# Patient Record
Sex: FEMALE | Race: OTHER | HISPANIC OR LATINO
[De-identification: names, ages, dates, MRNs, and addresses within clinical notes are randomized per-mention and may not be internally consistent; named-entity substitution may affect disease eponyms.]

---

## 2017-04-04 PROBLEM — Z00.00 ENCOUNTER FOR PREVENTIVE HEALTH EXAMINATION: Status: ACTIVE | Noted: 2017-04-04

## 2017-04-26 ENCOUNTER — LABORATORY RESULT (OUTPATIENT)
Age: 72
End: 2017-04-26

## 2017-04-26 ENCOUNTER — APPOINTMENT (OUTPATIENT)
Dept: TRANSPLANT | Facility: CLINIC | Age: 72
End: 2017-04-26

## 2017-04-26 ENCOUNTER — APPOINTMENT (OUTPATIENT)
Dept: NEPHROLOGY | Facility: CLINIC | Age: 72
End: 2017-04-26

## 2017-04-26 ENCOUNTER — OUTPATIENT (OUTPATIENT)
Dept: OUTPATIENT SERVICES | Facility: HOSPITAL | Age: 72
LOS: 1 days | End: 2017-04-26

## 2017-04-26 ENCOUNTER — OUTPATIENT (OUTPATIENT)
Dept: OUTPATIENT SERVICES | Facility: HOSPITAL | Age: 72
LOS: 1 days | End: 2017-04-26
Payer: COMMERCIAL

## 2017-04-26 VITALS
BODY MASS INDEX: 20.34 KG/M2 | HEIGHT: 66.75 IN | WEIGHT: 129.6 LBS | RESPIRATION RATE: 16 BRPM | TEMPERATURE: 98.7 F | DIASTOLIC BLOOD PRESSURE: 90 MMHG | SYSTOLIC BLOOD PRESSURE: 180 MMHG | OXYGEN SATURATION: 98 % | HEART RATE: 76 BPM

## 2017-04-26 VITALS — BODY MASS INDEX: 21.82 KG/M2 | WEIGHT: 129.4 LBS | HEIGHT: 64.75 IN

## 2017-04-26 VITALS
RESPIRATION RATE: 16 BRPM | SYSTOLIC BLOOD PRESSURE: 180 MMHG | DIASTOLIC BLOOD PRESSURE: 90 MMHG | WEIGHT: 129.59 LBS | BODY MASS INDEX: 20.45 KG/M2

## 2017-04-26 DIAGNOSIS — N18.6 END STAGE RENAL DISEASE: ICD-10-CM

## 2017-04-26 DIAGNOSIS — Z63.5 DISRUPTION OF FAMILY BY SEPARATION AND DIVORCE: ICD-10-CM

## 2017-04-26 DIAGNOSIS — Z82.3 FAMILY HISTORY OF STROKE: ICD-10-CM

## 2017-04-26 DIAGNOSIS — Z82.49 FAMILY HISTORY OF ISCHEMIC HEART DISEASE AND OTHER DISEASES OF THE CIRCULATORY SYSTEM: ICD-10-CM

## 2017-04-26 DIAGNOSIS — H40.9 UNSPECIFIED GLAUCOMA: ICD-10-CM

## 2017-04-26 DIAGNOSIS — Z78.9 OTHER SPECIFIED HEALTH STATUS: ICD-10-CM

## 2017-04-26 DIAGNOSIS — Z86.69 PERSONAL HISTORY OF OTHER DISEASES OF THE NERVOUS SYSTEM AND SENSE ORGANS: ICD-10-CM

## 2017-04-26 DIAGNOSIS — Z87.891 PERSONAL HISTORY OF NICOTINE DEPENDENCE: ICD-10-CM

## 2017-04-26 DIAGNOSIS — Z87.448 PERSONAL HISTORY OF OTHER DISEASES OF URINARY SYSTEM: ICD-10-CM

## 2017-04-26 LAB
ALBUMIN SERPL ELPH-MCNC: 4.9 G/DL — SIGNIFICANT CHANGE UP (ref 3.3–5)
ALP SERPL-CCNC: 95 U/L — SIGNIFICANT CHANGE UP (ref 40–120)
ALT FLD-CCNC: 7 U/L — LOW (ref 10–45)
ANION GAP SERPL CALC-SCNC: 21 MMOL/L — HIGH (ref 5–17)
AST SERPL-CCNC: 15 U/L — SIGNIFICANT CHANGE UP (ref 10–40)
BASOPHILS # BLD AUTO: 0.02 K/UL — SIGNIFICANT CHANGE UP (ref 0–0.2)
BASOPHILS NFR BLD AUTO: 0.5 % — SIGNIFICANT CHANGE UP (ref 0–2)
BILIRUB SERPL-MCNC: 0.3 MG/DL — SIGNIFICANT CHANGE UP (ref 0.2–1.2)
BLD GP AB SCN SERPL QL: NEGATIVE — SIGNIFICANT CHANGE UP
BUN SERPL-MCNC: 43 MG/DL — HIGH (ref 7–23)
CALCIUM SERPL-MCNC: 9.6 MG/DL — SIGNIFICANT CHANGE UP (ref 8.4–10.5)
CHLORIDE SERPL-SCNC: 97 MMOL/L — SIGNIFICANT CHANGE UP (ref 96–108)
CO2 SERPL-SCNC: 25 MMOL/L — SIGNIFICANT CHANGE UP (ref 22–31)
CREAT SERPL-MCNC: 6.98 MG/DL — HIGH (ref 0.5–1.3)
EOSINOPHIL # BLD AUTO: 0.14 K/UL — SIGNIFICANT CHANGE UP (ref 0–0.5)
EOSINOPHIL NFR BLD AUTO: 3.5 % — SIGNIFICANT CHANGE UP (ref 0–6)
GLUCOSE SERPL-MCNC: 122 MG/DL — HIGH (ref 70–99)
HAV IGG+IGM SER QL: REACTIVE
HBV CORE AB SER-ACNC: SIGNIFICANT CHANGE UP
HBV SURFACE AB SER-ACNC: 646.8 MIU/ML — SIGNIFICANT CHANGE UP
HBV SURFACE AB SER-ACNC: REACTIVE
HBV SURFACE AG SER-ACNC: SIGNIFICANT CHANGE UP
HCT VFR BLD CALC: 39 % — SIGNIFICANT CHANGE UP (ref 34.5–45)
HCV AB S/CO SERPL IA: 0.35 S/CO — SIGNIFICANT CHANGE UP
HCV AB SERPL-IMP: SIGNIFICANT CHANGE UP
HGB BLD-MCNC: 13.1 G/DL — SIGNIFICANT CHANGE UP (ref 11.5–15.5)
HIV 1+2 AB+HIV1 P24 AG SERPL QL IA: SIGNIFICANT CHANGE UP
IMM GRANULOCYTES NFR BLD AUTO: 0 % — SIGNIFICANT CHANGE UP (ref 0–1.5)
LDH SERPL L TO P-CCNC: 218 U/L — SIGNIFICANT CHANGE UP (ref 50–242)
LYMPHOCYTES # BLD AUTO: 1.32 K/UL — SIGNIFICANT CHANGE UP (ref 1–3.3)
LYMPHOCYTES # BLD AUTO: 33.2 % — SIGNIFICANT CHANGE UP (ref 13–44)
MAGNESIUM SERPL-MCNC: 2.4 MG/DL — SIGNIFICANT CHANGE UP (ref 1.6–2.6)
MCHC RBC-ENTMCNC: 30 PG — SIGNIFICANT CHANGE UP (ref 27–34)
MCHC RBC-ENTMCNC: 33.6 GM/DL — SIGNIFICANT CHANGE UP (ref 32–36)
MCV RBC AUTO: 89.4 FL — SIGNIFICANT CHANGE UP (ref 80–100)
MONOCYTES # BLD AUTO: 0.3 K/UL — SIGNIFICANT CHANGE UP (ref 0–0.9)
MONOCYTES NFR BLD AUTO: 7.6 % — SIGNIFICANT CHANGE UP (ref 2–14)
NEUTROPHILS # BLD AUTO: 2.19 K/UL — SIGNIFICANT CHANGE UP (ref 1.8–7.4)
NEUTROPHILS NFR BLD AUTO: 55.2 % — SIGNIFICANT CHANGE UP (ref 43–77)
PHOSPHATE SERPL-MCNC: 5.4 MG/DL — HIGH (ref 2.5–4.5)
PLATELET # BLD AUTO: 227 K/UL — SIGNIFICANT CHANGE UP (ref 150–400)
POTASSIUM SERPL-MCNC: 4.3 MMOL/L — SIGNIFICANT CHANGE UP (ref 3.5–5.3)
POTASSIUM SERPL-SCNC: 4.3 MMOL/L — SIGNIFICANT CHANGE UP (ref 3.5–5.3)
PROT SERPL-MCNC: 7.9 G/DL — SIGNIFICANT CHANGE UP (ref 6–8.3)
RBC # BLD: 4.36 M/UL — SIGNIFICANT CHANGE UP (ref 3.8–5.2)
RBC # FLD: 15.3 % — HIGH (ref 10.3–14.5)
RH IG SCN BLD-IMP: POSITIVE — SIGNIFICANT CHANGE UP
SODIUM SERPL-SCNC: 143 MMOL/L — SIGNIFICANT CHANGE UP (ref 135–145)
WBC # BLD: 3.97 K/UL — SIGNIFICANT CHANGE UP (ref 3.8–10.5)
WBC # FLD AUTO: 3.97 K/UL — SIGNIFICANT CHANGE UP (ref 3.8–10.5)

## 2017-04-26 PROCEDURE — 86696 HERPES SIMPLEX TYPE 2 TEST: CPT

## 2017-04-26 PROCEDURE — 86695 HERPES SIMPLEX TYPE 1 TEST: CPT

## 2017-04-26 PROCEDURE — 86663 EPSTEIN-BARR ANTIBODY: CPT

## 2017-04-26 PROCEDURE — 87389 HIV-1 AG W/HIV-1&-2 AB AG IA: CPT

## 2017-04-26 PROCEDURE — 83735 ASSAY OF MAGNESIUM: CPT

## 2017-04-26 PROCEDURE — 86664 EPSTEIN-BARR NUCLEAR ANTIGEN: CPT

## 2017-04-26 PROCEDURE — 86644 CMV ANTIBODY: CPT

## 2017-04-26 PROCEDURE — 84100 ASSAY OF PHOSPHORUS: CPT

## 2017-04-26 PROCEDURE — 85027 COMPLETE CBC AUTOMATED: CPT

## 2017-04-26 PROCEDURE — 86706 HEP B SURFACE ANTIBODY: CPT

## 2017-04-26 PROCEDURE — 86901 BLOOD TYPING SEROLOGIC RH(D): CPT

## 2017-04-26 PROCEDURE — 87340 HEPATITIS B SURFACE AG IA: CPT

## 2017-04-26 PROCEDURE — 80053 COMPREHEN METABOLIC PANEL: CPT

## 2017-04-26 PROCEDURE — 86850 RBC ANTIBODY SCREEN: CPT

## 2017-04-26 PROCEDURE — 86777 TOXOPLASMA ANTIBODY: CPT

## 2017-04-26 PROCEDURE — 86787 VARICELLA-ZOSTER ANTIBODY: CPT

## 2017-04-26 PROCEDURE — 86592 SYPHILIS TEST NON-TREP QUAL: CPT

## 2017-04-26 PROCEDURE — 86704 HEP B CORE ANTIBODY TOTAL: CPT

## 2017-04-26 PROCEDURE — 86665 EPSTEIN-BARR CAPSID VCA: CPT

## 2017-04-26 PROCEDURE — 86900 BLOOD TYPING SEROLOGIC ABO: CPT

## 2017-04-26 PROCEDURE — 83036 HEMOGLOBIN GLYCOSYLATED A1C: CPT

## 2017-04-26 PROCEDURE — 86708 HEPATITIS A ANTIBODY: CPT

## 2017-04-26 PROCEDURE — 86762 RUBELLA ANTIBODY: CPT

## 2017-04-26 PROCEDURE — 86803 HEPATITIS C AB TEST: CPT

## 2017-04-26 PROCEDURE — 83615 LACTATE (LD) (LDH) ENZYME: CPT

## 2017-04-26 SDOH — SOCIAL STABILITY - SOCIAL INSECURITY: DISRUPTION OF FAMILY BY SEPARATION AND DIVORCE: Z63.5

## 2017-04-27 LAB
CMV IGG FLD QL: >10 U/ML — HIGH
CMV IGG SERPL-IMP: POSITIVE
EBV EA AB SER IA-ACNC: 19.9 U/ML — HIGH
EBV EA AB TITR SER IF: POSITIVE
EBV EA IGG SER-ACNC: POSITIVE
EBV NA IGG SER IA-ACNC: 527 U/ML — HIGH
EBV PATRN SPEC IB-IMP: SIGNIFICANT CHANGE UP
EBV VCA IGG AVIDITY SER QL IA: POSITIVE
EBV VCA IGM SER IA-ACNC: 273 U/ML — HIGH
EBV VCA IGM SER IA-ACNC: <10 U/ML — SIGNIFICANT CHANGE UP
EBV VCA IGM TITR FLD: NEGATIVE — SIGNIFICANT CHANGE UP
HBA1C BLD-MCNC: 4.9 % — SIGNIFICANT CHANGE UP (ref 4–5.6)
HSV1 IGG SER-ACNC: 39.6 INDEX — HIGH
HSV1 IGG SERPL QL IA: POSITIVE
HSV2 IGG FLD-ACNC: 21.4 INDEX — HIGH
HSV2 IGG SERPL QL IA: POSITIVE
RPR SERPL-ACNC: SIGNIFICANT CHANGE UP
RUBV IGG SER-ACNC: 17.1 INDEX — SIGNIFICANT CHANGE UP
RUBV IGG SER-IMP: POSITIVE — SIGNIFICANT CHANGE UP
T GONDII IGG SER QL: 137 IU/ML — HIGH
T GONDII IGG SER QL: POSITIVE
VZV IGG FLD QL IA: 3037 INDEX — SIGNIFICANT CHANGE UP
VZV IGG FLD QL IA: POSITIVE — SIGNIFICANT CHANGE UP

## 2017-05-02 ENCOUNTER — FORM ENCOUNTER (OUTPATIENT)
Age: 72
End: 2017-05-02

## 2017-05-03 ENCOUNTER — APPOINTMENT (OUTPATIENT)
Dept: ULTRASOUND IMAGING | Facility: HOSPITAL | Age: 72
End: 2017-05-03

## 2017-05-03 ENCOUNTER — OUTPATIENT (OUTPATIENT)
Dept: OUTPATIENT SERVICES | Facility: HOSPITAL | Age: 72
LOS: 1 days | End: 2017-05-03
Payer: COMMERCIAL

## 2017-05-03 DIAGNOSIS — Z01.818 ENCOUNTER FOR OTHER PREPROCEDURAL EXAMINATION: ICD-10-CM

## 2017-05-03 PROCEDURE — 93978 VASCULAR STUDY: CPT

## 2017-05-03 PROCEDURE — 71020: CPT | Mod: 26

## 2017-05-03 PROCEDURE — 93978 VASCULAR STUDY: CPT | Mod: 26

## 2017-05-03 PROCEDURE — 76700 US EXAM ABDOM COMPLETE: CPT | Mod: 26

## 2017-05-03 PROCEDURE — 76700 US EXAM ABDOM COMPLETE: CPT

## 2017-05-03 PROCEDURE — 71046 X-RAY EXAM CHEST 2 VIEWS: CPT

## 2017-05-08 ENCOUNTER — APPOINTMENT (OUTPATIENT)
Dept: GASTROENTEROLOGY | Facility: CLINIC | Age: 72
End: 2017-05-08

## 2017-05-23 ENCOUNTER — INPATIENT (INPATIENT)
Facility: HOSPITAL | Age: 72
LOS: 4 days | Discharge: HOME | DRG: 391 | End: 2017-05-28
Attending: INTERNAL MEDICINE | Admitting: INTERNAL MEDICINE
Payer: MEDICARE

## 2017-05-23 VITALS
SYSTOLIC BLOOD PRESSURE: 209 MMHG | TEMPERATURE: 98 F | HEART RATE: 94 BPM | RESPIRATION RATE: 18 BRPM | OXYGEN SATURATION: 100 % | DIASTOLIC BLOOD PRESSURE: 100 MMHG

## 2017-05-23 DIAGNOSIS — H26.9 UNSPECIFIED CATARACT: Chronic | ICD-10-CM

## 2017-05-23 DIAGNOSIS — N18.6 END STAGE RENAL DISEASE: ICD-10-CM

## 2017-05-23 DIAGNOSIS — S72.009A FRACTURE OF UNSPECIFIED PART OF NECK OF UNSPECIFIED FEMUR, INITIAL ENCOUNTER FOR CLOSED FRACTURE: Chronic | ICD-10-CM

## 2017-05-23 DIAGNOSIS — R11.2 NAUSEA WITH VOMITING, UNSPECIFIED: ICD-10-CM

## 2017-05-23 DIAGNOSIS — Z90.710 ACQUIRED ABSENCE OF BOTH CERVIX AND UTERUS: Chronic | ICD-10-CM

## 2017-05-23 DIAGNOSIS — Z29.9 ENCOUNTER FOR PROPHYLACTIC MEASURES, UNSPECIFIED: ICD-10-CM

## 2017-05-23 DIAGNOSIS — I10 ESSENTIAL (PRIMARY) HYPERTENSION: ICD-10-CM

## 2017-05-23 LAB
ALBUMIN SERPL ELPH-MCNC: 4.3 G/DL — SIGNIFICANT CHANGE UP (ref 3.3–5)
ALP SERPL-CCNC: 97 U/L — SIGNIFICANT CHANGE UP (ref 40–120)
ALT FLD-CCNC: 7 U/L RC — LOW (ref 10–45)
ANION GAP SERPL CALC-SCNC: 26 MMOL/L — HIGH (ref 5–17)
APTT BLD: 33.5 SEC — SIGNIFICANT CHANGE UP (ref 27.5–37.4)
AST SERPL-CCNC: 16 U/L — SIGNIFICANT CHANGE UP (ref 10–40)
BASOPHILS # BLD AUTO: 0 K/UL — SIGNIFICANT CHANGE UP (ref 0–0.2)
BASOPHILS NFR BLD AUTO: 0.1 % — SIGNIFICANT CHANGE UP (ref 0–2)
BILIRUB SERPL-MCNC: 0.3 MG/DL — SIGNIFICANT CHANGE UP (ref 0.2–1.2)
BUN SERPL-MCNC: 70 MG/DL — HIGH (ref 7–23)
CALCIUM SERPL-MCNC: 8.8 MG/DL — SIGNIFICANT CHANGE UP (ref 8.4–10.5)
CHLORIDE SERPL-SCNC: 95 MMOL/L — LOW (ref 96–108)
CO2 SERPL-SCNC: 21 MMOL/L — LOW (ref 22–31)
CREAT SERPL-MCNC: 10.09 MG/DL — HIGH (ref 0.5–1.3)
EOSINOPHIL # BLD AUTO: 0.1 K/UL — SIGNIFICANT CHANGE UP (ref 0–0.5)
EOSINOPHIL NFR BLD AUTO: 1.6 % — SIGNIFICANT CHANGE UP (ref 0–6)
GAS PNL BLDV: SIGNIFICANT CHANGE UP
GLUCOSE SERPL-MCNC: 138 MG/DL — HIGH (ref 70–99)
HCT VFR BLD CALC: 34 % — LOW (ref 34.5–45)
HGB BLD-MCNC: 11.6 G/DL — SIGNIFICANT CHANGE UP (ref 11.5–15.5)
INR BLD: 0.98 RATIO — SIGNIFICANT CHANGE UP (ref 0.88–1.16)
LIDOCAIN IGE QN: 120 U/L — HIGH (ref 7–60)
LYMPHOCYTES # BLD AUTO: 1 K/UL — SIGNIFICANT CHANGE UP (ref 1–3.3)
LYMPHOCYTES # BLD AUTO: 16.3 % — SIGNIFICANT CHANGE UP (ref 13–44)
MAGNESIUM SERPL-MCNC: 2.8 MG/DL — HIGH (ref 1.6–2.6)
MCHC RBC-ENTMCNC: 31.1 PG — SIGNIFICANT CHANGE UP (ref 27–34)
MCHC RBC-ENTMCNC: 34.1 GM/DL — SIGNIFICANT CHANGE UP (ref 32–36)
MCV RBC AUTO: 91.3 FL — SIGNIFICANT CHANGE UP (ref 80–100)
MONOCYTES # BLD AUTO: 0.2 K/UL — SIGNIFICANT CHANGE UP (ref 0–0.9)
MONOCYTES NFR BLD AUTO: 3.1 % — SIGNIFICANT CHANGE UP (ref 2–14)
NEUTROPHILS # BLD AUTO: 4.9 K/UL — SIGNIFICANT CHANGE UP (ref 1.8–7.4)
NEUTROPHILS NFR BLD AUTO: 79 % — HIGH (ref 43–77)
PLATELET # BLD AUTO: 202 K/UL — SIGNIFICANT CHANGE UP (ref 150–400)
POTASSIUM SERPL-MCNC: 4.3 MMOL/L — SIGNIFICANT CHANGE UP (ref 3.5–5.3)
POTASSIUM SERPL-SCNC: 4.3 MMOL/L — SIGNIFICANT CHANGE UP (ref 3.5–5.3)
PROT SERPL-MCNC: 8.1 G/DL — SIGNIFICANT CHANGE UP (ref 6–8.3)
PROTHROM AB SERPL-ACNC: 10.6 SEC — SIGNIFICANT CHANGE UP (ref 9.8–12.7)
RBC # BLD: 3.72 M/UL — LOW (ref 3.8–5.2)
RBC # FLD: 14.3 % — SIGNIFICANT CHANGE UP (ref 10.3–14.5)
SODIUM SERPL-SCNC: 142 MMOL/L — SIGNIFICANT CHANGE UP (ref 135–145)
WBC # BLD: 6.2 K/UL — SIGNIFICANT CHANGE UP (ref 3.8–10.5)
WBC # FLD AUTO: 6.2 K/UL — SIGNIFICANT CHANGE UP (ref 3.8–10.5)

## 2017-05-23 PROCEDURE — 74177 CT ABD & PELVIS W/CONTRAST: CPT | Mod: 26

## 2017-05-23 PROCEDURE — 99223 1ST HOSP IP/OBS HIGH 75: CPT

## 2017-05-23 PROCEDURE — 99285 EMERGENCY DEPT VISIT HI MDM: CPT | Mod: GC

## 2017-05-23 RX ORDER — HYDRALAZINE HCL 50 MG
75 TABLET ORAL ONCE
Qty: 0 | Refills: 0 | Status: COMPLETED | OUTPATIENT
Start: 2017-05-23 | End: 2017-05-23

## 2017-05-23 RX ORDER — AMLODIPINE BESYLATE 2.5 MG/1
10 TABLET ORAL DAILY
Qty: 0 | Refills: 0 | Status: DISCONTINUED | OUTPATIENT
Start: 2017-05-23 | End: 2017-05-28

## 2017-05-23 RX ORDER — ACETAMINOPHEN 500 MG
650 TABLET ORAL EVERY 6 HOURS
Qty: 0 | Refills: 0 | Status: DISCONTINUED | OUTPATIENT
Start: 2017-05-23 | End: 2017-05-28

## 2017-05-23 RX ORDER — HEPARIN SODIUM 5000 [USP'U]/ML
5000 INJECTION INTRAVENOUS; SUBCUTANEOUS EVERY 12 HOURS
Qty: 0 | Refills: 0 | Status: DISCONTINUED | OUTPATIENT
Start: 2017-05-23 | End: 2017-05-28

## 2017-05-23 RX ORDER — HYDRALAZINE HCL 50 MG
0 TABLET ORAL
Qty: 0 | Refills: 0 | COMMUNITY

## 2017-05-23 RX ORDER — ONDANSETRON 8 MG/1
4 TABLET, FILM COATED ORAL ONCE
Qty: 0 | Refills: 0 | Status: COMPLETED | OUTPATIENT
Start: 2017-05-23 | End: 2017-05-23

## 2017-05-23 RX ORDER — HYDRALAZINE HCL 50 MG
75 TABLET ORAL THREE TIMES A DAY
Qty: 0 | Refills: 0 | Status: DISCONTINUED | OUTPATIENT
Start: 2017-05-23 | End: 2017-05-26

## 2017-05-23 RX ORDER — AMLODIPINE BESYLATE 2.5 MG/1
0 TABLET ORAL
Qty: 0 | Refills: 0 | COMMUNITY

## 2017-05-23 RX ORDER — CALCIUM ACETATE 667 MG
2001 TABLET ORAL
Qty: 0 | Refills: 0 | Status: DISCONTINUED | OUTPATIENT
Start: 2017-05-23 | End: 2017-05-28

## 2017-05-23 RX ADMIN — HEPARIN SODIUM 5000 UNIT(S): 5000 INJECTION INTRAVENOUS; SUBCUTANEOUS at 21:56

## 2017-05-23 RX ADMIN — Medication 30 MILLILITER(S): at 12:03

## 2017-05-23 RX ADMIN — Medication 75 MILLIGRAM(S): at 21:56

## 2017-05-23 RX ADMIN — ONDANSETRON 4 MILLIGRAM(S): 8 TABLET, FILM COATED ORAL at 12:03

## 2017-05-23 RX ADMIN — Medication 75 MILLIGRAM(S): at 12:03

## 2017-05-23 NOTE — ED PROCEDURE NOTE - CPROC ED POST PROC CARE GUIDE1
Verbal/written post procedure instructions were given to patient/caregiver./Keep the cast/splint/dressing clean and dry.

## 2017-05-23 NOTE — H&P ADULT. - HISTORY OF PRESENT ILLNESS
Initial ED vitals--Afebrile HR 94 209/100, 18 100%   Initial labs notable for WBC 6.2 Hb 11.6 Plt 202; INR 0.96 BMP Cr 10.09 K 4.3 Mg 2.8; VBG with negative lactate 1.5   Imaging notable for CT Abd/Pelvis with submucosal fatty deposition in the descending colon and wall thickening transverse colon with evidence of acute on chronic colitis.     In the ED, she received Hydralazine 75mg PO x1, Zofran 4mg x1 and Maalox.     Admitted to medicine for further evaluation. 72 yo woman w/PMHx HTN, glaucoma, and ESRD on HD (Tues, Thurs, Sat) presenting with nausea and vomiting for 1 day. She reports that when she woke up this am at 7am she felt malaise. She then noted nausea when she took a few bites of toast. She had coffee and felt persistently nauseous. Her BP at that time 184/100 and she attributed her symptoms to her elevated blood pressure. She took her BP med and went to lay down. After resting for ~30mins, she reports that she vomited nonbloody nonbilious emesis mainly the coffee she had drank earlier. Associated with moderate "gas like pains." No diarrhea/constipation, sick contacts, or recent travel. She reports that yesterday morning she tried quail eggs for the first time. Denied any fevers/chills, CP, SOB, skin changes or dysuria. She reports that she still makes scant urine.     Initial ED vitals--Afebrile HR 94 209/100, 18 100%   Initial labs notable for WBC 6.2 Hb 11.6 Plt 202; INR 0.96 BMP Cr 10.09 K 4.3 Mg 2.8; VBG with negative lactate 1.5   Imaging notable for CT Abd/Pelvis with submucosal fatty deposition in the descending colon and wall thickening transverse colon with evidence of acute on chronic colitis.     In the ED, she received Hydralazine 75mg PO x1, Zofran 4mg x1 and Maalox.     Admitted to medicine for further evaluation.

## 2017-05-23 NOTE — H&P ADULT. - PROBLEM SELECTOR PLAN 3
- Renal consulted, seen in HD, recs appreciated  - Will start phoslo 3 tabs TID; check phos in AM   - Monitor volume status and lytes daily

## 2017-05-23 NOTE — ED ADULT NURSE REASSESSMENT NOTE - NS ED NURSE REASSESS COMMENT FT1
Report given to Beevrley MARINO - DIALYSIS. Pt is resting comfortably in bed. NAD noted. Son at bedside.

## 2017-05-23 NOTE — ED PROVIDER NOTE - MEDICAL DECISION MAKING DETAILS
71F on HD from chronic HTN presents with gastroenteritis type symptoms on day of dialysis. Denies chest pain, shortness of breath, headache. Will obtain blood, and control symptoms. Will need dialysis set up today. Rambo Cook, Resident. Woods: 71F on HD from chronic HTN presents with gastroenteritis type symptoms on day of dialysis. Denies chest pain, shortness of breath, headache. Will obtain blood, and control symptoms. Will need dialysis set up today. will check labs.

## 2017-05-23 NOTE — ED PROVIDER NOTE - OBJECTIVE STATEMENT
71F history of HTN, HD (T-TH-Sat) presents with vomiting (nbnb) and hypertension associated with gas-like epigastric pains. Denies fevers/chills, vision changes, headache, chest pain, shortness of breath, bowel/bladder changes. Still makes urine.     Nepho: Dipesh ([address])  PMD: Kourtney Tabares

## 2017-05-23 NOTE — H&P ADULT. - ASSESSMENT
71F w/HTN, glaucoma, and ESRD on HD (Tues, Thurs, Sat) presenting with nausea and vomiting for 1 day likely gastroenteritis admitted to medicine for further evaluation.

## 2017-05-23 NOTE — H&P ADULT. - RADIOLOGY RESULTS AND INTERPRETATION
Personally reviewed imaging. Imaging notable for CT Abd/Pelvis with submucosal fatty deposition in the descending colon and wall thickening transverse colon with evidence of acute on chronic colitis.

## 2017-05-23 NOTE — H&P ADULT. - PROBLEM SELECTOR PLAN 1
symptoms either related to gastroenteritis vs. acute on chronic colitis (no diarrhea). Afebrile, no leukocytosis. Symptoms improved, no longer feeling nauseous or having any abdominal pain.   - Dose Zofran if needed, Monitor QTc 499 on admission   - Monitor symptoms

## 2017-05-23 NOTE — ED ADULT NURSE REASSESSMENT NOTE - NS ED NURSE REASSESS COMMENT FT1
Pt states partial relief. Pt has not vomited since arrival. NAD noted. Pt is hypertensive - MD aware. Family at bedside. Plan of care discussed w/ pt and family.

## 2017-05-23 NOTE — ED PROVIDER NOTE - PROGRESS NOTE DETAILS
Spoke with house dialysis fellow who stated that pt's MD are private and need to be contacted to set up HD. She provided the numbers for the MDs but when called, no answer and mailbox full. Office contacted and left message with NP who also did not answer. Service line called and left message for Dr. Treviño. Rambo Cook, Resident. Dr. Treviño called and states that his partner, Dr. Lloyd is on call. He will notify Dr. Lloyd that patient needs dialysis today and took my number to give to Dr. Lloyd. Rambo Cook, Resident. Dr. Lloyd called back and will notify dialysis that patient needs to be dialyzed. Rambo Cook, Resident. Pt's PMD called and message sent for call back. Rambo Cook, Resident.

## 2017-05-23 NOTE — H&P ADULT. - PROBLEM SELECTOR PLAN 2
- c/w Hydralazine 75mg TID and Amlodipine 10mg qd   - Trend BP closely and adjust antihypertensives as needed

## 2017-05-23 NOTE — H&P ADULT. - LAB RESULTS AND INTERPRETATION
Personally reviewed labs. Initial labs notable for WBC 6.2 Hb 11.6 Plt 202; INR 0.96 BMP Cr 10.09 K 4.3 Mg 2.8; VBG with negative lactate 1.5

## 2017-05-23 NOTE — ED ADULT NURSE NOTE - OBJECTIVE STATEMENT
72yo female pt AxOx3 ambulatory to ED c/o N/V prior to going to dialysis today. Pt denies CP/SOB/diarrhea/fever/chills. +epigastric pain/burning. Abd soft/NT/ND/+BSx4. LAV fistula. +thrills/bruit, no signs of redness/swelling/edema noted to site. Pt is hypertensive. B/L lungs CTA. No edema noted to extremities. #20G R thumb, labs drawn and sent. Family at bedside. MD at bedside for eval. Safety maintained.

## 2017-05-24 ENCOUNTER — APPOINTMENT (OUTPATIENT)
Dept: CARDIOLOGY | Facility: CLINIC | Age: 72
End: 2017-05-24

## 2017-05-24 LAB
ANION GAP SERPL CALC-SCNC: 18 MMOL/L — HIGH (ref 5–17)
APPEARANCE UR: ABNORMAL
BACTERIA # UR AUTO: ABNORMAL /HPF
BASOPHILS # BLD AUTO: 0.01 K/UL — SIGNIFICANT CHANGE UP (ref 0–0.2)
BASOPHILS NFR BLD AUTO: 0.3 % — SIGNIFICANT CHANGE UP (ref 0–2)
BILIRUB UR-MCNC: NEGATIVE — SIGNIFICANT CHANGE UP
BUN SERPL-MCNC: 29 MG/DL — HIGH (ref 7–23)
CALCIUM SERPL-MCNC: 8.2 MG/DL — LOW (ref 8.4–10.5)
CHLORIDE SERPL-SCNC: 95 MMOL/L — LOW (ref 96–108)
CO2 SERPL-SCNC: 26 MMOL/L — SIGNIFICANT CHANGE UP (ref 22–31)
COLOR SPEC: SIGNIFICANT CHANGE UP
CREAT SERPL-MCNC: 5.82 MG/DL — HIGH (ref 0.5–1.3)
DIFF PNL FLD: ABNORMAL
EOSINOPHIL # BLD AUTO: 0.14 K/UL — SIGNIFICANT CHANGE UP (ref 0–0.5)
EOSINOPHIL NFR BLD AUTO: 3.8 % — SIGNIFICANT CHANGE UP (ref 0–6)
EPI CELLS # UR: SIGNIFICANT CHANGE UP /HPF
GLUCOSE SERPL-MCNC: 88 MG/DL — SIGNIFICANT CHANGE UP (ref 70–99)
GLUCOSE UR QL: 250
HCT VFR BLD CALC: 31.9 % — LOW (ref 34.5–45)
HGB BLD-MCNC: 10.2 G/DL — LOW (ref 11.5–15.5)
HYALINE CASTS # UR AUTO: ABNORMAL
IMM GRANULOCYTES NFR BLD AUTO: 0.3 % — SIGNIFICANT CHANGE UP (ref 0–1.5)
KETONES UR-MCNC: NEGATIVE — SIGNIFICANT CHANGE UP
LEUKOCYTE ESTERASE UR-ACNC: NEGATIVE — SIGNIFICANT CHANGE UP
LYMPHOCYTES # BLD AUTO: 0.91 K/UL — LOW (ref 1–3.3)
LYMPHOCYTES # BLD AUTO: 24.9 % — SIGNIFICANT CHANGE UP (ref 13–44)
MAGNESIUM SERPL-MCNC: 2.7 MG/DL — HIGH (ref 1.6–2.6)
MCHC RBC-ENTMCNC: 28.9 PG — SIGNIFICANT CHANGE UP (ref 27–34)
MCHC RBC-ENTMCNC: 32 GM/DL — SIGNIFICANT CHANGE UP (ref 32–36)
MCV RBC AUTO: 90.4 FL — SIGNIFICANT CHANGE UP (ref 80–100)
MONOCYTES # BLD AUTO: 0.29 K/UL — SIGNIFICANT CHANGE UP (ref 0–0.9)
MONOCYTES NFR BLD AUTO: 7.9 % — SIGNIFICANT CHANGE UP (ref 2–14)
NEUTROPHILS # BLD AUTO: 2.29 K/UL — SIGNIFICANT CHANGE UP (ref 1.8–7.4)
NEUTROPHILS NFR BLD AUTO: 62.8 % — SIGNIFICANT CHANGE UP (ref 43–77)
NITRITE UR-MCNC: NEGATIVE — SIGNIFICANT CHANGE UP
PH UR: 8.5 — HIGH (ref 5–8)
PHOSPHATE SERPL-MCNC: 5.7 MG/DL — HIGH (ref 2.5–4.5)
PLATELET # BLD AUTO: 221 K/UL — SIGNIFICANT CHANGE UP (ref 150–400)
POTASSIUM SERPL-MCNC: 4.5 MMOL/L — SIGNIFICANT CHANGE UP (ref 3.5–5.3)
POTASSIUM SERPL-SCNC: 4.5 MMOL/L — SIGNIFICANT CHANGE UP (ref 3.5–5.3)
PROT UR-MCNC: >600 MG/DL
RBC # BLD: 3.53 M/UL — LOW (ref 3.8–5.2)
RBC # FLD: 15.2 % — HIGH (ref 10.3–14.5)
RBC CASTS # UR COMP ASSIST: SIGNIFICANT CHANGE UP /HPF (ref 0–2)
SODIUM SERPL-SCNC: 139 MMOL/L — SIGNIFICANT CHANGE UP (ref 135–145)
SP GR SPEC: 1.01 — SIGNIFICANT CHANGE UP (ref 1.01–1.02)
UROBILINOGEN FLD QL: NEGATIVE — SIGNIFICANT CHANGE UP
WBC # BLD: 3.65 K/UL — LOW (ref 3.8–10.5)
WBC # FLD AUTO: 3.65 K/UL — LOW (ref 3.8–10.5)
WBC UR QL: SIGNIFICANT CHANGE UP /HPF (ref 0–5)

## 2017-05-24 RX ORDER — ONDANSETRON 8 MG/1
4 TABLET, FILM COATED ORAL ONCE
Qty: 0 | Refills: 0 | Status: COMPLETED | OUTPATIENT
Start: 2017-05-24 | End: 2017-05-24

## 2017-05-24 RX ORDER — CIPROFLOXACIN LACTATE 400MG/40ML
200 VIAL (ML) INTRAVENOUS EVERY 24 HOURS
Qty: 0 | Refills: 0 | Status: DISCONTINUED | OUTPATIENT
Start: 2017-05-25 | End: 2017-05-26

## 2017-05-24 RX ORDER — PANTOPRAZOLE SODIUM 20 MG/1
40 TABLET, DELAYED RELEASE ORAL
Qty: 0 | Refills: 0 | Status: DISCONTINUED | OUTPATIENT
Start: 2017-05-24 | End: 2017-05-26

## 2017-05-24 RX ORDER — METRONIDAZOLE 500 MG
500 TABLET ORAL EVERY 8 HOURS
Qty: 0 | Refills: 0 | Status: DISCONTINUED | OUTPATIENT
Start: 2017-05-24 | End: 2017-05-26

## 2017-05-24 RX ORDER — CIPROFLOXACIN LACTATE 400MG/40ML
200 VIAL (ML) INTRAVENOUS ONCE
Qty: 0 | Refills: 0 | Status: COMPLETED | OUTPATIENT
Start: 2017-05-24 | End: 2017-05-24

## 2017-05-24 RX ORDER — METRONIDAZOLE 500 MG
TABLET ORAL
Qty: 0 | Refills: 0 | Status: DISCONTINUED | OUTPATIENT
Start: 2017-05-24 | End: 2017-05-26

## 2017-05-24 RX ORDER — CIPROFLOXACIN LACTATE 400MG/40ML
VIAL (ML) INTRAVENOUS
Qty: 0 | Refills: 0 | Status: DISCONTINUED | OUTPATIENT
Start: 2017-05-24 | End: 2017-05-26

## 2017-05-24 RX ORDER — SOD SULF/SODIUM/NAHCO3/KCL/PEG
4000 SOLUTION, RECONSTITUTED, ORAL ORAL ONCE
Qty: 0 | Refills: 0 | Status: COMPLETED | OUTPATIENT
Start: 2017-05-25 | End: 2017-05-25

## 2017-05-24 RX ORDER — METRONIDAZOLE 500 MG
500 TABLET ORAL ONCE
Qty: 0 | Refills: 0 | Status: COMPLETED | OUTPATIENT
Start: 2017-05-24 | End: 2017-05-24

## 2017-05-24 RX ADMIN — AMLODIPINE BESYLATE 10 MILLIGRAM(S): 2.5 TABLET ORAL at 05:31

## 2017-05-24 RX ADMIN — ONDANSETRON 4 MILLIGRAM(S): 8 TABLET, FILM COATED ORAL at 18:13

## 2017-05-24 RX ADMIN — Medication 2001 MILLIGRAM(S): at 11:53

## 2017-05-24 RX ADMIN — Medication 100 MILLIGRAM(S): at 13:36

## 2017-05-24 RX ADMIN — Medication 75 MILLIGRAM(S): at 13:36

## 2017-05-24 RX ADMIN — Medication 75 MILLIGRAM(S): at 21:38

## 2017-05-24 RX ADMIN — Medication 2001 MILLIGRAM(S): at 17:36

## 2017-05-24 RX ADMIN — HEPARIN SODIUM 5000 UNIT(S): 5000 INJECTION INTRAVENOUS; SUBCUTANEOUS at 17:36

## 2017-05-24 RX ADMIN — Medication 100 MILLIGRAM(S): at 21:38

## 2017-05-24 RX ADMIN — Medication 75 MILLIGRAM(S): at 05:31

## 2017-05-24 RX ADMIN — Medication 2001 MILLIGRAM(S): at 07:55

## 2017-05-24 RX ADMIN — HEPARIN SODIUM 5000 UNIT(S): 5000 INJECTION INTRAVENOUS; SUBCUTANEOUS at 05:30

## 2017-05-25 LAB
ANION GAP SERPL CALC-SCNC: 17 MMOL/L — SIGNIFICANT CHANGE UP (ref 5–17)
BUN SERPL-MCNC: 46 MG/DL — HIGH (ref 7–23)
CALCIUM SERPL-MCNC: 8.5 MG/DL — SIGNIFICANT CHANGE UP (ref 8.4–10.5)
CHLORIDE SERPL-SCNC: 97 MMOL/L — SIGNIFICANT CHANGE UP (ref 96–108)
CO2 SERPL-SCNC: 24 MMOL/L — SIGNIFICANT CHANGE UP (ref 22–31)
CREAT SERPL-MCNC: 7.93 MG/DL — HIGH (ref 0.5–1.3)
GLUCOSE SERPL-MCNC: 85 MG/DL — SIGNIFICANT CHANGE UP (ref 70–99)
HCT VFR BLD CALC: 29.9 % — LOW (ref 34.5–45)
HGB BLD-MCNC: 9.9 G/DL — LOW (ref 11.5–15.5)
MCHC RBC-ENTMCNC: 29.6 PG — SIGNIFICANT CHANGE UP (ref 27–34)
MCHC RBC-ENTMCNC: 33.1 GM/DL — SIGNIFICANT CHANGE UP (ref 32–36)
MCV RBC AUTO: 89.5 FL — SIGNIFICANT CHANGE UP (ref 80–100)
PLATELET # BLD AUTO: 202 K/UL — SIGNIFICANT CHANGE UP (ref 150–400)
POTASSIUM SERPL-MCNC: 4.7 MMOL/L — SIGNIFICANT CHANGE UP (ref 3.5–5.3)
POTASSIUM SERPL-SCNC: 4.7 MMOL/L — SIGNIFICANT CHANGE UP (ref 3.5–5.3)
RBC # BLD: 3.34 M/UL — LOW (ref 3.8–5.2)
RBC # FLD: 15.1 % — HIGH (ref 10.3–14.5)
SODIUM SERPL-SCNC: 138 MMOL/L — SIGNIFICANT CHANGE UP (ref 135–145)
WBC # BLD: 3.26 K/UL — LOW (ref 3.8–10.5)
WBC # FLD AUTO: 3.26 K/UL — LOW (ref 3.8–10.5)

## 2017-05-25 RX ORDER — ERYTHROPOIETIN 10000 [IU]/ML
4000 INJECTION, SOLUTION INTRAVENOUS; SUBCUTANEOUS
Qty: 0 | Refills: 0 | Status: DISCONTINUED | OUTPATIENT
Start: 2017-05-25 | End: 2017-05-28

## 2017-05-25 RX ADMIN — AMLODIPINE BESYLATE 10 MILLIGRAM(S): 2.5 TABLET ORAL at 05:18

## 2017-05-25 RX ADMIN — Medication 100 MILLIGRAM(S): at 17:22

## 2017-05-25 RX ADMIN — HEPARIN SODIUM 5000 UNIT(S): 5000 INJECTION INTRAVENOUS; SUBCUTANEOUS at 05:18

## 2017-05-25 RX ADMIN — ERYTHROPOIETIN 4000 UNIT(S): 10000 INJECTION, SOLUTION INTRAVENOUS; SUBCUTANEOUS at 14:15

## 2017-05-25 RX ADMIN — PANTOPRAZOLE SODIUM 40 MILLIGRAM(S): 20 TABLET, DELAYED RELEASE ORAL at 05:18

## 2017-05-25 RX ADMIN — Medication 4000 MILLILITER(S): at 19:23

## 2017-05-25 RX ADMIN — Medication 75 MILLIGRAM(S): at 05:18

## 2017-05-25 RX ADMIN — Medication 75 MILLIGRAM(S): at 22:01

## 2017-05-25 RX ADMIN — PANTOPRAZOLE SODIUM 40 MILLIGRAM(S): 20 TABLET, DELAYED RELEASE ORAL at 17:22

## 2017-05-25 RX ADMIN — Medication 100 MILLIGRAM(S): at 05:18

## 2017-05-25 RX ADMIN — HEPARIN SODIUM 5000 UNIT(S): 5000 INJECTION INTRAVENOUS; SUBCUTANEOUS at 17:22

## 2017-05-26 ENCOUNTER — RESULT REVIEW (OUTPATIENT)
Age: 72
End: 2017-05-26

## 2017-05-26 LAB
ANION GAP SERPL CALC-SCNC: 17 MMOL/L — SIGNIFICANT CHANGE UP (ref 5–17)
ANION GAP SERPL CALC-SCNC: 19 MMOL/L — HIGH (ref 5–17)
BUN SERPL-MCNC: 17 MG/DL — SIGNIFICANT CHANGE UP (ref 7–23)
BUN SERPL-MCNC: 19 MG/DL — SIGNIFICANT CHANGE UP (ref 7–23)
CALCIUM SERPL-MCNC: 8.8 MG/DL — SIGNIFICANT CHANGE UP (ref 8.4–10.5)
CALCIUM SERPL-MCNC: 9.5 MG/DL — SIGNIFICANT CHANGE UP (ref 8.4–10.5)
CHLORIDE SERPL-SCNC: 93 MMOL/L — LOW (ref 96–108)
CHLORIDE SERPL-SCNC: 95 MMOL/L — LOW (ref 96–108)
CK MB BLD-MCNC: 2.9 % — SIGNIFICANT CHANGE UP (ref 0–3.5)
CK MB CFR SERPL CALC: 1.7 NG/ML — SIGNIFICANT CHANGE UP (ref 0–3.8)
CK SERPL-CCNC: 59 U/L — SIGNIFICANT CHANGE UP (ref 25–170)
CO2 SERPL-SCNC: 24 MMOL/L — SIGNIFICANT CHANGE UP (ref 22–31)
CO2 SERPL-SCNC: 26 MMOL/L — SIGNIFICANT CHANGE UP (ref 22–31)
CREAT SERPL-MCNC: 5.14 MG/DL — HIGH (ref 0.5–1.3)
CREAT SERPL-MCNC: 6.26 MG/DL — HIGH (ref 0.5–1.3)
GLUCOSE SERPL-MCNC: 116 MG/DL — HIGH (ref 70–99)
GLUCOSE SERPL-MCNC: 86 MG/DL — SIGNIFICANT CHANGE UP (ref 70–99)
HCT VFR BLD CALC: 29.8 % — LOW (ref 34.5–45)
HCT VFR BLD CALC: 35 % — SIGNIFICANT CHANGE UP (ref 34.5–45)
HGB BLD-MCNC: 11.6 G/DL — SIGNIFICANT CHANGE UP (ref 11.5–15.5)
HGB BLD-MCNC: 9.8 G/DL — LOW (ref 11.5–15.5)
LACTATE SERPL-SCNC: 1 MMOL/L — SIGNIFICANT CHANGE UP (ref 0.7–2)
MAGNESIUM SERPL-MCNC: 2.3 MG/DL — SIGNIFICANT CHANGE UP (ref 1.6–2.6)
MCHC RBC-ENTMCNC: 29.2 PG — SIGNIFICANT CHANGE UP (ref 27–34)
MCHC RBC-ENTMCNC: 30.2 PG — SIGNIFICANT CHANGE UP (ref 27–34)
MCHC RBC-ENTMCNC: 32.9 GM/DL — SIGNIFICANT CHANGE UP (ref 32–36)
MCHC RBC-ENTMCNC: 33.1 GM/DL — SIGNIFICANT CHANGE UP (ref 32–36)
MCV RBC AUTO: 88.7 FL — SIGNIFICANT CHANGE UP (ref 80–100)
MCV RBC AUTO: 91.2 FL — SIGNIFICANT CHANGE UP (ref 80–100)
PHOSPHATE SERPL-MCNC: 3.8 MG/DL — SIGNIFICANT CHANGE UP (ref 2.5–4.5)
PLATELET # BLD AUTO: 194 K/UL — SIGNIFICANT CHANGE UP (ref 150–400)
PLATELET # BLD AUTO: 199 K/UL — SIGNIFICANT CHANGE UP (ref 150–400)
POTASSIUM SERPL-MCNC: 4.2 MMOL/L — SIGNIFICANT CHANGE UP (ref 3.5–5.3)
POTASSIUM SERPL-MCNC: 4.5 MMOL/L — SIGNIFICANT CHANGE UP (ref 3.5–5.3)
POTASSIUM SERPL-SCNC: 4.2 MMOL/L — SIGNIFICANT CHANGE UP (ref 3.5–5.3)
POTASSIUM SERPL-SCNC: 4.5 MMOL/L — SIGNIFICANT CHANGE UP (ref 3.5–5.3)
RBC # BLD: 3.36 M/UL — LOW (ref 3.8–5.2)
RBC # BLD: 3.84 M/UL — SIGNIFICANT CHANGE UP (ref 3.8–5.2)
RBC # FLD: 14.1 % — SIGNIFICANT CHANGE UP (ref 10.3–14.5)
RBC # FLD: 15 % — HIGH (ref 10.3–14.5)
SODIUM SERPL-SCNC: 136 MMOL/L — SIGNIFICANT CHANGE UP (ref 135–145)
SODIUM SERPL-SCNC: 138 MMOL/L — SIGNIFICANT CHANGE UP (ref 135–145)
TROPONIN T SERPL-MCNC: 0.03 NG/ML — SIGNIFICANT CHANGE UP (ref 0–0.06)
WBC # BLD: 3.22 K/UL — LOW (ref 3.8–10.5)
WBC # BLD: 3.8 K/UL — SIGNIFICANT CHANGE UP (ref 3.8–10.5)
WBC # FLD AUTO: 3.22 K/UL — LOW (ref 3.8–10.5)
WBC # FLD AUTO: 3.8 K/UL — SIGNIFICANT CHANGE UP (ref 3.8–10.5)

## 2017-05-26 PROCEDURE — 31505 DIAGNOSTIC LARYNGOSCOPY: CPT

## 2017-05-26 PROCEDURE — 88312 SPECIAL STAINS GROUP 1: CPT | Mod: 26

## 2017-05-26 PROCEDURE — 71010: CPT | Mod: 26

## 2017-05-26 PROCEDURE — 99222 1ST HOSP IP/OBS MODERATE 55: CPT | Mod: 25

## 2017-05-26 PROCEDURE — 88305 TISSUE EXAM BY PATHOLOGIST: CPT | Mod: 26

## 2017-05-26 PROCEDURE — 93010 ELECTROCARDIOGRAM REPORT: CPT

## 2017-05-26 RX ORDER — DEXAMETHASONE 0.5 MG/5ML
10 ELIXIR ORAL EVERY 8 HOURS
Qty: 0 | Refills: 0 | Status: COMPLETED | OUTPATIENT
Start: 2017-05-27 | End: 2017-05-27

## 2017-05-26 RX ORDER — HYDRALAZINE HCL 50 MG
10 TABLET ORAL ONCE
Qty: 0 | Refills: 0 | Status: COMPLETED | OUTPATIENT
Start: 2017-05-26 | End: 2017-05-26

## 2017-05-26 RX ORDER — FAMOTIDINE 10 MG/ML
20 INJECTION INTRAVENOUS ONCE
Qty: 0 | Refills: 0 | Status: COMPLETED | OUTPATIENT
Start: 2017-05-26 | End: 2017-05-26

## 2017-05-26 RX ORDER — DIPHENHYDRAMINE HCL 50 MG
25 CAPSULE ORAL ONCE
Qty: 0 | Refills: 0 | Status: DISCONTINUED | OUTPATIENT
Start: 2017-05-26 | End: 2017-05-26

## 2017-05-26 RX ORDER — ONDANSETRON 8 MG/1
4 TABLET, FILM COATED ORAL ONCE
Qty: 0 | Refills: 0 | Status: COMPLETED | OUTPATIENT
Start: 2017-05-26 | End: 2017-05-26

## 2017-05-26 RX ORDER — PANTOPRAZOLE SODIUM 20 MG/1
40 TABLET, DELAYED RELEASE ORAL
Qty: 0 | Refills: 0 | Status: DISCONTINUED | OUTPATIENT
Start: 2017-05-26 | End: 2017-05-28

## 2017-05-26 RX ORDER — HYDRALAZINE HCL 50 MG
100 TABLET ORAL
Qty: 0 | Refills: 0 | Status: DISCONTINUED | OUTPATIENT
Start: 2017-05-26 | End: 2017-05-28

## 2017-05-26 RX ADMIN — Medication 100 MILLIGRAM(S): at 17:49

## 2017-05-26 RX ADMIN — HEPARIN SODIUM 5000 UNIT(S): 5000 INJECTION INTRAVENOUS; SUBCUTANEOUS at 05:23

## 2017-05-26 RX ADMIN — HEPARIN SODIUM 5000 UNIT(S): 5000 INJECTION INTRAVENOUS; SUBCUTANEOUS at 17:49

## 2017-05-26 RX ADMIN — Medication 100 MILLIGRAM(S): at 17:53

## 2017-05-26 RX ADMIN — Medication 75 MILLIGRAM(S): at 05:23

## 2017-05-26 RX ADMIN — FAMOTIDINE 20 MILLIGRAM(S): 10 INJECTION INTRAVENOUS at 19:52

## 2017-05-26 RX ADMIN — Medication 100 MILLIGRAM(S): at 01:36

## 2017-05-26 RX ADMIN — AMLODIPINE BESYLATE 10 MILLIGRAM(S): 2.5 TABLET ORAL at 05:23

## 2017-05-26 RX ADMIN — PANTOPRAZOLE SODIUM 40 MILLIGRAM(S): 20 TABLET, DELAYED RELEASE ORAL at 05:23

## 2017-05-26 RX ADMIN — ONDANSETRON 4 MILLIGRAM(S): 8 TABLET, FILM COATED ORAL at 22:03

## 2017-05-26 RX ADMIN — Medication 100 MILLIGRAM(S): at 09:07

## 2017-05-26 RX ADMIN — Medication 100 MILLIGRAM(S): at 13:52

## 2017-05-26 RX ADMIN — Medication 2001 MILLIGRAM(S): at 17:49

## 2017-05-26 RX ADMIN — Medication 10 MILLIGRAM(S): at 19:50

## 2017-05-26 NOTE — PROVIDER CONTACT NOTE (OTHER) - SITUATION
Pt was eating her dinner when her family came out and said pt was complaining of difficulty breathing, neck swelling

## 2017-05-26 NOTE — PROVIDER CONTACT NOTE (OTHER) - BACKGROUND
Hx ESRD, HTN, hysterectomy, 5/23- N/V, colitis, 5/26- endoscopy/colonoscopy with biopsy, and polyptectomy

## 2017-05-26 NOTE — PROVIDER CONTACT NOTE (OTHER) - ACTION/TREATMENT ORDERED:
RRT called, stat CBC, cardiac enzymes, BNP, Lactate, EKG, chest x-ray. Pt also put on humidified oxygen and pt sent to Telemetry unit

## 2017-05-26 NOTE — PROVIDER CONTACT NOTE (OTHER) - ASSESSMENT
Pt BP and HR elevated. RRT called. oxygen level normal on room air. pt complaining of difficulty breathing and neck swelling up. Pt BP and HR elevated. AG Anthony was with me when pt started complaining of diffculty breathing and neck swelling. RRT called. oxygen level normal on room air. pt complaining of difficulty breathing and neck swelling up.

## 2017-05-27 LAB
ANION GAP SERPL CALC-SCNC: 21 MMOL/L — HIGH (ref 5–17)
BUN SERPL-MCNC: 26 MG/DL — HIGH (ref 7–23)
CALCIUM SERPL-MCNC: 9.7 MG/DL — SIGNIFICANT CHANGE UP (ref 8.4–10.5)
CHLORIDE SERPL-SCNC: 94 MMOL/L — LOW (ref 96–108)
CO2 SERPL-SCNC: 20 MMOL/L — LOW (ref 22–31)
CREAT SERPL-MCNC: 7.36 MG/DL — HIGH (ref 0.5–1.3)
GLUCOSE SERPL-MCNC: 114 MG/DL — HIGH (ref 70–99)
POTASSIUM SERPL-MCNC: 5.3 MMOL/L — SIGNIFICANT CHANGE UP (ref 3.5–5.3)
POTASSIUM SERPL-SCNC: 5.3 MMOL/L — SIGNIFICANT CHANGE UP (ref 3.5–5.3)
SODIUM SERPL-SCNC: 135 MMOL/L — SIGNIFICANT CHANGE UP (ref 135–145)

## 2017-05-27 RX ADMIN — Medication 102 MILLIGRAM(S): at 02:56

## 2017-05-27 RX ADMIN — Medication 102 MILLIGRAM(S): at 12:01

## 2017-05-27 RX ADMIN — PANTOPRAZOLE SODIUM 40 MILLIGRAM(S): 20 TABLET, DELAYED RELEASE ORAL at 09:03

## 2017-05-27 RX ADMIN — Medication 25 MILLIGRAM(S): at 17:55

## 2017-05-27 RX ADMIN — HEPARIN SODIUM 5000 UNIT(S): 5000 INJECTION INTRAVENOUS; SUBCUTANEOUS at 17:53

## 2017-05-27 RX ADMIN — Medication 100 MILLIGRAM(S): at 05:40

## 2017-05-27 RX ADMIN — AMLODIPINE BESYLATE 10 MILLIGRAM(S): 2.5 TABLET ORAL at 05:40

## 2017-05-27 RX ADMIN — HEPARIN SODIUM 5000 UNIT(S): 5000 INJECTION INTRAVENOUS; SUBCUTANEOUS at 05:40

## 2017-05-27 RX ADMIN — ERYTHROPOIETIN 4000 UNIT(S): 10000 INJECTION, SOLUTION INTRAVENOUS; SUBCUTANEOUS at 13:45

## 2017-05-28 ENCOUNTER — TRANSCRIPTION ENCOUNTER (OUTPATIENT)
Age: 72
End: 2017-05-28

## 2017-05-28 VITALS
SYSTOLIC BLOOD PRESSURE: 148 MMHG | RESPIRATION RATE: 18 BRPM | DIASTOLIC BLOOD PRESSURE: 84 MMHG | OXYGEN SATURATION: 97 % | HEART RATE: 82 BPM | TEMPERATURE: 98 F

## 2017-05-28 PROCEDURE — 83605 ASSAY OF LACTIC ACID: CPT

## 2017-05-28 PROCEDURE — 82803 BLOOD GASES ANY COMBINATION: CPT

## 2017-05-28 PROCEDURE — 74177 CT ABD & PELVIS W/CONTRAST: CPT

## 2017-05-28 PROCEDURE — 71045 X-RAY EXAM CHEST 1 VIEW: CPT

## 2017-05-28 PROCEDURE — 97161 PT EVAL LOW COMPLEX 20 MIN: CPT

## 2017-05-28 PROCEDURE — 99261: CPT

## 2017-05-28 PROCEDURE — 96374 THER/PROPH/DIAG INJ IV PUSH: CPT | Mod: XU

## 2017-05-28 PROCEDURE — 85610 PROTHROMBIN TIME: CPT

## 2017-05-28 PROCEDURE — 85027 COMPLETE CBC AUTOMATED: CPT

## 2017-05-28 PROCEDURE — 93005 ELECTROCARDIOGRAM TRACING: CPT

## 2017-05-28 PROCEDURE — 80053 COMPREHEN METABOLIC PANEL: CPT

## 2017-05-28 PROCEDURE — 82553 CREATINE MB FRACTION: CPT

## 2017-05-28 PROCEDURE — 99285 EMERGENCY DEPT VISIT HI MDM: CPT | Mod: 25

## 2017-05-28 PROCEDURE — 82330 ASSAY OF CALCIUM: CPT

## 2017-05-28 PROCEDURE — 80048 BASIC METABOLIC PNL TOTAL CA: CPT

## 2017-05-28 PROCEDURE — 82947 ASSAY GLUCOSE BLOOD QUANT: CPT

## 2017-05-28 PROCEDURE — 92610 EVALUATE SWALLOWING FUNCTION: CPT

## 2017-05-28 PROCEDURE — 88305 TISSUE EXAM BY PATHOLOGIST: CPT

## 2017-05-28 PROCEDURE — 83690 ASSAY OF LIPASE: CPT

## 2017-05-28 PROCEDURE — 84132 ASSAY OF SERUM POTASSIUM: CPT

## 2017-05-28 PROCEDURE — 84295 ASSAY OF SERUM SODIUM: CPT

## 2017-05-28 PROCEDURE — 84100 ASSAY OF PHOSPHORUS: CPT

## 2017-05-28 PROCEDURE — 88312 SPECIAL STAINS GROUP 1: CPT

## 2017-05-28 PROCEDURE — 82435 ASSAY OF BLOOD CHLORIDE: CPT

## 2017-05-28 PROCEDURE — 85014 HEMATOCRIT: CPT

## 2017-05-28 PROCEDURE — 81001 URINALYSIS AUTO W/SCOPE: CPT

## 2017-05-28 PROCEDURE — 85730 THROMBOPLASTIN TIME PARTIAL: CPT

## 2017-05-28 PROCEDURE — 82550 ASSAY OF CK (CPK): CPT

## 2017-05-28 PROCEDURE — 84484 ASSAY OF TROPONIN QUANT: CPT

## 2017-05-28 PROCEDURE — 83735 ASSAY OF MAGNESIUM: CPT

## 2017-05-28 RX ORDER — EPINEPHRINE 0.3 MG/.3ML
0.3 INJECTION INTRAMUSCULAR; SUBCUTANEOUS
Qty: 1 | Refills: 0 | OUTPATIENT
Start: 2017-05-28 | End: 2017-05-29

## 2017-05-28 RX ORDER — HYDRALAZINE HCL 50 MG
1 TABLET ORAL
Qty: 60 | Refills: 0 | OUTPATIENT
Start: 2017-05-28 | End: 2017-06-27

## 2017-05-28 RX ORDER — EPINEPHRINE 0.3 MG/.3ML
0.3 INJECTION INTRAMUSCULAR; SUBCUTANEOUS
Qty: 0 | Refills: 0 | COMMUNITY

## 2017-05-28 RX ORDER — HYDRALAZINE HCL 50 MG
3 TABLET ORAL
Qty: 0 | Refills: 0 | COMMUNITY

## 2017-05-28 RX ORDER — HYDRALAZINE HCL 50 MG
1 TABLET ORAL
Qty: 60 | Refills: 0 | COMMUNITY
Start: 2017-05-28 | End: 2017-06-27

## 2017-05-28 RX ORDER — PANTOPRAZOLE SODIUM 20 MG/1
1 TABLET, DELAYED RELEASE ORAL
Qty: 0 | Refills: 0 | COMMUNITY
Start: 2017-05-28

## 2017-05-28 RX ORDER — CALCIUM ACETATE 667 MG
2001 TABLET ORAL
Qty: 0 | Refills: 0 | COMMUNITY
Start: 2017-05-28

## 2017-05-28 RX ADMIN — Medication 2001 MILLIGRAM(S): at 12:16

## 2017-05-28 RX ADMIN — AMLODIPINE BESYLATE 10 MILLIGRAM(S): 2.5 TABLET ORAL at 05:07

## 2017-05-28 RX ADMIN — Medication 2001 MILLIGRAM(S): at 09:01

## 2017-05-28 RX ADMIN — Medication 2001 MILLIGRAM(S): at 17:32

## 2017-05-28 RX ADMIN — HEPARIN SODIUM 5000 UNIT(S): 5000 INJECTION INTRAVENOUS; SUBCUTANEOUS at 05:07

## 2017-05-28 RX ADMIN — Medication 100 MILLIGRAM(S): at 05:07

## 2017-05-28 RX ADMIN — Medication 100 MILLIGRAM(S): at 17:32

## 2017-05-28 RX ADMIN — HEPARIN SODIUM 5000 UNIT(S): 5000 INJECTION INTRAVENOUS; SUBCUTANEOUS at 17:32

## 2017-05-28 RX ADMIN — PANTOPRAZOLE SODIUM 40 MILLIGRAM(S): 20 TABLET, DELAYED RELEASE ORAL at 05:07

## 2017-05-28 NOTE — SWALLOW BEDSIDE ASSESSMENT ADULT - SLP PERTINENT HISTORY OF CURRENT PROBLEM
70 yo woman w/PMHx HTN, glaucoma, and ESRD on HD (Tues, Thurs, Sat) presenting with nausea and vomiting for 1 day. She reports that when she woke up this am at 7am she felt malaise. She then noted nausea when she took a few bites of toast. She had coffee and felt persistently nauseous. Her BP at that time 184/100 and she attributed her symptoms to her elevated blood pressure. She took her BP med and went to lay down. After resting for ~30mins, she reports that she vomited nonbloody nonbilious emesis mainly the coffee she had drank earlier. Associated with moderate "gas like pains." No diarrhea/constipation, sick contacts, or recent travel. She reports that yesterday morning she tried quail eggs for the first time. Denied any fevers/chills, CP, SOB, skin changes or dysuria. She reports that she still makes scant urine.

## 2017-05-28 NOTE — DISCHARGE NOTE ADULT - HOSPITAL COURSE
To be completed by MD 70 yo woman w/PMHx HTN, glaucoma, and ESRD on HD (Tues, Thurs, Sat) presenting with nausea and vomiting for 1 day. She reports that when she woke up this am at 7am she felt malaise. She then noted nausea when she took a few bites of toast. She had coffee and felt persistently nauseous. Her BP at that time 184/100 and she attributed her symptoms to her elevated blood pressure. She took her BP med and went to lay down. After resting for ~30mins, she reports that she vomited nonbloody nonbilious emesis mainly the coffee she had drank earlier. Associated with moderate "gas like pains." No diarrhea/constipation, sick contacts, or recent travel. She reports that yesterday morning she tried quail eggs for the first time. Denied any fevers/chills, CP, SOB, skin changes or dysuria. She reports that she still makes scant urine.     Initial ED vitals--Afebrile HR 94 209/100, 18 100%   Initial labs notable for WBC 6.2 Hb 11.6 Plt 202; INR 0.96 BMP Cr 10.09 K 4.3 Mg 2.8; VBG with negative lactate 1.5   Imaging notable for CT Abd/Pelvis with submucosal fatty deposition in the descending colon and wall thickening transverse colon with evidence of acute on chronic colitis.     In the ED, she received Hydralazine 75mg PO x1, Zofran 4mg x1 and Maalox.     Admitted to medicine for further evaluation.     CT abd/pelvis: Submucosal fatty deposition in the ascending colon and wall  thickening of the transverse colon, these findings may represent acute on  chronic colitis.                       .    05/24- started on cipro / flagyl for acute on chronic colitis   05/25- Npo after mn for egd/ colonoscopy in am   EGD -  Normal esophagus. Z-line regular, 40 cm from the incisors.                       - Mild antral gastritis. Biopsied to rule out H. pylori.                       - Mild duodenitis of the duodenal bulb.                       - No source of upper GI bleeding found on this exam.  colon -  The examined portion of the ileum was normal.                       - One 2 mm polyp in the ascending colon. Resected and retrieved.                       - One 5 mm polyp in the transverse colon. Resected and retrieved.                       - Non-bleeding internal and external hemorrhoids  5/26 s/p rrt tx to telemetry for tachycardia. Seen by house ENT recommends decadron x2 doses and speech and swallow eval.   5/27 HD today - NP            Allergy team called will follow up as outpt            Started on clear liquids   5/28: Please Order EPI PEN on d/c ******* Cleared for D/C needs HD set up for Tuesday  Pt was dc home  f/u PCP

## 2017-05-28 NOTE — PHYSICAL THERAPY INITIAL EVALUATION ADULT - ADDITIONAL COMMENTS
+Colonscopy 5/26/17: One 2 mm polyp in the ascending colon. One 5 mm polyp in the transverse colon. Non-bleeding internal and external hemorrhoids.  +Upper endoscopy 5/26/17: Mild antral gastritis. Mild duodenitis of the duodenal bulb.  +CT abd and pelvis 5/23/17: Submucosal fatty deposition in the ascending colon and wall thickening of the transverse colon, these findings may represent acute on chronic colitis.    Pt states she lives in a apt with her son and daughter in law with elevator access. Pt states she is independent with all ADLs and ambulation. Pt states family available to assist when needed.

## 2017-05-28 NOTE — SWALLOW BEDSIDE ASSESSMENT ADULT - SPECIFY REASON(S)
to subjectively assess the swallow mechanism r/o dysphagia to subjectively assess the swallow mechanism r/o dysphagia. Per NP Medina, pt is cleared for liquids and solids to assess swallow mechanism

## 2017-05-28 NOTE — SWALLOW BEDSIDE ASSESSMENT ADULT - SWALLOW EVAL: PATIENT/FAMILY GOALS STATEMENT
Pt reported a feeling of "gas and tightness" pointing to chest s/p EGD. She states that prior to RRT, she began eating "chicken, mushrooms and green beans" and  felt "like I couldn't breathe". Patient reports she told her son to "get help". Patient denies any known  food allergies prior to admission. Pt reported a feeling of "gas and tightness" pointing to chest s/p EGD. She states that prior to RRT, she began eating "chicken, mushrooms and green beans" and then stated she "felt like I couldn't breathe". Patient reports she told her son to "get help". Patient denies any known  food allergies prior to admission.

## 2017-05-28 NOTE — DISCHARGE NOTE ADULT - MEDICATION SUMMARY - MEDICATIONS TO TAKE
I will START or STAY ON the medications listed below when I get home from the hospital:    calcium (as carbonate) 600 mg oral tablet  -- 3 tab(s) by mouth 3 times a day  -- Indication: For Heart burn    amLODIPine 10 mg oral tablet  -- 1 tab(s) by mouth once a day  -- Indication: For High Blood Pressure     EpiPen 2-Rakesh 0.3 mg injectable kit  -- 0.3 milligram(s) injectable once x 1 days, As Needed allergic reaction  -- Obtain medical advice before taking any non-prescription drugs as some may affect the action of this medication.    -- Indication: For Allergic Reaction     calcium acetate 667 mg oral tablet  -- 2001 milligram(s) by mouth 3 times a day  -- Indication: For Phosphorous    pantoprazole 40 mg oral delayed release tablet  -- 1 tab(s) by mouth once a day (before a meal)  -- Indication: For Heart Burn     hydrALAZINE 100 mg oral tablet  -- 1 tab(s) by mouth 2 times a day for hypertension  -- Indication: For Blood Pressure     Vitamin D3  -- 1 cap(s) by mouth once a week  -- Indication: For vitamin

## 2017-05-28 NOTE — SWALLOW BEDSIDE ASSESSMENT ADULT - SWALLOW EVAL: DIAGNOSIS
Pt presents with an overtly functional swallow sequence given thin liquid, thin puree and solid food. No overt, clinical s/s of laryngeal penetration/aspiration evident on exam.

## 2017-05-28 NOTE — DISCHARGE NOTE ADULT - CARE PROVIDERS DIRECT ADDRESSES
[email or web address],DirectAddress_Unknown,DirectAddress_Unknown,DirectAddress_Unknown

## 2017-05-28 NOTE — SWALLOW BEDSIDE ASSESSMENT ADULT - COMMENTS
Initial ED vitals--Afebrile HR 94 209/100, 18 100%   Initial labs notable for WBC 6.2 Hb 11.6 Plt 202; INR 0.96 BMP Cr 10.09 K 4.3 Mg 2.8; VBG with negative lactate 1.5   Imaging notable for CT Abd/Pelvis with submucosal fatty deposition in the descending colon and wall thickening transverse colon with evidence of acute on chronic colitis.     In the ED, she received Hydralazine 75mg PO x1, Zofran 4mg x1 and Maalox.     Patient admitted to medicine with gastroenteritis . Initial ED vitals--Afebrile HR 94 209/100, 18 100%   Initial labs notable for WBC 6.2 Hb 11.6 Plt 202; INR 0.96 BMP Cr 10.09 K 4.3 Mg 2.8; VBG with negative lactate 1.5   Imaging notable for CT Abd/Pelvis with submucosal fatty deposition in the descending colon and wall thickening transverse colon with evidence of acute on chronic colitis.     In the ED, she received Hydralazine 75mg PO x1, Zofran 4mg x1 and Maalox.     Patient admitted to medicine with gastroenteritis . GI consulted re: possible colitis, melena, N/V abd pain. S/P EGD/colonoscopy.   EGD->  - Normal esophagus. Z-line regular, 40 cm from the incisors. Mild antral gastritis. Biopsied to rule out H. pylori. Mild duodenitis of the duodenal bulb. No source of upper GI bleeding found on this exam.  Colonoscopy-> the examined portion of the ileum was normal. One 2 mm polyp in the ascending colon. Resected and retrieved. One 5 mm polyp in the transverse colon. Resected and retrieved. Non-bleeding internal and external hemorrhoids.  Renal following re: ESRD on HD Tuesday, Thursday, Saturday.   5/26-> RRT called 2/2 respiratory distress and choking sensation after eating food. Pt had steroid order in place for possible food allergy. EKG unchanged. Concern for food allergy. Epipen at bedside. Initially NPO then advanced to clears. ENT consulted. FOE-> no foreign body, Right arytenoid with mild swelling. Airway patent, vocal cords intact B/L. Placed on decadron, diet as per speech and swallow. Pt to see allergist as outpatient per NP.

## 2017-05-28 NOTE — DISCHARGE NOTE ADULT - PATIENT PORTAL LINK FT
“You can access the [employer] Patient Portal, offered by [facility], by registering with the following website: [email or web address]”

## 2017-05-28 NOTE — SWALLOW BEDSIDE ASSESSMENT ADULT - ASR SWALLOW ASPIRATION MONITOR
change of breathing pattern/Monitor for s/s aspiration/laryngeal penetration. If noted:  D/C p.o. intake, provide non-oral nutrition/hydration/meds, and contact this service @ [phone]/cough/gurgly voice/throat clearing/pneumonia/upper respiratory infection/fever

## 2017-05-28 NOTE — DISCHARGE NOTE ADULT - PROVIDER TOKENS
TOKEN:'[id number]',TOKEN:'[id number]',FREE:[LAST:[Carrieel],PHONE:[(   )    -],FAX:[(   )    -]],FREE:[LAST:[David],PHONE:[(   )    -],FAX:[(   )    -],ADDRESS:[Please follow up with your Allergist on Tuesday!   [phone]]]

## 2017-05-28 NOTE — PHYSICAL THERAPY INITIAL EVALUATION ADULT - PERTINENT HX OF CURRENT PROBLEM, REHAB EVAL
Pt is a 70 yo woman w/PMHx HTN, glaucoma, and ESRD on HD (Tues, Thurs, Sat) presenting with nausea and vomiting for 1 day. Pt had a RRT on 5/26/17 for respiratory distress and "choking sensation".  Continued below

## 2017-05-28 NOTE — DISCHARGE NOTE ADULT - MEDICATION SUMMARY - MEDICATIONS TO CHANGE
I will SWITCH the dose or number of times a day I take the medications listed below when I get home from the hospital:    hydrALAZINE 25 mg oral tablet  -- 3 tab(s) by mouth 3 times a day

## 2017-05-28 NOTE — DISCHARGE NOTE ADULT - PLAN OF CARE
To prevent exposure to allergen Please carry the epi-pen with you at all times  Please follow up with your Allergist on Tuesday!  [phone]  Please avoid exposure to possible Allergen   If you feel short of breath, unable to breath, lip swelling, throat closure, changes in voice use Epi-pen and Call 911 Avoid spicey salty greasy foods   If you cannot hydrate your self, if you are vomitting water please go to your nearest Emergency Department  If you develop a fever, please go to your nearest Emergency Department  Please follow up with your Primary Care provider within 1 week Please follow up with your nephrologist, please schedule your Dialysis for Tuesday.

## 2017-05-28 NOTE — SWALLOW BEDSIDE ASSESSMENT ADULT - SLP GENERAL OBSERVATIONS
Pt found in bed, alert and awake. A&O x3. Cooperative and communicative. Following commands for evaluation purposes.

## 2017-05-28 NOTE — DISCHARGE NOTE ADULT - CARE PROVIDER_API CALL
Matti Robledo), Internal Medicine  [address] [address] [address]  Phone: [phone]  Fax: [phone]    Kourtney Tabares), Internal Medicine  [id number] [place] [address]  Phone: [phone]  Fax: [phone]    Dipesh,   Phone: (   )    -  Fax: (   )    -    David,   Please follow up with your Allergist on Tuesday!   [phone]  Phone: (   )    -  Fax: (   )    -

## 2017-05-28 NOTE — DISCHARGE NOTE ADULT - SECONDARY DIAGNOSIS.
Nausea and vomiting, intractability of vomiting not specified, unspecified vomiting type ESRD on hemodialysis

## 2017-05-28 NOTE — DISCHARGE NOTE ADULT - CARE PLAN
Principal Discharge DX:	Allergic reaction, initial encounter  Goal:	To prevent exposure to allergen  Instructions for follow-up, activity and diet:	Please carry the epi-pen with you at all times  Please follow up with your Allergist on Tuesday!  [phone]  Please avoid exposure to possible Allergen   If you feel short of breath, unable to breath, lip swelling, throat closure, changes in voice use Epi-pen and Call 911  Secondary Diagnosis:	Nausea and vomiting, intractability of vomiting not specified, unspecified vomiting type  Instructions for follow-up, activity and diet:	Avoid spicey salty greasy foods   If you cannot hydrate your self, if you are vomitting water please go to your nearest Emergency Department  If you develop a fever, please go to your nearest Emergency Department  Please follow up with your Primary Care provider within 1 week  Secondary Diagnosis:	ESRD on hemodialysis  Instructions for follow-up, activity and diet:	Please follow up with your nephrologist, please schedule your Dialysis for Tuesday. Principal Discharge DX:	Allergic reaction, initial encounter  Goal:	To prevent exposure to allergen  Instructions for follow-up, activity and diet:	Please carry the epi-pen with you at all times  Please follow up with your Allergist on Tuesday!  [phone]  Please avoid exposure to possible Allergen   If you feel short of breath, unable to breath, lip swelling, throat closure, changes in voice use Epi-pen and Call 911  Secondary Diagnosis:	Nausea and vomiting, intractability of vomiting not specified, unspecified vomiting type  Instructions for follow-up, activity and diet:	Avoid spicey salty greasy foods   If you cannot hydrate your self, if you are vomitting water please go to your nearest Emergency Department  If you develop a fever, please go to your nearest Emergency Department  Please follow up with your Primary Care provider within 1 week  Secondary Diagnosis:	ESRD on hemodialysis  Instructions for follow-up, activity and diet:	Please follow up with your nephrologist, please schedule your Dialysis for Tuesday. Principal Discharge DX:	Allergic reaction, initial encounter  Goal:	To prevent exposure to allergen  Instructions for follow-up, activity and diet:	Please carry the epi-pen with you at all times  Please follow up with your Allergist on Tuesday!  [phone]  Please avoid exposure to possible Allergen   If you feel short of breath, unable to breath, lip swelling, throat closure, changes in voice use Epi-pen and Call 911  Secondary Diagnosis:	Nausea and vomiting, intractability of vomiting not specified, unspecified vomiting type  Instructions for follow-up, activity and diet:	Avoid spicey salty greasy foods   If you cannot hydrate your self, if you are vomitting water please go to your nearest Emergency Department  If you develop a fever, please go to your nearest Emergency Department  Please follow up with your Primary Care provider within 1 week  Secondary Diagnosis:	ESRD on hemodialysis  Instructions for follow-up, activity and diet:	Please follow up with your nephrologist, please schedule your Dialysis for Tuesday.

## 2017-05-28 NOTE — PHYSICAL THERAPY INITIAL EVALUATION ADULT - DISCHARGE DISPOSITION, PT EVAL
no skilled PT needs/Patient is cleared for D/C home from physical therapy standpoint. Patient is agreeable, JAMILA Marinelli and  aware.

## 2017-05-30 PROBLEM — I10 ESSENTIAL (PRIMARY) HYPERTENSION: Chronic | Status: ACTIVE | Noted: 2017-05-23

## 2017-05-30 PROBLEM — H40.9 UNSPECIFIED GLAUCOMA: Chronic | Status: ACTIVE | Noted: 2017-05-23

## 2017-05-30 LAB — SURGICAL PATHOLOGY STUDY: SIGNIFICANT CHANGE UP

## 2017-05-31 ENCOUNTER — INPATIENT (INPATIENT)
Facility: HOSPITAL | Age: 72
LOS: 5 days | Discharge: HOME | DRG: 314 | End: 2017-06-06
Attending: INTERNAL MEDICINE | Admitting: INTERNAL MEDICINE
Payer: MEDICARE

## 2017-05-31 VITALS
TEMPERATURE: 98 F | HEART RATE: 92 BPM | DIASTOLIC BLOOD PRESSURE: 87 MMHG | OXYGEN SATURATION: 99 % | SYSTOLIC BLOOD PRESSURE: 195 MMHG | RESPIRATION RATE: 16 BRPM

## 2017-05-31 DIAGNOSIS — Z90.710 ACQUIRED ABSENCE OF BOTH CERVIX AND UTERUS: Chronic | ICD-10-CM

## 2017-05-31 DIAGNOSIS — S72.009A FRACTURE OF UNSPECIFIED PART OF NECK OF UNSPECIFIED FEMUR, INITIAL ENCOUNTER FOR CLOSED FRACTURE: Chronic | ICD-10-CM

## 2017-05-31 DIAGNOSIS — H26.9 UNSPECIFIED CATARACT: Chronic | ICD-10-CM

## 2017-05-31 DIAGNOSIS — T82.898A OTHER SPECIFIED COMPLICATION OF VASCULAR PROSTHETIC DEVICES, IMPLANTS AND GRAFTS, INITIAL ENCOUNTER: ICD-10-CM

## 2017-05-31 LAB
ALBUMIN SERPL ELPH-MCNC: 4 G/DL — SIGNIFICANT CHANGE UP (ref 3.3–5)
ALP SERPL-CCNC: 74 U/L — SIGNIFICANT CHANGE UP (ref 40–120)
ALT FLD-CCNC: 29 U/L RC — SIGNIFICANT CHANGE UP (ref 10–45)
ANION GAP SERPL CALC-SCNC: 19 MMOL/L — HIGH (ref 5–17)
ANION GAP SERPL CALC-SCNC: 21 MMOL/L — HIGH (ref 5–17)
AST SERPL-CCNC: 41 U/L — HIGH (ref 10–40)
BASOPHILS # BLD AUTO: 0 K/UL — SIGNIFICANT CHANGE UP (ref 0–0.2)
BASOPHILS NFR BLD AUTO: 0.6 % — SIGNIFICANT CHANGE UP (ref 0–2)
BILIRUB SERPL-MCNC: 0.2 MG/DL — SIGNIFICANT CHANGE UP (ref 0.2–1.2)
BLD GP AB SCN SERPL QL: NEGATIVE — SIGNIFICANT CHANGE UP
BUN SERPL-MCNC: 80 MG/DL — HIGH (ref 7–23)
BUN SERPL-MCNC: 81 MG/DL — HIGH (ref 7–23)
CALCIUM SERPL-MCNC: 7.9 MG/DL — LOW (ref 8.4–10.5)
CALCIUM SERPL-MCNC: 8.1 MG/DL — LOW (ref 8.4–10.5)
CHLORIDE SERPL-SCNC: 100 MMOL/L — SIGNIFICANT CHANGE UP (ref 96–108)
CHLORIDE SERPL-SCNC: 98 MMOL/L — SIGNIFICANT CHANGE UP (ref 96–108)
CO2 SERPL-SCNC: 18 MMOL/L — LOW (ref 22–31)
CO2 SERPL-SCNC: 20 MMOL/L — LOW (ref 22–31)
CREAT SERPL-MCNC: 13.55 MG/DL — HIGH (ref 0.5–1.3)
CREAT SERPL-MCNC: 13.85 MG/DL — HIGH (ref 0.5–1.3)
EOSINOPHIL # BLD AUTO: 0.1 K/UL — SIGNIFICANT CHANGE UP (ref 0–0.5)
EOSINOPHIL NFR BLD AUTO: 3.2 % — SIGNIFICANT CHANGE UP (ref 0–6)
GLUCOSE SERPL-MCNC: 98 MG/DL — SIGNIFICANT CHANGE UP (ref 70–99)
GLUCOSE SERPL-MCNC: 98 MG/DL — SIGNIFICANT CHANGE UP (ref 70–99)
HCT VFR BLD CALC: 31.8 % — LOW (ref 34.5–45)
HGB BLD-MCNC: 10.8 G/DL — LOW (ref 11.5–15.5)
INR BLD: 0.98 RATIO — SIGNIFICANT CHANGE UP (ref 0.88–1.16)
LYMPHOCYTES # BLD AUTO: 0.9 K/UL — LOW (ref 1–3.3)
LYMPHOCYTES # BLD AUTO: 19.4 % — SIGNIFICANT CHANGE UP (ref 13–44)
MCHC RBC-ENTMCNC: 31.2 PG — SIGNIFICANT CHANGE UP (ref 27–34)
MCHC RBC-ENTMCNC: 34.1 GM/DL — SIGNIFICANT CHANGE UP (ref 32–36)
MCV RBC AUTO: 91.6 FL — SIGNIFICANT CHANGE UP (ref 80–100)
MONOCYTES # BLD AUTO: 0.6 K/UL — SIGNIFICANT CHANGE UP (ref 0–0.9)
MONOCYTES NFR BLD AUTO: 12.8 % — SIGNIFICANT CHANGE UP (ref 2–14)
NEUTROPHILS # BLD AUTO: 2.9 K/UL — SIGNIFICANT CHANGE UP (ref 1.8–7.4)
NEUTROPHILS NFR BLD AUTO: 64 % — SIGNIFICANT CHANGE UP (ref 43–77)
PLATELET # BLD AUTO: 243 K/UL — SIGNIFICANT CHANGE UP (ref 150–400)
POTASSIUM SERPL-MCNC: 5.6 MMOL/L — HIGH (ref 3.5–5.3)
POTASSIUM SERPL-MCNC: 7.2 MMOL/L — CRITICAL HIGH (ref 3.5–5.3)
POTASSIUM SERPL-SCNC: 5.6 MMOL/L — HIGH (ref 3.5–5.3)
POTASSIUM SERPL-SCNC: 7.2 MMOL/L — CRITICAL HIGH (ref 3.5–5.3)
PROT SERPL-MCNC: 7.6 G/DL — SIGNIFICANT CHANGE UP (ref 6–8.3)
PROTHROM AB SERPL-ACNC: 10.6 SEC — SIGNIFICANT CHANGE UP (ref 9.8–12.7)
RBC # BLD: 3.47 M/UL — LOW (ref 3.8–5.2)
RBC # FLD: 14.8 % — HIGH (ref 10.3–14.5)
RH IG SCN BLD-IMP: POSITIVE — SIGNIFICANT CHANGE UP
SODIUM SERPL-SCNC: 135 MMOL/L — SIGNIFICANT CHANGE UP (ref 135–145)
SODIUM SERPL-SCNC: 141 MMOL/L — SIGNIFICANT CHANGE UP (ref 135–145)
WBC # BLD: 4.5 K/UL — SIGNIFICANT CHANGE UP (ref 3.8–10.5)
WBC # FLD AUTO: 4.5 K/UL — SIGNIFICANT CHANGE UP (ref 3.8–10.5)

## 2017-05-31 PROCEDURE — 99285 EMERGENCY DEPT VISIT HI MDM: CPT | Mod: 25

## 2017-05-31 PROCEDURE — 71010: CPT | Mod: 26,59

## 2017-05-31 PROCEDURE — 93010 ELECTROCARDIOGRAM REPORT: CPT

## 2017-05-31 PROCEDURE — 71020: CPT | Mod: 26

## 2017-05-31 RX ORDER — ACETAMINOPHEN 500 MG
1000 TABLET ORAL ONCE
Qty: 0 | Refills: 0 | Status: COMPLETED | OUTPATIENT
Start: 2017-05-31 | End: 2017-05-31

## 2017-05-31 RX ADMIN — Medication 400 MILLIGRAM(S): at 20:53

## 2017-05-31 NOTE — ED PROVIDER NOTE - MEDICAL DECISION MAKING DETAILS
Dr. Cope (Attending Physician)  ESRD with occluded fistula no thrill unable to perform HD today. last HD 5 days ago.  No shortness of breath, orthopnea, pnd, will cxr, check labs, coags, type and screen.  Lungs clear.  D/w vascular, renal in ED on arrival and admit.

## 2017-05-31 NOTE — ED PROVIDER NOTE - PROGRESS NOTE DETAILS
Dr. Lowe, neph, saw pt, if labs stable no emergent HD, pt can get HD tomorrow after fistula working or alternative access is established. states pt was admitted to Dr. Xie recently. - CHRISTOPHER Salmon spoke with surgery, aware of pt, will see pt, admit to medicine. -CHRISTOPHER Salmon. surgery placed shiley. nephorology will HD pt tonight after placement of shiley is confirmed. Soham Salmon

## 2017-05-31 NOTE — ED PROVIDER NOTE - OBJECTIVE STATEMENT
72y/o F with PMH HTN, ESRD on HD, 72y/o F with PMH HTN, ESRD on HD, c/o fistula not working. pt states she went to HD today and fistula did not work, pt informed to go to ED. pt denies any fever, SOB, CP, N/V/D, fistula/arm pain.   nephro Dr. Lowe ([phone])  vasc sx Dr. Juárez/Dr. Horne ([phone])  PMD Dr. Xie 72y/o F with PMH HTN, ESRD on HD, c/o fistula not working. pt states she went to HD today and fistula did not work, pt informed to go to ED. last complete HD session was Saturday. pt denies any fever, SOB, CP, N/V/D, fistula/arm pain, swelling.   nephro Dr. Lowe ([phone])  vas sx Dr. Juárez/Dr. Horne ([phone])  PMD Dr. Xie 72y/o F with PMH HTN, ESRD on HD, c/o fistula not working. pt states she went to HD yesterday and the fistula did not work. pt saw vascular today and was informed to go to ED. last complete HD session was Saturday. pt denies any fever, SOB, CP, N/V/D, fistula/arm pain, swelling.   nephro Dr. Lowe ([phone])  vas sx Dr. Mc [phone]/Dr. Horne ([phone])  PMD Dr. Xie 70y/o F with PMH HTN, ESRD on HD, c/o fistula not working. pt states she went to HD yesterday and the fistula did not work. pt saw vascular today and was informed to go to ED. last complete HD session was Saturday. pt denies any fever, SOB, CP, N/V/D, fistula/arm pain, swelling.   nephro Dr. Lowe ([phone])  vasc sx Dr. Mc [phone]/Dr. Horne ([phone])  PMD Dr. Robledo [phone] 72y/o F with PMH HTN, ESRD on HD, c/o fistula not working. pt states she went to HD yesterday and the fistula did not work. pt saw vascular today and was informed to go to ED. last complete HD session was Saturday. pt denies any fever, SOB, CP, N/V/D, fistula/arm pain, swelling.   nephro Dr. Lowe ([phone])  vas sx Dr. Mc [phone]/Dr. Horne ([phone])  PMD Dr. OLIVE Tabares [phone]  admitted last week to Dr. Robledo.

## 2017-05-31 NOTE — ED PROVIDER NOTE - ATTENDING CONTRIBUTION TO CARE
Dr. Cope (Attending Physician)  I performed a history and physical exam of the patient and discussed their management with the resident. I reviewed the resident's note and agree with the documented findings and plan of care. My medical decision making and observations are found above.

## 2017-05-31 NOTE — ED ADULT NURSE NOTE - OBJECTIVE STATEMENT
pt ambulatory to midway from vascular office with non functioning bruitt L arm . pt receives dialysis tues, thurs, sat. pt awake alert color good skin warm dry lungs cl no chest pain abd soft nt. nobriut felt or heard L arm

## 2017-06-01 ENCOUNTER — OUTPATIENT (OUTPATIENT)
Dept: OUTPATIENT SERVICES | Facility: HOSPITAL | Age: 72
LOS: 1 days | End: 2017-06-01
Payer: MEDICAID

## 2017-06-01 ENCOUNTER — APPOINTMENT (OUTPATIENT)
Dept: PEDIATRIC ALLERGY IMMUNOLOGY | Facility: CLINIC | Age: 72
End: 2017-06-01

## 2017-06-01 DIAGNOSIS — T82.898A OTHER SPECIFIED COMPLICATION OF VASCULAR PROSTHETIC DEVICES, IMPLANTS AND GRAFTS, INITIAL ENCOUNTER: ICD-10-CM

## 2017-06-01 DIAGNOSIS — S72.009A FRACTURE OF UNSPECIFIED PART OF NECK OF UNSPECIFIED FEMUR, INITIAL ENCOUNTER FOR CLOSED FRACTURE: Chronic | ICD-10-CM

## 2017-06-01 DIAGNOSIS — Z90.710 ACQUIRED ABSENCE OF BOTH CERVIX AND UTERUS: Chronic | ICD-10-CM

## 2017-06-01 DIAGNOSIS — I10 ESSENTIAL (PRIMARY) HYPERTENSION: ICD-10-CM

## 2017-06-01 DIAGNOSIS — H26.9 UNSPECIFIED CATARACT: Chronic | ICD-10-CM

## 2017-06-01 DIAGNOSIS — N18.6 END STAGE RENAL DISEASE: ICD-10-CM

## 2017-06-01 DIAGNOSIS — Z29.9 ENCOUNTER FOR PROPHYLACTIC MEASURES, UNSPECIFIED: ICD-10-CM

## 2017-06-01 LAB
ALBUMIN SERPL ELPH-MCNC: 3.3 G/DL — SIGNIFICANT CHANGE UP (ref 3.3–5)
ALP SERPL-CCNC: 73 U/L — SIGNIFICANT CHANGE UP (ref 40–120)
ALT FLD-CCNC: 20 U/L RC — SIGNIFICANT CHANGE UP (ref 10–45)
ANION GAP SERPL CALC-SCNC: 15 MMOL/L — SIGNIFICANT CHANGE UP (ref 5–17)
AST SERPL-CCNC: 23 U/L — SIGNIFICANT CHANGE UP (ref 10–40)
BASOPHILS # BLD AUTO: 0 K/UL — SIGNIFICANT CHANGE UP (ref 0–0.2)
BASOPHILS NFR BLD AUTO: 0.3 % — SIGNIFICANT CHANGE UP (ref 0–2)
BILIRUB SERPL-MCNC: 0.4 MG/DL — SIGNIFICANT CHANGE UP (ref 0.2–1.2)
BUN SERPL-MCNC: 29 MG/DL — HIGH (ref 7–23)
CALCIUM SERPL-MCNC: 8.3 MG/DL — LOW (ref 8.4–10.5)
CHLORIDE SERPL-SCNC: 100 MMOL/L — SIGNIFICANT CHANGE UP (ref 96–108)
CO2 SERPL-SCNC: 26 MMOL/L — SIGNIFICANT CHANGE UP (ref 22–31)
CREAT SERPL-MCNC: 6.42 MG/DL — HIGH (ref 0.5–1.3)
EOSINOPHIL # BLD AUTO: 0.1 K/UL — SIGNIFICANT CHANGE UP (ref 0–0.5)
EOSINOPHIL NFR BLD AUTO: 3.6 % — SIGNIFICANT CHANGE UP (ref 0–6)
GLUCOSE SERPL-MCNC: 94 MG/DL — SIGNIFICANT CHANGE UP (ref 70–99)
HCT VFR BLD CALC: 28 % — LOW (ref 34.5–45)
HGB BLD-MCNC: 9.7 G/DL — LOW (ref 11.5–15.5)
LYMPHOCYTES # BLD AUTO: 0.6 K/UL — LOW (ref 1–3.3)
LYMPHOCYTES # BLD AUTO: 14.3 % — SIGNIFICANT CHANGE UP (ref 13–44)
MAGNESIUM SERPL-MCNC: 2 MG/DL — SIGNIFICANT CHANGE UP (ref 1.6–2.6)
MCHC RBC-ENTMCNC: 31.7 PG — SIGNIFICANT CHANGE UP (ref 27–34)
MCHC RBC-ENTMCNC: 34.7 GM/DL — SIGNIFICANT CHANGE UP (ref 32–36)
MCV RBC AUTO: 91.4 FL — SIGNIFICANT CHANGE UP (ref 80–100)
MONOCYTES # BLD AUTO: 0.4 K/UL — SIGNIFICANT CHANGE UP (ref 0–0.9)
MONOCYTES NFR BLD AUTO: 10.3 % — SIGNIFICANT CHANGE UP (ref 2–14)
NEUTROPHILS # BLD AUTO: 2.9 K/UL — SIGNIFICANT CHANGE UP (ref 1.8–7.4)
NEUTROPHILS NFR BLD AUTO: 71.6 % — SIGNIFICANT CHANGE UP (ref 43–77)
PHOSPHATE SERPL-MCNC: 4.6 MG/DL — HIGH (ref 2.5–4.5)
PLATELET # BLD AUTO: 208 K/UL — SIGNIFICANT CHANGE UP (ref 150–400)
POTASSIUM SERPL-MCNC: 4.4 MMOL/L — SIGNIFICANT CHANGE UP (ref 3.5–5.3)
POTASSIUM SERPL-SCNC: 4.4 MMOL/L — SIGNIFICANT CHANGE UP (ref 3.5–5.3)
PROT SERPL-MCNC: 6.3 G/DL — SIGNIFICANT CHANGE UP (ref 6–8.3)
RBC # BLD: 3.06 M/UL — LOW (ref 3.8–5.2)
RBC # FLD: 14.6 % — HIGH (ref 10.3–14.5)
SODIUM SERPL-SCNC: 141 MMOL/L — SIGNIFICANT CHANGE UP (ref 135–145)
WBC # BLD: 4 K/UL — SIGNIFICANT CHANGE UP (ref 3.8–10.5)
WBC # FLD AUTO: 4 K/UL — SIGNIFICANT CHANGE UP (ref 3.8–10.5)

## 2017-06-01 PROCEDURE — 99223 1ST HOSP IP/OBS HIGH 75: CPT

## 2017-06-01 RX ORDER — HEPARIN SODIUM 5000 [USP'U]/ML
5000 INJECTION INTRAVENOUS; SUBCUTANEOUS EVERY 8 HOURS
Qty: 0 | Refills: 0 | Status: DISCONTINUED | OUTPATIENT
Start: 2017-06-01 | End: 2017-06-02

## 2017-06-01 RX ORDER — AMLODIPINE BESYLATE 2.5 MG/1
10 TABLET ORAL DAILY
Qty: 0 | Refills: 0 | Status: DISCONTINUED | OUTPATIENT
Start: 2017-06-01 | End: 2017-06-06

## 2017-06-01 RX ORDER — CALCIUM ACETATE 667 MG
2001 TABLET ORAL
Qty: 0 | Refills: 0 | Status: DISCONTINUED | OUTPATIENT
Start: 2017-06-01 | End: 2017-06-06

## 2017-06-01 RX ORDER — PANTOPRAZOLE SODIUM 20 MG/1
40 TABLET, DELAYED RELEASE ORAL
Qty: 0 | Refills: 0 | Status: DISCONTINUED | OUTPATIENT
Start: 2017-06-01 | End: 2017-06-06

## 2017-06-01 RX ORDER — ERYTHROPOIETIN 10000 [IU]/ML
4000 INJECTION, SOLUTION INTRAVENOUS; SUBCUTANEOUS
Qty: 0 | Refills: 0 | Status: DISCONTINUED | OUTPATIENT
Start: 2017-06-01 | End: 2017-06-06

## 2017-06-01 RX ORDER — HYDRALAZINE HCL 50 MG
100 TABLET ORAL THREE TIMES A DAY
Qty: 0 | Refills: 0 | Status: DISCONTINUED | OUTPATIENT
Start: 2017-06-01 | End: 2017-06-06

## 2017-06-01 RX ORDER — HEPARIN SODIUM 5000 [USP'U]/ML
5000 INJECTION INTRAVENOUS; SUBCUTANEOUS EVERY 8 HOURS
Qty: 0 | Refills: 0 | Status: DISCONTINUED | OUTPATIENT
Start: 2017-06-01 | End: 2017-06-01

## 2017-06-01 RX ADMIN — Medication 2001 MILLIGRAM(S): at 21:31

## 2017-06-01 RX ADMIN — HEPARIN SODIUM 5000 UNIT(S): 5000 INJECTION INTRAVENOUS; SUBCUTANEOUS at 05:34

## 2017-06-01 RX ADMIN — Medication 100 MILLIGRAM(S): at 05:34

## 2017-06-01 RX ADMIN — AMLODIPINE BESYLATE 10 MILLIGRAM(S): 2.5 TABLET ORAL at 05:34

## 2017-06-01 RX ADMIN — ERYTHROPOIETIN 4000 UNIT(S): 10000 INJECTION, SOLUTION INTRAVENOUS; SUBCUTANEOUS at 18:16

## 2017-06-01 RX ADMIN — Medication 100 MILLIGRAM(S): at 14:46

## 2017-06-01 RX ADMIN — HEPARIN SODIUM 5000 UNIT(S): 5000 INJECTION INTRAVENOUS; SUBCUTANEOUS at 14:45

## 2017-06-01 RX ADMIN — HEPARIN SODIUM 5000 UNIT(S): 5000 INJECTION INTRAVENOUS; SUBCUTANEOUS at 21:31

## 2017-06-01 RX ADMIN — Medication 100 MILLIGRAM(S): at 21:31

## 2017-06-01 NOTE — H&P ADULT. - LAB RESULTS AND INTERPRETATION
Labs personally reviewed and interpreted:  no leukocytosis, hb 10.8, platelet 243  sodium 141, potassium 5.6, chloride 100, bicarb 20, BUN 81, creatinine 13.85, glucose 78; AG 21

## 2017-06-01 NOTE — H&P ADULT. - FAMILY HISTORY
Mother  Still living? Unknown  Family history of cerebrovascular accident (CVA), Age at diagnosis: Age Unknown     Sibling  Still living? Unknown  Family history of colon cancer, Age at diagnosis: Age Unknown

## 2017-06-01 NOTE — H&P ADULT. - RS GEN PE MLT RESP DETAILS PC
clear to auscultation bilaterally/normal/respirations non-labored/breath sounds equal/good air movement/airway patent

## 2017-06-01 NOTE — H&P ADULT. - HISTORY OF PRESENT ILLNESS
72 yo woman w/PMHx HTN, glaucoma, and ESRD on HD (Tues, Thurs, Sat), sent here because AVF not working.  Patient states that she went for dialysis on Tuesday as her routine and she was told her AVF not functioning.  She did not get dialysis that day.  She was referred to vascular surgeon, whom she saw on Wednesday and patient was sent here for new access.  Patient last had her dialysis on Saturday.  She denies any nausea, chest pain, SOB, cough, fever, nausea, vomiting.  She has no pain or swelling of left arm or at the site of AVF.  She was seen by nephrology in ED and vascular surgery, who placed a shiley dialysis.  She is now s/p dialysis, currently denies any complaints.

## 2017-06-01 NOTE — H&P ADULT. - ASSESSMENT
70 yo woman w/PMHx HTN, glaucoma, and ESRD on HD (Tues, Thurs, Sat), sent here because AVF not working.

## 2017-06-01 NOTE — ED ADULT NURSE REASSESSMENT NOTE - NS ED NURSE REASSESS COMMENT FT1
Dialysis JAMILA Bueno states patient receive 3 hours of hemodialysis, 0.9kg fluid removed. Last vitals /92, temp 98.4F, 99% on room air. 9Monti JAMILA Simental notified.

## 2017-06-01 NOTE — H&P ADULT. - PROBLEM SELECTOR PLAN 1
s/p permacath placement for dialysis  plan for likely fistulogram and/or new AVF placement  will keep patient NPO for now  f/u with vascular surgery  discussed plan with vascular surgery team

## 2017-06-01 NOTE — ED ADULT NURSE REASSESSMENT NOTE - NS ED NURSE REASSESS COMMENT FT1
Patient still at dialysis. Patient moving to [facility]. Report given to Deisy MARINO.

## 2017-06-01 NOTE — H&P ADULT. - OTHER
Old records reviewed:  EKG May 16, 2017: sinus tachycardia  CXR May 16, 2017: clear  CT abd/pelv May 23, 2017: acute on chronic colitis  HIV negative April 2017

## 2017-06-02 DIAGNOSIS — R69 ILLNESS, UNSPECIFIED: ICD-10-CM

## 2017-06-02 LAB
ANION GAP SERPL CALC-SCNC: 12 MMOL/L — SIGNIFICANT CHANGE UP (ref 5–17)
APTT BLD: 104.2 SEC — HIGH (ref 27.5–37.4)
APTT BLD: 122.8 SEC — CRITICAL HIGH (ref 27.5–37.4)
APTT BLD: 58.6 SEC — HIGH (ref 27.5–37.4)
BUN SERPL-MCNC: 18 MG/DL — SIGNIFICANT CHANGE UP (ref 7–23)
CALCIUM SERPL-MCNC: 8.3 MG/DL — LOW (ref 8.4–10.5)
CHLORIDE SERPL-SCNC: 101 MMOL/L — SIGNIFICANT CHANGE UP (ref 96–108)
CO2 SERPL-SCNC: 29 MMOL/L — SIGNIFICANT CHANGE UP (ref 22–31)
CREAT SERPL-MCNC: 4.55 MG/DL — HIGH (ref 0.5–1.3)
FIBRINOGEN PPP-MCNC: 634 MG/DL — HIGH (ref 310–510)
GLUCOSE SERPL-MCNC: 91 MG/DL — SIGNIFICANT CHANGE UP (ref 70–99)
HBV CORE AB SER-ACNC: SIGNIFICANT CHANGE UP
HBV SURFACE AB SER-ACNC: REACTIVE
HBV SURFACE AG SER-ACNC: SIGNIFICANT CHANGE UP
HCT VFR BLD CALC: 29.1 % — LOW (ref 34.5–45)
HGB BLD-MCNC: 9.8 G/DL — LOW (ref 11.5–15.5)
INR BLD: 1.03 RATIO — SIGNIFICANT CHANGE UP (ref 0.88–1.16)
INR BLD: 1.08 RATIO — SIGNIFICANT CHANGE UP (ref 0.88–1.16)
INR BLD: 1.1 RATIO — SIGNIFICANT CHANGE UP (ref 0.88–1.16)
INR BLD: 1.11 RATIO — SIGNIFICANT CHANGE UP (ref 0.88–1.16)
MCHC RBC-ENTMCNC: 31.5 PG — SIGNIFICANT CHANGE UP (ref 27–34)
MCHC RBC-ENTMCNC: 33.6 GM/DL — SIGNIFICANT CHANGE UP (ref 32–36)
MCV RBC AUTO: 93.8 FL — SIGNIFICANT CHANGE UP (ref 80–100)
PLATELET # BLD AUTO: 201 K/UL — SIGNIFICANT CHANGE UP (ref 150–400)
POTASSIUM SERPL-MCNC: 4.4 MMOL/L — SIGNIFICANT CHANGE UP (ref 3.5–5.3)
POTASSIUM SERPL-SCNC: 4.4 MMOL/L — SIGNIFICANT CHANGE UP (ref 3.5–5.3)
PROTHROM AB SERPL-ACNC: 11.2 SEC — SIGNIFICANT CHANGE UP (ref 9.8–12.7)
PROTHROM AB SERPL-ACNC: 11.8 SEC — SIGNIFICANT CHANGE UP (ref 9.8–12.7)
PROTHROM AB SERPL-ACNC: 11.9 SEC — SIGNIFICANT CHANGE UP (ref 9.8–12.7)
PROTHROM AB SERPL-ACNC: 12.1 SEC — SIGNIFICANT CHANGE UP (ref 9.8–12.7)
RBC # BLD: 3.11 M/UL — LOW (ref 3.8–5.2)
RBC # FLD: 14.8 % — HIGH (ref 10.3–14.5)
SODIUM SERPL-SCNC: 142 MMOL/L — SIGNIFICANT CHANGE UP (ref 135–145)
THROMBIN TIME: 20.5 SECS — SIGNIFICANT CHANGE UP (ref 16–25)
THROMBIN TIME: 20.5 SECS — SIGNIFICANT CHANGE UP (ref 16–25)
WBC # BLD: 3.7 K/UL — LOW (ref 3.8–10.5)
WBC # FLD AUTO: 3.7 K/UL — LOW (ref 3.8–10.5)

## 2017-06-02 PROCEDURE — 93970 EXTREMITY STUDY: CPT | Mod: 26

## 2017-06-02 PROCEDURE — 99222 1ST HOSP IP/OBS MODERATE 55: CPT | Mod: GC

## 2017-06-02 RX ADMIN — AMLODIPINE BESYLATE 10 MILLIGRAM(S): 2.5 TABLET ORAL at 05:48

## 2017-06-02 RX ADMIN — HEPARIN SODIUM 5000 UNIT(S): 5000 INJECTION INTRAVENOUS; SUBCUTANEOUS at 05:48

## 2017-06-02 RX ADMIN — PANTOPRAZOLE SODIUM 40 MILLIGRAM(S): 20 TABLET, DELAYED RELEASE ORAL at 05:49

## 2017-06-02 RX ADMIN — Medication 2001 MILLIGRAM(S): at 13:52

## 2017-06-02 RX ADMIN — Medication 100 MILLIGRAM(S): at 05:48

## 2017-06-02 RX ADMIN — Medication 100 MILLIGRAM(S): at 13:52

## 2017-06-02 RX ADMIN — Medication 100 MILLIGRAM(S): at 21:42

## 2017-06-02 NOTE — PROVIDER CONTACT NOTE (CRITICAL VALUE NOTIFICATION) - ACTION/TREATMENT ORDERED:
Alber NP made aware, was told to contact IR, contacted IR who stated they need repeat labs before coming to procedure, NP contacted by IR department to speak regarding blood work, PTT & INR STAT ordered, pending results, will continue to monitor

## 2017-06-03 LAB
ANION GAP SERPL CALC-SCNC: 15 MMOL/L — SIGNIFICANT CHANGE UP (ref 5–17)
BUN SERPL-MCNC: 32 MG/DL — HIGH (ref 7–23)
CALCIUM SERPL-MCNC: 9.2 MG/DL — SIGNIFICANT CHANGE UP (ref 8.4–10.5)
CHLORIDE SERPL-SCNC: 99 MMOL/L — SIGNIFICANT CHANGE UP (ref 96–108)
CO2 SERPL-SCNC: 27 MMOL/L — SIGNIFICANT CHANGE UP (ref 22–31)
CREAT SERPL-MCNC: 6.6 MG/DL — HIGH (ref 0.5–1.3)
GLUCOSE SERPL-MCNC: 92 MG/DL — SIGNIFICANT CHANGE UP (ref 70–99)
HCT VFR BLD CALC: 30.3 % — LOW (ref 34.5–45)
HGB BLD-MCNC: 10.1 G/DL — LOW (ref 11.5–15.5)
MCHC RBC-ENTMCNC: 31.3 PG — SIGNIFICANT CHANGE UP (ref 27–34)
MCHC RBC-ENTMCNC: 33.3 GM/DL — SIGNIFICANT CHANGE UP (ref 32–36)
MCV RBC AUTO: 94.1 FL — SIGNIFICANT CHANGE UP (ref 80–100)
PLATELET # BLD AUTO: 195 K/UL — SIGNIFICANT CHANGE UP (ref 150–400)
POTASSIUM SERPL-MCNC: 4.5 MMOL/L — SIGNIFICANT CHANGE UP (ref 3.5–5.3)
POTASSIUM SERPL-SCNC: 4.5 MMOL/L — SIGNIFICANT CHANGE UP (ref 3.5–5.3)
RBC # BLD: 3.23 M/UL — LOW (ref 3.8–5.2)
RBC # FLD: 14.6 % — HIGH (ref 10.3–14.5)
SODIUM SERPL-SCNC: 141 MMOL/L — SIGNIFICANT CHANGE UP (ref 135–145)
WBC # BLD: 4.4 K/UL — SIGNIFICANT CHANGE UP (ref 3.8–10.5)
WBC # FLD AUTO: 4.4 K/UL — SIGNIFICANT CHANGE UP (ref 3.8–10.5)

## 2017-06-03 RX ORDER — SODIUM CHLORIDE 0.65 %
1 AEROSOL, SPRAY (ML) NASAL THREE TIMES A DAY
Qty: 0 | Refills: 0 | Status: DISCONTINUED | OUTPATIENT
Start: 2017-06-03 | End: 2017-06-06

## 2017-06-03 RX ADMIN — PANTOPRAZOLE SODIUM 40 MILLIGRAM(S): 20 TABLET, DELAYED RELEASE ORAL at 05:31

## 2017-06-03 RX ADMIN — ERYTHROPOIETIN 4000 UNIT(S): 10000 INJECTION, SOLUTION INTRAVENOUS; SUBCUTANEOUS at 21:56

## 2017-06-03 RX ADMIN — Medication 100 MILLIGRAM(S): at 05:31

## 2017-06-03 RX ADMIN — Medication 1 SPRAY(S): at 22:18

## 2017-06-03 RX ADMIN — Medication 2001 MILLIGRAM(S): at 20:54

## 2017-06-03 RX ADMIN — Medication 100 MILLIGRAM(S): at 22:18

## 2017-06-03 RX ADMIN — AMLODIPINE BESYLATE 10 MILLIGRAM(S): 2.5 TABLET ORAL at 05:31

## 2017-06-03 RX ADMIN — Medication 100 MILLIGRAM(S): at 17:07

## 2017-06-03 RX ADMIN — Medication 2001 MILLIGRAM(S): at 09:41

## 2017-06-04 LAB
ANION GAP SERPL CALC-SCNC: 11 MMOL/L — SIGNIFICANT CHANGE UP (ref 5–17)
BUN SERPL-MCNC: 19 MG/DL — SIGNIFICANT CHANGE UP (ref 7–23)
CALCIUM SERPL-MCNC: 8.9 MG/DL — SIGNIFICANT CHANGE UP (ref 8.4–10.5)
CHLORIDE SERPL-SCNC: 95 MMOL/L — LOW (ref 96–108)
CO2 SERPL-SCNC: 30 MMOL/L — SIGNIFICANT CHANGE UP (ref 22–31)
CREAT SERPL-MCNC: 4.67 MG/DL — HIGH (ref 0.5–1.3)
GLUCOSE SERPL-MCNC: 88 MG/DL — SIGNIFICANT CHANGE UP (ref 70–99)
HCT VFR BLD CALC: 30.7 % — LOW (ref 34.5–45)
HGB BLD-MCNC: 9.9 G/DL — LOW (ref 11.5–15.5)
MCHC RBC-ENTMCNC: 30.1 PG — SIGNIFICANT CHANGE UP (ref 27–34)
MCHC RBC-ENTMCNC: 32.3 GM/DL — SIGNIFICANT CHANGE UP (ref 32–36)
MCV RBC AUTO: 93.3 FL — SIGNIFICANT CHANGE UP (ref 80–100)
PLATELET # BLD AUTO: 182 K/UL — SIGNIFICANT CHANGE UP (ref 150–400)
POTASSIUM SERPL-MCNC: 4.3 MMOL/L — SIGNIFICANT CHANGE UP (ref 3.5–5.3)
POTASSIUM SERPL-SCNC: 4.3 MMOL/L — SIGNIFICANT CHANGE UP (ref 3.5–5.3)
RBC # BLD: 3.29 M/UL — LOW (ref 3.8–5.2)
RBC # FLD: 14.5 % — SIGNIFICANT CHANGE UP (ref 10.3–14.5)
SODIUM SERPL-SCNC: 136 MMOL/L — SIGNIFICANT CHANGE UP (ref 135–145)
WBC # BLD: 4.7 K/UL — SIGNIFICANT CHANGE UP (ref 3.8–10.5)
WBC # FLD AUTO: 4.7 K/UL — SIGNIFICANT CHANGE UP (ref 3.8–10.5)

## 2017-06-04 RX ADMIN — PANTOPRAZOLE SODIUM 40 MILLIGRAM(S): 20 TABLET, DELAYED RELEASE ORAL at 05:19

## 2017-06-04 RX ADMIN — Medication 2001 MILLIGRAM(S): at 17:25

## 2017-06-04 RX ADMIN — Medication 100 MILLIGRAM(S): at 13:51

## 2017-06-04 RX ADMIN — Medication 100 MILLIGRAM(S): at 05:20

## 2017-06-04 RX ADMIN — Medication 100 MILLIGRAM(S): at 21:16

## 2017-06-04 RX ADMIN — AMLODIPINE BESYLATE 10 MILLIGRAM(S): 2.5 TABLET ORAL at 05:20

## 2017-06-04 RX ADMIN — Medication 2001 MILLIGRAM(S): at 11:58

## 2017-06-05 LAB
ANION GAP SERPL CALC-SCNC: 16 MMOL/L — SIGNIFICANT CHANGE UP (ref 5–17)
APTT BLD: 32.2 SEC — SIGNIFICANT CHANGE UP (ref 27.5–37.4)
BUN SERPL-MCNC: 39 MG/DL — HIGH (ref 7–23)
CALCIUM SERPL-MCNC: 9.2 MG/DL — SIGNIFICANT CHANGE UP (ref 8.4–10.5)
CHLORIDE SERPL-SCNC: 95 MMOL/L — LOW (ref 96–108)
CO2 SERPL-SCNC: 26 MMOL/L — SIGNIFICANT CHANGE UP (ref 22–31)
CREAT SERPL-MCNC: 7.4 MG/DL — HIGH (ref 0.5–1.3)
GLUCOSE SERPL-MCNC: 93 MG/DL — SIGNIFICANT CHANGE UP (ref 70–99)
INR BLD: 0.96 RATIO — SIGNIFICANT CHANGE UP (ref 0.88–1.16)
POTASSIUM SERPL-MCNC: 4.5 MMOL/L — SIGNIFICANT CHANGE UP (ref 3.5–5.3)
POTASSIUM SERPL-SCNC: 4.5 MMOL/L — SIGNIFICANT CHANGE UP (ref 3.5–5.3)
PROTHROM AB SERPL-ACNC: 10.4 SEC — SIGNIFICANT CHANGE UP (ref 9.8–12.7)
SODIUM SERPL-SCNC: 137 MMOL/L — SIGNIFICANT CHANGE UP (ref 135–145)

## 2017-06-05 PROCEDURE — 36558 INSERT TUNNELED CV CATH: CPT

## 2017-06-05 PROCEDURE — 77001 FLUOROGUIDE FOR VEIN DEVICE: CPT | Mod: 26

## 2017-06-05 PROCEDURE — 76937 US GUIDE VASCULAR ACCESS: CPT | Mod: 26

## 2017-06-05 RX ORDER — ACETAMINOPHEN 500 MG
650 TABLET ORAL ONCE
Qty: 0 | Refills: 0 | Status: COMPLETED | OUTPATIENT
Start: 2017-06-05 | End: 2017-06-05

## 2017-06-05 RX ADMIN — Medication 100 MILLIGRAM(S): at 21:41

## 2017-06-05 RX ADMIN — Medication 650 MILLIGRAM(S): at 13:06

## 2017-06-05 RX ADMIN — Medication 100 MILLIGRAM(S): at 13:07

## 2017-06-05 RX ADMIN — Medication 650 MILLIGRAM(S): at 13:36

## 2017-06-05 RX ADMIN — Medication 100 MILLIGRAM(S): at 05:12

## 2017-06-05 RX ADMIN — Medication 2001 MILLIGRAM(S): at 17:23

## 2017-06-05 RX ADMIN — PANTOPRAZOLE SODIUM 40 MILLIGRAM(S): 20 TABLET, DELAYED RELEASE ORAL at 05:12

## 2017-06-05 RX ADMIN — AMLODIPINE BESYLATE 10 MILLIGRAM(S): 2.5 TABLET ORAL at 05:12

## 2017-06-06 VITALS
HEART RATE: 85 BPM | TEMPERATURE: 99 F | SYSTOLIC BLOOD PRESSURE: 136 MMHG | RESPIRATION RATE: 18 BRPM | DIASTOLIC BLOOD PRESSURE: 66 MMHG | OXYGEN SATURATION: 97 %

## 2017-06-06 LAB
ANION GAP SERPL CALC-SCNC: 19 MMOL/L — HIGH (ref 5–17)
BUN SERPL-MCNC: 64 MG/DL — HIGH (ref 7–23)
CALCIUM SERPL-MCNC: 8.9 MG/DL — SIGNIFICANT CHANGE UP (ref 8.4–10.5)
CHLORIDE SERPL-SCNC: 94 MMOL/L — LOW (ref 96–108)
CO2 SERPL-SCNC: 23 MMOL/L — SIGNIFICANT CHANGE UP (ref 22–31)
CREAT SERPL-MCNC: 9.63 MG/DL — HIGH (ref 0.5–1.3)
GLUCOSE SERPL-MCNC: 100 MG/DL — HIGH (ref 70–99)
HCT VFR BLD CALC: 26.6 % — LOW (ref 34.5–45)
HGB BLD-MCNC: 9 G/DL — LOW (ref 11.5–15.5)
MCHC RBC-ENTMCNC: 31.3 PG — SIGNIFICANT CHANGE UP (ref 27–34)
MCHC RBC-ENTMCNC: 33.7 GM/DL — SIGNIFICANT CHANGE UP (ref 32–36)
MCV RBC AUTO: 92.9 FL — SIGNIFICANT CHANGE UP (ref 80–100)
PLATELET # BLD AUTO: 188 K/UL — SIGNIFICANT CHANGE UP (ref 150–400)
POTASSIUM SERPL-MCNC: 4.8 MMOL/L — SIGNIFICANT CHANGE UP (ref 3.5–5.3)
POTASSIUM SERPL-SCNC: 4.8 MMOL/L — SIGNIFICANT CHANGE UP (ref 3.5–5.3)
RBC # BLD: 2.86 M/UL — LOW (ref 3.8–5.2)
RBC # FLD: 14.5 % — SIGNIFICANT CHANGE UP (ref 10.3–14.5)
SODIUM SERPL-SCNC: 136 MMOL/L — SIGNIFICANT CHANGE UP (ref 135–145)
WBC # BLD: 4.7 K/UL — SIGNIFICANT CHANGE UP (ref 3.8–10.5)
WBC # FLD AUTO: 4.7 K/UL — SIGNIFICANT CHANGE UP (ref 3.8–10.5)

## 2017-06-06 PROCEDURE — 93970 EXTREMITY STUDY: CPT

## 2017-06-06 PROCEDURE — 36558 INSERT TUNNELED CV CATH: CPT

## 2017-06-06 PROCEDURE — 85027 COMPLETE CBC AUTOMATED: CPT

## 2017-06-06 PROCEDURE — 85610 PROTHROMBIN TIME: CPT

## 2017-06-06 PROCEDURE — 83735 ASSAY OF MAGNESIUM: CPT

## 2017-06-06 PROCEDURE — 85730 THROMBOPLASTIN TIME PARTIAL: CPT

## 2017-06-06 PROCEDURE — 77001 FLUOROGUIDE FOR VEIN DEVICE: CPT

## 2017-06-06 PROCEDURE — 80053 COMPREHEN METABOLIC PANEL: CPT

## 2017-06-06 PROCEDURE — 99285 EMERGENCY DEPT VISIT HI MDM: CPT | Mod: 25

## 2017-06-06 PROCEDURE — 86706 HEP B SURFACE ANTIBODY: CPT

## 2017-06-06 PROCEDURE — C1894: CPT

## 2017-06-06 PROCEDURE — 87340 HEPATITIS B SURFACE AG IA: CPT

## 2017-06-06 PROCEDURE — 71046 X-RAY EXAM CHEST 2 VIEWS: CPT

## 2017-06-06 PROCEDURE — 86901 BLOOD TYPING SEROLOGIC RH(D): CPT

## 2017-06-06 PROCEDURE — 71045 X-RAY EXAM CHEST 1 VIEW: CPT

## 2017-06-06 PROCEDURE — 86900 BLOOD TYPING SEROLOGIC ABO: CPT

## 2017-06-06 PROCEDURE — 86704 HEP B CORE ANTIBODY TOTAL: CPT

## 2017-06-06 PROCEDURE — 86850 RBC ANTIBODY SCREEN: CPT

## 2017-06-06 PROCEDURE — 80048 BASIC METABOLIC PNL TOTAL CA: CPT

## 2017-06-06 PROCEDURE — 76937 US GUIDE VASCULAR ACCESS: CPT

## 2017-06-06 PROCEDURE — C1769: CPT

## 2017-06-06 PROCEDURE — C1750: CPT

## 2017-06-06 PROCEDURE — 93005 ELECTROCARDIOGRAM TRACING: CPT

## 2017-06-06 PROCEDURE — 85670 THROMBIN TIME PLASMA: CPT

## 2017-06-06 PROCEDURE — 85384 FIBRINOGEN ACTIVITY: CPT

## 2017-06-06 PROCEDURE — 96374 THER/PROPH/DIAG INJ IV PUSH: CPT

## 2017-06-06 PROCEDURE — 84100 ASSAY OF PHOSPHORUS: CPT

## 2017-06-06 PROCEDURE — 85732 THROMBOPLASTIN TIME PARTIAL: CPT

## 2017-06-06 PROCEDURE — 99261: CPT

## 2017-06-06 RX ORDER — HYDRALAZINE HCL 50 MG
1 TABLET ORAL
Qty: 0 | Refills: 0 | COMMUNITY
Start: 2017-06-06

## 2017-06-06 RX ORDER — CALCIUM ACETATE 667 MG
2001 TABLET ORAL
Qty: 180090 | Refills: 0
Start: 2017-06-06 | End: 2017-07-06

## 2017-06-06 RX ORDER — AMLODIPINE BESYLATE 2.5 MG/1
1 TABLET ORAL
Qty: 0 | Refills: 0 | DISCHARGE
Start: 2017-06-06

## 2017-06-06 RX ORDER — AMLODIPINE BESYLATE 2.5 MG/1
1 TABLET ORAL
Qty: 0 | Refills: 0 | COMMUNITY

## 2017-06-06 RX ORDER — ERYTHROPOIETIN 10000 [IU]/ML
4000 INJECTION, SOLUTION INTRAVENOUS; SUBCUTANEOUS
Qty: 0 | Refills: 0 | COMMUNITY
Start: 2017-06-06

## 2017-06-06 RX ORDER — CALCIUM ACETATE 667 MG
2000 TABLET ORAL
Qty: 0 | Refills: 0 | COMMUNITY
Start: 2017-06-06

## 2017-06-06 RX ORDER — PANTOPRAZOLE SODIUM 20 MG/1
1 TABLET, DELAYED RELEASE ORAL
Qty: 0 | Refills: 0 | COMMUNITY
Start: 2017-06-06

## 2017-06-06 RX ORDER — CALCIUM CARBONATE 500(1250)
3 TABLET ORAL
Qty: 0 | Refills: 0 | COMMUNITY

## 2017-06-06 RX ADMIN — PANTOPRAZOLE SODIUM 40 MILLIGRAM(S): 20 TABLET, DELAYED RELEASE ORAL at 05:29

## 2017-06-06 RX ADMIN — Medication 100 MILLIGRAM(S): at 14:14

## 2017-06-06 RX ADMIN — ERYTHROPOIETIN 4000 UNIT(S): 10000 INJECTION, SOLUTION INTRAVENOUS; SUBCUTANEOUS at 10:53

## 2017-06-06 RX ADMIN — Medication 2001 MILLIGRAM(S): at 11:54

## 2017-06-06 NOTE — DISCHARGE NOTE ADULT - CARE PROVIDER_API CALL
Luis Fernando Lloyd), Internal Medicine  [address] [place] [address]  Phone: [phone]  Fax: [phone]

## 2017-06-06 NOTE — DISCHARGE NOTE ADULT - HOSPITAL COURSE
To be completed by attending MD 70 yo woman w/PMHx HTN, glaucoma, and ESRD on HD (Tues, Thurs, Sat), sent here because AVF not working.  Patient states that she went for dialysis on Tuesday as her routine and she was told her AVF not functioning.  She did not get dialysis that day.  She was referred to vascular surgeon, whom she saw on Wednesday and patient was sent here for new access.  Patient last had her dialysis on Saturday.  She denies any nausea, chest pain, SOB, cough, fever, nausea, vomiting.  She has no pain or swelling of left arm or at the site of AVF.  She was seen by nephrology in ED and vascular surgery, who placed a shiley dialysis.  She is now s/p dialysis, currently denies any complaints.      6/1- spoke with neph, aurelio decided that Fistula is not salvagable, shiley in place for dialysis, will need permacath by IR, plan to do in am vein mapping  6/2 notify by RN patient .8 repeat 104.2 no sign of bleeding repeat at 1200 result 58.6 hematology hosue called for consult   IR cancel secondary to PTT   - Will reeval on Monday once cleared by hematology for  IR  Perma cath   -  Discussed with Hematology Dr Bailey Ordered   D/ c Heparin Sq as per Hematology   - Consult Called for House Allergy called     6/5 s/p Permacath by IR today        Shiley previously placed by [facility]; IR and vasc declined to remove per Dr Lloyd ( renal)        Shiley removed per renal request; site w/ dsg  c/d/i; continue to monitor      Allergy/Immunology consult ( pt w/ anaphylactic rxn on last adm);  etiology of rxn                 unclear; poss drug rxn vs food ( mushrooms )to f/u in office for skin testing; pt has epi-pen; meds given around  the time of rxn ( during egd/colonoscopy) to be reviewed primary team, Patient to follow up outpt after review of requested records   6/6 : D/c plan for today and F/u with .

## 2017-06-06 NOTE — DISCHARGE NOTE ADULT - MEDICATION SUMMARY - MEDICATIONS TO TAKE
I will START or STAY ON the medications listed below when I get home from the hospital:    amLODIPine 10 mg oral tablet  -- 1 tab(s) by mouth once a day  -- Indication: For HTN (hypertension)    EpiPen 2-Rakesh 0.3 mg injectable kit  -- 0.3 milligram(s) injectable once x 1 days, As Needed allergic reaction  -- Obtain medical advice before taking any non-prescription drugs as some may affect the action of this medication.    -- Indication: For Allergic reaction    epoetin ivelisse  -- 4000 unit(s) subcutaneous Tuesday, Thursday, Saturday  at dialysis  -- Indication: For Epogen    calcium acetate 667 mg oral tablet  -- 2001 milligram(s) by mouth 3 times a day  -- Indication: For End stage renal disease    pantoprazole 40 mg oral delayed release tablet  -- 1 tab(s) by mouth once a day (before a meal)  -- Indication: For GERD    hydrALAZINE 100 mg oral tablet  -- 1 tab(s) by mouth 3 times a day  -- Indication: For HTN (hypertension)    Vitamin D3  -- 1 cap(s) by mouth once a week  -- Indication: For vitamin supplement I will START or STAY ON the medications listed below when I get home from the hospital:    amLODIPine 10 mg oral tablet  -- 1 tab(s) by mouth once a day  -- Indication: For HTN (hypertension)    EpiPen 2-Rakesh 0.3 mg injectable kit  -- 0.3 milligram(s) injectable once x 1 days, As Needed allergic reaction  -- Obtain medical advice before taking any non-prescription drugs as some may affect the action of this medication.    -- Indication: For Allergic reaction    epoetin ivelisse  -- 4000 unit(s) subcutaneous Tuesday, Thursday, Saturday  at dialysis  -- Indication: For Anemia due to renal disease    calcium acetate 667 mg oral tablet  -- 2001 milligram(s) by mouth 3 times a day  -- Indication: For End stage renal disease    pantoprazole 40 mg oral delayed release tablet  -- 1 tab(s) by mouth once a day (before a meal)  -- Indication: For GERD    hydrALAZINE 100 mg oral tablet  -- 1 tab(s) by mouth 3 times a day  -- Indication: For HTN (hypertension)    Vitamin D3  -- 1 cap(s) by mouth once a week  -- Indication: For vitamin supplement

## 2017-06-06 NOTE — DISCHARGE NOTE ADULT - MEDICATION SUMMARY - MEDICATIONS TO STOP TAKING
I will STOP taking the medications listed below when I get home from the hospital:    calcium (as carbonate) 600 mg oral tablet  -- 3 tab(s) by mouth 3 times a day

## 2017-06-06 NOTE — DISCHARGE NOTE ADULT - CARE PLAN
Principal Discharge DX:	AV fistula occlusion, initial encounter  Goal:	permacath in place for dialysis  Instructions for follow-up, activity and diet:	You had permacath placement during the stay in the hospital .  Likely AV fistula placement as out patient.   follow up with Dr. Lloyd (nephrologist) in one week  Secondary Diagnosis:	ESRD (end stage renal disease) on dialysis  Instructions for follow-up, activity and diet:	Dialysis scheduled for Tue/Thursday /Saturday  Continue with Phoslo   Will receive Epogen at dialysis center.  Secondary Diagnosis:	HTN (hypertension)  Instructions for follow-up, activity and diet:	Continue with Current dosing of Hydralazine and Norvasc .  Your BP is well controlled on following medication

## 2017-06-06 NOTE — DISCHARGE NOTE ADULT - PLAN OF CARE
permacath in place for dialysis You had permacath placement during the stay in the hospital .  Likely AV fistula placement as out patient.   follow up with Dr. Lloyd (nephrologist) in one week Dialysis scheduled for Tue/Thursday /Saturday  Continue with Phoslo   Will receive Epogen at dialysis center. Continue with Current dosing of Hydralazine and Norvasc .  Your BP is well controlled on following medication

## 2017-06-06 NOTE — DISCHARGE NOTE ADULT - CONDITIONS AT DISCHARGE
Pt A&Ox4. vss. pt complains of no pain at this time. iv site removed, no signs of redness or swelling noted at site. safety checks completed. tolerating diet with no signs of n/v. ambulating. oliguric. L neck dressing gauze & thin film wnl, L CW permacath wnl clean dry and intact, L AVF fistula present. otherwise skin intact. pt verbalized understanding of discharge instructions, when to notify provider and take medications. pt safety maintained.

## 2017-06-09 ENCOUNTER — APPOINTMENT (OUTPATIENT)
Dept: VASCULAR SURGERY | Facility: CLINIC | Age: 72
End: 2017-06-09

## 2017-06-09 ENCOUNTER — APPOINTMENT (OUTPATIENT)
Dept: GASTROENTEROLOGY | Facility: CLINIC | Age: 72
End: 2017-06-09

## 2017-06-09 VITALS
RESPIRATION RATE: 16 BRPM | HEIGHT: 64 IN | BODY MASS INDEX: 22.02 KG/M2 | DIASTOLIC BLOOD PRESSURE: 70 MMHG | TEMPERATURE: 98.6 F | WEIGHT: 129 LBS | SYSTOLIC BLOOD PRESSURE: 162 MMHG

## 2017-06-09 DIAGNOSIS — T82.868A THROMBOSIS DUE VASCULAR PROSTHETIC DEVICES, IMPLANTS AND GRAFTS, INITIAL ENCOUNTER: ICD-10-CM

## 2017-06-12 ENCOUNTER — INPATIENT (INPATIENT)
Facility: HOSPITAL | Age: 72
LOS: 8 days | Discharge: HOME | DRG: 314 | End: 2017-06-21
Attending: INTERNAL MEDICINE | Admitting: INTERNAL MEDICINE
Payer: MEDICARE

## 2017-06-12 VITALS
RESPIRATION RATE: 20 BRPM | OXYGEN SATURATION: 100 % | SYSTOLIC BLOOD PRESSURE: 150 MMHG | DIASTOLIC BLOOD PRESSURE: 77 MMHG | HEART RATE: 91 BPM | TEMPERATURE: 99 F | WEIGHT: 130.07 LBS

## 2017-06-12 DIAGNOSIS — Z90.710 ACQUIRED ABSENCE OF BOTH CERVIX AND UTERUS: Chronic | ICD-10-CM

## 2017-06-12 DIAGNOSIS — R50.9 FEVER, UNSPECIFIED: ICD-10-CM

## 2017-06-12 DIAGNOSIS — Z29.9 ENCOUNTER FOR PROPHYLACTIC MEASURES, UNSPECIFIED: ICD-10-CM

## 2017-06-12 DIAGNOSIS — S72.009A FRACTURE OF UNSPECIFIED PART OF NECK OF UNSPECIFIED FEMUR, INITIAL ENCOUNTER FOR CLOSED FRACTURE: Chronic | ICD-10-CM

## 2017-06-12 DIAGNOSIS — R09.1 PLEURISY: ICD-10-CM

## 2017-06-12 DIAGNOSIS — N18.6 END STAGE RENAL DISEASE: ICD-10-CM

## 2017-06-12 DIAGNOSIS — I10 ESSENTIAL (PRIMARY) HYPERTENSION: ICD-10-CM

## 2017-06-12 DIAGNOSIS — H26.9 UNSPECIFIED CATARACT: Chronic | ICD-10-CM

## 2017-06-12 DIAGNOSIS — I82.622 ACUTE EMBOLISM AND THROMBOSIS OF DEEP VEINS OF LEFT UPPER EXTREMITY: ICD-10-CM

## 2017-06-12 LAB
ALBUMIN SERPL ELPH-MCNC: 3.5 G/DL — SIGNIFICANT CHANGE UP (ref 3.3–5)
ALP SERPL-CCNC: 66 U/L — SIGNIFICANT CHANGE UP (ref 40–120)
ALT FLD-CCNC: 5 U/L RC — LOW (ref 10–45)
ANION GAP SERPL CALC-SCNC: 17 MMOL/L — SIGNIFICANT CHANGE UP (ref 5–17)
APTT BLD: 32.7 SEC — SIGNIFICANT CHANGE UP (ref 27.5–37.4)
APTT BLD: 80.6 SEC — HIGH (ref 27.5–37.4)
AST SERPL-CCNC: 11 U/L — SIGNIFICANT CHANGE UP (ref 10–40)
BASOPHILS # BLD AUTO: 0 K/UL — SIGNIFICANT CHANGE UP (ref 0–0.2)
BASOPHILS NFR BLD AUTO: 0.1 % — SIGNIFICANT CHANGE UP (ref 0–2)
BILIRUB SERPL-MCNC: 0.4 MG/DL — SIGNIFICANT CHANGE UP (ref 0.2–1.2)
BUN SERPL-MCNC: 34 MG/DL — HIGH (ref 7–23)
CALCIUM SERPL-MCNC: 8.8 MG/DL — SIGNIFICANT CHANGE UP (ref 8.4–10.5)
CHLORIDE SERPL-SCNC: 93 MMOL/L — LOW (ref 96–108)
CK MB CFR SERPL CALC: 1 NG/ML — SIGNIFICANT CHANGE UP (ref 0–3.8)
CK MB CFR SERPL CALC: 1 NG/ML — SIGNIFICANT CHANGE UP (ref 0–3.8)
CK SERPL-CCNC: 26 U/L — SIGNIFICANT CHANGE UP (ref 25–170)
CK SERPL-CCNC: 33 U/L — SIGNIFICANT CHANGE UP (ref 25–170)
CO2 SERPL-SCNC: 28 MMOL/L — SIGNIFICANT CHANGE UP (ref 22–31)
CREAT SERPL-MCNC: 7.76 MG/DL — HIGH (ref 0.5–1.3)
EOSINOPHIL # BLD AUTO: 0.1 K/UL — SIGNIFICANT CHANGE UP (ref 0–0.5)
EOSINOPHIL NFR BLD AUTO: 1.6 % — SIGNIFICANT CHANGE UP (ref 0–6)
GAS PNL BLDV: SIGNIFICANT CHANGE UP
GLUCOSE SERPL-MCNC: 104 MG/DL — HIGH (ref 70–99)
HCT VFR BLD CALC: 23.7 % — LOW (ref 34.5–45)
HCT VFR BLD CALC: 26 % — LOW (ref 34.5–45)
HGB BLD-MCNC: 8.2 G/DL — LOW (ref 11.5–15.5)
HGB BLD-MCNC: 8.7 G/DL — LOW (ref 11.5–15.5)
INR BLD: 1.08 RATIO — SIGNIFICANT CHANGE UP (ref 0.88–1.16)
LYMPHOCYTES # BLD AUTO: 0.7 K/UL — LOW (ref 1–3.3)
LYMPHOCYTES # BLD AUTO: 13.5 % — SIGNIFICANT CHANGE UP (ref 13–44)
MCHC RBC-ENTMCNC: 30.9 PG — SIGNIFICANT CHANGE UP (ref 27–34)
MCHC RBC-ENTMCNC: 31.9 PG — SIGNIFICANT CHANGE UP (ref 27–34)
MCHC RBC-ENTMCNC: 33.5 GM/DL — SIGNIFICANT CHANGE UP (ref 32–36)
MCHC RBC-ENTMCNC: 34.7 GM/DL — SIGNIFICANT CHANGE UP (ref 32–36)
MCV RBC AUTO: 92 FL — SIGNIFICANT CHANGE UP (ref 80–100)
MCV RBC AUTO: 92.2 FL — SIGNIFICANT CHANGE UP (ref 80–100)
MONOCYTES # BLD AUTO: 0.6 K/UL — SIGNIFICANT CHANGE UP (ref 0–0.9)
MONOCYTES NFR BLD AUTO: 11 % — SIGNIFICANT CHANGE UP (ref 2–14)
NEUTROPHILS # BLD AUTO: 4 K/UL — SIGNIFICANT CHANGE UP (ref 1.8–7.4)
NEUTROPHILS NFR BLD AUTO: 73.8 % — SIGNIFICANT CHANGE UP (ref 43–77)
PLATELET # BLD AUTO: 221 K/UL — SIGNIFICANT CHANGE UP (ref 150–400)
PLATELET # BLD AUTO: 234 K/UL — SIGNIFICANT CHANGE UP (ref 150–400)
POTASSIUM SERPL-MCNC: 4 MMOL/L — SIGNIFICANT CHANGE UP (ref 3.5–5.3)
POTASSIUM SERPL-SCNC: 4 MMOL/L — SIGNIFICANT CHANGE UP (ref 3.5–5.3)
PROT SERPL-MCNC: 7.1 G/DL — SIGNIFICANT CHANGE UP (ref 6–8.3)
PROTHROM AB SERPL-ACNC: 11.8 SEC — SIGNIFICANT CHANGE UP (ref 9.8–12.7)
RBC # BLD: 2.58 M/UL — LOW (ref 3.8–5.2)
RBC # BLD: 2.81 M/UL — LOW (ref 3.8–5.2)
RBC # FLD: 13.6 % — SIGNIFICANT CHANGE UP (ref 10.3–14.5)
RBC # FLD: 13.6 % — SIGNIFICANT CHANGE UP (ref 10.3–14.5)
SODIUM SERPL-SCNC: 138 MMOL/L — SIGNIFICANT CHANGE UP (ref 135–145)
TROPONIN T SERPL-MCNC: 0.05 NG/ML — SIGNIFICANT CHANGE UP (ref 0–0.06)
TROPONIN T SERPL-MCNC: 0.06 NG/ML — SIGNIFICANT CHANGE UP (ref 0–0.06)
WBC # BLD: 5.4 K/UL — SIGNIFICANT CHANGE UP (ref 3.8–10.5)
WBC # BLD: 5.5 K/UL — SIGNIFICANT CHANGE UP (ref 3.8–10.5)
WBC # FLD AUTO: 5.4 K/UL — SIGNIFICANT CHANGE UP (ref 3.8–10.5)
WBC # FLD AUTO: 5.5 K/UL — SIGNIFICANT CHANGE UP (ref 3.8–10.5)

## 2017-06-12 PROCEDURE — 93010 ELECTROCARDIOGRAM REPORT: CPT

## 2017-06-12 PROCEDURE — 99223 1ST HOSP IP/OBS HIGH 75: CPT

## 2017-06-12 PROCEDURE — 71020: CPT | Mod: 26

## 2017-06-12 PROCEDURE — 93971 EXTREMITY STUDY: CPT | Mod: 26

## 2017-06-12 PROCEDURE — 99285 EMERGENCY DEPT VISIT HI MDM: CPT | Mod: 25,GC

## 2017-06-12 RX ORDER — HEPARIN SODIUM 5000 [USP'U]/ML
4500 INJECTION INTRAVENOUS; SUBCUTANEOUS EVERY 6 HOURS
Qty: 0 | Refills: 0 | Status: DISCONTINUED | OUTPATIENT
Start: 2017-06-12 | End: 2017-06-21

## 2017-06-12 RX ORDER — SODIUM CHLORIDE 9 MG/ML
3 INJECTION INTRAMUSCULAR; INTRAVENOUS; SUBCUTANEOUS ONCE
Qty: 0 | Refills: 0 | Status: COMPLETED | OUTPATIENT
Start: 2017-06-12 | End: 2017-06-12

## 2017-06-12 RX ORDER — VANCOMYCIN HCL 1 G
1000 VIAL (EA) INTRAVENOUS ONCE
Qty: 0 | Refills: 0 | Status: COMPLETED | OUTPATIENT
Start: 2017-06-12 | End: 2017-06-12

## 2017-06-12 RX ORDER — AMLODIPINE BESYLATE 2.5 MG/1
10 TABLET ORAL DAILY
Qty: 0 | Refills: 0 | Status: DISCONTINUED | OUTPATIENT
Start: 2017-06-12 | End: 2017-06-21

## 2017-06-12 RX ORDER — CEFTRIAXONE 500 MG/1
1 INJECTION, POWDER, FOR SOLUTION INTRAMUSCULAR; INTRAVENOUS ONCE
Qty: 0 | Refills: 0 | Status: COMPLETED | OUTPATIENT
Start: 2017-06-12 | End: 2017-06-12

## 2017-06-12 RX ORDER — CALCIUM ACETATE 667 MG
667 TABLET ORAL
Qty: 0 | Refills: 0 | Status: DISCONTINUED | OUTPATIENT
Start: 2017-06-12 | End: 2017-06-13

## 2017-06-12 RX ORDER — HEPARIN SODIUM 5000 [USP'U]/ML
2000 INJECTION INTRAVENOUS; SUBCUTANEOUS EVERY 6 HOURS
Qty: 0 | Refills: 0 | Status: DISCONTINUED | OUTPATIENT
Start: 2017-06-12 | End: 2017-06-21

## 2017-06-12 RX ORDER — ACETAMINOPHEN 500 MG
650 TABLET ORAL EVERY 6 HOURS
Qty: 0 | Refills: 0 | Status: DISCONTINUED | OUTPATIENT
Start: 2017-06-12 | End: 2017-06-21

## 2017-06-12 RX ORDER — ASPIRIN/CALCIUM CARB/MAGNESIUM 324 MG
325 TABLET ORAL ONCE
Qty: 0 | Refills: 0 | Status: COMPLETED | OUTPATIENT
Start: 2017-06-12 | End: 2017-06-12

## 2017-06-12 RX ORDER — HEPARIN SODIUM 5000 [USP'U]/ML
4500 INJECTION INTRAVENOUS; SUBCUTANEOUS ONCE
Qty: 0 | Refills: 0 | Status: COMPLETED | OUTPATIENT
Start: 2017-06-12 | End: 2017-06-12

## 2017-06-12 RX ORDER — HYDRALAZINE HCL 50 MG
25 TABLET ORAL THREE TIMES A DAY
Qty: 0 | Refills: 0 | Status: DISCONTINUED | OUTPATIENT
Start: 2017-06-12 | End: 2017-06-13

## 2017-06-12 RX ORDER — PANTOPRAZOLE SODIUM 20 MG/1
40 TABLET, DELAYED RELEASE ORAL
Qty: 0 | Refills: 0 | Status: DISCONTINUED | OUTPATIENT
Start: 2017-06-12 | End: 2017-06-21

## 2017-06-12 RX ORDER — HEPARIN SODIUM 5000 [USP'U]/ML
INJECTION INTRAVENOUS; SUBCUTANEOUS
Qty: 25000 | Refills: 0 | Status: DISCONTINUED | OUTPATIENT
Start: 2017-06-12 | End: 2017-06-21

## 2017-06-12 RX ADMIN — HEPARIN SODIUM 1100 UNIT(S)/HR: 5000 INJECTION INTRAVENOUS; SUBCUTANEOUS at 22:22

## 2017-06-12 RX ADMIN — AMLODIPINE BESYLATE 10 MILLIGRAM(S): 2.5 TABLET ORAL at 16:23

## 2017-06-12 RX ADMIN — HEPARIN SODIUM 1100 UNIT(S)/HR: 5000 INJECTION INTRAVENOUS; SUBCUTANEOUS at 15:13

## 2017-06-12 RX ADMIN — Medication 250 MILLIGRAM(S): at 12:02

## 2017-06-12 RX ADMIN — Medication 325 MILLIGRAM(S): at 11:33

## 2017-06-12 RX ADMIN — CEFTRIAXONE 100 GRAM(S): 500 INJECTION, POWDER, FOR SOLUTION INTRAMUSCULAR; INTRAVENOUS at 11:33

## 2017-06-12 RX ADMIN — Medication 25 MILLIGRAM(S): at 21:48

## 2017-06-12 RX ADMIN — Medication 667 MILLIGRAM(S): at 20:11

## 2017-06-12 RX ADMIN — SODIUM CHLORIDE 3 MILLILITER(S): 9 INJECTION INTRAMUSCULAR; INTRAVENOUS; SUBCUTANEOUS at 11:16

## 2017-06-12 RX ADMIN — HEPARIN SODIUM 4500 UNIT(S): 5000 INJECTION INTRAVENOUS; SUBCUTANEOUS at 15:10

## 2017-06-12 NOTE — H&P ADULT - PROBLEM SELECTOR PLAN 1
patient iwht fever in setting of acute DVT, however given history cannot r/o other etiology  -would continue vancomycin with dialysis unti BCX return.  Obtain BCx from catheter site  -CXR clear no need for ceftriaxone.  -await BCx

## 2017-06-12 NOTE — ED PROVIDER NOTE - OBJECTIVE STATEMENT
71 YOF presents to ED with left chest pain with coughing over left side, +fever, chills since sunday. Pt recently had permacath placed in left chest, pt denies any discharge around the site. Pt denies any chest pain at rest, SOB, abd pain, dysuria, headaches, vision changes. Pain is only with coughing. no FH         PMD: Gaurav Tabares  Nephrologist:   Dialysis

## 2017-06-12 NOTE — H&P ADULT - PROBLEM SELECTOR PLAN 6
-possible 2/2 DVT vs PE, however given ESRD at high risk of ACS, cannot r/o atypical chest pain.  -ECG normal, would check second set of CE.

## 2017-06-12 NOTE — ED ADULT NURSE REASSESSMENT NOTE - NS ED NURSE REASSESS COMMENT FT1
Pt left unit for vascular
Pt return from having ultrasound done. Pt states pain to the chest improved, verbalizes pain level of 4/10 on pain scale. Awaiting bed asignment.

## 2017-06-12 NOTE — H&P ADULT - NSHPLABSRESULTS_GEN_ALL_CORE
Personally reviewed labs.  Normocytic anemia,Elevated Creatinine, baseline. CE negative x1.  Normal lactate    CXR personally reviewed:  No acute pulmonary disease  Upper extremity doppler reviewed: acute DVT in left subclavian    ECG personally erviewed:  Normal sinus rhythm without ST segment changes.

## 2017-06-12 NOTE — H&P ADULT - NSHPPHYSICALEXAM_GEN_ALL_CORE
GEN: NAD  HEENT: EOMI. No JVD  CARDS: +S1S2 rrr.  No M/r/g  RESP: CTA b/l.  no r/r/w  ABD: Soft. NT/ND.  +BS  Extr: TTP at shoulder on left. Site around dialysis catheter c/d/i  MSK: Full ROM b/l arms in flexion and abduction.  Neuro: Non-focal

## 2017-06-12 NOTE — H&P ADULT - ASSESSMENT
72 yo woman w/PMHx HTN, glaucoma, and ESRD on HD (Tues, Thurs, Sat) recent admission for malfunctioning dialysis catheter s/p left IJ access presents today with fevers x 2 days, pleuritic chest pain and left arm pain admitted for subclavian DVT

## 2017-06-12 NOTE — H&P ADULT - NSHPREVIEWOFSYSTEMS_GEN_ALL_CORE
+fever, non-producgtive cough, left arm sharp pain radiating down arm and into lateral chest, mid-axillary line.   Denies chest pain, abdominal pain, n/v/d/c, dysuria, numbness or tingling of extreities, weight gain or loss. Had prior history of rash with PCN.  No cold intolerance.  No gum bleeding.  No myalgias.

## 2017-06-12 NOTE — ED ADULT NURSE NOTE - OBJECTIVE STATEMENT
71 yr old female with h/o htn , ESRD  and is on dialysis Tuesday, Thursday, Saturday  present to the ER for fever and chest pain. As per report pt state she had fever Saturday pm however denies taking any medication. Pt reported that this am she started to experience a non productive cough and only experiences chest pain when she cough. Pt reports pain level of 8/10 on pain scale and aching in quality. Pt denies shortness of breath,.

## 2017-06-12 NOTE — ED PROVIDER NOTE - ATTENDING CONTRIBUTION TO CARE
Attending MD Santillan:  I personally have seen and examined this patient.  Resident note reviewed and agree on plan of care and except where noted.  See HPI, PE, and MDM for details.       71F with ESRD on HD MWF, recent failed fistula placement s/p recent left upper chest wall HD line presenting with pain around HD line site and fever. Ddx includes DVT associated with line, PNA, line infection, will obtain CXR, labs, US LUE, cultures, empiric abx and admit

## 2017-06-12 NOTE — CONSULT NOTE ADULT - SUBJECTIVE AND OBJECTIVE BOX
CC: Patient is a 71y old  Female who presents with a chief complaint of Fevers (12 Jun 2017 16:02)        Patient is a 71y year old female with PMHx of   HPI:  70 yo woman w/PMHx HTN, glaucoma, and ESRD on HD (Tues, Thurs, Sat) recent admission for malfunctioning dialysis catheter s/p left IJ access presents today with fevers x 2 days, pleuritic chest pain and left arm pain.  Patient states since discharge has had left arm pain, however on Saturday went to dialysis and was told had a fever.  This fever persisted into Sunday and became assocaietd wtih increased left arm pain and swelling.  Patient came to ED for further evaluation.     In eD: Zs=835.4  Hr=79  OH=270/83 R=20  PO2=97%RA  Patient given vancomycin, ceftraixone and started on heparin gtt for DVT adn admitted to medicine.    Currently patient sates pain improved with pain medication.  Still with nonn-roductive cough. (12 Jun 2017 16:02)    Patient has a nonfunctioning LUE AVF placed at OSH several years ago.  There have been several previous attempts at fistula salvage. She was admitted 5/31-6/6 for HD.  On 5/31 she had a left IJ Shiley placed in the ED. On 6/5 she had a Left IJ tunneled HD catheter placed.  She was discharged home.  Now she returns with left arm pain and chest pain. Duplex today demonstrates left subclavian thrombosis.  Vascular consulted for recommendations.     Patient has followed with Dr. Mc for future fistula placement for long term HD access      [facility]  Glaucoma  Dialysis patient  HTN (hypertension)    PSH  H/O: hysterectomy  Hip fracture  Acquired cataract    Allergies  penicillin (Rash)    Physical Exam  T(C): 36.9, Max: 38 (06-12 @ 10:56)  HR: 84 (79 - 91)  BP: 160/77 (150/77 - 174/83)  RR: 18 (18 - 20)  SpO2: 95% (95% - 100%)  Wt(kg): --  Tmax: T(C): , Max: 38 (06-12 @ 10:56)  Wt(kg): --    Gen: NAD  Left IJ permacath without erythema  LUE AVF without palpable thrill   palpable radial pulse  B/L Feet warm     I & Os for current day (as of 06-12-17)  =============================================  IN:    heparin  Infusion.: 55 ml    Total IN: 55 ml  ---------------------------------------------  OUT:    Total OUT: 0 ml  ---------------------------------------------  Total NET: 55 ml      Labs:                        8.2    5.4   )-----------( 221      ( 12 Jun 2017 21:19 )             23.7     06-12    138  |  93<L>  |  34<H>  ----------------------------<  104<H>  4.0   |  28  |  7.76<H>    Ca    8.8      12 Jun 2017 11:20    TPro  7.1  /  Alb  3.5  /  TBili  0.4  /  DBili  x   /  AST  11  /  ALT  5<L>  /  AlkPhos  66  06-12    PT/INR - ( 12 Jun 2017 11:20 )   PT: 11.8 sec;   INR: 1.08 ratio         PTT - ( 12 Jun 2017 11:20 )  PTT:32.7 sec      Imaging    EXAM:  DUPLEX EXT VEINS UPPER LT                        PROCEDURE DATE:  06/12/2017    IMPRESSION:   There is acute thrombosis of the left subclavian vein.

## 2017-06-12 NOTE — ED ADULT NURSE NOTE - CHPI ED SYMPTOMS NEG
no abdominal pain/no diarrhea/no shortness of breath/no vomiting/no rash/no decreased eating/drinking/no chills/no headache

## 2017-06-12 NOTE — ED PROVIDER NOTE - PHYSICAL EXAMINATION
Attending MD Santillan: A & O x 3, NAD, HEENT WNL and no facial asymmetry; left upper chest wall HD line in place, no surrounding erythema, discharge or fluctuance, lungs CTAB, heart with reg rhythm without murmur; abdomen soft NTND; extremities with no edema; neuro exam non focal with no motor or sensory deficits.

## 2017-06-12 NOTE — H&P ADULT - HISTORY OF PRESENT ILLNESS
70 yo woman w/PMHx HTN, glaucoma, and ESRD on HD (Tues, Thurs, Sat) recent admission for malfunctioning dialysis catheter s/p left IJ access presents today with fevers x 2 days, pleuritic chest pain and left arm pain.  Patient states since discharge has had left arm pain, however on Saturday went to dialysis and was told had a fever.  This fever persisted into Sunday and became assocaietd wtih increased left arm pain and swelling.  Patient came to ED for further evaluation.     In eD: Js=365.4  Hr=79  XT=438/83 R=20  PO2=97%RA  Patient given vancomycin, ceftraixone and started on heparin gtt for DVT adn admitted to medicine.    Currently patient sates pain improved with pain medication.  Still with nonn-roductive cough.

## 2017-06-12 NOTE — CONSULT NOTE ADULT - ASSESSMENT
71 F with L subclavian thrombosis associated with L IJ shiley --> permacath   -Recommend anticoagulation   -no need to remove permacath  -possible OR this admission for AVF; will need medical clearance.  AVF may occur outpt 71 F with L subclavian thrombosis associated with L IJ shiley --> permacath   -Recommend anticoagulation   -no need to remove permacath  -possible OR this admission for AVF; will need medical clearance.  AVF may occur outpt   -Pt already has vein mapping (6/2) 71 F with L subclavian thrombosis associated with L IJ shiley --> permacath   -Recommend anticoagulation 3 months  -no need to remove permacath  -no permanent access at this time  -Pt already has vein mapping (6/2)

## 2017-06-12 NOTE — H&P ADULT - PROBLEM SELECTOR PLAN 2
currently on heparin gtt  -vascular consult as was planning on placing AVF on left side.  -to also consult vascular re: need to remove current catheter vs. treat with cather in place.  -if goal is to treat for this UE DVT, would not obtain V/Q scan fo rPE, however if they do not want to treat for DVT, then would obtain V/Q scan given pleuritic chest pain and cough as would .  -heparin-->coumadin

## 2017-06-12 NOTE — ED PROVIDER NOTE - MEDICAL DECISION MAKING DETAILS
Pleuritic chest pain, recent catheter placement, will get lab work, CXR, r/o ACS, upper extremity duplex to r/o clot, blood cultures secondary to fever and recent line placement.

## 2017-06-13 DIAGNOSIS — E83.39 OTHER DISORDERS OF PHOSPHORUS METABOLISM: ICD-10-CM

## 2017-06-13 DIAGNOSIS — N18.9 CHRONIC KIDNEY DISEASE, UNSPECIFIED: ICD-10-CM

## 2017-06-13 LAB
ALBUMIN SERPL ELPH-MCNC: 3 G/DL — LOW (ref 3.3–5)
ALP SERPL-CCNC: 62 U/L — SIGNIFICANT CHANGE UP (ref 40–120)
ALT FLD-CCNC: <4 U/L — LOW (ref 10–45)
ANION GAP SERPL CALC-SCNC: 22 MMOL/L — HIGH (ref 5–17)
APTT BLD: 70.6 SEC — HIGH (ref 27.5–37.4)
AST SERPL-CCNC: 11 U/L — SIGNIFICANT CHANGE UP (ref 10–40)
BILIRUB DIRECT SERPL-MCNC: 0.1 MG/DL — SIGNIFICANT CHANGE UP (ref 0–0.2)
BILIRUB INDIRECT FLD-MCNC: 0.2 MG/DL — SIGNIFICANT CHANGE UP (ref 0.2–1)
BILIRUB SERPL-MCNC: 0.3 MG/DL — SIGNIFICANT CHANGE UP (ref 0.2–1.2)
BLD GP AB SCN SERPL QL: NEGATIVE — SIGNIFICANT CHANGE UP
BUN SERPL-MCNC: 44 MG/DL — HIGH (ref 7–23)
CALCIUM SERPL-MCNC: 8.2 MG/DL — LOW (ref 8.4–10.5)
CHLORIDE SERPL-SCNC: 92 MMOL/L — LOW (ref 96–108)
CO2 SERPL-SCNC: 20 MMOL/L — LOW (ref 22–31)
CREAT SERPL-MCNC: 9.6 MG/DL — HIGH (ref 0.5–1.3)
GLUCOSE SERPL-MCNC: 87 MG/DL — SIGNIFICANT CHANGE UP (ref 70–99)
HCT VFR BLD CALC: 22 % — LOW (ref 34.5–45)
HGB BLD-MCNC: 7.4 G/DL — LOW (ref 11.5–15.5)
INR BLD: 1.08 RATIO — SIGNIFICANT CHANGE UP (ref 0.88–1.16)
MAGNESIUM SERPL-MCNC: 2.3 MG/DL — SIGNIFICANT CHANGE UP (ref 1.6–2.6)
MCHC RBC-ENTMCNC: 29.6 PG — SIGNIFICANT CHANGE UP (ref 27–34)
MCHC RBC-ENTMCNC: 33.6 GM/DL — SIGNIFICANT CHANGE UP (ref 32–36)
MCV RBC AUTO: 88 FL — SIGNIFICANT CHANGE UP (ref 80–100)
PHOSPHATE SERPL-MCNC: 6 MG/DL — HIGH (ref 2.5–4.5)
PLATELET # BLD AUTO: 230 K/UL — SIGNIFICANT CHANGE UP (ref 150–400)
POTASSIUM SERPL-MCNC: 4.5 MMOL/L — SIGNIFICANT CHANGE UP (ref 3.5–5.3)
POTASSIUM SERPL-SCNC: 4.5 MMOL/L — SIGNIFICANT CHANGE UP (ref 3.5–5.3)
PROT SERPL-MCNC: 6.4 G/DL — SIGNIFICANT CHANGE UP (ref 6–8.3)
PROTHROM AB SERPL-ACNC: 12.2 SEC — SIGNIFICANT CHANGE UP (ref 10–13.1)
RBC # BLD: 2.5 M/UL — LOW (ref 3.8–5.2)
RBC # FLD: 14.9 % — HIGH (ref 10.3–14.5)
RH IG SCN BLD-IMP: POSITIVE — SIGNIFICANT CHANGE UP
SODIUM SERPL-SCNC: 134 MMOL/L — LOW (ref 135–145)
WBC # BLD: 4.39 K/UL — SIGNIFICANT CHANGE UP (ref 3.8–10.5)
WBC # FLD AUTO: 4.39 K/UL — SIGNIFICANT CHANGE UP (ref 3.8–10.5)

## 2017-06-13 PROCEDURE — 71275 CT ANGIOGRAPHY CHEST: CPT | Mod: 26

## 2017-06-13 RX ORDER — ERYTHROPOIETIN 10000 [IU]/ML
4000 INJECTION, SOLUTION INTRAVENOUS; SUBCUTANEOUS
Qty: 0 | Refills: 0 | Status: DISCONTINUED | OUTPATIENT
Start: 2017-06-13 | End: 2017-06-20

## 2017-06-13 RX ORDER — HYDRALAZINE HCL 50 MG
100 TABLET ORAL THREE TIMES A DAY
Qty: 0 | Refills: 0 | Status: DISCONTINUED | OUTPATIENT
Start: 2017-06-13 | End: 2017-06-21

## 2017-06-13 RX ORDER — CALCIUM ACETATE 667 MG
2001 TABLET ORAL
Qty: 0 | Refills: 0 | Status: DISCONTINUED | OUTPATIENT
Start: 2017-06-13 | End: 2017-06-21

## 2017-06-13 RX ADMIN — Medication 25 MILLIGRAM(S): at 12:49

## 2017-06-13 RX ADMIN — PANTOPRAZOLE SODIUM 40 MILLIGRAM(S): 20 TABLET, DELAYED RELEASE ORAL at 05:10

## 2017-06-13 RX ADMIN — Medication 100 MILLIGRAM(S): at 23:59

## 2017-06-13 RX ADMIN — Medication 667 MILLIGRAM(S): at 12:49

## 2017-06-13 RX ADMIN — AMLODIPINE BESYLATE 10 MILLIGRAM(S): 2.5 TABLET ORAL at 05:10

## 2017-06-13 RX ADMIN — Medication 25 MILLIGRAM(S): at 05:10

## 2017-06-13 RX ADMIN — HEPARIN SODIUM 1100 UNIT(S)/HR: 5000 INJECTION INTRAVENOUS; SUBCUTANEOUS at 06:14

## 2017-06-13 NOTE — PROGRESS NOTE ADULT - SUBJECTIVE AND OBJECTIVE BOX
Patient is a 71y old  Female who presents with a chief complaint of Fevers (12 Jun 2017 16:02)      SUBJECTIVE / OVERNIGHT EVENTS:   Feels better.  Denies CP/SOB/Palpitation/HA.    MEDICATIONS  (STANDING):  heparin  Infusion. Unit(s)/Hr IV Continuous <Continuous>  pantoprazole    Tablet 40milliGRAM(s) Oral before breakfast  amLODIPine   Tablet 10milliGRAM(s) Oral daily  hydrALAZINE 100milliGRAM(s) Oral three times a day  epoetin ivelisse Injectable 4000Unit(s) IV Push <User Schedule>  calcium acetate 2001milliGRAM(s) Oral three times a day with meals    MEDICATIONS  (PRN):  heparin  Injectable 4500Unit(s) IV Push every 6 hours PRN For aPTT less than 40  heparin  Injectable 2000Unit(s) IV Push every 6 hours PRN For aPTT between 40 - 57  acetaminophen   Tablet 650milliGRAM(s) Oral every 6 hours PRN For Temp greater than 38 C (100.4 F)  acetaminophen   Tablet. 650milliGRAM(s) Oral every 6 hours PRN Mild Pain (1 - 3)      Vital Signs Last 24 Hrs  T(C): 37.3, Max: 37.6 (06-13 @ 04:23)  HR: 82 (80 - 85)  BP: 161/85 (135/75 - 163/81)  RR: 18 (18 - 18)  SpO2: 97% (95% - 98%)  Wt(kg): --  CAPILLARY BLOOD GLUCOSE    I&O's Summary  I & Os for 24h ending 13 Jun 2017 07:00  =============================================  IN: 535 ml / OUT: 0 ml / NET: 535 ml    I & Os for current day (as of 14 Jun 2017 01:19)  =============================================  IN: 480 ml / OUT: 1500 ml / NET: -1020 ml      PHYSICAL EXAM:  GENERAL: NAD, well-developed  HEAD:  Atraumatic, Normocephalic  EYES: EOMI, PERRLA, conjunctiva and sclera clear  NECK: Supple, No JVD  CHEST/LUNG: Clear to auscultation bilaterally; No wheeze  HEART: Regular rate and rhythm; No murmurs, rubs, or gallops  ABDOMEN: Soft, Nontender, Nondistended; Bowel sounds present  EXTREMITIES:  2+ Peripheral Pulses, No clubbing, cyanosis, or edema  PSYCH: AAOx3  NEUROLOGY: non-focal  SKIN: No rashes or lesions    LABS:                        7.4    4.39  )-----------( 230      ( 13 Jun 2017 07:54 )             22.0     06-13    134<L>  |  92<L>  |  44<H>  ----------------------------<  87  4.5   |  20<L>  |  9.60<H>    Ca    8.2<L>      13 Jun 2017 07:54  Phos  6.0     06-13  Mg     2.3     06-13    TPro  6.4  /  Alb  3.0<L>  /  TBili  0.3  /  DBili  0.1  /  AST  11  /  ALT  <4<L>  /  AlkPhos  62  06-13    PT/INR - ( 13 Jun 2017 07:54 )   PT: 12.2 sec;   INR: 1.08 ratio         PTT - ( 13 Jun 2017 05:18 )  PTT:70.6 sec  CARDIAC MARKERS ( 12 Jun 2017 21:19 )  x     / 0.05 ng/mL / 33 U/L / x     / 1.0 ng/mL  CARDIAC MARKERS ( 12 Jun 2017 11:20 )  x     / 0.06 ng/mL / 26 U/L / x     / 1.0 ng/mL        CAPILLARY BLOOD GLUCOSE    06-12 @ 18:22  Culture-urine --  Culture results   No growth to date.  method type --  Organism --  Organism Identification --  Specimen source .Blood Blood  06-12 @ 14:45  Culture-urine --  Culture results   No growth to date.  method type --  Organism --  Organism Identification --  Specimen source .Blood Blood           06-12 @ 18:22  Culture blood --  Culture results   No growth to date.  Gram stain --  Gram stain blood --  Method type --  Organism --  Organism identification --  Specimen source .Blood Blood   06-12 @ 14:45  Culture blood --  Culture results   No growth to date.  Gram stain --  Gram stain blood --  Method type --  Organism --  Organism identification --  Specimen source .Blood Blood      RADIOLOGY & ADDITIONAL TESTS:    Imaging Personally Reviewed:    Consultant(s) Notes Reviewed:      Care Discussed with Consultants/Other Providers:

## 2017-06-13 NOTE — CONSULT NOTE ADULT - PROBLEM SELECTOR RECOMMENDATION 6
could be related to dvt but r/o infectious causes. Continue with iv abx and follow up blood cultures. so far, blood cultures from dialysis center have been negative.  ID consulted.

## 2017-06-13 NOTE — CONSULT NOTE ADULT - SUBJECTIVE AND OBJECTIVE BOX
HPI:  70 yo woman w/PMHx HTN, glaucoma, and ESRD on HD (Tues, Thurs, Sat) at [facility] ( dr. liriano/dr. heaton)  came for cough, chest pressure,  left swollen arm with low grade fever.. Renal consulted for ESRD management.  Pt had fever on saturday and blood cultures were taken in dialysis which are negative so far. Pt started to have left arm pain with swelling on Sunday. Pt's last had was on saturday 6/10 without any issues. Of note, patient had left forarm avg which thrombosed and as per vascular unable to do thrombectomy so permacath placed and would f/u with vascular as outpatient for new access.      PMH:   Glaucoma  Dialysis patient  HTN (hypertension)      PSH:   H/O: hysterectomy  Hip fracture  Acquired cataract      FAMILY HISTORY:  Family history of colon cancer (Sibling)  Family history of cerebrovascular accident (CVA)      Social History:  non-smoker/ non-alcoholic     Home Meds:  MEDICATIONS  (STANDING):  heparin  Infusion. Unit(s)/Hr IV Continuous <Continuous>  pantoprazole    Tablet 40milliGRAM(s) Oral before breakfast  calcium acetate 667milliGRAM(s) Oral four times a day with meals  hydrALAZINE 25milliGRAM(s) Oral three times a day  amLODIPine   Tablet 10milliGRAM(s) Oral daily    MEDICATIONS  (PRN):  heparin  Injectable 4500Unit(s) IV Push every 6 hours PRN For aPTT less than 40  heparin  Injectable 2000Unit(s) IV Push every 6 hours PRN For aPTT between 40 - 57  acetaminophen   Tablet 650milliGRAM(s) Oral every 6 hours PRN For Temp greater than 38 C (100.4 F)  acetaminophen   Tablet. 650milliGRAM(s) Oral every 6 hours PRN Mild Pain (1 - 3)      Allergies:  Allergies    penicillin (Rash)    Intolerances        REVIEW OF SYSTEMS:  CONSTITUTIONAL: fever present and no weight loss, or fatigue  EYES: No eye pain, visual disturbances, or discharge  ENMT:  No difficulty hearing, tinnitus, vertigo; No sinus or throat pain  NECK: No pain or stiffness  BREASTS: No pain, masses, or nipple discharge  RESPIRATORY: No cough, wheezing, chills or hemoptysis; No shortness of breath  CARDIOVASCULAR: No chest pain, palpitations, dizziness, or leg swelling  GASTROINTESTINAL: No abdominal or epigastric pain. No nausea, vomiting, or hematemesis; No diarrhea or constipation. No melena or hematochezia.  GENITOURINARY: No dysuria, frequency, hematuria, or incontinence  NEUROLOGICAL: No headaches, memory loss, loss of strength, numbness, or tremors  SKIN: No itching, burning, rashes, or lesions   ENDOCRINE: No heat or cold intolerance; No hair loss  MUSCULOSKELETAL: left arm swelling; No muscle, back, or extremity pain  PSYCHIATRIC: No depression, anxiety, mood swings, or difficulty sleeping  HEME/LYMPH: No easy bruising, or bleeding gums  ALLERY AND IMMUNOLOGIC: No hives or eczema    Vital Signs Last 24 Hrs  T(C): 37.4, Max: 37.7 (06-12 @ 14:55)  T(F): 99.3, Max: 99.8 (06-12 @ 14:55)  HR: 85 (79 - 85)  BP: 135/75 (135/75 - 174/83)  BP(mean): --  RR: 18 (18 - 20)  SpO2: 95% (95% - 97%)  I & Os for 24h ending 06-13 @ 07:00  =============================================  IN: 535 ml / OUT: 0 ml / NET: 535 ml    I & Os for current day (as of 06-13 @ 14:52)  =============================================  IN: 480 ml / OUT: 0 ml / NET: 480 ml      PHYSICAL EXAM:  GENERAL: No acute respiratory distress.  HEAD:  Atraumatic, Normocephalic  EYES: conjunctiva and sclera clear  ENMT: Moist mucous membranes,  NECK: Supple, No JVD  NERVOUS SYSTEM:  Awake, Alert & Oriented X3, No focal deficits present.   CHEST/LUNG: Clear to percussion bilaterally; No rales, rhonchi, wheezing, or rubs  HEART: Regular rate and rhythm; No murmurs, rubs, or gallops  ABDOMEN: Soft, Nontender, Nondistended; Bowel sounds present  EXTREMITIES:  2+ Peripheral Pulses lower text, trace edema; Lt forearm AVG thrombosed and Lt Chest permacath.  SKIN: No rashes or lesions    LABS:                        7.4    4.39  )-----------( 230      ( 13 Jun 2017 07:54 )             22.0     06-13    134<L>  |  92<L>  |  44<H>  ----------------------------<  87  4.5   |  20<L>  |  9.60<H>    Ca    8.2<L>      13 Jun 2017 07:54  Phos  6.0     06-13  Mg     2.3     06-13    TPro  6.4  /  Alb  3.0<L>  /  TBili  0.3  /  DBili  0.1  /  AST  11  /  ALT  <4<L>  /  AlkPhos  62  06-13    PT/INR - ( 13 Jun 2017 07:54 )   PT: 12.2 sec;   INR: 1.08 ratio         PTT - ( 13 Jun 2017 05:18 )  PTT:70.6 sec    Magnesium, Serum: 2.3 mg/dL (06-13 @ 07:54)  Phosphorus Level, Serum: 6.0 mg/dL <H> (06-13 @ 07:43)    Urine studies      RADIOLOGY & ADDITIONAL TESTS:

## 2017-06-13 NOTE — CONSULT NOTE ADULT - PROBLEM SELECTOR RECOMMENDATION 4
vascular doppler shows left subcalvian dvt near catheter site. Spoke with vascular (dr. villalobos) he recommends to treat medical with anticoagulation because he will need the rt arm for future access. Consult heme/onco, thrombosis x 2 in the last 2 weeks, r/o thrombophilic disorders and recs on anticoagulation. continue with heparin drip.

## 2017-06-13 NOTE — CONSULT NOTE ADULT - PROBLEM SELECTOR RECOMMENDATION 9
ESRD on HD (T/TH/SAT)  -Dialyzed today and HD orders were written and discussed with dialysis nurse.   -Hemodialysis Renal Diet and Fluid restriction to 1L/day  -Adjust meds to eGFR and avoid IV Gadolinium contrast,NSAIDs, and phosphate enema.  -Monitor Electrolytes daily.

## 2017-06-14 DIAGNOSIS — I82.B12 ACUTE EMBOLISM AND THROMBOSIS OF LEFT SUBCLAVIAN VEIN: ICD-10-CM

## 2017-06-14 LAB
ANION GAP SERPL CALC-SCNC: 17 MMOL/L — SIGNIFICANT CHANGE UP (ref 5–17)
APTT BLD: 72 SEC — HIGH (ref 27.5–37.4)
BUN SERPL-MCNC: 18 MG/DL — SIGNIFICANT CHANGE UP (ref 7–23)
CALCIUM SERPL-MCNC: 8.5 MG/DL — SIGNIFICANT CHANGE UP (ref 8.4–10.5)
CHLORIDE SERPL-SCNC: 92 MMOL/L — LOW (ref 96–108)
CO2 SERPL-SCNC: 25 MMOL/L — SIGNIFICANT CHANGE UP (ref 22–31)
CREAT SERPL-MCNC: 5.24 MG/DL — HIGH (ref 0.5–1.3)
GLUCOSE SERPL-MCNC: 92 MG/DL — SIGNIFICANT CHANGE UP (ref 70–99)
HCT VFR BLD CALC: 29 % — LOW (ref 34.5–45)
HGB BLD-MCNC: 10 G/DL — LOW (ref 11.5–15.5)
INR BLD: 1.13 RATIO — SIGNIFICANT CHANGE UP (ref 0.88–1.16)
MCHC RBC-ENTMCNC: 29.5 PG — SIGNIFICANT CHANGE UP (ref 27–34)
MCHC RBC-ENTMCNC: 34.5 GM/DL — SIGNIFICANT CHANGE UP (ref 32–36)
MCV RBC AUTO: 85.5 FL — SIGNIFICANT CHANGE UP (ref 80–100)
PLATELET # BLD AUTO: 253 K/UL — SIGNIFICANT CHANGE UP (ref 150–400)
POTASSIUM SERPL-MCNC: 3.8 MMOL/L — SIGNIFICANT CHANGE UP (ref 3.5–5.3)
POTASSIUM SERPL-SCNC: 3.8 MMOL/L — SIGNIFICANT CHANGE UP (ref 3.5–5.3)
PROTHROM AB SERPL-ACNC: 12.3 SEC — SIGNIFICANT CHANGE UP (ref 9.8–12.7)
RBC # BLD: 3.39 M/UL — LOW (ref 3.8–5.2)
RBC # FLD: 15.4 % — HIGH (ref 10.3–14.5)
SODIUM SERPL-SCNC: 134 MMOL/L — LOW (ref 135–145)
WBC # BLD: 3.83 K/UL — SIGNIFICANT CHANGE UP (ref 3.8–10.5)
WBC # FLD AUTO: 3.83 K/UL — SIGNIFICANT CHANGE UP (ref 3.8–10.5)

## 2017-06-14 PROCEDURE — G0452: CPT | Mod: 26

## 2017-06-14 PROCEDURE — 99222 1ST HOSP IP/OBS MODERATE 55: CPT | Mod: GC

## 2017-06-14 RX ORDER — WARFARIN SODIUM 2.5 MG/1
5 TABLET ORAL ONCE
Qty: 0 | Refills: 0 | Status: COMPLETED | OUTPATIENT
Start: 2017-06-14 | End: 2017-06-14

## 2017-06-14 RX ADMIN — Medication 2001 MILLIGRAM(S): at 12:08

## 2017-06-14 RX ADMIN — Medication 2001 MILLIGRAM(S): at 08:46

## 2017-06-14 RX ADMIN — Medication 100 MILLIGRAM(S): at 12:08

## 2017-06-14 RX ADMIN — AMLODIPINE BESYLATE 10 MILLIGRAM(S): 2.5 TABLET ORAL at 05:30

## 2017-06-14 RX ADMIN — HEPARIN SODIUM 1100 UNIT(S)/HR: 5000 INJECTION INTRAVENOUS; SUBCUTANEOUS at 08:46

## 2017-06-14 RX ADMIN — PANTOPRAZOLE SODIUM 40 MILLIGRAM(S): 20 TABLET, DELAYED RELEASE ORAL at 05:30

## 2017-06-14 RX ADMIN — Medication 100 MILLIGRAM(S): at 21:54

## 2017-06-14 RX ADMIN — Medication 100 MILLIGRAM(S): at 05:30

## 2017-06-14 RX ADMIN — WARFARIN SODIUM 5 MILLIGRAM(S): 2.5 TABLET ORAL at 21:54

## 2017-06-14 NOTE — CONSULT NOTE ADULT - PROBLEM SELECTOR RECOMMENDATION 9
- Provoked DVT in setting of recent dialysis catheter placement  - Agree with heparin for now.  Would bridge to coumadin given underlying renal dysfunction.    - No further work up indicated at this time. - Provoked DVT in setting of recent dialysis catheter placement  - Agree with heparin for now.  Would bridge to coumadin given underlying renal dysfunction.    - As patient may be transplant candidate would initiate hypercoag work up.  Check factor V leiden, prothrombin gene mutation, and antiphospholipid panel.

## 2017-06-14 NOTE — CONSULT NOTE ADULT - SUBJECTIVE AND OBJECTIVE BOX
HPI:  72 yo woman w/PMHx HTN, glaucoma, and ESRD on HD (Tues, Thurs, Sat) recent admission for malfunctioning dialysis catheter s/p left IJ access presents today with fevers x 2 days, pleuritic chest pain and left arm pain, found to have left subclavian DVT.  Patient known to heme service from prior admission.  At that time consulted for prolonged PTT which was secondary to heparin administration.  Heparin was held and PTT normalized.  At that time she had a line placed for HD as her left fistula was non functioning.  Today she denies any complaints.  States her left arm pain and swelling has improved.  Denies clotting history in past.           Allergies    penicillin (Rash)    Intolerances        MEDICATIONS  (STANDING):  heparin  Infusion. Unit(s)/Hr IV Continuous <Continuous>  pantoprazole    Tablet 40milliGRAM(s) Oral before breakfast  amLODIPine   Tablet 10milliGRAM(s) Oral daily  hydrALAZINE 100milliGRAM(s) Oral three times a day  epoetin ivelisse Injectable 4000Unit(s) IV Push <User Schedule>  calcium acetate 2001milliGRAM(s) Oral three times a day with meals    MEDICATIONS  (PRN):  heparin  Injectable 4500Unit(s) IV Push every 6 hours PRN For aPTT less than 40  heparin  Injectable 2000Unit(s) IV Push every 6 hours PRN For aPTT between 40 - 57  acetaminophen   Tablet 650milliGRAM(s) Oral every 6 hours PRN For Temp greater than 38 C (100.4 F)  acetaminophen   Tablet. 650milliGRAM(s) Oral every 6 hours PRN Mild Pain (1 - 3)      PAST MEDICAL & SURGICAL HISTORY:  Glaucoma  Dialysis patient  HTN (hypertension)  H/O: hysterectomy  Hip fracture  Acquired cataract      FAMILY HISTORY:  Family history of colon cancer (Sibling)  Family history of cerebrovascular accident (CVA)      SOCIAL HISTORY: No EtOH, no tobacco    REVIEW OF SYSTEMS:    CONSTITUTIONAL: No weakness, fevers or chills  EYES/ENT: No visual changes;  No vertigo or throat pain   NECK: No pain or stiffness  RESPIRATORY: No cough, wheezing, hemoptysis; No shortness of breath  CARDIOVASCULAR: No chest pain or palpitations  GASTROINTESTINAL: No abdominal or epigastric pain. No nausea, vomiting, or hematemesis; No diarrhea or constipation. No melena or hematochezia.  GENITOURINARY: No dysuria, frequency or hematuria  NEUROLOGICAL: No numbness or weakness  SKIN: No itching, burning, rashes, or lesions   All other review of systems is negative unless indicated above.        T(F): 99.2, Max: 100.6 (06-14 @ 04:48)  HR: 83  BP: 132/71  RR: 18  SpO2: 94%  Wt(kg): --    GENERAL: NAD, well-developed  HEAD:  Atraumatic, Normocephalic  EYES: EOMI, PERRLA, conjunctiva and sclera clear  NECK: Supple, No JVD  CHEST/LUNG: Clear to auscultation bilaterally; No wheeze  HEART: Regular rate and rhythm; No murmurs, rubs, or gallops  ABDOMEN: Soft, Nontender, Nondistended; Bowel sounds present  EXTREMITIES:  2+ Peripheral Pulses, No clubbing, cyanosis, or edema  NEUROLOGY: non-focal  SKIN: No rashes or lesions                          10.0   3.83  )-----------( 253      ( 14 Jun 2017 07:29 )             29.0       06-14    134<L>  |  92<L>  |  18  ----------------------------<  92  3.8   |  25  |  5.24<H>    Ca    8.5      14 Jun 2017 07:34  Phos  6.0     06-13  Mg     2.3     06-13    TPro  6.4  /  Alb  3.0<L>  /  TBili  0.3  /  DBili  0.1  /  AST  11  /  ALT  <4<L>  /  AlkPhos  62  06-13

## 2017-06-14 NOTE — PROGRESS NOTE ADULT - ASSESSMENT
72 yo woman w/PMHx HTN, glaucoma, and ESRD on HD (Tues, Thurs, Sat) recent admission for malfunctioning dialysis catheter s/p left IJ access presents today with fevers x 2 days, pleuritic chest pain and left arm pain admitted for Lt subclavian DVT

## 2017-06-14 NOTE — CONSULT NOTE ADULT - ASSESSMENT
72 yo woman w/PMHx HTN, glaucoma, and ESRD on HD (Tues, Thurs, Sat) recent admission for malfunctioning dialysis catheter s/p left IJ access now with left subclavian DVT

## 2017-06-14 NOTE — PROGRESS NOTE ADULT - SUBJECTIVE AND OBJECTIVE BOX
Patient is a 71y old  Female who presents with a chief complaint of Fevers (12 Jun 2017 16:02)      SUBJECTIVE / OVERNIGHT EVENTS:   Feels better.  Denies CP/SOB/Palpitation/HA.    MEDICATIONS  (STANDING):  heparin  Infusion. Unit(s)/Hr IV Continuous <Continuous>  pantoprazole    Tablet 40milliGRAM(s) Oral before breakfast  amLODIPine   Tablet 10milliGRAM(s) Oral daily  hydrALAZINE 100milliGRAM(s) Oral three times a day  epoetin ivelisse Injectable 4000Unit(s) IV Push <User Schedule>  calcium acetate 2001milliGRAM(s) Oral three times a day with meals    MEDICATIONS  (PRN):  heparin  Injectable 4500Unit(s) IV Push every 6 hours PRN For aPTT less than 40  heparin  Injectable 2000Unit(s) IV Push every 6 hours PRN For aPTT between 40 - 57  acetaminophen   Tablet 650milliGRAM(s) Oral every 6 hours PRN For Temp greater than 38 C (100.4 F)  acetaminophen   Tablet. 650milliGRAM(s) Oral every 6 hours PRN Mild Pain (1 - 3)      Vital Signs Last 24 Hrs  T(C): 37.4, Max: 38.1 (06-14 @ 04:48)  HR: 85 (80 - 85)  BP: 145/67 (132/71 - 161/85)  RR: 18 (18 - 18)  SpO2: 98% (94% - 98%)  Wt(kg): --  CAPILLARY BLOOD GLUCOSE    I&O's Summary  I & Os for 24h ending 14 Jun 2017 07:00  =============================================  IN: 480 ml / OUT: 1500 ml / NET: -1020 ml    I & Os for current day (as of 14 Jun 2017 22:34)  =============================================  IN: 660 ml / OUT: 0 ml / NET: 660 ml      PHYSICAL EXAM:  GENERAL: NAD, well-developed  HEAD:  Atraumatic, Normocephalic  EYES: EOMI, PERRLA, conjunctiva and sclera clear  NECK: Supple, No JVD  CHEST/LUNG: Clear to auscultation bilaterally; No wheeze  HEART: Regular rate and rhythm; No murmurs, rubs, or gallops  ABDOMEN: Soft, Nontender, Nondistended; Bowel sounds present  EXTREMITIES:  2+ Peripheral Pulses, No clubbing, cyanosis, or edema  PSYCH: AAOx3  NEUROLOGY: non-focal  SKIN: No rashes or lesions    LABS:                        10.0   3.83  )-----------( 253      ( 14 Jun 2017 07:29 )             29.0     06-14    134<L>  |  92<L>  |  18  ----------------------------<  92  3.8   |  25  |  5.24<H>    Ca    8.5      14 Jun 2017 07:34  Phos  6.0     06-13  Mg     2.3     06-13    TPro  6.4  /  Alb  3.0<L>  /  TBili  0.3  /  DBili  0.1  /  AST  11  /  ALT  <4<L>  /  AlkPhos  62  06-13    PT/INR - ( 14 Jun 2017 19:17 )   PT: 12.3 sec;   INR: 1.13 ratio         PTT - ( 14 Jun 2017 07:29 )  PTT:72.0 sec        CAPILLARY BLOOD GLUCOSE    06-12 @ 18:22  Culture-urine --  Culture results   No growth to date.  method type --  Organism --  Organism Identification --  Specimen source .Blood Blood  06-12 @ 14:45  Culture-urine --  Culture results   No growth to date.  method type --  Organism --  Organism Identification --  Specimen source .Blood Blood           06-12 @ 18:22  Culture blood --  Culture results   No growth to date.  Gram stain --  Gram stain blood --  Method type --  Organism --  Organism identification --  Specimen source .Blood Blood   06-12 @ 14:45  Culture blood --  Culture results   No growth to date.  Gram stain --  Gram stain blood --  Method type --  Organism --  Organism identification --  Specimen source .Blood Blood      RADIOLOGY & ADDITIONAL TESTS:    Imaging Personally Reviewed:    Consultant(s) Notes Reviewed:      Care Discussed with Consultants/Other Providers:

## 2017-06-14 NOTE — CONSULT NOTE ADULT - ATTENDING COMMENTS
agree with above. Recommend anticoagulation for 3 months. No permanent access at this admission. Followup as outpatient with Dr. Mc
71F with recently with HD catheter in [facility] now with subclavian vein thrombosis.   -provoked thrombosis, plan for 3 months of AC  -given previous thrombosis of the fistula and undergoing renal transplant workup, plan for hypercoag workup in peritransplant setting  -send Prothrombin gene mutation, FV leiden, DRVVT, Silica Clotting time, ACL and B2GP (Antiphosphlipid panel). (can send rest when thrombosis isnt in the acute setting)

## 2017-06-14 NOTE — PROGRESS NOTE ADULT - SUBJECTIVE AND OBJECTIVE BOX
CC: fever this morning. pt is not sob.     Vital Signs Last 24 Hrs  T(C): 36.7, Max: 38.1 (06-14 @ 04:48)  T(F): 98.1, Max: 100.6 (06-14 @ 04:48)  HR: 84 (80 - 85)  BP: 153/76 (135/75 - 163/81)  BP(mean): --  RR: 18 (18 - 18)  SpO2: 96% (95% - 98%)    I & Os for current day (as of 06-14 @ 08:29)  =============================================  IN: 480 ml / OUT: 1500 ml / NET: -1020 ml      PHYSICAL EXAM:  GENERAL: No acute respiratory distress.  HEAD:  Atraumatic, Normocephalic  EYES: conjunctiva and sclera clear  ENMT: Moist mucous membranes,  NECK: Supple, No JVD  NERVOUS SYSTEM:  Awake, Alert & Oriented X3, No focal deficits present.   CHEST/LUNG: Clear to percussion bilaterally; No rales, rhonchi, wheezing, or rubs  HEART: Regular rate and rhythm; No murmurs, rubs, or gallops  ABDOMEN: Soft, Nontender, Nondistended; Bowel sounds present  EXTREMITIES:  2+ Peripheral Pulses lower text, trace edema; Lt forearm AVG thrombosed and Lt Chest permacath.  SKIN: No rashes or lesions    MEDICATIONS:  heparin  Infusion. Unit(s)/Hr IV Continuous <Continuous>  heparin  Injectable 4500Unit(s) IV Push every 6 hours PRN  heparin  Injectable 2000Unit(s) IV Push every 6 hours PRN  pantoprazole    Tablet 40milliGRAM(s) Oral before breakfast  amLODIPine   Tablet 10milliGRAM(s) Oral daily  acetaminophen   Tablet 650milliGRAM(s) Oral every 6 hours PRN  acetaminophen   Tablet. 650milliGRAM(s) Oral every 6 hours PRN  hydrALAZINE 100milliGRAM(s) Oral three times a day  epoetin ivelisse Injectable 4000Unit(s) IV Push <User Schedule>  calcium acetate 2001milliGRAM(s) Oral three times a day with meals      LABS:                        7.4    4.39  )-----------( 230      ( 13 Jun 2017 07:54 )             22.0     06-14    134<L>  |  92<L>  |  18  ----------------------------<  92  3.8   |  25  |  5.24<H>    Ca    8.5      14 Jun 2017 07:34  Phos  6.0     06-13  Mg     2.3     06-13    TPro  6.4  /  Alb  3.0<L>  /  TBili  0.3  /  DBili  0.1  /  AST  11  /  ALT  <4<L>  /  AlkPhos  62  06-13    PT/INR - ( 13 Jun 2017 07:54 )   PT: 12.2 sec;   INR: 1.08 ratio         PTT - ( 14 Jun 2017 07:29 )  PTT:72.0 sec      Urine studies    PTH and Vit D:

## 2017-06-15 LAB
ANION GAP SERPL CALC-SCNC: 20 MMOL/L — HIGH (ref 5–17)
APTT BLD: 63.6 SEC — HIGH (ref 27.5–37.4)
BUN SERPL-MCNC: 29 MG/DL — HIGH (ref 7–23)
CALCIUM SERPL-MCNC: 8.3 MG/DL — LOW (ref 8.4–10.5)
CHLORIDE SERPL-SCNC: 93 MMOL/L — LOW (ref 96–108)
CO2 SERPL-SCNC: 23 MMOL/L — SIGNIFICANT CHANGE UP (ref 22–31)
CREAT SERPL-MCNC: 7.04 MG/DL — HIGH (ref 0.5–1.3)
GLUCOSE SERPL-MCNC: 114 MG/DL — HIGH (ref 70–99)
HCT VFR BLD CALC: 31.2 % — LOW (ref 34.5–45)
HGB BLD-MCNC: 10.4 G/DL — LOW (ref 11.5–15.5)
INR BLD: 1.09 RATIO — SIGNIFICANT CHANGE UP (ref 0.88–1.16)
MCHC RBC-ENTMCNC: 29 PG — SIGNIFICANT CHANGE UP (ref 27–34)
MCHC RBC-ENTMCNC: 33.3 GM/DL — SIGNIFICANT CHANGE UP (ref 32–36)
MCV RBC AUTO: 86.9 FL — SIGNIFICANT CHANGE UP (ref 80–100)
PLATELET # BLD AUTO: 281 K/UL — SIGNIFICANT CHANGE UP (ref 150–400)
POTASSIUM SERPL-MCNC: 4.2 MMOL/L — SIGNIFICANT CHANGE UP (ref 3.5–5.3)
POTASSIUM SERPL-SCNC: 4.2 MMOL/L — SIGNIFICANT CHANGE UP (ref 3.5–5.3)
PROTHROM AB SERPL-ACNC: 12.3 SEC — SIGNIFICANT CHANGE UP (ref 10–13.1)
RBC # BLD: 3.59 M/UL — LOW (ref 3.8–5.2)
RBC # FLD: 14.9 % — HIGH (ref 10.3–14.5)
SODIUM SERPL-SCNC: 136 MMOL/L — SIGNIFICANT CHANGE UP (ref 135–145)
WBC # BLD: 4.05 K/UL — SIGNIFICANT CHANGE UP (ref 3.8–10.5)
WBC # FLD AUTO: 4.05 K/UL — SIGNIFICANT CHANGE UP (ref 3.8–10.5)

## 2017-06-15 PROCEDURE — G0452: CPT | Mod: 26

## 2017-06-15 RX ORDER — WARFARIN SODIUM 2.5 MG/1
5 TABLET ORAL ONCE
Qty: 0 | Refills: 0 | Status: COMPLETED | OUTPATIENT
Start: 2017-06-15 | End: 2017-06-15

## 2017-06-15 RX ADMIN — Medication 100 MILLIGRAM(S): at 22:02

## 2017-06-15 RX ADMIN — HEPARIN SODIUM 1100 UNIT(S)/HR: 5000 INJECTION INTRAVENOUS; SUBCUTANEOUS at 10:33

## 2017-06-15 RX ADMIN — WARFARIN SODIUM 5 MILLIGRAM(S): 2.5 TABLET ORAL at 22:02

## 2017-06-15 RX ADMIN — Medication 100 MILLIGRAM(S): at 05:37

## 2017-06-15 RX ADMIN — Medication 2001 MILLIGRAM(S): at 11:50

## 2017-06-15 RX ADMIN — ERYTHROPOIETIN 4000 UNIT(S): 10000 INJECTION, SOLUTION INTRAVENOUS; SUBCUTANEOUS at 18:05

## 2017-06-15 RX ADMIN — PANTOPRAZOLE SODIUM 40 MILLIGRAM(S): 20 TABLET, DELAYED RELEASE ORAL at 05:37

## 2017-06-15 RX ADMIN — Medication 100 MILLIGRAM(S): at 14:44

## 2017-06-15 RX ADMIN — AMLODIPINE BESYLATE 10 MILLIGRAM(S): 2.5 TABLET ORAL at 05:37

## 2017-06-15 NOTE — DISCHARGE NOTE ADULT - HOSPITAL COURSE
To be completed by attending 70 yo woman w/PMHx HTN, glaucoma, and ESRD on HD (Tues, Thurs, Sat) recent admission for malfunctioning dialysis catheter s/p left IJ access presents today with fevers x 2 days, pleuritic chest pain and left arm pain.  Patient states since discharge has had left arm pain, however on Saturday went to dialysis and was told had a fever.  This fever persisted into Sunday and became assocaietd wtih increased left arm pain and swelling.  Patient came to ED for further evaluation.     In eD: Df=806.4  Hr=79  RE=963/83 R=20  PO2=97%RA  Patient given vancomycin, ceftraixone and started on heparin gtt for DVT adn admitted to medicine.    Currently patient sates pain improved with pain medication.  Still with non-productive cough.    6/12 : US LUE  -acute thrombosis of the left subclavian vein.- Heparin drip  6/13: For CTA w/Con to R/O PE. Then Hemodialysis immediately after. CTA limited study, no PE  - Pt to receive 2 U PRBC in HD  for H & H 7.4/22.   6/14:  vascular doppler shows left subcalvian dvt near catheter site. As per Nephrologist, Vascular MD  recommends to treat DVT medically  with anticoagulation because he will need the rt arm for future access for AV Fistula. He recommended Consult heme/onco, thrombosis x 2 in the last 2 weeks, to  r/o thrombophilic disorders and for  recs on anticoagulation. Pt to continue with heparin drip. Heme Onc Consult, Dr. Rodrigez to see pt.   6/14: Pt seen byu Heme Onc. Recommended Bridging coumadin 5mg, serum prothrombin, antiphospholipid antibodies, factor V leiden  6/15: As per Vascular Surgery, , no acute inpatient vascular surgery intervention indicated. Pt to have AVF planning as outpatient  -Discharge planning as per primary team. Patient should be discharged on anti-coagulation.   -Patient is to follow-up in office upon discharge with Dr. Dutch Mc.    - Pt to start Heparin to Coumadin Bridge tonight - Will receive Coumadin 5 mg tonigh  - Heme Onc requested coagulopathy lab work up - ( serum prothrombin, antiphospholipid antibodies, factor V leiden). Condent obtained today. Labs to be drawn in Hemodialysis this evening.   6/16: Coumadin dose increased to 8mg today with INR 1.11  6/20 discharge planning if INR is therapeutic           Patient set up for PMD office visits for PT/INR check   INR 2.24  Pt was dc home  f/u PCP

## 2017-06-15 NOTE — DISCHARGE NOTE ADULT - CARE PLAN
Principal Discharge DX:	Acute deep vein thrombosis (DVT) of other vein of left upper extremity  Secondary Diagnosis:	ESRD (end stage renal disease) on dialysis  Secondary Diagnosis:	Essential hypertension Principal Discharge DX:	Acute deep vein thrombosis (DVT) of other vein of left upper extremity  Goal:	stable  Instructions for follow-up, activity and diet:	c/w coumadin and f/u with your PCP for INR checks  Secondary Diagnosis:	ESRD (end stage renal disease) on dialysis  Goal:	stable  Instructions for follow-up, activity and diet:	c/w HD and c/w current regime  Secondary Diagnosis:	Essential hypertension  Goal:	stable  Instructions for follow-up, activity and diet:	Follow up with your medical doctor to establish long term blood pressure treatment goals.

## 2017-06-15 NOTE — DISCHARGE NOTE ADULT - PATIENT PORTAL LINK FT
“You can access the [facility] Patient Portal, offered by [facility], by registering with the following website: [email or web address]”

## 2017-06-15 NOTE — DISCHARGE NOTE ADULT - CARE PROVIDERS DIRECT ADDRESSES
[email or web address]

## 2017-06-15 NOTE — DISCHARGE NOTE ADULT - PLAN OF CARE
stable c/w coumadin and f/u with your PCP for INR checks c/w HD and c/w current regime Follow up with your medical doctor to establish long term blood pressure treatment goals.

## 2017-06-15 NOTE — PROGRESS NOTE ADULT - SUBJECTIVE AND OBJECTIVE BOX
CC: no fever overnight.    Vital Signs Last 24 Hrs  T(C): 37.4, Max: 37.4 (06-14 @ 20:50)  T(F): 99.3, Max: 99.3 (06-14 @ 20:50)  HR: 77 (77 - 85)  BP: 139/69 (132/71 - 145/67)  BP(mean): --  RR: 18 (18 - 18)  SpO2: 95% (94% - 98%)  I & Os for 24h ending 06-15 @ 07:00  =============================================  IN: 912 ml / OUT: 0 ml / NET: 912 ml    I & Os for current day (as of 06-15 @ 11:30)  =============================================  IN: 220 ml / OUT: 0 ml / NET: 220 ml      PHYSICAL EXAM:  GENERAL: No acute respiratory distress.  HEAD:  Atraumatic, Normocephalic  EYES: conjunctiva and sclera clear  ENMT: Moist mucous membranes,  NECK: Supple, No JVD  NERVOUS SYSTEM:  Awake, Alert & Oriented X3, No focal deficits present.   CHEST/LUNG: Clear to percussion bilaterally; No rales, rhonchi, wheezing, or rubs  HEART: Regular rate and rhythm; No murmurs, rubs, or gallops  ABDOMEN: Soft, Nontender, Nondistended; Bowel sounds present  EXTREMITIES:  2+ Peripheral Pulses lower text, trace edema; Lt forearm AVG thrombosed and Lt Chest permacath.  SKIN: No rashes or lesions      MEDICATIONS:  heparin  Infusion. Unit(s)/Hr IV Continuous <Continuous>  heparin  Injectable 4500Unit(s) IV Push every 6 hours PRN  heparin  Injectable 2000Unit(s) IV Push every 6 hours PRN  pantoprazole    Tablet 40milliGRAM(s) Oral before breakfast  amLODIPine   Tablet 10milliGRAM(s) Oral daily  acetaminophen   Tablet 650milliGRAM(s) Oral every 6 hours PRN  acetaminophen   Tablet. 650milliGRAM(s) Oral every 6 hours PRN  hydrALAZINE 100milliGRAM(s) Oral three times a day  epoetin ivelisse Injectable 4000Unit(s) IV Push <User Schedule>  calcium acetate 2001milliGRAM(s) Oral three times a day with meals      LABS:                        10.4   4.05  )-----------( 281      ( 15 Jarod 2017 09:44 )             31.2     06-15    136  |  93<L>  |  29<H>  ----------------------------<  114<H>  4.2   |  23  |  7.04<H>    Ca    8.3<L>      15 Jarod 2017 09:44      PT/INR - ( 15 Jarod 2017 09:44 )   PT: 12.3 sec;   INR: 1.09 ratio         PTT - ( 15 Jarod 2017 09:44 )  PTT:63.6 sec      Urine studies    PTH and Vit D:

## 2017-06-15 NOTE — DISCHARGE NOTE ADULT - ADDITIONAL INSTRUCTIONS
Follow up with Dr. Dutch Mc to discuss placement of AV Fistula. You have a schedule appointment with Dr. Rayshawn bishop Monday, 6/26/17 at 10:45 am at this time you  will also have blood work done to check your PT/INR for Coumadin dosing   Please schedule an appointment with  Dr. Dutch Mc to discuss placement of AV Fistula.

## 2017-06-15 NOTE — DISCHARGE NOTE ADULT - CARE PROVIDER_API CALL
Dutch Mc), Surgery; Vascular Surgery  [address]  [place] [address]  Phone: [phone]  Fax: [phone] Dutch Mc), Surgery; Vascular Surgery  [address]  [place] [address]  Phone: [phone]  Fax: [phone]    Kourtney Tabares), Internal Medicine  [address] [place] [address]  Phone: [phone]  Fax: [phone]

## 2017-06-15 NOTE — DISCHARGE NOTE ADULT - MEDICATION SUMMARY - MEDICATIONS TO STOP TAKING
I will STOP taking the medications listed below when I get home from the hospital:  None I will STOP taking the medications listed below when I get home from the hospital:    hydrALAZINE 25 mg oral tablet  -- 1 tab(s) by mouth 3 times a day

## 2017-06-15 NOTE — PROGRESS NOTE ADULT - ASSESSMENT
70 yo woman w/PMHx HTN, glaucoma, and ESRD on HD (Tues, Thurs, Sat) recent admission for malfunctioning dialysis catheter s/p left IJ access presents today with fevers x 2 days, pleuritic chest pain and left arm pain admitted for Lt subclavian DVT.  -AVF planning as outpatient, no acute inpatient vascular surgery intervention indicated  -Discharge planning as per primary team. Patient should be discharged on anti-coagulation.   -Patient is to follow-up in office upon discharge with Dr. Dutch Mc  -Discussed case with

## 2017-06-15 NOTE — PROGRESS NOTE ADULT - SUBJECTIVE AND OBJECTIVE BOX
Patient is a 71y old  Female who presents with a chief complaint of Fevers. (15 Jarod 2017 19:44)      SUBJECTIVE / OVERNIGHT EVENTS:   Feels better.  Denies CP/SOB/Palpitation/HA.    MEDICATIONS  (STANDING):  heparin  Infusion. Unit(s)/Hr IV Continuous <Continuous>  pantoprazole    Tablet 40milliGRAM(s) Oral before breakfast  amLODIPine   Tablet 10milliGRAM(s) Oral daily  hydrALAZINE 100milliGRAM(s) Oral three times a day  epoetin ivelisse Injectable 4000Unit(s) IV Push <User Schedule>  calcium acetate 2001milliGRAM(s) Oral three times a day with meals    MEDICATIONS  (PRN):  heparin  Injectable 4500Unit(s) IV Push every 6 hours PRN For aPTT less than 40  heparin  Injectable 2000Unit(s) IV Push every 6 hours PRN For aPTT between 40 - 57  acetaminophen   Tablet 650milliGRAM(s) Oral every 6 hours PRN For Temp greater than 38 C (100.4 F)  acetaminophen   Tablet. 650milliGRAM(s) Oral every 6 hours PRN Mild Pain (1 - 3)      Vital Signs Last 24 Hrs  T(C): 36.7, Max: 37.4 (06-15 @ 04:39)  HR: 95 (77 - 95)  BP: 166/82 (130/68 - 166/82)  RR: 18 (17 - 18)  SpO2: 95% (95% - 98%)  Wt(kg): --  CAPILLARY BLOOD GLUCOSE    I&O's Summary  I & Os for 24h ending 15 Jarod 2017 07:00  =============================================  IN: 912 ml / OUT: 0 ml / NET: 912 ml    I & Os for current day (as of 15 Jarod 2017 23:57)  =============================================  IN: 940 ml / OUT: 1500 ml / NET: -560 ml      PHYSICAL EXAM:  GENERAL: NAD, well-developed  HEAD:  Atraumatic, Normocephalic  EYES: EOMI, PERRLA, conjunctiva and sclera clear  NECK: Supple, No JVD  CHEST/LUNG: Clear to auscultation bilaterally; No wheeze  HEART: Regular rate and rhythm; No murmurs, rubs, or gallops  ABDOMEN: Soft, Nontender, Nondistended; Bowel sounds present  EXTREMITIES:  2+ Peripheral Pulses, No clubbing, cyanosis, or edema  PSYCH: AAOx3  NEUROLOGY: non-focal  SKIN: No rashes or lesions    LABS:                        10.4   4.05  )-----------( 281      ( 15 Jarod 2017 09:44 )             31.2     06-15    136  |  93<L>  |  29<H>  ----------------------------<  114<H>  4.2   |  23  |  7.04<H>    Ca    8.3<L>      15 Jarod 2017 09:44      PT/INR - ( 15 Jarod 2017 09:44 )   PT: 12.3 sec;   INR: 1.09 ratio         PTT - ( 15 Jarod 2017 09:44 )  PTT:63.6 sec        CAPILLARY BLOOD GLUCOSE    06-12 @ 18:22  Culture-urine --  Culture results   No growth to date.  method type --  Organism --  Organism Identification --  Specimen source .Blood Blood  06-12 @ 14:45  Culture-urine --  Culture results   No growth to date.  method type --  Organism --  Organism Identification --  Specimen source .Blood Blood           06-12 @ 18:22  Culture blood --  Culture results   No growth to date.  Gram stain --  Gram stain blood --  Method type --  Organism --  Organism identification --  Specimen source .Blood Blood   06-12 @ 14:45  Culture blood --  Culture results   No growth to date.  Gram stain --  Gram stain blood --  Method type --  Organism --  Organism identification --  Specimen source .Blood Blood      RADIOLOGY & ADDITIONAL TESTS:    Imaging Personally Reviewed:    Consultant(s) Notes Reviewed:      Care Discussed with Consultants/Other Providers:

## 2017-06-15 NOTE — PROGRESS NOTE ADULT - SUBJECTIVE AND OBJECTIVE BOX
GENERAL SURGERY DAILY PROGRESS NOTE:    Patient resting comfortably, no complaints      PE:  General: WN/WD NAD  Neurology: A&Ox3, nonfocal, FAGAN x 4  Head:  Normocephalic, atraumatic  ENT:  Mucosa moist, no ulcerations  Neck:  Supple, no sinuses or palpable masses, L IJ double lumen catheter in place.  Lymphatic:  No palpable cervical, supraclavicular, axillary or inguinal adenopathy  Respiratory: CTA B/L  CV: RRR, S1S2, no murmur  Abdominal: Soft, NT, ND no palpable mass  MSK: No edema, + peripheral pulses, FROM all 4 extremity      Vital Signs Last 24 Hrs  T(C): 37.3, Max: 37.4 (06-14 @ 20:50)  T(F): 99.2, Max: 99.3 (06-14 @ 20:50)  HR: 82 (77 - 85)  BP: 143/76 (130/68 - 145/67)  BP(mean): --  RR: 18 (18 - 18)  SpO2: 98% (95% - 98%)    I&O's Detail  I & Os for 24h ending 15 Jarod 2017 07:00  =============================================  IN:    Oral Fluid: 780 ml    heparin  Infusion.: 132 ml    Total IN: 912 ml  ---------------------------------------------  OUT:    Total OUT: 0 ml  ---------------------------------------------  Total NET: 912 ml    I & Os for current day (as of 15 Jarod 2017 15:24)  =============================================  IN:    Oral Fluid: 460 ml    Total IN: 460 ml  ---------------------------------------------  OUT:    Total OUT: 0 ml  ---------------------------------------------  Total NET: 460 ml      Daily     Daily     MEDICATIONS  (STANDING):  heparin  Infusion. Unit(s)/Hr IV Continuous <Continuous>  pantoprazole    Tablet 40milliGRAM(s) Oral before breakfast  amLODIPine   Tablet 10milliGRAM(s) Oral daily  hydrALAZINE 100milliGRAM(s) Oral three times a day  epoetin ivelisse Injectable 4000Unit(s) IV Push <User Schedule>  calcium acetate 2001milliGRAM(s) Oral three times a day with meals    MEDICATIONS  (PRN):  heparin  Injectable 4500Unit(s) IV Push every 6 hours PRN For aPTT less than 40  heparin  Injectable 2000Unit(s) IV Push every 6 hours PRN For aPTT between 40 - 57  acetaminophen   Tablet 650milliGRAM(s) Oral every 6 hours PRN For Temp greater than 38 C (100.4 F)  acetaminophen   Tablet. 650milliGRAM(s) Oral every 6 hours PRN Mild Pain (1 - 3)      LABS:                        10.4   4.05  )-----------( 281      ( 15 Jarod 2017 09:44 )             31.2     06-15    136  |  93<L>  |  29<H>  ----------------------------<  114<H>  4.2   |  23  |  7.04<H>    Ca    8.3<L>      15 Jarod 2017 09:44      PT/INR - ( 15 Jarod 2017 09:44 )   PT: 12.3 sec;   INR: 1.09 ratio         PTT - ( 15 Jarod 2017 09:44 )  PTT:63.6 sec

## 2017-06-16 LAB
ANION GAP SERPL CALC-SCNC: 13 MMOL/L — SIGNIFICANT CHANGE UP (ref 5–17)
APTT BLD: 65.2 SEC — HIGH (ref 27.5–37.4)
BUN SERPL-MCNC: 16 MG/DL — SIGNIFICANT CHANGE UP (ref 7–23)
CALCIUM SERPL-MCNC: 8.9 MG/DL — SIGNIFICANT CHANGE UP (ref 8.4–10.5)
CHLORIDE SERPL-SCNC: 95 MMOL/L — LOW (ref 96–108)
CO2 SERPL-SCNC: 27 MMOL/L — SIGNIFICANT CHANGE UP (ref 22–31)
CREAT SERPL-MCNC: 5.13 MG/DL — HIGH (ref 0.5–1.3)
GLUCOSE SERPL-MCNC: 92 MG/DL — SIGNIFICANT CHANGE UP (ref 70–99)
HCT VFR BLD CALC: 30.7 % — LOW (ref 34.5–45)
HGB BLD-MCNC: 10 G/DL — LOW (ref 11.5–15.5)
INR BLD: 1.11 RATIO — SIGNIFICANT CHANGE UP (ref 0.88–1.16)
MCHC RBC-ENTMCNC: 28.8 PG — SIGNIFICANT CHANGE UP (ref 27–34)
MCHC RBC-ENTMCNC: 32.6 GM/DL — SIGNIFICANT CHANGE UP (ref 32–36)
MCV RBC AUTO: 88.5 FL — SIGNIFICANT CHANGE UP (ref 80–100)
PLATELET # BLD AUTO: 302 K/UL — SIGNIFICANT CHANGE UP (ref 150–400)
POTASSIUM SERPL-MCNC: 3.9 MMOL/L — SIGNIFICANT CHANGE UP (ref 3.5–5.3)
POTASSIUM SERPL-SCNC: 3.9 MMOL/L — SIGNIFICANT CHANGE UP (ref 3.5–5.3)
PROTHROM AB SERPL-ACNC: 12.1 SEC — SIGNIFICANT CHANGE UP (ref 9.8–12.7)
RBC # BLD: 3.47 M/UL — LOW (ref 3.8–5.2)
RBC # FLD: 14.9 % — HIGH (ref 10.3–14.5)
SODIUM SERPL-SCNC: 135 MMOL/L — SIGNIFICANT CHANGE UP (ref 135–145)
WBC # BLD: 3.89 K/UL — SIGNIFICANT CHANGE UP (ref 3.8–10.5)
WBC # FLD AUTO: 3.89 K/UL — SIGNIFICANT CHANGE UP (ref 3.8–10.5)

## 2017-06-16 PROCEDURE — G0452: CPT | Mod: 26

## 2017-06-16 RX ORDER — WARFARIN SODIUM 2.5 MG/1
8 TABLET ORAL ONCE
Qty: 0 | Refills: 0 | Status: COMPLETED | OUTPATIENT
Start: 2017-06-16 | End: 2017-06-16

## 2017-06-16 RX ADMIN — HEPARIN SODIUM 1100 UNIT(S)/HR: 5000 INJECTION INTRAVENOUS; SUBCUTANEOUS at 08:35

## 2017-06-16 RX ADMIN — PANTOPRAZOLE SODIUM 40 MILLIGRAM(S): 20 TABLET, DELAYED RELEASE ORAL at 05:11

## 2017-06-16 RX ADMIN — Medication 100 MILLIGRAM(S): at 21:26

## 2017-06-16 RX ADMIN — Medication 2001 MILLIGRAM(S): at 08:27

## 2017-06-16 RX ADMIN — Medication 2001 MILLIGRAM(S): at 17:04

## 2017-06-16 RX ADMIN — Medication 100 MILLIGRAM(S): at 12:57

## 2017-06-16 RX ADMIN — Medication 2001 MILLIGRAM(S): at 12:57

## 2017-06-16 RX ADMIN — AMLODIPINE BESYLATE 10 MILLIGRAM(S): 2.5 TABLET ORAL at 05:11

## 2017-06-16 RX ADMIN — Medication 100 MILLIGRAM(S): at 05:11

## 2017-06-16 RX ADMIN — WARFARIN SODIUM 8 MILLIGRAM(S): 2.5 TABLET ORAL at 21:26

## 2017-06-16 NOTE — PROGRESS NOTE ADULT - ASSESSMENT
70 yo woman w/PMHx HTN, glaucoma, and ESRD on HD (Tues, Thurs, Sat) recent admission for malfunctioning dialysis catheter s/p left IJ access presents today with fevers x 2 days, pleuritic chest pain and left arm pain admitted for Lt subclavian DVT

## 2017-06-16 NOTE — PROGRESS NOTE ADULT - SUBJECTIVE AND OBJECTIVE BOX
CC: no fever overnight.    Vital Signs Last 24 Hrs  T(C): 37.6, Max: 37.6 (06-16 @ 04:44)  T(F): 99.7, Max: 99.7 (06-16 @ 04:44)  HR: 81 (81 - 95)  BP: 139/67 (130/68 - 166/82)  BP(mean): --  RR: 18 (17 - 18)  SpO2: 96% (95% - 98%)  I & Os for 24h ending 06-16 @ 07:00  =============================================  IN: 1312 ml / OUT: 1500 ml / NET: -188 ml    I & Os for current day (as of 06-16 @ 11:36)  =============================================  IN: 240 ml / OUT: 0 ml / NET: 240 ml      PHYSICAL EXAM:  GENERAL: No acute respiratory distress.  HEAD:  Atraumatic, Normocephalic  EYES: conjunctiva and sclera clear  ENMT: Moist mucous membranes,  NECK: Supple, No JVD  NERVOUS SYSTEM:  Awake, Alert & Oriented X3, No focal deficits present.   CHEST/LUNG: Clear to percussion bilaterally; No rales, rhonchi, wheezing, or rubs  HEART: Regular rate and rhythm; No murmurs, rubs, or gallops  ABDOMEN: Soft, Nontender, Nondistended; Bowel sounds present  EXTREMITIES:  2+ Peripheral Pulses lower text, trace edema; Lt forearm AVG thrombosed and Lt Chest permacath.  SKIN: No rashes or lesions    MEDICATIONS:  heparin  Infusion. Unit(s)/Hr IV Continuous <Continuous>  heparin  Injectable 4500Unit(s) IV Push every 6 hours PRN  heparin  Injectable 2000Unit(s) IV Push every 6 hours PRN  pantoprazole    Tablet 40milliGRAM(s) Oral before breakfast  amLODIPine   Tablet 10milliGRAM(s) Oral daily  acetaminophen   Tablet 650milliGRAM(s) Oral every 6 hours PRN  acetaminophen   Tablet. 650milliGRAM(s) Oral every 6 hours PRN  hydrALAZINE 100milliGRAM(s) Oral three times a day  epoetin ivelisse Injectable 4000Unit(s) IV Push <User Schedule>  calcium acetate 2001milliGRAM(s) Oral three times a day with meals      LABS:                        10.0   3.89  )-----------( 302      ( 16 Jun 2017 07:50 )             30.7     06-16    135  |  95<L>  |  16  ----------------------------<  92  3.9   |  27  |  5.13<H>    Ca    8.9      16 Jun 2017 08:33      PT/INR - ( 15 Jarod 2017 09:44 )   PT: 12.3 sec;   INR: 1.09 ratio         PTT - ( 16 Jun 2017 07:50 )  PTT:65.2 sec      Urine studies    PTH and Vit D:

## 2017-06-16 NOTE — PROGRESS NOTE ADULT - SUBJECTIVE AND OBJECTIVE BOX
Patient is a 71y old  Female who presents with a chief complaint of Fevers. (15 Jarod 2017 19:44)      SUBJECTIVE / OVERNIGHT EVENTS:   Feels better.  Denies CP/SOB/Palpitation/HA.    MEDICATIONS  (STANDING):  heparin  Infusion. Unit(s)/Hr IV Continuous <Continuous>  pantoprazole    Tablet 40milliGRAM(s) Oral before breakfast  amLODIPine   Tablet 10milliGRAM(s) Oral daily  hydrALAZINE 100milliGRAM(s) Oral three times a day  epoetin ivelisse Injectable 4000Unit(s) IV Push <User Schedule>  calcium acetate 2001milliGRAM(s) Oral three times a day with meals    MEDICATIONS  (PRN):  heparin  Injectable 4500Unit(s) IV Push every 6 hours PRN For aPTT less than 40  heparin  Injectable 2000Unit(s) IV Push every 6 hours PRN For aPTT between 40 - 57  acetaminophen   Tablet 650milliGRAM(s) Oral every 6 hours PRN For Temp greater than 38 C (100.4 F)  acetaminophen   Tablet. 650milliGRAM(s) Oral every 6 hours PRN Mild Pain (1 - 3)      Vital Signs Last 24 Hrs  T(C): 37, Max: 37.6 (06-16 @ 04:44)  HR: 80 (80 - 85)  BP: 165/80 (139/67 - 165/80)  RR: 20 (18 - 20)  SpO2: 96% (96% - 96%)  Wt(kg): --  CAPILLARY BLOOD GLUCOSE    I&O's Summary  I & Os for 24h ending 16 Jun 2017 07:00  =============================================  IN: 1312 ml / OUT: 1500 ml / NET: -188 ml    I & Os for current day (as of 17 Jun 2017 01:20)  =============================================  IN: 960 ml / OUT: 100 ml / NET: 860 ml      PHYSICAL EXAM:  GENERAL: NAD, well-developed  HEAD:  Atraumatic, Normocephalic  EYES: EOMI, PERRLA, conjunctiva and sclera clear  NECK: Supple, No JVD  CHEST/LUNG: Clear to auscultation bilaterally; No wheeze  HEART: Regular rate and rhythm; No murmurs, rubs, or gallops  ABDOMEN: Soft, Nontender, Nondistended; Bowel sounds present  EXTREMITIES:  2+ Peripheral Pulses, No clubbing, cyanosis, or edema  PSYCH: AAOx3  NEUROLOGY: non-focal  SKIN: No rashes or lesions    LABS:                        10.0   3.89  )-----------( 302      ( 16 Jun 2017 07:50 )             30.7     06-16    135  |  95<L>  |  16  ----------------------------<  92  3.9   |  27  |  5.13<H>    Ca    8.9      16 Jun 2017 08:33      PT/INR - ( 16 Jun 2017 17:03 )   PT: 12.1 sec;   INR: 1.11 ratio         PTT - ( 16 Jun 2017 07:50 )  PTT:65.2 sec        CAPILLARY BLOOD GLUCOSE    06-12 @ 18:22  Culture-urine --  Culture results   No growth to date.  method type --  Organism --  Organism Identification --  Specimen source .Blood Blood  06-12 @ 14:45  Culture-urine --  Culture results   No growth to date.  method type --  Organism --  Organism Identification --  Specimen source .Blood Blood           06-12 @ 18:22  Culture blood --  Culture results   No growth to date.  Gram stain --  Gram stain blood --  Method type --  Organism --  Organism identification --  Specimen source .Blood Blood   06-12 @ 14:45  Culture blood --  Culture results   No growth to date.  Gram stain --  Gram stain blood --  Method type --  Organism --  Organism identification --  Specimen source .Blood Blood      RADIOLOGY & ADDITIONAL TESTS:    Imaging Personally Reviewed:    Consultant(s) Notes Reviewed:      Care Discussed with Consultants/Other Providers:

## 2017-06-17 LAB
ANION GAP SERPL CALC-SCNC: 18 MMOL/L — HIGH (ref 5–17)
APTT BLD: 67.3 SEC — HIGH (ref 27.5–37.4)
BUN SERPL-MCNC: 26 MG/DL — HIGH (ref 7–23)
CALCIUM SERPL-MCNC: 9.3 MG/DL — SIGNIFICANT CHANGE UP (ref 8.4–10.5)
CHLORIDE SERPL-SCNC: 92 MMOL/L — LOW (ref 96–108)
CO2 SERPL-SCNC: 24 MMOL/L — SIGNIFICANT CHANGE UP (ref 22–31)
CREAT SERPL-MCNC: 7.14 MG/DL — HIGH (ref 0.5–1.3)
CULTURE RESULTS: SIGNIFICANT CHANGE UP
CULTURE RESULTS: SIGNIFICANT CHANGE UP
GLUCOSE SERPL-MCNC: 81 MG/DL — SIGNIFICANT CHANGE UP (ref 70–99)
HCT VFR BLD CALC: 30.5 % — LOW (ref 34.5–45)
HGB BLD-MCNC: 9.9 G/DL — LOW (ref 11.5–15.5)
INR BLD: 1.11 RATIO — SIGNIFICANT CHANGE UP (ref 0.88–1.16)
MCHC RBC-ENTMCNC: 28.5 PG — SIGNIFICANT CHANGE UP (ref 27–34)
MCHC RBC-ENTMCNC: 32.5 GM/DL — SIGNIFICANT CHANGE UP (ref 32–36)
MCV RBC AUTO: 87.9 FL — SIGNIFICANT CHANGE UP (ref 80–100)
PLATELET # BLD AUTO: 303 K/UL — SIGNIFICANT CHANGE UP (ref 150–400)
POTASSIUM SERPL-MCNC: 4.3 MMOL/L — SIGNIFICANT CHANGE UP (ref 3.5–5.3)
POTASSIUM SERPL-SCNC: 4.3 MMOL/L — SIGNIFICANT CHANGE UP (ref 3.5–5.3)
PROTHROM AB SERPL-ACNC: 12.6 SEC — SIGNIFICANT CHANGE UP (ref 10–13.1)
RBC # BLD: 3.47 M/UL — LOW (ref 3.8–5.2)
RBC # FLD: 14.7 % — HIGH (ref 10.3–14.5)
SODIUM SERPL-SCNC: 134 MMOL/L — LOW (ref 135–145)
SPECIMEN SOURCE: SIGNIFICANT CHANGE UP
SPECIMEN SOURCE: SIGNIFICANT CHANGE UP
WBC # BLD: 4.14 K/UL — SIGNIFICANT CHANGE UP (ref 3.8–10.5)
WBC # FLD AUTO: 4.14 K/UL — SIGNIFICANT CHANGE UP (ref 3.8–10.5)

## 2017-06-17 RX ORDER — WARFARIN SODIUM 2.5 MG/1
10 TABLET ORAL ONCE
Qty: 0 | Refills: 0 | Status: COMPLETED | OUTPATIENT
Start: 2017-06-17 | End: 2017-06-17

## 2017-06-17 RX ADMIN — AMLODIPINE BESYLATE 10 MILLIGRAM(S): 2.5 TABLET ORAL at 05:08

## 2017-06-17 RX ADMIN — Medication 2001 MILLIGRAM(S): at 19:59

## 2017-06-17 RX ADMIN — Medication 2001 MILLIGRAM(S): at 12:15

## 2017-06-17 RX ADMIN — ERYTHROPOIETIN 4000 UNIT(S): 10000 INJECTION, SOLUTION INTRAVENOUS; SUBCUTANEOUS at 15:58

## 2017-06-17 RX ADMIN — WARFARIN SODIUM 10 MILLIGRAM(S): 2.5 TABLET ORAL at 21:06

## 2017-06-17 RX ADMIN — Medication 100 MILLIGRAM(S): at 05:09

## 2017-06-17 RX ADMIN — PANTOPRAZOLE SODIUM 40 MILLIGRAM(S): 20 TABLET, DELAYED RELEASE ORAL at 05:08

## 2017-06-17 RX ADMIN — HEPARIN SODIUM 1100 UNIT(S)/HR: 5000 INJECTION INTRAVENOUS; SUBCUTANEOUS at 12:19

## 2017-06-17 RX ADMIN — Medication 100 MILLIGRAM(S): at 21:06

## 2017-06-17 RX ADMIN — Medication 100 MILLIGRAM(S): at 13:14

## 2017-06-17 NOTE — PROGRESS NOTE ADULT - SUBJECTIVE AND OBJECTIVE BOX
CC: no sob present.    Vital Signs Last 24 Hrs  T(C): 36.8, Max: 37.1 (06-17 @ 04:47)  T(F): 98.2, Max: 98.7 (06-17 @ 04:47)  HR: 78 (76 - 80)  BP: 155/79 (155/79 - 165/80)  BP(mean): --  RR: 18 (18 - 20)  SpO2: 97% (96% - 97%)  I & Os for 24h ending 06-17 @ 07:00  =============================================  IN: 1321 ml / OUT: 100 ml / NET: 1221 ml    I & Os for current day (as of 06-17 @ 13:56)  =============================================  IN: 480 ml / OUT: 300 ml / NET: 180 ml      PHYSICAL EXAM:  GENERAL: No acute respiratory distress.  HEAD:  Atraumatic, Normocephalic  EYES: conjunctiva and sclera clear  ENMT: Moist mucous membranes,  NECK: Supple, No JVD  NERVOUS SYSTEM:  Awake, Alert & Oriented X3, No focal deficits present.   CHEST/LUNG: Clear to percussion bilaterally; No rales, rhonchi, wheezing, or rubs  HEART: Regular rate and rhythm; No murmurs, rubs, or gallops  ABDOMEN: Soft, Nontender, Nondistended; Bowel sounds present  EXTREMITIES:  2+ Peripheral Pulses lower text, trace edema; Lt forearm AVG thrombosed and Lt Chest permacath.  SKIN: No rashes or lesions    MEDICATIONS:  heparin  Infusion. Unit(s)/Hr IV Continuous <Continuous>  heparin  Injectable 4500Unit(s) IV Push every 6 hours PRN  heparin  Injectable 2000Unit(s) IV Push every 6 hours PRN  pantoprazole    Tablet 40milliGRAM(s) Oral before breakfast  amLODIPine   Tablet 10milliGRAM(s) Oral daily  acetaminophen   Tablet 650milliGRAM(s) Oral every 6 hours PRN  acetaminophen   Tablet. 650milliGRAM(s) Oral every 6 hours PRN  hydrALAZINE 100milliGRAM(s) Oral three times a day  epoetin ivelisse Injectable 4000Unit(s) IV Push <User Schedule>  calcium acetate 2001milliGRAM(s) Oral three times a day with meals  warfarin 10milliGRAM(s) Oral once      LABS:                        9.9    4.14  )-----------( 303      ( 17 Jun 2017 08:27 )             30.5     06-17    134<L>  |  92<L>  |  26<H>  ----------------------------<  81  4.3   |  24  |  7.14<H>    Ca    9.3      17 Jun 2017 08:23      PT/INR - ( 17 Jun 2017 08:27 )   PT: 12.6 sec;   INR: 1.11 ratio         PTT - ( 17 Jun 2017 08:27 )  PTT:67.3 sec      Urine studies    PTH and Vit D:

## 2017-06-17 NOTE — PROGRESS NOTE ADULT - SUBJECTIVE AND OBJECTIVE BOX
Patient is a 71y old  Female who presents with a chief complaint of Fevers. (15 Jarod 2017 19:44)      SUBJECTIVE / OVERNIGHT EVENTS:   Feels better.  Denies CP/SOB/Palpitation/HA.    MEDICATIONS  (STANDING):  heparin  Infusion. Unit(s)/Hr IV Continuous <Continuous>  pantoprazole    Tablet 40milliGRAM(s) Oral before breakfast  amLODIPine   Tablet 10milliGRAM(s) Oral daily  hydrALAZINE 100milliGRAM(s) Oral three times a day  epoetin ivelisse Injectable 4000Unit(s) IV Push <User Schedule>  calcium acetate 2001milliGRAM(s) Oral three times a day with meals    MEDICATIONS  (PRN):  heparin  Injectable 4500Unit(s) IV Push every 6 hours PRN For aPTT less than 40  heparin  Injectable 2000Unit(s) IV Push every 6 hours PRN For aPTT between 40 - 57  acetaminophen   Tablet 650milliGRAM(s) Oral every 6 hours PRN For Temp greater than 38 C (100.4 F)  acetaminophen   Tablet. 650milliGRAM(s) Oral every 6 hours PRN Mild Pain (1 - 3)      Vital Signs Last 24 Hrs  T(C): 37, Max: 37.1 (06-17 @ 04:47)  HR: 103 (76 - 103)  BP: 175/78 (144/76 - 175/78)  RR: 19 (18 - 19)  SpO2: 96% (96% - 98%)  Wt(kg): --  CAPILLARY BLOOD GLUCOSE    I&O's Summary  I & Os for 24h ending 17 Jun 2017 07:00  =============================================  IN: 1321 ml / OUT: 100 ml / NET: 1221 ml    I & Os for current day (as of 18 Jun 2017 02:01)  =============================================  IN: 887 ml / OUT: 1800 ml / NET: -913 ml      PHYSICAL EXAM:  GENERAL: NAD, well-developed  HEAD:  Atraumatic, Normocephalic  EYES: EOMI, PERRLA, conjunctiva and sclera clear  NECK: Supple, No JVD  CHEST/LUNG: Clear to auscultation bilaterally; No wheeze  HEART: Regular rate and rhythm; No murmurs, rubs, or gallops  ABDOMEN: Soft, Nontender, Nondistended; Bowel sounds present  EXTREMITIES:  2+ Peripheral Pulses, No clubbing, cyanosis, or edema  PSYCH: AAOx3  NEUROLOGY: non-focal  SKIN: No rashes or lesions    LABS:                        9.9    4.14  )-----------( 303      ( 17 Jun 2017 08:27 )             30.5     06-17    134<L>  |  92<L>  |  26<H>  ----------------------------<  81  4.3   |  24  |  7.14<H>    Ca    9.3      17 Jun 2017 08:23      PT/INR - ( 17 Jun 2017 08:27 )   PT: 12.6 sec;   INR: 1.11 ratio         PTT - ( 17 Jun 2017 08:27 )  PTT:67.3 sec        CAPILLARY BLOOD GLUCOSE    06-12 @ 18:22  Culture-urine --  Culture results   No growth at 5 days.  method type --  Organism --  Organism Identification --  Specimen source .Blood Blood  06-12 @ 14:45  Culture-urine --  Culture results   No growth at 5 days.  method type --  Organism --  Organism Identification --  Specimen source .Blood Blood           06-12 @ 18:22  Culture blood --  Culture results   No growth at 5 days.  Gram stain --  Gram stain blood --  Method type --  Organism --  Organism identification --  Specimen source .Blood Blood   06-12 @ 14:45  Culture blood --  Culture results   No growth at 5 days.  Gram stain --  Gram stain blood --  Method type --  Organism --  Organism identification --  Specimen source .Blood Blood      RADIOLOGY & ADDITIONAL TESTS:    Imaging Personally Reviewed:    Consultant(s) Notes Reviewed:      Care Discussed with Consultants/Other Providers:

## 2017-06-18 LAB
ANION GAP SERPL CALC-SCNC: 15 MMOL/L — SIGNIFICANT CHANGE UP (ref 5–17)
APTT BLD: 63.3 SEC — HIGH (ref 27.5–37.4)
BUN SERPL-MCNC: 17 MG/DL — SIGNIFICANT CHANGE UP (ref 7–23)
CALCIUM SERPL-MCNC: 9.1 MG/DL — SIGNIFICANT CHANGE UP (ref 8.4–10.5)
CHLORIDE SERPL-SCNC: 98 MMOL/L — SIGNIFICANT CHANGE UP (ref 96–108)
CO2 SERPL-SCNC: 25 MMOL/L — SIGNIFICANT CHANGE UP (ref 22–31)
CREAT SERPL-MCNC: 5.03 MG/DL — HIGH (ref 0.5–1.3)
GLUCOSE SERPL-MCNC: 85 MG/DL — SIGNIFICANT CHANGE UP (ref 70–99)
HCT VFR BLD CALC: 30.5 % — LOW (ref 34.5–45)
HGB BLD-MCNC: 9.7 G/DL — LOW (ref 11.5–15.5)
INR BLD: 1.35 RATIO — HIGH (ref 0.88–1.16)
MCHC RBC-ENTMCNC: 28.4 PG — SIGNIFICANT CHANGE UP (ref 27–34)
MCHC RBC-ENTMCNC: 31.8 GM/DL — LOW (ref 32–36)
MCV RBC AUTO: 89.4 FL — SIGNIFICANT CHANGE UP (ref 80–100)
PLATELET # BLD AUTO: 290 K/UL — SIGNIFICANT CHANGE UP (ref 150–400)
POTASSIUM SERPL-MCNC: 4 MMOL/L — SIGNIFICANT CHANGE UP (ref 3.5–5.3)
POTASSIUM SERPL-SCNC: 4 MMOL/L — SIGNIFICANT CHANGE UP (ref 3.5–5.3)
PROTHROM AB SERPL-ACNC: 15.4 SEC — HIGH (ref 10–13.1)
RBC # BLD: 3.41 M/UL — LOW (ref 3.8–5.2)
RBC # FLD: 14.6 % — HIGH (ref 10.3–14.5)
SODIUM SERPL-SCNC: 138 MMOL/L — SIGNIFICANT CHANGE UP (ref 135–145)
WBC # BLD: 3.94 K/UL — SIGNIFICANT CHANGE UP (ref 3.8–10.5)
WBC # FLD AUTO: 3.94 K/UL — SIGNIFICANT CHANGE UP (ref 3.8–10.5)

## 2017-06-18 RX ORDER — WARFARIN SODIUM 2.5 MG/1
8 TABLET ORAL ONCE
Qty: 0 | Refills: 0 | Status: COMPLETED | OUTPATIENT
Start: 2017-06-18 | End: 2017-06-18

## 2017-06-18 RX ADMIN — Medication 100 MILLIGRAM(S): at 13:20

## 2017-06-18 RX ADMIN — AMLODIPINE BESYLATE 10 MILLIGRAM(S): 2.5 TABLET ORAL at 05:03

## 2017-06-18 RX ADMIN — Medication 2001 MILLIGRAM(S): at 11:47

## 2017-06-18 RX ADMIN — WARFARIN SODIUM 8 MILLIGRAM(S): 2.5 TABLET ORAL at 21:05

## 2017-06-18 RX ADMIN — HEPARIN SODIUM 1100 UNIT(S)/HR: 5000 INJECTION INTRAVENOUS; SUBCUTANEOUS at 11:47

## 2017-06-18 RX ADMIN — PANTOPRAZOLE SODIUM 40 MILLIGRAM(S): 20 TABLET, DELAYED RELEASE ORAL at 05:03

## 2017-06-18 RX ADMIN — Medication 100 MILLIGRAM(S): at 05:03

## 2017-06-18 RX ADMIN — Medication 2001 MILLIGRAM(S): at 16:56

## 2017-06-18 RX ADMIN — Medication 100 MILLIGRAM(S): at 21:05

## 2017-06-18 NOTE — PROGRESS NOTE ADULT - SUBJECTIVE AND OBJECTIVE BOX
CC: no fevers or sob    Vital Signs Last 24 Hrs  T(C): 37.2, Max: 37.2 (06-18 @ 04:31)  T(F): 98.9, Max: 98.9 (06-18 @ 04:31)  HR: 77 (76 - 103)  BP: 150/70 (144/76 - 175/78)  BP(mean): --  RR: 18 (18 - 19)  SpO2: 95% (95% - 98%)  I & Os for 24h ending 06-18 @ 07:00  =============================================  IN: 1248 ml / OUT: 1800 ml / NET: -552 ml    I & Os for current day (as of 06-18 @ 10:12)  =============================================  IN: 240 ml / OUT: 0 ml / NET: 240 ml      PHYSICAL EXAM:  GENERAL: No acute respiratory distress.  HEAD:  Atraumatic, Normocephalic  EYES: conjunctiva and sclera clear  ENMT: Moist mucous membranes,  NECK: Supple, No JVD  NERVOUS SYSTEM:  Awake, Alert & Oriented X3, No focal deficits present.   CHEST/LUNG: Clear to percussion bilaterally; No rales, rhonchi, wheezing, or rubs  HEART: Regular rate and rhythm; No murmurs, rubs, or gallops  ABDOMEN: Soft, Nontender, Nondistended; Bowel sounds present  EXTREMITIES:  2+ Peripheral Pulses lower text, trace edema; Lt forearm AVG thrombosed and Lt Chest permacath.  SKIN: No rashes or lesions      MEDICATIONS:  heparin  Infusion. Unit(s)/Hr IV Continuous <Continuous>  heparin  Injectable 4500Unit(s) IV Push every 6 hours PRN  heparin  Injectable 2000Unit(s) IV Push every 6 hours PRN  pantoprazole    Tablet 40milliGRAM(s) Oral before breakfast  amLODIPine   Tablet 10milliGRAM(s) Oral daily  acetaminophen   Tablet 650milliGRAM(s) Oral every 6 hours PRN  acetaminophen   Tablet. 650milliGRAM(s) Oral every 6 hours PRN  hydrALAZINE 100milliGRAM(s) Oral three times a day  epoetin ivelisse Injectable 4000Unit(s) IV Push <User Schedule>  calcium acetate 2001milliGRAM(s) Oral three times a day with meals      LABS:                        9.9    4.14  )-----------( 303      ( 17 Jun 2017 08:27 )             30.5     06-17    134<L>  |  92<L>  |  26<H>  ----------------------------<  81  4.3   |  24  |  7.14<H>    Ca    9.3      17 Jun 2017 08:23      PT/INR - ( 17 Jun 2017 08:27 )   PT: 12.6 sec;   INR: 1.11 ratio         PTT - ( 17 Jun 2017 08:27 )  PTT:67.3 sec      Urine studies    PTH and Vit D:

## 2017-06-19 LAB
ANION GAP SERPL CALC-SCNC: 18 MMOL/L — HIGH (ref 5–17)
APTT BLD: 79.2 SEC — HIGH (ref 27.5–37.4)
BUN SERPL-MCNC: 31 MG/DL — HIGH (ref 7–23)
CALCIUM SERPL-MCNC: 9.4 MG/DL — SIGNIFICANT CHANGE UP (ref 8.4–10.5)
CHLORIDE SERPL-SCNC: 96 MMOL/L — SIGNIFICANT CHANGE UP (ref 96–108)
CO2 SERPL-SCNC: 25 MMOL/L — SIGNIFICANT CHANGE UP (ref 22–31)
CREAT SERPL-MCNC: 7.25 MG/DL — HIGH (ref 0.5–1.3)
GLUCOSE SERPL-MCNC: 73 MG/DL — SIGNIFICANT CHANGE UP (ref 70–99)
HCT VFR BLD CALC: 29.5 % — LOW (ref 34.5–45)
HGB BLD-MCNC: 9.5 G/DL — LOW (ref 11.5–15.5)
INR BLD: 1.66 RATIO — HIGH (ref 0.88–1.16)
MCHC RBC-ENTMCNC: 28.8 PG — SIGNIFICANT CHANGE UP (ref 27–34)
MCHC RBC-ENTMCNC: 32.2 GM/DL — SIGNIFICANT CHANGE UP (ref 32–36)
MCV RBC AUTO: 89.4 FL — SIGNIFICANT CHANGE UP (ref 80–100)
PLATELET # BLD AUTO: 307 K/UL — SIGNIFICANT CHANGE UP (ref 150–400)
POTASSIUM SERPL-MCNC: 4.5 MMOL/L — SIGNIFICANT CHANGE UP (ref 3.5–5.3)
POTASSIUM SERPL-SCNC: 4.5 MMOL/L — SIGNIFICANT CHANGE UP (ref 3.5–5.3)
PROTHROM AB SERPL-ACNC: 18.9 SEC — HIGH (ref 10–13.1)
RBC # BLD: 3.3 M/UL — LOW (ref 3.8–5.2)
RBC # FLD: 14.7 % — HIGH (ref 10.3–14.5)
SODIUM SERPL-SCNC: 139 MMOL/L — SIGNIFICANT CHANGE UP (ref 135–145)
WBC # BLD: 3.53 K/UL — LOW (ref 3.8–10.5)
WBC # FLD AUTO: 3.53 K/UL — LOW (ref 3.8–10.5)

## 2017-06-19 RX ORDER — WARFARIN SODIUM 2.5 MG/1
8 TABLET ORAL ONCE
Qty: 0 | Refills: 0 | Status: COMPLETED | OUTPATIENT
Start: 2017-06-19 | End: 2017-06-19

## 2017-06-19 RX ADMIN — AMLODIPINE BESYLATE 10 MILLIGRAM(S): 2.5 TABLET ORAL at 05:19

## 2017-06-19 RX ADMIN — Medication 2001 MILLIGRAM(S): at 08:54

## 2017-06-19 RX ADMIN — Medication 100 MILLIGRAM(S): at 05:19

## 2017-06-19 RX ADMIN — HEPARIN SODIUM 1100 UNIT(S)/HR: 5000 INJECTION INTRAVENOUS; SUBCUTANEOUS at 10:08

## 2017-06-19 RX ADMIN — WARFARIN SODIUM 8 MILLIGRAM(S): 2.5 TABLET ORAL at 21:43

## 2017-06-19 RX ADMIN — Medication 2001 MILLIGRAM(S): at 17:08

## 2017-06-19 RX ADMIN — Medication 2001 MILLIGRAM(S): at 13:06

## 2017-06-19 RX ADMIN — PANTOPRAZOLE SODIUM 40 MILLIGRAM(S): 20 TABLET, DELAYED RELEASE ORAL at 05:19

## 2017-06-19 RX ADMIN — Medication 100 MILLIGRAM(S): at 13:06

## 2017-06-19 RX ADMIN — Medication 100 MILLIGRAM(S): at 21:43

## 2017-06-19 NOTE — PROGRESS NOTE ADULT - SUBJECTIVE AND OBJECTIVE BOX
CC: no fever and no sob.    Vital Signs Last 24 Hrs  T(C): 37, Max: 37.6 (06-18 @ 20:23)  T(F): 98.6, Max: 99.7 (06-18 @ 20:23)  HR: 72 (72 - 82)  BP: 145/70 (145/70 - 159/77)  BP(mean): --  RR: 18 (18 - 19)  SpO2: 97% (93% - 98%)  I & Os for 24h ending 06-19 @ 07:00  =============================================  IN: 1136 ml / OUT: 500 ml / NET: 636 ml    I & Os for current day (as of 06-19 @ 11:12)  =============================================  IN: 240 ml / OUT: 0 ml / NET: 240 ml      PHYSICAL EXAM:  GENERAL: No acute respiratory distress.  HEAD:  Atraumatic, Normocephalic  EYES: conjunctiva and sclera clear  ENMT: Moist mucous membranes,  NECK: Supple, No JVD  NERVOUS SYSTEM:  Awake, Alert & Oriented X3, No focal deficits present.   CHEST/LUNG: Clear to percussion bilaterally; No rales, rhonchi, wheezing, or rubs  HEART: Regular rate and rhythm; No murmurs, rubs, or gallops  ABDOMEN: Soft, Nontender, Nondistended; Bowel sounds present  EXTREMITIES:  2+ Peripheral Pulses lower text, trace edema; Lt forearm AVG thrombosed and Lt Chest permacath.  SKIN: No rashes or lesions    MEDICATIONS:  heparin  Infusion. Unit(s)/Hr IV Continuous <Continuous>  heparin  Injectable 4500Unit(s) IV Push every 6 hours PRN  heparin  Injectable 2000Unit(s) IV Push every 6 hours PRN  pantoprazole    Tablet 40milliGRAM(s) Oral before breakfast  amLODIPine   Tablet 10milliGRAM(s) Oral daily  acetaminophen   Tablet 650milliGRAM(s) Oral every 6 hours PRN  acetaminophen   Tablet. 650milliGRAM(s) Oral every 6 hours PRN  hydrALAZINE 100milliGRAM(s) Oral three times a day  epoetin ivelisse Injectable 4000Unit(s) IV Push <User Schedule>  calcium acetate 2001milliGRAM(s) Oral three times a day with meals      LABS:                        9.5    3.53  )-----------( 307      ( 19 Jun 2017 07:44 )             29.5     06-19    139  |  96  |  31<H>  ----------------------------<  73  4.5   |  25  |  7.25<H>    Ca    9.4      19 Jun 2017 09:18      PT/INR - ( 19 Jun 2017 07:50 )   PT: 18.9 sec;   INR: 1.66 ratio         PTT - ( 19 Jun 2017 07:50 )  PTT:79.2 sec      Urine studies    PTH and Vit D:

## 2017-06-19 NOTE — PROGRESS NOTE ADULT - SUBJECTIVE AND OBJECTIVE BOX
Patient is a 71y old  Female who presents with a chief complaint of Fevers. (15 Jarod 2017 19:44)      SUBJECTIVE / OVERNIGHT EVENTS:   Feels better.  Denies CP/SOB/Palpitation/HA.    MEDICATIONS  (STANDING):  heparin  Infusion. Unit(s)/Hr IV Continuous <Continuous>  pantoprazole    Tablet 40milliGRAM(s) Oral before breakfast  amLODIPine   Tablet 10milliGRAM(s) Oral daily  hydrALAZINE 100milliGRAM(s) Oral three times a day  epoetin ivelisse Injectable 4000Unit(s) IV Push <User Schedule>  calcium acetate 2001milliGRAM(s) Oral three times a day with meals    MEDICATIONS  (PRN):  heparin  Injectable 4500Unit(s) IV Push every 6 hours PRN For aPTT less than 40  heparin  Injectable 2000Unit(s) IV Push every 6 hours PRN For aPTT between 40 - 57  acetaminophen   Tablet 650milliGRAM(s) Oral every 6 hours PRN For Temp greater than 38 C (100.4 F)  acetaminophen   Tablet. 650milliGRAM(s) Oral every 6 hours PRN Mild Pain (1 - 3)      Vital Signs Last 24 Hrs  T(C): 36.8, Max: 37.1 (06-19 @ 11:45)  HR: 76 (72 - 76)  BP: 174/80 (145/70 - 174/80)  RR: 19 (18 - 19)  SpO2: 96% (96% - 97%)  Wt(kg): --  CAPILLARY BLOOD GLUCOSE    I&O's Summary  I & Os for 24h ending 19 Jun 2017 07:00  =============================================  IN: 1136 ml / OUT: 500 ml / NET: 636 ml    I & Os for current day (as of 19 Jun 2017 23:28)  =============================================  IN: 850 ml / OUT: 400 ml / NET: 450 ml      PHYSICAL EXAM:  GENERAL: NAD, well-developed  HEAD:  Atraumatic, Normocephalic  EYES: EOMI, PERRLA, conjunctiva and sclera clear  NECK: Supple, No JVD  CHEST/LUNG: Clear to auscultation bilaterally; No wheeze  HEART: Regular rate and rhythm; No murmurs, rubs, or gallops  ABDOMEN: Soft, Nontender, Nondistended; Bowel sounds present  EXTREMITIES:  2+ Peripheral Pulses, No clubbing, cyanosis, or edema  PSYCH: AAOx3  NEUROLOGY: non-focal  SKIN: No rashes or lesions    LABS:                        9.5    3.53  )-----------( 307      ( 19 Jun 2017 07:44 )             29.5     06-19    139  |  96  |  31<H>  ----------------------------<  73  4.5   |  25  |  7.25<H>    Ca    9.4      19 Jun 2017 09:18      PT/INR - ( 19 Jun 2017 07:50 )   PT: 18.9 sec;   INR: 1.66 ratio         PTT - ( 19 Jun 2017 07:50 )  PTT:79.2 sec        CAPILLARY BLOOD GLUCOSE                RADIOLOGY & ADDITIONAL TESTS:    Imaging Personally Reviewed:    Consultant(s) Notes Reviewed:      Care Discussed with Consultants/Other Providers:

## 2017-06-20 LAB
ANION GAP SERPL CALC-SCNC: 19 MMOL/L — HIGH (ref 5–17)
APTT BLD: 119.9 SEC — HIGH (ref 27.5–37.4)
APTT BLD: 174.3 SEC — CRITICAL HIGH (ref 27.5–37.4)
BUN SERPL-MCNC: 42 MG/DL — HIGH (ref 7–23)
CALCIUM SERPL-MCNC: 9.4 MG/DL — SIGNIFICANT CHANGE UP (ref 8.4–10.5)
CHLORIDE SERPL-SCNC: 96 MMOL/L — SIGNIFICANT CHANGE UP (ref 96–108)
CO2 SERPL-SCNC: 24 MMOL/L — SIGNIFICANT CHANGE UP (ref 22–31)
CREAT SERPL-MCNC: 9.31 MG/DL — HIGH (ref 0.5–1.3)
GLUCOSE SERPL-MCNC: 120 MG/DL — HIGH (ref 70–99)
HCT VFR BLD CALC: 29.4 % — LOW (ref 34.5–45)
HGB BLD-MCNC: 9.6 G/DL — LOW (ref 11.5–15.5)
INR BLD: 1.97 RATIO — HIGH (ref 0.88–1.16)
MCHC RBC-ENTMCNC: 29.4 PG — SIGNIFICANT CHANGE UP (ref 27–34)
MCHC RBC-ENTMCNC: 32.7 GM/DL — SIGNIFICANT CHANGE UP (ref 32–36)
MCV RBC AUTO: 90.2 FL — SIGNIFICANT CHANGE UP (ref 80–100)
PLATELET # BLD AUTO: 328 K/UL — SIGNIFICANT CHANGE UP (ref 150–400)
POTASSIUM SERPL-MCNC: 4.2 MMOL/L — SIGNIFICANT CHANGE UP (ref 3.5–5.3)
POTASSIUM SERPL-SCNC: 4.2 MMOL/L — SIGNIFICANT CHANGE UP (ref 3.5–5.3)
PROTHROM AB SERPL-ACNC: 22.6 SEC — HIGH (ref 10–13.1)
RBC # BLD: 3.26 M/UL — LOW (ref 3.8–5.2)
RBC # FLD: 15 % — HIGH (ref 10.3–14.5)
SODIUM SERPL-SCNC: 139 MMOL/L — SIGNIFICANT CHANGE UP (ref 135–145)
WBC # BLD: 3.74 K/UL — LOW (ref 3.8–10.5)
WBC # FLD AUTO: 3.74 K/UL — LOW (ref 3.8–10.5)

## 2017-06-20 RX ORDER — WARFARIN SODIUM 2.5 MG/1
8 TABLET ORAL ONCE
Qty: 0 | Refills: 0 | Status: COMPLETED | OUTPATIENT
Start: 2017-06-20 | End: 2017-06-20

## 2017-06-20 RX ORDER — ERYTHROPOIETIN 10000 [IU]/ML
10000 INJECTION, SOLUTION INTRAVENOUS; SUBCUTANEOUS
Qty: 0 | Refills: 0 | Status: DISCONTINUED | OUTPATIENT
Start: 2017-06-20 | End: 2017-06-21

## 2017-06-20 RX ADMIN — HEPARIN SODIUM 0 UNIT(S)/HR: 5000 INJECTION INTRAVENOUS; SUBCUTANEOUS at 20:40

## 2017-06-20 RX ADMIN — WARFARIN SODIUM 8 MILLIGRAM(S): 2.5 TABLET ORAL at 21:37

## 2017-06-20 RX ADMIN — Medication 2001 MILLIGRAM(S): at 18:04

## 2017-06-20 RX ADMIN — Medication 100 MILLIGRAM(S): at 12:15

## 2017-06-20 RX ADMIN — HEPARIN SODIUM 800 UNIT(S)/HR: 5000 INJECTION INTRAVENOUS; SUBCUTANEOUS at 21:41

## 2017-06-20 RX ADMIN — Medication 2001 MILLIGRAM(S): at 12:15

## 2017-06-20 RX ADMIN — ERYTHROPOIETIN 4000 UNIT(S): 10000 INJECTION, SOLUTION INTRAVENOUS; SUBCUTANEOUS at 10:54

## 2017-06-20 RX ADMIN — PANTOPRAZOLE SODIUM 40 MILLIGRAM(S): 20 TABLET, DELAYED RELEASE ORAL at 05:22

## 2017-06-20 RX ADMIN — Medication 100 MILLIGRAM(S): at 21:37

## 2017-06-20 RX ADMIN — HEPARIN SODIUM 1000 UNIT(S)/HR: 5000 INJECTION INTRAVENOUS; SUBCUTANEOUS at 14:02

## 2017-06-20 RX ADMIN — AMLODIPINE BESYLATE 10 MILLIGRAM(S): 2.5 TABLET ORAL at 12:15

## 2017-06-20 RX ADMIN — Medication 2001 MILLIGRAM(S): at 08:00

## 2017-06-20 NOTE — PROGRESS NOTE ADULT - SUBJECTIVE AND OBJECTIVE BOX
Patient is a 71y old  Female who presents with a chief complaint of Fevers. (15 Jarod 2017 19:44)      SUBJECTIVE / OVERNIGHT EVENTS:   Feels better.  Denies CP/SOB/Palpitation/HA.    MEDICATIONS  (STANDING):  heparin  Infusion. Unit(s)/Hr IV Continuous <Continuous>  pantoprazole    Tablet 40milliGRAM(s) Oral before breakfast  amLODIPine   Tablet 10milliGRAM(s) Oral daily  hydrALAZINE 100milliGRAM(s) Oral three times a day  calcium acetate 2001milliGRAM(s) Oral three times a day with meals  epoetin ivelisse Injectable 25025Ezps(s) IV Push <User Schedule>    MEDICATIONS  (PRN):  heparin  Injectable 4500Unit(s) IV Push every 6 hours PRN For aPTT less than 40  heparin  Injectable 2000Unit(s) IV Push every 6 hours PRN For aPTT between 40 - 57  acetaminophen   Tablet 650milliGRAM(s) Oral every 6 hours PRN For Temp greater than 38 C (100.4 F)  acetaminophen   Tablet. 650milliGRAM(s) Oral every 6 hours PRN Mild Pain (1 - 3)      Vital Signs Last 24 Hrs  T(C): 37.6, Max: 37.6 (06-20 @ 20:30)  HR: 77 (68 - 99)  BP: 147/68 (139/82 - 169/88)  RR: 19 (18 - 19)  SpO2: 96% (96% - 98%)  Wt(kg): --  CAPILLARY BLOOD GLUCOSE    I&O's Summary  I & Os for 24h ending 20 Jun 2017 07:00  =============================================  IN: 970 ml / OUT: 400 ml / NET: 570 ml    I & Os for current day (as of 21 Jun 2017 00:20)  =============================================  IN: 1730 ml / OUT: 3550 ml / NET: -1820 ml      PHYSICAL EXAM:  GENERAL: NAD, well-developed  HEAD:  Atraumatic, Normocephalic  EYES: EOMI, PERRLA, conjunctiva and sclera clear  NECK: Supple, No JVD  CHEST/LUNG: Clear to auscultation bilaterally; No wheeze  HEART: Regular rate and rhythm; No murmurs, rubs, or gallops  ABDOMEN: Soft, Nontender, Nondistended; Bowel sounds present  EXTREMITIES:  2+ Peripheral Pulses, No clubbing, cyanosis, or edema  PSYCH: AAOx3  NEUROLOGY: non-focal  SKIN: No rashes or lesions    LABS:                        9.6    3.74  )-----------( 328      ( 20 Jun 2017 12:28 )             29.4     06-20    139  |  96  |  42<H>  ----------------------------<  120<H>  4.2   |  24  |  9.31<H>    Ca    9.4      20 Jun 2017 12:28      PT/INR - ( 20 Jun 2017 12:28 )   PT: 22.6 sec;   INR: 1.97 ratio         PTT - ( 20 Jun 2017 20:05 )  PTT:174.3 sec        CAPILLARY BLOOD GLUCOSE                RADIOLOGY & ADDITIONAL TESTS:    Imaging Personally Reviewed:    Consultant(s) Notes Reviewed:      Care Discussed with Consultants/Other Providers:

## 2017-06-20 NOTE — PROGRESS NOTE ADULT - SUBJECTIVE AND OBJECTIVE BOX
CC: no issues overnight.    Vital Signs Last 24 Hrs  T(C): 36.8, Max: 37.1 (06-19 @ 11:45)  T(F): 98.3, Max: 98.7 (06-19 @ 11:45)  HR: 73 (70 - 76)  BP: 160/91 (153/72 - 174/80)  BP(mean): --  RR: 18 (18 - 19)  SpO2: 97% (96% - 97%)  I & Os for 24h ending 06-20 @ 07:00  =============================================  IN: 970 ml / OUT: 400 ml / NET: 570 ml    I & Os for current day (as of 06-20 @ 10:54)  =============================================  IN: 360 ml / OUT: 0 ml / NET: 360 ml      PHYSICAL EXAM:  GENERAL: No acute respiratory distress.  HEAD:  Atraumatic, Normocephalic  EYES: conjunctiva and sclera clear  ENMT: Moist mucous membranes,  NECK: Supple, No JVD  NERVOUS SYSTEM:  Awake, Alert & Oriented X3, No focal deficits present.   CHEST/LUNG: Clear to percussion bilaterally; No rales, rhonchi, wheezing, or rubs  HEART: Regular rate and rhythm; No murmurs, rubs, or gallops  ABDOMEN: Soft, Nontender, Nondistended; Bowel sounds present  EXTREMITIES:  2+ Peripheral Pulses lower text, trace edema; Lt forearm AVG thrombosed and Lt Chest permacath.  SKIN: No rashes or lesions      MEDICATIONS:  heparin  Infusion. Unit(s)/Hr IV Continuous <Continuous>  heparin  Injectable 4500Unit(s) IV Push every 6 hours PRN  heparin  Injectable 2000Unit(s) IV Push every 6 hours PRN  pantoprazole    Tablet 40milliGRAM(s) Oral before breakfast  amLODIPine   Tablet 10milliGRAM(s) Oral daily  acetaminophen   Tablet 650milliGRAM(s) Oral every 6 hours PRN  acetaminophen   Tablet. 650milliGRAM(s) Oral every 6 hours PRN  hydrALAZINE 100milliGRAM(s) Oral three times a day  epoetin ivelisse Injectable 4000Unit(s) IV Push <User Schedule>  calcium acetate 2001milliGRAM(s) Oral three times a day with meals      LABS:                        9.5    3.53  )-----------( 307      ( 19 Jun 2017 07:44 )             29.5     06-19    139  |  96  |  31<H>  ----------------------------<  73  4.5   |  25  |  7.25<H>    Ca    9.4      19 Jun 2017 09:18      PT/INR - ( 19 Jun 2017 07:50 )   PT: 18.9 sec;   INR: 1.66 ratio         PTT - ( 19 Jun 2017 07:50 )  PTT:79.2 sec      Urine studies    PTH and Vit D:

## 2017-06-21 VITALS — HEART RATE: 80 BPM | SYSTOLIC BLOOD PRESSURE: 179 MMHG | DIASTOLIC BLOOD PRESSURE: 88 MMHG

## 2017-06-21 LAB
ANION GAP SERPL CALC-SCNC: 14 MMOL/L — SIGNIFICANT CHANGE UP (ref 5–17)
APTT BLD: 89.7 SEC — HIGH (ref 27.5–37.4)
BASOPHILS # BLD AUTO: 0 K/UL — SIGNIFICANT CHANGE UP (ref 0–0.2)
BASOPHILS NFR BLD AUTO: 0.3 % — SIGNIFICANT CHANGE UP (ref 0–2)
BUN SERPL-MCNC: 28 MG/DL — HIGH (ref 7–23)
CALCIUM SERPL-MCNC: 9.3 MG/DL — SIGNIFICANT CHANGE UP (ref 8.4–10.5)
CHLORIDE SERPL-SCNC: 100 MMOL/L — SIGNIFICANT CHANGE UP (ref 96–108)
CO2 SERPL-SCNC: 26 MMOL/L — SIGNIFICANT CHANGE UP (ref 22–31)
CREAT SERPL-MCNC: 6.13 MG/DL — HIGH (ref 0.5–1.3)
DNA PLOIDY SPEC FC-IMP: SIGNIFICANT CHANGE UP
EOSINOPHIL # BLD AUTO: 0.1 K/UL — SIGNIFICANT CHANGE UP (ref 0–0.5)
EOSINOPHIL NFR BLD AUTO: 3.5 % — SIGNIFICANT CHANGE UP (ref 0–6)
GLUCOSE SERPL-MCNC: 92 MG/DL — SIGNIFICANT CHANGE UP (ref 70–99)
HCT VFR BLD CALC: 30.7 % — LOW (ref 34.5–45)
HGB BLD-MCNC: 10.2 G/DL — LOW (ref 11.5–15.5)
INR BLD: 2.24 RATIO — HIGH (ref 0.88–1.16)
LYMPHOCYTES # BLD AUTO: 1.2 K/UL — SIGNIFICANT CHANGE UP (ref 1–3.3)
LYMPHOCYTES # BLD AUTO: 27.3 % — SIGNIFICANT CHANGE UP (ref 13–44)
MCHC RBC-ENTMCNC: 30.8 PG — SIGNIFICANT CHANGE UP (ref 27–34)
MCHC RBC-ENTMCNC: 33.1 GM/DL — SIGNIFICANT CHANGE UP (ref 32–36)
MCV RBC AUTO: 92.9 FL — SIGNIFICANT CHANGE UP (ref 80–100)
MONOCYTES # BLD AUTO: 0.5 K/UL — SIGNIFICANT CHANGE UP (ref 0–0.9)
MONOCYTES NFR BLD AUTO: 11.1 % — SIGNIFICANT CHANGE UP (ref 2–14)
MTHFR GENE INTERPRETATION: SIGNIFICANT CHANGE UP
NEUTROPHILS # BLD AUTO: 2.5 K/UL — SIGNIFICANT CHANGE UP (ref 1.8–7.4)
NEUTROPHILS NFR BLD AUTO: 57.9 % — SIGNIFICANT CHANGE UP (ref 43–77)
PLATELET # BLD AUTO: 303 K/UL — SIGNIFICANT CHANGE UP (ref 150–400)
POTASSIUM SERPL-MCNC: 4.4 MMOL/L — SIGNIFICANT CHANGE UP (ref 3.5–5.3)
POTASSIUM SERPL-SCNC: 4.4 MMOL/L — SIGNIFICANT CHANGE UP (ref 3.5–5.3)
PROTHROM AB SERPL-ACNC: 24.8 SEC — HIGH (ref 9.8–12.7)
PTR INTERPRETATION: SIGNIFICANT CHANGE UP
RBC # BLD: 3.3 M/UL — LOW (ref 3.8–5.2)
RBC # FLD: 13.9 % — SIGNIFICANT CHANGE UP (ref 10.3–14.5)
SODIUM SERPL-SCNC: 140 MMOL/L — SIGNIFICANT CHANGE UP (ref 135–145)
WBC # BLD: 4.3 K/UL — SIGNIFICANT CHANGE UP (ref 3.8–10.5)
WBC # FLD AUTO: 4.3 K/UL — SIGNIFICANT CHANGE UP (ref 3.8–10.5)

## 2017-06-21 PROCEDURE — 81241 F5 GENE: CPT

## 2017-06-21 PROCEDURE — 86850 RBC ANTIBODY SCREEN: CPT

## 2017-06-21 PROCEDURE — 71275 CT ANGIOGRAPHY CHEST: CPT

## 2017-06-21 PROCEDURE — 82553 CREATINE MB FRACTION: CPT

## 2017-06-21 PROCEDURE — 84484 ASSAY OF TROPONIN QUANT: CPT

## 2017-06-21 PROCEDURE — 85610 PROTHROMBIN TIME: CPT

## 2017-06-21 PROCEDURE — 96374 THER/PROPH/DIAG INJ IV PUSH: CPT

## 2017-06-21 PROCEDURE — 85730 THROMBOPLASTIN TIME PARTIAL: CPT

## 2017-06-21 PROCEDURE — 84132 ASSAY OF SERUM POTASSIUM: CPT

## 2017-06-21 PROCEDURE — 71046 X-RAY EXAM CHEST 2 VIEWS: CPT

## 2017-06-21 PROCEDURE — 85014 HEMATOCRIT: CPT

## 2017-06-21 PROCEDURE — 82550 ASSAY OF CK (CPK): CPT

## 2017-06-21 PROCEDURE — P9016: CPT

## 2017-06-21 PROCEDURE — 82435 ASSAY OF BLOOD CHLORIDE: CPT

## 2017-06-21 PROCEDURE — 82330 ASSAY OF CALCIUM: CPT

## 2017-06-21 PROCEDURE — 86901 BLOOD TYPING SEROLOGIC RH(D): CPT

## 2017-06-21 PROCEDURE — 99261: CPT

## 2017-06-21 PROCEDURE — 82947 ASSAY GLUCOSE BLOOD QUANT: CPT

## 2017-06-21 PROCEDURE — 81240 F2 GENE: CPT

## 2017-06-21 PROCEDURE — 84295 ASSAY OF SERUM SODIUM: CPT

## 2017-06-21 PROCEDURE — 84100 ASSAY OF PHOSPHORUS: CPT

## 2017-06-21 PROCEDURE — 81291 MTHFR GENE: CPT

## 2017-06-21 PROCEDURE — 85027 COMPLETE CBC AUTOMATED: CPT

## 2017-06-21 PROCEDURE — 96375 TX/PRO/DX INJ NEW DRUG ADDON: CPT

## 2017-06-21 PROCEDURE — 87040 BLOOD CULTURE FOR BACTERIA: CPT

## 2017-06-21 PROCEDURE — 80076 HEPATIC FUNCTION PANEL: CPT

## 2017-06-21 PROCEDURE — 82803 BLOOD GASES ANY COMBINATION: CPT

## 2017-06-21 PROCEDURE — 80048 BASIC METABOLIC PNL TOTAL CA: CPT

## 2017-06-21 PROCEDURE — 93005 ELECTROCARDIOGRAM TRACING: CPT

## 2017-06-21 PROCEDURE — 93971 EXTREMITY STUDY: CPT

## 2017-06-21 PROCEDURE — 83735 ASSAY OF MAGNESIUM: CPT

## 2017-06-21 PROCEDURE — 86900 BLOOD TYPING SEROLOGIC ABO: CPT

## 2017-06-21 PROCEDURE — 86923 COMPATIBILITY TEST ELECTRIC: CPT

## 2017-06-21 PROCEDURE — 36430 TRANSFUSION BLD/BLD COMPNT: CPT

## 2017-06-21 PROCEDURE — 99285 EMERGENCY DEPT VISIT HI MDM: CPT | Mod: 25

## 2017-06-21 PROCEDURE — 83605 ASSAY OF LACTIC ACID: CPT

## 2017-06-21 PROCEDURE — 80053 COMPREHEN METABOLIC PANEL: CPT

## 2017-06-21 RX ORDER — WARFARIN SODIUM 2.5 MG/1
1 TABLET ORAL
Qty: 30 | Refills: 0 | OUTPATIENT
Start: 2017-06-21 | End: 2017-07-21

## 2017-06-21 RX ORDER — HYDRALAZINE HCL 50 MG
1 TABLET ORAL
Qty: 90 | Refills: 0 | OUTPATIENT
Start: 2017-06-21 | End: 2017-07-21

## 2017-06-21 RX ADMIN — AMLODIPINE BESYLATE 10 MILLIGRAM(S): 2.5 TABLET ORAL at 05:12

## 2017-06-21 RX ADMIN — HEPARIN SODIUM 800 UNIT(S)/HR: 5000 INJECTION INTRAVENOUS; SUBCUTANEOUS at 04:32

## 2017-06-21 RX ADMIN — Medication 2001 MILLIGRAM(S): at 09:21

## 2017-06-21 RX ADMIN — PANTOPRAZOLE SODIUM 40 MILLIGRAM(S): 20 TABLET, DELAYED RELEASE ORAL at 05:12

## 2017-06-21 RX ADMIN — Medication 2001 MILLIGRAM(S): at 12:12

## 2017-06-21 RX ADMIN — Medication 100 MILLIGRAM(S): at 12:12

## 2017-06-21 RX ADMIN — Medication 100 MILLIGRAM(S): at 05:12

## 2017-06-21 NOTE — PROGRESS NOTE ADULT - PROBLEM SELECTOR PROBLEM 1
ESRD (end stage renal disease) on dialysis
Fever, unspecified fever cause

## 2017-06-21 NOTE — PROGRESS NOTE ADULT - PROBLEM SELECTOR PROBLEM 5
Pleuritis
Need for prophylactic measure

## 2017-06-21 NOTE — PROGRESS NOTE ADULT - PROBLEM SELECTOR PLAN 2
h/h is slowly dropping and s/p  2 prbc  increase to epogen 10,000 tiw.
h/h is stable and s/p  2 prbc  continue epogen 10,000 tiw.
h/h is stable with s/p  2 prbc  continue epogen 4,000 tiw.
Iv Heparin/Cpumadin  Dc planning once INR >2
Iv Heparin/Cpumadin  Dc planning once INR >2
currently on heparin gtt  -vascular consult as was planning on placing AVF on left side as OP.  -to also consult vascular re:  treat with cather in place.  -CTPA.  -heparin-->coumadin
currently on heparin gtt/ start Coumadin/Hem eval noted.  -vascular consult as was planning on placing AVF on left side as OP.  -to also consult vascular re:  treat with catheter in place.  -CTPA:   -heparin-->coumadin
currently on heparin gtt/ start Coumadin/Hem eval noted.  -vascular consult as was planning on placing AVF on left side as OP.  -to also consult vascular re:  treat with catheter in place.  -CTPA:   -heparin-->coumadin
currently on heparin gtt/ start Coumadin/Hem f/up   -vascular consult as was planning on placing AVF on left side as OP.  -to also consult vascular re:  treat with catheter in place.  -CTPA:   -heparin-->coumadin
awaiting labs and s/p  2 prbc  continue epogen 4,000 tiw.

## 2017-06-21 NOTE — PROGRESS NOTE ADULT - PROBLEM SELECTOR PROBLEM 2
Anemia due to chronic kidney disease
Acute deep vein thrombosis (DVT) of other vein of left upper extremity

## 2017-06-21 NOTE — PROGRESS NOTE ADULT - SUBJECTIVE AND OBJECTIVE BOX
CC: no issues overnight.    Vital Signs Last 24 Hrs  T(C): 37.2, Max: 37.6 (06-20 @ 20:30)  T(F): 98.9, Max: 99.6 (06-20 @ 20:30)  HR: 74 (68 - 99)  BP: 160/72 (139/82 - 169/88)  BP(mean): --  RR: 19 (18 - 19)  SpO2: 97% (96% - 98%)    I & Os for current day (as of 06-21 @ 09:41)  =============================================  IN: 1966 ml / OUT: 3750 ml / NET: -1784 ml      PHYSICAL EXAM:  GENERAL: No acute respiratory distress.  HEAD:  Atraumatic, Normocephalic  EYES: conjunctiva and sclera clear  ENMT: Moist mucous membranes,  NECK: Supple, No JVD  NERVOUS SYSTEM:  Awake, Alert & Oriented X3, No focal deficits present.   CHEST/LUNG: Clear to percussion bilaterally; No rales, rhonchi, wheezing, or rubs  HEART: Regular rate and rhythm; No murmurs, rubs, or gallops  ABDOMEN: Soft, Nontender, Nondistended; Bowel sounds present  EXTREMITIES:  2+ Peripheral Pulses lower text, trace edema; Lt forearm AVG thrombosed and Lt Chest permacath.  SKIN: No rashes or lesions    MEDICATIONS:  heparin  Infusion. Unit(s)/Hr IV Continuous <Continuous>  heparin  Injectable 4500Unit(s) IV Push every 6 hours PRN  heparin  Injectable 2000Unit(s) IV Push every 6 hours PRN  pantoprazole    Tablet 40milliGRAM(s) Oral before breakfast  amLODIPine   Tablet 10milliGRAM(s) Oral daily  acetaminophen   Tablet 650milliGRAM(s) Oral every 6 hours PRN  acetaminophen   Tablet. 650milliGRAM(s) Oral every 6 hours PRN  hydrALAZINE 100milliGRAM(s) Oral three times a day  calcium acetate 2001milliGRAM(s) Oral three times a day with meals  epoetin ivelisse Injectable 27164Pbjm(s) IV Push <User Schedule>      LABS:                        10.2   4.3   )-----------( 303      ( 21 Jun 2017 03:40 )             30.7     06-21    140  |  100  |  28<H>  ----------------------------<  92  4.4   |  26  |  6.13<H>    Ca    9.3      21 Jun 2017 03:40      PT/INR - ( 21 Jun 2017 03:40 )   PT: 24.8 sec;   INR: 2.24 ratio         PTT - ( 21 Jun 2017 03:40 )  PTT:89.7 sec      Urine studies    PTH and Vit D:

## 2017-06-21 NOTE — PROGRESS NOTE ADULT - PROBLEM SELECTOR PLAN 3
bp is acceptable and keep <140/80; continue bp meds.
continue with current medications
bp is acceptable and keep <140/80; continue home meds.

## 2017-06-21 NOTE — PROGRESS NOTE ADULT - PROBLEM SELECTOR PROBLEM 6
Fever, unspecified fever cause
Pleuritis

## 2017-06-21 NOTE — PROGRESS NOTE ADULT - PROBLEM SELECTOR PLAN 4
vascular doppler shows left subcalvian dvt near catheter site. Spoke with vascular (dr. villalobos) he recommends to treat medical with anticoagulation because he will need the rt arm for future access. Heme/onco consult noted. recs coumadin and f/u thrombotic workup.  continue with heparin drip and coumadin.  f/u daily pt/inr.
vascular doppler shows left subcalvian dvt near catheter site. Spoke with vascular (dr. villalobos) he recommends to treat medical with anticoagulation because he will need the rt arm for future access. Heme/onco consult noted. recs coumadin and f/u thrombotic workup.  continue with heparin drip and coumadin.  f/u daily pt/inr. INR goal is 2 to 3.
vascular doppler shows left subcalvian dvt near catheter site. Spoke with vascular (dr. villalobos) he recommends to treat medical with anticoagulation because he will need the rt arm for future access. Heme/onco consult noted. recs coumadin and f/u thrombotic workup.  continue with heparin drip and coumadin.  f/u daily pt/inr. INR goal is 2 to 3. spoke with NP to arrange for monitoring coumadin with INR upon discharge with pcp.
vascular doppler shows left subcalvian dvt near catheter site. Spoke with vascular (dr. villalobos) he recommends to treat medical with anticoagulation because he will need the rt arm for future access. Heme/onco consult noted. recs coumadin and f/u thrombotic workup.  continue with heparin drip and coumadin.  f/u daily pt/inr. INR goal is 2 to 3. spoke with NP to arrange for monitoring coumadin with INR upon discharge with pcp.
vascular doppler shows left subcalvian dvt near catheter site. Spoke with vascular (dr. villalobos) he recommends to treat medical with anticoagulation because he will need the rt arm for future access. Heme/onco consult noted. recs coumadin and sending thrombotic workup.  continue with heparin drip and coumadin started last night. f/u daily pt/inr.
dialysis T,Th,S.  Renal eval appreciated.
dialysis T,Th,S.  Renal f/up appreciated.
dialysis T,Th,S.  Renal f/up appreciated.
vascular doppler shows left subcalvian dvt near catheter site. Spoke with vascular (dr. villalobos) he recommends to treat medical with anticoagulation because he will need the rt arm for future access. Consult heme/onco, thrombosis x 2 in the last 2 weeks, r/o thrombophilic disorders and recs on anticoagulation. continue with heparin drip.

## 2017-06-21 NOTE — PROGRESS NOTE ADULT - PROBLEM SELECTOR PLAN 6
could be related to dvt but r/o infectious causes. no fevers overnight. Continue with iv abx and follow up blood cultures shows ngd. so far, blood cultures from dialysis center have been negative.
could be related to dvt but r/o infectious causes. no fevers overnight. Continue with iv abx and follow up blood cultures shos ngd. so far, blood cultures from dialysis center have been negative.
could be related to dvt but r/o infectious causes. no fevers overnight. Continue with iv abx and follow up blood cultures shos ngd. so far, blood cultures from dialysis center have been negative.
could be related to dvt but r/o infectious causes. no fevers overnight. Continue with iv abx and follow up blood cultures shows ngd. so far, blood cultures from dialysis center have been negative.
could be related to dvt but r/o infectious causes. no fevers overnight. follow up blood cultures shows ngd. so far, blood cultures from dialysis center have been negative.  off iv abx.
-possible 2/2 DVT . CTPA  no PE.  -ECG normal.
-possible 2/2 DVT . CTPA to r/o PE.  -ECG normal.
could be related to dvt but r/o infectious causes. Pt still has low grade fever this morning. Continue with iv abx and follow up blood cultures. so far, blood cultures from dialysis center have been negative.  ID consulted.

## 2017-06-21 NOTE — PROGRESS NOTE ADULT - PROBLEM SELECTOR PROBLEM 4
Acute deep vein thrombosis (DVT) of other vein of left upper extremity
ESRD (end stage renal disease) on dialysis

## 2017-06-21 NOTE — PROGRESS NOTE ADULT - PROBLEM SELECTOR PROBLEM 3
Essential hypertension

## 2017-06-21 NOTE — PROGRESS NOTE ADULT - PROBLEM SELECTOR PLAN 5
CT angio shows no obvious PE. no active pleuritic pain.
on hep gtt
r/o PE and  CT angio performed and awaiting results; continue heparin drip.

## 2017-06-21 NOTE — PROGRESS NOTE ADULT - PROBLEM SELECTOR PROBLEM 7
Hyperphosphatemia

## 2017-06-21 NOTE — PROGRESS NOTE ADULT - PROBLEM SELECTOR PLAN 7
continue the phoslo 3 tabs tid with meals.

## 2017-06-21 NOTE — PROGRESS NOTE ADULT - PROBLEM SELECTOR PLAN 1
Recommendation: ESRD on HD (T/TH/SAT)  -HD today  -Hemodialysis Renal Diet and Fluid restriction to 1L/day  -Adjust meds to eGFR and avoid IV Gadolinium contrast,NSAIDs, and phosphate enema.
Recommendation: ESRD on HD (T/TH/SAT)  -HD today.  -Hemodialysis Renal Diet and Fluid restriction to 1L/day  -Adjust meds to eGFR and avoid IV Gadolinium contrast,NSAIDs, and phosphate enema.
Recommendation: ESRD on HD (T/TH/SAT)  -HD tolerated well and tomorrow   -Hemodialysis Renal Diet and Fluid restriction to 1L/day  -Adjust meds to eGFR and avoid IV Gadolinium contrast,NSAIDs, and phosphate enema.
Recommendation: ESRD on HD (T/TH/SAT)  -HD tomorrow and orders written..  -Hemodialysis Renal Diet and Fluid restriction to 1L/day  -Adjust meds to eGFR and avoid IV Gadolinium contrast,NSAIDs, and phosphate enema.
Recommendation: ESRD on HD (T/TH/SAT)  -HD yesterday and removed 2.0kg of UF.  -Hemodialysis Renal Diet and Fluid restriction to 1L/day  -Adjust meds to eGFR and avoid IV Gadolinium contrast,NSAIDs, and phosphate enema.
Recommendation: ESRD on HD (T/TH/SAT)  -Hd today  -Hemodialysis Renal Diet and Fluid restriction to 1L/day  -Adjust meds to eGFR and avoid IV Gadolinium contrast,NSAIDs, and phosphate enema.
Recommendation: ESRD on HD (T/TH/SAT)  -Yesterday UF was 1.5kg and HD tomorrow.  -Hemodialysis Renal Diet and Fluid restriction to 1L/day  -Adjust meds to eGFR and avoid IV Gadolinium contrast,NSAIDs, and phosphate enema.
off abx
off abx
patient with fever in setting of acute DVT, however given history cannot r/o other etiology  -would continue vancomycin with dialysis unti BCX return.  BCx from catheter site  -CXR clear no need for ceftriaxone.  -await BCx
patient with fever in setting of acute DVT, however given history cannot r/o other etiology. Cx neg.
Recommendation: ESRD on HD (T/TH/SAT)  -Hd yesterday and removed 1.5kg.  -Dialysis tomorrow  -Hemodialysis Renal Diet and Fluid restriction to 1L/day  -Adjust meds to eGFR and avoid IV Gadolinium contrast,NSAIDs, and phosphate enema.

## 2017-07-18 ENCOUNTER — APPOINTMENT (OUTPATIENT)
Dept: VASCULAR SURGERY | Facility: CLINIC | Age: 72
End: 2017-07-18

## 2017-07-28 ENCOUNTER — APPOINTMENT (OUTPATIENT)
Dept: VASCULAR SURGERY | Facility: CLINIC | Age: 72
End: 2017-07-28
Payer: MEDICARE

## 2017-07-28 PROCEDURE — 99214 OFFICE O/P EST MOD 30 MIN: CPT

## 2017-07-28 PROCEDURE — G0365: CPT

## 2017-08-01 PROCEDURE — G9001: CPT

## 2017-08-14 ENCOUNTER — APPOINTMENT (OUTPATIENT)
Dept: NEPHROLOGY | Facility: CLINIC | Age: 72
End: 2017-08-14
Payer: COMMERCIAL

## 2017-08-14 VITALS
SYSTOLIC BLOOD PRESSURE: 207 MMHG | RESPIRATION RATE: 14 BRPM | WEIGHT: 131 LBS | OXYGEN SATURATION: 99 % | TEMPERATURE: 97.2 F | HEART RATE: 73 BPM | BODY MASS INDEX: 21.83 KG/M2 | DIASTOLIC BLOOD PRESSURE: 100 MMHG | HEIGHT: 65 IN

## 2017-08-14 PROCEDURE — 99214 OFFICE O/P EST MOD 30 MIN: CPT

## 2017-09-01 ENCOUNTER — OUTPATIENT (OUTPATIENT)
Dept: OUTPATIENT SERVICES | Facility: HOSPITAL | Age: 72
LOS: 1 days | End: 2017-09-01
Payer: MEDICARE

## 2017-09-01 VITALS
TEMPERATURE: 98 F | OXYGEN SATURATION: 99 % | RESPIRATION RATE: 16 BRPM | SYSTOLIC BLOOD PRESSURE: 185 MMHG | WEIGHT: 129.41 LBS | HEIGHT: 68 IN | HEART RATE: 79 BPM | DIASTOLIC BLOOD PRESSURE: 86 MMHG

## 2017-09-01 DIAGNOSIS — Z90.710 ACQUIRED ABSENCE OF BOTH CERVIX AND UTERUS: Chronic | ICD-10-CM

## 2017-09-01 DIAGNOSIS — S72.009A FRACTURE OF UNSPECIFIED PART OF NECK OF UNSPECIFIED FEMUR, INITIAL ENCOUNTER FOR CLOSED FRACTURE: Chronic | ICD-10-CM

## 2017-09-01 DIAGNOSIS — I82.409 ACUTE EMBOLISM AND THROMBOSIS OF UNSPECIFIED DEEP VEINS OF UNSPECIFIED LOWER EXTREMITY: ICD-10-CM

## 2017-09-01 DIAGNOSIS — I77.0 ARTERIOVENOUS FISTULA, ACQUIRED: Chronic | ICD-10-CM

## 2017-09-01 DIAGNOSIS — N18.6 END STAGE RENAL DISEASE: ICD-10-CM

## 2017-09-01 DIAGNOSIS — H26.9 UNSPECIFIED CATARACT: Chronic | ICD-10-CM

## 2017-09-01 DIAGNOSIS — Z01.818 ENCOUNTER FOR OTHER PREPROCEDURAL EXAMINATION: ICD-10-CM

## 2017-09-01 DIAGNOSIS — Z86.69 PERSONAL HISTORY OF OTHER DISEASES OF THE NERVOUS SYSTEM AND SENSE ORGANS: Chronic | ICD-10-CM

## 2017-09-01 LAB
ANION GAP SERPL CALC-SCNC: 20 MMOL/L — HIGH (ref 5–17)
BUN SERPL-MCNC: 30 MG/DL — HIGH (ref 7–23)
CALCIUM SERPL-MCNC: 9.4 MG/DL — SIGNIFICANT CHANGE UP (ref 8.4–10.5)
CHLORIDE SERPL-SCNC: 98 MMOL/L — SIGNIFICANT CHANGE UP (ref 96–108)
CO2 SERPL-SCNC: 24 MMOL/L — SIGNIFICANT CHANGE UP (ref 22–31)
CREAT SERPL-MCNC: 5.07 MG/DL — HIGH (ref 0.5–1.3)
GLUCOSE SERPL-MCNC: 75 MG/DL — SIGNIFICANT CHANGE UP (ref 70–99)
HCT VFR BLD CALC: 35.7 % — SIGNIFICANT CHANGE UP (ref 34.5–45)
HGB BLD-MCNC: 11.3 G/DL — LOW (ref 11.5–15.5)
MCHC RBC-ENTMCNC: 27.8 PG — SIGNIFICANT CHANGE UP (ref 27–34)
MCHC RBC-ENTMCNC: 31.7 GM/DL — LOW (ref 32–36)
MCV RBC AUTO: 87.9 FL — SIGNIFICANT CHANGE UP (ref 80–100)
PLATELET # BLD AUTO: 207 K/UL — SIGNIFICANT CHANGE UP (ref 150–400)
POTASSIUM SERPL-MCNC: 4.5 MMOL/L — SIGNIFICANT CHANGE UP (ref 3.5–5.3)
POTASSIUM SERPL-SCNC: 4.5 MMOL/L — SIGNIFICANT CHANGE UP (ref 3.5–5.3)
RBC # BLD: 4.06 M/UL — SIGNIFICANT CHANGE UP (ref 3.8–5.2)
RBC # FLD: 15.9 % — HIGH (ref 10.3–14.5)
SODIUM SERPL-SCNC: 142 MMOL/L — SIGNIFICANT CHANGE UP (ref 135–145)
WBC # BLD: 3.61 K/UL — LOW (ref 3.8–10.5)
WBC # FLD AUTO: 3.61 K/UL — LOW (ref 3.8–10.5)

## 2017-09-01 PROCEDURE — G0463: CPT

## 2017-09-01 PROCEDURE — 80048 BASIC METABOLIC PNL TOTAL CA: CPT

## 2017-09-01 PROCEDURE — 85027 COMPLETE CBC AUTOMATED: CPT

## 2017-09-01 RX ORDER — CHOLECALCIFEROL (VITAMIN D3) 125 MCG
1 CAPSULE ORAL
Qty: 0 | Refills: 0 | COMMUNITY

## 2017-09-01 RX ORDER — VANCOMYCIN HCL 1 G
1000 VIAL (EA) INTRAVENOUS ONCE
Qty: 0 | Refills: 0 | Status: DISCONTINUED | OUTPATIENT
Start: 2017-09-13 | End: 2017-09-28

## 2017-09-01 RX ORDER — WARFARIN SODIUM 2.5 MG/1
1 TABLET ORAL
Qty: 0 | Refills: 0 | COMMUNITY

## 2017-09-01 RX ORDER — SODIUM CHLORIDE 9 MG/ML
3 INJECTION INTRAMUSCULAR; INTRAVENOUS; SUBCUTANEOUS EVERY 8 HOURS
Qty: 0 | Refills: 0 | Status: DISCONTINUED | OUTPATIENT
Start: 2017-09-13 | End: 2017-09-13

## 2017-09-01 RX ORDER — LIDOCAINE HCL 20 MG/ML
0.2 VIAL (ML) INJECTION ONCE
Qty: 0 | Refills: 0 | Status: DISCONTINUED | OUTPATIENT
Start: 2017-09-13 | End: 2017-09-13

## 2017-09-01 NOTE — H&P PST ADULT - HISTORY OF PRESENT ILLNESS
70 yo woman with PMH of  HTN, glaucoma, ESRD on HD for 2 years (Tues, Thurs, Sat), s/p recent admission for thrombosed Left forearm AV fistula s/p Left IJ port (06/01/17), s/p admission on 06/12/17 with acute Left subclavian DVT (now on Coumadin) presents for creation of a new hemodialysis access. Of note, patient is being considered for renal transplant list pending cardiac evaluation. She is scheduled for Right Arm AV Graft.

## 2017-09-01 NOTE — H&P PST ADULT - PSH
Acquired cataract  extraction with b/l lense placement, 2016  AV fistula  2015  H/O: glaucoma  surgery, 2002  S/P [facility]  for fibroids, 2012

## 2017-09-01 NOTE — H&P PST ADULT - PROBLEM SELECTOR PLAN 2
Will stop Coumadin on 09/09/17 as per Dr. AMY Watts. Will bridge with Lovenox as per Dr. OLIVE Tabares (PCP)

## 2017-09-01 NOTE — H&P PST ADULT - PMH
Cataract    DVT (deep venous thrombosis)  of Left subclavian vein, 06/12/17  ESRD (end stage renal disease) on dialysis    Glaucoma    HTN (hypertension)

## 2017-09-01 NOTE — H&P PST ADULT - NSANTHOSAYNRD_GEN_A_CORE
No. DESTIN screening performed.  STOP BANG Legend: 0-2 = LOW Risk; 3-4 = INTERMEDIATE Risk; 5-8 = HIGH Risk

## 2017-09-13 ENCOUNTER — OUTPATIENT (OUTPATIENT)
Dept: OUTPATIENT SERVICES | Facility: HOSPITAL | Age: 72
LOS: 1 days | End: 2017-09-13
Payer: MEDICARE

## 2017-09-13 ENCOUNTER — APPOINTMENT (OUTPATIENT)
Dept: VASCULAR SURGERY | Facility: HOSPITAL | Age: 72
End: 2017-09-13

## 2017-09-13 VITALS
OXYGEN SATURATION: 98 % | DIASTOLIC BLOOD PRESSURE: 78 MMHG | RESPIRATION RATE: 17 BRPM | SYSTOLIC BLOOD PRESSURE: 157 MMHG | HEART RATE: 72 BPM

## 2017-09-13 VITALS
HEART RATE: 73 BPM | RESPIRATION RATE: 18 BRPM | DIASTOLIC BLOOD PRESSURE: 96 MMHG | OXYGEN SATURATION: 99 % | TEMPERATURE: 98 F | WEIGHT: 129.41 LBS | SYSTOLIC BLOOD PRESSURE: 178 MMHG | HEIGHT: 68 IN

## 2017-09-13 DIAGNOSIS — Z90.710 ACQUIRED ABSENCE OF BOTH CERVIX AND UTERUS: Chronic | ICD-10-CM

## 2017-09-13 DIAGNOSIS — N18.6 END STAGE RENAL DISEASE: ICD-10-CM

## 2017-09-13 DIAGNOSIS — Z86.69 PERSONAL HISTORY OF OTHER DISEASES OF THE NERVOUS SYSTEM AND SENSE ORGANS: Chronic | ICD-10-CM

## 2017-09-13 DIAGNOSIS — H26.9 UNSPECIFIED CATARACT: Chronic | ICD-10-CM

## 2017-09-13 DIAGNOSIS — I77.0 ARTERIOVENOUS FISTULA, ACQUIRED: Chronic | ICD-10-CM

## 2017-09-13 LAB
INR BLD: 1.03 RATIO — SIGNIFICANT CHANGE UP (ref 0.88–1.16)
POTASSIUM SERPL-MCNC: 4.3 MMOL/L — SIGNIFICANT CHANGE UP (ref 3.5–5.3)
POTASSIUM SERPL-SCNC: 4.3 MMOL/L — SIGNIFICANT CHANGE UP (ref 3.5–5.3)
PROTHROM AB SERPL-ACNC: 11.2 SEC — SIGNIFICANT CHANGE UP (ref 9.8–12.7)

## 2017-09-13 PROCEDURE — 36830 ARTERY-VEIN NONAUTOGRAFT: CPT

## 2017-09-13 PROCEDURE — C1768: CPT

## 2017-09-13 PROCEDURE — C1889: CPT

## 2017-09-13 PROCEDURE — 84132 ASSAY OF SERUM POTASSIUM: CPT

## 2017-09-13 PROCEDURE — 36830 ARTERY-VEIN NONAUTOGRAFT: CPT | Mod: RT

## 2017-09-13 PROCEDURE — 85610 PROTHROMBIN TIME: CPT

## 2017-09-13 RX ORDER — ENOXAPARIN SODIUM 100 MG/ML
0 INJECTION SUBCUTANEOUS
Qty: 0 | Refills: 0 | COMMUNITY

## 2017-09-13 RX ORDER — ONDANSETRON 8 MG/1
4 TABLET, FILM COATED ORAL ONCE
Qty: 0 | Refills: 0 | Status: DISCONTINUED | OUTPATIENT
Start: 2017-09-13 | End: 2017-09-13

## 2017-09-13 RX ORDER — HYDROMORPHONE HYDROCHLORIDE 2 MG/ML
0.25 INJECTION INTRAMUSCULAR; INTRAVENOUS; SUBCUTANEOUS
Qty: 0 | Refills: 0 | Status: DISCONTINUED | OUTPATIENT
Start: 2017-09-13 | End: 2017-09-13

## 2017-09-13 NOTE — ASU DISCHARGE PLAN (ADULT/PEDIATRIC). - MEDICATION SUMMARY - MEDICATIONS TO TAKE
I will START or STAY ON the medications listed below when I get home from the hospital:    Coumadin 6 mg oral tablet  -- 1 tab(s) by mouth once a day, last dose 09/09/17 as per Dr. Watts  -- Indication: For Anticoagulation    metoprolol succinate 25 mg oral tablet, extended release  -- 1 tab(s) by mouth once a day  -- Indication: For Hypertension    amLODIPine 10 mg oral tablet  -- 1 tab(s) by mouth once a day  -- Indication: For Hypertension    hydrALAZINE 100 mg oral tablet  -- 1 tab(s) by mouth 2 times a day  -- Indication: For Hypertension

## 2017-09-13 NOTE — ASU DISCHARGE PLAN (ADULT/PEDIATRIC). - NOTIFY
Swelling that continues/Bleeding that does not stop/Numbness, color, or temperature change to extremity

## 2017-09-15 ENCOUNTER — APPOINTMENT (OUTPATIENT)
Age: 72
End: 2017-09-15

## 2017-09-19 ENCOUNTER — TRANSCRIPTION ENCOUNTER (OUTPATIENT)
Age: 72
End: 2017-09-19

## 2017-09-19 ENCOUNTER — APPOINTMENT (OUTPATIENT)
Dept: VASCULAR SURGERY | Facility: CLINIC | Age: 72
End: 2017-09-19
Payer: MEDICARE

## 2017-09-19 VITALS
TEMPERATURE: 98 F | DIASTOLIC BLOOD PRESSURE: 86 MMHG | SYSTOLIC BLOOD PRESSURE: 171 MMHG | HEIGHT: 65 IN | HEART RATE: 70 BPM | WEIGHT: 131 LBS | BODY MASS INDEX: 21.83 KG/M2

## 2017-09-19 PROCEDURE — 99024 POSTOP FOLLOW-UP VISIT: CPT

## 2017-09-20 ENCOUNTER — APPOINTMENT (OUTPATIENT)
Dept: CARDIOLOGY | Facility: CLINIC | Age: 72
End: 2017-09-20

## 2017-10-03 ENCOUNTER — APPOINTMENT (OUTPATIENT)
Dept: VASCULAR SURGERY | Facility: CLINIC | Age: 72
End: 2017-10-03
Payer: MEDICARE

## 2017-10-03 VITALS — DIASTOLIC BLOOD PRESSURE: 82 MMHG | SYSTOLIC BLOOD PRESSURE: 183 MMHG | HEART RATE: 75 BPM

## 2017-10-03 PROCEDURE — 99024 POSTOP FOLLOW-UP VISIT: CPT

## 2017-10-27 ENCOUNTER — APPOINTMENT (OUTPATIENT)
Age: 72
End: 2017-10-27
Payer: MEDICARE

## 2017-10-27 PROCEDURE — 36589 REMOVAL TUNNELED CV CATH: CPT | Mod: 78

## 2017-11-09 ENCOUNTER — APPOINTMENT (OUTPATIENT)
Dept: CARDIOLOGY | Facility: CLINIC | Age: 72
End: 2017-11-09
Payer: MEDICARE

## 2017-11-09 ENCOUNTER — NON-APPOINTMENT (OUTPATIENT)
Age: 72
End: 2017-11-09

## 2017-11-09 VITALS
HEIGHT: 65 IN | SYSTOLIC BLOOD PRESSURE: 186 MMHG | BODY MASS INDEX: 21.16 KG/M2 | OXYGEN SATURATION: 100 % | HEART RATE: 74 BPM | WEIGHT: 127 LBS | DIASTOLIC BLOOD PRESSURE: 84 MMHG

## 2017-11-09 PROCEDURE — 99203 OFFICE O/P NEW LOW 30 MIN: CPT

## 2017-11-09 PROCEDURE — 93000 ELECTROCARDIOGRAM COMPLETE: CPT

## 2017-11-09 RX ORDER — ISOSORBIDE MONONITRATE 10 MG/1
10 TABLET ORAL
Qty: 30 | Refills: 0 | Status: DISCONTINUED | COMMUNITY
Start: 2017-04-30 | End: 2017-11-09

## 2017-11-09 RX ORDER — HYDRALAZINE HYDROCHLORIDE 50 MG/1
50 TABLET ORAL
Qty: 90 | Refills: 0 | Status: DISCONTINUED | COMMUNITY
Start: 2017-04-30 | End: 2017-11-09

## 2017-11-09 RX ORDER — WARFARIN 6 MG/1
6 TABLET ORAL
Qty: 30 | Refills: 0 | Status: DISCONTINUED | COMMUNITY
Start: 2017-06-21 | End: 2017-11-09

## 2017-11-09 RX ORDER — HYDRALAZINE HYDROCHLORIDE 25 MG/1
25 TABLET ORAL 3 TIMES DAILY
Refills: 0 | Status: DISCONTINUED | COMMUNITY
End: 2017-11-09

## 2017-12-06 ENCOUNTER — OUTPATIENT (OUTPATIENT)
Dept: OUTPATIENT SERVICES | Facility: HOSPITAL | Age: 72
LOS: 1 days | End: 2017-12-06

## 2017-12-06 ENCOUNTER — APPOINTMENT (OUTPATIENT)
Dept: CV DIAGNOSTICS | Facility: HOSPITAL | Age: 72
End: 2017-12-06

## 2017-12-06 ENCOUNTER — APPOINTMENT (OUTPATIENT)
Dept: CV DIAGNOSITCS | Facility: HOSPITAL | Age: 72
End: 2017-12-06
Payer: COMMERCIAL

## 2017-12-06 DIAGNOSIS — Z86.69 PERSONAL HISTORY OF OTHER DISEASES OF THE NERVOUS SYSTEM AND SENSE ORGANS: Chronic | ICD-10-CM

## 2017-12-06 DIAGNOSIS — H26.9 UNSPECIFIED CATARACT: Chronic | ICD-10-CM

## 2017-12-06 DIAGNOSIS — I10 ESSENTIAL (PRIMARY) HYPERTENSION: ICD-10-CM

## 2017-12-06 DIAGNOSIS — Z90.710 ACQUIRED ABSENCE OF BOTH CERVIX AND UTERUS: Chronic | ICD-10-CM

## 2017-12-06 DIAGNOSIS — I77.0 ARTERIOVENOUS FISTULA, ACQUIRED: Chronic | ICD-10-CM

## 2017-12-06 PROCEDURE — 93306 TTE W/DOPPLER COMPLETE: CPT | Mod: 26

## 2017-12-06 PROCEDURE — 93016 CV STRESS TEST SUPVJ ONLY: CPT | Mod: GC

## 2017-12-06 PROCEDURE — 93018 CV STRESS TEST I&R ONLY: CPT | Mod: GC

## 2017-12-06 PROCEDURE — 78452 HT MUSCLE IMAGE SPECT MULT: CPT | Mod: 26

## 2017-12-14 ENCOUNTER — CHART COPY (OUTPATIENT)
Age: 72
End: 2017-12-14

## 2017-12-19 ENCOUNTER — APPOINTMENT (OUTPATIENT)
Dept: VASCULAR SURGERY | Facility: CLINIC | Age: 72
End: 2017-12-19
Payer: MEDICARE

## 2017-12-19 VITALS
HEART RATE: 75 BPM | TEMPERATURE: 98 F | HEIGHT: 65 IN | SYSTOLIC BLOOD PRESSURE: 181 MMHG | DIASTOLIC BLOOD PRESSURE: 94 MMHG | BODY MASS INDEX: 21.33 KG/M2 | WEIGHT: 128 LBS

## 2017-12-19 PROCEDURE — 99213 OFFICE O/P EST LOW 20 MIN: CPT

## 2017-12-19 PROCEDURE — 93990 DOPPLER FLOW TESTING: CPT

## 2018-01-26 ENCOUNTER — EMERGENCY (EMERGENCY)
Facility: HOSPITAL | Age: 73
LOS: 1 days | Discharge: HOME | End: 2018-01-26
Attending: EMERGENCY MEDICINE | Admitting: EMERGENCY MEDICINE
Payer: MEDICARE

## 2018-01-26 VITALS
TEMPERATURE: 98 F | OXYGEN SATURATION: 98 % | WEIGHT: 130.95 LBS | SYSTOLIC BLOOD PRESSURE: 186 MMHG | HEIGHT: 68 IN | HEART RATE: 85 BPM | DIASTOLIC BLOOD PRESSURE: 86 MMHG | RESPIRATION RATE: 18 BRPM

## 2018-01-26 VITALS
RESPIRATION RATE: 18 BRPM | SYSTOLIC BLOOD PRESSURE: 154 MMHG | OXYGEN SATURATION: 100 % | HEART RATE: 70 BPM | DIASTOLIC BLOOD PRESSURE: 66 MMHG

## 2018-01-26 DIAGNOSIS — Z90.710 ACQUIRED ABSENCE OF BOTH CERVIX AND UTERUS: Chronic | ICD-10-CM

## 2018-01-26 DIAGNOSIS — Z86.69 PERSONAL HISTORY OF OTHER DISEASES OF THE NERVOUS SYSTEM AND SENSE ORGANS: Chronic | ICD-10-CM

## 2018-01-26 DIAGNOSIS — I77.0 ARTERIOVENOUS FISTULA, ACQUIRED: Chronic | ICD-10-CM

## 2018-01-26 DIAGNOSIS — H26.9 UNSPECIFIED CATARACT: Chronic | ICD-10-CM

## 2018-01-26 LAB
APPEARANCE UR: CLEAR — SIGNIFICANT CHANGE UP
BILIRUB UR-MCNC: NEGATIVE — SIGNIFICANT CHANGE UP
COLOR SPEC: SIGNIFICANT CHANGE UP
DIFF PNL FLD: NEGATIVE — SIGNIFICANT CHANGE UP
GLUCOSE UR QL: 150 MG/DL
KETONES UR-MCNC: NEGATIVE — SIGNIFICANT CHANGE UP
LEUKOCYTE ESTERASE UR-ACNC: ABNORMAL
NITRITE UR-MCNC: NEGATIVE — SIGNIFICANT CHANGE UP
PH UR: 8.5 — HIGH (ref 5–8)
PROT UR-MCNC: >600 MG/DL
SP GR SPEC: 1.01 — LOW (ref 1.01–1.02)
UROBILINOGEN FLD QL: NEGATIVE — SIGNIFICANT CHANGE UP

## 2018-01-26 PROCEDURE — 87086 URINE CULTURE/COLONY COUNT: CPT

## 2018-01-26 PROCEDURE — 99284 EMERGENCY DEPT VISIT MOD MDM: CPT | Mod: 25,GC

## 2018-01-26 PROCEDURE — 81001 URINALYSIS AUTO W/SCOPE: CPT

## 2018-01-26 PROCEDURE — 93010 ELECTROCARDIOGRAM REPORT: CPT

## 2018-01-26 PROCEDURE — 99283 EMERGENCY DEPT VISIT LOW MDM: CPT | Mod: 25

## 2018-01-26 PROCEDURE — 93005 ELECTROCARDIOGRAM TRACING: CPT

## 2018-01-26 RX ORDER — ACETAMINOPHEN 500 MG
975 TABLET ORAL ONCE
Qty: 0 | Refills: 0 | Status: COMPLETED | OUTPATIENT
Start: 2018-01-26 | End: 2018-01-26

## 2018-01-26 RX ADMIN — Medication 975 MILLIGRAM(S): at 12:45

## 2018-01-26 NOTE — ED PROVIDER NOTE - MEDICAL DECISION MAKING DETAILS
73yo F PMH ESRD on HD M/W/F, HTN who presents with dizziness, weakness and hypertension. On exam symptoms improved. R CVAT, will get ua and urine culture. Will also get EKG, basic labs. Likely discharge to get regularly scheduled dialysis today. 71yo F PMH ESRD on HD M/W/F, HTN who presents with dizziness, weakness and hypertension. On exam symptoms improved. R CVAT, will get ua and urine culture. No indication at this time for lab work. Likely discharge to get regularly scheduled dialysis today.

## 2018-01-26 NOTE — ED ADULT NURSE NOTE - PMH
Cataract    DVT (deep venous thrombosis)  of Left subclavian vein, 06/12/17  ESRD (end stage renal disease) on dialysis    Glaucoma    Hemodialysis access, fistula mature  JASON  Hemodialysis patient    HTN (hypertension)

## 2018-01-26 NOTE — ED PROVIDER NOTE - NS ED ROS FT
REVIEW OF SYSTEMS:  CONSTITUTIONAL: Dizzy, lightheaded, generalized weakness; No fever, weight loss, or fatigue  EYES: No eye pain, visual disturbances, or discharge  ENMT:  No difficulty hearing, tinnitus, vertigo; No sinus or throat pain  NECK: No pain or stiffness  BREASTS: No pain, masses, or nipple discharge  RESPIRATORY: No cough, wheezing, chills or hemoptysis; No shortness of breath  CARDIOVASCULAR: No chest pain, palpitations, dizziness, or leg swelling  GASTROINTESTINAL: No abdominal or epigastric pain. No nausea, vomiting, or hematemesis; No diarrhea or constipation. No melena or hematochezia.  GENITOURINARY: No dysuria, frequency, hematuria, or incontinence  NEUROLOGICAL: No headaches, memory loss, loss of strength, numbness, or tremors  SKIN: No itching, burning, rashes, or lesions   LYMPH NODES: No enlarged glands  ENDOCRINE: No heat or cold intolerance; No hair loss  MUSCULOSKELETAL: No joint pain or swelling; No muscle, back, or extremity pain  PSYCHIATRIC: No depression, anxiety, mood swings, or difficulty sleeping  HEME/LYMPH: No easy bruising, or bleeding gums  ALLERY AND IMMUNOLOGIC: No hives or eczema

## 2018-01-26 NOTE — ED PROVIDER NOTE - ATTENDING CONTRIBUTION TO CARE
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 Took extra dose and now feeling better, pressure improved. Makes urine. Denies fever, headache, cough, sore throat, chest pain, SOB, abdominal pain, dysuria, diarrhea. On exam, BPs 150s/60s, neuro intact, pink conjunctiva, RUE fistula with palpable thrill, lungs clear, MMM, mild R CVAT. Will obtain ekg, urinalysis/urine culture and reassess. Likely dc to scheduled dialysis this afternoon.

## 2018-01-26 NOTE — ED PROVIDER NOTE - FAMILY HISTORY
Family history of cerebrovascular accident (CVA)     Sibling  Still living? Unknown  Family history of colon cancer, Age at diagnosis: Age Unknown

## 2018-01-26 NOTE — ED PROVIDER NOTE - PHYSICAL EXAMINATION
PHYSICAL EXAM:  GENERAL: NAD, well-groomed, well-developed  HEAD:  Atraumatic, Normocephalic  EYES: EOMI, PERRLA, conjunctiva and sclera clear  EARS: TMs w/o erythema, effusion or bulging; +R cerumen  NECK: Supple, No JVD, No LAD  CHEST/LUNG: Clear to auscultation bilaterally; No rales, rhonchi, wheezing, or rubs  HEART: Regular rate and rhythm; No murmurs, rubs, or gallops  ABDOMEN: Soft, Nondistended, Nontender, No guarding; Bowel sounds present  VASCULAR:  2+ Peripheral Pulses, No clubbing, cyanosis, or edema  LYMPH: No lymphadenopathy noted  SKIN: No rashes or lesions  NERVOUS SYSTEM:  Alert & Oriented X3, Good concentration; Motor Strength 5/5 B/L upper and lower extremities; steady gait PHYSICAL EXAM:  GENERAL: NAD, well-groomed, well-developed  HEAD:  Atraumatic, Normocephalic  EYES: EOMI, PERRLA, conjunctiva and sclera clear  EARS: TMs w/o erythema, effusion or bulging; +R cerumen  NECK: Supple, No JVD, No LAD  CHEST/LUNG: Clear to auscultation bilaterally; No rales, rhonchi, wheezing, or rubs  HEART: Regular rate and rhythm; No murmurs, rubs, or gallops  ABDOMEN: Soft, Nondistended, Nontender, No guarding; Bowel sounds present; +R CVAT  VASCULAR:  2+ Peripheral Pulses, No clubbing, cyanosis, or edema  LYMPH: No lymphadenopathy noted  SKIN: No rashes or lesions  NERVOUS SYSTEM:  Alert & Oriented X3, Good concentration; Motor Strength 5/5 B/L upper and lower extremities; steady gait

## 2018-01-26 NOTE — ED PROVIDER NOTE - PLAN OF CARE
Please take antibiotics as prescribed Please take bactrim DS 1 tab after dialysis today, Friday 1/26.   Please take bactrim single strength 1 tab once a day on saturday 1/27 and sunday 1/28.

## 2018-01-26 NOTE — ED PROVIDER NOTE - CARE PLAN
Principal Discharge DX:	Dizziness Principal Discharge DX:	UTI (urinary tract infection)  Goal:	Please take antibiotics as prescribed  Assessment and plan of treatment:	Please take bactrim DS 1 tab after dialysis today, Friday 1/26.   Please take bactrim single strength 1 tab once a day on saturday 1/27 and sunday 1/28.

## 2018-01-26 NOTE — ED PROVIDER NOTE - PROGRESS NOTE DETAILS
MD Nuzhat: UA positive. Will send bactrim (penicillin allergic) to [facility] on [address] in [place]. Dizziness improved, able to ambulate. Stable for discharge.

## 2018-01-26 NOTE — ED ADULT NURSE NOTE - OBJECTIVE STATEMENT
72 year old female a/ox3 ambulatory presenting to ed with one episode of htn at home this morning 195/90, as well as verbalizing intermtitent dizziness. patient a dialysis patient, goes M/W/F, scheduled to go today 72 year old female a/ox3 ambulatoryhx of ESRD, on Dialysis M/W/F  presenting to ed with one episode of htn at home this morning 199/90, as well as verbalizing intermtitent dizziness this morning. patient states she took an extra dose of hydralazine this morning and called her son who came over and found patient on the floor patient denies falling, stated she sat down herself because she felt weak. patient denies fever, headache, cough, sore throat, chest pain, SOB, abdominal pain, dysuria, diarrhea. patient verbalizes feeling better, no longer has light headedness or dizziness.

## 2018-01-26 NOTE — ED PROVIDER NOTE - OBJECTIVE STATEMENT
71yo F PMH ESRD on HD M/W/F, HTN, L subclav thromosis 6/2017 on coumadin who presents with hypertension and associated dizziness/lightheadedness. She reports feeling weak and dizzy this AM, took her BP which was 199/100 at took an extra hydralazine (100mg tab). She called her son who found her on the floor at home. She denies falling, reports sitting on floor due to weakness. She reports commonly having these symptoms when she is hypertensive. She denies fever, headache, cough, sore throat, chest pain, SOB, abdominal pain, dysuria, diarrhea. 73yo F PMH ESRD on HD M/W/F, HTN, L subclav thromosis 6/2017 on coumadin who presents with hypertension and associated dizziness/lightheadedness. She reports feeling weak and dizzy this AM, took her BP which was 199/100 at took an extra hydralazine (100mg tab). She called her son who found her on the floor at home. She denies falling, reports sitting on floor due to weakness. She reports commonly having these symptoms when she is hypertensive. She denies fever, headache, cough, sore throat, chest pain, SOB, abdominal pain, dysuria, diarrhea. She is reporting improvement in symptoms on exam.

## 2018-01-27 LAB
CULTURE RESULTS: SIGNIFICANT CHANGE UP
SPECIMEN SOURCE: SIGNIFICANT CHANGE UP

## 2018-01-28 NOTE — ED POST DISCHARGE NOTE - OTHER COMMUNICATION
Patient contacted and informed of contaminated UCx.  Instructed to have repeat culture with her PMD.  Currently asymptomatic. -Kanu England PA-C

## 2018-05-17 ENCOUNTER — APPOINTMENT (OUTPATIENT)
Dept: VASCULAR SURGERY | Facility: CLINIC | Age: 73
End: 2018-05-17
Payer: MEDICARE

## 2018-05-17 PROCEDURE — 93990 DOPPLER FLOW TESTING: CPT

## 2018-05-17 PROCEDURE — 99213 OFFICE O/P EST LOW 20 MIN: CPT

## 2018-07-27 PROBLEM — Z78.9 ALCOHOL USE: Status: INACTIVE | Noted: 2017-04-26

## 2018-07-31 PROBLEM — I82.409 ACUTE EMBOLISM AND THROMBOSIS OF UNSPECIFIED DEEP VEINS OF UNSPECIFIED LOWER EXTREMITY: Chronic | Status: ACTIVE | Noted: 2017-09-01

## 2018-07-31 PROBLEM — H26.9 UNSPECIFIED CATARACT: Chronic | Status: ACTIVE | Noted: 2017-09-01

## 2018-07-31 PROBLEM — N18.6 END STAGE RENAL DISEASE: Chronic | Status: ACTIVE | Noted: 2017-09-01

## 2018-08-14 ENCOUNTER — APPOINTMENT (OUTPATIENT)
Dept: VASCULAR SURGERY | Facility: CLINIC | Age: 73
End: 2018-08-14
Payer: MEDICARE

## 2018-08-14 PROCEDURE — 93990 DOPPLER FLOW TESTING: CPT

## 2018-08-14 PROCEDURE — 99213 OFFICE O/P EST LOW 20 MIN: CPT

## 2018-10-09 ENCOUNTER — APPOINTMENT (OUTPATIENT)
Dept: NEPHROLOGY | Facility: CLINIC | Age: 73
End: 2018-10-09

## 2019-01-15 ENCOUNTER — APPOINTMENT (OUTPATIENT)
Dept: NEPHROLOGY | Facility: CLINIC | Age: 74
End: 2019-01-15

## 2019-01-29 ENCOUNTER — APPOINTMENT (OUTPATIENT)
Dept: VASCULAR SURGERY | Facility: CLINIC | Age: 74
End: 2019-01-29
Payer: MEDICARE

## 2019-01-29 PROCEDURE — 99213 OFFICE O/P EST LOW 20 MIN: CPT

## 2019-01-29 PROCEDURE — 93990 DOPPLER FLOW TESTING: CPT

## 2019-01-31 NOTE — PHYSICAL EXAM
[Thrill] : thrill [Hand well perfused] : hand well perfused [Warm Extremities] : warm extremities [Normal] : coordination grossly intact, no focal deficits [Pulsatile Thrill] : no pulsatile thrill [Aneurysm] : no aneurysm [Bleeding] : no bleeding [Ulcer] : no ulcer [Gangrene] : no gangrene [de-identified] : motor intact b/l upper extremities [de-identified] : intact, no erythema, no ulceration

## 2019-01-31 NOTE — DISCUSSION/SUMMARY
[FreeTextEntry1] : 72 yo female with a history of esrd on hd presents for follow up of right upper extremity avg.  pt reports left upper extremity avg is painful and increased in size\par duplex shows patent right upper extremity avg\par left upper extremity graft in place no edema, no erythema noted would monitor pt to follow up if notes any further growth otherwise will repeat duplex of the right upper extremity in 3-4 months

## 2019-03-13 ENCOUNTER — APPOINTMENT (OUTPATIENT)
Dept: TRANSPLANT | Facility: CLINIC | Age: 74
End: 2019-03-13

## 2019-03-13 ENCOUNTER — APPOINTMENT (OUTPATIENT)
Dept: NEPHROLOGY | Facility: CLINIC | Age: 74
End: 2019-03-13

## 2019-03-20 ENCOUNTER — APPOINTMENT (OUTPATIENT)
Dept: TRANSPLANT | Facility: CLINIC | Age: 74
End: 2019-03-20

## 2019-03-20 ENCOUNTER — APPOINTMENT (OUTPATIENT)
Dept: NEPHROLOGY | Facility: CLINIC | Age: 74
End: 2019-03-20
Payer: COMMERCIAL

## 2019-03-20 VITALS
WEIGHT: 132 LBS | HEART RATE: 72 BPM | SYSTOLIC BLOOD PRESSURE: 150 MMHG | RESPIRATION RATE: 14 BRPM | DIASTOLIC BLOOD PRESSURE: 74 MMHG | BODY MASS INDEX: 21.97 KG/M2

## 2019-03-20 LAB
CREAT SPEC-SCNC: 29 MG/DL
CREAT/PROT UR: 13.4 RATIO
PROT UR-MCNC: 395 MG/DL

## 2019-03-20 PROCEDURE — 99214 OFFICE O/P EST MOD 30 MIN: CPT

## 2019-03-20 NOTE — ASSESSMENT
[FreeTextEntry1] : ESRD: On hemodialysis, on f/u with Dr. Treviño, clinically appears to be an acceptable candidate in good functional status.- Reviewed work up so far, acceptable- needs updated  cardiac eval, and others as per protocol.\par Hypertension: discussed target level, on f/u with primary nephrologist.\par Transplant evaluation: will need CAD evaluation,mammogram and latent TB test.\par Explained again benefits of live donor transplantation, evaluation process, wait times, variability in wait times, PHS high risk , Hep C pos and KDPI >85% kidneys.\par

## 2019-03-20 NOTE — HISTORY OF PRESENT ILLNESS
[FreeTextEntry1] : Patient is here for follow up ; accompanied by Salome, daughter in law.\par 73 years old [place] lady, (looks much younger) retired beautician and . ESRD (1 year 3 months), f/u by Dr. Treviño, [facility] dialysis unit. Switching to home dialysis\par Normal urine out put, no hematuria.\par Hypertension, 25 years, no kidney biopsy.\par Non smoker. \par Able to walk 20 blocks, able to climb stairs.\par Retired in [place], 15 years,  moved back since on dialysis.\par h/o transfusion. No CAD/CVA/PVD.\par No neoplasm/DVT/ bleeding/infection.\par Past surgery: eye, hysterectomy\par non smoker\par independent for ADL\par \par Update:\par Listed, consents for Hep C. No live donors. No acute issues since last visit\par \par Accompanied by daughter in law Saloem who is a  at a [place] nursing home.\par \par \par

## 2019-03-20 NOTE — END OF VISIT
[Time Spent: ___ minutes] : I have spent [unfilled] minutes of face to face time with the patient [FreeTextEntry1] : BONITA Winkler

## 2019-03-20 NOTE — REASON FOR VISIT
[Follow-Up] : a follow-up visit [Family Member] : family member [FreeTextEntry1] : follow up pre transplant evaluation

## 2019-03-21 LAB
ABO + RH PNL BLD: NORMAL
ALBUMIN SERPL ELPH-MCNC: 4.3 G/DL
ALP BLD-CCNC: 104 U/L
ALT SERPL-CCNC: 14 U/L
ANION GAP SERPL CALC-SCNC: 17 MMOL/L
APPEARANCE: CLEAR
AST SERPL-CCNC: 18 U/L
BACTERIA: NEGATIVE
BASOPHILS # BLD AUTO: 0.03 K/UL
BASOPHILS NFR BLD AUTO: 0.7 %
BILIRUB SERPL-MCNC: 0.2 MG/DL
BILIRUBIN URINE: NEGATIVE
BLOOD URINE: NORMAL
BUN SERPL-MCNC: 85 MG/DL
C PEPTIDE SERPL-MCNC: 7.5 NG/ML
CALCIUM SERPL-MCNC: 9.1 MG/DL
CHLORIDE SERPL-SCNC: 97 MMOL/L
CMV IGG SERPL QL: 7.7 U/ML
CMV IGG SERPL-IMP: POSITIVE
CO2 SERPL-SCNC: 26 MMOL/L
COLOR: NORMAL
CREAT SERPL-MCNC: 8.99 MG/DL
EBV EA AB SER IA-ACNC: 41 U/ML
EBV EA AB TITR SER IF: POSITIVE
EBV EA IGG SER QL IA: >600 U/ML
EBV EA IGG SER-ACNC: POSITIVE
EBV EA IGM SER IA-ACNC: NEGATIVE
EBV PATRN SPEC IB-IMP: NORMAL
EBV VCA IGG SER IA-ACNC: 408 U/ML
EBV VCA IGM SER QL IA: <10 U/ML
EOSINOPHIL # BLD AUTO: 0.18 K/UL
EOSINOPHIL NFR BLD AUTO: 4.2 %
EPSTEIN-BARR VIRUS CAPSID ANTIGEN IGG: POSITIVE
GLUCOSE QUALITATIVE U: ABNORMAL
GLUCOSE SERPL-MCNC: 88 MG/DL
HAV IGM SER QL: NONREACTIVE
HBA1C MFR BLD HPLC: 4.6 %
HBV CORE IGG+IGM SER QL: NONREACTIVE
HBV SURFACE AB SER QL: REACTIVE
HBV SURFACE AG SER QL: NONREACTIVE
HCT VFR BLD CALC: 28.5 %
HCV AB SER QL: NONREACTIVE
HCV S/CO RATIO: 0.43 S/CO
HGB BLD-MCNC: 9 G/DL
HIV1+2 AB SPEC QL IA.RAPID: NONREACTIVE
HSV 1+2 IGG SER IA-IMP: POSITIVE
HSV 1+2 IGG SER IA-IMP: POSITIVE
HSV1 IGG SER QL: 31.1 INDEX
HSV2 IGG SER QL: 18.9 INDEX
HYALINE CASTS: 0 /LPF
IMM GRANULOCYTES NFR BLD AUTO: 0.2 %
KETONES URINE: NEGATIVE
LEUKOCYTE ESTERASE URINE: NEGATIVE
LYMPHOCYTES # BLD AUTO: 1.25 K/UL
LYMPHOCYTES NFR BLD AUTO: 29.2 %
MAGNESIUM SERPL-MCNC: 2.5 MG/DL
MAN DIFF?: NORMAL
MCHC RBC-ENTMCNC: 28.5 PG
MCHC RBC-ENTMCNC: 31.6 GM/DL
MCV RBC AUTO: 90.2 FL
MICROSCOPIC-UA: NORMAL
MONOCYTES # BLD AUTO: 0.41 K/UL
MONOCYTES NFR BLD AUTO: 9.6 %
NEUTROPHILS # BLD AUTO: 2.4 K/UL
NEUTROPHILS NFR BLD AUTO: 56.1 %
NITRITE URINE: NEGATIVE
PH URINE: 8.5
PHOSPHATE SERPL-MCNC: 6 MG/DL
PLATELET # BLD AUTO: 114 K/UL
POTASSIUM SERPL-SCNC: 5.9 MMOL/L
PROT SERPL-MCNC: 7.1 G/DL
PROTEIN URINE: ABNORMAL
RBC # BLD: 3.16 M/UL
RBC # FLD: 15.7 %
RED BLOOD CELLS URINE: 3 /HPF
RUBV IGG FLD-ACNC: 18 INDEX
RUBV IGG SER-IMP: POSITIVE
SODIUM SERPL-SCNC: 140 MMOL/L
SPECIFIC GRAVITY URINE: 1.01
SQUAMOUS EPITHELIAL CELLS: 2 /HPF
T GONDII AB SER-IMP: POSITIVE
T GONDII IGG SER QL: 82.7 IU/ML
T PALLIDUM AB SER QL IA: NEGATIVE
URATE SERPL-MCNC: 4.7 MG/DL
UROBILINOGEN URINE: NORMAL
VZV AB TITR SER: POSITIVE
VZV IGG SER IF-ACNC: 1955 INDEX
WBC # FLD AUTO: 4.28 K/UL
WHITE BLOOD CELLS URINE: 3 /HPF

## 2019-03-22 LAB
EBV DNA SERPL NAA+PROBE-ACNC: NOT DETECTED IU/ML
EBVPCR LOG: NOT DETECTED LOGIU/ML

## 2019-03-27 LAB
M TB IFN-G BLD-IMP: NEGATIVE
QUANTIFERON TB PLUS MITOGEN MINUS NIL: >10 IU/ML
QUANTIFERON TB PLUS NIL: 0.01 IU/ML
QUANTIFERON TB PLUS TB1 MINUS NIL: 0 IU/ML
QUANTIFERON TB PLUS TB2 MINUS NIL: 0 IU/ML

## 2019-04-17 ENCOUNTER — APPOINTMENT (OUTPATIENT)
Dept: ULTRASOUND IMAGING | Facility: IMAGING CENTER | Age: 74
End: 2019-04-17
Payer: COMMERCIAL

## 2019-04-17 ENCOUNTER — NON-APPOINTMENT (OUTPATIENT)
Age: 74
End: 2019-04-17

## 2019-04-17 ENCOUNTER — APPOINTMENT (OUTPATIENT)
Dept: RADIOLOGY | Facility: IMAGING CENTER | Age: 74
End: 2019-04-17
Payer: COMMERCIAL

## 2019-04-17 ENCOUNTER — OUTPATIENT (OUTPATIENT)
Dept: OUTPATIENT SERVICES | Facility: HOSPITAL | Age: 74
LOS: 1 days | End: 2019-04-17
Payer: COMMERCIAL

## 2019-04-17 ENCOUNTER — APPOINTMENT (OUTPATIENT)
Dept: CARDIOLOGY | Facility: CLINIC | Age: 74
End: 2019-04-17
Payer: COMMERCIAL

## 2019-04-17 VITALS
DIASTOLIC BLOOD PRESSURE: 76 MMHG | HEART RATE: 59 BPM | BODY MASS INDEX: 22.33 KG/M2 | HEIGHT: 65 IN | WEIGHT: 134 LBS | SYSTOLIC BLOOD PRESSURE: 178 MMHG | OXYGEN SATURATION: 99 %

## 2019-04-17 DIAGNOSIS — I77.0 ARTERIOVENOUS FISTULA, ACQUIRED: Chronic | ICD-10-CM

## 2019-04-17 DIAGNOSIS — Z90.710 ACQUIRED ABSENCE OF BOTH CERVIX AND UTERUS: Chronic | ICD-10-CM

## 2019-04-17 DIAGNOSIS — Z01.818 ENCOUNTER FOR OTHER PREPROCEDURAL EXAMINATION: ICD-10-CM

## 2019-04-17 DIAGNOSIS — H26.9 UNSPECIFIED CATARACT: Chronic | ICD-10-CM

## 2019-04-17 DIAGNOSIS — Z86.69 PERSONAL HISTORY OF OTHER DISEASES OF THE NERVOUS SYSTEM AND SENSE ORGANS: Chronic | ICD-10-CM

## 2019-04-17 PROCEDURE — 93979 VASCULAR STUDY: CPT

## 2019-04-17 PROCEDURE — 71046 X-RAY EXAM CHEST 2 VIEWS: CPT | Mod: 26

## 2019-04-17 PROCEDURE — 93000 ELECTROCARDIOGRAM COMPLETE: CPT

## 2019-04-17 PROCEDURE — 76700 US EXAM ABDOM COMPLETE: CPT | Mod: 26

## 2019-04-17 PROCEDURE — 93979 VASCULAR STUDY: CPT | Mod: 26

## 2019-04-17 PROCEDURE — 76700 US EXAM ABDOM COMPLETE: CPT

## 2019-04-17 PROCEDURE — 71046 X-RAY EXAM CHEST 2 VIEWS: CPT

## 2019-04-17 PROCEDURE — 99213 OFFICE O/P EST LOW 20 MIN: CPT

## 2019-04-17 RX ORDER — WARFARIN 7.5 MG/1
7.5 TABLET ORAL
Qty: 30 | Refills: 0 | Status: DISCONTINUED | COMMUNITY
Start: 2017-11-05 | End: 2019-04-17

## 2019-04-22 NOTE — PHYSICAL EXAM
[General Appearance - Well Developed] : well developed [Normal Appearance] : normal appearance [Well Groomed] : well groomed [General Appearance - Well Nourished] : well nourished [No Deformities] : no deformities [General Appearance - In No Acute Distress] : no acute distress [Normal Conjunctiva] : the conjunctiva exhibited no abnormalities [Eyelids - No Xanthelasma] : the eyelids demonstrated no xanthelasmas [Normal Oral Mucosa] : normal oral mucosa [No Oral Pallor] : no oral pallor [No Oral Cyanosis] : no oral cyanosis [Respiration, Rhythm And Depth] : normal respiratory rhythm and effort [Exaggerated Use Of Accessory Muscles For Inspiration] : no accessory muscle use [Auscultation Breath Sounds / Voice Sounds] : lungs were clear to auscultation bilaterally [Heart Rate And Rhythm] : heart rate and rhythm were normal [Heart Sounds] : normal S1 and S2 [Murmurs] : no murmurs present [Abdomen Soft] : soft [Abdomen Tenderness] : non-tender [Abnormal Walk] : normal gait [Gait - Sufficient For Exercise Testing] : the gait was sufficient for exercise testing [Cyanosis, Localized] : no localized cyanosis [Skin Color & Pigmentation] : normal skin color and pigmentation [] : no rash [Oriented To Time, Place, And Person] : oriented to person, place, and time [Affect] : the affect was normal [Mood] : the mood was normal [No Anxiety] : not feeling anxious [FreeTextEntry1] : RUE fistula

## 2019-04-22 NOTE — DISCUSSION/SUMMARY
[Hypertension] : hypertension [FreeTextEntry1] : \par Currently stable from a cardiovascular standpoint. Hypertensive today. Mild volume overload. Asymptomatic. Continue current medications. ECG completed today and reviewed. Will schedule a pharmacologic nuclear stress test to rule out any significant CAD. In addition, will schedule an echocardiogram to assess her cardiac structures and function. At this time, patient is considered an acceptable risk from a cardiac standpoint for renal transplant. Follow up annually pre-transplant.

## 2019-05-10 ENCOUNTER — OTHER (OUTPATIENT)
Age: 74
End: 2019-05-10

## 2019-05-27 ENCOUNTER — INPATIENT (INPATIENT)
Facility: HOSPITAL | Age: 74
LOS: 0 days | Discharge: HOME | DRG: 640 | End: 2019-05-28
Attending: INTERNAL MEDICINE | Admitting: INTERNAL MEDICINE
Payer: MEDICAID

## 2019-05-27 VITALS
HEART RATE: 58 BPM | HEIGHT: 67 IN | DIASTOLIC BLOOD PRESSURE: 85 MMHG | SYSTOLIC BLOOD PRESSURE: 215 MMHG | TEMPERATURE: 98 F | WEIGHT: 132.06 LBS | OXYGEN SATURATION: 97 % | RESPIRATION RATE: 18 BRPM

## 2019-05-27 DIAGNOSIS — Z29.9 ENCOUNTER FOR PROPHYLACTIC MEASURES, UNSPECIFIED: ICD-10-CM

## 2019-05-27 DIAGNOSIS — H26.9 UNSPECIFIED CATARACT: Chronic | ICD-10-CM

## 2019-05-27 DIAGNOSIS — R06.02 SHORTNESS OF BREATH: ICD-10-CM

## 2019-05-27 DIAGNOSIS — I77.0 ARTERIOVENOUS FISTULA, ACQUIRED: Chronic | ICD-10-CM

## 2019-05-27 DIAGNOSIS — Z86.69 PERSONAL HISTORY OF OTHER DISEASES OF THE NERVOUS SYSTEM AND SENSE ORGANS: Chronic | ICD-10-CM

## 2019-05-27 DIAGNOSIS — N18.6 END STAGE RENAL DISEASE: ICD-10-CM

## 2019-05-27 DIAGNOSIS — E87.5 HYPERKALEMIA: ICD-10-CM

## 2019-05-27 DIAGNOSIS — D64.9 ANEMIA, UNSPECIFIED: ICD-10-CM

## 2019-05-27 DIAGNOSIS — I16.0 HYPERTENSIVE URGENCY: ICD-10-CM

## 2019-05-27 DIAGNOSIS — Z90.710 ACQUIRED ABSENCE OF BOTH CERVIX AND UTERUS: Chronic | ICD-10-CM

## 2019-05-27 LAB
ALBUMIN SERPL ELPH-MCNC: 3.5 G/DL — SIGNIFICANT CHANGE UP (ref 3.5–5)
ALP SERPL-CCNC: 109 U/L — SIGNIFICANT CHANGE UP (ref 40–120)
ALT FLD-CCNC: 38 U/L DA — SIGNIFICANT CHANGE UP (ref 10–60)
ANION GAP SERPL CALC-SCNC: 9 MMOL/L — SIGNIFICANT CHANGE UP (ref 5–17)
APTT BLD: 34.1 SEC — SIGNIFICANT CHANGE UP (ref 27.5–36.3)
AST SERPL-CCNC: 37 U/L — SIGNIFICANT CHANGE UP (ref 10–40)
BASOPHILS # BLD AUTO: 0.02 K/UL — SIGNIFICANT CHANGE UP (ref 0–0.2)
BASOPHILS NFR BLD AUTO: 0.4 % — SIGNIFICANT CHANGE UP (ref 0–2)
BILIRUB SERPL-MCNC: 0.4 MG/DL — SIGNIFICANT CHANGE UP (ref 0.2–1.2)
BUN SERPL-MCNC: 76 MG/DL — HIGH (ref 7–18)
CALCIUM SERPL-MCNC: 8.5 MG/DL — SIGNIFICANT CHANGE UP (ref 8.4–10.5)
CHLORIDE SERPL-SCNC: 104 MMOL/L — SIGNIFICANT CHANGE UP (ref 96–108)
CK MB BLD-MCNC: 2.1 % — SIGNIFICANT CHANGE UP (ref 0–3.5)
CK MB CFR SERPL CALC: 2.2 NG/ML — SIGNIFICANT CHANGE UP (ref 0–3.6)
CK SERPL-CCNC: 106 U/L — SIGNIFICANT CHANGE UP (ref 21–215)
CO2 SERPL-SCNC: 25 MMOL/L — SIGNIFICANT CHANGE UP (ref 22–31)
CREAT SERPL-MCNC: 11.8 MG/DL — HIGH (ref 0.5–1.3)
EOSINOPHIL # BLD AUTO: 0.17 K/UL — SIGNIFICANT CHANGE UP (ref 0–0.5)
EOSINOPHIL NFR BLD AUTO: 3.6 % — SIGNIFICANT CHANGE UP (ref 0–6)
GLUCOSE SERPL-MCNC: 102 MG/DL — HIGH (ref 70–99)
HCT VFR BLD CALC: 27.1 % — LOW (ref 34.5–45)
HGB BLD-MCNC: 8.8 G/DL — LOW (ref 11.5–15.5)
IMM GRANULOCYTES NFR BLD AUTO: 0.4 % — SIGNIFICANT CHANGE UP (ref 0–1.5)
INR BLD: 1.1 RATIO — SIGNIFICANT CHANGE UP (ref 0.88–1.16)
IRON SATN MFR SERPL: 19 % — SIGNIFICANT CHANGE UP (ref 15–50)
IRON SATN MFR SERPL: 40 UG/DL — SIGNIFICANT CHANGE UP (ref 40–150)
LYMPHOCYTES # BLD AUTO: 0.86 K/UL — LOW (ref 1–3.3)
LYMPHOCYTES # BLD AUTO: 18.1 % — SIGNIFICANT CHANGE UP (ref 13–44)
MCHC RBC-ENTMCNC: 28.4 PG — SIGNIFICANT CHANGE UP (ref 27–34)
MCHC RBC-ENTMCNC: 32.5 GM/DL — SIGNIFICANT CHANGE UP (ref 32–36)
MCV RBC AUTO: 87.4 FL — SIGNIFICANT CHANGE UP (ref 80–100)
MONOCYTES # BLD AUTO: 0.37 K/UL — SIGNIFICANT CHANGE UP (ref 0–0.9)
MONOCYTES NFR BLD AUTO: 7.8 % — SIGNIFICANT CHANGE UP (ref 2–14)
NEUTROPHILS # BLD AUTO: 3.31 K/UL — SIGNIFICANT CHANGE UP (ref 1.8–7.4)
NEUTROPHILS NFR BLD AUTO: 69.7 % — SIGNIFICANT CHANGE UP (ref 43–77)
NRBC # BLD: 0 /100 WBCS — SIGNIFICANT CHANGE UP (ref 0–0)
NT-PROBNP SERPL-SCNC: HIGH PG/ML (ref 0–450)
PHOSPHATE SERPL-MCNC: 5.6 MG/DL — HIGH (ref 2.5–4.5)
PLATELET # BLD AUTO: 131 K/UL — LOW (ref 150–400)
POTASSIUM SERPL-MCNC: 6.5 MMOL/L — CRITICAL HIGH (ref 3.5–5.3)
POTASSIUM SERPL-SCNC: 6.5 MMOL/L — CRITICAL HIGH (ref 3.5–5.3)
PROT SERPL-MCNC: 7 G/DL — SIGNIFICANT CHANGE UP (ref 6–8.3)
PROTHROM AB SERPL-ACNC: 12.3 SEC — SIGNIFICANT CHANGE UP (ref 10–12.9)
RBC # BLD: 3.1 M/UL — LOW (ref 3.8–5.2)
RBC # FLD: 15.3 % — HIGH (ref 10.3–14.5)
SODIUM SERPL-SCNC: 138 MMOL/L — SIGNIFICANT CHANGE UP (ref 135–145)
TIBC SERPL-MCNC: 209 UG/DL — LOW (ref 250–450)
TROPONIN I SERPL-MCNC: <0.015 NG/ML — SIGNIFICANT CHANGE UP (ref 0–0.04)
UIBC SERPL-MCNC: 169 UG/DL — SIGNIFICANT CHANGE UP (ref 110–370)
WBC # BLD: 4.75 K/UL — SIGNIFICANT CHANGE UP (ref 3.8–10.5)
WBC # FLD AUTO: 4.75 K/UL — SIGNIFICANT CHANGE UP (ref 3.8–10.5)

## 2019-05-27 PROCEDURE — 71045 X-RAY EXAM CHEST 1 VIEW: CPT | Mod: 26

## 2019-05-27 PROCEDURE — 93010 ELECTROCARDIOGRAM REPORT: CPT

## 2019-05-27 PROCEDURE — 99285 EMERGENCY DEPT VISIT HI MDM: CPT

## 2019-05-27 RX ORDER — WARFARIN SODIUM 2.5 MG/1
1 TABLET ORAL
Qty: 0 | Refills: 0 | DISCHARGE

## 2019-05-27 RX ORDER — LOSARTAN POTASSIUM 100 MG/1
25 TABLET, FILM COATED ORAL DAILY
Refills: 0 | Status: DISCONTINUED | OUTPATIENT
Start: 2019-05-27 | End: 2019-05-28

## 2019-05-27 RX ORDER — HYDRALAZINE HCL 50 MG
50 TABLET ORAL EVERY 8 HOURS
Refills: 0 | Status: DISCONTINUED | OUTPATIENT
Start: 2019-05-27 | End: 2019-05-28

## 2019-05-27 RX ORDER — HEPARIN SODIUM 5000 [USP'U]/ML
5000 INJECTION INTRAVENOUS; SUBCUTANEOUS EVERY 12 HOURS
Refills: 0 | Status: DISCONTINUED | OUTPATIENT
Start: 2019-05-27 | End: 2019-05-28

## 2019-05-27 RX ORDER — HYDRALAZINE HCL 50 MG
100 TABLET ORAL EVERY 12 HOURS
Refills: 0 | Status: DISCONTINUED | OUTPATIENT
Start: 2019-05-27 | End: 2019-05-27

## 2019-05-27 RX ORDER — AMLODIPINE BESYLATE 2.5 MG/1
10 TABLET ORAL DAILY
Refills: 0 | Status: DISCONTINUED | OUTPATIENT
Start: 2019-05-27 | End: 2019-05-28

## 2019-05-27 RX ORDER — METOPROLOL TARTRATE 50 MG
1 TABLET ORAL
Qty: 0 | Refills: 0 | DISCHARGE

## 2019-05-27 RX ORDER — HYDRALAZINE HCL 50 MG
1 TABLET ORAL
Qty: 0 | Refills: 0 | DISCHARGE

## 2019-05-27 RX ORDER — METOPROLOL TARTRATE 50 MG
25 TABLET ORAL
Refills: 0 | Status: DISCONTINUED | OUTPATIENT
Start: 2019-05-27 | End: 2019-05-28

## 2019-05-27 RX ORDER — HYDRALAZINE HCL 50 MG
10 TABLET ORAL ONCE
Refills: 0 | Status: COMPLETED | OUTPATIENT
Start: 2019-05-27 | End: 2019-05-27

## 2019-05-27 RX ADMIN — Medication 10 MILLIGRAM(S): at 21:51

## 2019-05-27 NOTE — ED PROVIDER NOTE - OBJECTIVE STATEMENT
72 y/o F pt with a PMHx of DVT, HTN, ESRD, and a significant PSHx of AV fistula, presents to the ED with complaints of shortness of breath. Patient reports she was supposed to have dialysis today at 5pm but felt shortness of breath and felt chest tightness so she came into the ED to see if she can get dialyzed today. Patient denies nausea, vomiting, diarrhea, fever or any other complaints. Allergies: Penicillin

## 2019-05-27 NOTE — H&P ADULT - PROBLEM SELECTOR PLAN 5
/85mmHg in ED   pt on norvasc, metoprolol, losartan and hydralazine at home, compliance with home meds   resume home bp meds  will get HD today   monitor BP closely /85mmHg in ED   pt on norvasc, metoprolol, losartan and hydralazine at home, compliance with home meds   resume home bp meds  will get HD today and  f/u BP after HD   monitor BP closely

## 2019-05-27 NOTE — H&P ADULT - NSICDXFAMILYHX_GEN_ALL_CORE_FT
FAMILY HISTORY:  Family history of cerebrovascular accident (CVA)    Sibling  Still living? Unknown  Family history of colon cancer, Age at diagnosis: Age Unknown

## 2019-05-27 NOTE — H&P ADULT - NSHPPHYSICALEXAM_GEN_ALL_CORE
Vital Signs Last 24 Hrs  T(C): 36.4 (27 May 2019 15:30), Max: 36.6 (27 May 2019 13:17)  T(F): 97.6 (27 May 2019 15:30), Max: 97.9 (27 May 2019 13:17)  HR: 57 (27 May 2019 15:30) (57 - 58)  BP: 190/64 (27 May 2019 15:30) (190/64 - 215/85)  BP(mean): --  RR: 18 (27 May 2019 15:30) (18 - 18)  SpO2: 97% (27 May 2019 15:30) (97% - 97%)

## 2019-05-27 NOTE — H&P ADULT - NSICDXPASTSURGICALHX_GEN_ALL_CORE_FT
PAST SURGICAL HISTORY:  Acquired cataract extraction with b/l lense placement, 2016    AV fistula 2015    H/O: glaucoma surgery, 2002    S/P KEVEN-BSO for fibroids, 2012

## 2019-05-27 NOTE — ED ADULT NURSE REASSESSMENT NOTE - NS ED NURSE REASSESS COMMENT FT1
Received pt from JAMILA Moise, pt at dialysis at ti8me of shift start. Pt came back from dialysis @ 20:20. Observed laying in bed, breathing room air, in no respiratory distress at time of assessment. Pt is A&O x3, able to make needs known, denies any distress/discomfort at time of assessment, states dialysis went fine. Pt has AV Fistula to right upper arm, dialysis days M, W & F, left hand #2oGa in place. Admitted to [facility], awaiting bed, endorsed to JAMILA Chao for holding.

## 2019-05-27 NOTE — H&P ADULT - PROBLEM SELECTOR PLAN 6
IMPROVE VTE Individual Risk Assessment    RISK                                                          Points  [] Previous VTE                                           3  [] Thrombophilia                                        2  [] Lower limb paralysis                              2   [] Current Cancer                                       2   [x] Immobilization > 24 hrs                        1  [] ICU/CCU stay > 24 hours                       1  [x] Age > 60                                                   1    IMPROVE VTE Score: 2  on heparin for dvt ppx  no need for gi ppx

## 2019-05-27 NOTE — H&P ADULT - HISTORY OF PRESENT ILLNESS
72 yo F from home with PMH of DVT, HTN, ESRD, and a significant PSHx of AV fistula, presents to the ED with complaints of shortness of breath. Patient reports she was supposed to have dialysis today at 5pm but felt shortness of breath and felt chest tightness so she came into the ED to see if she can get dialyzed today. Patient denies nausea, vomiting, diarrhea, fever or any other complaints. Allergies: Penicillin 74 yo F from home with PMH of LUE DVT (off coumadin ), HTN, ESRD and a significant PSHx of AV fistula, presents to the ED with complaints of shortness of breath. Patient reports she was supposed to have dialysis today at 5pm but felt shortness of breath and felt chest tightness so she came into the ED to see if she can get dialyzed today. Patient denies fever, chills, chest pain, abd pain, nausea, vomiting, diarrhea, LE swelling or any other complaints.     ED course, initial vitals noted /85mmHg, labs noted BUN/Cr 76/11.8, K 6.5, pt will get urgent HD today.     GOC: pt is full code. 72 yo F from home with PMH of LUE DVT (off coumadin ), HTN, ESRD (MWF via RUE AVF) and a significant PSHx of AV fistula, presents to the ED with complaints of shortness of breath. Patient reports she was supposed to have dialysis today at 5pm but felt shortness of breath and felt chest tightness so she came into the ED to see if she can get dialyzed today. Patient denies fever, chills, chest pain, abd pain, nausea, vomiting, diarrhea, LE swelling or any other complaints.     ED course, initial vitals noted /85mmHg, labs noted BUN/Cr 76/11.8, K 6.5, CXR preliminary reading noted pulmonary congested, pt will get urgent HD today.     GOC: pt is full code. 74 yo F from home with PMH of LUE DVT (off coumadin ), HTN, ESRD (MWF via RUE AVF) and a significant PSHx of AV fistula, presents to the ED with complaints of shortness of breath. Patient reports she was supposed to have dialysis today at 5pm but felt shortness of breath and felt chest tightness so she came into the ED to see if she can get dialyzed today. Patient denies fever, chills, chest pain, abd pain, nausea, vomiting, diarrhea, LE swelling or any other complaints.  Patient on home dialysis and last dialysis was Friday.    ED course, initial vitals noted /85mmHg, labs noted BUN/Cr 76/11.8, K 6.5, CXR preliminary reading noted pulmonary congested, pt will get urgent HD today.     GOC: pt is full code. 74 yo F from home with PMH of LUE DVT (off coumadin ), HTN, ESRD (on home dialysis, last HD on Friday) and a significant PSHx of AV fistula, presents to the ED with complaints of shortness of breath. Patient reports she was supposed to have dialysis today at 5pm but felt shortness of breath and felt chest tightness so she came into the ED to see if she can get dialyzed today. Patient denies fever, chills, chest pain, abd pain, nausea, vomiting, diarrhea, LE swelling or any other complaints.  Patient on home dialysis and last dialysis was Friday.    ED course, initial vitals noted /85mmHg, labs noted BUN/Cr 76/11.8, K 6.5, CXR preliminary reading noted pulmonary congested, pt will get urgent HD today.     GOC: pt is full code.

## 2019-05-27 NOTE — H&P ADULT - NSHPLABSRESULTS_GEN_ALL_CORE
Comprehensive Metabolic Panel (05.27.19 @ 15:26)    Sodium, Serum: 138 mmol/L    Potassium, Serum: 6.5: TYPE:(C=Critical, N=Notification, A=Abnormal) C  TESTS: K  DATE/TIME CALLED: 05/27/19 16:16  CALLED TO: JENNIFER CASTLE  READ BACK (2 Patient Identifiers)(Y/N): Y  READ BACK VALUES (Y/N): Y  CALLED BY: SC mmol/L    Chloride, Serum: 104 mmol/L    Carbon Dioxide, Serum: 25 mmol/L    Anion Gap, Serum: 9 mmol/L    Blood Urea Nitrogen, Serum: 76 mg/dL    Creatinine, Serum: 11.80 mg/dL    Glucose, Serum: 102 mg/dL    Calcium, Total Serum: 8.5 mg/dL    Protein Total, Serum: 7.0 g/dL    Albumin, Serum: 3.5 g/dL    Bilirubin Total, Serum: 0.4 mg/dL    Alkaline Phosphatase, Serum: 109 U/L    Aspartate Aminotransferase (AST/SGOT): 37 U/L    Alanine Aminotransferase (ALT/SGPT): 38 U/L DA    eGFR if Non : 3:   Complete Blood Count + Automated Diff (05.27.19 @ 15:26)    WBC Count: 4.75 K/uL    RBC Count: 3.10 M/uL    Hemoglobin: 8.8 g/dL    Hematocrit: 27.1 %    Mean Cell Volume: 87.4 fl    Mean Cell Hemoglobin: 28.4 pg    Mean Cell Hemoglobin Conc: 32.5 gm/dL    Red Cell Distrib Width: 15.3 %    Platelet Count - Automated: 131 K/uL    Auto Neutrophil #: 3.31 K/uL    Auto Lymphocyte #: 0.86 K/uL    Auto Monocyte #: 0.37 K/uL    Auto Eosinophil #: 0.17 K/uL    Auto Basophil #: 0.02 K/uL    Auto Neutrophil %: 69.7: Differential percentages must be correlated with absolute numbers for  clinical significance. %    Auto Lymphocyte %: 18.1 %    Auto Monocyte %: 7.8 %    Auto Eosinophil %: 3.6 %    Auto Basophil %: 0.4 %    Auto Immature Granulocyte %: 0.4 %    Nucleated RBC: 0 /100 WBCs

## 2019-05-27 NOTE — CHART NOTE - NSCHARTNOTEFT_GEN_A_CORE
EVENT: hypertension    OBJECTIVE:  Vital Signs Last 24 Hrs  T(C): 36.6 (27 May 2019 20:00), Max: 36.7 (27 May 2019 17:00)  T(F): 97.8 (27 May 2019 20:00), Max: 98 (27 May 2019 17:00)  HR: 58 (27 May 2019 20:00) (57 - 58)  BP: 180/89 (27 May 2019 20:00) (180/89 - 215/85)  BP(mean): --  RR: 17 (27 May 2019 20:00) (17 - 18)  SpO2: 99% (27 May 2019 20:00) (97% - 100%)    FOCUSED PHYSICAL EXAM:  Neuro: awake, alert, oriented x 3. No neuro deficit  Cardiovascular: Pulses +2 B/L in lower and upper extremities, HR regular, BP stable, No edema.  Respiratory: Respirations regular, unlabored, breath sounds clear B/L.   GI: Abdomen soft, non-tender, positive bowel sounds.  : no bladder distention noted. No complaints at this time.  Skin: Dry, intact, no bruising, no diaphoresis.    LABS:                        8.8    4.75  )-----------( 131      ( 27 May 2019 15:26 )             27.1   CARDIAC MARKERS ( 27 May 2019 15:26 )  <0.015 ng/mL / x     / 106 U/L / x     / 2.2 ng/mL    05-27    138  |  104  |  76<H>  ----------------------------<  102<H>  6.5<HH>   |  25  |  11.80<H>    Ca    8.5      27 May 2019 15:26  Phos  5.6     05-27    TPro  7.0  /  Alb  3.5  /  TBili  0.4  /  DBili  x   /  AST  37  /  ALT  38  /  AlkPhos  109  05-27      EKG:   IMGAGING:    ASSESSMENT:  HPI:  74 yo F from home with PMH of LUE DVT (off coumadin ), HTN, ESRD (on home dialysis, last HD on Friday) and a significant PSHx of AV fistula, presents to the ED with complaints of shortness of breath. Patient reports she was supposed to have dialysis today at 5pm but felt shortness of breath and felt chest tightness so she came into the ED to see if she can get dialyzed today. Patient denies fever, chills, chest pain, abd pain, nausea, vomiting, diarrhea, LE swelling or any other complaints.  Patient on home dialysis and last dialysis was Friday.    ED course, initial vitals noted /85mmHg, labs noted BUN/Cr 76/11.8, K 6.5, CXR preliminary reading noted pulmonary congested, pt will get urgent HD today.     GOC: pt is full code. (27 May 2019 15:41)      PLAN:   Hydralazine PO 9 PM dose held. IV hydralazine ordered.    FOLLOW UP / RESULT: EVENT: hypertension    OBJECTIVE:  Vital Signs Last 24 Hrs  T(C): 36.6 (27 May 2019 20:00), Max: 36.7 (27 May 2019 17:00)  T(F): 97.8 (27 May 2019 20:00), Max: 98 (27 May 2019 17:00)  HR: 58 (27 May 2019 20:00) (57 - 58)  BP: 180/89 (27 May 2019 20:00) (180/89 - 215/85)  BP(mean): --  RR: 17 (27 May 2019 20:00) (17 - 18)  SpO2: 99% (27 May 2019 20:00) (97% - 100%)    FOCUSED PHYSICAL EXAM:  Neuro: awake, alert, oriented x 3. No neuro deficit  Cardiovascular: Pulses +2 B/L in lower and upper extremities, HR regular, /65 reported by PCT, No edema.  Respiratory: Respirations regular, unlabored, breath sounds clear B/L.   GI: Abdomen soft, non-tender, positive bowel sounds.  : no bladder distention noted. No complaints at this time.  Skin: Dry, intact, no bruising, no diaphoresis.    LABS:                        8.8    4.75  )-----------( 131      ( 27 May 2019 15:26 )             27.1   CARDIAC MARKERS ( 27 May 2019 15:26 )  <0.015 ng/mL / x     / 106 U/L / x     / 2.2 ng/mL    05-27    138  |  104  |  76<H>  ----------------------------<  102<H>  6.5<HH>   |  25  |  11.80<H>    Ca    8.5      27 May 2019 15:26  Phos  5.6     05-27    TPro  7.0  /  Alb  3.5  /  TBili  0.4  /  DBili  x   /  AST  37  /  ALT  38  /  AlkPhos  109  05-27      EKG:       < from: 12 Lead ECG (01.26.18 @ 12:20) >      Ventricular Rate 70 BPM    Atrial Rate 70 BPM    P-R Interval 186 ms    QRS Duration 90 ms     ms    QTc 473 ms    P Axis 1 degrees    R Axis 52 degrees    T Axis 80 degrees    Diagnosis Line NORMAL SINUS RHYTHM  NONSPECIFIC ST AND T WAVE ABNORMALITY  PROLONGED QT    < end of copied text >      IMGAGING:    ASSESSMENT:  HPI:  74 yo F from home with PMH of LUE DVT (off coumadin ), HTN, ESRD (on home dialysis, last HD on Friday) and a significant PSHx of AV fistula, presents to the ED with complaints of shortness of breath. Patient reports she was supposed to have dialysis today at 5pm but felt shortness of breath and felt chest tightness so she came into the ED to see if she can get dialyzed today. Patient denies fever, chills, chest pain, abd pain, nausea, vomiting, diarrhea, LE swelling or any other complaints.  Patient on home dialysis and last dialysis was Friday.    ED course, initial vitals noted /85mmHg, labs noted BUN/Cr 76/11.8, K 6.5, CXR preliminary reading noted pulmonary congested, pt will get urgent HD today.   5/27/19 /65 HR 54 reported. PO hydralazine dose held, IV hydralazine ordered one time dose. Will follow up.     GOC: pt is full code. (27 May 2019 15:41)      PLAN:   Hydralazine PO 9 PM dose held. IV hydralazine ordered.    FOLLOW UP / RESULT: EVENT: hypertension    OBJECTIVE:  Vital Signs Last 24 Hrs  T(C): 36.6 (27 May 2019 20:00), Max: 36.7 (27 May 2019 17:00)  T(F): 97.8 (27 May 2019 20:00), Max: 98 (27 May 2019 17:00)  HR: 58 (27 May 2019 20:00) (57 - 58)  BP: 180/89 (27 May 2019 20:00) (180/89 - 215/85)  BP(mean): --  RR: 17 (27 May 2019 20:00) (17 - 18)  SpO2: 99% (27 May 2019 20:00) (97% - 100%)    FOCUSED PHYSICAL EXAM:  Neuro: awake, alert, oriented x 3. No neuro deficit  Cardiovascular: Pulses +2 B/L in lower and upper extremities, HR regular, /65 reported by PCT, No edema.  Respiratory: Respirations regular, unlabored, breath sounds clear B/L.   GI: Abdomen soft, non-tender, positive bowel sounds.  : no bladder distention noted. No complaints at this time.  Skin: Dry, intact, no bruising, no diaphoresis.    LABS:                        8.8    4.75  )-----------( 131      ( 27 May 2019 15:26 )             27.1   CARDIAC MARKERS ( 27 May 2019 15:26 )  <0.015 ng/mL / x     / 106 U/L / x     / 2.2 ng/mL    05-27    138  |  104  |  76<H>  ----------------------------<  102<H>  6.5<HH>   |  25  |  11.80<H>    Ca    8.5      27 May 2019 15:26  Phos  5.6     05-27    TPro  7.0  /  Alb  3.5  /  TBili  0.4  /  DBili  x   /  AST  37  /  ALT  38  /  AlkPhos  109  05-27      EKG:       < from: 12 Lead ECG (01.26.18 @ 12:20) >      Ventricular Rate 70 BPM    Atrial Rate 70 BPM    P-R Interval 186 ms    QRS Duration 90 ms     ms    QTc 473 ms    P Axis 1 degrees    R Axis 52 degrees    T Axis 80 degrees    Diagnosis Line NORMAL SINUS RHYTHM  NONSPECIFIC ST AND T WAVE ABNORMALITY  PROLONGED QT    < end of copied text >      IMGAGING:    ASSESSMENT:  HPI:  74 yo F from home with PMH of LUE DVT (off coumadin ), HTN, ESRD (on home dialysis, last HD on Friday) and a significant PSHx of AV fistula, presents to the ED with complaints of shortness of breath. Patient reports she was supposed to have dialysis today at 5pm but felt shortness of breath and felt chest tightness so she came into the ED to see if she can get dialyzed today. Patient denies fever, chills, chest pain, abd pain, nausea, vomiting, diarrhea, LE swelling or any other complaints.  Patient on home dialysis and last dialysis was Friday.    ED course, initial vitals noted /85mmHg, labs noted BUN/Cr 76/11.8, K 6.5, CXR preliminary reading noted pulmonary congested, pt will get urgent HD today.   5/27/19 /65 HR 54 reported. PO hydralazine dose held, IV hydralazine ordered one time dose. Will follow up.     GOC: pt is full code. (27 May 2019 15:41)      PLAN:   Hydralazine PO 9 PM dose held. IV hydralazine ordered.    FOLLOW UP / RESULT:    5/27/19 23:00 /58. HR 56

## 2019-05-27 NOTE — ED ADULT NURSE NOTE - NSIMPLEMENTINTERV_GEN_ALL_ED
Implemented All Universal Safety Interventions:  [place] to call system. Call bell, personal items and telephone within reach. Instruct patient to call for assistance. Room bathroom lighting operational. Non-slip footwear when patient is off stretcher. Physically safe environment: no spills, clutter or unnecessary equipment. Stretcher in lowest position, wheels locked, appropriate side rails in place.

## 2019-05-27 NOTE — H&P ADULT - PROBLEM SELECTOR PLAN 2
K 6.5  will get urgent HD today  f/u repeat bmp K 6.5  will get urgent HD today  f/u repeat bmp  nephro DR. Chowdhury

## 2019-05-27 NOTE — H&P ADULT - PROBLEM SELECTOR PLAN 3
ESRD on HD  BUN/Cr 76/11.8  will get urgent HD today  nephro DR. Chowdhury ESRD on HD, MWF via RUE AVF  BUN/Cr 76/11.8  will get urgent HD today  nephro DR. Chowdhury ESRD on HD, MWF via RUE AVF  Patient on home dialysis and last dialysis was Friday.  BUN/Cr 76/11.8  will get urgent HD today  nephro DR. Chowdhury ESRD on home dialysis and last dialysis was Friday.  BUN/Cr 76/11.8  will get urgent HD today  nephro DR. Chowdhury

## 2019-05-27 NOTE — H&P ADULT - PROBLEM SELECTOR PLAN 1
p/w sob and chest tightness, denies CP  likely sob 2/2 fluid overload 2/2 ESRD   BUN/Cr 76/11.8, pt will get urgent HD today. p/w sob and chest tightness, denies CP  likely sob 2/2 fluid overload 2/2 ESRD   CXR preliminary reading noted pulmonary congested  BUN/Cr 76/11.8, pt will get urgent HD today. p/w sob and chest tightness, denies CP  likely sob 2/2 fluid overload 2/2 ESRD   CXR preliminary reading noted pulmonary congested  BUN/Cr 76/11.8, pt will get urgent HD today.  EKG noted nsr, no ischemia changes and Troponin negative  Pro BNP elevated >23k (likely from ESRD, no need for tele/TTE as per attending )

## 2019-05-27 NOTE — ED ADULT NURSE NOTE - OBJECTIVE STATEMENT
Pt  states has SOB x 1 week, last dialysis tx on Friday, due today  Denies pain, c/o being hungry, no difficulty breathing noted

## 2019-05-27 NOTE — H&P ADULT - PROBLEM SELECTOR PLAN 4
Hb 8.8, MCV 86, baseline Hb 9-11  likely chronic anemia 2/2 ESRD   f/u anemia panel and FOBT  CBC daily

## 2019-05-27 NOTE — CONSULT NOTE ADULT - ASSESSMENT
ESRD due to hypertension.  Chest pain and dyspnea , await xr chest for further evaluation, possible CHF  R/O ACS, follow Troponin level  Follow BNP  Hyperkalemia to have dialysis ASAP.    Anemia follow iron studies and stool for occult blood, check with Dr. Terviño base line hemoglobin  Hold Epogen for now due to Hypertension

## 2019-05-27 NOTE — ED ADULT NURSE NOTE - CHPI ED NUR SYMPTOMS NEG
no wheezing/no shortness of breath/no diaphoresis/no fever/no chest pain/no chills/no hemoptysis/no cough/no edema/no headache/no body aches

## 2019-05-27 NOTE — H&P ADULT - ASSESSMENT
72 yo F from home with PMH of LUE DVT (off coumadin ), HTN, ESRD and a significant PSHx of AV fistula, presents to the ED with complaints of shortness of breath. Patient reports she was supposed to have dialysis today at 5pm but felt shortness of breath and felt chest tightness so she came into the ED to see if she can get dialyzed today. ED course, initial vitals noted /85mmHg, labs noted BUN/Cr 76/11.8, K 6.5, Will admit for urgent HD today.

## 2019-05-27 NOTE — CONSULT NOTE ADULT - SUBJECTIVE AND OBJECTIVE BOX
Chief complain/HPI  ESRD due to hypertension  She acme to the ER foe increased BP in the last few days associated with SOB and chest tightness  She is on home dialysis, and last dialysis was Friday.  She infiltrated her AVG when she cannulated the AVG.  She says that she wants to go back to her dialysis center [facility] dialysis  She is 2.5 kg above EDW today.    PAST MEDICAL & SURGICAL HISTORY:  Hemodialysis patient  Hemodialysis access, graft mature: JASON  DVT (deep venous thrombosis): of Left subclavian vein, 06/12/17  ESRD (end stage renal disease) on dialysis  Cataract  Glaucoma  HTN (hypertension)  S/P KEVEN-BSO: for fibroids, 2012  AV fistula: 2015  H/O: glaucoma: surgery, 2002  Acquired cataract: extraction with b/l lense placement, 2016      Home Medications Reviewed    Hospital Medications:   MEDICATIONS  (STANDING):  amLODIPine   Tablet 10 milliGRAM(s) Oral daily  heparin  Injectable 5000 Unit(s) SubCutaneous every 12 hours  hydrALAZINE 50 milliGRAM(s) Oral every 8 hours  losartan 25 milliGRAM(s) Oral daily  metoprolol tartrate 25 milliGRAM(s) Oral two times a day    MEDICATIONS  (PRN):      Allergies    Mushrooms (Anaphylaxis)  penicillin (Rash)    Intolerances                              8.8    4.75  )-----------( 131      ( 27 May 2019 15:26 )             27.1     05-27    138  |  104  |  76<H>  ----------------------------<  102<H>  6.5<HH>   |  25  |  11.80<H>    Ca    8.5      27 May 2019 15:26    TPro  7.0  /  Alb  3.5  /  TBili  0.4  /  DBili  x   /  AST  37  /  ALT  38  /  AlkPhos  109  05-27              RADIOLOGY & ADDITIONAL STUDIES:    SOCIAL HISTORY: Denies ETOh,Smoking,     FAMILY HISTORY:  Family history of colon cancer (Sibling)  Family history of cerebrovascular accident (CVA)      REVIEW OF SYSTEMS:  VITALS:  Vital Signs Last 24 Hrs  T(C): 36.4 (27 May 2019 15:30), Max: 36.6 (27 May 2019 13:17)  T(F): 97.6 (27 May 2019 15:30), Max: 97.9 (27 May 2019 13:17)  HR: 57 (27 May 2019 15:30) (57 - 58)  BP: 190/64 (27 May 2019 15:30) (190/64 - 215/85)  BP(mean): --  RR: 18 (27 May 2019 15:30) (18 - 18)  SpO2: 97% (27 May 2019 15:30) (97% - 97%)    Height (cm): 170.18 (05-27 @ 13:17)  Weight (kg): 59.9 (05-27 @ 13:17)  BMI (kg/m2): 20.7 (05-27 @ 13:17)  BSA (m2): 1.7 (05-27 @ 13:17)    PHYSICAL EXAM:  Constitutional: NAD, suoine position  HEENT: anicteric sclera, oropharynx clear, MMM  Neck: No JVD  Respiratory:  reduced air entrance to right lung lower field, no wheezes, rales or rhonchi  Cardiovascular: S1, S2, RRR, no pericardial rub, no murmur  Gastrointestinal: BS+, soft, no tenderness, no distension, no bruit  Pelvis: bladder non-distended.  Extremities: EDEMA TRACE BIALTERAL in lower extermities  Neurological: A/O x 3, no focal deficits    Right AVG WITH MILD SWELLING IN THE UPPER PART WITH BRUIT AND THRILL

## 2019-05-28 ENCOUNTER — TRANSCRIPTION ENCOUNTER (OUTPATIENT)
Age: 74
End: 2019-05-28

## 2019-05-28 VITALS — SYSTOLIC BLOOD PRESSURE: 148 MMHG | DIASTOLIC BLOOD PRESSURE: 52 MMHG | HEART RATE: 61 BPM

## 2019-05-28 LAB
ANION GAP SERPL CALC-SCNC: 7 MMOL/L — SIGNIFICANT CHANGE UP (ref 5–17)
BASOPHILS # BLD AUTO: 0.02 K/UL — SIGNIFICANT CHANGE UP (ref 0–0.2)
BASOPHILS NFR BLD AUTO: 0.6 % — SIGNIFICANT CHANGE UP (ref 0–2)
BUN SERPL-MCNC: 35 MG/DL — HIGH (ref 7–18)
CALCIUM SERPL-MCNC: 8 MG/DL — LOW (ref 8.4–10.5)
CHLORIDE SERPL-SCNC: 101 MMOL/L — SIGNIFICANT CHANGE UP (ref 96–108)
CHOLEST SERPL-MCNC: 149 MG/DL — SIGNIFICANT CHANGE UP (ref 10–199)
CO2 SERPL-SCNC: 32 MMOL/L — HIGH (ref 22–31)
CREAT SERPL-MCNC: 7.01 MG/DL — HIGH (ref 0.5–1.3)
EOSINOPHIL # BLD AUTO: 0.21 K/UL — SIGNIFICANT CHANGE UP (ref 0–0.5)
EOSINOPHIL NFR BLD AUTO: 5.8 % — SIGNIFICANT CHANGE UP (ref 0–6)
FOLATE SERPL-MCNC: 7.3 NG/ML — SIGNIFICANT CHANGE UP
GLUCOSE SERPL-MCNC: 88 MG/DL — SIGNIFICANT CHANGE UP (ref 70–99)
HBA1C BLD-MCNC: 4.6 % — SIGNIFICANT CHANGE UP (ref 4–5.6)
HBV SURFACE AB SER-ACNC: REACTIVE
HBV SURFACE AG SER-ACNC: SIGNIFICANT CHANGE UP
HCT VFR BLD CALC: 24.9 % — LOW (ref 34.5–45)
HCV AB S/CO SERPL IA: 0.34 S/CO — SIGNIFICANT CHANGE UP (ref 0–0.99)
HCV AB SERPL-IMP: SIGNIFICANT CHANGE UP
HDLC SERPL-MCNC: 69 MG/DL — SIGNIFICANT CHANGE UP
HGB BLD-MCNC: 8.1 G/DL — LOW (ref 11.5–15.5)
IMM GRANULOCYTES NFR BLD AUTO: 0.3 % — SIGNIFICANT CHANGE UP (ref 0–1.5)
LIPID PNL WITH DIRECT LDL SERPL: 67 MG/DL — SIGNIFICANT CHANGE UP
LYMPHOCYTES # BLD AUTO: 0.89 K/UL — LOW (ref 1–3.3)
LYMPHOCYTES # BLD AUTO: 24.6 % — SIGNIFICANT CHANGE UP (ref 13–44)
MAGNESIUM SERPL-MCNC: 2.3 MG/DL — SIGNIFICANT CHANGE UP (ref 1.6–2.6)
MCHC RBC-ENTMCNC: 28.3 PG — SIGNIFICANT CHANGE UP (ref 27–34)
MCHC RBC-ENTMCNC: 32.5 GM/DL — SIGNIFICANT CHANGE UP (ref 32–36)
MCV RBC AUTO: 87.1 FL — SIGNIFICANT CHANGE UP (ref 80–100)
MONOCYTES # BLD AUTO: 0.39 K/UL — SIGNIFICANT CHANGE UP (ref 0–0.9)
MONOCYTES NFR BLD AUTO: 10.8 % — SIGNIFICANT CHANGE UP (ref 2–14)
NEUTROPHILS # BLD AUTO: 2.1 K/UL — SIGNIFICANT CHANGE UP (ref 1.8–7.4)
NEUTROPHILS NFR BLD AUTO: 57.9 % — SIGNIFICANT CHANGE UP (ref 43–77)
NRBC # BLD: 0 /100 WBCS — SIGNIFICANT CHANGE UP (ref 0–0)
PHOSPHATE SERPL-MCNC: 4.9 MG/DL — HIGH (ref 2.5–4.5)
PLATELET # BLD AUTO: 128 K/UL — LOW (ref 150–400)
POTASSIUM SERPL-MCNC: 4.7 MMOL/L — SIGNIFICANT CHANGE UP (ref 3.5–5.3)
POTASSIUM SERPL-SCNC: 4.7 MMOL/L — SIGNIFICANT CHANGE UP (ref 3.5–5.3)
RBC # BLD: 2.86 M/UL — LOW (ref 3.8–5.2)
RBC # FLD: 15.1 % — HIGH (ref 10.3–14.5)
SODIUM SERPL-SCNC: 140 MMOL/L — SIGNIFICANT CHANGE UP (ref 135–145)
TOTAL CHOLESTEROL/HDL RATIO MEASUREMENT: 2.2 RATIO — LOW (ref 3.3–7.1)
TRIGL SERPL-MCNC: 67 MG/DL — SIGNIFICANT CHANGE UP (ref 10–149)
TSH SERPL-MCNC: 3.79 UU/ML — SIGNIFICANT CHANGE UP (ref 0.34–4.82)
VIT B12 SERPL-MCNC: 566 PG/ML — SIGNIFICANT CHANGE UP (ref 232–1245)
WBC # BLD: 3.62 K/UL — LOW (ref 3.8–10.5)
WBC # FLD AUTO: 3.62 K/UL — LOW (ref 3.8–10.5)

## 2019-05-28 RX ORDER — CHLORHEXIDINE GLUCONATE 213 G/1000ML
1 SOLUTION TOPICAL DAILY
Refills: 0 | Status: DISCONTINUED | OUTPATIENT
Start: 2019-05-28 | End: 2019-05-28

## 2019-05-28 RX ADMIN — Medication 25 MILLIGRAM(S): at 05:03

## 2019-05-28 RX ADMIN — AMLODIPINE BESYLATE 10 MILLIGRAM(S): 2.5 TABLET ORAL at 05:03

## 2019-05-28 RX ADMIN — CHLORHEXIDINE GLUCONATE 1 APPLICATION(S): 213 SOLUTION TOPICAL at 11:03

## 2019-05-28 RX ADMIN — HEPARIN SODIUM 5000 UNIT(S): 5000 INJECTION INTRAVENOUS; SUBCUTANEOUS at 05:03

## 2019-05-28 RX ADMIN — Medication 50 MILLIGRAM(S): at 05:02

## 2019-05-28 RX ADMIN — Medication 50 MILLIGRAM(S): at 11:38

## 2019-05-28 RX ADMIN — LOSARTAN POTASSIUM 25 MILLIGRAM(S): 100 TABLET, FILM COATED ORAL at 05:03

## 2019-05-28 NOTE — PATIENT PROFILE ADULT - NSPROGENPREVTRANSF_GEN_A_NUR
32-year-old male presents to the ER with complaints of a lymph node infection. States he gets sores under his armpits and he has gone to a dermatologist that told him he can't do much for him. Usually has them in his right armpit but now has them in his left. Mother is a nurse and told him he probably has a lymph node infection. Was told he needed to come right in since they were under the left armpit they are too close to his heart. Denies fevers.    no

## 2019-05-28 NOTE — DISCHARGE NOTE PROVIDER - HOSPITAL COURSE
74 yo F from home with PMH of LUE DVT (off coumadin ), HTN, ESRD and a significant PSHx of AV fistula, presents to the ED with complaints of shortness of breath.  Home HD pt ,tried HD yesterday noted to have swelling AVF site so she called her HD center and they gave her 5pm schedule but due to her chest tightness/ SOB, she chose to visit ED to get urgent HD. Per PT her baseline SBP is 155 to 170mmHg at home, BP is 170/70mmHg at 10:30am, denies SOB/ chest pain or any other discomfort. SW arranged her HD for tomorrow, 5.29.2019. Pt is medically stable to d/c home.

## 2019-05-28 NOTE — PROGRESS NOTE ADULT - ASSESSMENT
A/P:  1.ESRD: secondary to htn on home hemodialysis recently  -s/p hd last pm for high k and fluid overload/htn  -pt feels fine and wants to go home and start in center hd as pt feels her bp is always high on home hemodialysis   -Keep patient euvolemic and renal diet  -Avoid Nephrotoxic Meds/ Agents such as (NSAIDs, IV contrast, Aminoglycosides such as gentamicin, -Gadolinium contrast, Phosphate containing enemas, etc..)  -Adjust Medications according to eGFR  -f/u bmp daily  2.HYPERKALEMIA:may be secondary to underdialysis at home vs non compliant with low k diet   -now k is normal today  -add low k diet  3.HTN: bp is high  -suggest to give stat dose of Hydralazine 50 mg now and recheck bp in 1 to 2 hrs  -pt/famiuly advised to stay in the hospital x 24 hrs if bp still high this pm  -keep bp<140/90  -titrate bp meds as needed  -low salt diet  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

## 2019-05-28 NOTE — DISCHARGE NOTE NURSING/CASE MANAGEMENT/SOCIAL WORK - NSDCDPATPORTLINK_GEN_ALL_CORE
You can access the [email or web address] Portal, offered by [facility], by registering with the following website: [email or web address]

## 2019-05-28 NOTE — DISCHARGE NOTE PROVIDER - CARE PROVIDER_API CALL
Nakul Gomez  [facility] [place] [address]  Phone: [phone]  Fax: (   )    -  Follow Up Time:

## 2019-05-28 NOTE — DISCHARGE NOTE PROVIDER - PROVIDER TOKENS
FREE:[LAST:[Jason],FIRST:[Nakul],PHONE:[phone]],FAX:[phone]   [phone]    -],ADDRESS:[[place] [id number]]]

## 2019-05-28 NOTE — DISCHARGE NOTE NURSING/CASE MANAGEMENT/SOCIAL WORK - NSDCFUADDAPPT_GEN_ALL_CORE_FT
[facility]    [address] [place], [place], [address]    Wednesday, 05/29/2019 at 5AM  Friday, 05/31/2019 at 5AM  Saturday, 06/01/2019 at 1PM    ONGOING SCHEDULE IS Tuesday, Thursday and Saturday at 1:30PM starting 06/04/2019

## 2019-05-28 NOTE — DISCHARGE NOTE PROVIDER - NSDCCPCAREPLAN_GEN_ALL_CORE_FT
PRINCIPAL DISCHARGE DIAGNOSIS  Diagnosis: Shortness of breath  Assessment and Plan of Treatment: resolved  continue HD as scheduled      SECONDARY DISCHARGE DIAGNOSES  Diagnosis: ESRD (end stage renal disease) on dialysis  Assessment and Plan of Treatment: go to your dialysis center tomorrow, 5.29.2019 at 5am as we made appointment for you    Diagnosis: Hypertensive urgency  Assessment and Plan of Treatment: improved   continue HD as scheduled and take meds as prescribed.

## 2019-05-28 NOTE — PROGRESS NOTE ADULT - SUBJECTIVE AND OBJECTIVE BOX
pt seen and examined.pts current chart reviewed and case discussed with resident covering.    SUBJECTIVE:  pt feels fine and denies cp,sob,gi or gu/uremic  symptons    REVIEW OF SYSTEMS:  CONSTITUTIONAL: No weakness, fevers or chills  EYES/ENT: No visual changes;  No vertigo or throat pain   NECK: No pain or stiffness  RESPIRATORY: No cough, wheezing, hemoptysis; No shortness of breath  CARDIOVASCULAR: No chest pain or palpitations  GASTROINTESTINAL: No abdominal or epigastric pain. No nausea, vomiting, or hematemesis; No diarrhea or constipation. No melena or hematochezia.  GENITOURINARY: No dysuria, frequency , hematuria, flank pain or nocturia  NEUROLOGICAL: No numbness or weakness  SKIN: No itching, burning, rashes, or lesions   All other review of systems is negative unless indicated above    Current meds:    amLODIPine   Tablet 10 milliGRAM(s) Oral daily  chlorhexidine 2% Cloths 1 Application(s) Topical daily  heparin  Injectable 5000 Unit(s) SubCutaneous every 12 hours  hydrALAZINE 50 milliGRAM(s) Oral every 8 hours  losartan 25 milliGRAM(s) Oral daily  metoprolol tartrate 25 milliGRAM(s) Oral two times a day      Vital Signs    T(F): 98 (05-28-19 @ 08:53), Max: 99.4 (05-28-19 @ 06:03)  HR: 64 (05-28-19 @ 08:53) (57 - 64)  BP: 170/70 (05-28-19 @ 11:10) (158/64 - 216/65)  ABP: --  RR: 18 (05-28-19 @ 08:53) (17 - 18)  SpO2: 96% (05-28-19 @ 08:53) (96% - 100%)  Wt(kg): --  CVP(cm H2O): --  CO: --  PCWP: --    I and O's:    Daily Height in cm: 170.18 (27 May 2019 13:17)    Daily     PHYSICAL EXAM:  Constitutional: well developed, well nourished  and in nad  HEENT: PERRLA,  no icteric sclera and mild pallor of conjunctiva noted  Neck: No JVD, thyromegaly or adenopathy  Respiratory: reduced air entry lower lungs with no rales, wheezing or rhonchi  Cardiovascular: S1 and S2 normally heard  Gastrointestinal: soft, nondistended, nontender and normal bowel sounds heard  Extremities: No peripheral edema or cyanosis  Neurological: A/O x 3, no focal deficits  : No flank or cva tenderness palpated.  Skin: No rashes      LABS:    CBC:                          8.1    3.62  )-----------( 128      ( 28 May 2019 06:56 )             24.9           BMP:    05-28    140  |  101  |  35<H>  ----------------------------<  88  4.7   |  32<H>  |  7.01<H>  05-27    138  |  104  |  76<H>  ----------------------------<  102<H>  6.5<HH>   |  25  |  11.80<H>    Ca    8.0<L>      28 May 2019 06:56  Ca    8.5      27 May 2019 15:26  Phos  4.9     05-28  Phos  5.6     05-27  Mg     2.3     05-28    TPro  7.0  /  Alb  3.5  /  TBili  0.4  /  DBili  x   /  AST  37  /  ALT  38  /  AlkPhos  109  05-27      Thyroid Stimulating Hormone, Serum: 3.79 uU/mL (05-28 @ 06:56)      URINE STUDIES:                        RADIOLOGY & ADDITIONAL STUDIES: pt seen and examined.pts current chart reviewed and case discussed with NP  covering.    SUBJECTIVE:  pt feels fine and denies cp,sob,gi or uremic  symptons  pt is being d/jair home but bp is high(bp 180s systolic)  d/w pt to stay and get bp meds now and we will f/u repeat bp in 1 to 2 hrs  pts family at bedside  pt was dialyzed yesterday for hyperkalemia and fluid overload    REVIEW OF SYSTEMS:  CONSTITUTIONAL: No weakness, fevers or chills  EYES/ENT: No visual changes;  No vertigo or throat pain   NECK: No pain or stiffness  RESPIRATORY: No cough, wheezing, hemoptysis; No shortness of breath  CARDIOVASCULAR: No chest pain or palpitations  GASTROINTESTINAL: No abdominal or epigastric pain. No nausea, vomiting, or hematemesis; No diarrhea or constipation. No melena or hematochezia.  GENITOURINARY: No dysuria, frequency , hematuria, flank pain or nocturia  NEUROLOGICAL: No numbness or weakness  SKIN: No itching, burning, rashes, or lesions   All other review of systems is negative unless indicated above    Current meds:    amLODIPine   Tablet 10 milliGRAM(s) Oral daily  chlorhexidine 2% Cloths 1 Application(s) Topical daily  heparin  Injectable 5000 Unit(s) SubCutaneous every 12 hours  hydrALAZINE 50 milliGRAM(s) Oral every 8 hours  losartan 25 milliGRAM(s) Oral daily  metoprolol tartrate 25 milliGRAM(s) Oral two times a day      Vital Signs    T(F): 98 (05-28-19 @ 08:53), Max: 99.4 (05-28-19 @ 06:03)  HR: 64 (05-28-19 @ 08:53) (57 - 64)  BP: 170/70 (05-28-19 @ 11:10) (158/64 - 216/65)  ABP: --  RR: 18 (05-28-19 @ 08:53) (17 - 18)  SpO2: 96% (05-28-19 @ 08:53) (96% - 100%)  Wt(kg): --  CVP(cm H2O): --  CO: --  PCWP: --    I and O's:    Daily Height in cm: 170.18 (27 May 2019 13:17)    Daily     PHYSICAL EXAM:  Constitutional: well developed, well nourished  and in nad  HEENT: PERRLA,  no icteric sclera and mild pallor of conjunctiva noted  Neck: No JVD, thyromegaly or adenopathy  Respiratory: reduced air entry lower lungs with no rales, wheezing or rhonchi  Cardiovascular: S1 and S2 normally heard  Gastrointestinal: soft, nondistended, nontender and normal bowel sounds heard  Extremities: No peripheral edema or cyanosis  pt has palpable thrill over rt upper arm with no swelling or tenderness   Neurological: A/O x 3, no focal deficits  Skin: No rashes      LABS:    CBC:                          8.1    3.62  )-----------( 128      ( 28 May 2019 06:56 )             24.9       BMP:    05-28    140  |  101  |  35<H>  ----------------------------<  88  4.7   |  32<H>  |  7.01<H>  05-27    138  |  104  |  76<H>  ----------------------------<  102<H>  6.5<HH>   |  25  |  11.80<H>    Ca    8.0<L>      28 May 2019 06:56  Ca    8.5      27 May 2019 15:26  Phos  4.9     05-28  Phos  5.6     05-27  Mg     2.3     05-28    TPro  7.0  /  Alb  3.5  /  TBili  0.4  /  DBili  x   /  AST  37  /  ALT  38  /  AlkPhos  109  05-27      Thyroid Stimulating Hormone, Serum: 3.79 uU/mL (05-28 @ 06:56)

## 2019-06-06 ENCOUNTER — APPOINTMENT (OUTPATIENT)
Dept: VASCULAR SURGERY | Facility: CLINIC | Age: 74
End: 2019-06-06
Payer: MEDICARE

## 2019-06-06 VITALS — SYSTOLIC BLOOD PRESSURE: 196 MMHG | DIASTOLIC BLOOD PRESSURE: 69 MMHG | TEMPERATURE: 97.8 F

## 2019-06-06 PROCEDURE — 93990 DOPPLER FLOW TESTING: CPT

## 2019-06-06 PROCEDURE — 99213 OFFICE O/P EST LOW 20 MIN: CPT

## 2019-07-10 PROCEDURE — 99261: CPT

## 2019-07-10 PROCEDURE — 80053 COMPREHEN METABOLIC PANEL: CPT

## 2019-07-10 PROCEDURE — 99285 EMERGENCY DEPT VISIT HI MDM: CPT | Mod: 25

## 2019-07-10 PROCEDURE — 83550 IRON BINDING TEST: CPT

## 2019-07-10 PROCEDURE — 87340 HEPATITIS B SURFACE AG IA: CPT

## 2019-07-10 PROCEDURE — 71045 X-RAY EXAM CHEST 1 VIEW: CPT

## 2019-07-10 PROCEDURE — 82607 VITAMIN B-12: CPT

## 2019-07-10 PROCEDURE — 83540 ASSAY OF IRON: CPT

## 2019-07-10 PROCEDURE — 86803 HEPATITIS C AB TEST: CPT

## 2019-07-10 PROCEDURE — 84484 ASSAY OF TROPONIN QUANT: CPT

## 2019-07-10 PROCEDURE — 82553 CREATINE MB FRACTION: CPT

## 2019-07-10 PROCEDURE — 80048 BASIC METABOLIC PNL TOTAL CA: CPT

## 2019-07-10 PROCEDURE — 80061 LIPID PANEL: CPT

## 2019-07-10 PROCEDURE — 83036 HEMOGLOBIN GLYCOSYLATED A1C: CPT

## 2019-07-10 PROCEDURE — 83880 ASSAY OF NATRIURETIC PEPTIDE: CPT

## 2019-07-10 PROCEDURE — 85027 COMPLETE CBC AUTOMATED: CPT

## 2019-07-10 PROCEDURE — 85730 THROMBOPLASTIN TIME PARTIAL: CPT

## 2019-07-10 PROCEDURE — 82550 ASSAY OF CK (CPK): CPT

## 2019-07-10 PROCEDURE — G0257: CPT

## 2019-07-10 PROCEDURE — 36415 COLL VENOUS BLD VENIPUNCTURE: CPT

## 2019-07-10 PROCEDURE — 86706 HEP B SURFACE ANTIBODY: CPT

## 2019-07-10 PROCEDURE — 85610 PROTHROMBIN TIME: CPT

## 2019-07-10 PROCEDURE — 84443 ASSAY THYROID STIM HORMONE: CPT

## 2019-07-10 PROCEDURE — 84100 ASSAY OF PHOSPHORUS: CPT

## 2019-07-10 PROCEDURE — 82746 ASSAY OF FOLIC ACID SERUM: CPT

## 2019-07-10 PROCEDURE — 83735 ASSAY OF MAGNESIUM: CPT

## 2019-07-10 PROCEDURE — 93005 ELECTROCARDIOGRAM TRACING: CPT

## 2019-09-05 ENCOUNTER — APPOINTMENT (OUTPATIENT)
Dept: VASCULAR SURGERY | Facility: CLINIC | Age: 74
End: 2019-09-05
Payer: MEDICARE

## 2019-09-05 VITALS
TEMPERATURE: 97.9 F | SYSTOLIC BLOOD PRESSURE: 181 MMHG | HEART RATE: 65 BPM | BODY MASS INDEX: 22.49 KG/M2 | HEIGHT: 65 IN | DIASTOLIC BLOOD PRESSURE: 82 MMHG | WEIGHT: 135 LBS

## 2019-09-05 PROCEDURE — 93990 DOPPLER FLOW TESTING: CPT

## 2019-09-05 PROCEDURE — 99213 OFFICE O/P EST LOW 20 MIN: CPT

## 2019-09-05 NOTE — DISCUSSION/SUMMARY
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 At this time would recommend

## 2019-09-05 NOTE — HISTORY OF PRESENT ILLNESS
[] : right brachiocephalic fistula [FreeTextEntry1] : 72 yo female with a history of esrd on hd presents for evaluation of pain over the distal aspect of the avf.  pt states that the pain started about 2 weeks ago over the distal bump  \par pt denies any difficulty with accessing the avf and states that she experiences pain when they access that area but they have been alternating sites

## 2019-09-05 NOTE — PHYSICAL EXAM
[Normal] : no acute distress, well-nourished, well developed, well appearing [Thrill] : thrill [Aneurysm] : aneurysm [Hand well perfused] : hand well perfused [2+] : left 2+ [Pulsatile Thrill] : no pulsatile thrill [Bleeding] : no bleeding [Ulcer] : no ulcer [de-identified] : intact

## 2019-09-12 ENCOUNTER — FORM ENCOUNTER (OUTPATIENT)
Age: 74
End: 2019-09-12

## 2019-09-13 ENCOUNTER — APPOINTMENT (OUTPATIENT)
Dept: ENDOVASCULAR SURGERY | Facility: CLINIC | Age: 74
End: 2019-09-13
Payer: MEDICARE

## 2019-09-13 VITALS
RESPIRATION RATE: 15 BRPM | HEIGHT: 65 IN | SYSTOLIC BLOOD PRESSURE: 222 MMHG | BODY MASS INDEX: 22.49 KG/M2 | WEIGHT: 135 LBS | TEMPERATURE: 98 F | DIASTOLIC BLOOD PRESSURE: 89 MMHG | OXYGEN SATURATION: 100 % | HEART RATE: 66 BPM

## 2019-09-13 PROCEDURE — 36907Z: CUSTOM | Mod: 59

## 2019-09-13 PROCEDURE — 36902Z: CUSTOM

## 2019-09-13 NOTE — HISTORY OF PRESENT ILLNESS
[] : in right upper arm [FreeTextEntry1] : alert and oriented x 3 \par accompanied by son eldae \par no reported falls\par took Bp meds in the morning \par  [FreeTextEntry3] : 9/13/2017 Dr Watts [FreeTextEntry4] : today [FreeTextEntry6] : Dr Griffith [FreeTextEntry5] : yesterday

## 2019-09-13 NOTE — PAST MEDICAL HISTORY
[Increasing age ( >40 years old)] : Increasing age ( >40 years old) [No therapy indicated for cases scheduled for less than one hour] : No therapy indicated for cases scheduled for less than one hour. [FreeTextEntry1] : Malignant Hyperthermia Screening Tool and Risk of Bleeding Assessment \par \par Ms. ARIANNE MCNAMARA denies family of unexpected death following Anesthesia or Exercise.\par Denies Family history of Malignant Hyperthermia, Muscle or Neuromuscular disorder and High Temperature  following exercise.\par \par Ms. ARIANNE MCNAMARA denies history of Muscle Spasm, Dark or Chocolate- Colored and Unanticipated fever immediately following anaesthesia or serious exercise.\par Ms. ARIANNE MCNAMARA also denies bleeding tendencies/Risks of Bleeding.\par

## 2019-09-13 NOTE — PROCEDURE
[Resume diet] : resume diet [Vital signs on admission the q 15 mins x2] : Vital signs on admission the q 15 mins x2 [Site check for bleeding/hematoma/thrill/bruit] : Site check for bleeding/hematoma/thrill/bruit [FreeTextEntry1] : right arm fistula/fistulogram/angioplasty

## 2019-09-25 ENCOUNTER — APPOINTMENT (OUTPATIENT)
Dept: CARDIOLOGY | Facility: CLINIC | Age: 74
End: 2019-09-25
Payer: MEDICARE

## 2019-10-16 ENCOUNTER — APPOINTMENT (OUTPATIENT)
Dept: VASCULAR SURGERY | Facility: CLINIC | Age: 74
End: 2019-10-16

## 2019-10-17 ENCOUNTER — INPATIENT (INPATIENT)
Facility: HOSPITAL | Age: 74
LOS: 3 days | Discharge: HOME | DRG: 69 | End: 2019-10-21
Attending: INTERNAL MEDICINE | Admitting: INTERNAL MEDICINE
Payer: MEDICARE

## 2019-10-17 VITALS
SYSTOLIC BLOOD PRESSURE: 201 MMHG | TEMPERATURE: 97 F | DIASTOLIC BLOOD PRESSURE: 91 MMHG | HEIGHT: 68 IN | WEIGHT: 132.06 LBS | RESPIRATION RATE: 20 BRPM | OXYGEN SATURATION: 99 % | HEART RATE: 72 BPM

## 2019-10-17 DIAGNOSIS — Z90.710 ACQUIRED ABSENCE OF BOTH CERVIX AND UTERUS: Chronic | ICD-10-CM

## 2019-10-17 DIAGNOSIS — H26.9 UNSPECIFIED CATARACT: Chronic | ICD-10-CM

## 2019-10-17 DIAGNOSIS — I63.9 CEREBRAL INFARCTION, UNSPECIFIED: ICD-10-CM

## 2019-10-17 DIAGNOSIS — I77.0 ARTERIOVENOUS FISTULA, ACQUIRED: Chronic | ICD-10-CM

## 2019-10-17 DIAGNOSIS — I16.1 HYPERTENSIVE EMERGENCY: ICD-10-CM

## 2019-10-17 DIAGNOSIS — Z86.69 PERSONAL HISTORY OF OTHER DISEASES OF THE NERVOUS SYSTEM AND SENSE ORGANS: Chronic | ICD-10-CM

## 2019-10-17 DIAGNOSIS — I82.409 ACUTE EMBOLISM AND THROMBOSIS OF UNSPECIFIED DEEP VEINS OF UNSPECIFIED LOWER EXTREMITY: ICD-10-CM

## 2019-10-17 DIAGNOSIS — Z29.9 ENCOUNTER FOR PROPHYLACTIC MEASURES, UNSPECIFIED: ICD-10-CM

## 2019-10-17 DIAGNOSIS — N18.6 END STAGE RENAL DISEASE: ICD-10-CM

## 2019-10-17 LAB
ALBUMIN SERPL ELPH-MCNC: 4 G/DL — SIGNIFICANT CHANGE UP (ref 3.5–5)
ALP SERPL-CCNC: 75 U/L — SIGNIFICANT CHANGE UP (ref 40–120)
ALT FLD-CCNC: 19 U/L DA — SIGNIFICANT CHANGE UP (ref 10–60)
ANION GAP SERPL CALC-SCNC: 10 MMOL/L — SIGNIFICANT CHANGE UP (ref 5–17)
APTT BLD: 40.8 SEC — HIGH (ref 27.5–36.3)
AST SERPL-CCNC: 28 U/L — SIGNIFICANT CHANGE UP (ref 10–40)
BASOPHILS # BLD AUTO: 0.02 K/UL — SIGNIFICANT CHANGE UP (ref 0–0.2)
BASOPHILS NFR BLD AUTO: 0.6 % — SIGNIFICANT CHANGE UP (ref 0–2)
BILIRUB SERPL-MCNC: 0.7 MG/DL — SIGNIFICANT CHANGE UP (ref 0.2–1.2)
BUN SERPL-MCNC: 30 MG/DL — HIGH (ref 7–18)
CALCIUM SERPL-MCNC: 10.1 MG/DL — SIGNIFICANT CHANGE UP (ref 8.4–10.5)
CHLORIDE SERPL-SCNC: 97 MMOL/L — SIGNIFICANT CHANGE UP (ref 96–108)
CO2 SERPL-SCNC: 27 MMOL/L — SIGNIFICANT CHANGE UP (ref 22–31)
CREAT SERPL-MCNC: 6.47 MG/DL — HIGH (ref 0.5–1.3)
EOSINOPHIL # BLD AUTO: 0.14 K/UL — SIGNIFICANT CHANGE UP (ref 0–0.5)
EOSINOPHIL NFR BLD AUTO: 4 % — SIGNIFICANT CHANGE UP (ref 0–6)
GLUCOSE SERPL-MCNC: 101 MG/DL — HIGH (ref 70–99)
HCT VFR BLD CALC: 36.8 % — SIGNIFICANT CHANGE UP (ref 34.5–45)
HGB BLD-MCNC: 11.6 G/DL — SIGNIFICANT CHANGE UP (ref 11.5–15.5)
IMM GRANULOCYTES NFR BLD AUTO: 0.6 % — SIGNIFICANT CHANGE UP (ref 0–1.5)
INR BLD: 0.98 RATIO — SIGNIFICANT CHANGE UP (ref 0.88–1.16)
LYMPHOCYTES # BLD AUTO: 0.79 K/UL — LOW (ref 1–3.3)
LYMPHOCYTES # BLD AUTO: 22.4 % — SIGNIFICANT CHANGE UP (ref 13–44)
MCHC RBC-ENTMCNC: 27.8 PG — SIGNIFICANT CHANGE UP (ref 27–34)
MCHC RBC-ENTMCNC: 31.5 GM/DL — LOW (ref 32–36)
MCV RBC AUTO: 88.2 FL — SIGNIFICANT CHANGE UP (ref 80–100)
MONOCYTES # BLD AUTO: 0.47 K/UL — SIGNIFICANT CHANGE UP (ref 0–0.9)
MONOCYTES NFR BLD AUTO: 13.3 % — SIGNIFICANT CHANGE UP (ref 2–14)
NEUTROPHILS # BLD AUTO: 2.09 K/UL — SIGNIFICANT CHANGE UP (ref 1.8–7.4)
NEUTROPHILS NFR BLD AUTO: 59.1 % — SIGNIFICANT CHANGE UP (ref 43–77)
NRBC # BLD: 0 /100 WBCS — SIGNIFICANT CHANGE UP (ref 0–0)
PLATELET # BLD AUTO: 108 K/UL — LOW (ref 150–400)
POTASSIUM SERPL-MCNC: 4.5 MMOL/L — SIGNIFICANT CHANGE UP (ref 3.5–5.3)
POTASSIUM SERPL-SCNC: 4.5 MMOL/L — SIGNIFICANT CHANGE UP (ref 3.5–5.3)
PROT SERPL-MCNC: 7.7 G/DL — SIGNIFICANT CHANGE UP (ref 6–8.3)
PROTHROM AB SERPL-ACNC: 10.9 SEC — SIGNIFICANT CHANGE UP (ref 10–12.9)
RBC # BLD: 4.17 M/UL — SIGNIFICANT CHANGE UP (ref 3.8–5.2)
RBC # FLD: 16.2 % — HIGH (ref 10.3–14.5)
SODIUM SERPL-SCNC: 134 MMOL/L — LOW (ref 135–145)
TROPONIN I SERPL-MCNC: <0.015 NG/ML — SIGNIFICANT CHANGE UP (ref 0–0.04)
WBC # BLD: 3.53 K/UL — LOW (ref 3.8–10.5)
WBC # FLD AUTO: 3.53 K/UL — LOW (ref 3.8–10.5)

## 2019-10-17 PROCEDURE — 99285 EMERGENCY DEPT VISIT HI MDM: CPT

## 2019-10-17 PROCEDURE — 71045 X-RAY EXAM CHEST 1 VIEW: CPT | Mod: 26

## 2019-10-17 PROCEDURE — 70450 CT HEAD/BRAIN W/O DYE: CPT | Mod: 26

## 2019-10-17 RX ORDER — ISOSORBIDE MONONITRATE 60 MG/1
10 TABLET, EXTENDED RELEASE ORAL ONCE
Refills: 0 | Status: COMPLETED | OUTPATIENT
Start: 2019-10-17 | End: 2019-10-17

## 2019-10-17 RX ORDER — ISOSORBIDE MONONITRATE 60 MG/1
10 TABLET, EXTENDED RELEASE ORAL
Refills: 0 | Status: DISCONTINUED | OUTPATIENT
Start: 2019-10-17 | End: 2019-10-17

## 2019-10-17 RX ORDER — LABETALOL HCL 100 MG
100 TABLET ORAL EVERY 8 HOURS
Refills: 0 | Status: DISCONTINUED | OUTPATIENT
Start: 2019-10-17 | End: 2019-10-17

## 2019-10-17 RX ORDER — ISOSORBIDE MONONITRATE 60 MG/1
20 TABLET, EXTENDED RELEASE ORAL ONCE
Refills: 0 | Status: DISCONTINUED | OUTPATIENT
Start: 2019-10-17 | End: 2019-10-17

## 2019-10-17 RX ORDER — LOSARTAN POTASSIUM 100 MG/1
1 TABLET, FILM COATED ORAL
Qty: 0 | Refills: 0 | DISCHARGE

## 2019-10-17 RX ORDER — AMLODIPINE BESYLATE 2.5 MG/1
10 TABLET ORAL DAILY
Refills: 0 | Status: DISCONTINUED | OUTPATIENT
Start: 2019-10-18 | End: 2019-10-21

## 2019-10-17 RX ORDER — NICARDIPINE HYDROCHLORIDE 30 MG/1
5 CAPSULE, EXTENDED RELEASE ORAL
Qty: 40 | Refills: 0 | Status: DISCONTINUED | OUTPATIENT
Start: 2019-10-17 | End: 2019-10-17

## 2019-10-17 RX ORDER — LABETALOL HCL 100 MG
100 TABLET ORAL ONCE
Refills: 0 | Status: COMPLETED | OUTPATIENT
Start: 2019-10-17 | End: 2019-10-17

## 2019-10-17 RX ORDER — LABETALOL HCL 100 MG
10 TABLET ORAL ONCE
Refills: 0 | Status: COMPLETED | OUTPATIENT
Start: 2019-10-17 | End: 2019-10-17

## 2019-10-17 RX ORDER — HEPARIN SODIUM 5000 [USP'U]/ML
5000 INJECTION INTRAVENOUS; SUBCUTANEOUS EVERY 8 HOURS
Refills: 0 | Status: DISCONTINUED | OUTPATIENT
Start: 2019-10-17 | End: 2019-10-21

## 2019-10-17 RX ORDER — HYDRALAZINE HCL 50 MG
100 TABLET ORAL THREE TIMES A DAY
Refills: 0 | Status: DISCONTINUED | OUTPATIENT
Start: 2019-10-18 | End: 2019-10-21

## 2019-10-17 RX ORDER — ISOSORBIDE MONONITRATE 60 MG/1
10 TABLET, EXTENDED RELEASE ORAL
Refills: 0 | Status: DISCONTINUED | OUTPATIENT
Start: 2019-10-18 | End: 2019-10-21

## 2019-10-17 RX ORDER — HYDRALAZINE HCL 50 MG
100 TABLET ORAL THREE TIMES A DAY
Refills: 0 | Status: DISCONTINUED | OUTPATIENT
Start: 2019-10-17 | End: 2019-10-17

## 2019-10-17 RX ORDER — ISOSORBIDE MONONITRATE 60 MG/1
10 TABLET, EXTENDED RELEASE ORAL ONCE
Refills: 0 | Status: DISCONTINUED | OUTPATIENT
Start: 2019-10-17 | End: 2019-10-17

## 2019-10-17 RX ORDER — HYDRALAZINE HCL 50 MG
10 TABLET ORAL ONCE
Refills: 0 | Status: COMPLETED | OUTPATIENT
Start: 2019-10-17 | End: 2019-10-17

## 2019-10-17 RX ORDER — AMLODIPINE BESYLATE 2.5 MG/1
10 TABLET ORAL DAILY
Refills: 0 | Status: DISCONTINUED | OUTPATIENT
Start: 2019-10-17 | End: 2019-10-17

## 2019-10-17 RX ORDER — LABETALOL HCL 100 MG
100 TABLET ORAL EVERY 8 HOURS
Refills: 0 | Status: DISCONTINUED | OUTPATIENT
Start: 2019-10-18 | End: 2019-10-19

## 2019-10-17 RX ORDER — HYDRALAZINE HCL 50 MG
100 TABLET ORAL ONCE
Refills: 0 | Status: COMPLETED | OUTPATIENT
Start: 2019-10-17 | End: 2019-10-17

## 2019-10-17 RX ADMIN — ISOSORBIDE MONONITRATE 10 MILLIGRAM(S): 60 TABLET, EXTENDED RELEASE ORAL at 18:52

## 2019-10-17 RX ADMIN — Medication 100 MILLIGRAM(S): at 18:51

## 2019-10-17 RX ADMIN — Medication 10 MILLIGRAM(S): at 15:23

## 2019-10-17 RX ADMIN — NICARDIPINE HYDROCHLORIDE 25 MG/HR: 30 CAPSULE, EXTENDED RELEASE ORAL at 16:45

## 2019-10-17 RX ADMIN — NICARDIPINE HYDROCHLORIDE 5 MG/HR: 30 CAPSULE, EXTENDED RELEASE ORAL at 17:32

## 2019-10-17 RX ADMIN — Medication 10 MILLIGRAM(S): at 18:52

## 2019-10-17 RX ADMIN — Medication 10 MILLIGRAM(S): at 14:03

## 2019-10-17 RX ADMIN — Medication 10 MILLIGRAM(S): at 17:11

## 2019-10-17 RX ADMIN — Medication 100 MILLIGRAM(S): at 17:12

## 2019-10-17 NOTE — ED ADULT NURSE NOTE - NSIMPLEMENTINTERV_GEN_ALL_ED
Implemented All Universal Safety Interventions:  [place] to call system. Call bell, personal items and telephone within reach. Instruct patient to call for assistance. Room bathroom lighting operational. Non-slip footwear when patient is off stretcher. Physically safe environment: no spills, clutter or unnecessary equipment. Stretcher in lowest position, wheels locked, appropriate side rails in place.

## 2019-10-17 NOTE — ED ADULT NURSE NOTE - OBJECTIVE STATEMENT
FLOAT NOTE _axox3 ,nad , brought from home with facial numbness , and tongue numbness , bilateral ext lower and upper numbness , headache since 11 am today

## 2019-10-17 NOTE — PROGRESS NOTE ADULT - ASSESSMENT
ASSESSMENT: Acute stroke versus HTNive crisis    Despite low NIHSS, patient distressed by symptoms and feels language difficulty is highly disabling. She understands risks and benefits and would like to pursue IV tPA.    Given her elevated BP, need to get and maintain BP in IV tPA goal range prior to IV tPA administration. [facility] ED physician Dr. Davison would prefer to see if patient symptoms resolve at lower pressure prior to IV tPA administration.     RECOMMENDATIONS:  [x] Administer IV rTPA after BP controlled  [x] Vessel imaging, though symptoms not suggestive of LVO; if LVO present, please re-consult Telestroke immediately to assess for need for further therapies    Discussed above with patient, patient's son, patient's , patient's nurse and patient's ED physician Dr. Davison.    TeleStroke service available for any further questions or changes in patient condition.     50 minutes

## 2019-10-17 NOTE — H&P ADULT - PROBLEM SELECTOR PROBLEM 3
Pt here w/ vomiting for several days, today episode of near syncope.  Pt vomiting upon arrival
ESRD (end stage renal disease) on dialysis

## 2019-10-17 NOTE — H&P ADULT - ASSESSMENT
Patient is a 72 y/o F from home with PMH of ESRD on HD (MWF via right upper arm A-V graft) x 4 years, HTN PSHx right hip and femure fracture s/p replacement, comes in with complaint of facial numbness on right side and tongue haviness with inability to speak started 11:00 am this morning. Patient checked her BP at home which was 210s.Pt states that she took her home medications but there was no improvement in her symptoms and she came to ED. Pt was recommended for Tpa as per Tele stroke after Blood pressure stabilization but her symptoms improved and no tpa was given. pt was started on nicardipine for small duration and was later changed to her home medication with some improvement in blood pressure

## 2019-10-17 NOTE — H&P ADULT - NSHPPHYSICALEXAM_GEN_ALL_CORE
PHYSICAL EXAM:  GENERAL: NAD, speaks in full sentences, no signs of respiratory distress  HEAD:  Atraumatic, Normocephalic  EYES: EOMI, PERRLA, conjunctiva and sclera clear  NECK: Supple, No JVD  CHEST/LUNG: Clear to auscultation bilaterally; No wheeze; No crackles; No accessory muscles used  HEART: Regular rate and rhythm; No murmurs;   ABDOMEN: Soft, Nontender, Nondistended; Bowel sounds present; No guarding  EXTREMITIES:  2+ Peripheral Pulses, No cyanosis or edema  PSYCH: AAOx3  NEUROLOGY: non-focal  SKIN: No rashes or lesions PHYSICAL EXAM:  GENERAL: NAD, speaks in full sentences, no signs of respiratory distress  HEAD:  Atraumatic, Normocephalic  EYES: EOMI, PERRLA, conjunctiva and sclera clear  NECK: Supple, No JVD  CHEST/LUNG: Clear to auscultation bilaterally; No wheeze; No crackles; No accessory muscles used  HEART: Regular rate and rhythm; No murmurs;   ABDOMEN: Soft, Nontender, Nondistended; Bowel sounds present; No guarding  EXTREMITIES:  2+ Peripheral Pulses, No cyanosis or edema  PSYCH: AAOx3  NEUROLOGY: non-focal, Power 5/5 in b/l upper and lower ext, DTR intact, CN intact  SKIN: No rashes or lesions

## 2019-10-17 NOTE — CONSULT NOTE ADULT - PROBLEM SELECTOR RECOMMENDATION 9
-pt comes in with symptoms of facial numbness and tongue heaviness.   -Her /91 HR 72   -EKG shows NSR @ 82 b/min with prolong  msec  -Code stroke was called and tele stroke was consulted, they recommended to administer tpa with improved bp control. However, patient's symptoms started to improve and are now almost resolved.   -She received a dose of Iv labetolol 10mg and IV hydralazine 10mg x 1.  -Patient reiecved another dose of Labetolol 10mg Iv and 100mg po which improved the Bp to 203/83  -Her symptoms are likely secondary to hypertensive emergency  -At home patient's bp sometimes becomes high upto 190-200s as well at least twice a month.   -Patient did not took her imdur, enalapril as well today  -Will recommend to resume all home medications except metoprolol  -Will recommend to start labetolol 100mg TID and titrate as tolerate   -Goal BP in next 24 hours should be 175-180mmhg -pt comes in with symptoms of facial numbness and tongue heaviness.   -Her /91 HR 72   -EKG shows NSR @ 82 b/min with prolong  msec  -Code stroke was called and tele stroke was consulted, they recommended to administer tpa with improved bp control. However, patient's symptoms started to improve and are now almost resolved.   -She received a dose of Iv labetolol 10mg and IV hydralazine 10mg x 1.  -Patient reiecved another dose of Labetolol 10mg Iv and 100mg po which improved the Bp to 203/83  -Her symptoms are likely secondary to hypertensive emergency  -At home patient's bp sometimes becomes high upto 190-200s as well at least twice a month.   -Patient did not took her imdur, enalapril as well today  -Will recommend to resume all home medications except metoprolol  -Will recommend to start labetolol 100mg TID and titrate as tolerate   -Goal BP in next 24 hours should be 175-180mmhg  -Will recommend to hold off to nicardipine drip  -If BP doesn't control appropriately after extensive Po medication regime, can reconsult as needed.

## 2019-10-17 NOTE — CHART NOTE - NSCHARTNOTEFT_GEN_A_CORE
Patient's latest repeat Blood pressure is 188/74 mmhg. Discussed with ICU attending Dr Tillman. Patient doesn't require ICU level care at this point. Discussed with ER Attending.

## 2019-10-17 NOTE — H&P ADULT - PROBLEM SELECTOR PLAN 2
Pt came in with some weakness and was also found to have /91 HR 72   -EKG shows NSR @ 82 b/min   Pt was initially started on nicardipine drip and ICU was consulted who recommended to start on PO medication  Pt started back on home medications  Monitor BP Goal -180 for 24 hours from symptom onset  Please confirm medication list from pharmacy

## 2019-10-17 NOTE — CONSULT NOTE ADULT - SUBJECTIVE AND OBJECTIVE BOX
PULMONARY/CRITICAL CARE CONSULTATION    HPI: Patient is a 72 y/o F from home with PMH of ESRD on HD (MWF via right upper arm A-V graft) x 4 years, HTN PSHx right hip and femure fracture s/p replacement, comes in with complaint of facial numbness on right side and tongue haviness with speech changes started 11:00 am this morning. Patient checked her BP at home which was 210s. She took her Bp medications like amlodipine 10mg, metoprolol 25 mg and doubled the dose of her morning hydralazine to 200mg. Her symptoms did not improve after medication so she came in to ED. In Ed Code stroke was called. She had a CT head stroke protocol which did not show any bleed or stroke. Patient's Initially vitals were significant for /91 HR 72 saO2 99% on room air. She received a dose of labetalol 10mg Iv push and hydralazine 10mg IV push w/o significant improvement in her Bp. ICU was consulted for concern of Hypertensive emergency.     PAST MEDICAL & SURGICAL HISTORY:  Hemodialysis patient  Hemodialysis access, fistula mature: JASON  DVT (deep venous thrombosis): of Left subclavian vein, 06/12/17  ESRD (end stage renal disease) on dialysis  Cataract  Glaucoma  HTN (hypertension)  S/P KEVEN-BSO: for fibroids, 2012  AV fistula: 2015  H/O: glaucoma: surgery, 2002  Acquired cataract: extraction with b/l lense placement, 2016    Allergies    Mushrooms (Anaphylaxis)  penicillin (Rash)    Intolerances      SOCIAL HISTORY/FAMILY HISTORY reviewed:   Medications:  niCARdipine Infusion 5 mG/Hr IV Continuous <Continuous>      Review of Systems:  12 point ROS performed, pertinent positives and negatives below  CONSTITUTIONAL: No fever, chills, or fatigue;  EYES: No eye pain, visual disturbances, or discharge;  ENMT:  right side difficulty hearing, currently no vertigo; right sided sinus pain  NECK: No pain or stiffness;  RESPIRATORY: No cough, wheezing, chills or hemoptysis; No shortness of breath;  CARDIOVASCULAR: No chest pain, palpitations, currently denies dizziness, or leg swelling;  GASTROINTESTINAL: No abdominal or epigastric pain. No nausea, vomiting, or hematemesis; No diarrhea or constipation. No melena or hematochezia;  GENITOURINARY: No dysuria, frequency, hematuria, or incontinence;  NEUROLOGICAL: mild numbness of right side of face. no extremity weakness.   SKIN: No itching, burning, rashes, or lesions;  MUSCULOSKELETAL: No joint pain or swelling; No muscle, back, or extremity pain;  PSYCHIATRIC: No depression, anxiety, mood swings, or difficulty sleeping;        T(F): 97.5 (10-17-19 @ 15:18), Max: 97.5 (10-17-19 @ 15:18)  HR: 71 (10-17-19 @ 15:18)  BP: 206/86 (10-17-19 @ 15:18)  BP(mean): --  ABP: --  RR: 18 (10-17-19 @ 15:18)  SpO2: 100% (10-17-19 @ 15:18)                PHYSICAL EXAM:  GENERAL: NAD, well-groomed, well-developed;  HEAD:  Atraumatic, Normocephalic;  EYES: EOMI, PERRLA, conjunctiva and sclera clear;  ENMT: No tonsillar erythema, exudates, or enlargement; Moist mucous membranes, Good dentition, No lesions;  NECK: Supple, normal appearance, No JVD; Normal thyroid; Trachea midline;  NERVOUS SYSTEM:  Alert & Oriented X3, Good concentration; Motor Strength 5/5 B/L upper and lower extremities; DTRs 2+ intact and symmetric; no focal deficits   CHEST/LUNG: No chest deformity; Normal percussion bilaterally; No rales, rhonchi, wheezing; Crackles at bases;  HEART: Regular rate and rhythm; No murmurs, rubs, or gallops;  ABDOMEN: Soft, Nontender, Nondistended; Bowel sounds present;  EXTREMITIES:  2+ Peripheral Pulses, No clubbing, cyanosis, or edema, Bilat lower extremity edema;  LYMPH:No lymphadenopathy noted;  SKIN: No rashes or lesions; Good capillary refill;    LABS:                        11.6   3.53  )-----------( 108      ( 17 Oct 2019 13:56 )             36.8     10-17    134<L>  |  97  |  30<H>  ----------------------------<  101<H>  4.5   |  27  |  6.47<H>    Ca    10.1      17 Oct 2019 14:27    TPro  7.7  /  Alb  4.0  /  TBili  0.7  /  DBili  x   /  AST  28  /  ALT  19  /  AlkPhos  75  10-17      CARDIAC MARKERS ( 17 Oct 2019 14:27 )  <0.015 ng/mL / x     / x     / x     / x          CAPILLARY BLOOD GLUCOSE        PT/INR - ( 17 Oct 2019 13:56 )   PT: 10.9 sec;   INR: 0.98 ratio         PTT - ( 17 Oct 2019 13:56 )  PTT:40.8 sec    CULTURES:            RADIOLOGY REVIEWED:  < from: Xray Chest 1 View- PORTABLE-Urgent (10.17.19 @ 14:57) >    Presently lungs are clear. Clips in the right upper arm again noted.    < end of copied text > PULMONARY/CRITICAL CARE CONSULTATION    HPI: Patient is a 72 y/o F from home with PMH of ESRD on HD (MWF via right upper arm A-V graft) x 4 years, HTN PSHx right hip and femure fracture s/p replacement, comes in with complaint of facial numbness on right side and tongue haviness with speech changes started 11:00 am this morning. Patient checked her BP at home which was 210s. She took her Bp medications like amlodipine 10mg, metoprolol 25 mg and doubled the dose of her morning hydralazine to 200mg. Her symptoms did not improve after medication so she came in to ED. She currently denies any chest pain, dizziness, nausea, vomiting, headache, weakness, numbness.  In Ed Code stroke was called. She had a CT head stroke protocol which did not show any bleed or stroke. Patient's Initially vitals were significant for /91 HR 72 saO2 99% on room air. She received a dose of labetalol 10mg Iv push and hydralazine 10mg IV push w/o significant improvement in her Bp. ICU was consulted for concern of Hypertensive emergency.     PAST MEDICAL & SURGICAL HISTORY:  Hemodialysis patient  Hemodialysis access, fistula mature: JASON  DVT (deep venous thrombosis): of Left subclavian vein, 06/12/17  ESRD (end stage renal disease) on dialysis  Cataract  Glaucoma  HTN (hypertension)  S/P KEVEN-BSO: for fibroids, 2012  AV fistula: 2015  H/O: glaucoma: surgery, 2002  Acquired cataract: extraction with b/l lense placement, 2016    Allergies    Mushrooms (Anaphylaxis)  penicillin (Rash)    Intolerances      SOCIAL HISTORY/FAMILY HISTORY reviewed:   Medications:  niCARdipine Infusion 5 mG/Hr IV Continuous <Continuous>      Review of Systems:  12 point ROS performed, pertinent positives and negatives below  CONSTITUTIONAL: No fever, chills, or fatigue;  EYES: No eye pain, visual disturbances, or discharge;  ENMT:  right side difficulty hearing, currently no vertigo; right sided sinus pain  NECK: No pain or stiffness;  RESPIRATORY: No cough, wheezing, chills or hemoptysis; No shortness of breath;  CARDIOVASCULAR: No chest pain, palpitations, currently denies dizziness, or leg swelling;  GASTROINTESTINAL: No abdominal or epigastric pain. No nausea, vomiting, or hematemesis; No diarrhea or constipation. No melena or hematochezia;  GENITOURINARY: No dysuria, frequency, hematuria, or incontinence;  NEUROLOGICAL: mild numbness of right side of face. no extremity weakness.   SKIN: No itching, burning, rashes, or lesions;  MUSCULOSKELETAL: No joint pain or swelling; No muscle, back, or extremity pain;  PSYCHIATRIC: No depression, anxiety, mood swings, or difficulty sleeping;        T(F): 97.5 (10-17-19 @ 15:18), Max: 97.5 (10-17-19 @ 15:18)  HR: 71 (10-17-19 @ 15:18)  BP: 206/86 (10-17-19 @ 15:18)  BP(mean): --  ABP: --  RR: 18 (10-17-19 @ 15:18)  SpO2: 100% (10-17-19 @ 15:18)      PHYSICAL EXAM:  GENERAL: NAD, well-groomed, well-developed;  HEAD:  Atraumatic, Normocephalic;  EYES: EOMI, PERRLA, conjunctiva and sclera clear;  ENMT: No tonsillar erythema, exudates, or enlargement; Moist mucous membranes, Good dentition, No lesions;  NECK: Supple, normal appearance, No JVD; Normal thyroid; Trachea midline;  NERVOUS SYSTEM:  Alert & Oriented X3, Good concentration; Motor Strength 5/5 B/L upper and lower extremities; DTRs 2+ intact and symmetric; no focal deficits   CHEST/LUNG: No chest deformity; Normal percussion bilaterally; No rales, rhonchi, wheezing; Crackles at bases;  HEART: Regular rate and rhythm; No murmurs, rubs, or gallops;  ABDOMEN: Soft, Nontender, Nondistended; Bowel sounds present;  EXTREMITIES:  2+ Peripheral Pulses, No clubbing, cyanosis, or edema, Bilat lower extremity edema;  LYMPH:No lymphadenopathy noted;  SKIN: No rashes or lesions; Good capillary refill;    LABS:                        11.6   3.53  )-----------( 108      ( 17 Oct 2019 13:56 )             36.8     10-17    134<L>  |  97  |  30<H>  ----------------------------<  101<H>  4.5   |  27  |  6.47<H>    Ca    10.1      17 Oct 2019 14:27    TPro  7.7  /  Alb  4.0  /  TBili  0.7  /  DBili  x   /  AST  28  /  ALT  19  /  AlkPhos  75  10-17      CARDIAC MARKERS ( 17 Oct 2019 14:27 )  <0.015 ng/mL / x     / x     / x     / x          CAPILLARY BLOOD GLUCOSE        PT/INR - ( 17 Oct 2019 13:56 )   PT: 10.9 sec;   INR: 0.98 ratio         PTT - ( 17 Oct 2019 13:56 )  PTT:40.8 sec    CULTURES:            RADIOLOGY REVIEWED:  < from: Xray Chest 1 View- PORTABLE-Urgent (10.17.19 @ 14:57) >    Presently lungs are clear. Clips in the right upper arm again noted.    < end of copied text >

## 2019-10-17 NOTE — ED ADULT TRIAGE NOTE - O2 FLOW (L/MIN)
Spouse Nataliya called back stating that her  has been in rehab at [facility]. He has been seeing physicans there while at rehab. Patient would still like to be a patient of dr yip and will make an appointment once this is all taken care of. If you have any questions to please return the call back to nataliya.   2

## 2019-10-17 NOTE — ED PROVIDER NOTE - CLINICAL SUMMARY MEDICAL DECISION MAKING FREE TEXT BOX
72 y/o pt presenting to ED with heaviness to tongue with slight dysarthria. [facility] Code called. Based on sxs, her risk outweigh benefits for TPA. Given improving sxs and low NIH scale.

## 2019-10-17 NOTE — ED PROVIDER NOTE - PROGRESS NOTE DETAILS
Pt dysphagia pass, NIHSS 1. No tpa given due to improving symptoms, low stroke scale with nondisabling symptoms. ICU consulted given possible need for nicardipine infusion to help control BP. Pt states BP chronically elevated at same level at least twice weekly. Infusion held by ICU team in attempt manage BP. Dr. Salgado: Patient signed out by Dr. Noyola. ICU rec medicine admission.

## 2019-10-17 NOTE — ED PROVIDER NOTE - CRANIAL NERVE AND PUPILLARY EXAM
cough reflex intact/corneal reflex intact/extra-ocular movements intact/gag reflex intact/tongue is midline/cranial nerves 2-12 intact/central and peripheral vision intact

## 2019-10-17 NOTE — H&P ADULT - PROBLEM SELECTOR PLAN 3
Pt is ESRD on Dialysis M/W/F through AV fistula  Pt goes to [facility], doesn't remember nephrologist name  Doesn't appear in fluid overload with acceptable potassium levels  Nephrology consult for dialysis set up while admitted

## 2019-10-17 NOTE — H&P ADULT - PROBLEM SELECTOR PLAN 4
Pt on multiple medications at home  Unsure of full details  Please call pharmacy in am to get list of all active medications

## 2019-10-17 NOTE — H&P ADULT - PROBLEM SELECTOR PLAN 5
IMPROVE VTE Individual Risk Assessment    RISK                                                                Points  [ X ] Previous VTE                                                  3  [  ] Thrombophilia                                               2  [  ] Lower limb paralysis                                      2        (unable to hold up >15 seconds)    [  ] Current Cancer                                              2         (within 6 months)  [ X ] Immobilization > 24 hrs                                1  [  ] ICU/CCU stay > 24 hours                              1  [ X ] Age > 60                                                      1  IMPROVE VTE Score ____4_____  C/W Heparin for DVT ppx

## 2019-10-17 NOTE — H&P ADULT - HISTORY OF PRESENT ILLNESS
HPI: Patient is a 74 y/o F from home with PMH of ESRD on HD (MWF via right upper arm A-V graft) x 4 years, HTN PSHx right hip and femure fracture s/p replacement, comes in with complaint of facial numbness on right side and tongue haviness with speech changes started 11:00 am this morning. Patient checked her BP at home which was 210s. She took her Bp medications like amlodipine 10mg, metoprolol 25 mg and doubled the dose of her morning hydralazine to 200mg. Her symptoms did not improve after medication so she came in to ED. She currently denies any chest pain, dizziness, nausea, vomiting, headache, weakness, numbness.  In Ed Code stroke was called. She had a CT head stroke protocol which did not show any bleed or stroke. Patient's Initially vitals were significant for /91 HR 72 saO2 99% on room air. She received a dose of labetalol 10mg Iv push and hydralazine 10mg IV push w/o significant improvement in her Bp. ICU was consulted for concern of Hypertensive emergency. Patient is a 74 y/o F from home with PMH of ESRD on HD (MWF via right upper arm A-V graft) x 4 years, HTN PSHx right hip and femure fracture s/p replacement, comes in with complaint of facial numbness on right side and tongue haviness with inability to speak started 11:00 am this morning. Patient checked her BP at home which was 210s.Pt states that she took her home medications but there was no improvement in her symptoms and she came to ED. Pt was recommended for Tpa as per Tele stroke after Blood pressure stabilization but her symptoms improved and no tpa was given. pt was started on nicardipine for small duration and was later changed to her home medication with some improvement in blood pressure

## 2019-10-17 NOTE — H&P ADULT - PROBLEM SELECTOR PLAN 1
Pt came in with tongue weakness, difficulty speaking and tingling  Symptoms now resolved  Code stroke was called who recommended Tpa post Bp stabilizations  Monitor on tele  Neuroo checks  Stat ct head for change in mental status  Neuro evaluation Pt came in with tongue weakness, difficulty speaking and tingling  Symptoms now resolved  Code stroke was called who recommended Tpa post Bp stabilizations  Monitor on tele  Neuro checks q4  Stat ct head for change in mental status  Neuro evaluation  Aspirin and high intensity statin

## 2019-10-17 NOTE — ED ADULT TRIAGE NOTE - CHIEF COMPLAINT QUOTE
headache , numbness to face and b/l feet , pt verbalizes tounge heaviness started at 11:00 am , pt is a dialysis pt

## 2019-10-17 NOTE — H&P ADULT - NSHPREVIEWOFSYSTEMS_GEN_ALL_CORE
REVIEW OF SYSTEMS:    CONSTITUTIONAL: No weakness, fevers or chills  EYES/ENT: No visual changes;  No vertigo or throat pain   NECK: No pain or stiffness  RESPIRATORY: No cough, wheezing, hemoptysis; No shortness of breath  CARDIOVASCULAR: No chest pain or palpitations  GASTROINTESTINAL: No abdominal or epigastric pain. No nausea, vomiting, or hematemesis; No diarrhea or constipation. No melena or hematochezia.  GENITOURINARY: No dysuria, frequency or hematuria  NEUROLOGICAL: No numbness or weakness  SKIN: No itching, rashes REVIEW OF SYSTEMS:    CONSTITUTIONAL: Weakness, and headache  EYES/ENT: No visual changes;  No vertigo or throat pain   NECK: No pain or stiffness  RESPIRATORY: No cough, wheezing, hemoptysis; No shortness of breath  CARDIOVASCULAR: +chest pain  GASTROINTESTINAL: No abdominal or epigastric pain. No nausea, vomiting, or hematemesis; No diarrhea or constipation. No melena or hematochezia.  GENITOURINARY: No dysuria, frequency or hematuria  NEUROLOGICAL: no current weakness or numbness  SKIN: No itching, rashes

## 2019-10-17 NOTE — PROGRESS NOTE ADULT - SUBJECTIVE AND OBJECTIVE BOX
HISTORY OF PRESENT ILLNESS:  Mrs. Head (MRN [id number]) is a 73 year-old woman with stroke risk factors of hypertension who developed acute onset of "heavy tongue" and difficulty speaking. She clearly identified 11AM as the onset time as she described cooking and then suddenly developing these symptoms. Her  and son corroborate this story.     OUTSIDE HOSPITAL COURSE:  Hypertensive to SBP 200s.     PAST MEDICAL HISTORY:  Family history of colon cancer (Sibling)  Family history of cerebrovascular accident (CVA)  MEWS Score  Hemodialysis patient  Hemodialysis access, fistula mature  DVT (deep venous thrombosis)  ESRD (end stage renal disease) on dialysis  Cataract  Glaucoma  Dialysis patient  HTN (hypertension)  S/P KEVEN-BSO  AV fistula  H/O: glaucoma  H/O: hysterectomy  Hip fracture  Acquired cataract  HYPERTENSIVE CRISIS/NUMBNESS    HOME MEDICATIONS:  Home Medications:  amLODIPine 10 mg oral tablet: 1 tab(s) orally once a day (27 May 2019 16:27)  hydrALAZINE 100 mg oral tablet: 1 tab(s) orally 3 times a day (27 May 2019 16:27)  losartan 25 mg oral tablet: 1 tab(s) orally once a day (27 May 2019 16:27)  metoprolol succinate 25 mg oral tablet, extended release: 1 tab(s) orally 2 times a day (27 May 2019 16:27)  Tylenol 325 mg oral capsule: 1 cap(s) orally 3 times a day, As Needed (27 May 2019 16:27)    ALLERGIES:  Mushrooms (Anaphylaxis)  penicillin (Rash)      VITALS/DATA/ORDERS: [x] Reviewed  Vital Signs Last 24 Hrs  T(C): 35.9 (17 Oct 2019 13:10), Max: 35.9 (17 Oct 2019 13:10)  T(F): 96.7 (17 Oct 2019 13:10), Max: 96.7 (17 Oct 2019 13:10)  HR: 72 (17 Oct 2019 13:10) (72 - 72)  BP: 201/91 (17 Oct 2019 13:10) (201/91 - 201/91)  BP(mean): --  RR: 20 (17 Oct 2019 13:10) (20 - 20)  SpO2: 99% (17 Oct 2019 13:10) (99% - 99%)    CAPILLARY BLOOD GLUCOSE    CT Head:   No hemorrhage    EXAMINATION: Assisted by (OSH staff)  NIHSS: 1    1A: Level of consciousness       0= Alert; keenly responsive  1B: Ask month and age       0= Both questions right  1C: "Blink eyes" and "Squeeze Hands"       0= Performs both    2: Horizontal EOMs       0= Normal    3: Visual fields       0= No visual loss    4: Facial palsy (use grimace if obtunded)       0= Normal symmetry    5A: Left arm motor drift (count out loud and use fingers to show count)       0= No drift x 10 seconds    5B: Right arm motor drift       0= No drift x 10 seconds    6A: Left leg motor drift       0= No drift x 10 seconds    6B: Right leg motor drift       0= No drift x 10 seconds    7: Limb ataxia (FNF/heel-shin)       0= No ataxia    8: Sensation       0= Normal, no sensory loss    9: Language/aphasia- describe the scene (on megan); name the items; read the sentences (on megan)       0= Normal, no aphasia    10: Dysarthria- read the words       +1= mild-moderate dysarthria: slurring but can be understood    11: Extinction/inattention       0= No abnormality    IV rTPA INCLUSION CRITERIA  [x] Symptoms suggestive of ischemic stroke that are deemed to be disabling, regardless of improvement   [] Able to initiate treatment within [x] 3 hours [] 4.5 hours of time last known well    IV rTPA CONTRAINDICATIONS  [] None    EXCLUSION CRITERIA:  Absolute Contraindications  [x] Uncontrolled hypertension at the start of treatment with tPA (SBP>185 mm Hg and/or DBP>110 mm Hg)  [] Evidence of intracranial hemorrhage on pretreatment evaluation  [] Suspicion of subarachnoid hemorrhage  [] Recent intracranial surgery or serious head trauma within 3 months  [] Major surgeries and/or major trauma within 15 days  [] Active internal bleeding within 21 days  [] Intracranial neoplasm, AVM or aneurysm  [] Known bleeding diathesis (including INR>1.7, heparin within last 12 hours and a PTT > 1.5 x normal, platelet count < 100,000)  [] Acute pericarditis or infective endocarditis  [] Patient or family declines and understands risks and benefits of treatment.  Additional exclusion criteria between 3 and 4.5 hours:  [] Age >80 years  [] Severe stroke (NIHSS > 25)  [] History of diabetes and prior stroke  [] Taking an oral anticoagulant regardless of INR  Relative Contraindications/ Warnings:  [] Glucose < 50 or > 400 mg/dL at time of bolus  [] Arterial puncture in non-compressible site  [] Minor or rapidly improving stroke within previous 7 days  [] Recent MI within 3 months  [] Previous stroke within the past 3 months  [] History of intracranial hemorrhage  [] Seizure at onset of stroke  [] Life expectancy < 1 year or severe co-morbid illness  [] Pregnancy    RISKS/BENEFITS DISCUSSION:  The risks and benefits of IV rTPA administration was discussed with patient/family in presence of OSH staff.  Current treatment recommendations for eligible patients with acute ischemic stroke have proven highly beneficial with acceptable risk. Complications related to intravenous r-tPA include symptomatic intracranial hemorrhage, major systemic hemorrhage and angioedema in approximately 6%, 2%, and 5% of patients, respectively. There is a 2.8% risk of intracerebral bleeding in patients treated in the 3-4.5 hour window (compared to 0.2% not treated with r-tPA) according to the ECASS III Study with no increase in mortality compared to placebo groups. Early Alteplase IV r-tPA is the standard of care for acute stroke patients. Rapid intervention is critical to successful treatment.

## 2019-10-18 ENCOUNTER — TRANSCRIPTION ENCOUNTER (OUTPATIENT)
Age: 74
End: 2019-10-18

## 2019-10-18 DIAGNOSIS — I10 ESSENTIAL (PRIMARY) HYPERTENSION: ICD-10-CM

## 2019-10-18 DIAGNOSIS — G45.9 TRANSIENT CEREBRAL ISCHEMIC ATTACK, UNSPECIFIED: ICD-10-CM

## 2019-10-18 LAB
ANION GAP SERPL CALC-SCNC: 8 MMOL/L — SIGNIFICANT CHANGE UP (ref 5–17)
BUN SERPL-MCNC: 34 MG/DL — HIGH (ref 7–18)
CALCIUM SERPL-MCNC: 9.2 MG/DL — SIGNIFICANT CHANGE UP (ref 8.4–10.5)
CHLORIDE SERPL-SCNC: 98 MMOL/L — SIGNIFICANT CHANGE UP (ref 96–108)
CK MB BLD-MCNC: 2.6 % — SIGNIFICANT CHANGE UP (ref 0–3.5)
CK MB CFR SERPL CALC: 2.5 NG/ML — SIGNIFICANT CHANGE UP (ref 0–3.6)
CK SERPL-CCNC: 95 U/L — SIGNIFICANT CHANGE UP (ref 21–215)
CO2 SERPL-SCNC: 28 MMOL/L — SIGNIFICANT CHANGE UP (ref 22–31)
CREAT SERPL-MCNC: 7.32 MG/DL — HIGH (ref 0.5–1.3)
GLUCOSE BLDC GLUCOMTR-MCNC: 127 MG/DL — HIGH (ref 70–99)
GLUCOSE SERPL-MCNC: 114 MG/DL — HIGH (ref 70–99)
PHOSPHATE SERPL-MCNC: 5.4 MG/DL — HIGH (ref 2.5–4.5)
POTASSIUM SERPL-MCNC: 4.7 MMOL/L — SIGNIFICANT CHANGE UP (ref 3.5–5.3)
POTASSIUM SERPL-SCNC: 4.7 MMOL/L — SIGNIFICANT CHANGE UP (ref 3.5–5.3)
SODIUM SERPL-SCNC: 134 MMOL/L — LOW (ref 135–145)
TROPONIN I SERPL-MCNC: <0.015 NG/ML — SIGNIFICANT CHANGE UP (ref 0–0.04)

## 2019-10-18 RX ORDER — METOPROLOL TARTRATE 50 MG
1 TABLET ORAL
Qty: 0 | Refills: 0 | DISCHARGE

## 2019-10-18 RX ORDER — ACETAMINOPHEN 500 MG
1 TABLET ORAL
Qty: 0 | Refills: 0 | DISCHARGE

## 2019-10-18 RX ORDER — LABETALOL HCL 100 MG
1 TABLET ORAL
Qty: 90 | Refills: 0
Start: 2019-10-18 | End: 2019-11-16

## 2019-10-18 RX ADMIN — Medication 100 MILLIGRAM(S): at 13:17

## 2019-10-18 RX ADMIN — Medication 100 MILLIGRAM(S): at 22:39

## 2019-10-18 RX ADMIN — ISOSORBIDE MONONITRATE 10 MILLIGRAM(S): 60 TABLET, EXTENDED RELEASE ORAL at 06:02

## 2019-10-18 RX ADMIN — Medication 10 MILLIGRAM(S): at 06:01

## 2019-10-18 RX ADMIN — HEPARIN SODIUM 5000 UNIT(S): 5000 INJECTION INTRAVENOUS; SUBCUTANEOUS at 13:17

## 2019-10-18 RX ADMIN — HEPARIN SODIUM 5000 UNIT(S): 5000 INJECTION INTRAVENOUS; SUBCUTANEOUS at 22:39

## 2019-10-18 RX ADMIN — Medication 100 MILLIGRAM(S): at 06:04

## 2019-10-18 RX ADMIN — AMLODIPINE BESYLATE 10 MILLIGRAM(S): 2.5 TABLET ORAL at 06:01

## 2019-10-18 RX ADMIN — Medication 100 MILLIGRAM(S): at 06:02

## 2019-10-18 RX ADMIN — HEPARIN SODIUM 5000 UNIT(S): 5000 INJECTION INTRAVENOUS; SUBCUTANEOUS at 06:02

## 2019-10-18 NOTE — SWALLOW BEDSIDE ASSESSMENT ADULT - SLP PERTINENT HISTORY OF CURRENT PROBLEM
72 y/o F from home with PMH of ESRD on HD (MWF via right upper arm A-V graft) x 4 years, HTN PSHx right hip and femure fracture s/p replacement, came to ED with complaint of facial numbness on right side and tongue heaviness with speech changes starting in the morning. Patient checked her BP at home which was 210s. She took her Bp medications like amlodipine 10mg, metoprolol 25 mg and doubled the dose of her morning hydralazine to 200mg. Her symptoms did not improve after medication so she came in to ED. In Ed Code stroke was called. She had a CT head stroke protocol which did not show any bleed or stroke. Patient's Initially vitals were significant for /91 HR 72 saO2 99% on room air. She received a dose of labetalol 10mg Iv push and hydralazine 10mg IV push w/o significant improvement in her Bp. ICU was consulted for concern of Hypertensive emergency.

## 2019-10-18 NOTE — PROGRESS NOTE ADULT - ATTENDING COMMENTS
Agreed and concurred on the above.    Pt reports complete resolution of presenting neuro- deficits.  SBP improved, at present 167. Pending cardiac consult and stabilized bp will proceed with d/c planning on current BP medication (if deemed no alternative to Hydralazine can be trailed, given concern for potential rebound HTN on missed dosage).

## 2019-10-18 NOTE — DISCHARGE NOTE PROVIDER - NSDCCPCAREPLAN_GEN_ALL_CORE_FT
PRINCIPAL DISCHARGE DIAGNOSIS  Diagnosis: TIA (transient ischemic attack)  Assessment and Plan of Treatment: You came in with complaint of facial numbness on right side and tongue heaviness with inability to speak started in the morning before coming to ED. You had elevated BP. CT Head was negative for any intracranial pathology. Continue with medications a sprescribed. Follow with your nephrologist as an outpatient.      SECONDARY DISCHARGE DIAGNOSES  Diagnosis: Hypertensive emergency  Assessment and Plan of Treatment: You were started on labetalol 100 mg tid, hydralazine 100 tid, enalapril 10 od, amlodipine 10 od, isosorbide mononitrate 10 bid. Continue with the medications a sprescribed. Follow with your PCP as an outpatient.       Diagnosis: ESRD (end stage renal disease) on dialysis  Assessment and Plan of Treatment: Continue with your dialysis sessions. You got dialsysis sessions while you were in the hospital. See your nephrologist regularly. PRINCIPAL DISCHARGE DIAGNOSIS  Diagnosis: TIA (transient ischemic attack)  Assessment and Plan of Treatment: You came in with complaint of facial numbness on right side and tongue heaviness with inability to speak started in the morning before coming to ED. You had elevated BP. CT Head was negative for any intracranial pathology. Continue with medications as prescribed. Follow with your nephrologist as an outpatient.      SECONDARY DISCHARGE DIAGNOSES  Diagnosis: Hypertensive emergency  Assessment and Plan of Treatment: You were started on labetalol, hydralazine, enalapril, amlodipine, isosorbide mononitrate . Continue with the medications as prescribed. Follow with your PCP as an outpatient for adjustment of BP medications if needed. Eat healthy diet rich in fruits and vegetables. Avoid salty and fatty diet. Excercise regularly.       Diagnosis: ESRD (end stage renal disease) on dialysis  Assessment and Plan of Treatment: Continue with your dialysis sessions. You got dialsysis sessions while you were in the hospital. See your nephrologist regularly.

## 2019-10-18 NOTE — PROGRESS NOTE ADULT - ASSESSMENT
Patient is a 72 y/o F from home with PMH of ESRD on HD (MWF via right upper arm A-V graft) x 4 years, HTN PSHx right hip and femure fracture s/p replacement, comes in with complaint of facial numbness on right side and tongue haviness with inability to speak.

## 2019-10-18 NOTE — DISCHARGE NOTE PROVIDER - HOSPITAL COURSE
Patient is a 72 y/o F from home with PMH of ESRD on HD (MWF via right upper arm A-V graft) x 4 years, HTN PSHx right hip and femure fracture s/p replacement, came in with complaint of facial numbness on right side and tongue haviness with inability to speak started in the morning before coming to ED. Patient checked her BP at home which was in 210s.    Pt was amanged for TIA and hypertensive emergency.     CT Head was negative for any intracranial changes.     In the ED, she received a dose of Iv labetolol 10mg and IV hydralazine 10mg x 1 and another dose of Labetolol 10mg Iv and 100mg po was given which improved the Bp to 203/83.    Pt was started on labetalol 100 mg tid, hydralazine 100 tid, enalapril 10 od, amlodipine 10 od, isosorbide mononitrate 10 bid. Patient is a 74 y/o F from home with PMH of ESRD on HD (MWF via right upper arm A-V graft) x 4 years, HTN PSHx right hip and femure fracture s/p replacement, came in with complaint of facial numbness on right side and tongue haviness with inability to speak started in the morning before coming to ED. Patient checked her BP at home which was in 210s.    Pt was amanged for TIA and hypertensive emergency.     CT Head was negative for any intracranial changes.     In the ED, she received a dose of Iv labetolol 10mg and IV hydralazine 10mg x 1 and another dose of Labetolol 10mg Iv and 100mg po was given which improved the Bp to 203/83.    Pt was started on labetalol 200 mg tid, hydralazine 100 tid, enalapril 10 od, amlodipine 10 od, isosorbide mononitrate 30 bid.

## 2019-10-18 NOTE — DISCHARGE NOTE PROVIDER - NSDCFUSCHEDAPPT_GEN_ALL_CORE_FT
ARIANNE MCNAMARA ; 12/17/2019 ; [facility] [address] ; 12/17/2019 ; [facility] [address] ; 12/26/2019 ; [facility] [address] ARIANNE MCNAMARA ; 12/17/2019 ; [facility] [address] ; 12/17/2019 ; [facility] [address] ; 12/26/2019 ; [facility] [address] ARIANNE MCNAMARA ; 12/17/2019 ; [facility] [address] ; 12/17/2019 ; [facility] [address] ; 12/26/2019 ; [facility] [address] ARIANNE MCNAMARA ; 12/17/2019 ; [facility] [address] ; 12/17/2019 ; [facility] [address] ; 12/26/2019 ; [facility] [address] ARIANNE MCNAMARA ; 12/17/2019 ; [facility] [address] ; 12/17/2019 ; [facility] [address] ; 12/26/2019 ; [facility] [address]

## 2019-10-18 NOTE — CONSULT NOTE ADULT - SUBJECTIVE AND OBJECTIVE BOX
This consult is incomplete  Patient is a 73y Female whom presented to the hospital with     HPI:  Patient is a 72 y/o F from home with PMH of ESRD on HD (MWF via right upper arm A-V graft) x 4 years, HTN PSHx right hip and femure fracture s/p replacement, comes in with complaint of facial numbness on right side and tongue haviness with inability to speak started 11:00 am this morning. Patient checked her BP at home which was 210s.Pt states that she took her home medications but there was no improvement in her symptoms and she came to ED. Pt was recommended for Tpa as per Tele stroke after Blood pressure stabilization but her symptoms improved and no tpa was given. pt was started on nicardipine for small duration and was later changed to her home medication with some improvement in blood pressure (17 Oct 2019 21:21)    pts current chart reviewed and case discussed with resident  pt denies pmh of renal ds, proteinuria, renal stones, recurrent uti or nephrotic edema or nephrotic syndrome  pt give pmh of retinopathy and s/p laser treatment  pt denies Nsaids use or otc other nephrotoxic supplements  PAST MEDICAL & SURGICAL HISTORY:  Hemodialysis patient  Hemodialysis access, fistula mature: JASON  DVT (deep venous thrombosis): of Left subclavian vein, 17  ESRD (end stage renal disease) on dialysis  Cataract  Glaucoma  HTN (hypertension)  S/P KEVEN-BSO: for fibroids,   AV fistula:   H/O: glaucoma: surgery,   Acquired cataract: extraction with b/l lense placement, 2016      Home Medications: Reviewed    MEDICATIONS  (STANDING):  amLODIPine   Tablet 10 milliGRAM(s) Oral daily  enalapril 10 milliGRAM(s) Oral daily  heparin  Injectable 5000 Unit(s) SubCutaneous every 8 hours  hydrALAZINE 100 milliGRAM(s) Oral three times a day  isosorbide    mononitrate Tablet (ISMO) 10 milliGRAM(s) Oral two times a day  labetalol 100 milliGRAM(s) Oral every 8 hours    MEDICATIONS  (PRN):      Allergies    Mushrooms (Anaphylaxis)  penicillin (Rash)    Intolerances        SOCIAL HISTORY:  Alcohol use [X ] No  [ ] Yes  Smoking  [ X] No  [ ] Yes  Drug Abuse [X ] No  [ ] Yes  Tattoo [X ] No  [ ] Yes  History of Blood transfusion [ X] No  [ ] Yes    FAMILY HISTORY:  Family history of colon cancer (Sibling)  Family history of cerebrovascular accident (CVA)      Diabetes [ ]  Hypertension [ ]  Kidney Stones [ ]  Heart Disease [ ]  Hyperlipidemia [ ]  Cancer [ ]    REVIEW OF SYSTEMS:  CONSTITUTIONAL: No weakness, fevers or chills  EYES/ENT: No visual changes;  No vertigo or throat pain   NECK: No pain or stiffness  RESPIRATORY: No cough, wheezing, hemoptysis; No shortness of breath  CARDIOVASCULAR: No chest pain or palpitations  GASTROINTESTINAL: No abdominal or epigastric pain. No nausea, vomiting, or hematemesis; No diarrhea or constipation. No melena or hematochezia.  GENITOURINARY: No dysuria, frequency or hematuria  NEUROLOGICAL: No numbness or weakness  SKIN: No itching, burning, rashes, or lesions   All other review of systems is negative unless indicated above    Vital Signs  T(F): 98 (10-18-19 @ 11:10), Max: 98.8 (10-18-19 @ 02:13)  HR: 66 (10-18-19 @ 11:10) (66 - 89)  BP: 170/73 (10-18-19 @ 11:10) (166/76 - 207/89)  ABP: --  RR: 18 (10-18-19 @ 11:10) (16 - 20)  SpO2: 100% (10-18-19 @ 11:10) (97% - 100%)  Wt(kg): --  CVP(cm H2O): --  CO: --  PCWP: --    I and O's:    Daily Height in cm: 172.72 (17 Oct 2019 13:10)    Daily Weight in k.6 (18 Oct 2019 05:14)    PHYSICAL EXAM:  Constitutional: well developed, well nourished  and in nad  HEENT: PERRLA,  no icteric sclera and mild pallor of conjunctiva noted  Neck: No JVD, thyromegaly or adenopathy  Respiratory: reduced air entry lower lungs with no rales, wheezing or rhonchi  Cardiovascular: S1 and S2 normally heard  Gastrointestinal: soft, nondistended, nontender and normal bowel sounds heard  Extremities: No peripheral edema or cyanosis  Neurological: A/O x 3, no focal deficits  Psychiatric: Normal mood, normal affect  : No flank tenderness  Skin: No rashes        LABS:                        11.6   3.53  )-----------( 108      ( 17 Oct 2019 13:56 )             36.8           10-18    134<L>  |  98  |  34<H>  ----------------------------<  114<H>  4.7   |  28  |  7.32<H>  10-17    134<L>  |  97  |  30<H>  ----------------------------<  101<H>  4.5   |  27  |  6.47<H>    Ca    9.2      18 Oct 2019 00:33  Ca    10.1      17 Oct 2019 14:27    TPro  7.7  /  Alb  4.0  /  TBili  0.7  /  DBili  x   /  AST  28  /  ALT  19  /  AlkPhos  75  10-17          URINE STUDIES:                    RADIOLOGY & ADDITIONAL STUDIES: Patient is a 73y Female whom presented to the hospital with Rt facial numbness and inability to speak and uncontrolled htn  ,was suspected of TIA vs CVA ,needs hd for esrd    Medical HPI:  Patient is a 72 y/o F from home with PMH of ESRD on HD (MWF via right upper arm A-V graft) x 4 years, HTN PSHx right hip and femure fracture s/p replacement, comes in with complaint of facial numbness on right side and tongue haviness with inability to speak started 11:00 am this morning. Patient checked her BP at home which was 210s.Pt states that she took her home medications but there was no improvement in her symptoms and she came to ED. Pt was recommended for Tpa as per Tele stroke after Blood pressure stabilization but her symptoms improved and no tpa was given. pt was started on nicardipine for small duration and was later changed to her home medication with some improvement in blood pressure (17 Oct 2019 21:21)  Renal HPI:  pts current chart reviewed and case discussed with resident  pt gets hd at [facility] on / schedule, last dialyzed on Wed via Rt arm AVG.  pt denies allergy to Heparin or Dialyzer as per pt and as per dialysi ctr staff.pt also denies hx of Hep B infection  pt currently denies facial numbness,weakness ,dizziness ,cp,sob ,gi symptoms or uremic symptoms like itching  pt does have mild headache.pt is able to speak normally now.    PAST MEDICAL & SURGICAL HISTORY:  Hemodialysis patient  Hemodialysis access, fistula mature: JASON  DVT (deep venous thrombosis): of Left subclavian vein, 15  ESRD (end stage renal disease) on dialysis  Cataract  Glaucoma  HTN (hypertension)  S/P KEVEN-BSO: for fibroids,   AV fistula:   H/O: glaucoma: surgery,   Acquired cataract: extraction with b/l lense placement, 2016      Home Medications: Reviewed    MEDICATIONS  (STANDING):  amLODIPine   Tablet 10 milliGRAM(s) Oral daily  enalapril 10 milliGRAM(s) Oral daily  heparin  Injectable 5000 Unit(s) SubCutaneous every 8 hours  hydrALAZINE 100 milliGRAM(s) Oral three times a day  isosorbide    mononitrate Tablet (ISMO) 10 milliGRAM(s) Oral two times a day  labetalol 100 milliGRAM(s) Oral every 8 hours    MEDICATIONS  (PRN):      Allergies    Mushrooms (Anaphylaxis)  penicillin (Rash)    Intolerances        SOCIAL HISTORY:  Alcohol use [X ] No  [ ] Yes  Smoking  [ X] No  [ ] Yes  Drug Abuse [X ] No  [ ] Yes  Tattoo [X ] No  [ ] Yes  History of Blood transfusion [ X] No  [ ] Yes    FAMILY HISTORY:  Family history of colon cancer (Sibling)  Family history of cerebrovascular accident (CVA)        REVIEW OF SYSTEMS:  CONSTITUTIONAL: No weakness, fevers or chills  EYES/ENT: No visual changes;  No vertigo or throat pain   NECK: No pain or stiffness  RESPIRATORY: No cough, wheezing, hemoptysis; No shortness of breath  CARDIOVASCULAR: No chest pain or palpitations  GASTROINTESTINAL: No abdominal or epigastric pain. No nausea, vomiting, or hematemesis; No diarrhea or constipation. No melena or hematochezia.  GENITOURINARY: No dysuria, frequency or hematuria  NEUROLOGICAL: No numbness or weakness  +headache  SKIN: No itching, burning, rashes, or lesions   All other review of systems is negative unless indicated above    Vital Signs  T(F): 98 (10-18-19 @ 11:10), Max: 98.8 (10-18-19 @ 02:13)  HR: 66 (10-18-19 @ 11:10) (66 - 89)  BP: 170/73 (10-18-19 @ 11:10) (166/76 - 207/89)  ABP: --  RR: 18 (10-18-19 @ 11:10) (16 - 20)  SpO2: 100% (10-18-19 @ 11:10) (97% - 100%)  Wt(kg): --  CVP(cm H2O): --  CO: --  PCWP: --    I and O's:    Daily Height in cm: 172.72 (17 Oct 2019 13:10)    Daily Weight in k.6 (18 Oct 2019 05:14)    PHYSICAL EXAM:  Constitutional: well developed, well nourished  and in nad  HEENT: PERRLA,  no icteric sclera and mild pallor of conjunctiva noted  Neck: No JVD or  thyromegaly   Respiratory: reduced air entry lower lungs with no rales, wheezing or rhonchi  Cardiovascular: S1 and S2 normally heard  Gastrointestinal: soft, nondistended, nontender and normal bowel sounds heard  Extremities: No peripheral edema or cyanosis  pt has palpable thrill over Rt upper arm AVG   Neurological: A/O x 3, no focal deficits  Psychiatric: Normal mood, normal affect  Skin: No rashes        LABS:                        11.6   3.53  )-----------( 108      ( 17 Oct 2019 13:56 )             36.8           10-18    134<L>  |  98  |  34<H>  ----------------------------<  114<H>  4.7   |  28  |  7.32<H>  10-17    134<L>  |  97  |  30<H>  ----------------------------<  101<H>  4.5   |  27  |  6.47<H>    Ca    9.2      18 Oct 2019 00:33  Ca    10.1      17 Oct 2019 14:27    TPro  7.7  /  Alb  4.0  /  TBili  0.7  /  DBili  x   /  AST  28  /  ALT  19  /  AlkPhos  75  10-17              RADIOLOGY & ADDITIONAL STUDIES:  < from: Xray Chest 1 View- PORTABLE-Urgent (10.17.19 @ 14:57) >  EXAM:  XR CHEST PORTABLE URGENT 1V                            PROCEDURE DATE:  10/17/2019          INTERPRETATION:  AP semierect chest on 2019 at 2:25 PM. This   is a code stroke.    Gross heart enlargement again noted.    On May 27 of this year there were also small basilar effusions with   adjacent atelectasis.    Presently lungs are clear. Clips in the right upper arm again noted.    IMPRESSION: Heart enlargement again noted. No acute lung finding at this   time.    < end of copied text >  < from: CT Brain Stroke Protocol (10.17.19 @ 13:26) >  EXAM:  CT BRAIN STROKE PROTOCOL                            PROCEDURE DATE:  10/17/2019          INTERPRETATION:  Head CT without IV contrast     Clinical Indication: Dysarthria    Technique: Axial CT images of the head are obtained from the skull base   to the vertex without IV contrast.    Comparison: None.    Findings: There is no acute intracranial hemorrhage. No CT evidence for   an acute territorial infarct. Patchy hypodensities in the periventricular   and subcortical white matter bilaterally, compatible with chronic small   vessel ischemic disease. There is no space-occupying lesion, midline   shift, or herniation. The ventricles maintain normal size, shape, and   location. The sulci are symmetric and normal for age bilaterally. No   extra-axial fluid collection. The gray-white differentiation is   preserved. The visualized orbits appear grossly unremarkable. 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    Impression: No acute intracranial hemorrhage. No CT evidence for an acute   territorial infarct.    Chronic small vessel ischemic disease.    If clinically indicated, brain MR may be pursued for further evaluation.    < end of copied text >

## 2019-10-18 NOTE — PROGRESS NOTE ADULT - SUBJECTIVE AND OBJECTIVE BOX
PGY1 Note discussed with supervising resident and primary attending.    Patient is a 73y old  Female who presents with a chief complaint of Hypertensive urgency (17 Oct 2019 17:15)      INTERVAL HPI/OVERNIGHT EVENTS: Patient seen and examined at bedside. No acute events.     MEDICATIONS  (STANDING):  amLODIPine   Tablet 10 milliGRAM(s) Oral daily  enalapril 10 milliGRAM(s) Oral daily  heparin  Injectable 5000 Unit(s) SubCutaneous every 8 hours  hydrALAZINE 100 milliGRAM(s) Oral three times a day  isosorbide    mononitrate Tablet (ISMO) 10 milliGRAM(s) Oral two times a day  labetalol 100 milliGRAM(s) Oral every 8 hours    MEDICATIONS  (PRN):      Allergies    Mushrooms (Anaphylaxis)  penicillin (Rash)    Intolerances        REVIEW OF SYSTEMS:  CONSTITUTIONAL: No fever, weight loss, or fatigue  RESPIRATORY: No cough, wheezing, chills or hemoptysis; No shortness of breath  CARDIOVASCULAR: No chest pain, palpitations, dizziness, or leg swelling  GASTROINTESTINAL: No abdominal or epigastric pain. No nausea, vomiting, or hematemesis; No diarrhea or constipation. No melena or hematochezia.  NEUROLOGICAL: No headaches, memory loss, loss of strength, numbness, or tremors  SKIN: No itching, burning, rashes, or lesions     Vital Signs Last 24 Hrs  T(C): 37 (18 Oct 2019 05:14), Max: 37.1 (17 Oct 2019 23:43)  T(F): 98.6 (18 Oct 2019 05:14), Max: 98.8 (18 Oct 2019 02:13)  HR: 81 (18 Oct 2019 05:14) (71 - 89)  BP: 189/80 (18 Oct 2019 05:14) (166/76 - 207/89)  BP(mean): --  RR: 18 (18 Oct 2019 05:14) (16 - 20)  SpO2: 100% (18 Oct 2019 05:14) (97% - 100%)    PHYSICAL EXAM:  GENERAL: NAD, well-groomed, well-developed  HEAD:  Atraumatic, Normocephalic  EYES: EOMI, PERRLA, conjunctiva and sclera clear  NECK: Supple, No JVD, Normal thyroid  CHEST/LUNG: Clear to percussion bilaterally; No rales, rhonchi, wheezing, or rubs  HEART: Regular rate and rhythm; No murmurs, rubs, or gallops  ABDOMEN: Soft, Nontender, Nondistended; Bowel sounds present  NERVOUS SYSTEM:  Alert & Oriented X3, Good concentration; Motor Strength 5/5 B/L   EXTREMITIES:  2+ Peripheral Pulses, No clubbing, cyanosis, or edema    LABS:                        11.6   3.53  )-----------( 108      ( 17 Oct 2019 13:56 )             36.8     10-18    134<L>  |  98  |  34<H>  ----------------------------<  114<H>  4.7   |  28  |  7.32<H>    Ca    9.2      18 Oct 2019 00:33    TPro  7.7  /  Alb  4.0  /  TBili  0.7  /  DBili  x   /  AST  28  /  ALT  19  /  AlkPhos  75  10-17    PT/INR - ( 17 Oct 2019 13:56 )   PT: 10.9 sec;   INR: 0.98 ratio         PTT - ( 17 Oct 2019 13:56 )  PTT:40.8 sec    CAPILLARY BLOOD GLUCOSE          RADIOLOGY & ADDITIONAL TESTS:    Imaging Personally Reviewed:  [x ] YES  [ ] NO    Consultant(s) Notes Reviewed:  [x ] YES  [ ] NO

## 2019-10-18 NOTE — SWALLOW BEDSIDE ASSESSMENT ADULT - SWALLOW EVAL: DIAGNOSIS
Pt p/w intact oral management and timely pharyngeal swallow for Regular solids and Thin liquids. No overt clinical s&s of penetration/aspiration noted at time of this exam.

## 2019-10-18 NOTE — SWALLOW BEDSIDE ASSESSMENT ADULT - COMMENTS
Pt received upright at side of bed.  bedside. Pt is AOx3, speech is clear, following directives. Upper and lower dentures visualized. Labial, lingual, velar structures appear symmetrical at rest, and with WFL ROM and strength.    PATIENT RECEIVED WRITTEN TEACHING ON:   1. Signs and symptoms of stroke.  2. What to do if they are having a stroke.  3. Activation of 911.  4. Modifying risk factors.  5. Discharge medications.  6. The importance of compliance and the need for follow up.

## 2019-10-19 LAB
ANION GAP SERPL CALC-SCNC: 7 MMOL/L — SIGNIFICANT CHANGE UP (ref 5–17)
BASOPHILS # BLD AUTO: 0.01 K/UL — SIGNIFICANT CHANGE UP (ref 0–0.2)
BASOPHILS NFR BLD AUTO: 0.3 % — SIGNIFICANT CHANGE UP (ref 0–2)
BUN SERPL-MCNC: 22 MG/DL — HIGH (ref 7–18)
CALCIUM SERPL-MCNC: 9 MG/DL — SIGNIFICANT CHANGE UP (ref 8.4–10.5)
CALCIUM SERPL-MCNC: 9.2 MG/DL — SIGNIFICANT CHANGE UP (ref 8.4–10.5)
CHLORIDE SERPL-SCNC: 98 MMOL/L — SIGNIFICANT CHANGE UP (ref 96–108)
CO2 SERPL-SCNC: 31 MMOL/L — SIGNIFICANT CHANGE UP (ref 22–31)
CREAT SERPL-MCNC: 5.11 MG/DL — HIGH (ref 0.5–1.3)
EOSINOPHIL # BLD AUTO: 0.13 K/UL — SIGNIFICANT CHANGE UP (ref 0–0.5)
EOSINOPHIL NFR BLD AUTO: 4.2 % — SIGNIFICANT CHANGE UP (ref 0–6)
GLUCOSE SERPL-MCNC: 90 MG/DL — SIGNIFICANT CHANGE UP (ref 70–99)
HBV CORE AB SER-ACNC: SIGNIFICANT CHANGE UP
HBV SURFACE AB SER-ACNC: 32.2 MIU/ML — SIGNIFICANT CHANGE UP
HBV SURFACE AB SER-ACNC: REACTIVE
HBV SURFACE AG SER-ACNC: SIGNIFICANT CHANGE UP
HCT VFR BLD CALC: 31.5 % — LOW (ref 34.5–45)
HGB BLD-MCNC: 10.1 G/DL — LOW (ref 11.5–15.5)
IMM GRANULOCYTES NFR BLD AUTO: 0.3 % — SIGNIFICANT CHANGE UP (ref 0–1.5)
LYMPHOCYTES # BLD AUTO: 0.8 K/UL — LOW (ref 1–3.3)
LYMPHOCYTES # BLD AUTO: 25.9 % — SIGNIFICANT CHANGE UP (ref 13–44)
MCHC RBC-ENTMCNC: 27.7 PG — SIGNIFICANT CHANGE UP (ref 27–34)
MCHC RBC-ENTMCNC: 32.1 GM/DL — SIGNIFICANT CHANGE UP (ref 32–36)
MCV RBC AUTO: 86.5 FL — SIGNIFICANT CHANGE UP (ref 80–100)
MONOCYTES # BLD AUTO: 0.36 K/UL — SIGNIFICANT CHANGE UP (ref 0–0.9)
MONOCYTES NFR BLD AUTO: 11.7 % — SIGNIFICANT CHANGE UP (ref 2–14)
NEUTROPHILS # BLD AUTO: 1.78 K/UL — LOW (ref 1.8–7.4)
NEUTROPHILS NFR BLD AUTO: 57.6 % — SIGNIFICANT CHANGE UP (ref 43–77)
NRBC # BLD: 0 /100 WBCS — SIGNIFICANT CHANGE UP (ref 0–0)
PLATELET # BLD AUTO: 143 K/UL — LOW (ref 150–400)
POTASSIUM SERPL-MCNC: 4.3 MMOL/L — SIGNIFICANT CHANGE UP (ref 3.5–5.3)
POTASSIUM SERPL-SCNC: 4.3 MMOL/L — SIGNIFICANT CHANGE UP (ref 3.5–5.3)
PTH-INTACT FLD-MCNC: 246 PG/ML — HIGH (ref 15–65)
RBC # BLD: 3.64 M/UL — LOW (ref 3.8–5.2)
RBC # FLD: 16.1 % — HIGH (ref 10.3–14.5)
SODIUM SERPL-SCNC: 136 MMOL/L — SIGNIFICANT CHANGE UP (ref 135–145)
WBC # BLD: 3.09 K/UL — LOW (ref 3.8–10.5)
WBC # FLD AUTO: 3.09 K/UL — LOW (ref 3.8–10.5)

## 2019-10-19 RX ORDER — SEVELAMER CARBONATE 2400 MG/1
1600 POWDER, FOR SUSPENSION ORAL
Refills: 0 | Status: DISCONTINUED | OUTPATIENT
Start: 2019-10-19 | End: 2019-10-21

## 2019-10-19 RX ORDER — LABETALOL HCL 100 MG
200 TABLET ORAL THREE TIMES A DAY
Refills: 0 | Status: DISCONTINUED | OUTPATIENT
Start: 2019-10-19 | End: 2019-10-21

## 2019-10-19 RX ADMIN — ISOSORBIDE MONONITRATE 10 MILLIGRAM(S): 60 TABLET, EXTENDED RELEASE ORAL at 06:19

## 2019-10-19 RX ADMIN — HEPARIN SODIUM 5000 UNIT(S): 5000 INJECTION INTRAVENOUS; SUBCUTANEOUS at 14:20

## 2019-10-19 RX ADMIN — Medication 1 TABLET(S): at 22:23

## 2019-10-19 RX ADMIN — Medication 100 MILLIGRAM(S): at 14:20

## 2019-10-19 RX ADMIN — Medication 100 MILLIGRAM(S): at 22:24

## 2019-10-19 RX ADMIN — Medication 200 MILLIGRAM(S): at 22:24

## 2019-10-19 RX ADMIN — HEPARIN SODIUM 5000 UNIT(S): 5000 INJECTION INTRAVENOUS; SUBCUTANEOUS at 06:19

## 2019-10-19 RX ADMIN — Medication 10 MILLIGRAM(S): at 06:19

## 2019-10-19 RX ADMIN — Medication 200 MILLIGRAM(S): at 15:56

## 2019-10-19 RX ADMIN — HEPARIN SODIUM 5000 UNIT(S): 5000 INJECTION INTRAVENOUS; SUBCUTANEOUS at 22:23

## 2019-10-19 RX ADMIN — Medication 100 MILLIGRAM(S): at 06:19

## 2019-10-19 RX ADMIN — ISOSORBIDE MONONITRATE 10 MILLIGRAM(S): 60 TABLET, EXTENDED RELEASE ORAL at 18:17

## 2019-10-19 RX ADMIN — AMLODIPINE BESYLATE 10 MILLIGRAM(S): 2.5 TABLET ORAL at 06:19

## 2019-10-19 RX ADMIN — SEVELAMER CARBONATE 1600 MILLIGRAM(S): 2400 POWDER, FOR SUSPENSION ORAL at 22:23

## 2019-10-19 NOTE — PROGRESS NOTE ADULT - ASSESSMENT
Patient is a 74 y/o F from home with PMH of ESRD on HD (MWF via right upper arm A-V graft) x 4 years, HTN PSHx right hip and femure fracture s/p replacement, comes in with complaint of facial numbness on right side and tongue haviness with inability to speak.    Day 2- Pt reports complete resolution of presenting neuro- deficits.  SBP improved, at present 167. Pending cardiac consult and stabilized bp will proceed with d/c planning on current BP medication (if deemed no alternative to Hydralazine can be trailed, given concern for potential rebound HTN on missed dosage).

## 2019-10-19 NOTE — PROGRESS NOTE ADULT - SUBJECTIVE AND OBJECTIVE BOX
ARIANNE MCNAMARA  MR# [id number]  73yFemale        Patient is a 73y old  Female who presents with a chief complaint of Hypertensive urgency (17 Oct 2019 17:15)      INTERVAL HPI/OVERNIGHT EVENTS:  Patient seen and examined at bedside. No notations of chest pain, palpitation, SOB, orthopnea, nausea, vomiting or abdominal pain.    ALLERGIES  Mushrooms (Anaphylaxis)  penicillin (Rash)      MEDICATIONS  amLODIPine   Tablet 10 milliGRAM(s) Oral daily  enalapril 10 milliGRAM(s) Oral daily  heparin  Injectable 5000 Unit(s) SubCutaneous every 8 hours  hydrALAZINE 100 milliGRAM(s) Oral three times a day  isosorbide    mononitrate Tablet (ISMO) 10 milliGRAM(s) Oral two times a day  labetalol 200 milliGRAM(s) Oral three times a day  Nephro-radha 1 Tablet(s) Oral daily  sevelamer carbonate 1600 milliGRAM(s) Oral three times a day with meals              REVIEW OF SYSTEMS:  CONSTITUTIONAL: No fever, weight loss, or fatigue  EYES: No eye pain, visual disturbances, or discharge  ENT:  No difficulty hearing, tinnitus, vertigo; No sinus or throat pain  NECK: No pain or stiffness  RESPIRATORY: No cough, wheezing, chills or hemoptysis; No Shortness of Breath  CARDIOVASCULAR: No chest pain, palpitations, passing out, dizziness, or leg swelling  GASTROINTESTINAL: No abdominal or epigastric pain. No nausea, vomiting, or hematemesis; No diarrhea or constipation. No melena or hematochezia.  GENITOURINARY: No dysuria, frequency, hematuria, or incontinence  NEUROLOGICAL: No headaches, memory loss, loss of strength, numbness, or tremors  SKIN: No itching, burning, rashes, or lesions   LYMPH Nodes: No enlarged glands  ENDOCRINE: No heat or cold intolerance; No hair loss  MUSCULOSKELETAL: No joint pain or swelling; No muscle, back, or extremity pain  PSYCHIATRIC: No depression, anxiety, mood swings, or difficulty sleeping  HEME/LYMPH: No easy bruising, or bleeding gums  ALLERGY AND IMMUNOLOGIC: No hives or eczema	    [ ] All others negative	  [ ] Unable to obtain      T(C): 36.7 (10-19-19 @ 18:47), Max: 37.5 (10-19-19 @ 14:26)  T(F): 98.1 (10-19-19 @ 18:47), Max: 99.5 (10-19-19 @ 14:26)  HR: 70 (10-19-19 @ 18:47) (67 - 173)  BP: 177/81 (10-19-19 @ 18:47) (153/67 - 198/78)  RR: 18 (10-19-19 @ 18:47) (18 - 18)  SpO2: 97% (10-19-19 @ 18:47) (97% - 100%)  Wt(kg): --    I&O's Summary        PHYSICAL EXAM:  A X O x  HEAD:  Atraumatic, Normocephalic  EYES: EOMI, PERRLA, conjunctiva and sclera clear  NECK: Supple, No JVD, Normal thyroid  Resp: CTAB, No crackles, wheezing,   CVS: Regular rate and rhythm; No discernable murmurs, rubs, or gallops  ABD: Soft, Nontender, Nondistended; Bowel sounds present  EXTREMITIES:  2+ Peripheral Pulses, No edema  LYMPH: No dicernable lymphadenopathy noted  GENERAL: NAD, well-groomed, well-developed      LABS:                        10.1   3.09  )-----------( 143      ( 19 Oct 2019 07:39 )             31.5     10-19    136  |  98  |  22<H>  ----------------------------<  90  4.3   |  31  |  5.11<H>    Ca    9.0      19 Oct 2019 07:39  Phos  5.4     10-18          CAPILLARY BLOOD GLUCOSE      POCT Blood Glucose.: 127 mg/dL (18 Oct 2019 21:40)      Troponins:  ProBNP:  Lipid Profile:   HgA1c:  TSH:           RADIOLOGY & ADDITIONAL TESTS:    Imaging Personally Reviewed:  [ ] YES  [ ] NO      Consultant(s) Notes Reviewed:  [x ] YES  [ ] NO    Care Discussed with Consultants/Other Providers [ x] YES  [ ] NO          PAST MEDICAL & SURGICAL HISTORY:  Hemodialysis patient  Hemodialysis access, fistula mature: [facility]  DVT (deep venous thrombosis): of Left subclavian vein, 06/12/17  ESRD (end stage renal disease) on dialysis  Cataract  Glaucoma  HTN (hypertension)  S/P KEVEN-BSO: for fibroids, 2012  AV fistula: 2015  H/O: glaucoma: surgery, 2002  Acquired cataract: extraction with b/l lense placement, 2016        Hypertensive emergency  Family history of colon cancer (Sibling)  Family history of cerebrovascular accident (CVA)  Handoff  MEWS Score  Hemodialysis patient  Hemodialysis access, fistula mature  DVT (deep venous thrombosis)  ESRD (end stage renal disease) on dialysis  Cataract  Glaucoma  Dialysis patient  HTN (hypertension)  TIA (transient ischemic attack)  Hypertensive emergency  Hypertension  TIA (transient ischemic attack)  Prophylactic measure  DVT (deep venous thrombosis)  Stroke  ESRD (end stage renal disease) on dialysis  Hypertensive emergency  S/P KEVEN-BSO  AV fistula  H/O: glaucoma  H/O: hysterectomy  Hip fracture  Acquired cataract  HYPERTENSIVE CRISIS/NUMBNESS  90+  ESRD (end stage renal disease) on dialysis  Hypertensive emergency

## 2019-10-19 NOTE — PROGRESS NOTE ADULT - SUBJECTIVE AND OBJECTIVE BOX
This note is incomplete  pt seen and examined.pts current chart reviewed and case discussed with resident covering.    SUBJECTIVE:  pt feels fine and denies cp,sob,gi or gu/uremic  symptons    REVIEW OF SYSTEMS:  CONSTITUTIONAL: No weakness, fevers or chills  EYES/ENT: No visual changes;  No vertigo or throat pain   NECK: No pain or stiffness  RESPIRATORY: No cough, wheezing, hemoptysis; No shortness of breath  CARDIOVASCULAR: No chest pain or palpitations  GASTROINTESTINAL: No abdominal or epigastric pain. No nausea, vomiting, or hematemesis; No diarrhea or constipation. No melena or hematochezia.  GENITOURINARY: No dysuria, frequency , hematuria, flank pain or nocturia  NEUROLOGICAL: No numbness or weakness  SKIN: No itching, burning, rashes, or lesions   All other review of systems is negative unless indicated above    Current meds:    amLODIPine   Tablet 10 milliGRAM(s) Oral daily  enalapril 10 milliGRAM(s) Oral daily  heparin  Injectable 5000 Unit(s) SubCutaneous every 8 hours  hydrALAZINE 100 milliGRAM(s) Oral three times a day  isosorbide    mononitrate Tablet (ISMO) 10 milliGRAM(s) Oral two times a day  labetalol 200 milliGRAM(s) Oral three times a day      Vital Signs    T(F): 99.5 (10-19-19 @ 14:26), Max: 99.5 (10-19-19 @ 14:26)  HR: 173 (10-19-19 @ 14:26) (67 - 173)  BP: 173/67 (10-19-19 @ 14:26) (153/67 - 198/78)  ABP: --  RR: 18 (10-19-19 @ 14:26) (17 - 19)  SpO2: 100% (10-19-19 @ 14:26) (99% - 100%)  Wt(kg): --  CVP(cm H2O): --  CO: --  PCWP: --    I and O's:    Daily     Daily Weight in k.7 (19 Oct 2019 02:29)    PHYSICAL EXAM:  Constitutional: well developed, well nourished  and in nad  HEENT: PERRLA,  no icteric sclera and mild pallor of conjunctiva noted  Neck: No JVD, thyromegaly or adenopathy  Respiratory: reduced air entry lower lungs with no rales, wheezing or rhonchi  Cardiovascular: S1 and S2 normally heard  Gastrointestinal: soft, nondistended, nontender and normal bowel sounds heard  Extremities: No peripheral edema or cyanosis  Neurological: A/O x 3, no focal deficits  : No flank or cva tenderness palpated.  Skin: No rashes      LABS:    CBC:                          10.1   3.09  )-----------( 143      ( 19 Oct 2019 07:39 )             31.5           BMP:    10-19    136  |  98  |  22<H>  ----------------------------<  90  4.3   |  31  |  5.11<H>  10-18    134<L>  |  98  |  34<H>  ----------------------------<  114<H>  4.7   |  28  |  7.32<H>  10-17    134<L>  |  97  |  30<H>  ----------------------------<  101<H>  4.5   |  27  |  6.47<H>    Ca    9.0      19 Oct 2019 07:39  Ca    9.2      18 Oct 2019 00:33  Ca    10.1      17 Oct 2019 14:27  Phos  5.4     10-18    TPro  7.7  /  Alb  4.0  /  TBili  0.7  /  DBili  x   /  AST  28  /  ALT  19  /  AlkPhos  75  10-17      Intact PTH: 246 pg/mL (10-19 @ 01:16)      URINE STUDIES:                        RADIOLOGY & ADDITIONAL STUDIES: pt seen and examined.    SUBJECTIVE:  pt feels fine and denies cp,sob,gi or uremic  symptons  pts bp still high  REVIEW OF SYSTEMS:  CONSTITUTIONAL: No weakness, fevers or chills  EYES/ENT: No visual changes;  No vertigo or throat pain   NECK: No pain or stiffness  RESPIRATORY: No cough, wheezing, hemoptysis; No shortness of breath  CARDIOVASCULAR: No chest pain or palpitations  GASTROINTESTINAL: No abdominal or epigastric pain. No nausea, vomiting, or hematemesis; No diarrhea or constipation. No melena or hematochezia.  GENITOURINARY: No dysuria, frequency , hematuria, flank pain or nocturia  NEUROLOGICAL: No numbness or weakness  SKIN: No itching, burning, rashes, or lesions   All other review of systems is negative unless indicated above    Current meds:    amLODIPine   Tablet 10 milliGRAM(s) Oral daily  enalapril 10 milliGRAM(s) Oral daily  heparin  Injectable 5000 Unit(s) SubCutaneous every 8 hours  hydrALAZINE 100 milliGRAM(s) Oral three times a day  isosorbide    mononitrate Tablet (ISMO) 10 milliGRAM(s) Oral two times a day  labetalol 200 milliGRAM(s) Oral three times a day      Vital Signs    T(F): 99.5 (10-19-19 @ 14:26), Max: 99.5 (10-19-19 @ 14:26)  HR: 173 (10-19-19 @ 14:26) (67 - 173)  BP: 173/67 (10-19-19 @ 14:26) (153/67 - 198/78)  ABP: --  RR: 18 (10-19-19 @ 14:26) (17 - 19)  SpO2: 100% (10-19-19 @ 14:26) (99% - 100%)  Wt(kg): --  CVP(cm H2O): --  CO: --  PCWP: --    I and O's:    Daily     Daily Weight in k.7 (19 Oct 2019 02:29)    PHYSICAL EXAM:  Constitutional: well developed, well nourished  and in nad  HEENT: PERRLA,  no icteric sclera and mild pallor of conjunctiva noted  Neck: No JVD or  thyromegaly   Respiratory: reduced air entry lower lungs with no rales, wheezing or rhonchi  Cardiovascular: S1 and S2 normally heard  Gastrointestinal: soft, nondistended, nontender and normal bowel sounds heard  Extremities: No peripheral edema or cyanosis  pt has palpable thrill over Rt upper arm AVG   Neurological: A/O x 3, no focal deficits  Psychiatric: Normal mood, normal affect  Skin: No rashes      LABS:    CBC:                          10.1   3.09  )-----------( 143      ( 19 Oct 2019 07:39 )             31.5     BMP:    10-19    136  |  98  |  22<H>  ----------------------------<  90  4.3   |  31  |  5.11<H>  10-18    134<L>  |  98  |  34<H>  ----------------------------<  114<H>  4.7   |  28  |  7.32<H>  10-17    134<L>  |  97  |  30<H>  ----------------------------<  101<H>  4.5   |  27  |  6.47<H>    Ca    9.0      19 Oct 2019 07:39  Ca    9.2      18 Oct 2019 00:33  Ca    10.1      17 Oct 2019 14:27  Phos  5.4     10-18    TPro  7.7  /  Alb  4.0  /  TBili  0.7  /  DBili  x   /  AST  28  /  ALT  19  /  AlkPhos  75  10-17      Intact PTH: 246 pg/mL (10-19 @ 01:16)

## 2019-10-20 LAB
ANION GAP SERPL CALC-SCNC: 9 MMOL/L — SIGNIFICANT CHANGE UP (ref 5–17)
BASOPHILS # BLD AUTO: 0.01 K/UL — SIGNIFICANT CHANGE UP (ref 0–0.2)
BASOPHILS NFR BLD AUTO: 0.3 % — SIGNIFICANT CHANGE UP (ref 0–2)
BUN SERPL-MCNC: 40 MG/DL — HIGH (ref 7–18)
CALCIUM SERPL-MCNC: 9 MG/DL — SIGNIFICANT CHANGE UP (ref 8.4–10.5)
CHLORIDE SERPL-SCNC: 99 MMOL/L — SIGNIFICANT CHANGE UP (ref 96–108)
CO2 SERPL-SCNC: 27 MMOL/L — SIGNIFICANT CHANGE UP (ref 22–31)
CREAT SERPL-MCNC: 7.23 MG/DL — HIGH (ref 0.5–1.3)
EOSINOPHIL # BLD AUTO: 0.15 K/UL — SIGNIFICANT CHANGE UP (ref 0–0.5)
EOSINOPHIL NFR BLD AUTO: 4.5 % — SIGNIFICANT CHANGE UP (ref 0–6)
GLUCOSE SERPL-MCNC: 94 MG/DL — SIGNIFICANT CHANGE UP (ref 70–99)
HCT VFR BLD CALC: 31.9 % — LOW (ref 34.5–45)
HGB BLD-MCNC: 10.3 G/DL — LOW (ref 11.5–15.5)
IMM GRANULOCYTES NFR BLD AUTO: 0.3 % — SIGNIFICANT CHANGE UP (ref 0–1.5)
LYMPHOCYTES # BLD AUTO: 1.05 K/UL — SIGNIFICANT CHANGE UP (ref 1–3.3)
LYMPHOCYTES # BLD AUTO: 31.3 % — SIGNIFICANT CHANGE UP (ref 13–44)
MCHC RBC-ENTMCNC: 27.8 PG — SIGNIFICANT CHANGE UP (ref 27–34)
MCHC RBC-ENTMCNC: 32.3 GM/DL — SIGNIFICANT CHANGE UP (ref 32–36)
MCV RBC AUTO: 86 FL — SIGNIFICANT CHANGE UP (ref 80–100)
MONOCYTES # BLD AUTO: 0.38 K/UL — SIGNIFICANT CHANGE UP (ref 0–0.9)
MONOCYTES NFR BLD AUTO: 11.3 % — SIGNIFICANT CHANGE UP (ref 2–14)
NEUTROPHILS # BLD AUTO: 1.76 K/UL — LOW (ref 1.8–7.4)
NEUTROPHILS NFR BLD AUTO: 52.3 % — SIGNIFICANT CHANGE UP (ref 43–77)
NRBC # BLD: 0 /100 WBCS — SIGNIFICANT CHANGE UP (ref 0–0)
PLATELET # BLD AUTO: 147 K/UL — LOW (ref 150–400)
POTASSIUM SERPL-MCNC: 4.9 MMOL/L — SIGNIFICANT CHANGE UP (ref 3.5–5.3)
POTASSIUM SERPL-SCNC: 4.9 MMOL/L — SIGNIFICANT CHANGE UP (ref 3.5–5.3)
RBC # BLD: 3.71 M/UL — LOW (ref 3.8–5.2)
RBC # FLD: 16.3 % — HIGH (ref 10.3–14.5)
SODIUM SERPL-SCNC: 135 MMOL/L — SIGNIFICANT CHANGE UP (ref 135–145)
WBC # BLD: 3.36 K/UL — LOW (ref 3.8–10.5)
WBC # FLD AUTO: 3.36 K/UL — LOW (ref 3.8–10.5)

## 2019-10-20 RX ORDER — LABETALOL HCL 100 MG
5 TABLET ORAL ONCE
Refills: 0 | Status: COMPLETED | OUTPATIENT
Start: 2019-10-20 | End: 2019-10-20

## 2019-10-20 RX ORDER — ERYTHROPOIETIN 10000 [IU]/ML
6000 INJECTION, SOLUTION INTRAVENOUS; SUBCUTANEOUS
Refills: 0 | Status: DISCONTINUED | OUTPATIENT
Start: 2019-10-21 | End: 2019-10-21

## 2019-10-20 RX ADMIN — HEPARIN SODIUM 5000 UNIT(S): 5000 INJECTION INTRAVENOUS; SUBCUTANEOUS at 06:47

## 2019-10-20 RX ADMIN — Medication 100 MILLIGRAM(S): at 21:41

## 2019-10-20 RX ADMIN — Medication 1 TABLET(S): at 12:49

## 2019-10-20 RX ADMIN — ISOSORBIDE MONONITRATE 10 MILLIGRAM(S): 60 TABLET, EXTENDED RELEASE ORAL at 17:55

## 2019-10-20 RX ADMIN — AMLODIPINE BESYLATE 10 MILLIGRAM(S): 2.5 TABLET ORAL at 06:48

## 2019-10-20 RX ADMIN — Medication 200 MILLIGRAM(S): at 13:43

## 2019-10-20 RX ADMIN — Medication 10 MILLIGRAM(S): at 06:47

## 2019-10-20 RX ADMIN — Medication 100 MILLIGRAM(S): at 13:43

## 2019-10-20 RX ADMIN — SEVELAMER CARBONATE 1600 MILLIGRAM(S): 2400 POWDER, FOR SUSPENSION ORAL at 12:49

## 2019-10-20 RX ADMIN — Medication 200 MILLIGRAM(S): at 06:48

## 2019-10-20 RX ADMIN — Medication 5 MILLIGRAM(S): at 10:21

## 2019-10-20 RX ADMIN — Medication 200 MILLIGRAM(S): at 21:41

## 2019-10-20 RX ADMIN — ISOSORBIDE MONONITRATE 10 MILLIGRAM(S): 60 TABLET, EXTENDED RELEASE ORAL at 06:48

## 2019-10-20 RX ADMIN — HEPARIN SODIUM 5000 UNIT(S): 5000 INJECTION INTRAVENOUS; SUBCUTANEOUS at 21:42

## 2019-10-20 RX ADMIN — Medication 100 MILLIGRAM(S): at 06:47

## 2019-10-20 RX ADMIN — SEVELAMER CARBONATE 1600 MILLIGRAM(S): 2400 POWDER, FOR SUSPENSION ORAL at 10:21

## 2019-10-20 RX ADMIN — SEVELAMER CARBONATE 1600 MILLIGRAM(S): 2400 POWDER, FOR SUSPENSION ORAL at 17:54

## 2019-10-20 RX ADMIN — HEPARIN SODIUM 5000 UNIT(S): 5000 INJECTION INTRAVENOUS; SUBCUTANEOUS at 13:43

## 2019-10-20 NOTE — PROGRESS NOTE ADULT - SUBJECTIVE AND OBJECTIVE BOX
PGY1 Note discussed with supervising resident and primary attending.    Patient is a 73y old  Female who presents with a chief complaint of Hypertensive Emergency (19 Oct 2019 18:01)      INTERVAL HPI/OVERNIGHT EVENTS: Patient seen and examined at bedside. No acute events.     MEDICATIONS  (STANDING):  amLODIPine   Tablet 10 milliGRAM(s) Oral daily  enalapril 10 milliGRAM(s) Oral daily  heparin  Injectable 5000 Unit(s) SubCutaneous every 8 hours  hydrALAZINE 100 milliGRAM(s) Oral three times a day  isosorbide    mononitrate Tablet (ISMO) 10 milliGRAM(s) Oral two times a day  labetalol 200 milliGRAM(s) Oral three times a day  labetalol Injectable 5 milliGRAM(s) IV Push once  Nephro-radha 1 Tablet(s) Oral daily  sevelamer carbonate 1600 milliGRAM(s) Oral three times a day with meals    MEDICATIONS  (PRN):      Allergies    Mushrooms (Anaphylaxis)  penicillin (Rash)    Intolerances        REVIEW OF SYSTEMS:  CONSTITUTIONAL: No fever, weight loss, or fatigue  RESPIRATORY: No cough, wheezing, chills or hemoptysis; No shortness of breath  CARDIOVASCULAR: No chest pain, palpitations, dizziness, or leg swelling  GASTROINTESTINAL: No abdominal or epigastric pain. No nausea, vomiting, or hematemesis; No diarrhea or constipation. No melena or hematochezia.  NEUROLOGICAL: No headaches, memory loss, loss of strength, numbness, or tremors  SKIN: No itching, burning, rashes, or lesions     Vital Signs Last 24 Hrs  T(C): 37 (20 Oct 2019 05:26), Max: 37.5 (19 Oct 2019 14:26)  T(F): 98.6 (20 Oct 2019 05:26), Max: 99.5 (19 Oct 2019 14:26)  HR: 70 (20 Oct 2019 05:26) (64 - 173)  BP: 185/81 (20 Oct 2019 05:26) (153/67 - 185/82)  BP(mean): --  RR: 18 (20 Oct 2019 05:26) (18 - 18)  SpO2: 97% (20 Oct 2019 05:26) (97% - 100%)    PHYSICAL EXAM:  GENERAL: NAD, well-groomed, well-developed  HEAD:  Atraumatic, Normocephalic  EYES: EOMI, PERRLA, conjunctiva and sclera clear  NECK: Supple, No JVD, Normal thyroid  CHEST/LUNG: Clear to percussion bilaterally; No rales, rhonchi, wheezing, or rubs  HEART: Regular rate and rhythm; No murmurs, rubs, or gallops  ABDOMEN: Soft, Nontender, Nondistended; Bowel sounds present  NERVOUS SYSTEM:  Alert & Oriented X3, Good concentration; Motor Strength 5/5 B/L   EXTREMITIES:  2+ Peripheral Pulses, No clubbing, cyanosis, or edema  SKIN;    LABS:                        10.3   3.36  )-----------( 147      ( 20 Oct 2019 07:54 )             31.9     10-19    136  |  98  |  22<H>  ----------------------------<  90  4.3   |  31  |  5.11<H>    Ca    9.0      19 Oct 2019 07:39  Phos  5.4     10-18          CAPILLARY BLOOD GLUCOSE          RADIOLOGY & ADDITIONAL TESTS:    Imaging Personally Reviewed:  [x ] YES  [ ] NO    Consultant(s) Notes Reviewed:  [x ] YES  [ ] NO

## 2019-10-20 NOTE — PROGRESS NOTE ADULT - ASSESSMENT
Patient is a 74 y/o F from home with PMH of ESRD on HD (MWF via right upper arm A-V graft) x 4 years, HTN PSHx right hip and femure fracture s/p replacement, comes in with complaint of facial numbness on right side and tongue haviness with inability to speak.

## 2019-10-20 NOTE — PROGRESS NOTE ADULT - SUBJECTIVE AND OBJECTIVE BOX
pt seen and examined.    SUBJECTIVE:  pt feels fine and denies cp,sob,gi or uremic  symptons    REVIEW OF SYSTEMS:  CONSTITUTIONAL: No weakness, fevers or chills  EYES/ENT: No visual changes;  No vertigo or throat pain   NECK: No pain or stiffness  RESPIRATORY: No cough, wheezing, hemoptysis; No shortness of breath  CARDIOVASCULAR: No chest pain or palpitations  GASTROINTESTINAL: No abdominal or epigastric pain. No nausea, vomiting, or hematemesis; No diarrhea or constipation. No melena or hematochezia.  GENITOURINARY: No dysuria, frequency , hematuria, flank pain or nocturia  NEUROLOGICAL: No numbness or weakness  SKIN: No itching, burning, rashes, or lesions   All other review of systems is negative unless indicated above    Current meds:    amLODIPine   Tablet 10 milliGRAM(s) Oral daily  enalapril 10 milliGRAM(s) Oral daily  heparin  Injectable 5000 Unit(s) SubCutaneous every 8 hours  hydrALAZINE 100 milliGRAM(s) Oral three times a day  isosorbide    mononitrate Tablet (ISMO) 10 milliGRAM(s) Oral two times a day  labetalol 200 milliGRAM(s) Oral three times a day  Nephro-radha 1 Tablet(s) Oral daily  sevelamer carbonate 1600 milliGRAM(s) Oral three times a day with meals      Vital Signs    T(F): 98 (10-20-19 @ 14:31), Max: 98.6 (10-20-19 @ 05:26)  HR: 66 (10-20-19 @ 14:31) (64 - 75)  BP: 168/71 (10-20-19 @ 14:31) (153/69 - 185/81)  ABP: --  RR: 18 (10-20-19 @ 14:31) (18 - 18)  SpO2: 96% (10-20-19 @ 14:31) (96% - 100%)  Wt(kg): --  CVP(cm H2O): --  CO: --  PCWP: --    I and O's:    Daily     Daily Weight in k.2 (20 Oct 2019 02:30)    PHYSICAL EXAM:  Constitutional: well developed, well nourished  and in nad  HEENT: PERRLA,  no icteric sclera and mild pallor of conjunctiva noted  Neck: No JVD or  thyromegaly   Respiratory: reduced air entry lower lungs with no rales, wheezing or rhonchi  Cardiovascular: S1 and S2 normally heard  Gastrointestinal: soft, nondistended, nontender and normal bowel sounds heard  Extremities: No peripheral edema or cyanosis  pt has palpable thrill over Rt upper arm AVG   Neurological: A/O x 3, no focal deficits  Psychiatric: Normal mood, normal affect  Skin: No rashes      LABS:    CBC:                          10.3   3.36  )-----------( 147      ( 20 Oct 2019 07:54 )             31.9       BMP:    10-20    135  |  99  |  40<H>  ----------------------------<  94  4.9   |  27  |  7.23<H>  10-19    136  |  98  |  22<H>  ----------------------------<  90  4.3   |  31  |  5.11<H>  10-18    134<L>  |  98  |  34<H>  ----------------------------<  114<H>  4.7   |  28  |  7.32<H>    Ca    9.0      20 Oct 2019 07:54  Ca    9.0      19 Oct 2019 07:39  Ca    9.2      18 Oct 2019 00:33  Phos  5.4     10-18        Intact PTH: 246 pg/mL (10-19 @ 01:16)

## 2019-10-20 NOTE — PROGRESS NOTE ADULT - ASSESSMENT
A/P:  1.ESRD: on hd M/W/F schedule  -pts renal status is stable.pt will be dialyzed tomorrow  -Keep patient euvolemic and HD renal diet  -Avoid Nephrotoxic Meds/ Agents such as (NSAIDs, IV contrast, Aminoglycosides such as gentamicin, -Gadolinium contrast, Phosphate containing enemas, etc..)  -Adjust Medications according to eGFR  -f/u bmp daily  2.HTN: bp is high but much better today  -please continue to titrate bp meds prn to keep bp<130/80 next 24 hrs  -continue  low salt diet  3.ANEMIA:mild, secondary to esrd  -we will add epogen in hd tomorrow  -f/u Hb daily  4.R/O TIA vs CVA: plan as per medical team  -suggest neurology consult  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

## 2019-10-21 ENCOUNTER — TRANSCRIPTION ENCOUNTER (OUTPATIENT)
Age: 74
End: 2019-10-21

## 2019-10-21 VITALS
HEART RATE: 71 BPM | OXYGEN SATURATION: 99 % | SYSTOLIC BLOOD PRESSURE: 168 MMHG | DIASTOLIC BLOOD PRESSURE: 58 MMHG | RESPIRATION RATE: 17 BRPM

## 2019-10-21 LAB
ALDOST SERPL-MCNC: 6.5 NG/DL — SIGNIFICANT CHANGE UP
ANION GAP SERPL CALC-SCNC: 11 MMOL/L — SIGNIFICANT CHANGE UP (ref 5–17)
BASOPHILS # BLD AUTO: 0.02 K/UL — SIGNIFICANT CHANGE UP (ref 0–0.2)
BASOPHILS NFR BLD AUTO: 0.5 % — SIGNIFICANT CHANGE UP (ref 0–2)
BUN SERPL-MCNC: 48 MG/DL — HIGH (ref 7–18)
CALCIUM SERPL-MCNC: 9.4 MG/DL — SIGNIFICANT CHANGE UP (ref 8.4–10.5)
CHLORIDE SERPL-SCNC: 97 MMOL/L — SIGNIFICANT CHANGE UP (ref 96–108)
CO2 SERPL-SCNC: 25 MMOL/L — SIGNIFICANT CHANGE UP (ref 22–31)
CREAT SERPL-MCNC: 9.02 MG/DL — HIGH (ref 0.5–1.3)
EOSINOPHIL # BLD AUTO: 0.19 K/UL — SIGNIFICANT CHANGE UP (ref 0–0.5)
EOSINOPHIL NFR BLD AUTO: 5.1 % — SIGNIFICANT CHANGE UP (ref 0–6)
GLUCOSE SERPL-MCNC: 81 MG/DL — SIGNIFICANT CHANGE UP (ref 70–99)
HCT VFR BLD CALC: 31.7 % — LOW (ref 34.5–45)
HGB BLD-MCNC: 10.4 G/DL — LOW (ref 11.5–15.5)
IMM GRANULOCYTES NFR BLD AUTO: 0.3 % — SIGNIFICANT CHANGE UP (ref 0–1.5)
LYMPHOCYTES # BLD AUTO: 0.99 K/UL — LOW (ref 1–3.3)
LYMPHOCYTES # BLD AUTO: 26.5 % — SIGNIFICANT CHANGE UP (ref 13–44)
MCHC RBC-ENTMCNC: 28 PG — SIGNIFICANT CHANGE UP (ref 27–34)
MCHC RBC-ENTMCNC: 32.8 GM/DL — SIGNIFICANT CHANGE UP (ref 32–36)
MCV RBC AUTO: 85.4 FL — SIGNIFICANT CHANGE UP (ref 80–100)
MONOCYTES # BLD AUTO: 0.39 K/UL — SIGNIFICANT CHANGE UP (ref 0–0.9)
MONOCYTES NFR BLD AUTO: 10.5 % — SIGNIFICANT CHANGE UP (ref 2–14)
NEUTROPHILS # BLD AUTO: 2.13 K/UL — SIGNIFICANT CHANGE UP (ref 1.8–7.4)
NEUTROPHILS NFR BLD AUTO: 57.1 % — SIGNIFICANT CHANGE UP (ref 43–77)
NRBC # BLD: 0 /100 WBCS — SIGNIFICANT CHANGE UP (ref 0–0)
PLATELET # BLD AUTO: 173 K/UL — SIGNIFICANT CHANGE UP (ref 150–400)
POTASSIUM SERPL-MCNC: 5.2 MMOL/L — SIGNIFICANT CHANGE UP (ref 3.5–5.3)
POTASSIUM SERPL-SCNC: 5.2 MMOL/L — SIGNIFICANT CHANGE UP (ref 3.5–5.3)
RBC # BLD: 3.71 M/UL — LOW (ref 3.8–5.2)
RBC # FLD: 16.6 % — HIGH (ref 10.3–14.5)
SODIUM SERPL-SCNC: 133 MMOL/L — LOW (ref 135–145)
WBC # BLD: 3.73 K/UL — LOW (ref 3.8–10.5)
WBC # FLD AUTO: 3.73 K/UL — LOW (ref 3.8–10.5)

## 2019-10-21 RX ORDER — HYDRALAZINE HCL 50 MG
1 TABLET ORAL
Qty: 90 | Refills: 0
Start: 2019-10-21 | End: 2019-11-19

## 2019-10-21 RX ORDER — ASPIRIN/CALCIUM CARB/MAGNESIUM 324 MG
1 TABLET ORAL
Qty: 30 | Refills: 0
Start: 2019-10-21 | End: 2019-11-19

## 2019-10-21 RX ORDER — AMLODIPINE BESYLATE 2.5 MG/1
1 TABLET ORAL
Qty: 0 | Refills: 0 | DISCHARGE
Start: 2019-10-21 | End: 2019-11-19

## 2019-10-21 RX ORDER — LABETALOL HCL 100 MG
1 TABLET ORAL
Qty: 90 | Refills: 0
Start: 2019-10-21 | End: 2019-11-19

## 2019-10-21 RX ORDER — ISOSORBIDE MONONITRATE 60 MG/1
30 TABLET, EXTENDED RELEASE ORAL DAILY
Refills: 0 | Status: DISCONTINUED | OUTPATIENT
Start: 2019-10-21 | End: 2019-10-21

## 2019-10-21 RX ORDER — SEVELAMER CARBONATE 2400 MG/1
2 POWDER, FOR SUSPENSION ORAL
Qty: 180 | Refills: 0
Start: 2019-10-21 | End: 2019-11-19

## 2019-10-21 RX ORDER — ATORVASTATIN CALCIUM 80 MG/1
1 TABLET, FILM COATED ORAL
Qty: 30 | Refills: 0
Start: 2019-10-21 | End: 2019-11-19

## 2019-10-21 RX ORDER — AMLODIPINE BESYLATE 2.5 MG/1
1 TABLET ORAL
Qty: 30 | Refills: 0
Start: 2019-10-21 | End: 2019-11-19

## 2019-10-21 RX ORDER — ISOSORBIDE MONONITRATE 60 MG/1
1 TABLET, EXTENDED RELEASE ORAL
Qty: 30 | Refills: 0
Start: 2019-10-21 | End: 2019-11-19

## 2019-10-21 RX ORDER — ISOSORBIDE MONONITRATE 60 MG/1
1 TABLET, EXTENDED RELEASE ORAL
Qty: 0 | Refills: 0 | DISCHARGE

## 2019-10-21 RX ORDER — ATORVASTATIN CALCIUM 80 MG/1
40 TABLET, FILM COATED ORAL AT BEDTIME
Refills: 0 | Status: DISCONTINUED | OUTPATIENT
Start: 2019-10-21 | End: 2019-10-21

## 2019-10-21 RX ORDER — HYDRALAZINE HCL 50 MG
1 TABLET ORAL
Qty: 0 | Refills: 0 | DISCHARGE

## 2019-10-21 RX ORDER — ASPIRIN/CALCIUM CARB/MAGNESIUM 324 MG
81 TABLET ORAL DAILY
Refills: 0 | Status: DISCONTINUED | OUTPATIENT
Start: 2019-10-21 | End: 2019-10-21

## 2019-10-21 RX ADMIN — SEVELAMER CARBONATE 1600 MILLIGRAM(S): 2400 POWDER, FOR SUSPENSION ORAL at 12:04

## 2019-10-21 RX ADMIN — Medication 200 MILLIGRAM(S): at 12:07

## 2019-10-21 RX ADMIN — Medication 100 MILLIGRAM(S): at 12:07

## 2019-10-21 RX ADMIN — HEPARIN SODIUM 5000 UNIT(S): 5000 INJECTION INTRAVENOUS; SUBCUTANEOUS at 12:07

## 2019-10-21 RX ADMIN — Medication 10 MILLIGRAM(S): at 06:08

## 2019-10-21 RX ADMIN — AMLODIPINE BESYLATE 10 MILLIGRAM(S): 2.5 TABLET ORAL at 06:08

## 2019-10-21 RX ADMIN — ISOSORBIDE MONONITRATE 30 MILLIGRAM(S): 60 TABLET, EXTENDED RELEASE ORAL at 17:48

## 2019-10-21 RX ADMIN — Medication 1 TABLET(S): at 12:04

## 2019-10-21 RX ADMIN — SEVELAMER CARBONATE 1600 MILLIGRAM(S): 2400 POWDER, FOR SUSPENSION ORAL at 17:48

## 2019-10-21 RX ADMIN — HEPARIN SODIUM 5000 UNIT(S): 5000 INJECTION INTRAVENOUS; SUBCUTANEOUS at 06:08

## 2019-10-21 RX ADMIN — Medication 200 MILLIGRAM(S): at 06:07

## 2019-10-21 RX ADMIN — Medication 81 MILLIGRAM(S): at 12:08

## 2019-10-21 RX ADMIN — ISOSORBIDE MONONITRATE 10 MILLIGRAM(S): 60 TABLET, EXTENDED RELEASE ORAL at 06:08

## 2019-10-21 RX ADMIN — Medication 100 MILLIGRAM(S): at 06:08

## 2019-10-21 RX ADMIN — SEVELAMER CARBONATE 1600 MILLIGRAM(S): 2400 POWDER, FOR SUSPENSION ORAL at 12:06

## 2019-10-21 RX ADMIN — ERYTHROPOIETIN 6000 UNIT(S): 10000 INJECTION, SOLUTION INTRAVENOUS; SUBCUTANEOUS at 15:32

## 2019-10-21 NOTE — PROGRESS NOTE ADULT - PROBLEM SELECTOR PLAN 3
-Pt is ESRD on Dialysis M/W/F through AV fistula  -Nephrology consult for dialysis set up while admitted
-Pt is ESRD on Dialysis M/W/F through AV fistula  -Got dialysis on Friday
-Pt is ESRD on Dialysis M/W/F through AV fistula  -Got dialysis on Friday; next session today
-Pt is ESRD on Dialysis M/W/F through AV fistula  -Nephrology consult for dialysis set up while admitted

## 2019-10-21 NOTE — PROGRESS NOTE ADULT - SUBJECTIVE AND OBJECTIVE BOX
This note is incomplete  pt seen and examined.pts current chart reviewed and case discussed with resident covering.    SUBJECTIVE:  pt feels fine and denies cp,sob,gi or gu/uremic  symptons    REVIEW OF SYSTEMS:  CONSTITUTIONAL: No weakness, fevers or chills  EYES/ENT: No visual changes;  No vertigo or throat pain   NECK: No pain or stiffness  RESPIRATORY: No cough, wheezing, hemoptysis; No shortness of breath  CARDIOVASCULAR: No chest pain or palpitations  GASTROINTESTINAL: No abdominal or epigastric pain. No nausea, vomiting, or hematemesis; No diarrhea or constipation. No melena or hematochezia.  GENITOURINARY: No dysuria, frequency , hematuria, flank pain or nocturia  NEUROLOGICAL: No numbness or weakness  SKIN: No itching, burning, rashes, or lesions   All other review of systems is negative unless indicated above    Current meds:    amLODIPine   Tablet 10 milliGRAM(s) Oral daily  aspirin  chewable 81 milliGRAM(s) Oral daily  atorvastatin 40 milliGRAM(s) Oral at bedtime  enalapril 10 milliGRAM(s) Oral daily  epoetin ivelisse Injectable 6000 Unit(s) IV Push <User Schedule>  heparin  Injectable 5000 Unit(s) SubCutaneous every 8 hours  hydrALAZINE 100 milliGRAM(s) Oral three times a day  isosorbide   mononitrate ER Tablet (IMDUR) 30 milliGRAM(s) Oral daily  labetalol 200 milliGRAM(s) Oral three times a day  Nephro-radha 1 Tablet(s) Oral daily  sevelamer carbonate 1600 milliGRAM(s) Oral three times a day with meals      Vital Signs    T(F): 98.2 (10-21-19 @ 13:40), Max: 98.2 (10-21-19 @ 13:40)  HR: 62 (10-21-19 @ 13:40) (62 - 69)  BP: 132/68 (10-21-19 @ 13:40) (132/68 - 175/69)  ABP: --  RR: 18 (10-21-19 @ 13:40) (18 - 18)  SpO2: 100% (10-21-19 @ 13:40) (96% - 100%)  Wt(kg): --  CVP(cm H2O): --  CO: --  PCWP: --    I and O's:    Daily     Daily Weight in k.5 (21 Oct 2019 13:40)    PHYSICAL EXAM:  Constitutional: well developed, well nourished  and in nad  HEENT: PERRLA,  no icteric sclera and mild pallor of conjunctiva noted  Neck: No JVD, thyromegaly or adenopathy  Respiratory: reduced air entry lower lungs with no rales, wheezing or rhonchi  Cardiovascular: S1 and S2 normally heard  Gastrointestinal: soft, nondistended, nontender and normal bowel sounds heard  Extremities: No peripheral edema or cyanosis  Neurological: A/O x 3, no focal deficits  : No flank or cva tenderness palpated.  Skin: No rashes      LABS:    CBC:                          10.4   3.73  )-----------( 173      ( 21 Oct 2019 07:16 )             31.7           BMP:    10-21    133<L>  |  97  |  48<H>  ----------------------------<  81  5.2   |  25  |  9.02<H>  10    135  |  99  |  40<H>  ----------------------------<  94  4.9   |  27  |  7.23<H>  1019    136  |  98  |  22<H>  ----------------------------<  90  4.3   |  31  |  5.11<H>    Ca    9.4      21 Oct 2019 07:16  Ca    9.0      20 Oct 2019 07:54  Ca    9.0      19 Oct 2019 07:39  Phos  5.4     10-18        Intact PTH: 246 pg/mL (10-19 @ 01:16)      URINE STUDIES:                        RADIOLOGY & ADDITIONAL STUDIES: pt seen and examined.    SUBJECTIVE:  pt seen during dialysis  pt feels fine and denies cp,sob,gi or uremic  symptons  pts bp is acceptble during dialysis  pt will be d/jair home post hd if pt is hemodynamically stable  Current meds:    amLODIPine   Tablet 10 milliGRAM(s) Oral daily  aspirin  chewable 81 milliGRAM(s) Oral daily  atorvastatin 40 milliGRAM(s) Oral at bedtime  enalapril 10 milliGRAM(s) Oral daily  epoetin ivelisse Injectable 6000 Unit(s) IV Push <User Schedule>  heparin  Injectable 5000 Unit(s) SubCutaneous every 8 hours  hydrALAZINE 100 milliGRAM(s) Oral three times a day  isosorbide   mononitrate ER Tablet (IMDUR) 30 milliGRAM(s) Oral daily  labetalol 200 milliGRAM(s) Oral three times a day  Nephro-radha 1 Tablet(s) Oral daily  sevelamer carbonate 1600 milliGRAM(s) Oral three times a day with meals      Vital Signs    T(F): 98.2 (10-21-19 @ 13:40), Max: 98.2 (10-21-19 @ 13:40)  HR: 62 (10-21-19 @ 13:40) (62 - 69)  BP: 132/68 (10-21-19 @ 13:40) (132/68 - 175/69)  ABP: --  RR: 18 (10-21-19 @ 13:40) (18 - 18)  SpO2: 100% (10-21-19 @ 13:40) (96% - 100%)  Wt(kg): --  CVP(cm H2O): --  CO: --  PCWP: --    I and O's:    Daily     Daily Weight in k.5 (21 Oct 2019 13:40)    PHYSICAL EXAM:  Constitutional: well developed, well nourished  and in nad  HEENT: PERRLA,  no icteric sclera and mild pallor of conjunctiva noted  Neck: No JVD or  thyromegaly   Respiratory: reduced air entry lower lungs with no rales, wheezing or rhonchi  Cardiovascular: S1 and S2 normally heard  Gastrointestinal: soft, nondistended, nontender and normal bowel sounds heard  Extremities: No peripheral edema or cyanosis  pt has palpable thrill over Rt upper arm AVG   Neurological: A/O x 3, no focal deficits  Psychiatric: Normal mood, normal affect  Skin: No rashes    LABS:    CBC:                          10.4   3.73  )-----------( 173      ( 21 Oct 2019 07:16 )             31.7           BMP:    10-21    133<L>  |  97  |  48<H>  ----------------------------<  81  5.2   |  25  |  9.02<H>  10    135  |  99  |  40<H>  ----------------------------<  94  4.9   |  27  |  7.23<H>  10    136  |  98  |  22<H>  ----------------------------<  90  4.3   |  31  |  5.11<H>    Ca    9.4      21 Oct 2019 07:16  Ca    9.0      20 Oct 2019 07:54  Ca    9.0      19 Oct 2019 07:39  Phos  5.4     10-18        Intact PTH: 246 pg/mL (10-19 @ 01:16)

## 2019-10-21 NOTE — PROGRESS NOTE ADULT - PROBLEM SELECTOR PLAN 2
-Pt came in with some weakness and was also found to have /91 HR 72   -EKG NSR  -Pt was initially started on nicardipine drip and ICU was consulted who recommended to start on PO medication  -Pt started back on home medications  -started on labetalol 100 mg tid, hydralazine 100 tid, enalapril 10 od, amlodipine 10 od, isosorbide mononitrate 10 bid  -/77 this AM
-Pt came in with some weakness and was also found to have /91 HR 72   -EKG NSR  -Pt was initially started on nicardipine drip and ICU was consulted who recommended to start on PO medication  -Pt started back on home medications  -increased labetalol to 200 mg tid,   -continue with hydralazine 100 tid, enalapril 10 od, amlodipine 10 od, isosorbide mononitrate 10 bid  -/68 this AM
-Pt came in with some weakness and was also found to have /91 HR 72   -EKG NSR  -Pt was initially started on nicardipine drip and ICU was consulted who recommended to start on PO medication  -Pt started back on home medications  -increased labetalol to 200 mg tid,   -continue with hydralazine 100 tid, enalapril 10 od, amlodipine 10 od, isosorbide mononitrate 10 bid  -/81 this AM
-Pt came in with some weakness and was also found to have /91 HR 72   -EKG NSR  -Pt was initially started on nicardipine drip and ICU was consulted who recommended to start on PO medication  -Pt started back on home medications  -started on labetalol 100 mg tid, hydralazine 100 tid, enalapril 10 od, amlodipine 10 od, isosorbide mononitrate 10 bid  -/77 this AM

## 2019-10-21 NOTE — CONSULT NOTE ADULT - ASSESSMENT
HPI: Patient is a 74 y/o F from home with PMH of ESRD on HD (MWF via right upper arm A-V graft) x 4 years, HTN PSHx right hip and femure fracture s/p replacement, comes in with complaint of facial numbness on right side and tongue haviness with speech changes started 11:00 am this morning. Patient checked her BP at home which was 210s. She took her Bp medications like amlodipine 10mg, metoprolol 25 mg and doubled the dose of her morning hydralazine to 200mg. Her symptoms did not improve after medication so she came in to ED. In Ed Code stroke was called. She had a CT head stroke protocol which did not show any bleed or stroke. Patient's Initially vitals were significant for /91 HR 72 saO2 99% on room air. She received a dose of labetalol 10mg Iv push and hydralazine 10mg IV push w/o significant improvement in her Bp. ICU was consulted for concern of Hypertensive emergency.
72 y/o F from home with PMHX of ESRD on HD (MWF via right upper arm A-V graft) x 4 years, HTN PSHx right hip and femure fracture s/p replacement, comes in with complaint of facial numbness on right side and tongue haviness with inability to speak started 11:00 am this morning.  1.ESRD-HD as per renal.  2.HTN-Change Imdur 30mg qd, cont rest of  bp medication.  3.Anemia-epogen.  4.Cont asa,statin.  5.GI and DVT prophylaxis.
A/P:  1.ESRD: on hd M/W/F schedule  -pt will get hd today.Hd consent obtained and hd orders written  -Keep patient euvolemic and HD renal diet  -Avoid Nephrotoxic Meds/ Agents such as (NSAIDs, IV contrast, Aminoglycosides such as gentamicin, -Gadolinium contrast, Phosphate containing enemas, etc..)  -Adjust Medications according to eGFR  -f/u bmp daily  2.HTN: bp is high  -please titrate bp meds to keep bp<130/80 slowly over next 48 hrs  -add low salt diet  3.ANEMIA:mild, secondary to esrd  -we will avoid epogen for now as pts bp is high plus Hb >11 gm  -f/u Hb daily  4.R/O TIA vs CVA: plan as per medical team  -suggest neurology consult  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

## 2019-10-21 NOTE — PROGRESS NOTE ADULT - PROBLEM SELECTOR PROBLEM 1
TIA (transient ischemic attack)

## 2019-10-21 NOTE — CONSULT NOTE ADULT - SUBJECTIVE AND OBJECTIVE BOX
CHIEF COMPLAINT:Patient is a 73y old  Female who presents with a chief complaint of Hypertensive Emergency.      HPI:  Patient is a 74 y/o F from home with PMHX of ESRD on HD (MWF via right upper arm A-V graft) x 4 years, HTN PSHx right hip and femure fracture s/p replacement, comes in with complaint of facial numbness on right side and tongue haviness with inability to speak started 11:00 am this morning. Patient checked her BP at home which was 210s.Pt states that she took her home medications but there was no improvement in her symptoms and she came to ED. Pt was recommended for Tpa as per Tele stroke after Blood pressure stabilization but her symptoms improved and no tpa was given. pt was started on nicardipine for small duration and was later changed to her home medication with some improvement in blood pressure (17 Oct 2019 21:21)      PAST MEDICAL & SURGICAL HISTORY:  Hemodialysis patient  Hemodialysis access, fistula mature: JASON  DVT (deep venous thrombosis): of Left subclavian vein, 06/12/17  ESRD (end stage renal disease) on dialysis  Cataract  Glaucoma  HTN (hypertension)  S/P KEVEN-BSO: for fibroids, 2012  AV fistula: 2015  H/O: glaucoma: surgery, 2002  Acquired cataract: extraction with b/l lense placement, 2016      MEDICATIONS  (STANDING):  amLODIPine   Tablet 10 milliGRAM(s) Oral daily  aspirin  chewable 81 milliGRAM(s) Oral daily  atorvastatin 40 milliGRAM(s) Oral at bedtime  enalapril 10 milliGRAM(s) Oral daily  epoetin ivelisse Injectable 6000 Unit(s) IV Push <User Schedule>  heparin  Injectable 5000 Unit(s) SubCutaneous every 8 hours  hydrALAZINE 100 milliGRAM(s) Oral three times a day  isosorbide    mononitrate Tablet (ISMO) 10 milliGRAM(s) Oral two times a day  labetalol 200 milliGRAM(s) Oral three times a day  Nephro-radha 1 Tablet(s) Oral daily  sevelamer carbonate 1600 milliGRAM(s) Oral three times a day with meals    MEDICATIONS  (PRN):      FAMILY HISTORY:  Family history of colon cancer (Sibling)  Family history of cerebrovascular accident (CVA)      SOCIAL HISTORY:    [ x] Non-smoker    [x ] Alcohol-denies    Allergies    Mushrooms (Anaphylaxis)  penicillin (Rash)    Intolerances    	    REVIEW OF SYSTEMS:  CONSTITUTIONAL: No fever, weight loss, or fatigue  EYES: No eye pain, visual disturbances, or discharge  ENT:  No difficulty hearing, tinnitus, vertigo; No sinus or throat pain  NECK: No pain or stiffness  RESPIRATORY: No cough, wheezing, chills or hemoptysis; No Shortness of Breath  CARDIOVASCULAR: No chest pain, palpitations, passing out, dizziness, or leg swelling  GASTROINTESTINAL: No abdominal or epigastric pain. No nausea, vomiting, or hematemesis; No diarrhea or constipation. No melena or hematochezia.  GENITOURINARY: No dysuria, frequency, hematuria, or incontinence  NEUROLOGICAL: No headaches, memory loss, loss of strength, numbness, or tremors  SKIN: No itching, burning, rashes, or lesions   LYMPH Nodes: No enlarged glands  ENDOCRINE: No heat or cold intolerance; No hair loss  MUSCULOSKELETAL: No joint pain or swelling; No muscle, back, or extremity pain  PSYCHIATRIC: No depression, anxiety, mood swings, or difficulty sleeping  HEME/LYMPH: No easy bruising, or bleeding gums  ALLERGY AND IMMUNOLOGIC: No hives or eczema	      PHYSICAL EXAM:  T(C): 36.7 (10-21-19 @ 11:42), Max: 36.7 (10-20-19 @ 14:31)  HR: 63 (10-21-19 @ 11:42) (63 - 69)  BP: 149/61 (10-21-19 @ 11:42) (149/61 - 175/69)  RR: 18 (10-21-19 @ 11:42) (18 - 18)  SpO2: 100% (10-21-19 @ 11:42) (96% - 100%)  Wt(kg): --  I&O's Summary      Appearance: Normal	  HEENT:   Normal oral mucosa, PERRL, EOMI	  Lymphatic: No lymphadenopathy  Cardiovascular: Normal S1 S2, No JVD, No murmurs, No edema  Respiratory: Lungs clear to auscultation	  Psychiatry: A & O x 3, Mood & affect appropriate  Gastrointestinal:  Soft, Non-tender, + BS	  Skin: No rashes, No ecchymoses, No cyanosis	  Neurologic: Non-focal  Extremities: Normal range of motion, No clubbing, cyanosis or edema  Vascular: Peripheral pulses palpable 2+ bilaterally        ECG:  	Normal sinus rhythm  Prolonged QT      LABS:	 	                        10.4   3.73  )-----------( 173      ( 21 Oct 2019 07:16 )             31.7     10-21    133<L>  |  97  |  48<H>  ----------------------------<  81  5.2   |  25  |  9.02<H>    Ca    9.4      21 Oct 2019 07:16        EXAM:  XR CHEST PORTABLE URGENT 1V                            PROCEDURE DATE:  10/17/2019          INTERPRETATION:  AP semierect chest on October 17, 2019 at 2:25 PM. This   is a code stroke.    Gross heart enlargement again noted.    On May 27 of this year there were also small basilar effusions with   adjacent atelectasis.    Presently lungs are clear. Clips in the right upper arm again noted.    IMPRESSION: Heart enlargement again noted. No acute lung finding at this   time.    Echocardiogram:    OBSERVATIONS:  Mitral Valve: Normal mitral valve. Trace mitral  regurgitation.  Aortic Root: Normal aortic root.  Aortic Valve: Normal trileaflet aortic valve. Trace aortic  regurgitation.  Left Atrium: Severely dilated left atrium.  LA volume index  = 54 cc/m2.  Left Ventricle: Endocardium not well visualized; grossly  normal left ventricular systolic function.   Mild diastolic  dysfunction (stage I). Moderate concentric left ventricular  hypertrophy.  Right Heart: Normal right atrium. Normal right ventricular  size and function. There is trace tricuspid regurgitation.  There is mild pulmonic regurgitation.  Pericardium/PleuraSmall pericardial effusion.  No  echocardiographic evidence of tamponade physiology.  Hemodynamic: RA Pressure is 8 mm Hg. RV systolic pressure  is 46 mm Hg. Mild pulmonary hypertension. Agitated saline  injection was negative for intracardiac shunt.

## 2019-10-21 NOTE — PROGRESS NOTE ADULT - SUBJECTIVE AND OBJECTIVE BOX
PGY1 Note discussed with supervising resident and primary attending.    Patient is a 73y old  Female who presents with a chief complaint of Hypertensive Emergency (20 Oct 2019 08:01)      INTERVAL HPI/OVERNIGHT EVENTS: Patient seen and examined at bedside. No acute events.     MEDICATIONS  (STANDING):  amLODIPine   Tablet 10 milliGRAM(s) Oral daily  aspirin  chewable 81 milliGRAM(s) Oral daily  atorvastatin 40 milliGRAM(s) Oral at bedtime  enalapril 10 milliGRAM(s) Oral daily  epoetin ivelisse Injectable 6000 Unit(s) IV Push <User Schedule>  heparin  Injectable 5000 Unit(s) SubCutaneous every 8 hours  hydrALAZINE 100 milliGRAM(s) Oral three times a day  isosorbide    mononitrate Tablet (ISMO) 10 milliGRAM(s) Oral two times a day  labetalol 200 milliGRAM(s) Oral three times a day  Nephro-radha 1 Tablet(s) Oral daily  sevelamer carbonate 1600 milliGRAM(s) Oral three times a day with meals    MEDICATIONS  (PRN):      Allergies    Mushrooms (Anaphylaxis)  penicillin (Rash)    Intolerances        REVIEW OF SYSTEMS:  CONSTITUTIONAL: No fever, weight loss, or fatigue  RESPIRATORY: No cough, wheezing, chills or hemoptysis; No shortness of breath  CARDIOVASCULAR: No chest pain, palpitations, dizziness, or leg swelling  GASTROINTESTINAL: No abdominal or epigastric pain. No nausea, vomiting, or hematemesis; No diarrhea or constipation. No melena or hematochezia.  NEUROLOGICAL: No headaches, memory loss, loss of strength, numbness, or tremors  SKIN: No itching, burning, rashes, or lesions     Vital Signs Last 24 Hrs  T(C): 36.7 (21 Oct 2019 04:52), Max: 36.7 (20 Oct 2019 14:31)  T(F): 98 (21 Oct 2019 04:52), Max: 98.1 (21 Oct 2019 00:53)  HR: 64 (21 Oct 2019 04:52) (64 - 69)  BP: 170/68 (21 Oct 2019 04:52) (167/75 - 175/69)  BP(mean): --  RR: 18 (21 Oct 2019 04:52) (18 - 18)  SpO2: 98% (21 Oct 2019 04:52) (96% - 98%)    PHYSICAL EXAM:  GENERAL: NAD, well-groomed, well-developed  HEAD:  Atraumatic, Normocephalic  EYES: EOMI, PERRLA, conjunctiva and sclera clear  NECK: Supple, No JVD, Normal thyroid  CHEST/LUNG: Clear to percussion bilaterally; No rales, rhonchi, wheezing, or rubs  HEART: Regular rate and rhythm; No murmurs, rubs, or gallops  ABDOMEN: Soft, Nontender, Nondistended; Bowel sounds present  NERVOUS SYSTEM:  Alert & Oriented X3, Good concentration; Motor Strength 5/5 B/L   EXTREMITIES:  2+ Peripheral Pulses, No clubbing, cyanosis, or edema  SKIN;    LABS:                        10.4   3.73  )-----------( 173      ( 21 Oct 2019 07:16 )             31.7     10-21    133<L>  |  97  |  48<H>  ----------------------------<  81  5.2   |  25  |  9.02<H>    Ca    9.4      21 Oct 2019 07:16          CAPILLARY BLOOD GLUCOSE          RADIOLOGY & ADDITIONAL TESTS:    Imaging Personally Reviewed:  [x ] YES  [ ] NO    Consultant(s) Notes Reviewed:  [x ] YES  [ ] NO

## 2019-10-21 NOTE — DISCHARGE NOTE NURSING/CASE MANAGEMENT/SOCIAL WORK - PATIENT PORTAL LINK FT
You can access the FollowMyHealth Patient Portal offered by [facility] by registering at the following website: [email or web address]. By joining [employer]’s FollowMyHealth portal, you will also be able to view your health information using other applications (apps) compatible with our system.

## 2019-10-21 NOTE — PROGRESS NOTE ADULT - PROBLEM SELECTOR PLAN 1
-Pt came in with tongue weakness, difficulty speaking and R facial numbness  -Symptoms now resolved  -Neuro checks q4  -CT Head negative for any acute intracraial hemorrhage  -Neuro evaluation  -Aspirin and statin  -f/u Echo
-Pt came in with tongue weakness, difficulty speaking and R facial numbness  -Symptoms now resolved  -CT Head negative for any acute intracranial hemorrhage  -Neuro evaluation  -Aspirin and statin   -Echo shows severely dilated LA and EF >55%
-Pt came in with tongue weakness, difficulty speaking and R facial numbness  -Symptoms now resolved  -CT Head negative for any acute intracranial hemorrhage  -Neuro evaluation  -Aspirin and statin   -Echo shows severely dilated LA and EF >55%
-Pt came in with tongue weakness, difficulty speaking and R facial numbness  -Symptoms now resolved  -Neuro checks q4  -CT Head negative for any acute intracraial hemorrhage  -Neuro evaluation  -Aspirin and statin  -f/u Echo

## 2019-10-21 NOTE — PROGRESS NOTE ADULT - PROVIDER SPECIALTY LIST ADULT
Internal Medicine
Nephrology
Nephrology
TeleStroke
Nephrology
Internal Medicine

## 2019-10-21 NOTE — PROGRESS NOTE ADULT - ASSESSMENT
A/P:  1.ESRD: on hd M/W/F schedule  -pts renal status is stable.pt will be dialyzed tomorrow  -Keep patient euvolemic and HD renal diet  -Avoid Nephrotoxic Meds/ Agents such as (NSAIDs, IV contrast, Aminoglycosides such as gentamicin, -Gadolinium contrast, Phosphate containing enemas, etc..)  -Adjust Medications according to eGFR  -f/u bmp daily  2.HTN: bp is high but much better today  -please continue to titrate bp meds prn to keep bp<130/80 next 24 hrs  -continue  low salt diet  3.ANEMIA:mild, secondary to esrd  -we will add epogen in hd tomorrow  -f/u Hb daily  4.R/O TIA vs CVA: plan as per medical team  -suggest neurology consult  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 A/P:  1.ESRD: on hd M/W/F schedule  -pts renal status is stable.pt is being dialyzed now.Hd orders written and discussed  with dialysis RN  -Keep patient euvolemic and HD renal diet  -Avoid Nephrotoxic Meds/ Agents such as (NSAIDs, IV contrast, Aminoglycosides such as gentamicin, -Gadolinium contrast, Phosphate containing enemas, etc..)  -Adjust Medications according to eGFR  -f/u bmp daily  -pt is ok for d/c home if post dialysis bp is acceptble  2.HTN: bp is acceptble today  -Goal bp<130/80 next 24 hrs  -continue  low salt diet  3.ANEMIA:mild, secondary to esrd  -on  epogen in hd today  -f/u Hb daily  4.R/O TIA vs CVA: plan as per medical team  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

## 2019-10-21 NOTE — DISCHARGE NOTE NURSING/CASE MANAGEMENT/SOCIAL WORK - NSDCPEPTSTRK_GEN_ALL_CORE
Call 911 for stroke/Stroke education booklet/Stroke support groups for patients, families, and friends/Signs and symptoms of stroke/Need for follow up after discharge/Risk factors for stroke/Stroke warning signs and symptoms/Prescribed medications

## 2019-10-28 LAB — RENIN PLAS-CCNC: <0.167 NG/ML/HR — LOW (ref 0.17–5.38)

## 2019-10-28 NOTE — ED PROVIDER NOTE - CROS ED CARDIOVAS ALL NEG
Concha--thanks --I stopped her glip today , added jardiance and low dose pioglitazone to her metformin , will have continue IF diet but she needs to recognize cannot eat banana bread as it made her bs 500+.4 weeks recheck with me --lets see how it goes.Whitney
- - -

## 2019-11-12 PROCEDURE — 82310 ASSAY OF CALCIUM: CPT

## 2019-11-12 PROCEDURE — 82088 ASSAY OF ALDOSTERONE: CPT

## 2019-11-12 PROCEDURE — 87340 HEPATITIS B SURFACE AG IA: CPT

## 2019-11-12 PROCEDURE — 96374 THER/PROPH/DIAG INJ IV PUSH: CPT

## 2019-11-12 PROCEDURE — 96376 TX/PRO/DX INJ SAME DRUG ADON: CPT

## 2019-11-12 PROCEDURE — 83970 ASSAY OF PARATHORMONE: CPT

## 2019-11-12 PROCEDURE — 99285 EMERGENCY DEPT VISIT HI MDM: CPT | Mod: 25

## 2019-11-12 PROCEDURE — 93306 TTE W/DOPPLER COMPLETE: CPT

## 2019-11-12 PROCEDURE — 84244 ASSAY OF RENIN: CPT

## 2019-11-12 PROCEDURE — 84484 ASSAY OF TROPONIN QUANT: CPT

## 2019-11-12 PROCEDURE — 70450 CT HEAD/BRAIN W/O DYE: CPT

## 2019-11-12 PROCEDURE — 85610 PROTHROMBIN TIME: CPT

## 2019-11-12 PROCEDURE — 84100 ASSAY OF PHOSPHORUS: CPT

## 2019-11-12 PROCEDURE — 80053 COMPREHEN METABOLIC PANEL: CPT

## 2019-11-12 PROCEDURE — 82550 ASSAY OF CK (CPK): CPT

## 2019-11-12 PROCEDURE — 86704 HEP B CORE ANTIBODY TOTAL: CPT

## 2019-11-12 PROCEDURE — 96375 TX/PRO/DX INJ NEW DRUG ADDON: CPT

## 2019-11-12 PROCEDURE — 82962 GLUCOSE BLOOD TEST: CPT

## 2019-11-12 PROCEDURE — 85730 THROMBOPLASTIN TIME PARTIAL: CPT

## 2019-11-12 PROCEDURE — 80048 BASIC METABOLIC PNL TOTAL CA: CPT

## 2019-11-12 PROCEDURE — 93005 ELECTROCARDIOGRAM TRACING: CPT

## 2019-11-12 PROCEDURE — 82553 CREATINE MB FRACTION: CPT

## 2019-11-12 PROCEDURE — 71045 X-RAY EXAM CHEST 1 VIEW: CPT

## 2019-11-12 PROCEDURE — 86706 HEP B SURFACE ANTIBODY: CPT

## 2019-11-12 PROCEDURE — 85027 COMPLETE CBC AUTOMATED: CPT

## 2019-11-12 PROCEDURE — 99261: CPT

## 2019-11-12 PROCEDURE — 36415 COLL VENOUS BLD VENIPUNCTURE: CPT

## 2019-11-12 PROCEDURE — 92610 EVALUATE SWALLOWING FUNCTION: CPT

## 2019-12-10 ENCOUNTER — OTHER (OUTPATIENT)
Age: 74
End: 2019-12-10

## 2019-12-17 ENCOUNTER — APPOINTMENT (OUTPATIENT)
Dept: VASCULAR SURGERY | Facility: CLINIC | Age: 74
End: 2019-12-17
Payer: MEDICARE

## 2019-12-19 ENCOUNTER — APPOINTMENT (OUTPATIENT)
Dept: CT IMAGING | Facility: IMAGING CENTER | Age: 74
End: 2019-12-19
Payer: MEDICARE

## 2019-12-19 ENCOUNTER — OUTPATIENT (OUTPATIENT)
Dept: OUTPATIENT SERVICES | Facility: HOSPITAL | Age: 74
LOS: 1 days | End: 2019-12-19
Payer: MEDICARE

## 2019-12-19 DIAGNOSIS — Z90.710 ACQUIRED ABSENCE OF BOTH CERVIX AND UTERUS: Chronic | ICD-10-CM

## 2019-12-19 DIAGNOSIS — R91.1 SOLITARY PULMONARY NODULE: ICD-10-CM

## 2019-12-19 DIAGNOSIS — H26.9 UNSPECIFIED CATARACT: Chronic | ICD-10-CM

## 2019-12-19 DIAGNOSIS — I77.0 ARTERIOVENOUS FISTULA, ACQUIRED: Chronic | ICD-10-CM

## 2019-12-19 DIAGNOSIS — Z86.69 PERSONAL HISTORY OF OTHER DISEASES OF THE NERVOUS SYSTEM AND SENSE ORGANS: Chronic | ICD-10-CM

## 2019-12-19 PROCEDURE — 71250 CT THORAX DX C-: CPT

## 2019-12-19 PROCEDURE — 71250 CT THORAX DX C-: CPT | Mod: 26

## 2019-12-26 ENCOUNTER — NON-APPOINTMENT (OUTPATIENT)
Age: 74
End: 2019-12-26

## 2019-12-26 ENCOUNTER — APPOINTMENT (OUTPATIENT)
Dept: CARDIOLOGY | Facility: CLINIC | Age: 74
End: 2019-12-26
Payer: MEDICARE

## 2019-12-26 VITALS
SYSTOLIC BLOOD PRESSURE: 169 MMHG | HEIGHT: 65 IN | BODY MASS INDEX: 22.49 KG/M2 | OXYGEN SATURATION: 100 % | HEART RATE: 65 BPM | RESPIRATION RATE: 14 BRPM | DIASTOLIC BLOOD PRESSURE: 81 MMHG | WEIGHT: 135 LBS

## 2019-12-26 PROCEDURE — 93000 ELECTROCARDIOGRAM COMPLETE: CPT

## 2019-12-26 PROCEDURE — 99214 OFFICE O/P EST MOD 30 MIN: CPT

## 2019-12-26 RX ORDER — METOPROLOL SUCCINATE 25 MG/1
25 TABLET, EXTENDED RELEASE ORAL DAILY
Refills: 0 | Status: DISCONTINUED | COMMUNITY
Start: 2017-07-13 | End: 2019-12-26

## 2020-01-14 ENCOUNTER — APPOINTMENT (OUTPATIENT)
Dept: CV DIAGNOSITCS | Facility: HOSPITAL | Age: 75
End: 2020-01-14

## 2020-01-14 ENCOUNTER — OUTPATIENT (OUTPATIENT)
Dept: OUTPATIENT SERVICES | Facility: HOSPITAL | Age: 75
LOS: 1 days | End: 2020-01-14

## 2020-01-14 ENCOUNTER — APPOINTMENT (OUTPATIENT)
Dept: VASCULAR SURGERY | Facility: CLINIC | Age: 75
End: 2020-01-14
Payer: MEDICARE

## 2020-01-14 DIAGNOSIS — Z86.69 PERSONAL HISTORY OF OTHER DISEASES OF THE NERVOUS SYSTEM AND SENSE ORGANS: Chronic | ICD-10-CM

## 2020-01-14 DIAGNOSIS — I31.3 PERICARDIAL EFFUSION (NONINFLAMMATORY): ICD-10-CM

## 2020-01-14 DIAGNOSIS — I77.0 ARTERIOVENOUS FISTULA, ACQUIRED: Chronic | ICD-10-CM

## 2020-01-14 DIAGNOSIS — Z90.710 ACQUIRED ABSENCE OF BOTH CERVIX AND UTERUS: Chronic | ICD-10-CM

## 2020-01-14 DIAGNOSIS — H26.9 UNSPECIFIED CATARACT: Chronic | ICD-10-CM

## 2020-01-14 PROCEDURE — 99213 OFFICE O/P EST LOW 20 MIN: CPT

## 2020-01-14 PROCEDURE — 93990 DOPPLER FLOW TESTING: CPT

## 2020-01-14 NOTE — DISCUSSION/SUMMARY
[FreeTextEntry1] : 74 yo female with a history of esrd on hd presents for evaluation o avg\par No significant stenosis

## 2020-01-14 NOTE — PHYSICAL EXAM
[Normal] : no acute distress, well-nourished, well developed, well appearing [Aneurysm] : aneurysm [Thrill] : thrill [Pulsatile Thrill] : no pulsatile thrill [Ulcer] : no ulcer [Hand well perfused] : hand well perfused [Bleeding] : no bleeding [2+] : right 2+ [de-identified] : intact

## 2020-01-14 NOTE — HISTORY OF PRESENT ILLNESS
[FreeTextEntry1] : 73 yo female with a history of esrd on hd presents for evaluation\par pt denies any difficulty with accessing the avf and states that she experiences pain when they access that area but they have been alternating sites  [] : right brachiocephalic fistula

## 2020-01-16 ENCOUNTER — APPOINTMENT (OUTPATIENT)
Dept: CV DIAGNOSTICS | Facility: HOSPITAL | Age: 75
End: 2020-01-16

## 2020-01-16 ENCOUNTER — OUTPATIENT (OUTPATIENT)
Dept: OUTPATIENT SERVICES | Facility: HOSPITAL | Age: 75
LOS: 1 days | End: 2020-01-16

## 2020-01-16 DIAGNOSIS — N18.6 END STAGE RENAL DISEASE: ICD-10-CM

## 2020-01-16 DIAGNOSIS — Z90.710 ACQUIRED ABSENCE OF BOTH CERVIX AND UTERUS: Chronic | ICD-10-CM

## 2020-01-16 DIAGNOSIS — H26.9 UNSPECIFIED CATARACT: Chronic | ICD-10-CM

## 2020-01-16 DIAGNOSIS — I10 ESSENTIAL (PRIMARY) HYPERTENSION: ICD-10-CM

## 2020-01-16 DIAGNOSIS — Z86.69 PERSONAL HISTORY OF OTHER DISEASES OF THE NERVOUS SYSTEM AND SENSE ORGANS: Chronic | ICD-10-CM

## 2020-01-16 DIAGNOSIS — I77.0 ARTERIOVENOUS FISTULA, ACQUIRED: Chronic | ICD-10-CM

## 2020-01-17 NOTE — ADDENDUM
[FreeTextEntry1] : CARDIAC TEST RESULTS:\par 01/14/20 ECHO: MAC, mild AR, mod LAE, moderate concentric LVH, mild diastolic dysfunction, normal RV size and function, small pericardial effusion superior to RA and adjacent to RV free wall, LVEF 62%\par 01/16/20 Regadenoson Tetrofosmin: no evidence of infarction or inducible ischemia, LVEF 61%\par \par CARDIAC CLEARANCE:\par At this time, patient is considered an acceptable risk from a cardiac standpoint for renal transplant. Optimizing BP management is advised.

## 2020-01-17 NOTE — PHYSICAL EXAM
[Normal Appearance] : normal appearance [General Appearance - Well Developed] : well developed [Well Groomed] : well groomed [No Deformities] : no deformities [General Appearance - Well Nourished] : well nourished [Normal Conjunctiva] : the conjunctiva exhibited no abnormalities [General Appearance - In No Acute Distress] : no acute distress [Normal Oral Mucosa] : normal oral mucosa [Eyelids - No Xanthelasma] : the eyelids demonstrated no xanthelasmas [No Oral Cyanosis] : no oral cyanosis [No Oral Pallor] : no oral pallor [Respiration, Rhythm And Depth] : normal respiratory rhythm and effort [Exaggerated Use Of Accessory Muscles For Inspiration] : no accessory muscle use [Auscultation Breath Sounds / Voice Sounds] : lungs were clear to auscultation bilaterally [Heart Rate And Rhythm] : heart rate and rhythm were normal [Heart Sounds] : normal S1 and S2 [Murmurs] : no murmurs present [Edema] : no peripheral edema present [Abdomen Tenderness] : non-tender [Abdomen Soft] : soft [Abnormal Walk] : normal gait [Gait - Sufficient For Exercise Testing] : the gait was sufficient for exercise testing [Cyanosis, Localized] : no localized cyanosis [Skin Color & Pigmentation] : normal skin color and pigmentation [] : no rash [Oriented To Time, Place, And Person] : oriented to person, place, and time [Affect] : the affect was normal [Mood] : the mood was normal [No Anxiety] : not feeling anxious [FreeTextEntry1] : RUE fistula

## 2020-01-17 NOTE — DISCUSSION/SUMMARY
[Hypertension] : hypertension [FreeTextEntry1] : \par Currently stable from a cardiovascular standpoint. Hypertensive today.  Appears euvolemic. Small to moderate pericardial effusion noted on recent CT. No clinical evidence of tamponade. Continue current medications. ECG completed today and reviewed. For renal transplant clearance, will schedule a pharmacologic nuclear stress test to rule out any significant CAD/ischemia. In addition, will schedule an echocardiogram to assess her cardiac structures and function. Pending the test results, I will make further recommendations.

## 2020-01-17 NOTE — HISTORY OF PRESENT ILLNESS
[FreeTextEntry1] : Doing okay. Denies chest pain, shortness of breath or palpitations. Was noted to have pericardial effusion on recent CT chest.

## 2020-01-30 NOTE — HISTORY OF PRESENT ILLNESS
[] : in right upper arm [FreeTextEntry1] : 74 yo female with a history of esrd on hd presents for follow up of right upper extremity avg.  pt without any complaints at this time\par pt denies any problems with hd at this time\par pt denies any history of bleeding after hd

## 2020-01-30 NOTE — PHYSICAL EXAM
[Thrill] : thrill [Hand well perfused] : hand well perfused [2+] : right 2+ [Normal] : normal sclera/conjunctiva, PERRL, EOMI [Pulsatile Thrill] : no pulsatile thrill [Aneurysm] : no aneurysm [Bleeding] : no bleeding [Ulcer] : no ulcer [de-identified] : intact, mild ecchymosis over the proximal upper arm (pt states about 2 weeks ago after attempted at home hd acces now back at a facility for hd) [de-identified] :  strength 5/5 b/l upper extremities [Gangrene] : no gangrene

## 2020-01-30 NOTE — DISCUSSION/SUMMARY
[FreeTextEntry1] : 72 yo female with a history of esrd on hd presents for follow up of right upper extremity avg\par duplex shows stable 50-75% stenosis at the venous anastomosis with flow rate of 2991\par given no difficulty with hd at this time would continue to monitor\par pt to follow up in 3 months with repeat duplex

## 2020-02-18 ENCOUNTER — EMERGENCY (EMERGENCY)
Facility: HOSPITAL | Age: 75
LOS: 1 days | Discharge: HOME | End: 2020-02-18
Attending: STUDENT IN AN ORGANIZED HEALTH CARE EDUCATION/TRAINING PROGRAM
Payer: MEDICARE

## 2020-02-18 VITALS
OXYGEN SATURATION: 100 % | RESPIRATION RATE: 17 BRPM | TEMPERATURE: 98 F | HEART RATE: 74 BPM | DIASTOLIC BLOOD PRESSURE: 68 MMHG | SYSTOLIC BLOOD PRESSURE: 185 MMHG

## 2020-02-18 VITALS
HEART RATE: 75 BPM | SYSTOLIC BLOOD PRESSURE: 211 MMHG | RESPIRATION RATE: 17 BRPM | DIASTOLIC BLOOD PRESSURE: 86 MMHG | WEIGHT: 125 LBS | TEMPERATURE: 98 F | OXYGEN SATURATION: 99 %

## 2020-02-18 DIAGNOSIS — Z90.710 ACQUIRED ABSENCE OF BOTH CERVIX AND UTERUS: Chronic | ICD-10-CM

## 2020-02-18 DIAGNOSIS — I77.0 ARTERIOVENOUS FISTULA, ACQUIRED: Chronic | ICD-10-CM

## 2020-02-18 DIAGNOSIS — H26.9 UNSPECIFIED CATARACT: Chronic | ICD-10-CM

## 2020-02-18 DIAGNOSIS — Z86.69 PERSONAL HISTORY OF OTHER DISEASES OF THE NERVOUS SYSTEM AND SENSE ORGANS: Chronic | ICD-10-CM

## 2020-02-18 LAB
ALBUMIN SERPL ELPH-MCNC: 3.2 G/DL — LOW (ref 3.5–5)
ALP SERPL-CCNC: 73 U/L — SIGNIFICANT CHANGE UP (ref 40–120)
ALT FLD-CCNC: 18 U/L DA — SIGNIFICANT CHANGE UP (ref 10–60)
ANION GAP SERPL CALC-SCNC: 6 MMOL/L — SIGNIFICANT CHANGE UP (ref 5–17)
APTT BLD: 34.6 SEC — SIGNIFICANT CHANGE UP (ref 27.5–36.3)
AST SERPL-CCNC: 22 U/L — SIGNIFICANT CHANGE UP (ref 10–40)
BASOPHILS # BLD AUTO: 0.02 K/UL — SIGNIFICANT CHANGE UP (ref 0–0.2)
BASOPHILS NFR BLD AUTO: 0.5 % — SIGNIFICANT CHANGE UP (ref 0–2)
BILIRUB SERPL-MCNC: 0.4 MG/DL — SIGNIFICANT CHANGE UP (ref 0.2–1.2)
BUN SERPL-MCNC: 52 MG/DL — HIGH (ref 7–18)
CALCIUM SERPL-MCNC: 9.2 MG/DL — SIGNIFICANT CHANGE UP (ref 8.4–10.5)
CHLORIDE SERPL-SCNC: 101 MMOL/L — SIGNIFICANT CHANGE UP (ref 96–108)
CO2 SERPL-SCNC: 29 MMOL/L — SIGNIFICANT CHANGE UP (ref 22–31)
CREAT SERPL-MCNC: 6.83 MG/DL — HIGH (ref 0.5–1.3)
EOSINOPHIL # BLD AUTO: 0.16 K/UL — SIGNIFICANT CHANGE UP (ref 0–0.5)
EOSINOPHIL NFR BLD AUTO: 3.8 % — SIGNIFICANT CHANGE UP (ref 0–6)
GLUCOSE SERPL-MCNC: 94 MG/DL — SIGNIFICANT CHANGE UP (ref 70–99)
HCT VFR BLD CALC: 30.9 % — LOW (ref 34.5–45)
HGB BLD-MCNC: 9.9 G/DL — LOW (ref 11.5–15.5)
IMM GRANULOCYTES NFR BLD AUTO: 0.2 % — SIGNIFICANT CHANGE UP (ref 0–1.5)
INR BLD: 1.04 RATIO — SIGNIFICANT CHANGE UP (ref 0.88–1.16)
LYMPHOCYTES # BLD AUTO: 0.81 K/UL — LOW (ref 1–3.3)
LYMPHOCYTES # BLD AUTO: 19.4 % — SIGNIFICANT CHANGE UP (ref 13–44)
MCHC RBC-ENTMCNC: 28.9 PG — SIGNIFICANT CHANGE UP (ref 27–34)
MCHC RBC-ENTMCNC: 32 GM/DL — SIGNIFICANT CHANGE UP (ref 32–36)
MCV RBC AUTO: 90.4 FL — SIGNIFICANT CHANGE UP (ref 80–100)
MONOCYTES # BLD AUTO: 0.55 K/UL — SIGNIFICANT CHANGE UP (ref 0–0.9)
MONOCYTES NFR BLD AUTO: 13.2 % — SIGNIFICANT CHANGE UP (ref 2–14)
NEUTROPHILS # BLD AUTO: 2.62 K/UL — SIGNIFICANT CHANGE UP (ref 1.8–7.4)
NEUTROPHILS NFR BLD AUTO: 62.9 % — SIGNIFICANT CHANGE UP (ref 43–77)
NRBC # BLD: 0 /100 WBCS — SIGNIFICANT CHANGE UP (ref 0–0)
NT-PROBNP SERPL-SCNC: HIGH PG/ML (ref 0–450)
PLATELET # BLD AUTO: 99 K/UL — LOW (ref 150–400)
POTASSIUM SERPL-MCNC: 5 MMOL/L — SIGNIFICANT CHANGE UP (ref 3.5–5.3)
POTASSIUM SERPL-SCNC: 5 MMOL/L — SIGNIFICANT CHANGE UP (ref 3.5–5.3)
PROT SERPL-MCNC: 6.3 G/DL — SIGNIFICANT CHANGE UP (ref 6–8.3)
PROTHROM AB SERPL-ACNC: 11.6 SEC — SIGNIFICANT CHANGE UP (ref 10–12.9)
RBC # BLD: 3.42 M/UL — LOW (ref 3.8–5.2)
RBC # FLD: 14.4 % — SIGNIFICANT CHANGE UP (ref 10.3–14.5)
SODIUM SERPL-SCNC: 136 MMOL/L — SIGNIFICANT CHANGE UP (ref 135–145)
TROPONIN I SERPL-MCNC: <0.015 NG/ML — SIGNIFICANT CHANGE UP (ref 0–0.04)
WBC # BLD: 4.17 K/UL — SIGNIFICANT CHANGE UP (ref 3.8–10.5)
WBC # FLD AUTO: 4.17 K/UL — SIGNIFICANT CHANGE UP (ref 3.8–10.5)

## 2020-02-18 PROCEDURE — 83880 ASSAY OF NATRIURETIC PEPTIDE: CPT

## 2020-02-18 PROCEDURE — 99283 EMERGENCY DEPT VISIT LOW MDM: CPT | Mod: 25

## 2020-02-18 PROCEDURE — 71046 X-RAY EXAM CHEST 2 VIEWS: CPT | Mod: 26

## 2020-02-18 PROCEDURE — 99284 EMERGENCY DEPT VISIT MOD MDM: CPT

## 2020-02-18 PROCEDURE — 36415 COLL VENOUS BLD VENIPUNCTURE: CPT

## 2020-02-18 PROCEDURE — 85730 THROMBOPLASTIN TIME PARTIAL: CPT

## 2020-02-18 PROCEDURE — 84484 ASSAY OF TROPONIN QUANT: CPT

## 2020-02-18 PROCEDURE — 93005 ELECTROCARDIOGRAM TRACING: CPT

## 2020-02-18 PROCEDURE — 80053 COMPREHEN METABOLIC PANEL: CPT

## 2020-02-18 PROCEDURE — 71046 X-RAY EXAM CHEST 2 VIEWS: CPT

## 2020-02-18 PROCEDURE — 85027 COMPLETE CBC AUTOMATED: CPT

## 2020-02-18 PROCEDURE — 85610 PROTHROMBIN TIME: CPT

## 2020-02-18 NOTE — ED PROVIDER NOTE - PROGRESS NOTE DETAILS
patient well appearing. vital stable. xray does not appear fluid over loaded or have infiltrate. lab wnl. trop negative. symptoms x weeks, appears stable. instructed to f.u pmd, return precaution provided

## 2020-02-18 NOTE — ED PROVIDER NOTE - PATIENT PORTAL LINK FT
You can access the FollowMyHealth Patient Portal offered by [facility] by registering at the following website: [email or web address]. By joining [facility]’s FollowMyHealth portal, you will also be able to view your health information using other applications (apps) compatible with our system.

## 2020-02-18 NOTE — ED PROVIDER NOTE - CLINICAL SUMMARY MEDICAL DECISION MAKING FREE TEXT BOX
Patient presenting with sob with cough. vital stable. ekg sinus. will obtain lab, r/o acs, cxr, assess for fluid overload, lyte abnormality, pna. ed obs and reassess

## 2020-02-18 NOTE — ED PROVIDER NOTE - OBJECTIVE STATEMENT
73 y/o F pt with a PMHx of ESRD (dialyzed yesterday), HTN, and DVT and a PSHx of KEVEN-BSO presents to ED c/o 3 weeks of cough and chest tightness. Pt denies chest pain, nausea, vomiting, fever, abd pain, recent travel, or any other acute complaints. Allergies: Penicillin (Anaphylaxis)

## 2020-02-19 NOTE — ED ADULT NURSE NOTE - NSIMPLEMENTINTERV_GEN_ALL_ED
Implemented All Universal Safety Interventions:  [place] to call system. Call bell, personal items and telephone within reach. Instruct patient to call for assistance. Room bathroom lighting operational. Non-slip footwear when patient is off stretcher. Physically safe environment: no spills, clutter or unnecessary equipment. Stretcher in lowest position, wheels locked, appropriate side rails in place.

## 2020-04-07 ENCOUNTER — APPOINTMENT (OUTPATIENT)
Dept: NEPHROLOGY | Facility: CLINIC | Age: 75
End: 2020-04-07

## 2020-04-07 ENCOUNTER — APPOINTMENT (OUTPATIENT)
Dept: TRANSPLANT | Facility: CLINIC | Age: 75
End: 2020-04-07

## 2020-05-28 ENCOUNTER — APPOINTMENT (OUTPATIENT)
Dept: RADIOLOGY | Facility: CLINIC | Age: 75
End: 2020-05-28
Payer: COMMERCIAL

## 2020-05-28 ENCOUNTER — APPOINTMENT (OUTPATIENT)
Dept: ULTRASOUND IMAGING | Facility: CLINIC | Age: 75
End: 2020-05-28
Payer: COMMERCIAL

## 2020-05-28 ENCOUNTER — OUTPATIENT (OUTPATIENT)
Dept: OUTPATIENT SERVICES | Facility: HOSPITAL | Age: 75
LOS: 1 days | End: 2020-05-28
Payer: COMMERCIAL

## 2020-05-28 DIAGNOSIS — Z90.710 ACQUIRED ABSENCE OF BOTH CERVIX AND UTERUS: Chronic | ICD-10-CM

## 2020-05-28 DIAGNOSIS — Z86.69 PERSONAL HISTORY OF OTHER DISEASES OF THE NERVOUS SYSTEM AND SENSE ORGANS: Chronic | ICD-10-CM

## 2020-05-28 DIAGNOSIS — H26.9 UNSPECIFIED CATARACT: Chronic | ICD-10-CM

## 2020-05-28 DIAGNOSIS — Z01.818 ENCOUNTER FOR OTHER PREPROCEDURAL EXAMINATION: ICD-10-CM

## 2020-05-28 DIAGNOSIS — I77.0 ARTERIOVENOUS FISTULA, ACQUIRED: Chronic | ICD-10-CM

## 2020-05-28 PROCEDURE — 76700 US EXAM ABDOM COMPLETE: CPT

## 2020-05-28 PROCEDURE — 76700 US EXAM ABDOM COMPLETE: CPT | Mod: 26,59

## 2020-05-28 PROCEDURE — 93975 VASCULAR STUDY: CPT

## 2020-05-28 PROCEDURE — 93975 VASCULAR STUDY: CPT | Mod: 26

## 2020-05-28 PROCEDURE — 71046 X-RAY EXAM CHEST 2 VIEWS: CPT | Mod: 26

## 2020-05-28 PROCEDURE — 71046 X-RAY EXAM CHEST 2 VIEWS: CPT

## 2020-07-01 NOTE — PHYSICAL EXAM
[General Appearance - Alert] : alert [General Appearance - In No Acute Distress] : in no acute distress [Heart Sounds Pericardial Friction Rub] : no pericardial rub [] : no rash [Thrill] : a thrill was present [No Focal Deficits] : no focal deficits [Oriented To Time, Place, And Person] : oriented to person, place, and time [Impaired Insight] : insight and judgment were intact [Affect] : the affect was normal

## 2020-07-02 ENCOUNTER — APPOINTMENT (OUTPATIENT)
Dept: TRANSPLANT | Facility: CLINIC | Age: 75
End: 2020-07-02

## 2020-07-02 ENCOUNTER — APPOINTMENT (OUTPATIENT)
Dept: NEPHROLOGY | Facility: CLINIC | Age: 75
End: 2020-07-02
Payer: COMMERCIAL

## 2020-07-02 PROCEDURE — 99214 OFFICE O/P EST MOD 30 MIN: CPT | Mod: 95

## 2020-07-02 NOTE — REASON FOR VISIT
[Kidney Transplant Evaluation] : kidney transplant evaluation [Follow-Up] : a follow-up visit [Family Member] : family member [FreeTextEntry1] : follow up pre transplant evaluation

## 2020-07-02 NOTE — ASSESSMENT
[FreeTextEntry1] : 1)ESRD: On hemodialysis, is she appears to be an acceptable candidate and reports good functional status despite her advanced age. will need an in clinic appt \par 2)Hypertension: discussed target level, on f/u with primary nephrologist.\par 3)Cardiovascular- has updated echo and stress from this year. results reviewed. \par 4)cancer Screening- Aicha and colonoscopy results reviewed. \par 5) ID- update serologies \par \par Reported off to:  david LUOGN and Erin LAMB\par 	\par Patient must complete: cardiac clearance, Abd US/DOppler, chest Xray,  mammo and PAP, update serologies including COVID Ab here at the office, Complete COVID PCR testing, Send consents and COVID order with locations to the patient to sign and return. Pt must be scheduled for annual appointment. \par Donor coordinator contact information given to patient	\par KDPI >85%: Yes\par CDC high risk: Yes\par Hep C Kidney: Yes\par A2B\par \par \par I have personally discussed the risks and benefits of transplantation where the following was disclosed:\par  \par Reviewed factors affecting survival and morbidity while on dialysis, the transplant wait list and reviewed bella-operative and long-term risk factors affecting outcome in kidney transplantation. \par  \par One year SRTR outcomes for national and [facility] were discussed in regards to patient survival and graft survival after transplantation. \par  \par Details of transplant surgery, including complications were discussed.\par Immunosuppression and complications including infection including life threatening sepsis and opportunistic infections, malignancy and new onset diabetes were discussed. \par  \par Benefits of live donor transplantation as well as variability in wait times across regions and multiple listing were discussed. \par KDPI >85% and PHS high risk criteria donors were discussed. \par HCV kidney transplantation was discussed.\par  \par Will proceed with completing/ updating work up and listing for transplant/ live donor transplant once work up is reviewed and found to be acceptable by multidisciplinary listing committee

## 2020-07-02 NOTE — ADDENDUM
[FreeTextEntry1] : Pt is a 74 yo female appears younger then stated age I s being seen today for transplant annual follow up visit. \par Followed by nephrologist:  Dr Treviño\par Listing Status:1\par KDPI >85% :  yes \par High Risk PHS: yes\par Hep C Offers:  yes\par Recent Hospitalizations or ER visits: none\par Procedures done since last visit: none\par Blood transfusions since last visit: none\par Live donors: none\par Prior Transplants: none\par Note:   Patient came in for annual follow up. Pt saw Dr. Winkler, , finance and annual labs were done. Medications reviewed and updated. Patient reviewed and signed:  HIPPA form, [employer], Informed consent for kidney transplant recipient/KDPI/PHS/HCV form, email form, HIV form. Multi listing discussed.\par Patient was educated on importance of Annual clinical update work as instructed by transplant team. Patient verbalized understanding. Patient was advised to inform the transplant coordinator if any changes in health status.\par  Pt sent for cardiac clearance, CXR, Abdominal sonogram, and mammogram. Lindsay Marshall is patient coordinator and will follow up labs and imaging \par

## 2020-07-02 NOTE — HISTORY OF PRESENT ILLNESS
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 ESRD (1 year 3 months), f/u by Dr. Treviño, [facility] dialysis unit. Switching to home dialysis\par Normal urine out put, no hematuria.\par Hypertension, 25 years, no kidney biopsy.\par Non smoker. \par Able to walk 20 blocks, able to climb stairs.\par Retired in , 15 years,  moved back since on dialysis.\par h/o transfusion. No CAD/CVA/PVD.\par No neoplasm/DVT/ bleeding/infection.\par Past surgery: eye, hysterectomy\par non smoker\par independent for ADL\par \par Last Seen 3/21/19\par Next Apt 2020\par Listed 17\par ABO A\par PRA 81%\par Cause of Kidney failure Unknown (no Bx)\par Other Med Hx ESRD 2015, HTN age 24, hysterectomy age 48, L THR \par \par \par \par Most Recent testing\par \par Cardiac\par Saw Dr Bowers 19\par EK19, normal sinus rhythm, nonspecific ST abnormality \par 20 ECHO: MAC, mild AR, mod LAE, moderate concentric LVH, mild diastolic dysfunction, normal RV size and function, small pericardial effusion superior to RA and adjacent to RV free wall, LVEF 62%\par 20 Regadenoson Tetrofosmin: no evidence of infarction or inducible ischemia, LVEF 61%\par CARDIAC CLEARANCE:\par At this time, patient is considered an acceptable risk from a cardiac standpoint for renal transplant. Optimizing BP management is advised. \par \par Radiology\par Chest CT 19 Clear Lungs Small to moderate pericardial effusion new from prior study. \par Abd US/doppler 19 Liver, BD panc, spleen, WNL. GB 2 mm polyp, kidneys thin echogenic cortex no hydro bilaterally. Vasculature patent. \par \par Cancer Screening\par Mammo- 17-No dominant masses or suspicious clusters of microcalcifications are seen. Bi-Rads-2\par PAP-n/a -Hysterectomy \par Endoscopy- 2017- Normal esophagus, mild antral gastritis (biopsied to r/o \par H. pylori) Mild duodenitis of the duodenal bulb, no source of bleeding found on this exam. Path: Mild chronic gastritis, no H. Pylori\par Colonoscopy- 2017- non-bleeding internal and external hemorrhoids. 2mm polyp in the ascending colon-Tubular adenoma 5mm polyp in the transverse colon- Hyperplastic polyp focally with features \par suggestive of sessile  polyp/adenoma. Repeat in 5 yrs.

## 2020-07-02 NOTE — ADDENDUM
[FreeTextEntry1] : Pt is a 72 yo female appears younger then stated age I s being seen today for transplant annual follow up visit. \par Followed by nephrologist:  Dr Treviño\par Listing Status:1\par KDPI >85% :  yes \par High Risk PHS: yes\par Hep C Offers:  yes\par Recent Hospitalizations or ER visits: none\par Procedures done since last visit: none\par Blood transfusions since last visit: none\par Live donors: none\par Prior Transplants: none\par Note:   Patient came in for annual follow up. Pt saw Dr. Winkler, , finance and annual labs were done. Medications reviewed and updated. Patient reviewed and signed:  HIPPA form, [employer], Informed consent for kidney transplant recipient/KDPI/PHS/HCV form, email form, HIV form. Multi listing discussed.\par Patient was educated on importance of Annual clinical update work as instructed by transplant team. Patient verbalized understanding. Patient was advised to inform the transplant coordinator if any changes in health status.\par  Pt sent for cardiac clearance, CXR, Abdominal sonogram, and mammogram. Lindsay Marshall is patient coordinator and will follow up labs and imaging \par

## 2020-07-02 NOTE — ASSESSMENT
[FreeTextEntry1] : 1)ESRD: On hemodialysis, on f/u with Dr. Treviño, clinically appears to be an acceptable candidate in good functional status\par 2)Hypertension: discussed target level, on f/u with primary nephrologist.\par 3)Cardiovascular\par 4)cancer SCreening\par 5) ID\par \par Reported off to:  david LUONG and Erin LAMB\par 	\par Patient must complete: cardiac clearance, Abd US/DOppler, chest Xray,  mammo and PAP, update serologies including COVID Ab here at the office, Complete COVID PCR testing, Send consents and COVID order with locations to the patient to sign and return. Pt must be scheduled for annual appointment. \par Donor coordinator contact information given to patient	\par KDPI >85%: Yes\par CDC high risk: Yes\par Hep C Kidney: Yes\par A2B\par \par \par I have personally discussed the risks and benefits of transplantation where the following was disclosed:\par  \par Reviewed factors affecting survival and morbidity while on dialysis, the transplant wait list and reviewed bella-operative and long-term risk factors affecting outcome in kidney transplantation. \par  \par One year SRTR outcomes for national and [facility] were discussed in regards to patient survival and graft survival after transplantation. \par  \par Details of transplant surgery, including complications were discussed.\par Immunosuppression and complications including infection including life threatening sepsis and opportunistic infections, malignancy and new onset diabetes were discussed. \par  \par Benefits of live donor transplantation as well as variability in wait times across regions and multiple listing were discussed. \par KDPI >85% and PHS high risk criteria donors were discussed. \par HCV kidney transplantation was discussed.\par  \par Will proceed with completing/ updating work up and listing for transplant/ live donor transplant once work up is reviewed and found to be acceptable by multidisciplinary listing committee

## 2020-07-02 NOTE — HISTORY OF PRESENT ILLNESS
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 Treviño\par Normal urine out put, no hematuria.\par Hypertension, 25 years, no kidney biopsy.\par Non smoker. \par Able to walk 20 blocks, able to climb stairs.\par Retired in [place], 15 years,  moved back since on dialysis.\par h/o transfusion. No CAD/CVA/PVD.\par No neoplasm/DVT/ bleeding/infection.\par Past surgery: eye, hysterectomy\par non smoker\par independent for ADL\par \par Recent ho\par Last Seen 3/21/19\par Next Apt 2020\par Listed 17\par ABO A\par PRA 81%\par Cause of Kidney failure Unknown (no Bx)\par Other Med Hx ESRD 2015, HTN age 24, hysterectomy age 48, L THR \par \par Most recent hospitalization was in March for Covid 19. was admitted just for one day and discharged. \par Most Recent testing\par \par Cardiac\par Saw Dr Bowers 19\par EK19, normal sinus rhythm, nonspecific ST abnormality \par 20 ECHO: MAC, mild AR, mod LAE, moderate concentric LVH, mild diastolic dysfunction, normal RV size and function, small pericardial effusion superior to RA and adjacent to RV free wall, LVEF 62%\par 20 Regadenoson Tetrofosmin: no evidence of infarction or inducible ischemia, LVEF 61%\par CARDIAC CLEARANCE:\par At this time, patient is considered an acceptable risk from a cardiac standpoint for renal transplant. Optimizing BP management is advised. \par \par Radiology\par Chest CT 19 Clear Lungs Small to moderate pericardial effusion new from prior study. \par Abd US/doppler 19 Liver, BD panc, spleen, WNL. GB 2 mm polyp, kidneys thin echogenic cortex no hydro bilaterally. Vasculature patent. \par \par Cancer Screening\par Mammo- 17-No dominant masses or suspicious clusters of microcalcifications are seen. Bi-Rads-2\par PAP-n/a -Hysterectomy \par Endoscopy- 2017- Normal esophagus, mild antral gastritis (biopsied to r/o \par H. pylori) Mild duodenitis of the duodenal bulb, no source of bleeding found on this exam. Path: Mild chronic gastritis, no H. Pylori\par Colonoscopy- 2017- non-bleeding internal and external hemorrhoids. 2mm polyp in the ascending colon-Tubular adenoma 5mm polyp in the transverse colon- Hyperplastic polyp focally with features \par suggestive of sessile  polyp/adenoma. Repeat in 5 yrs.

## 2020-07-21 ENCOUNTER — APPOINTMENT (OUTPATIENT)
Dept: VASCULAR SURGERY | Facility: CLINIC | Age: 75
End: 2020-07-21
Payer: MEDICARE

## 2020-07-30 ENCOUNTER — APPOINTMENT (OUTPATIENT)
Dept: VASCULAR SURGERY | Facility: CLINIC | Age: 75
End: 2020-07-30
Payer: MEDICARE

## 2020-07-30 VITALS — TEMPERATURE: 96.4 F

## 2020-07-30 PROCEDURE — 93990 DOPPLER FLOW TESTING: CPT

## 2020-07-30 PROCEDURE — 99213 OFFICE O/P EST LOW 20 MIN: CPT

## 2020-07-31 NOTE — DISCUSSION/SUMMARY
[FreeTextEntry1] : 73 yo female with a history of esrd on hd presents for follow up of right upper extremity avg.\par \par duplex shows patent avg with 50-75%stenosis of the distal and mid upper arm and distal graft with flow rate of 2492 \par \par will continue to monitor given no issues with hd \par pt to follow up in 3-4 months with repeat duplex\par

## 2020-07-31 NOTE — PHYSICAL EXAM
[Thrill] : thrill [Hand well perfused] : hand well perfused [2+] : right 2+ [Normal] : coordination grossly intact, no focal deficits [Pulsatile Thrill] : no pulsatile thrill [Aneurysm] : no aneurysm [Bleeding] : no bleeding [Ulcer] : no ulcer [de-identified] : intact

## 2020-07-31 NOTE — HISTORY OF PRESENT ILLNESS
[] : in right forearm  [FreeTextEntry1] : 73 yo female with a history of esrd on hd presents for follow up of right upper extremity avg.  pt without any complaints at this time denies any problems with hd at this time

## 2020-08-09 ENCOUNTER — INPATIENT (INPATIENT)
Facility: HOSPITAL | Age: 75
LOS: 7 days | Discharge: HOME | DRG: 193 | End: 2020-08-17
Attending: INTERNAL MEDICINE | Admitting: INTERNAL MEDICINE
Payer: MEDICARE

## 2020-08-09 VITALS
RESPIRATION RATE: 16 BRPM | WEIGHT: 141.1 LBS | HEIGHT: 67 IN | HEART RATE: 66 BPM | SYSTOLIC BLOOD PRESSURE: 220 MMHG | DIASTOLIC BLOOD PRESSURE: 92 MMHG | TEMPERATURE: 98 F | OXYGEN SATURATION: 100 %

## 2020-08-09 DIAGNOSIS — J18.9 PNEUMONIA, UNSPECIFIED ORGANISM: ICD-10-CM

## 2020-08-09 DIAGNOSIS — N18.6 END STAGE RENAL DISEASE: ICD-10-CM

## 2020-08-09 DIAGNOSIS — E87.5 HYPERKALEMIA: ICD-10-CM

## 2020-08-09 DIAGNOSIS — H26.9 UNSPECIFIED CATARACT: Chronic | ICD-10-CM

## 2020-08-09 DIAGNOSIS — Z90.710 ACQUIRED ABSENCE OF BOTH CERVIX AND UTERUS: Chronic | ICD-10-CM

## 2020-08-09 DIAGNOSIS — Z29.9 ENCOUNTER FOR PROPHYLACTIC MEASURES, UNSPECIFIED: ICD-10-CM

## 2020-08-09 DIAGNOSIS — I77.0 ARTERIOVENOUS FISTULA, ACQUIRED: Chronic | ICD-10-CM

## 2020-08-09 DIAGNOSIS — I10 ESSENTIAL (PRIMARY) HYPERTENSION: ICD-10-CM

## 2020-08-09 DIAGNOSIS — Z86.69 PERSONAL HISTORY OF OTHER DISEASES OF THE NERVOUS SYSTEM AND SENSE ORGANS: Chronic | ICD-10-CM

## 2020-08-09 LAB
ALBUMIN SERPL ELPH-MCNC: 3.5 G/DL — SIGNIFICANT CHANGE UP (ref 3.5–5)
ALP SERPL-CCNC: 87 U/L — SIGNIFICANT CHANGE UP (ref 40–120)
ALT FLD-CCNC: 44 U/L DA — SIGNIFICANT CHANGE UP (ref 10–60)
ANION GAP SERPL CALC-SCNC: 10 MMOL/L — SIGNIFICANT CHANGE UP (ref 5–17)
ANION GAP SERPL CALC-SCNC: 11 MMOL/L — SIGNIFICANT CHANGE UP (ref 5–17)
ANION GAP SERPL CALC-SCNC: 12 MMOL/L — SIGNIFICANT CHANGE UP (ref 5–17)
APTT BLD: 38.4 SEC — HIGH (ref 27.5–35.5)
AST SERPL-CCNC: 46 U/L — HIGH (ref 10–40)
BASOPHILS # BLD AUTO: 0.02 K/UL — SIGNIFICANT CHANGE UP (ref 0–0.2)
BASOPHILS NFR BLD AUTO: 0.3 % — SIGNIFICANT CHANGE UP (ref 0–2)
BILIRUB SERPL-MCNC: 0.6 MG/DL — SIGNIFICANT CHANGE UP (ref 0.2–1.2)
BUN SERPL-MCNC: 59 MG/DL — HIGH (ref 7–18)
BUN SERPL-MCNC: 64 MG/DL — HIGH (ref 7–18)
BUN SERPL-MCNC: 69 MG/DL — HIGH (ref 7–18)
CALCIUM SERPL-MCNC: 9.2 MG/DL — SIGNIFICANT CHANGE UP (ref 8.4–10.5)
CALCIUM SERPL-MCNC: 9.3 MG/DL — SIGNIFICANT CHANGE UP (ref 8.4–10.5)
CALCIUM SERPL-MCNC: 9.5 MG/DL — SIGNIFICANT CHANGE UP (ref 8.4–10.5)
CHLORIDE SERPL-SCNC: 97 MMOL/L — SIGNIFICANT CHANGE UP (ref 96–108)
CHLORIDE SERPL-SCNC: 98 MMOL/L — SIGNIFICANT CHANGE UP (ref 96–108)
CHLORIDE SERPL-SCNC: 99 MMOL/L — SIGNIFICANT CHANGE UP (ref 96–108)
CO2 SERPL-SCNC: 26 MMOL/L — SIGNIFICANT CHANGE UP (ref 22–31)
CO2 SERPL-SCNC: 27 MMOL/L — SIGNIFICANT CHANGE UP (ref 22–31)
CO2 SERPL-SCNC: 30 MMOL/L — SIGNIFICANT CHANGE UP (ref 22–31)
CREAT SERPL-MCNC: 8.65 MG/DL — HIGH (ref 0.5–1.3)
CREAT SERPL-MCNC: 9.18 MG/DL — HIGH (ref 0.5–1.3)
CREAT SERPL-MCNC: 9.72 MG/DL — HIGH (ref 0.5–1.3)
EOSINOPHIL # BLD AUTO: 0.21 K/UL — SIGNIFICANT CHANGE UP (ref 0–0.5)
EOSINOPHIL NFR BLD AUTO: 3.6 % — SIGNIFICANT CHANGE UP (ref 0–6)
GLUCOSE BLDC GLUCOMTR-MCNC: 142 MG/DL — HIGH (ref 70–99)
GLUCOSE BLDC GLUCOMTR-MCNC: 63 MG/DL — LOW (ref 70–99)
GLUCOSE BLDC GLUCOMTR-MCNC: 70 MG/DL — SIGNIFICANT CHANGE UP (ref 70–99)
GLUCOSE BLDC GLUCOMTR-MCNC: 87 MG/DL — SIGNIFICANT CHANGE UP (ref 70–99)
GLUCOSE BLDC GLUCOMTR-MCNC: 92 MG/DL — SIGNIFICANT CHANGE UP (ref 70–99)
GLUCOSE SERPL-MCNC: 119 MG/DL — HIGH (ref 70–99)
GLUCOSE SERPL-MCNC: 126 MG/DL — HIGH (ref 70–99)
GLUCOSE SERPL-MCNC: 92 MG/DL — SIGNIFICANT CHANGE UP (ref 70–99)
HCT VFR BLD CALC: 37.6 % — SIGNIFICANT CHANGE UP (ref 34.5–45)
HGB BLD-MCNC: 12.2 G/DL — SIGNIFICANT CHANGE UP (ref 11.5–15.5)
IMM GRANULOCYTES NFR BLD AUTO: 0.2 % — SIGNIFICANT CHANGE UP (ref 0–1.5)
INR BLD: 1.06 RATIO — SIGNIFICANT CHANGE UP (ref 0.88–1.16)
LIDOCAIN IGE QN: 338 U/L — SIGNIFICANT CHANGE UP (ref 73–393)
LYMPHOCYTES # BLD AUTO: 0.79 K/UL — LOW (ref 1–3.3)
LYMPHOCYTES # BLD AUTO: 13.4 % — SIGNIFICANT CHANGE UP (ref 13–44)
MAGNESIUM SERPL-MCNC: 2.7 MG/DL — HIGH (ref 1.6–2.6)
MCHC RBC-ENTMCNC: 28.4 PG — SIGNIFICANT CHANGE UP (ref 27–34)
MCHC RBC-ENTMCNC: 32.4 GM/DL — SIGNIFICANT CHANGE UP (ref 32–36)
MCV RBC AUTO: 87.4 FL — SIGNIFICANT CHANGE UP (ref 80–100)
MONOCYTES # BLD AUTO: 0.52 K/UL — SIGNIFICANT CHANGE UP (ref 0–0.9)
MONOCYTES NFR BLD AUTO: 8.8 % — SIGNIFICANT CHANGE UP (ref 2–14)
NEUTROPHILS # BLD AUTO: 4.36 K/UL — SIGNIFICANT CHANGE UP (ref 1.8–7.4)
NEUTROPHILS NFR BLD AUTO: 73.7 % — SIGNIFICANT CHANGE UP (ref 43–77)
NRBC # BLD: 0 /100 WBCS — SIGNIFICANT CHANGE UP (ref 0–0)
NT-PROBNP SERPL-SCNC: HIGH PG/ML (ref 0–450)
PLATELET # BLD AUTO: 163 K/UL — SIGNIFICANT CHANGE UP (ref 150–400)
POTASSIUM SERPL-MCNC: 5.5 MMOL/L — HIGH (ref 3.5–5.3)
POTASSIUM SERPL-MCNC: 5.6 MMOL/L — HIGH (ref 3.5–5.3)
POTASSIUM SERPL-MCNC: 5.9 MMOL/L — HIGH (ref 3.5–5.3)
POTASSIUM SERPL-SCNC: 5.5 MMOL/L — HIGH (ref 3.5–5.3)
POTASSIUM SERPL-SCNC: 5.6 MMOL/L — HIGH (ref 3.5–5.3)
POTASSIUM SERPL-SCNC: 5.9 MMOL/L — HIGH (ref 3.5–5.3)
PROT SERPL-MCNC: 7.2 G/DL — SIGNIFICANT CHANGE UP (ref 6–8.3)
PROTHROM AB SERPL-ACNC: 12.4 SEC — SIGNIFICANT CHANGE UP (ref 10.6–13.6)
RBC # BLD: 4.3 M/UL — SIGNIFICANT CHANGE UP (ref 3.8–5.2)
RBC # FLD: 17 % — HIGH (ref 10.3–14.5)
SARS-COV-2 RNA SPEC QL NAA+PROBE: SIGNIFICANT CHANGE UP
SODIUM SERPL-SCNC: 136 MMOL/L — SIGNIFICANT CHANGE UP (ref 135–145)
SODIUM SERPL-SCNC: 137 MMOL/L — SIGNIFICANT CHANGE UP (ref 135–145)
SODIUM SERPL-SCNC: 137 MMOL/L — SIGNIFICANT CHANGE UP (ref 135–145)
TROPONIN I SERPL-MCNC: 0.02 NG/ML — SIGNIFICANT CHANGE UP (ref 0–0.04)
WBC # BLD: 5.91 K/UL — SIGNIFICANT CHANGE UP (ref 3.8–10.5)
WBC # FLD AUTO: 5.91 K/UL — SIGNIFICANT CHANGE UP (ref 3.8–10.5)

## 2020-08-09 PROCEDURE — 99285 EMERGENCY DEPT VISIT HI MDM: CPT | Mod: 25

## 2020-08-09 PROCEDURE — 71046 X-RAY EXAM CHEST 2 VIEWS: CPT | Mod: 26

## 2020-08-09 RX ORDER — INSULIN HUMAN 100 [IU]/ML
5 INJECTION, SOLUTION SUBCUTANEOUS ONCE
Refills: 0 | Status: COMPLETED | OUTPATIENT
Start: 2020-08-09 | End: 2020-08-09

## 2020-08-09 RX ORDER — ERGOCALCIFEROL 1.25 MG/1
1 CAPSULE ORAL
Qty: 0 | Refills: 0 | DISCHARGE

## 2020-08-09 RX ORDER — AMLODIPINE BESYLATE 2.5 MG/1
10 TABLET ORAL ONCE
Refills: 0 | Status: COMPLETED | OUTPATIENT
Start: 2020-08-09 | End: 2020-08-09

## 2020-08-09 RX ORDER — HEPARIN SODIUM 5000 [USP'U]/ML
5000 INJECTION INTRAVENOUS; SUBCUTANEOUS EVERY 8 HOURS
Refills: 0 | Status: DISCONTINUED | OUTPATIENT
Start: 2020-08-09 | End: 2020-08-17

## 2020-08-09 RX ORDER — AMLODIPINE BESYLATE 2.5 MG/1
10 TABLET ORAL DAILY
Refills: 0 | Status: DISCONTINUED | OUTPATIENT
Start: 2020-08-09 | End: 2020-08-10

## 2020-08-09 RX ORDER — DEXTROSE 50 % IN WATER 50 %
50 SYRINGE (ML) INTRAVENOUS ONCE
Refills: 0 | Status: COMPLETED | OUTPATIENT
Start: 2020-08-09 | End: 2020-08-09

## 2020-08-09 RX ORDER — SODIUM POLYSTYRENE SULFONATE 4.1 MEQ/G
30 POWDER, FOR SUSPENSION ORAL ONCE
Refills: 0 | Status: COMPLETED | OUTPATIENT
Start: 2020-08-09 | End: 2020-08-09

## 2020-08-09 RX ORDER — HYDRALAZINE HCL 50 MG
100 TABLET ORAL THREE TIMES A DAY
Refills: 0 | Status: DISCONTINUED | OUTPATIENT
Start: 2020-08-09 | End: 2020-08-17

## 2020-08-09 RX ORDER — CALCITRIOL 0.5 UG/1
0.75 CAPSULE ORAL
Refills: 0 | Status: DISCONTINUED | OUTPATIENT
Start: 2020-08-09 | End: 2020-08-17

## 2020-08-09 RX ORDER — AZTREONAM 2 G
1000 VIAL (EA) INJECTION ONCE
Refills: 0 | Status: COMPLETED | OUTPATIENT
Start: 2020-08-09 | End: 2020-08-09

## 2020-08-09 RX ORDER — CEFTRIAXONE 500 MG/1
1000 INJECTION, POWDER, FOR SOLUTION INTRAMUSCULAR; INTRAVENOUS ONCE
Refills: 0 | Status: COMPLETED | OUTPATIENT
Start: 2020-08-09 | End: 2020-08-09

## 2020-08-09 RX ORDER — LABETALOL HCL 100 MG
100 TABLET ORAL
Refills: 0 | Status: DISCONTINUED | OUTPATIENT
Start: 2020-08-09 | End: 2020-08-09

## 2020-08-09 RX ORDER — ALBUTEROL 90 UG/1
2.5 AEROSOL, METERED ORAL ONCE
Refills: 0 | Status: DISCONTINUED | OUTPATIENT
Start: 2020-08-09 | End: 2020-08-09

## 2020-08-09 RX ORDER — AMLODIPINE BESYLATE 2.5 MG/1
10 TABLET ORAL DAILY
Refills: 0 | Status: DISCONTINUED | OUTPATIENT
Start: 2020-08-09 | End: 2020-08-09

## 2020-08-09 RX ORDER — CALCIUM ACETATE 667 MG
2001 TABLET ORAL
Refills: 0 | Status: DISCONTINUED | OUTPATIENT
Start: 2020-08-09 | End: 2020-08-17

## 2020-08-09 RX ORDER — SODIUM ZIRCONIUM CYCLOSILICATE 10 G/10G
10 POWDER, FOR SUSPENSION ORAL ONCE
Refills: 0 | Status: COMPLETED | OUTPATIENT
Start: 2020-08-09 | End: 2020-08-09

## 2020-08-09 RX ORDER — INSULIN HUMAN 100 [IU]/ML
5 INJECTION, SOLUTION SUBCUTANEOUS ONCE
Refills: 0 | Status: DISCONTINUED | OUTPATIENT
Start: 2020-08-09 | End: 2020-08-09

## 2020-08-09 RX ORDER — CALCIUM GLUCONATE 100 MG/ML
1 VIAL (ML) INTRAVENOUS ONCE
Refills: 0 | Status: COMPLETED | OUTPATIENT
Start: 2020-08-09 | End: 2020-08-09

## 2020-08-09 RX ORDER — ALBUTEROL 90 UG/1
2 AEROSOL, METERED ORAL EVERY 6 HOURS
Refills: 0 | Status: DISCONTINUED | OUTPATIENT
Start: 2020-08-09 | End: 2020-08-17

## 2020-08-09 RX ORDER — METOPROLOL TARTRATE 50 MG
50 TABLET ORAL
Refills: 0 | Status: DISCONTINUED | OUTPATIENT
Start: 2020-08-09 | End: 2020-08-11

## 2020-08-09 RX ORDER — HYDRALAZINE HCL 50 MG
100 TABLET ORAL ONCE
Refills: 0 | Status: COMPLETED | OUTPATIENT
Start: 2020-08-09 | End: 2020-08-09

## 2020-08-09 RX ADMIN — CEFTRIAXONE 100 MILLIGRAM(S): 500 INJECTION, POWDER, FOR SOLUTION INTRAMUSCULAR; INTRAVENOUS at 08:06

## 2020-08-09 RX ADMIN — CEFTRIAXONE 1000 MILLIGRAM(S): 500 INJECTION, POWDER, FOR SOLUTION INTRAMUSCULAR; INTRAVENOUS at 08:36

## 2020-08-09 RX ADMIN — Medication 50 MILLILITER(S): at 21:02

## 2020-08-09 RX ADMIN — INSULIN HUMAN 5 UNIT(S): 100 INJECTION, SOLUTION SUBCUTANEOUS at 21:02

## 2020-08-09 RX ADMIN — Medication 100 MILLIGRAM(S): at 14:20

## 2020-08-09 RX ADMIN — SODIUM ZIRCONIUM CYCLOSILICATE 10 GRAM(S): 10 POWDER, FOR SUSPENSION ORAL at 21:14

## 2020-08-09 RX ADMIN — HEPARIN SODIUM 5000 UNIT(S): 5000 INJECTION INTRAVENOUS; SUBCUTANEOUS at 14:40

## 2020-08-09 RX ADMIN — AMLODIPINE BESYLATE 10 MILLIGRAM(S): 2.5 TABLET ORAL at 18:40

## 2020-08-09 RX ADMIN — Medication 50 MILLILITER(S): at 09:36

## 2020-08-09 RX ADMIN — SODIUM POLYSTYRENE SULFONATE 30 GRAM(S): 4.1 POWDER, FOR SUSPENSION ORAL at 14:16

## 2020-08-09 RX ADMIN — Medication 100 MILLIGRAM(S): at 21:15

## 2020-08-09 RX ADMIN — Medication 50 MILLILITER(S): at 14:40

## 2020-08-09 RX ADMIN — Medication 100 GRAM(S): at 21:02

## 2020-08-09 RX ADMIN — INSULIN HUMAN 5 UNIT(S): 100 INJECTION, SOLUTION SUBCUTANEOUS at 14:40

## 2020-08-09 RX ADMIN — Medication 1 TABLET(S): at 14:22

## 2020-08-09 RX ADMIN — Medication 50 MILLIGRAM(S): at 09:36

## 2020-08-09 RX ADMIN — Medication 100 MILLIGRAM(S): at 08:06

## 2020-08-09 RX ADMIN — Medication 50 MILLIGRAM(S): at 18:40

## 2020-08-09 RX ADMIN — INSULIN HUMAN 5 UNIT(S): 100 INJECTION, SOLUTION SUBCUTANEOUS at 09:36

## 2020-08-09 RX ADMIN — HEPARIN SODIUM 5000 UNIT(S): 5000 INJECTION INTRAVENOUS; SUBCUTANEOUS at 21:15

## 2020-08-09 NOTE — H&P ADULT - PROBLEM SELECTOR PLAN 1
Pt pw sob x 1 day with cough   cxr: Moderate cardiomegaly. Patchy opacities at the bilateral lung bases likely reflect a combination of pneumonia and atelectasis. Trace right, small left pleural effusion.   Pt is afebrile with no wbc elevation   will start on abx levaquin fro CAP   fu COVID results   fu blood cultures   Pt was on NRB will de-escalate to NC Pt pw sob x 1 day with cough   cxr: Moderate cardiomegaly. Patchy opacities at the bilateral lung bases likely reflect a combination of pneumonia and atelectasis. Trace right, small left pleural effusion.   Pt is afebrile with no wbc elevation   will start on abx levaquin fro CAP   fu COVID results   fu blood cultures   Pt was on NRB will de-escalate to NC  pulm Dr. Harris

## 2020-08-09 NOTE — H&P ADULT - PROBLEM SELECTOR PLAN 4
IMPROVE VTE Individual Risk Assessment  RISK                                                         Points  [  ] Previous VTE                                      3  [  ] Thrombophilia                                   2  [  ] Lower limb paralysis                         2 (unable to hold up >15 seconds)    [  ] Current Cancer                                  2       (within 6 months)  [ x ] Immobilization > 24 hrs                    1  [  ] ICU/CCU stay > 24 hrs                         1  [ x ] Age > 60                                              1  will cw heparin sub q for dvt ppx Pr with ESRD with HD MWF  hyperkalemia: K elevated will repeat after cocktail    cw renal diet  last HD 8/7  Pt follows with Dr. Hudson Lloyd consulted

## 2020-08-09 NOTE — H&P ADULT - ASSESSMENT
74F, pmh of ESRD (MWF, last dialysis 08/07), HTN, and DVT, presents with shortness of breath for 1 day. Pt noted to have RLL opacity concerning for pna. Pt is admitted for management

## 2020-08-09 NOTE — ED ADULT TRIAGE NOTE - CHIEF COMPLAINT QUOTE
" I don't feel good, I feel short of breath"  Denies pain, Denies exposure to person with corona virus, denies flu-like symptoms

## 2020-08-09 NOTE — ED PROVIDER NOTE - CLINICAL SUMMARY MEDICAL DECISION MAKING FREE TEXT BOX
74F presenting with sob and orthopnea. no fever or chest pain. competed dialysis as scheduled on Friday. concern for CHF, ACS. plan for labs, cxr. will reassess.

## 2020-08-09 NOTE — H&P ADULT - PROBLEM SELECTOR PLAN 3
Pr with ESRD with HD MWF  hyperkalemia: K elevated will repeat after cocktail    cw renal diet  last HD 8/7  Pt follows with Dr. Treviño Pr with ESRD with HD MWF  hyperkalemia: K elevated will repeat after cocktail    cw renal diet  last HD 8/7  Pt follows with Dr. Hudson Lloyd consulted Pt  noted to have hyperkalemia 5.5  s/p kayexalate x1 with insulin regular 5units x2 K trended up to 5.9   will give lokelma x1 and insulin x1 with calcium gluconate and repeat bmp at 12pm  fu daily bmp

## 2020-08-09 NOTE — ED PROVIDER NOTE - CARE PLAN
Principal Discharge DX:	SOB (shortness of breath) Principal Discharge DX:	HCAP (healthcare-associated pneumonia)

## 2020-08-09 NOTE — H&P ADULT - NSICDXPASTMEDICALHX_GEN_ALL_CORE_FT
PAST MEDICAL HISTORY:  Cataract     DVT (deep venous thrombosis) of Left subclavian vein, 06/12/17    ESRD (end stage renal disease) on dialysis     Glaucoma     Hemodialysis access, fistula mature JASON    Hemodialysis patient MWF    HTN (hypertension)

## 2020-08-09 NOTE — H&P ADULT - PROBLEM SELECTOR PLAN 5
IMPROVE VTE Individual Risk Assessment  RISK                                                         Points  [  ] Previous VTE                                      3  [  ] Thrombophilia                                   2  [  ] Lower limb paralysis                         2 (unable to hold up >15 seconds)    [  ] Current Cancer                                  2       (within 6 months)  [ x ] Immobilization > 24 hrs                    1  [  ] ICU/CCU stay > 24 hrs                         1  [ x ] Age > 60                                              1  will cw heparin sub q for dvt ppx

## 2020-08-09 NOTE — CONSULT NOTE ADULT - ATTENDING COMMENTS
please call with any questions, Dr. Lloyd (cell: [phone])  Answering Service: [phone]    NEPHROLOGY MEDICAL CARE, [facility]  MD Jorge Ojeda MD Alexander Bangiev, MD Ljubisa Micic, MD      [place] Office: (Luis Fernando Lloyd MD)  [address], [address]  [place] [address]  Ph: [phone]  Fax: [phone]  Answering Service ([phone])    [place] Office: (Robson Soriano MD)  [address] [place] [address]  Ph: [phone]  Fax: [phone]

## 2020-08-09 NOTE — CONSULT NOTE ADULT - PROBLEM SELECTOR RECOMMENDATION 9
Panculture  antibiotics  oxygen supp  monitor temp and WBC Panculture  antibiotics  oxygen supp  monitor temp and WBC  Pleural effusions too small for safe tap

## 2020-08-09 NOTE — PATIENT PROFILE ADULT - FUNCTIONAL SCREEN CURRENT LEVEL: COMMUNICATION, MLM
0 = understands/communicates without difficulty
63M w/ ESRD on ED, CAD s/p PCI (6/2019), HTN, DM, hypothyroidism, L carotid stenosis. Presented w/ aphasia and AMS. Given tPA for presumed CVA. Admitted to ICU for post-tPA neuro monitoring. Repeat CTH overnight for change in exam showed hemorrhagic conversion with SAH. Pt seen and examined on rounds.     - pt is lethargic and does not follow commands, appears to be protecting his airway  - per report from O/N, telestroke spoke with Neurosurgery and no plans for intervention  - repeat CTH now to evaluate for stability of bleed  - will start NaCl 3% @ 30 cc/hr to increase Na  - get TTE, swallow eval, PT/OT, lipids, HBA1c   - will likely need NGT after 24 hrs of tPA   - aim for SBP ~140 in setting of bleed- continue w/ nicardipine; hold all PO BP meds for now  - hold all DOACs and blood thinner  in setting of bleed  - SCDs for DVT ppx  - d/c D5  - f/u neuro recs  - check TSH and FT4- hx of hypothyroidism  - repeat BMP today- K noted to be 6.5, if remains elevated will plan for slow HD ( want to avoid large swings in BP in setting of SAH)

## 2020-08-09 NOTE — H&P ADULT - HISTORY OF PRESENT ILLNESS
74F, pmh of ESRD (MWF, last dialysis 08/07), HTN, and DVT, presents with shortness of breath for 1 day. Patient has associated cough but denies fevers, chills, phlegm. Pt reports worsening shortness of breath since around 6pm yesterday afternoon and this is the first time she has experienced sob. She endorses breathing is worse with laying down. Pt denies fever, chest pain, nausea, vomiting or swelling in legs.    GOC full code 74F, pmh of ESRD (MWF, last dialysis 08/07), HTN, and DVT, presents with shortness of breath for 1 day. Patient has associated cough but denies fevers, chills, phlegm. Pt reports worsening shortness of breath since around 6pm yesterday afternoon and this is the first time she has experienced sob. She endorses breathing is worse with laying down. Pt denies fever, headache, chest pain, nausea, vomiting or swelling in legs, pain in ext, urinary s/s, .    GOC full code

## 2020-08-09 NOTE — ED PROVIDER NOTE - PHYSICAL EXAMINATION
General: uncomfortable appearing female, mild distress   HEENT: normocephalic, atraumatic   Respiratory: normal work of breathing, lungs clear to auscultation bilaterally   Cardiac: regular rate and rhythm   Abdomen: soft, non-tender, no guarding or rebound   MSK: no swelling or tenderness of lower extremities, moving all extremities spontaneously   Skin: warm, dry   Neuro: A&Ox3  Psych: appropriate affect

## 2020-08-09 NOTE — H&P ADULT - PROBLEM SELECTOR PLAN 2
Pt pw HTN urgency with bp 220/92 which improved to 179/76 after hydralazine and amlodipine was give .   pt had a recent echo on 1/2020 with ef > 605  continue with home medications  continue to monitor bp Pt pw HTN urgency with bp 220/92 which improved to 179/76 after hydralazine and amlodipine was give .   BNP is elevated; pt had a recent echo on 1/2020 with ef > 60% and lv hypertrophy  continue with home medications  continue to monitor bp

## 2020-08-09 NOTE — H&P ADULT - NSHPPHYSICALEXAM_GEN_ALL_CORE
Vital Signs Last 24 Hrs  T(C): 36.4 (09 Aug 2020 08:16), Max: 36.6 (09 Aug 2020 04:52)  T(F): 97.5 (09 Aug 2020 08:16), Max: 97.8 (09 Aug 2020 04:52)  HR: 60 (09 Aug 2020 08:16) (60 - 66)  BP: 179/76 (09 Aug 2020 08:16) (179/76 - 220/92)  BP(mean): --  RR: 21 (09 Aug 2020 08:16) (16 - 21)  SpO2: 100% (09 Aug 2020 08:16) (100% - 100%)    GENERAL: NAD, lying in bed comfortably  HEAD:  Atraumatic, Normocephalic  EYES: EOMI, PERRLA, conjunctiva and sclera clear  ENT: Moist mucous membranes  NECK: Supple, No JVD  CHEST/LUNG: Clear to auscultation bilaterally; No rales, rhonchi, wheezing, or rubs.  HEART: Regular rate and rhythm; S1+ S2+  ABDOMEN: Bowel sounds present; Soft, Nontender, Nondistended. No hepatomegaly  EXTREMITIES:  2+ Peripheral Pulses, brisk capillary refill. No clubbing, cyanosis, or edema, RUE avf  NERVOUS SYSTEM:  Alert & Oriented X3, speech clear. No deficits   MSK: FROM all 4 extremities, full and equal strength  SKIN: No rashes or lesions

## 2020-08-09 NOTE — ED ADULT NURSE NOTE - TEMPLATE
Respiratory Fusiform Excision Additional Text (Leave Blank If You Do Not Want): The margin was drawn around the clinically apparent lesion.  A fusiform shape was then drawn on the skin incorporating the lesion and margins.  Incisions were then made along these lines to the appropriate tissue plane and the lesion was extirpated.

## 2020-08-09 NOTE — ED PROVIDER NOTE - PROGRESS NOTE DETAILS
updated patient on results. agrees to plan for admission. Luis Hawley Spoke with Dr. Braun, being covered by Dr. Joseph today. D/w Dr. Joseph covering Dr. Braun will admit to [facility]. Pt feels better will attempt to titrate down O2 to NC.

## 2020-08-09 NOTE — CONSULT NOTE ADULT - SUBJECTIVE AND OBJECTIVE BOX
NEPHROLOGY MEDICAL CARE, [facility] - Dr. Luis Fernando Lloyd/ Dr. Jorge Treviño/ Dr. Robson Soriano/ Dr. Lauren Paul    Patient was seen and examined at bedside.    HPI:  74F, pmh of  HTN, glaucoma, and ESRD on HD (Tues, Thurs, Sat) at [facility] (Dr. Soriano)  came for sob x 1 day.  Renal consulted for ESRD management. Last HD was on 8/ 7. Pt had sob for 1 day and when lying down it is worse. denies chest pain, nausea, vomiting, diarrhea, cough and fever. pt had elevated K and given rectal kayexalate x 1. Pt also had elevated bp and given her oral bp meds.     PMH:   Hemodialysis patient  DVT (deep venous thrombosis)  ESRD (end stage renal disease) on dialysis  Cataract  Glaucoma  Dialysis patient  HTN (hypertension)      PSH:   S/P KEVEN-BSO  AV fistula  H/O: glaucoma  H/O: hysterectomy  Hip fracture  Acquired cataract      FAMILY HISTORY:  Family history of colon cancer (Sibling)  Family history of cerebrovascular accident (CVA)      Social History:  non-smoker/ non-alcoholic     Home Meds:  MEDICATIONS  (STANDING):  amLODIPine   Tablet 10 milliGRAM(s) Oral daily  heparin   Injectable 5000 Unit(s) SubCutaneous every 8 hours  hydrALAZINE 100 milliGRAM(s) Oral three times a day  labetalol 100 milliGRAM(s) Oral two times a day  levoFLOXacin IVPB 500 milliGRAM(s) IV Intermittent every 24 hours  Nephro-radha 1 Tablet(s) Oral daily    MEDICATIONS  (PRN):  ALBUTerol    90 MICROgram(s) HFA Inhaler 2 Puff(s) Inhalation every 6 hours PRN Bronchospasm      Allergies:  Allergies    Mushrooms (Anaphylaxis)  penicillin (Rash)    Intolerances        REVIEW OF SYSTEMS:  CONSTITUTIONAL: No fever; No weight loss; No fatigue  EYES: No eye pain; No visual disturbances; No discharge  ENMT:  No difficulty hearing; No tinnitus;  No vertigo; No sinus; No throat pain  NECK: No pain; No stiffness  BREASTS: No pain; No masses; No nipple discharge  RESPIRATORY: No cough; No wheezing; No chills; No hemoptysis; shortness of breath  CARDIOVASCULAR: No chest pain; No palpitations; No dizziness; No leg swelling  GASTROINTESTINAL: No abdominal pain; No epigastric pain; No nausea; No vomiting; No hematemesis; No diarrhea; No constipation. No melena   GENITOURINARY: No dysuria No frequency; No hematuria; No incontinence  NEUROLOGICAL: No headaches; No memory loss; No loss of strength; No numbness; No tremors  SKIN: No itching; No burning; No rashes  ENDOCRINE: No heat or cold intolerance; No hair loss  MUSCULOSKELETAL: No joint pain or swelling; No muscle, back, or extremity pain  PSYCHIATRIC: No depression; No anxiety; No mood swings; No difficulty sleeping  HEME/LYMPH: No easy bruising; No bleeding gums  ALLERY AND IMMUNOLOGIC: No hives or eczema    Vital Signs Last 24 Hrs  T(C): 36.6 (09 Aug 2020 15:36), Max: 36.6 (09 Aug 2020 04:52)  T(F): 97.8 (09 Aug 2020 15:36), Max: 97.8 (09 Aug 2020 04:52)  HR: 62 (09 Aug 2020 17:18) (60 - 72)  BP: 197/92 (09 Aug 2020 17:18) (179/76 - 220/92)  BP(mean): --  RR: 20 (09 Aug 2020 15:36) (16 - 21)  SpO2: 100% (09 Aug 2020 15:36) (100% - 100%)      PHYSICAL EXAM:  GENERAL: No acute respiratory distress.  EYES: conjunctiva and sclera clear  ENMT: Atraumatic, Normocephalic, supple, No JVD present. Moist mucous membranes  RESPIRATORY: b/l poor air entry; No rales, rhonchi, wheezing  CARDIOVASCULAR: S1S2+; no m/r/g  GASTROINTESTINAL: Soft, Non-tender, Nondistended; Bowel sounds present, no hepatosplenomegaly.   CENTRAL NERVOUS SYSTEM:  Awake, Alert & Oriented X3, No focal deficits present.   EXTREMITIES:  2+ Peripheral Pulses and pedal edema, No Cyanosis  SKIN: No rashes  DIALYSIS ACCESS:  Rt Arm AVG thrill/bruit+   LABS:                        12.2   5.91  )-----------( 163      ( 09 Aug 2020 06:23 )             37.6     08-09    137  |  97  |  64<H>  ----------------------------<  119<H>  5.6<H>   |  30  |  9.18<H>    Ca    9.2      09 Aug 2020 13:06  Mg     2.7     08-09    TPro  7.2  /  Alb  3.5  /  TBili  0.6  /  DBili  x   /  AST  46<H>  /  ALT  44  /  AlkPhos  87  08-09    PT/INR - ( 09 Aug 2020 06:23 )   PT: 12.4 sec;   INR: 1.06 ratio         PTT - ( 09 Aug 2020 06:23 )  PTT:38.4 sec    Magnesium, Serum: 2.7 mg/dL <H> (08-09 @ 06:23)    Urine studies      Medications:  ALBUTerol    90 MICROgram(s) HFA Inhaler 2 Puff(s) Inhalation every 6 hours PRN  amLODIPine   Tablet 10 milliGRAM(s) Oral daily  heparin   Injectable 5000 Unit(s) SubCutaneous every 8 hours  hydrALAZINE 100 milliGRAM(s) Oral three times a day  labetalol 100 milliGRAM(s) Oral two times a day  levoFLOXacin IVPB 500 milliGRAM(s) IV Intermittent every 24 hours  Nephro-radha 1 Tablet(s) Oral daily      RADIOLOGY & ADDITIONAL TESTS:

## 2020-08-09 NOTE — H&P ADULT - ATTENDING COMMENTS
74F, pmh of ESRD (MWF, last dialysis 08/07), HTN, and DVT, presents with shortness of breath for 1 day. Patient has associated cough but denies fevers, chills, phlegm. Pt reports worsening shortness of breath since around 6pm yesterday afternoon and this is the first time she has experienced sob. She endorses breathing is worse with laying down. Pt denies fever, headache, chest pain, nausea, vomiting or swelling in legs, pain in ext, urinary s/s, pt had bp 220/92 which improved to 179/76 after hydralazine and amlodipine was give .    [facility] full code      assessment  --  pneumonia, pleural eff, atelectasism uncontrolled HTN with urgency h/o ESRD (MWF, last dialysis 08/07), HTN, and DVT,    plan  --  admit to med, levaquin, supplemental oxygen, cont preadmit home meds, gi and dvt profilaxis, incentive spirometer  cbc, bmp, mg, phos, lipids, tsh, bld cx, ua, ucx,     hd as per renal    pulm cons   id cons  cardio cons  renal cons

## 2020-08-09 NOTE — CONSULT NOTE ADULT - SUBJECTIVE AND OBJECTIVE BOX
PULMONARY CONSULT NOTE      ARIANNE MCNAMARA  [id number]    Patient is a 74y old  Female who presents with a chief complaint of shortness of breath (09 Aug 2020 09:24)      History of Present Illness:  Reason for Admission: shortness of breath	  History of Present Illness: 	  74F, pmh of ESRD (MWF, last dialysis 08/07), HTN, and DVT, presents with shortness of breath for 1 day. Patient has associated cough but denies fevers, chills, phlegm. Pt reports worsening shortness of breath since around 6pm yesterday afternoon and this is the first time she has experienced sob. She endorses breathing is worse with laying down. Pt denies fever, headache, chest pain, nausea, vomiting or swelling in legs, pain in ext, urinary s/s, .      HISTORY OF PRESENT ILLNESS: As above. Awake, alert, comfortable in bed in NAD    MEDICATIONS  (STANDING):  amLODIPine   Tablet 10 milliGRAM(s) Oral daily  heparin   Injectable 5000 Unit(s) SubCutaneous every 8 hours  hydrALAZINE 100 milliGRAM(s) Oral three times a day  labetalol 100 milliGRAM(s) Oral two times a day  levoFLOXacin IVPB 500 milliGRAM(s) IV Intermittent every 24 hours  Nephro-radha 1 Tablet(s) Oral daily  sodium polystyrene sulfonate Suspension 30 Gram(s) Oral once      MEDICATIONS  (PRN):  ALBUTerol    90 MICROgram(s) HFA Inhaler 2 Puff(s) Inhalation every 6 hours PRN Bronchospasm      Allergies    Mushrooms (Anaphylaxis)  penicillin (Rash)    Intolerances        PAST MEDICAL & SURGICAL HISTORY:  Hemodialysis patient: F  Hemodialysis access, fistula mature: JASON  DVT (deep venous thrombosis): of Left subclavian vein, 06/12/17  ESRD (end stage renal disease) on dialysis  Cataract  Glaucoma  HTN (hypertension)  S/P KEVEN-BSO: for fibroids, 2012  AV fistula: 2015  H/O: glaucoma: surgery, 2002  Acquired cataract: extraction with b/l lense placement, 2016      FAMILY HISTORY:  Family history of colon cancer (Sibling)  Family history of cerebrovascular accident (CVA)      SOCIAL HISTORY  Smoking History:     REVIEW OF SYSTEMS:    CONSTITUTIONAL:  No fevers, chills, sweats    HEENT:  Eyes:  No diplopia or blurred vision. ENT:  No earache, sore throat or runny nose.    CARDIOVASCULAR:  No pressure, squeezing, tightness, or heaviness about the chest; no palpitations.    RESPIRATORY:  Per HPI    GASTROINTESTINAL:  No abdominal pain, nausea, vomiting or diarrhea.    GENITOURINARY:  No dysuria, frequency or urgency.    NEUROLOGIC:  No paresthesias, fasciculations, seizures or weakness.    PSYCHIATRIC:  No disorder of thought or mood.    Vital Signs Last 24 Hrs  T(C): 36.4 (09 Aug 2020 08:16), Max: 36.6 (09 Aug 2020 04:52)  T(F): 97.5 (09 Aug 2020 08:16), Max: 97.8 (09 Aug 2020 04:52)  HR: 60 (09 Aug 2020 08:16) (60 - 66)  BP: 179/76 (09 Aug 2020 08:16) (179/76 - 220/92)  BP(mean): --  RR: 21 (09 Aug 2020 08:16) (16 - 21)  SpO2: 100% (09 Aug 2020 08:16) (100% - 100%)  I&O's Detail      PHYSICAL EXAMINATION:    GENERAL: The patient is a well-developed, well-nourished _____in no apparent distress.     HEENT: Head is normocephalic and atraumatic. Extraocular muscles are intact. Mucous membranes are moist.     NECK: Supple.     LUNGS: Clear to auscultation without wheezing, rales, or rhonchi. Respirations unlabored    HEART: Regular rate and rhythm without murmur.    ABDOMEN: Soft, nontender, and nondistended.  No hepatosplenomegaly is noted.    EXTREMITIES: Without any cyanosis, clubbing, rash, lesions or edema.    NEUROLOGIC: Grossly intact.      LABS:                        12.2   5.91  )-----------( 163      ( 09 Aug 2020 06:23 )             37.6     08-09    137  |  99  |  59<H>  ----------------------------<  92  5.5<H>   |  27  |  8.65<H>    Ca    9.5      09 Aug 2020 06:23  Mg     2.7     08-09    TPro  7.2  /  Alb  3.5  /  TBili  0.6  /  DBili  x   /  AST  46<H>  /  ALT  44  /  AlkPhos  87  08-09    PT/INR - ( 09 Aug 2020 06:23 )   PT: 12.4 sec;   INR: 1.06 ratio         PTT - ( 09 Aug 2020 06:23 )  PTT:38.4 sec      CARDIAC MARKERS ( 09 Aug 2020 06:23 )  0.016 ng/mL / x     / x     / x     / x            Serum Pro-Brain Natriuretic Peptide: [id number] pg/mL (08-09-20 @ 06:23)          MICROBIOLOGY:    RADIOLOGY & ADDITIONAL STUDIES:    CXR:  < from: Xray Chest 2 Views PA/Lat (08.09.20 @ 05:50) >  Impression: Bibasilar infiltrates/atelectasis and effusions as described. Cardiomegaly    < end of copied text >    Ct scan chest:    ekg;    echo: PULMONARY CONSULT NOTE      ARIANNE MCNAMARA  [id number]    Patient is a 74y old  Female who presents with a chief complaint of shortness of breath (09 Aug 2020 09:24)      History of Present Illness:  Reason for Admission: shortness of breath	  History of Present Illness: 	  74F, pmh of ESRD (MWF, last dialysis 08/07), HTN, and DVT, presents with shortness of breath for 1 day. Patient has associated cough but denies fevers, chills, phlegm. Pt reports worsening shortness of breath since around 6pm yesterday afternoon and this is the first time she has experienced sob. She endorses breathing is worse with laying down. Pt denies fever, headache, chest pain, nausea, vomiting or swelling in legs, pain in ext, urinary s/s, .      HISTORY OF PRESENT ILLNESS: As above. Awake, alert, comfortable in bed in NAD    MEDICATIONS  (STANDING):  amLODIPine   Tablet 10 milliGRAM(s) Oral daily  heparin   Injectable 5000 Unit(s) SubCutaneous every 8 hours  hydrALAZINE 100 milliGRAM(s) Oral three times a day  labetalol 100 milliGRAM(s) Oral two times a day  levoFLOXacin IVPB 500 milliGRAM(s) IV Intermittent every 24 hours  Nephro-radha 1 Tablet(s) Oral daily  sodium polystyrene sulfonate Suspension 30 Gram(s) Oral once      MEDICATIONS  (PRN):  ALBUTerol    90 MICROgram(s) HFA Inhaler 2 Puff(s) Inhalation every 6 hours PRN Bronchospasm      Allergies    Mushrooms (Anaphylaxis)  penicillin (Rash)    Intolerances        PAST MEDICAL & SURGICAL HISTORY:  Hemodialysis patient: F  Hemodialysis access, fistula mature: JASON  DVT (deep venous thrombosis): of Left subclavian vein, 06/12/17  ESRD (end stage renal disease) on dialysis  Cataract  Glaucoma  HTN (hypertension)  S/P KEVEN-BSO: for fibroids, 2012  AV fistula: 2015  H/O: glaucoma: surgery, 2002  Acquired cataract: extraction with b/l lense placement, 2016      FAMILY HISTORY:  Family history of colon cancer (Sibling)  Family history of cerebrovascular accident (CVA)      SOCIAL HISTORY  Smoking History:     REVIEW OF SYSTEMS:    CONSTITUTIONAL:  No fevers, chills, sweats    HEENT:  Eyes:  No diplopia or blurred vision. ENT:  No earache, sore throat or runny nose.    CARDIOVASCULAR:  No pressure, squeezing, tightness, or heaviness about the chest; no palpitations.    RESPIRATORY:  Per HPI    GASTROINTESTINAL:  No abdominal pain, nausea, vomiting or diarrhea.    GENITOURINARY:  No dysuria, frequency or urgency.    NEUROLOGIC:  No paresthesias, fasciculations, seizures or weakness.    PSYCHIATRIC:  No disorder of thought or mood.    Vital Signs Last 24 Hrs  T(C): 36.4 (09 Aug 2020 08:16), Max: 36.6 (09 Aug 2020 04:52)  T(F): 97.5 (09 Aug 2020 08:16), Max: 97.8 (09 Aug 2020 04:52)  HR: 60 (09 Aug 2020 08:16) (60 - 66)  BP: 179/76 (09 Aug 2020 08:16) (179/76 - 220/92)  BP(mean): --  RR: 21 (09 Aug 2020 08:16) (16 - 21)  SpO2: 100% (09 Aug 2020 08:16) (100% - 100%)  I&O's Detail      PHYSICAL EXAMINATION:    GENERAL: The patient is a well-developed, well-nourished _____in no apparent distress.     HEENT: Head is normocephalic and atraumatic. Extraocular muscles are intact. Mucous membranes are moist.     NECK: Supple.     LUNGS: Decreased BS on left base    HEART: Regular rate and rhythm without murmur.    ABDOMEN: Soft, nontender, and nondistended.  No hepatosplenomegaly is noted.    EXTREMITIES: Without any cyanosis, clubbing, rash, lesions or edema.    NEUROLOGIC: Grossly intact.      LABS:                        12.2   5.91  )-----------( 163      ( 09 Aug 2020 06:23 )             37.6     08-09    137  |  99  |  59<H>  ----------------------------<  92  5.5<H>   |  27  |  8.65<H>    Ca    9.5      09 Aug 2020 06:23  Mg     2.7     08-09    TPro  7.2  /  Alb  3.5  /  TBili  0.6  /  DBili  x   /  AST  46<H>  /  ALT  44  /  AlkPhos  87  08-09    PT/INR - ( 09 Aug 2020 06:23 )   PT: 12.4 sec;   INR: 1.06 ratio         PTT - ( 09 Aug 2020 06:23 )  PTT:38.4 sec      CARDIAC MARKERS ( 09 Aug 2020 06:23 )  0.016 ng/mL / x     / x     / x     / x            Serum Pro-Brain Natriuretic Peptide: [id number] pg/mL (08-09-20 @ 06:23)          MICROBIOLOGY:    RADIOLOGY & ADDITIONAL STUDIES:    CXR:  < from: Xray Chest 2 Views PA/Lat (08.09.20 @ 05:50) >  Impression: Bibasilar infiltrates/atelectasis and effusions as described. Cardiomegaly    < end of copied text >    Ct scan chest:    ekg;    echo:

## 2020-08-09 NOTE — ED ADULT NURSE NOTE - NSIMPLEMENTINTERV_GEN_ALL_ED
Implemented All Universal Safety Interventions:  [place] to call system. Call bell, personal items and telephone within reach. Instruct patient to call for assistance. Room bathroom lighting operational. Non-slip footwear when patient is off stretcher. Physically safe environment: no spills, clutter or unnecessary equipment. Stretcher in lowest position, wheels locked, appropriate side rails in place.

## 2020-08-09 NOTE — ED PROVIDER NOTE - OBJECTIVE STATEMENT
74F, pmh of ESRD (MWF, last dialysis Friday), HTN, and DVT, presents with shortness of breath. patient reports worsening shortness of breath since around 6pm yesterday afternoon. breathing worse with laying down. denies fever, chest pain, nausea, vomiting or swelling in legs.

## 2020-08-09 NOTE — H&P ADULT - NSHPLABSRESULTS_GEN_ALL_CORE
< from: Xray Chest 2 Views PA/Lat (08.09.20 @ 05:50) >    Moderate cardiomegaly. Patchy opacities at the bilateral lung bases likely reflect a combination of pneumonia and atelectasis. Trace right, small left pleural effusion. Mild levoscoliosis lower thoracic spine.    < end of copied text >

## 2020-08-09 NOTE — CONSULT NOTE ADULT - ASSESSMENT
1. ESRD on HD (M/W/F)  -plan for HD tomorrow and HD orders were written and discussed with dialysis nurse.   -Hemodialysis Renal Diet and Fluid restriction to 1L/day  -Adjust meds to eGFR and avoid IV Gadolinium contrast,NSAIDs, and phosphate enema.  -Monitor Electrolytes daily.  2. Anemia:  -hb >10.5 and no need for epogen  -F/u CBC daily  -transfuse if HB < 7.0.  3. HTN: -bp is acceptable. pt takes metoprolol 50mg bid at home.   -titrate bp meds to keep sbp >110 and < 130  4. MBD: phoslo 3 tab tid; check phos and outpatient PtH (895), continue calcitriol 0.75mcg tiw.   5. Pneumonia: on iv levaquin. 1. ESRD on HD (M/W/F)  -plan for HD tomorrow and HD orders were written and discussed with dialysis nurse.   -Hemodialysis Renal Diet and Fluid restriction to 1L/day  -Adjust meds to eGFR and avoid IV Gadolinium contrast,NSAIDs, and phosphate enema.  -Monitor Electrolytes daily.  2. Anemia:  -hb >10.5 and no need for epogen  -F/u CBC daily  -transfuse if HB < 7.0.  3. HTN: -bp is acceptable. pt takes metoprolol 50mg bid at home.   -titrate bp meds to keep sbp >110 and < 130  4. MBD: phoslo 3 tab tid; check phos and outpatient PtH (895), continue calcitriol 0.75mcg tiw.   5. Pneumonia: on iv levaquin.   6. Hyperkalemia due to ESRD  -Kayexalate given already.     -start low K diet.   -Keep pt euvolemic. Avoid ACE inh/ ARBs, NSAIDs, and Aldactone or potassium sparing diuretics. Monitor K+ daily.

## 2020-08-10 LAB
A1C WITH ESTIMATED AVERAGE GLUCOSE RESULT: 4.5 % — SIGNIFICANT CHANGE UP (ref 4–5.6)
ANION GAP SERPL CALC-SCNC: 13 MMOL/L — SIGNIFICANT CHANGE UP (ref 5–17)
BUN SERPL-MCNC: 71 MG/DL — HIGH (ref 7–18)
CALCIUM SERPL-MCNC: 9.2 MG/DL — SIGNIFICANT CHANGE UP (ref 8.4–10.5)
CHLORIDE SERPL-SCNC: 95 MMOL/L — LOW (ref 96–108)
CHOLEST SERPL-MCNC: 155 MG/DL — SIGNIFICANT CHANGE UP (ref 10–199)
CO2 SERPL-SCNC: 28 MMOL/L — SIGNIFICANT CHANGE UP (ref 22–31)
CREAT SERPL-MCNC: 11 MG/DL — HIGH (ref 0.5–1.3)
ESTIMATED AVERAGE GLUCOSE: 82 MG/DL — SIGNIFICANT CHANGE UP (ref 68–114)
FOLATE SERPL-MCNC: >20 NG/ML — SIGNIFICANT CHANGE UP
GLUCOSE BLDC GLUCOMTR-MCNC: 126 MG/DL — HIGH (ref 70–99)
GLUCOSE SERPL-MCNC: 147 MG/DL — HIGH (ref 70–99)
HCT VFR BLD CALC: 36.4 % — SIGNIFICANT CHANGE UP (ref 34.5–45)
HDLC SERPL-MCNC: 77 MG/DL — SIGNIFICANT CHANGE UP
HGB BLD-MCNC: 11.7 G/DL — SIGNIFICANT CHANGE UP (ref 11.5–15.5)
LIPID PNL WITH DIRECT LDL SERPL: 63 MG/DL — SIGNIFICANT CHANGE UP
MAGNESIUM SERPL-MCNC: 2.8 MG/DL — HIGH (ref 1.6–2.6)
MCHC RBC-ENTMCNC: 28.3 PG — SIGNIFICANT CHANGE UP (ref 27–34)
MCHC RBC-ENTMCNC: 32.1 GM/DL — SIGNIFICANT CHANGE UP (ref 32–36)
MCV RBC AUTO: 87.9 FL — SIGNIFICANT CHANGE UP (ref 80–100)
NRBC # BLD: 0 /100 WBCS — SIGNIFICANT CHANGE UP (ref 0–0)
PHOSPHATE SERPL-MCNC: 6.6 MG/DL — HIGH (ref 2.5–4.5)
PLATELET # BLD AUTO: 184 K/UL — SIGNIFICANT CHANGE UP (ref 150–400)
POTASSIUM SERPL-MCNC: 5.4 MMOL/L — HIGH (ref 3.5–5.3)
POTASSIUM SERPL-SCNC: 5.4 MMOL/L — HIGH (ref 3.5–5.3)
RBC # BLD: 4.14 M/UL — SIGNIFICANT CHANGE UP (ref 3.8–5.2)
RBC # FLD: 17.2 % — HIGH (ref 10.3–14.5)
SARS-COV-2 IGG SERPL QL IA: POSITIVE
SARS-COV-2 IGM SERPL IA-ACNC: 87.5 INDEX — HIGH
SODIUM SERPL-SCNC: 136 MMOL/L — SIGNIFICANT CHANGE UP (ref 135–145)
TOTAL CHOLESTEROL/HDL RATIO MEASUREMENT: 2 RATIO — LOW (ref 3.3–7.1)
TRIGL SERPL-MCNC: 76 MG/DL — SIGNIFICANT CHANGE UP (ref 10–149)
TSH SERPL-MCNC: 2.7 UU/ML — SIGNIFICANT CHANGE UP (ref 0.34–4.82)
VIT B12 SERPL-MCNC: 605 PG/ML — SIGNIFICANT CHANGE UP (ref 232–1245)
WBC # BLD: 5.39 K/UL — SIGNIFICANT CHANGE UP (ref 3.8–10.5)
WBC # FLD AUTO: 5.39 K/UL — SIGNIFICANT CHANGE UP (ref 3.8–10.5)

## 2020-08-10 RX ORDER — NIFEDIPINE 30 MG
30 TABLET, EXTENDED RELEASE 24 HR ORAL DAILY
Refills: 0 | Status: DISCONTINUED | OUTPATIENT
Start: 2020-08-10 | End: 2020-08-10

## 2020-08-10 RX ORDER — OXYMETAZOLINE HYDROCHLORIDE 0.5 MG/ML
1 SPRAY NASAL ONCE
Refills: 0 | Status: DISCONTINUED | OUTPATIENT
Start: 2020-08-10 | End: 2020-08-10

## 2020-08-10 RX ORDER — CHLORHEXIDINE GLUCONATE 213 G/1000ML
1 SOLUTION TOPICAL DAILY
Refills: 0 | Status: DISCONTINUED | OUTPATIENT
Start: 2020-08-10 | End: 2020-08-17

## 2020-08-10 RX ORDER — ISOSORBIDE MONONITRATE 60 MG/1
30 TABLET, EXTENDED RELEASE ORAL DAILY
Refills: 0 | Status: DISCONTINUED | OUTPATIENT
Start: 2020-08-10 | End: 2020-08-12

## 2020-08-10 RX ADMIN — Medication 2001 MILLIGRAM(S): at 10:10

## 2020-08-10 RX ADMIN — HEPARIN SODIUM 5000 UNIT(S): 5000 INJECTION INTRAVENOUS; SUBCUTANEOUS at 21:30

## 2020-08-10 RX ADMIN — CALCITRIOL 0.75 MICROGRAM(S): 0.5 CAPSULE ORAL at 10:11

## 2020-08-10 RX ADMIN — Medication 2001 MILLIGRAM(S): at 14:41

## 2020-08-10 RX ADMIN — Medication 100 MILLIGRAM(S): at 05:05

## 2020-08-10 RX ADMIN — Medication 100 MILLIGRAM(S): at 21:30

## 2020-08-10 RX ADMIN — Medication 50 MILLIGRAM(S): at 05:05

## 2020-08-10 RX ADMIN — Medication 2001 MILLIGRAM(S): at 17:52

## 2020-08-10 RX ADMIN — ISOSORBIDE MONONITRATE 30 MILLIGRAM(S): 60 TABLET, EXTENDED RELEASE ORAL at 16:36

## 2020-08-10 RX ADMIN — AMLODIPINE BESYLATE 10 MILLIGRAM(S): 2.5 TABLET ORAL at 05:05

## 2020-08-10 RX ADMIN — Medication 100 MILLIGRAM(S): at 14:41

## 2020-08-10 RX ADMIN — HEPARIN SODIUM 5000 UNIT(S): 5000 INJECTION INTRAVENOUS; SUBCUTANEOUS at 14:41

## 2020-08-10 RX ADMIN — Medication 50 MILLIGRAM(S): at 17:52

## 2020-08-10 RX ADMIN — Medication 1 TABLET(S): at 14:41

## 2020-08-10 RX ADMIN — CHLORHEXIDINE GLUCONATE 1 APPLICATION(S): 213 SOLUTION TOPICAL at 14:42

## 2020-08-10 RX ADMIN — HEPARIN SODIUM 5000 UNIT(S): 5000 INJECTION INTRAVENOUS; SUBCUTANEOUS at 05:04

## 2020-08-10 NOTE — MEDICAL STUDENT ADULT H&P (EDUCATION) - NS MD HP STUD PE EXTREMITIES FT
Pt has normal strength in upper and lower extremities. Fingers and toes are warm to touch. No edema noted in extremities. AV fistula is noted ante-brachially on R arm.

## 2020-08-10 NOTE — MEDICAL STUDENT ADULT H&P (EDUCATION) - NS MD HP STUD PE GI FT
Normal bowel sounds in all 4 quadrants. No abdominal bruits auscultated. Abdominal is non-distended and soft. No visible signs of infection or trauma noted on visual inspection. Non-tender to light and deep palpation in all 4 quadrants. Negative lopez's, mcburney's, and rosvigs.

## 2020-08-10 NOTE — MEDICAL STUDENT ADULT H&P (EDUCATION) - NS MD HP STUD ROS ENMT FT
Denies loss of smell, taste. Endorses hearing loss in L ear which she uses a hearing aid for. Endorses use of dentures. Denies loss of smell, taste. Endorses hearing loss in L ear which she uses a hearing aid for. Endorses use of dentures. Endorses hx of sinusitis

## 2020-08-10 NOTE — MEDICAL STUDENT ADULT H&P (EDUCATION) - NS MD HP STUD PE SKIN FT
Pt's face and lower leg (from toes to ante-talus bilaterally) are slightly more pigmented than the rest of her body.

## 2020-08-10 NOTE — CONSULT NOTE ADULT - ASSESSMENT
Patient is a 74y old  Female with ESRD (MWF, last dialysis 08/07), HTN, and DVT, presents with worsening shortness of breath associated with cough but denies fever. On admission, she found to have no fever or leukocytosis but Patchy opacities at the bilateral lung bases on CXR. She has started on Levaquin since allergic to  PCN, and the ID consult requested to assist with further evaluation and antibiotic management,     # Pneumonia  # COVID 19 Negative-  H/O COVID    would recommend:    1. Follow up Blood cultures  2. Obtain Nasal swab for MRSA PCR  3. Continue Levaquin for now  4. Supplemental oxygenation and bronchodilator as needed  5. Aspiration precaution    will follow the patient with you and make further recommendation based on the clinical course and Lab results  Thank you for the opportunity to participate in Ms. MCNAMARA's care    Attending Attestation:    Spent more than 65 minutes on total encounter, more than 50 % of the visit was spent counseling and/or coordinating care by the Attending physician. Patient is a 74y old  Female with ESRD (MWF, last dialysis 08/07), HTN, and DVT, presents to the ER for evaluation of  worsening shortness of breath associated with cough but No fever. On admission, she found to have no fever or leukocytosis but Patchy opacities at the bilateral lung bases on CXR. She has started on Levaquin since allergic to  PCN, and the ID consult requested to assist with further evaluation and antibiotic management.    # Pneumonia  # COVID 19 Negative-  H/O COVID  # ESRD- on HD  # Allergy- PCN ( Rash)      would recommend:    1. Follow up Blood cultures  2. Obtain Nasal swab for MRSA PCR  3. Continue Levaquin for now and please adjust doses based on Crcl   4. Supplemental oxygenation and bronchodilator as needed  5. Aspiration precaution    d/w patient     will follow the patient with you and make further recommendation based on the clinical course and Lab results  Thank you for the opportunity to participate in Ms. MCNAMARA's care    Attending Attestation:    Spent more than 65 minutes on total encounter, more than 50 % of the visit was spent counseling and/or coordinating care by the Attending physician.

## 2020-08-10 NOTE — MEDICAL STUDENT ADULT H&P (EDUCATION) - NS MD HP STUD ASPLAN PLAN FT
1. CAP   - continue to monitor temperature, WBC and clinical picture.   - repeat chest XRay to look for improvement  - attempt to decrease use of supplemental oxygen.  2. Hyperkalemia  - reassess K+ levels after dialysis has taken place  - cont. telemetry to assess rhythm  3. HTN  - Pt presented w/ hypertensive emergency. Today's BP was 179/86 which has improved from the initial presentation 220/92.   - Reassess BP medications to optimize control.   4. ESRD  - Dialysis today 1. CAP   - continue to monitor temperature, WBC and clinical picture.   - repeat chest XRay to look for improvement post abx.  - attempt to decrease use of supplemental oxygen.  2. Hyperkalemia  - reassess K+ levels after dialysis has taken place  - cont. telemetry to assess rhythm  3. HTN  - Pt presented w/ hypertensive emergency. Today's BP was 179/86 which has improved from the initial presentation 220/92.   - Reassess BP medications to optimize control.   4. ESRD  - Dialysis today

## 2020-08-10 NOTE — MEDICAL STUDENT ADULT H&P (EDUCATION) - NS MD HP STUD PE ENMT FT
Face is non-tender to palpation. Nose is midline, no signs of trauma, bleeding. Lips are pink, patient is without teeth. Pinna are intact bilaterally w.o. signs of trauma or infection. No hearing aids are noted in either ear.

## 2020-08-10 NOTE — MEDICAL STUDENT ADULT H&P (EDUCATION) - NS MD HP STUD RESULTS EKG FT
12 Lead ECG 8/9/20   Diagnosis Line Normal sinus rhythm  Normal ECG  Confirmed by MIKAEL MACHADO, [facility] ([id number]) on 8/9/2020 1:38:50 PM

## 2020-08-10 NOTE — PROGRESS NOTE ADULT - SUBJECTIVE AND OBJECTIVE BOX
Pt is awake, alert, lying in bed in NAD.     INTERVAL HPI/OVERNIGHT EVENTS:      VITAL SIGNS:  T(F): 98.3 (08-10-20 @ 10:40)  HR: 62 (08-10-20 @ 10:40)  BP: 162/82 (08-10-20 @ 10:40)  RR: 18 (08-10-20 @ 10:40)  SpO2: 100% (08-10-20 @ 10:40)  Wt(kg): --  I&O's Detail    09 Aug 2020 07:01  -  10 Aug 2020 07:00  --------------------------------------------------------  IN:    Oral Fluid: 75 mL  Total IN: 75 mL    OUT:  Total OUT: 0 mL    Total NET: 75 mL              REVIEW OF SYSTEMS:    CONSTITUTIONAL:  No fevers, chills, sweats    HEENT:  Eyes:  No diplopia or blurred vision. ENT:  No earache, sore throat or runny nose.    CARDIOVASCULAR:  No pressure, squeezing, tightness, or heaviness about the chest; no palpitations.    RESPIRATORY:  Per HPI    GASTROINTESTINAL:  No abdominal pain, nausea, vomiting or diarrhea.    GENITOURINARY:  No dysuria, frequency or urgency.    NEUROLOGIC:  No paresthesias, fasciculations, seizures or weakness.    PSYCHIATRIC:  No disorder of thought or mood.      PHYSICAL EXAM:    Constitutional: Well developed and nourished  Eyes:Perrla  ENMT: normal  Neck:supple  Respiratory: good air entry  Cardiovascular: S1 S2 regular  Gastrointestinal: Soft, Non tender  Extremities: No edema  Vascular:normal  Neurological:Awake, alert,Ox3  Musculoskeletal:Normal      MEDICATIONS  (STANDING):  amLODIPine   Tablet 10 milliGRAM(s) Oral daily  calcitriol   Capsule 0.75 MICROGram(s) Oral <User Schedule>  calcium acetate 2001 milliGRAM(s) Oral three times a day with meals  chlorhexidine 2% Cloths 1 Application(s) Topical daily  heparin   Injectable 5000 Unit(s) SubCutaneous every 8 hours  hydrALAZINE 100 milliGRAM(s) Oral three times a day  levoFLOXacin IVPB 500 milliGRAM(s) IV Intermittent every 24 hours  metoprolol tartrate 50 milliGRAM(s) Oral two times a day  Nephro-radha 1 Tablet(s) Oral daily    MEDICATIONS  (PRN):  ALBUTerol    90 MICROgram(s) HFA Inhaler 2 Puff(s) Inhalation every 6 hours PRN Bronchospasm      Allergies    Mushrooms (Anaphylaxis)  penicillin (Rash)    Intolerances        LABS:                        11.7   5.39  )-----------( 184      ( 10 Aug 2020 10:14 )             36.4     08-10    136  |  95<L>  |  71<H>  ----------------------------<  147<H>  5.4<H>   |  28  |  11.00<H>    Ca    9.2      10 Aug 2020 10:14  Phos  6.6     08-10  Mg     2.8     08-10    TPro  7.2  /  Alb  3.5  /  TBili  0.6  /  DBili  x   /  AST  46<H>  /  ALT  44  /  AlkPhos  87  08-09    PT/INR - ( 09 Aug 2020 06:23 )   PT: 12.4 sec;   INR: 1.06 ratio         PTT - ( 09 Aug 2020 06:23 )  PTT:38.4 sec      CARDIAC MARKERS ( 09 Aug 2020 06:23 )  0.016 ng/mL / x     / x     / x     / x          CAPILLARY BLOOD GLUCOSE      POCT Blood Glucose.: 126 mg/dL (10 Aug 2020 06:14)  POCT Blood Glucose.: 87 mg/dL (09 Aug 2020 23:53)  POCT Blood Glucose.: 70 mg/dL (09 Aug 2020 22:38)  POCT Blood Glucose.: 92 mg/dL (09 Aug 2020 20:58)  POCT Blood Glucose.: 142 mg/dL (09 Aug 2020 17:14)  POCT Blood Glucose.: 63 mg/dL (09 Aug 2020 16:18)    pro-bnp [id number] 08-09 @ 06:23     d-dimer --  08-09 @ 06:23      RADIOLOGY & ADDITIONAL TESTS:    CXR:  < from: Xray Chest 2 Views PA/Lat (08.09.20 @ 05:50) >  Impression: Bibasilar infiltrates/atelectasis and effusions as described. Cardiomegaly    < end of copied text >    Ct scan chest:    ekg;    echo:

## 2020-08-10 NOTE — MEDICAL STUDENT ADULT H&P (EDUCATION) - NS MD HP STUD PE VASCULAR FT
Capillary refill in 3 seconds. Radial pulse is strong, +3, bilaterally. Brachial pulse difficult to palpate on R arm due to fistula, but +3 on L arm. Posterior tibialis pulse is +2 bilaterally. No carotid or abdominal bruits were auscultated.

## 2020-08-10 NOTE — CONSULT NOTE ADULT - SUBJECTIVE AND OBJECTIVE BOX
Patient is a 74y old  Female with ESRD (MWF, last dialysis 08/07), HTN, and DVT, presents with worsening shortness of breath associated with cough but denies fever. On admission, she found to have no fever or leukocytosis but Patchy opacities at the bilateral lung bases on CXR. She has started on Levaquin since allergic to  PCN, and the ID consult requested to assist with further evaluation and antibiotic management,         REVIEW OF SYSTEMS: Total of twelve systems have been reviewed with patient and found to be negative unless mentioned in HPI        PAST MEDICAL & SURGICAL HISTORY:  Hemodialysis patient: MWF  Hemodialysis access, fistula mature: JASON  DVT (deep venous thrombosis): of Left subclavian vein, 06/12/17  ESRD (end stage renal disease) on dialysis  Cataract  Glaucoma  HTN (hypertension)  S/P KEVEN-BSO: for fibroids, 2012  AV fistula: 2015  H/O: glaucoma: surgery, 2002  Acquired cataract: extraction with b/l lense placement, 2016        SOCIAL HISTORY  Alcohol: Does not drink  Tobacco: Does not smoke  Illicit substance use: None      FAMILY HISTORY: Non contributory to the present illness        ALLERGIES: Mushrooms (Anaphylaxis)  penicillin (Rash)        Vital Signs Last 24 Hrs  T(C): 37.1 (10 Aug 2020 14:33), Max: 37.1 (10 Aug 2020 14:33)  T(F): 98.7 (10 Aug 2020 14:33), Max: 98.7 (10 Aug 2020 14:33)  HR: 69 (10 Aug 2020 14:33) (59 - 72)  BP: 174/81 (10 Aug 2020 14:33) (162/77 - 211/92)  BP(mean): 113 (10 Aug 2020 06:47) (113 - 113)  RR: 18 (10 Aug 2020 14:33) (18 - 20)  SpO2: 97% (10 Aug 2020 14:33) (94% - 100%)        PHYSICAL EXAM:  GENERAL: Not in distress   CHEST/LUNG:  Not using accessory muscles   HEART: s1 and s2 present  ABDOMEN:  Nontender and  Nondistended  EXTREMITIES: No pedal  edema  CNS: Awake and Alert        LABS:                        11.7   5.39  )-----------( 184      ( 10 Aug 2020 10:14 )             36.4       08-10    136  |  95<L>  |  71<H>  ----------------------------<  147<H>  5.4<H>   |  28  |  11.00<H>    Ca    9.2      10 Aug 2020 10:14  Phos  6.6     08-10  Mg     2.8     08-10    TPro  7.2  /  Alb  3.5  /  TBili  0.6  /  DBili  x   /  AST  46<H>  /  ALT  44  /  AlkPhos  87  08-09    PT/INR - ( 09 Aug 2020 06:23 )   PT: 12.4 sec;   INR: 1.06 ratio       PTT - ( 09 Aug 2020 06:23 )  PTT:38.4 sec        CAPILLARY BLOOD GLUCOSE  POCT Blood Glucose.: 126 mg/dL (10 Aug 2020 06:14)  POCT Blood Glucose.: 87 mg/dL (09 Aug 2020 23:53)  POCT Blood Glucose.: 70 mg/dL (09 Aug 2020 22:38)  POCT Blood Glucose.: 92 mg/dL (09 Aug 2020 20:58)  POCT Blood Glucose.: 142 mg/dL (09 Aug 2020 17:14)  POCT Blood Glucose.: 63 mg/dL (09 Aug 2020 16:18)        MEDICATIONS  (STANDING):    amLODIPine   Tablet 10 milliGRAM(s) Oral daily  calcitriol   Capsule 0.75 MICROGram(s) Oral <User Schedule>  calcium acetate 2001 milliGRAM(s) Oral three times a day with meals  chlorhexidine 2% Cloths 1 Application(s) Topical daily  heparin   Injectable 5000 Unit(s) SubCutaneous every 8 hours  hydrALAZINE 100 milliGRAM(s) Oral three times a day  levoFLOXacin IVPB 500 milliGRAM(s) IV Intermittent every 24 hours  metoprolol tartrate 50 milliGRAM(s) Oral two times a day  Nephro-radha 1 Tablet(s) Oral daily    MEDICATIONS  (PRN):  ALBUTerol    90 MICROgram(s) HFA Inhaler 2 Puff(s) Inhalation every 6 hours PRN Bronchospasm        RADIOLOGY & ADDITIONAL TESTS:    8/9/20 : Xray Chest 2 Views PA/Lat (08.09.20 @ 05:50) PA lateral. Prior 5/20/2020.    Moderate cardiomegaly. Patchy opacities at the bilateral lung bases likely reflect a combination of pneumonia and atelectasis. Trace right, small left pleural effusion. Mild levoscoliosis lower thoracic spine.        MICROBIOLOGY DATA:    COVID-19 PCR . (08.09.20 @ 07:53)    COVID-19 PCR: NotDetec: This test has been validated by [employer] to be accurate;  though it has not been FDA cleared/approved by the usual pathway  As with all laboratory test, results should be correlated with clinical  findings.  https://www.fda.gov/media/700220/download  https://www.fda.gov/media/927116/download Patient is a 74y old  Female with ESRD (MWF, last dialysis 08/07), HTN, and DVT, presents to the ER for evaluation of  worsening shortness of breath associated with cough but No fever. On admission, she found to have no fever or leukocytosis but Patchy opacities at the bilateral lung bases on CXR. She has started on Levaquin since allergic to  PCN, and the ID consult requested to assist with further evaluation and antibiotic management.        REVIEW OF SYSTEMS: Total of twelve systems have been reviewed with patient and found to be negative unless mentioned in HPI        PAST MEDICAL & SURGICAL HISTORY:  Hemodialysis patient: MWF  Hemodialysis access, fistula mature: JASON  DVT (deep venous thrombosis): of Left subclavian vein, 06/12/17  ESRD (end stage renal disease) on dialysis  Cataract  Glaucoma  HTN (hypertension)  S/P KEVEN-BSO: for fibroids, 2012  AV fistula: 2015  H/O: glaucoma: surgery, 2002  Acquired cataract: extraction with b/l lense placement, 2016        SOCIAL HISTORY  Alcohol: Does not drink  Tobacco: Does not smoke  Illicit substance use: None        FAMILY HISTORY: Non contributory to the present illness        ALLERGIES: Mushrooms (Anaphylaxis)  penicillin (Rash)        Vital Signs Last 24 Hrs  T(C): 37.1 (10 Aug 2020 14:33), Max: 37.1 (10 Aug 2020 14:33)  T(F): 98.7 (10 Aug 2020 14:33), Max: 98.7 (10 Aug 2020 14:33)  HR: 69 (10 Aug 2020 14:33) (59 - 72)  BP: 174/81 (10 Aug 2020 14:33) (162/77 - 211/92)  BP(mean): 113 (10 Aug 2020 06:47) (113 - 113)  RR: 18 (10 Aug 2020 14:33) (18 - 20)  SpO2: 97% (10 Aug 2020 14:33) (94% - 100%)        PHYSICAL EXAM:  GENERAL: Not in distress, on oxygen via NC   CHEST/LUNG:  Not using accessory muscles   HEART: s1 and s2 present  ABDOMEN:  Nontender and  Nondistended  EXTREMITIES: No pedal  edema  CNS: Awake and Alert        LABS:                        11.7   5.39  )-----------( 184      ( 10 Aug 2020 10:14 )             36.4       08-10    136  |  95<L>  |  71<H>  ----------------------------<  147<H>  5.4<H>   |  28  |  11.00<H>    Ca    9.2      10 Aug 2020 10:14  Phos  6.6     08-10  Mg     2.8     08-10    TPro  7.2  /  Alb  3.5  /  TBili  0.6  /  DBili  x   /  AST  46<H>  /  ALT  44  /  AlkPhos  87  08-09    PT/INR - ( 09 Aug 2020 06:23 )   PT: 12.4 sec;   INR: 1.06 ratio       PTT - ( 09 Aug 2020 06:23 )  PTT:38.4 sec        CAPILLARY BLOOD GLUCOSE  POCT Blood Glucose.: 126 mg/dL (10 Aug 2020 06:14)  POCT Blood Glucose.: 87 mg/dL (09 Aug 2020 23:53)  POCT Blood Glucose.: 70 mg/dL (09 Aug 2020 22:38)  POCT Blood Glucose.: 92 mg/dL (09 Aug 2020 20:58)  POCT Blood Glucose.: 142 mg/dL (09 Aug 2020 17:14)  POCT Blood Glucose.: 63 mg/dL (09 Aug 2020 16:18)        MEDICATIONS  (STANDING):    amLODIPine   Tablet 10 milliGRAM(s) Oral daily  calcitriol   Capsule 0.75 MICROGram(s) Oral <User Schedule>  calcium acetate 2001 milliGRAM(s) Oral three times a day with meals  chlorhexidine 2% Cloths 1 Application(s) Topical daily  heparin   Injectable 5000 Unit(s) SubCutaneous every 8 hours  hydrALAZINE 100 milliGRAM(s) Oral three times a day  levoFLOXacin IVPB 500 milliGRAM(s) IV Intermittent every 24 hours  metoprolol tartrate 50 milliGRAM(s) Oral two times a day  Nephro-radha 1 Tablet(s) Oral daily    MEDICATIONS  (PRN):  ALBUTerol    90 MICROgram(s) HFA Inhaler 2 Puff(s) Inhalation every 6 hours PRN Bronchospasm        RADIOLOGY & ADDITIONAL TESTS:    8/9/20 : Xray Chest 2 Views PA/Lat (08.09.20 @ 05:50) PA lateral. Prior 5/20/2020.    Moderate cardiomegaly. Patchy opacities at the bilateral lung bases likely reflect a combination of pneumonia and atelectasis. Trace right, small left pleural effusion. Mild levoscoliosis lower thoracic spine.        MICROBIOLOGY DATA:    COVID-19 PCR . (08.09.20 @ 07:53)    COVID-19 PCR: NotDetec: This test has been validated by [employer] to be accurate;  though it has not been FDA cleared/approved by the usual pathway  As with all laboratory test, results should be correlated with clinical  findings.  https://www.fda.gov/media/674933/download  https://www.fda.gov/media/024266/download

## 2020-08-10 NOTE — MEDICAL STUDENT ADULT H&P (EDUCATION) - NS MD HP STUD PE NEURO FT
CN II- XII grossly intact, but CN X was not assessed. Pt had good proprioception and soft/sharp discrimination.

## 2020-08-10 NOTE — MEDICAL STUDENT ADULT H&P (EDUCATION) - NS MD HP STUD HX OF PRESENT ILLNESS FT
74 y.o. female w/ PMH ESRD and HTN p/w cc: "I can't breathe". Pt says that she noticed the difficulty breathing on Saturday afternoon but it was tolerable. At 4 am Sunday morning, pt woke up unable to breathe and called her own ambulance. The difficulty breathing worsened as time went on, but improved on oxygen. She says she has never experienced this before. Pt denies CP, palpitations, fever, N/V/D/C. Endorses cough but no phlegm production. 74 y.o. female w/ PMH ESRD and HTN p/w cc: "I can't breathe". Pt says that she noticed the difficulty breathing on Saturday afternoon but it was tolerable. At 4 am Sunday morning, pt woke up unable to breathe and called her own ambulance. The difficulty breathing worsened as time went on, but improved on oxygen NC. She says she has never experienced this before. Pt denies CP, palpitations, fever, N/V/D/C. Endorses cough but no phlegm production.

## 2020-08-10 NOTE — MEDICAL STUDENT PROGRESS NOTE(EDUCATION) - SUBJECTIVE AND OBJECTIVE BOX
cc: "I can't breathe".   HPI  74 y.o. female w/ PMH ESRD and HTN p/w cc: "I can't breathe". Pt says that she noticed the difficulty breathing on Saturday afternoon but it was tolerable. At 4 am Sunday morning, pt woke up unable to breathe and called her own ambulance. The difficulty breathing worsened as time went on, but improved on oxygen NC. She says she has never experienced this before. Pt denies CP, palpitations, fever, N/V/D/C. Endorses cough but no phlegm production.     ALLERGIES: Allergies  Mushrooms (Anaphylaxis)  penicillin (Rash)    MEDICATIONS  (STANDING):  calcitriol   Capsule 0.75 MICROGram(s) Oral <User Schedule>  calcium acetate 2001 milliGRAM(s) Oral three times a day with meals  chlorhexidine 2% Cloths 1 Application(s) Topical daily  heparin   Injectable 5000 Unit(s) SubCutaneous every 8 hours  hydrALAZINE 100 milliGRAM(s) Oral three times a day  isosorbide   mononitrate ER Tablet (IMDUR) 30 milliGRAM(s) Oral daily  levoFLOXacin IVPB 500 milliGRAM(s) IV Intermittent every 24 hours  metoprolol tartrate 50 milliGRAM(s) Oral two times a day  Nephro-radha 1 Tablet(s) Oral daily    MEDICATIONS  (PRN):  ALBUTerol    90 MICROgram(s) HFA Inhaler 2 Puff(s) Inhalation every 6 hours PRN Bronchospasm    PMH:   Hemodialysis patient: MWF  Hemodialysis access, fistula mature: JASON  DVT (deep venous thrombosis): of Left subclavian vein, 06/12/17  ESRD (end stage renal disease) on dialysis  Cataract  Glaucoma  HTN (hypertension)    SURG.Hx:  S/P KEVEN-BSO: for fibroids, 2012  AV fistula: 2015  H/O: glaucoma: surgery, 2002  Acquired cataract: extraction with b/l lense placement, 2016    FHX:   Cardiovascular disease and hypertension    SHx:  Pt is a non-smoker, non-ETOH drinker. She is currently retired and lives with her . She has three children who live nearby.     ROS:   · General: Denies weight loss/ weight gain  · Skin/Breast: denies skin changes  · Ophthalmologic: endorses normal vision s/p eye surgeries (see SrgHx)  · ENMT: Denies loss of smell, taste. Endorses hearing loss in L ear which she uses a hearing aid for. Endorses use of dentures. Endorses hx of sinusitis  · Respiratory and Thorax: Denies SOB, CP  · Cardiovascular: Denies Palpitations  · GI: denies abdominal pain, V/C/D/N  · : denies urinary freq., hest., and pain  · Musculoskeletal: denies weakness in L and U extremities, denies extremity swelling  · Neuro: denies headaches and dizziness  · Hematology/Lymphatics: denies swelling of neck, pain in fingertips and toes    HOSPITAL COURSE:   Pt seen at bedside today s/p dialysis. Pt reports she is feeling "normal" after dialysis. She was eating her meal, and expressed a desire to change her medications for HTN as her BP is still elevated.    PHYSICAL EXAM:     VITAL SIGNS (Last 24 Hrs)  T(C): 37 (10 Aug 2020 15:40), Max: 37.1 (10 Aug 2020 14:33)  T(F): 98.6 (10 Aug 2020 15:40), Max: 98.7 (10 Aug 2020 14:33)  HR: 66 (10 Aug 2020 16:36) (59 - 79)  BP: 176/65 (10 Aug 2020 16:36) (162/77 - 206/82)  BP(mean): 113 (10 Aug 2020 06:47) (113 - 113)  RR: 18 (10 Aug 2020 15:40) (18 - 20)  SpO2: 98% (10 Aug 2020 15:40) (94% - 100%)    · Constitutional Pt is well developed, well nourished in no acute distress.  · Eyes Pt has significant bilateral periorbital edema. EOMI and convergence intact.  · ENMT	Face is non-tender to palpation. Nose is midline, no signs of trauma, bleeding. Lips are pink, patient is without teeth. Pinna are intact bilaterally w.o. signs of trauma or infection. No hearing aids are noted in either ear.  · Neck Trachea is midline and neck is supple. There is no lymphadenopathy noted.  · Back Slight kyphosis is noted in upper thoracic cage. Non-tender to palpation.  · Respiratory Chest is non-tender to palpation P. Lungs clear to auscultation bilaterally A/P. Negative Fremitus P.  · Cardiovascular normal rhythm, s1 s2 heard. No murmurs or rubs auscultated, and no thrills palpated.  · GI Normal bowel sounds in all 4 quadrants. No abdominal bruits auscultated. Abdominal is non-distended and soft. No visible signs of infection or trauma noted on visual inspection. Non-tender to light and deep palpation in all 4 quadrants. Negative Fitzgerald's McBurney's and Rosvig's.  · Extremities Pt has normal strength in upper and lower extremities. Fingers and toes are warm to touch. No edema noted in extremities. AV fistula is noted ante-brachially on R arm.  · Vascular Capillary refill in 3 seconds. Radial pulse is strong, +3, bilaterally. Brachial pulse difficult to palpate on R arm due to fistula, but +3 on L arm. Posterior tibialis pulse is +2 bilaterally. No carotid or abdominal bruits were auscultated.  · Neurological CN II- XII grossly intact, but CN X was not assessed. Pt had good proprioception and soft/sharp discrimination.  · Skin Pt's face and lower leg (from toes to ante-talus bilaterally) are slightly more pigmented than the rest of her body.    LABS:                      11.7   5.39  )-----------( 184      ( 10 Aug 2020 10:14 )             36.4   08-10    136  |  95<L>  |  71<H>  ----------------------------<  147<H>  5.4<H>   |  28  |  11.00<H>    Ca    9.2      10 Aug 2020 10:14  Phos  6.6     08-10  Mg     2.8     08-10    Thyroid Stimulating Hormone, Serum (08.10.20 @ 10:14)    Thyroid Stimulating Hormone, Serum: 2.70 uU/mL  Troponin I, Serum (08.09.20 @ 06:23)    Troponin I, Serum: 0.016  TPro  7.2  /  Alb  3.5  /  TBili  0.6  /  DBili  x   /  AST  46<H>  /  ALT  44  /  AlkPhos  87  08-09  Lipase, Serum (08.09.20 @ 06:23)    Lipase, Serum: 338 U/L  Lipid Profile (08.10.20 @ 10:14)    Total Cholesterol/HDL Ratio Measurement: 2.0 RATIO    Cholesterol, Serum: 155 mg/dL    Triglycerides, Serum: 76 mg/dL    HDL Cholesterol, Serum: 77: HDL Levels >/= 60 mg/dL are considered beneficial and a "negative" risk  factor.  PT/INR - ( 09 Aug 2020 06:23 )   PT: 12.4 sec;   INR: 1.06 ratio    PTT - ( 09 Aug 2020 06:23 )  PTT:38.4 sec    RADIOLOGY:    EXAM:  XR CHEST PA LAT 2V                        PROCEDURE DATE:  08/09/2020    INTERPRETATION:  Chest pain short of breath.  PA lateral. Prior 5/20/2020.  Moderate cardiomegaly. Patchy opacities at the bilateral lung bases likely reflect a combination of pneumonia and atelectasis. Trace right, small left pleural effusion. Mild levoscoliosis lower thoracic spine.  Impression: Bibasilar infiltrates/atelectasis and effusions as described. Cardiomegaly  MJ MACKAY M.D., ATTENDING RADIOLOGIST  This document has been electronically signed. Aug  9 2020  8:18AM    12 LEAD EKG:  Ventricular Rate 65 BPM  Atrial Rate 65 BPM  P-R Interval 168 ms  QRS Duration 82 ms  Q-T Interval 434 ms  QTC Calculation(Bezet) 451 ms  P Axis -5 degrees  R Axis 11 degrees  T Axis 65 degrees  Diagnosis Line Normal sinus rhythm  Normal ECG  Confirmed by MIKAEL MACHADO [facility] ([id number]) on 8/9/2020 1:38:50 PM cc: "I can't breathe".     HPI/ Hospital Course: 74 y.o. female w/ PMH ESRD and HTN p/w cc: "I can't breathe". Pt says that she noticed the difficulty breathing on Saturday afternoon but it was tolerable. At 4 am Sunday morning, pt woke up unable to breathe and called her own ambulance. The difficulty breathing worsened as time went on, but improved on oxygen NC. She says she has never experienced this before. Pt denies CP, palpitations, fever, N/V/D/C. Endorses cough but no phlegm production.      ALLERGIES: Allergies  Mushrooms (Anaphylaxis)  penicillin (Rash)    MEDICATIONS  (STANDING):  calcitriol   Capsule 0.75 MICROGram(s) Oral <User Schedule>  calcium acetate 2001 milliGRAM(s) Oral three times a day with meals  chlorhexidine 2% Cloths 1 Application(s) Topical daily  heparin   Injectable 5000 Unit(s) SubCutaneous every 8 hours  hydrALAZINE 100 milliGRAM(s) Oral three times a day  isosorbide   mononitrate ER Tablet (IMDUR) 30 milliGRAM(s) Oral daily  levoFLOXacin IVPB 500 milliGRAM(s) IV Intermittent every 24 hours  metoprolol tartrate 50 milliGRAM(s) Oral two times a day  Nephro-radha 1 Tablet(s) Oral daily    MEDICATIONS  (PRN):  ALBUTerol    90 MICROgram(s) HFA Inhaler 2 Puff(s) Inhalation every 6 hours PRN Bronchospasm    PMH:   Hemodialysis patient: MWF  Hemodialysis access, fistula mature: JASON  DVT (deep venous thrombosis): of Left subclavian vein, 06/12/17  ESRD (end stage renal disease) on dialysis  Cataract  Glaucoma  HTN (hypertension)    SURG.Hx:  S/P KEVEN-BSO: for fibroids, 2012  AV fistula: 2015  H/O: glaucoma: surgery, 2002  Acquired cataract: extraction with b/l lense placement, 2016    FHX:   Cardiovascular disease and hypertension    SHx:  Pt is a non-smoker, non-ETOH drinker. She is currently retired and lives with her . She has three children who live nearby.     ROS:   · General: Denies weight loss/ weight gain  · Skin/Breast: denies skin changes  · Ophthalmologic: endorses normal vision s/p eye surgeries (see SrgHx)  · ENMT: Denies loss of smell, taste. Endorses hearing loss in L ear which she uses a hearing aid for. Endorses use of dentures. Endorses hx of sinusitis  · Respiratory and Thorax: Denies SOB, CP  · Cardiovascular: Denies Palpitations  · GI: denies abdominal pain, V/C/D/N  · : denies urinary freq., hest., and pain  · Musculoskeletal: denies weakness in L and U extremities, denies extremity swelling  · Neuro: denies headaches and dizziness  · Hematology/Lymphatics: denies swelling of neck, pain in fingertips and toes    HOSPITAL COURSE:   Pt seen at bedside today s/p dialysis. Pt reports she is feeling "normal" after dialysis. She was eating her meal, and expressed a desire to change her medications for HTN as her BP is still elevated.    PHYSICAL EXAM:     VITAL SIGNS (Last 24 Hrs)  T(C): 37 (10 Aug 2020 15:40), Max: 37.1 (10 Aug 2020 14:33)  T(F): 98.6 (10 Aug 2020 15:40), Max: 98.7 (10 Aug 2020 14:33)  HR: 66 (10 Aug 2020 16:36) (59 - 79)  BP: 176/65 (10 Aug 2020 16:36) (162/77 - 206/82)  BP(mean): 113 (10 Aug 2020 06:47) (113 - 113)  RR: 18 (10 Aug 2020 15:40) (18 - 20)  SpO2: 98% (10 Aug 2020 15:40) (94% - 100%)    · Constitutional Pt is well developed, well nourished in no acute distress.  · Eyes Pt has significant bilateral periorbital edema. EOMI and convergence intact.  · ENMT	Face is non-tender to palpation. Nose is midline, no signs of trauma, bleeding. Lips are pink, patient is without teeth. Pinna are intact bilaterally w.o. signs of trauma or infection. No hearing aids are noted in either ear.  · Neck Trachea is midline and neck is supple. There is no lymphadenopathy noted.  · Back Slight kyphosis is noted in upper thoracic cage. Non-tender to palpation.  · Respiratory Chest is non-tender to palpation P. Lungs clear to auscultation bilaterally A/P. Negative Fremitus P.  · Cardiovascular normal rhythm, s1 s2 heard. No murmurs or rubs auscultated, and no thrills palpated.  · GI Normal bowel sounds in all 4 quadrants. No abdominal bruits auscultated. Abdominal is non-distended and soft. No visible signs of infection or trauma noted on visual inspection. Non-tender to light and deep palpation in all 4 quadrants. Negative Fitzgerald's McBurney's and Rosvig's.  · Extremities Pt has normal strength in upper and lower extremities. Fingers and toes are warm to touch. No edema noted in extremities. AV fistula is noted ante-brachially on R arm.  · Vascular Capillary refill in 3 seconds. Radial pulse is strong, +3, bilaterally. Brachial pulse difficult to palpate on R arm due to fistula, but +3 on L arm. Posterior tibialis pulse is +2 bilaterally. No carotid or abdominal bruits were auscultated.  · Neurological CN II- XII grossly intact, but CN X was not assessed. Pt had good proprioception and soft/sharp discrimination.  · Skin Pt's face and lower leg (from toes to ante-talus bilaterally) are slightly more pigmented than the rest of her body.    LABS:                      11.7   5.39  )-----------( 184      ( 10 Aug 2020 10:14 )             36.4   08-10    136  |  95<L>  |  71<H>  ----------------------------<  147<H>  5.4<H>   |  28  |  11.00<H>    Ca    9.2      10 Aug 2020 10:14  Phos  6.6     08-10  Mg     2.8     08-10    Thyroid Stimulating Hormone, Serum (08.10.20 @ 10:14)    Thyroid Stimulating Hormone, Serum: 2.70 uU/mL  Troponin I, Serum (08.09.20 @ 06:23)    Troponin I, Serum: 0.016  TPro  7.2  /  Alb  3.5  /  TBili  0.6  /  DBili  x   /  AST  46<H>  /  ALT  44  /  AlkPhos  87  08-09  Lipase, Serum (08.09.20 @ 06:23)    Lipase, Serum: 338 U/L  Lipid Profile (08.10.20 @ 10:14)    Total Cholesterol/HDL Ratio Measurement: 2.0 RATIO    Cholesterol, Serum: 155 mg/dL    Triglycerides, Serum: 76 mg/dL    HDL Cholesterol, Serum: 77: HDL Levels >/= 60 mg/dL are considered beneficial and a "negative" risk  factor.  PT/INR - ( 09 Aug 2020 06:23 )   PT: 12.4 sec;   INR: 1.06 ratio    PTT - ( 09 Aug 2020 06:23 )  PTT:38.4 sec    RADIOLOGY:    EXAM:  XR CHEST PA LAT 2V                        PROCEDURE DATE:  08/09/2020    INTERPRETATION:  Chest pain short of breath.  PA lateral. Prior 5/20/2020.  Moderate cardiomegaly. Patchy opacities at the bilateral lung bases likely reflect a combination of pneumonia and atelectasis. Trace right, small left pleural effusion. Mild levoscoliosis lower thoracic spine.  Impression: Bibasilar infiltrates/atelectasis and effusions as described. Cardiomegaly  MJ MACKAY M.D., ATTENDING RADIOLOGIST  This document has been electronically signed. Aug  9 2020  8:18AM    12 LEAD EKG:  Ventricular Rate 65 BPM  Atrial Rate 65 BPM  P-R Interval 168 ms  QRS Duration 82 ms  Q-T Interval 434 ms  QTC Calculation(Bezet) 451 ms  P Axis -5 degrees  R Axis 11 degrees  T Axis 65 degrees  Diagnosis Line Normal sinus rhythm  Normal ECG  Confirmed by MIKAEL MACHADO [facility] ([id number]) on 8/9/2020 1:38:50 PM

## 2020-08-10 NOTE — MEDICAL STUDENT ADULT H&P (EDUCATION) - NS MD HP STUD RESULTS LAB FT
LABS AS OF 8/4/20                 11.7   5.39  )-----------( 184                  36.4   136  |  95<L>  |  71<H>  ----------------------------<  147<H>  5.4<H>   |  28  |  11.00<H>  Ca    9.2      10 Aug 2020 10:14  Phos  6.6     08-10  Mg     2.8     08-10  TPro  7.2  /  Alb  3.5  /  TBili  0.6  /  DBili  x   /  AST  46<H>  /  ALT  44  /  AlkPhos  87  08-09

## 2020-08-10 NOTE — MEDICAL STUDENT ADULT H&P (EDUCATION) - NS MD HP STUD PE VITALS FT
Vital Signs Last 24 Hrs  T(C): 36.7 (10 Aug 2020 07:39), Max: 36.8 (09 Aug 2020 20:15)  T(F): 98.1 (10 Aug 2020 07:39), Max: 98.3 (09 Aug 2020 20:15)  HR: 59 (10 Aug 2020 07:39) (59 - 72)  BP: 179/86 (10 Aug 2020 07:39) (162/77 - 211/92)  BP(mean): 113 (10 Aug 2020 06:47) (113 - 113)  RR: 20 (10 Aug 2020 07:39) (18 - 20)  SpO2: 95% (10 Aug 2020 07:39) (94% - 100%) on nasal canula

## 2020-08-10 NOTE — MEDICAL STUDENT PROGRESS NOTE(EDUCATION) - NS MD HP STUD ASPLAN PLAN FT
1. Health Care Associated Pneumonia  - continue to monitor temperature, WBC and clinical picture.   - repeat chest XRay to look for improvement post abx.  - reassess O2 sat tomorrow to attempt to decrease use of supplemental oxygen.  2. Hyperkalemia  - reassess K+ levels after dialysis has taken place  - cont. telemetry to assess rhythm  3. HTN  - Pt presented w/ hypertensive emergency. Today's BP was 179/86 which has improved from the initial presentation 220/92.   - added IMDUR to optimize control.   4. ESRD  - Dialysis today 1. Health Care Associated Pneumonia  - continue to monitor temperature, WBC and clinical picture.   - CXR showed patchy opacities on b/l lung bases. pnemonia/ atelectasis.  - reassess O2 sat tomorrow to attempt to decrease use of supplemental oxygen.  - CT chest to be done after ECHO    2. Hyperkalemia  - reassess K+ levels after dialysis has taken place  - cont. telemetry to assess rhythm    3. HTN  - Pt presented w/ hypertensive emergency. Today's BP was 179/86 which has improved from the initial presentation 220/92.   - added IMDUR to optimize control.     4. ESRD  - Hemodialysis today (M/W/F)

## 2020-08-10 NOTE — PROGRESS NOTE ADULT - SUBJECTIVE AND OBJECTIVE BOX
CC: Feels better. Denies SOB, fever or chills.    Vital Signs Last 24 Hrs  T(C): 36.9 (10 Aug 2020 20:33), Max: 37.2 (10 Aug 2020 13:40)  T(F): 98.5 (10 Aug 2020 20:33), Max: 98.9 (10 Aug 2020 13:40)  HR: 66 (10 Aug 2020 20:33) (59 - 79)  BP: 184/74 (10 Aug 2020 20:33) (162/82 - 200/82)  BP(mean): 113 (10 Aug 2020 06:47) (113 - 113)  RR: 18 (10 Aug 2020 20:33) (18 - 20)  SpO2: 99% (10 Aug 2020 20:33) (95% - 100%)    08-09 @ 07:01  -  08-10 @ 07:00  --------------------------------------------------------  IN: 75 mL / OUT: 0 mL / NET: 75 mL    08-10 @ 07:01  -  08-10 @ 22:55  --------------------------------------------------------  IN: 725 mL / OUT: 3000 mL / NET: -2275 mL    PHYSICAL EXAM:    GENERAL: No acute respiratory distress.  EYES: Sclera anicteric  ENMT: Atraumatic, Normocephalic, supple, No JVD present. Moist mucous membranes  RESPIRATORY: b/l poor air entry; No rales, rhonchi, wheezing  CARDIOVASCULAR: S1S2+; no murmurs  GASTROINTESTINAL: Soft, Non-tender, Nondistended; Bowel sounds present   CENTRAL NERVOUS SYSTEM:  Awake, Alert & Oriented X3  EXTREMITIES:  1+ edema  SKIN: Normal turgor  DIALYSIS ACCESS:  Rt Arm AVG thrill/bruit+     MEDICATIONS:  ALBUTerol    90 MICROgram(s) HFA Inhaler 2 Puff(s) Inhalation every 6 hours PRN  calcitriol   Capsule 0.75 MICROGram(s) Oral <User Schedule>  calcium acetate 2001 milliGRAM(s) Oral three times a day with meals  chlorhexidine 2% Cloths 1 Application(s) Topical daily  heparin   Injectable 5000 Unit(s) SubCutaneous every 8 hours  hydrALAZINE 100 milliGRAM(s) Oral three times a day  isosorbide   mononitrate ER Tablet (IMDUR) 30 milliGRAM(s) Oral daily  levoFLOXacin IVPB 500 milliGRAM(s) IV Intermittent every 24 hours  metoprolol tartrate 50 milliGRAM(s) Oral two times a day  Nephro-radha 1 Tablet(s) Oral daily      LABS:                        11.7   5.39  )-----------( 184      ( 10 Aug 2020 10:14 )             36.4     08-10    136  |  95<L>  |  71<H>  ----------------------------<  147<H>  5.4<H>   |  28  |  11.00<H>    Ca    9.2      10 Aug 2020 10:14  Phos  6.6     08-10  Mg     2.8     08-10    TPro  7.2  /  Alb  3.5  /  TBili  0.6  /  DBili  x   /  AST  46<H>  /  ALT  44  /  AlkPhos  87  08-09    PT/INR - ( 09 Aug 2020 06:23 )   PT: 12.4 sec;   INR: 1.06 ratio    PTT - ( 09 Aug 2020 06:23 )  PTT:38.4 sec    ASSESSMENT AND PLAN:     1. ESRD. Tolerating HD  -Cont HD with UF as tolerated  -Hemodialysis Renal Diet and Fluid restriction to 1L/day  -Adjust meds to eGFR and avoid IV Gadolinium contrast,NSAIDs, and phosphate enema.  -Monitor Electrolytes daily.  2. Anemia:  -hb >10.5 and no need for epogen  -F/u CBC daily  -transfuse if HB < 7.0.  3. HTN: -bp is acceptable. pt takes metoprolol 50mg bid at home.   -titrate bp meds to keep sbp >110 and < 130  4. MBD: phoslo 3 tab tid; check phos and outpatient PtH (895), continue calcitriol 0.75mcg tiw.   5. Pneumonia: on iv levaquin.   6. Hyperkalemia due to ESRD  -Kayexalate PRN for K>5.3  -start low K diet.   -Keep pt euvolemic. Avoid ACE inh/ ARBs, NSAIDs, and Aldactone or potassium sparing diuretics. Monitor K+ daily.

## 2020-08-10 NOTE — MEDICAL STUDENT ADULT H&P (EDUCATION) - NS MD HP STUD SOCIAL HX FT
Pt is a non-smoker, non-ETOH drinker.     She is currently retired and lives with her . She has three children who live nearby.

## 2020-08-10 NOTE — PROGRESS NOTE ADULT - ASSESSMENT
Assessment and Recommendation:   Problem/Recommendation - 1:  Problem: HCAP (healthcare-associated pneumonia). Recommendation: Panculture  antibiotics  oxygen supp  monitor temp and WBC  Pleural effusions too small for safe tap.    Problem/Recommendation - 2:  ·  Problem: ESRD (end stage renal disease) on dialysis.  Recommendation: cont HD  monitor labs  Renal F/U.  K is 5.4 today.      Problem/Recommendation - 3:  ·  Problem: HTN (hypertension).  Recommendation: monitor BP  cont meds.

## 2020-08-10 NOTE — MEDICAL STUDENT ADULT H&P (EDUCATION) - NS MD HP STUD PE RESPIRATORY FT
Chest is non-tender to palpation P. Lungs clear to auscultation bilaterally A/P. Negative Fremitus P.

## 2020-08-10 NOTE — MEDICAL STUDENT ADULT H&P (EDUCATION) - NS MD HP STUD RESULTS RAD FT
EXAM:  XR CHEST PA LAT 2V  08/09/2020    INTERPRETATION:  Chest pain short of breath. PA lateral. Prior 5/20/2020.  Moderate cardiomegaly. Patchy opacities at the bilateral lung bases likely reflect a combination of pneumonia and atelectasis. Trace right, small left pleural effusion. Mild levoscoliosis lower thoracic spine.  Impression: Bibasilar infiltrates/atelectasis and effusions as described. Cardiomegaly

## 2020-08-10 NOTE — MEDICAL STUDENT ADULT H&P (EDUCATION) - NS MD HP STUD MEDICATIONS FT
Hydralazine, Amlodopine, Metoprolol MEDICATIONS  (STANDING):  amLODIPine   Tablet 10 milliGRAM(s) Oral daily  calcitriol   Capsule 0.75 MICROGram(s) Oral <User Schedule>  calcium acetate 2001 milliGRAM(s) Oral three times a day with meals  chlorhexidine 2% Cloths 1 Application(s) Topical daily  heparin   Injectable 5000 Unit(s) SubCutaneous every 8 hours  hydrALAZINE 100 milliGRAM(s) Oral three times a day  levoFLOXacin IVPB 500 milliGRAM(s) IV Intermittent every 24 hours  metoprolol tartrate 50 milliGRAM(s) Oral two times a day  Nephro-radha 1 Tablet(s) Oral daily  MEDICATIONS  (PRN):  ALBUTerol    90 MICROgram(s) HFA Inhaler 2 Puff(s) Inhalation every 6 hours PRN Bronchospasm

## 2020-08-11 ENCOUNTER — TRANSCRIPTION ENCOUNTER (OUTPATIENT)
Age: 75
End: 2020-08-11

## 2020-08-11 LAB
ALBUMIN SERPL ELPH-MCNC: 2.9 G/DL — LOW (ref 3.5–5)
ALP SERPL-CCNC: 72 U/L — SIGNIFICANT CHANGE UP (ref 40–120)
ALT FLD-CCNC: 24 U/L DA — SIGNIFICANT CHANGE UP (ref 10–60)
ANION GAP SERPL CALC-SCNC: 7 MMOL/L — SIGNIFICANT CHANGE UP (ref 5–17)
AST SERPL-CCNC: 21 U/L — SIGNIFICANT CHANGE UP (ref 10–40)
BILIRUB SERPL-MCNC: 0.5 MG/DL — SIGNIFICANT CHANGE UP (ref 0.2–1.2)
BUN SERPL-MCNC: 45 MG/DL — HIGH (ref 7–18)
CALCIUM SERPL-MCNC: 9.1 MG/DL — SIGNIFICANT CHANGE UP (ref 8.4–10.5)
CHLORIDE SERPL-SCNC: 99 MMOL/L — SIGNIFICANT CHANGE UP (ref 96–108)
CO2 SERPL-SCNC: 30 MMOL/L — SIGNIFICANT CHANGE UP (ref 22–31)
CREAT SERPL-MCNC: 7.49 MG/DL — HIGH (ref 0.5–1.3)
GLUCOSE BLDC GLUCOMTR-MCNC: 138 MG/DL — HIGH (ref 70–99)
GLUCOSE BLDC GLUCOMTR-MCNC: 98 MG/DL — SIGNIFICANT CHANGE UP (ref 70–99)
GLUCOSE SERPL-MCNC: 86 MG/DL — SIGNIFICANT CHANGE UP (ref 70–99)
HBV SURFACE AG SER-ACNC: SIGNIFICANT CHANGE UP
HCT VFR BLD CALC: 35.4 % — SIGNIFICANT CHANGE UP (ref 34.5–45)
HGB BLD-MCNC: 11.4 G/DL — LOW (ref 11.5–15.5)
MCHC RBC-ENTMCNC: 28.3 PG — SIGNIFICANT CHANGE UP (ref 27–34)
MCHC RBC-ENTMCNC: 32.2 GM/DL — SIGNIFICANT CHANGE UP (ref 32–36)
MCV RBC AUTO: 87.8 FL — SIGNIFICANT CHANGE UP (ref 80–100)
MRSA PCR RESULT.: SIGNIFICANT CHANGE UP
NRBC # BLD: 0 /100 WBCS — SIGNIFICANT CHANGE UP (ref 0–0)
PLATELET # BLD AUTO: 175 K/UL — SIGNIFICANT CHANGE UP (ref 150–400)
POTASSIUM SERPL-MCNC: 5.1 MMOL/L — SIGNIFICANT CHANGE UP (ref 3.5–5.3)
POTASSIUM SERPL-SCNC: 5.1 MMOL/L — SIGNIFICANT CHANGE UP (ref 3.5–5.3)
PROCALCITONIN SERPL-MCNC: 0.68 NG/ML — HIGH (ref 0.02–0.1)
PROT SERPL-MCNC: 6.4 G/DL — SIGNIFICANT CHANGE UP (ref 6–8.3)
RBC # BLD: 4.03 M/UL — SIGNIFICANT CHANGE UP (ref 3.8–5.2)
RBC # FLD: 17.1 % — HIGH (ref 10.3–14.5)
S AUREUS DNA NOSE QL NAA+PROBE: SIGNIFICANT CHANGE UP
SODIUM SERPL-SCNC: 136 MMOL/L — SIGNIFICANT CHANGE UP (ref 135–145)
WBC # BLD: 4.79 K/UL — SIGNIFICANT CHANGE UP (ref 3.8–10.5)
WBC # FLD AUTO: 4.79 K/UL — SIGNIFICANT CHANGE UP (ref 3.8–10.5)

## 2020-08-11 PROCEDURE — 71250 CT THORAX DX C-: CPT | Mod: 26

## 2020-08-11 RX ORDER — HYDRALAZINE HCL 50 MG
5 TABLET ORAL ONCE
Refills: 0 | Status: COMPLETED | OUTPATIENT
Start: 2020-08-11 | End: 2020-08-11

## 2020-08-11 RX ORDER — LABETALOL HCL 100 MG
200 TABLET ORAL THREE TIMES A DAY
Refills: 0 | Status: DISCONTINUED | OUTPATIENT
Start: 2020-08-11 | End: 2020-08-17

## 2020-08-11 RX ORDER — LABETALOL HCL 100 MG
200 TABLET ORAL THREE TIMES A DAY
Refills: 0 | Status: DISCONTINUED | OUTPATIENT
Start: 2020-08-11 | End: 2020-08-11

## 2020-08-11 RX ADMIN — Medication 50 MILLIGRAM(S): at 06:02

## 2020-08-11 RX ADMIN — Medication 5 MILLIGRAM(S): at 00:56

## 2020-08-11 RX ADMIN — Medication 2001 MILLIGRAM(S): at 12:00

## 2020-08-11 RX ADMIN — Medication 2001 MILLIGRAM(S): at 17:01

## 2020-08-11 RX ADMIN — Medication 2001 MILLIGRAM(S): at 08:08

## 2020-08-11 RX ADMIN — HEPARIN SODIUM 5000 UNIT(S): 5000 INJECTION INTRAVENOUS; SUBCUTANEOUS at 21:22

## 2020-08-11 RX ADMIN — Medication 100 MILLIGRAM(S): at 06:02

## 2020-08-11 RX ADMIN — Medication 200 MILLIGRAM(S): at 08:55

## 2020-08-11 RX ADMIN — Medication 5 MILLIGRAM(S): at 02:49

## 2020-08-11 RX ADMIN — HEPARIN SODIUM 5000 UNIT(S): 5000 INJECTION INTRAVENOUS; SUBCUTANEOUS at 14:17

## 2020-08-11 RX ADMIN — Medication 200 MILLIGRAM(S): at 21:22

## 2020-08-11 RX ADMIN — CHLORHEXIDINE GLUCONATE 1 APPLICATION(S): 213 SOLUTION TOPICAL at 12:09

## 2020-08-11 RX ADMIN — Medication 200 MILLIGRAM(S): at 14:16

## 2020-08-11 RX ADMIN — ISOSORBIDE MONONITRATE 30 MILLIGRAM(S): 60 TABLET, EXTENDED RELEASE ORAL at 12:00

## 2020-08-11 RX ADMIN — Medication 100 MILLIGRAM(S): at 21:25

## 2020-08-11 RX ADMIN — HEPARIN SODIUM 5000 UNIT(S): 5000 INJECTION INTRAVENOUS; SUBCUTANEOUS at 06:02

## 2020-08-11 RX ADMIN — Medication 1 TABLET(S): at 12:00

## 2020-08-11 RX ADMIN — Medication 100 MILLIGRAM(S): at 14:17

## 2020-08-11 NOTE — PROGRESS NOTE ADULT - ASSESSMENT
Assessment and Recommendation:   Problem/Recommendation - 1:  Problem: HCAP (healthcare-associated pneumonia). Recommendation: Blood cultures neg  antibiotics  oxygen supp  monitor temp and WBC  Pleural effusions too small for safe tap.    Problem/Recommendation - 2:  ·  Problem: ESRD (end stage renal disease) on dialysis.  Recommendation: cont HD  monitor labs  Renal F/U.        Problem/Recommendation - 3:  ·  Problem: HTN (hypertension).  Recommendation: monitor BP  cont meds.

## 2020-08-11 NOTE — DISCHARGE NOTE PROVIDER - NSDCMRMEDTOKEN_GEN_ALL_CORE_FT
amLODIPine 10 mg oral tablet: 1 tab(s) orally 2 times a day  hydrALAZINE 100 mg oral tablet: 1 tab(s) orally 3 times a day  labetalol 100 mg oral tablet: 1 tab(s) orally 2 times a day  multivitamin:   Ventolin HFA 90 mcg/inh inhalation aerosol: 2 puff(s) inhaled every 6 hours, As Needed calcitriol 0.25 mcg oral capsule: 3 cap(s) orally Monday, Wednesday, and Friday   calcium acetate 667 mg oral tablet: 3 tab(s) orally 3 times a day   hydrALAZINE 100 mg oral tablet: 1 tab(s) orally 3 times a day  isosorbide mononitrate 120 mg oral tablet, extended release: 1 tab(s) orally once a day  labetalol 200 mg oral tablet: 1 tab(s) orally 3 times a day  levoFLOXacin 500 mg oral tablet: 1 tab(s) orally every other day   Lokelma 5 g oral powder for reconstitution: 1 packet(s) orally Tuesday, Thursday, Saturday, Sunday   multivitamin:   NIFEdipine 30 mg oral tablet, extended release: 1 tab(s) orally once a day  Ventolin HFA 90 mcg/inh inhalation aerosol: 2 puff(s) inhaled every 6 hours, As Needed

## 2020-08-11 NOTE — MEDICAL STUDENT PROGRESS NOTE(EDUCATION) - NS MD HP STUD ASPLAN PLAN FT
1. Health Care Associated Pneumonia  - continue to monitor temperature, WBC and clinical picture.   - CXR showed patchy opacities on b/l lung bases. pnemonia/ atelectasis.  - reassess O2 sat tomorrow to attempt to decrease use of supplemental oxygen.  - CT chest to be done after ECHO    2. Hyperkalemia  - reassess K+ levels after dialysis has taken place  - cont. telemetry to assess rhythm    3. HTN  - Pt presented w/ hypertensive emergency. Today's BP was 179/86 which has improved from the initial presentation 220/92.   - added IMDUR to optimize control.     4. ESRD  - Hemodialysis today (M/W/F) 1. Health Care Associated Pneumonia  - continue to monitor temperature, WBC and clinical picture.   - CXR showed patchy opacities on b/l lung bases. pnemonia/ atelectasis.  - reassess O2 sat tomorrow to attempt to decrease use of supplemental oxygen.  - CT chest to be done after ECHO    2. Hyperkalemia  - reassess K+ levels after dialysis has taken place   - cont. telemetry to assess rhythm    3. HTN  - Pt presented w/ hypertensive emergency. Today's BP was 187/88 which has improved from the initial presentation 220/92.   - added IMDUR to optimize control.     4. ESRD  - Hemodialysis today (M/W/F) 1. Health Care Associated Pneumonia  - continue to monitor temperature, WBC and clinical picture.   - CXR showed patchy opacities on b/l lung bases. pnemonia/ atelectasis.  - reassess O2 sat tomorrow to attempt to decrease use of supplemental oxygen.  - CT chest to be done after ECHO    2. Hyperkalemia  - reassess K+ levels after dialysis has taken place    3. HTN  - Pt presented w/ hypertensive emergency. Today's BP was 187/88 which has improved from the initial presentation 220/92.   - added IMDUR to optimize control.     4. ESRD  - Hemodialysis today (M/W/F) 1. Health Care Associated Pneumonia  - continue to monitor temperature, WBC and clinical picture.   - CXR showed patchy opacities on b/l lung bases. pnemonia/ atelectasis.  - reassess O2 sat tomorrow to attempt to decrease use of supplemental oxygen.  - CT chest - Moderate bilateral pleural effusion, new from prior, with underlying compressive atelectasis of lower lobes. pericardial effusion and cardiomegaly unchanged. End-stage renal disease and renal osteodystrophy. Multinodular thyroid.    2. Hyperkalemia  - reassess K+ levels after dialysis has taken place    3. HTN  - Pt presented w/ hypertensive emergency. Today's BP was 187/88 which has improved from the initial presentation 220/92.   - added IMDUR to optimize control.   - changed metoprolol to labetalol 200 today    4. ESRD  - Hemodialysis tomorrow (M/W/F) after that D/c

## 2020-08-11 NOTE — PROGRESS NOTE ADULT - SUBJECTIVE AND OBJECTIVE BOX
Patient is a 74y old  Female who presents with a chief complaint of shortness of breath (10 Aug 2020 22:54)  Awake, alert, comfortable in bed in NAD    INTERVAL HPI/OVERNIGHT EVENTS:      VITAL SIGNS:  T(F): 99 (08-11-20 @ 11:28)  HR: 66 (08-11-20 @ 11:28)  BP: 154/64 (08-11-20 @ 11:28)  RR: 18 (08-11-20 @ 11:28)  SpO2: 99% (08-11-20 @ 11:28)  Wt(kg): --  I&O's Detail    10 Aug 2020 07:01  -  11 Aug 2020 07:00  --------------------------------------------------------  IN:    Oral Fluid: 275 mL    Other: 450 mL  Total IN: 725 mL    OUT:    Other: 3000 mL  Total OUT: 3000 mL    Total NET: -2275 mL              REVIEW OF SYSTEMS:    CONSTITUTIONAL:  No fevers, chills, sweats    HEENT:  Eyes:  No diplopia or blurred vision. ENT:  No earache, sore throat or runny nose.    CARDIOVASCULAR:  No pressure, squeezing, tightness, or heaviness about the chest; no palpitations.    RESPIRATORY:  Per HPI    GASTROINTESTINAL:  No abdominal pain, nausea, vomiting or diarrhea.    GENITOURINARY:  No dysuria, frequency or urgency.    NEUROLOGIC:  No paresthesias, fasciculations, seizures or weakness.    PSYCHIATRIC:  No disorder of thought or mood.      PHYSICAL EXAM:    Constitutional: Well developed and nourished  Eyes:Perrla  ENMT: normal  Neck:supple  Respiratory: good air entry  Cardiovascular: S1 S2 regular  Gastrointestinal: Soft, Non tender  Extremities: No edema  Vascular:normal  Neurological:Awake, alert,Ox3  Musculoskeletal:Normal      MEDICATIONS  (STANDING):  calcitriol   Capsule 0.75 MICROGram(s) Oral <User Schedule>  calcium acetate 2001 milliGRAM(s) Oral three times a day with meals  chlorhexidine 2% Cloths 1 Application(s) Topical daily  heparin   Injectable 5000 Unit(s) SubCutaneous every 8 hours  hydrALAZINE 100 milliGRAM(s) Oral three times a day  isosorbide   mononitrate ER Tablet (IMDUR) 30 milliGRAM(s) Oral daily  labetalol 200 milliGRAM(s) Oral three times a day  levoFLOXacin IVPB 500 milliGRAM(s) IV Intermittent every 24 hours  Nephro-radha 1 Tablet(s) Oral daily    MEDICATIONS  (PRN):  ALBUTerol    90 MICROgram(s) HFA Inhaler 2 Puff(s) Inhalation every 6 hours PRN Bronchospasm      Allergies    Mushrooms (Anaphylaxis)  penicillin (Rash)    Intolerances        LABS:                        11.4   4.79  )-----------( 175      ( 11 Aug 2020 07:16 )             35.4     08-11    136  |  99  |  45<H>  ----------------------------<  86  5.1   |  30  |  7.49<H>    Ca    9.1      11 Aug 2020 07:16  Phos  6.6     08-10  Mg     2.8     08-10    TPro  6.4  /  Alb  2.9<L>  /  TBili  0.5  /  DBili  x   /  AST  21  /  ALT  24  /  AlkPhos  72  08-11              CAPILLARY BLOOD GLUCOSE      POCT Blood Glucose.: 138 mg/dL (11 Aug 2020 11:37)  POCT Blood Glucose.: 98 mg/dL (11 Aug 2020 08:00)Culture - Blood (08.09.20 @ 18:40)    Specimen Source: .Blood Blood    Culture Results:   No growth to date.        pro-bnp [id number] 08-09 @ 06:23     d-dimer --  08-09 @ 06:23      RADIOLOGY & ADDITIONAL TESTS:    CXR:  < from: Xray Chest 2 Views PA/Lat (08.09.20 @ 05:50) >  Impression: Bibasilar infiltrates/atelectasis and effusions as described. Cardiomegaly    < end of copied text >    Ct scan chest:    ekg;    echo:

## 2020-08-11 NOTE — DISCHARGE NOTE PROVIDER - NSDCFUSCHEDAPPT_GEN_ALL_CORE_FT
ARIANNE MCNAMARA ; 10/27/2020 ; [facility] Surg [address] ; 10/27/2020 ; [facility] Surg Vasc [address] [facility] ; [facility] Rad  [facility] ; 09/17/2020 ; NPP Rad  [facility] ; 09/17/2020 ; [facility] Diag Rad [address] [facility] [facility] ; 10/14/2020 ; [facility] Cardio [id number] [facility]  [facility] ; 10/27/2020 ; NPP Surg [address] MCNAMARA ARIANNE ; 10/27/2020 ; NPP Surg Vasc [address]

## 2020-08-11 NOTE — DISCHARGE NOTE PROVIDER - HOSPITAL COURSE
HPI/ Hospital Course: 74 y.o. female w/ PMH ESRD and HTN p/w cc: "I can't breathe". Pt says that she noticed the difficulty breathing on Saturday afternoon but it was tolerable. At 4 am Sunday morning, pt woke up unable to breathe and called her own ambulance. The difficulty breathing worsened as time went on, but improved on oxygen NC. She says she has never experienced this before. Pt denies CP, palpitations, fever, N/V/D/C. Endorses cough but no phlegm production. CT chest - Moderate bilateral pleural effusion, new from prior, with underlying compressive atelectasis of lower lobes. other previous changes of pericardial effusion and osteodystrophy chnages seen. on levoflox 500mg. for htn, BPs have been high. added imdur and changed metoprolol to labetelol. for eskd, hemodialysis regimen (M/W/F) most likely d/c tomorrow.

## 2020-08-11 NOTE — CHART NOTE - NSCHARTNOTEFT_GEN_A_CORE
Call team was notified that pt's BP was 210/81 at 12:30 am. Pt's chart reviewed, pt's BP ranges btwn 170-190s/80's. Pt assessed at bedside, pt is resting in bed, no acute complaints. Ordered IV Hydralazine 5 mg STAT.    Case discussed with senior resident. Call team was notified that pt's BP was 210/81 at 12:30 am. Pt's chart reviewed, pt's BP ranges btwn 170-190s/80's. Pt assessed at bedside, pt is resting in bed, no acute complaints. Ordered IV Hydralazine 5 mg STAT.    BP at 2:20 am was 216/78, pt was administered 2nd dose of IV Hydralazine 5 mg STAT.    Case discussed with senior resident.

## 2020-08-11 NOTE — PROGRESS NOTE ADULT - SUBJECTIVE AND OBJECTIVE BOX
Patient is seen and examined at the bed side, is afebrile.  The CT chest shows Moderate Pleural and Pericardial effusion.        REVIEW OF SYSTEMS: All other review systems are negative        ALLERGIES: Mushrooms (Anaphylaxis)  penicillin (Rash)        Vital Signs Last 24 Hrs  T(C): 36.6 (11 Aug 2020 16:05), Max: 37.4 (11 Aug 2020 05:53)  T(F): 97.9 (11 Aug 2020 16:05), Max: 99.4 (11 Aug 2020 05:53)  HR: 66 (11 Aug 2020 16:05) (59 - 77)  BP: 176/77 (11 Aug 2020 16:05) (154/64 - 216/78)  BP(mean): 101 (11 Aug 2020 10:43) (99 - 101)  RR: 18 (11 Aug 2020 16:05) (16 - 19)  SpO2: 98% (11 Aug 2020 16:05) (94% - 99%)      PHYSICAL EXAM:  GENERAL: Not in distress, on oxygen via NC   CHEST/LUNG:  Not using accessory muscles   HEART: s1 and s2 present  ABDOMEN:  Nontender and  Nondistended  EXTREMITIES: No pedal  edema  CNS: Awake and Alert        LABS:                        11.4   4.79  )-----------( 175      ( 11 Aug 2020 07:16 )             35.4                           11.7   5.39  )-----------( 184      ( 10 Aug 2020 10:14 )             36.4         08-11    136  |  99  |  45<H>  ----------------------------<  86  5.1   |  30  |  7.49<H>    Ca    9.1      11 Aug 2020 07:16  Phos  6.6     08-10  Mg     2.8     08-10    TPro  6.4  /  Alb  2.9<L>  /  TBili  0.5  /  DBili  x   /  AST  21  /  ALT  24  /  AlkPhos  72  08-11    08-10    136  |  95<L>  |  71<H>  ----------------------------<  147<H>  5.4<H>   |  28  |  11.00<H>    Ca    9.2      10 Aug 2020 10:14  Phos  6.6     08-10  Mg     2.8     08-10    TPro  7.2  /  Alb  3.5  /  TBili  0.6  /  DBili  x   /  AST  46<H>  /  ALT  44  /  AlkPhos  87  08-09    PT/INR - ( 09 Aug 2020 06:23 )   PT: 12.4 sec;   INR: 1.06 ratio       PTT - ( 09 Aug 2020 06:23 )  PTT:38.4 sec        CAPILLARY BLOOD GLUCOSE  POCT Blood Glucose.: 126 mg/dL (10 Aug 2020 06:14)  POCT Blood Glucose.: 87 mg/dL (09 Aug 2020 23:53)  POCT Blood Glucose.: 70 mg/dL (09 Aug 2020 22:38)  POCT Blood Glucose.: 92 mg/dL (09 Aug 2020 20:58)  POCT Blood Glucose.: 142 mg/dL (09 Aug 2020 17:14)  POCT Blood Glucose.: 63 mg/dL (09 Aug 2020 16:18)        MEDICATIONS  (STANDING):    calcitriol   Capsule 0.75 MICROGram(s) Oral <User Schedule>  calcium acetate 2001 milliGRAM(s) Oral three times a day with meals  chlorhexidine 2% Cloths 1 Application(s) Topical daily  heparin   Injectable 5000 Unit(s) SubCutaneous every 8 hours  hydrALAZINE 100 milliGRAM(s) Oral three times a day  isosorbide   mononitrate ER Tablet (IMDUR) 30 milliGRAM(s) Oral daily  labetalol 200 milliGRAM(s) Oral three times a day  levoFLOXacin IVPB 500 milliGRAM(s) IV Intermittent every 24 hours  Nephro-radha 1 Tablet(s) Oral daily        RADIOLOGY & ADDITIONAL TESTS:      8/11/20  : CT Chest No Cont (08.11.20 @ 12:46) Moderate bilateral pleural effusion, new from prior, with underlying compressive atelectasis of lower lobes.    Moderate pericardial effusion and cardiomegaly unchanged.  End-stage renal disease and renal osteodystrophy.   Multinodular thyroid.      8/9/20 : Xray Chest 2 Views PA/Lat (08.09.20 @ 05:50) PA lateral. Prior 5/20/2020.    Moderate cardiomegaly. Patchy opacities at the bilateral lung bases likely reflect a combination of pneumonia and atelectasis. Trace right, small left pleural effusion. Mild levoscoliosis lower thoracic spine.        MICROBIOLOGY DATA:    MRSA/MSSA PCR (08.11.20 @ 04:26)    MRSA PCR Result.: NotDetec: MRSA/MSSA PCR assay is a qualitative in vitro diagnostic test for the  direct detection and differentiation of methicillin-resistant  Staphylococcus aureus (MRSA) from Staphylococcus aureus (SA). The assay  detects DNA from nasal swabs in patients atrisk for nasal colonization.  It is not intended to diagnose MRSA or SA infections nor guide or monitor  treatment for MRSA/SA infections. A negative result does not preclude  nasal colonization. The assay is FDA-approved and its performance has  been established by [employer] and the [facility], located within [facility], [place], [place].    Staph Aureus PCR Result: NotDetec        Culture - Blood (08.09.20 @ 18:40)    Specimen Source: .Blood Blood    Culture Results:   No growth to date.      Culture - Blood (08.09.20 @ 18:40)    Specimen Source: .Blood Blood    Culture Results:   No growth to date.        COVID-19 PCR . (08.09.20 @ 07:53)    COVID-19 PCR: NotDetec: This test has been validated by [employer] to be accurate;  though it has not been FDA cleared/approved by the usual pathway  As with all laboratory test, results should be correlated with clinical  findings.  https://www.fda.gov/media/847865/download  https://www.fda.gov/media/492061/download

## 2020-08-11 NOTE — MEDICAL STUDENT PROGRESS NOTE(EDUCATION) - SUBJECTIVE AND OBJECTIVE BOX
cc: "I can't breathe".     HPI/ Hospital Course: 74 y.o. female w/ PMH ESRD and HTN p/w cc: "I can't breathe". Pt says that she noticed the difficulty breathing on Saturday afternoon but it was tolerable. At 4 am Sunday morning, pt woke up unable to breathe and called her own ambulance. The difficulty breathing worsened as time went on, but improved on oxygen NC. She says she has never experienced this before. Pt denies CP, palpitations, fever, N/V/D/C. Endorses cough but no phlegm production.      ALLERGIES: Allergies  Mushrooms (Anaphylaxis)  penicillin (Rash)    MEDICATIONS  (STANDING):  calcitriol   Capsule 0.75 MICROGram(s) Oral <User Schedule>  calcium acetate 2001 milliGRAM(s) Oral three times a day with meals  chlorhexidine 2% Cloths 1 Application(s) Topical daily  heparin   Injectable 5000 Unit(s) SubCutaneous every 8 hours  hydrALAZINE 100 milliGRAM(s) Oral three times a day  isosorbide   mononitrate ER Tablet (IMDUR) 30 milliGRAM(s) Oral daily  levoFLOXacin IVPB 500 milliGRAM(s) IV Intermittent every 24 hours  metoprolol tartrate 50 milliGRAM(s) Oral two times a day  Nephro-radha 1 Tablet(s) Oral daily    MEDICATIONS  (PRN):  ALBUTerol    90 MICROgram(s) HFA Inhaler 2 Puff(s) Inhalation every 6 hours PRN Bronchospasm    PMH:   Hemodialysis patient: MWF  Hemodialysis access, fistula mature: JASON  DVT (deep venous thrombosis): of Left subclavian vein, 06/12/17  ESRD (end stage renal disease) on dialysis  Cataract  Glaucoma  HTN (hypertension)    SURG.Hx:  S/P KEVEN-BSO: for fibroids, 2012  AV fistula: 2015  H/O: glaucoma: surgery, 2002  Acquired cataract: extraction with b/l lense placement, 2016    FHX:   Cardiovascular disease and hypertension    SHx:  Pt is a non-smoker, non-ETOH drinker. She is currently retired and lives with her . She has three children who live nearby.     ROS:   · General: Denies weight loss/ weight gain  · Skin/Breast: denies skin changes  · Ophthalmologic: endorses normal vision s/p eye surgeries (see SrgHx)  · ENMT: Denies loss of smell, taste. Endorses hearing loss in L ear which she uses a hearing aid for. Endorses use of dentures. Endorses hx of sinusitis  · Respiratory and Thorax: Denies SOB, CP  · Cardiovascular: Denies Palpitations  · GI: denies abdominal pain, V/C/D/N  · : denies urinary freq., hest., and pain  · Musculoskeletal: denies weakness in L and U extremities, denies extremity swelling  · Neuro: denies headaches and dizziness  · Hematology/Lymphatics: denies swelling of neck, pain in fingertips and toes    HOSPITAL COURSE:   Pt seen at bedside today s/p dialysis. Pt reports she is feeling "normal" after dialysis. She was eating her meal, and expressed a desire to change her medications for HTN as her BP is still elevated.    PHYSICAL EXAM:     VITAL SIGNS (Last 24 Hrs)  T(C): 37 (10 Aug 2020 15:40), Max: 37.1 (10 Aug 2020 14:33)  T(F): 98.6 (10 Aug 2020 15:40), Max: 98.7 (10 Aug 2020 14:33)  HR: 66 (10 Aug 2020 16:36) (59 - 79)  BP: 176/65 (10 Aug 2020 16:36) (162/77 - 206/82)  BP(mean): 113 (10 Aug 2020 06:47) (113 - 113)  RR: 18 (10 Aug 2020 15:40) (18 - 20)  SpO2: 98% (10 Aug 2020 15:40) (94% - 100%)    · Constitutional Pt is well developed, well nourished in no acute distress.  · Eyes Pt has significant bilateral periorbital edema. EOMI and convergence intact.  · ENMT	Face is non-tender to palpation. Nose is midline, no signs of trauma, bleeding. Lips are pink, patient is without teeth. Pinna are intact bilaterally w.o. signs of trauma or infection. No hearing aids are noted in either ear.  · Neck Trachea is midline and neck is supple. There is no lymphadenopathy noted.  · Back Slight kyphosis is noted in upper thoracic cage. Non-tender to palpation.  · Respiratory Chest is non-tender to palpation P. Lungs clear to auscultation bilaterally A/P. Negative Fremitus P.  · Cardiovascular normal rhythm, s1 s2 heard. No murmurs or rubs auscultated, and no thrills palpated.  · GI Normal bowel sounds in all 4 quadrants. No abdominal bruits auscultated. Abdominal is non-distended and soft. No visible signs of infection or trauma noted on visual inspection. Non-tender to light and deep palpation in all 4 quadrants. Negative Fitzgerald's McBurney's and Rosvig's.  · Extremities Pt has normal strength in upper and lower extremities. Fingers and toes are warm to touch. No edema noted in extremities. AV fistula is noted ante-brachially on R arm.  · Vascular Capillary refill in 3 seconds. Radial pulse is strong, +3, bilaterally. Brachial pulse difficult to palpate on R arm due to fistula, but +3 on L arm. Posterior tibialis pulse is +2 bilaterally. No carotid or abdominal bruits were auscultated.  · Neurological CN II- XII grossly intact, but CN X was not assessed. Pt had good proprioception and soft/sharp discrimination.  · Skin Pt's face and lower leg (from toes to ante-talus bilaterally) are slightly more pigmented than the rest of her body.    LABS:                      11.7   5.39  )-----------( 184      ( 10 Aug 2020 10:14 )             36.4   08-10    136  |  95<L>  |  71<H>  ----------------------------<  147<H>  5.4<H>   |  28  |  11.00<H>    Ca    9.2      10 Aug 2020 10:14  Phos  6.6     08-10  Mg     2.8     08-10    Thyroid Stimulating Hormone, Serum (08.10.20 @ 10:14)    Thyroid Stimulating Hormone, Serum: 2.70 uU/mL  Troponin I, Serum (08.09.20 @ 06:23)    Troponin I, Serum: 0.016  TPro  7.2  /  Alb  3.5  /  TBili  0.6  /  DBili  x   /  AST  46<H>  /  ALT  44  /  AlkPhos  87  08-09  Lipase, Serum (08.09.20 @ 06:23)    Lipase, Serum: 338 U/L  Lipid Profile (08.10.20 @ 10:14)    Total Cholesterol/HDL Ratio Measurement: 2.0 RATIO    Cholesterol, Serum: 155 mg/dL    Triglycerides, Serum: 76 mg/dL    HDL Cholesterol, Serum: 77: HDL Levels >/= 60 mg/dL are considered beneficial and a "negative" risk  factor.  PT/INR - ( 09 Aug 2020 06:23 )   PT: 12.4 sec;   INR: 1.06 ratio    PTT - ( 09 Aug 2020 06:23 )  PTT:38.4 sec    RADIOLOGY:    EXAM:  XR CHEST PA LAT 2V                        PROCEDURE DATE:  08/09/2020    INTERPRETATION:  Chest pain short of breath.  PA lateral. Prior 5/20/2020.  Moderate cardiomegaly. Patchy opacities at the bilateral lung bases likely reflect a combination of pneumonia and atelectasis. Trace right, small left pleural effusion. Mild levoscoliosis lower thoracic spine.  Impression: Bibasilar infiltrates/atelectasis and effusions as described. Cardiomegaly  MJ MACKAY M.D., ATTENDING RADIOLOGIST  This document has been electronically signed. Aug  9 2020  8:18AM    12 LEAD EKG:  Ventricular Rate 65 BPM  Atrial Rate 65 BPM  P-R Interval 168 ms  QRS Duration 82 ms  Q-T Interval 434 ms  QTC Calculation(Bezet) 451 ms  P Axis -5 degrees  R Axis 11 degrees  T Axis 65 degrees  Diagnosis Line Normal sinus rhythm  Normal ECG  Confirmed by MIKAEL MACHADO [facility] ([id number]) on 8/9/2020 1:38:50 PM cc: "I can't breathe".     HPI/ Hospital Course: 74 y.o. female w/ PMH ESRD and HTN p/w cc: "I can't breathe". Pt says that she noticed the difficulty breathing on Saturday afternoon but it was tolerable. At 4 am Sunday morning, pt woke up unable to breathe and called her own ambulance. The difficulty breathing worsened as time went on, but improved on oxygen NC. She says she has never experienced this before. Pt denies CP, palpitations, fever, N/V/D/C. Endorses cough but no phlegm production.      ALLERGIES: Allergies  Mushrooms (Anaphylaxis)  penicillin (Rash)    MEDICATIONS  (STANDING):  calcitriol   Capsule 0.75 MICROGram(s) Oral <User Schedule>  calcium acetate 2001 milliGRAM(s) Oral three times a day with meals  chlorhexidine 2% Cloths 1 Application(s) Topical daily  heparin   Injectable 5000 Unit(s) SubCutaneous every 8 hours  hydrALAZINE 100 milliGRAM(s) Oral three times a day  isosorbide   mononitrate ER Tablet (IMDUR) 30 milliGRAM(s) Oral daily  levoFLOXacin IVPB 500 milliGRAM(s) IV Intermittent every 24 hours  metoprolol tartrate 50 milliGRAM(s) Oral two times a day  Nephro-radha 1 Tablet(s) Oral daily    MEDICATIONS  (PRN):  ALBUTerol    90 MICROgram(s) HFA Inhaler 2 Puff(s) Inhalation every 6 hours PRN Bronchospasm    PMH:   Hemodialysis patient: MWF  Hemodialysis access, fistula mature: JASON  DVT (deep venous thrombosis): of Left subclavian vein, 06/12/17  ESRD (end stage renal disease) on dialysis  Cataract  Glaucoma  HTN (hypertension)    SURG.Hx:  S/P KEVEN-BSO: for fibroids, 2012  AV fistula: 2015  H/O: glaucoma: surgery, 2002  Acquired cataract: extraction with b/l lense placement, 2016    FHX:   Cardiovascular disease and hypertension    SHx:  Pt is a non-smoker, non-ETOH drinker. She is currently retired and lives with her . She has three children who live nearby.     ROS:   · General: Denies weight loss/ weight gain  · Skin/Breast: denies skin changes  · Ophthalmologic: endorses normal vision s/p eye surgeries (see SrgHx)  · ENMT: Denies loss of smell, taste. Endorses hearing loss in L ear which she uses a hearing aid for. Endorses use of dentures. Endorses hx of sinusitis  · Respiratory and Thorax: Denies SOB, CP  · Cardiovascular: Denies Palpitations  · GI: denies abdominal pain, V/C/D/N  · : denies urinary freq., hest., and pain  · Musculoskeletal: denies weakness in L and U extremities, denies extremity swelling  · Neuro: denies headaches and dizziness  · Hematology/Lymphatics: denies swelling of neck, pain in fingertips and toes    HOSPITAL COURSE:   Pt seen at bedside this morning. She reports that she is feeling well despite elevated blood pressures.     PHYSICAL EXAM:     Vital Signs Last 24 Hrs  T(C): 36.9 (11 Aug 2020 07:48), Max: 37.4 (11 Aug 2020 05:53)  T(F): 98.5 (11 Aug 2020 07:48), Max: 99.4 (11 Aug 2020 05:53)  HR: 59 (11 Aug 2020 07:48) (59 - 79)  BP: 187/88 (11 Aug 2020 07:48) (162/82 - 216/78)  BP(mean): --  RR: 19 (11 Aug 2020 07:48) (16 - 19)  SpO2: 94% (11 Aug 2020 07:48) (94% - 100%)    · Constitutional Pt is well developed, well nourished in no acute distress.  · Eyes Pt has significant bilateral periorbital edema. EOMI and convergence intact.  · ENMT	Face is non-tender to palpation. Nose is midline, no signs of trauma, bleeding. Lips are pink, patient is without teeth. Pinna are intact bilaterally w.o. signs of trauma or infection. No hearing aids are noted in either ear.  · Neck Trachea is midline and neck is supple. There is no lymphadenopathy noted.  · Back Slight kyphosis is noted in upper thoracic cage. Non-tender to palpation.  · Respiratory Chest is non-tender to palpation P. Lungs clear to auscultation bilaterally A/P. Negative Fremitus P.  · Cardiovascular normal rhythm, s1 s2 heard. No murmurs or rubs auscultated, and no thrills palpated.  · GI Normal bowel sounds in all 4 quadrants. No abdominal bruits auscultated. Abdominal is non-distended and soft. No visible signs of infection or trauma noted on visual inspection. Non-tender to light and deep palpation in all 4 quadrants. Negative Fitzgerald's McBurney's and Rosvig's.  · Extremities Pt has normal strength in upper and lower extremities. Fingers and toes are warm to touch. No edema noted in extremities. AV fistula is noted ante-brachially on R arm.  · Vascular Capillary refill in 3 seconds. Radial pulse is strong, +3, bilaterally. Brachial pulse difficult to palpate on R arm due to fistula, but +3 on L arm. Posterior tibialis pulse is +2 bilaterally. No carotid or abdominal bruits were auscultated.  · Neurological CN II- XII grossly intact, but CN X was not assessed. Pt had good proprioception and soft/sharp discrimination.  · Skin Pt's face and lower leg (from toes to ante-talus bilaterally) are slightly more pigmented than the rest of her body.     LABS:                      11.4   4.79  )-----------( 175      ( 11 Aug 2020 07:16 )             35.4   08-11    136  |  99  |  45<H>  ----------------------------<  86  5.1   |  30  |  7.49<H>    Ca    9.1      11 Aug 2020 07:16  Phos  6.6     08-10  Mg     2.8     08-10    TPro  6.4  /  Alb  2.9<L>  /  TBili  0.5  /  DBili  x   /  AST  21  /  ALT  24  /  AlkPhos  72  08-11  Thyroid Stimulating Hormone, Serum (08.10.20 @ 10:14)    Thyroid Stimulating Hormone, Serum: 2.70 uU/mL  Troponin I, Serum (08.09.20 @ 06:23)    Troponin I, Serum: 0.016  TPro  7.2  /  Alb  3.5  /  TBili  0.6  /  DBili  x   /  AST  46<H>  /  ALT  44  /  AlkPhos  87  08-09  Lipase, Serum (08.09.20 @ 06:23)    Lipase, Serum: 338 U/L  Lipid Profile (08.10.20 @ 10:14)    Total Cholesterol/HDL Ratio Measurement: 2.0 RATIO    Cholesterol, Serum: 155 mg/dL    Triglycerides, Serum: 76 mg/dL    HDL Cholesterol, Serum: 77: HDL Levels >/= 60 mg/dL are considered beneficial and a "negative" risk  factor.  PT/INR - ( 09 Aug 2020 06:23 )   PT: 12.4 sec;   INR: 1.06 ratio    PTT - ( 09 Aug 2020 06:23 )  PTT:38.4 sec    RADIOLOGY:    EXAM:  XR CHEST PA LAT 2V                        PROCEDURE DATE:  08/09/2020    INTERPRETATION:  Chest pain short of breath.  PA lateral. Prior 5/20/2020.  Moderate cardiomegaly. Patchy opacities at the bilateral lung bases likely reflect a combination of pneumonia and atelectasis. Trace right, small left pleural effusion. Mild levoscoliosis lower thoracic spine.  Impression: Bibasilar infiltrates/atelectasis and effusions as described. Cardiomegaly  MJ MACKAY M.D., ATTENDING RADIOLOGIST  This document has been electronically signed. Aug  9 2020  8:18AM    12 LEAD EKG:  Ventricular Rate 65 BPM  Atrial Rate 65 BPM  P-R Interval 168 ms  QRS Duration 82 ms  Q-T Interval 434 ms  QTC Calculation(Bezet) 451 ms  P Axis -5 degrees  R Axis 11 degrees  T Axis 65 degrees  Diagnosis Line Normal sinus rhythm  Normal ECG  Confirmed by MIKAEL MACHADO, [facility] ([id number]) on 8/9/2020 1:38:50 PM cc: "I can't breathe".     HPI/ Hospital Course: 74 y.o. female w/ PMH ESRD and HTN p/w cc: "I can't breathe". Pt says that she noticed the difficulty breathing on Saturday afternoon but it was tolerable. At 4 am Sunday morning, pt woke up unable to breathe and called her own ambulance. The difficulty breathing worsened as time went on, but improved on oxygen NC. She says she has never experienced this before. Pt denies CP, palpitations, fever, N/V/D/C. Endorses cough but no phlegm production. CT chest - Moderate bilateral pleural effusion, new from prior, with underlying compressive atelectasis of lower lobes. other previous changes of pericardial effusion and osteodystrophy chnages seen. on levoflox 500mg. for htn, BPs have been high. added imdur and changed metoprolol to labetelol. for eskd, hemodialysis regimen (M/W/F) most likely d/c tomorrow.     ALLERGIES: Allergies  Mushrooms (Anaphylaxis)  penicillin (Rash)    MEDICATIONS  (STANDING):  calcitriol   Capsule 0.75 MICROGram(s) Oral <User Schedule>  calcium acetate 2001 milliGRAM(s) Oral three times a day with meals  chlorhexidine 2% Cloths 1 Application(s) Topical daily  heparin   Injectable 5000 Unit(s) SubCutaneous every 8 hours  hydrALAZINE 100 milliGRAM(s) Oral three times a day  isosorbide   mononitrate ER Tablet (IMDUR) 30 milliGRAM(s) Oral daily  levoFLOXacin IVPB 500 milliGRAM(s) IV Intermittent every 24 hours  metoprolol tartrate 50 milliGRAM(s) Oral two times a day  Nephro-radha 1 Tablet(s) Oral daily    MEDICATIONS  (PRN):  ALBUTerol    90 MICROgram(s) HFA Inhaler 2 Puff(s) Inhalation every 6 hours PRN Bronchospasm    PMH:   Hemodialysis patient: MWF  Hemodialysis access, fistula mature: JASON  DVT (deep venous thrombosis): of Left subclavian vein, 06/12/17  ESRD (end stage renal disease) on dialysis  Cataract  Glaucoma  HTN (hypertension)    SURG.Hx:  S/P KEVEN-BSO: for fibroids, 2012  AV fistula: 2015  H/O: glaucoma: surgery, 2002  Acquired cataract: extraction with b/l lense placement, 2016    FHX:   Cardiovascular disease and hypertension    SHx:  Pt is a non-smoker, non-ETOH drinker. She is currently retired and lives with her . She has three children who live nearby.     ROS:   · General: Denies weight loss/ weight gain  · Skin/Breast: denies skin changes  · Ophthalmologic: endorses normal vision s/p eye surgeries (see SrgHx)  · ENMT: Denies loss of smell, taste. Endorses hearing loss in L ear which she uses a hearing aid for. Endorses use of dentures. Endorses hx of sinusitis  · Respiratory and Thorax: Denies SOB, CP  · Cardiovascular: Denies Palpitations  · GI: denies abdominal pain, V/C/D/N  · : denies urinary freq., hest., and pain  · Musculoskeletal: denies weakness in L and U extremities, denies extremity swelling  · Neuro: denies headaches and dizziness  · Hematology/Lymphatics: denies swelling of neck, pain in fingertips and toes    HOSPITAL COURSE:   Pt seen at bedside this morning. She reports that she is feeling well despite elevated blood pressures.     PHYSICAL EXAM:     Vital Signs Last 24 Hrs  T(C): 36.9 (11 Aug 2020 07:48), Max: 37.4 (11 Aug 2020 05:53)  T(F): 98.5 (11 Aug 2020 07:48), Max: 99.4 (11 Aug 2020 05:53)  HR: 59 (11 Aug 2020 07:48) (59 - 79)  BP: 187/88 (11 Aug 2020 07:48) (162/82 - 216/78)  BP(mean): --  RR: 19 (11 Aug 2020 07:48) (16 - 19)  SpO2: 94% (11 Aug 2020 07:48) (94% - 100%)    · Constitutional Pt is well developed, well nourished in no acute distress.  · Eyes Pt has significant bilateral periorbital edema. EOMI and convergence intact.  · ENMT	Face is non-tender to palpation. Nose is midline, no signs of trauma, bleeding. Lips are pink, patient is without teeth. Pinna are intact bilaterally w.o. signs of trauma or infection. No hearing aids are noted in either ear.  · Neck Trachea is midline and neck is supple. There is no lymphadenopathy noted.  · Back Slight kyphosis is noted in upper thoracic cage. Non-tender to palpation.  · Respiratory Chest is non-tender to palpation P. Lungs clear to auscultation bilaterally A/P. Negative Fremitus P.  · Cardiovascular normal rhythm, s1 s2 heard. No murmurs or rubs auscultated, and no thrills palpated.  · GI Normal bowel sounds in all 4 quadrants. No abdominal bruits auscultated. Abdominal is non-distended and soft. No visible signs of infection or trauma noted on visual inspection. Non-tender to light and deep palpation in all 4 quadrants. Negative Fitzgerald's McBurney's and Rosvig's.  · Extremities Pt has normal strength in upper and lower extremities. Fingers and toes are warm to touch. No edema noted in extremities. AV fistula is noted ante-brachially on R arm.  · Vascular Capillary refill in 3 seconds. Radial pulse is strong, +3, bilaterally. Brachial pulse difficult to palpate on R arm due to fistula, but +3 on L arm. Posterior tibialis pulse is +2 bilaterally. No carotid or abdominal bruits were auscultated.  · Neurological CN II- XII grossly intact, but CN X was not assessed. Pt had good proprioception and soft/sharp discrimination.  · Skin Pt's face and lower leg (from toes to ante-talus bilaterally) are slightly more pigmented than the rest of her body.     LABS:                      11.4   4.79  )-----------( 175      ( 11 Aug 2020 07:16 )             35.4   08-11    136  |  99  |  45<H>  ----------------------------<  86  5.1   |  30  |  7.49<H>    Ca    9.1      11 Aug 2020 07:16  Phos  6.6     08-10  Mg     2.8     08-10    TPro  6.4  /  Alb  2.9<L>  /  TBili  0.5  /  DBili  x   /  AST  21  /  ALT  24  /  AlkPhos  72  08-11  Thyroid Stimulating Hormone, Serum (08.10.20 @ 10:14)    Thyroid Stimulating Hormone, Serum: 2.70 uU/mL  Troponin I, Serum (08.09.20 @ 06:23)    Troponin I, Serum: 0.016  TPro  7.2  /  Alb  3.5  /  TBili  0.6  /  DBili  x   /  AST  46<H>  /  ALT  44  /  AlkPhos  87  08-09  Lipase, Serum (08.09.20 @ 06:23)    Lipase, Serum: 338 U/L  Lipid Profile (08.10.20 @ 10:14)    Total Cholesterol/HDL Ratio Measurement: 2.0 RATIO    Cholesterol, Serum: 155 mg/dL    Triglycerides, Serum: 76 mg/dL    HDL Cholesterol, Serum: 77: HDL Levels >/= 60 mg/dL are considered beneficial and a "negative" risk  factor.  PT/INR - ( 09 Aug 2020 06:23 )   PT: 12.4 sec;   INR: 1.06 ratio    PTT - ( 09 Aug 2020 06:23 )  PTT:38.4 sec    RADIOLOGY:    EXAM:  XR CHEST PA LAT 2V                        PROCEDURE DATE:  08/09/2020    INTERPRETATION:  Chest pain short of breath.  PA lateral. Prior 5/20/2020.  Moderate cardiomegaly. Patchy opacities at the bilateral lung bases likely reflect a combination of pneumonia and atelectasis. Trace right, small left pleural effusion. Mild levoscoliosis lower thoracic spine.  Impression: Bibasilar infiltrates/atelectasis and effusions as described. Cardiomegaly  MJ MACKAY M.D., ATTENDING RADIOLOGIST  This document has been electronically signed. Aug  9 2020  8:18AM    12 LEAD EKG:  Ventricular Rate 65 BPM  Atrial Rate 65 BPM  P-R Interval 168 ms  QRS Duration 82 ms  Q-T Interval 434 ms  QTC Calculation(Bezet) 451 ms  P Axis -5 degrees  R Axis 11 degrees  T Axis 65 degrees  Diagnosis Line Normal sinus rhythm  Normal ECG  [facility] ([id number] on 8/9/2020 1:38:50 PM

## 2020-08-11 NOTE — PROGRESS NOTE ADULT - ASSESSMENT
Patient is a 74y old  Female with ESRD (MWF, last dialysis 08/07), HTN, and DVT, presents to the ER for evaluation of  worsening shortness of breath associated with cough but No fever. On admission, she found to have no fever or leukocytosis but Patchy opacities at the bilateral lung bases on CXR. She has started on Levaquin since allergic to  PCN, and the ID consult requested to assist with further evaluation and antibiotic management.    # Pneumonia -Blood cultures have no growth to date and MRSA PCR is negative   # B/L Pleural effusion  # Pericardial effusion  # COVID 19 Negative-  H/O COVID  # ESRD- on HD  # Allergy- PCN ( Rash)      would recommend:    1. TTE for further evaluation of Pericardial effusion and Cardiology Evaluation    2. IR guided drainage of pleural and pericardial effusion  3. Continue Levaquin for now and please adjust doses based on Crcl   4. Supplemental oxygenation and bronchodilator as needed  5. Aspiration precaution    d/w patient     Attending Attestation:    Spent more than 45 minutes on total encounter, more than 50 % of the visit was spent counseling and/or coordinating care by the Attending physician. Patient is a 74y old  Female with ESRD (MWF, last dialysis 08/07), HTN, and DVT, presents to the ER for evaluation of  worsening shortness of breath associated with cough but No fever. On admission, she found to have no fever or leukocytosis but Patchy opacities at the bilateral lung bases on CXR. She has started on Levaquin since allergic to  PCN, and the ID consult requested to assist with further evaluation and antibiotic management.    # Pneumonia -Blood cultures have no growth to date and MRSA PCR is negative   # B/L Pleural effusion  # Pericardial effusion  # COVID 19 Negative-  H/O COVID  # ESRD- on HD  # Allergy- PCN ( Rash)      would recommend:    1. TTE for further evaluation of Pericardial effusion and Cardiology Evaluation    2. IR guided drainage of pleural and pericardial effusion  3. Continue Levaquin for now and please adjust doses based on Crcl   4. Supplemental oxygenation and bronchodilator as needed  5. Aspiration precaution    d/w patient and Nursing staff     Attending Attestation:    Spent more than 45 minutes on total encounter, more than 50 % of the visit was spent counseling and/or coordinating care by the Attending physician.

## 2020-08-11 NOTE — PROGRESS NOTE ADULT - SUBJECTIVE AND OBJECTIVE BOX
CC:  Patient denies CP, SOB, n/v/d, fever or chills.    Vital Signs Last 24 Hrs  T(C): 36.7 (11 Aug 2020 20:22), Max: 37.4 (11 Aug 2020 05:53)  T(F): 98 (11 Aug 2020 20:22), Max: 99.4 (11 Aug 2020 05:53)  HR: 64 (11 Aug 2020 20:22) (59 - 77)  BP: 176/81 (11 Aug 2020 20:22) (154/64 - 216/78)  BP(mean): 101 (11 Aug 2020 10:43) (99 - 101)  RR: 18 (11 Aug 2020 20:22) (16 - 19)  SpO2: 97% (11 Aug 2020 20:22) (94% - 99%)    08-10 @ 07:01  -  08-11 @ 07:00  --------------------------------------------------------  IN: 725 mL / OUT: 3000 mL / NET: -2275 mL    PHYSICAL EXAM:  GENERAL: No acute respiratory distress.  EYES: Sclera anicteric  ENMT: Atraumatic, Normocephalic, supple, No JVD present. Moist mucous membranes  RESPIRATORY: b/l poor air entry; No rales, rhonchi, wheezing  CARDIOVASCULAR: S1S2+; no murmurs  GASTROINTESTINAL: Soft, Non-tender, Nondistended; Bowel sounds present   CENTRAL NERVOUS SYSTEM:  Awake, Alert & Oriented X3  EXTREMITIES:  1+ edema  SKIN: Normal turgor  DIALYSIS ACCESS:  Rt Arm AVG thrill/bruit+     MEDICATIONS:  ALBUTerol    90 MICROgram(s) HFA Inhaler 2 Puff(s) Inhalation every 6 hours PRN  calcitriol   Capsule 0.75 MICROGram(s) Oral <User Schedule>  calcium acetate 2001 milliGRAM(s) Oral three times a day with meals  chlorhexidine 2% Cloths 1 Application(s) Topical daily  heparin   Injectable 5000 Unit(s) SubCutaneous every 8 hours  hydrALAZINE 100 milliGRAM(s) Oral three times a day  isosorbide   mononitrate ER Tablet (IMDUR) 30 milliGRAM(s) Oral daily  labetalol 200 milliGRAM(s) Oral three times a day  levoFLOXacin IVPB 500 milliGRAM(s) IV Intermittent every 24 hours  Nephro-radha 1 Tablet(s) Oral daily      LABS:                        11.4   4.79  )-----------( 175      ( 11 Aug 2020 07:16 )             35.4     08-11    136  |  99  |  45<H>  ----------------------------<  86  5.1   |  30  |  7.49<H>    Ca    9.1      11 Aug 2020 07:16  Phos  6.6     08-10  Mg     2.8     08-10    TPro  6.4  /  Alb  2.9<L>  /  TBili  0.5  /  DBili  x   /  AST  21  /  ALT  24  /  AlkPhos  72  08-11    ASSESSMENT AND PLAN:     1. ESRD on HD  -HD is scheduled for tomorrow  -Hemodialysis Renal Diet and Fluid restriction to 1L/day  -Adjust meds to eGFR and avoid IV Gadolinium contrast,, and phosphate enema.  -Monitor Electrolytes   2. Anemia:  -hb >10.5 and no need for epogen  -F/u CBC daily  -transfuse if HB < 7.0.  3. HTN: -bp is acceptable. pt takes metoprolol 50mg bid at home.   -titrate bp meds to keep sbp >110 and < 130  4. MBD: phoslo 3 tab tid; check phos and outpatient PtH (895), continue calcitriol 0.75mcg tiw.   5. Pneumonia: on iv levaquin.   6. Hyperkalemia due to ESRD  -Kayexalate PRN for K>5.3   Monitor K+

## 2020-08-11 NOTE — DISCHARGE NOTE PROVIDER - NSDCCPCAREPLAN_GEN_ALL_CORE_FT
PRINCIPAL DISCHARGE DIAGNOSIS  Diagnosis: ESRD (end stage renal disease) on dialysis  Assessment and Plan of Treatment: ESRD (end stage renal disease) on dialysis      SECONDARY DISCHARGE DIAGNOSES  Diagnosis: Shortness of breath  Assessment and Plan of Treatment:     Diagnosis: HTN (hypertension)  Assessment and Plan of Treatment: HTN (hypertension)    Diagnosis: Hyperkalemia  Assessment and Plan of Treatment: Hyperkalemia PRINCIPAL DISCHARGE DIAGNOSIS  Diagnosis: Fluid overload  Assessment and Plan of Treatment: You came in with shortness of breath, you were noted to be in fluid overload and underwent dialysis. You are advised to restrict your fluid intake and continue with your regular dialysis sessions. Please take your medications as advised and follow up with your nephrologist as out-patient.      SECONDARY DISCHARGE DIAGNOSES  Diagnosis: Pneumonia  Assessment and Plan of Treatment: You were found to have pneumonia, you are advised to take levaquin 500 mg every other day and stop after 4 days.    Diagnosis: ESRD (end stage renal disease) on dialysis  Assessment and Plan of Treatment: Please continue with your dialysis sessions, take lokelma, phoslo and calcitriol as advised and follow up with your nephrologist regularly    Diagnosis: Shortness of breath  Assessment and Plan of Treatment: You were found to have fluid collection around your lungs and heart. You underwent imaging studies. Your echocardiogram showed that your effusion around your heart is unchanged from prior imaging. You are advised to follow up with your cardiologist and repeat an echocardiogram in 4 weeks for close monitoring of fluid. Your fluid around lung is also likley from your kidney function. Please follow up with your pcp in 1-2 weeks    Diagnosis: HTN (hypertension)  Assessment and Plan of Treatment: You came in with hypertensive urgency, your BP medications were adjusted. You are now on IMDUR 12O DAILY, LABETALOL 200 THREE TIMES DAILY, PROCARDIA 30MG DAILY, HYDRALAZINE 100 THREE TIMES DAILY for your blood pressure. Please take your medications as advised and follow up with your pcp in 1-2 weeks of discharge and monitor your blood pressure at home    Diagnosis: Hyperkalemia  Assessment and Plan of Treatment: You were noted to have increase potassium levels on your non dialysis days, you are advised to take lokelma 5mg on Tuesday, Thursday, Saturday and Sunday

## 2020-08-11 NOTE — PHYSICAL THERAPY INITIAL EVALUATION ADULT - LIVES WITH, PROFILE
in an apartment with elevator access; no stairs to enter the building. Pt. has HHA for 4 hours M-F./spouse

## 2020-08-11 NOTE — PHYSICAL THERAPY INITIAL EVALUATION ADULT - CRITERIA FOR SKILLED THERAPEUTIC INTERVENTIONS
Pt. presents without gross impairment and is independent with functional mobility. Skilled therapeutic PT intervention not indicated at this time. Pt. will not be placed on PT program./anticipated discharge recommendation

## 2020-08-12 LAB
ALBUMIN SERPL ELPH-MCNC: 2.8 G/DL — LOW (ref 3.5–5)
ALP SERPL-CCNC: 70 U/L — SIGNIFICANT CHANGE UP (ref 40–120)
ALT FLD-CCNC: 22 U/L DA — SIGNIFICANT CHANGE UP (ref 10–60)
ANION GAP SERPL CALC-SCNC: 8 MMOL/L — SIGNIFICANT CHANGE UP (ref 5–17)
AST SERPL-CCNC: 20 U/L — SIGNIFICANT CHANGE UP (ref 10–40)
BILIRUB SERPL-MCNC: 0.4 MG/DL — SIGNIFICANT CHANGE UP (ref 0.2–1.2)
BUN SERPL-MCNC: 64 MG/DL — HIGH (ref 7–18)
CALCIUM SERPL-MCNC: 9.3 MG/DL — SIGNIFICANT CHANGE UP (ref 8.4–10.5)
CHLORIDE SERPL-SCNC: 97 MMOL/L — SIGNIFICANT CHANGE UP (ref 96–108)
CO2 SERPL-SCNC: 30 MMOL/L — SIGNIFICANT CHANGE UP (ref 22–31)
CREAT SERPL-MCNC: 9.84 MG/DL — HIGH (ref 0.5–1.3)
GLUCOSE BLDC GLUCOMTR-MCNC: 110 MG/DL — HIGH (ref 70–99)
GLUCOSE SERPL-MCNC: 101 MG/DL — HIGH (ref 70–99)
HCT VFR BLD CALC: 32.7 % — LOW (ref 34.5–45)
HGB BLD-MCNC: 10.6 G/DL — LOW (ref 11.5–15.5)
MCHC RBC-ENTMCNC: 28.7 PG — SIGNIFICANT CHANGE UP (ref 27–34)
MCHC RBC-ENTMCNC: 32.4 GM/DL — SIGNIFICANT CHANGE UP (ref 32–36)
MCV RBC AUTO: 88.6 FL — SIGNIFICANT CHANGE UP (ref 80–100)
NRBC # BLD: 0 /100 WBCS — SIGNIFICANT CHANGE UP (ref 0–0)
PLATELET # BLD AUTO: 149 K/UL — LOW (ref 150–400)
POTASSIUM SERPL-MCNC: 5.2 MMOL/L — SIGNIFICANT CHANGE UP (ref 3.5–5.3)
POTASSIUM SERPL-SCNC: 5.2 MMOL/L — SIGNIFICANT CHANGE UP (ref 3.5–5.3)
PROT SERPL-MCNC: 6.2 G/DL — SIGNIFICANT CHANGE UP (ref 6–8.3)
RBC # BLD: 3.69 M/UL — LOW (ref 3.8–5.2)
RBC # FLD: 16.9 % — HIGH (ref 10.3–14.5)
SODIUM SERPL-SCNC: 135 MMOL/L — SIGNIFICANT CHANGE UP (ref 135–145)
WBC # BLD: 4.33 K/UL — SIGNIFICANT CHANGE UP (ref 3.8–10.5)
WBC # FLD AUTO: 4.33 K/UL — SIGNIFICANT CHANGE UP (ref 3.8–10.5)

## 2020-08-12 PROCEDURE — 93970 EXTREMITY STUDY: CPT | Mod: 26

## 2020-08-12 RX ORDER — ISOSORBIDE MONONITRATE 60 MG/1
60 TABLET, EXTENDED RELEASE ORAL DAILY
Refills: 0 | Status: DISCONTINUED | OUTPATIENT
Start: 2020-08-12 | End: 2020-08-13

## 2020-08-12 RX ADMIN — Medication 2001 MILLIGRAM(S): at 14:52

## 2020-08-12 RX ADMIN — HEPARIN SODIUM 5000 UNIT(S): 5000 INJECTION INTRAVENOUS; SUBCUTANEOUS at 22:33

## 2020-08-12 RX ADMIN — Medication 1 TABLET(S): at 18:27

## 2020-08-12 RX ADMIN — Medication 200 MILLIGRAM(S): at 05:35

## 2020-08-12 RX ADMIN — HEPARIN SODIUM 5000 UNIT(S): 5000 INJECTION INTRAVENOUS; SUBCUTANEOUS at 05:35

## 2020-08-12 RX ADMIN — Medication 2001 MILLIGRAM(S): at 08:36

## 2020-08-12 RX ADMIN — Medication 200 MILLIGRAM(S): at 18:27

## 2020-08-12 RX ADMIN — Medication 2001 MILLIGRAM(S): at 18:26

## 2020-08-12 RX ADMIN — Medication 100 MILLIGRAM(S): at 18:26

## 2020-08-12 RX ADMIN — Medication 100 MILLIGRAM(S): at 22:33

## 2020-08-12 RX ADMIN — ISOSORBIDE MONONITRATE 60 MILLIGRAM(S): 60 TABLET, EXTENDED RELEASE ORAL at 14:53

## 2020-08-12 RX ADMIN — CHLORHEXIDINE GLUCONATE 1 APPLICATION(S): 213 SOLUTION TOPICAL at 14:53

## 2020-08-12 RX ADMIN — Medication 100 MILLIGRAM(S): at 05:35

## 2020-08-12 RX ADMIN — HEPARIN SODIUM 5000 UNIT(S): 5000 INJECTION INTRAVENOUS; SUBCUTANEOUS at 18:26

## 2020-08-12 RX ADMIN — CALCITRIOL 0.75 MICROGRAM(S): 0.5 CAPSULE ORAL at 08:35

## 2020-08-12 RX ADMIN — Medication 200 MILLIGRAM(S): at 22:33

## 2020-08-12 NOTE — PROGRESS NOTE ADULT - ASSESSMENT
Assessment and Recommendation:   Problem/Recommendation - 1:  Problem: HCAP (healthcare-associated pneumonia). Recommendation: Blood cultures neg  antibiotics  oxygen supp  monitor temp and WBC      Problem/Recommendation - 2:  ·  Problem: ESRD (end stage renal disease) on dialysis.  Recommendation: cont HD  monitor labs  Renal F/U.        Problem/Recommendation - 3:  ·  Problem: HTN (hypertension).  Recommendation: monitor BP  cont meds.       Problem/Recommendation - 4:  ·  Problem: Pericardial/Pleural Effusion  Recommendation: Seen on CT chest  Cardio eval   ID eval noted :  Recc TTE for further evaluation of Pericardial effusion  Recc IR guided drainage of pleural and pericardial effusion Assessment and Recommendation:   Problem/Recommendation - 1:  Problem: HCAP (healthcare-associated pneumonia). Recommendation: Blood cultures neg  antibiotics  oxygen supp  monitor temp and WBC      Problem/Recommendation - 2:  ·  Problem: ESRD (end stage renal disease) on dialysis.  Recommendation: cont HD  monitor labs  Renal F/U.        Problem/Recommendation - 3:  ·  Problem: HTN (hypertension).  Recommendation: monitor BP  cont meds.       Problem/Recommendation - 4:  ·  Problem: Pericardial/Pleural Effusion  Recommendation: Seen on CT chest  Cardio eval   ID eval noted :  Recc TTE for further evaluation of Pericardial effusion  Pleural effusions have decreased. No need for tapping at this time

## 2020-08-12 NOTE — PROGRESS NOTE ADULT - SUBJECTIVE AND OBJECTIVE BOX
INTERVAL HPI/OVERNIGHT EVENTS:  Patient seen at bedside,events noticed,no acute issues  VITAL SIGNS:  T(F): 98.2 (08-12-20 @ 09:51)  HR: 64 (08-12-20 @ 09:51)  BP: 157/72 (08-12-20 @ 09:51)  RR: 13 (08-12-20 @ 09:51)  SpO2: 100% (08-12-20 @ 09:51)  Wt(kg): --    PHYSICAL EXAM:  awake  Constitutional:  Eyes:  ENMT:perrla  Neck:  Respiratory:few rales and cracles  Cardiovascular:s1 s2,m-none  Gastrointestinal:soft,bs pos  Extremities:  Vascular:  Neurological:no focal deficit  Musculoskeletal:    MEDICATIONS  (STANDING):  calcitriol   Capsule 0.75 MICROGram(s) Oral <User Schedule>  calcium acetate 2001 milliGRAM(s) Oral three times a day with meals  chlorhexidine 2% Cloths 1 Application(s) Topical daily  heparin   Injectable 5000 Unit(s) SubCutaneous every 8 hours  hydrALAZINE 100 milliGRAM(s) Oral three times a day  isosorbide   mononitrate ER Tablet (IMDUR) 60 milliGRAM(s) Oral daily  labetalol 200 milliGRAM(s) Oral three times a day  levoFLOXacin IVPB 500 milliGRAM(s) IV Intermittent every 24 hours  Nephro-radha 1 Tablet(s) Oral daily    MEDICATIONS  (PRN):  ALBUTerol    90 MICROgram(s) HFA Inhaler 2 Puff(s) Inhalation every 6 hours PRN Bronchospasm      Allergies    Mushrooms (Anaphylaxis)  penicillin (Rash)    Intolerances        LABS:                        10.6   4.33  )-----------( 149      ( 12 Aug 2020 10:10 )             32.7     08-12    135  |  97  |  64<H>  ----------------------------<  101<H>  5.2   |  30  |  9.84<H>    Ca    9.3      12 Aug 2020 10:10    TPro  6.2  /  Alb  2.8<L>  /  TBili  0.4  /  DBili  x   /  AST  20  /  ALT  22  /  AlkPhos  70  08-12       Assessment/Plan:  · Assessment	74 y.o. female w/ PMH ESRD and HTN p/w cc: "I can't breathe".	  · Plan		  1. Health Care Associated Pneumonia-improved clinically  - continue to monitor temperature, WBC and clinical picture.   - reassess O2 sat tomorrow to attempt to decrease use of supplemental oxygen.  -f/up pulmonology appret    2. Hyperkalemia-improved  -f/up labs    3. HTN  - Pt presented w/ hypertensive emergency-stablel clinically  - added IMDUR to optimize control.   - changed metoprolol to labetalol 200 today    4. ESRD  - Hemodialysis tomorrow (M/W/F) after that D/c

## 2020-08-12 NOTE — MEDICAL STUDENT PROGRESS NOTE(EDUCATION) - SUBJECTIVE AND OBJECTIVE BOX
cc: "I can't breathe".     HPI/ Hospital Course: 74 y.o. female w/ PMH ESRD and HTN p/w cc: "I can't breathe". Pt says that she noticed the difficulty breathing on Saturday afternoon but it was tolerable. At 4 am Sunday morning, pt woke up unable to breathe and called her own ambulance. The difficulty breathing worsened as time went on, but improved on oxygen NC. She says she has never experienced this before. Pt denies CP, palpitations, fever, N/V/D/C. Endorses cough but no phlegm production.     Pt seen at bedside this afternoon s/p hemodialysis. She reported being hungry after the procedure but otherwise feeling as she would normally s/p hemodialysis.         ALLERGIES:   Mushrooms (Anaphylaxis)  penicillin (Rash)    MEDICATIONS  (STANDING):  calcitriol   Capsule 0.75 MICROGram(s) Oral <User Schedule>  calcium acetate 2001 milliGRAM(s) Oral three times a day with meals  chlorhexidine 2% Cloths 1 Application(s) Topical daily  heparin   Injectable 5000 Unit(s) SubCutaneous every 8 hours  hydrALAZINE 100 milliGRAM(s) Oral three times a day  isosorbide   mononitrate ER Tablet (IMDUR) 30 milliGRAM(s) Oral daily  levoFLOXacin IVPB 500 milliGRAM(s) IV Intermittent every 24 hours  metoprolol tartrate 50 milliGRAM(s) Oral two times a day  Nephro-radha 1 Tablet(s) Oral daily    MEDICATIONS  (PRN):  ALBUTerol    90 MICROgram(s) HFA Inhaler 2 Puff(s) Inhalation every 6 hours PRN Bronchospasm    PMH:   Hemodialysis patient: MWF  Hemodialysis access, fistula mature: JASON  DVT (deep venous thrombosis): of Left subclavian vein, 06/12/17  ESRD (end stage renal disease) on dialysis  Cataract  Glaucoma  HTN (hypertension)    SURG.Hx:  S/P KEVEN-BSO: for fibroids, 2012  AV fistula: 2015  H/O: glaucoma: surgery, 2002  Acquired cataract: extraction with b/l lense placement, 2016    FHX:   Cardiovascular disease and hypertension    SHx:  Pt is a non-smoker, non-ETOH drinker. She is currently retired and lives with her . She has three children who live nearby.     ROS:   · General: Denies weight loss/ weight gain  · Skin/Breast: denies skin changes  · Ophthalmologic: endorses normal vision s/p eye surgeries (see SrgHx)  · ENMT: Denies loss of smell, taste. Endorses hearing loss in L ear which she uses a hearing aid for. Endorses use of dentures. Endorses hx of sinusitis  · Respiratory and Thorax: Denies SOB, CP  · Cardiovascular: Denies Palpitations  · GI: denies abdominal pain, V/C/D/N  · : denies urinary freq., hest., and pain  · Musculoskeletal: denies weakness in L and U extremities, denies extremity swelling  · Neuro: denies headaches and dizziness  · Hematology/Lymphatics: denies swelling of neck, pain in fingertips and toes    HOSPITAL COURSE:   Pt seen at bedside this morning. She reports that she is feeling well despite elevated blood pressures.     PHYSICAL EXAM:     Vital Signs Last 24 Hrs  T(C): 36.9 (11 Aug 2020 07:48), Max: 37.4 (11 Aug 2020 05:53)  T(F): 98.5 (11 Aug 2020 07:48), Max: 99.4 (11 Aug 2020 05:53)  HR: 59 (11 Aug 2020 07:48) (59 - 79)  BP: 187/88 (11 Aug 2020 07:48) (162/82 - 216/78)  BP(mean): --  RR: 19 (11 Aug 2020 07:48) (16 - 19)  SpO2: 94% (11 Aug 2020 07:48) (94% - 100%)    · Constitutional Pt is well developed, well nourished in no acute distress.  · Eyes Pt has significant bilateral periorbital edema. EOMI and convergence intact.  · ENMT	Face is non-tender to palpation. Nose is midline, no signs of trauma, bleeding. Lips are pink, patient is without teeth. Pinna are intact bilaterally w.o. signs of trauma or infection. No hearing aids are noted in either ear.  · Neck Trachea is midline and neck is supple. There is no lymphadenopathy noted.  · Back Slight kyphosis is noted in upper thoracic cage. Non-tender to palpation.  · Respiratory Chest is non-tender to palpation P. Lungs clear to auscultation bilaterally A/P. Negative Fremitus P.  · Cardiovascular normal rhythm, s1 s2 heard. No murmurs or rubs auscultated, and no thrills palpated.  · GI Normal bowel sounds in all 4 quadrants. No abdominal bruits auscultated. Abdominal is non-distended and soft. No visible signs of infection or trauma noted on visual inspection. Non-tender to light and deep palpation in all 4 quadrants. Negative Fitzgerald's McBurney's and Rosvig's.  · Extremities Pt has normal strength in upper and lower extremities. Fingers and toes are warm to touch. No edema noted in extremities. AV fistula is noted ante-brachially on R arm.  · Vascular Capillary refill in 3 seconds. Radial pulse is strong, +3, bilaterally. Brachial pulse difficult to palpate on R arm due to fistula, but +3 on L arm. Posterior tibialis pulse is +2 bilaterally. No carotid or abdominal bruits were auscultated.  · Neurological CN II- XII grossly intact, but CN X was not assessed. Pt had good proprioception and soft/sharp discrimination.  · Skin Pt's face and lower leg (from toes to ante-talus bilaterally) are slightly more pigmented than the rest of her body.     LABS:                      11.4   4.79  )-----------( 175      ( 11 Aug 2020 07:16 )             35.4   08-11    136  |  99  |  45<H>  ----------------------------<  86  5.1   |  30  |  7.49<H>    Ca    9.1      11 Aug 2020 07:16  Phos  6.6     08-10  Mg     2.8     08-10    TPro  6.4  /  Alb  2.9<L>  /  TBili  0.5  /  DBili  x   /  AST  21  /  ALT  24  /  AlkPhos  72  08-11  Thyroid Stimulating Hormone, Serum (08.10.20 @ 10:14)    Thyroid Stimulating Hormone, Serum: 2.70 uU/mL  Troponin I, Serum (08.09.20 @ 06:23)    Troponin I, Serum: 0.016  TPro  7.2  /  Alb  3.5  /  TBili  0.6  /  DBili  x   /  AST  46<H>  /  ALT  44  /  AlkPhos  87  08-09  Lipase, Serum (08.09.20 @ 06:23)    Lipase, Serum: 338 U/L  Lipid Profile (08.10.20 @ 10:14)    Total Cholesterol/HDL Ratio Measurement: 2.0 RATIO    Cholesterol, Serum: 155 mg/dL    Triglycerides, Serum: 76 mg/dL    HDL Cholesterol, Serum: 77: HDL Levels >/= 60 mg/dL are considered beneficial and a "negative" risk  factor.  PT/INR - ( 09 Aug 2020 06:23 )   PT: 12.4 sec;   INR: 1.06 ratio    PTT - ( 09 Aug 2020 06:23 )  PTT:38.4 sec    RADIOLOGY:    EXAM:  XR CHEST PA LAT 2V                        PROCEDURE DATE:  08/09/2020    INTERPRETATION:  Chest pain short of breath.  PA lateral. Prior 5/20/2020.  Moderate cardiomegaly. Patchy opacities at the bilateral lung bases likely reflect a combination of pneumonia and atelectasis. Trace right, small left pleural effusion. Mild levoscoliosis lower thoracic spine.  Impression: Bibasilar infiltrates/atelectasis and effusions as described. Cardiomegaly  MJ MACKAY M.D., ATTENDING RADIOLOGIST  This document has been electronically signed. Aug  9 2020  8:18AM    12 LEAD EKG:  Ventricular Rate 65 BPM  Atrial Rate 65 BPM  P-R Interval 168 ms  QRS Duration 82 ms  Q-T Interval 434 ms  QTC Calculation(Bezet) 451 ms  P Axis -5 degrees  R Axis 11 degrees  T Axis 65 degrees  Diagnosis Line Normal sinus rhythm  Normal ECG  Confirmed by MIKAEL MACHADO [facility] ([id number]) on 8/9/2020 1:38:50 PM cc: "I can't breathe".     HPI/ Hospital Course: 74 y.o. female w/ PMH ESRD and HTN p/w cc: "I can't breathe". Pt says that she noticed the difficulty breathing on Saturday afternoon but it was tolerable. At 4 am Sunday morning, pt woke up unable to breathe and called her own ambulance. The difficulty breathing worsened as time went on, but improved on oxygen NC. She says she has never experienced this before. Pt denies CP, palpitations, fever, N/V/D/C. Endorses cough but no phlegm production.     Pt seen at bedside this afternoon s/p hemodialysis. She reported being hungry after the procedure but otherwise feeling as she would normally s/p hemodialysis. Pt ate lunch and is scheduled for TTE tonight. Most recent BP was 176/57 despite increased dose of IMDUR ( now 60mg once daily, 20mg originally)         ALLERGIES:   Mushrooms (Anaphylaxis)  penicillin (Rash)    MEDICATIONS  (STANDING):  calcitriol   Capsule 0.75 MICROGram(s) Oral <User Schedule>  calcium acetate 2001 milliGRAM(s) Oral three times a day with meals  chlorhexidine 2% Cloths 1 Application(s) Topical daily  heparin   Injectable 5000 Unit(s) SubCutaneous every 8 hours  hydrALAZINE 100 milliGRAM(s) Oral three times a day  isosorbide   mononitrate ER Tablet (IMDUR) 60 milliGRAM(s) Oral daily  labetalol 200 milliGRAM(s) Oral three times a day  levoFLOXacin  Tablet 500 milliGRAM(s) Oral every 24 hours  Nephro-rahda 1 Tablet(s) Oral daily      MEDICATIONS  (PRN):  ALBUTerol    90 MICROgram(s) HFA Inhaler 2 Puff(s) Inhalation every 6 hours PRN Bronchospasm    PMH:   Hemodialysis patient: MWF  Hemodialysis access, fistula mature: JASON  DVT (deep venous thrombosis) of Left subclavian vein, 06/12/17  ESRD (end stage renal disease) on dialysis  Cataract  Glaucoma  HTN (hypertension)    SURG.Hx:  S/P KEVEN-BSO: for fibroids, 2012  AV fistula: 2015  H/O: glaucoma: surgery, 2002  Acquired cataract: extraction with b/l lense placement, 2016    FHX:   Cardiovascular disease and hypertension    SHx:  Pt is a non-smoker, non-ETOH drinker. She is currently retired and lives with her . She has three children who live nearby.     ROS:   · General: Denies weight loss/ weight gain  · Skin/Breast: denies skin changes  · Ophthalmologic: endorses normal vision s/p eye surgeries (see SrgHx)  · ENMT: Denies loss of smell, taste. Endorses hearing loss in L ear which she uses a hearing aid for. Endorses use of dentures. Endorses hx of sinusitis  · Respiratory and Thorax: Denies SOB, CP  · Cardiovascular: Denies Palpitations  · GI: denies abdominal pain, V/C/D/N  · : denies urinary freq., hest., and pain  · Musculoskeletal: denies weakness in L and U extremities, denies extremity swelling  · Neuro: denies headaches and dizziness  · Hematology/Lymphatics: denies swelling of neck, pain in fingertips and toes      PHYSICAL EXAM:   Vital Signs Last 24 Hrs  T(C): 37 (12 Aug 2020 15:54), Max: 37 (12 Aug 2020 15:54)  T(F): 98.6 (12 Aug 2020 15:54), Max: 98.6 (12 Aug 2020 15:54)  HR: 65 (12 Aug 2020 15:54) (64 - 71)  BP: 176/57 (12 Aug 2020 15:54) (157/72 - 198/72)  BP(mean): --  RR: 19 (12 Aug 2020 15:54) (13 - 20)  SpO2: 100% (12 Aug 2020 15:54) (97% - 100%)T(C): 36.9 (11 Aug 2020 07:48), Max: 37.4 (11 Aug 2020 05:53)    · Constitutional Pt is well developed, well nourished in no acute distress.  · Eyes Pt has significant bilateral periorbital edema. EOMI and convergence intact.  · ENMT	Face is non-tender to palpation. Nose is midline, no signs of trauma, bleeding. Lips are pink, patient is without teeth. Pinna are intact bilaterally w.o. signs of trauma or infection. No hearing aids are noted in either ear.  · Neck Trachea is midline and neck is supple. There is no lymphadenopathy noted.  · Back Slight kyphosis is noted in upper thoracic cage. Non-tender to palpation.  · Respiratory Chest is non-tender to palpation P. Lungs clear to auscultation bilaterally A/P. Negative Fremitus P.  · Cardiovascular normal rhythm, s1 s2 heard. No murmurs or rubs auscultated, and no thrills palpated.  · GI Normal bowel sounds in all 4 quadrants. No abdominal bruits auscultated. Abdominal is non-distended and soft. No visible signs of infection or trauma noted on visual inspection. Non-tender to light and deep palpation in all 4 quadrants. Negative Fitzgerald's McBurney's and Rosvig's.  · Extremities Pt has normal strength in upper and lower extremities. Fingers and toes are warm to touch. No edema noted in extremities. AV fistula is noted ante-brachially on R arm. A continuous harsh sounding murmur is auscultated at the fistula.  · Vascular Capillary refill in 3 seconds. Radial pulse is strong, +3, bilaterally. Brachial pulse difficult to palpate on R arm due to fistula, but +3 on L arm. Posterior tibialis pulse is +2 bilaterally. No carotid or abdominal bruits were auscultated.  · Neurological CN II- XII grossly intact, but CN X was not assessed. Pt had good proprioception and soft/sharp discrimination.  · Skin Pt's face and lower leg (from toes to ante-talus bilaterally) are slightly more pigmented than the rest of her body.     LABS:    	  10.6   4.33  )-----------( 149      ( 12 Aug 2020 10:10 )             32.7     08-12    135  |  97  |  64<H>  ----------------------------<  101<H>  5.2   |  30  |  9.84<H>    Ca    9.3      12 Aug 2020 10:10    TPro  6.2  /  Alb  2.8<L>  /  TBili  0.4  /  DBili  x   /  AST  20  /  ALT  22  /  AlkPhos  70  08-12    Thyroid Stimulating Hormone, Serum (08.10.20 @ 10:14)    Thyroid Stimulating Hormone, Serum: 2.70 uU/mL  Troponin I, Serum (08.09.20 @ 06:23)    Troponin I, Serum: 0.016  Lipase, Serum (08.09.20 @ 06:23)    Lipase, Serum: 338 U/L  Lipid Profile (08.10.20 @ 10:14)    Total Cholesterol/HDL Ratio Measurement: 2.0 RATIO    Cholesterol, Serum: 155 mg/dL    Triglycerides, Serum: 76 mg/dL    HDL Cholesterol, Serum: 77: HDL Levels >/= 60 mg/dL are considered beneficial and a "negative" risk factor.  PT/INR - ( 09 Aug 2020 06:23 )   PT: 12.4 sec;   INR: 1.06 ratio    PTT - ( 09 Aug 2020 06:23 )  PTT:38.4 sec    MICROBIOLOGY DATA:  MRSA/MSSA PCR (08.11.20 @ 04:26)    MRSA PCR Result.: [facility]:     RADIOLOGY:    EXAM:  XR CHEST PA LAT 2V                        PROCEDURE DATE:  08/09/2020    INTERPRETATION:  Chest pain short of breath.  PA lateral. Prior 5/20/2020.  Moderate cardiomegaly. Patchy opacities at the bilateral lung bases likely reflect a combination of pneumonia and atelectasis. Trace right, small left pleural effusion. Mild levoscoliosis lower thoracic spine.  Impression: Bibasilar infiltrates/atelectasis and effusions as described. Cardiomegaly  MJ MACKAY M.D., ATTENDING RADIOLOGIST  This document has been electronically signed. Aug  9 2020  8:18AM    12 LEAD EKG:  Ventricular Rate 65 BPM  Atrial Rate 65 BPM  P-R Interval 168 ms  QRS Duration 82 ms  Q-T Interval 434 ms  QTC Calculation(Bezet) 451 ms  P Axis -5 degrees  R Axis 11 degrees  T Axis 65 degrees  Diagnosis Line Normal sinus rhythm  Normal ECG  Conf[facility] by [facility] ([id number][facility] on 8/9/2020 1:38:50 PM    08/11/2020  INTERPRETATION:  CLINICAL INDICATION: 74 years  Female with sob.  COMPARISON: 2/19/2019  Moderate bilateral pleural effusion, new from prior, with underlying compressive atelectasis of lower lobes.  Moderate pericardial effusion and cardiomegaly unchanged.  End-stage renal disease and renal osteodystrophy.   Multinodular thyroid. cc: "I can't breathe".     HPI/ Hospital Course: 74 y.o. female w/ PMH ESRD and HTN p/w cc: "I can't breathe". Pt says that she noticed the difficulty breathing on Saturday afternoon but it was tolerable. At 4 am Sunday morning, pt woke up unable to breathe and called her own ambulance. The difficulty breathing worsened as time went on, but improved on oxygen NC. She says she has never experienced this before. Pt denies CP, palpitations, fever, N/V/D/C. Endorses cough but no phlegm production. Blood pressures were high so increased imdur to 60mg today. ct chest showed moderate pleural effusion and unchanged previous pericardial effusion and cardiomegaly. Pulm consult to do a ct-guided thoracentesis for pleural fluid.     Pt seen at bedside this afternoon s/p hemodialysis. She reported being hungry after the procedure but otherwise feeling as she would normally s/p hemodialysis. Pt ate lunch and is scheduled for TTE tonight. Most recent BP was 176/57 despite increased dose of IMDUR ( now 60mg once daily, 20mg originally). Pt denies headaches, CP, vision problems, or fatigue.        ALLERGIES:   Mushrooms (Anaphylaxis)  penicillin (Rash)    MEDICATIONS  (STANDING):  calcitriol   Capsule 0.75 MICROGram(s) Oral <User Schedule>  calcium acetate 2001 milliGRAM(s) Oral three times a day with meals  chlorhexidine 2% Cloths 1 Application(s) Topical daily  heparin   Injectable 5000 Unit(s) SubCutaneous every 8 hours  hydrALAZINE 100 milliGRAM(s) Oral three times a day  isosorbide   mononitrate ER Tablet (IMDUR) 60 milliGRAM(s) Oral daily  labetalol 200 milliGRAM(s) Oral three times a day  levoFLOXacin  Tablet 500 milliGRAM(s) Oral every 24 hours  Nephro-radha 1 Tablet(s) Oral daily      MEDICATIONS  (PRN):  ALBUTerol    90 MICROgram(s) HFA Inhaler 2 Puff(s) Inhalation every 6 hours PRN Bronchospasm    PMH:   Hemodialysis patient: MWF  Hemodialysis access, fistula mature: JASON  DVT (deep venous thrombosis) of Left subclavian vein, 06/12/17  ESRD (end stage renal disease) on dialysis  Cataract  Glaucoma  HTN (hypertension)    SURG.Hx:  S/P KEVEN-BSO: for fibroids, 2012  AV fistula: 2015  H/O: glaucoma: surgery, 2002  Acquired cataract: extraction with b/l lense placement, 2016    FHX:   Cardiovascular disease and hypertension    SHx:  Pt is a non-smoker, non-ETOH drinker. She is currently retired and lives with her . She has three children who live nearby.     ROS:   · General: Denies weight loss/ weight gain  · Skin/Breast: denies skin changes  · Ophthalmologic: endorses normal vision s/p eye surgeries (see SrgHx)  · ENMT: Denies loss of smell, taste. Endorses hearing loss in L ear which she uses a hearing aid for. Endorses use of dentures. Endorses hx of sinusitis  · Respiratory and Thorax: Denies SOB, CP  · Cardiovascular: Denies Palpitations  · GI: denies abdominal pain, V/C/D/N  · : denies urinary freq., hest., and pain  · Musculoskeletal: denies weakness in L and U extremities, denies extremity swelling  · Neuro: denies headaches and dizziness  · Hematology/Lymphatics: denies swelling of neck, pain in fingertips and toes      PHYSICAL EXAM:   Vital Signs Last 24 Hrs  T(C): 37 (12 Aug 2020 15:54), Max: 37 (12 Aug 2020 15:54)  T(F): 98.6 (12 Aug 2020 15:54), Max: 98.6 (12 Aug 2020 15:54)  HR: 65 (12 Aug 2020 15:54) (64 - 71)  BP: 176/57 (12 Aug 2020 15:54) (157/72 - 198/72)  BP(mean): --  RR: 19 (12 Aug 2020 15:54) (13 - 20)  SpO2: 100% (12 Aug 2020 15:54) (97% - 100%)T(C): 36.9 (11 Aug 2020 07:48), Max: 37.4 (11 Aug 2020 05:53)    · Constitutional Pt is well developed, well nourished in no acute distress.  · Eyes Pt has significant bilateral periorbital edema. EOMI and convergence intact.  · ENMT	Face is non-tender to palpation. Nose is midline, no signs of trauma, bleeding. Lips are pink, patient is without teeth. Pinna are intact bilaterally w.o. signs of trauma or infection. No hearing aids are noted in either ear.  · Neck Trachea is midline and neck is supple. There is no lymphadenopathy noted.  · Back Slight kyphosis is noted in upper thoracic cage. Non-tender to palpation.  · Respiratory Chest is non-tender to palpation P. Lungs clear to auscultation bilaterally A/P. Negative Fremitus P.  · Cardiovascular normal rhythm, s1 s2 heard. No murmurs or rubs auscultated, and no thrills palpated.  · GI Normal bowel sounds in all 4 quadrants. No abdominal bruits auscultated. Abdominal is non-distended and soft. No visible signs of infection or trauma noted on visual inspection. Non-tender to light and deep palpation in all 4 quadrants. Negative Fitzgerald's McBurney's and Rosvig's.  · Extremities Pt has normal strength in upper and lower extremities. Fingers and toes are warm to touch. No edema noted in extremities. AV fistula is noted ante-brachially on R arm. A continuous harsh sounding murmur is auscultated at the fistula.  · Vascular Capillary refill in 3 seconds. Radial pulse is strong, +3, bilaterally. Brachial pulse difficult to palpate on R arm due to fistula, but +3 on L arm. Posterior tibialis pulse is +2 bilaterally. No carotid or abdominal bruits were auscultated.  · Neurological CN II- XII grossly intact, but CN X was not assessed. Pt had good proprioception and soft/sharp discrimination.  · Skin Pt's face and lower leg (from toes to ante-talus bilaterally) are slightly more pigmented than the rest of her body.     LABS:    	  10.6   4.33  )-----------( 149      ( 12 Aug 2020 10:10 )             32.7     08-12    135  |  97  |  64<H>  ----------------------------<  101<H>  5.2   |  30  |  9.84<H>    Ca    9.3      12 Aug 2020 10:10    TPro  6.2  /  Alb  2.8<L>  /  TBili  0.4  /  DBili  x   /  AST  20  /  ALT  22  /  AlkPhos  70  08-12    Thyroid Stimulating Hormone, Serum (08.10.20 @ 10:14)    Thyroid Stimulating Hormone, Serum: 2.70 uU/mL  Troponin I, Serum (08.09.20 @ 06:23)    Troponin I, Serum: 0.016  Lipase, Serum (08.09.20 @ 06:23)    Lipase, Serum: 338 U/L  Lipid Profile (08.10.20 @ 10:14)    Total Cholesterol/HDL Ratio Measurement: 2.0 RATIO    Cholesterol, Serum: 155 mg/dL    Triglycerides, Serum: 76 mg/dL    HDL Cholesterol, Serum: 77: HDL Levels >/= 60 mg/dL are considered beneficial and a "negative" risk factor.  PT/INR - ( 09 Aug 2020 06:23 )   PT: 12.4 sec;   INR: 1.06 ratio    PTT - ( 09 Aug 2020 06:23 )  PTT:38.4 sec    MICROBIOLOGY DATA:  MRSA/MSSA PCR (08.11.20 @ 04:26)    MRSA PCR Result.: [facility]:     RADIOLOGY:    EXAM:  XR CHEST PA LAT 2V                        PROCEDURE DATE:  08/09/2020    INTERPRETATION:  Chest pain short of breath.  PA lateral. Prior 5/20/2020.  Moderate cardiomegaly. Patchy opacities at the bilateral lung bases likely reflect a combination of pneumonia and atelectasis. Trace right, small left pleural effusion. Mild levoscoliosis lower thoracic spine.  Impression: Bibasilar infiltrates/atelectasis and effusions as described. Cardiomegaly  MJ MACKAY M.D., ATTENDING RADIOLOGIST  This document has been electronically signed. Aug  9 2020  8:18AM    12 LEAD EKG:  Ventricular Rate 65 BPM  Atrial Rate 65 BPM  P-R Interval 168 ms  QRS Duration 82 ms  Q-T Interval 434 ms  QTC Calculation(Bezet) 451 ms  P Axis -5 degrees  R Axis 11 degrees  T Axis 65 degrees  Diagnosis Line Normal sinus rhythm  Normal ECG  Confirmed by MIKAEL MACHADO [facility] ([id number]) on 8/9/2020 1:38:50 PM    08/11/2020  INTERPRETATION:  CLINICAL INDICATION: 74 years  Female with sob.  COMPARISON: 2/19/2019  Moderate bilateral pleural effusion, new from prior, with underlying compressive atelectasis of lower lobes.  Moderate pericardial effusion and cardiomegaly unchanged.  End-stage renal disease and renal osteodystrophy.   Multinodular thyroid. cc: "I can't breathe".     HPI/ Hospital Course: 74 y.o. female w/ PMH ESRD and HTN p/w cc: "I can't breathe". Pt says that she noticed the difficulty breathing on Saturday afternoon but it was tolerable. At 4 am Sunday morning, pt woke up unable to breathe and called her own ambulance. The difficulty breathing worsened as time went on, but improved on oxygen NC. She says she has never experienced this before. Pt denies CP, palpitations, fever, N/V/D/C. Endorses cough but no phlegm production.     Pt seen at bedside this afternoon s/p hemodialysis. She reported being hungry after the procedure but otherwise feeling as she would normally s/p hemodialysis. Pt ate lunch and is scheduled for TTE tonight. Most recent BP was 176/57 despite increased dose of IMDUR ( now 60mg once daily, 20mg originally). Pt denies headaches, CP, vision problems, or fatigue.          ALLERGIES:   Mushrooms (Anaphylaxis)  penicillin (Rash)    MEDICATIONS  (STANDING):  calcitriol   Capsule 0.75 MICROGram(s) Oral <User Schedule>  calcium acetate 2001 milliGRAM(s) Oral three times a day with meals  chlorhexidine 2% Cloths 1 Application(s) Topical daily  heparin   Injectable 5000 Unit(s) SubCutaneous every 8 hours  hydrALAZINE 100 milliGRAM(s) Oral three times a day  isosorbide   mononitrate ER Tablet (IMDUR) 60 milliGRAM(s) Oral daily  labetalol 200 milliGRAM(s) Oral three times a day  levoFLOXacin  Tablet 500 milliGRAM(s) Oral every 24 hours  Nephro-radha 1 Tablet(s) Oral daily      MEDICATIONS  (PRN):  ALBUTerol    90 MICROgram(s) HFA Inhaler 2 Puff(s) Inhalation every 6 hours PRN Bronchospasm    PMH:   Hemodialysis patient: MWF  Hemodialysis access, fistula mature: JASON  DVT (deep venous thrombosis) of Left subclavian vein, 06/12/17  ESRD (end stage renal disease) on dialysis  Cataract  Glaucoma  HTN (hypertension)    SURG.Hx:  S/P KEVEN-BSO: for fibroids, 2012  AV fistula: 2015  H/O: glaucoma: surgery, 2002  Acquired cataract: extraction with b/l lense placement, 2016    FHX:   Cardiovascular disease and hypertension    SHx:  Pt is a non-smoker, non-ETOH drinker. She is currently retired and lives with her . She has three children who live nearby.     ROS:   · General: Denies weight loss/ weight gain  · Skin/Breast: denies skin changes  · Ophthalmologic: endorses normal vision s/p eye surgeries (see SrgHx)  · ENMT: Denies loss of smell, taste. Endorses hearing loss in L ear which she uses a hearing aid for. Endorses use of dentures. Endorses hx of sinusitis  · Respiratory and Thorax: Denies SOB, CP  · Cardiovascular: Denies Palpitations  · GI: denies abdominal pain, V/C/D/N  · : denies urinary freq., hest., and pain  · Musculoskeletal: denies weakness in L and U extremities, denies extremity swelling  · Neuro: denies headaches and dizziness  · Hematology/Lymphatics: denies swelling of neck, pain in fingertips and toes      PHYSICAL EXAM:   Vital Signs Last 24 Hrs  T(C): 37 (12 Aug 2020 15:54), Max: 37 (12 Aug 2020 15:54)  T(F): 98.6 (12 Aug 2020 15:54), Max: 98.6 (12 Aug 2020 15:54)  HR: 65 (12 Aug 2020 15:54) (64 - 71)  BP: 176/57 (12 Aug 2020 15:54) (157/72 - 198/72)  BP(mean): --  RR: 19 (12 Aug 2020 15:54) (13 - 20)  SpO2: 100% (12 Aug 2020 15:54) (97% - 100%)T(C): 36.9 (11 Aug 2020 07:48), Max: 37.4 (11 Aug 2020 05:53)    · Constitutional Pt is well developed, well nourished in no acute distress.  · Eyes Pt has significant bilateral periorbital edema. EOMI and convergence intact.  · ENMT	Face is non-tender to palpation. Nose is midline, no signs of trauma, bleeding. Lips are pink, patient is without teeth. Pinna are intact bilaterally w.o. signs of trauma or infection. No hearing aids are noted in either ear.  · Neck Trachea is midline and neck is supple. There is no lymphadenopathy noted.  · Back Slight kyphosis is noted in upper thoracic cage. Non-tender to palpation.  · Respiratory Chest is non-tender to palpation P. Lungs clear to auscultation bilaterally A/P. Negative Fremitus P.  · Cardiovascular normal rhythm, s1 s2 heard. No murmurs or rubs auscultated, and no thrills palpated.  · GI Normal bowel sounds in all 4 quadrants. No abdominal bruits auscultated. Abdominal is non-distended and soft. No visible signs of infection or trauma noted on visual inspection. Non-tender to light and deep palpation in all 4 quadrants. Negative Fitzgerald's McBurney's and Rosvig's.  · Extremities Pt has normal strength in upper and lower extremities. Fingers and toes are warm to touch. No edema noted in extremities. AV fistula is noted ante-brachially on R arm. A continuous harsh sounding murmur is auscultated at the fistula.  · Vascular Capillary refill in 3 seconds. Radial pulse is strong, +3, bilaterally. Brachial pulse difficult to palpate on R arm due to fistula, but +3 on L arm. Posterior tibialis pulse is +2 bilaterally. No carotid or abdominal bruits were auscultated.  · Neurological CN II- XII grossly intact, but CN X was not assessed. Pt had good proprioception and soft/sharp discrimination.  · Skin Pt's face and lower leg (from toes to ante-talus bilaterally) are slightly more pigmented than the rest of her body.     LABS:    	  10.6   4.33  )-----------( 149      ( 12 Aug 2020 10:10 )             32.7     08-12    135  |  97  |  64<H>  ----------------------------<  101<H>  5.2   |  30  |  9.84<H>    Ca    9.3      12 Aug 2020 10:10    TPro  6.2  /  Alb  2.8<L>  /  TBili  0.4  /  DBili  x   /  AST  20  /  ALT  22  /  AlkPhos  70  08-12    Thyroid Stimulating Hormone, Serum (08.10.20 @ 10:14)    Thyroid Stimulating Hormone, Serum: 2.70 uU/mL  Troponin I, Serum (08.09.20 @ 06:23)    Troponin I, Serum: 0.016  Lipase, Serum (08.09.20 @ 06:23)    Lipase, Serum: 338 U/L  Lipid Profile (08.10.20 @ 10:14)    Total Cholesterol/HDL Ratio Measurement: 2.0 RATIO    Cholesterol, Serum: 155 mg/dL    Triglycerides, Serum: 76 mg/dL    HDL Cholesterol, Serum: 77: HDL Levels >/= 60 mg/dL are considered beneficial and a "negative" risk factor.  PT/INR - ( 09 Aug 2020 06:23 )   PT: 12.4 sec;   INR: 1.06 ratio    PTT - ( 09 Aug 2020 06:23 )  PTT:38.4 sec    MICROBIOLOGY DATA:  MRSA/MSSA PCR (08.11.20 @ 04:26)    MRSA PCR Result.: NotDetec:     RADIOLOGY:    EXAM:  XR CHEST PA LAT 2V                        PROCEDURE DATE:  08/09/2020    INTERPRETATION:  Chest pain short of breath.  PA lateral. Prior 5/20/2020.  Moderate cardiomegaly. Patchy opacities at the bilateral lung bases likely reflect a combination of pneumonia and atelectasis. Trace right, small left pleural effusion. Mild levoscoliosis lower thoracic spine.  Impression: Bibasilar infiltrates/atelectasis and effusions as described. Cardiomegaly  MJ MACKAY M.D., ATTENDING RADIOLOGIST  This document has been electronically signed. Aug  9 2020  8:18AM    12 LEAD EKG:  Ventricular Rate 65 BPM  Atrial Rate 65 BPM  P-R Interval 168 ms  QRS Duration 82 ms  Q-T Interval 434 ms  QTC Calculation(Bezet) 451 ms  P Axis -5 degrees  R Axis 11 degrees  T Axis 65 degrees  Diagnosis Line Normal sinus rhythm  Normal ECG  Confirmed by [facility] ([id number] on 8/9/2020 1:38:50 PM    08/11/2020  INTERPRETATION:  CLINICAL INDICATION: 74 years  Female with sob.  COMPARISON: 2/19/2019  Moderate bilateral pleural effusion, new from prior, with underlying compressive atelectasis of lower lobes.  Moderate pericardial effusion and cardiomegaly unchanged.  End-stage renal disease and renal osteodystrophy.   Multinodular thyroid.

## 2020-08-12 NOTE — CONSULT NOTE ADULT - SUBJECTIVE AND OBJECTIVE BOX
CHIEF COMPLAINT:Patient is a 74y old  Female who presents with a chief complaint of shortness of breath.      HPI:  74 yr old F, pmhx of ESRD (MWF, last dialysis 08/07), HTN, and DVT, presents with shortness of breath for 1 day. Patient has associated cough but denies fevers, chills, phlegm. Pt reports worsening shortness of breath since around 6pm yesterday afternoon and this is the first time she has experienced sob. She endorses breathing is worse with laying down. Pt denies fever, headache, chest pain, nausea, vomiting or swelling in legs, pain in ext, urinary s/s, .      PAST MEDICAL & SURGICAL HISTORY:  Hemodialysis patient: MWF  Hemodialysis access, fistula mature: JASON  DVT (deep venous thrombosis): of Left subclavian vein, 06/12/17  ESRD (end stage renal disease) on dialysis  Cataract  Glaucoma  HTN (hypertension)  S/P KEVEN-BSO: for fibroids, 2012  AV fistula: 2015  H/O: glaucoma: surgery, 2002  Acquired cataract: extraction with b/l lense placement, 2016      MEDICATIONS  (STANDING):  calcitriol   Capsule 0.75 MICROGram(s) Oral <User Schedule>  calcium acetate 2001 milliGRAM(s) Oral three times a day with meals  chlorhexidine 2% Cloths 1 Application(s) Topical daily  heparin   Injectable 5000 Unit(s) SubCutaneous every 8 hours  hydrALAZINE 100 milliGRAM(s) Oral three times a day  isosorbide   mononitrate ER Tablet (IMDUR) 60 milliGRAM(s) Oral daily  labetalol 200 milliGRAM(s) Oral three times a day  levoFLOXacin IVPB 500 milliGRAM(s) IV Intermittent every 24 hours  Nephro-radha 1 Tablet(s) Oral daily    MEDICATIONS  (PRN):  ALBUTerol    90 MICROgram(s) HFA Inhaler 2 Puff(s) Inhalation every 6 hours PRN Bronchospasm      FAMILY HISTORY:  Family history of colon cancer (Sibling)  Family history of cerebrovascular accident (CVA)      SOCIAL HISTORY:    [x ] Non-smoker    [x ] Alcohol    Allergies    Mushrooms (Anaphylaxis)  penicillin (Rash)    Intolerances    	    REVIEW OF SYSTEMS:  CONSTITUTIONAL: No fever, weight loss, or fatigue  EYES: No eye pain, visual disturbances, or discharge  ENT:  No difficulty hearing, tinnitus, vertigo; No sinus or throat pain  NECK: No pain or stiffness  RESPIRATORY: No cough, wheezing, chills or hemoptysis; + Shortness of Breath  CARDIOVASCULAR: No chest pain, palpitations, passing out, dizziness, or leg swelling  GASTROINTESTINAL: No abdominal or epigastric pain. No nausea, vomiting, or hematemesis; No diarrhea or constipation. No melena or hematochezia.  GENITOURINARY: No dysuria, frequency, hematuria, or incontinence  NEUROLOGICAL: No headaches, memory loss, loss of strength, numbness, or tremors  SKIN: No itching, burning, rashes, or lesions   LYMPH Nodes: No enlarged glands  ENDOCRINE: No heat or cold intolerance; No hair loss  MUSCULOSKELETAL: No joint pain or swelling; No muscle, back, or extremity pain  PSYCHIATRIC: No depression, anxiety, mood swings, or difficulty sleeping  HEME/LYMPH: No easy bruising, or bleeding gums  ALLERGY AND IMMUNOLOGIC: No hives or eczema	        PHYSICAL EXAM:  T(C): 36.8 (08-12-20 @ 09:51), Max: 36.9 (08-12-20 @ 04:43)  HR: 64 (08-12-20 @ 09:51) (64 - 71)  BP: 157/72 (08-12-20 @ 09:51) (157/72 - 198/72)  RR: 13 (08-12-20 @ 09:51) (13 - 20)  SpO2: 100% (08-12-20 @ 09:51) (97% - 100%)        Appearance: Normal	  HEENT:   Normal oral mucosa, PERRL, EOMI	  Lymphatic: No lymphadenopathy  Cardiovascular: Normal S1 S2, No JVD, No murmurs, No edema  Respiratory: Dec BS at bases  Psychiatry: A & O x 3, Mood & affect appropriate  Gastrointestinal:  Soft, Non-tender, + BS	  Skin: No rashes, No ecchymoses, No cyanosis	  Neurologic: Non-focal  Extremities: Normal range of motion, No clubbing, cyanosis or edema  Vascular: Peripheral pulses palpable 2+ bilaterally        ECG:  	NSR with NL axis  	  	  LABS:	 	                       10.6   4.33  )-----------( 149      ( 12 Aug 2020 10:10 )             32.7     08-12    135  |  97  |  64<H>  ----------------------------<  101<H>  5.2   |  30  |  9.84<H>    Ca    9.3      12 Aug 2020 10:10    TPro  6.2  /  Alb  2.8<L>  /  TBili  0.4  /  DBili  x   /  AST  20  /  ALT  22  /  AlkPhos  70  08-12    EXAM:  CT CHEST                            PROCEDURE DATE:  08/11/2020          INTERPRETATION:  CLINICAL INDICATION: 74 years  Female with sob.    COMPARISON: 2/19/2019    PROCEDURE:  CT of the Chest was performed without intravenous contrast.  Sagittal and coronal reformats were performed.    FINDINGS:    LUNGS AND AIRWAYS: Patent central airways.  Compressive atelectasis underlying the pleural effusions. Patchy bilateral upper lobe groundglass opacities. Right middle lobe discoid atelectasis.  PLEURA: Moderate bilateral pleural effusions. No pneumothorax.  MEDIASTINUM AND YUSUF: No lymphadenopathy.  VESSELS: Bovine arch. Atherosclerotic aorta without aneurysm.  HEART: Moderate cardiomegaly. Moderate pericardial effusion unchanged.  CHEST WALL AND LOWER NECK: Heterogeneous multinodular thyroid.  VISUALIZED UPPER ABDOMEN: Complete renal cortical atrophy.  BONES: Diffuse osseous sclerosis secondary to renal osteodystrophy. Mild thoracic degenerative changes. 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    IMPRESSION:  Moderate bilateral pleural effusion, new from prior, with underlying compressive atelectasis of lower lobes.    Moderate pericardial effusion and cardiomegaly unchanged.    End-stage renal disease and renal osteodystrophy.    Multinodular thyroid.        CONCLUSIONS:  1. Mitral annular calcification, otherwise normal mitral  valve. Minimal mitral regurgitation.  2. Calcified trileaflet aortic valve with normal opening.  Mild aortic regurgitation.  3. Moderate concentric left ventricular hypertrophy.  4. Normal left ventricular systolic function. No segmental  wall motion abnormalities.  5. Mild diastolic dysfunction (Stage I).  6. Normal right ventricular size and function.  7. Small pericardial effusion superior to the right atrium  and adjacent to the RV free wall.  ------------------------------------------------------------------------  Confirmed on  1/14/2020 - 09:43:14 by Kyle Tesfaye M.D.

## 2020-08-12 NOTE — PROGRESS NOTE ADULT - SUBJECTIVE AND OBJECTIVE BOX
Patient is seen and examined at the bed side, is afebrile.  The Cardiology recommendation is appreciated.         REVIEW OF SYSTEMS: All other review systems are negative        ALLERGIES: Mushrooms (Anaphylaxis)  penicillin (Rash)        Vital Signs Last 24 Hrs  T(C): 37 (12 Aug 2020 15:54), Max: 37 (12 Aug 2020 15:54)  T(F): 98.6 (12 Aug 2020 15:54), Max: 98.6 (12 Aug 2020 15:54)  HR: 65 (12 Aug 2020 15:54) (64 - 71)  BP: 176/57 (12 Aug 2020 15:54) (157/72 - 198/72)  BP(mean): --  RR: 19 (12 Aug 2020 15:54) (13 - 20)  SpO2: 100% (12 Aug 2020 15:54) (97% - 100%)        PHYSICAL EXAM:  GENERAL: Not in distress, on oxygen via NC   CHEST/LUNG:  Not using accessory muscles   HEART: s1 and s2 present  ABDOMEN:  Nontender and  Nondistended  EXTREMITIES: No pedal  edema  CNS: Awake and Alert        LABS:                        10.6   4.33  )-----------( 149      ( 12 Aug 2020 10:10 )             32.7                           11.4   4.79  )-----------( 175      ( 11 Aug 2020 07:16 )             35.4         08-12    135  |  97  |  64<H>  ----------------------------<  101<H>  5.2   |  30  |  9.84<H>    Ca    9.3      12 Aug 2020 10:10    TPro  6.2  /  Alb  2.8<L>  /  TBili  0.4  /  DBili  x   /  AST  20  /  ALT  22  /  AlkPhos  70  08-12    08-11    136  |  99  |  45<H>  ----------------------------<  86  5.1   |  30  |  7.49<H>    Ca    9.1      11 Aug 2020 07:16  Phos  6.6     08-10  Mg     2.8     08-10    TPro  6.4  /  Alb  2.9<L>  /  TBili  0.5  /  DBili  x   /  AST  21  /  ALT  24  /  AlkPhos  72  08-11      PT/INR - ( 09 Aug 2020 06:23 )   PT: 12.4 sec;   INR: 1.06 ratio       PTT - ( 09 Aug 2020 06:23 )  PTT:38.4 sec        CAPILLARY BLOOD GLUCOSE  POCT Blood Glucose.: 126 mg/dL (10 Aug 2020 06:14)  POCT Blood Glucose.: 87 mg/dL (09 Aug 2020 23:53)  POCT Blood Glucose.: 70 mg/dL (09 Aug 2020 22:38)  POCT Blood Glucose.: 92 mg/dL (09 Aug 2020 20:58)  POCT Blood Glucose.: 142 mg/dL (09 Aug 2020 17:14)  POCT Blood Glucose.: 63 mg/dL (09 Aug 2020 16:18)        MEDICATIONS  (STANDING):    calcitriol   Capsule 0.75 MICROGram(s) Oral <User Schedule>  calcium acetate 2001 milliGRAM(s) Oral three times a day with meals  chlorhexidine 2% Cloths 1 Application(s) Topical daily  heparin   Injectable 5000 Unit(s) SubCutaneous every 8 hours  hydrALAZINE 100 milliGRAM(s) Oral three times a day  isosorbide   mononitrate ER Tablet (IMDUR) 60 milliGRAM(s) Oral daily  labetalol 200 milliGRAM(s) Oral three times a day  levoFLOXacin  Tablet 500 milliGRAM(s) Oral every 24 hours  Nephro-radha 1 Tablet(s) Oral daily        RADIOLOGY & ADDITIONAL TESTS:    8/11/20  : CT Chest No Cont (08.11.20 @ 12:46) Moderate bilateral pleural effusion, new from prior, with underlying compressive atelectasis of lower lobes.    Moderate pericardial effusion and cardiomegaly unchanged.  End-stage renal disease and renal osteodystrophy.   Multinodular thyroid.      8/9/20 : Xray Chest 2 Views PA/Lat (08.09.20 @ 05:50) PA lateral. Prior 5/20/2020.    Moderate cardiomegaly. Patchy opacities at the bilateral lung bases likely reflect a combination of pneumonia and atelectasis. Trace right, small left pleural effusion. Mild levoscoliosis lower thoracic spine.        MICROBIOLOGY DATA:      MRSA/MSSA PCR (08.11.20 @ 04:26)    MRSA PCR Result.: NotDetec: MRSA/MSSA PCR assay is a qualitative in vitro diagnostic test for the  direct detection and differentiation of methicillin-resistant  Staphylococcus aureus (MRSA) from Staphylococcus aureus (SA). The assay  detects DNA from nasal swabs in patients atrisk for nasal colonization.  It is not intended to diagnose MRSA or SA infections nor guide or monitor  treatment for MRSA/SA infections. A negative result does not preclude  nasal colonization. The assay is FDA-approved and its performance has  been established by [employer] [place] and the [employer]  [facility], [place], [place].    Staph Aureus PCR Result: NotDetec        Culture - Blood (08.09.20 @ 18:40)    Specimen Source: .Blood Blood    Culture Results:   No growth to date.      Culture - Blood (08.09.20 @ 18:40)    Specimen Source: .Blood Blood    Culture Results:   No growth to date.        COVID-19 PCR . (08.09.20 @ 07:53)    COVID-19 PCR: NotDetec: This test has been validated by [employer] to be accurate;  though it has not been FDA cleared/approved by the usual pathway  As with all laboratory test, results should be correlated with clinical  findings.  https://www.fda.gov/media/174355/download  https://www.fda.gov/media/086293/download Patient is seen and examined at the bed side, is afebrile.  She is feeling better.  The Cardiology recommendation is appreciated.         REVIEW OF SYSTEMS: All other review systems are negative        ALLERGIES: Mushrooms (Anaphylaxis)  penicillin (Rash)        Vital Signs Last 24 Hrs  T(C): 37 (12 Aug 2020 15:54), Max: 37 (12 Aug 2020 15:54)  T(F): 98.6 (12 Aug 2020 15:54), Max: 98.6 (12 Aug 2020 15:54)  HR: 65 (12 Aug 2020 15:54) (64 - 71)  BP: 176/57 (12 Aug 2020 15:54) (157/72 - 198/72)  BP(mean): --  RR: 19 (12 Aug 2020 15:54) (13 - 20)  SpO2: 100% (12 Aug 2020 15:54) (97% - 100%)        PHYSICAL EXAM:  GENERAL: Not in distress, on oxygen via NC   CHEST/LUNG:  Not using accessory muscles   HEART: s1 and s2 present  ABDOMEN:  Nontender and  Nondistended  EXTREMITIES: No pedal  edema  CNS: Awake and Alert        LABS:                        10.6   4.33  )-----------( 149      ( 12 Aug 2020 10:10 )             32.7                           11.4   4.79  )-----------( 175      ( 11 Aug 2020 07:16 )             35.4         08-12    135  |  97  |  64<H>  ----------------------------<  101<H>  5.2   |  30  |  9.84<H>    Ca    9.3      12 Aug 2020 10:10    TPro  6.2  /  Alb  2.8<L>  /  TBili  0.4  /  DBili  x   /  AST  20  /  ALT  22  /  AlkPhos  70  08-12    08-11    136  |  99  |  45<H>  ----------------------------<  86  5.1   |  30  |  7.49<H>    Ca    9.1      11 Aug 2020 07:16  Phos  6.6     08-10  Mg     2.8     08-10    TPro  6.4  /  Alb  2.9<L>  /  TBili  0.5  /  DBili  x   /  AST  21  /  ALT  24  /  AlkPhos  72  08-11      PT/INR - ( 09 Aug 2020 06:23 )   PT: 12.4 sec;   INR: 1.06 ratio       PTT - ( 09 Aug 2020 06:23 )  PTT:38.4 sec        CAPILLARY BLOOD GLUCOSE  POCT Blood Glucose.: 126 mg/dL (10 Aug 2020 06:14)  POCT Blood Glucose.: 87 mg/dL (09 Aug 2020 23:53)  POCT Blood Glucose.: 70 mg/dL (09 Aug 2020 22:38)  POCT Blood Glucose.: 92 mg/dL (09 Aug 2020 20:58)  POCT Blood Glucose.: 142 mg/dL (09 Aug 2020 17:14)  POCT Blood Glucose.: 63 mg/dL (09 Aug 2020 16:18)        MEDICATIONS  (STANDING):    calcitriol   Capsule 0.75 MICROGram(s) Oral <User Schedule>  calcium acetate 2001 milliGRAM(s) Oral three times a day with meals  chlorhexidine 2% Cloths 1 Application(s) Topical daily  heparin   Injectable 5000 Unit(s) SubCutaneous every 8 hours  hydrALAZINE 100 milliGRAM(s) Oral three times a day  isosorbide   mononitrate ER Tablet (IMDUR) 60 milliGRAM(s) Oral daily  labetalol 200 milliGRAM(s) Oral three times a day  levoFLOXacin  Tablet 500 milliGRAM(s) Oral every 24 hours  Nephro-radha 1 Tablet(s) Oral daily        RADIOLOGY & ADDITIONAL TESTS:    8/11/20  : CT Chest No Cont (08.11.20 @ 12:46) Moderate bilateral pleural effusion, new from prior, with underlying compressive atelectasis of lower lobes.    Moderate pericardial effusion and cardiomegaly unchanged.  End-stage renal disease and renal osteodystrophy.   Multinodular thyroid.      8/9/20 : Xray Chest 2 Views PA/Lat (08.09.20 @ 05:50) PA lateral. Prior 5/20/2020.    Moderate cardiomegaly. Patchy opacities at the bilateral lung bases likely reflect a combination of pneumonia and atelectasis. Trace right, small left pleural effusion. Mild levoscoliosis lower thoracic spine.        MICROBIOLOGY DATA:      MRSA/MSSA PCR (08.11.20 @ 04:26)    MRSA PCR Result.: NotDetec: MRSA/MSSA PCR assay is a qualitative in vitro diagnostic test for the  direct detection and differentiation of methicillin-resistant  Staphylococcus aureus (MRSA) from Staphylococcus aureus (SA). The assay  detects DNA from nasal swabs in patients atrisk for nasal colonization.  It is not intended to diagnose MRSA or SA infections nor guide or monitor  treatment for MRSA/SA infections. A negative result does not preclude  nasal colonization. The assay is FDA-approved and its performance has  been established by [employer], [place] and the [employer], [place], [place].    Staph Aureus PCR Result: NotDetec        Culture - Blood (08.09.20 @ 18:40)    Specimen Source: .Blood Blood    Culture Results:   No growth to date.      Culture - Blood (08.09.20 @ 18:40)    Specimen Source: .Blood Blood    Culture Results:   No growth to date.        COVID-19 PCR . (08.09.20 @ 07:53)    COVID-19 PCR: NotDetec: This test has been validated by [employer] to be accurate;  though it has not been FDA cleared/approved by the usual pathway  As with all laboratory test, results should be correlated with clinical  findings.  https://www.fda.gov/media/242334/download  https://www.fda.gov/media/916522/download

## 2020-08-12 NOTE — PROGRESS NOTE ADULT - ASSESSMENT
Patient is a 74y old  Female with ESRD (MWF, last dialysis 08/07), HTN, and DVT, presents to the ER for evaluation of  worsening shortness of breath associated with cough but No fever. On admission, she found to have no fever or leukocytosis but Patchy opacities at the bilateral lung bases on CXR. She has started on Levaquin since allergic to  PCN, and the ID consult requested to assist with further evaluation and antibiotic management.    # Pneumonia -Blood cultures have no growth to date and MRSA PCR is negative   # B/L Pleural effusion  # Pericardial effusion  # COVID 19 Negative-  H/O COVID  # ESRD- on HD  # Allergy- PCN ( Rash)      would recommend:    1. TTE for further evaluation of Pericardial effusion and Cardiology Evaluation    2. IR guided drainage of pleural and pericardial effusion  3. Continue Levaquin for now and please adjust doses based on Crcl   4. Supplemental oxygenation and bronchodilator as needed  5. Aspiration precaution    d/w patient and Nursing staff     Attending Attestation:    Spent more than 45 minutes on total encounter, more than 50 % of the visit was spent counseling and/or coordinating care by the Attending physician. Patient is a 74y old  Female with ESRD (MWF, last dialysis 08/07), HTN, and DVT, presents to the ER for evaluation of  worsening shortness of breath associated with cough but No fever. On admission, she found to have no fever or leukocytosis but Patchy opacities at the bilateral lung bases on CXR. She has started on Levaquin since allergic to  PCN, and the ID consult requested to assist with further evaluation and antibiotic management.    # Pneumonia -Blood cultures have no growth to date and MRSA PCR is negative   # B/L Pleural effusion  # Pericardial effusion  # COVID 19 Negative-  H/O COVID  # ESRD- on HD  # Allergy- PCN ( Rash)    would recommend:    1. TTE for further evaluation of Pericardial effusion   2. IR guided drainage of pleural and pericardial effusion if drainable   3. Continue Levaquin for now and please adjust doses based on Crcl   4. Supplemental oxygenation and bronchodilator as needed  5. Aspiration precaution    d/w patient and Nursing staff     Attending Attestation:    Spent more than 45 minutes on total encounter, more than 50 % of the visit was spent counseling and/or coordinating care by the Attending physician.

## 2020-08-12 NOTE — MEDICAL STUDENT PROGRESS NOTE(EDUCATION) - NS MD HP STUD ASPLAN ASSES FT
74 y.o. female w/ PMH ESRD and HTN p/w cc: "I can't breathe". 74 y.o. female w/ PMH ESRD (last HD 8/12/20) and HTN p/w cc: "I can't breathe". She was found to have new moderate pleural effusion and unchanged moderate pleural effusion and cardiomegaly on Chest CT (8/11/20). TTE is scheduled for tonight (8/12/20). The pt reports feeling better and is saturating well on NC (2L). 74 y.o. female w/ PMH ESRD (last HD 8/12/20) and HTN p/w cc: "I can't breathe". She was found to have new moderate pleural effusion and unchanged moderate pleural effusion and cardiomegaly on Chest CT (8/11/20). TTE is scheduled for tonight (8/12/20). The pt reports feeling better and is saturating well on NC (2L). Although BP is still elevated (176/87) the pt reports no headaches, CP, vision problems, or fatigue.    Problems:  1. HTN   2. Hyperkalemia  3. HCAP  4. ESRD

## 2020-08-12 NOTE — PROGRESS NOTE ADULT - SUBJECTIVE AND OBJECTIVE BOX
Pt is awake, alert, lying in bed in NAD. HD today.     INTERVAL HPI/OVERNIGHT EVENTS:      VITAL SIGNS:  T(F): 98.2 (08-12-20 @ 09:51)  HR: 64 (08-12-20 @ 09:51)  BP: 157/72 (08-12-20 @ 09:51)  RR: 13 (08-12-20 @ 09:51)  SpO2: 100% (08-12-20 @ 09:51)  Wt(kg): --  I&O's Detail          REVIEW OF SYSTEMS:    CONSTITUTIONAL:  No fevers, chills, sweats    HEENT:  Eyes:  No diplopia or blurred vision. ENT:  No earache, sore throat or runny nose.    CARDIOVASCULAR:  No pressure, squeezing, tightness, or heaviness about the chest; no palpitations.    RESPIRATORY:  Per HPI    GASTROINTESTINAL:  No abdominal pain, nausea, vomiting or diarrhea.    GENITOURINARY:  No dysuria, frequency or urgency.    NEUROLOGIC:  No paresthesias, fasciculations, seizures or weakness.    PSYCHIATRIC:  No disorder of thought or mood.      PHYSICAL EXAM:    Constitutional: Well developed and nourished  Eyes:Perrla  ENMT: normal  Neck:supple  Respiratory: good air entry  Cardiovascular: S1 S2 regular  Gastrointestinal: Soft, Non tender  Extremities: No edema  Vascular:normal  Neurological:Awake, alert,Ox3  Musculoskeletal:Normal      MEDICATIONS  (STANDING):  calcitriol   Capsule 0.75 MICROGram(s) Oral <User Schedule>  calcium acetate 2001 milliGRAM(s) Oral three times a day with meals  chlorhexidine 2% Cloths 1 Application(s) Topical daily  heparin   Injectable 5000 Unit(s) SubCutaneous every 8 hours  hydrALAZINE 100 milliGRAM(s) Oral three times a day  isosorbide   mononitrate ER Tablet (IMDUR) 60 milliGRAM(s) Oral daily  labetalol 200 milliGRAM(s) Oral three times a day  levoFLOXacin IVPB 500 milliGRAM(s) IV Intermittent every 24 hours  Nephro-radha 1 Tablet(s) Oral daily    MEDICATIONS  (PRN):  ALBUTerol    90 MICROgram(s) HFA Inhaler 2 Puff(s) Inhalation every 6 hours PRN Bronchospasm      Allergies    Mushrooms (Anaphylaxis)  penicillin (Rash)    Intolerances        LABS:                        10.6   4.33  )-----------( 149      ( 12 Aug 2020 10:10 )             32.7     08-12    135  |  97  |  64<H>  ----------------------------<  101<H>  5.2   |  30  |  9.84<H>    Ca    9.3      12 Aug 2020 10:10    TPro  6.2  /  Alb  2.8<L>  /  TBili  0.4  /  DBili  x   /  AST  20  /  ALT  22  /  AlkPhos  70  08-12              CAPILLARY BLOOD GLUCOSE      POCT Blood Glucose.: 138 mg/dL (11 Aug 2020 11:37)    pro-bnp [id number] 08-09 @ 06:23     d-dimer --  08-09 @ 06:23      RADIOLOGY & ADDITIONAL TESTS:    CXR:    Ct scan chest:  < from: CT Chest No Cont (08.11.20 @ 12:46) >  IMPRESSION:  Moderate bilateral pleural effusion, new from prior, with underlying compressive atelectasis of lower lobes.    Moderate pericardial effusion and cardiomegaly unchanged.    End-stage renal disease and renal osteodystrophy.    Multinodular thyroid.    < end of copied text >    ekg;    echo:

## 2020-08-12 NOTE — PROGRESS NOTE ADULT - SUBJECTIVE AND OBJECTIVE BOX
CC: Patient denies CP, SOB, n/v/d, fever or chills.    Vital Signs Last 24 Hrs  T(C): 36.9 (12 Aug 2020 19:30), Max: 37 (12 Aug 2020 15:54)  T(F): 98.5 (12 Aug 2020 19:30), Max: 98.6 (12 Aug 2020 15:54)  HR: 72 (12 Aug 2020 19:30) (64 - 72)  BP: 154/65 (12 Aug 2020 19:30) (154/65 - 198/72)  BP(mean): --  RR: 18 (12 Aug 2020 19:30) (13 - 20)  SpO2: 98% (12 Aug 2020 19:30) (98% - 100%)    08-12 @ 07:01  -  08-12 @ 23:08  --------------------------------------------------------  IN: 400 mL / OUT: 3000 mL / NET: -2600 mL    PHYSICAL EXAM:  GENERAL: No acute respiratory distress.  EYES: Sclera anicteric  ENMT: Atraumatic, Normocephalic, supple, No JVD present. Moist mucous membranes  RESPIRATORY: few crackles  CARDIOVASCULAR: S1S2+; no murmurs  GASTROINTESTINAL: Soft, Non-tender, Nondistended; Bowel sounds present   CENTRAL NERVOUS SYSTEM:  Awake, Alert & Oriented X3  EXTREMITIES:  1+ edema  SKIN: Normal turgor  DIALYSIS ACCESS:  Rt Arm AVG thrill/bruit+     MEDICATIONS:  ALBUTerol    90 MICROgram(s) HFA Inhaler 2 Puff(s) Inhalation every 6 hours PRN  calcitriol   Capsule 0.75 MICROGram(s) Oral <User Schedule>  calcium acetate 2001 milliGRAM(s) Oral three times a day with meals  chlorhexidine 2% Cloths 1 Application(s) Topical daily  heparin   Injectable 5000 Unit(s) SubCutaneous every 8 hours  hydrALAZINE 100 milliGRAM(s) Oral three times a day  isosorbide   mononitrate ER Tablet (IMDUR) 60 milliGRAM(s) Oral daily  labetalol 200 milliGRAM(s) Oral three times a day  levoFLOXacin  Tablet 500 milliGRAM(s) Oral every 24 hours  Nephro-radha 1 Tablet(s) Oral daily      LABS:                        10.6   4.33  )-----------( 149      ( 12 Aug 2020 10:10 )             32.7     08-12    135  |  97  |  64<H>  ----------------------------<  101<H>  5.2   |  30  |  9.84<H>    Ca    9.3      12 Aug 2020 10:10    TPro  6.2  /  Alb  2.8<L>  /  TBili  0.4  /  DBili  x   /  AST  20  /  ALT  22  /  AlkPhos  70  08-12    ASSESSMENT AND PLAN:     1. ESRD. Tolerating HD  -Cont HD with UF as tolerated  -Hemodialysis Renal Diet and Fluid restriction to 1L/day  -Adjust meds to eGFR and avoid IV Gadolinium contrast,NSAIDs, and phosphate enema.  -Monitor Electrolytes daily.  2. Anemia:  -hb >10.5 and no need for epogen  -F/u CBC daily  -transfuse if HB < 7.0.  3. HTN: -bp is acceptable. pt takes metoprolol 50mg bid at home.   -titrate bp meds to keep sbp >110 and < 130  4. MBD: phoslo 3 tab tid; check phos and outpatient PtH (895), continue calcitriol 0.75mcg tiw.   5. Pneumonia: on iv levaquin.   6. Hyperkalemia due to ESRD  -Kayexalate PRN for K>5.3

## 2020-08-12 NOTE — MEDICAL STUDENT PROGRESS NOTE(EDUCATION) - NS MD HP STUD ASPLAN PLAN FT
1. CAP   - continue to monitor temperature, WBC and clinical picture.   - repeat chest XRay to look for improvement post abx.  - attempt to decrease use of supplemental oxygen.  2. Hyperkalemia  - reassess K+ levels after dialysis has taken place  - cont. telemetry to assess rhythm  3. HTN  - Pt presented w/ hypertensive emergency. Today's BP was 179/86 which has improved from the initial presentation 220/92.   - Reassess BP medications to optimize control.   4. ESRD  - Dialysis today 1. Health Care Associated Pneumonia  - Pt continues to be afebrile, and has improved clinically with ABX and NC.   - continue to monitor temperature, WBC and clinical picture while in pt  - CT of Chest demonstrated pleural and pericardial effusions.   - attempt to decrease use of supplemental oxygen.  2. Hyperkalemia  - reassess K+ levels after dialysis has taken place  - cont. telemetry to assess rhythm  3. HTN  - Pt presented w/ hypertensive emergency. Today's BP was 179/86 which has improved from the initial presentation 220/92.   - Reassess BP medications to optimize control.   4. ESRD  - Dialysis today 1. Health Care Associated Pneumonia  - Pt continues to be afebrile, and has improved clinically with ABX and NC.   - continue to monitor temperature, WBC and clinical picture while in pt  - CT of Chest demonstrated pleural and pericardial effusions.   - attempt to decrease use of supplemental oxygen.  2. Hyperkalemia  - reassess K+ levels after dialysis has taken place  - cont. telemetry to assess rhythm  3. HTN  - Pt presented w/ hypertensive emergency. Today's BP was 179/86 which has improved from the initial presentation 220/92.   - increased imdur to 60mg today  - Reassess BP medications to optimize control.   4. ESRD  - Dialysis today  5. Pleural effusion  - ct chest showed moderate pleural effusion and unchanged previous pericardial effusion and cardiomegaly.   - Pulm consult to do a ct-guided thoracentesis for pleural fluid. 1. HTN  - Pt presented w/ hypertensive emergency. Today's BP was 176/57 which has improved from the initial presentation 220/92, however the BP has not normalized despite increasing the dose of Labetalol and IMDUR.   - Monitor BP s/p dialysis for improvement  2. Pericardial and Pleural Effusions  - Pt was noted to have a pleural effusion on CT + moderate pericardial infusion. per ID, IR guided tap was recommended.   - STEPHANIE and ESR levels to r/o autoimmune or inflammatory causes  2. Hyperkalemia  - K+ levels normalized s/p dialysis and Kayexalate (5.2 today)  - Repeat CMP to monitor levels  3. Health Care Associated Pneumonia  - Pt continues to be afebrile, and has improved clinically with ABX and NC (2L).   - continue to monitor temperature, WBC and clinical picture while in pt  - CT of Chest demonstrated pleural and pericardial effusions.   - attempt to decrease use of supplemental oxygen- reassess O2 sat tomorrow.  4. ESRD  - Dialysis today (8/12/20) went well- pt reported feeling well

## 2020-08-13 LAB
ALBUMIN SERPL ELPH-MCNC: 2.9 G/DL — LOW (ref 3.5–5)
ALP SERPL-CCNC: 68 U/L — SIGNIFICANT CHANGE UP (ref 40–120)
ALT FLD-CCNC: 25 U/L DA — SIGNIFICANT CHANGE UP (ref 10–60)
ANION GAP SERPL CALC-SCNC: 10 MMOL/L — SIGNIFICANT CHANGE UP (ref 5–17)
AST SERPL-CCNC: 26 U/L — SIGNIFICANT CHANGE UP (ref 10–40)
BILIRUB SERPL-MCNC: 0.4 MG/DL — SIGNIFICANT CHANGE UP (ref 0.2–1.2)
BUN SERPL-MCNC: 39 MG/DL — HIGH (ref 7–18)
CALCIUM SERPL-MCNC: 9.3 MG/DL — SIGNIFICANT CHANGE UP (ref 8.4–10.5)
CHLORIDE SERPL-SCNC: 97 MMOL/L — SIGNIFICANT CHANGE UP (ref 96–108)
CO2 SERPL-SCNC: 30 MMOL/L — SIGNIFICANT CHANGE UP (ref 22–31)
CREAT SERPL-MCNC: 6.31 MG/DL — HIGH (ref 0.5–1.3)
ERYTHROCYTE [SEDIMENTATION RATE] IN BLOOD: 10 MM/HR — SIGNIFICANT CHANGE UP (ref 0–20)
GLUCOSE BLDC GLUCOMTR-MCNC: 102 MG/DL — HIGH (ref 70–99)
GLUCOSE BLDC GLUCOMTR-MCNC: 95 MG/DL — SIGNIFICANT CHANGE UP (ref 70–99)
GLUCOSE SERPL-MCNC: 76 MG/DL — SIGNIFICANT CHANGE UP (ref 70–99)
HCT VFR BLD CALC: 35.4 % — SIGNIFICANT CHANGE UP (ref 34.5–45)
HGB BLD-MCNC: 11.1 G/DL — LOW (ref 11.5–15.5)
MCHC RBC-ENTMCNC: 28.5 PG — SIGNIFICANT CHANGE UP (ref 27–34)
MCHC RBC-ENTMCNC: 31.4 GM/DL — LOW (ref 32–36)
MCV RBC AUTO: 90.8 FL — SIGNIFICANT CHANGE UP (ref 80–100)
NRBC # BLD: 0 /100 WBCS — SIGNIFICANT CHANGE UP (ref 0–0)
PLATELET # BLD AUTO: 162 K/UL — SIGNIFICANT CHANGE UP (ref 150–400)
POTASSIUM SERPL-MCNC: 5.5 MMOL/L — HIGH (ref 3.5–5.3)
POTASSIUM SERPL-SCNC: 5.5 MMOL/L — HIGH (ref 3.5–5.3)
PROT SERPL-MCNC: 6.4 G/DL — SIGNIFICANT CHANGE UP (ref 6–8.3)
RBC # BLD: 3.9 M/UL — SIGNIFICANT CHANGE UP (ref 3.8–5.2)
RBC # FLD: 17 % — HIGH (ref 10.3–14.5)
SODIUM SERPL-SCNC: 137 MMOL/L — SIGNIFICANT CHANGE UP (ref 135–145)
WBC # BLD: 3.97 K/UL — SIGNIFICANT CHANGE UP (ref 3.8–10.5)
WBC # FLD AUTO: 3.97 K/UL — SIGNIFICANT CHANGE UP (ref 3.8–10.5)

## 2020-08-13 PROCEDURE — 93306 TTE W/DOPPLER COMPLETE: CPT | Mod: 26

## 2020-08-13 RX ORDER — ISOSORBIDE MONONITRATE 60 MG/1
120 TABLET, EXTENDED RELEASE ORAL DAILY
Refills: 0 | Status: DISCONTINUED | OUTPATIENT
Start: 2020-08-14 | End: 2020-08-17

## 2020-08-13 RX ORDER — SODIUM ZIRCONIUM CYCLOSILICATE 10 G/10G
5 POWDER, FOR SUSPENSION ORAL ONCE
Refills: 0 | Status: COMPLETED | OUTPATIENT
Start: 2020-08-13 | End: 2020-08-13

## 2020-08-13 RX ORDER — ISOSORBIDE MONONITRATE 60 MG/1
60 TABLET, EXTENDED RELEASE ORAL ONCE
Refills: 0 | Status: DISCONTINUED | OUTPATIENT
Start: 2020-08-13 | End: 2020-08-17

## 2020-08-13 RX ORDER — SODIUM ZIRCONIUM CYCLOSILICATE 10 G/10G
5 POWDER, FOR SUSPENSION ORAL
Refills: 0 | Status: DISCONTINUED | OUTPATIENT
Start: 2020-08-14 | End: 2020-08-17

## 2020-08-13 RX ADMIN — Medication 200 MILLIGRAM(S): at 16:33

## 2020-08-13 RX ADMIN — Medication 200 MILLIGRAM(S): at 05:59

## 2020-08-13 RX ADMIN — Medication 100 MILLIGRAM(S): at 16:33

## 2020-08-13 RX ADMIN — Medication 2001 MILLIGRAM(S): at 13:19

## 2020-08-13 RX ADMIN — Medication 100 MILLIGRAM(S): at 05:59

## 2020-08-13 RX ADMIN — ISOSORBIDE MONONITRATE 60 MILLIGRAM(S): 60 TABLET, EXTENDED RELEASE ORAL at 13:25

## 2020-08-13 RX ADMIN — Medication 2001 MILLIGRAM(S): at 08:42

## 2020-08-13 RX ADMIN — Medication 1 TABLET(S): at 13:26

## 2020-08-13 RX ADMIN — SODIUM ZIRCONIUM CYCLOSILICATE 5 GRAM(S): 10 POWDER, FOR SUSPENSION ORAL at 13:25

## 2020-08-13 RX ADMIN — HEPARIN SODIUM 5000 UNIT(S): 5000 INJECTION INTRAVENOUS; SUBCUTANEOUS at 05:59

## 2020-08-13 RX ADMIN — Medication 100 MILLIGRAM(S): at 22:28

## 2020-08-13 RX ADMIN — Medication 2001 MILLIGRAM(S): at 18:01

## 2020-08-13 RX ADMIN — Medication 200 MILLIGRAM(S): at 22:27

## 2020-08-13 RX ADMIN — CHLORHEXIDINE GLUCONATE 1 APPLICATION(S): 213 SOLUTION TOPICAL at 13:19

## 2020-08-13 RX ADMIN — HEPARIN SODIUM 5000 UNIT(S): 5000 INJECTION INTRAVENOUS; SUBCUTANEOUS at 22:28

## 2020-08-13 RX ADMIN — HEPARIN SODIUM 5000 UNIT(S): 5000 INJECTION INTRAVENOUS; SUBCUTANEOUS at 16:33

## 2020-08-13 NOTE — PROGRESS NOTE ADULT - ASSESSMENT
Assessment and Recommendation:   Problem/Recommendation - 1:  Problem: HCAP (healthcare-associated pneumonia). Recommendation: Blood cultures neg  antibiotics  oxygen supp  monitor temp and WBC      Problem/Recommendation - 2:  ·  Problem: ESRD (end stage renal disease) on dialysis.  Recommendation: cont HD  monitor labs  Renal F/U.        Problem/Recommendation - 3:  ·  Problem: HTN (hypertension).  Recommendation: monitor BP  cont meds.       Problem/Recommendation - 4:  ·  Problem: Pericardial/Pleural Effusion  Recommendation: Seen on CT chest  Cardio eval   ID eval noted :  Recc TTE for further evaluation of Pericardial effusion  Recc IR guided drainage of pleural and pericardial effusion

## 2020-08-13 NOTE — MEDICAL STUDENT PROGRESS NOTE(EDUCATION) - NS MD HP STUD ASPLAN PLAN FT
1. HTN  - Pt presented w/ hypertensive emergency. BP this morning was 161/64 which has improved from the initial presentation 220/92, however the BP has not normalized despite increasing the dose of Labetalol and IMDUR.   - Monitor BP regularly to assess whether a return to baseline occurs.   2. Pericardial and Pleural Effusions  - Pt was noted to have a pleural effusion on CT + moderate pericardial infusion. per ID, IR guided tap is recommended and is rescheduled from today.  - ESR was normal (10).   - STEPHANIE to r/o autoimmune effusions.   2. Hyperkalemia  - K+ levels increased again today (5.3 at 7:17 am). sodium zirconium cyclosilicate 5g was started this morning.   - Repeat CMP to continue to monitor levels  3. Health Care Associated Pneumonia  - Pt continues to be afebrile, and has improved clinically with ABX and NC (2L).   - continue to monitor temperature, WBC and clinical picture while inpatient  - CT of Chest demonstrated pleural and pericardial effusions.   - attempt to decrease use of supplemental oxygen- reassess O2 sat tomorrow.  4. ESRD  - Dialysis yesterday (8/12/20) went well- pt reported feeling well this morning.  - scheduled for next dialysis tomorrow 8/14/20. 1. HTN  - Pt presented w/ hypertensive emergency. BP this morning was 161/64 which has improved from the initial presentation 220/92, however the BP has not normalized despite increasing the dose of Labetalol and IMDUR.   - Monitor BP regularly to assess whether a return to baseline occurs.     2. Pericardial and Pleural Effusions  - Pt was noted to have a pleural effusion on CT + moderate pericardial infusion.  - ESR was normal (10).   - STEPHANIE to r/o autoimmune effusions.   - ECHO done today will f/u    2. Hyperkalemia  - K+ levels increased again today (5.3 at 7:17 am). sodium zirconium cyclosilicate 5g was started this morning.   - Repeat CMP to continue to monitor levels    3. Health Care Associated Pneumonia  - Pt continues to be afebrile, and has improved clinically with ABX and NC (2L).   - continue to monitor temperature, WBC and clinical picture while inpatient  - CT of Chest demonstrated pleural and pericardial effusions.   - attempt to decrease use of supplemental oxygen- reassess O2 sat tomorrow.  - IV levaquin to be adjusted based on creat clearance    4. ESRD  - Dialysis yesterday (8/12/20) went well- pt reported feeling well this morning.  - scheduled for next dialysis tomorrow 8/14/20.

## 2020-08-13 NOTE — PROGRESS NOTE ADULT - ASSESSMENT
Patient is a 74y old  Female with ESRD (MWF, last dialysis 08/07), HTN, and DVT, presents to the ER for evaluation of  worsening shortness of breath associated with cough but No fever. On admission, she found to have no fever or leukocytosis but Patchy opacities at the bilateral lung bases on CXR. She has started on Levaquin since allergic to  PCN, and the ID consult requested to assist with further evaluation and antibiotic management.    # Pneumonia -Blood cultures have no growth to date and MRSA PCR is negative   # B/L Pleural effusion  # Pericardial effusion  # COVID 19 Negative-  H/O COVID  # ESRD- on HD  # Allergy- PCN ( Rash)    would recommend:    1. Follow up TTE for further evaluation of Pericardial effusion   2. IR guided drainage of pleural and pericardial effusion if drainable   3. Continue Levaquin for now and please adjust doses based on Crcl   4. Supplemental oxygenation and bronchodilator as needed  5. Aspiration precaution    d/w patient and Nursing staff     Attending Attestation:    Spent more than 45 minutes on total encounter, more than 50 % of the visit was spent counseling and/or coordinating care by the Attending physician. Patient is a 74y old  Female with ESRD (MWF, last dialysis 08/07), HTN, and DVT, presents to the ER for evaluation of  worsening shortness of breath associated with cough but No fever. On admission, she found to have no fever or leukocytosis but Patchy opacities at the bilateral lung bases on CXR. She has started on Levaquin since allergic to  PCN, and the ID consult requested to assist with further evaluation and antibiotic management.    # Pneumonia -Blood cultures have no growth to date and MRSA PCR is negative   # B/L Pleural effusion  # Pericardial effusion  # COVID 19 Negative-  H/O COVID  # ESRD- on HD  # Allergy- PCN ( Rash)    would recommend:    1. Follow up TTE for further evaluation of Pericardial effusion   2. IR guided drainage of pleural and pericardial effusion if drainable   3. Continue Levaquin   4. Supplemental oxygenation and bronchodilator as needed  5. Aspiration precaution    d/w patient and House staff     Attending Attestation:    Spent more than 45 minutes on total encounter, more than 50 % of the visit was spent counseling and/or coordinating care by the Attending physician.

## 2020-08-13 NOTE — MEDICAL STUDENT PROGRESS NOTE(EDUCATION) - SUBJECTIVE AND OBJECTIVE BOX
cc: "I can't breathe".     HPI/ Hospital Course: 74 y.o. female w/ PMH ESRD and HTN p/w cc: "I can't breathe". Pt says that she noticed the difficulty breathing on Saturday afternoon but it was tolerable. At 4 am Sunday morning, pt woke up unable to breathe and called her own ambulance. The difficulty breathing worsened as time went on, but improved on oxygen NC. She says she has never experienced this before. Pt denies CP, palpitations, fever, N/V/D/C. Endorses cough but no phlegm production. Pt reports baseline systolic BP is in 140s.     Pt seen at bedside this morning 1 day s/p dialysis. Pt had just gone to the bathroom and removed her nasal canula to get out of bed, she reported no SOB, difficulty breathing, or CP. Pt denies pain, trouble sleeping, nausea, vomiting, diarrhea or constipation.     ALLERGIES:   Mushrooms (Anaphylaxis)  penicillin (Rash)    MEDICATIONS  (STANDING):  calcitriol   Capsule 0.75 MICROGram(s) Oral <User Schedule>  calcium acetate 2001 milliGRAM(s) Oral three times a day with meals  chlorhexidine 2% Cloths 1 Application(s) Topical daily  heparin   Injectable 5000 Unit(s) SubCutaneous every 8 hours  hydrALAZINE 100 milliGRAM(s) Oral three times a day  isosorbide   mononitrate ER Tablet (IMDUR) 60 milliGRAM(s) Oral daily  labetalol 200 milliGRAM(s) Oral three times a day  levoFLOXacin IVPB 500 milliGRAM(s) IV Intermittent every 48 hours  Nephro-radha 1 Tablet(s) Oral daily  sodium zirconium cyclosilicate 5 Gram(s) Oral once    MEDICATIONS  (PRN):  ALBUTerol    90 MICROgram(s) HFA Inhaler 2 Puff(s) Inhalation every 6 hours PRN Bronchospasm    PMH:   Hemodialysis patient: MWF  Hemodialysis access, fistula mature: JASON  DVT (deep venous thrombosis) of Left subclavian vein, 06/12/17  ESRD (end stage renal disease) on dialysis  Cataract  Glaucoma  HTN (hypertension)    SURG.Hx:  S/P KEVEN-BSO: for fibroids, 2012  AV fistula: 2015  H/O: glaucoma: surgery, 2002  Acquired cataract: extraction with b/l lens placement, 2016    FHX:   Cardiovascular disease and hypertension    SHx:  Pt is a non-smoker, non-ETOH drinker. She is currently retired and lives with her . She has three children who live nearby.     ROS:   · General: Denies weight loss/ weight gain  · Skin/Breast: denies skin changes  · Ophthalmologic: endorses normal vision s/p eye surgeries (see SrgHx)  · ENMT: Denies loss of smell, taste. Endorses hearing loss in L ear which she uses a hearing aid for. Endorses use of dentures. Endorses hx of sinusitis  · Respiratory and Thorax: Denies SOB, CP  · Cardiovascular: Denies Palpitations  · GI: denies abdominal pain, V/C/D/N  · : denies urinary freq., hest., and pain  · Musculoskeletal: denies weakness in L and U extremities, denies extremity swelling  · Neuro: denies headaches and dizziness  · Hematology/Lymphatics: denies swelling of neck, pain in fingertips and toes      PHYSICAL EXAM:   Vital Signs Last 24 Hrs  T(C): 37 (13 Aug 2020 07:36), Max: 37.1 (12 Aug 2020 23:28)  T(F): 98.6 (13 Aug 2020 07:36), Max: 98.8 (12 Aug 2020 23:28)  HR: 67 (13 Aug 2020 07:36) (65 - 72)  BP: 161/64 (13 Aug 2020 07:36) (154/65 - 192/66)  BP(mean): --  RR: 19 (13 Aug 2020 07:36) (16 - 19)  SpO2: 100% (13 Aug 2020 07:36) (98% - 100%)    · Constitutional Pt is well developed, well nourished in no acute distress.  · Eyes Pt has significant bilateral periorbital edema. EOMI and convergence intact.  · ENMT	Face is non-tender to palpation. Nose is midline, no signs of trauma, bleeding. Lips are pink, patient is without teeth. Pinna are intact bilaterally w.o. signs of trauma or infection. No hearing aids are noted in either ear.  · Neck Trachea is midline and neck is supple. There is no lymphadenopathy noted.  · Back Slight kyphosis is noted in upper thoracic cage. Non-tender to palpation.  · Respiratory Chest is non-tender to palpation P. Lungs clear to auscultation bilaterally A/P. Negative Fremitus P.  · Cardiovascular normal rhythm, s1 s2 heard. No murmurs or rubs auscultated, and no thrills palpated.  · GI Normal bowel sounds in all 4 quadrants. No abdominal bruits auscultated. Abdominal is non-distended and soft. No visible signs of infection or trauma noted on visual inspection. Non-tender to light and deep palpation in all 4 quadrants. Negative Fitzgerald's McBurney's and Rosvig's.  · Extremities Pt has normal strength in upper and lower extremities. Fingers and toes are warm to touch. No edema noted in extremities. AV fistula is noted ante-brachially on R arm. A continuous harsh sounding murmur is auscultated at the fistula.  · Vascular Capillary refill in 3 seconds. Radial pulse is strong, +3, bilaterally. Brachial pulse difficult to palpate on R arm due to fistula, but +3 on L arm. Posterior tibialis pulse is +2 bilaterally. No carotid or abdominal bruits were auscultated.  · Neurological CN II- XII grossly intact, but CN X was not assessed. Pt had good proprioception and soft/sharp discrimination.  · Skin Pt's face and lower leg (from toes to ante-talus bilaterally) are slightly more pigmented than the rest of her body.     LABS:                          11.1   3.97  )-----------( 162      ( 13 Aug 2020 07:17 )             35.4   08-13    137  |  97  |  39<H>  ----------------------------<  76  5.5<H>   |  30  |  6.31<H>    Ca    9.3      13 Aug 2020 07:17    TPro  6.4  /  Alb  2.9<L>  /  TBili  0.4  /  DBili  x   /  AST  26  /  ALT  25  /  AlkPhos  68  08-13    Thyroid Stimulating Hormone, Serum (08.10.20 @ 10:14)    Thyroid Stimulating Hormone, Serum: 2.70 uU/mL  Troponin I, Serum (08.09.20 @ 06:23)    Troponin I, Serum: 0.016  Lipase, Serum (08.09.20 @ 06:23)    Lipase, Serum: 338 U/L  Lipid Profile (08.10.20 @ 10:14)    Total Cholesterol/HDL Ratio Measurement: 2.0 RATIO    Cholesterol, Serum: 155 mg/dL    Triglycerides, Serum: 76 mg/dL    HDL Cholesterol, Serum: 77: HDL Levels >/= 60 mg/dL are considered beneficial and a "negative" risk factor.  PT/INR - ( 09 Aug 2020 06:23 )   PT: 12.4 sec;   INR: 1.06 ratio    PTT - ( 09 Aug 2020 06:23 )  PTT:38.4 sec    MICROBIOLOGY DATA:  MRSA/MSSA PCR (08.11.20 @ 04:26)    MRSA PCR Result.: NotDetec:     RADIOLOGY:    EXAM:  XR CHEST PA LAT 2V                        PROCEDURE DATE:  08/09/2020    INTERPRETATION:  Chest pain short of breath.  PA lateral. Prior 5/20/2020.  Moderate cardiomegaly. Patchy opacities at the bilateral lung bases likely reflect a combination of pneumonia and atelectasis. Trace right, small left pleural effusion. Mild levoscoliosis lower thoracic spine.  Impression: Bibasilar infiltrates/atelectasis and effusions as described. Cardiomegaly  MJ MACKAY M.D., ATTENDING RADIOLOGIST  This document has been electronically signed. Aug  9 2020  8:18AM    12 LEAD EKG:  Ventricular Rate 65 BPM  Atrial Rate 65 BPM  P-R Interval 168 ms  QRS Duration 82 ms  Q-T Interval 434 ms  QTC Calculation(Bezet) 451 ms  P Axis -5 degrees  R Axis 11 degrees  T Axis 65 degrees  Diagnosis Line Normal sinus rhythm  Normal ECG  Confirmed by [facility] ([id number]) on 8/9/2020 1:38:50 PM    08/11/2020  INTERPRETATION:  CLINICAL INDICATION: 74 years  Female with sob.  COMPARISON: 2/19/2019  Moderate bilateral pleural effusion, new from prior, with underlying compressive atelectasis of lower lobes.  Moderate pericardial effusion and cardiomegaly unchanged.  End-stage renal disease and renal osteodystrophy.   Multinodular thyroid.

## 2020-08-13 NOTE — PROGRESS NOTE ADULT - SUBJECTIVE AND OBJECTIVE BOX
NEPHROLOGY MEDICAL CARE, [facility] - Dr. Luis Fernando Lloyd/ Dr. Jorge Treviño/ Dr. Robson Soriano/ Dr. Lauren Paul    Patient was seen and examined at bedside.    CC: patient feels okay.    Vital Signs Last 24 Hrs  T(C): 37.1 (13 Aug 2020 11:19), Max: 37.1 (12 Aug 2020 23:28)  T(F): 98.8 (13 Aug 2020 11:19), Max: 98.8 (12 Aug 2020 23:28)  HR: 63 (13 Aug 2020 11:19) (63 - 72)  BP: 186/62 (13 Aug 2020 11:19) (154/65 - 192/66)  BP(mean): --  RR: 18 (13 Aug 2020 11:19) (16 - 19)  SpO2: 100% (13 Aug 2020 11:19) (98% - 100%)    08-12 @ 07:01  -  08-13 @ 07:00  --------------------------------------------------------  IN: 400 mL / OUT: 3000 mL / NET: -2600 mL        PHYSICAL EXAM:  GENERAL: No acute respiratory distress.  EYES: Sclera anicteric  ENMT: Atraumatic, Normocephalic, supple, No JVD present. Moist mucous membranes  RESPIRATORY: few crackles  CARDIOVASCULAR: S1S2+; no murmurs  GASTROINTESTINAL: Soft, Non-tender, Nondistended; Bowel sounds present   CENTRAL NERVOUS SYSTEM:  Awake, Alert & Oriented X3  EXTREMITIES:  1+ edema  SKIN: Normal turgor  DIALYSIS ACCESS:  Rt Arm AVG thrill/bruit+       MEDICATIONS:  ALBUTerol    90 MICROgram(s) HFA Inhaler 2 Puff(s) Inhalation every 6 hours PRN  calcitriol   Capsule 0.75 MICROGram(s) Oral <User Schedule>  calcium acetate 2001 milliGRAM(s) Oral three times a day with meals  chlorhexidine 2% Cloths 1 Application(s) Topical daily  heparin   Injectable 5000 Unit(s) SubCutaneous every 8 hours  hydrALAZINE 100 milliGRAM(s) Oral three times a day  isosorbide   mononitrate ER Tablet (IMDUR) 60 milliGRAM(s) Oral daily  labetalol 200 milliGRAM(s) Oral three times a day  levoFLOXacin IVPB 500 milliGRAM(s) IV Intermittent every 48 hours  Nephro-radha 1 Tablet(s) Oral daily  sodium zirconium cyclosilicate 5 Gram(s) Oral once      LABS:                        11.1   3.97  )-----------( 162      ( 13 Aug 2020 07:17 )             35.4     08-13    137  |  97  |  39<H>  ----------------------------<  76  5.5<H>   |  30  |  6.31<H>    Ca    9.3      13 Aug 2020 07:17    TPro  6.4  /  Alb  2.9<L>  /  TBili  0.4  /  DBili  x   /  AST  26  /  ALT  25  /  AlkPhos  68  08-13          Urine studies    PTH and Vit D:

## 2020-08-13 NOTE — MEDICAL STUDENT PROGRESS NOTE(EDUCATION) - NS MD HP STUD ASPLAN ASSES FT
74 y.o. female w/ PMH ESRD (last HD 8/12/20) and HTN p/w cc: "I can't breathe". She was found to have new moderate pleural effusion and unchanged moderate pleural effusion and cardiomegaly on Chest CT (8/11/20). Per IR, the pt will not have thoracocentesis done today due to a full schedule. The echocardiogram was not completed last night, it is rescheduled for today. She is scheduled for HD tomorrow (8/14/20). The pt reports feeling better and is saturating well on NC (2L). Although BP is still elevated (161/64), the pt reports no headaches, CP, vision problems, or fatigue.    Problems:  1. HTN   2. Hyperkalemia  3. HCAP  4. ESRD

## 2020-08-13 NOTE — PROGRESS NOTE ADULT - ASSESSMENT
74 yr old F, pmhx of ESRD (MWF, last dialysis 08/07), HTN, and DVT, presents with shortness of breath for 1 day.  1.Repeat echo-eval Pericardial effusion size.  2.Check STEPHANIE.  3.ESRD-HD as per Renal.  4.HTN-cont bp medication.  5.ABX.  6.GI and DVT prophylaxis.

## 2020-08-13 NOTE — PROGRESS NOTE ADULT - SUBJECTIVE AND OBJECTIVE BOX
Pt is awake, alert, lying in bed in NAD. Feels well. C/O some cough, on occasion. Pending repeat echo.     INTERVAL HPI/OVERNIGHT EVENTS:      VITAL SIGNS:  T(F): 98.6 (08-13-20 @ 07:36)  HR: 67 (08-13-20 @ 07:36)  BP: 161/64 (08-13-20 @ 07:36)  RR: 19 (08-13-20 @ 07:36)  SpO2: 100% (08-13-20 @ 07:36)  Wt(kg): --  I&O's Detail    12 Aug 2020 07:01  -  13 Aug 2020 07:00  --------------------------------------------------------  IN:    Other: 400 mL  Total IN: 400 mL    OUT:    Other: 3000 mL  Total OUT: 3000 mL    Total NET: -2600 mL              REVIEW OF SYSTEMS:    CONSTITUTIONAL:  No fevers, chills, sweats    HEENT:  Eyes:  No diplopia or blurred vision. ENT:  No earache, sore throat or runny nose.    CARDIOVASCULAR:  No pressure, squeezing, tightness, or heaviness about the chest; no palpitations.    RESPIRATORY:  Per HPI    GASTROINTESTINAL:  No abdominal pain, nausea, vomiting or diarrhea.    GENITOURINARY:  No dysuria, frequency or urgency.    NEUROLOGIC:  No paresthesias, fasciculations, seizures or weakness.    PSYCHIATRIC:  No disorder of thought or mood.      PHYSICAL EXAM:    Constitutional: Well developed and nourished  Eyes:Perrla  ENMT: normal  Neck:supple  Respiratory: good air entry  Cardiovascular: S1 S2 regular  Gastrointestinal: Soft, Non tender  Extremities: No edema  Vascular:normal  Neurological:Awake, alert,Ox3  Musculoskeletal:Normal      MEDICATIONS  (STANDING):  calcitriol   Capsule 0.75 MICROGram(s) Oral <User Schedule>  calcium acetate 2001 milliGRAM(s) Oral three times a day with meals  chlorhexidine 2% Cloths 1 Application(s) Topical daily  heparin   Injectable 5000 Unit(s) SubCutaneous every 8 hours  hydrALAZINE 100 milliGRAM(s) Oral three times a day  isosorbide   mononitrate ER Tablet (IMDUR) 60 milliGRAM(s) Oral daily  labetalol 200 milliGRAM(s) Oral three times a day  levoFLOXacin IVPB 500 milliGRAM(s) IV Intermittent every 48 hours  Nephro-radha 1 Tablet(s) Oral daily  sodium zirconium cyclosilicate 5 Gram(s) Oral once    MEDICATIONS  (PRN):  ALBUTerol    90 MICROgram(s) HFA Inhaler 2 Puff(s) Inhalation every 6 hours PRN Bronchospasm      Allergies    Mushrooms (Anaphylaxis)  penicillin (Rash)    Intolerances        LABS:                        11.1   3.97  )-----------( 162      ( 13 Aug 2020 07:17 )             35.4     08-13    137  |  97  |  39<H>  ----------------------------<  76  5.5<H>   |  30  |  6.31<H>    Ca    9.3      13 Aug 2020 07:17    TPro  6.4  /  Alb  2.9<L>  /  TBili  0.4  /  DBili  x   /  AST  26  /  ALT  25  /  AlkPhos  68  08-13              CAPILLARY BLOOD GLUCOSE      POCT Blood Glucose.: 95 mg/dL (13 Aug 2020 07:57)  POCT Blood Glucose.: 110 mg/dL (12 Aug 2020 22:32)    pro-bnp [id number] 08-09 @ 06:23     d-dimer --  08-09 @ 06:23      RADIOLOGY & ADDITIONAL TESTS:    CXR:    Ct scan chest:    ekg;    echo:

## 2020-08-13 NOTE — PROGRESS NOTE ADULT - SUBJECTIVE AND OBJECTIVE BOX
Patient is seen and examined at the bed side, is afebrile.  She is feeling better.         REVIEW OF SYSTEMS: All other review systems are negative        ALLERGIES: Mushrooms (Anaphylaxis)  penicillin (Rash)      Vital Signs Last 24 Hrs  T(C): 37.1 (13 Aug 2020 11:19), Max: 37.1 (12 Aug 2020 23:28)  T(F): 98.8 (13 Aug 2020 11:19), Max: 98.8 (12 Aug 2020 23:28)  HR: 63 (13 Aug 2020 11:19) (63 - 72)  BP: 186/62 (13 Aug 2020 11:19) (154/65 - 192/66)  BP(mean): --  RR: 18 (13 Aug 2020 11:19) (18 - 19)  SpO2: 100% (13 Aug 2020 11:19) (98% - 100%)          PHYSICAL EXAM:  GENERAL: Not in distress, on oxygen via NC   CHEST/LUNG:  Not using accessory muscles   HEART: s1 and s2 present  ABDOMEN:  Nontender and  Nondistended  EXTREMITIES: No pedal  edema  CNS: Awake and Alert        LABS:                        11.1   3.97  )-----------( 162      ( 13 Aug 2020 07:17 )             35.4                           10.6   4.33  )-----------( 149      ( 12 Aug 2020 10:10 )             32.7       08-13    137  |  97  |  39<H>  ----------------------------<  76  5.5<H>   |  30  |  6.31<H>    Ca    9.3      13 Aug 2020 07:17    TPro  6.4  /  Alb  2.9<L>  /  TBili  0.4  /  DBili  x   /  AST  26  /  ALT  25  /  AlkPhos  68  08-13      08-12    135  |  97  |  64<H>  ----------------------------<  101<H>  5.2   |  30  |  9.84<H>    Ca    9.3      12 Aug 2020 10:10    TPro  6.2  /  Alb  2.8<L>  /  TBili  0.4  /  DBili  x   /  AST  20  /  ALT  22  /  AlkPhos  70  08-12    PT/INR - ( 09 Aug 2020 06:23 )   PT: 12.4 sec;   INR: 1.06 ratio       PTT - ( 09 Aug 2020 06:23 )  PTT:38.4 sec        CAPILLARY BLOOD GLUCOSE  POCT Blood Glucose.: 126 mg/dL (10 Aug 2020 06:14)  POCT Blood Glucose.: 87 mg/dL (09 Aug 2020 23:53)  POCT Blood Glucose.: 70 mg/dL (09 Aug 2020 22:38)  POCT Blood Glucose.: 92 mg/dL (09 Aug 2020 20:58)  POCT Blood Glucose.: 142 mg/dL (09 Aug 2020 17:14)  POCT Blood Glucose.: 63 mg/dL (09 Aug 2020 16:18)        MEDICATIONS  (STANDING):    calcitriol   Capsule 0.75 MICROGram(s) Oral <User Schedule>  calcium acetate 2001 milliGRAM(s) Oral three times a day with meals  chlorhexidine 2% Cloths 1 Application(s) Topical daily  heparin   Injectable 5000 Unit(s) SubCutaneous every 8 hours  hydrALAZINE 100 milliGRAM(s) Oral three times a day  isosorbide   mononitrate ER Tablet (IMDUR) 60 milliGRAM(s) Oral once  labetalol 200 milliGRAM(s) Oral three times a day  levoFLOXacin IVPB 500 milliGRAM(s) IV Intermittent every 48 hours  Nephro-radha 1 Tablet(s) Oral daily        RADIOLOGY & ADDITIONAL TESTS:        8/11/20  : CT Chest No Cont (08.11.20 @ 12:46) Moderate bilateral pleural effusion, new from prior, with underlying compressive atelectasis of lower lobes.    Moderate pericardial effusion and cardiomegaly unchanged.  End-stage renal disease and renal osteodystrophy.   Multinodular thyroid.      8/9/20 : Xray Chest 2 Views PA/Lat (08.09.20 @ 05:50) PA lateral. Prior 5/20/2020.    Moderate cardiomegaly. Patchy opacities at the bilateral lung bases likely reflect a combination of pneumonia and atelectasis. Trace right, small left pleural effusion. Mild levoscoliosis lower thoracic spine.        MICROBIOLOGY DATA:      MRSA/MSSA PCR (08.11.20 @ 04:26)    MRSA PCR Result.: NotDetec: MRSA/MSSA PCR assay is a qualitative in vitro diagnostic test for the  direct detection and differentiation of methicillin-resistant  Staphylococcus aureus (MRSA) from Staphylococcus aureus (SA). The assay  detects DNA from nasal swabs in patients atrisk for nasal colonization.  It is not intended to diagnose MRSA or SA infections nor guide or monitor  treatment for MRSA/SA infections. A negative result does not preclude  nasal colonization. The assay is FDA-approved and its performance has  been established by [employer] and the [facility] [employer]  [facility], [place], [place].    Staph Aureus PCR Result: NotDetec        Culture - Blood (08.09.20 @ 18:40)    Specimen Source: .Blood Blood    Culture Results:   No growth to date.      Culture - Blood (08.09.20 @ 18:40)    Specimen Source: .Blood Blood    Culture Results:   No growth to date.        COVID-19 PCR . (08.09.20 @ 07:53)    COVID-19 PCR: NotDetec: This test has been validated by [employer] to be accurate;  though it has not been FDA cleared/approved by the usual pathway  As with all laboratory test, results should be correlated with clinical  findings.  https://www.fda.gov/media/856817/download  https://www.fda.gov/media/140008/download Patient is seen and examined at the bed side, is afebrile. The echo result is pending.         REVIEW OF SYSTEMS: All other review systems are negative        ALLERGIES: Mushrooms (Anaphylaxis)  penicillin (Rash)      Vital Signs Last 24 Hrs  T(C): 37.1 (13 Aug 2020 11:19), Max: 37.1 (12 Aug 2020 23:28)  T(F): 98.8 (13 Aug 2020 11:19), Max: 98.8 (12 Aug 2020 23:28)  HR: 63 (13 Aug 2020 11:19) (63 - 72)  BP: 186/62 (13 Aug 2020 11:19) (154/65 - 192/66)  BP(mean): --  RR: 18 (13 Aug 2020 11:19) (18 - 19)  SpO2: 100% (13 Aug 2020 11:19) (98% - 100%)          PHYSICAL EXAM:  GENERAL: Not in distress, on oxygen via NC   CHEST/LUNG:  Not using accessory muscles   HEART: s1 and s2 present  ABDOMEN:  Nontender and  Nondistended  EXTREMITIES: No pedal  edema  CNS: Awake and Alert        LABS:                        11.1   3.97  )-----------( 162      ( 13 Aug 2020 07:17 )             35.4                           10.6   4.33  )-----------( 149      ( 12 Aug 2020 10:10 )             32.7       08-13    137  |  97  |  39<H>  ----------------------------<  76  5.5<H>   |  30  |  6.31<H>    Ca    9.3      13 Aug 2020 07:17    TPro  6.4  /  Alb  2.9<L>  /  TBili  0.4  /  DBili  x   /  AST  26  /  ALT  25  /  AlkPhos  68  08-13      08-12    135  |  97  |  64<H>  ----------------------------<  101<H>  5.2   |  30  |  9.84<H>    Ca    9.3      12 Aug 2020 10:10    TPro  6.2  /  Alb  2.8<L>  /  TBili  0.4  /  DBili  x   /  AST  20  /  ALT  22  /  AlkPhos  70  08-12    PT/INR - ( 09 Aug 2020 06:23 )   PT: 12.4 sec;   INR: 1.06 ratio       PTT - ( 09 Aug 2020 06:23 )  PTT:38.4 sec        CAPILLARY BLOOD GLUCOSE  POCT Blood Glucose.: 126 mg/dL (10 Aug 2020 06:14)  POCT Blood Glucose.: 87 mg/dL (09 Aug 2020 23:53)  POCT Blood Glucose.: 70 mg/dL (09 Aug 2020 22:38)  POCT Blood Glucose.: 92 mg/dL (09 Aug 2020 20:58)  POCT Blood Glucose.: 142 mg/dL (09 Aug 2020 17:14)  POCT Blood Glucose.: 63 mg/dL (09 Aug 2020 16:18)        MEDICATIONS  (STANDING):    calcitriol   Capsule 0.75 MICROGram(s) Oral <User Schedule>  calcium acetate 2001 milliGRAM(s) Oral three times a day with meals  chlorhexidine 2% Cloths 1 Application(s) Topical daily  heparin   Injectable 5000 Unit(s) SubCutaneous every 8 hours  hydrALAZINE 100 milliGRAM(s) Oral three times a day  isosorbide   mononitrate ER Tablet (IMDUR) 60 milliGRAM(s) Oral once  labetalol 200 milliGRAM(s) Oral three times a day  levoFLOXacin IVPB 500 milliGRAM(s) IV Intermittent every 48 hours  Nephro-radha 1 Tablet(s) Oral daily        RADIOLOGY & ADDITIONAL TESTS:        8/11/20  : CT Chest No Cont (08.11.20 @ 12:46) Moderate bilateral pleural effusion, new from prior, with underlying compressive atelectasis of lower lobes.    Moderate pericardial effusion and cardiomegaly unchanged.  End-stage renal disease and renal osteodystrophy.   Multinodular thyroid.      8/9/20 : Xray Chest 2 Views PA/Lat (08.09.20 @ 05:50) PA lateral. Prior 5/20/2020.    Moderate cardiomegaly. Patchy opacities at the bilateral lung bases likely reflect a combination of pneumonia and atelectasis. Trace right, small left pleural effusion. Mild levoscoliosis lower thoracic spine.        MICROBIOLOGY DATA:      MRSA/MSSA PCR (08.11.20 @ 04:26)    MRSA PCR Result.: NotDetec: MRSA/MSSA PCR assay is a qualitative in vitro diagnostic test for the  direct detection and differentiation of methicillin-resistant  Staphylococcus aureus (MRSA) from Staphylococcus aureus (SA). The assay  detects DNA from nasal swabs in patients atrisk for nasal colonization.  It is not intended to diagnose MRSA or SA infections nor guide or monitor  treatment for MRSA/SA infections. A negative result does not preclude  nasal colonization. The assay is FDA-approved and its performance has  been established by [employer] and the [facility] [employer]  [facility], [place], [place].    Staph Aureus PCR Result: NotDetec        Culture - Blood (08.09.20 @ 18:40)    Specimen Source: .Blood Blood    Culture Results:   No growth to date.      Culture - Blood (08.09.20 @ 18:40)    Specimen Source: .Blood Blood    Culture Results:   No growth to date.        COVID-19 PCR . (08.09.20 @ 07:53)    COVID-19 PCR: NotDetec: This test has been validated by [employer] to be accurate;  though it has not been FDA cleared/approved by the usual pathway  As with all laboratory test, results should be correlated with clinical  findings.  https://www.fda.gov/media/366912/download  https://www.fda.gov/media/106743/download

## 2020-08-13 NOTE — PROGRESS NOTE ADULT - ASSESSMENT
1. ESRD. Tolerating HD  -s/p HD yesterday with UF 2.5kg. Plan for HD tomorrow.  -Hemodialysis Renal Diet and Fluid restriction to 1L/day  -Adjust meds to eGFR and avoid IV Gadolinium contrast,NSAIDs, and phosphate enema.  -Monitor Electrolytes daily.  2. Anemia:  -hb >10.5 and no need for epogen  -F/u CBC daily  -transfuse if HB < 7.0.  3. HTN: -bp is elevated and on imdur, labetolol, hydralazine; start norvasc if okay with cardiology  -titrate bp meds to keep sbp >110 and < 130  4. MBD: phoslo 3 tab tid; check phos and outpatient PtH (895), continue calcitriol 0.75mcg tiw.   5. Pneumonia: on iv levaquin.   6. Hyperkalemia due to ESRD:  -mild hyperkalemia and Lokelmia given and place on lokelmia on non-dialysis days.  7. Percardial Effusion: on CT Scan and ECHO pending. cardiology f/u.

## 2020-08-13 NOTE — PROGRESS NOTE ADULT - SUBJECTIVE AND OBJECTIVE BOX
CHIEF COMPLAINT:Patient is a 74y old  Female who presents with a chief complaint of shortness of breath.Pt appears comfortable.    	  REVIEW OF SYSTEMS:  CONSTITUTIONAL: No fever, weight loss, or fatigue  EYES: No eye pain, visual disturbances, or discharge  ENT:  No difficulty hearing, tinnitus, vertigo; No sinus or throat pain  NECK: No pain or stiffness  RESPIRATORY: No cough, wheezing, chills or hemoptysis; No Shortness of Breath  CARDIOVASCULAR: No chest pain, palpitations, passing out, dizziness, or leg swelling  GASTROINTESTINAL: No abdominal or epigastric pain. No nausea, vomiting, or hematemesis; No diarrhea or constipation. No melena or hematochezia.  GENITOURINARY: No dysuria, frequency, hematuria, or incontinence  NEUROLOGICAL: No headaches, memory loss, loss of strength, numbness, or tremors  SKIN: No itching, burning, rashes, or lesions   LYMPH Nodes: No enlarged glands  ENDOCRINE: No heat or cold intolerance; No hair loss  MUSCULOSKELETAL: No joint pain or swelling; No muscle, back, or extremity pain  PSYCHIATRIC: No depression, anxiety, mood swings, or difficulty sleeping  HEME/LYMPH: No easy bruising, or bleeding gums  ALLERGY AND IMMUNOLOGIC: No hives or eczema	        PHYSICAL EXAM:  T(C): 37 (08-13-20 @ 07:36), Max: 37.1 (08-12-20 @ 23:28)  HR: 67 (08-13-20 @ 07:36) (64 - 72)  BP: 161/64 (08-13-20 @ 07:36) (154/65 - 192/66)  RR: 19 (08-13-20 @ 07:36) (13 - 19)  SpO2: 100% (08-13-20 @ 07:36) (98% - 100%)  Wt(kg): --  I&O's Summary    12 Aug 2020 07:01  -  13 Aug 2020 07:00  --------------------------------------------------------  IN: 400 mL / OUT: 3000 mL / NET: -2600 mL        Appearance: Normal	  HEENT:   Normal oral mucosa, PERRL, EOMI	  Lymphatic: No lymphadenopathy  Cardiovascular: Normal S1 S2, No JVD, No murmurs, No edema  Respiratory: Dec BS at bases	  Psychiatry: A & O x 3, Mood & affect appropriate  Gastrointestinal:  Soft, Non-tender, + BS	  Skin: No rashes, No ecchymoses, No cyanosis	  Neurologic: Non-focal  Extremities: Normal range of motion, No clubbing, cyanosis or edema  Vascular: Peripheral pulses palpable 2+ bilaterally    MEDICATIONS  (STANDING):  calcitriol   Capsule 0.75 MICROGram(s) Oral <User Schedule>  calcium acetate 2001 milliGRAM(s) Oral three times a day with meals  chlorhexidine 2% Cloths 1 Application(s) Topical daily  heparin   Injectable 5000 Unit(s) SubCutaneous every 8 hours  hydrALAZINE 100 milliGRAM(s) Oral three times a day  isosorbide   mononitrate ER Tablet (IMDUR) 60 milliGRAM(s) Oral daily  labetalol 200 milliGRAM(s) Oral three times a day  levoFLOXacin IVPB 500 milliGRAM(s) IV Intermittent every 48 hours  Nephro-radha 1 Tablet(s) Oral daily      	  	  LABS:	 	                       11.1   3.97  )-----------( 162      ( 13 Aug 2020 07:17 )             35.4     08-13    137  |  97  |  39<H>  ----------------------------<  76  5.5<H>   |  30  |  6.31<H>    Ca    9.3      13 Aug 2020 07:17    TPro  6.4  /  Alb  2.9<L>  /  TBili  0.4  /  DBili  x   /  AST  26  /  ALT  25  /  AlkPhos  68  08-13    proBNP: Serum Pro-Brain Natriuretic Peptide: 76198 pg/mL (08-09 @ 06:23)    Lipid Profile: Cholesterol 155  LDL 63  HDL 77  TG 76      TSH: Thyroid Stimulating Hormone, Serum: 2.70 uU/mL (08-10 @ 10:14)      Sedimentation Rate, Erythrocyte (08.13.20 @ 07:17)    Sedimentation Rate, Erythrocyte: 10 mm/Hr    	    Doppler-no dvt.

## 2020-08-14 LAB
ALBUMIN SERPL ELPH-MCNC: 2.9 G/DL — LOW (ref 3.5–5)
ALP SERPL-CCNC: 69 U/L — SIGNIFICANT CHANGE UP (ref 40–120)
ALT FLD-CCNC: 23 U/L DA — SIGNIFICANT CHANGE UP (ref 10–60)
ANA TITR SER: NEGATIVE — SIGNIFICANT CHANGE UP
ANION GAP SERPL CALC-SCNC: 12 MMOL/L — SIGNIFICANT CHANGE UP (ref 5–17)
AST SERPL-CCNC: 25 U/L — SIGNIFICANT CHANGE UP (ref 10–40)
BILIRUB SERPL-MCNC: 0.4 MG/DL — SIGNIFICANT CHANGE UP (ref 0.2–1.2)
BUN SERPL-MCNC: 63 MG/DL — HIGH (ref 7–18)
CALCIUM SERPL-MCNC: 9.3 MG/DL — SIGNIFICANT CHANGE UP (ref 8.4–10.5)
CHLORIDE SERPL-SCNC: 96 MMOL/L — SIGNIFICANT CHANGE UP (ref 96–108)
CO2 SERPL-SCNC: 27 MMOL/L — SIGNIFICANT CHANGE UP (ref 22–31)
CREAT SERPL-MCNC: 9.01 MG/DL — HIGH (ref 0.5–1.3)
CULTURE RESULTS: SIGNIFICANT CHANGE UP
CULTURE RESULTS: SIGNIFICANT CHANGE UP
GLUCOSE SERPL-MCNC: 132 MG/DL — HIGH (ref 70–99)
HCT VFR BLD CALC: 33.9 % — LOW (ref 34.5–45)
HGB BLD-MCNC: 11 G/DL — LOW (ref 11.5–15.5)
MCHC RBC-ENTMCNC: 28.5 PG — SIGNIFICANT CHANGE UP (ref 27–34)
MCHC RBC-ENTMCNC: 32.4 GM/DL — SIGNIFICANT CHANGE UP (ref 32–36)
MCV RBC AUTO: 87.8 FL — SIGNIFICANT CHANGE UP (ref 80–100)
NRBC # BLD: 0 /100 WBCS — SIGNIFICANT CHANGE UP (ref 0–0)
PLATELET # BLD AUTO: 139 K/UL — LOW (ref 150–400)
POTASSIUM SERPL-MCNC: 5.1 MMOL/L — SIGNIFICANT CHANGE UP (ref 3.5–5.3)
POTASSIUM SERPL-SCNC: 5.1 MMOL/L — SIGNIFICANT CHANGE UP (ref 3.5–5.3)
PROT SERPL-MCNC: 6.5 G/DL — SIGNIFICANT CHANGE UP (ref 6–8.3)
RBC # BLD: 3.86 M/UL — SIGNIFICANT CHANGE UP (ref 3.8–5.2)
RBC # FLD: 16.9 % — HIGH (ref 10.3–14.5)
SODIUM SERPL-SCNC: 135 MMOL/L — SIGNIFICANT CHANGE UP (ref 135–145)
SPECIMEN SOURCE: SIGNIFICANT CHANGE UP
SPECIMEN SOURCE: SIGNIFICANT CHANGE UP
WBC # BLD: 4.42 K/UL — SIGNIFICANT CHANGE UP (ref 3.8–10.5)
WBC # FLD AUTO: 4.42 K/UL — SIGNIFICANT CHANGE UP (ref 3.8–10.5)

## 2020-08-14 RX ORDER — NIFEDIPINE 30 MG
30 TABLET, EXTENDED RELEASE 24 HR ORAL DAILY
Refills: 0 | Status: DISCONTINUED | OUTPATIENT
Start: 2020-08-14 | End: 2020-08-17

## 2020-08-14 RX ADMIN — CHLORHEXIDINE GLUCONATE 1 APPLICATION(S): 213 SOLUTION TOPICAL at 12:33

## 2020-08-14 RX ADMIN — HEPARIN SODIUM 5000 UNIT(S): 5000 INJECTION INTRAVENOUS; SUBCUTANEOUS at 05:54

## 2020-08-14 RX ADMIN — Medication 200 MILLIGRAM(S): at 05:54

## 2020-08-14 RX ADMIN — ISOSORBIDE MONONITRATE 120 MILLIGRAM(S): 60 TABLET, EXTENDED RELEASE ORAL at 12:32

## 2020-08-14 RX ADMIN — Medication 100 MILLIGRAM(S): at 05:54

## 2020-08-14 RX ADMIN — HEPARIN SODIUM 5000 UNIT(S): 5000 INJECTION INTRAVENOUS; SUBCUTANEOUS at 13:43

## 2020-08-14 RX ADMIN — Medication 200 MILLIGRAM(S): at 13:43

## 2020-08-14 RX ADMIN — HEPARIN SODIUM 5000 UNIT(S): 5000 INJECTION INTRAVENOUS; SUBCUTANEOUS at 21:16

## 2020-08-14 RX ADMIN — Medication 30 MILLIGRAM(S): at 12:31

## 2020-08-14 RX ADMIN — Medication 2001 MILLIGRAM(S): at 12:31

## 2020-08-14 RX ADMIN — Medication 1 TABLET(S): at 12:31

## 2020-08-14 RX ADMIN — Medication 100 MILLIGRAM(S): at 21:16

## 2020-08-14 RX ADMIN — Medication 200 MILLIGRAM(S): at 21:16

## 2020-08-14 RX ADMIN — Medication 2001 MILLIGRAM(S): at 08:02

## 2020-08-14 RX ADMIN — Medication 100 MILLIGRAM(S): at 13:43

## 2020-08-14 RX ADMIN — Medication 2001 MILLIGRAM(S): at 17:10

## 2020-08-14 RX ADMIN — CALCITRIOL 0.75 MICROGRAM(S): 0.5 CAPSULE ORAL at 08:02

## 2020-08-14 NOTE — DIETITIAN INITIAL EVALUATION ADULT. - PERTINENT MEDS FT
MEDICATIONS  (STANDING):  calcitriol   Capsule 0.75 MICROGram(s) Oral <User Schedule>  calcium acetate 2001 milliGRAM(s) Oral three times a day with meals  chlorhexidine 2% Cloths 1 Application(s) Topical daily  heparin   Injectable 5000 Unit(s) SubCutaneous every 8 hours  hydrALAZINE 100 milliGRAM(s) Oral three times a day  isosorbide   mononitrate ER Tablet (IMDUR) 60 milliGRAM(s) Oral once  isosorbide   mononitrate ER Tablet (IMDUR) 120 milliGRAM(s) Oral daily  labetalol 200 milliGRAM(s) Oral three times a day  levoFLOXacin IVPB 500 milliGRAM(s) IV Intermittent every 48 hours  Nephro-radha 1 Tablet(s) Oral daily  sodium zirconium cyclosilicate 5 Gram(s) Oral <User Schedule>

## 2020-08-14 NOTE — DIETITIAN INITIAL EVALUATION ADULT. - NS FNS WEIGHT USED FOR CALC
ideal/Ht=5' 7"   MGG=202 lb    Admission gd=889 lb ?   Current jb=842.2 lb 8/9/2020; 129.6 lb 8/14/2020  BMI=20.3   a bit fluctuated, may due to fluid shift from HD and/or scale variance

## 2020-08-14 NOTE — PROGRESS NOTE ADULT - SUBJECTIVE AND OBJECTIVE BOX
Patient is a 74y old  Female who presents with a chief complaint of shortness of breath (13 Aug 2020 15:22)  awake, alert, comfortable in bed in NAD. having HD today    INTERVAL HPI/OVERNIGHT EVENTS:      VITAL SIGNS:  T(F): 98 (08-14-20 @ 08:45)  HR: 65 (08-14-20 @ 08:45)  BP: 176/77 (08-14-20 @ 08:45)  RR: 17 (08-14-20 @ 08:45)  SpO2: 100% (08-14-20 @ 08:45)  Wt(kg): --  I&O's Detail          REVIEW OF SYSTEMS:    CONSTITUTIONAL:  No fevers, chills, sweats    HEENT:  Eyes:  No diplopia or blurred vision. ENT:  No earache, sore throat or runny nose.    CARDIOVASCULAR:  No pressure, squeezing, tightness, or heaviness about the chest; no palpitations.    RESPIRATORY:  Per HPI    GASTROINTESTINAL:  No abdominal pain, nausea, vomiting or diarrhea.    GENITOURINARY:  No dysuria, frequency or urgency.    NEUROLOGIC:  No paresthesias, fasciculations, seizures or weakness.    PSYCHIATRIC:  No disorder of thought or mood.      PHYSICAL EXAM:    Constitutional: Well developed and nourished  Eyes:Perrla  ENMT: normal  Neck:supple  Respiratory: good air entry  Cardiovascular: S1 S2 regular  Gastrointestinal: Soft, Non tender  Extremities: No edema  Vascular:normal  Neurological:Awake, alert,Ox3  Musculoskeletal:Normal      MEDICATIONS  (STANDING):  calcitriol   Capsule 0.75 MICROGram(s) Oral <User Schedule>  calcium acetate 2001 milliGRAM(s) Oral three times a day with meals  chlorhexidine 2% Cloths 1 Application(s) Topical daily  heparin   Injectable 5000 Unit(s) SubCutaneous every 8 hours  hydrALAZINE 100 milliGRAM(s) Oral three times a day  isosorbide   mononitrate ER Tablet (IMDUR) 60 milliGRAM(s) Oral once  isosorbide   mononitrate ER Tablet (IMDUR) 120 milliGRAM(s) Oral daily  labetalol 200 milliGRAM(s) Oral three times a day  levoFLOXacin IVPB 500 milliGRAM(s) IV Intermittent every 48 hours  Nephro-radha 1 Tablet(s) Oral daily  sodium zirconium cyclosilicate 5 Gram(s) Oral <User Schedule>    MEDICATIONS  (PRN):  ALBUTerol    90 MICROgram(s) HFA Inhaler 2 Puff(s) Inhalation every 6 hours PRN Bronchospasm      Allergies    Mushrooms (Anaphylaxis)  penicillin (Rash)    Intolerances        LABS:                        11.0   4.42  )-----------( 139      ( 14 Aug 2020 08:56 )             33.9     08-14    135  |  96  |  63<H>  ----------------------------<  132<H>  5.1   |  27  |  9.01<H>    Ca    9.3      14 Aug 2020 08:56    TPro  6.5  /  Alb  2.9<L>  /  TBili  0.4  /  DBili  x   /  AST  25  /  ALT  23  /  AlkPhos  69  08-14              CAPILLARY BLOOD GLUCOSE      POCT Blood Glucose.: 102 mg/dL (13 Aug 2020 11:12)    pro-bnp [id number] 08-09 @ 06:23     d-dimer --  08-09 @ 06:23      RADIOLOGY & ADDITIONAL TESTS:    CXR:  < from: Xray Chest 2 Views PA/Lat (08.09.20 @ 05:50) >  Impression: Bibasilar infiltrates/atelectasis and effusions as described. Cardiomegaly    < end of copied text >    Ct scan chest:    ekg;    echo:

## 2020-08-14 NOTE — PROGRESS NOTE ADULT - SUBJECTIVE AND OBJECTIVE BOX
Patient is seen and examined at the bed side, is afebrile. The echo shows Moderate circumferential pericardial effusion. No echocardiographic evidence of tamponade physiology.  and  Bilateral pleural effusions. The Cardiology follow up appreciated.         REVIEW OF SYSTEMS: All other review systems are negative        ALLERGIES: Mushrooms (Anaphylaxis)  penicillin (Rash)        Vital Signs Last 24 Hrs  T(C): 36.9 (14 Aug 2020 17:07), Max: 37.4 (14 Aug 2020 12:35)  T(F): 98.5 (14 Aug 2020 17:07), Max: 99.4 (14 Aug 2020 12:35)  HR: 71 (14 Aug 2020 17:07) (63 - 78)  BP: 145/67 (14 Aug 2020 17:07) (145/67 - 179/68)  BP(mean): --  RR: 18 (14 Aug 2020 17:07) (17 - 18)  SpO2: 95% (14 Aug 2020 17:07) (95% - 100%)        PHYSICAL EXAM:  GENERAL: Not in distress, on oxygen via NC   CHEST/LUNG:  Not using accessory muscles   HEART: s1 and s2 present  ABDOMEN:  Nontender and  Nondistended  EXTREMITIES: No pedal  edema  CNS: Awake and Alert        LABS:                        11.0   4.42  )-----------( 139      ( 14 Aug 2020 08:56 )             33.9                           11.1   3.97  )-----------( 162      ( 13 Aug 2020 07:17 )             35.4               08-14    135  |  96  |  63<H>  ----------------------------<  132<H>  5.1   |  27  |  9.01<H>    Ca    9.3      14 Aug 2020 08:56    TPro  6.5  /  Alb  2.9<L>  /  TBili  0.4  /  DBili  x   /  AST  25  /  ALT  23  /  AlkPhos  69  08-14    08-13    137  |  97  |  39<H>  ----------------------------<  76  5.5<H>   |  30  |  6.31<H>    Ca    9.3      13 Aug 2020 07:17    TPro  6.4  /  Alb  2.9<L>  /  TBili  0.4  /  DBili  x   /  AST  26  /  ALT  25  /  AlkPhos  68  08-13  PT/INR - ( 09 Aug 2020 06:23 )   PT: 12.4 sec;   INR: 1.06 ratio       PTT - ( 09 Aug 2020 06:23 )  PTT:38.4 sec        CAPILLARY BLOOD GLUCOSE  POCT Blood Glucose.: 126 mg/dL (10 Aug 2020 06:14)  POCT Blood Glucose.: 87 mg/dL (09 Aug 2020 23:53)  POCT Blood Glucose.: 70 mg/dL (09 Aug 2020 22:38)  POCT Blood Glucose.: 92 mg/dL (09 Aug 2020 20:58)  POCT Blood Glucose.: 142 mg/dL (09 Aug 2020 17:14)  POCT Blood Glucose.: 63 mg/dL (09 Aug 2020 16:18)        MEDICATIONS  (STANDING):    calcitriol   Capsule 0.75 MICROGram(s) Oral <User Schedule>  calcium acetate 2001 milliGRAM(s) Oral three times a day with meals  chlorhexidine 2% Cloths 1 Application(s) Topical daily  heparin   Injectable 5000 Unit(s) SubCutaneous every 8 hours  hydrALAZINE 100 milliGRAM(s) Oral three times a day  isosorbide   mononitrate ER Tablet (IMDUR) 60 milliGRAM(s) Oral once  isosorbide   mononitrate ER Tablet (IMDUR) 120 milliGRAM(s) Oral daily  labetalol 200 milliGRAM(s) Oral three times a day  levoFLOXacin IVPB 500 milliGRAM(s) IV Intermittent every 48 hours  Nephro-radha 1 Tablet(s) Oral daily  NIFEdipine XL 30 milliGRAM(s) Oral daily  sodium zirconium cyclosilicate 5 Gram(s) Oral <User Schedule>        RADIOLOGY & ADDITIONAL TESTS:        8/11/20  : CT Chest No Cont (08.11.20 @ 12:46) Moderate bilateral pleural effusion, new from prior, with underlying compressive atelectasis of lower lobes.    Moderate pericardial effusion and cardiomegaly unchanged.  End-stage renal disease and renal osteodystrophy.   Multinodular thyroid.      8/9/20 : Xray Chest 2 Views PA/Lat (08.09.20 @ 05:50) PA lateral. Prior 5/20/2020.    < from: Transthoracic Echocardiogram (08.13.20 @ 12:20) >  1. Mild posterior mitral annular calcification. Trivial  mitral regurgitation.  2. Normal trileaflet aortic valve. No aortic stenosis. Mild  to moderate aortic regurgitation ( msec).  3. Normal aortic root.  4. Mildly dilated left atrium.  5. Severe concentric left ventricular hypertrophy.  6. Normal Left Ventricular Systolic Function,  (EF = 62% by  biplane)  7. Normal global longitudinal strain (GLS) at - 21%.  8. Grade I diastolic dysfunction (Impaired relaxation).  9. Normal right atrium.  10. Normal right ventricular size and systolic function  (TAPSE 2.0 cm).  11. RV systolic pressure is mildly increased at  39 mm Hg.  12. Normal tricuspid valve. Trace tricuspid regurgitation.  13. Normal pulmonic valve. Trace pulmonic insufficiency is  noted.  14. Moderate circumferential pericardial effusion. No  echocardiographic evidence of tamponade physiology.  15. Bilateral pleural effusions.    < end of copied text >  Moderate cardiomegaly. Patchy opacities at the bilateral lung bases likely reflect a combination of pneumonia and atelectasis. Trace right, small left pleural effusion. Mild levoscoliosis lower thoracic spine.        MICROBIOLOGY DATA:      MRSA/MSSA PCR (08.11.20 @ 04:26)    MRSA PCR Result.: NotDetec: MRSA/MSSA PCR assay is a qualitative in vitro diagnostic test for the  direct detection and differentiation of methicillin-resistant  Staphylococcus aureus (MRSA) from Staphylococcus aureus (SA). The assay  detects DNA from nasal swabs in patients atrisk for nasal colonization.  It is not intended to diagnose MRSA or SA infections nor guide or monitor  treatment for MRSA/SA infections. A negative result does not preclude  nasal colonization. The assay is FDA-approved and its performance has  been established by [employer], [place] and the [employer], [place], [place].    Staph Aureus PCR Result: NotDetec        Culture - Blood (08.09.20 @ 18:40)    Specimen Source: .Blood Blood    Culture Results:   No growth to date.      Culture - Blood (08.09.20 @ 18:40)    Specimen Source: .Blood Blood    Culture Results:   No growth to date.        COVID-19 PCR . (08.09.20 @ 07:53)    COVID-19 PCR: NotDetec: This test has been validated by [employer] to be accurate;  though it has not been FDA cleared/approved by the usual pathway  As with all laboratory test, results should be correlated with clinical  findings.  https://www.fda.gov/media/409892/download  https://www.fda.gov/media/185965/download

## 2020-08-14 NOTE — PROGRESS NOTE ADULT - PROBLEM SELECTOR PLAN 4
Pr with ESRD with HD MWF  hyperkalemia: K elevated will repeat after cocktail    cw renal diet  last HD today  Dr. Lloyd consulted

## 2020-08-14 NOTE — PROGRESS NOTE ADULT - SUBJECTIVE AND OBJECTIVE BOX
CHIEF COMPLAINT:Patient is a 74y old  Female who presents with a chief complaint of shortness of breath.Pt appears comfortable.    	  REVIEW OF SYSTEMS:  CONSTITUTIONAL: No fever, weight loss, or fatigue  EYES: No eye pain, visual disturbances, or discharge  ENT:  No difficulty hearing, tinnitus, vertigo; No sinus or throat pain  NECK: No pain or stiffness  RESPIRATORY: No cough, wheezing, chills or hemoptysis; No Shortness of Breath  CARDIOVASCULAR: No chest pain, palpitations, passing out, dizziness, or leg swelling  GASTROINTESTINAL: No abdominal or epigastric pain. No nausea, vomiting, or hematemesis; No diarrhea or constipation. No melena or hematochezia.  GENITOURINARY: No dysuria, frequency, hematuria, or incontinence  NEUROLOGICAL: No headaches, memory loss, loss of strength, numbness, or tremors  SKIN: No itching, burning, rashes, or lesions   LYMPH Nodes: No enlarged glands  ENDOCRINE: No heat or cold intolerance; No hair loss  MUSCULOSKELETAL: No joint pain or swelling; No muscle, back, or extremity pain  PSYCHIATRIC: No depression, anxiety, mood swings, or difficulty sleeping  HEME/LYMPH: No easy bruising, or bleeding gums  ALLERGY AND IMMUNOLOGIC: No hives or eczema	      PHYSICAL EXAM:  T(C): 36.7 (08-14-20 @ 08:45), Max: 37 (08-14-20 @ 04:39)  HR: 65 (08-14-20 @ 08:45) (63 - 78)  BP: 176/77 (08-14-20 @ 08:45) (161/76 - 179/68)  RR: 17 (08-14-20 @ 08:45) (17 - 18)  SpO2: 100% (08-14-20 @ 08:45) (97% - 100%)  Wt(kg): --  I&O's Summary      Appearance: Normal	  HEENT:   Normal oral mucosa, PERRL, EOMI	  Lymphatic: No lymphadenopathy  Cardiovascular: Normal S1 S2, No JVD, No murmurs, No edema  Respiratory: Dec BS at bases  Psychiatry: A & O x 3, Mood & affect appropriate  Gastrointestinal:  Soft, Non-tender, + BS	  Skin: No rashes, No ecchymoses, No cyanosis	  Neurologic: Non-focal  Extremities: Normal range of motion, No clubbing, cyanosis or edema  Vascular: Peripheral pulses palpable 2+ bilaterally    MEDICATIONS  (STANDING):  calcitriol   Capsule 0.75 MICROGram(s) Oral <User Schedule>  calcium acetate 2001 milliGRAM(s) Oral three times a day with meals  chlorhexidine 2% Cloths 1 Application(s) Topical daily  heparin   Injectable 5000 Unit(s) SubCutaneous every 8 hours  hydrALAZINE 100 milliGRAM(s) Oral three times a day  isosorbide   mononitrate ER Tablet (IMDUR) 60 milliGRAM(s) Oral once  isosorbide   mononitrate ER Tablet (IMDUR) 120 milliGRAM(s) Oral daily  labetalol 200 milliGRAM(s) Oral three times a day  levoFLOXacin IVPB 500 milliGRAM(s) IV Intermittent every 48 hours  Nephro-radha 1 Tablet(s) Oral daily  NIFEdipine XL 30 milliGRAM(s) Oral daily  sodium zirconium cyclosilicate 5 Gram(s) Oral <User Schedule>      	  	  LABS:	 	                        11.0   4.42  )-----------( 139      ( 14 Aug 2020 08:56 )             33.9     08-14    135  |  96  |  63<H>  ----------------------------<  132<H>  5.1   |  27  |  9.01<H>    Ca    9.3      14 Aug 2020 08:56    TPro  6.5  /  Alb  2.9<L>  /  TBili  0.4  /  DBili  x   /  AST  25  /  ALT  23  /  AlkPhos  69  08-14    proBNP: Serum Pro-Brain Natriuretic Peptide: 40137 pg/mL (08-09 @ 06:23)    Lipid Profile: Cholesterol 155  LDL 63  HDL 77  TG 76      TSH: Thyroid Stimulating Hormone, Serum: 2.70 uU/mL (08-10 @ 10:14)      	  OBSERVATIONS:  Mitral Valve: Mild posterior mitral annular calcification.  Trivial mitral regurgitation.  Aortic Root: Normal aortic root.  Aortic Valve: Normal trileaflet aortic valve. No aortic  stenosis. Mild to moderate aortic regurgitation (  msec).  Left Atrium: Mildly dilated left atrium.  LA volume index =  36 cc/m2.  Left Ventricle: Normal Left Ventricular Systolic Function,  (EF = 62% by biplane) No regional wall motion  abnormalities. Severe concentric left ventricular  hypertrophy. Grade I diastolic dysfunction (Impaired  relaxation).  Right Heart: Normal right atrium. Normal right ventricular  size and systolic function (TAPSE 2.0 cm). Normal tricuspid  valve. Trace tricuspid regurgitation. Normal pulmonic  valve. Trace pulmonic insufficiencyis noted.  Pericardium/PleuraModerate circumferential pericardial  effusion. No echocardiographic evidence of tamponade  physiology.   Bilateral pleural effusions.  Hemodynamic: RA Pressure is 3 mm Hg. RV systolic pressure  is mildly increased at  39mm Hg.  Dyssynchrony/Optimization: Normal global longitudinal  strain (GLS) at - 21%.

## 2020-08-14 NOTE — PROGRESS NOTE ADULT - PROBLEM SELECTOR PLAN 1
Pt pw sob x 1 day with cough   cxr: Moderate cardiomegaly. Patchy opacities at the bilateral lung bases likely reflect a combination of pneumonia and atelectasis. Trace right, small left pleural effusion.   Pt is afebrile with no wbc elevation  pulrico Harris

## 2020-08-14 NOTE — DIETITIAN INITIAL EVALUATION ADULT. - PERTINENT LABORATORY DATA
08-14 Na135 mmol/L Glu 132 mg/dL<H> K+ 5.1 mmol/L Cr  9.01 mg/dL<H> BUN 63 mg/dL<H>   08-10 Phos 6.6 mg/dL<H>   08-14 Alb 2.9 g/dL<L>       08-10 Chol 155 mg/dL LDL 63 mg/dL HDL 77 mg/dL Trig 76 mg/dL  08-10-20 @ 14:36 HgbA1C 4.5 [4.0 - 5.6]

## 2020-08-14 NOTE — DIETITIAN INITIAL EVALUATION ADULT. - NAME AND PHONE
Pt followed by dietitian at out-pt dialysis center when medically ready to be discharged Pt followed by dietitian at out-pt dialysis center when medically ready to be discharged.

## 2020-08-14 NOTE — PROGRESS NOTE ADULT - PROBLEM SELECTOR PLAN 2
Pt pw HTN urgency with bp 220/92 which improved to 179/76 after hydralazine and amlodipine was give .   BNP is elevated;   pt had a recent echo on 1/2020 with ef > 60% and lv hypertrophy  continue with home medications  added nifedipine today  continue to monitor bp

## 2020-08-14 NOTE — DIETITIAN INITIAL EVALUATION ADULT. - FACTORS AFF FOOD INTAKE
acute on chronic comorbidities, including ESRD on HD/Advent/ethnic/cultural/personal food preferences

## 2020-08-14 NOTE — PROGRESS NOTE ADULT - SUBJECTIVE AND OBJECTIVE BOX
CC: Patient denies CP, SOB, n/v/d, fever or chills.    Vital Signs Last 24 Hrs  T(C): 37.4 (14 Aug 2020 12:35), Max: 37.4 (14 Aug 2020 12:35)  T(F): 99.4 (14 Aug 2020 12:35), Max: 99.4 (14 Aug 2020 12:35)  HR: 69 (14 Aug 2020 12:35) (63 - 78)  BP: 178/63 (14 Aug 2020 12:35) (154/63 - 179/68)  BP(mean): --  RR: 18 (14 Aug 2020 12:35) (17 - 18)  SpO2: 100% (14 Aug 2020 12:35) (97% - 100%)    08-14 @ 07:01  -  08-14 @ 16:21  --------------------------------------------------------  IN: 400 mL / OUT: 3400 mL / NET: -3000 ml    PHYSICAL EXAM:  GENERAL: No acute respiratory distress.  EYES: Sclera anicteric  ENMT: Atraumatic, Normocephalic, supple, No JVD present. Moist mucous membranes  RESPIRATORY: clear  CARDIOVASCULAR: S1S2+; no murmurs  GASTROINTESTINAL: Soft, Non-tender, Nondistended; Bowel sounds present   CENTRAL NERVOUS SYSTEM:  Awake, Alert & Oriented X3  EXTREMITIES:  1+ edema  SKIN: Normal turgor  DIALYSIS ACCESS:  Rt Arm AVG thrill/bruit+     MEDICATIONS:  ALBUTerol    90 MICROgram(s) HFA Inhaler 2 Puff(s) Inhalation every 6 hours PRN  calcitriol   Capsule 0.75 MICROGram(s) Oral <User Schedule>  calcium acetate 2001 milliGRAM(s) Oral three times a day with meals  chlorhexidine 2% Cloths 1 Application(s) Topical daily  heparin   Injectable 5000 Unit(s) SubCutaneous every 8 hours  hydrALAZINE 100 milliGRAM(s) Oral three times a day  isosorbide   mononitrate ER Tablet (IMDUR) 60 milliGRAM(s) Oral once  isosorbide   mononitrate ER Tablet (IMDUR) 120 milliGRAM(s) Oral daily  labetalol 200 milliGRAM(s) Oral three times a day  levoFLOXacin IVPB 500 milliGRAM(s) IV Intermittent every 48 hours  Nephro-radha 1 Tablet(s) Oral daily  NIFEdipine XL 30 milliGRAM(s) Oral daily  sodium zirconium cyclosilicate 5 Gram(s) Oral <User Schedule>    LABS:                        11.0   4.42  )-----------( 139      ( 14 Aug 2020 08:56 )             33.9     08-14    135  |  96  |  63<H>  ----------------------------<  132<H>  5.1   |  27  |  9.01<H>    Ca    9.3      14 Aug 2020 08:56    TPro  6.5  /  Alb  2.9<L>  /  TBili  0.4  /  DBili  x   /  AST  25  /  ALT  23  /  AlkPhos  69  08-14    ASSESSMENT AND PLAN:     1. ESRD. Tolerating HD  -Cont HD with UF as tolerated  -Hemodialysis Renal Diet and Fluid restriction to 1L/day  -Adjust meds to eGFR and avoid IV Gadolinium contrast,NSAIDs, and phosphate enema.  -Monitor Electrolytes daily.  2. Anemia:  -hb >10.5 and no need for epogen  -F/u CBC daily  -transfuse if HB < 7.0.  3. HTN: -bp is acceptable. pt takes metoprolol 50mg bid at home.   -titrate bp meds to keep sbp >110 and < 130  4. MBD: phoslo 3 tab tid; check phos and outpatient PtH (895), continue calcitriol 0.75mcg tiw.   5. Pneumonia: on iv levaquin.   6. Hyperkalemia due to ESRD  -Kayexalate PRN for K>5.3

## 2020-08-14 NOTE — DIETITIAN INITIAL EVALUATION ADULT. - OTHER INFO
Pt from home, on HD Tx at present; ESRD on HD, followed by RD at out-pt HD center; Pt alert, oriented, well-communicated, lives home PTA; declining RD interview at present, "I don't need nutrition(dietitian)" per pt, followed by RD at out-pt HD center; observed lunch 100% intake today; Discussed with RN Pt alert, oriented, well-communicated, lives home PTA; declining RD interview at present, "I don't need nutrition(dietitian)" per pt, followed by RD at out-pt HD center; observed lunch 100% intake today; Discussed with RN.

## 2020-08-14 NOTE — PROGRESS NOTE ADULT - ASSESSMENT
Assessment and Recommendation:   Problem/Recommendation - 1:  Problem: HCAP (healthcare-associated pneumonia). Recommendation: Blood cultures neg  antibiotics  oxygen supp  monitor temp and WBC      Problem/Recommendation - 2:  ·  Problem: ESRD (end stage renal disease) on dialysis.  Recommendation: cont HD  monitor labs  Renal F/U.        Problem/Recommendation - 3:  ·  Problem: HTN (hypertension).  Recommendation: monitor BP  cont meds.       Problem/Recommendation - 4:  ·  Problem: Pericardial/Pleural Effusion  Recommendation: Seen on CT chest  Cardio eval   ID eval noted :  Recc TTE for further evaluation of Pericardial effusion  Pleural effusions decreased. No need for tapping at this time

## 2020-08-14 NOTE — PROGRESS NOTE ADULT - SUBJECTIVE AND OBJECTIVE BOX
INTERVAL HPI/OVERNIGHT EVENTS:  Patient seen at bedside,events noticed,no dystress  VITAL SIGNS:  T(F): 98.5 (08-14-20 @ 17:07)  HR: 71 (08-14-20 @ 17:07)  BP: 145/67 (08-14-20 @ 17:07)  RR: 18 (08-14-20 @ 17:07)  SpO2: 95% (08-14-20 @ 17:07)  Wt(kg): --    PHYSICAL EXAM:  awake  Constitutional:  Eyes:  ENMT:perrla  Neck:  Respiratory:few rales and cracles  Cardiovascular:s1 s2,m-none  Gastrointestinal:soft,bs pos  Extremities:mild edema  Vascular:  Neurological:no focal deficit  Musculoskeletal:    MEDICATIONS  (STANDING):  calcitriol   Capsule 0.75 MICROGram(s) Oral <User Schedule>  calcium acetate 2001 milliGRAM(s) Oral three times a day with meals  chlorhexidine 2% Cloths 1 Application(s) Topical daily  heparin   Injectable 5000 Unit(s) SubCutaneous every 8 hours  hydrALAZINE 100 milliGRAM(s) Oral three times a day  isosorbide   mononitrate ER Tablet (IMDUR) 60 milliGRAM(s) Oral once  isosorbide   mononitrate ER Tablet (IMDUR) 120 milliGRAM(s) Oral daily  labetalol 200 milliGRAM(s) Oral three times a day  levoFLOXacin IVPB 500 milliGRAM(s) IV Intermittent every 48 hours  Nephro-radha 1 Tablet(s) Oral daily  NIFEdipine XL 30 milliGRAM(s) Oral daily  sodium zirconium cyclosilicate 5 Gram(s) Oral <User Schedule>    MEDICATIONS  (PRN):  ALBUTerol    90 MICROgram(s) HFA Inhaler 2 Puff(s) Inhalation every 6 hours PRN Bronchospasm      Allergies    Mushrooms (Anaphylaxis)  penicillin (Rash)    Intolerances        LABS:                        11.0   4.42  )-----------( 139      ( 14 Aug 2020 08:56 )             33.9     08-14    135  |  96  |  63<H>  ----------------------------<  132<H>  5.1   |  27  |  9.01<H>    Ca    9.3      14 Aug 2020 08:56    TPro  6.5  /  Alb  2.9<L>  /  TBili  0.4  /  DBili  x   /  AST  25  /  ALT  23  /  AlkPhos  69  08-14     Assessment/Plan:  · Assessment	74 y.o. female w/ PMH ESRD (last HD 8/12/20) and HTN p/w cc: "I can't breathe". She was found to have new moderate pleural effusion and unchanged moderate pleural effusion and cardiomegaly on Chest CT (8/11/20). Per IR, the pt will not have thoracocentesis done today due to a full schedule. The echocardiogram was not completed last night, it is rescheduled for today. She is scheduled for HD tomorrow (8/14/20). The pt reports feeling better and is saturating well on NC (2L). Although BP is still elevated (161/64), the pt reports no headaches, CP, vision problems, or fatigue.  Problems: 1. HTN  2. Hyperkalemia 3. HCAP 4. ESRD	  · Plan		  1. HTN  - Pt presented w/ hypertensive emergency. BP this morning was 161/64 which has improved from the initial presentation 220/92, however the BP has not normalized despite increasing the dose of Labetalol and IMDUR.   - Monitor BP regularly to assess whether a return to baseline occurs.     2. Pericardial and Pleural Effusions  - Pt was noted to have a pleural effusion on CT + moderate pericardial infusion.  - ESR was normal (10).   - STEPHANIE to r/o autoimmune effusions.   - ECHO done today will f/u    2. Hyperkalemia  - K+ levels increased again today (5.3 at 7:17 am). sodium zirconium cyclosilicate 5g was started this morning.   - Repeat CMP to continue to monitor levels    3. Health Care Associated Pneumonia  - Pt continues to be afebrile, and has improved clinically with ABX and NC (2L).   - continue to monitor temperature, WBC and clinical picture while inpatient  - CT of Chest demonstrated pleural and pericardial effusions.   - attempt to decrease use of supplemental oxygen- reassess O2 sat tomorrow.  - IV levaquin to be adjusted based on creat clearance    4. ESRD  - Dialysis yesterday (8/12/20) went well- pt reported feeling well this morning.  - scheduled for next dialysis tomorrow 8/14/20.

## 2020-08-14 NOTE — PROGRESS NOTE ADULT - ASSESSMENT
74 yr old F, pmhx of ESRD (MWF, last dialysis 08/07), HTN, and DVT, presents with shortness of breath for 1 day.  1.Repeat echo-eval Pericardial effusion size in 4 weeks for stability.  2.HTN-Add procardia xl 30mg qd,cont rest of bp medication.  3.ESRD-HD as per Renal.  4.Pleural effusion-pulm f/u.  5.ABX.  6.GI and DVT prophylaxis.

## 2020-08-14 NOTE — PROGRESS NOTE ADULT - ASSESSMENT
Patient is a 74y old  Female with ESRD (MWF, last dialysis 08/07), HTN, and DVT, presents to the ER for evaluation of  worsening shortness of breath associated with cough but No fever. On admission, she found to have no fever or leukocytosis but Patchy opacities at the bilateral lung bases on CXR. She has started on Levaquin since allergic to  PCN, and the ID consult requested to assist with further evaluation and antibiotic management.    # Pneumonia -Blood cultures have no growth to date and MRSA PCR is negative   # B/L Pleural effusion- also seen on echo 8/13  # Pericardial effusion - Also seen on Echo 8/13  # COVID 19 Negative-  H/O COVID  # ESRD- on HD  # Allergy- PCN ( Rash)    would recommend:    1. Management of pericardial effusion as per Cardiology   2. Continue Levaquin until 8/19/20, may change to oral on discharge   3. Supplemental oxygenation and bronchodilator as needed  4. Aspiration precaution    d/w patient and House staff     Attending Attestation:    Spent more than 45 minutes on total encounter, more than 50 % of the visit was spent counseling and/or coordinating care by the Attending physician.

## 2020-08-14 NOTE — PROGRESS NOTE ADULT - SUBJECTIVE AND OBJECTIVE BOX
PGY-1 Progress Note discussed with attending    PAGER #: [[phone]] TILL 5:00 PM  PLEASE CONTACT ON CALL TEAM:  - On Call Team (Please refer to Carrillo) FROM 5:00 PM - 8:30PM  - Nightfloat Team FROM 8:30 -7:30 AM    CHIEF COMPLAINT & BRIEF HOSPITAL COURSE: 74 y.o. female w/ PMH ESRD and HTN p/w cc: "I can't breathe". Pt says that she noticed the difficulty breathing on Saturday afternoon but it was tolerable. At 4 am Sunday morning, pt woke up unable to breathe and called her own ambulance. The difficulty breathing worsened as time went on, but improved on oxygen NC. She says she has never experienced this before. Pt denies CP, palpitations, fever, N/V/D/C. Endorses cough but no phlegm production. Pt reports baseline systolic BP is in 160s. Nifedipine added today. If nml tomorrow, d/c    INTERVAL HPI/OVERNIGHT EVENTS: No adverse events overnight    REVIEW OF SYSTEMS:  CONSTITUTIONAL: No fever, weight loss, or fatigue  RESPIRATORY: No cough, wheezing, chills or hemoptysis; No shortness of breath  CARDIOVASCULAR: No chest pain, palpitations, dizziness, or leg swelling  GASTROINTESTINAL: No abdominal pain. No nausea, vomiting, or hematemesis; No diarrhea or constipation. No melena or hematochezia.  GENITOURINARY: No dysuria or hematuria, urinary frequency  NEUROLOGICAL: No headaches, memory loss, loss of strength, numbness, or tremors  SKIN: No itching, burning, rashes, or lesions     Vital Signs Last 24 Hrs  T(C): 36.9 (14 Aug 2020 17:07), Max: 37.4 (14 Aug 2020 12:35)  T(F): 98.5 (14 Aug 2020 17:07), Max: 99.4 (14 Aug 2020 12:35)  HR: 71 (14 Aug 2020 17:07) (63 - 78)  BP: 145/67 (14 Aug 2020 17:07) (145/67 - 179/68)  BP(mean): --  RR: 18 (14 Aug 2020 17:07) (17 - 18)  SpO2: 95% (14 Aug 2020 17:07) (95% - 100%)    PHYSICAL EXAMINATION:  GENERAL: NAD, well built  HEAD:  Atraumatic, Normocephalic  EYES:  conjunctiva and sclera clear  NECK: Supple, No JVD, Normal thyroid  CHEST/LUNG: Clear to auscultation. Clear to percussion bilaterally; No rales, rhonchi, wheezing, or rubs  HEART: Regular rate and rhythm; No murmurs, rubs, or gallops  ABDOMEN: Soft, Nontender, Nondistended; Bowel sounds present  NERVOUS SYSTEM:  Alert & Oriented X3,    EXTREMITIES:  2+ Peripheral Pulses, No clubbing, cyanosis, or edema  SKIN: warm dry                          11.0   4.42  )-----------( 139      ( 14 Aug 2020 08:56 )             33.9     08-14    135  |  96  |  63<H>  ----------------------------<  132<H>  5.1   |  27  |  9.01<H>    Ca    9.3      14 Aug 2020 08:56    TPro  6.5  /  Alb  2.9<L>  /  TBili  0.4  /  DBili  x   /  AST  25  /  ALT  23  /  AlkPhos  69  08-14    LIVER FUNCTIONS - ( 14 Aug 2020 08:56 )  Alb: 2.9 g/dL / Pro: 6.5 g/dL / ALK PHOS: 69 U/L / ALT: 23 U/L DA / AST: 25 U/L / GGT: x                   CAPILLARY BLOOD GLUCOSE      RADIOLOGY & ADDITIONAL TESTS:

## 2020-08-15 PROCEDURE — 71045 X-RAY EXAM CHEST 1 VIEW: CPT | Mod: 26

## 2020-08-15 RX ORDER — CALCITRIOL 0.5 UG/1
3 CAPSULE ORAL
Qty: 39 | Refills: 0
Start: 2020-08-15 | End: 2020-09-13

## 2020-08-15 RX ORDER — ISOSORBIDE MONONITRATE 60 MG/1
1 TABLET, EXTENDED RELEASE ORAL
Qty: 30 | Refills: 0
Start: 2020-08-15 | End: 2020-09-13

## 2020-08-15 RX ORDER — LABETALOL HCL 100 MG
1 TABLET ORAL
Qty: 90 | Refills: 0
Start: 2020-08-15 | End: 2020-09-13

## 2020-08-15 RX ORDER — CALCIUM ACETATE 667 MG
3 TABLET ORAL
Qty: 270 | Refills: 0
Start: 2020-08-15 | End: 2020-09-13

## 2020-08-15 RX ORDER — NIFEDIPINE 30 MG
1 TABLET, EXTENDED RELEASE 24 HR ORAL
Qty: 30 | Refills: 0
Start: 2020-08-15 | End: 2020-09-13

## 2020-08-15 RX ORDER — HYDRALAZINE HCL 50 MG
1 TABLET ORAL
Qty: 90 | Refills: 0
Start: 2020-08-15 | End: 2020-09-13

## 2020-08-15 RX ORDER — LABETALOL HCL 100 MG
1 TABLET ORAL
Qty: 0 | Refills: 0 | DISCHARGE
Start: 2020-08-15 | End: 2020-09-13

## 2020-08-15 RX ORDER — LABETALOL HCL 100 MG
100 TABLET ORAL
Qty: 0 | Refills: 0 | DISCHARGE
Start: 2020-08-15 | End: 2020-09-13

## 2020-08-15 RX ORDER — LABETALOL HCL 100 MG
1 TABLET ORAL
Qty: 0 | Refills: 0 | DISCHARGE

## 2020-08-15 RX ORDER — SODIUM ZIRCONIUM CYCLOSILICATE 10 G/10G
1 POWDER, FOR SUSPENSION ORAL
Qty: 17 | Refills: 0
Start: 2020-08-15 | End: 2020-09-13

## 2020-08-15 RX ADMIN — Medication 1 TABLET(S): at 11:21

## 2020-08-15 RX ADMIN — HEPARIN SODIUM 5000 UNIT(S): 5000 INJECTION INTRAVENOUS; SUBCUTANEOUS at 06:29

## 2020-08-15 RX ADMIN — SODIUM ZIRCONIUM CYCLOSILICATE 5 GRAM(S): 10 POWDER, FOR SUSPENSION ORAL at 08:09

## 2020-08-15 RX ADMIN — Medication 2001 MILLIGRAM(S): at 17:30

## 2020-08-15 RX ADMIN — Medication 100 MILLIGRAM(S): at 21:49

## 2020-08-15 RX ADMIN — Medication 200 MILLIGRAM(S): at 06:29

## 2020-08-15 RX ADMIN — Medication 2001 MILLIGRAM(S): at 08:09

## 2020-08-15 RX ADMIN — HEPARIN SODIUM 5000 UNIT(S): 5000 INJECTION INTRAVENOUS; SUBCUTANEOUS at 14:24

## 2020-08-15 RX ADMIN — Medication 100 MILLIGRAM(S): at 14:23

## 2020-08-15 RX ADMIN — Medication 30 MILLIGRAM(S): at 06:29

## 2020-08-15 RX ADMIN — Medication 100 MILLIGRAM(S): at 06:29

## 2020-08-15 RX ADMIN — Medication 200 MILLIGRAM(S): at 21:49

## 2020-08-15 RX ADMIN — CHLORHEXIDINE GLUCONATE 1 APPLICATION(S): 213 SOLUTION TOPICAL at 11:21

## 2020-08-15 RX ADMIN — Medication 200 MILLIGRAM(S): at 14:23

## 2020-08-15 RX ADMIN — ISOSORBIDE MONONITRATE 120 MILLIGRAM(S): 60 TABLET, EXTENDED RELEASE ORAL at 11:21

## 2020-08-15 RX ADMIN — Medication 2001 MILLIGRAM(S): at 11:41

## 2020-08-15 RX ADMIN — HEPARIN SODIUM 5000 UNIT(S): 5000 INJECTION INTRAVENOUS; SUBCUTANEOUS at 21:49

## 2020-08-15 NOTE — PROGRESS NOTE ADULT - ASSESSMENT
Patient is a 74y old  Female with ESRD (MWF, last dialysis 08/07), HTN, and DVT, presents to the ER for evaluation of  worsening shortness of breath associated with cough but No fever. On admission, she found to have no fever or leukocytosis but Patchy opacities at the bilateral lung bases on CXR. She has started on Levaquin since allergic to  PCN, and the ID consult requested to assist with further evaluation and antibiotic management.    # Pneumonia -Blood cultures have no growth to date and MRSA PCR is negative   # B/L Pleural effusion- also seen on echo 8/13  # Pericardial effusion - Also seen on Echo 8/13-  The echo shows Moderate circumferential pericardial effusion. No echocardiographic evidence of tamponade physiology.  and  Bilateral pleural effusions  # COVID 19 Negative-  H/O COVID  # ESRD- on HD  # Allergy- PCN ( Rash)    would recommend:    1. Repeat Echo in 4 weeks  as per Cardiology   2. Continue Levaquin until 8/19/20, may change to oral on discharge   3. Supplemental oxygenation and bronchodilator as needed  4. Aspiration precaution    d/w patient and Nursing staff     Attending Attestation:    Spent more than 35 minutes on total encounter, more than 50 % of the visit was spent counseling and/or coordinating care by the Attending physician.

## 2020-08-15 NOTE — PROGRESS NOTE ADULT - SUBJECTIVE AND OBJECTIVE BOX
CHIEF COMPLAINT:Patient is a 74y old  Female who presents with a chief complaint of shortness of breath.Pt appears comfortable.    	  REVIEW OF SYSTEMS:  CONSTITUTIONAL: No fever, weight loss, or fatigue  EYES: No eye pain, visual disturbances, or discharge  ENT:  No difficulty hearing, tinnitus, vertigo; No sinus or throat pain  NECK: No pain or stiffness  RESPIRATORY: No cough, wheezing, chills or hemoptysis; No Shortness of Breath  CARDIOVASCULAR: No chest pain, palpitations, passing out, dizziness, or leg swelling  GASTROINTESTINAL: No abdominal or epigastric pain. No nausea, vomiting, or hematemesis; No diarrhea or constipation. No melena or hematochezia.  GENITOURINARY: No dysuria, frequency, hematuria, or incontinence  NEUROLOGICAL: No headaches, memory loss, loss of strength, numbness, or tremors  SKIN: No itching, burning, rashes, or lesions   LYMPH Nodes: No enlarged glands  ENDOCRINE: No heat or cold intolerance; No hair loss  MUSCULOSKELETAL: No joint pain or swelling; No muscle, back, or extremity pain  PSYCHIATRIC: No depression, anxiety, mood swings, or difficulty sleeping  HEME/LYMPH: No easy bruising, or bleeding gums  ALLERGY AND IMMUNOLOGIC: No hives or eczema	        PHYSICAL EXAM:  T(C): 36.9 (08-15-20 @ 08:12), Max: 37.4 (08-14-20 @ 12:35)  HR: 67 (08-15-20 @ 08:12) (67 - 74)  BP: 152/60 (08-15-20 @ 08:12) (145/67 - 178/63)  RR: 19 (08-15-20 @ 08:12) (18 - 19)  SpO2: 100% (08-15-20 @ 08:12) (95% - 100%)  Wt(kg): --  I&O's Summary    14 Aug 2020 07:01  -  15 Aug 2020 07:00  --------------------------------------------------------  IN: 400 mL / OUT: 3400 mL / NET: -3000 mL        Appearance: Normal	  HEENT:   Normal oral mucosa, PERRL, EOMI	  Lymphatic: No lymphadenopathy  Cardiovascular: Normal S1 S2, No JVD, No murmurs, No edema  Respiratory: Dec bS at bases	  Psychiatry: A & O x 3, Mood & affect appropriate  Gastrointestinal:  Soft, Non-tender, + BS	  Skin: No rashes, No ecchymoses, No cyanosis	  Neurologic: Non-focal  Extremities: Normal range of motion, No clubbing, cyanosis or edema  Vascular: Peripheral pulses palpable 2+ bilaterally    MEDICATIONS  (STANDING):  calcitriol   Capsule 0.75 MICROGram(s) Oral <User Schedule>  calcium acetate 2001 milliGRAM(s) Oral three times a day with meals  chlorhexidine 2% Cloths 1 Application(s) Topical daily  heparin   Injectable 5000 Unit(s) SubCutaneous every 8 hours  hydrALAZINE 100 milliGRAM(s) Oral three times a day  isosorbide   mononitrate ER Tablet (IMDUR) 60 milliGRAM(s) Oral once  isosorbide   mononitrate ER Tablet (IMDUR) 120 milliGRAM(s) Oral daily  labetalol 200 milliGRAM(s) Oral three times a day  levoFLOXacin IVPB 500 milliGRAM(s) IV Intermittent every 48 hours  Nephro-radha 1 Tablet(s) Oral daily  NIFEdipine XL 30 milliGRAM(s) Oral daily  sodium zirconium cyclosilicate 5 Gram(s) Oral <User Schedule>      	  	  LABS:	 	                       11.0   4.42  )-----------( 139      ( 14 Aug 2020 08:56 )             33.9     08-14    135  |  96  |  63<H>  ----------------------------<  132<H>  5.1   |  27  |  9.01<H>    Ca    9.3      14 Aug 2020 08:56    TPro  6.5  /  Alb  2.9<L>  /  TBili  0.4  /  DBili  x   /  AST  25  /  ALT  23  /  AlkPhos  69  08-14    proBNP: Serum Pro-Brain Natriuretic Peptide: 12407 pg/mL (08-09 @ 06:23)    Lipid Profile: Cholesterol 155  LDL 63  HDL 77  TG 76    TSH: Thyroid Stimulating Hormone, Serum: 2.70 uU/mL (08-10 @ 10:14)

## 2020-08-15 NOTE — PROGRESS NOTE ADULT - SUBJECTIVE AND OBJECTIVE BOX
Patient is seen and examined at the bed side, is afebrile. She is feeling better.        REVIEW OF SYSTEMS: All other review systems are negative        ALLERGIES: Mushrooms (Anaphylaxis)  penicillin (Rash)      Vital Signs Last 24 Hrs  T(C): 36.6 (15 Aug 2020 15:58), Max: 37.4 (14 Aug 2020 23:21)  T(F): 97.8 (15 Aug 2020 15:58), Max: 99.3 (14 Aug 2020 23:21)  HR: 75 (15 Aug 2020 15:58) (66 - 78)  BP: 156/60 (15 Aug 2020 15:58) (148/66 - 162/67)  BP(mean): --  RR: 18 (15 Aug 2020 15:58) (17 - 19)  SpO2: 100% (15 Aug 2020 15:58) (99% - 100%)        PHYSICAL EXAM:  GENERAL: Not in distress, on oxygen via NC   CHEST/LUNG:  Not using accessory muscles   HEART: s1 and s2 present  ABDOMEN:  Nontender and  Nondistended  EXTREMITIES: No pedal  edema  CNS: Awake and Alert        LABS:                        11.0   4.42  )-----------( 139      ( 14 Aug 2020 08:56 )             33.9                           11.0   4.42  )-----------( 139      ( 14 Aug 2020 08:56 )             33.9                08-14    135  |  96  |  63<H>  ----------------------------<  132<H>  5.1   |  27  |  9.01<H>    Ca    9.3      14 Aug 2020 08:56    TPro  6.5  /  Alb  2.9<L>  /  TBili  0.4  /  DBili  x   /  AST  25  /  ALT  23  /  AlkPhos  69  08-14                08-14    135  |  96  |  63<H>  ----------------------------<  132<H>  5.1   |  27  |  9.01<H>    Ca    9.3      14 Aug 2020 08:56    TPro  6.5  /  Alb  2.9<L>  /  TBili  0.4  /  DBili  x   /  AST  25  /  ALT  23  /  AlkPhos  69  08-14    TPro  6.4  /  Alb  2.9<L>  /  TBili  0.4  /  DBili  x   /  AST  26  /  ALT  25  /  AlkPhos  68  08-13  PT/INR - ( 09 Aug 2020 06:23 )   PT: 12.4 sec;   INR: 1.06 ratio       PTT - ( 09 Aug 2020 06:23 )  PTT:38.4 sec        CAPILLARY BLOOD GLUCOSE  POCT Blood Glucose.: 126 mg/dL (10 Aug 2020 06:14)  POCT Blood Glucose.: 87 mg/dL (09 Aug 2020 23:53)  POCT Blood Glucose.: 70 mg/dL (09 Aug 2020 22:38)  POCT Blood Glucose.: 92 mg/dL (09 Aug 2020 20:58)  POCT Blood Glucose.: 142 mg/dL (09 Aug 2020 17:14)  POCT Blood Glucose.: 63 mg/dL (09 Aug 2020 16:18)        MEDICATIONS  (STANDING):    calcitriol   Capsule 0.75 MICROGram(s) Oral <User Schedule>  calcium acetate 2001 milliGRAM(s) Oral three times a day with meals  chlorhexidine 2% Cloths 1 Application(s) Topical daily  heparin   Injectable 5000 Unit(s) SubCutaneous every 8 hours  hydrALAZINE 100 milliGRAM(s) Oral three times a day  isosorbide   mononitrate ER Tablet (IMDUR) 60 milliGRAM(s) Oral once  isosorbide   mononitrate ER Tablet (IMDUR) 120 milliGRAM(s) Oral daily  labetalol 200 milliGRAM(s) Oral three times a day  levoFLOXacin IVPB 500 milliGRAM(s) IV Intermittent every 48 hours  Nephro-radha 1 Tablet(s) Oral daily  NIFEdipine XL 30 milliGRAM(s) Oral daily  sodium zirconium cyclosilicate 5 Gram(s) Oral <User Schedule>      RADIOLOGY & ADDITIONAL TESTS:      8/11/20  : CT Chest No Cont (08.11.20 @ 12:46) Moderate bilateral pleural effusion, new from prior, with underlying compressive atelectasis of lower lobes.    Moderate pericardial effusion and cardiomegaly unchanged.  End-stage renal disease and renal osteodystrophy.   Multinodular thyroid.      8/9/20 : Xray Chest 2 Views PA/Lat (08.09.20 @ 05:50) PA lateral. Prior 5/20/2020.    < from: Transthoracic Echocardiogram (08.13.20 @ 12:20) >  1. Mild posterior mitral annular calcification. Trivial  mitral regurgitation.  2. Normal trileaflet aortic valve. No aortic stenosis. Mild  to moderate aortic regurgitation ( msec).  3. Normal aortic root.  4. Mildly dilated left atrium.  5. Severe concentric left ventricular hypertrophy.  6. Normal Left Ventricular Systolic Function,  (EF = 62% by  biplane)  7. Normal global longitudinal strain (GLS) at - 21%.  8. Grade I diastolic dysfunction (Impaired relaxation).  9. Normal right atrium.  10. Normal right ventricular size and systolic function  (TAPSE 2.0 cm).  11. RV systolic pressure is mildly increased at  39 mm Hg.  12. Normal tricuspid valve. Trace tricuspid regurgitation.  13. Normal pulmonic valve. Trace pulmonic insufficiency is  noted.  14. Moderate circumferential pericardial effusion. No  echocardiographic evidence of tamponade physiology.  15. Bilateral pleural effusions.    < end of copied text >  Moderate cardiomegaly. Patchy opacities at the bilateral lung bases likely reflect a combination of pneumonia and atelectasis. Trace right, small left pleural effusion. Mild levoscoliosis lower thoracic spine.        MICROBIOLOGY DATA:      MRSA/MSSA PCR (08.11.20 @ 04:26)    MRSA PCR Result.: NotDetec: MRSA/MSSA PCR assay is a qualitative in vitro diagnostic test for the  direct detection and differentiation of methicillin-resistant  Staphylococcus aureus (MRSA) from Staphylococcus aureus (SA). The assay  detects DNA from nasal swabs in patients atrisk for nasal colonization.  It is not intended to diagnose MRSA or SA infections nor guide or monitor  treatment for MRSA/SA infections. A negative result does not preclude  nasal colonization. The assay is FDA-approved and its performance has  been established by [employer], [place] and the [employer], [place], [place].    Staph Aureus PCR Result: NotDetec        Culture - Blood (08.09.20 @ 18:40)    Specimen Source: .Blood Blood    Culture Results:   No growth to date.      Culture - Blood (08.09.20 @ 18:40)    Specimen Source: .Blood Blood    Culture Results:   No growth to date.        COVID-19 PCR . (08.09.20 @ 07:53)    COVID-19 PCR: NotDetec: This test has been validated by [employer] to be accurate;  though it has not been FDA cleared/approved by the usual pathway  As with all laboratory test, results should be correlated with clinical  findings.  https://www.fda.gov/media/314433/download  https://www.fda.gov/media/678681/download

## 2020-08-15 NOTE — PROGRESS NOTE ADULT - SUBJECTIVE AND OBJECTIVE BOX
INTERVAL HPI/OVERNIGHT EVENTS:  awake,alert,no acute events  VITAL SIGNS:  T(F): 97.8 (08-15-20 @ 15:58)  HR: 75 (08-15-20 @ 15:58)  BP: 156/60 (08-15-20 @ 15:58)  RR: 18 (08-15-20 @ 15:58)  SpO2: 100% (08-15-20 @ 15:58)  Wt(kg): --    PHYSICAL EXAM:  awake  Constitutional:  Eyes:  ENMT:perrla  Neck:  Respiratory:few rales  Cardiovascular:s1 s2,m-none  Gastrointestinal:soft,bs pos  Extremities:  Vascular:  Neurological:no focal deficit  Musculoskeletal:    MEDICATIONS  (STANDING):  calcitriol   Capsule 0.75 MICROGram(s) Oral <User Schedule>  calcium acetate 2001 milliGRAM(s) Oral three times a day with meals  chlorhexidine 2% Cloths 1 Application(s) Topical daily  heparin   Injectable 5000 Unit(s) SubCutaneous every 8 hours  hydrALAZINE 100 milliGRAM(s) Oral three times a day  isosorbide   mononitrate ER Tablet (IMDUR) 60 milliGRAM(s) Oral once  isosorbide   mononitrate ER Tablet (IMDUR) 120 milliGRAM(s) Oral daily  labetalol 200 milliGRAM(s) Oral three times a day  levoFLOXacin IVPB 500 milliGRAM(s) IV Intermittent every 48 hours  Nephro-radha 1 Tablet(s) Oral daily  NIFEdipine XL 30 milliGRAM(s) Oral daily  sodium zirconium cyclosilicate 5 Gram(s) Oral <User Schedule>    MEDICATIONS  (PRN):  ALBUTerol    90 MICROgram(s) HFA Inhaler 2 Puff(s) Inhalation every 6 hours PRN Bronchospasm      Allergies    Mushrooms (Anaphylaxis)  penicillin (Rash)    Intolerances        LABS:                        11.0   4.42  )-----------( 139      ( 14 Aug 2020 08:56 )             33.9     08-14    135  |  96  |  63<H>  ----------------------------<  132<H>  5.1   |  27  |  9.01<H>    Ca    9.3      14 Aug 2020 08:56    TPro  6.5  /  Alb  2.9<L>  /  TBili  0.4  /  DBili  x   /  AST  25  /  ALT  23  /  AlkPhos  69  08-14      Assessment and Plan:   · Assessment		  74F, pmh of ESRD (MWF, last dialysis 08/07), HTN, and DVT, presents with shortness of breath for 1 day. Pt noted to have RLL opacity concerning for pna. Pt is admitted for management      Problem/Plan - 1:  ·  Problem: HCAP (healthcare-associated pneumonia).  Plan: Pt pw sob x 1 day with cough   cxr: Moderate cardiomegaly. Patchy opacities at the bilateral lung bases likely reflect a combination of pneumonia and atelectasis. Trace right, small left pleural effusion.   Pt is afebrile with no wbc elevation  pulm Dr. Harris.      Problem/Plan - 2:  ·  Problem: HTN (hypertension).  Plan: Pt pw HTN urgency with bp 220/92 which improved to 179/76 after hydralazine and amlodipine was give .   BNP is elevated;   pt had a recent echo on 1/2020 with ef > 60% and lv hypertrophy  continue with home medications  added nifedipine today  continue to monitor bp.      Problem/Plan - 3:  ·  Problem: Hyperkalemia.  Plan: Pt  noted to have hyperkalemia 5.5  now it is nml 5.1.      Problem/Plan - 4:  ·  Problem: ESRD (end stage renal disease) on dialysis.  Plan: Pr with ESRD with HD MWF  hyperkalemia: K elevated will repeat after cocktail    cw renal diet  last HD today  Dr. Lloyd consulted.      Problem/Plan - 5:  ·  Problem: Prophylactic measure.  Plan: IMPROVE VTE Individual Risk Assessment  RISK                                                         Points  [  ] Previous VTE                                      3  [  ] Thrombophilia                                   2  [  ] Lower limb paralysis                         2 (unable to hold up >15 seconds)    [  ] Current Cancer                                  2       (within 6 months)  [ x ] Immobilization > 24 hrs                    1  [  ] ICU/CCU stay > 24 hrs                         1  [ x ] Age > 60                                              1  will cw heparin sub q for dvt ppx.

## 2020-08-15 NOTE — PROGRESS NOTE ADULT - SUBJECTIVE AND OBJECTIVE BOX
CC: Patient denies CP, SOB, n/v/d, fever or chills.    Vital Signs Last 24 Hrs  T(C): 37 (15 Aug 2020 23:37), Max: 37.2 (15 Aug 2020 04:24)  T(F): 98.6 (15 Aug 2020 23:37), Max: 98.9 (15 Aug 2020 04:24)  HR: 83 (15 Aug 2020 23:37) (66 - 83)  BP: 169/56 (15 Aug 2020 23:37) (139/57 - 169/56)  BP(mean): --  RR: 18 (15 Aug 2020 23:37) (17 - 19)  SpO2: 98% (15 Aug 2020 23:37) (98% - 100%)    08-14 @ 07:01  -  08-15 @ 07:00  --------------------------------------------------------  IN: 400 mL / OUT: 3400 mL / NET: -3000 mL    08-15 @ 07:01  -  08-15 @ 23:43  --------------------------------------------------------  IN: 300 mL / OUT: 0 mL / NET: 300 mL    PHYSICAL EXAM:  GENERAL: No acute respiratory distress.  EYES: Sclera anicteric  ENMT: Atraumatic, Normocephalic, supple, No JVD present. Moist mucous membranes  RESPIRATORY: decreased BS's  CARDIOVASCULAR: S1S2+; no murmurs  GASTROINTESTINAL: Soft, Non-tender, Nondistended; Bowel sounds present   CENTRAL NERVOUS SYSTEM:  Awake, Alert & Oriented X3  EXTREMITIES:  1+ edema  SKIN: Normal turgor  DIALYSIS ACCESS:  Rt Arm AVG thrill/bruit+     MEDICATIONS:  ALBUTerol    90 MICROgram(s) HFA Inhaler 2 Puff(s) Inhalation every 6 hours PRN  calcitriol   Capsule 0.75 MICROGram(s) Oral <User Schedule>  calcium acetate 2001 milliGRAM(s) Oral three times a day with meals  chlorhexidine 2% Cloths 1 Application(s) Topical daily  heparin   Injectable 5000 Unit(s) SubCutaneous every 8 hours  hydrALAZINE 100 milliGRAM(s) Oral three times a day  isosorbide   mononitrate ER Tablet (IMDUR) 60 milliGRAM(s) Oral once  isosorbide   mononitrate ER Tablet (IMDUR) 120 milliGRAM(s) Oral daily  labetalol 200 milliGRAM(s) Oral three times a day  levoFLOXacin IVPB 500 milliGRAM(s) IV Intermittent every 48 hours  Nephro-radha 1 Tablet(s) Oral daily  NIFEdipine XL 30 milliGRAM(s) Oral daily  sodium zirconium cyclosilicate 5 Gram(s) Oral <User Schedule>      LABS:                        11.0   4.42  )-----------( 139      ( 14 Aug 2020 08:56 )             33.9     08-14    135  |  96  |  63<H>  ----------------------------<  132<H>  5.1   |  27  |  9.01<H>    Ca    9.3      14 Aug 2020 08:56    TPro  6.5  /  Alb  2.9<L>  /  TBili  0.4  /  DBili  x   /  AST  25  /  ALT  23  /  AlkPhos  69  08-14    ASSESSMENT AND PLAN:     1. ESRD on HD  -HD tiw as per schedule  -Hemodialysis Renal Diet and Fluid restriction to 1L/day  -Adjust meds to eGFR and avoid IV Gadolinium contrast,NSAIDs, and phosphate enema.  -Monitor Electrolytes daily.  2. Anemia:  -hb >10.5 and no need for epogen  -F/u CBC daily  -transfuse if HB < 7.0.  3. HTN: -bp is acceptable. pt takes metoprolol 50mg bid at home.   -titrate bp meds to keep sbp >110 and < 130  4. MBD: phoslo 3 tab tid; check phos and outpatient PtH (895), continue calcitriol 0.75mcg tiw.   5. Pneumonia: on iv levaquin.   6. Hyperkalemia due to ESRD  -Kayexalate PRN for K>5.3

## 2020-08-15 NOTE — PROGRESS NOTE ADULT - ASSESSMENT
74 yr old F, pmhx of ESRD (MWF, last dialysis 08/07), HTN, and DVT, presents with shortness of breath for 1 day.  1.Repeat echo-eval Pericardial effusion size in 4 weeks for stability.  2.HTN-cont  bp medication.  3.ESRD-HD as per Renal.  4.Pleural effusion-pulm f/u.  5.ABX.  6.GI and DVT prophylaxis.

## 2020-08-15 NOTE — PROGRESS NOTE ADULT - SUBJECTIVE AND OBJECTIVE BOX
Patient is a 74y old  Female who presents with a chief complaint of shortness of breath (15 Aug 2020 10:03)  Awake, alert, comfortable in bed in NAD    INTERVAL HPI/OVERNIGHT EVENTS:      VITAL SIGNS:  T(F): 98.4 (08-15-20 @ 08:12)  HR: 67 (08-15-20 @ 08:12)  BP: 152/60 (08-15-20 @ 08:12)  RR: 19 (08-15-20 @ 08:12)  SpO2: 100% (08-15-20 @ 08:12)  Wt(kg): --  I&O's Detail    14 Aug 2020 07:01  -  15 Aug 2020 07:00  --------------------------------------------------------  IN:    Other: 400 mL  Total IN: 400 mL    OUT:    Other: 3400 mL  Total OUT: 3400 mL    Total NET: -3000 mL              REVIEW OF SYSTEMS:    CONSTITUTIONAL:  No fevers, chills, sweats    HEENT:  Eyes:  No diplopia or blurred vision. ENT:  No earache, sore throat or runny nose.    CARDIOVASCULAR:  No pressure, squeezing, tightness, or heaviness about the chest; no palpitations.    RESPIRATORY:  Per HPI    GASTROINTESTINAL:  No abdominal pain, nausea, vomiting or diarrhea.    GENITOURINARY:  No dysuria, frequency or urgency.    NEUROLOGIC:  No paresthesias, fasciculations, seizures or weakness.    PSYCHIATRIC:  No disorder of thought or mood.      PHYSICAL EXAM:    Constitutional: Well developed and nourished  Eyes:Perrla  ENMT: normal  Neck:supple  Respiratory: good air entry  Cardiovascular: S1 S2 regular  Gastrointestinal: Soft, Non tender  Extremities: No edema  Vascular:normal  Neurological:Awake, alert,Ox3  Musculoskeletal:Normal      MEDICATIONS  (STANDING):  calcitriol   Capsule 0.75 MICROGram(s) Oral <User Schedule>  calcium acetate 2001 milliGRAM(s) Oral three times a day with meals  chlorhexidine 2% Cloths 1 Application(s) Topical daily  heparin   Injectable 5000 Unit(s) SubCutaneous every 8 hours  hydrALAZINE 100 milliGRAM(s) Oral three times a day  isosorbide   mononitrate ER Tablet (IMDUR) 60 milliGRAM(s) Oral once  isosorbide   mononitrate ER Tablet (IMDUR) 120 milliGRAM(s) Oral daily  labetalol 200 milliGRAM(s) Oral three times a day  levoFLOXacin IVPB 500 milliGRAM(s) IV Intermittent every 48 hours  Nephro-radha 1 Tablet(s) Oral daily  NIFEdipine XL 30 milliGRAM(s) Oral daily  sodium zirconium cyclosilicate 5 Gram(s) Oral <User Schedule>    MEDICATIONS  (PRN):  ALBUTerol    90 MICROgram(s) HFA Inhaler 2 Puff(s) Inhalation every 6 hours PRN Bronchospasm      Allergies    Mushrooms (Anaphylaxis)  penicillin (Rash)    Intolerances        LABS:                        11.0   4.42  )-----------( 139      ( 14 Aug 2020 08:56 )             33.9     08-14    135  |  96  |  63<H>  ----------------------------<  132<H>  5.1   |  27  |  9.01<H>    Ca    9.3      14 Aug 2020 08:56    TPro  6.5  /  Alb  2.9<L>  /  TBili  0.4  /  DBili  x   /  AST  25  /  ALT  23  /  AlkPhos  69  08-14              CAPILLARY BLOOD GLUCOSE        pro-bnp [id number] 08-09 @ 06:23     d-dimer --  08-09 @ 06:23      RADIOLOGY & ADDITIONAL TESTS:    CXR:  < from: Xray Chest 1 View- PORTABLE-Routine (08.15.20 @ 08:48) >  FINDINGS: The cardiac silhouette is enlarged likely related to a combination of both cardiomegaly and pericardial effusion. No superior mediastinal widening is demonstrated. Moderately sized bilateral pleural effusions are noted; underlying parenchymal infiltrate/consolidation/atelectasis cannot be excluded. There is no evidence for pneumothorax. No mediastinal shift is noted. No acute osseous fractures demonstrated.    IMPRESSION: As above.      < end of copied text >    Ct scan chest:    ekg;    echo:

## 2020-08-16 LAB
ALBUMIN SERPL ELPH-MCNC: 3.1 G/DL — LOW (ref 3.5–5)
ALP SERPL-CCNC: 74 U/L — SIGNIFICANT CHANGE UP (ref 40–120)
ALT FLD-CCNC: 22 U/L DA — SIGNIFICANT CHANGE UP (ref 10–60)
ANION GAP SERPL CALC-SCNC: 10 MMOL/L — SIGNIFICANT CHANGE UP (ref 5–17)
AST SERPL-CCNC: 24 U/L — SIGNIFICANT CHANGE UP (ref 10–40)
BILIRUB SERPL-MCNC: 0.4 MG/DL — SIGNIFICANT CHANGE UP (ref 0.2–1.2)
BUN SERPL-MCNC: 88 MG/DL — HIGH (ref 7–18)
CALCIUM SERPL-MCNC: 9.5 MG/DL — SIGNIFICANT CHANGE UP (ref 8.4–10.5)
CHLORIDE SERPL-SCNC: 96 MMOL/L — SIGNIFICANT CHANGE UP (ref 96–108)
CO2 SERPL-SCNC: 28 MMOL/L — SIGNIFICANT CHANGE UP (ref 22–31)
CREAT SERPL-MCNC: 9.43 MG/DL — HIGH (ref 0.5–1.3)
GLUCOSE SERPL-MCNC: 90 MG/DL — SIGNIFICANT CHANGE UP (ref 70–99)
HCT VFR BLD CALC: 35.3 % — SIGNIFICANT CHANGE UP (ref 34.5–45)
HGB BLD-MCNC: 11.6 G/DL — SIGNIFICANT CHANGE UP (ref 11.5–15.5)
MCHC RBC-ENTMCNC: 28.7 PG — SIGNIFICANT CHANGE UP (ref 27–34)
MCHC RBC-ENTMCNC: 32.9 GM/DL — SIGNIFICANT CHANGE UP (ref 32–36)
MCV RBC AUTO: 87.4 FL — SIGNIFICANT CHANGE UP (ref 80–100)
NRBC # BLD: 0 /100 WBCS — SIGNIFICANT CHANGE UP (ref 0–0)
PLATELET # BLD AUTO: 154 K/UL — SIGNIFICANT CHANGE UP (ref 150–400)
POTASSIUM SERPL-MCNC: 5.4 MMOL/L — HIGH (ref 3.5–5.3)
POTASSIUM SERPL-SCNC: 5.4 MMOL/L — HIGH (ref 3.5–5.3)
PROT SERPL-MCNC: 6.9 G/DL — SIGNIFICANT CHANGE UP (ref 6–8.3)
RBC # BLD: 4.04 M/UL — SIGNIFICANT CHANGE UP (ref 3.8–5.2)
RBC # FLD: 16.7 % — HIGH (ref 10.3–14.5)
SODIUM SERPL-SCNC: 134 MMOL/L — LOW (ref 135–145)
WBC # BLD: 3.98 K/UL — SIGNIFICANT CHANGE UP (ref 3.8–10.5)
WBC # FLD AUTO: 3.98 K/UL — SIGNIFICANT CHANGE UP (ref 3.8–10.5)

## 2020-08-16 RX ADMIN — ISOSORBIDE MONONITRATE 120 MILLIGRAM(S): 60 TABLET, EXTENDED RELEASE ORAL at 11:50

## 2020-08-16 RX ADMIN — HEPARIN SODIUM 5000 UNIT(S): 5000 INJECTION INTRAVENOUS; SUBCUTANEOUS at 05:53

## 2020-08-16 RX ADMIN — Medication 2001 MILLIGRAM(S): at 11:49

## 2020-08-16 RX ADMIN — Medication 200 MILLIGRAM(S): at 21:38

## 2020-08-16 RX ADMIN — SODIUM ZIRCONIUM CYCLOSILICATE 5 GRAM(S): 10 POWDER, FOR SUSPENSION ORAL at 08:03

## 2020-08-16 RX ADMIN — Medication 200 MILLIGRAM(S): at 13:02

## 2020-08-16 RX ADMIN — CHLORHEXIDINE GLUCONATE 1 APPLICATION(S): 213 SOLUTION TOPICAL at 11:50

## 2020-08-16 RX ADMIN — Medication 200 MILLIGRAM(S): at 05:53

## 2020-08-16 RX ADMIN — Medication 100 MILLIGRAM(S): at 21:39

## 2020-08-16 RX ADMIN — Medication 1 TABLET(S): at 11:49

## 2020-08-16 RX ADMIN — HEPARIN SODIUM 5000 UNIT(S): 5000 INJECTION INTRAVENOUS; SUBCUTANEOUS at 13:02

## 2020-08-16 RX ADMIN — HEPARIN SODIUM 5000 UNIT(S): 5000 INJECTION INTRAVENOUS; SUBCUTANEOUS at 21:39

## 2020-08-16 RX ADMIN — Medication 100 MILLIGRAM(S): at 05:53

## 2020-08-16 RX ADMIN — Medication 2001 MILLIGRAM(S): at 08:02

## 2020-08-16 RX ADMIN — Medication 2001 MILLIGRAM(S): at 17:35

## 2020-08-16 RX ADMIN — Medication 100 MILLIGRAM(S): at 13:02

## 2020-08-16 RX ADMIN — Medication 30 MILLIGRAM(S): at 05:53

## 2020-08-16 NOTE — MEDICAL STUDENT PROGRESS NOTE(EDUCATION) - SUBJECTIVE AND OBJECTIVE BOX
cc: "I can't breathe".     HPI/ Hospital Course: 74 y.o. female w/ PMH ESRD and HTN p/w cc: "I can't breathe". Pt says that she noticed the difficulty breathing on Saturday afternoon but it was tolerable. At 4 am Sunday morning, pt woke up unable to breathe and called her own ambulance. The difficulty breathing worsened as time went on, but improved on oxygen NC. She says she has never experienced this before. Pt denies CP, palpitations, fever, N/V/D/C. Endorses cough but no phlegm production. Pt reports baseline systolic BP is in 140s.     Pt seen at bedside this morning 2 day s/p dialysis (08/14/20) . Pt has been off the NC since yesterday, she reports feeling well without supplemental oxygen. She was seen to get out of the bed with no difficulty and to cross to the other side of the room to retrieve something. Pt did note that she had burning and itching on the left hand at the site of her IV (through which she was receiving Levaquin). Nurse switched location of IV to ante brachial fossa. Pt reports that her hand is feeling better now, she is no longer experiencing burning or itching.     ALLERGIES:   Mushrooms (Anaphylaxis)  penicillin (Rash)    MEDICATIONS  (STANDING):  calcitriol   Capsule 0.75 MICROGram(s) Oral <User Schedule>  calcium acetate 2001 milliGRAM(s) Oral three times a day with meals  chlorhexidine 2% Cloths 1 Application(s) Topical daily  heparin   Injectable 5000 Unit(s) SubCutaneous every 8 hours  hydrALAZINE 100 milliGRAM(s) Oral three times a day  isosorbide   mononitrate ER Tablet (IMDUR) 60 milliGRAM(s) Oral once  isosorbide   mononitrate ER Tablet (IMDUR) 120 milliGRAM(s) Oral daily  labetalol 200 milliGRAM(s) Oral three times a day  Nephro-radha 1 Tablet(s) Oral daily  NIFEdipine XL 30 milliGRAM(s) Oral daily  sodium zirconium cyclosilicate 5 Gram(s) Oral <User Schedule>    MEDICATIONS  (PRN):  ALBUTerol    90 MICROgram(s) HFA Inhaler 2 Puff(s) Inhalation every 6 hours PRN Bronchospasm    PMH:   Hemodialysis patient: MWF; last dialysis was 08/14/20  Hemodialysis access, fistula mature: JASON  DVT (deep venous thrombosis) of Left subclavian vein, 06/12/17  ESRD (end stage renal disease) on dialysis  Cataract  Glaucoma  HTN (hypertension)    SURG.Hx:  S/P KEVEN-BSO: for fibroids, 2012  AV fistula: 2015  H/O: glaucoma: surgery, 2002  Acquired cataract: extraction with b/l lens placement, 2016    FHX:   Cardiovascular disease and hypertension    SHx:  Pt is a non-smoker, non-ETOH drinker. She is currently retired and lives with her . She has three children who live nearby.     ROS:   · General: Denies weight loss/ weight gain  · Skin/Breast: denies skin changes  · Ophthalmologic: endorses normal vision s/p eye surgeries (see SrgHx)  · ENMT: Denies loss of smell, taste. Endorses hearing loss in L ear which she uses a hearing aid for. Endorses use of dentures. Endorses hx of sinusitis  · Respiratory and Thorax: Denies SOB, CP  · Cardiovascular: Denies Palpitations  · GI: denies abdominal pain, V/C/D/N  · : denies urinary freq., hest., and pain  · Musculoskeletal: denies weakness in L and U extremities, denies extremity swelling  · Neuro: denies headaches and dizziness  · Hematology/Lymphatics: denies swelling of neck, pain in fingertips and toes      PHYSICAL EXAM:   Vital Signs Last 24 Hrs  T(C): 36.7 (16 Aug 2020 11:37), Max: 37 (15 Aug 2020 23:37)  T(F): 98.1 (16 Aug 2020 11:37), Max: 98.6 (15 Aug 2020 23:37)  HR: 67 (16 Aug 2020 11:37) (58 - 83)  BP: 169/73 (16 Aug 2020 11:37) (139/57 - 169/73)  BP(mean): --  RR: 18 (16 Aug 2020 11:37) (17 - 18)  SpO2: 98% (16 Aug 2020 11:37) (98% - 100%)    · Constitutional Pt is well developed, well nourished in no acute distress.  · Eyes Pt has bilateral periorbital edema. EOMI and convergence intact.  · ENMT	Face is non-tender to palpation. Nose is midline, no signs of trauma, bleeding. Lips are pink, patient is without teeth. Pinna are intact bilaterally w.o. signs of trauma or infection. No hearing aids are noted in either ear.  · Neck Trachea is midline and neck is supple. There is no lymphadenopathy noted.  · Back Slight kyphosis is noted in upper thoracic cage. Non-tender to palpation.  · Respiratory Chest is non-tender to palpation P. Lungs clear to auscultation bilaterally A/P. Negative Fremitus P.  · Cardiovascular normal rhythm, s1 s2 heard. No murmurs or rubs auscultated, and no thrills palpated.  · GI Normal bowel sounds in all 4 quadrants. No abdominal bruits auscultated. Abdominal is non-distended and soft. No visible signs of infection or trauma noted on visual inspection. Non-tender to light and deep palpation in all 4 quadrants. Negative Fitzgerald's McBurney's and Rosvig's.  · Extremities Pt has normal strength in upper and lower extremities. Fingers and toes are warm to touch. No edema noted in extremities. AV fistula is noted ante-brachially on R arm. A continuous harsh sounding murmur is auscultated at the fistula.  · Vascular Capillary refill in 3 seconds. Radial pulse is strong, +3, bilaterally. Brachial pulse difficult to palpate on R arm due to fistula, but +3 on L arm. Posterior tibialis pulse is +2 bilaterally. No carotid or abdominal bruits were auscultated.  · Neurological CN II- XII grossly intact, but CN X was not assessed. Pt had good proprioception and soft/sharp discrimination.  · Skin Pt's face and lower leg (from toes to ante-talus bilaterally) are slightly more pigmented than the rest of her body. Today on the L hand, there was mild erythema noted but no urticaria or swelling.      LABS:                                            11.6   3.98  )-----------( 154      ( 16 Aug 2020 10:18 )             35.3     08-16    134<L>  |  96  |  88<H>  ----------------------------<  90  5.4<H>   |  28  |  9.43<H>    Ca    9.5      16 Aug 2020 10:18    TPro  6.9  /  Alb  3.1<L>  /  TBili  0.4  /  DBili  x   /  AST  24  /  ALT  22  /  AlkPhos  74  08-16    Thyroid Stimulating Hormone, Serum (08.10.20 @ 10:14)    Thyroid Stimulating Hormone, Serum: 2.70 uU/mL  Troponin I, Serum (08.09.20 @ 06:23)    Troponin I, Serum: 0.016  Lipase, Serum (08.09.20 @ 06:23)    Lipase, Serum: 338 U/L  Lipid Profile (08.10.20 @ 10:14)    Total Cholesterol/HDL Ratio Measurement: 2.0 RATIO    Cholesterol, Serum: 155 mg/dL    Triglycerides, Serum: 76 mg/dL    HDL Cholesterol, Serum: 77: HDL Levels >/= 60 mg/dL are considered beneficial and a "negative" risk factor.  PT/INR - ( 09 Aug 2020 06:23 )   PT: 12.4 sec;   INR: 1.06 ratio    PTT - ( 09 Aug 2020 06:23 )  PTT:38.4 sec    MICROBIOLOGY DATA:  MRSA/MSSA PCR (08.11.20 @ 04:26)    MRSA PCR Result.: NotDetec:     RADIOLOGY:    EXAM:  XR CHEST PA LAT 2V                        PROCEDURE DATE:  08/09/2020    INTERPRETATION:  Chest pain short of breath.  PA lateral. Prior 5/20/2020.  Moderate cardiomegaly. Patchy opacities at the bilateral lung bases likely reflect a combination of pneumonia and atelectasis. Trace right, small left pleural effusion. Mild levoscoliosis lower thoracic spine.  Impression: Bibasilar infiltrates/atelectasis and effusions as described. Cardiomegaly  MJ MACKAY M.D., ATTENDING RADIOLOGIST  This document has been electronically signed. Aug  9 2020  8:18AM    12 LEAD EKG:  Ventricular Rate 65 BPM  Atrial Rate 65 BPM  P-R Interval 168 ms  QRS Duration 82 ms  Q-T Interval 434 ms  QTC Calculation(Bezet) 451 ms  P Axis -5 degrees  R Axis 11 degrees  T Axis 65 degrees  Diagnosis Line Normal sinus rhythm  Normal ECG  Confirmed by MIKAEL MACHADO [facility] ([id number]) on 8/9/2020 1:38:50 PM    08/11/2020  INTERPRETATION: CHEST CT  CLINICAL INDICATION: 74 years  Female with sob.  COMPARISON: 2/19/2019  Moderate bilateral pleural effusion, new from prior, with underlying compressive atelectasis of lower lobes.  Moderate pericardial effusion and cardiomegaly unchanged.  End-stage renal disease and renal osteodystrophy.   Multinodular thyroid.    < from: Transthoracic Echocardiogram (08.13.20 @ 12:20) >  CONCLUSIONS:  1. Mild posterior mitral annular calcification. Trivial  mitral regurgitation.  2. Normal trileaflet aortic valve. No aortic stenosis. Mild  to moderate aortic regurgitation ( msec).  3. Normal aortic root.  4. Mildly dilated left atrium.  5. Severe concentric left ventricular hypertrophy.  6. Normal Left Ventricular Systolic Function,  (EF = 62% by  biplane)  7. Normal global longitudinal strain (GLS) at - 21%.  8. Grade I diastolic dysfunction (Impaired relaxation).  9. Normal right atrium.  10. Normal right ventricular size and systolic function  (TAPSE 2.0 cm).  11. RV systolic pressure is mildly increased at  39 mm Hg.  12. Normal tricuspid valve. Trace tricuspid regurgitation.  13. Normal pulmonic valve. Trace pulmonic insufficiency is  noted.  14. Moderate circumferential pericardial effusion. No  echocardiographic evidence of tamponade physiology.  15. Bilateral pleural effusions.    *** Compared with echocardiogram report of 10/18/2019, mild  increase in pericardial effusion size as well as  progression of aortic insufficiency.  ------------------------------------------------------------------------  Confirmed on  8/13/2020 - 16:25:58 by Jeremie Fisher MD    < from: Xray Chest 1 View- PORTABLE-Routine (08.15.20 @ 08:48) >  INTERPRETATION:  INDICATION: Bilateral pleural effusions.  PRIORS: 8/9/2020.  VIEWS: Portable AP radiography of the chest performed.  FINDINGS: The cardiac silhouette is enlarged likely related to a combination of both cardiomegaly and pericardial effusion. No superior mediastinal widening is demonstrated. Moderately sized bilateral pleural effusions are noted; underlying parenchymal infiltrate/consolidation/atelectasis cannot be excluded. There is no evidence for pneumothorax. No mediastinal shift is noted. No acute osseous fractures demonstrated.  IMPRESSION: As above.  COSTA MCKINLEY M.D., ATTENDING RADIOLOGIST  This document has been electronically signed. Aug 15 2020  9:27AM

## 2020-08-16 NOTE — PROGRESS NOTE ADULT - SUBJECTIVE AND OBJECTIVE BOX
Patient is seen and examined at the bed side, is afebrile. She is feeling much better, OFF oxygen.         REVIEW OF SYSTEMS: All other review systems are negative        ALLERGIES: Mushrooms (Anaphylaxis)  penicillin (Rash)        Vital Signs Last 24 Hrs  T(C): 36.8 (16 Aug 2020 15:41), Max: 37 (15 Aug 2020 23:37)  T(F): 98.3 (16 Aug 2020 15:41), Max: 98.6 (15 Aug 2020 23:37)  HR: 65 (16 Aug 2020 15:41) (58 - 83)  BP: 147/66 (16 Aug 2020 15:41) (139/57 - 169/73)  BP(mean): --  RR: 18 (16 Aug 2020 15:41) (18 - 18)  SpO2: 96% (16 Aug 2020 15:41) (96% - 99%)        PHYSICAL EXAM:  GENERAL: Not in distress  CHEST/LUNG:  Not using accessory muscles   HEART: s1 and s2 present  ABDOMEN:  Nontender and  Nondistended  EXTREMITIES: No pedal  edema  CNS: Awake and Alert        LABS:                        11.6   3.98  )-----------( 154      ( 16 Aug 2020 10:18 )             35.3                           11.0   4.42  )-----------( 139      ( 14 Aug 2020 08:56 )             33.9                 08-16    134<L>  |  96  |  88<H>  ----------------------------<  90  5.4<H>   |  28  |  9.43<H>    Ca    9.5      16 Aug 2020 10:18    TPro  6.9  /  Alb  3.1<L>  /  TBili  0.4  /  DBili  x   /  AST  24  /  ALT  22  /  AlkPhos  74  08-16     08-14    135  |  96  |  63<H>  ----------------------------<  132<H>  5.1   |  27  |  9.01<H>    Ca    9.3      14 Aug 2020 08:56    TPro  6.5  /  Alb  2.9<L>  /  TBili  0.4  /  DBili  x   /  AST  25  /  ALT  23  /  AlkPhos  69  08-14    PT/INR - ( 09 Aug 2020 06:23 )   PT: 12.4 sec;   INR: 1.06 ratio       PTT - ( 09 Aug 2020 06:23 )  PTT:38.4 sec        CAPILLARY BLOOD GLUCOSE  POCT Blood Glucose.: 126 mg/dL (10 Aug 2020 06:14)  POCT Blood Glucose.: 87 mg/dL (09 Aug 2020 23:53)  POCT Blood Glucose.: 70 mg/dL (09 Aug 2020 22:38)  POCT Blood Glucose.: 92 mg/dL (09 Aug 2020 20:58)  POCT Blood Glucose.: 142 mg/dL (09 Aug 2020 17:14)  POCT Blood Glucose.: 63 mg/dL (09 Aug 2020 16:18)        MEDICATIONS  (STANDING):    calcitriol   Capsule 0.75 MICROGram(s) Oral <User Schedule>  calcium acetate 2001 milliGRAM(s) Oral three times a day with meals  chlorhexidine 2% Cloths 1 Application(s) Topical daily  heparin   Injectable 5000 Unit(s) SubCutaneous every 8 hours  hydrALAZINE 100 milliGRAM(s) Oral three times a day  isosorbide   mononitrate ER Tablet (IMDUR) 60 milliGRAM(s) Oral once  isosorbide   mononitrate ER Tablet (IMDUR) 120 milliGRAM(s) Oral daily  labetalol 200 milliGRAM(s) Oral three times a day  Nephro-radha 1 Tablet(s) Oral daily  NIFEdipine XL 30 milliGRAM(s) Oral daily  sodium zirconium cyclosilicate 5 Gram(s) Oral <User Schedule>        RADIOLOGY & ADDITIONAL TESTS:      8/11/20  : CT Chest No Cont (08.11.20 @ 12:46) Moderate bilateral pleural effusion, new from prior, with underlying compressive atelectasis of lower lobes.    Moderate pericardial effusion and cardiomegaly unchanged.  End-stage renal disease and renal osteodystrophy.   Multinodular thyroid.      8/9/20 : Xray Chest 2 Views PA/Lat (08.09.20 @ 05:50) PA lateral. Prior 5/20/2020.    < from: Transthoracic Echocardiogram (08.13.20 @ 12:20) >  1. Mild posterior mitral annular calcification. Trivial  mitral regurgitation.  2. Normal trileaflet aortic valve. No aortic stenosis. Mild  to moderate aortic regurgitation ( msec).  3. Normal aortic root.  4. Mildly dilated left atrium.  5. Severe concentric left ventricular hypertrophy.  6. Normal Left Ventricular Systolic Function,  (EF = 62% by  biplane)  7. Normal global longitudinal strain (GLS) at - 21%.  8. Grade I diastolic dysfunction (Impaired relaxation).  9. Normal right atrium.  10. Normal right ventricular size and systolic function  (TAPSE 2.0 cm).  11. RV systolic pressure is mildly increased at  39 mm Hg.  12. Normal tricuspid valve. Trace tricuspid regurgitation.  13. Normal pulmonic valve. Trace pulmonic insufficiency is  noted.  14. Moderate circumferential pericardial effusion. No  echocardiographic evidence of tamponade physiology.  15. Bilateral pleural effusions.    < end of copied text >  Moderate cardiomegaly. Patchy opacities at the bilateral lung bases likely reflect a combination of pneumonia and atelectasis. Trace right, small left pleural effusion. Mild levoscoliosis lower thoracic spine.        MICROBIOLOGY DATA:      MRSA/MSSA PCR (08.11.20 @ 04:26)    MRSA PCR Result.: NotDetec: MRSA/MSSA PCR assay is a qualitative in vitro diagnostic test for the  direct detection and differentiation of methicillin-resistant  Staphylococcus aureus (MRSA) from Staphylococcus aureus (SA). The assay  detects DNA from nasal swabs in patients atrisk for nasal colonization.  It is not intended to diagnose MRSA or SA infections nor guide or monitor  treatment for MRSA/SA infections. A negative result does not preclude  nasal colonization. The assay is FDA-approved and its performance has  been established by [employer], [place] and the [employer], [place], [place].    Staph Aureus PCR Result: NotDetec        Culture - Blood (08.09.20 @ 18:40)    Specimen Source: .Blood Blood    Culture Results:   No growth to date.      Culture - Blood (08.09.20 @ 18:40)    Specimen Source: .Blood Blood    Culture Results:   No growth to date.        COVID-19 PCR . (08.09.20 @ 07:53)    COVID-19 PCR: NotDetec: This test has been validated by [employer] to be accurate;  though it has not been FDA cleared/approved by the usual pathway  As with all laboratory test, results should be correlated with clinical  findings.  https://www.fda.gov/media/226591/download  https://www.fda.gov/media/668159/download

## 2020-08-16 NOTE — PROGRESS NOTE ADULT - SUBJECTIVE AND OBJECTIVE BOX
CHIEF COMPLAINT:Patient is a 74y old  Female who presents with a chief complaint of shortness of breath.Pt appears comfortable.    	  REVIEW OF SYSTEMS:  CONSTITUTIONAL: No fever, weight loss, or fatigue  EYES: No eye pain, visual disturbances, or discharge  ENT:  No difficulty hearing, tinnitus, vertigo; No sinus or throat pain  NECK: No pain or stiffness  RESPIRATORY: No cough, wheezing, chills or hemoptysis; No Shortness of Breath  CARDIOVASCULAR: No chest pain, palpitations, passing out, dizziness, or leg swelling  GASTROINTESTINAL: No abdominal or epigastric pain. No nausea, vomiting, or hematemesis; No diarrhea or constipation. No melena or hematochezia.  GENITOURINARY: No dysuria, frequency, hematuria, or incontinence  NEUROLOGICAL: No headaches, memory loss, loss of strength, numbness, or tremors  SKIN: No itching, burning, rashes, or lesions   LYMPH Nodes: No enlarged glands  ENDOCRINE: No heat or cold intolerance; No hair loss  MUSCULOSKELETAL: No joint pain or swelling; No muscle, back, or extremity pain  PSYCHIATRIC: No depression, anxiety, mood swings, or difficulty sleeping  HEME/LYMPH: No easy bruising, or bleeding gums  ALLERGY AND IMMUNOLOGIC: No hives or eczema	        PHYSICAL EXAM:  T(C): 36.6 (08-16-20 @ 07:47), Max: 37 (08-15-20 @ 23:37)  HR: 58 (08-16-20 @ 07:47) (58 - 83)  BP: 162/70 (08-16-20 @ 07:47) (139/57 - 169/56)  RR: 18 (08-16-20 @ 07:47) (17 - 18)  SpO2: 99% (08-16-20 @ 07:47) (98% - 100%)    I&O's Summary    15 Aug 2020 07:01  -  16 Aug 2020 07:00  --------------------------------------------------------  IN: 300 mL / OUT: 0 mL / NET: 300 mL        Appearance: Normal	  HEENT:   Normal oral mucosa, PERRL, EOMI	  Lymphatic: No lymphadenopathy  Cardiovascular: Normal S1 S2, No JVD, No murmurs, No edema  Respiratory: Lungs clear to auscultation	  Psychiatry: A & O x 3, Mood & affect appropriate  Gastrointestinal:  Soft, Non-tender, + BS	  Skin: No rashes, No ecchymoses, No cyanosis	  Neurologic: Non-focal  Extremities: Normal range of motion, No clubbing, cyanosis or edema  Vascular: Peripheral pulses palpable 2+ bilaterally    MEDICATIONS  (STANDING):  calcitriol   Capsule 0.75 MICROGram(s) Oral <User Schedule>  calcium acetate 2001 milliGRAM(s) Oral three times a day with meals  chlorhexidine 2% Cloths 1 Application(s) Topical daily  heparin   Injectable 5000 Unit(s) SubCutaneous every 8 hours  hydrALAZINE 100 milliGRAM(s) Oral three times a day  isosorbide   mononitrate ER Tablet (IMDUR) 60 milliGRAM(s) Oral once  isosorbide   mononitrate ER Tablet (IMDUR) 120 milliGRAM(s) Oral daily  labetalol 200 milliGRAM(s) Oral three times a day  levoFLOXacin IVPB 500 milliGRAM(s) IV Intermittent every 48 hours  Nephro-radha 1 Tablet(s) Oral daily  NIFEdipine XL 30 milliGRAM(s) Oral daily  sodium zirconium cyclosilicate 5 Gram(s) Oral <User Schedule>      	  	  LABS:	 	    proBNP: Serum Pro-Brain Natriuretic Peptide: 22261 pg/mL (08-09 @ 06:23)    Lipid Profile: Cholesterol 155  LDL 63  HDL 77  TG 76      TSH: Thyroid Stimulating Hormone, Serum: 2.70 uU/mL (08-10 @ 10:14)      Stress test:	    IMPRESSIONS:Normal Study  * Myocardial Perfusion SPECT results are normal.  * Review of raw data shows: The study is of good technical  quality.  * The left ventricle was hypertrophied. Normal myocardial  perfusion scan,with no evidence of infarction or inducible  ischemia.  * Post-stress gated wall motion analysis was performed  (LVEF = 61 %;LVEDV = 121 ml.), revealing normal LV  systolic function. The LV time activity curve suggested  diastolic dysfunction.  ------------------------------------------------------------------------  Confirmed on  1/16/2020 - 15:50:01 by Ludwig Walter M.D.

## 2020-08-16 NOTE — MEDICAL STUDENT PROGRESS NOTE(EDUCATION) - NS MD HP STUD ASPLAN PLAN FT
1. HTN  - Pt presented w/ hypertensive emergency. BP this morning was 169/73 which has improved from the initial presentation 220/92.  - despite increasing the dose of Labetalol and IMDUR, and the addition of Nifedipine, pt's blood pressure remains elevated most likely secondary to ESRD.  -  encourage pt adherence to medications and f/u with daily BP readings to monitor trends.     2. Pericardial and Pleural Effusions  - Pt was noted to have a pleural effusion + moderate pericardial infusion on CT (8/11/20) and on XRAY (8/15/20).  - ECHO showed normal EF (~62%) and no evidence of tamponade  - ESR was normal (10).   -  (Anti-Nuclear Antibody (08.13.20 @ 10:27) Anti Nuclear Factor Titer: Negative    2. Hyperkalemia  - sodium zirconium cyclosilicate 5g has been continued and is keeping K+ stable  - continue to monitor levels    3. Health Care Associated Pneumonia  - Pt continues to be afebrile with no leukocytosis, and has improved clinically with ABX and NC (2L).   - continue to monitor temperature, WBC and clinical picture while inpatient  - CT of Chest demonstrated pleural and pericardial effusions, which most recent XRAY confirmed.  - Pt is now saturating well on RA (98%), lungs remain clear and pt reports no SOB.  - IV levaquin to be adjusted if neededbased on creat clearance    4. ESRD  - Dialysis 8/14/20 went well  - scheduled for next dialysis tomorrow 8/17/20. 1. HTN  - Pt presented w/ hypertensive emergency. BP this morning was 169/73 which has improved from the initial presentation 220/92.  - despite increasing the dose of Labetalol and IMDUR, and the addition of Nifedipine, pt's blood pressure remains elevated most likely secondary to ESRD.  -  encourage pt adherence to medications and f/u with daily BP readings to monitor trends.     2. Pericardial and Pleural Effusions  - Pt was noted to have a pleural effusion + moderate pericardial infusion on CT (8/11/20) and on XRAY (8/15/20).  - ECHO showed normal EF (~62%) and no evidence of tamponade  - ESR was normal (10).   -  (Anti-Nuclear Antibody (08.13.20 @ 10:27) Anti Nuclear Factor Titer: Negative    2. Hyperkalemia and Hyponatremia  - sodium zirconium cyclosilicate 5g has been continued and is keeping K+ stable  - mild hyponatremia was noted today. Pt shows no signs of AMS, dizziness, or weakness.  - continue to monitor levels and treat hyponatremia if persistant/symptomatic    3. Health Care Associated Pneumonia  - Pt continues to be afebrile with no leukocytosis, and has improved clinically with ABX and NC (2L).   - continue to monitor temperature, WBC and clinical picture while inpatient  - CT of Chest demonstrated pleural and pericardial effusions, which most recent XRAY confirmed.  - Pt is now saturating well on RA (98%), lungs remain clear and pt reports no SOB.  - IV levaquin to be adjusted if neededbased on creat clearance    4. ESRD  - Dialysis 8/14/20 went well  - scheduled for next dialysis tomorrow 8/17/20.

## 2020-08-16 NOTE — PROGRESS NOTE ADULT - SUBJECTIVE AND OBJECTIVE BOX
Patient is a 74y old  Female who presents with a chief complaint of shortness of breath (16 Aug 2020 09:48)  Awake, alert, comfortable in bed in NAD    INTERVAL HPI/OVERNIGHT EVENTS:      VITAL SIGNS:  T(F): 97.9 (08-16-20 @ 07:47)  HR: 58 (08-16-20 @ 07:47)  BP: 162/70 (08-16-20 @ 07:47)  RR: 18 (08-16-20 @ 07:47)  SpO2: 99% (08-16-20 @ 07:47)  Wt(kg): --  I&O's Detail    15 Aug 2020 07:01  -  16 Aug 2020 07:00  --------------------------------------------------------  IN:    Oral Fluid: 300 mL  Total IN: 300 mL    OUT:  Total OUT: 0 mL    Total NET: 300 mL              REVIEW OF SYSTEMS:    CONSTITUTIONAL:  No fevers, chills, sweats    HEENT:  Eyes:  No diplopia or blurred vision. ENT:  No earache, sore throat or runny nose.    CARDIOVASCULAR:  No pressure, squeezing, tightness, or heaviness about the chest; no palpitations.    RESPIRATORY:  Per HPI    GASTROINTESTINAL:  No abdominal pain, nausea, vomiting or diarrhea.    GENITOURINARY:  No dysuria, frequency or urgency.    NEUROLOGIC:  No paresthesias, fasciculations, seizures or weakness.    PSYCHIATRIC:  No disorder of thought or mood.      PHYSICAL EXAM:    Constitutional: Well developed and nourished  Eyes:Perrla  ENMT: normal  Neck:supple  Respiratory: good air entry  Cardiovascular: S1 S2 regular  Gastrointestinal: Soft, Non tender  Extremities: No edema  Vascular:normal  Neurological:Awake, alert,Ox3  Musculoskeletal:Normal      MEDICATIONS  (STANDING):  calcitriol   Capsule 0.75 MICROGram(s) Oral <User Schedule>  calcium acetate 2001 milliGRAM(s) Oral three times a day with meals  chlorhexidine 2% Cloths 1 Application(s) Topical daily  heparin   Injectable 5000 Unit(s) SubCutaneous every 8 hours  hydrALAZINE 100 milliGRAM(s) Oral three times a day  isosorbide   mononitrate ER Tablet (IMDUR) 60 milliGRAM(s) Oral once  isosorbide   mononitrate ER Tablet (IMDUR) 120 milliGRAM(s) Oral daily  labetalol 200 milliGRAM(s) Oral three times a day  Nephro-radha 1 Tablet(s) Oral daily  NIFEdipine XL 30 milliGRAM(s) Oral daily  sodium zirconium cyclosilicate 5 Gram(s) Oral <User Schedule>    MEDICATIONS  (PRN):  ALBUTerol    90 MICROgram(s) HFA Inhaler 2 Puff(s) Inhalation every 6 hours PRN Bronchospasm      Allergies    Mushrooms (Anaphylaxis)  penicillin (Rash)    Intolerances        LABS:                        11.6   3.98  )-----------( 154      ( 16 Aug 2020 10:18 )             35.3     08-16    134<L>  |  96  |  88<H>  ----------------------------<  90  5.4<H>   |  28  |  9.43<H>    Ca    9.5      16 Aug 2020 10:18    TPro  6.9  /  Alb  3.1<L>  /  TBili  0.4  /  DBili  x   /  AST  24  /  ALT  22  /  AlkPhos  74  08-16              CAPILLARY BLOOD GLUCOSE            RADIOLOGY & ADDITIONAL TESTS:    CXR:  < from: Xray Chest 1 View- PORTABLE-Routine (08.15.20 @ 08:48) >  FINDINGS: The cardiac silhouette is enlarged likely related to a combination of both cardiomegaly and pericardial effusion. No superior mediastinal widening is demonstrated. Moderately sized bilateral pleural effusions are noted; underlying parenchymal infiltrate/consolidation/atelectasis cannot be excluded. There is no evidence for pneumothorax. No mediastinal shift is noted. No acute osseous fractures demonstrated.    IMPRESSION: As above.    < end of copied text >    Ct scan chest:    ekg;    echo:< from: Transthoracic Echocardiogram (08.13.20 @ 12:20) >  1. Mild posterior mitral annular calcification. Trivial  mitral regurgitation.  2. Normal trileaflet aortic valve. No aortic stenosis. Mild  to moderate aortic regurgitation ( msec).  3. Normal aortic root.  4. Mildly dilated left atrium.  5. Severe concentric left ventricular hypertrophy.  6. Normal Left Ventricular Systolic Function,  (EF = 62% by  biplane)  7. Normal global longitudinal strain (GLS) at - 21%.  8. Grade I diastolic dysfunction (Impaired relaxation).  9. Normal right atrium.  10. Normal right ventricular size and systolic function  (TAPSE 2.0 cm).  11. RV systolic pressure is mildly increased at  39 mm Hg.  12. Normal tricuspid valve. Trace tricuspid regurgitation.  13. Normal pulmonic valve. Trace pulmonic insufficiency is  noted.  14. Moderate circumferential pericardial effusion. No  echocardiographic evidence of tamponade physiology.  15. Bilateral pleural effusions.    < end of copied text >

## 2020-08-16 NOTE — PROGRESS NOTE ADULT - ASSESSMENT
Assessment and Recommendation:   Problem/Recommendation - 1:  Problem: HCAP (healthcare-associated pneumonia). Recommendation: Blood cultures neg  antibiotics completed  oxygen supp  monitor temp and WBC      Problem/Recommendation - 2:  ·  Problem: ESRD (end stage renal disease) on dialysis.  Recommendation: cont HD  monitor labs  Renal F/U.        Problem/Recommendation - 3:  ·  Problem: HTN (hypertension).  Recommendation: monitor BP  cont meds.       Problem/Recommendation - 4:  ·  Problem: Pericardial/Pleural Effusion  Recommendation: Seen on CT chest  Cardio follow up  ID eval noted :  Recc TTE for further evaluation of Pericardial effusion  Pleural effusions decreased. No need for tapping at this time    Problem/Recommendation-5  Problem: Pulmonary HTN. Recommendation: Oxygen supp  Bronchodilators  CCB  Pfts as OP

## 2020-08-16 NOTE — PROGRESS NOTE ADULT - ASSESSMENT
Patient is a 74y old  Female with ESRD (MWF, last dialysis 08/07), HTN, and DVT, presents to the ER for evaluation of  worsening shortness of breath associated with cough but No fever. On admission, she found to have no fever or leukocytosis but Patchy opacities at the bilateral lung bases on CXR. She has started on Levaquin since allergic to  PCN, and the ID consult requested to assist with further evaluation and antibiotic management.    # Pneumonia -Blood cultures have no growth to date and MRSA PCR is negative   # B/L Pleural effusion- also seen on echo 8/13  # Pericardial effusion - Also seen on Echo 8/13-  The echo shows Moderate circumferential pericardial effusion. No echocardiographic evidence of tamponade physiology.  and  Bilateral pleural effusions  # COVID 19 Negative-  H/O COVID  # ESRD- on HD  # Allergy- PCN ( Rash)    would recommend:    1. Continue Levaquin until 8/19/20, may change to oral on discharge  2. Repeat Echo in 4 weeks  as per Cardiology    3. Supplemental oxygenation and bronchodilator as needed  4. Aspiration precaution    d/w patient and House staff     Attending Attestation:    Spent more than 35 minutes on total encounter, more than 50 % of the visit was spent counseling and/or coordinating care by the Attending physician.

## 2020-08-16 NOTE — PROGRESS NOTE ADULT - SUBJECTIVE AND OBJECTIVE BOX
CC: Patient denies CP, SOB, n/v/d, fever or chills.    Vital Signs Last 24 Hrs  T(C): 36.9 (16 Aug 2020 20:24), Max: 36.9 (16 Aug 2020 20:24)  T(F): 98.4 (16 Aug 2020 20:24), Max: 98.4 (16 Aug 2020 20:24)  HR: 66 (16 Aug 2020 20:24) (58 - 67)  BP: 176/70 (16 Aug 2020 20:24) (147/66 - 176/70)  BP(mean): --  RR: 18 (16 Aug 2020 20:24) (18 - 18)  SpO2: 98% (16 Aug 2020 20:24) (96% - 99%)    08-15 @ 07:01 - 08-16 @ 07:00  --------------------------------------------------------  IN: 300 mL / OUT: 0 mL / NET: 300 mL    08-16 @ 07:01 - 08-16 @ 23:46  --------------------------------------------------------  IN: 300 mL / OUT: 0 mL / NET: 300 mL    PHYSICAL EXAM:  GENERAL: No acute respiratory distress.  EYES: Sclera anicteric  ENMT: Atraumatic, Normocephalic, supple, No JVD present. Moist mucous membranes  RESPIRATORY: decreased BS's  CARDIOVASCULAR: S1S2+; no murmurs  GASTROINTESTINAL: Soft, Non-tender, Nondistended; Bowel sounds present   CENTRAL NERVOUS SYSTEM:  Awake, Alert & Oriented X3  EXTREMITIES:  1+ edema  SKIN: Normal turgor  DIALYSIS ACCESS:  Rt Arm AVG thrill/bruit+     MEDICATIONS:  ALBUTerol    90 MICROgram(s) HFA Inhaler 2 Puff(s) Inhalation every 6 hours PRN  calcitriol   Capsule 0.75 MICROGram(s) Oral <User Schedule>  calcium acetate 2001 milliGRAM(s) Oral three times a day with meals  chlorhexidine 2% Cloths 1 Application(s) Topical daily  heparin   Injectable 5000 Unit(s) SubCutaneous every 8 hours  hydrALAZINE 100 milliGRAM(s) Oral three times a day  isosorbide   mononitrate ER Tablet (IMDUR) 60 milliGRAM(s) Oral once  isosorbide   mononitrate ER Tablet (IMDUR) 120 milliGRAM(s) Oral daily  labetalol 200 milliGRAM(s) Oral three times a day  Nephro-radha 1 Tablet(s) Oral daily  NIFEdipine XL 30 milliGRAM(s) Oral daily  sodium zirconium cyclosilicate 5 Gram(s) Oral <User Schedule>      LABS:                        11.6   3.98  )-----------( 154      ( 16 Aug 2020 10:18 )             35.3     08-16    134<L>  |  96  |  88<H>  ----------------------------<  90  5.4<H>   |  28  |  9.43<H>    Ca    9.5      16 Aug 2020 10:18    TPro  6.9  /  Alb  3.1<L>  /  TBili  0.4  /  DBili  x   /  AST  24  /  ALT  22  /  AlkPhos  74  08-16      ASSESSMENT AND PLAN:     1. ESRD on HD  -HD is scheduled for tomorrow  -Hemodialysis Renal Diet and Fluid restriction to 1L/day  -Adjust meds to eGFR and avoid IV Gadolinium contrast,NSAIDs, and phosphate enema.  -Monitor Electrolytes daily.  2. Anemia:  -hb >10.5 and no need for epogen  -F/u CBC daily  -transfuse if HB < 7.0.  3. HTN: -bp is acceptable. pt takes metoprolol 50mg bid at home.   -titrate bp meds to keep sbp >110 and < 130  4. MBD: phoslo 3 tab tid; check phos and outpatient PtH (895), continue calcitriol 0.75mcg tiw.   5. Pneumonia: on iv levaquin.   6. Hyperkalemia due to ESRD  -Kayexalate PRN for K>5.3

## 2020-08-16 NOTE — PROGRESS NOTE ADULT - SUBJECTIVE AND OBJECTIVE BOX
INTERVAL HPI/OVERNIGHT EVENTS:  Patietn seen at bedside,awake  VITAL SIGNS:  T(F): 98.1 (08-16-20 @ 11:37)  HR: 67 (08-16-20 @ 11:37)  BP: 169/73 (08-16-20 @ 11:37)  RR: 18 (08-16-20 @ 11:37)  SpO2: 98% (08-16-20 @ 11:37)  Wt(kg): --    PHYSICAL EXAM:  awake,alert  Constitutional:  Eyes:  ENMT:perrla  Neck:supp[le  Respiratory:clear  Cardiovascular:s1 s2,m-none  Gastrointestinal:soft,bs pos  Extremities:  Vascular:  Neurological:no focal deficit  Musculoskeletal:    MEDICATIONS  (STANDING):  calcitriol   Capsule 0.75 MICROGram(s) Oral <User Schedule>  calcium acetate 2001 milliGRAM(s) Oral three times a day with meals  chlorhexidine 2% Cloths 1 Application(s) Topical daily  heparin   Injectable 5000 Unit(s) SubCutaneous every 8 hours  hydrALAZINE 100 milliGRAM(s) Oral three times a day  isosorbide   mononitrate ER Tablet (IMDUR) 60 milliGRAM(s) Oral once  isosorbide   mononitrate ER Tablet (IMDUR) 120 milliGRAM(s) Oral daily  labetalol 200 milliGRAM(s) Oral three times a day  Nephro-radha 1 Tablet(s) Oral daily  NIFEdipine XL 30 milliGRAM(s) Oral daily  sodium zirconium cyclosilicate 5 Gram(s) Oral <User Schedule>    MEDICATIONS  (PRN):  ALBUTerol    90 MICROgram(s) HFA Inhaler 2 Puff(s) Inhalation every 6 hours PRN Bronchospasm      Allergies    Mushrooms (Anaphylaxis)  penicillin (Rash)    Intolerances        LABS:                        11.6   3.98  )-----------( 154      ( 16 Aug 2020 10:18 )             35.3     08-16    134<L>  |  96  |  88<H>  ----------------------------<  90  5.4<H>   |  28  |  9.43<H>    Ca    9.5      16 Aug 2020 10:18    TPro  6.9  /  Alb  3.1<L>  /  TBili  0.4  /  DBili  x   /  AST  24  /  ALT  22  /  AlkPhos  74  08-16      Assessment and Plan:   · Assessment		  74F, pmh of ESRD (MWF, last dialysis 08/07), HTN, and DVT, presents with shortness of breath for 1 day. Pt noted to have RLL opacity concerning for pna. Pt is admitted for management      Problem/Plan - 1:  ·  Problem: HCAP (healthcare-associated pneumonia)-stablel lcinically  pulm Dr. Harris.      Problem/Plan - 2:  ·  Problem: HTN (hypertension).  Plan: Pt pw HTN urgency with bp 220/92 which improved to 179/76 after hydralazine and amlodipine was give .   BNP is elevated;   pt had a recent echo on 1/2020 with ef > 60% and lv hypertrophy  continue with home medications  added nifedipine today  continue to monitor bp.      Problem/Plan - 3:  ·  Problem: Hyperkalemia.-improving       Problem/Plan - 4:  ·  Problem: ESRD (end stage renal disease) on dialysis.  Plan: Pr with ESRD with HD MWF  hyperkalemia: K elevated will repeat after cocktail    cw renal diet  last HD today  Dr. Lloyd consulted.      Problem/Plan - 5:  ·  Problem: Prophylactic measure.  Plan: IMPROVE VTE Individual Risk Assessment  RISK                                                         Points  [  ] Previous VTE                                      3  [  ] Thrombophilia                                   2  [  ] Lower limb paralysis                         2 (unable to hold up >15 seconds)    [  ] Current Cancer                                  2       (within 6 months)  [ x ] Immobilization > 24 hrs                    1  [  ] ICU/CCU stay > 24 hrs                         1  [ x ] Age > 60                                              1  will cw heparin sub q for dvt ppx.

## 2020-08-16 NOTE — MEDICAL STUDENT PROGRESS NOTE(EDUCATION) - NS MD HP STUD ASPLAN ASSES FT
74 y.o. female w/ PMH ESRD (last HD 8/12/20) and HTN p/w cc: "I can't breathe". She was found to have new moderate pleural effusion and unchanged moderate pleural effusion and cardiomegaly on Chest CT (8/11/20). Repeat CXR confirmed the persistent presence of CT findings on 8/15/20. The pt reports feeling better and is saturating well on RA (98%) . Although BP is still elevated (169/73), the pt reports no headaches, CP, vision problems, or fatigue.     Problems:  1. HTN   2. Hyperkalemia  3. HCAP  4. ESRD 74 y.o. female w/ PMH ESRD (last HD 8/12/20) and HTN p/w cc: "I can't breathe". She was found to have new moderate pleural effusion and unchanged moderate pleural effusion and cardiomegaly on Chest CT (8/11/20). Repeat CXR confirmed the persistent presence of CT findings on 8/15/20. The pt reports feeling better and is saturating well on RA (98%) . Although BP is still elevated (169/73), the pt reports no headaches, CP, vision problems, or fatigue.     Problems:  1. HTN   2. Electrolyte Imbalances  3. HCAP  4. ESRD

## 2020-08-17 ENCOUNTER — TRANSCRIPTION ENCOUNTER (OUTPATIENT)
Age: 75
End: 2020-08-17

## 2020-08-17 VITALS
RESPIRATION RATE: 20 BRPM | TEMPERATURE: 98 F | HEART RATE: 74 BPM | WEIGHT: 126.1 LBS | SYSTOLIC BLOOD PRESSURE: 153 MMHG | DIASTOLIC BLOOD PRESSURE: 65 MMHG | OXYGEN SATURATION: 95 %

## 2020-08-17 LAB
ALBUMIN SERPL ELPH-MCNC: 3 G/DL — LOW (ref 3.5–5)
ALP SERPL-CCNC: 70 U/L — SIGNIFICANT CHANGE UP (ref 40–120)
ALT FLD-CCNC: 22 U/L DA — SIGNIFICANT CHANGE UP (ref 10–60)
ANION GAP SERPL CALC-SCNC: 12 MMOL/L — SIGNIFICANT CHANGE UP (ref 5–17)
AST SERPL-CCNC: 20 U/L — SIGNIFICANT CHANGE UP (ref 10–40)
BILIRUB SERPL-MCNC: 0.5 MG/DL — SIGNIFICANT CHANGE UP (ref 0.2–1.2)
BUN SERPL-MCNC: 106 MG/DL — HIGH (ref 7–18)
CALCIUM SERPL-MCNC: 9 MG/DL — SIGNIFICANT CHANGE UP (ref 8.4–10.5)
CHLORIDE SERPL-SCNC: 96 MMOL/L — SIGNIFICANT CHANGE UP (ref 96–108)
CO2 SERPL-SCNC: 24 MMOL/L — SIGNIFICANT CHANGE UP (ref 22–31)
CREAT SERPL-MCNC: 11 MG/DL — HIGH (ref 0.5–1.3)
GLUCOSE SERPL-MCNC: 106 MG/DL — HIGH (ref 70–99)
POTASSIUM SERPL-MCNC: 6.3 MMOL/L — CRITICAL HIGH (ref 3.5–5.3)
POTASSIUM SERPL-SCNC: 6.3 MMOL/L — CRITICAL HIGH (ref 3.5–5.3)
PROT SERPL-MCNC: 6.5 G/DL — SIGNIFICANT CHANGE UP (ref 6–8.3)
SODIUM SERPL-SCNC: 132 MMOL/L — LOW (ref 135–145)

## 2020-08-17 PROCEDURE — 82746 ASSAY OF FOLIC ACID SERUM: CPT

## 2020-08-17 PROCEDURE — 93005 ELECTROCARDIOGRAM TRACING: CPT

## 2020-08-17 PROCEDURE — 87635 SARS-COV-2 COVID-19 AMP PRB: CPT

## 2020-08-17 PROCEDURE — 71250 CT THORAX DX C-: CPT

## 2020-08-17 PROCEDURE — 87340 HEPATITIS B SURFACE AG IA: CPT

## 2020-08-17 PROCEDURE — 96374 THER/PROPH/DIAG INJ IV PUSH: CPT

## 2020-08-17 PROCEDURE — 80048 BASIC METABOLIC PNL TOTAL CA: CPT

## 2020-08-17 PROCEDURE — 80061 LIPID PANEL: CPT

## 2020-08-17 PROCEDURE — 83690 ASSAY OF LIPASE: CPT

## 2020-08-17 PROCEDURE — 82962 GLUCOSE BLOOD TEST: CPT

## 2020-08-17 PROCEDURE — 99261: CPT

## 2020-08-17 PROCEDURE — 83880 ASSAY OF NATRIURETIC PEPTIDE: CPT

## 2020-08-17 PROCEDURE — 93970 EXTREMITY STUDY: CPT

## 2020-08-17 PROCEDURE — 86038 ANTINUCLEAR ANTIBODIES: CPT

## 2020-08-17 PROCEDURE — 84484 ASSAY OF TROPONIN QUANT: CPT

## 2020-08-17 PROCEDURE — 71045 X-RAY EXAM CHEST 1 VIEW: CPT

## 2020-08-17 PROCEDURE — 80053 COMPREHEN METABOLIC PANEL: CPT

## 2020-08-17 PROCEDURE — 86769 SARS-COV-2 COVID-19 ANTIBODY: CPT

## 2020-08-17 PROCEDURE — 85730 THROMBOPLASTIN TIME PARTIAL: CPT

## 2020-08-17 PROCEDURE — 84145 PROCALCITONIN (PCT): CPT

## 2020-08-17 PROCEDURE — 87040 BLOOD CULTURE FOR BACTERIA: CPT

## 2020-08-17 PROCEDURE — 85652 RBC SED RATE AUTOMATED: CPT

## 2020-08-17 PROCEDURE — 97162 PT EVAL MOD COMPLEX 30 MIN: CPT

## 2020-08-17 PROCEDURE — 36415 COLL VENOUS BLD VENIPUNCTURE: CPT

## 2020-08-17 PROCEDURE — 83735 ASSAY OF MAGNESIUM: CPT

## 2020-08-17 PROCEDURE — 93306 TTE W/DOPPLER COMPLETE: CPT

## 2020-08-17 PROCEDURE — 85027 COMPLETE CBC AUTOMATED: CPT

## 2020-08-17 PROCEDURE — 87640 STAPH A DNA AMP PROBE: CPT

## 2020-08-17 PROCEDURE — 87641 MR-STAPH DNA AMP PROBE: CPT

## 2020-08-17 PROCEDURE — 84100 ASSAY OF PHOSPHORUS: CPT

## 2020-08-17 PROCEDURE — 71046 X-RAY EXAM CHEST 2 VIEWS: CPT

## 2020-08-17 PROCEDURE — 85610 PROTHROMBIN TIME: CPT

## 2020-08-17 PROCEDURE — 83036 HEMOGLOBIN GLYCOSYLATED A1C: CPT

## 2020-08-17 PROCEDURE — 82607 VITAMIN B-12: CPT

## 2020-08-17 PROCEDURE — 84443 ASSAY THYROID STIM HORMONE: CPT

## 2020-08-17 PROCEDURE — 99285 EMERGENCY DEPT VISIT HI MDM: CPT | Mod: 25

## 2020-08-17 RX ADMIN — Medication 2001 MILLIGRAM(S): at 14:12

## 2020-08-17 RX ADMIN — HEPARIN SODIUM 5000 UNIT(S): 5000 INJECTION INTRAVENOUS; SUBCUTANEOUS at 06:34

## 2020-08-17 RX ADMIN — HEPARIN SODIUM 5000 UNIT(S): 5000 INJECTION INTRAVENOUS; SUBCUTANEOUS at 14:14

## 2020-08-17 RX ADMIN — Medication 30 MILLIGRAM(S): at 06:34

## 2020-08-17 RX ADMIN — Medication 2001 MILLIGRAM(S): at 08:07

## 2020-08-17 RX ADMIN — Medication 200 MILLIGRAM(S): at 06:34

## 2020-08-17 RX ADMIN — CHLORHEXIDINE GLUCONATE 1 APPLICATION(S): 213 SOLUTION TOPICAL at 14:33

## 2020-08-17 RX ADMIN — CALCITRIOL 0.75 MICROGRAM(S): 0.5 CAPSULE ORAL at 08:06

## 2020-08-17 RX ADMIN — ISOSORBIDE MONONITRATE 120 MILLIGRAM(S): 60 TABLET, EXTENDED RELEASE ORAL at 14:13

## 2020-08-17 RX ADMIN — Medication 200 MILLIGRAM(S): at 14:14

## 2020-08-17 RX ADMIN — Medication 1 TABLET(S): at 14:12

## 2020-08-17 RX ADMIN — Medication 100 MILLIGRAM(S): at 06:34

## 2020-08-17 RX ADMIN — Medication 100 MILLIGRAM(S): at 14:14

## 2020-08-17 NOTE — PHARMACOTHERAPY INTERVENTION NOTE - COMMENTS
[facility]  [address] [address]  Phone: [phone]    January 30, 2017        Janine Salas  [address]  [place] [address]          To whom it may concern:    RE: Janine Salas    Patient was seen and treated 1/27/17 at our clinic.  Patient may return to work with the following:    Bend: Not at all (0 hours)  Squat: Not at all (0 hours)  Walk/Stand: Occasionally (1-3 hours)  Lift, carry, push, and pull no more than: 0-10 lbs.   These will be in place through 2/3/17.    Please contact me for questions or concerns.      Sincerely,        Elsiha Calvin PA-C  
MRSA PCR result neg. No MDR risk factors.   Levofloxacin 750 mg IV was ordered. It was renally adjusted and 7 day course completed during hospital stay.   Patient clinically improved.
Recommended to change Levofloxacin IV (Day #4) to PO. Patient afebrile, low WBC and takes other oral meds.
STAR CHASELIST: Patient's medication list reviewed for appropriate indication and dosages. No additional recommendations at this time.
STAR Pneumonia patient was counseled on her medications.  Explained indications and possible side effects (heparin, levofloxacin).  Patient showed understanding.  She had no additional questions.  Patient profile reviewed and recommendation to change Levofloxacin IV to PO was given to the resident.

## 2020-08-17 NOTE — PROGRESS NOTE ADULT - SUBJECTIVE AND OBJECTIVE BOX
Pt is awake, alert, lying in bed in NAD.     INTERVAL HPI/OVERNIGHT EVENTS:      VITAL SIGNS:  T(F): 98.2 (08-17-20 @ 07:27)  HR: 62 (08-17-20 @ 08:45)  BP: 168/56 (08-17-20 @ 08:45)  RR: 17 (08-17-20 @ 08:45)  SpO2: 96% (08-17-20 @ 08:45)  Wt(kg): --  I&O's Detail    16 Aug 2020 07:01  -  17 Aug 2020 07:00  --------------------------------------------------------  IN:    Oral Fluid: 300 mL  Total IN: 300 mL    OUT:  Total OUT: 0 mL    Total NET: 300 mL              REVIEW OF SYSTEMS:    CONSTITUTIONAL:  No fevers, chills, sweats    HEENT:  Eyes:  No diplopia or blurred vision. ENT:  No earache, sore throat or runny nose.    CARDIOVASCULAR:  No pressure, squeezing, tightness, or heaviness about the chest; no palpitations.    RESPIRATORY:  Per HPI    GASTROINTESTINAL:  No abdominal pain, nausea, vomiting or diarrhea.    GENITOURINARY:  No dysuria, frequency or urgency.    NEUROLOGIC:  No paresthesias, fasciculations, seizures or weakness.    PSYCHIATRIC:  No disorder of thought or mood.      PHYSICAL EXAM:    Constitutional: Well developed and nourished  Eyes:Perrla  ENMT: normal  Neck:supple  Respiratory: good air entry  Cardiovascular: S1 S2 regular  Gastrointestinal: Soft, Non tender  Extremities: No edema  Vascular:normal  Neurological:Awake, alert,Ox3  Musculoskeletal:Normal      MEDICATIONS  (STANDING):  calcitriol   Capsule 0.75 MICROGram(s) Oral <User Schedule>  calcium acetate 2001 milliGRAM(s) Oral three times a day with meals  chlorhexidine 2% Cloths 1 Application(s) Topical daily  heparin   Injectable 5000 Unit(s) SubCutaneous every 8 hours  hydrALAZINE 100 milliGRAM(s) Oral three times a day  isosorbide   mononitrate ER Tablet (IMDUR) 60 milliGRAM(s) Oral once  isosorbide   mononitrate ER Tablet (IMDUR) 120 milliGRAM(s) Oral daily  labetalol 200 milliGRAM(s) Oral three times a day  Nephro-radha 1 Tablet(s) Oral daily  NIFEdipine XL 30 milliGRAM(s) Oral daily  sodium zirconium cyclosilicate 5 Gram(s) Oral <User Schedule>    MEDICATIONS  (PRN):  ALBUTerol    90 MICROgram(s) HFA Inhaler 2 Puff(s) Inhalation every 6 hours PRN Bronchospasm      Allergies    Mushrooms (Anaphylaxis)  penicillin (Rash)    Intolerances        LABS:                        11.6   3.98  )-----------( 154      ( 16 Aug 2020 10:18 )             35.3     08-17    132<L>  |  96  |  106<H>  ----------------------------<  106<H>  6.3<HH>   |  24  |  11.00<H>    Ca    9.0      17 Aug 2020 09:52    TPro  6.5  /  Alb  3.0<L>  /  TBili  0.5  /  DBili  x   /  AST  20  /  ALT  22  /  AlkPhos  70  08-17              CAPILLARY BLOOD GLUCOSE            RADIOLOGY & ADDITIONAL TESTS:    CXR:    Ct scan chest:    ekg;    echo:

## 2020-08-17 NOTE — PROGRESS NOTE ADULT - SUBJECTIVE AND OBJECTIVE BOX
CHIEF COMPLAINT:Patient is a 74y old  Female who presents with a chief complaint of shortness of breath .Pt appears comfortable.    	  REVIEW OF SYSTEMS:  CONSTITUTIONAL: No fever, weight loss, or fatigue  EYES: No eye pain, visual disturbances, or discharge  ENT:  No difficulty hearing, tinnitus, vertigo; No sinus or throat pain  NECK: No pain or stiffness  RESPIRATORY: No cough, wheezing, chills or hemoptysis; No Shortness of Breath  CARDIOVASCULAR: No chest pain, palpitations, passing out, dizziness, or leg swelling  GASTROINTESTINAL: No abdominal or epigastric pain. No nausea, vomiting, or hematemesis; No diarrhea or constipation. No melena or hematochezia.  GENITOURINARY: No dysuria, frequency, hematuria, or incontinence  NEUROLOGICAL: No headaches, memory loss, loss of strength, numbness, or tremors  SKIN: No itching, burning, rashes, or lesions   LYMPH Nodes: No enlarged glands  ENDOCRINE: No heat or cold intolerance; No hair loss  MUSCULOSKELETAL: No joint pain or swelling; No muscle, back, or extremity pain  PSYCHIATRIC: No depression, anxiety, mood swings, or difficulty sleeping  HEME/LYMPH: No easy bruising, or bleeding gums  ALLERGY AND IMMUNOLOGIC: No hives or eczema	      PHYSICAL EXAM:  T(C): 36.8 (08-17-20 @ 07:27), Max: 37.1 (08-17-20 @ 05:00)  HR: 62 (08-17-20 @ 08:45) (62 - 67)  BP: 168/56 (08-17-20 @ 08:45) (147/66 - 208/74)  RR: 17 (08-17-20 @ 08:45) (17 - 18)  SpO2: 96% (08-17-20 @ 08:45) (96% - 100%)  Wt(kg): --  I&O's Summary    16 Aug 2020 07:01  -  17 Aug 2020 07:00  --------------------------------------------------------  IN: 300 mL / OUT: 0 mL / NET: 300 mL        Appearance: Normal	  HEENT:   Normal oral mucosa, PERRL, EOMI	  Lymphatic: No lymphadenopathy  Cardiovascular: Normal S1 S2, No JVD, No murmurs, No edema  Respiratory: Dec BS at bases	  Psychiatry: A & O x 3, Mood & affect appropriate  Gastrointestinal:  Soft, Non-tender, + BS	  Skin: No rashes, No ecchymoses, No cyanosis	  Neurologic: Non-focal  Extremities: Normal range of motion, No clubbing, cyanosis or edema  Vascular: Peripheral pulses palpable 2+ bilaterally    MEDICATIONS  (STANDING):  calcitriol   Capsule 0.75 MICROGram(s) Oral <User Schedule>  calcium acetate 2001 milliGRAM(s) Oral three times a day with meals  chlorhexidine 2% Cloths 1 Application(s) Topical daily  heparin   Injectable 5000 Unit(s) SubCutaneous every 8 hours  hydrALAZINE 100 milliGRAM(s) Oral three times a day  isosorbide   mononitrate ER Tablet (IMDUR) 60 milliGRAM(s) Oral once  isosorbide   mononitrate ER Tablet (IMDUR) 120 milliGRAM(s) Oral daily  labetalol 200 milliGRAM(s) Oral three times a day  Nephro-radha 1 Tablet(s) Oral daily  NIFEdipine XL 30 milliGRAM(s) Oral daily  sodium zirconium cyclosilicate 5 Gram(s) Oral <User Schedule>     	  	  LABS:	 	                        11.6   3.98  )-----------( 154      ( 16 Aug 2020 10:18 )             35.3     08-16    134<L>  |  96  |  88<H>  ----------------------------<  90  5.4<H>   |  28  |  9.43<H>    Ca    9.5      16 Aug 2020 10:18    TPro  6.9  /  Alb  3.1<L>  /  TBili  0.4  /  DBili  x   /  AST  24  /  ALT  22  /  AlkPhos  74  08-16    proBNP: Serum Pro-Brain Natriuretic Peptide: 70485 pg/mL (08-09 @ 06:23)    Lipid Profile: Cholesterol 155  LDL 63  HDL 77  TG 76      TSH: Thyroid Stimulating Hormone, Serum: 2.70 uU/mL (08-10 @ 10:14)

## 2020-08-17 NOTE — DISCHARGE NOTE NURSING/CASE MANAGEMENT/SOCIAL WORK - PATIENT PORTAL LINK FT
You can access the FollowMyHealth Patient Portal offered by [facility] by registering at the following website: [email or web address]. By joining [facility]’s FollowMyHealth portal, you will also be able to view your health information using other applications (apps) compatible with our system.

## 2020-08-17 NOTE — PROGRESS NOTE ADULT - PROVIDER SPECIALTY LIST ADULT
Cardiology
Infectious Disease
Internal Medicine
Nephrology
Pulmonology
Cardiology

## 2020-08-17 NOTE — PROGRESS NOTE ADULT - ASSESSMENT
Assessment and Recommendation:   Problem/Recommendation - 1:  Problem: HCAP (healthcare-associated pneumonia). Recommendation: Blood cultures neg  antibiotics completed  oxygen supp  monitor temp and WBC      Problem/Recommendation - 2:  ·  Problem: ESRD (end stage renal disease) on dialysis.  Recommendation: cont HD  monitor labs  Renal F/U.        Problem/Recommendation - 3:  ·  Problem: HTN (hypertension).  Recommendation: monitor BP  cont meds.       Problem/Recommendation - 4:  ·  Problem: Pericardial/Pleural Effusion  Recommendation: Seen on CT chest  Cardio follow up  ID eval noted :  Recc TTE for further evaluation of Pericardial effusion  Pleural effusions decreased. No need for tapping at this time  Repeat echo in 4 wk.     Problem/Recommendation-5  Problem: Pulmonary HTN. Recommendation: Oxygen supp  Bronchodilators  CCB  Pfts as OP

## 2020-08-17 NOTE — PROGRESS NOTE ADULT - ASSESSMENT
74 yr old F, pmhx of ESRD (MWF, last dialysis 08/07), HTN, and DVT, presents with shortness of breath for 1 day.  1.Repeat echo-eval Pericardial effusion size in 4 weeks for stability.  2.HTN-cont  bp medication.  3.ESRD-HD as per Renal.  4.Pleural effusion-pulm f/u.  5.GI and DVT prophylaxis.

## 2020-09-17 ENCOUNTER — APPOINTMENT (OUTPATIENT)
Dept: RADIOLOGY | Facility: CLINIC | Age: 75
End: 2020-09-17

## 2020-09-17 ENCOUNTER — APPOINTMENT (OUTPATIENT)
Dept: ULTRASOUND IMAGING | Facility: CLINIC | Age: 75
End: 2020-09-17

## 2020-10-04 NOTE — H&P ADULT - PROBLEM/PLAN-4
DISPLAY PLAN FREE TEXT
Brought in by [facility] ems and [facility] s/p verbal altercation with mother. Pt's mother called 911 and reported patient threatening as per ems, however pt has been calm and cooperative since they arrived on scene and presently so in ED. Pt is awake, a&ox4, calm, NAD. Ambulatory and able to follow verbal command. Pt denies s/i h/i or a/v/h.

## 2020-10-26 NOTE — PROGRESS NOTE ADULT - ASSESSMENT
70 yo woman w/PMHx HTN, glaucoma, and ESRD on HD (Tues, Thurs, Sat) recent admission for malfunctioning dialysis catheter s/p left IJ access presents today with fevers x 2 days, pleuritic chest pain and left arm pain admitted for Lt subclavian DVT none

## 2020-10-27 ENCOUNTER — APPOINTMENT (OUTPATIENT)
Dept: VASCULAR SURGERY | Facility: CLINIC | Age: 75
End: 2020-10-27
Payer: MEDICARE

## 2020-10-27 VITALS — TEMPERATURE: 97.1 F

## 2020-10-27 PROCEDURE — 93990 DOPPLER FLOW TESTING: CPT

## 2020-10-27 PROCEDURE — 99213 OFFICE O/P EST LOW 20 MIN: CPT

## 2020-10-27 NOTE — PHYSICAL EXAM
[Thrill] : thrill [Hand well perfused] : hand well perfused [2+] : right 2+ [Normal] : no respiratory distress, clear to auscultation, no accessory muscle use [Bleeding] : no bleeding [Ulcer] : no ulcer [de-identified] :  strength 5/5 b/l upper extremities [de-identified] : intact

## 2020-10-27 NOTE — DISCUSSION/SUMMARY
[FreeTextEntry1] : 73 yo female with a history of esrd on hd presents for follow up of right upper extremity avg\par \par duplex shows patent avg with 75% stenosis at the proximal upper arm and anastomosis with flow rate of 1249\par \par will continue to monitor given no issues with hd \par pt to follow up in 3-4 months with repeat duplex\par

## 2020-10-27 NOTE — HISTORY OF PRESENT ILLNESS
[] : in right upper arm [FreeTextEntry1] : 75 yo female with a history of esrd on hd presents for follow up of right upper extremity avg.  pt without any complaints at this time denies any problems with hd at this time\par pt denies any bleeding or difficulty with cannulation

## 2020-12-13 ENCOUNTER — EMERGENCY (EMERGENCY)
Facility: HOSPITAL | Age: 75
LOS: 1 days | Discharge: HOME | End: 2020-12-13
Attending: EMERGENCY MEDICINE
Payer: MEDICARE

## 2020-12-13 VITALS
OXYGEN SATURATION: 99 % | TEMPERATURE: 99 F | HEART RATE: 89 BPM | RESPIRATION RATE: 16 BRPM | HEIGHT: 67 IN | DIASTOLIC BLOOD PRESSURE: 93 MMHG | SYSTOLIC BLOOD PRESSURE: 205 MMHG | WEIGHT: 122.36 LBS

## 2020-12-13 VITALS
DIASTOLIC BLOOD PRESSURE: 83 MMHG | HEART RATE: 76 BPM | SYSTOLIC BLOOD PRESSURE: 175 MMHG | OXYGEN SATURATION: 98 % | RESPIRATION RATE: 17 BRPM | TEMPERATURE: 98 F

## 2020-12-13 DIAGNOSIS — Z86.69 PERSONAL HISTORY OF OTHER DISEASES OF THE NERVOUS SYSTEM AND SENSE ORGANS: Chronic | ICD-10-CM

## 2020-12-13 DIAGNOSIS — H26.9 UNSPECIFIED CATARACT: Chronic | ICD-10-CM

## 2020-12-13 DIAGNOSIS — I77.0 ARTERIOVENOUS FISTULA, ACQUIRED: Chronic | ICD-10-CM

## 2020-12-13 DIAGNOSIS — Z90.710 ACQUIRED ABSENCE OF BOTH CERVIX AND UTERUS: Chronic | ICD-10-CM

## 2020-12-13 LAB
ALBUMIN SERPL ELPH-MCNC: 3.4 G/DL — LOW (ref 3.5–5)
ALP SERPL-CCNC: 85 U/L — SIGNIFICANT CHANGE UP (ref 40–120)
ALT FLD-CCNC: 15 U/L DA — SIGNIFICANT CHANGE UP (ref 10–60)
ANION GAP SERPL CALC-SCNC: 11 MMOL/L — SIGNIFICANT CHANGE UP (ref 5–17)
APTT BLD: 35.7 SEC — HIGH (ref 27.5–35.5)
AST SERPL-CCNC: 14 U/L — SIGNIFICANT CHANGE UP (ref 10–40)
BASOPHILS # BLD AUTO: 0.03 K/UL — SIGNIFICANT CHANGE UP (ref 0–0.2)
BASOPHILS NFR BLD AUTO: 0.4 % — SIGNIFICANT CHANGE UP (ref 0–2)
BILIRUB SERPL-MCNC: 0.4 MG/DL — SIGNIFICANT CHANGE UP (ref 0.2–1.2)
BUN SERPL-MCNC: 57 MG/DL — HIGH (ref 7–18)
CALCIUM SERPL-MCNC: 9.7 MG/DL — SIGNIFICANT CHANGE UP (ref 8.4–10.5)
CHLORIDE SERPL-SCNC: 96 MMOL/L — SIGNIFICANT CHANGE UP (ref 96–108)
CO2 SERPL-SCNC: 30 MMOL/L — SIGNIFICANT CHANGE UP (ref 22–31)
CREAT SERPL-MCNC: 8.42 MG/DL — HIGH (ref 0.5–1.3)
EOSINOPHIL # BLD AUTO: 0.3 K/UL — SIGNIFICANT CHANGE UP (ref 0–0.5)
EOSINOPHIL NFR BLD AUTO: 4 % — SIGNIFICANT CHANGE UP (ref 0–6)
GLUCOSE SERPL-MCNC: 92 MG/DL — SIGNIFICANT CHANGE UP (ref 70–99)
HCT VFR BLD CALC: 36.6 % — SIGNIFICANT CHANGE UP (ref 34.5–45)
HGB BLD-MCNC: 11.5 G/DL — SIGNIFICANT CHANGE UP (ref 11.5–15.5)
IMM GRANULOCYTES NFR BLD AUTO: 0.3 % — SIGNIFICANT CHANGE UP (ref 0–1.5)
INR BLD: 0.98 RATIO — SIGNIFICANT CHANGE UP (ref 0.88–1.16)
LYMPHOCYTES # BLD AUTO: 1.08 K/UL — SIGNIFICANT CHANGE UP (ref 1–3.3)
LYMPHOCYTES # BLD AUTO: 14.4 % — SIGNIFICANT CHANGE UP (ref 13–44)
MCHC RBC-ENTMCNC: 27.2 PG — SIGNIFICANT CHANGE UP (ref 27–34)
MCHC RBC-ENTMCNC: 31.4 GM/DL — LOW (ref 32–36)
MCV RBC AUTO: 86.5 FL — SIGNIFICANT CHANGE UP (ref 80–100)
MONOCYTES # BLD AUTO: 0.99 K/UL — HIGH (ref 0–0.9)
MONOCYTES NFR BLD AUTO: 13.2 % — SIGNIFICANT CHANGE UP (ref 2–14)
NEUTROPHILS # BLD AUTO: 5.07 K/UL — SIGNIFICANT CHANGE UP (ref 1.8–7.4)
NEUTROPHILS NFR BLD AUTO: 67.7 % — SIGNIFICANT CHANGE UP (ref 43–77)
NRBC # BLD: 0 /100 WBCS — SIGNIFICANT CHANGE UP (ref 0–0)
PLATELET # BLD AUTO: 262 K/UL — SIGNIFICANT CHANGE UP (ref 150–400)
POTASSIUM SERPL-MCNC: 4.5 MMOL/L — SIGNIFICANT CHANGE UP (ref 3.5–5.3)
POTASSIUM SERPL-SCNC: 4.5 MMOL/L — SIGNIFICANT CHANGE UP (ref 3.5–5.3)
PROT SERPL-MCNC: 7.6 G/DL — SIGNIFICANT CHANGE UP (ref 6–8.3)
PROTHROM AB SERPL-ACNC: 11.7 SEC — SIGNIFICANT CHANGE UP (ref 10.6–13.6)
RBC # BLD: 4.23 M/UL — SIGNIFICANT CHANGE UP (ref 3.8–5.2)
RBC # FLD: 17.3 % — HIGH (ref 10.3–14.5)
SODIUM SERPL-SCNC: 137 MMOL/L — SIGNIFICANT CHANGE UP (ref 135–145)
WBC # BLD: 7.49 K/UL — SIGNIFICANT CHANGE UP (ref 3.8–10.5)
WBC # FLD AUTO: 7.49 K/UL — SIGNIFICANT CHANGE UP (ref 3.8–10.5)

## 2020-12-13 PROCEDURE — 99284 EMERGENCY DEPT VISIT MOD MDM: CPT | Mod: 25

## 2020-12-13 PROCEDURE — 36415 COLL VENOUS BLD VENIPUNCTURE: CPT

## 2020-12-13 PROCEDURE — 93971 EXTREMITY STUDY: CPT | Mod: 26,LT

## 2020-12-13 PROCEDURE — 85610 PROTHROMBIN TIME: CPT

## 2020-12-13 PROCEDURE — 93971 EXTREMITY STUDY: CPT

## 2020-12-13 PROCEDURE — 85730 THROMBOPLASTIN TIME PARTIAL: CPT

## 2020-12-13 PROCEDURE — 99284 EMERGENCY DEPT VISIT MOD MDM: CPT

## 2020-12-13 PROCEDURE — 80053 COMPREHEN METABOLIC PANEL: CPT

## 2020-12-13 PROCEDURE — 85025 COMPLETE CBC W/AUTO DIFF WBC: CPT

## 2020-12-13 RX ORDER — ACETAMINOPHEN 500 MG
650 TABLET ORAL ONCE
Refills: 0 | Status: COMPLETED | OUTPATIENT
Start: 2020-12-13 | End: 2020-12-13

## 2020-12-13 RX ADMIN — Medication 650 MILLIGRAM(S): at 12:23

## 2020-12-13 NOTE — ED ADULT NURSE NOTE - NSIMPLEMENTINTERV_GEN_ALL_ED
Implemented All Fall Risk Interventions:  [place]iona to call system. Call bell, personal items and telephone within reach. Instruct patient to call for assistance. Room bathroom lighting operational. Non-slip footwear when patient is off stretcher. Physically safe environment: no spills, clutter or unnecessary equipment. Stretcher in lowest position, wheels locked, appropriate side rails in place. Provide visual cue, wrist band, yellow gown, etc. Monitor gait and stability. Monitor for mental status changes and reorient to person, place, and time. Review medications for side effects contributing to fall risk. Reinforce activity limits and safety measures with patient and family.

## 2020-12-13 NOTE — ED ADULT NURSE NOTE - ED STAT RN HANDOFF DETAILS
Patient discharged home, stable in no acute distress. IV removed, no bleeding no redness noted. Patient's pain managed to acceptable level.

## 2020-12-13 NOTE — ED PROVIDER NOTE - ATTENDING CONTRIBUTION TO CARE
Agree with NP H&P.   75 y/o Female with PMHx of DVT, ESRD, HTN, Cataracts and Glaucoma presents to ED with complaint of Left lower leg pain for 3 days which worsened today.  Pt denies chest pain, SOB, dizziness, falls, injuries, or any other acute complaints.  + calf tenderness on exam, will check sono, labs, and reassess.

## 2020-12-13 NOTE — ED PROVIDER NOTE - OBJECTIVE STATEMENT
73 y/o F pt with a PMHx of DVT, ESRD, HTN, Cataracts and Glaucoma and a PSHx of a KEVEN-BSO presents to ED c/o 3 days of L lower leg pain worsened today w/decreased ability to walk. Pt relates a previous Hx of DVT in upper extremity but has not been on blood thinners in over 3 years. Of note, pt blood pressure elevated in triage despite pt taking medication this morning. Pt denies chest pain, SOB, dizziness, falls, injuries, or any other acute complaints. Allergies: Penicillin.

## 2020-12-13 NOTE — ED PROVIDER NOTE - PMH
Cataract    DVT (deep venous thrombosis)  of Left subclavian vein, 06/12/17  ESRD (end stage renal disease) on dialysis    Glaucoma    Hemodialysis access, fistula mature  JASON  Hemodialysis patient  MWF  HTN (hypertension)

## 2020-12-13 NOTE — ED PROVIDER NOTE - PROGRESS NOTE DETAILS
No DVT, Skin looks WNL, no break, no S/S of infection. Patient expressing relief with Tylenol. Pt is well appearing walking with steady gait, stable for discharge and follow up without fail with medical doctor. I had a detailed discussion with the patient and/or guardian regarding the historical points, exam findings, and any diagnostic results supporting the discharge diagnosis. Pt educated on care and need for follow up. Strict return instructions and red flag signs and symptoms discussed with patient. Questions answered. Pt shows understanding of discharge information and agrees to follow. Repeat BP for discharge has improved, patient states she takes her medication TID, currently no dizziness, CP, SOB,or neuro symptoms

## 2020-12-13 NOTE — ED PROVIDER NOTE - MUSCULOSKELETAL, MLM
L lower leg posterior portion is warm to touch w/palpable swelling and tenderness as compared to other leg which is cool and without swelling or tenderness. +2 peripheral pulses in extremities.

## 2020-12-13 NOTE — ED PROVIDER NOTE - PATIENT PORTAL LINK FT
You can access the FollowMyHealth Patient Portal offered by [facility] by registering at the following website: [email or web address]. By joining [facility]’s FollowMyHealth portal, you will also be able to view your health information using other applications (apps) compatible with our system.

## 2020-12-13 NOTE — ED ADULT NURSE NOTE - OBJECTIVE STATEMENT
Patient presents to ED with c/o left lower leg pain x4days. Patient sates "pain is OK now but feels like pulling when it hurts". Patient is noted to have left lower leg swelling and warm to touch. Patient denies difficulty breathing, Patient lying on stretcher in no acute distress.

## 2020-12-31 ENCOUNTER — APPOINTMENT (OUTPATIENT)
Dept: CARDIOLOGY | Facility: CLINIC | Age: 75
End: 2020-12-31

## 2021-02-02 ENCOUNTER — INPATIENT (INPATIENT)
Facility: HOSPITAL | Age: 76
LOS: 3 days | Discharge: HOME | DRG: 640 | End: 2021-02-06
Attending: INTERNAL MEDICINE | Admitting: INTERNAL MEDICINE
Payer: MEDICARE

## 2021-02-02 VITALS
TEMPERATURE: 98 F | RESPIRATION RATE: 18 BRPM | HEIGHT: 67 IN | HEART RATE: 60 BPM | OXYGEN SATURATION: 100 % | WEIGHT: 132.06 LBS | DIASTOLIC BLOOD PRESSURE: 83 MMHG | SYSTOLIC BLOOD PRESSURE: 164 MMHG

## 2021-02-02 DIAGNOSIS — Z99.2 DEPENDENCE ON RENAL DIALYSIS: ICD-10-CM

## 2021-02-02 DIAGNOSIS — Z86.69 PERSONAL HISTORY OF OTHER DISEASES OF THE NERVOUS SYSTEM AND SENSE ORGANS: Chronic | ICD-10-CM

## 2021-02-02 DIAGNOSIS — Z29.9 ENCOUNTER FOR PROPHYLACTIC MEASURES, UNSPECIFIED: ICD-10-CM

## 2021-02-02 DIAGNOSIS — E87.5 HYPERKALEMIA: ICD-10-CM

## 2021-02-02 DIAGNOSIS — I10 ESSENTIAL (PRIMARY) HYPERTENSION: ICD-10-CM

## 2021-02-02 DIAGNOSIS — H26.9 UNSPECIFIED CATARACT: Chronic | ICD-10-CM

## 2021-02-02 DIAGNOSIS — I77.0 ARTERIOVENOUS FISTULA, ACQUIRED: Chronic | ICD-10-CM

## 2021-02-02 DIAGNOSIS — Z90.710 ACQUIRED ABSENCE OF BOTH CERVIX AND UTERUS: Chronic | ICD-10-CM

## 2021-02-02 LAB
A1C WITH ESTIMATED AVERAGE GLUCOSE RESULT: 4.4 % — SIGNIFICANT CHANGE UP (ref 4–5.6)
ALBUMIN SERPL ELPH-MCNC: 3.3 G/DL — LOW (ref 3.5–5)
ALBUMIN SERPL ELPH-MCNC: 3.5 G/DL — SIGNIFICANT CHANGE UP (ref 3.5–5)
ALP SERPL-CCNC: 98 U/L — SIGNIFICANT CHANGE UP (ref 40–120)
ALP SERPL-CCNC: 98 U/L — SIGNIFICANT CHANGE UP (ref 40–120)
ALT FLD-CCNC: 18 U/L DA — SIGNIFICANT CHANGE UP (ref 10–60)
ALT FLD-CCNC: 18 U/L DA — SIGNIFICANT CHANGE UP (ref 10–60)
ANION GAP SERPL CALC-SCNC: 12 MMOL/L — SIGNIFICANT CHANGE UP (ref 5–17)
ANION GAP SERPL CALC-SCNC: 9 MMOL/L — SIGNIFICANT CHANGE UP (ref 5–17)
APTT BLD: 36.8 SEC — HIGH (ref 27.5–35.5)
AST SERPL-CCNC: 18 U/L — SIGNIFICANT CHANGE UP (ref 10–40)
AST SERPL-CCNC: 22 U/L — SIGNIFICANT CHANGE UP (ref 10–40)
BASOPHILS # BLD AUTO: 0.01 K/UL — SIGNIFICANT CHANGE UP (ref 0–0.2)
BASOPHILS # BLD AUTO: 0.02 K/UL — SIGNIFICANT CHANGE UP (ref 0–0.2)
BASOPHILS NFR BLD AUTO: 0.3 % — SIGNIFICANT CHANGE UP (ref 0–2)
BASOPHILS NFR BLD AUTO: 0.4 % — SIGNIFICANT CHANGE UP (ref 0–2)
BILIRUB SERPL-MCNC: 0.5 MG/DL — SIGNIFICANT CHANGE UP (ref 0.2–1.2)
BILIRUB SERPL-MCNC: 0.6 MG/DL — SIGNIFICANT CHANGE UP (ref 0.2–1.2)
BUN SERPL-MCNC: 127 MG/DL — HIGH (ref 7–18)
BUN SERPL-MCNC: 55 MG/DL — HIGH (ref 7–18)
CALCIUM SERPL-MCNC: 9 MG/DL — SIGNIFICANT CHANGE UP (ref 8.4–10.5)
CALCIUM SERPL-MCNC: 9.2 MG/DL — SIGNIFICANT CHANGE UP (ref 8.4–10.5)
CHLORIDE SERPL-SCNC: 95 MMOL/L — LOW (ref 96–108)
CHLORIDE SERPL-SCNC: 98 MMOL/L — SIGNIFICANT CHANGE UP (ref 96–108)
CHOLEST SERPL-MCNC: 162 MG/DL — SIGNIFICANT CHANGE UP
CO2 SERPL-SCNC: 23 MMOL/L — SIGNIFICANT CHANGE UP (ref 22–31)
CO2 SERPL-SCNC: 29 MMOL/L — SIGNIFICANT CHANGE UP (ref 22–31)
CREAT SERPL-MCNC: 11.8 MG/DL — HIGH (ref 0.5–1.3)
CREAT SERPL-MCNC: 6.88 MG/DL — HIGH (ref 0.5–1.3)
EOSINOPHIL # BLD AUTO: 0.09 K/UL — SIGNIFICANT CHANGE UP (ref 0–0.5)
EOSINOPHIL # BLD AUTO: 0.12 K/UL — SIGNIFICANT CHANGE UP (ref 0–0.5)
EOSINOPHIL NFR BLD AUTO: 2.4 % — SIGNIFICANT CHANGE UP (ref 0–6)
EOSINOPHIL NFR BLD AUTO: 2.5 % — SIGNIFICANT CHANGE UP (ref 0–6)
ESTIMATED AVERAGE GLUCOSE: 80 MG/DL — SIGNIFICANT CHANGE UP (ref 68–114)
GLUCOSE SERPL-MCNC: 106 MG/DL — HIGH (ref 70–99)
GLUCOSE SERPL-MCNC: 145 MG/DL — HIGH (ref 70–99)
HCT VFR BLD CALC: 34.2 % — LOW (ref 34.5–45)
HCT VFR BLD CALC: 35.5 % — SIGNIFICANT CHANGE UP (ref 34.5–45)
HDLC SERPL-MCNC: 81 MG/DL — SIGNIFICANT CHANGE UP
HGB BLD-MCNC: 11.3 G/DL — LOW (ref 11.5–15.5)
HGB BLD-MCNC: 11.4 G/DL — LOW (ref 11.5–15.5)
IMM GRANULOCYTES NFR BLD AUTO: 0.3 % — SIGNIFICANT CHANGE UP (ref 0–1.5)
IMM GRANULOCYTES NFR BLD AUTO: 0.4 % — SIGNIFICANT CHANGE UP (ref 0–1.5)
INR BLD: 1.03 RATIO — SIGNIFICANT CHANGE UP (ref 0.88–1.16)
LIPID PNL WITH DIRECT LDL SERPL: 68 MG/DL — SIGNIFICANT CHANGE UP
LYMPHOCYTES # BLD AUTO: 0.66 K/UL — LOW (ref 1–3.3)
LYMPHOCYTES # BLD AUTO: 0.67 K/UL — LOW (ref 1–3.3)
LYMPHOCYTES # BLD AUTO: 13.9 % — SIGNIFICANT CHANGE UP (ref 13–44)
LYMPHOCYTES # BLD AUTO: 17.9 % — SIGNIFICANT CHANGE UP (ref 13–44)
MAGNESIUM SERPL-MCNC: 2.4 MG/DL — SIGNIFICANT CHANGE UP (ref 1.6–2.6)
MAGNESIUM SERPL-MCNC: 3.1 MG/DL — HIGH (ref 1.6–2.6)
MCHC RBC-ENTMCNC: 27.5 PG — SIGNIFICANT CHANGE UP (ref 27–34)
MCHC RBC-ENTMCNC: 27.6 PG — SIGNIFICANT CHANGE UP (ref 27–34)
MCHC RBC-ENTMCNC: 32.1 GM/DL — SIGNIFICANT CHANGE UP (ref 32–36)
MCHC RBC-ENTMCNC: 33 GM/DL — SIGNIFICANT CHANGE UP (ref 32–36)
MCV RBC AUTO: 83.2 FL — SIGNIFICANT CHANGE UP (ref 80–100)
MCV RBC AUTO: 86 FL — SIGNIFICANT CHANGE UP (ref 80–100)
MONOCYTES # BLD AUTO: 0.38 K/UL — SIGNIFICANT CHANGE UP (ref 0–0.9)
MONOCYTES # BLD AUTO: 0.39 K/UL — SIGNIFICANT CHANGE UP (ref 0–0.9)
MONOCYTES NFR BLD AUTO: 10.6 % — SIGNIFICANT CHANGE UP (ref 2–14)
MONOCYTES NFR BLD AUTO: 7.9 % — SIGNIFICANT CHANGE UP (ref 2–14)
NEUTROPHILS # BLD AUTO: 2.53 K/UL — SIGNIFICANT CHANGE UP (ref 1.8–7.4)
NEUTROPHILS # BLD AUTO: 3.61 K/UL — SIGNIFICANT CHANGE UP (ref 1.8–7.4)
NEUTROPHILS NFR BLD AUTO: 68.5 % — SIGNIFICANT CHANGE UP (ref 43–77)
NEUTROPHILS NFR BLD AUTO: 74.9 % — SIGNIFICANT CHANGE UP (ref 43–77)
NON HDL CHOLESTEROL: 81 MG/DL — SIGNIFICANT CHANGE UP
NRBC # BLD: 0 /100 WBCS — SIGNIFICANT CHANGE UP (ref 0–0)
NRBC # BLD: 0 /100 WBCS — SIGNIFICANT CHANGE UP (ref 0–0)
NT-PROBNP SERPL-SCNC: HIGH PG/ML (ref 0–450)
PHOSPHATE SERPL-MCNC: 3.7 MG/DL — SIGNIFICANT CHANGE UP (ref 2.5–4.5)
PLATELET # BLD AUTO: 142 K/UL — LOW (ref 150–400)
PLATELET # BLD AUTO: 148 K/UL — LOW (ref 150–400)
POTASSIUM SERPL-MCNC: 3.9 MMOL/L — SIGNIFICANT CHANGE UP (ref 3.5–5.3)
POTASSIUM SERPL-MCNC: 7.2 MMOL/L — CRITICAL HIGH (ref 3.5–5.3)
POTASSIUM SERPL-SCNC: 3.9 MMOL/L — SIGNIFICANT CHANGE UP (ref 3.5–5.3)
POTASSIUM SERPL-SCNC: 7.2 MMOL/L — CRITICAL HIGH (ref 3.5–5.3)
PROT SERPL-MCNC: 7 G/DL — SIGNIFICANT CHANGE UP (ref 6–8.3)
PROT SERPL-MCNC: 7.4 G/DL — SIGNIFICANT CHANGE UP (ref 6–8.3)
PROTHROM AB SERPL-ACNC: 12.2 SEC — SIGNIFICANT CHANGE UP (ref 10.6–13.6)
RBC # BLD: 4.11 M/UL — SIGNIFICANT CHANGE UP (ref 3.8–5.2)
RBC # BLD: 4.13 M/UL — SIGNIFICANT CHANGE UP (ref 3.8–5.2)
RBC # FLD: 17.6 % — HIGH (ref 10.3–14.5)
RBC # FLD: 17.8 % — HIGH (ref 10.3–14.5)
SARS-COV-2 RNA SPEC QL NAA+PROBE: SIGNIFICANT CHANGE UP
SODIUM SERPL-SCNC: 133 MMOL/L — LOW (ref 135–145)
SODIUM SERPL-SCNC: 133 MMOL/L — LOW (ref 135–145)
TRIGL SERPL-MCNC: 63 MG/DL — SIGNIFICANT CHANGE UP
TSH SERPL-MCNC: 3.75 UU/ML — SIGNIFICANT CHANGE UP (ref 0.34–4.82)
WBC # BLD: 3.69 K/UL — LOW (ref 3.8–10.5)
WBC # BLD: 4.82 K/UL — SIGNIFICANT CHANGE UP (ref 3.8–10.5)
WBC # FLD AUTO: 3.69 K/UL — LOW (ref 3.8–10.5)
WBC # FLD AUTO: 4.82 K/UL — SIGNIFICANT CHANGE UP (ref 3.8–10.5)

## 2021-02-02 PROCEDURE — 71045 X-RAY EXAM CHEST 1 VIEW: CPT | Mod: 26

## 2021-02-02 PROCEDURE — 99285 EMERGENCY DEPT VISIT HI MDM: CPT

## 2021-02-02 RX ORDER — SODIUM ZIRCONIUM CYCLOSILICATE 10 G/10G
5 POWDER, FOR SUSPENSION ORAL
Refills: 0 | Status: DISCONTINUED | OUTPATIENT
Start: 2021-02-02 | End: 2021-02-06

## 2021-02-02 RX ORDER — SODIUM POLYSTYRENE SULFONATE 4.1 MEQ/G
30 POWDER, FOR SUSPENSION ORAL ONCE
Refills: 0 | Status: COMPLETED | OUTPATIENT
Start: 2021-02-02 | End: 2021-02-02

## 2021-02-02 RX ORDER — ALBUTEROL 90 UG/1
2 AEROSOL, METERED ORAL EVERY 6 HOURS
Refills: 0 | Status: DISCONTINUED | OUTPATIENT
Start: 2021-02-02 | End: 2021-02-06

## 2021-02-02 RX ORDER — HYDRALAZINE HCL 50 MG
50 TABLET ORAL THREE TIMES A DAY
Refills: 0 | Status: DISCONTINUED | OUTPATIENT
Start: 2021-02-02 | End: 2021-02-04

## 2021-02-02 RX ORDER — SODIUM ZIRCONIUM CYCLOSILICATE 10 G/10G
5 POWDER, FOR SUSPENSION ORAL DAILY
Refills: 0 | Status: DISCONTINUED | OUTPATIENT
Start: 2021-02-02 | End: 2021-02-02

## 2021-02-02 RX ORDER — ISOSORBIDE MONONITRATE 60 MG/1
120 TABLET, EXTENDED RELEASE ORAL DAILY
Refills: 0 | Status: DISCONTINUED | OUTPATIENT
Start: 2021-02-02 | End: 2021-02-06

## 2021-02-02 RX ORDER — LABETALOL HCL 100 MG
200 TABLET ORAL THREE TIMES A DAY
Refills: 0 | Status: DISCONTINUED | OUTPATIENT
Start: 2021-02-02 | End: 2021-02-06

## 2021-02-02 RX ORDER — SODIUM BICARBONATE 1 MEQ/ML
50 SYRINGE (ML) INTRAVENOUS ONCE
Refills: 0 | Status: COMPLETED | OUTPATIENT
Start: 2021-02-02 | End: 2021-02-02

## 2021-02-02 RX ORDER — HEPARIN SODIUM 5000 [USP'U]/ML
5000 INJECTION INTRAVENOUS; SUBCUTANEOUS EVERY 12 HOURS
Refills: 0 | Status: DISCONTINUED | OUTPATIENT
Start: 2021-02-02 | End: 2021-02-06

## 2021-02-02 RX ORDER — HYDRALAZINE HCL 50 MG
5 TABLET ORAL ONCE
Refills: 0 | Status: COMPLETED | OUTPATIENT
Start: 2021-02-02 | End: 2021-02-02

## 2021-02-02 RX ORDER — CALCIUM GLUCONATE 100 MG/ML
2 VIAL (ML) INTRAVENOUS ONCE
Refills: 0 | Status: COMPLETED | OUTPATIENT
Start: 2021-02-02 | End: 2021-02-02

## 2021-02-02 RX ORDER — NIFEDIPINE 30 MG
30 TABLET, EXTENDED RELEASE 24 HR ORAL DAILY
Refills: 0 | Status: DISCONTINUED | OUTPATIENT
Start: 2021-02-02 | End: 2021-02-06

## 2021-02-02 RX ORDER — SODIUM POLYSTYRENE SULFONATE 4.1 MEQ/G
30 POWDER, FOR SUSPENSION ORAL ONCE
Refills: 0 | Status: DISCONTINUED | OUTPATIENT
Start: 2021-02-02 | End: 2021-02-06

## 2021-02-02 RX ORDER — CALCIUM ACETATE 667 MG
2001 TABLET ORAL
Refills: 0 | Status: DISCONTINUED | OUTPATIENT
Start: 2021-02-02 | End: 2021-02-06

## 2021-02-02 RX ORDER — INSULIN HUMAN 100 [IU]/ML
10 INJECTION, SOLUTION SUBCUTANEOUS ONCE
Refills: 0 | Status: COMPLETED | OUTPATIENT
Start: 2021-02-02 | End: 2021-02-02

## 2021-02-02 RX ORDER — CALCIUM ACETATE 667 MG
667 TABLET ORAL
Refills: 0 | Status: DISCONTINUED | OUTPATIENT
Start: 2021-02-02 | End: 2021-02-02

## 2021-02-02 RX ORDER — DEXTROSE 50 % IN WATER 50 %
50 SYRINGE (ML) INTRAVENOUS ONCE
Refills: 0 | Status: COMPLETED | OUTPATIENT
Start: 2021-02-02 | End: 2021-02-02

## 2021-02-02 RX ADMIN — Medication 50 MILLIGRAM(S): at 22:25

## 2021-02-02 RX ADMIN — Medication 50 MILLIEQUIVALENT(S): at 13:30

## 2021-02-02 RX ADMIN — Medication 2001 MILLIGRAM(S): at 18:29

## 2021-02-02 RX ADMIN — Medication 5 MILLIGRAM(S): at 20:01

## 2021-02-02 RX ADMIN — INSULIN HUMAN 10 UNIT(S): 100 INJECTION, SOLUTION SUBCUTANEOUS at 13:30

## 2021-02-02 RX ADMIN — Medication 200 GRAM(S): at 13:29

## 2021-02-02 RX ADMIN — Medication 200 MILLIGRAM(S): at 22:25

## 2021-02-02 RX ADMIN — Medication 50 MILLILITER(S): at 13:31

## 2021-02-02 RX ADMIN — Medication 200 MILLIGRAM(S): at 18:29

## 2021-02-02 RX ADMIN — Medication 30 MILLIGRAM(S): at 18:29

## 2021-02-02 RX ADMIN — HEPARIN SODIUM 5000 UNIT(S): 5000 INJECTION INTRAVENOUS; SUBCUTANEOUS at 18:29
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
## 2021-02-02 NOTE — H&P ADULT - ASSESSMENT
Patient is 75 year old Female from home with pmh of ESRD (M/W/F, no dialysis for last 4 days ), HTN, and DVT, presents with shortness of breath for 1 day due to missed dialysis. Admitted for dialysis.

## 2021-02-02 NOTE — H&P ADULT - NSHPPHYSICALEXAM_GEN_ALL_CORE
T(C): 36.1 (02-02-21 @ 13:06), Max: 36.4 (02-02-21 @ 10:03)  HR: 60 (02-02-21 @ 13:06) (60 - 60)  BP: 173/66 (02-02-21 @ 13:06) (164/83 - 173/66)  RR: 19 (02-02-21 @ 13:06) (18 - 19)  SpO2: 100% (02-02-21 @ 13:06) (100% - 100%)    PHYSICAL EXAM:  GENERAL: NAD, speaks in full sentences, mild respiratory distress  HEAD:  Atraumatic, Normocephalic  EYES: EOMI, PERRLA, conjunctiva and sclera clear  NECK: Supple, No JVD  CHEST/LUNG:  No wheeze; + crackles; No accessory muscles used  HEART: Regular rate and rhythm; No murmurs;   ABDOMEN: Soft, Nontender, Nondistended; Bowel sounds present; No guarding  EXTREMITIES:  2+ Peripheral Pulses, No cyanosis or edema  PSYCH: AAOx3  NEUROLOGY: no-focal deficit  SKIN: No rashes or lesions

## 2021-02-02 NOTE — PHARMACOTHERAPY INTERVENTION NOTE - COMMENTS
5 gm oral daily changed to  5gm Sunday,Tuesday,Thursday,Saturday
667 mg four times a day changed to 2001 mg three times a day with meals
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
## 2021-02-02 NOTE — ED PROVIDER NOTE - NS ED MD TWO NIGHTS YN
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  Medications verified, no changes.  Social History     Tobacco Use   Smoking Status Never Smoker   Smokeless Tobacco Never Used        Yes

## 2021-02-02 NOTE — ED ADULT NURSE NOTE - NSIMPLEMENTINTERV_GEN_ALL_ED
Implemented All Fall with Harm Risk Interventions:  [place]oss to call system. Call bell, personal items and telephone within reach. Instruct patient to call for assistance. Room bathroom lighting operational. Non-slip footwear when patient is off stretcher. Physically safe environment: no spills, clutter or unnecessary equipment. Stretcher in lowest position, wheels locked, appropriate side rails in place. Provide visual cue, wrist band, yellow gown, etc. Monitor gait and stability. Monitor for mental status changes and reorient to person, place, and time. Review medications for side effects contributing to fall risk. Reinforce activity limits and safety measures with patient and family. Provide visual clues: red socks.
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
## 2021-02-02 NOTE — ED ADULT NURSE REASSESSMENT NOTE - NS ED NURSE REASSESS COMMENT FT1
received pt awake alert oriented x3,with saline lock intact,no redness,no swelling noted,waiting for telebed.
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
## 2021-02-02 NOTE — ED ADULT NURSE REASSESSMENT NOTE - NS ED NURSE REASSESS COMMENT FT1
Patient being transported to dialysis by transporter and manager bobbi with cardiac monitor stable in no acute distress safety maintained.

## 2021-02-03 DIAGNOSIS — E87.1 HYPO-OSMOLALITY AND HYPONATREMIA: ICD-10-CM

## 2021-02-03 DIAGNOSIS — R06.02 SHORTNESS OF BREATH: ICD-10-CM

## 2021-02-03 LAB
ALBUMIN SERPL ELPH-MCNC: 3.1 G/DL — LOW (ref 3.5–5)
ALP SERPL-CCNC: 89 U/L — SIGNIFICANT CHANGE UP (ref 40–120)
ALT FLD-CCNC: 16 U/L DA — SIGNIFICANT CHANGE UP (ref 10–60)
ANION GAP SERPL CALC-SCNC: 10 MMOL/L — SIGNIFICANT CHANGE UP (ref 5–17)
AST SERPL-CCNC: 16 U/L — SIGNIFICANT CHANGE UP (ref 10–40)
BASOPHILS # BLD AUTO: 0.01 K/UL — SIGNIFICANT CHANGE UP (ref 0–0.2)
BASOPHILS NFR BLD AUTO: 0.3 % — SIGNIFICANT CHANGE UP (ref 0–2)
BILIRUB SERPL-MCNC: 0.5 MG/DL — SIGNIFICANT CHANGE UP (ref 0.2–1.2)
BUN SERPL-MCNC: 65 MG/DL — HIGH (ref 7–18)
CALCIUM SERPL-MCNC: 8.7 MG/DL — SIGNIFICANT CHANGE UP (ref 8.4–10.5)
CHLORIDE SERPL-SCNC: 94 MMOL/L — LOW (ref 96–108)
CO2 SERPL-SCNC: 30 MMOL/L — SIGNIFICANT CHANGE UP (ref 22–31)
CREAT SERPL-MCNC: 8.58 MG/DL — HIGH (ref 0.5–1.3)
EOSINOPHIL # BLD AUTO: 0.08 K/UL — SIGNIFICANT CHANGE UP (ref 0–0.5)
EOSINOPHIL NFR BLD AUTO: 2.6 % — SIGNIFICANT CHANGE UP (ref 0–6)
GLUCOSE SERPL-MCNC: 136 MG/DL — HIGH (ref 70–99)
HCT VFR BLD CALC: 33.3 % — LOW (ref 34.5–45)
HGB BLD-MCNC: 10.8 G/DL — LOW (ref 11.5–15.5)
IMM GRANULOCYTES NFR BLD AUTO: 0 % — SIGNIFICANT CHANGE UP (ref 0–1.5)
LYMPHOCYTES # BLD AUTO: 0.62 K/UL — LOW (ref 1–3.3)
LYMPHOCYTES # BLD AUTO: 20.5 % — SIGNIFICANT CHANGE UP (ref 13–44)
MCHC RBC-ENTMCNC: 27.5 PG — SIGNIFICANT CHANGE UP (ref 27–34)
MCHC RBC-ENTMCNC: 32.4 GM/DL — SIGNIFICANT CHANGE UP (ref 32–36)
MCV RBC AUTO: 84.7 FL — SIGNIFICANT CHANGE UP (ref 80–100)
MONOCYTES # BLD AUTO: 0.35 K/UL — SIGNIFICANT CHANGE UP (ref 0–0.9)
MONOCYTES NFR BLD AUTO: 11.6 % — SIGNIFICANT CHANGE UP (ref 2–14)
NEUTROPHILS # BLD AUTO: 1.96 K/UL — SIGNIFICANT CHANGE UP (ref 1.8–7.4)
NEUTROPHILS NFR BLD AUTO: 65 % — SIGNIFICANT CHANGE UP (ref 43–77)
NRBC # BLD: 0 /100 WBCS — SIGNIFICANT CHANGE UP (ref 0–0)
PLATELET # BLD AUTO: 140 K/UL — LOW (ref 150–400)
POTASSIUM SERPL-MCNC: 4.7 MMOL/L — SIGNIFICANT CHANGE UP (ref 3.5–5.3)
POTASSIUM SERPL-SCNC: 4.7 MMOL/L — SIGNIFICANT CHANGE UP (ref 3.5–5.3)
PROT SERPL-MCNC: 6.7 G/DL — SIGNIFICANT CHANGE UP (ref 6–8.3)
RBC # BLD: 3.93 M/UL — SIGNIFICANT CHANGE UP (ref 3.8–5.2)
RBC # FLD: 17.5 % — HIGH (ref 10.3–14.5)
SARS-COV-2 IGG SERPL QL IA: POSITIVE
SARS-COV-2 IGM SERPL IA-ACNC: 1.64 INDEX — HIGH
SODIUM SERPL-SCNC: 134 MMOL/L — LOW (ref 135–145)
WBC # BLD: 3.02 K/UL — LOW (ref 3.8–10.5)
WBC # FLD AUTO: 3.02 K/UL — LOW (ref 3.8–10.5)

## 2021-02-03 PROCEDURE — 71045 X-RAY EXAM CHEST 1 VIEW: CPT | Mod: 26

## 2021-02-03 RX ADMIN — Medication 50 MILLIGRAM(S): at 21:33

## 2021-02-03 RX ADMIN — HEPARIN SODIUM 5000 UNIT(S): 5000 INJECTION INTRAVENOUS; SUBCUTANEOUS at 17:24

## 2021-02-03 RX ADMIN — Medication 1 TABLET(S): at 11:14

## 2021-02-03 RX ADMIN — Medication 2001 MILLIGRAM(S): at 17:24

## 2021-02-03 RX ADMIN — Medication 2001 MILLIGRAM(S): at 11:15

## 2021-02-03 RX ADMIN — Medication 200 MILLIGRAM(S): at 13:48

## 2021-02-03 RX ADMIN — HEPARIN SODIUM 5000 UNIT(S): 5000 INJECTION INTRAVENOUS; SUBCUTANEOUS at 05:28

## 2021-02-03 RX ADMIN — Medication 200 MILLIGRAM(S): at 05:28

## 2021-02-03 RX ADMIN — Medication 200 MILLIGRAM(S): at 21:34

## 2021-02-03 RX ADMIN — Medication 30 MILLIGRAM(S): at 05:28

## 2021-02-03 RX ADMIN — ISOSORBIDE MONONITRATE 120 MILLIGRAM(S): 60 TABLET, EXTENDED RELEASE ORAL at 11:14

## 2021-02-03 RX ADMIN — Medication 50 MILLIGRAM(S): at 13:48

## 2021-02-03 RX ADMIN — Medication 50 MILLIGRAM(S): at 05:28

## 2021-02-03 NOTE — CONSULT NOTE ADULT - ASSESSMENT
75 year old Female from home with pmhx of ESRD (M/W/F, no dialysis for last 4 days ), HTN, and DVT, presents with shortness of breath for 1 day,missed HD and hyperkalemia.  1.Tele monitoring.  2.Echocardiogram.  3.ESRD-HD as per renal.  4.HTN-cont bp medication.  5.GI and DVT prophylaxis.
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
## 2021-02-03 NOTE — PROGRESS NOTE ADULT - SUBJECTIVE AND OBJECTIVE BOX
CC: Feels better. Denies CP, SOB, n/v/d, fever or chills.    Vital Signs Last 24 Hrs  T(C): 36.7 (03 Feb 2021 16:28), Max: 37.4 (03 Feb 2021 04:11)  T(F): 98 (03 Feb 2021 16:28), Max: 99.3 (03 Feb 2021 04:11)  HR: 68 (03 Feb 2021 16:28) (68 - 79)  BP: 131/63 (03 Feb 2021 16:28) (131/63 - 173/69)  BP(mean): --  RR: 18 (03 Feb 2021 16:28) (16 - 18)  SpO2: 95% (03 Feb 2021 16:28) (93% - 99%)    02-02 @ 07:01  -  02-03 @ 07:00  --------------------------------------------------------  IN: 600 mL / OUT: 2250 mL / NET: -1650 mL    PHYSICAL EXAM:    GENERAL: NAD, well-nourished, well-developed  HEAD:  Atraumatic, Normocephalic  EYES: Sclera anicteric  ENMT: Moist mucous membranes  NECK: Supple, No JVD, Normal thyroid  NERVOUS SYSTEM:  Alert & Oriented X3  CHEST/LUNG: Decreased BS's  HEART: Regular rate and rhythm; No murmurs  ABDOMEN: Soft, Nontender, Nondistended; Bowel sounds present  EXTREMITIES:  No edema  SKIN: Normal turgor    MEDICATIONS:  ALBUTerol    90 MICROgram(s) HFA Inhaler 2 Puff(s) Inhalation every 6 hours PRN  calcium acetate 2001 milliGRAM(s) Oral three times a day with meals  heparin   Injectable 5000 Unit(s) SubCutaneous every 12 hours  hydrALAZINE 50 milliGRAM(s) Oral three times a day  isosorbide   mononitrate ER Tablet (IMDUR) 120 milliGRAM(s) Oral daily  labetalol 200 milliGRAM(s) Oral three times a day  Nephro-radha 1 Tablet(s) Oral daily  NIFEdipine XL 30 milliGRAM(s) Oral daily  sodium polystyrene sulfonate Suspension 30 Gram(s) Oral once  sodium zirconium cyclosilicate 5 Gram(s) Oral <User Schedule>      LABS:                        10.8   3.02  )-----------( 140      ( 03 Feb 2021 09:00 )             33.3     02-03    134<L>  |  94<L>  |  65<H>  ----------------------------<  136<H>  4.7   |  30  |  8.58<H>    Ca    8.7      03 Feb 2021 09:00  Phos  3.7     02-02  Mg     2.4     02-02    TPro  6.7  /  Alb  3.1<L>  /  TBili  0.5  /  DBili  x   /  AST  16  /  ALT  16  /  AlkPhos  89  02-03    PT/INR - ( 02 Feb 2021 10:30 )   PT: 12.2 sec;   INR: 1.03 ratio    PTT - ( 02 Feb 2021 10:30 )  PTT:36.8 sec    ASSESSMENT AND PLAN:     1. ESRD on HD  2. Hyperkalemia resolved  3. Anemia due to CKD. No need for Epogen  4. Hyperphosphatemia stable on Ca acetate  5. Volume overload improved  HD is scheduled for tomorrow  UF as tolerated  Keep patient euvolemic and renal diet  Adjust Medications according to eGFR  Follow BMP, H/H

## 2021-02-03 NOTE — CONSULT NOTE ADULT - ASSESSMENT
1. SOB  Improved.   Likely 2nd to fluid overload   Covid IGG positive, PRC negative.   Hx of ESRD - with missed HD.  Resume HD   Bronchodilators   Echo   CXR noted.   O2 Supp PRN   PFTs as OP   Repeat CXR   Cardio F/U   DVT and GI PPX     2. ESRD  On HD  Avoid nephrotoxic drugs   Renal F/U     3. Electrolyte Imbalance  Missed HD - resume HD   Monitor labs.   Renal F/U     4. R/O CHF   Echo  Tele monitoring.   Cardio F/U
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
## 2021-02-03 NOTE — PATIENT PROFILE ADULT - NSPROGENSOURCEINFO_GEN_A_NUR
Pt biba from urgent care for abnormal EKG. PT C/O frequent urination and R lower back pain x today. h/o DM, HTN, hypothyroidism
patient

## 2021-02-03 NOTE — PROGRESS NOTE ADULT - SUBJECTIVE AND OBJECTIVE BOX
Patient is a 75y old  Female who presents with a chief complaint of Missed dialysis (02 Feb 2021 15:32)    pt seen in icu [  ], reg med floor [   ], bed [  ], chair at bedside [   ], a+o x3 [  ], lethargic [  ],  nad [  ]    sethi [  ], ngt [  ], peg [  ], et tube [  ], cent line [  ], picc line [  ]        Allergies    Mushrooms (Anaphylaxis)  penicillin (Rash)        Vitals    T(F): 99.3 (02-03-21 @ 04:11), Max: 99.3 (02-03-21 @ 04:11)  HR: 79 (02-03-21 @ 04:11) (60 - 79)  BP: 173/69 (02-03-21 @ 04:11) (150/63 - 184/90)  RR: 18 (02-03-21 @ 04:11) (16 - 19)  SpO2: 97% (02-03-21 @ 04:11) (94% - 100%)  Wt(kg): --  CAPILLARY BLOOD GLUCOSE      POCT Blood Glucose.: 109 mg/dL (02 Feb 2021 10:05)      Labs                          11.3   3.69  )-----------( 142      ( 02 Feb 2021 19:57 )             34.2       02-02    133<L>  |  95<L>  |  55<H>  ----------------------------<  145<H>  3.9   |  29  |  6.88<H>    Ca    9.0      02 Feb 2021 19:57  Phos  3.7     02-02  Mg     2.4     02-02    TPro  7.0  /  Alb  3.3<L>  /  TBili  0.6  /  DBili  x   /  AST  18  /  ALT  18  /  AlkPhos  98  02-02                Radiology Results      Meds    MEDICATIONS  (STANDING):  calcium acetate 2001 milliGRAM(s) Oral three times a day with meals  heparin   Injectable 5000 Unit(s) SubCutaneous every 12 hours  hydrALAZINE 50 milliGRAM(s) Oral three times a day  isosorbide   mononitrate ER Tablet (IMDUR) 120 milliGRAM(s) Oral daily  labetalol 200 milliGRAM(s) Oral three times a day  Nephro-radha 1 Tablet(s) Oral daily  NIFEdipine XL 30 milliGRAM(s) Oral daily  sodium polystyrene sulfonate Suspension 30 Gram(s) Oral once  sodium zirconium cyclosilicate 5 Gram(s) Oral <User Schedule>      MEDICATIONS  (PRN):  ALBUTerol    90 MICROgram(s) HFA Inhaler 2 Puff(s) Inhalation every 6 hours PRN Shortness of Breath and/or Wheezing      Physical Exam    Neuro :  no focal deficits  Respiratory: CTA B/L  CV: RRR, S1S2, no murmurs,   Abdominal: Soft, NT, ND +BS,  Extremities: No edema, + peripheral pulses    ASSESSMENT    Hyperkalemia    Hemodialysis patient    Hemodialysis access, fistula mature    DVT (deep venous thrombosis)    ESRD (end stage renal disease) on dialysis    Cataract    Glaucoma    Dialysis patient    HTN (hypertension)    S/P KEVEN-BSO    AV fistula    H/O: glaucoma    H/O: hysterectomy    Hip fracture    Acquired cataract        PLAN     Patient is a 75y old  Female who presents with a chief complaint of Missed dialysis (02 Feb 2021 15:32)    pt seen in tele [x  ], reg med floor [   ], bed [ x ], chair at bedside [   ], a+o x3 [ x ], lethargic [  ],  nad [x  ]    pt states feeling a little better        Allergies    Mushrooms (Anaphylaxis)  penicillin (Rash)        Vitals    T(F): 99.3 (02-03-21 @ 04:11), Max: 99.3 (02-03-21 @ 04:11)  HR: 79 (02-03-21 @ 04:11) (60 - 79)  BP: 173/69 (02-03-21 @ 04:11) (150/63 - 184/90)  RR: 18 (02-03-21 @ 04:11) (16 - 19)  SpO2: 97% (02-03-21 @ 04:11) (94% - 100%)  Wt(kg): --  CAPILLARY BLOOD GLUCOSE      POCT Blood Glucose.: 109 mg/dL (02 Feb 2021 10:05)      Labs                          11.3   3.69  )-----------( 142      ( 02 Feb 2021 19:57 )             34.2       02-02    133<L>  |  95<L>  |  55<H>  ----------------------------<  145<H>  3.9   |  29  |  6.88<H>    Ca    9.0      02 Feb 2021 19:57  Phos  3.7     02-02  Mg     2.4     02-02    TPro  7.0  /  Alb  3.3<L>  /  TBili  0.6  /  DBili  x   /  AST  18  /  ALT  18  /  AlkPhos  98  02-02                Radiology Results      Meds    MEDICATIONS  (STANDING):  calcium acetate 2001 milliGRAM(s) Oral three times a day with meals  heparin   Injectable 5000 Unit(s) SubCutaneous every 12 hours  hydrALAZINE 50 milliGRAM(s) Oral three times a day  isosorbide   mononitrate ER Tablet (IMDUR) 120 milliGRAM(s) Oral daily  labetalol 200 milliGRAM(s) Oral three times a day  Nephro-radha 1 Tablet(s) Oral daily  NIFEdipine XL 30 milliGRAM(s) Oral daily  sodium polystyrene sulfonate Suspension 30 Gram(s) Oral once  sodium zirconium cyclosilicate 5 Gram(s) Oral <User Schedule>      MEDICATIONS  (PRN):  ALBUTerol    90 MICROgram(s) HFA Inhaler 2 Puff(s) Inhalation every 6 hours PRN Shortness of Breath and/or Wheezing      Physical Exam    Neuro :  no focal deficits  Respiratory: CTA B/L  CV: RRR, S1S2, no murmurs,   Abdominal: Soft, NT, ND +BS,  Extremities: No edema, + peripheral pulses        ASSESSMENT      dispnea 2nd to missed hd,   hyperkalemia,   hyponatremia,   Anemia due to CKD.  r/o chf   h/o Cataract,,   DVT (deep venous thrombosis)  of Left subclavian vein, 06/12/17,   ESRD (end stage renal disease) on dialysis via right avf MWF,   Glaucoma,   HTN (hypertension)  S/P KEVEN-BSO  Hip fracture      PLAN    cont  s/p HD 1 K bicarb bath 2/2/21  hyperkalemia resolved   renal f/u  No need for Epogen  Hyperphosphatemia on Ca acetate  Keep patient euvolemic and renal diet  pt for hd in am  probnp 61,00  cardio cons  f/u echo  cont current meds Patient is a 75y old  Female who presents with a chief complaint of Missed dialysis (02 Feb 2021 15:32)    pt seen in tele [x  ], reg med floor [   ], bed [ x ], chair at bedside [   ], a+o x3 [ x ], lethargic [  ],  nad [x  ]    pt states feeling a little better        Allergies    Mushrooms (Anaphylaxis)  penicillin (Rash)        Vitals    T(F): 99.3 (02-03-21 @ 04:11), Max: 99.3 (02-03-21 @ 04:11)  HR: 79 (02-03-21 @ 04:11) (60 - 79)  BP: 173/69 (02-03-21 @ 04:11) (150/63 - 184/90)  RR: 18 (02-03-21 @ 04:11) (16 - 19)  SpO2: 97% (02-03-21 @ 04:11) (94% - 100%)  Wt(kg): --  CAPILLARY BLOOD GLUCOSE      POCT Blood Glucose.: 109 mg/dL (02 Feb 2021 10:05)      Labs                          11.3   3.69  )-----------( 142      ( 02 Feb 2021 19:57 )             34.2       02-02    133<L>  |  95<L>  |  55<H>  ----------------------------<  145<H>  3.9   |  29  |  6.88<H>    Ca    9.0      02 Feb 2021 19:57  Phos  3.7     02-02  Mg     2.4     02-02    TPro  7.0  /  Alb  3.3<L>  /  TBili  0.6  /  DBili  x   /  AST  18  /  ALT  18  /  AlkPhos  98  02-02          Serum Pro-Brain Natriuretic Peptide (02.02.21 @ 19:57)   Serum Pro-Brain Natriuretic Peptide: [id number]:      Radiology Results      Meds    MEDICATIONS  (STANDING):  calcium acetate 2001 milliGRAM(s) Oral three times a day with meals  heparin   Injectable 5000 Unit(s) SubCutaneous every 12 hours  hydrALAZINE 50 milliGRAM(s) Oral three times a day  isosorbide   mononitrate ER Tablet (IMDUR) 120 milliGRAM(s) Oral daily  labetalol 200 milliGRAM(s) Oral three times a day  Nephro-radha 1 Tablet(s) Oral daily  NIFEdipine XL 30 milliGRAM(s) Oral daily  sodium polystyrene sulfonate Suspension 30 Gram(s) Oral once  sodium zirconium cyclosilicate 5 Gram(s) Oral <User Schedule>      MEDICATIONS  (PRN):  ALBUTerol    90 MICROgram(s) HFA Inhaler 2 Puff(s) Inhalation every 6 hours PRN Shortness of Breath and/or Wheezing      Physical Exam    Neuro :  no focal deficits  Respiratory: CTA B/L  CV: RRR, S1S2, no murmurs,   Abdominal: Soft, NT, ND +BS,  Extremities: No edema, + peripheral pulses        ASSESSMENT      dispnea 2nd to missed hd,   hyperkalemia,   hyponatremia,   Anemia due to CKD.  r/o chf   h/o Cataract,,   DVT (deep venous thrombosis)  of Left subclavian vein, 06/12/17,   ESRD (end stage renal disease) on dialysis via right avf MWF,   Glaucoma,   HTN (hypertension)  S/P KEVEN-BSO  Hip fracture      PLAN    cont  s/p HD 1 K bicarb bath 2/2/21  hyperkalemia resolved   renal f/u  No need for Epogen  Hyperphosphatemia on Ca acetate  Keep patient euvolemic and renal diet  pt for hd in am  probnp [id number]: noted above  cardio cons  f/u echo  cont current meds

## 2021-02-03 NOTE — PROGRESS NOTE ADULT - PROBLEM SELECTOR PLAN 1
-Pt presents with shortness of breadth for 1 day  -Likely secondary to fluid overload sec to miss HD  -Chest Xray showed Gross heart enlargement and mild-to-moderate diffuse CHF.  -ECHO in aug 2020 showed normal EF with GIDD  -Repeat ECHO to r/o any new changes   -Dr Wolfe consulted

## 2021-02-04 ENCOUNTER — TRANSCRIPTION ENCOUNTER (OUTPATIENT)
Age: 76
End: 2021-02-04

## 2021-02-04 LAB
ALBUMIN SERPL ELPH-MCNC: 3 G/DL — LOW (ref 3.5–5)
ALP SERPL-CCNC: 77 U/L — SIGNIFICANT CHANGE UP (ref 40–120)
ALT FLD-CCNC: 14 U/L DA — SIGNIFICANT CHANGE UP (ref 10–60)
ANION GAP SERPL CALC-SCNC: 14 MMOL/L — SIGNIFICANT CHANGE UP (ref 5–17)
AST SERPL-CCNC: 13 U/L — SIGNIFICANT CHANGE UP (ref 10–40)
BILIRUB SERPL-MCNC: 0.4 MG/DL — SIGNIFICANT CHANGE UP (ref 0.2–1.2)
BUN SERPL-MCNC: 96 MG/DL — HIGH (ref 7–18)
CALCIUM SERPL-MCNC: 8.6 MG/DL — SIGNIFICANT CHANGE UP (ref 8.4–10.5)
CHLORIDE SERPL-SCNC: 94 MMOL/L — LOW (ref 96–108)
CO2 SERPL-SCNC: 27 MMOL/L — SIGNIFICANT CHANGE UP (ref 22–31)
CREAT SERPL-MCNC: 10.6 MG/DL — HIGH (ref 0.5–1.3)
GLUCOSE SERPL-MCNC: 86 MG/DL — SIGNIFICANT CHANGE UP (ref 70–99)
MAGNESIUM SERPL-MCNC: 2.7 MG/DL — HIGH (ref 1.6–2.6)
PHOSPHATE SERPL-MCNC: 7.2 MG/DL — HIGH (ref 2.5–4.5)
POTASSIUM SERPL-MCNC: 5.3 MMOL/L — SIGNIFICANT CHANGE UP (ref 3.5–5.3)
POTASSIUM SERPL-SCNC: 5.3 MMOL/L — SIGNIFICANT CHANGE UP (ref 3.5–5.3)
PROT SERPL-MCNC: 6.3 G/DL — SIGNIFICANT CHANGE UP (ref 6–8.3)
SODIUM SERPL-SCNC: 135 MMOL/L — SIGNIFICANT CHANGE UP (ref 135–145)

## 2021-02-04 PROCEDURE — 71045 X-RAY EXAM CHEST 1 VIEW: CPT | Mod: 26

## 2021-02-04 RX ORDER — HYDRALAZINE HCL 50 MG
75 TABLET ORAL EVERY 8 HOURS
Refills: 0 | Status: DISCONTINUED | OUTPATIENT
Start: 2021-02-04 | End: 2021-02-05

## 2021-02-04 RX ADMIN — Medication 200 MILLIGRAM(S): at 21:22

## 2021-02-04 RX ADMIN — Medication 30 MILLIGRAM(S): at 04:48

## 2021-02-04 RX ADMIN — HEPARIN SODIUM 5000 UNIT(S): 5000 INJECTION INTRAVENOUS; SUBCUTANEOUS at 04:48

## 2021-02-04 RX ADMIN — Medication 75 MILLIGRAM(S): at 15:13

## 2021-02-04 RX ADMIN — Medication 2001 MILLIGRAM(S): at 13:03

## 2021-02-04 RX ADMIN — Medication 200 MILLIGRAM(S): at 13:03

## 2021-02-04 RX ADMIN — HEPARIN SODIUM 5000 UNIT(S): 5000 INJECTION INTRAVENOUS; SUBCUTANEOUS at 17:00

## 2021-02-04 RX ADMIN — Medication 200 MILLIGRAM(S): at 04:48

## 2021-02-04 RX ADMIN — Medication 2001 MILLIGRAM(S): at 17:00

## 2021-02-04 RX ADMIN — Medication 1 TABLET(S): at 13:03

## 2021-02-04 RX ADMIN — ISOSORBIDE MONONITRATE 120 MILLIGRAM(S): 60 TABLET, EXTENDED RELEASE ORAL at 13:03

## 2021-02-04 RX ADMIN — Medication 50 MILLIGRAM(S): at 04:48

## 2021-02-04 RX ADMIN — Medication 75 MILLIGRAM(S): at 21:22

## 2021-02-04 RX ADMIN — SODIUM ZIRCONIUM CYCLOSILICATE 5 GRAM(S): 10 POWDER, FOR SUSPENSION ORAL at 04:48

## 2021-02-04 RX ADMIN — Medication 2001 MILLIGRAM(S): at 08:06

## 2021-02-04 NOTE — PROGRESS NOTE ADULT - SUBJECTIVE AND OBJECTIVE BOX
CC: Patient denies CP, SOB, n/v/d, fever or chills.    Vital Signs Last 24 Hrs  T(C): 36.6 (04 Feb 2021 15:44), Max: 37 (04 Feb 2021 08:16)  T(F): 97.9 (04 Feb 2021 15:44), Max: 98.6 (04 Feb 2021 08:16)  HR: 63 (04 Feb 2021 15:44) (63 - 71)  BP: 154/61 (04 Feb 2021 15:44) (123/63 - 188/71)  BP(mean): --  RR: 17 (04 Feb 2021 15:44) (17 - 18)  SpO2: 100% (04 Feb 2021 15:44) (95% - 100%)    02-04 @ 07:01  -  02-04 @ 18:39  --------------------------------------------------------  IN: 700 mL / OUT: 1272 mL / NET: -572 mL    PHYSICAL EXAM:    GENERAL: NAD, well-nourished, well-developed  HEAD:  Atraumatic, Normocephalic  EYES: Sclera anicteric  ENMT: Moist mucous membranes  NECK: Supple, No JVD, Normal thyroid  NERVOUS SYSTEM:  Alert & Oriented X3  CHEST/LUNG: Decreased BS's  HEART: Regular rate and rhythm; No murmurs  ABDOMEN: Soft, Nontender, Nondistended; Bowel sounds present  EXTREMITIES:  No edema  SKIN: Normal turgor    MEDICATIONS:  ALBUTerol    90 MICROgram(s) HFA Inhaler 2 Puff(s) Inhalation every 6 hours PRN  calcium acetate 2001 milliGRAM(s) Oral three times a day with meals  heparin   Injectable 5000 Unit(s) SubCutaneous every 12 hours  hydrALAZINE 75 milliGRAM(s) Oral every 8 hours  isosorbide   mononitrate ER Tablet (IMDUR) 120 milliGRAM(s) Oral daily  labetalol 200 milliGRAM(s) Oral three times a day  Nephro-radha 1 Tablet(s) Oral daily  NIFEdipine XL 30 milliGRAM(s) Oral daily  sodium polystyrene sulfonate Suspension 30 Gram(s) Oral once  sodium zirconium cyclosilicate 5 Gram(s) Oral <User Schedule>    LABS:                        10.8   3.02  )-----------( 140      ( 03 Feb 2021 09:00 )             33.3     02-04    135  |  94<L>  |  96<H>  ----------------------------<  86  5.3   |  27  |  10.60<H>    Ca    8.6      04 Feb 2021 10:43  Phos  7.2     02-04  Mg     2.7     02-04    TPro  6.3  /  Alb  3.0<L>  /  TBili  0.4  /  DBili  x   /  AST  13  /  ALT  14  /  AlkPhos  77  02-04    ASSESSMENT AND PLAN:     1. ESRD on HD  2. Hyperkalemia improved  3. Anemia due to CKD. No need for Epogen  4. Hyperphosphatemia on Ca acetate  5. Volume overload improved  6. Hypertension is uncontrolled  Continue HD as ordered  UF as tolerated  Titrate BP meds  Keep patient euvolemic and renal diet  Adjust Medications according to eGFR  Follow BMP, H/H

## 2021-02-04 NOTE — PROGRESS NOTE ADULT - PROBLEM SELECTOR PLAN 1
-Pt presents with shortness of breadth for 1 day  -Likely secondary to fluid overload sec to miss HD  -Chest Xray showed Gross heart enlargement and mild-to-moderate diffuse CHF.  -ECHO in aug 2020 showed normal EF with GIDD  -Repeat ECHO to r/o any new changes   -Dr Wolfe consulted -Pt presents with shortness of breadth for 1 day  -Likely secondary to fluid overload sec to miss HD  -Chest Xray showed Gross heart enlargement and mild-to-moderate diffuse CHF.  -ECHO in aug 2020 showed normal EF with GIDD  -Repeat ECHO with no new changes with small pericardial effusion   -Dr Wolfe consulted

## 2021-02-04 NOTE — DISCHARGE NOTE PROVIDER - NSDCMRMEDTOKEN_GEN_ALL_CORE_FT
calcitriol 0.25 mcg oral capsule: 3 cap(s) orally Monday, Wednesday, and Friday   calcium acetate 667 mg oral tablet: 3 tab(s) orally 3 times a day   hydrALAZINE 100 mg oral tablet: 1 tab(s) orally 3 times a day  isosorbide mononitrate 120 mg oral tablet, extended release: 1 tab(s) orally once a day  labetalol 200 mg oral tablet: 1 tab(s) orally 3 times a day  Lokelma 5 g oral powder for reconstitution: 1 packet(s) orally Tuesday, Thursday, Saturday, Sunday   multivitamin:   NIFEdipine 30 mg oral tablet, extended release: 1 tab(s) orally once a day  Ventolin HFA 90 mcg/inh inhalation aerosol: 2 puff(s) inhaled every 6 hours, As Needed   calcitriol 0.25 mcg oral capsule: 3 cap(s) orally Monday, Wednesday, and Friday   calcium acetate 667 mg oral tablet: 3 tab(s) orally 3 times a day   hydrALAZINE 100 mg oral tablet: 1 tab(s) orally 3 times a day  isosorbide mononitrate 120 mg oral tablet, extended release: 1 tab(s) orally once a day  labetalol 200 mg oral tablet: 1 tab(s) orally 3 times a day  Lokelma 5 g oral powder for reconstitution: 1 packet(s) orally Tuesday, Thursday, Saturday, Sunday   multivitamin:   NIFEdipine 60 mg oral tablet, extended release: 1 tab(s) orally once a day   Ventolin HFA 90 mcg/inh inhalation aerosol: 2 puff(s) inhaled every 6 hours, As Needed

## 2021-02-04 NOTE — PROGRESS NOTE ADULT - SUBJECTIVE AND OBJECTIVE BOX
CHIEF COMPLAINT:Patient is a 75y old  Female who presents with a chief complaint of Missed dialysis.Pt appears comfortable.    	  REVIEW OF SYSTEMS:  CONSTITUTIONAL: No fever, weight loss, or fatigue  EYES: No eye pain, visual disturbances, or discharge  ENT:  No difficulty hearing, tinnitus, vertigo; No sinus or throat pain  NECK: No pain or stiffness  RESPIRATORY: No cough, wheezing, chills or hemoptysis; No Shortness of Breath  CARDIOVASCULAR: No chest pain, palpitations, passing out, dizziness, or leg swelling  GASTROINTESTINAL: No abdominal or epigastric pain. No nausea, vomiting, or hematemesis; No diarrhea or constipation. No melena or hematochezia.  GENITOURINARY: No dysuria, frequency, hematuria, or incontinence  NEUROLOGICAL: No headaches, memory loss, loss of strength, numbness, or tremors  SKIN: No itching, burning, rashes, or lesions   LYMPH Nodes: No enlarged glands  ENDOCRINE: No heat or cold intolerance; No hair loss  MUSCULOSKELETAL: No joint pain or swelling; No muscle, back, or extremity pain  PSYCHIATRIC: No depression, anxiety, mood swings, or difficulty sleeping  HEME/LYMPH: No easy bruising, or bleeding gums  ALLERGY AND IMMUNOLOGIC: No hives or eczema	      PHYSICAL EXAM:  T(C): 37 (02-04-21 @ 08:16), Max: 37 (02-04-21 @ 08:16)  HR: 68 (02-04-21 @ 08:16) (67 - 77)  BP: 185/83 (02-04-21 @ 08:16) (131/63 - 188/71)  RR: 17 (02-04-21 @ 08:16) (17 - 18)  SpO2: 95% (02-04-21 @ 08:16) (95% - 98%)  Wt(kg): --  I&O's Summary      Appearance: Normal	  HEENT:   Normal oral mucosa, PERRL, EOMI	  Lymphatic: No lymphadenopathy  Cardiovascular: Normal S1 S2, No JVD, No murmurs, No edema  Respiratory: Lungs clear to auscultation	  Psychiatry: A & O x 3, Mood & affect appropriate  Gastrointestinal:  Soft, Non-tender, + BS	  Skin: No rashes, No ecchymoses, No cyanosis	  Neurologic: Non-focal  Extremities: Normal range of motion, No clubbing, cyanosis or edema  Vascular: Peripheral pulses palpable 2+ bilaterally    MEDICATIONS  (STANDING):  calcium acetate 2001 milliGRAM(s) Oral three times a day with meals  heparin   Injectable 5000 Unit(s) SubCutaneous every 12 hours  hydrALAZINE 75 milliGRAM(s) Oral every 8 hours  isosorbide   mononitrate ER Tablet (IMDUR) 120 milliGRAM(s) Oral daily  labetalol 200 milliGRAM(s) Oral three times a day  Nephro-radha 1 Tablet(s) Oral daily  NIFEdipine XL 30 milliGRAM(s) Oral daily  sodium polystyrene sulfonate Suspension 30 Gram(s) Oral once  sodium zirconium cyclosilicate 5 Gram(s) Oral <User Schedule>      TELEMETRY: nsr	    	  	  LABS:	 	                        10.8   3.02  )-----------( 140      ( 03 Feb 2021 09:00 )             33.3     02-03    134<L>  |  94<L>  |  65<H>  ----------------------------<  136<H>  4.7   |  30  |  8.58<H>    Ca    8.7      03 Feb 2021 09:00  Phos  3.7     02-02  Mg     2.4     02-02    TPro  6.7  /  Alb  3.1<L>  /  TBili  0.5  /  DBili  x   /  AST  16  /  ALT  16  /  AlkPhos  89  02-03    proBNP: Serum Pro-Brain Natriuretic Peptide: 55654 pg/mL (02-02 @ 19:57)    Lipid Profile: Cholesterol 162  LDL --  HDL 81  TG 63      TSH: Thyroid Stimulating Hormone, Serum: 3.75 uU/mL (02-02 @ 19:57)

## 2021-02-04 NOTE — PROGRESS NOTE ADULT - PROBLEM SELECTOR PLAN 4
-Came with normal Sodium  -Now 133 >134  -Continue to monitor BMP -Came with normal Sodium  -Now 133 >134>135  -Continue to monitor BMP
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
## 2021-02-04 NOTE — PROGRESS NOTE ADULT - SUBJECTIVE AND OBJECTIVE BOX
Pt is awake, alert, lying in bed in NAD. Saturating 99%. HD today.   INTERVAL HPI/OVERNIGHT EVENTS:      VITAL SIGNS:  T(F): 98.4 (02-04-21 @ 10:05)  HR: 64 (02-04-21 @ 10:05)  BP: 176/78 (02-04-21 @ 10:05)  RR: 18 (02-04-21 @ 10:05)  SpO2: 99% (02-04-21 @ 10:05)  Wt(kg): --  I&O's Detail          REVIEW OF SYSTEMS:    CONSTITUTIONAL:  No fevers, chills, sweats    HEENT:  Eyes:  No diplopia or blurred vision. ENT:  No earache, sore throat or runny nose.    CARDIOVASCULAR:  No pressure, squeezing, tightness, or heaviness about the chest; no palpitations.    RESPIRATORY:  Per HPI    GASTROINTESTINAL:  No abdominal pain, nausea, vomiting or diarrhea.    GENITOURINARY:  No dysuria, frequency or urgency.    NEUROLOGIC:  No paresthesias, fasciculations, seizures or weakness.    PSYCHIATRIC:  No disorder of thought or mood.      PHYSICAL EXAM:    Constitutional: Well developed and nourished  Eyes:Perrla  ENMT: normal  Neck:supple  Respiratory: good air entry  Cardiovascular: S1 S2 regular  Gastrointestinal: Soft, Non tender  Extremities: No edema  Vascular:normal  Neurological:Awake, alert,Ox3  Musculoskeletal:Normal      MEDICATIONS  (STANDING):  calcium acetate 2001 milliGRAM(s) Oral three times a day with meals  heparin   Injectable 5000 Unit(s) SubCutaneous every 12 hours  hydrALAZINE 75 milliGRAM(s) Oral every 8 hours  isosorbide   mononitrate ER Tablet (IMDUR) 120 milliGRAM(s) Oral daily  labetalol 200 milliGRAM(s) Oral three times a day  Nephro-radha 1 Tablet(s) Oral daily  NIFEdipine XL 30 milliGRAM(s) Oral daily  sodium polystyrene sulfonate Suspension 30 Gram(s) Oral once  sodium zirconium cyclosilicate 5 Gram(s) Oral <User Schedule>    MEDICATIONS  (PRN):  ALBUTerol    90 MICROgram(s) HFA Inhaler 2 Puff(s) Inhalation every 6 hours PRN Shortness of Breath and/or Wheezing      Allergies    Mushrooms (Anaphylaxis)  penicillin (Rash)    Intolerances        LABS:                        10.8   3.02  )-----------( 140      ( 03 Feb 2021 09:00 )             33.3     02-04    135  |  94<L>  |  96<H>  ----------------------------<  86  5.3   |  27  |  10.60<H>    Ca    8.6      04 Feb 2021 10:43  Phos  7.2     02-04  Mg     2.7     02-04    TPro  6.3  /  Alb  3.0<L>  /  TBili  0.4  /  DBili  x   /  AST  13  /  ALT  14  /  AlkPhos  77  02-04              CAPILLARY BLOOD GLUCOSE        pro-bnp [id number] 02-02 @ 19:57     d-dimer --  02-02 @ 19:57      RADIOLOGY & ADDITIONAL TESTS:    CXR:    Ct scan chest:    ekg;    echo:
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
## 2021-02-04 NOTE — DISCHARGE NOTE PROVIDER - NSDCFUSCHEDAPPT_GEN_ALL_CORE_FT
ARIANNE MCNAMARA ; 03/16/2021 ; [facility] [address] ARIANEN [address] ; 03/16/2021 ; [facility] [address]

## 2021-02-04 NOTE — DISCHARGE NOTE PROVIDER - NSDCCPCAREPLAN_GEN_ALL_CORE_FT
PRINCIPAL DISCHARGE DIAGNOSIS  Diagnosis: Shortness of breath  Assessment and Plan of Treatment: you came with difficulty in breathing. Chest Xray showed small b/l pleural effusion. EKG was normal. ECHO ultrasound of heart showed left ventricular hypertrophy , normal ejection fraction , normal Right ventricular function with small pericardial effusion. Legs doppler was negative for any clot. You got dialysis on 2/4/21. Difficulty in breathing likely due to missed dialysis. Your symptoms have been improved. You have been advised to go to your regular dialysis center tomorrow for your scheduled dialysis.      SECONDARY DISCHARGE DIAGNOSES  Diagnosis: HTN (hypertension)  Assessment and Plan of Treatment: Continue with blood pressure medication. Maintain a healthy diet that consist of low sugar, low fat, low sodium diet. Exercise frequently if possible.  Follow up with primary care physician in one week after discharge.       PRINCIPAL DISCHARGE DIAGNOSIS  Diagnosis: Shortness of breath  Assessment and Plan of Treatment: you came with difficulty in breathing. Chest Xray showed small b/l pleural effusion. EKG was normal. ECHO ultrasound of heart showed left ventricular hypertrophy , normal ejection fraction , normal Right ventricular function with small pericardial effusion. Legs doppler was negative for any clot. You got dialysis on 2/4/21. Difficulty in breathing likely due to missed dialysis. Your symptoms have been improved. You have been advised to go to your regular dialysis center for your scheduled dialysis.      SECONDARY DISCHARGE DIAGNOSES  Diagnosis: HTN (hypertension)  Assessment and Plan of Treatment: Continue with blood pressure medication. Maintain a healthy diet that consist of low sugar, low fat, low sodium diet. Exercise frequently if possible.  Follow up with primary care physician in one week after discharge.       PRINCIPAL DISCHARGE DIAGNOSIS  Diagnosis: Shortness of breath  Assessment and Plan of Treatment: you came with difficulty in breathing. Chest Xray showed small b/l pleural effusion. EKG was normal. ECHO ultrasound of heart showed left ventricular hypertrophy , normal ejection fraction , normal Right ventricular function with small pericardial effusion. Legs doppler was negative for any clot. You got dialysis on 2/4/21 and 2/6/21. Difficulty in breathing likely due to missed dialysis. Your symptoms have been improved. You have been advised to go to your regular dialysis center for your scheduled dialysis.      SECONDARY DISCHARGE DIAGNOSES  Diagnosis: HTN (hypertension)  Assessment and Plan of Treatment: Continue with blood pressure medication. Maintain a healthy diet that consist of low sugar, low fat, low sodium diet. Exercise frequently if possible.  Follow up with primary care physician in one week after discharge.       PRINCIPAL DISCHARGE DIAGNOSIS  Diagnosis: Shortness of breath  Assessment and Plan of Treatment: you came with difficulty in breathing. Chest Xray showed small b/l pleural effusion. EKG was normal. ECHO ultrasound of heart showed left ventricular hypertrophy , normal ejection fraction , normal Right ventricular function with small pericardial effusion. Legs doppler was negative for any clot. You got dialysis on 2/4/21 and 2/6/21. Difficulty in breathing likely due to missed dialysis. Your symptoms have been improved. You have been advised to go to your regular dialysis center for your scheduled dialysis.      SECONDARY DISCHARGE DIAGNOSES  Diagnosis: HTN (hypertension)  Assessment and Plan of Treatment: Your blood pressure was high on your home medications. Your nifedipine medication dose has been uptitrated from 30mg to 60 mg once daily. Maintain a healthy diet that consist of low sugar, low fat, low sodium diet. Exercise frequently if possible.  Follow up with primary care physician in one week after discharge for further care of your blood pressure.

## 2021-02-04 NOTE — DISCHARGE NOTE PROVIDER - CARE PROVIDER_API CALL
Kourtney Tabares  [place] [address]  Phone: [phone]  Fax: [phone]  Follow Up Time:

## 2021-02-04 NOTE — DISCHARGE NOTE PROVIDER - NSDCDCMDCOMP_GEN_ALL_CORE
EOMI; PERRL; no drainage or redness This document is complete and the patient is ready for discharge.

## 2021-02-04 NOTE — PROGRESS NOTE ADULT - PROBLEM SELECTOR PLAN 3
-pt p/w k 7.2   -no ekg changes  -s/p coaktail,  -K is 4.7 -pt p/w k 7.2   -no ekg changes  -s/p coaktail,  -K is 5.3 before HD today
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
## 2021-02-04 NOTE — PROGRESS NOTE ADULT - PROBLEM SELECTOR PLAN 5
-Pt has hx of htn   -resumed home meds at lower doses  -uptitrate as needed -Pt has hx of htn   -resumed home meds at lower doses  -Hydralazine switched from 50 TID to 75 TID(100 TID at home)

## 2021-02-05 ENCOUNTER — TRANSCRIPTION ENCOUNTER (OUTPATIENT)
Age: 76
End: 2021-02-05

## 2021-02-05 LAB
% ALBUMIN: 57.9 % — SIGNIFICANT CHANGE UP
% ALPHA 1: 6.2 % — SIGNIFICANT CHANGE UP
% ALPHA 2: 9.5 % — SIGNIFICANT CHANGE UP
% BETA: 8.2 % — SIGNIFICANT CHANGE UP
% GAMMA: 18.2 % — SIGNIFICANT CHANGE UP
ALBUMIN SERPL ELPH-MCNC: 3.2 G/DL — LOW (ref 3.5–5)
ALBUMIN SERPL ELPH-MCNC: 3.4 G/DL — LOW (ref 3.6–5.5)
ALBUMIN/GLOB SERPL ELPH: 1.4 RATIO — SIGNIFICANT CHANGE UP
ALP SERPL-CCNC: 80 U/L — SIGNIFICANT CHANGE UP (ref 40–120)
ALPHA1 GLOB SERPL ELPH-MCNC: 0.4 G/DL — SIGNIFICANT CHANGE UP (ref 0.1–0.4)
ALPHA2 GLOB SERPL ELPH-MCNC: 0.6 G/DL — SIGNIFICANT CHANGE UP (ref 0.5–1)
ALT FLD-CCNC: 15 U/L DA — SIGNIFICANT CHANGE UP (ref 10–60)
ANION GAP SERPL CALC-SCNC: 11 MMOL/L — SIGNIFICANT CHANGE UP (ref 5–17)
AST SERPL-CCNC: 16 U/L — SIGNIFICANT CHANGE UP (ref 10–40)
B-GLOBULIN SERPL ELPH-MCNC: 0.5 G/DL — SIGNIFICANT CHANGE UP (ref 0.5–1)
BILIRUB SERPL-MCNC: 0.5 MG/DL — SIGNIFICANT CHANGE UP (ref 0.2–1.2)
BUN SERPL-MCNC: 66 MG/DL — HIGH (ref 7–18)
CALCIUM SERPL-MCNC: 8.4 MG/DL — SIGNIFICANT CHANGE UP (ref 8.4–10.5)
CHLORIDE SERPL-SCNC: 92 MMOL/L — LOW (ref 96–108)
CO2 SERPL-SCNC: 30 MMOL/L — SIGNIFICANT CHANGE UP (ref 22–31)
CREAT SERPL-MCNC: 8.03 MG/DL — HIGH (ref 0.5–1.3)
GAMMA GLOBULIN: 1.1 G/DL — SIGNIFICANT CHANGE UP (ref 0.6–1.6)
GLUCOSE SERPL-MCNC: 85 MG/DL — SIGNIFICANT CHANGE UP (ref 70–99)
INTERPRETATION SERPL IFE-IMP: SIGNIFICANT CHANGE UP
POTASSIUM SERPL-MCNC: 4.8 MMOL/L — SIGNIFICANT CHANGE UP (ref 3.5–5.3)
POTASSIUM SERPL-SCNC: 4.8 MMOL/L — SIGNIFICANT CHANGE UP (ref 3.5–5.3)
PROT PATTERN SERPL ELPH-IMP: SIGNIFICANT CHANGE UP
PROT SERPL-MCNC: 5.8 G/DL — LOW (ref 6–8.3)
PROT SERPL-MCNC: 5.8 G/DL — LOW (ref 6–8.3)
PROT SERPL-MCNC: 6.8 G/DL — SIGNIFICANT CHANGE UP (ref 6–8.3)
SODIUM SERPL-SCNC: 133 MMOL/L — LOW (ref 135–145)

## 2021-02-05 RX ORDER — HYDRALAZINE HCL 50 MG
100 TABLET ORAL EVERY 8 HOURS
Refills: 0 | Status: DISCONTINUED | OUTPATIENT
Start: 2021-02-05 | End: 2021-02-06

## 2021-02-05 RX ADMIN — Medication 75 MILLIGRAM(S): at 06:03

## 2021-02-05 RX ADMIN — Medication 2001 MILLIGRAM(S): at 11:57

## 2021-02-05 RX ADMIN — Medication 200 MILLIGRAM(S): at 13:09

## 2021-02-05 RX ADMIN — Medication 200 MILLIGRAM(S): at 21:26

## 2021-02-05 RX ADMIN — Medication 100 MILLIGRAM(S): at 13:09

## 2021-02-05 RX ADMIN — Medication 2001 MILLIGRAM(S): at 08:18

## 2021-02-05 RX ADMIN — ISOSORBIDE MONONITRATE 120 MILLIGRAM(S): 60 TABLET, EXTENDED RELEASE ORAL at 11:57

## 2021-02-05 RX ADMIN — Medication 30 MILLIGRAM(S): at 06:03

## 2021-02-05 RX ADMIN — Medication 200 MILLIGRAM(S): at 06:02

## 2021-02-05 RX ADMIN — Medication 100 MILLIGRAM(S): at 21:26

## 2021-02-05 RX ADMIN — Medication 2001 MILLIGRAM(S): at 17:12

## 2021-02-05 RX ADMIN — Medication 1 TABLET(S): at 11:57

## 2021-02-05 RX ADMIN — HEPARIN SODIUM 5000 UNIT(S): 5000 INJECTION INTRAVENOUS; SUBCUTANEOUS at 17:13

## 2021-02-05 RX ADMIN — HEPARIN SODIUM 5000 UNIT(S): 5000 INJECTION INTRAVENOUS; SUBCUTANEOUS at 06:03

## 2021-02-05 NOTE — PROGRESS NOTE ADULT - PROBLEM SELECTOR PLAN 5
-Pt has hx of htn   -resumed home meds at lower doses  -Hydralazine switched from 50 TID to 75 TID(100 TID at home) -Pt has hx of htn   -resumed home meds at lower doses  -Hydralazine switched from 50 TID to 75 TID and now 100mg TID  -Pt with possible d/c tomorrow

## 2021-02-05 NOTE — PROGRESS NOTE ADULT - SUBJECTIVE AND OBJECTIVE BOX
NEPHROLOGY MEDICAL CARE, [facility] - Dr. Luis Fernando Lloyd/ Dr. Jorge Treviño/ Dr. Robson Soriano/ Dr. Lauren Paul    Patient was seen and examined at bedside.    CC: patient is okay and no sob    Vital Signs Last 24 Hrs  T(C): 36.9 (05 Feb 2021 11:17), Max: 37.1 (05 Feb 2021 04:34)  T(F): 98.4 (05 Feb 2021 11:17), Max: 98.7 (05 Feb 2021 04:34)  HR: 65 (05 Feb 2021 11:17) (62 - 70)  BP: 171/73 (05 Feb 2021 11:17) (140/64 - 189/62)  BP(mean): --  RR: 19 (05 Feb 2021 11:17) (17 - 19)  SpO2: 95% (05 Feb 2021 11:17) (95% - 100%)    02-04 @ 07:01  -  02-05 @ 07:00  --------------------------------------------------------  IN: 700 mL / OUT: 1272 mL / NET: -572 mL        PHYSICAL EXAM:  GENERAL: NAD, well-nourished, well-developed  HEAD:  Atraumatic, Normocephalic  EYES: Sclera anicteric  ENMT: Moist mucous membranes  NECK: Supple, No JVD, Normal thyroid  NERVOUS SYSTEM:  Alert & Oriented X3  CHEST/LUNG: Decreased BS's  HEART: Regular rate and rhythm; No murmurs  ABDOMEN: Soft, Nontender, Nondistended; Bowel sounds present  EXTREMITIES:  No edema  SKIN: Normal turgor    MEDICATIONS:  ALBUTerol    90 MICROgram(s) HFA Inhaler 2 Puff(s) Inhalation every 6 hours PRN  calcium acetate 2001 milliGRAM(s) Oral three times a day with meals  heparin   Injectable 5000 Unit(s) SubCutaneous every 12 hours  hydrALAZINE 100 milliGRAM(s) Oral every 8 hours  isosorbide   mononitrate ER Tablet (IMDUR) 120 milliGRAM(s) Oral daily  labetalol 200 milliGRAM(s) Oral three times a day  Nephro-radha 1 Tablet(s) Oral daily  NIFEdipine XL 30 milliGRAM(s) Oral daily  sodium polystyrene sulfonate Suspension 30 Gram(s) Oral once  sodium zirconium cyclosilicate 5 Gram(s) Oral <User Schedule>      LABS:    02-05    133<L>  |  92<L>  |  66<H>  ----------------------------<  85  4.8   |  30  |  8.03<H>    Ca    8.4      05 Feb 2021 08:20  Phos  7.2     02-04  Mg     2.7     02-04    TPro  6.8  /  Alb  3.2<L>  /  TBili  0.5  /  DBili  x   /  AST  16  /  ALT  15  /  AlkPhos  80  02-05          Urine studies    PTH and Vit D:

## 2021-02-05 NOTE — DISCHARGE NOTE NURSING/CASE MANAGEMENT/SOCIAL WORK - NSDCFUADDAPPT_GEN_ALL_CORE_FT
Outpatient hemodialysis reinstated for Monday 2/8 at [facility].  PCA services reinstated with [facility] for Monday 2/8.

## 2021-02-05 NOTE — PROGRESS NOTE ADULT - SUBJECTIVE AND OBJECTIVE BOX
Patient is a 75y old  Female who presents with a chief complaint of Missed dialysis (04 Feb 2021 16:36)      pt seen in tele [x  ], reg med floor [   ], bed [ x ], chair at bedside [   ], a+o x3 [ x ], lethargic [  ],    nad [x  ]        Allergies    Mushrooms (Anaphylaxis)  penicillin (Rash)        Vitals    T(F): 98.7 (02-05-21 @ 04:34), Max: 98.7 (02-05-21 @ 04:34)  HR: 65 (02-05-21 @ 04:34) (62 - 70)  BP: 178/61 (02-05-21 @ 04:34) (123/63 - 189/62)  RR: 17 (02-05-21 @ 04:34) (17 - 18)  SpO2: 100% (02-05-21 @ 04:34) (95% - 100%)  Wt(kg): --  CAPILLARY BLOOD GLUCOSE          Labs                          10.8   3.02  )-----------( 140      ( 03 Feb 2021 09:00 )             33.3       02-04    135  |  94<L>  |  96<H>  ----------------------------<  86  5.3   |  27  |  10.60<H>    Ca    8.6      04 Feb 2021 10:43  Phos  7.2     02-04  Mg     2.7     02-04    TPro  6.3  /  Alb  3.0<L>  /  TBili  0.4  /  DBili  x   /  AST  13  /  ALT  14  /  AlkPhos  77  02-04                Radiology Results      Meds    MEDICATIONS  (STANDING):  calcium acetate 2001 milliGRAM(s) Oral three times a day with meals  heparin   Injectable 5000 Unit(s) SubCutaneous every 12 hours  hydrALAZINE 75 milliGRAM(s) Oral every 8 hours  isosorbide   mononitrate ER Tablet (IMDUR) 120 milliGRAM(s) Oral daily  labetalol 200 milliGRAM(s) Oral three times a day  Nephro-radha 1 Tablet(s) Oral daily  NIFEdipine XL 30 milliGRAM(s) Oral daily  sodium polystyrene sulfonate Suspension 30 Gram(s) Oral once  sodium zirconium cyclosilicate 5 Gram(s) Oral <User Schedule>      MEDICATIONS  (PRN):  ALBUTerol    90 MICROgram(s) HFA Inhaler 2 Puff(s) Inhalation every 6 hours PRN Shortness of Breath and/or Wheezing      Physical Exam    Neuro :  no focal deficits  Respiratory: CTA B/L  CV: RRR, S1S2, no murmurs,   Abdominal: Soft, NT, ND +BS,  Extremities: No edema, + peripheral pulses        ASSESSMENT      dispnea 2nd to missed hd,   hyperkalemia,   hyponatremia,   Anemia due to CKD.  r/o chf   uncontrolled htn   pulm htn  h/o Cataract,,   DVT (deep venous thrombosis)  of Left subclavian vein, 06/12/17,   ESRD (end stage renal disease) on dialysis via right avf MWF,   Glaucoma,   HTN (hypertension)  S/P KEVEN-BSO  Hip fracture      PLAN    cont tele  pt for hd today  renal f/u  No need for Epogen  Hyperphosphatemia on Ca acetate  Keep patient euvolemic and renal diet  probnp [id number] noted  cardio f/u   echo with Severely dilated left atrium. Severe concentric left ventricular hypertrophy. Ejection Fraction Visual Estimate: >55 %, Mild diastolic dysfunction (stage I).  Mild pulmonary hypertension. Small pericardial effusion.   No echocardiographic evidence of tamponade physiology noted above.  incr hydralazine 75mg tid for better bp control,   cont IMDUR, labetalol, NIFEdipine XL   pulm f/u  cont O2 Supp PRN   PFTs as OP   Repeat CXR with Mild opacities lung bases and/or small pleural effusions without significant change given differences in technique on the right, progressed on the left lung base. Improving congestive changes noted above   cont current meds      Patient is a 75y old  Female who presents with a chief complaint of Missed dialysis (04 Feb 2021 16:36)      pt seen in tele [x  ], reg med floor [   ], bed [ x ], chair at bedside [   ], a+o x3 [ x ], lethargic [  ],    nad [x  ]        Allergies    Mushrooms (Anaphylaxis)  penicillin (Rash)        Vitals    T(F): 98.7 (02-05-21 @ 04:34), Max: 98.7 (02-05-21 @ 04:34)  HR: 65 (02-05-21 @ 04:34) (62 - 70)  BP: 178/61 (02-05-21 @ 04:34) (123/63 - 189/62)  RR: 17 (02-05-21 @ 04:34) (17 - 18)  SpO2: 100% (02-05-21 @ 04:34) (95% - 100%)  Wt(kg): --  CAPILLARY BLOOD GLUCOSE          Labs                          10.8   3.02  )-----------( 140      ( 03 Feb 2021 09:00 )             33.3       02-04    135  |  94<L>  |  96<H>  ----------------------------<  86  5.3   |  27  |  10.60<H>    Ca    8.6      04 Feb 2021 10:43  Phos  7.2     02-04  Mg     2.7     02-04    TPro  6.3  /  Alb  3.0<L>  /  TBili  0.4  /  DBili  x   /  AST  13  /  ALT  14  /  AlkPhos  77  02-04                Radiology Results      Meds    MEDICATIONS  (STANDING):  calcium acetate 2001 milliGRAM(s) Oral three times a day with meals  heparin   Injectable 5000 Unit(s) SubCutaneous every 12 hours  hydrALAZINE 75 milliGRAM(s) Oral every 8 hours  isosorbide   mononitrate ER Tablet (IMDUR) 120 milliGRAM(s) Oral daily  labetalol 200 milliGRAM(s) Oral three times a day  Nephro-radha 1 Tablet(s) Oral daily  NIFEdipine XL 30 milliGRAM(s) Oral daily  sodium polystyrene sulfonate Suspension 30 Gram(s) Oral once  sodium zirconium cyclosilicate 5 Gram(s) Oral <User Schedule>      MEDICATIONS  (PRN):  ALBUTerol    90 MICROgram(s) HFA Inhaler 2 Puff(s) Inhalation every 6 hours PRN Shortness of Breath and/or Wheezing      Physical Exam    Neuro :  no focal deficits  Respiratory: CTA B/L  CV: RRR, S1S2, no murmurs,   Abdominal: Soft, NT, ND +BS,  Extremities: No edema, + peripheral pulses        ASSESSMENT      dispnea 2nd to missed hd,   hyperkalemia,   hyponatremia,   Anemia due to CKD.  diastolic chf   uncontrolled htn   pulm htn  h/o Cataract,,   DVT (deep venous thrombosis)  of Left subclavian vein, 06/12/17,   ESRD (end stage renal disease) on dialysis via right avf MWF,   Glaucoma,   HTN (hypertension)  S/P KEVEN-BSO  Hip fracture      PLAN    cont tele  pt for hd in am  renal f/u  No need for Epogen  Hyperphosphatemia on Ca acetate  Keep patient euvolemic and renal diet  probnp [id number] noted  cardio f/u   echo with Severely dilated left atrium. Severe concentric left ventricular hypertrophy. Ejection Fraction Visual Estimate: >55 %, Mild diastolic dysfunction (stage I).  Mild pulmonary hypertension. Small pericardial effusion.   No echocardiographic evidence of tamponade physiology noted.  incr hydralazine 100 mg tid for better bp control,   cont IMDUR, labetalol, NIFEdipine XL   pulm f/u  cont O2 Supp PRN   PFTs as OP   Repeat CXR with Mild opacities lung bases and/or small pleural effusions without significant change given differences in technique on the right, progressed on the left lung base. Improving congestive changes noted above   cont current meds   d/c plan for am if bp stable

## 2021-02-05 NOTE — PROGRESS NOTE ADULT - SUBJECTIVE AND OBJECTIVE BOX
Pt is awake, alert, lying in bed in NAD. Saturating 95% on RA.     INTERVAL HPI/OVERNIGHT EVENTS:      VITAL SIGNS:  T(F): 98.4 (02-05-21 @ 11:17)  HR: 65 (02-05-21 @ 11:17)  BP: 171/73 (02-05-21 @ 11:17)  RR: 19 (02-05-21 @ 11:17)  SpO2: 95% (02-05-21 @ 11:17)  Wt(kg): --  I&O's Detail    04 Feb 2021 07:01  -  05 Feb 2021 07:00  --------------------------------------------------------  IN:    Other (mL): 700 mL  Total IN: 700 mL    OUT:    Other (mL): 1272 mL  Total OUT: 1272 mL    Total NET: -572 mL              REVIEW OF SYSTEMS:    CONSTITUTIONAL:  No fevers, chills, sweats    HEENT:  Eyes:  No diplopia or blurred vision. ENT:  No earache, sore throat or runny nose.    CARDIOVASCULAR:  No pressure, squeezing, tightness, or heaviness about the chest; no palpitations.    RESPIRATORY:  Per HPI    GASTROINTESTINAL:  No abdominal pain, nausea, vomiting or diarrhea.    GENITOURINARY:  No dysuria, frequency or urgency.    NEUROLOGIC:  No paresthesias, fasciculations, seizures or weakness.    PSYCHIATRIC:  No disorder of thought or mood.      PHYSICAL EXAM:    Constitutional: Well developed and nourished  Eyes:Perrla  ENMT: normal  Neck:supple  Respiratory: good air entry  Cardiovascular: S1 S2 regular  Gastrointestinal: Soft, Non tender  Extremities: No edema  Vascular:normal  Neurological:Awake, alert,Ox3  Musculoskeletal:Normal      MEDICATIONS  (STANDING):  calcium acetate 2001 milliGRAM(s) Oral three times a day with meals  heparin   Injectable 5000 Unit(s) SubCutaneous every 12 hours  hydrALAZINE 100 milliGRAM(s) Oral every 8 hours  isosorbide   mononitrate ER Tablet (IMDUR) 120 milliGRAM(s) Oral daily  labetalol 200 milliGRAM(s) Oral three times a day  Nephro-radha 1 Tablet(s) Oral daily  NIFEdipine XL 30 milliGRAM(s) Oral daily  sodium polystyrene sulfonate Suspension 30 Gram(s) Oral once  sodium zirconium cyclosilicate 5 Gram(s) Oral <User Schedule>    MEDICATIONS  (PRN):  ALBUTerol    90 MICROgram(s) HFA Inhaler 2 Puff(s) Inhalation every 6 hours PRN Shortness of Breath and/or Wheezing      Allergies    Mushrooms (Anaphylaxis)  penicillin (Rash)    Intolerances        LABS:    02-05    133<L>  |  92<L>  |  66<H>  ----------------------------<  85  4.8   |  30  |  8.03<H>    Ca    8.4      05 Feb 2021 08:20  Phos  7.2     02-04  Mg     2.7     02-04    TPro  6.8  /  Alb  3.2<L>  /  TBili  0.5  /  DBili  x   /  AST  16  /  ALT  15  /  AlkPhos  80  02-05              CAPILLARY BLOOD GLUCOSE        pro-bnp [id number] 02-02 @ 19:57     d-dimer --  02-02 @ 19:57      RADIOLOGY & ADDITIONAL TESTS:    CXR:  < from: Xray Chest 1 View- PORTABLE-Routine (Xray Chest 1 View- PORTABLE-Routine in AM.) (02.04.21 @ 10:51) >  IMPRESSION:  Small bilateral pleural effusions and adjacent airspace opacities unchanged    Heart size cannot be accurately assessed in this projection, but appear enlarged.    < end of copied text >    Ct scan chest:    ekg;    echo:

## 2021-02-05 NOTE — PROGRESS NOTE ADULT - PROBLEM SELECTOR PLAN 2
-Pr with ESRD with HD MWF  -pt missed dialysis due to weather conditions  -hyperkalemia: 7.2 cocktail> 3.9 >4.7  -cw renal diet  -last HD 4 days ago  -HD today done   -next HD tomorrow   -Nephrologist Dr. Paul -Pt with ESRD with HD MWF  -pt missed dialysis due to weather conditions  -hyperkalemia: 7.2 cocktail> 3.9 >4.7  -cw renal diet  -last HD 4 days ago  -next HD tomorrow   -Nephrologist Dr. Paul

## 2021-02-05 NOTE — PROGRESS NOTE ADULT - SUBJECTIVE AND OBJECTIVE BOX
CHIEF COMPLAINT:Patient is a 75y old  Female who presents with a chief complaint of Missed dialysis.Pt appears comfortable.    	  REVIEW OF SYSTEMS:  CONSTITUTIONAL: No fever, weight loss, or fatigue  EYES: No eye pain, visual disturbances, or discharge  ENT:  No difficulty hearing, tinnitus, vertigo; No sinus or throat pain  NECK: No pain or stiffness  RESPIRATORY: No cough, wheezing, chills or hemoptysis; No Shortness of Breath  CARDIOVASCULAR: No chest pain, palpitations, passing out, dizziness, or leg swelling  GASTROINTESTINAL: No abdominal or epigastric pain. No nausea, vomiting, or hematemesis; No diarrhea or constipation. No melena or hematochezia.  GENITOURINARY: No dysuria, frequency, hematuria, or incontinence  NEUROLOGICAL: No headaches, memory loss, loss of strength, numbness, or tremors  SKIN: No itching, burning, rashes, or lesions   LYMPH Nodes: No enlarged glands  ENDOCRINE: No heat or cold intolerance; No hair loss  MUSCULOSKELETAL: No joint pain or swelling; No muscle, back, or extremity pain  PSYCHIATRIC: No depression, anxiety, mood swings, or difficulty sleeping  HEME/LYMPH: No easy bruising, or bleeding gums  ALLERGY AND IMMUNOLOGIC: No hives or eczema	        PHYSICAL EXAM:  T(C): 36.8 (02-05-21 @ 08:05), Max: 37.1 (02-05-21 @ 04:34)  HR: 64 (02-05-21 @ 08:05) (62 - 70)  BP: 152/72 (02-05-21 @ 08:05) (123/63 - 189/62)  RR: 18 (02-05-21 @ 08:05) (17 - 18)  SpO2: 96% (02-05-21 @ 08:05) (96% - 100%)  Wt(kg): --  I&O's Summary    04 Feb 2021 07:01  -  05 Feb 2021 07:00  --------------------------------------------------------  IN: 700 mL / OUT: 1272 mL / NET: -572 mL        Appearance: Normal	  HEENT:   Normal oral mucosa, PERRL, EOMI	  Lymphatic: No lymphadenopathy  Cardiovascular: Normal S1 S2, No JVD, No murmurs, No edema  Respiratory: Lungs clear to auscultation	  Psychiatry: A & O x 3, Mood & affect appropriate  Gastrointestinal:  Soft, Non-tender, + BS	  Skin: No rashes, No ecchymoses, No cyanosis	  Neurologic: Non-focal  Extremities: Normal range of motion, No clubbing, cyanosis or edema  Vascular: Peripheral pulses palpable 2+ bilaterally    MEDICATIONS  (STANDING):  calcium acetate 2001 milliGRAM(s) Oral three times a day with meals  heparin   Injectable 5000 Unit(s) SubCutaneous every 12 hours  hydrALAZINE 100 milliGRAM(s) Oral every 8 hours  isosorbide   mononitrate ER Tablet (IMDUR) 120 milliGRAM(s) Oral daily  labetalol 200 milliGRAM(s) Oral three times a day  Nephro-radha 1 Tablet(s) Oral daily  NIFEdipine XL 30 milliGRAM(s) Oral daily  sodium polystyrene sulfonate Suspension 30 Gram(s) Oral once  sodium zirconium cyclosilicate 5 Gram(s) Oral <User Schedule>      TELEMETRY: nsr	    	  	  LABS:	 	      02-05    133<L>  |  92<L>  |  66<H>  ----------------------------<  85  4.8   |  30  |  8.03<H>    Ca    8.4      05 Feb 2021 08:20  Phos  7.2     02-04  Mg     2.7     02-04    TPro  6.8  /  Alb  3.2<L>  /  TBili  0.5  /  DBili  x   /  AST  16  /  ALT  15  /  AlkPhos  80  02-05    proBNP: Serum Pro-Brain Natriuretic Peptide: 61623 pg/mL (02-02 @ 19:57)    Lipid Profile: Cholesterol 162  LDL --  HDL 81  TG 63    HgA1c:   TSH: Thyroid Stimulating Hormone, Serum: 3.75 uU/mL (02-02 @ 19:57)      	   Transthoracic Echocardiogram (02.03.21 @ 08:14) >  OBSERVATIONS:  Mitral Valve: Mitral annular calcification. Trace mitral  regurgitation.  Aortic Root: Normal aortic root.  Aortic Valve: Normal trileaflet aortic valve. Trace aortic  regurgitation.  Left Atrium: Severely dilated left atrium.  LA volume index  = 72 cc/m2.  Left Ventricle: Normal left ventricular systolic function.   Mild diastolic dysfunction (stage I). Severe concentric  left ventricular hypertrophy.  Right Heart: Normal right atrium. Normal right ventricular  size and function. There is trace tricuspid regurgitation.  There is mild pulmonic regurgitation.  Pericardium/PleuraSmall pericardial effusion.   No  echocardiographic evidence of tamponade physiology.  Hemodynamic: RA Pressure is 8 mm Hg. RV systolic pressure  is 44 mm Hg. Mild pulmonary hypertension.

## 2021-02-05 NOTE — DISCHARGE NOTE NURSING/CASE MANAGEMENT/SOCIAL WORK - PATIENT PORTAL LINK FT
You can access the FollowMyHealth Patient Portal offered by [facility] by registering at the following website: [email or web address]. By joining [employer]’s FollowMyHealth portal, you will also be able to view your health information using other applications (apps) compatible with our system.

## 2021-02-05 NOTE — PROGRESS NOTE ADULT - PROBLEM SELECTOR PLAN 4
-Came with normal Sodium  -Now 133 >134>135  -Continue to monitor BMP -Came with normal Sodium  -Now 133 >134>135

## 2021-02-05 NOTE — PROGRESS NOTE ADULT - SUBJECTIVE AND OBJECTIVE BOX
PGY-1 Progress Note discussed with attending    PAGER #: [[phone]] TILL 5:00 PM  PLEASE CONTACT ON CALL TEAM:  - On Call Team (Please refer to Carrillo) FROM 5:00 PM - 8:30PM  - Nightfloat Team FROM 8:30 -7:30 AM    CHIEF COMPLAINT & BRIEF HOSPITAL COURSE:    INTERVAL HPI/OVERNIGHT EVENTS:       REVIEW OF SYSTEMS:  CONSTITUTIONAL: No fever, weight loss, or fatigue  RESPIRATORY: No cough, wheezing, chills or hemoptysis; No shortness of breath  CARDIOVASCULAR: No chest pain, palpitations, dizziness, or leg swelling  GASTROINTESTINAL: No abdominal pain. No nausea, vomiting, or hematemesis; No diarrhea or constipation. No melena or hematochezia.  GENITOURINARY: No dysuria or hematuria, urinary frequency  NEUROLOGICAL: No headaches, memory loss, loss of strength, numbness, or tremors  SKIN: No itching, burning, rashes, or lesions     Vital Signs Last 24 Hrs  T(C): 36.8 (05 Feb 2021 08:05), Max: 37.1 (05 Feb 2021 04:34)  T(F): 98.3 (05 Feb 2021 08:05), Max: 98.7 (05 Feb 2021 04:34)  HR: 64 (05 Feb 2021 08:05) (62 - 70)  BP: 152/72 (05 Feb 2021 08:05) (123/63 - 189/62)  BP(mean): --  RR: 18 (05 Feb 2021 08:05) (17 - 18)  SpO2: 96% (05 Feb 2021 08:05) (96% - 100%)    PHYSICAL EXAMINATION:  GENERAL: NAD, well built  HEAD:  Atraumatic, Normocephalic  EYES:  conjunctiva and sclera clear  NECK: Supple, No JVD, Normal thyroid  CHEST/LUNG: Clear to auscultation. Clear to percussion bilaterally; No rales, rhonchi, wheezing, or rubs  HEART: Regular rate and rhythm; No murmurs, rubs, or gallops  ABDOMEN: Soft, Nontender, Nondistended; Bowel sounds present  NERVOUS SYSTEM:  Alert & Oriented X3,    EXTREMITIES:  2+ Peripheral Pulses, No clubbing, cyanosis, or edema  SKIN: warm dry      02-05    133<L>  |  92<L>  |  66<H>  ----------------------------<  85  4.8   |  30  |  8.03<H>    Ca    8.4      05 Feb 2021 08:20  Phos  7.2     02-04  Mg     2.7     02-04    TPro  6.8  /  Alb  3.2<L>  /  TBili  0.5  /  DBili  x   /  AST  16  /  ALT  15  /  AlkPhos  80  02-05    LIVER FUNCTIONS - ( 05 Feb 2021 08:20 )  Alb: 3.2 g/dL / Pro: 6.8 g/dL / ALK PHOS: 80 U/L / ALT: 15 U/L DA / AST: 16 U/L / GGT: x                   CAPILLARY BLOOD GLUCOSE      RADIOLOGY & ADDITIONAL TESTS:                   PGY-1 Progress Note discussed with attending    PAGER #: [[phone]] TILL 5:00 PM  PLEASE CONTACT ON CALL TEAM:  - On Call Team (Please refer to Carrillo) FROM 5:00 PM - 8:30PM  - Nightfloat Team FROM 8:30 -7:30 AM      INTERVAL HPI/OVERNIGHT EVENTS: No complaints.       REVIEW OF SYSTEMS:  CONSTITUTIONAL: No fever, weight loss, or fatigue  RESPIRATORY: No cough, wheezing, chills or hemoptysis; No shortness of breath  CARDIOVASCULAR: No chest pain, palpitations, dizziness, or leg swelling  GASTROINTESTINAL: No abdominal pain. No nausea, vomiting, or hematemesis; No diarrhea or constipation. No melena or hematochezia.  GENITOURINARY: No dysuria or hematuria, urinary frequency  NEUROLOGICAL: No headaches, memory loss, loss of strength, numbness, or tremors  SKIN: No itching, burning, rashes, or lesions     Vital Signs Last 24 Hrs  T(C): 36.8 (05 Feb 2021 08:05), Max: 37.1 (05 Feb 2021 04:34)  T(F): 98.3 (05 Feb 2021 08:05), Max: 98.7 (05 Feb 2021 04:34)  HR: 64 (05 Feb 2021 08:05) (62 - 70)  BP: 152/72 (05 Feb 2021 08:05) (123/63 - 189/62)  BP(mean): --  RR: 18 (05 Feb 2021 08:05) (17 - 18)  SpO2: 96% (05 Feb 2021 08:05) (96% - 100%)    PHYSICAL EXAMINATION:  GENERAL: NAD, well built  HEAD:  Atraumatic, Normocephalic  EYES:  conjunctiva and sclera clear  NECK: Supple, No JVD, Normal thyroid  CHEST/LUNG: Clear to auscultation. Clear to percussion bilaterally; No rales, rhonchi, wheezing, or rubs  HEART: Regular rate and rhythm; No murmurs, rubs, or gallops  ABDOMEN: Soft, Nontender, Nondistended; Bowel sounds present  NERVOUS SYSTEM:  Alert & Oriented X3,    EXTREMITIES:  2+ Peripheral Pulses, No clubbing, cyanosis, or edema  SKIN: warm dry    MEDICATIONS  (STANDING):  calcium acetate 2001 milliGRAM(s) Oral three times a day with meals  heparin   Injectable 5000 Unit(s) SubCutaneous every 12 hours  hydrALAZINE 100 milliGRAM(s) Oral every 8 hours  isosorbide   mononitrate ER Tablet (IMDUR) 120 milliGRAM(s) Oral daily  labetalol 200 milliGRAM(s) Oral three times a day  Nephro-radha 1 Tablet(s) Oral daily  NIFEdipine XL 30 milliGRAM(s) Oral daily  sodium polystyrene sulfonate Suspension 30 Gram(s) Oral once  sodium zirconium cyclosilicate 5 Gram(s) Oral <User Schedule>    MEDICATIONS  (PRN):  ALBUTerol    90 MICROgram(s) HFA Inhaler 2 Puff(s) Inhalation every 6 hours PRN Shortness of Breath and/or Wheezing        02-05    133<L>  |  92<L>  |  66<H>  ----------------------------<  85  4.8   |  30  |  8.03<H>    Ca    8.4      05 Feb 2021 08:20  Phos  7.2     02-04  Mg     2.7     02-04    TPro  6.8  /  Alb  3.2<L>  /  TBili  0.5  /  DBili  x   /  AST  16  /  ALT  15  /  AlkPhos  80  02-05    LIVER FUNCTIONS - ( 05 Feb 2021 08:20 )  Alb: 3.2 g/dL / Pro: 6.8 g/dL / ALK PHOS: 80 U/L / ALT: 15 U/L DA / AST: 16 U/L / GGT: x                   CAPILLARY BLOOD GLUCOSE      RADIOLOGY & ADDITIONAL TESTS:

## 2021-02-05 NOTE — PROGRESS NOTE ADULT - PROBLEM SELECTOR PLAN 1
-Pt presents with shortness of breadth for 1 day  -Likely secondary to fluid overload sec to miss HD  -Chest Xray showed Gross heart enlargement and mild-to-moderate diffuse CHF.  -ECHO in aug 2020 showed normal EF with GIDD  -Repeat ECHO with no new changes with small pericardial effusion   -Dr Wolfe consulted

## 2021-02-06 VITALS
HEART RATE: 59 BPM | DIASTOLIC BLOOD PRESSURE: 61 MMHG | SYSTOLIC BLOOD PRESSURE: 150 MMHG | TEMPERATURE: 98 F | RESPIRATION RATE: 18 BRPM | OXYGEN SATURATION: 100 %

## 2021-02-06 LAB
ANION GAP SERPL CALC-SCNC: 11 MMOL/L — SIGNIFICANT CHANGE UP (ref 5–17)
BUN SERPL-MCNC: 76 MG/DL — HIGH (ref 7–18)
CALCIUM SERPL-MCNC: 8.6 MG/DL — SIGNIFICANT CHANGE UP (ref 8.4–10.5)
CHLORIDE SERPL-SCNC: 94 MMOL/L — LOW (ref 96–108)
CO2 SERPL-SCNC: 28 MMOL/L — SIGNIFICANT CHANGE UP (ref 22–31)
CREAT SERPL-MCNC: 7.81 MG/DL — HIGH (ref 0.5–1.3)
GLUCOSE SERPL-MCNC: 104 MG/DL — HIGH (ref 70–99)
POTASSIUM SERPL-MCNC: 4.9 MMOL/L — SIGNIFICANT CHANGE UP (ref 3.5–5.3)
POTASSIUM SERPL-SCNC: 4.9 MMOL/L — SIGNIFICANT CHANGE UP (ref 3.5–5.3)
SODIUM SERPL-SCNC: 133 MMOL/L — LOW (ref 135–145)

## 2021-02-06 PROCEDURE — 71045 X-RAY EXAM CHEST 1 VIEW: CPT

## 2021-02-06 PROCEDURE — U0005: CPT

## 2021-02-06 PROCEDURE — 82962 GLUCOSE BLOOD TEST: CPT

## 2021-02-06 PROCEDURE — 83735 ASSAY OF MAGNESIUM: CPT

## 2021-02-06 PROCEDURE — 85610 PROTHROMBIN TIME: CPT

## 2021-02-06 PROCEDURE — 93306 TTE W/DOPPLER COMPLETE: CPT

## 2021-02-06 PROCEDURE — 80074 ACUTE HEPATITIS PANEL: CPT

## 2021-02-06 PROCEDURE — 85652 RBC SED RATE AUTOMATED: CPT

## 2021-02-06 PROCEDURE — 99285 EMERGENCY DEPT VISIT HI MDM: CPT | Mod: 25

## 2021-02-06 PROCEDURE — 99261: CPT

## 2021-02-06 PROCEDURE — 86769 SARS-COV-2 COVID-19 ANTIBODY: CPT

## 2021-02-06 PROCEDURE — 80048 BASIC METABOLIC PNL TOTAL CA: CPT

## 2021-02-06 PROCEDURE — 36415 COLL VENOUS BLD VENIPUNCTURE: CPT

## 2021-02-06 PROCEDURE — 83036 HEMOGLOBIN GLYCOSYLATED A1C: CPT

## 2021-02-06 PROCEDURE — 85730 THROMBOPLASTIN TIME PARTIAL: CPT

## 2021-02-06 PROCEDURE — 84155 ASSAY OF PROTEIN SERUM: CPT

## 2021-02-06 PROCEDURE — 93005 ELECTROCARDIOGRAM TRACING: CPT

## 2021-02-06 PROCEDURE — 87635 SARS-COV-2 COVID-19 AMP PRB: CPT

## 2021-02-06 PROCEDURE — 84443 ASSAY THYROID STIM HORMONE: CPT

## 2021-02-06 PROCEDURE — 84100 ASSAY OF PHOSPHORUS: CPT

## 2021-02-06 PROCEDURE — 84165 PROTEIN E-PHORESIS SERUM: CPT

## 2021-02-06 PROCEDURE — 83880 ASSAY OF NATRIURETIC PEPTIDE: CPT

## 2021-02-06 PROCEDURE — 80061 LIPID PANEL: CPT

## 2021-02-06 PROCEDURE — 86334 IMMUNOFIX E-PHORESIS SERUM: CPT

## 2021-02-06 PROCEDURE — 80053 COMPREHEN METABOLIC PANEL: CPT

## 2021-02-06 PROCEDURE — 85025 COMPLETE CBC W/AUTO DIFF WBC: CPT

## 2021-02-06 RX ORDER — NIFEDIPINE 30 MG
1 TABLET, EXTENDED RELEASE 24 HR ORAL
Qty: 30 | Refills: 0
Start: 2021-02-06 | End: 2021-03-07

## 2021-02-06 RX ORDER — NIFEDIPINE 30 MG
60 TABLET, EXTENDED RELEASE 24 HR ORAL DAILY
Refills: 0 | Status: DISCONTINUED | OUTPATIENT
Start: 2021-02-06 | End: 2021-02-06

## 2021-02-06 RX ADMIN — Medication 200 MILLIGRAM(S): at 11:28

## 2021-02-06 RX ADMIN — Medication 1 TABLET(S): at 11:23

## 2021-02-06 RX ADMIN — Medication 2001 MILLIGRAM(S): at 12:18

## 2021-02-06 RX ADMIN — Medication 200 MILLIGRAM(S): at 05:37

## 2021-02-06 RX ADMIN — Medication 100 MILLIGRAM(S): at 05:37

## 2021-02-06 RX ADMIN — SODIUM ZIRCONIUM CYCLOSILICATE 5 GRAM(S): 10 POWDER, FOR SUSPENSION ORAL at 12:19

## 2021-02-06 RX ADMIN — Medication 30 MILLIGRAM(S): at 05:37

## 2021-02-06 RX ADMIN — ISOSORBIDE MONONITRATE 120 MILLIGRAM(S): 60 TABLET, EXTENDED RELEASE ORAL at 11:24

## 2021-02-06 RX ADMIN — Medication 100 MILLIGRAM(S): at 11:29

## 2021-02-06 NOTE — PROGRESS NOTE ADULT - SUBJECTIVE AND OBJECTIVE BOX
NEPHROLOGY MEDICAL CARE, [facility] - Dr. Luis Fernando Lloyd/ Dr. Jorge Treviño/ Dr. Robson Soriano/ Dr. Lauren Paul    Patient was seen and examined at bedside.    CC: patient is okay and tolerated HD well.    Vital Signs Last 24 Hrs  T(C): 36.7 (06 Feb 2021 12:56), Max: 36.8 (05 Feb 2021 20:22)  T(F): 98 (06 Feb 2021 12:56), Max: 98.3 (05 Feb 2021 20:22)  HR: 59 (06 Feb 2021 12:56) (59 - 69)  BP: 150/61 (06 Feb 2021 12:56) (150/61 - 188/80)  BP(mean): --  RR: 18 (06 Feb 2021 12:56) (18 - 18)  SpO2: 100% (06 Feb 2021 12:56) (93% - 100%)    02-06 @ 07:01  -  02-06 @ 13:08  --------------------------------------------------------  IN: 600 mL / OUT: 2600 mL / NET: -2000 mL        PHYSICAL EXAM:  GENERAL: NAD, well-nourished, well-developed  HEAD:  Atraumatic, Normocephalic  EYES: Sclera anicteric  ENMT: Moist mucous membranes  NECK: Supple, No JVD, Normal thyroid  NERVOUS SYSTEM:  Alert & Oriented X3  CHEST/LUNG: Decreased BS's  HEART: Regular rate and rhythm; No murmurs  ABDOMEN: Soft, Nontender, Nondistended; Bowel sounds present  EXTREMITIES:  No edema  SKIN: Normal turgor    MEDICATIONS:  ALBUTerol    90 MICROgram(s) HFA Inhaler 2 Puff(s) Inhalation every 6 hours PRN  calcium acetate 2001 milliGRAM(s) Oral three times a day with meals  heparin   Injectable 5000 Unit(s) SubCutaneous every 12 hours  hydrALAZINE 100 milliGRAM(s) Oral every 8 hours  isosorbide   mononitrate ER Tablet (IMDUR) 120 milliGRAM(s) Oral daily  labetalol 200 milliGRAM(s) Oral three times a day  Nephro-radha 1 Tablet(s) Oral daily  NIFEdipine XL 60 milliGRAM(s) Oral daily  sodium polystyrene sulfonate Suspension 30 Gram(s) Oral once  sodium zirconium cyclosilicate 5 Gram(s) Oral <User Schedule>      LABS:    02-06    133<L>  |  94<L>  |  76<H>  ----------------------------<  104<H>  4.9   |  28  |  7.81<H>    Ca    8.6      06 Feb 2021 09:04    TPro  6.8  /  Alb  3.2<L>  /  TBili  0.5  /  DBili  x   /  AST  16  /  ALT  15  /  AlkPhos  80  02-05          Urine studies    PTH and Vit D:

## 2021-02-06 NOTE — PROGRESS NOTE ADULT - PROVIDER SPECIALTY LIST ADULT
Pulmonology
Cardiology
Internal Medicine
Nephrology
Cardiology
Internal Medicine
Pulmonology
Pulmonology
Nephrology
Cardiology
Internal Medicine

## 2021-02-06 NOTE — PROGRESS NOTE ADULT - SUBJECTIVE AND OBJECTIVE BOX
PGY-1 Progress Note discussed with attending    PAGER #: [[phone]] TILL 5:00 PM  PLEASE CONTACT ON CALL TEAM:  - On Call Team (Please refer to Carrillo) FROM 5:00 PM - 8:30PM  - Nightfloat Team FROM 8:30 -7:30 AM    CHIEF COMPLAINT & BRIEF HOSPITAL COURSE:    INTERVAL HPI/OVERNIGHT EVENTS:       REVIEW OF SYSTEMS:  CONSTITUTIONAL: No fever, weight loss, or fatigue  RESPIRATORY: No cough, wheezing, chills or hemoptysis; No shortness of breath  CARDIOVASCULAR: No chest pain, palpitations, dizziness, or leg swelling  GASTROINTESTINAL: No abdominal pain. No nausea, vomiting, or hematemesis; No diarrhea or constipation. No melena or hematochezia.  GENITOURINARY: No dysuria or hematuria, urinary frequency  NEUROLOGICAL: No headaches, memory loss, loss of strength, numbness, or tremors  SKIN: No itching, burning, rashes, or lesions     Vital Signs Last 24 Hrs  T(C): 36.7 (06 Feb 2021 04:27), Max: 36.9 (05 Feb 2021 11:17)  T(F): 98 (06 Feb 2021 04:27), Max: 98.4 (05 Feb 2021 11:17)  HR: 64 (06 Feb 2021 04:27) (64 - 69)  BP: 185/69 (06 Feb 2021 04:27) (151/56 - 185/69)  BP(mean): --  RR: 18 (06 Feb 2021 04:27) (18 - 19)  SpO2: 100% (06 Feb 2021 04:27) (93% - 100%)    PHYSICAL EXAMINATION:  GENERAL: NAD, well built  HEAD:  Atraumatic, Normocephalic  EYES:  conjunctiva and sclera clear  NECK: Supple, No JVD, Normal thyroid  CHEST/LUNG: Clear to auscultation. Clear to percussion bilaterally; No rales, rhonchi, wheezing, or rubs  HEART: Regular rate and rhythm; No murmurs, rubs, or gallops  ABDOMEN: Soft, Nontender, Nondistended; Bowel sounds present  NERVOUS SYSTEM:  Alert & Oriented X3,    EXTREMITIES:  2+ Peripheral Pulses, No clubbing, cyanosis, or edema  SKIN: warm dry      02-05    133<L>  |  92<L>  |  66<H>  ----------------------------<  85  4.8   |  30  |  8.03<H>    Ca    8.4      05 Feb 2021 08:20  Phos  7.2     02-04  Mg     2.7     02-04    TPro  6.8  /  Alb  3.2<L>  /  TBili  0.5  /  DBili  x   /  AST  16  /  ALT  15  /  AlkPhos  80  02-05    LIVER FUNCTIONS - ( 05 Feb 2021 08:20 )  Alb: 3.2 g/dL / Pro: 6.8 g/dL / ALK PHOS: 80 U/L / ALT: 15 U/L DA / AST: 16 U/L / GGT: x                   CAPILLARY BLOOD GLUCOSE      RADIOLOGY & ADDITIONAL TESTS:

## 2021-02-06 NOTE — PROGRESS NOTE ADULT - SUBJECTIVE AND OBJECTIVE BOX
Patient is a 75y old  Female who presents with a chief complaint of Missed dialysis (06 Feb 2021 10:14)  patient is awake, alert, comfortable in bed in NAD, Having HD    INTERVAL HPI/OVERNIGHT EVENTS:      VITAL SIGNS:  T(F): 96.5 (02-06-21 @ 11:45)  HR: 62 (02-06-21 @ 11:45)  BP: 188/80 (02-06-21 @ 11:45)  RR: 18 (02-06-21 @ 11:45)  SpO2: 100% (02-06-21 @ 11:45)  Wt(kg): --  I&O's Detail    06 Feb 2021 07:01  -  06 Feb 2021 12:47  --------------------------------------------------------  IN:    Other (mL): 600 mL  Total IN: 600 mL    OUT:    Other (mL): 2600 mL  Total OUT: 2600 mL    Total NET: -2000 mL              REVIEW OF SYSTEMS:    CONSTITUTIONAL:  No fevers, chills, sweats    HEENT:  Eyes:  No diplopia or blurred vision. ENT:  No earache, sore throat or runny nose.    CARDIOVASCULAR:  No pressure, squeezing, tightness, or heaviness about the chest; no palpitations.    RESPIRATORY:  Per HPI    GASTROINTESTINAL:  No abdominal pain, nausea, vomiting or diarrhea.    GENITOURINARY:  No dysuria, frequency or urgency.    NEUROLOGIC:  No paresthesias, fasciculations, seizures or weakness.    PSYCHIATRIC:  No disorder of thought or mood.      PHYSICAL EXAM:    Constitutional: Well developed and nourished  Eyes:Perrla  ENMT: normal  Neck:supple  Respiratory: good air entry  Cardiovascular: S1 S2 regular  Gastrointestinal: Soft, Non tender  Extremities: No edema  Vascular:normal  Neurological:Awake, alert,Ox3  Musculoskeletal:Normal      MEDICATIONS  (STANDING):  calcium acetate 2001 milliGRAM(s) Oral three times a day with meals  heparin   Injectable 5000 Unit(s) SubCutaneous every 12 hours  hydrALAZINE 100 milliGRAM(s) Oral every 8 hours  isosorbide   mononitrate ER Tablet (IMDUR) 120 milliGRAM(s) Oral daily  labetalol 200 milliGRAM(s) Oral three times a day  Nephro-radha 1 Tablet(s) Oral daily  NIFEdipine XL 60 milliGRAM(s) Oral daily  sodium polystyrene sulfonate Suspension 30 Gram(s) Oral once  sodium zirconium cyclosilicate 5 Gram(s) Oral <User Schedule>    MEDICATIONS  (PRN):  ALBUTerol    90 MICROgram(s) HFA Inhaler 2 Puff(s) Inhalation every 6 hours PRN Shortness of Breath and/or Wheezing      Allergies    Mushrooms (Anaphylaxis)  penicillin (Rash)    Intolerances        LABS:    02-06    133<L>  |  94<L>  |  76<H>  ----------------------------<  104<H>  4.9   |  28  |  7.81<H>    Ca    8.6      06 Feb 2021 09:04    TPro  6.8  /  Alb  3.2<L>  /  TBili  0.5  /  DBili  x   /  AST  16  /  ALT  15  /  AlkPhos  80  02-05              CAPILLARY BLOOD GLUCOSE        pro-bnp [id number] 02-02 @ 19:57     d-dimer --  02-02 @ 19:57      RADIOLOGY & ADDITIONAL TESTS:    CXR:  < from: Xray Chest 1 View- PORTABLE-Routine (Xray Chest 1 View- PORTABLE-Routine in AM.) (02.04.21 @ 10:51) >  FINDINGS/  IMPRESSION:  Small bilateral pleural effusions and adjacent airspace opacities unchanged    Heart size cannot be accurately assessed in this projection, but appear enlarged.      < end of copied text >  < from: Transthoracic Echocardiogram (02.03.21 @ 08:14) >  CONCLUSIONS:  1. Mitral annular calcification. Trace mitral  regurgitation.  2.  Trace aortic regurgitation.  3. Severely dilated left atrium.  LA volume index = 72  cc/m2.  4. Severe concentric left ventricular hypertrophy.  5. Normal left ventricular systolic function.   Mild  diastolic dysfunction (stage I).  6. Normal right ventricular size and function.  7. RV systolic pressure is 44 mm Hg. Mild pulmonary  hypertension.  8. Small pericardial effusion.   No echocardiographic  evidence of tamponade physiology.    < end of copied text >    Ct scan chest:    ekg;    echo:

## 2021-02-06 NOTE — PROGRESS NOTE ADULT - ASSESSMENT
1. ESRD on HD: Hd yesterday. Plan for HD tomorrow if still here or outpatient.  2. Hyperkalemia: improved  3. Anemia due to CKD. No need for Epogen  4. Hyperphosphatemia on Ca acetate  5. Volume overload improved  6. Hypertension is improving.  Titrate BP meds  Keep patient euvolemic and renal diet  Adjust Medications according to eGFR  Follow BMP, H/H
1. ESRD on HD: s/p HD today and tolerated well.   2. Hyperkalemia: improved  3. Anemia due to CKD. No need for Epogen  4. Hyperphosphatemia on Ca acetate  5. Volume overload improved  6. Hypertension: bp better after rpt in the afternoon in 150s and stable for discharge from nephrology point of view. Titrate BP meds  Keep patient euvolemic and renal diet  Adjust Medications according to eGFR  Follow BMP, H/H  
1. SOB  Improved.   Likely 2nd to fluid overload   Covid IGG positive, PRC negative.   Hx of ESRD - with missed HD.  Resume HD   Bronchodilators   Echo pending.   CXR noted.   O2 Supp PRN   PFTs as OP   Repeat CXR   Cardio F/U   DVT and GI PPX     2. ESRD  On HD  Avoid nephrotoxic drugs   Renal F/U     3. Electrolyte Imbalance  Missed HD - resume HD   Monitor labs.   Renal F/U     4. R/O CHF   Echo pending.   Tele monitoring.   Cardio F/U   
1. SOB  Improved.   Likely 2nd to fluid overload   Covid IGG positive, PRC negative.   Hx of ESRD - with missed HD.  on HD.   Bronchodilators   Echo pending.   CXR noted.   O2 Supp PRN   PFTs as OP   Repeat CXR noted 2/4.   Cardio F/U   DVT and GI PPX     2. ESRD  On HD  Avoid nephrotoxic drugs   Renal F/U     3. Electrolyte Imbalance  Missed HD - resume HD   Monitor labs.   Renal F/U     4. R/O CHF   Echo pending.   Tele monitoring.   Cardio F/U   
75 year old Female from home with pmhx of ESRD (M/W/F, no dialysis for last 4 days ), HTN, and DVT, presents with shortness of breath for 1 day,missed HD and hyperkalemia.  1.Tele monitoring.  2.Echocardiogram.  3.ESRD-HD as per renal.  4.HTN-inc hydralazine 75mg tid, cont rest of bp medication.  5.GI and DVT prophylaxis.
75 year old Female from home with pmhx of ESRD (M/W/F, no dialysis for last 4 days ), HTN, and DVT, presents with shortness of breath for 1 day,missed HD and hyperkalemia.  1.Tele monitoring.  2.SPEP-p.  3.ESRD-HD as per renal.  4.HTN-inc hydralazine 100mg tid, cont rest of bp medication.  5.GI and DVT prophylaxis.
Patient is 75 year old Female from home with pmh of ESRD (M/W/F, no dialysis for last 4 days ), HTN, and DVT, presents with shortness of breath for 1 day due to missed dialysis. Admitted for dialysis.
Patient is 75 year old Female from home with pmh of ESRD (M/W/F, no dialysis for last 4 days ), HTN, and DVT, presents with shortness of breath for 1 day due to missed dialysis. Admitted for dialysis.
75 year old Female from home with pmhx of ESRD (M/W/F, no dialysis for last 4 days ), HTN, and DVT, presents with shortness of breath for 1 day,missed HD and hyperkalemia.  1.D/C Tele monitoring.  2.SPEP-.  3.ESRD-HD as per renal.  4.HTN-inc procardia xl 60mg qd, cont rest of bp medication.  5.GI and DVT prophylaxis.
1. SOB  Improved.   Likely 2nd to fluid overload   Covid IGG positive, PRC negative.   Hx of ESRD - with missed HD.  on HD.   Bronchodilators   Echo noted   CXR noted.   O2 Supp PRN   PFTs as OP   Repeat CXR noted 2/4.   Cardio F/U   DVT and GI PPX     2. ESRD  On HD  Avoid nephrotoxic drugs   Renal F/U     3. Electrolyte Imbalance  Missed HD - resume HD   Monitor labs.   Renal F/U     4. R/O CHF   Echo pending.   Tele monitoring.   Cardio F/U    5.Pulmonary HTN  oxygen supp  Bronchodilattors  CCB   
Patient is 75 year old Female from home with pmh of ESRD (M/W/F, no dialysis for last 4 days ), HTN, and DVT, presents with shortness of breath for 1 day due to missed dialysis. Admitted for dialysis.
Patient is 75 year old Female from home with pmh of ESRD (M/W/F, no dialysis for last 4 days ), HTN, and DVT, presents with shortness of breath for 1 day due to missed dialysis. Admitted for dialysis.

## 2021-02-06 NOTE — PROGRESS NOTE ADULT - REASON FOR ADMISSION
Missed dialysis

## 2021-02-06 NOTE — PROGRESS NOTE ADULT - PROBLEM SELECTOR PLAN 2
-Pt with ESRD with HD MWF  -pt missed dialysis due to weather conditions  -hyperkalemia: 7.2 cocktail> 3.9 >4.7  -cw renal diet  -last HD 4 days ago  -next HD tomorrow   -Nephrologist Dr. Paul

## 2021-02-06 NOTE — PROGRESS NOTE ADULT - PROBLEM SELECTOR PLAN 5
-Pt has hx of htn   -resumed home meds at lower doses  -Hydralazine switched from 50 TID to 75 TID and now 100mg TID  -Pt with possible d/c tomorrow

## 2021-02-06 NOTE — PROGRESS NOTE ADULT - PROBLEM SELECTOR PLAN 6
RISK                                                          Points  [] Previous VTE                                           3  [] Thrombophilia                                        2  [] Lower limb paralysis                              2   [] Current Cancer                                       2   [x] Immobilization > 24 hrs                        1  [] ICU/CCU stay > 24 hours                       1  [x] Age > 60                                                   1    sc heparin

## 2021-02-06 NOTE — PROGRESS NOTE ADULT - SUBJECTIVE AND OBJECTIVE BOX
Patient is a 75y old  Female who presents with a chief complaint of Missed dialysis (05 Feb 2021 14:06)    pt seen in tele [x  ], reg med floor [   ], bed [ x ], chair at bedside [   ], a+o x3 [ x ], lethargic [  ],    nad [x  ]      Allergies    Mushrooms (Anaphylaxis)  penicillin (Rash)        Vitals    T(F): 98 (02-06-21 @ 04:27), Max: 98.4 (02-05-21 @ 11:17)  HR: 64 (02-06-21 @ 04:27) (64 - 69)  BP: 185/69 (02-06-21 @ 04:27) (151/56 - 185/69)  RR: 18 (02-06-21 @ 04:27) (18 - 19)  SpO2: 100% (02-06-21 @ 04:27) (93% - 100%)  Wt(kg): --  CAPILLARY BLOOD GLUCOSE          Labs        02-05    133<L>  |  92<L>  |  66<H>  ----------------------------<  85  4.8   |  30  |  8.03<H>    Ca    8.4      05 Feb 2021 08:20  Phos  7.2     02-04  Mg     2.7     02-04    TPro  6.8  /  Alb  3.2<L>  /  TBili  0.5  /  DBili  x   /  AST  16  /  ALT  15  /  AlkPhos  80  02-05                Radiology Results      Meds    MEDICATIONS  (STANDING):  calcium acetate 2001 milliGRAM(s) Oral three times a day with meals  heparin   Injectable 5000 Unit(s) SubCutaneous every 12 hours  hydrALAZINE 100 milliGRAM(s) Oral every 8 hours  isosorbide   mononitrate ER Tablet (IMDUR) 120 milliGRAM(s) Oral daily  labetalol 200 milliGRAM(s) Oral three times a day  Nephro-radha 1 Tablet(s) Oral daily  NIFEdipine XL 30 milliGRAM(s) Oral daily  sodium polystyrene sulfonate Suspension 30 Gram(s) Oral once  sodium zirconium cyclosilicate 5 Gram(s) Oral <User Schedule>      MEDICATIONS  (PRN):  ALBUTerol    90 MICROgram(s) HFA Inhaler 2 Puff(s) Inhalation every 6 hours PRN Shortness of Breath and/or Wheezing      Physical Exam    Neuro :  no focal deficits  Respiratory: CTA B/L  CV: RRR, S1S2, no murmurs,   Abdominal: Soft, NT, ND +BS,  Extremities: No edema, + peripheral pulses        ASSESSMENT      dispnea 2nd to missed hd,   hyperkalemia,   hyponatremia,   Anemia due to CKD.  diastolic chf   uncontrolled htn   pulm htn  h/o Cataract,,   DVT (deep venous thrombosis)  of Left subclavian vein, 06/12/17,   ESRD (end stage renal disease) on dialysis via right avf MWF,   Glaucoma,   HTN (hypertension)  S/P KEVEN-BSO  Hip fracture      PLAN    cont tele  pt for hd in am  renal f/u  No need for Epogen  Hyperphosphatemia on Ca acetate  Keep patient euvolemic and renal diet  probnp [id number] noted  cardio f/u   echo with Severely dilated left atrium. Severe concentric left ventricular hypertrophy. Ejection Fraction Visual Estimate: >55 %, Mild diastolic dysfunction (stage I).  Mild pulmonary hypertension. Small pericardial effusion.   No echocardiographic evidence of tamponade physiology noted.  incr hydralazine 100 mg tid for better bp control,   cont IMDUR, labetalol, NIFEdipine XL   pulm f/u  cont O2 Supp PRN   PFTs as OP   Repeat CXR with Mild opacities lung bases and/or small pleural effusions without significant change given differences in technique on the right, progressed on the left lung base. Improving congestive changes noted above   cont current meds   d/c plan for am if bp stable       Patient is a 75y old  Female who presents with a chief complaint of Missed dialysis (05 Feb 2021 14:06)    pt seen in tele [x  ], reg med floor [   ], bed [ x ], chair at bedside [   ], a+o x3 [ x ], lethargic [  ],    nad [x  ]      Allergies    Mushrooms (Anaphylaxis)  penicillin (Rash)        Vitals    T(F): 98 (02-06-21 @ 04:27), Max: 98.4 (02-05-21 @ 11:17)  HR: 64 (02-06-21 @ 04:27) (64 - 69)  BP: 185/69 (02-06-21 @ 04:27) (151/56 - 185/69)  RR: 18 (02-06-21 @ 04:27) (18 - 19)  SpO2: 100% (02-06-21 @ 04:27) (93% - 100%)  Wt(kg): --  CAPILLARY BLOOD GLUCOSE          Labs        02-05    133<L>  |  92<L>  |  66<H>  ----------------------------<  85  4.8   |  30  |  8.03<H>    Ca    8.4      05 Feb 2021 08:20  Phos  7.2     02-04  Mg     2.7     02-04    TPro  6.8  /  Alb  3.2<L>  /  TBili  0.5  /  DBili  x   /  AST  16  /  ALT  15  /  AlkPhos  80  02-05                Radiology Results      Meds    MEDICATIONS  (STANDING):  calcium acetate 2001 milliGRAM(s) Oral three times a day with meals  heparin   Injectable 5000 Unit(s) SubCutaneous every 12 hours  hydrALAZINE 100 milliGRAM(s) Oral every 8 hours  isosorbide   mononitrate ER Tablet (IMDUR) 120 milliGRAM(s) Oral daily  labetalol 200 milliGRAM(s) Oral three times a day  Nephro-radha 1 Tablet(s) Oral daily  NIFEdipine XL 30 milliGRAM(s) Oral daily  sodium polystyrene sulfonate Suspension 30 Gram(s) Oral once  sodium zirconium cyclosilicate 5 Gram(s) Oral <User Schedule>      MEDICATIONS  (PRN):  ALBUTerol    90 MICROgram(s) HFA Inhaler 2 Puff(s) Inhalation every 6 hours PRN Shortness of Breath and/or Wheezing      Physical Exam    Neuro :  no focal deficits  Respiratory: CTA B/L  CV: RRR, S1S2, no murmurs,   Abdominal: Soft, NT, ND +BS,  Extremities: No edema, + peripheral pulses        ASSESSMENT      dispnea 2nd to missed hd,   hyperkalemia,   hyponatremia,   Anemia due to CKD.  diastolic chf   uncontrolled htn   pulm htn  h/o Cataract,,   DVT (deep venous thrombosis)  of Left subclavian vein, 06/12/17,   ESRD (end stage renal disease) on dialysis via right avf MWF,   Glaucoma,   HTN (hypertension)  S/P KEVEN-BSO  Hip fracture      PLAN    cont tele  pt for hd today  renal f/u  No need for Epogen  Hyperphosphatemia on Ca acetate  Keep patient euvolemic and renal diet  probnp [id number] noted  cardio f/u   echo with Severely dilated left atrium. Severe concentric left ventricular hypertrophy. Ejection Fraction Visual Estimate: >55 %, Mild diastolic dysfunction (stage I).  Mild pulmonary hypertension. Small pericardial effusion.   No echocardiographic evidence of tamponade physiology noted.  incr hydralazine 100 mg tid for better bp control,   cont IMDUR, labetalol, NIFEdipine XL   pulm f/u  cont O2 Supp PRN   PFTs as OP   Repeat CXR with Mild opacities lung bases and/or small pleural effusions without significant change given differences in technique on the right, progressed on the left lung base. Improving congestive changes noted   cont current meds   d/c plan after hd if bp stable

## 2021-02-06 NOTE — PROGRESS NOTE ADULT - SUBJECTIVE AND OBJECTIVE BOX
CHIEF COMPLAINT:Patient is a 75y old  Female who presents with a chief complaint of Missed dialysis.Pt seen ad dialysis,no new complaints.    	  REVIEW OF SYSTEMS:  CONSTITUTIONAL: No fever, weight loss, or fatigue  EYES: No eye pain, visual disturbances, or discharge  ENT:  No difficulty hearing, tinnitus, vertigo; No sinus or throat pain  NECK: No pain or stiffness  RESPIRATORY: No cough, wheezing, chills or hemoptysis; No Shortness of Breath  CARDIOVASCULAR: No chest pain, palpitations, passing out, dizziness, or leg swelling  GASTROINTESTINAL: No abdominal or epigastric pain. No nausea, vomiting, or hematemesis; No diarrhea or constipation. No melena or hematochezia.  GENITOURINARY: No dysuria, frequency, hematuria, or incontinence  NEUROLOGICAL: No headaches, memory loss, loss of strength, numbness, or tremors  SKIN: No itching, burning, rashes, or lesions   LYMPH Nodes: No enlarged glands  ENDOCRINE: No heat or cold intolerance; No hair loss  MUSCULOSKELETAL: No joint pain or swelling; No muscle, back, or extremity pain  PSYCHIATRIC: No depression, anxiety, mood swings, or difficulty sleeping  HEME/LYMPH: No easy bruising, or bleeding gums  ALLERGY AND IMMUNOLOGIC: No hives or eczema	        PHYSICAL EXAM:  T(C): 36.8 (02-06-21 @ 08:30), Max: 36.9 (02-05-21 @ 11:17)  HR: 60 (02-06-21 @ 08:30) (60 - 69)  BP: 185/85 (02-06-21 @ 08:30) (151/56 - 185/85)  RR: 18 (02-06-21 @ 08:30) (18 - 19)  SpO2: 100% (02-06-21 @ 08:30) (93% - 100%)        Appearance: Normal	  HEENT:   Normal oral mucosa, PERRL, EOMI	  Lymphatic: No lymphadenopathy  Cardiovascular: Normal S1 S2, No JVD, No murmurs, No edema  Respiratory: Lungs clear to auscultation	  Psychiatry: A & O x 3, Mood & affect appropriate  Gastrointestinal:  Soft, Non-tender, + BS	  Skin: No rashes, No ecchymoses, No cyanosis	  Neurologic: Non-focal  Extremities: Normal range of motion, No clubbing, cyanosis or edema  Vascular: Peripheral pulses palpable 2+ bilaterally    MEDICATIONS  (STANDING):  calcium acetate 2001 milliGRAM(s) Oral three times a day with meals  heparin   Injectable 5000 Unit(s) SubCutaneous every 12 hours  hydrALAZINE 100 milliGRAM(s) Oral every 8 hours  isosorbide   mononitrate ER Tablet (IMDUR) 120 milliGRAM(s) Oral daily  labetalol 200 milliGRAM(s) Oral three times a day  Nephro-radha 1 Tablet(s) Oral daily  NIFEdipine XL 60 milliGRAM(s) Oral daily  sodium polystyrene sulfonate Suspension 30 Gram(s) Oral once  sodium zirconium cyclosilicate 5 Gram(s) Oral <User Schedule>      	  	  LABS:	 	    02-06    133<L>  |  94<L>  |  76<H>  ----------------------------<  104<H>  4.9   |  28  |  7.81<H>    Ca    8.6      06 Feb 2021 09:04  Phos  7.2     02-04  Mg     2.7     02-04    TPro  6.8  /  Alb  3.2<L>  /  TBili  0.5  /  DBili  x   /  AST  16  /  ALT  15  /  AlkPhos  80  02-05    proBNP: Serum Pro-Brain Natriuretic Peptide: 09954 pg/mL (02-02 @ 19:57)    Lipid Profile: Cholesterol 162  HDL 81  TG 63      TSH: Thyroid Stimulating Hormone, Serum: 3.75 uU/mL (02-02 @ 19:57)      	  SPEP-.

## 2021-02-07 LAB
HAV IGM SER-ACNC: SIGNIFICANT CHANGE UP
HBV CORE IGM SER-ACNC: SIGNIFICANT CHANGE UP
HBV SURFACE AG SER-ACNC: SIGNIFICANT CHANGE UP
HCV AB S/CO SERPL IA: 0.24 S/CO — SIGNIFICANT CHANGE UP (ref 0–0.99)
HCV AB SERPL-IMP: SIGNIFICANT CHANGE UP

## 2021-03-16 ENCOUNTER — APPOINTMENT (OUTPATIENT)
Dept: VASCULAR SURGERY | Facility: CLINIC | Age: 76
End: 2021-03-16
Payer: MEDICARE

## 2021-03-16 VITALS
WEIGHT: 127.43 LBS | BODY MASS INDEX: 21.23 KG/M2 | SYSTOLIC BLOOD PRESSURE: 197 MMHG | TEMPERATURE: 97.8 F | DIASTOLIC BLOOD PRESSURE: 88 MMHG | HEART RATE: 73 BPM | HEIGHT: 65 IN

## 2021-03-16 PROCEDURE — 93990 DOPPLER FLOW TESTING: CPT

## 2021-03-16 PROCEDURE — 99213 OFFICE O/P EST LOW 20 MIN: CPT

## 2021-03-16 NOTE — DISCUSSION/SUMMARY
[FreeTextEntry1] : 76 yo female with a history of esrd on hd presents for follow up of right upper extremity avg\par \par duplex shows patent avg with 50-75% stenosis at the proximal upper arm and anastomosis with flow rate of 1211\par \par will continue to monitor given no issues with hd \par pt to follow up in 3-4 months with repeat duplex\par

## 2021-03-16 NOTE — PHYSICAL EXAM
[Thrill] : thrill [Bleeding] : no bleeding [Hand well perfused] : hand well perfused [Ulcer] : no ulcer [2+] : right 2+ [Normal] : coordination grossly intact, no focal deficits [de-identified] :  strength 5/5 b/l upper extremities [de-identified] : intact

## 2021-03-16 NOTE — HISTORY OF PRESENT ILLNESS
[FreeTextEntry1] : 74 yo female with a history of esrd on hd presents for follow up of right upper extremity avg.  pt without any complaints at this time denies any problems with hd at this time\par pt denies any bleeding or difficulty with cannulation  [] : in right upper arm

## 2021-03-23 ENCOUNTER — APPOINTMENT (OUTPATIENT)
Dept: TRANSPLANT | Facility: CLINIC | Age: 76
End: 2021-03-23

## 2021-05-18 ENCOUNTER — TRANSCRIPTION ENCOUNTER (OUTPATIENT)
Age: 76
End: 2021-05-18

## 2021-05-19 ENCOUNTER — NON-APPOINTMENT (OUTPATIENT)
Age: 76
End: 2021-05-19

## 2021-05-19 ENCOUNTER — APPOINTMENT (OUTPATIENT)
Dept: TRANSPLANT | Facility: HOSPITAL | Age: 76
End: 2021-05-19

## 2021-05-19 ENCOUNTER — INPATIENT (INPATIENT)
Facility: HOSPITAL | Age: 76
LOS: 4 days | Discharge: HOME | DRG: 650 | End: 2021-05-24
Attending: TRANSPLANT SURGERY | Admitting: TRANSPLANT SURGERY
Payer: MEDICARE

## 2021-05-19 VITALS
OXYGEN SATURATION: 100 % | SYSTOLIC BLOOD PRESSURE: 193 MMHG | HEART RATE: 71 BPM | RESPIRATION RATE: 18 BRPM | DIASTOLIC BLOOD PRESSURE: 90 MMHG | WEIGHT: 128.09 LBS | HEIGHT: 68 IN | TEMPERATURE: 98 F

## 2021-05-19 DIAGNOSIS — Z99.2 DEPENDENCE ON RENAL DIALYSIS: Chronic | ICD-10-CM

## 2021-05-19 DIAGNOSIS — H26.9 UNSPECIFIED CATARACT: Chronic | ICD-10-CM

## 2021-05-19 DIAGNOSIS — Z86.69 PERSONAL HISTORY OF OTHER DISEASES OF THE NERVOUS SYSTEM AND SENSE ORGANS: Chronic | ICD-10-CM

## 2021-05-19 DIAGNOSIS — Z90.710 ACQUIRED ABSENCE OF BOTH CERVIX AND UTERUS: Chronic | ICD-10-CM

## 2021-05-19 DIAGNOSIS — I77.0 ARTERIOVENOUS FISTULA, ACQUIRED: Chronic | ICD-10-CM

## 2021-05-19 DIAGNOSIS — Z94.0 KIDNEY TRANSPLANT STATUS: ICD-10-CM

## 2021-05-19 LAB
ALBUMIN SERPL ELPH-MCNC: 3 G/DL — LOW (ref 3.3–5)
ALBUMIN SERPL ELPH-MCNC: 3.5 G/DL — SIGNIFICANT CHANGE UP (ref 3.3–5)
ALBUMIN SERPL ELPH-MCNC: 4.5 G/DL — SIGNIFICANT CHANGE UP (ref 3.3–5)
ALP SERPL-CCNC: 66 U/L — SIGNIFICANT CHANGE UP (ref 40–120)
ALP SERPL-CCNC: 81 U/L — SIGNIFICANT CHANGE UP (ref 40–120)
ALP SERPL-CCNC: 95 U/L — SIGNIFICANT CHANGE UP (ref 40–120)
ALT FLD-CCNC: 10 U/L — SIGNIFICANT CHANGE UP (ref 10–45)
ALT FLD-CCNC: 12 U/L — SIGNIFICANT CHANGE UP (ref 10–45)
ALT FLD-CCNC: 8 U/L — LOW (ref 10–45)
ANION GAP SERPL CALC-SCNC: 17 MMOL/L — SIGNIFICANT CHANGE UP (ref 5–17)
ANION GAP SERPL CALC-SCNC: 18 MMOL/L — HIGH (ref 5–17)
ANION GAP SERPL CALC-SCNC: 20 MMOL/L — HIGH (ref 5–17)
APTT BLD: 31.6 SEC — SIGNIFICANT CHANGE UP (ref 27.5–35.5)
APTT BLD: 35.4 SEC — SIGNIFICANT CHANGE UP (ref 27.5–35.5)
AST SERPL-CCNC: 15 U/L — SIGNIFICANT CHANGE UP (ref 10–40)
AST SERPL-CCNC: 16 U/L — SIGNIFICANT CHANGE UP (ref 10–40)
AST SERPL-CCNC: 21 U/L — SIGNIFICANT CHANGE UP (ref 10–40)
BASE EXCESS BLDV CALC-SCNC: -0.6 MMOL/L — SIGNIFICANT CHANGE UP (ref -2–2)
BASOPHILS # BLD AUTO: 0 K/UL — SIGNIFICANT CHANGE UP (ref 0–0.2)
BASOPHILS # BLD AUTO: 0.01 K/UL — SIGNIFICANT CHANGE UP (ref 0–0.2)
BASOPHILS NFR BLD AUTO: 0 % — SIGNIFICANT CHANGE UP (ref 0–2)
BASOPHILS NFR BLD AUTO: 0.2 % — SIGNIFICANT CHANGE UP (ref 0–2)
BILIRUB SERPL-MCNC: 0.3 MG/DL — SIGNIFICANT CHANGE UP (ref 0.2–1.2)
BILIRUB SERPL-MCNC: 0.3 MG/DL — SIGNIFICANT CHANGE UP (ref 0.2–1.2)
BILIRUB SERPL-MCNC: 0.4 MG/DL — SIGNIFICANT CHANGE UP (ref 0.2–1.2)
BLD GP AB SCN SERPL QL: POSITIVE — SIGNIFICANT CHANGE UP
BUN SERPL-MCNC: 28 MG/DL — HIGH (ref 7–23)
BUN SERPL-MCNC: 34 MG/DL — HIGH (ref 7–23)
BUN SERPL-MCNC: 72 MG/DL — HIGH (ref 7–23)
CA-I SERPL-SCNC: 1.14 MMOL/L — SIGNIFICANT CHANGE UP (ref 1.12–1.3)
CALCIUM SERPL-MCNC: 6.9 MG/DL — LOW (ref 8.4–10.5)
CALCIUM SERPL-MCNC: 8.6 MG/DL — SIGNIFICANT CHANGE UP (ref 8.4–10.5)
CALCIUM SERPL-MCNC: 9.6 MG/DL — SIGNIFICANT CHANGE UP (ref 8.4–10.5)
CHLORIDE BLDV-SCNC: 103 MMOL/L — SIGNIFICANT CHANGE UP (ref 96–108)
CHLORIDE SERPL-SCNC: 103 MMOL/L — SIGNIFICANT CHANGE UP (ref 96–108)
CHLORIDE SERPL-SCNC: 94 MMOL/L — LOW (ref 96–108)
CHLORIDE SERPL-SCNC: 97 MMOL/L — SIGNIFICANT CHANGE UP (ref 96–108)
CO2 BLDV-SCNC: 24 MMOL/L — SIGNIFICANT CHANGE UP (ref 22–30)
CO2 SERPL-SCNC: 16 MMOL/L — LOW (ref 22–31)
CO2 SERPL-SCNC: 20 MMOL/L — LOW (ref 22–31)
CO2 SERPL-SCNC: 20 MMOL/L — LOW (ref 22–31)
CREAT SERPL-MCNC: 4.13 MG/DL — HIGH (ref 0.5–1.3)
CREAT SERPL-MCNC: 5.33 MG/DL — HIGH (ref 0.5–1.3)
CREAT SERPL-MCNC: 8.84 MG/DL — HIGH (ref 0.5–1.3)
EOSINOPHIL # BLD AUTO: 0 K/UL — SIGNIFICANT CHANGE UP (ref 0–0.5)
EOSINOPHIL # BLD AUTO: 0.34 K/UL — SIGNIFICANT CHANGE UP (ref 0–0.5)
EOSINOPHIL NFR BLD AUTO: 0 % — SIGNIFICANT CHANGE UP (ref 0–6)
EOSINOPHIL NFR BLD AUTO: 6.1 % — HIGH (ref 0–6)
GAS PNL BLDA: SIGNIFICANT CHANGE UP
GAS PNL BLDV: 133 MMOL/L — LOW (ref 135–145)
GAS PNL BLDV: SIGNIFICANT CHANGE UP
GLUCOSE BLDC GLUCOMTR-MCNC: 126 MG/DL — HIGH (ref 70–99)
GLUCOSE BLDC GLUCOMTR-MCNC: 84 MG/DL — SIGNIFICANT CHANGE UP (ref 70–99)
GLUCOSE BLDV-MCNC: 88 MG/DL — SIGNIFICANT CHANGE UP (ref 70–99)
GLUCOSE SERPL-MCNC: 100 MG/DL — HIGH (ref 70–99)
GLUCOSE SERPL-MCNC: 121 MG/DL — HIGH (ref 70–99)
GLUCOSE SERPL-MCNC: 85 MG/DL — SIGNIFICANT CHANGE UP (ref 70–99)
HAV IGM SER-ACNC: SIGNIFICANT CHANGE UP
HBV CORE AB SER-ACNC: SIGNIFICANT CHANGE UP
HBV CORE IGM SER-ACNC: SIGNIFICANT CHANGE UP
HBV SURFACE AB SER-ACNC: 22.4 MIU/ML — SIGNIFICANT CHANGE UP
HBV SURFACE AG SER-ACNC: SIGNIFICANT CHANGE UP
HCO3 BLDV-SCNC: 23 MMOL/L — SIGNIFICANT CHANGE UP (ref 21–29)
HCT VFR BLD CALC: 24.2 % — LOW (ref 34.5–45)
HCT VFR BLD CALC: 36.2 % — SIGNIFICANT CHANGE UP (ref 34.5–45)
HCT VFR BLD CALC: 36.2 % — SIGNIFICANT CHANGE UP (ref 34.5–45)
HCT VFR BLDA CALC: 37 % — LOW (ref 39–50)
HCV AB S/CO SERPL IA: 0.42 S/CO — SIGNIFICANT CHANGE UP (ref 0–0.99)
HCV AB SERPL-IMP: SIGNIFICANT CHANGE UP
HGB BLD CALC-MCNC: 12.1 G/DL — SIGNIFICANT CHANGE UP (ref 11.5–15.5)
HGB BLD-MCNC: 11.6 G/DL — SIGNIFICANT CHANGE UP (ref 11.5–15.5)
HGB BLD-MCNC: 11.9 G/DL — SIGNIFICANT CHANGE UP (ref 11.5–15.5)
HGB BLD-MCNC: 7.7 G/DL — LOW (ref 11.5–15.5)
HIV 1+2 AB+HIV1 P24 AG SERPL QL IA: SIGNIFICANT CHANGE UP
IMM GRANULOCYTES NFR BLD AUTO: 0.5 % — SIGNIFICANT CHANGE UP (ref 0–1.5)
INR BLD: 1 RATIO — SIGNIFICANT CHANGE UP (ref 0.88–1.16)
INR BLD: 1.16 RATIO — SIGNIFICANT CHANGE UP (ref 0.88–1.16)
LACTATE BLDV-MCNC: 1 MMOL/L — SIGNIFICANT CHANGE UP (ref 0.7–2)
LYMPHOCYTES # BLD AUTO: 0.23 K/UL — LOW (ref 1–3.3)
LYMPHOCYTES # BLD AUTO: 0.82 K/UL — LOW (ref 1–3.3)
LYMPHOCYTES # BLD AUTO: 14.6 % — SIGNIFICANT CHANGE UP (ref 13–44)
LYMPHOCYTES # BLD AUTO: 3.5 % — LOW (ref 13–44)
MAGNESIUM SERPL-MCNC: 1.8 MG/DL — SIGNIFICANT CHANGE UP (ref 1.6–2.6)
MAGNESIUM SERPL-MCNC: 2.3 MG/DL — SIGNIFICANT CHANGE UP (ref 1.6–2.6)
MAGNESIUM SERPL-MCNC: 2.7 MG/DL — HIGH (ref 1.6–2.6)
MANUAL SMEAR VERIFICATION: SIGNIFICANT CHANGE UP
MCHC RBC-ENTMCNC: 28.1 PG — SIGNIFICANT CHANGE UP (ref 27–34)
MCHC RBC-ENTMCNC: 28.3 PG — SIGNIFICANT CHANGE UP (ref 27–34)
MCHC RBC-ENTMCNC: 28.5 PG — SIGNIFICANT CHANGE UP (ref 27–34)
MCHC RBC-ENTMCNC: 31.8 GM/DL — LOW (ref 32–36)
MCHC RBC-ENTMCNC: 32 GM/DL — SIGNIFICANT CHANGE UP (ref 32–36)
MCHC RBC-ENTMCNC: 32.9 GM/DL — SIGNIFICANT CHANGE UP (ref 32–36)
MCV RBC AUTO: 86.8 FL — SIGNIFICANT CHANGE UP (ref 80–100)
MCV RBC AUTO: 87.7 FL — SIGNIFICANT CHANGE UP (ref 80–100)
MCV RBC AUTO: 89 FL — SIGNIFICANT CHANGE UP (ref 80–100)
MONOCYTES # BLD AUTO: 0.17 K/UL — SIGNIFICANT CHANGE UP (ref 0–0.9)
MONOCYTES # BLD AUTO: 0.53 K/UL — SIGNIFICANT CHANGE UP (ref 0–0.9)
MONOCYTES NFR BLD AUTO: 2.6 % — SIGNIFICANT CHANGE UP (ref 2–14)
MONOCYTES NFR BLD AUTO: 9.4 % — SIGNIFICANT CHANGE UP (ref 2–14)
NEUTROPHILS # BLD AUTO: 3.88 K/UL — SIGNIFICANT CHANGE UP (ref 1.8–7.4)
NEUTROPHILS # BLD AUTO: 6.11 K/UL — SIGNIFICANT CHANGE UP (ref 1.8–7.4)
NEUTROPHILS NFR BLD AUTO: 69.2 % — SIGNIFICANT CHANGE UP (ref 43–77)
NEUTROPHILS NFR BLD AUTO: 92.1 % — HIGH (ref 43–77)
NEUTS BAND # BLD: 1.8 % — SIGNIFICANT CHANGE UP (ref 0–8)
NRBC # BLD: 0 /100 WBCS — SIGNIFICANT CHANGE UP (ref 0–0)
NRBC # BLD: 0 /100 WBCS — SIGNIFICANT CHANGE UP (ref 0–0)
OTHER CELLS CSF MANUAL: 16 ML/DL — LOW (ref 18–22)
PCO2 BLDV: 37 MMHG — SIGNIFICANT CHANGE UP (ref 35–50)
PH BLDV: 7.42 — SIGNIFICANT CHANGE UP (ref 7.35–7.45)
PHOSPHATE SERPL-MCNC: 4.3 MG/DL — SIGNIFICANT CHANGE UP (ref 2.5–4.5)
PHOSPHATE SERPL-MCNC: 6.3 MG/DL — HIGH (ref 2.5–4.5)
PHOSPHATE SERPL-MCNC: 7.5 MG/DL — HIGH (ref 2.5–4.5)
PLAT MORPH BLD: NORMAL — SIGNIFICANT CHANGE UP
PLATELET # BLD AUTO: 113 K/UL — LOW (ref 150–400)
PLATELET # BLD AUTO: 174 K/UL — SIGNIFICANT CHANGE UP (ref 150–400)
PLATELET # BLD AUTO: 178 K/UL — SIGNIFICANT CHANGE UP (ref 150–400)
PO2 BLDV: 78 MMHG — HIGH (ref 25–45)
POTASSIUM BLDV-SCNC: 5.4 MMOL/L — HIGH (ref 3.5–5.3)
POTASSIUM SERPL-MCNC: 3.9 MMOL/L — SIGNIFICANT CHANGE UP (ref 3.5–5.3)
POTASSIUM SERPL-MCNC: 5.3 MMOL/L — SIGNIFICANT CHANGE UP (ref 3.5–5.3)
POTASSIUM SERPL-MCNC: 5.7 MMOL/L — HIGH (ref 3.5–5.3)
POTASSIUM SERPL-SCNC: 3.9 MMOL/L — SIGNIFICANT CHANGE UP (ref 3.5–5.3)
POTASSIUM SERPL-SCNC: 5.3 MMOL/L — SIGNIFICANT CHANGE UP (ref 3.5–5.3)
POTASSIUM SERPL-SCNC: 5.7 MMOL/L — HIGH (ref 3.5–5.3)
PROT SERPL-MCNC: 5 G/DL — LOW (ref 6–8.3)
PROT SERPL-MCNC: 6 G/DL — SIGNIFICANT CHANGE UP (ref 6–8.3)
PROT SERPL-MCNC: 7.7 G/DL — SIGNIFICANT CHANGE UP (ref 6–8.3)
PROTHROM AB SERPL-ACNC: 12 SEC — SIGNIFICANT CHANGE UP (ref 10.6–13.6)
PROTHROM AB SERPL-ACNC: 13.8 SEC — HIGH (ref 10.6–13.6)
RBC # BLD: 2.72 M/UL — LOW (ref 3.8–5.2)
RBC # BLD: 4.13 M/UL — SIGNIFICANT CHANGE UP (ref 3.8–5.2)
RBC # BLD: 4.17 M/UL — SIGNIFICANT CHANGE UP (ref 3.8–5.2)
RBC # FLD: 18.5 % — HIGH (ref 10.3–14.5)
RBC BLD AUTO: SIGNIFICANT CHANGE UP
RH IG SCN BLD-IMP: POSITIVE — SIGNIFICANT CHANGE UP
SAO2 % BLDV: 96 % — HIGH (ref 67–88)
SARS-COV-2 RNA SPEC QL NAA+PROBE: SIGNIFICANT CHANGE UP
SODIUM SERPL-SCNC: 132 MMOL/L — LOW (ref 135–145)
SODIUM SERPL-SCNC: 136 MMOL/L — SIGNIFICANT CHANGE UP (ref 135–145)
SODIUM SERPL-SCNC: 137 MMOL/L — SIGNIFICANT CHANGE UP (ref 135–145)
WBC # BLD: 10.59 K/UL — HIGH (ref 3.8–10.5)
WBC # BLD: 5.61 K/UL — SIGNIFICANT CHANGE UP (ref 3.8–10.5)
WBC # BLD: 6.51 K/UL — SIGNIFICANT CHANGE UP (ref 3.8–10.5)
WBC # FLD AUTO: 10.59 K/UL — HIGH (ref 3.8–10.5)
WBC # FLD AUTO: 5.61 K/UL — SIGNIFICANT CHANGE UP (ref 3.8–10.5)
WBC # FLD AUTO: 6.51 K/UL — SIGNIFICANT CHANGE UP (ref 3.8–10.5)

## 2021-05-19 PROCEDURE — 50360 RNL ALTRNSPLJ W/O RCP NFRCT: CPT | Mod: GC

## 2021-05-19 PROCEDURE — 71045 X-RAY EXAM CHEST 1 VIEW: CPT | Mod: 26

## 2021-05-19 PROCEDURE — 86077 PHYS BLOOD BANK SERV XMATCH: CPT

## 2021-05-19 PROCEDURE — 93010 ELECTROCARDIOGRAM REPORT: CPT

## 2021-05-19 PROCEDURE — 76776 US EXAM K TRANSPL W/DOPPLER: CPT | Mod: 26,RT

## 2021-05-19 PROCEDURE — 50605 INSERT URETERAL SUPPORT: CPT | Mod: GC

## 2021-05-19 PROCEDURE — 99222 1ST HOSP IP/OBS MODERATE 55: CPT | Mod: GC

## 2021-05-19 RX ORDER — BASILIXIMAB 20 MG/5ML
20 INJECTION, POWDER, FOR SOLUTION INTRAVENOUS ONCE
Refills: 0 | Status: DISCONTINUED | OUTPATIENT
Start: 2021-05-19 | End: 2021-05-19

## 2021-05-19 RX ORDER — LABETALOL HCL 100 MG
10 TABLET ORAL ONCE
Refills: 0 | Status: COMPLETED | OUTPATIENT
Start: 2021-05-19 | End: 2021-05-19

## 2021-05-19 RX ORDER — ONDANSETRON 8 MG/1
4 TABLET, FILM COATED ORAL ONCE
Refills: 0 | Status: DISCONTINUED | OUTPATIENT
Start: 2021-05-19 | End: 2021-05-20

## 2021-05-19 RX ORDER — PANTOPRAZOLE SODIUM 20 MG/1
40 TABLET, DELAYED RELEASE ORAL
Refills: 0 | Status: DISCONTINUED | OUTPATIENT
Start: 2021-05-19 | End: 2021-05-24

## 2021-05-19 RX ORDER — SODIUM CHLORIDE 9 MG/ML
250 INJECTION INTRAMUSCULAR; INTRAVENOUS; SUBCUTANEOUS ONCE
Refills: 0 | Status: COMPLETED | OUTPATIENT
Start: 2021-05-19 | End: 2021-05-19

## 2021-05-19 RX ORDER — CALCIUM CARBONATE 500(1250)
1 TABLET ORAL ONCE
Refills: 0 | Status: COMPLETED | OUTPATIENT
Start: 2021-05-19 | End: 2021-05-20

## 2021-05-19 RX ORDER — HYDROMORPHONE HYDROCHLORIDE 2 MG/ML
0.25 INJECTION INTRAMUSCULAR; INTRAVENOUS; SUBCUTANEOUS
Refills: 0 | Status: DISCONTINUED | OUTPATIENT
Start: 2021-05-19 | End: 2021-05-20

## 2021-05-19 RX ORDER — HYDROMORPHONE HYDROCHLORIDE 2 MG/ML
0.25 INJECTION INTRAMUSCULAR; INTRAVENOUS; SUBCUTANEOUS
Refills: 0 | Status: DISCONTINUED | OUTPATIENT
Start: 2021-05-19 | End: 2021-05-24

## 2021-05-19 RX ORDER — BASILIXIMAB 20 MG/5ML
20 INJECTION, POWDER, FOR SOLUTION INTRAVENOUS ONCE
Refills: 0 | Status: COMPLETED | OUTPATIENT
Start: 2021-05-23 | End: 2021-05-23

## 2021-05-19 RX ORDER — FENTANYL CITRATE 50 UG/ML
25 INJECTION INTRAVENOUS
Refills: 0 | Status: DISCONTINUED | OUTPATIENT
Start: 2021-05-19 | End: 2021-05-20

## 2021-05-19 RX ORDER — VANCOMYCIN HCL 1 G
1000 VIAL (EA) INTRAVENOUS ONCE
Refills: 0 | Status: DISCONTINUED | OUTPATIENT
Start: 2021-05-19 | End: 2021-05-19

## 2021-05-19 RX ORDER — SODIUM CHLORIDE 9 MG/ML
1000 INJECTION INTRAMUSCULAR; INTRAVENOUS; SUBCUTANEOUS
Refills: 0 | Status: DISCONTINUED | OUTPATIENT
Start: 2021-05-19 | End: 2021-05-20

## 2021-05-19 RX ORDER — HYDRALAZINE HCL 50 MG
10 TABLET ORAL ONCE
Refills: 0 | Status: COMPLETED | OUTPATIENT
Start: 2021-05-19 | End: 2021-05-19

## 2021-05-19 RX ORDER — MYCOPHENOLATE MOFETIL 250 MG/1
1 CAPSULE ORAL
Refills: 0 | Status: DISCONTINUED | OUTPATIENT
Start: 2021-05-20 | End: 2021-05-24

## 2021-05-19 RX ORDER — SENNA PLUS 8.6 MG/1
2 TABLET ORAL AT BEDTIME
Refills: 0 | Status: DISCONTINUED | OUTPATIENT
Start: 2021-05-20 | End: 2021-05-24

## 2021-05-19 RX ORDER — SODIUM CHLORIDE 9 MG/ML
3 INJECTION INTRAMUSCULAR; INTRAVENOUS; SUBCUTANEOUS EVERY 8 HOURS
Refills: 0 | Status: DISCONTINUED | OUTPATIENT
Start: 2021-05-19 | End: 2021-05-19

## 2021-05-19 RX ORDER — HYDROMORPHONE HYDROCHLORIDE 2 MG/ML
30 INJECTION INTRAMUSCULAR; INTRAVENOUS; SUBCUTANEOUS
Refills: 0 | Status: DISCONTINUED | OUTPATIENT
Start: 2021-05-19 | End: 2021-05-20

## 2021-05-19 RX ORDER — ONDANSETRON 8 MG/1
4 TABLET, FILM COATED ORAL EVERY 6 HOURS
Refills: 0 | Status: DISCONTINUED | OUTPATIENT
Start: 2021-05-19 | End: 2021-05-24

## 2021-05-19 RX ORDER — HYDRALAZINE HCL 50 MG
5 TABLET ORAL ONCE
Refills: 0 | Status: COMPLETED | OUTPATIENT
Start: 2021-05-19 | End: 2021-05-19

## 2021-05-19 RX ORDER — NALOXONE HYDROCHLORIDE 4 MG/.1ML
0.1 SPRAY NASAL
Refills: 0 | Status: DISCONTINUED | OUTPATIENT
Start: 2021-05-19 | End: 2021-05-24

## 2021-05-19 RX ORDER — VALGANCICLOVIR 450 MG/1
450 TABLET, FILM COATED ORAL
Refills: 0 | Status: DISCONTINUED | OUTPATIENT
Start: 2021-05-19 | End: 2021-05-24

## 2021-05-19 RX ORDER — SODIUM CHLORIDE 9 MG/ML
500 INJECTION, SOLUTION INTRAVENOUS
Refills: 0 | Status: DISCONTINUED | OUTPATIENT
Start: 2021-05-19 | End: 2021-05-20

## 2021-05-19 RX ORDER — NYSTATIN 500MM UNIT
500000 POWDER (EA) MISCELLANEOUS
Refills: 0 | Status: DISCONTINUED | OUTPATIENT
Start: 2021-05-20 | End: 2021-05-24

## 2021-05-19 RX ADMIN — FENTANYL CITRATE 25 MICROGRAM(S): 50 INJECTION INTRAVENOUS at 21:29

## 2021-05-19 RX ADMIN — SODIUM CHLORIDE 70 MILLILITER(S): 9 INJECTION INTRAMUSCULAR; INTRAVENOUS; SUBCUTANEOUS at 21:17

## 2021-05-19 RX ADMIN — Medication 10 MILLIGRAM(S): at 23:09

## 2021-05-19 RX ADMIN — HYDROMORPHONE HYDROCHLORIDE 0.25 MILLIGRAM(S): 2 INJECTION INTRAMUSCULAR; INTRAVENOUS; SUBCUTANEOUS at 22:15

## 2021-05-19 RX ADMIN — Medication 10 MILLIGRAM(S): at 21:17

## 2021-05-19 RX ADMIN — FENTANYL CITRATE 25 MICROGRAM(S): 50 INJECTION INTRAVENOUS at 20:55

## 2021-05-19 RX ADMIN — HYDROMORPHONE HYDROCHLORIDE 30 MILLILITER(S): 2 INJECTION INTRAMUSCULAR; INTRAVENOUS; SUBCUTANEOUS at 23:09

## 2021-05-19 RX ADMIN — Medication 5 MILLIGRAM(S): at 22:03

## 2021-05-19 RX ADMIN — HYDROMORPHONE HYDROCHLORIDE 0.25 MILLIGRAM(S): 2 INJECTION INTRAMUSCULAR; INTRAVENOUS; SUBCUTANEOUS at 21:53

## 2021-05-19 NOTE — PROGRESS NOTE ADULT - ASSESSMENT
75F with HTN, ESRD on HD 2017 (MWF via R AVF--normal UO, etiology unknown, no bx, Dr. Treviño), Glaucoma, h/o DVT to L subclavian vein (2017) admitted s/p R DDRT (1a/1v/1u--stented). Simulect induction.    s/p R DDRT  -post-op labs appropriate, trend as ordered  -strict I&Os via sethi/KATHARINA  -IVF as ordered  -dPCA o/n  -SCDs while in bed at all times, no SQH, encourage spirometry  -NPO o/n  -Immuno: ENVARSUS per level, MMF 1g BID, Pred taper, Simulect #2 POD#4  -PPx: Nystatin/Valcyte/Bactrim/Protonix    HTN  -restart home regimen and adjust accordingly    Dispo  -expect [facility] tele transfer 75F with HTN, ESRD on HD 2017 (MWF via R AVF--oliguria, etiology unknown, no bx, Dr. Treviño), Glaucoma, h/o DVT to L subclavian vein (2017) admitted s/p R DDRT (1a/1v/1u--stented). Simulect induction.    s/p R DDRT  -post-op labs appropriate, trend as ordered  -strict I&Os via sethi/KATHARINA  -IVF as ordered  -dPCA o/n  -SCDs while in bed at all times, no SQH, encourage spirometry  -NPO o/n  -Immuno: ENVARSUS per level, MMF 1g BID, Pred taper, Simulect #2 POD#4  -PPx: Nystatin/Valcyte/Bactrim/Protonix    HTN  -restart home regimen and adjust accordingly    Dispo  -expect [facility] tele transfer

## 2021-05-19 NOTE — H&P ADULT - ATTENDING COMMENTS
agree with above  for  donor kidney transplant; risk, benefits, alternatives and possible complications discussed including but not limited to risks of bleeding, need for reoperation, urine leak, graft thrombosis, primary non function, delayed function, need for dialysis all discussed. small risk of transmission of disease also discussed. need for life long immunosuppression medications, and complications of immunosuppression meds discussed.   consent signed

## 2021-05-19 NOTE — CONSULT NOTE ADULT - ASSESSMENT
76 y/o female w/ PMH significant for HTN x since age 24,  ESRD on HD x6 years (MWF via RUE AVG)-  anuric,  L subclavian DVT 2017,   Has received Pfizer covid vaccine x 2 doses.  Presents today for a DDRT.  She feels well, offers no complaints denies weakness, fatigue, SOB, chest pain, palp, recent travel or sick contacts.  Last HD was done on 5/17 via RUE AVG. Will receive HD today prior to DDRT. Simulect will be used for induction.          ESRD on HD x6 years (MWF via RUE AVG)- DDRT today  HD prior to OR.   HD order in chart. Consent obtained. -190/80-90. Will do HD with 1.5L UF which should help with lowering the BP. Monitor BP  K 5.7, will give 2K bath.  Phos 7.4. Will monitor  Induction: Simulect/ solumedrol  PPx:  Vancomycin           If you have any questions, please feel free to contact jenna Weir  Nephrology Fellow  [phone]  (After 5pm or on weekends please page the on-call fellow)

## 2021-05-19 NOTE — H&P ADULT - NSHPLABSRESULTS_GEN_ALL_CORE
Vital Signs Last 24 Hrs  T(C): 36.7 (19 May 2021 10:03), Max: 36.7 (19 May 2021 10:03)  T(F): 98 (19 May 2021 10:03), Max: 98 (19 May 2021 10:03)  HR: 67 (19 May 2021 10:03) (67 - 71)  BP: 190/87 (19 May 2021 10:03) (190/87 - 193/90)  BP(mean): --  RR: 18 (19 May 2021 10:03) (18 - 18)  SpO2: 99% (19 May 2021 10:03) (99% - 100%)    I&O's Summary        LABS:                        11.9   5.61  )-----------( 178      ( 19 May 2021 09:10 )             36.2     05-19    137  |  97  |  72<H>  ----------------------------<  85  5.7<H>   |  20<L>  |  8.84<H>    Ca    9.6      19 May 2021 09:10  Phos  7.5     05-19  Mg     2.7     05-19    TPro  7.7  /  Alb  4.5  /  TBili  0.4  /  DBili  x   /  AST  21  /  ALT  12  /  AlkPhos  95  05-19    PT/INR - ( 19 May 2021 09:15 )   PT: 12.0 sec;   INR: 1.00 ratio         PTT - ( 19 May 2021 09:15 )  PTT:35.4 sec    CAPILLARY BLOOD GLUCOSE      POCT Blood Glucose.: 84 mg/dL (19 May 2021 08:41)    LIVER FUNCTIONS - ( 19 May 2021 09:10 )  Alb: 4.5 g/dL / Pro: 7.7 g/dL / ALK PHOS: 95 U/L / ALT: 12 U/L / AST: 21 U/L / GGT: x                 Cultures:

## 2021-05-19 NOTE — CONSULT NOTE ADULT - ATTENDING COMMENTS
ESRD on dialysis x 6 years  appears younger than stated age, no recent infections, cardiac events.  Will undergo transplant with simulect induction.  Will f/u.  Dialysis performed pre-transplantation.

## 2021-05-19 NOTE — PROGRESS NOTE ADULT - SUBJECTIVE AND OBJECTIVE BOX
Transplant Surgery - POC  --------------------------------------------------------------  R DDRT 2021    75F with HTN, ESRD on HD  (MWF via R AVF--normal UO, etiology unknown, no bx, Dr. Treviño), Glaucoma, h/o DVT to L subclavian vein () admitted s/p R DDRT (1av/1u--stented). Simulect induction.    -in PACU, pt hypertensive (4 agents at home), controlled with IV Labetalol/Hydralazine  -pain well controlled with dPCA  -AAOx3, drowsy  -oliguria 5-10cc/h via sethi  -post-op u/s with good flow        ====  Recipient:   Hx: HTN, Hysterectomy, Eye surgery, Covid Vaccine Pfizer x2 (2021)  Nephrologist: Dr. Treviño  CPRA:    81%  ABO:      A  CMV:  Positive  EBV Status:  Positive    ===  Donor (import ): [facility]  Donor ID:  [id number]  Match: [id number]  OPO: PADV  Age:  42 y.o F  ABO:  A  High Risk:  No  KDPI: 69%  COD:  Cardiac Arrest  X Clamp Time: 21  21:03  Medical Hx:  IDDM, gastroparesis, hypothyroid, Depression, Anxiety, Left Breast mass, amenorrhea, fibromyalgia, diabetic neuropathy, Pfizer Vaccine x2 (3/2021)  Terminal Cr:  3.3  Covid Testin/15/21 NP-Neg,  BAL-Neg  CMV-Negative  EBV-Positive  HepBcAb-Negative  Hepatitis C-JASON- Negative  Hepatitis C ab-Negative  MISMATCH: 2,2,1  Kidney Laterality: Left  X-match –Negative x match   ===  OR:  Backtable preparation of donor Left kidney.   Right Bird incision.  Right iliac artery intima tacked with 6-0 prolene x3.    1 vein, 1 artery, 1 ureter anastomosis performed.   Intraoperative doppler performed with evidence of good flow.   Surgitek® Double-J® Ureteral stent placed.   1x KATHARINA drain placed in subfascial space.  CIT 21H      Potential Discharge date: pending clinical stability    Education:  Medications    Plan of care:  See Below    MEDICATIONS  (STANDING):  calcium carbonate    500 mG (Tums) Chewable 1 Tablet(s) Chew once  HYDROmorphone PCA (1 mG/mL) 30 milliLiter(s) PCA Continuous PCA Continuous  labetalol 100 milliGRAM(s) Oral once  labetalol 100 milliGRAM(s) Oral two times a day  methylPREDNISolone sodium succinate Injectable 125 milliGRAM(s) IV Push two times a day  methylPREDNISolone sodium succinate IVPB 500 milliGRAM(s) IV Intermittent once  mycophenolate mofetil 1 Gram(s) Oral <User Schedule>  nystatin    Suspension 262347 Unit(s) Swish and Swallow four times a day  pantoprazole    Tablet 40 milliGRAM(s) Oral before breakfast  senna 2 Tablet(s) Oral at bedtime  sodium chloride 0.45%. 500 milliLiter(s) (50 mL/Hr) IV Continuous <Continuous>  sodium chloride 0.9% Bolus 250 milliLiter(s) IV Bolus once  sodium chloride 0.9%. 1000 milliLiter(s) (70 mL/Hr) IV Continuous <Continuous>  trimethoprim   80 mG/sulfamethoxazole 400 mG 1 Tablet(s) Oral daily  valGANciclovir 450 milliGRAM(s) Oral <User Schedule>    MEDICATIONS  (PRN):  fentaNYL    Injectable 25 MICROGram(s) IV Push every 15 minutes PRN Moderate Pain (4 - 6)  HYDROmorphone  Injectable 0.25 milliGRAM(s) IV Push every 10 minutes PRN Severe Pain (7 - 10)  HYDROmorphone PCA (1 mG/mL) Rescue Clinician Bolus 0.25 milliGRAM(s) IV Push every 15 minutes PRN for Pain Scale GREATER THAN 6  naloxone Injectable 0.1 milliGRAM(s) IV Push every 3 minutes PRN For ANY of the following changes in patient status:  A. RR LESS THAN 10 breaths per minute, B. Oxygen saturation LESS THAN 90%, C. Sedation score of 6  ondansetron Injectable 4 milliGRAM(s) IV Push every 6 hours PRN Nausea and/or Vomiting  ondansetron Injectable 4 milliGRAM(s) IV Push once PRN Nausea and/or Vomiting      PAST MEDICAL & SURGICAL HISTORY:  HTN (hypertension)    Glaucoma    Cataract    ESRD (end stage renal disease) on dialysis    DVT (deep venous thrombosis)  of Left subclavian vein, 17    Hemodialysis patient  MWF    Acquired cataract  extraction with b/l lense placement,     H/O: glaucoma  surgery,     AV fistula  2015    S/P KEVEN-BSO  for fibroids,     Hemodialysis access, AV graft        Vital Signs Last 24 Hrs  T(C): 36.3 (20 May 2021 00:00), Max: 37 (19 May 2021 13:03)  T(F): 97.3 (20 May 2021 00:00), Max: 98.6 (19 May 2021 13:03)  HR: 68 (20 May 2021 00:00) (63 - 79)  BP: 168/70 (20 May 2021 00:00) (148/70 - 217/86)  BP(mean): 100 (20 May 2021 00:00) (100 - 123)  RR: 15 (20 May 2021 00:00) (15 - 18)  SpO2: 100% (20 May 2021 00:00) (98% - 100%)    I&O's Summary    19 May 2021 07:01  -  20 May 2021 00:15  --------------------------------------------------------  IN: 460 mL / OUT: 1048 mL / NET: -588 mL                              11.6   10.59 )-----------( 174      ( 19 May 2021 22:30 )             36.2         132<L>  |  94<L>  |  34<H>  ----------------------------<  121<H>  5.3   |  20<L>  |  5.33<H>    Ca    8.6      19 May 2021 22:30  Phos  6.3       Mg     2.3         TPro  6.0  /  Alb  3.5  /  TBili  0.3  /  DBili  x   /  AST  16  /  ALT  8<L>  /  AlkPhos  81              Review of systems  Gen: No weight changes, fatigue, fevers/chills, weakness  Skin: No rashes  Head/Eyes/Ears/Mouth: No headache; Normal hearing; Normal vision w/o blurriness; No sinus pain/discomfort, sore throat  Respiratory: No dyspnea, cough, wheezing, hemoptysis  CV: No chest pain, PND, orthopnea  GI: Mild abdominal pain at surgical incision site; denies diarrhea, constipation, nausea, vomiting, melena, hematochezia  : No increased frequency, dysuria, hematuria, nocturia  MSK: No joint pain/swelling; no back pain; no edema  Neuro: No dizziness/lightheadedness, weakness, seizures, numbness, tingling  Heme: No easy bruising or bleeding  Endo: No heat/cold intolerance  Psych: No significant nervousness, anxiety, stress, depression  All other systems were reviewed and are negative, except as noted.      PHYSICAL EXAM:  Constitutional: Well developed / well nourished  Eyes: Anicteric, PERRLA  ENMT: nc/at  Neck: supple (unable to place central line intra-op)  Respiratory: CTA B/L  Cardiovascular: RRR  Gastrointestinal: Soft, ND. RLQ dressing c/d/i. KATHARINA x1 minimal sanguinous   Genitourinary: Urinary catheter in place minimal urine--dark  Extremities: SCD's in place and working bilaterally, R AVF palpable.  no edema  Vascular: Palpable dp pulses bilaterally  Neurological: A&O x3  Skin: no rashes, ulcerations or lesions;  Musculoskeletal: Moving all extremities  Psychiatric: Responsive     Transplant Surgery - POC  --------------------------------------------------------------  R DDRT 2021    75F with HTN, ESRD on HD  (MWF via R AVF--oliguric, etiology unknown, no bx, Dr. Treviño), Glaucoma, h/o DVT to L subclavian vein () admitted s/p R DDRT (1a/1v/1u--stented). Simulect induction.    -in PACU, pt hypertensive (4 agents at home), controlled with IV Labetalol/Hydralazine  -pain well controlled with dPCA  -AAOx3, drowsy  -oliguria 5-10cc/h via sethi  -post-op u/s with good flow        ====  Recipient:   Hx: HTN, Hysterectomy, Eye surgery, Covid Vaccine Pfizer x2 (2021)  Nephrologist: Dr. Treviño  CPRA:    81%  ABO:      A  CMV:  Positive  EBV Status:  Positive    ===  Donor (import ): [facility]  Donor ID:  [id number]  Match: [id number]  OPO: PADLISETH  Age:  42 y.o F  ABO:  A  High Risk:  No  KDPI: 69%  COD:  Cardiac Arrest  X Clamp Time: 21  21:03  Medical Hx:  IDDM, gastroparesis, hypothyroid, Depression, Anxiety, Left Breast mass, amenorrhea, fibromyalgia, diabetic neuropathy, Pfizer Vaccine x2 (3/2021)  Terminal Cr:  3.3  Covid Testin/15/21 NP-Neg,  BAL-Neg  CMV-Negative  EBV-Positive  HepBcAb-Negative  Hepatitis C-JASON- Negative  Hepatitis C ab-Negative  MISMATCH: 2,2,1  Kidney Laterality: Left  X-match –Negative x match   ===  OR:  Backtable preparation of donor Left kidney.   Right Bird incision.  Right iliac artery intima tacked with 6-0 prolene x3.    1 vein, 1 artery, 1 ureter anastomosis performed.   Intraoperative doppler performed with evidence of good flow.   Surgitek® Double-J® Ureteral stent placed.   1x KATHARINA drain placed in subfascial space.  CIT 21H      Potential Discharge date: pending clinical stability    Education:  Medications    Plan of care:  See Below    MEDICATIONS  (STANDING):  calcium carbonate    500 mG (Tums) Chewable 1 Tablet(s) Chew once  HYDROmorphone PCA (1 mG/mL) 30 milliLiter(s) PCA Continuous PCA Continuous  labetalol 100 milliGRAM(s) Oral once  labetalol 100 milliGRAM(s) Oral two times a day  methylPREDNISolone sodium succinate Injectable 125 milliGRAM(s) IV Push two times a day  methylPREDNISolone sodium succinate IVPB 500 milliGRAM(s) IV Intermittent once  mycophenolate mofetil 1 Gram(s) Oral <User Schedule>  nystatin    Suspension [id number] Unit(s) Swish and Swallow four times a day  pantoprazole    Tablet 40 milliGRAM(s) Oral before breakfast  senna 2 Tablet(s) Oral at bedtime  sodium chloride 0.45%. 500 milliLiter(s) (50 mL/Hr) IV Continuous <Continuous>  sodium chloride 0.9% Bolus 250 milliLiter(s) IV Bolus once  sodium chloride 0.9%. 1000 milliLiter(s) (70 mL/Hr) IV Continuous <Continuous>  trimethoprim   80 mG/sulfamethoxazole 400 mG 1 Tablet(s) Oral daily  valGANciclovir 450 milliGRAM(s) Oral <User Schedule>    MEDICATIONS  (PRN):  fentaNYL    Injectable 25 MICROGram(s) IV Push every 15 minutes PRN Moderate Pain (4 - 6)  HYDROmorphone  Injectable 0.25 milliGRAM(s) IV Push every 10 minutes PRN Severe Pain (7 - 10)  HYDROmorphone PCA (1 mG/mL) Rescue Clinician Bolus 0.25 milliGRAM(s) IV Push every 15 minutes PRN for Pain Scale GREATER THAN 6  naloxone Injectable 0.1 milliGRAM(s) IV Push every 3 minutes PRN For ANY of the following changes in patient status:  A. RR LESS THAN 10 breaths per minute, B. Oxygen saturation LESS THAN 90%, C. Sedation score of 6  ondansetron Injectable 4 milliGRAM(s) IV Push every 6 hours PRN Nausea and/or Vomiting  ondansetron Injectable 4 milliGRAM(s) IV Push once PRN Nausea and/or Vomiting      PAST MEDICAL & SURGICAL HISTORY:  HTN (hypertension)    Glaucoma    Cataract    ESRD (end stage renal disease) on dialysis    DVT (deep venous thrombosis)  of Left subclavian vein, 15    Hemodialysis patient  MWF    Acquired cataract  extraction with b/l lense placement,     H/O: glaucoma  surgery,     AV fistula  2015    S/P KEVEN-BSO  for fibroids,     Hemodialysis access, AV graft        Vital Signs Last 24 Hrs  T(C): 36.3 (20 May 2021 00:00), Max: 37 (19 May 2021 13:03)  T(F): 97.3 (20 May 2021 00:00), Max: 98.6 (19 May 2021 13:03)  HR: 68 (20 May 2021 00:00) (63 - 79)  BP: 168/70 (20 May 2021 00:00) (148/70 - 217/86)  BP(mean): 100 (20 May 2021 00:00) (100 - 123)  RR: 15 (20 May 2021 00:00) (15 - 18)  SpO2: 100% (20 May 2021 00:00) (98% - 100%)    I&O's Summary    19 May 2021 07:01  -  20 May 2021 00:15  --------------------------------------------------------  IN: 460 mL / OUT: 1048 mL / NET: -588 mL                              11.6   10.59 )-----------( 174      ( 19 May 2021 22:30 )             36.2         132<L>  |  94<L>  |  34<H>  ----------------------------<  121<H>  5.3   |  20<L>  |  5.33<H>    Ca    8.6      19 May 2021 22:30  Phos  6.3       Mg     2.3         TPro  6.0  /  Alb  3.5  /  TBili  0.3  /  DBili  x   /  AST  16  /  ALT  8<L>  /  AlkPhos  81              Review of systems  Gen: No weight changes, fatigue, fevers/chills, weakness  Skin: No rashes  Head/Eyes/Ears/Mouth: No headache; Normal hearing; Normal vision w/o blurriness; No sinus pain/discomfort, sore throat  Respiratory: No dyspnea, cough, wheezing, hemoptysis  CV: No chest pain, PND, orthopnea  GI: Mild abdominal pain at surgical incision site; denies diarrhea, constipation, nausea, vomiting, melena, hematochezia  : No increased frequency, dysuria, hematuria, nocturia  MSK: No joint pain/swelling; no back pain; no edema  Neuro: No dizziness/lightheadedness, weakness, seizures, numbness, tingling  Heme: No easy bruising or bleeding  Endo: No heat/cold intolerance  Psych: No significant nervousness, anxiety, stress, depression  All other systems were reviewed and are negative, except as noted.      PHYSICAL EXAM:  Constitutional: Well developed / well nourished  Eyes: Anicteric, PERRLA  ENMT: nc/at  Neck: supple (unable to place central line intra-op)  Respiratory: CTA B/L  Cardiovascular: RRR  Gastrointestinal: Soft, ND. RLQ dressing c/d/i. KATHARINA x1 minimal sanguinous   Genitourinary: Urinary catheter in place minimal urine--dark  Extremities: SCD's in place and working bilaterally, R AVF palpable.  no edema  Vascular: Palpable dp pulses bilaterally  Neurological: A&O x3  Skin: no rashes, ulcerations or lesions;  Musculoskeletal: Moving all extremities  Psychiatric: Responsive

## 2021-05-19 NOTE — H&P ADULT - NSHPPHYSICALEXAM_GEN_ALL_CORE
PHYSICAL EXAM:  Constitutional: Well developed / well nourished  Eyes: Anicteric, PERRLA  ENMT: nc/at  Neck: supple  Respiratory: CTA B/L  Cardiovascular: RRR  Gastrointestinal: Soft abdomen, non tender/ ND  Genitourinary: anuric   Extremities: no LE edema, RUE AVG pos thrill/ pos bruit  Vascular: Palpable dp pulses bilaterally  Neurological: A&O x3  Skin: intact   Musculoskeletal: Moving all extremities  Psychiatric: Responsive

## 2021-05-19 NOTE — BRIEF OPERATIVE NOTE - OPERATION/FINDINGS
Backtable preparation of donor Left kidney.   Right Bird incision.  Right iliac artery intima tacked with 6-0 prolene x3.    1 vein, 1 artery, 1 ureter anastomosis performed.   Intraoperative doppler performed with evidence of good flow.   Surgitek ® Double-J ® Ureteral stent placed.   1x KATHARINA drain placed in subfascial space. Backtable preparation of donor Left kidney.   Right Bird incision.  Right iliac artery intima tacked with 6-0 prolene x3.    1 vein, 1 artery, 1 ureter anastomosis performed.   Intraoperative doppler performed with evidence of good flow.   Surgitek® Double-J® Ureteral stent placed.   1x KATHARINA drain placed in subfascial space.

## 2021-05-19 NOTE — H&P ADULT - NSICDXPASTSURGICALHX_GEN_ALL_CORE_FT
PAST SURGICAL HISTORY:  Acquired cataract extraction with b/l lense placement, 2016    AV fistula 2015    H/O: glaucoma surgery, 2002    Hemodialysis access, AV graft     S/P KEVEN-BSO for fibroids, 2012

## 2021-05-19 NOTE — BRIEF OPERATIVE NOTE - COMMENTS
Donor ID: [id number]  Match: [id number]  Age: 42F   ABO: A  High Risk: No  KDPI: 69%  COD: Cardiac Arrest  DBD  X Clamp Time: 5/18/21 21:03  Cold Ischemia: 21hr, 22min  Med Hx: IDDM, hypothyroid, diabetic neuropathy  Terminal Cr: 3.3  Covid: 5/15/21: Neg  CMV: Neg  EBV: Pos  HepB cAb: Neg  HepC JASON: Neg  HepC Ab: Neg  Mismatch: 2,2,1

## 2021-05-19 NOTE — H&P ADULT - ASSESSMENT
76 y/o female w/ PMH significant for HTN x since age 24,  ESRD on HD x6 years (MWF via RUE AVG), L subclavian DVT 2017,   Presents today for a DDRT.        Plan:   Admit to transplant surgery  NPO  CBC/ CMP/ Mg/ Phos/ Coags/ T&S   Hepatitis serologies   CXR  EKG  HD prior to OR  Induct: Simulect/ solumedrol  PPx:  Vancomycin

## 2021-05-19 NOTE — CONSULT NOTE ADULT - SUBJECTIVE AND OBJECTIVE BOX
[facility] DIVISION OF KIDNEY DISEASES AND HYPERTENSION -- INITIAL CONSULT NOTE  --------------------------------------------------------------------------------  HPI: 74 y/o female w/ PMH significant for HTN x since age 24,  ESRD on HD x6 years (MWF via RUE AVG)-  jeremías  L subclavian DVT 2017,   Has received Pfizer covid vaccine x 2 doses.  Presents today for a DDRT.  She feels well, offers no complaints denies weakness, fatigue, SOB, chest pain, palp, recent travel or sick contacts.  Last HD was done on 5/17 via RUE AVG. Will receive HD today prior to DDRT. Simulect will be used for induction.      PAST HISTORY  --------------------------------------------------------------------------------  PAST MEDICAL & SURGICAL HISTORY:  HTN (hypertension)    Glaucoma    Cataract    ESRD (end stage renal disease) on dialysis    DVT (deep venous thrombosis)  of Left subclavian vein, 06/12/17    Hemodialysis patient  MWF    Acquired cataract  extraction with b/l lense placement, 2016    H/O: glaucoma  surgery, 2002    AV fistula  2015    S/P KEVEN-BSO  for fibroids, 2012    Hemodialysis access, AV graft      FAMILY HISTORY:  Family history of cerebrovascular accident (CVA)    Family history of colon cancer (Sibling)      PAST SOCIAL HISTORY:    ALLERGIES & MEDICATIONS  --------------------------------------------------------------------------------  Allergies    Mushrooms (Anaphylaxis)  penicillin (Rash)    Intolerances      Standing Inpatient Medications  basiliximab  IVPB 20 milliGRAM(s) IV Intermittent once  methylPREDNISolone sodium succinate IVPB 500 milliGRAM(s) IV Intermittent once  sodium chloride 0.9% lock flush 3 milliLiter(s) IV Push every 8 hours  vancomycin  IVPB 1000 milliGRAM(s) IV Intermittent once    PRN Inpatient Medications      REVIEW OF SYSTEMS  --------------------------------------------------------------------------------  Gen: No weight changes, fatigue, fevers/chills, weakness  Skin: No rashes  Head/Eyes/Ears/Mouth: No headache; Normal hearing; Normal vision w/o blurriness; No sinus pain/discomfort, sore throat  Respiratory: No dyspnea, cough, wheezing, hemoptysis  CV: No chest pain, PND, orthopnea  GI: No abdominal pain, diarrhea, constipation, nausea, vomiting, melena, hematochezia  : No increased frequency, dysuria, hematuria, nocturia  MSK: No joint pain/swelling; no back pain; no edema  Neuro: No dizziness/lightheadedness, weakness, seizures, numbness, tingling  Heme: No easy bruising or bleeding  Endo: No heat/cold intolerance  Psych: No significant nervousness, anxiety, stress, depression    All other systems were reviewed and are negative, except as noted.    VITALS/PHYSICAL EXAM  --------------------------------------------------------------------------------  T(C): 36.8 (05-19-21 @ 12:05), Max: 36.8 (05-19-21 @ 12:03)  HR: 67 (05-19-21 @ 12:05) (67 - 73)  BP: 175/90 (05-19-21 @ 12:05) (175/90 - 193/90)  RR: 18 (05-19-21 @ 12:05) (18 - 18)  SpO2: 99% (05-19-21 @ 12:05) (98% - 100%)  Wt(kg): --  Height (cm): 172.7 (05-19-21 @ 08:49)  Weight (kg): 58.1 (05-19-21 @ 08:49)  BMI (kg/m2): 19.5 (05-19-21 @ 08:49)  BSA (m2): 1.69 (05-19-21 @ 08:49)      05-19-21 @ 07:01  -  05-19-21 @ 12:36  --------------------------------------------------------  IN: 0 mL / OUT: 1000 mL / NET: -1000 mL      Physical Exam:  	Gen: NAD, well-appearing  	HEENT: PERRL, supple neck  	Pulm: CTA B/L  	CV: RRR, S1S2; no rub  	Back: No spinal or CVA tenderness; no sacral edema  	Abd: +BS, soft, nontender/nondistended  	: No suprapubic tenderness  	UE: Warm, FROM, no clubbing, intact strength; no edema  	LE: Warm, FROM, no clubbing, intact strength; mild pedal edema b/l  	Neuro: No focal deficits, intact gait  	Psych: Normal affect and mood  	Skin: Warm, without rashes  	Vascular access: SHILOH PABLO with thrill     LABS/STUDIES  --------------------------------------------------------------------------------              11.9   5.61  >-----------<  178      [05-19-21 @ 09:10]              36.2     137  |  97  |  72  ----------------------------<  85      [05-19-21 @ 09:10]  5.7   |  20  |  8.84        Ca     9.6     [05-19-21 @ 09:10]      Mg     2.7     [05-19-21 @ 09:10]      Phos  7.5     [05-19-21 @ 09:10]    TPro  7.7  /  Alb  4.5  /  TBili  0.4  /  DBili  x   /  AST  21  /  ALT  12  /  AlkPhos  95  [05-19-21 @ 09:10]    PT/INR: PT 12.0 , INR 1.00       [05-19-21 @ 09:15]  PTT: 35.4       [05-19-21 @ 09:15]      Creatinine Trend:  SCr 8.84 [05-19 @ 09:10]    Urinalysis - [01-26-18 @ 12:15]      Color PL Yellow / Appearance Clear / SG 1.008 / pH 8.5      Gluc 150 / Ketone Negative  / Bili Negative / Urobili Negative       Blood Negative / Protein >600 / Leuk Est Small / Nitrite Negative      RBC 0-2 / WBC 10-25 / Hyaline  / Gran  / Sq Epi  / Non Sq Epi Occasional / Bacteria Few      HbA1c 4.6      [05-28-19 @ 09:49]  TSH 3.75      [02-02-21 @ 19:57]  Lipid: chol 162, TG 63, HDL 81, LDL --      [02-02-21 @ 19:57]    HBsAb 32.2      [10-19-19 @ 01:09]  HBsAb Reactive      [10-19-19 @ 01:09]  HBsAg Nonreact      [02-06-21 @ 18:09]  HBcAb Nonreact      [10-19-19 @ 01:09]  HCV 0.24, Nonreact      [02-06-21 @ 18:09]  HIV Nonreact      [04-26-17 @ 18:37]    STEPHANIE: titer Negative, pattern --      [08-13-20 @ 10:27]  Immunofixation Serum:   No Monoclonal Band Identified    Reference Range: None Detected      [02-05-21 @ 06:39]  SPEP Interpretation: Normal Electrophoresis Pattern      [02-05-21 @ 06:39]    Tacrolimus  Cyclosporine  Sirolimus  Mycophenolate  BK PCR  CMV PCR  Parvo PCR  EBV PCR

## 2021-05-19 NOTE — H&P ADULT - HISTORY OF PRESENT ILLNESS
74 y/o female w/ PMH significant for HTN x since age 24,  ESRD on HD x6 years (MWF via RUTARA AVG)-  anuric,  L subclavian DVT 2017,   Has received Pfizer covid vaccine x 2 doses.  Presents today for a DDRT.  She feels well, offers no complaints denies weakness, fatigue, SOB, chest pain, palp, recent travel or sick contacts.      Recipient info:  CPRA:  81%  ABO:  A  CMV: Pos/ EBV: Pos    Cardiac clearance: Dr. Bowers  EK19: NSR, non specific ST abnormality  ECHO: 20 Mild AR, Mod LAE, mod concentric LVH, mild diastolic dysfunction, norm RV size, small pericardial effusion superior to RA and adjacent to RV free wall, LVEF 62%  Regadenoson Tetrofosmin: 20- no evidence infarct or inducible ischemia LVEF 61%

## 2021-05-20 LAB
ALBUMIN SERPL ELPH-MCNC: 3.3 G/DL — SIGNIFICANT CHANGE UP (ref 3.3–5)
ALBUMIN SERPL ELPH-MCNC: 3.4 G/DL — SIGNIFICANT CHANGE UP (ref 3.3–5)
ALP SERPL-CCNC: 71 U/L — SIGNIFICANT CHANGE UP (ref 40–120)
ALP SERPL-CCNC: 73 U/L — SIGNIFICANT CHANGE UP (ref 40–120)
ALT FLD-CCNC: 7 U/L — LOW (ref 10–45)
ALT FLD-CCNC: 9 U/L — LOW (ref 10–45)
ANION GAP SERPL CALC-SCNC: 17 MMOL/L — SIGNIFICANT CHANGE UP (ref 5–17)
ANION GAP SERPL CALC-SCNC: 19 MMOL/L — HIGH (ref 5–17)
ANION GAP SERPL CALC-SCNC: 19 MMOL/L — HIGH (ref 5–17)
AST SERPL-CCNC: 16 U/L — SIGNIFICANT CHANGE UP (ref 10–40)
AST SERPL-CCNC: 22 U/L — SIGNIFICANT CHANGE UP (ref 10–40)
BASOPHILS # BLD AUTO: 0 K/UL — SIGNIFICANT CHANGE UP (ref 0–0.2)
BASOPHILS # BLD AUTO: 0.01 K/UL — SIGNIFICANT CHANGE UP (ref 0–0.2)
BASOPHILS NFR BLD AUTO: 0 % — SIGNIFICANT CHANGE UP (ref 0–2)
BASOPHILS NFR BLD AUTO: 0.1 % — SIGNIFICANT CHANGE UP (ref 0–2)
BILIRUB SERPL-MCNC: 0.3 MG/DL — SIGNIFICANT CHANGE UP (ref 0.2–1.2)
BILIRUB SERPL-MCNC: 0.3 MG/DL — SIGNIFICANT CHANGE UP (ref 0.2–1.2)
BUN SERPL-MCNC: 37 MG/DL — HIGH (ref 7–23)
BUN SERPL-MCNC: 40 MG/DL — HIGH (ref 7–23)
BUN SERPL-MCNC: 42 MG/DL — HIGH (ref 7–23)
CALCIUM SERPL-MCNC: 9 MG/DL — SIGNIFICANT CHANGE UP (ref 8.4–10.5)
CALCIUM SERPL-MCNC: 9.2 MG/DL — SIGNIFICANT CHANGE UP (ref 8.4–10.5)
CALCIUM SERPL-MCNC: 9.2 MG/DL — SIGNIFICANT CHANGE UP (ref 8.4–10.5)
CHLORIDE SERPL-SCNC: 92 MMOL/L — LOW (ref 96–108)
CHLORIDE SERPL-SCNC: 93 MMOL/L — LOW (ref 96–108)
CHLORIDE SERPL-SCNC: 95 MMOL/L — LOW (ref 96–108)
CO2 SERPL-SCNC: 19 MMOL/L — LOW (ref 22–31)
CO2 SERPL-SCNC: 19 MMOL/L — LOW (ref 22–31)
CO2 SERPL-SCNC: 21 MMOL/L — LOW (ref 22–31)
CREAT SERPL-MCNC: 5.15 MG/DL — HIGH (ref 0.5–1.3)
CREAT SERPL-MCNC: 5.91 MG/DL — HIGH (ref 0.5–1.3)
CREAT SERPL-MCNC: 6.13 MG/DL — HIGH (ref 0.5–1.3)
EOSINOPHIL # BLD AUTO: 0 K/UL — SIGNIFICANT CHANGE UP (ref 0–0.5)
EOSINOPHIL # BLD AUTO: 0 K/UL — SIGNIFICANT CHANGE UP (ref 0–0.5)
EOSINOPHIL NFR BLD AUTO: 0 % — SIGNIFICANT CHANGE UP (ref 0–6)
EOSINOPHIL NFR BLD AUTO: 0 % — SIGNIFICANT CHANGE UP (ref 0–6)
GLUCOSE BLDC GLUCOMTR-MCNC: 111 MG/DL — HIGH (ref 70–99)
GLUCOSE BLDC GLUCOMTR-MCNC: 124 MG/DL — HIGH (ref 70–99)
GLUCOSE BLDC GLUCOMTR-MCNC: 125 MG/DL — HIGH (ref 70–99)
GLUCOSE BLDC GLUCOMTR-MCNC: 129 MG/DL — HIGH (ref 70–99)
GLUCOSE BLDC GLUCOMTR-MCNC: 150 MG/DL — HIGH (ref 70–99)
GLUCOSE BLDC GLUCOMTR-MCNC: 173 MG/DL — HIGH (ref 70–99)
GLUCOSE BLDC GLUCOMTR-MCNC: 220 MG/DL — HIGH (ref 70–99)
GLUCOSE SERPL-MCNC: 117 MG/DL — HIGH (ref 70–99)
GLUCOSE SERPL-MCNC: 122 MG/DL — HIGH (ref 70–99)
GLUCOSE SERPL-MCNC: 132 MG/DL — HIGH (ref 70–99)
HCT VFR BLD CALC: 28.6 % — LOW (ref 34.5–45)
HCT VFR BLD CALC: 30.8 % — LOW (ref 34.5–45)
HCV RNA SERPL NAA DL=5-ACNC: SIGNIFICANT CHANGE UP IU/ML
HCV RNA SPEC NAA+PROBE-LOG IU: SIGNIFICANT CHANGE UP LOG10IU/ML
HGB BLD-MCNC: 9.3 G/DL — LOW (ref 11.5–15.5)
HGB BLD-MCNC: 9.8 G/DL — LOW (ref 11.5–15.5)
IMM GRANULOCYTES NFR BLD AUTO: 0.3 % — SIGNIFICANT CHANGE UP (ref 0–1.5)
IMM GRANULOCYTES NFR BLD AUTO: 0.4 % — SIGNIFICANT CHANGE UP (ref 0–1.5)
LYMPHOCYTES # BLD AUTO: 0.26 K/UL — LOW (ref 1–3.3)
LYMPHOCYTES # BLD AUTO: 0.32 K/UL — LOW (ref 1–3.3)
LYMPHOCYTES # BLD AUTO: 3.2 % — LOW (ref 13–44)
LYMPHOCYTES # BLD AUTO: 3.5 % — LOW (ref 13–44)
MAGNESIUM SERPL-MCNC: 2.3 MG/DL — SIGNIFICANT CHANGE UP (ref 1.6–2.6)
MCHC RBC-ENTMCNC: 27.9 PG — SIGNIFICANT CHANGE UP (ref 27–34)
MCHC RBC-ENTMCNC: 28.5 PG — SIGNIFICANT CHANGE UP (ref 27–34)
MCHC RBC-ENTMCNC: 31.8 GM/DL — LOW (ref 32–36)
MCHC RBC-ENTMCNC: 32.5 GM/DL — SIGNIFICANT CHANGE UP (ref 32–36)
MCV RBC AUTO: 87.7 FL — SIGNIFICANT CHANGE UP (ref 80–100)
MCV RBC AUTO: 87.7 FL — SIGNIFICANT CHANGE UP (ref 80–100)
MONOCYTES # BLD AUTO: 0.48 K/UL — SIGNIFICANT CHANGE UP (ref 0–0.9)
MONOCYTES # BLD AUTO: 0.8 K/UL — SIGNIFICANT CHANGE UP (ref 0–0.9)
MONOCYTES NFR BLD AUTO: 6.4 % — SIGNIFICANT CHANGE UP (ref 2–14)
MONOCYTES NFR BLD AUTO: 8.1 % — SIGNIFICANT CHANGE UP (ref 2–14)
NEUTROPHILS # BLD AUTO: 6.72 K/UL — SIGNIFICANT CHANGE UP (ref 1.8–7.4)
NEUTROPHILS # BLD AUTO: 8.73 K/UL — HIGH (ref 1.8–7.4)
NEUTROPHILS NFR BLD AUTO: 88.4 % — HIGH (ref 43–77)
NEUTROPHILS NFR BLD AUTO: 89.6 % — HIGH (ref 43–77)
NRBC # BLD: 0 /100 WBCS — SIGNIFICANT CHANGE UP (ref 0–0)
NRBC # BLD: 0 /100 WBCS — SIGNIFICANT CHANGE UP (ref 0–0)
PHOSPHATE SERPL-MCNC: 7.5 MG/DL — HIGH (ref 2.5–4.5)
PLATELET # BLD AUTO: 173 K/UL — SIGNIFICANT CHANGE UP (ref 150–400)
PLATELET # BLD AUTO: 182 K/UL — SIGNIFICANT CHANGE UP (ref 150–400)
POTASSIUM SERPL-MCNC: 4.7 MMOL/L — SIGNIFICANT CHANGE UP (ref 3.5–5.3)
POTASSIUM SERPL-MCNC: 5.8 MMOL/L — HIGH (ref 3.5–5.3)
POTASSIUM SERPL-MCNC: 5.8 MMOL/L — HIGH (ref 3.5–5.3)
POTASSIUM SERPL-SCNC: 4.7 MMOL/L — SIGNIFICANT CHANGE UP (ref 3.5–5.3)
POTASSIUM SERPL-SCNC: 5.8 MMOL/L — HIGH (ref 3.5–5.3)
POTASSIUM SERPL-SCNC: 5.8 MMOL/L — HIGH (ref 3.5–5.3)
PROT SERPL-MCNC: 5.7 G/DL — LOW (ref 6–8.3)
PROT SERPL-MCNC: 5.8 G/DL — LOW (ref 6–8.3)
RBC # BLD: 3.26 M/UL — LOW (ref 3.8–5.2)
RBC # BLD: 3.51 M/UL — LOW (ref 3.8–5.2)
RBC # FLD: 18.1 % — HIGH (ref 10.3–14.5)
RBC # FLD: 18.3 % — HIGH (ref 10.3–14.5)
SODIUM SERPL-SCNC: 130 MMOL/L — LOW (ref 135–145)
SODIUM SERPL-SCNC: 131 MMOL/L — LOW (ref 135–145)
SODIUM SERPL-SCNC: 133 MMOL/L — LOW (ref 135–145)
WBC # BLD: 7.5 K/UL — SIGNIFICANT CHANGE UP (ref 3.8–10.5)
WBC # BLD: 9.88 K/UL — SIGNIFICANT CHANGE UP (ref 3.8–10.5)
WBC # FLD AUTO: 7.5 K/UL — SIGNIFICANT CHANGE UP (ref 3.8–10.5)
WBC # FLD AUTO: 9.88 K/UL — SIGNIFICANT CHANGE UP (ref 3.8–10.5)

## 2021-05-20 PROCEDURE — 99232 SBSQ HOSP IP/OBS MODERATE 35: CPT | Mod: GC

## 2021-05-20 PROCEDURE — 93010 ELECTROCARDIOGRAM REPORT: CPT

## 2021-05-20 RX ORDER — INSULIN HUMAN 100 [IU]/ML
10 INJECTION, SOLUTION SUBCUTANEOUS ONCE
Refills: 0 | Status: COMPLETED | OUTPATIENT
Start: 2021-05-20 | End: 2021-05-20

## 2021-05-20 RX ORDER — TACROLIMUS 5 MG/1
8 CAPSULE ORAL
Refills: 0 | Status: DISCONTINUED | OUTPATIENT
Start: 2021-05-20 | End: 2021-05-21

## 2021-05-20 RX ORDER — SODIUM ZIRCONIUM CYCLOSILICATE 10 G/10G
10 POWDER, FOR SUSPENSION ORAL ONCE
Refills: 0 | Status: COMPLETED | OUTPATIENT
Start: 2021-05-20 | End: 2021-05-20

## 2021-05-20 RX ORDER — B COMPLEX, C NO.20/FOLIC ACID 1 MG
CAPSULE ORAL
Refills: 0 | Status: DISCONTINUED | COMMUNITY
End: 2021-05-20

## 2021-05-20 RX ORDER — TRAMADOL HYDROCHLORIDE 50 MG/1
25 TABLET ORAL EVERY 6 HOURS
Refills: 0 | Status: DISCONTINUED | OUTPATIENT
Start: 2021-05-20 | End: 2021-05-24

## 2021-05-20 RX ORDER — LATANOPROST/PF 0.005 %
0.01 DROPS OPHTHALMIC (EYE) DAILY
Qty: 12 | Refills: 11 | Status: ACTIVE | COMMUNITY
Start: 2021-05-20 | End: 1900-01-01

## 2021-05-20 RX ORDER — CALCIUM ACETATE 667 MG/1
667 CAPSULE ORAL 3 TIMES DAILY
Refills: 0 | Status: DISCONTINUED | COMMUNITY
Start: 2017-01-19 | End: 2021-05-20

## 2021-05-20 RX ORDER — TRAMADOL HYDROCHLORIDE 50 MG/1
50 TABLET ORAL EVERY 6 HOURS
Refills: 0 | Status: DISCONTINUED | OUTPATIENT
Start: 2021-05-20 | End: 2021-05-24

## 2021-05-20 RX ORDER — LATANOPROST 0.05 MG/ML
1 SOLUTION/ DROPS OPHTHALMIC; TOPICAL AT BEDTIME
Refills: 0 | Status: DISCONTINUED | OUTPATIENT
Start: 2021-05-20 | End: 2021-05-24

## 2021-05-20 RX ORDER — ENALAPRIL MALEATE 10 MG/1
10 TABLET ORAL DAILY
Refills: 0 | Status: DISCONTINUED | COMMUNITY
End: 2021-05-20

## 2021-05-20 RX ORDER — NIFEDIPINE 30 MG
60 TABLET, EXTENDED RELEASE 24 HR ORAL DAILY
Refills: 0 | Status: DISCONTINUED | OUTPATIENT
Start: 2021-05-20 | End: 2021-05-22

## 2021-05-20 RX ORDER — LABETALOL HCL 100 MG
100 TABLET ORAL
Refills: 0 | Status: DISCONTINUED | OUTPATIENT
Start: 2021-05-20 | End: 2021-05-23

## 2021-05-20 RX ORDER — FUROSEMIDE 40 MG
80 TABLET ORAL ONCE
Refills: 0 | Status: COMPLETED | OUTPATIENT
Start: 2021-05-20 | End: 2021-05-20

## 2021-05-20 RX ORDER — LABETALOL HCL 100 MG
10 TABLET ORAL ONCE
Refills: 0 | Status: COMPLETED | OUTPATIENT
Start: 2021-05-20 | End: 2021-05-20

## 2021-05-20 RX ORDER — AMLODIPINE BESYLATE 10 MG/1
10 TABLET ORAL DAILY
Qty: 90 | Refills: 3 | Status: DISCONTINUED | COMMUNITY
End: 2021-05-20

## 2021-05-20 RX ORDER — LABETALOL HYDROCHLORIDE 200 MG/1
200 TABLET, FILM COATED ORAL 3 TIMES DAILY
Refills: 5 | Status: DISCONTINUED | COMMUNITY
End: 2021-05-20

## 2021-05-20 RX ORDER — ISOSORBIDE MONONITRATE 30 MG
TABLET, EXTENDED RELEASE 24 HR ORAL DAILY
Refills: 0 | Status: DISCONTINUED | COMMUNITY
End: 2021-05-20

## 2021-05-20 RX ORDER — DEXTROSE 50 % IN WATER 50 %
50 SYRINGE (ML) INTRAVENOUS ONCE
Refills: 0 | Status: COMPLETED | OUTPATIENT
Start: 2021-05-20 | End: 2021-05-20

## 2021-05-20 RX ORDER — INSULIN HUMAN 100 [IU]/ML
5 INJECTION, SOLUTION SUBCUTANEOUS ONCE
Refills: 0 | Status: COMPLETED | OUTPATIENT
Start: 2021-05-20 | End: 2021-05-20

## 2021-05-20 RX ORDER — SODIUM CHLORIDE 9 MG/ML
1000 INJECTION, SOLUTION INTRAVENOUS
Refills: 0 | Status: DISCONTINUED | OUTPATIENT
Start: 2021-05-20 | End: 2021-05-21

## 2021-05-20 RX ORDER — ATORVASTATIN CALCIUM 40 MG/1
40 TABLET, FILM COATED ORAL DAILY
Qty: 30 | Refills: 5 | Status: DISCONTINUED | COMMUNITY
End: 2021-05-20

## 2021-05-20 RX ORDER — SODIUM CHLORIDE 9 MG/ML
250 INJECTION INTRAMUSCULAR; INTRAVENOUS; SUBCUTANEOUS ONCE
Refills: 0 | Status: COMPLETED | OUTPATIENT
Start: 2021-05-20 | End: 2021-05-20

## 2021-05-20 RX ORDER — LABETALOL HCL 100 MG
100 TABLET ORAL ONCE
Refills: 0 | Status: COMPLETED | OUTPATIENT
Start: 2021-05-20 | End: 2021-05-20

## 2021-05-20 RX ORDER — EPINEPHRINE 0.3 MG/.3ML
0.3 INJECTION INTRAMUSCULAR
Qty: 2 | Refills: 0 | Status: DISCONTINUED | COMMUNITY
Start: 2017-05-29 | End: 2021-05-20

## 2021-05-20 RX ORDER — HYDRALAZINE HYDROCHLORIDE 100 MG/1
100 TABLET ORAL 3 TIMES DAILY
Refills: 0 | Status: DISCONTINUED | COMMUNITY
Start: 2017-06-21 | End: 2021-05-20

## 2021-05-20 RX ORDER — HYDRALAZINE HCL 50 MG
10 TABLET ORAL ONCE
Refills: 0 | Status: COMPLETED | OUTPATIENT
Start: 2021-05-20 | End: 2021-05-20

## 2021-05-20 RX ADMIN — SENNA PLUS 2 TABLET(S): 8.6 TABLET ORAL at 21:16

## 2021-05-20 RX ADMIN — INSULIN HUMAN 5 UNIT(S): 100 INJECTION, SOLUTION SUBCUTANEOUS at 10:35

## 2021-05-20 RX ADMIN — Medication 1 TABLET(S): at 12:24

## 2021-05-20 RX ADMIN — Medication 10 MILLIGRAM(S): at 02:12

## 2021-05-20 RX ADMIN — SODIUM CHLORIDE 50 MILLILITER(S): 9 INJECTION, SOLUTION INTRAVENOUS at 08:58

## 2021-05-20 RX ADMIN — Medication 125 MILLIGRAM(S): at 05:15

## 2021-05-20 RX ADMIN — Medication 60 MILLIGRAM(S): at 08:59

## 2021-05-20 RX ADMIN — MYCOPHENOLATE MOFETIL 1 GRAM(S): 250 CAPSULE ORAL at 08:59

## 2021-05-20 RX ADMIN — TRAMADOL HYDROCHLORIDE 50 MILLIGRAM(S): 50 TABLET ORAL at 06:40

## 2021-05-20 RX ADMIN — Medication 125 MILLIGRAM(S): at 17:33

## 2021-05-20 RX ADMIN — Medication 100 MILLIGRAM(S): at 00:36

## 2021-05-20 RX ADMIN — Medication 500000 UNIT(S): at 21:16

## 2021-05-20 RX ADMIN — MYCOPHENOLATE MOFETIL 1 GRAM(S): 250 CAPSULE ORAL at 21:16

## 2021-05-20 RX ADMIN — LATANOPROST 1 DROP(S): 0.05 SOLUTION/ DROPS OPHTHALMIC; TOPICAL at 21:16

## 2021-05-20 RX ADMIN — Medication 50 MILLILITER(S): at 04:51

## 2021-05-20 RX ADMIN — Medication 500000 UNIT(S): at 12:24

## 2021-05-20 RX ADMIN — TACROLIMUS 8 MILLIGRAM(S): 5 CAPSULE ORAL at 08:59

## 2021-05-20 RX ADMIN — SODIUM CHLORIDE 250 MILLILITER(S): 9 INJECTION INTRAMUSCULAR; INTRAVENOUS; SUBCUTANEOUS at 00:17

## 2021-05-20 RX ADMIN — Medication 50 MILLILITER(S): at 10:35

## 2021-05-20 RX ADMIN — Medication 100 MILLIGRAM(S): at 06:40

## 2021-05-20 RX ADMIN — INSULIN HUMAN 10 UNIT(S): 100 INJECTION, SOLUTION SUBCUTANEOUS at 04:51

## 2021-05-20 RX ADMIN — Medication 80 MILLIGRAM(S): at 09:00

## 2021-05-20 RX ADMIN — Medication 100 MILLIGRAM(S): at 18:36

## 2021-05-20 RX ADMIN — Medication 500000 UNIT(S): at 00:19

## 2021-05-20 RX ADMIN — Medication 500000 UNIT(S): at 17:33

## 2021-05-20 RX ADMIN — HYDROMORPHONE HYDROCHLORIDE 30 MILLILITER(S): 2 INJECTION INTRAMUSCULAR; INTRAVENOUS; SUBCUTANEOUS at 05:25

## 2021-05-20 RX ADMIN — TRAMADOL HYDROCHLORIDE 50 MILLIGRAM(S): 50 TABLET ORAL at 07:10

## 2021-05-20 RX ADMIN — PANTOPRAZOLE SODIUM 40 MILLIGRAM(S): 20 TABLET, DELAYED RELEASE ORAL at 09:00

## 2021-05-20 RX ADMIN — SODIUM CHLORIDE 250 MILLILITER(S): 9 INJECTION INTRAMUSCULAR; INTRAVENOUS; SUBCUTANEOUS at 05:16

## 2021-05-20 RX ADMIN — Medication 10 MILLIGRAM(S): at 04:20

## 2021-05-20 RX ADMIN — Medication 500000 UNIT(S): at 05:16

## 2021-05-20 RX ADMIN — SODIUM ZIRCONIUM CYCLOSILICATE 10 GRAM(S): 10 POWDER, FOR SUSPENSION ORAL at 05:00

## 2021-05-20 RX ADMIN — Medication 1 TABLET(S): at 00:19

## 2021-05-20 NOTE — PROGRESS NOTE ADULT - ASSESSMENT
76 y/o female w/ PMH significant for HTN x since age 24,  ESRD on HD x6 years (MWF via RUE AVG)-  anuric,  L subclavian DVT 2017,   Has received Pfizer covid vaccine x 2 doses.  Presents today for a DDRT.  She feels well, offers no complaints denies weakness, fatigue, SOB, chest pain, palp, recent travel or sick contacts.  Last HD was done on 5/17 via RUE AVG. Will receive HD today prior to DDRT. Simulect will be used for induction.          ESRD on HD x6 years (MWF via RUE AVG)-  s/p DDRT 5/19/21  HD today (no UF, no heparin)  -180/80-90. Monitor BP  K 5.8, will give 2K bath.  Phos 7.4. Will monitor  Induction: Simulect/ solumedrol  - Lasix 80mg IV x1 + Metolazone 5mg PO x1  - strict I&Os   - DC IVF   - Immuno: Start Envarsus 8mg this am, f/ur level, MMF 1g BID, Pred taper, Simulect #2 POD#4  -PPx: Nystatin/Valcyte/Bactrim/Protonix    HTN  - Restarted home regimen    - BP elevated- increase nifedipine to 60mg QD. Add labetalol home dose            If you have any questions, please feel free to contact me  Violeta Weir  Nephrology Fellow  [phone]  (After 5pm or on weekends please page the on-call fellow)

## 2021-05-20 NOTE — PROGRESS NOTE ADULT - ASSESSMENT
75F with HTN, ESRD on HD 2017 (MWF via R AVF--oliguria, etiology unknown, no bx, Dr. Treviño), Glaucoma, h/o DVT to L subclavian vein (2017) admitted s/p R DDRT (1a/1v/1u--stented). Simulect induction.    s/p R DDRT POD#1  - Cr uptrending to 5.9 from 5.33, repeat K 5.8  - Shift with D50 x1 and 5u R insulin plan for HD today   - Lasix 80mg IV x1 + Metolazone 5mg PO x1  - strict I&Os via sethi/KATHARINA  - DC IVF   - DC PCA and start PRN tylenol tramadol  - SCDs while in bed at all times, no SQH, encourage spirometry  - Advance diet as adilia  - Immuno: Start Envarsus 8mg this am, f/ur level, MMF 1g BID, Pred taper, Simulect #2 POD#4  -PPx: Nystatin/Valcyte/Bactrim/Protonix    HTN  - Restarted home regimen    - BP elevated increase nifedipine to 60mg QD

## 2021-05-20 NOTE — PHYSICAL THERAPY INITIAL EVALUATION ADULT - GENERAL OBSERVATIONS, REHAB EVAL
Pt received out of bed sitting in chair in NAD, lines intact, +sethi, +IV lock, +annamarie chinchilla, +tele.

## 2021-05-20 NOTE — PHYSICAL THERAPY INITIAL EVALUATION ADULT - PLANNED THERAPY INTERVENTIONS, PT EVAL
balance training/bed mobility training/gait training/strengthening balance training/bed mobility training/gait training/strengthening/transfer training

## 2021-05-20 NOTE — PROGRESS NOTE ADULT - SUBJECTIVE AND OBJECTIVE BOX
[facility] DIVISION OF KIDNEY DISEASES AND HYPERTENSION -- FOLLOW UP NOTE  --------------------------------------------------------------------------------      24 hour events/subjective: Pt doing well. POD #1.  cc x 24 hrs. Pt was oliguric pre-op.         PAST HISTORY  --------------------------------------------------------------------------------  No significant changes to PMH, PSH, FHx, SHx, unless otherwise noted    ALLERGIES & MEDICATIONS  --------------------------------------------------------------------------------  Allergies    Mushrooms (Anaphylaxis)  penicillin (Rash)    Intolerances      Standing Inpatient Medications  dextrose 5% + sodium chloride 0.45%. 1000 milliLiter(s) IV Continuous <Continuous>  labetalol 100 milliGRAM(s) Oral two times a day  latanoprost 0.005% Ophthalmic Solution 1 Drop(s) Both EYES at bedtime  methylPREDNISolone sodium succinate Injectable 125 milliGRAM(s) IV Push two times a day  methylPREDNISolone sodium succinate IVPB 500 milliGRAM(s) IV Intermittent once  mycophenolate mofetil 1 Gram(s) Oral <User Schedule>  NIFEdipine XL 60 milliGRAM(s) Oral daily  nystatin    Suspension 429231 Unit(s) Swish and Swallow four times a day  pantoprazole    Tablet 40 milliGRAM(s) Oral before breakfast  senna 2 Tablet(s) Oral at bedtime  tacrolimus ER Tablet (ENVARSUS XR) 8 milliGRAM(s) Oral <User Schedule>  trimethoprim   80 mG/sulfamethoxazole 400 mG 1 Tablet(s) Oral daily  valGANciclovir 450 milliGRAM(s) Oral <User Schedule>    PRN Inpatient Medications  HYDROmorphone  Injectable 0.25 milliGRAM(s) IV Push every 10 minutes PRN  naloxone Injectable 0.1 milliGRAM(s) IV Push every 3 minutes PRN  ondansetron Injectable 4 milliGRAM(s) IV Push every 6 hours PRN  traMADol 25 milliGRAM(s) Oral every 6 hours PRN  traMADol 50 milliGRAM(s) Oral every 6 hours PRN      REVIEW OF SYSTEMS  --------------------------------------------------------------------------------  Gen: No fatigue, fevers/chills, weakness  Skin: No rashes  Head/Eyes/Ears/Mouth: No headache;No sore throat  Respiratory: No dyspnea, cough,   CV: No chest pain, PND, orthopnea  GI: No abdominal pain, diarrhea, constipation, nausea, vomiting  Transplant: No pain  : No increased frequency, dysuria, hematuria, nocturia  MSK: No joint pain/swelling; no back pain; no edema  Neuro: No dizziness/lightheadedness, weakness, seizures, numbness, tingling  Psych: No significant nervousness, anxiety, stress, depression    All other systems were reviewed and are negative, except as noted.    VITALS/PHYSICAL EXAM  --------------------------------------------------------------------------------  T(C): 36.2 (05-20-21 @ 13:14), Max: 37.2 (05-20-21 @ 06:00)  HR: 62 (05-20-21 @ 14:25) (62 - 79)  BP: 179/76 (05-20-21 @ 14:25) (148/70 - 190/86)  RR: 18 (05-20-21 @ 13:14) (15 - 18)  SpO2: 98% (05-20-21 @ 14:25) (95% - 100%)  Wt(kg): --  Height (cm): 172.7 (05-19-21 @ 13:03)  Weight (kg): 58.1 (05-19-21 @ 13:03)  BMI (kg/m2): 19.5 (05-19-21 @ 13:03)  BSA (m2): 1.69 (05-19-21 @ 13:03)      05-19-21 @ 07:01  -  05-20-21 @ 07:00  --------------------------------------------------------  IN: 1185 mL / OUT: 1246 mL / NET: -61 mL    05-20-21 @ 07:01  -  05-20-21 @ 15:22  --------------------------------------------------------  IN: 370 mL / OUT: 92 mL / NET: 278 mL      Physical Exam:  	Gen: NAD, well-appearing  	HEENT: PERRL, supple neck  	Pulm: CTA B/L  	CV: RRR, S1S2; no rub  	Back: No spinal or CVA tenderness; no sacral edema  	Abd: +BS, soft, nontender/nondistended  	: No suprapubic tenderness  	UE: Warm, FROM, no clubbing, intact strength; no edema  	LE: Warm, FROM, no clubbing, intact strength; no LE edema b/l, LUE edema (IV line infiltration)  	Neuro: No focal deficits, intact gait  	Psych: Normal affect and mood  	Skin: Warm, without rashes  	Vascular access: SHILOH PABLO       LABS/STUDIES  --------------------------------------------------------------------------------              9.3    7.50  >-----------<  173      [05-20-21 @ 09:19]              28.6     133  |  95  |  42  ----------------------------<  122      [05-20-21 @ 09:19]  5.8   |  19  |  6.13        Ca     9.2     [05-20-21 @ 09:19]      Mg     2.3     [05-20-21 @ 03:47]      Phos  7.5     [05-20-21 @ 03:47]    TPro  5.7  /  Alb  3.3  /  TBili  0.3  /  DBili  x   /  AST  16  /  ALT  7   /  AlkPhos  71  [05-20-21 @ 09:19]    PT/INR: PT 13.8 , INR 1.16       [05-19-21 @ 21:15]  PTT: 31.6       [05-19-21 @ 21:15]      Creatinine Trend:  SCr 6.13 [05-20 @ 09:19]  SCr 5.91 [05-20 @ 03:47]  SCr 5.33 [05-19 @ 22:30]  SCr 4.13 [05-19 @ 21:15]  SCr 8.84 [05-19 @ 09:10]                  HbA1c 4.6      [05-28-19 @ 09:49]  TSH 3.75      [02-02-21 @ 19:57]  Lipid: chol 162, TG 63, HDL 81, LDL --      [02-02-21 @ 19:57]    HBsAb 22.4      [05-19-21 @ 10:42]  HBsAg Nonreact      [05-19-21 @ 10:42]  HBcAb Nonreact      [05-19-21 @ 10:42]  HCV 0.42, Nonreact      [05-19-21 @ 10:42]  HIV Nonreact      [05-19-21 @ 10:45]

## 2021-05-20 NOTE — PROGRESS NOTE ADULT - SUBJECTIVE AND OBJECTIVE BOX
Day 1 of Anesthesia Pain Management Service    SUBJECTIVE: Doing ok    Pain Scale Score:	[X] Refer to charted pain scores    THERAPY:    [ ] IV PCA Morphine		        [ ] 5 mg/mL	[ ] 1 mg/mL  [X] IV PCA Hydromorphone	[ ] 5 mg/mL	[X] 1 mg/mL  [ ] IV PCA Fentanyl		        [ ] 50 micrograms/mL    Demand dose: 0.2 mg     Lockout: 6 minutes   Continuous Rate: 0 mg/hr  4 Hour Limit: 4 mg    MEDICATIONS  (STANDING):  dextrose 5% + sodium chloride 0.45%. 1000 milliLiter(s) (50 mL/Hr) IV Continuous <Continuous>  labetalol 100 milliGRAM(s) Oral two times a day  latanoprost 0.005% Ophthalmic Solution 1 Drop(s) Both EYES at bedtime  methylPREDNISolone sodium succinate Injectable 125 milliGRAM(s) IV Push two times a day  methylPREDNISolone sodium succinate IVPB 500 milliGRAM(s) IV Intermittent once  metolazone 5 milliGRAM(s) Oral once  mycophenolate mofetil 1 Gram(s) Oral <User Schedule>  NIFEdipine XL 60 milliGRAM(s) Oral daily  nystatin    Suspension 592084 Unit(s) Swish and Swallow four times a day  pantoprazole    Tablet 40 milliGRAM(s) Oral before breakfast  senna 2 Tablet(s) Oral at bedtime  tacrolimus ER Tablet (ENVARSUS XR) 8 milliGRAM(s) Oral <User Schedule>  trimethoprim   80 mG/sulfamethoxazole 400 mG 1 Tablet(s) Oral daily  valGANciclovir 450 milliGRAM(s) Oral <User Schedule>    MEDICATIONS  (PRN):  HYDROmorphone  Injectable 0.25 milliGRAM(s) IV Push every 10 minutes PRN Severe Pain (7 - 10)  naloxone Injectable 0.1 milliGRAM(s) IV Push every 3 minutes PRN For ANY of the following changes in patient status:  A. RR LESS THAN 10 breaths per minute, B. Oxygen saturation LESS THAN 90%, C. Sedation score of 6  ondansetron Injectable 4 milliGRAM(s) IV Push every 6 hours PRN Nausea and/or Vomiting  traMADol 25 milliGRAM(s) Oral every 6 hours PRN Moderate Pain (4 - 6)  traMADol 50 milliGRAM(s) Oral every 6 hours PRN Severe Pain (7 - 10)      OBJECTIVE:    Sedation Score:	[ X] Alert	 [ ] Drowsy 	[ ] Arousable	[ ] Asleep	[ ] Unresponsive    Side Effects:	[X ] None	[ ] Nausea	[ ] Vomiting	[ ] Pruritus  		[ ] Other:    Vital Signs Last 24 Hrs  T(C): 36.6 (20 May 2021 09:00), Max: 37.2 (20 May 2021 06:00)  T(F): 97.9 (20 May 2021 09:00), Max: 98.9 (20 May 2021 06:00)  HR: 72 (20 May 2021 09:00) (63 - 79)  BP: 175/81 (20 May 2021 09:00) (148/70 - 217/86)  BP(mean): 116 (20 May 2021 09:00) (98 - 123)  RR: 18 (20 May 2021 09:00) (15 - 18)  SpO2: 97% (20 May 2021 09:00) (95% - 100%)    ASSESSMENT/ PLAN    Therapy to  be:               [  ] Continued   [X ] Discontinued   [ X] Changed to PRN Analgesics    Documentation and Verification of current medications:   [X] Done	[ ] Not done, not eligible    Comments: PCA D\C'd prior to arrival to floor by primary service. Transitioned to prn analgesics

## 2021-05-20 NOTE — PHYSICAL THERAPY INITIAL EVALUATION ADULT - PERTINENT HX OF CURRENT PROBLEM, REHAB EVAL
75 y F w/ PMH HTN x since age 24,  ESRD on HD x6 yrs; anuric,  L subclavian DVT 2017,   Has received Pfizer covid vaccine x 2 doses. Last HD was done on 5/17 via RUE AVG. Adm for DDRT.  Hosp Course: 5/19 DDRT, +IV, +Pavon. 5/19 Xray finds stable mild cardiomegaly, interval decrease in bilateral pleural effusions, L basilar airspace opacity stable, mild improvement in R basilar airspace opacity, No pneumothorax, No bilateral chest wall or bilateral neck subcutaneous emphysema..

## 2021-05-20 NOTE — PROGRESS NOTE ADULT - SUBJECTIVE AND OBJECTIVE BOX
Transplant Surgery - Multidisciplinary rounds   --------------------------------------------------------------  R DDRT 2021 POD#1    Present:   Patient seen and examined with multidisciplinary team including Transplant Surgeon:  Dr. Sebastian,  Transplant Nephrologist: Dr. Felice Ayala, nephrology fellow, ACPs Becky Fox and Bernabe Garcia, transplant pharmacist Pham and pharmacy resident, surgical resident Sherwin Odonnell, unit RN and RN manager during am rounds.  Disciplines not in attendance will be notified of the plan.     HPI: 75F with HTN, ESRD on HD  (MWF via R AVF--oliguric, etiology unknown, no bx, Dr. Treviño), Glaucoma, h/o DVT to L subclavian vein (2017) admitted s/p R DDRT (1a/1v/1u--stented). Simulect induction.        ====  Recipient:   Hx: HTN, Hysterectomy, Eye surgery, Covid Vaccine Pfizer x2 (2021)  Nephrologist: Dr. Treviño  CPRA:    81%  ABO:      A  CMV:  Positive  EBV Status:  Positive    ===  Donor (import ): [facility]  Donor ID:  [id number]  Match: [id number]  OPO: PADV  Age:  42 y.o F  ABO:  A  High Risk:  No  KDPI: 69%  COD:  Cardiac Arrest  X Clamp Time: 21  21:03  Medical Hx:  IDDM, gastroparesis, hypothyroid, Depression, Anxiety, Left Breast mass, amenorrhea, fibromyalgia, diabetic neuropathy, Pfizer Vaccine x2 (3/2021)  Terminal Cr:  3.3  Covid Testin/15/21 NP-Neg,  BAL-Neg  CMV-Negative  EBV-Positive  HepBcAb-Negative  Hepatitis C-JASON- Negative  Hepatitis C ab-Negative  MISMATCH: 2,2,1  Kidney Laterality: Left  X-match –Negative x match   ===  OR:  Backtable preparation of donor Left kidney.   Right Bird incision.  Right iliac artery intima tacked with 6-0 prolene x3.    1 vein, 1 artery, 1 ureter anastomosis performed.   Intraoperative doppler performed with evidence of good flow.   Surgitek® Double-J® Ureteral stent placed.   1x KATHARINA drain placed in subfascial space.  CIT 21H    Interval events:   -in PACU, pt hypertensive (4 agents at home), controlled with IV Labetalol/Hydralazine  -pain well controlled with dPCA  -AAOx3, drowsy  - Post op hyperkalemia shifted + lokelma x1  -Received one IVF bolus 250ml for oliguria    -post-op u/s with good flow    Potential Discharge date: pending clinical stability    Education:  Medications    Plan of care:  See Below       MEDICATIONS  (STANDING):  dextrose 5% + sodium chloride 0.45%. 1000 milliLiter(s) (50 mL/Hr) IV Continuous <Continuous>  labetalol 100 milliGRAM(s) Oral two times a day  latanoprost 0.005% Ophthalmic Solution 1 Drop(s) Both EYES at bedtime  methylPREDNISolone sodium succinate Injectable 125 milliGRAM(s) IV Push two times a day  methylPREDNISolone sodium succinate IVPB 500 milliGRAM(s) IV Intermittent once  mycophenolate mofetil 1 Gram(s) Oral <User Schedule>  NIFEdipine XL 60 milliGRAM(s) Oral daily  nystatin    Suspension 700852 Unit(s) Swish and Swallow four times a day  pantoprazole    Tablet 40 milliGRAM(s) Oral before breakfast  senna 2 Tablet(s) Oral at bedtime  tacrolimus ER Tablet (ENVARSUS XR) 8 milliGRAM(s) Oral <User Schedule>  trimethoprim   80 mG/sulfamethoxazole 400 mG 1 Tablet(s) Oral daily  valGANciclovir 450 milliGRAM(s) Oral <User Schedule>    MEDICATIONS  (PRN):  HYDROmorphone  Injectable 0.25 milliGRAM(s) IV Push every 10 minutes PRN Severe Pain (7 - 10)  naloxone Injectable 0.1 milliGRAM(s) IV Push every 3 minutes PRN For ANY of the following changes in patient status:  A. RR LESS THAN 10 breaths per minute, B. Oxygen saturation LESS THAN 90%, C. Sedation score of 6  ondansetron Injectable 4 milliGRAM(s) IV Push every 6 hours PRN Nausea and/or Vomiting  traMADol 25 milliGRAM(s) Oral every 6 hours PRN Moderate Pain (4 - 6)  traMADol 50 milliGRAM(s) Oral every 6 hours PRN Severe Pain (7 - 10)      PAST MEDICAL & SURGICAL HISTORY:  HTN (hypertension)    Glaucoma    Cataract    ESRD (end stage renal disease) on dialysis    DVT (deep venous thrombosis)  of Left subclavian vein, 17    Hemodialysis patient  MWF    Acquired cataract  extraction with b/l lense placement,     H/O: glaucoma  surgery,     AV fistula      S/P KEVEN-BSO  for fibroids,     Hemodialysis access, AV graft        Vital Signs Last 24 Hrs  T(C): 36.4 (20 May 2021 10:00), Max: 37.2 (20 May 2021 06:00)  T(F): 97.5 (20 May 2021 10:00), Max: 98.9 (20 May 2021 06:00)  HR: 71 (20 May 2021 10:00) (63 - 79)  BP: 153/70 (20 May 2021 10:00) (148/70 - 217/86)  BP(mean): 116 (20 May 2021 09:00) (98 - 123)  RR: 18 (20 May 2021 10:00) (15 - 18)  SpO2: 96% (20 May 2021 10:00) (95% - 100%)    I&O's Summary    19 May 2021 07:01  -  20 May 2021 07:00  --------------------------------------------------------  IN: 1185 mL / OUT: 1246 mL / NET: -61 mL    20 May 2021 07:01  -  20 May 2021 10:59  --------------------------------------------------------  IN: 220 mL / OUT: 45 mL / NET: 175 mL                              9.3    7.50  )-----------( 173      ( 20 May 2021 09:19 )             28.6     05-20    133<L>  |  95<L>  |  42<H>  ----------------------------<  122<H>  5.8<H>   |  19<L>  |  6.13<H>    Ca    9.2      20 May 2021 09:19  Phos  7.5     05-20  Mg     2.3     05-20    TPro  5.7<L>  /  Alb  3.3  /  TBili  0.3  /  DBili  x   /  AST  16  /  ALT  7<L>  /  AlkPhos  71  05-      Review of systems  Gen: No weight changes, fatigue, fevers/chills, weakness  Skin: No rashes  Head/Eyes/Ears/Mouth: No headache; Normal hearing; Normal vision w/o blurriness; No sinus pain/discomfort, sore throat  Respiratory: No dyspnea, cough, wheezing, hemoptysis  CV: No chest pain, PND, orthopnea  GI: Mild abdominal pain at surgical incision site; denies diarrhea, constipation, nausea, vomiting, melena, hematochezia  : No increased frequency, dysuria, hematuria, nocturia  MSK: No joint pain/swelling; no back pain; no edema  Neuro: No dizziness/lightheadedness, weakness, seizures, numbness, tingling  Heme: No easy bruising or bleeding  Endo: No heat/cold intolerance  Psych: No significant nervousness, anxiety, stress, depression  All other systems were reviewed and are negative, except as noted.      PHYSICAL EXAM:  Constitutional: Well developed / well nourished  Eyes: Anicteric, PERRLA  ENMT: nc/at  Neck: supple (unable to place central line intra-op)  Respiratory: CTA B/L  Cardiovascular: RRR  Gastrointestinal: Soft, ND. RLQ dressing c/d/i. KATHARINA x1 minimal sanguinous   Genitourinary: Urinary catheter in place minimal urine--dark  Extremities: SCD's in place and working bilaterally, R AVF palpable.  no edema  Vascular: Palpable dp pulses bilaterally  Neurological: A&O x3  Skin: no rashes, ulcerations or lesions;  Musculoskeletal: Moving all extremities  Psychiatric: Responsive

## 2021-05-20 NOTE — PROGRESS NOTE ADULT - ASSESSMENT
ARIANNE MCNAMARA is a 75y Female s/p DDRT on 5/19/2021. PMH is significant for HTN, ESRD on HD and glaucoma.    Allergies: PCN- rash  CMV -/+    Transplant Medications  Induction  -Basiliximab 20 mg POD 0 (given in OR) and POD 4  -Methylprednisolone taper (switch to PO prednisone on POD 4)            POD 0: 500 mg IV in OR            POD 1: 125 mg IV Q12H            POD 2: 60 mg IV Q12H            POD 3: 30 mg IV Q12H        Maintenance Immunosuppression  -Tacrolimus 0.14 mg/kg (Adjust for goal trough: 8-10)  -Mycophenolate 1,000 mg PO/IV Q12H  -Prednisone             POD 4: 20 mg PO Q12H            POD 5: 10 mg PO Q12H            POD 6: 5 mg PO Q12H            POD 7-: 5 mg daily     Anti-infection   -Bactrim SS tablet (frequency based on renal function)  -Valganciclovir (dose based on CMV serostatus and frequency based on renal function)  -Nystatin swish and swallow 5 mL four times daily    Surgical prophylaxis pre- and intra-operative dosing  -Vancomycin (PCN allergy)    Prophylaxis  -GI ppx: famotidine 20 mg daily  -Bowel ppx: senna/colace  -DVT: sequential compression device  -Pain:            Mild: Acetaminophen 650 mg every 6 hours PRN           Moderate: Tramadol 25 mg every 4 hours PRN (adjust for renal function)           Severe: Tramadol 50 mg every 4 hours PRN (adjust for renal function)    Home medications:  Hydralazine 100 mg three times daily  Labetolol 100 mg twice daily  Amlodipine  10 mg daily  Isosorbide mononitrate 10 mg daily  Latanoprost 0.005% 1 drop in both eyes at bedtime    Outpatient medication reconciliation reviewed and will be re-started appropriately.  Plan discussed with multidisciplinary team.

## 2021-05-21 LAB
ALBUMIN SERPL ELPH-MCNC: 3.2 G/DL — LOW (ref 3.3–5)
ALP SERPL-CCNC: 68 U/L — SIGNIFICANT CHANGE UP (ref 40–120)
ALT FLD-CCNC: 7 U/L — LOW (ref 10–45)
ANION GAP SERPL CALC-SCNC: 18 MMOL/L — HIGH (ref 5–17)
AST SERPL-CCNC: 15 U/L — SIGNIFICANT CHANGE UP (ref 10–40)
BASOPHILS # BLD AUTO: 0.01 K/UL — SIGNIFICANT CHANGE UP (ref 0–0.2)
BASOPHILS NFR BLD AUTO: 0.1 % — SIGNIFICANT CHANGE UP (ref 0–2)
BILIRUB SERPL-MCNC: 0.2 MG/DL — SIGNIFICANT CHANGE UP (ref 0.2–1.2)
BUN SERPL-MCNC: 37 MG/DL — HIGH (ref 7–23)
CALCIUM SERPL-MCNC: 8.7 MG/DL — SIGNIFICANT CHANGE UP (ref 8.4–10.5)
CHLORIDE SERPL-SCNC: 92 MMOL/L — LOW (ref 96–108)
CO2 SERPL-SCNC: 20 MMOL/L — LOW (ref 22–31)
CREAT SERPL-MCNC: 4.73 MG/DL — HIGH (ref 0.5–1.3)
EOSINOPHIL # BLD AUTO: 0 K/UL — SIGNIFICANT CHANGE UP (ref 0–0.5)
EOSINOPHIL NFR BLD AUTO: 0 % — SIGNIFICANT CHANGE UP (ref 0–6)
GLUCOSE BLDC GLUCOMTR-MCNC: 127 MG/DL — HIGH (ref 70–99)
GLUCOSE BLDC GLUCOMTR-MCNC: 132 MG/DL — HIGH (ref 70–99)
GLUCOSE BLDC GLUCOMTR-MCNC: 135 MG/DL — HIGH (ref 70–99)
GLUCOSE BLDC GLUCOMTR-MCNC: 190 MG/DL — HIGH (ref 70–99)
GLUCOSE SERPL-MCNC: 117 MG/DL — HIGH (ref 70–99)
HCT VFR BLD CALC: 25.2 % — LOW (ref 34.5–45)
HGB BLD-MCNC: 8.2 G/DL — LOW (ref 11.5–15.5)
IMM GRANULOCYTES NFR BLD AUTO: 0.4 % — SIGNIFICANT CHANGE UP (ref 0–1.5)
LYMPHOCYTES # BLD AUTO: 0.33 K/UL — LOW (ref 1–3.3)
LYMPHOCYTES # BLD AUTO: 4.4 % — LOW (ref 13–44)
MAGNESIUM SERPL-MCNC: 2.2 MG/DL — SIGNIFICANT CHANGE UP (ref 1.6–2.6)
MCHC RBC-ENTMCNC: 27.8 PG — SIGNIFICANT CHANGE UP (ref 27–34)
MCHC RBC-ENTMCNC: 32.5 GM/DL — SIGNIFICANT CHANGE UP (ref 32–36)
MCV RBC AUTO: 85.4 FL — SIGNIFICANT CHANGE UP (ref 80–100)
MONOCYTES # BLD AUTO: 0.83 K/UL — SIGNIFICANT CHANGE UP (ref 0–0.9)
MONOCYTES NFR BLD AUTO: 11.1 % — SIGNIFICANT CHANGE UP (ref 2–14)
NEUTROPHILS # BLD AUTO: 6.26 K/UL — SIGNIFICANT CHANGE UP (ref 1.8–7.4)
NEUTROPHILS NFR BLD AUTO: 84 % — HIGH (ref 43–77)
NRBC # BLD: 0 /100 WBCS — SIGNIFICANT CHANGE UP (ref 0–0)
PHOSPHATE SERPL-MCNC: 7.5 MG/DL — HIGH (ref 2.5–4.5)
PLATELET # BLD AUTO: 137 K/UL — LOW (ref 150–400)
POTASSIUM SERPL-MCNC: 4.7 MMOL/L — SIGNIFICANT CHANGE UP (ref 3.5–5.3)
POTASSIUM SERPL-SCNC: 4.7 MMOL/L — SIGNIFICANT CHANGE UP (ref 3.5–5.3)
PROT SERPL-MCNC: 5.5 G/DL — LOW (ref 6–8.3)
RBC # BLD: 2.95 M/UL — LOW (ref 3.8–5.2)
RBC # FLD: 18.1 % — HIGH (ref 10.3–14.5)
SODIUM SERPL-SCNC: 130 MMOL/L — LOW (ref 135–145)
TACROLIMUS SERPL-MCNC: 3.9 NG/ML — SIGNIFICANT CHANGE UP
WBC # BLD: 7.46 K/UL — SIGNIFICANT CHANGE UP (ref 3.8–10.5)
WBC # FLD AUTO: 7.46 K/UL — SIGNIFICANT CHANGE UP (ref 3.8–10.5)

## 2021-05-21 PROCEDURE — 99232 SBSQ HOSP IP/OBS MODERATE 35: CPT | Mod: GC

## 2021-05-21 PROCEDURE — 76776 US EXAM K TRANSPL W/DOPPLER: CPT | Mod: 26,RT

## 2021-05-21 RX ORDER — SIMETHICONE 80 MG/1
80 TABLET, CHEWABLE ORAL ONCE
Refills: 0 | Status: COMPLETED | OUTPATIENT
Start: 2021-05-21 | End: 2021-05-21

## 2021-05-21 RX ORDER — HYDRALAZINE HCL 50 MG
100 TABLET ORAL THREE TIMES A DAY
Refills: 0 | Status: DISCONTINUED | OUTPATIENT
Start: 2021-05-21 | End: 2021-05-24

## 2021-05-21 RX ORDER — CHLORHEXIDINE GLUCONATE 213 G/1000ML
1 SOLUTION TOPICAL DAILY
Refills: 0 | Status: DISCONTINUED | OUTPATIENT
Start: 2021-05-21 | End: 2021-05-24

## 2021-05-21 RX ORDER — GLYCERIN ADULT
1 SUPPOSITORY, RECTAL RECTAL ONCE
Refills: 0 | Status: COMPLETED | OUTPATIENT
Start: 2021-05-21 | End: 2021-05-21

## 2021-05-21 RX ORDER — CHLORHEXIDINE GLUCONATE 213 G/1000ML
1 SOLUTION TOPICAL DAILY
Refills: 0 | Status: DISCONTINUED | OUTPATIENT
Start: 2021-05-21 | End: 2021-05-21

## 2021-05-21 RX ORDER — TACROLIMUS 5 MG/1
2 CAPSULE ORAL ONCE
Refills: 0 | Status: COMPLETED | OUTPATIENT
Start: 2021-05-21 | End: 2021-05-21

## 2021-05-21 RX ORDER — FUROSEMIDE 40 MG
5 TABLET ORAL
Qty: 500 | Refills: 0 | Status: DISCONTINUED | OUTPATIENT
Start: 2021-05-21 | End: 2021-05-23

## 2021-05-21 RX ORDER — FUROSEMIDE 40 MG
80 TABLET ORAL ONCE
Refills: 0 | Status: COMPLETED | OUTPATIENT
Start: 2021-05-21 | End: 2021-05-21

## 2021-05-21 RX ORDER — ASPIRIN/CALCIUM CARB/MAGNESIUM 324 MG
81 TABLET ORAL DAILY
Refills: 0 | Status: DISCONTINUED | OUTPATIENT
Start: 2021-05-21 | End: 2021-05-24

## 2021-05-21 RX ORDER — HYDRALAZINE HCL 50 MG
10 TABLET ORAL ONCE
Refills: 0 | Status: DISCONTINUED | OUTPATIENT
Start: 2021-05-21 | End: 2021-05-21

## 2021-05-21 RX ORDER — HYDRALAZINE HCL 50 MG
50 TABLET ORAL THREE TIMES A DAY
Refills: 0 | Status: DISCONTINUED | OUTPATIENT
Start: 2021-05-21 | End: 2021-05-21

## 2021-05-21 RX ADMIN — CHLORHEXIDINE GLUCONATE 1 APPLICATION(S): 213 SOLUTION TOPICAL at 12:23

## 2021-05-21 RX ADMIN — LATANOPROST 1 DROP(S): 0.05 SOLUTION/ DROPS OPHTHALMIC; TOPICAL at 21:27

## 2021-05-21 RX ADMIN — MYCOPHENOLATE MOFETIL 1 GRAM(S): 250 CAPSULE ORAL at 07:54

## 2021-05-21 RX ADMIN — Medication 60 MILLIGRAM(S): at 17:18

## 2021-05-21 RX ADMIN — Medication 81 MILLIGRAM(S): at 13:31

## 2021-05-21 RX ADMIN — Medication 1 SUPPOSITORY(S): at 20:38

## 2021-05-21 RX ADMIN — MYCOPHENOLATE MOFETIL 1 GRAM(S): 250 CAPSULE ORAL at 20:08

## 2021-05-21 RX ADMIN — SIMETHICONE 80 MILLIGRAM(S): 80 TABLET, CHEWABLE ORAL at 06:46

## 2021-05-21 RX ADMIN — Medication 500000 UNIT(S): at 17:18

## 2021-05-21 RX ADMIN — PANTOPRAZOLE SODIUM 40 MILLIGRAM(S): 20 TABLET, DELAYED RELEASE ORAL at 05:53

## 2021-05-21 RX ADMIN — VALGANCICLOVIR 450 MILLIGRAM(S): 450 TABLET, FILM COATED ORAL at 07:54

## 2021-05-21 RX ADMIN — Medication 60 MILLIGRAM(S): at 05:54

## 2021-05-21 RX ADMIN — Medication 80 MILLIGRAM(S): at 09:08

## 2021-05-21 RX ADMIN — Medication 500000 UNIT(S): at 12:17

## 2021-05-21 RX ADMIN — SENNA PLUS 2 TABLET(S): 8.6 TABLET ORAL at 21:28

## 2021-05-21 RX ADMIN — Medication 2.5 MG/HR: at 13:30

## 2021-05-21 RX ADMIN — TACROLIMUS 8 MILLIGRAM(S): 5 CAPSULE ORAL at 07:54

## 2021-05-21 RX ADMIN — Medication 1 TABLET(S): at 12:17

## 2021-05-21 RX ADMIN — TACROLIMUS 2 MILLIGRAM(S): 5 CAPSULE ORAL at 12:17

## 2021-05-21 RX ADMIN — Medication 100 MILLIGRAM(S): at 05:52

## 2021-05-21 RX ADMIN — Medication 60 MILLIGRAM(S): at 05:53

## 2021-05-21 RX ADMIN — Medication 100 MILLIGRAM(S): at 07:54

## 2021-05-21 RX ADMIN — Medication 500000 UNIT(S): at 06:00

## 2021-05-21 RX ADMIN — Medication 100 MILLIGRAM(S): at 17:18

## 2021-05-21 RX ADMIN — Medication 100 MILLIGRAM(S): at 21:30

## 2021-05-21 NOTE — DIETITIAN INITIAL EVALUATION ADULT. - OTHER INFO
Pt S/P kidney transplant recipient (05/19) - reports good appetite and PO intake, tolerating diet. Noted 100% PO intake as per lunch tray at bedside. States food tastes salty due to being used to a low salt diet. Denies difficulty chewing/swallowing. Reports nausea yesterday without vomiting, none today. Denies diarrhea or constipation, last BM 2 days ago (05/19). Urine output as per flow sheets (05/19) 191 ml -> (05/20) 322 ml -> (05/21) 68 ml.     Pt denies weight changes PTA, reports  pounds. Weight as per previous RD note (08/11/2020) 129.6 pounds. Weight as per flow sheets (05/19) 128 pounds -> (05/21) 136.9 pounds -?accuracy of weight fluctuations as pt with edema and S/P DDRT.     Provided education on post transplant nutrition therapy and food safety guidelines for transplant recipients. Discussed importance of thoroughly washing all fresh fruits/vegetables, importance of avoiding uncooked/raw/unpasteurized foods, avoiding pre-made deli/buffet/salad bar meals. Foods recommended as healthy well balanced diet and importance of adequate protein intakes for proper post-surgical healing discussed. Reviewed recommendations to avoid grapefruit, pomegranate and star fruit while taking immunosuppressant medication. Reviewed recommendations for moderate intake of sodium and carbohydrates with transplant medications. Reviewed effect of steroids on BG levels and importance of limiting concentrated sweets. Pt was receptive and expressed understanding. All questions answered. Provided nutrition package including in English and [place]ian: USDA Food Safety for Transplant Recipients booklet; food safety and BG control handouts, fridge magnet with cooking temperatures, and RD information card.

## 2021-05-21 NOTE — DIETITIAN INITIAL EVALUATION ADULT. - DIET TYPE
Recommend renal diet upon discharge. Recommend follow up visit with Transplant MD and outpatient RD for dietary modifications as warranted.

## 2021-05-21 NOTE — DIETITIAN INITIAL EVALUATION ADULT. - ORAL INTAKE PTA/DIET HISTORY
Pt reports good appetite and PO intake at home. Reports allergy to mushrooms, states they cause hives. Reports following a renal diet at home; low in potassium, phosphorus, and salt. Reports taking Vitamin D and drinking Ensure 1xweek PTA, unable to specify which.

## 2021-05-21 NOTE — PHARMACY EDUCATION NOTE - MEDICATION SAFETY
Adherence/Allergies/Herbals/Interactions/Medication precautions/Miss dose instruction/Purpose/Side effects/Signs and symptoms to report

## 2021-05-21 NOTE — PROGRESS NOTE ADULT - SUBJECTIVE AND OBJECTIVE BOX
Transplant Surgery Multidisciplinary Progress Note  --------------------------------------------------------------  R DDRT 5/19/2021 POD 2    Present:   Patient seen and examined with multidisciplinary team including Transplant Surgeon: Dr. El. Transplant Nephrologist:  Dr. Felice Ayala, Pharmacist: Pham Faulkner. ACPs Rosette/Ervin, ,  and unit RN during am rounds.  Disciplines not in attendance will be notified of the plan.     HPI: 75F with HTN, ESRD on HD 2017 (MWF via R AVF--oliguric, etiology unknown, no bx, Dr. Treviño), Glaucoma, h/o DVT to L subclavian vein (2017).  Underwent R DDRT (1a/1v/1u--stented). Simulect induction. Post op course c/b DGF requiring HD.     Interval events:   - POD 2 s/p DDRT c/b DGF: u/o ~15cc/hr; HD yesterday  - HTN'sive overnight: started hydralazine 100mg tid  - Tolerating PO intake, denies N/V, + bowel function  - Pain well controlled     Potential Discharge date: pending clinical stability  Education:  Medications  Plan of care:  See Below    MEDICATIONS  (STANDING):  chlorhexidine 2% Cloths 1 Application(s) Topical daily  hydrALAZINE 100 milliGRAM(s) Oral three times a day  labetalol 100 milliGRAM(s) Oral two times a day  latanoprost 0.005% Ophthalmic Solution 1 Drop(s) Both EYES at bedtime  methylPREDNISolone sodium succinate Injectable 60 milliGRAM(s) IV Push two times a day  methylPREDNISolone sodium succinate IVPB 500 milliGRAM(s) IV Intermittent once  metolazone 5 milliGRAM(s) Oral once  mycophenolate mofetil 1 Gram(s) Oral <User Schedule>  NIFEdipine XL 60 milliGRAM(s) Oral daily  nystatin    Suspension 950853 Unit(s) Swish and Swallow four times a day  pantoprazole    Tablet 40 milliGRAM(s) Oral before breakfast  senna 2 Tablet(s) Oral at bedtime  tacrolimus ER Tablet (ENVARSUS XR) 8 milliGRAM(s) Oral <User Schedule>  trimethoprim   80 mG/sulfamethoxazole 400 mG 1 Tablet(s) Oral daily  valGANciclovir 450 milliGRAM(s) Oral <User Schedule>    MEDICATIONS  (PRN):  HYDROmorphone  Injectable 0.25 milliGRAM(s) IV Push every 10 minutes PRN Severe Pain (7 - 10)  naloxone Injectable 0.1 milliGRAM(s) IV Push every 3 minutes PRN For ANY of the following changes in patient status:  A. RR LESS THAN 10 breaths per minute, B. Oxygen saturation LESS THAN 90%, C. Sedation score of 6  ondansetron Injectable 4 milliGRAM(s) IV Push every 6 hours PRN Nausea and/or Vomiting  traMADol 25 milliGRAM(s) Oral every 6 hours PRN Moderate Pain (4 - 6)  traMADol 50 milliGRAM(s) Oral every 6 hours PRN Severe Pain (7 - 10)      PAST MEDICAL & SURGICAL HISTORY:  HTN (hypertension)  Glaucoma  Cataract  ESRD (end stage renal disease) on dialysis  DVT (deep venous thrombosis)  of Left subclavian vein, 06/12/17  Hemodialysis patient MWF  Acquired cataract  extraction with b/l lense placement, 2016  H/O: glaucoma surgery, 2002  AV fistula 2015  S/P KEVEN-BSO for fibroids, 2012  Hemodialysis access, AV graft    Vital Signs Last 24 Hrs  T(C): 36.7 (21 May 2021 09:00), Max: 37.1 (21 May 2021 01:02)  T(F): 98.1 (21 May 2021 09:00), Max: 98.7 (21 May 2021 01:02)  HR: 65 (21 May 2021 09:00) (62 - 93)  BP: 151/70 (21 May 2021 09:00) (151/70 - 195/95)  BP(mean): 119 (21 May 2021 07:28) (100 - 134)  RR: 18 (21 May 2021 09:00) (17 - 18)  SpO2: 99% (21 May 2021 09:00) (95% - 99%)    I&O's Summary    20 May 2021 07:01  -  21 May 2021 07:00  --------------------------------------------------------  IN: 2400 mL / OUT: 969.5 mL / NET: 1430.5 mL    21 May 2021 07:01  -  21 May 2021 10:58  --------------------------------------------------------  IN: 0 mL / OUT: 18 mL / NET: -18 mL                         8.2    7.46  )-----------( 137      ( 21 May 2021 06:22 )             25.2     05-21    130<L>  |  92<L>  |  37<H>  ----------------------------<  117<H>  4.7   |  20<L>  |  4.73<H>    Ca    8.7      21 May 2021 06:22  Phos  7.5     05-21  Mg     2.2     05-21    TPro  5.5<L>  /  Alb  3.2<L>  /  TBili  0.2  /  DBili  x   /  AST  15  /  ALT  7<L>  /  AlkPhos  68  05-21    Tacrolimus (), Serum: 3.9 ng/mL (05-21 @ 07:20)    Review of systems  Gen: No weight changes, fatigue, fevers/chills, weakness  Skin: No rashes  Head/Eyes/Ears/Mouth: No headache; Normal hearing; Normal vision w/o blurriness; No sinus pain/discomfort, sore throat  Respiratory: No dyspnea, cough, wheezing, hemoptysis  CV: No chest pain, PND, orthopnea  GI: Mild abdominal pain at surgical incision site; denies diarrhea, constipation, nausea, vomiting, melena, hematochezia  : No increased frequency, dysuria, hematuria, nocturia  MSK: No joint pain/swelling; no back pain; no edema  Neuro: No dizziness/lightheadedness, weakness, seizures, numbness, tingling  Heme: No easy bruising or bleeding  Endo: No heat/cold intolerance  Psych: No significant nervousness, anxiety, stress, depression  All other systems were reviewed and are negative, except as noted.    PHYSICAL EXAM:  Constitutional: Well developed / well nourished  Eyes: Anicteric, PERRLA  ENMT: nc/at  Neck: supple   Respiratory: CTA B/L  Cardiovascular: RRR  Gastrointestinal: Soft, ND. incision c/d/i, KATHARINA with SS drainage  Genitourinary: Urinary catheter in place minimal urine--dark  Extremities: SCD's in place and working bilaterally, R AVF palpable.  no edema  Vascular: Palpable dp pulses bilaterally  Neurological: A&O x3  Skin: no rashes, ulcerations or lesions;  Musculoskeletal: Moving all extremities  Psychiatric: Responsive

## 2021-05-21 NOTE — PHARMACY EDUCATION NOTE - EDUCATION SUMMARY
Discharge immunosuppressant medications and prophylactic anti-infective agents reviewed with the patient. Outpatient medication schedule was discussed in detail including: medication name, indication, dose, administration times, treatment duration, side effects, drug interactions, and special instructions. Patient questions and concerns were answered and addressed. Patient demonstrated understanding.

## 2021-05-21 NOTE — PROGRESS NOTE ADULT - ASSESSMENT
74 y/o female w/ PMH significant for HTN x since age 24,  ESRD on HD x6 years (MWF via RUE AVG)-  anuric,  L subclavian DVT 2017,   Has received Pfizer covid vaccine x 2 doses.  Presents today for a DDRT.  She feels well, offers no complaints denies weakness, fatigue, SOB, chest pain, palp, recent travel or sick contacts.  Last HD was done on 5/17 via RUE AVG. Will receive HD today prior to DDRT. Simulect will be used for induction.          ESRD on HD x6 years (MWF via RUE AVG)-  s/p DDRT 5/19/21. Pt now with DGF due to ATN from ischemia reperfusion injury.    Last HD done on 5/20 (no UF, no heparin)  -180/80-90. Monitor BP    Induction: Simulect/ solumedrol  - Lasix 80mg IV x1 + Metolazone 5mg PO x1 today with no response. Started on lasix infusion  - strict I&Os   - Immuno: Start Envarsus 8mg this am, f/ur level, MMF 1g BID, Pred taper, Simulect #2 POD#4  -PPx: Nystatin/Valcyte/Bactrim/Protonix    HTN  - Restarted home regimen    - BP elevated- c/w nifedipine to 60mg QD & labetalol home dose. Add hydralazine 100 tid            If you have any questions, please feel free to contact me  Violeta Weir  Nephrology Fellow  [phone]  (After 5pm or on weekends please page the on-call fellow)          Attestation Statements:    Attestation Statements:  Attending supervision statement: I have personally seen and examined the patient.  I fully participated in the care of this patient.  I have made amendments to the documentation where necessary, and agree with the history, physical exam, and plan as documented by the Fellow.     POD1 DDRT    K elevated - plan for dialysis today.   Continue immunosuppression protocol.  Simulect induction.

## 2021-05-21 NOTE — DIETITIAN INITIAL EVALUATION ADULT. - ADD RECOMMEND
1. Will continue to monitor PO intake, weight, labs, skin, GI status, diet, urine output. 2. Encourage PO intake and provide food preferences. 3. Provided education on post transplant nutrition therapy and food safety guidelines for transplant recipients with nutrition package before discharge - made aware RD remains available.

## 2021-05-21 NOTE — DIETITIAN INITIAL EVALUATION ADULT. - PERTINENT LABORATORY DATA
No HbA1c; Finger sticks: (05/21) 127 - 135 (05/20) 111 - 220; (05/21) Na 130, BUN 37, Cr 4.73, , Phos 7.5

## 2021-05-21 NOTE — PROGRESS NOTE ADULT - SUBJECTIVE AND OBJECTIVE BOX
[facility] DIVISION OF KIDNEY DISEASES AND HYPERTENSION -- FOLLOW UP NOTE  --------------------------------------------------------------------------------  Chief Complaint:    24 hour events/subjective:        PAST HISTORY  --------------------------------------------------------------------------------  No significant changes to PMH, PSH, FHx, SHx, unless otherwise noted    ALLERGIES & MEDICATIONS  --------------------------------------------------------------------------------  Allergies    Mushrooms (Anaphylaxis)  penicillin (Rash)    Intolerances      Standing Inpatient Medications  aspirin  chewable 81 milliGRAM(s) Oral daily  chlorhexidine 2% Cloths 1 Application(s) Topical daily  furosemide Infusion 5 mG/Hr IV Continuous <Continuous>  hydrALAZINE 100 milliGRAM(s) Oral three times a day  labetalol 100 milliGRAM(s) Oral two times a day  latanoprost 0.005% Ophthalmic Solution 1 Drop(s) Both EYES at bedtime  methylPREDNISolone sodium succinate IVPB 500 milliGRAM(s) IV Intermittent once  mycophenolate mofetil 1 Gram(s) Oral <User Schedule>  NIFEdipine XL 60 milliGRAM(s) Oral daily  nystatin    Suspension 684558 Unit(s) Swish and Swallow four times a day  pantoprazole    Tablet 40 milliGRAM(s) Oral before breakfast  senna 2 Tablet(s) Oral at bedtime  trimethoprim   80 mG/sulfamethoxazole 400 mG 1 Tablet(s) Oral daily  valGANciclovir 450 milliGRAM(s) Oral <User Schedule>    PRN Inpatient Medications  HYDROmorphone  Injectable 0.25 milliGRAM(s) IV Push every 10 minutes PRN  naloxone Injectable 0.1 milliGRAM(s) IV Push every 3 minutes PRN  ondansetron Injectable 4 milliGRAM(s) IV Push every 6 hours PRN  traMADol 25 milliGRAM(s) Oral every 6 hours PRN  traMADol 50 milliGRAM(s) Oral every 6 hours PRN      REVIEW OF SYSTEMS  --------------------------------------------------------------------------------  Gen: No fatigue, fevers/chills, weakness  Skin: No rashes  Head/Eyes/Ears/Mouth: No headache;No sore throat  Respiratory: No dyspnea, cough,   CV: No chest pain, PND, orthopnea  GI: No abdominal pain, diarrhea, constipation, nausea, vomiting  Transplant: No pain  : No increased frequency, dysuria, hematuria, nocturia  MSK: No joint pain/swelling; no back pain; no edema  Neuro: No dizziness/lightheadedness, weakness, seizures, numbness, tingling  Psych: No significant nervousness, anxiety, stress, depression    All other systems were reviewed and are negative, except as noted.    VITALS/PHYSICAL EXAM  --------------------------------------------------------------------------------  T(C): 36.7 (05-21-21 @ 17:00), Max: 37.1 (05-21-21 @ 01:02)  HR: 72 (05-21-21 @ 17:00) (65 - 93)  BP: 160/70 (05-21-21 @ 17:00) (151/70 - 195/95)  RR: 16 (05-21-21 @ 17:00) (16 - 18)  SpO2: 97% (05-21-21 @ 17:00) (95% - 99%)  Wt(kg): --        05-20-21 @ 07:01  -  05-21-21 @ 07:00  --------------------------------------------------------  IN: 2400 mL / OUT: 969.5 mL / NET: 1430.5 mL    05-21-21 @ 07:01  -  05-21-21 @ 17:43  --------------------------------------------------------  IN: 0 mL / OUT: 233 mL / NET: -233 mL      Physical Exam:  	Gen: NAD, well-appearing  	HEENT: PERRL, supple neck, clear oropharynx  	Pulm: CTA B/L  	CV: RRR, S1S2; no rub  	Back: No spinal or CVA tenderness; no sacral edema  	Abd: +BS, soft, nontender/nondistended                      Transplant: No tenderness, swelling  	: No suprapubic tenderness  	UE: Warm, FROM, intact strength; no edema; no asterixis  	LE: Warm, FROM, intact strength; no edema  	Neuro: No focal deficits, intact gait  	Psych: Normal affect and mood  	Skin: Warm, without rashes      LABS/STUDIES  --------------------------------------------------------------------------------              8.2    7.46  >-----------<  137      [05-21-21 @ 06:22]              25.2     130  |  92  |  37  ----------------------------<  117      [05-21-21 @ 06:22]  4.7   |  20  |  4.73        Ca     8.7     [05-21-21 @ 06:22]      Mg     2.2     [05-21-21 @ 06:22]      Phos  7.5     [05-21-21 @ 06:22]    TPro  5.5  /  Alb  3.2  /  TBili  0.2  /  DBili  x   /  AST  15  /  ALT  7   /  AlkPhos  68  [05-21-21 @ 06:22]    PT/INR: PT 13.8 , INR 1.16       [05-19-21 @ 21:15]  PTT: 31.6       [05-19-21 @ 21:15]      Creatinine Trend:  SCr 4.73 [05-21 @ 06:22]  SCr 5.15 [05-20 @ 15:39]  SCr 6.13 [05-20 @ 09:19]  SCr 5.91 [05-20 @ 03:47]  SCr 5.33 [05-19 @ 22:30]    Tacrolimus (), Serum: 3.9 ng/mL (05-21 @ 07:20)                HbA1c 4.6      [05-28-19 @ 09:49]  TSH 3.75      [02-02-21 @ 19:57]  Lipid: chol 162, TG 63, HDL 81, LDL --      [02-02-21 @ 19:57]    HBsAb 22.4      [05-19-21 @ 10:42]  HBsAg Nonreact      [05-19-21 @ 10:42]  HBcAb Nonreact      [05-19-21 @ 10:42]  HCV 0.42, Nonreact      [05-19-21 @ 10:42]  HIV Nonreact      [05-19-21 @ 10:45]       [facility] DIVISION OF KIDNEY DISEASES AND HYPERTENSION -- FOLLOW UP NOTE  --------------------------------------------------------------------------------      24 hour events/subjective: Pt eating well. Offers no complaints. UOP 322cc x 24 hrs. Still oliguric. She was given lasix 80 + metolazone 5 last night.         PAST HISTORY  --------------------------------------------------------------------------------  No significant changes to PMH, PSH, FHx, SHx, unless otherwise noted    ALLERGIES & MEDICATIONS  --------------------------------------------------------------------------------  Allergies    Mushrooms (Anaphylaxis)  penicillin (Rash)    Intolerances      Standing Inpatient Medications  aspirin  chewable 81 milliGRAM(s) Oral daily  chlorhexidine 2% Cloths 1 Application(s) Topical daily  furosemide Infusion 5 mG/Hr IV Continuous <Continuous>  hydrALAZINE 100 milliGRAM(s) Oral three times a day  labetalol 100 milliGRAM(s) Oral two times a day  latanoprost 0.005% Ophthalmic Solution 1 Drop(s) Both EYES at bedtime  methylPREDNISolone sodium succinate IVPB 500 milliGRAM(s) IV Intermittent once  mycophenolate mofetil 1 Gram(s) Oral <User Schedule>  NIFEdipine XL 60 milliGRAM(s) Oral daily  nystatin    Suspension 144719 Unit(s) Swish and Swallow four times a day  pantoprazole    Tablet 40 milliGRAM(s) Oral before breakfast  senna 2 Tablet(s) Oral at bedtime  trimethoprim   80 mG/sulfamethoxazole 400 mG 1 Tablet(s) Oral daily  valGANciclovir 450 milliGRAM(s) Oral <User Schedule>    PRN Inpatient Medications  HYDROmorphone  Injectable 0.25 milliGRAM(s) IV Push every 10 minutes PRN  naloxone Injectable 0.1 milliGRAM(s) IV Push every 3 minutes PRN  ondansetron Injectable 4 milliGRAM(s) IV Push every 6 hours PRN  traMADol 25 milliGRAM(s) Oral every 6 hours PRN  traMADol 50 milliGRAM(s) Oral every 6 hours PRN      REVIEW OF SYSTEMS  --------------------------------------------------------------------------------  Gen: No fatigue, fevers/chills, weakness  Skin: No rashes  Head/Eyes/Ears/Mouth: No headache;No sore throat  Respiratory: No dyspnea, cough,   CV: No chest pain, PND, orthopnea  GI: No abdominal pain, diarrhea, constipation, nausea, vomiting  Transplant: No pain  : No increased frequency, dysuria, hematuria, nocturia  MSK: No joint pain/swelling; no back pain; no edema  Neuro: No dizziness/lightheadedness, weakness, seizures, numbness, tingling  Psych: No significant nervousness, anxiety, stress, depression    All other systems were reviewed and are negative, except as noted.    VITALS/PHYSICAL EXAM  --------------------------------------------------------------------------------  T(C): 36.7 (05-21-21 @ 17:00), Max: 37.1 (05-21-21 @ 01:02)  HR: 72 (05-21-21 @ 17:00) (65 - 93)  BP: 160/70 (05-21-21 @ 17:00) (151/70 - 195/95)  RR: 16 (05-21-21 @ 17:00) (16 - 18)  SpO2: 97% (05-21-21 @ 17:00) (95% - 99%)  Wt(kg): --        05-20-21 @ 07:01  -  05-21-21 @ 07:00  --------------------------------------------------------  IN: 2400 mL / OUT: 969.5 mL / NET: 1430.5 mL    05-21-21 @ 07:01  -  05-21-21 @ 17:43  --------------------------------------------------------  IN: 0 mL / OUT: 233 mL / NET: -233 mL      Physical Exam:  Gen: NAD, well-appearing  	HEENT: PERRL, supple neck  	Pulm: CTA B/L  	CV: RRR, S1S2; no rub  	Back: No spinal or CVA tenderness; no sacral edema  	Abd: +BS, soft, nontender/nondistended  	: No suprapubic tenderness  	UE: Warm, FROM, no clubbing, intact strength; no edema  	LE: Warm, FROM, no clubbing, intact strength; no LE edema b/l, LUE edema (IV line infiltration)  	Neuro: No focal deficits, intact gait  	Psych: Normal affect and mood  	Skin: Warm, without rashes  	Vascular access: SHILOH PABLO       LABS/STUDIES  --------------------------------------------------------------------------------              8.2    7.46  >-----------<  137      [05-21-21 @ 06:22]              25.2     130  |  92  |  37  ----------------------------<  117      [05-21-21 @ 06:22]  4.7   |  20  |  4.73        Ca     8.7     [05-21-21 @ 06:22]      Mg     2.2     [05-21-21 @ 06:22]      Phos  7.5     [05-21-21 @ 06:22]    TPro  5.5  /  Alb  3.2  /  TBili  0.2  /  DBili  x   /  AST  15  /  ALT  7   /  AlkPhos  68  [05-21-21 @ 06:22]    PT/INR: PT 13.8 , INR 1.16       [05-19-21 @ 21:15]  PTT: 31.6       [05-19-21 @ 21:15]      Creatinine Trend:  SCr 4.73 [05-21 @ 06:22]  SCr 5.15 [05-20 @ 15:39]  SCr 6.13 [05-20 @ 09:19]  SCr 5.91 [05-20 @ 03:47]  SCr 5.33 [05-19 @ 22:30]    Tacrolimus (), Serum: 3.9 ng/mL (05-21 @ 07:20)                HbA1c 4.6      [05-28-19 @ 09:49]  TSH 3.75      [02-02-21 @ 19:57]  Lipid: chol 162, TG 63, HDL 81, LDL --      [02-02-21 @ 19:57]    HBsAb 22.4      [05-19-21 @ 10:42]  HBsAg Nonreact      [05-19-21 @ 10:42]  HBcAb Nonreact      [05-19-21 @ 10:42]  HCV 0.42, Nonreact      [05-19-21 @ 10:42]  HIV Nonreact      [05-19-21 @ 10:45]

## 2021-05-21 NOTE — DIETITIAN INITIAL EVALUATION ADULT. - PHYSCIAL ASSESSMENT
Skin: no noted pressure injuries as per documentation.   No visual signs of muscle/fat loss noted. well nourished

## 2021-05-21 NOTE — DIETITIAN INITIAL EVALUATION ADULT. - REASON FOR ADMISSION
Pt 74 y/o F with PMH as per chart: HTN, ESRD on HD (2017), Glaucoma, h/o DVT to L subclavian vein (2017), admitted for kidney transplant, S/P DDRT (05/19), with DGF, required HD yesterday.

## 2021-05-21 NOTE — PROGRESS NOTE ADULT - ASSESSMENT
75F with HTN, ESRD on HD 2017 (MWF via R AVF--oliguria, etiology unknown, no bx, Dr. Treviño), Glaucoma, h/o DVT to L subclavian vein (2017.   s/p R DDRT (1a/1v/1u--stented). Simulect induction.    [] POD 2 s/p R DDRT c/b DGF  - HD yesterday  - Diuretic challenge: Metolazone 5mg/Lasix 80mg IVP x 1 dose  - Repeat Renal doppler today  - Immuno: Env by level, MMF 1g bid, Pred taper, Simulect on POD 4  - PPX: valcyte/bactrim/nystatin  - strict I&Os via sethi/KATHARINA  - Diet: Regular; dced IVF  - Pain control: tramadol prn  - Bowel regimen  - SCDs while in bed at all times, no SQH, encourage spirometry, OOB    [] HTN  - continue labetalol 100mg bid, nifedipine 60mg daily  - restarted hydralazine 100mg tid    [] Dispo:  - likely Monday

## 2021-05-21 NOTE — DIETITIAN INITIAL EVALUATION ADULT. - REASON INDICATOR FOR ASSESSMENT
Pt seen for post kidney transplant recipient nutrition evaluation per department protocol.   Information obtained from: medical record, previous RD note, and pt.   Pt Nepalese-Speaking, dietitian fluent in Nepalese.

## 2021-05-21 NOTE — DIETITIAN INITIAL EVALUATION ADULT. - PERSON TAUGHT/METHOD
Post transplant nutrition therapy and food safety guidelines for transplant recipients/verbal instruction/written material/patient instructed/teach back - (Patient repeats in own words)

## 2021-05-21 NOTE — DIETITIAN INITIAL EVALUATION ADULT. - LITERATURE/VIDEOS GIVEN
Provided nutrition package including in English and [place]ian: USDA Food Safety for Transplant Recipients booklet; food safety and BG control handouts, fridge magnet with cooking temperatures, and RD information card.

## 2021-05-22 LAB
ALBUMIN SERPL ELPH-MCNC: 3.3 G/DL — SIGNIFICANT CHANGE UP (ref 3.3–5)
ALP SERPL-CCNC: 69 U/L — SIGNIFICANT CHANGE UP (ref 40–120)
ALT FLD-CCNC: 8 U/L — LOW (ref 10–45)
ANION GAP SERPL CALC-SCNC: 20 MMOL/L — HIGH (ref 5–17)
AST SERPL-CCNC: 13 U/L — SIGNIFICANT CHANGE UP (ref 10–40)
BASOPHILS # BLD AUTO: 0.01 K/UL — SIGNIFICANT CHANGE UP (ref 0–0.2)
BASOPHILS NFR BLD AUTO: 0.1 % — SIGNIFICANT CHANGE UP (ref 0–2)
BILIRUB SERPL-MCNC: 0.2 MG/DL — SIGNIFICANT CHANGE UP (ref 0.2–1.2)
BUN SERPL-MCNC: 65 MG/DL — HIGH (ref 7–23)
CALCIUM SERPL-MCNC: 8.4 MG/DL — SIGNIFICANT CHANGE UP (ref 8.4–10.5)
CHLORIDE SERPL-SCNC: 91 MMOL/L — LOW (ref 96–108)
CO2 SERPL-SCNC: 19 MMOL/L — LOW (ref 22–31)
CREAT SERPL-MCNC: 6.63 MG/DL — HIGH (ref 0.5–1.3)
EOSINOPHIL # BLD AUTO: 0 K/UL — SIGNIFICANT CHANGE UP (ref 0–0.5)
EOSINOPHIL NFR BLD AUTO: 0 % — SIGNIFICANT CHANGE UP (ref 0–6)
GLUCOSE BLDC GLUCOMTR-MCNC: 111 MG/DL — HIGH (ref 70–99)
GLUCOSE BLDC GLUCOMTR-MCNC: 128 MG/DL — HIGH (ref 70–99)
GLUCOSE BLDC GLUCOMTR-MCNC: 137 MG/DL — HIGH (ref 70–99)
GLUCOSE BLDC GLUCOMTR-MCNC: 141 MG/DL — HIGH (ref 70–99)
GLUCOSE SERPL-MCNC: 107 MG/DL — HIGH (ref 70–99)
HCT VFR BLD CALC: 24.4 % — LOW (ref 34.5–45)
HGB BLD-MCNC: 8 G/DL — LOW (ref 11.5–15.5)
IMM GRANULOCYTES NFR BLD AUTO: 0.4 % — SIGNIFICANT CHANGE UP (ref 0–1.5)
LYMPHOCYTES # BLD AUTO: 0.51 K/UL — LOW (ref 1–3.3)
LYMPHOCYTES # BLD AUTO: 7.6 % — LOW (ref 13–44)
MAGNESIUM SERPL-MCNC: 2.3 MG/DL — SIGNIFICANT CHANGE UP (ref 1.6–2.6)
MCHC RBC-ENTMCNC: 27.9 PG — SIGNIFICANT CHANGE UP (ref 27–34)
MCHC RBC-ENTMCNC: 32.8 GM/DL — SIGNIFICANT CHANGE UP (ref 32–36)
MCV RBC AUTO: 85 FL — SIGNIFICANT CHANGE UP (ref 80–100)
MONOCYTES # BLD AUTO: 0.68 K/UL — SIGNIFICANT CHANGE UP (ref 0–0.9)
MONOCYTES NFR BLD AUTO: 10.1 % — SIGNIFICANT CHANGE UP (ref 2–14)
NEUTROPHILS # BLD AUTO: 5.5 K/UL — SIGNIFICANT CHANGE UP (ref 1.8–7.4)
NEUTROPHILS NFR BLD AUTO: 81.8 % — HIGH (ref 43–77)
NRBC # BLD: 0 /100 WBCS — SIGNIFICANT CHANGE UP (ref 0–0)
PHOSPHATE SERPL-MCNC: 8.7 MG/DL — HIGH (ref 2.5–4.5)
PLATELET # BLD AUTO: 132 K/UL — LOW (ref 150–400)
POTASSIUM SERPL-MCNC: 4.6 MMOL/L — SIGNIFICANT CHANGE UP (ref 3.5–5.3)
POTASSIUM SERPL-SCNC: 4.6 MMOL/L — SIGNIFICANT CHANGE UP (ref 3.5–5.3)
PROT SERPL-MCNC: 5.8 G/DL — LOW (ref 6–8.3)
RBC # BLD: 2.87 M/UL — LOW (ref 3.8–5.2)
RBC # FLD: 17.5 % — HIGH (ref 10.3–14.5)
SODIUM SERPL-SCNC: 130 MMOL/L — LOW (ref 135–145)
TACROLIMUS SERPL-MCNC: 6.9 NG/ML — SIGNIFICANT CHANGE UP
WBC # BLD: 6.73 K/UL — SIGNIFICANT CHANGE UP (ref 3.8–10.5)
WBC # FLD AUTO: 6.73 K/UL — SIGNIFICANT CHANGE UP (ref 3.8–10.5)

## 2021-05-22 PROCEDURE — 99232 SBSQ HOSP IP/OBS MODERATE 35: CPT

## 2021-05-22 RX ORDER — TACROLIMUS 5 MG/1
10 CAPSULE ORAL ONCE
Refills: 0 | Status: COMPLETED | OUTPATIENT
Start: 2021-05-22 | End: 2021-05-22

## 2021-05-22 RX ORDER — TACROLIMUS 5 MG/1
10 CAPSULE ORAL
Refills: 0 | Status: DISCONTINUED | OUTPATIENT
Start: 2021-05-23 | End: 2021-05-23

## 2021-05-22 RX ORDER — NIFEDIPINE 30 MG
60 TABLET, EXTENDED RELEASE 24 HR ORAL
Refills: 0 | Status: DISCONTINUED | OUTPATIENT
Start: 2021-05-22 | End: 2021-05-24

## 2021-05-22 RX ORDER — CALCIUM ACETATE 667 MG
2001 TABLET ORAL
Refills: 0 | Status: DISCONTINUED | OUTPATIENT
Start: 2021-05-22 | End: 2021-05-24

## 2021-05-22 RX ADMIN — Medication 1 TABLET(S): at 11:05

## 2021-05-22 RX ADMIN — Medication 500000 UNIT(S): at 00:02

## 2021-05-22 RX ADMIN — Medication 60 MILLIGRAM(S): at 05:19

## 2021-05-22 RX ADMIN — Medication 100 MILLIGRAM(S): at 05:19

## 2021-05-22 RX ADMIN — Medication 30 MILLIGRAM(S): at 17:15

## 2021-05-22 RX ADMIN — PANTOPRAZOLE SODIUM 40 MILLIGRAM(S): 20 TABLET, DELAYED RELEASE ORAL at 05:20

## 2021-05-22 RX ADMIN — Medication 500000 UNIT(S): at 17:14

## 2021-05-22 RX ADMIN — Medication 81 MILLIGRAM(S): at 11:06

## 2021-05-22 RX ADMIN — Medication 30 MILLIGRAM(S): at 05:18

## 2021-05-22 RX ADMIN — Medication 500000 UNIT(S): at 11:05

## 2021-05-22 RX ADMIN — Medication 100 MILLIGRAM(S): at 13:02

## 2021-05-22 RX ADMIN — Medication 100 MILLIGRAM(S): at 17:14

## 2021-05-22 RX ADMIN — MYCOPHENOLATE MOFETIL 1 GRAM(S): 250 CAPSULE ORAL at 20:06

## 2021-05-22 RX ADMIN — Medication 100 MILLIGRAM(S): at 21:57

## 2021-05-22 RX ADMIN — LATANOPROST 1 DROP(S): 0.05 SOLUTION/ DROPS OPHTHALMIC; TOPICAL at 21:58

## 2021-05-22 RX ADMIN — Medication 60 MILLIGRAM(S): at 17:15

## 2021-05-22 RX ADMIN — CHLORHEXIDINE GLUCONATE 1 APPLICATION(S): 213 SOLUTION TOPICAL at 11:05

## 2021-05-22 RX ADMIN — TACROLIMUS 10 MILLIGRAM(S): 5 CAPSULE ORAL at 14:55

## 2021-05-22 RX ADMIN — Medication 2001 MILLIGRAM(S): at 13:02

## 2021-05-22 RX ADMIN — MYCOPHENOLATE MOFETIL 1 GRAM(S): 250 CAPSULE ORAL at 07:58

## 2021-05-22 RX ADMIN — Medication 2.5 MG/HR: at 17:14

## 2021-05-22 RX ADMIN — Medication 500000 UNIT(S): at 05:18

## 2021-05-22 RX ADMIN — Medication 2001 MILLIGRAM(S): at 17:16

## 2021-05-22 NOTE — PROGRESS NOTE ADULT - SUBJECTIVE AND OBJECTIVE BOX
[facility] DIVISION OF KIDNEY DISEASES AND HYPERTENSION -- FOLLOW UP NOTE  --------------------------------------------------------------------------------  Chief Complaint:    24 hour events/subjective:  Patient was seen and examined at bedside  denies any complaints     PAST HISTORY  --------------------------------------------------------------------------------  No significant changes to PMH, PSH, FHx, SHx, unless otherwise noted    ALLERGIES & MEDICATIONS  --------------------------------------------------------------------------------  Allergies    Mushrooms (Anaphylaxis)  penicillin (Rash)    Intolerances      Standing Inpatient Medications  aspirin  chewable 81 milliGRAM(s) Oral daily  calcium acetate 2001 milliGRAM(s) Oral three times a day with meals  chlorhexidine 2% Cloths 1 Application(s) Topical daily  furosemide Infusion 5 mG/Hr IV Continuous <Continuous>  hydrALAZINE 100 milliGRAM(s) Oral three times a day  labetalol 100 milliGRAM(s) Oral two times a day  latanoprost 0.005% Ophthalmic Solution 1 Drop(s) Both EYES at bedtime  methylPREDNISolone sodium succinate Injectable 30 milliGRAM(s) IV Push two times a day  methylPREDNISolone sodium succinate IVPB 500 milliGRAM(s) IV Intermittent once  mycophenolate mofetil 1 Gram(s) Oral <User Schedule>  NIFEdipine XL 60 milliGRAM(s) Oral two times a day  nystatin    Suspension 175206 Unit(s) Swish and Swallow four times a day  pantoprazole    Tablet 40 milliGRAM(s) Oral before breakfast  senna 2 Tablet(s) Oral at bedtime  trimethoprim   80 mG/sulfamethoxazole 400 mG 1 Tablet(s) Oral daily  valGANciclovir 450 milliGRAM(s) Oral <User Schedule>    PRN Inpatient Medications  HYDROmorphone  Injectable 0.25 milliGRAM(s) IV Push every 10 minutes PRN  naloxone Injectable 0.1 milliGRAM(s) IV Push every 3 minutes PRN  ondansetron Injectable 4 milliGRAM(s) IV Push every 6 hours PRN  traMADol 25 milliGRAM(s) Oral every 6 hours PRN  traMADol 50 milliGRAM(s) Oral every 6 hours PRN      REVIEW OF SYSTEMS  --------------------------------------------------------------------------------  Gen: No fatigue, fevers/chills, weakness  Skin: No rashes  Head/Eyes/Ears/Mouth: No headache;No sore throat  Respiratory: No dyspnea, cough,   CV: No chest pain, PND, orthopnea  GI: No abdominal pain, diarrhea, constipation, nausea, vomiting  Transplant: No pain  : No increased frequency, dysuria, hematuria, nocturia  MSK: No joint pain/swelling; no back pain; no edema  Neuro: No dizziness/lightheadedness, weakness, seizures, numbness, tingling  Psych: No significant nervousness, anxiety, stress, depression    All other systems were reviewed and are negative, except as noted.    VITALS/PHYSICAL EXAM  --------------------------------------------------------------------------------  T(C): 36.7 (05-22-21 @ 09:00), Max: 36.9 (05-21-21 @ 21:00)  HR: 75 (05-22-21 @ 09:00) (70 - 75)  BP: 170/81 (05-22-21 @ 09:00) (153/67 - 173/81)  RR: 20 (05-22-21 @ 09:00) (16 - 20)  SpO2: 98% (05-22-21 @ 09:00) (95% - 100%)  Wt(kg): --        05-21-21 @ 07:01  -  05-22-21 @ 07:00  --------------------------------------------------------  IN: 1015 mL / OUT: 640 mL / NET: 375 mL    05-22-21 @ 07:01  -  05-22-21 @ 10:28  --------------------------------------------------------  IN: 247.5 mL / OUT: 85 mL / NET: 162.5 mL      Physical Exam:  	Gen: NAD  	HEENT: PERRL, supple neck, clear oropharynx  	Pulm: CTA B/L  	CV: RRR, S1S2; no rub  	Back: No spinal or CVA tenderness; no sacral edema  	Abd: +BS, soft, nontender/nondistended                      Transplant: No tenderness, swelling  	: No suprapubic tenderness  	UE: Warm, FROM; no edema; no asterixis  	LE: Warm, FROM; no edema  	Neuro: No focal deficits  	Psych: Normal affect and mood  	Skin: Warm, without rashes      LABS/STUDIES  --------------------------------------------------------------------------------              8.0    6.73  >-----------<  132      [05-22-21 @ 06:06]              24.4     130  |  91  |  65  ----------------------------<  107      [05-22-21 @ 06:02]  4.6   |  19  |  6.63        Ca     8.4     [05-22-21 @ 06:02]      Mg     2.3     [05-22-21 @ 06:02]      Phos  8.7     [05-22-21 @ 06:02]    TPro  5.8  /  Alb  3.3  /  TBili  0.2  /  DBili  x   /  AST  13  /  ALT  8   /  AlkPhos  69  [05-22-21 @ 06:02]      Creatinine Trend:  SCr 6.63 [05-22 @ 06:02]  SCr 4.73 [05-21 @ 06:22]  SCr 5.15 [05-20 @ 15:39]  SCr 6.13 [05-20 @ 09:19]  SCr 5.91 [05-20 @ 03:47]    Tacrolimus (), Serum: 3.9 ng/mL (05-21 @ 07:20)      HbA1c 4.6      [05-28-19 @ 09:49]  TSH 3.75      [02-02-21 @ 19:57]  Lipid: chol 162, TG 63, HDL 81, LDL --      [02-02-21 @ 19:57]    HBsAb 22.4      [05-19-21 @ 10:42]  HBsAg Nonreact      [05-19-21 @ 10:42]  HBcAb Nonreact      [05-19-21 @ 10:42]  HCV 0.42, Nonreact      [05-19-21 @ 10:42]  HIV Nonreact      [05-19-21 @ 10:45]

## 2021-05-22 NOTE — PROGRESS NOTE ADULT - SUBJECTIVE AND OBJECTIVE BOX
Transplant Surgery Multidisciplinary Progress Note  --------------------------------------------------------------  R DDRT 2021 POD 3    Present:   Patient seen and examined with multidisciplinary team including Transplant Surgeon: Dr. El. Transplant Nephrologist:  Dr. SUSANNAH Ayala, ANGELINA Nieto, surgery resident Sherwin Odonnell and unit RN during am rounds.  Examined by Dr. El. Disciplines not in attendance will be notified of the plan.     HPI: 75F with HTN, ESRD on HD 2017 (MWF via R AVF--oliguric, etiology unknown, no bx, Dr. Treviño), Glaucoma, h/o DVT to L subclavian vein ().  Underwent R DDRT (1a/1v/1u--stented). Simulect induction. Post op course c/b DGF requiring HD.     Interval events:   - Lasix 80mg IV/Metolazone 5mg and Lasix drip started 5mg/hour  - SCr 6.63  UOP 615ml  (322ml day prior)  - US.2x3cm upper pole hematoma, otherwise homogenous flow, non hydro/ELIAS  - HTN: Hydralazine started  - Home ASA 81mng started  - DCd IVF    Potential Discharge date: pending clinical stability  Education:  Medications  Plan of care:  See Below      MEDICATIONS  (STANDING):  aspirin  chewable 81 milliGRAM(s) Oral daily  calcium acetate 2001 milliGRAM(s) Oral three times a day with meals  chlorhexidine 2% Cloths 1 Application(s) Topical daily  furosemide Infusion 5 mG/Hr (2.5 mL/Hr) IV Continuous <Continuous>  hydrALAZINE 100 milliGRAM(s) Oral three times a day  labetalol 100 milliGRAM(s) Oral two times a day  latanoprost 0.005% Ophthalmic Solution 1 Drop(s) Both EYES at bedtime  methylPREDNISolone sodium succinate Injectable 30 milliGRAM(s) IV Push two times a day  methylPREDNISolone sodium succinate IVPB 500 milliGRAM(s) IV Intermittent once  mycophenolate mofetil 1 Gram(s) Oral <User Schedule>  NIFEdipine XL 60 milliGRAM(s) Oral two times a day  nystatin    Suspension 892370 Unit(s) Swish and Swallow four times a day  pantoprazole    Tablet 40 milliGRAM(s) Oral before breakfast  senna 2 Tablet(s) Oral at bedtime  trimethoprim   80 mG/sulfamethoxazole 400 mG 1 Tablet(s) Oral daily  valGANciclovir 450 milliGRAM(s) Oral <User Schedule>    MEDICATIONS  (PRN):  HYDROmorphone  Injectable 0.25 milliGRAM(s) IV Push every 10 minutes PRN Severe Pain (7 - 10)  naloxone Injectable 0.1 milliGRAM(s) IV Push every 3 minutes PRN For ANY of the following changes in patient status:  A. RR LESS THAN 10 breaths per minute, B. Oxygen saturation LESS THAN 90%, C. Sedation score of 6  ondansetron Injectable 4 milliGRAM(s) IV Push every 6 hours PRN Nausea and/or Vomiting  traMADol 25 milliGRAM(s) Oral every 6 hours PRN Moderate Pain (4 - 6)  traMADol 50 milliGRAM(s) Oral every 6 hours PRN Severe Pain (7 - 10)      PAST MEDICAL & SURGICAL HISTORY:  HTN (hypertension)    Glaucoma    Cataract    ESRD (end stage renal disease) on dialysis    DVT (deep venous thrombosis)  of Left subclavian vein, 17    Hemodialysis patient  MWF    Acquired cataract  extraction with b/l lense placement,     H/O: glaucoma  surgery,     AV fistula      S/P KEVEN-BSO  for fibroids,     Hemodialysis access, AV graft        Vital Signs Last 24 Hrs  T(C): 36.7 (22 May 2021 09:00), Max: 36.9 (21 May 2021 21:00)  T(F): 98.1 (22 May 2021 09:00), Max: 98.4 (21 May 2021 21:00)  HR: 75 (22 May 2021 09:00) (70 - 75)  BP: 170/81 (22 May 2021 09:00) (153/67 - 173/81)  BP(mean): 100 (21 May 2021 21:00) (100 - 100)  RR: 20 (22 May 2021 09:00) (16 - 20)  SpO2: 98% (22 May 2021 09:00) (95% - 100%)    I&O's Summary    21 May 2021 07:01  -  22 May 2021 07:00  --------------------------------------------------------  IN: 1015 mL / OUT: 640 mL / NET: 375 mL    22 May 2021 07:01  -  22 May 2021 10:55  --------------------------------------------------------  IN: 247.5 mL / OUT: 120 mL / NET: 127.5 mL                              8.0    6.73  )-----------( 132      ( 22 May 2021 06:06 )             24.4     05-    130<L>  |  91<L>  |  65<H>  ----------------------------<  107<H>  4.6   |  19<L>  |  6.63<H>    Ca    8.4      22 May 2021 06:02  Phos  8.7       Mg     2.3         TPro  5.8<L>  /  Alb  3.3  /  TBili  0.2  /  DBili  x   /  AST  13  /  ALT  8<L>  /  AlkPhos  69  -    Tacrolimus (), Serum: 3.9 ng/mL ( @ 07:20)      EXAM:  US KIDNEY TRANSPLANT W DOPP RT           PROCEDURE DATE:  2021    INTERPRETATION:  CLINICAL INFORMATION: 75-year-old with right lower quadrant renal transplant with elevated creatinine  COMPARISON: 2021  TECHNIQUE: Grayscale, Color and spectral Doppler evaluation of a right lower quadrant renal transplant.  FINDINGS:  Renal Transplant: 12.4 cm. No renal mass, hydronephrosis or calculi. A heterogeneous collection is seen adjacent to the upper pole of the transplant, measuring 6.2 x 3.2 x 3.2 cm, likely hematoma. A drainage catheter is seen in the collection.  Urinary bladder: Not visualized    Color and spectral Doppler reveals homogeneous flow throughout the transplant.    Peak iliac artery velocity is 174 cm/sec pre-anastomosis, 181 cm/sec at the anastomosis, and 160 cm/sec post anastomosis.    Transplant Renal Artery:  Peak systolic velocity is 216 cm/sec anastomosis, 179 cm/sec proximal, 145 cm/sec mid, 174 cm/sec distal and 163 cm/sec hilum.  Resistive Indices Range: 0.73-0.87    Transplant Renal Vein: Patent.    IMPRESSION:  A collection, likely hematoma, is seen adjacent to the upper pole of the transplant. A drainage catheter is seen in the collection.  No evidence of hydronephrosis. Mildly elevated velocities at the transplant anastomosis without secondary signs of significant stenosis.    Review of systems  Gen: No weight changes, fatigue, fevers/chills, weakness  Skin: No rashes  Head/Eyes/Ears/Mouth: No headache; Normal hearing; Normal vision w/o blurriness; No sinus pain/discomfort, sore throat  Respiratory: No dyspnea, cough, wheezing, hemoptysis  CV: No chest pain, PND, orthopnea  GI: Mild abdominal pain at surgical incision site; denies diarrhea, constipation, nausea, vomiting, melena, hematochezia  : No increased frequency, dysuria, hematuria, nocturia  MSK: No joint pain/swelling; no back pain; no edema  Neuro: No dizziness/lightheadedness, weakness, seizures, numbness, tingling  Heme: No easy bruising or bleeding  Endo: No heat/cold intolerance  Psych: No significant nervousness, anxiety, stress, depression  All other systems were reviewed and are negative, except as noted.    PHYSICAL EXAM:  Constitutional: Well developed / well nourished  Eyes: Anicteric, PERRLA  ENMT: nc/at  Neck: supple   Respiratory: CTA B/L  Cardiovascular: RRR  Gastrointestinal: Soft, ND. incision c/d/i, KATHARINA with SS drainage  Genitourinary: Urinary catheter in place minimal urine--dark  Extremities: SCD's in place and working bilaterally, R AVF palpable.  no edema  Vascular: Palpable dp pulses bilaterally  Neurological: A&O x3  Skin: no rashes, ulcerations or lesions;  Musculoskeletal: Moving all extremities  Psychiatric: Responsive

## 2021-05-22 NOTE — PROGRESS NOTE ADULT - ASSESSMENT
75F with HTN, ESRD on HD 2017 (MWF via R AVF--oliguria, etiology unknown, no bx, Dr. Treviño), Glaucoma, h/o DVT to L subclavian vein (2017.   s/p R DDRT (1a/1v/1u--stented). Simulect induction.    [] s/p R DDRT c/b DGF  - No decline in creatinine yet, but improved UOP on Lasix drip 5mg/hour  - No acute indication for HD today.  Will reassess daily. Last HD session 5/20  - Add Calcium acetate 2001mg TID with meals  - Immuno: Env by level, MMF 1g bid, Pred taper, Simulect on POD 4  - PPX: valcyte/bactrim/nystatin  - strict I&Os via sethi/KATHARINA  - Diet: Regular  - Pain control: tramadol prn  - Bowel regimen  - SCDs while in bed at all times, no SQH, encourage spirometry, OOB    [] HTN  - continue labetalol 100mg bid, Hydralazine 100mg tid  - Increase Nifedipine to 60mg BID    [] Dispo:  - likely Monday

## 2021-05-23 ENCOUNTER — TRANSCRIPTION ENCOUNTER (OUTPATIENT)
Age: 76
End: 2021-05-23

## 2021-05-23 LAB
ALBUMIN SERPL ELPH-MCNC: 3.8 G/DL — SIGNIFICANT CHANGE UP (ref 3.3–5)
ALP SERPL-CCNC: 73 U/L — SIGNIFICANT CHANGE UP (ref 40–120)
ALT FLD-CCNC: 6 U/L — LOW (ref 10–45)
ANION GAP SERPL CALC-SCNC: 21 MMOL/L — HIGH (ref 5–17)
ANION GAP SERPL CALC-SCNC: 21 MMOL/L — HIGH (ref 5–17)
AST SERPL-CCNC: 13 U/L — SIGNIFICANT CHANGE UP (ref 10–40)
BILIRUB SERPL-MCNC: 0.3 MG/DL — SIGNIFICANT CHANGE UP (ref 0.2–1.2)
BUN SERPL-MCNC: 100 MG/DL — HIGH (ref 7–23)
BUN SERPL-MCNC: 88 MG/DL — HIGH (ref 7–23)
CALCIUM SERPL-MCNC: 8.3 MG/DL — LOW (ref 8.4–10.5)
CALCIUM SERPL-MCNC: 8.6 MG/DL — SIGNIFICANT CHANGE UP (ref 8.4–10.5)
CHLORIDE SERPL-SCNC: 88 MMOL/L — LOW (ref 96–108)
CHLORIDE SERPL-SCNC: 90 MMOL/L — LOW (ref 96–108)
CO2 SERPL-SCNC: 16 MMOL/L — LOW (ref 22–31)
CO2 SERPL-SCNC: 17 MMOL/L — LOW (ref 22–31)
CREAT SERPL-MCNC: 8.15 MG/DL — HIGH (ref 0.5–1.3)
CREAT SERPL-MCNC: 8.76 MG/DL — HIGH (ref 0.5–1.3)
GLUCOSE BLDC GLUCOMTR-MCNC: 114 MG/DL — HIGH (ref 70–99)
GLUCOSE BLDC GLUCOMTR-MCNC: 133 MG/DL — HIGH (ref 70–99)
GLUCOSE BLDC GLUCOMTR-MCNC: 140 MG/DL — HIGH (ref 70–99)
GLUCOSE BLDC GLUCOMTR-MCNC: 146 MG/DL — HIGH (ref 70–99)
GLUCOSE SERPL-MCNC: 103 MG/DL — HIGH (ref 70–99)
GLUCOSE SERPL-MCNC: 144 MG/DL — HIGH (ref 70–99)
HCT VFR BLD CALC: 26.9 % — LOW (ref 34.5–45)
HGB BLD-MCNC: 8.9 G/DL — LOW (ref 11.5–15.5)
MAGNESIUM SERPL-MCNC: 2.4 MG/DL — SIGNIFICANT CHANGE UP (ref 1.6–2.6)
MCHC RBC-ENTMCNC: 28.2 PG — SIGNIFICANT CHANGE UP (ref 27–34)
MCHC RBC-ENTMCNC: 33.1 GM/DL — SIGNIFICANT CHANGE UP (ref 32–36)
MCV RBC AUTO: 85.1 FL — SIGNIFICANT CHANGE UP (ref 80–100)
NRBC # BLD: 0 /100 WBCS — SIGNIFICANT CHANGE UP (ref 0–0)
PHOSPHATE SERPL-MCNC: 9.2 MG/DL — HIGH (ref 2.5–4.5)
PLATELET # BLD AUTO: 155 K/UL — SIGNIFICANT CHANGE UP (ref 150–400)
POTASSIUM SERPL-MCNC: 5.1 MMOL/L — SIGNIFICANT CHANGE UP (ref 3.5–5.3)
POTASSIUM SERPL-MCNC: 5.5 MMOL/L — HIGH (ref 3.5–5.3)
POTASSIUM SERPL-SCNC: 5.1 MMOL/L — SIGNIFICANT CHANGE UP (ref 3.5–5.3)
POTASSIUM SERPL-SCNC: 5.5 MMOL/L — HIGH (ref 3.5–5.3)
PROT SERPL-MCNC: 6.4 G/DL — SIGNIFICANT CHANGE UP (ref 6–8.3)
RBC # BLD: 3.16 M/UL — LOW (ref 3.8–5.2)
RBC # FLD: 17.5 % — HIGH (ref 10.3–14.5)
SODIUM SERPL-SCNC: 126 MMOL/L — LOW (ref 135–145)
SODIUM SERPL-SCNC: 127 MMOL/L — LOW (ref 135–145)
TACROLIMUS SERPL-MCNC: 21.2 NG/ML — SIGNIFICANT CHANGE UP
WBC # BLD: 6.46 K/UL — SIGNIFICANT CHANGE UP (ref 3.8–10.5)
WBC # FLD AUTO: 6.46 K/UL — SIGNIFICANT CHANGE UP (ref 3.8–10.5)

## 2021-05-23 PROCEDURE — 99233 SBSQ HOSP IP/OBS HIGH 50: CPT

## 2021-05-23 RX ORDER — LABETALOL HCL 100 MG
200 TABLET ORAL
Refills: 0 | Status: DISCONTINUED | OUTPATIENT
Start: 2021-05-23 | End: 2021-05-24

## 2021-05-23 RX ORDER — SODIUM BICARBONATE 1 MEQ/ML
1300 SYRINGE (ML) INTRAVENOUS THREE TIMES A DAY
Refills: 0 | Status: DISCONTINUED | OUTPATIENT
Start: 2021-05-23 | End: 2021-05-24

## 2021-05-23 RX ORDER — SIMETHICONE 80 MG/1
80 TABLET, CHEWABLE ORAL ONCE
Refills: 0 | Status: COMPLETED | OUTPATIENT
Start: 2021-05-23 | End: 2021-05-23

## 2021-05-23 RX ORDER — LABETALOL HCL 100 MG
10 TABLET ORAL ONCE
Refills: 0 | Status: COMPLETED | OUTPATIENT
Start: 2021-05-23 | End: 2021-05-23

## 2021-05-23 RX ADMIN — MYCOPHENOLATE MOFETIL 1 GRAM(S): 250 CAPSULE ORAL at 07:39

## 2021-05-23 RX ADMIN — Medication 81 MILLIGRAM(S): at 11:33

## 2021-05-23 RX ADMIN — Medication 10 MILLIGRAM(S): at 02:53

## 2021-05-23 RX ADMIN — Medication 100 MILLIGRAM(S): at 05:12

## 2021-05-23 RX ADMIN — Medication 1300 MILLIGRAM(S): at 15:12

## 2021-05-23 RX ADMIN — Medication 500000 UNIT(S): at 00:01

## 2021-05-23 RX ADMIN — CHLORHEXIDINE GLUCONATE 1 APPLICATION(S): 213 SOLUTION TOPICAL at 11:32

## 2021-05-23 RX ADMIN — Medication 100 MILLIGRAM(S): at 22:01

## 2021-05-23 RX ADMIN — TRAMADOL HYDROCHLORIDE 50 MILLIGRAM(S): 50 TABLET ORAL at 20:33

## 2021-05-23 RX ADMIN — SIMETHICONE 80 MILLIGRAM(S): 80 TABLET, CHEWABLE ORAL at 02:28

## 2021-05-23 RX ADMIN — SENNA PLUS 2 TABLET(S): 8.6 TABLET ORAL at 22:02

## 2021-05-23 RX ADMIN — Medication 100 MILLIGRAM(S): at 05:13

## 2021-05-23 RX ADMIN — Medication 200 MILLIGRAM(S): at 17:38

## 2021-05-23 RX ADMIN — Medication 20 MILLIGRAM(S): at 17:27

## 2021-05-23 RX ADMIN — Medication 60 MILLIGRAM(S): at 17:27

## 2021-05-23 RX ADMIN — Medication 500000 UNIT(S): at 05:12

## 2021-05-23 RX ADMIN — TRAMADOL HYDROCHLORIDE 50 MILLIGRAM(S): 50 TABLET ORAL at 19:33

## 2021-05-23 RX ADMIN — Medication 2001 MILLIGRAM(S): at 11:33

## 2021-05-23 RX ADMIN — Medication 2001 MILLIGRAM(S): at 17:26

## 2021-05-23 RX ADMIN — LATANOPROST 1 DROP(S): 0.05 SOLUTION/ DROPS OPHTHALMIC; TOPICAL at 22:02

## 2021-05-23 RX ADMIN — Medication 10 MILLIGRAM(S): at 04:06

## 2021-05-23 RX ADMIN — Medication 2001 MILLIGRAM(S): at 07:39

## 2021-05-23 RX ADMIN — Medication 20 MILLIGRAM(S): at 05:13

## 2021-05-23 RX ADMIN — TRAMADOL HYDROCHLORIDE 50 MILLIGRAM(S): 50 TABLET ORAL at 01:12

## 2021-05-23 RX ADMIN — Medication 500000 UNIT(S): at 17:26

## 2021-05-23 RX ADMIN — Medication 100 MILLIGRAM(S): at 15:12

## 2021-05-23 RX ADMIN — MYCOPHENOLATE MOFETIL 1 GRAM(S): 250 CAPSULE ORAL at 19:33

## 2021-05-23 RX ADMIN — Medication 60 MILLIGRAM(S): at 05:12

## 2021-05-23 RX ADMIN — PANTOPRAZOLE SODIUM 40 MILLIGRAM(S): 20 TABLET, DELAYED RELEASE ORAL at 05:13

## 2021-05-23 RX ADMIN — TACROLIMUS 10 MILLIGRAM(S): 5 CAPSULE ORAL at 07:39

## 2021-05-23 RX ADMIN — Medication 1 TABLET(S): at 11:33

## 2021-05-23 RX ADMIN — TRAMADOL HYDROCHLORIDE 50 MILLIGRAM(S): 50 TABLET ORAL at 02:10

## 2021-05-23 RX ADMIN — BASILIXIMAB 100 MILLIGRAM(S): 20 INJECTION, POWDER, FOR SOLUTION INTRAVENOUS at 08:27

## 2021-05-23 RX ADMIN — Medication 1300 MILLIGRAM(S): at 22:01

## 2021-05-23 RX ADMIN — Medication 500000 UNIT(S): at 11:32

## 2021-05-23 NOTE — PROVIDER CONTACT NOTE (OTHER) - RECOMMENDATIONS
Bedtime BP meds given. Will continue to monitor.
Morning BP meds given. Continue to monitor.
Bedtime BP meds given. Continue to monitor.
Continue to monitor.

## 2021-05-23 NOTE — DISCHARGE NOTE PROVIDER - NSDCFUSCHEDAPPT_GEN_ALL_CORE_FT
ARIANNE MCNAMARA ; 06/29/2021 ; [facility] [address] ; 06/29/2021 ; [facility] Surg [facility] [address] ; 07/28/2021 ; NPP Surg [address] [facility]

## 2021-05-23 NOTE — PROGRESS NOTE ADULT - SUBJECTIVE AND OBJECTIVE BOX
[facility] DIVISION OF KIDNEY DISEASES AND HYPERTENSION -- FOLLOW UP NOTE  --------------------------------------------------------------------------------  Chief Complaint:    24 hour events/subjective:  Patient was seen and examined at bedside  denies any complaints   UOP was ~900 ml in the last 24 hrs       PAST HISTORY  --------------------------------------------------------------------------------  No significant changes to PMH, PSH, FHx, SHx, unless otherwise noted    ALLERGIES & MEDICATIONS  --------------------------------------------------------------------------------  Allergies    Mushrooms (Anaphylaxis)  penicillin (Rash)    Intolerances      Standing Inpatient Medications  aspirin  chewable 81 milliGRAM(s) Oral daily  calcium acetate 2001 milliGRAM(s) Oral three times a day with meals  chlorhexidine 2% Cloths 1 Application(s) Topical daily  furosemide Infusion 5 mG/Hr IV Continuous <Continuous>  hydrALAZINE 100 milliGRAM(s) Oral three times a day  labetalol 200 milliGRAM(s) Oral two times a day  latanoprost 0.005% Ophthalmic Solution 1 Drop(s) Both EYES at bedtime  methylPREDNISolone sodium succinate IVPB 500 milliGRAM(s) IV Intermittent once  mycophenolate mofetil 1 Gram(s) Oral <User Schedule>  NIFEdipine XL 60 milliGRAM(s) Oral two times a day  nystatin    Suspension 477389 Unit(s) Swish and Swallow four times a day  pantoprazole    Tablet 40 milliGRAM(s) Oral before breakfast  predniSONE   Tablet 20 milliGRAM(s) Oral two times a day  senna 2 Tablet(s) Oral at bedtime  tacrolimus ER Tablet (ENVARSUS XR) 10 milliGRAM(s) Oral <User Schedule>  trimethoprim   80 mG/sulfamethoxazole 400 mG 1 Tablet(s) Oral daily  valGANciclovir 450 milliGRAM(s) Oral <User Schedule>    PRN Inpatient Medications  HYDROmorphone  Injectable 0.25 milliGRAM(s) IV Push every 10 minutes PRN  naloxone Injectable 0.1 milliGRAM(s) IV Push every 3 minutes PRN  ondansetron Injectable 4 milliGRAM(s) IV Push every 6 hours PRN  traMADol 25 milliGRAM(s) Oral every 6 hours PRN  traMADol 50 milliGRAM(s) Oral every 6 hours PRN      REVIEW OF SYSTEMS  --------------------------------------------------------------------------------  Gen: No fatigue, fevers/chills, weakness  Skin: No rashes  Head/Eyes/Ears/Mouth: No headache;No sore throat  Respiratory: No dyspnea, cough,   CV: No chest pain, PND, orthopnea  GI: No abdominal pain, diarrhea, constipation, nausea, vomiting  Transplant: No pain  : No increased frequency, dysuria, hematuria, nocturia  MSK: No joint pain/swelling; no back pain; no edema  Neuro: No dizziness/lightheadedness, weakness, seizures, numbness, tingling  Psych: No significant nervousness, anxiety, stress, depression    All other systems were reviewed and are negative, except as noted.    VITALS/PHYSICAL EXAM  --------------------------------------------------------------------------------  T(C): 36.6 (05-23-21 @ 08:32), Max: 36.9 (05-23-21 @ 01:00)  HR: 65 (05-23-21 @ 08:32) (65 - 81)  BP: 145/67 (05-23-21 @ 08:32) (145/67 - 178/62)  RR: 18 (05-23-21 @ 08:32) (18 - 18)  SpO2: 99% (05-23-21 @ 08:32) (97% - 100%)  Wt(kg): --        05-22-21 @ 07:01  -  05-23-21 @ 07:00  --------------------------------------------------------  IN: 1435 mL / OUT: 920 mL / NET: 515 mL    05-23-21 @ 07:01  -  05-23-21 @ 09:16  --------------------------------------------------------  IN: 412.5 mL / OUT: 73 mL / NET: 339.5 mL      Physical Exam:  	Gen: NAD  	HEENT: PERRL, supple neck, clear oropharynx  	Pulm: CTA B/L  	CV: RRR, S1S2; no rub  	Back: No spinal or CVA tenderness; no sacral edema  	Abd: +BS, soft, nontender/nondistended                      Transplant: No tenderness, swelling  	: No suprapubic tenderness  	UE: Warm, FROM; no edema; no asterixis  	LE: Warm, FROM; no edema  	Neuro: No focal deficits  	Psych: Normal affect and mood  	Skin: Warm, without rashes      LABS/STUDIES  --------------------------------------------------------------------------------              8.9    6.46  >-----------<  155      [05-23-21 @ 06:48]              26.9     127  |  90  |  88  ----------------------------<  103      [05-23-21 @ 06:48]  5.1   |  16  |  8.15        Ca     8.3     [05-23-21 @ 06:48]      Mg     2.4     [05-23-21 @ 06:48]      Phos  9.2     [05-23-21 @ 06:48]    TPro  6.4  /  Alb  3.8  /  TBili  0.3  /  DBili  x   /  AST  13  /  ALT  6   /  AlkPhos  73  [05-23-21 @ 06:48]          Creatinine Trend:  SCr 8.15 [05-23 @ 06:48]  SCr 6.63 [05-22 @ 06:02]  SCr 4.73 [05-21 @ 06:22]  SCr 5.15 [05-20 @ 15:39]  SCr 6.13 [05-20 @ 09:19]    Tacrolimus (), Serum: 6.9 ng/mL (05-22 @ 08:09)  Tacrolimus (), Serum: 3.9 ng/mL (05-21 @ 07:20)                HbA1c 4.6      [05-28-19 @ 09:49]  TSH 3.75      [02-02-21 @ 19:57]  Lipid: chol 162, TG 63, HDL 81, LDL --      [02-02-21 @ 19:57]    HBsAb 22.4      [05-19-21 @ 10:42]  HBsAg Nonreact      [05-19-21 @ 10:42]  HBcAb Nonreact      [05-19-21 @ 10:42]  HCV 0.42, Nonreact      [05-19-21 @ 10:42]  HIV Nonreact      [05-19-21 @ 10:45]

## 2021-05-23 NOTE — PROGRESS NOTE ADULT - SUBJECTIVE AND OBJECTIVE BOX
Transplant Surgery Multidisciplinary Progress Note  --------------------------------------------------------------  R DDRT 5/19/2021 POD 4    Present:   Patient seen and examined with multidisciplinary team including Transplant Surgeon: Dr. El, Transplant Nephrologist:  Dr. SUSANNAH Ayala, CHRISTOPHER Dinero, and unit RN during am rounds.  Disciplines not in attendance will be notified of the plan.     HPI: 75F with HTN, ESRD on HD 2017 (MWF via R AVF--oliguric, etiology unknown, no bx, Dr. Treviño), Glaucoma, h/o DVT to L subclavian vein (2017).  Underwent R DDRT (1a/1v/1u--stented). Simulect induction. Post op course c/b DGF requiring HD.     Interval events:   - Afebrile, stable VSS  - U/O ~30cc/hr, on Lasix drip  - SCr 8.15 from 6.63   15ml    - HTN'sive: increased Nifedipine 60 bid yesterday, overnight received labetalol 10mg IV x 2    Potential Discharge date: pending clinical stability  Education:  Medications  Plan of care:  See Below    MEDICATIONS  (STANDING):  aspirin  chewable 81 milliGRAM(s) Oral daily  calcium acetate 2001 milliGRAM(s) Oral three times a day with meals  chlorhexidine 2% Cloths 1 Application(s) Topical daily  furosemide Infusion 5 mG/Hr (2.5 mL/Hr) IV Continuous <Continuous>  hydrALAZINE 100 milliGRAM(s) Oral three times a day  labetalol 200 milliGRAM(s) Oral two times a day  latanoprost 0.005% Ophthalmic Solution 1 Drop(s) Both EYES at bedtime  methylPREDNISolone sodium succinate IVPB 500 milliGRAM(s) IV Intermittent once  mycophenolate mofetil 1 Gram(s) Oral <User Schedule>  NIFEdipine XL 60 milliGRAM(s) Oral two times a day  nystatin    Suspension 785546 Unit(s) Swish and Swallow four times a day  pantoprazole    Tablet 40 milliGRAM(s) Oral before breakfast  predniSONE   Tablet 20 milliGRAM(s) Oral two times a day  senna 2 Tablet(s) Oral at bedtime  sodium bicarbonate 1300 milliGRAM(s) Oral three times a day  tacrolimus ER Tablet (ENVARSUS XR) 10 milliGRAM(s) Oral <User Schedule>  trimethoprim   80 mG/sulfamethoxazole 400 mG 1 Tablet(s) Oral daily  valGANciclovir 450 milliGRAM(s) Oral <User Schedule>    MEDICATIONS  (PRN):  HYDROmorphone  Injectable 0.25 milliGRAM(s) IV Push every 10 minutes PRN Severe Pain (7 - 10)  naloxone Injectable 0.1 milliGRAM(s) IV Push every 3 minutes PRN For ANY of the following changes in patient status:  A. RR LESS THAN 10 breaths per minute, B. Oxygen saturation LESS THAN 90%, C. Sedation score of 6  ondansetron Injectable 4 milliGRAM(s) IV Push every 6 hours PRN Nausea and/or Vomiting  traMADol 25 milliGRAM(s) Oral every 6 hours PRN Moderate Pain (4 - 6)  traMADol 50 milliGRAM(s) Oral every 6 hours PRN Severe Pain (7 - 10)      PAST MEDICAL & SURGICAL HISTORY:  HTN (hypertension)  Glaucoma  Cataract  ESRD (end stage renal disease) on dialysis  DVT (deep venous thrombosis)  of Left subclavian vein, 06/12/17  Hemodialysis patient MWF  Acquired cataract extraction with b/l lense placement, 2016  H/O: glaucoma surgery, 2002  AV fistula 2015  S/P KEVEN-BSO for fibroids, 2012  Hemodialysis access, AV graft    Vital Signs Last 24 Hrs  T(C): 36.6 (23 May 2021 08:32), Max: 36.9 (23 May 2021 01:00)  T(F): 97.8 (23 May 2021 08:32), Max: 98.5 (23 May 2021 01:00)  HR: 65 (23 May 2021 08:32) (65 - 81)  BP: 145/67 (23 May 2021 08:32) (145/67 - 178/62)  BP(mean): 111 (23 May 2021 04:30) (111 - 120)  RR: 18 (23 May 2021 08:32) (18 - 18)  SpO2: 99% (23 May 2021 08:32) (97% - 100%)    I&O's Summary    22 May 2021 07:01  -  23 May 2021 07:00  --------------------------------------------------------  IN: 1435 mL / OUT: 920 mL / NET: 515 mL    23 May 2021 07:01  -  23 May 2021 10:44  --------------------------------------------------------  IN: 412.5 mL / OUT: 73 mL / NET: 339.5 mL                         8.9    6.46  )-----------( 155      ( 23 May 2021 06:48 )             26.9     05-23    127<L>  |  90<L>  |  88<H>  ----------------------------<  103<H>  5.1   |  16<L>  |  8.15<H>    Ca    8.3<L>      23 May 2021 06:48  Phos  9.2     05-23  Mg     2.4     05-23    TPro  6.4  /  Alb  3.8  /  TBili  0.3  /  DBili  x   /  AST  13  /  ALT  6<L>  /  AlkPhos  73  05-23    Tacrolimus (), Serum: 6.9 ng/mL (05-22 @ 08:09)    Review of systems  Gen: No weight changes, fatigue, fevers/chills, weakness  Skin: No rashes  Head/Eyes/Ears/Mouth: No headache; Normal hearing; Normal vision w/o blurriness; No sinus pain/discomfort, sore throat  Respiratory: No dyspnea, cough, wheezing, hemoptysis  CV: No chest pain, PND, orthopnea  GI: Mild abdominal pain at surgical incision site; denies diarrhea, constipation, nausea, vomiting, melena, hematochezia  : No increased frequency, dysuria, hematuria, nocturia  MSK: No joint pain/swelling; no back pain; no edema  Neuro: No dizziness/lightheadedness, weakness, seizures, numbness, tingling  Heme: No easy bruising or bleeding  Endo: No heat/cold intolerance  Psych: No significant nervousness, anxiety, stress, depression  All other systems were reviewed and are negative, except as noted.    PHYSICAL EXAM:  Constitutional: Well developed / well nourished  Eyes: Anicteric, PERRLA  ENMT: nc/at  Neck: supple   Respiratory: CTA B/L  Cardiovascular: RRR  Gastrointestinal: Soft, ND. incision c/d/i, KATHARINA with SS drainage  Genitourinary: Urinary catheter in place minimal urine--dark  Extremities: SCD's in place and working bilaterally, R AVF palpable.  no edema  Vascular: Palpable dp pulses bilaterally  Neurological: A&O x3  Skin: no rashes, ulcerations or lesions;  Musculoskeletal: Moving all extremities  Psychiatric: Responsive

## 2021-05-23 NOTE — DISCHARGE NOTE PROVIDER - NSDCCPCAREPLAN_GEN_ALL_CORE_FT
PRINCIPAL DISCHARGE DIAGNOSIS  Diagnosis: Kidney transplanted  Assessment and Plan of Treatment: - Avoid heavy lifting aything over 5lbs for six weeks following your surgery date. Do NOT drive a car or operate machinery unless cleared by your transplant surgeon during your follow up visit. Avoid straining, use stool softners as needed. Stop stool softners if diarrhea develops.   - Call transplant clinic if you develop fever, increased abdominal pain, redness/swelling or bleeding around your incision site  - Call transplant clinic if you have difficulty urinating, notice decrease in urine output or blood in urine.   - Follow FOOD SAFETY instructions provided to you in your patient education guide booklet   - Bathing: Shower is allowed, you can let soap and water flow over your incision; do NOT rub the area. Bath is NOT allowed until your incision is healed and you have been cleared by your transplant surgeon.   If you developed any fever, edema, redness, shortness of breath, chest pain, difficulty urination, burning with urination, no urine output, and or any change in your condition you may call transplant clinic at any time 24 hours/day to speak with a coordinator  - Transplant recipients are at risk for developing infection because immune system is lowered due to transplant medications.   - Avoid contact with sick people; practice good hand hygiene;   - Take medications as directed by your translant team. Never stop taking medications unless instructed by your transplant physician.  - Do NOT double up medication dose if you missed your dose; call the transplant office for instructions.          SECONDARY DISCHARGE DIAGNOSES  Diagnosis: Hypertension  Assessment and Plan of Treatment: Be sure to follow a low salt diet, avoid caffeine, reduce alcohol intake.  If you have been prescribed antihypertensive medications to control your blood pressure, be sure to take them every day as prescribed and do not miss any doses, the medications do not work if they are not taken consistently. Follow up with your Primary Care Doctor and have your Blood Pressure checked regularly.

## 2021-05-23 NOTE — PROVIDER CONTACT NOTE (OTHER) - ASSESSMENT
Pt A&OX4, no c/o of chest pain or discomfort.

## 2021-05-23 NOTE — PROVIDER CONTACT NOTE (OTHER) - ACTION/TREATMENT ORDERED:
Continue to monitor.
IV labetalol 10 mg ordered. Continue to monitor. Give AM BP meds early.
Continue to monitor.
Continue to monitor.

## 2021-05-23 NOTE — DISCHARGE NOTE PROVIDER - HOSPITAL COURSE
75F with HTN, ESRD on HD 2017 (MWF via R AVF--oliguric, etiology unknown, no bx, Dr. Treviño), Glaucoma, h/o DVT to L subclavian vein (2017).    Underwent R DDRT (1a/1v/1u) with Simulect induction and ureteral stent placement. Post op course c/b DGF requiring HD.   -Post operative with oliguria and Hyperkalemia, required HD on POD 1 and POD 4  -Required diuresis trials with some improvement in urine output.   -Renal doppler with good perfusion.   - DC SCR ------.  - Pavon and KATHARINA removed on POD 4 with no complications  -Tolerated PO intake; ambulated with PT (recommend home PT), had bowel function and maintained good pain control.   -HTN'sive post op, improved with medication adjustment.     She was evaluated by the multi-disciplinary team including surgeon, nephrologist, ACPs, RNs, pharmacist, nutrition, social work, and nursing and deemed stable for discharge with the following plan:     Immuno: ENVARSUS-----, MMF 1g BID, Pred taper  F/u with Dr. Henderson on --------  Ureteral stent removal outpatient in 4-6w      75F with HTN, ESRD on HD 2017 (MWF via R AVF--oliguric, etiology unknown, no bx, Dr. Treviño), Glaucoma, h/o DVT to L subclavian vein (2017).    Underwent R DDRT (1a/1v/1u) with Simulect induction and ureteral stent placement. Post op course c/b DGF requiring HD.   -Post operative with oliguria and Hyperkalemia, required HD on POD 1 and POD 4  -Required diuresis trials with some improvement in urine output.   -Renal doppler with good perfusion.   - DC SCR ------.  - Pavon and KATHARINA removed on POD 4 with no complications  -Tolerated PO intake; ambulated with PT (recommend home PT), had bowel function and maintained good pain control.   -HTN'sive post op, improved with medication adjustment.     She was evaluated by the multi-disciplinary team including surgeon, nephrologist, ACPs, RNs, pharmacist, nutrition, social work, and nursing and deemed stable for discharge with the following plan:     Immuno: ENVARSUS-----, MMF 1g BID, Pred taper  F/u with Dr. Henderson on --------  Ureteral stent removal outpatient in 4-6w     -------------------  Recipient info:   Hx: HTN, Hysterectomy, Eye surgery, Covid Vaccine Pfizer x2 (2021)  Nephrologist: Dr. Treviño  CPRA:    81%  ABO:      A  CMV:  Positive  EBV Status:  Positive    ===  Donor (import ): [facility]  Donor ID:  [id number]  Match: [id number]  OPO: PADV  Age:  42 y.o F  ABO:  A  High Risk:  No  KDPI: 69%  COD:  Cardiac Arrest  X Clamp Time: 21  21:03  Medical Hx:  IDDM, gastroparesis, hypothyroid, Depression, Anxiety, Left Breast mass, amenorrhea, fibromyalgia, diabetic neuropathy, Pfizer Vaccine x2 (3/2021)  Terminal Cr:  3.3  Covid Testin/15/21 NP-Neg,  BAL-Neg  CMV-Negative  EBV-Positive  HepBcAb-Negative  Hepatitis C-JASON- Negative  Hepatitis C ab-Negative  MISMATCH: 2,2,1  Kidney Laterality: Left  X-match –Negative x match   ===  OR:  Backtable preparation of donor Left kidney.   Right Bird incision.  Right iliac artery intima tacked with 6-0 prolene x3.    1 vein, 1 artery, 1 ureter anastomosis performed.   Intraoperative doppler performed with evidence of good flow.   Surgitek® Double-J® Ureteral stent placed.   1x KATHARINA drain placed in subfascial space.     75F with HTN, ESRD on HD 2017 (MWF via R AVF--oliguric, etiology unknown, no bx, Dr. Trevñio), Glaucoma, h/o DVT to L subclavian vein ().    Underwent R DDRT (1a/1v/1u) with Simulect induction and ureteral stent placement:  -Post op course c/b DGF requiring HD.  D/C Cr 4.6 (post HD, Cr ~8)  -Required diuresis trials with some improvement in urine output.   -Renal doppler with good perfusion.   -Pavon and KATHARINA removed on POD 4 with no complications  Tolerated PO intake; ambulated with PT (recommend home PT), had bowel function and maintained good pain control.   -HTN'sive post op, improved with medication adjustment.     She was evaluated by the multi-disciplinary team including surgeon, nephrologist, ACPs, RNs, pharmacist, nutrition, social work, and nursing and deemed stable for discharge with the following plan:     Immuno: ENVARSUS 9, MMF 1g BID, Pred taper  HD MWF  F/u with Dr. Henderson on   Ureteral stent removal outpatient in 4-6w     -------------------  Recipient info:   Hx: HTN, Hysterectomy, Eye surgery, Covid Vaccine Pfizer x2 (2021)  Nephrologist: Dr. Treviño  CPRA:    81%  ABO:      A  CMV:  Positive  EBV Status:  Positive    ===  Donor (import ): [facility]  Donor ID:  [id number]  Match: [id number]  OPO: PADV  Age:  42 y.o F  ABO:  A  High Risk:  No  KDPI: 69%  COD:  Cardiac Arrest  X Clamp Time: 21  21:03  Medical Hx:  IDDM, gastroparesis, hypothyroid, Depression, Anxiety, Left Breast mass, amenorrhea, fibromyalgia, diabetic neuropathy, Pfizer Vaccine x2 (3/2021)  Terminal Cr:  3.3  Covid Testin/15/21 NP-Neg,  BAL-Neg  CMV-Negative  EBV-Positive  HepBcAb-Negative  Hepatitis C-JASON- Negative  Hepatitis C ab-Negative  MISMATCH: 2,2,1  Kidney Laterality: Left  X-match –Negative x match   ===  OR:  Backtable preparation of donor Left kidney.   Right Bird incision.  Right iliac artery intima tacked with 6-0 prolene x3.    1 vein, 1 artery, 1 ureter anastomosis performed.   Intraoperative doppler performed with evidence of good flow.   Surgitek® Double-J® Ureteral stent placed.   1x KATHARINA drain placed in subfascial space.     75F with HTN, ESRD on HD 2017 (MWF via R AVF--oliguric, etiology unknown, no bx, Dr. Treviño), Glaucoma, h/o DVT to L subclavian vein ().    Underwent R DDRT (1a/1v/1u) with Simulect induction and ureteral stent placement:  -Post op course c/b DGF requiring HD.  D/C Cr 4.6 (post HD, Cr ~8)  -Required diuresis trials with some improvement in urine output.   -Renal doppler with good perfusion.   -Pavon and KATHARINA removed on POD 4 with no complications  Tolerated PO intake; ambulated with PT (recommend home PT), had bowel function and maintained good pain control.   -HTN'sive post op, improved with medication adjustment.     She was evaluated by the multi-disciplinary team including surgeon, nephrologist, ACPs, RNs, pharmacist, nutrition, social work, and nursing and deemed stable for discharge with the following plan:     Immuno: ENVARSUS 9, MMF 1g BID, Pred taper  HD MWF  F/u with Dr. Henderson on   Ureteral stent removal as outpatient in 4-6w . education provided    -------------------  Recipient info:   Hx: HTN, Hysterectomy, Eye surgery, Covid Vaccine Pfizer x2 (2021)  Nephrologist: Dr. Treviño  CPRA:    81%  ABO:      A  CMV:  Positive  EBV Status:  Positive    ===  Donor (import ): [facility]  Donor ID:  [id number]  Match: [id number]  OPO: PADV  Age:  42 y.o F  ABO:  A  High Risk:  No  KDPI: 69%  COD:  Cardiac Arrest  X Clamp Time: 21  21:03  Medical Hx:  IDDM, gastroparesis, hypothyroid, Depression, Anxiety, Left Breast mass, amenorrhea, fibromyalgia, diabetic neuropathy, Pfizer Vaccine x2 (3/2021)  Terminal Cr:  3.3  Covid Testin/15/21 NP-Neg,  BAL-Neg  CMV-Negative  EBV-Positive  HepBcAb-Negative  Hepatitis C-JASON- Negative  Hepatitis C ab-Negative  MISMATCH: 2,2,1  Kidney Laterality: Left  X-match –Negative x match   ===  OR:  Backtable preparation of donor Left kidney.   Right Bird incision.  Right iliac artery intima tacked with 6-0 prolene x3.    1 vein, 1 artery, 1 ureter anastomosis performed.   Intraoperative doppler performed with evidence of good flow.   Surgitek® Double-J® Ureteral stent placed.   1x KATHARINA drain placed in subfascial space.

## 2021-05-23 NOTE — DISCHARGE NOTE PROVIDER - CARE PROVIDER_API CALL
Reinaldo Henderson)  Surgery  [address] [place] [address]  Phone: [phone]  Fax: [phone]  Follow Up Time:

## 2021-05-23 NOTE — DISCHARGE NOTE PROVIDER - DISCHARGE DATE
Problem: PAIN - ADULT  Goal: Verbalizes/displays adequate comfort level or baseline comfort level  Interventions:  - Encourage patient to monitor pain and request assistance  - Assess pain using appropriate pain scale  - Administer analgesics based on type and severity of pain and evaluate response  - Implement non-pharmacological measures as appropriate and evaluate response  - Consider cultural and social influences on pain and pain management  - Notify physician/advanced practitioner if interventions unsuccessful or patient reports new pain  Outcome: Progressing      Problem: INFECTION - ADULT  Goal: Absence or prevention of progression during hospitalization  INTERVENTIONS:  - Assess and monitor for signs and symptoms of infection  - Monitor lab/diagnostic results  - Monitor all insertion sites, i e  indwelling lines, tubes, and drains  - Monitor endotracheal (as able) and nasal secretions for changes in amount and color  - [place] appropriate cooling/warming therapies per order  - Administer medications as ordered  - Instruct and encourage patient and family to use good hand hygiene technique  - Identify and instruct in appropriate isolation precautions for identified infection/condition  Outcome: Progressing    Goal: Absence of fever/infection during neutropenic period  INTERVENTIONS:  - Monitor WBC  - Implement neutropenic guidelines  Outcome: Progressing      Problem: Knowledge Deficit  Goal: Patient/family/caregiver demonstrates understanding of disease process, treatment plan, medications, and discharge instructions  Complete learning assessment and assess knowledge base    Interventions:  - Provide teaching at level of understanding  - Provide teaching via preferred learning methods  Outcome: Progressing
24-May-2021

## 2021-05-23 NOTE — PROVIDER CONTACT NOTE (OTHER) - BACKGROUND
Pt admitted for kidney transplant. PMH of ESRD on HD, HTN, cataract, and glaucoma.

## 2021-05-23 NOTE — DISCHARGE NOTE PROVIDER - NSDCMRMEDTOKEN_GEN_ALL_CORE_FT
amLODIPine 10 mg oral tablet: 1 tab(s) orally once a day  calcium acetate 667 mg oral tablet: 3 tab(s) orally 3 times a day   hydrALAZINE 100 mg oral tablet: 1 tab(s) orally 3 times a day  isosorbide mononitrate 10 mg oral tablet: 10 milligram(s) orally once a day  labetalol 100 mg oral tablet: 1 tab(s) orally 2 times a day  latanoprost 0.005% ophthalmic solution: 1 drop(s) to each affected eye once a day (in the evening) both eyes  Lokelma 5 g oral powder for reconstitution: 1 packet(s) orally Tuesday, Thursday, Saturday, Sunday   multivitamin:   Ventolin HFA 90 mcg/inh inhalation aerosol: 2 puff(s) inhaled every 6 hours, As Needed   aspirin 81 mg oral tablet, chewable: 1 tab(s) orally once a day  CellCept 500 mg oral tablet: 2 tab(s) orally 2 times a day  Envarsus XR 1 mg oral tablet, extended release: 9 tab(s) orally once a day  furosemide 80 mg oral tablet: 1 tab(s) orally once a day  hydrALAZINE 100 mg oral tablet: 1 tab(s) orally 3 times a day   labetalol 100 mg oral tablet: 2 tab(s) orally 2 times a day  latanoprost 0.005% ophthalmic solution: 1 drop(s) to each affected eye once a day (in the evening) both eyes  multivitamin:   NIFEdipine 60 mg oral tablet, extended release: 1 tab(s) orally 2 times a day  nystatin 100,000 units/mL oral suspension: 5 milliliter(s) orally 4 times a day  pantoprazole 40 mg oral delayed release tablet: 1 tab(s) orally once a day (before a meal)  predniSONE: please follow your steroid taper sheet  senna oral tablet: 2 tab(s) orally once a day (at bedtime), As Needed  sodium bicarbonate 650 mg oral tablet: 2 tab(s) orally 3 times a day   sulfamethoxazole-trimethoprim 400 mg-80 mg oral tablet: 1 tab(s) orally once a day  valGANciclovir 450 mg oral tablet: 1 tab(s) orally Monday, Wednesday, and Friday  Ventolin HFA 90 mcg/inh inhalation aerosol: 2 puff(s) inhaled every 6 hours, As Needed

## 2021-05-23 NOTE — PROGRESS NOTE ADULT - ASSESSMENT
75 year old  female with PMH significant for HTN x since age 24,  ESRD on HD x6 years (MWF via RUE AVG)-  anuric,  L subclavian DVT 2017,   Has received Pfizer covid vaccine x 2 doses.  Presents today for a DDRT.  She feels well, offers no complaints denies weakness, fatigue, SOB, chest pain, palp, recent travel or sick contacts.  Last HD was done on 5/17 via RUE AVG. Will receive HD today prior to DDRT. Simulect will be used for induction.    1. s/p DDRT 5/19/21. Allograft function with DGF due to ATN from ischemia reperfusion injury.    Last HD done on 5/20 (no UF, no heparin)  on Lasix gtt , UOP has improved but BUN/Cr is up trending. will give one dose of metolazone today and continue lasix gtt.   She will likely need dialysis tomw.     2. IS meds- Simulect Induction. Second dose on POD day 4   On Envarsus dosing per level ( goal 8-10) 8mg this am, f/ur level, MMF 1g BID and Steroid taper.   PPx: Nystatin/Valcyte/Bactrim/Protonix    3. HTN- Increase labetalol to 200mg po bid. Continue Nifedipine  60 mg po bid and Hydralazine 100 po tid  4. Metabolic acidosis- start sodium bicarbonate 1300 mg po tid   5. Anemia - Hb is 8.9 today

## 2021-05-23 NOTE — DISCHARGE NOTE PROVIDER - NSDCFUADDAPPT_GEN_ALL_CORE_FT
follow up with Dr. Henderson on 5/27 with labs  follow up in 4-6 weeks for removal of ureteral stent

## 2021-05-23 NOTE — PROGRESS NOTE ADULT - ASSESSMENT
75F with HTN, ESRD on HD 2017 (MWF via R AVF--oliguria, etiology unknown, no bx, Dr. Treviño), Glaucoma, h/o DVT to L subclavian vein (2017.   s/p R DDRT (1a/1v/1u--stented). Simulect induction.    [] POD 4 s/p R DDRT c/b DGF  - Creatinine trending up, K 5.1,   - No acute indication for HD today.  Will likely need HD tomorrow; Repeat BMP this afternoon  - SCDs while in bed at all times, no SQH, encourage spirometry, Please keep pt OOB for all meals  Ambulate with pt four times a day- Immuno: Env by level, MMF 1g bid, Pred taper, Simulect today  - PPX: valcyte/bactrim/nystatin  - strict I&Os via sethi/KATHARINA  - Diet: Regular  - Pain control: tramadol prn  - Bowel regimen  - SCDs while in bed at all times, no SQH, encourage spirometry, OOB  - Remove Sethi/KATHARINA this afternoon  - add sodium bicarb 1300mg tid      [] HTN  - increase Labetalol 200mg bid, cont Hydralazine 100mg tid, Cont nifedipine 60mg bid    [] Dispo:  - likely Monday

## 2021-05-24 ENCOUNTER — TRANSCRIPTION ENCOUNTER (OUTPATIENT)
Age: 76
End: 2021-05-24

## 2021-05-24 VITALS
OXYGEN SATURATION: 99 % | HEART RATE: 84 BPM | TEMPERATURE: 98 F | RESPIRATION RATE: 18 BRPM | SYSTOLIC BLOOD PRESSURE: 148 MMHG | DIASTOLIC BLOOD PRESSURE: 63 MMHG

## 2021-05-24 LAB
ALBUMIN SERPL ELPH-MCNC: 3.5 G/DL — SIGNIFICANT CHANGE UP (ref 3.3–5)
ALP SERPL-CCNC: 65 U/L — SIGNIFICANT CHANGE UP (ref 40–120)
ALT FLD-CCNC: 5 U/L — LOW (ref 10–45)
ANION GAP SERPL CALC-SCNC: 17 MMOL/L — SIGNIFICANT CHANGE UP (ref 5–17)
AST SERPL-CCNC: 12 U/L — SIGNIFICANT CHANGE UP (ref 10–40)
BASOPHILS # BLD AUTO: 0 K/UL — SIGNIFICANT CHANGE UP (ref 0–0.2)
BASOPHILS NFR BLD AUTO: 0 % — SIGNIFICANT CHANGE UP (ref 0–2)
BILIRUB SERPL-MCNC: 0.4 MG/DL — SIGNIFICANT CHANGE UP (ref 0.2–1.2)
BUN SERPL-MCNC: 42 MG/DL — HIGH (ref 7–23)
CALCIUM SERPL-MCNC: 8.8 MG/DL — SIGNIFICANT CHANGE UP (ref 8.4–10.5)
CHLORIDE SERPL-SCNC: 94 MMOL/L — LOW (ref 96–108)
CO2 SERPL-SCNC: 23 MMOL/L — SIGNIFICANT CHANGE UP (ref 22–31)
CREAT SERPL-MCNC: 4.67 MG/DL — HIGH (ref 0.5–1.3)
EOSINOPHIL # BLD AUTO: 0 K/UL — SIGNIFICANT CHANGE UP (ref 0–0.5)
EOSINOPHIL NFR BLD AUTO: 0 % — SIGNIFICANT CHANGE UP (ref 0–6)
GLUCOSE BLDC GLUCOMTR-MCNC: 121 MG/DL — HIGH (ref 70–99)
GLUCOSE BLDC GLUCOMTR-MCNC: 140 MG/DL — HIGH (ref 70–99)
GLUCOSE SERPL-MCNC: 96 MG/DL — SIGNIFICANT CHANGE UP (ref 70–99)
HCT VFR BLD CALC: 25.4 % — LOW (ref 34.5–45)
HGB BLD-MCNC: 8.5 G/DL — LOW (ref 11.5–15.5)
LYMPHOCYTES # BLD AUTO: 0.33 K/UL — LOW (ref 1–3.3)
LYMPHOCYTES # BLD AUTO: 9.8 % — LOW (ref 13–44)
MAGNESIUM SERPL-MCNC: 2.1 MG/DL — SIGNIFICANT CHANGE UP (ref 1.6–2.6)
MCHC RBC-ENTMCNC: 28 PG — SIGNIFICANT CHANGE UP (ref 27–34)
MCHC RBC-ENTMCNC: 33.5 GM/DL — SIGNIFICANT CHANGE UP (ref 32–36)
MCV RBC AUTO: 83.6 FL — SIGNIFICANT CHANGE UP (ref 80–100)
MONOCYTES # BLD AUTO: 0.32 K/UL — SIGNIFICANT CHANGE UP (ref 0–0.9)
MONOCYTES NFR BLD AUTO: 9.7 % — SIGNIFICANT CHANGE UP (ref 2–14)
NEUTROPHILS # BLD AUTO: 2.67 K/UL — SIGNIFICANT CHANGE UP (ref 1.8–7.4)
NEUTROPHILS NFR BLD AUTO: 80.5 % — HIGH (ref 43–77)
PHOSPHATE SERPL-MCNC: 5.8 MG/DL — HIGH (ref 2.5–4.5)
PLAT MORPH BLD: NORMAL — SIGNIFICANT CHANGE UP
PLATELET # BLD AUTO: 152 K/UL — SIGNIFICANT CHANGE UP (ref 150–400)
POTASSIUM SERPL-MCNC: 4.1 MMOL/L — SIGNIFICANT CHANGE UP (ref 3.5–5.3)
POTASSIUM SERPL-SCNC: 4.1 MMOL/L — SIGNIFICANT CHANGE UP (ref 3.5–5.3)
PROT SERPL-MCNC: 5.8 G/DL — LOW (ref 6–8.3)
RBC # BLD: 3.04 M/UL — LOW (ref 3.8–5.2)
RBC # FLD: 17.6 % — HIGH (ref 10.3–14.5)
RBC BLD AUTO: SIGNIFICANT CHANGE UP
SODIUM SERPL-SCNC: 134 MMOL/L — LOW (ref 135–145)
TACROLIMUS SERPL-MCNC: 11.4 NG/ML — SIGNIFICANT CHANGE UP
WBC # BLD: 3.32 K/UL — LOW (ref 3.8–10.5)
WBC # FLD AUTO: 3.32 K/UL — LOW (ref 3.8–10.5)

## 2021-05-24 PROCEDURE — 85014 HEMATOCRIT: CPT

## 2021-05-24 PROCEDURE — 76776 US EXAM K TRANSPL W/DOPPLER: CPT

## 2021-05-24 PROCEDURE — 80053 COMPREHEN METABOLIC PANEL: CPT

## 2021-05-24 PROCEDURE — 86709 HEPATITIS A IGM ANTIBODY: CPT

## 2021-05-24 PROCEDURE — 99232 SBSQ HOSP IP/OBS MODERATE 35: CPT | Mod: GC

## 2021-05-24 PROCEDURE — 99261: CPT

## 2021-05-24 PROCEDURE — 87340 HEPATITIS B SURFACE AG IA: CPT

## 2021-05-24 PROCEDURE — 82803 BLOOD GASES ANY COMBINATION: CPT

## 2021-05-24 PROCEDURE — 86870 RBC ANTIBODY IDENTIFICATION: CPT

## 2021-05-24 PROCEDURE — 85730 THROMBOPLASTIN TIME PARTIAL: CPT

## 2021-05-24 PROCEDURE — 87522 HEPATITIS C REVRS TRNSCRPJ: CPT

## 2021-05-24 PROCEDURE — 80048 BASIC METABOLIC PNL TOTAL CA: CPT

## 2021-05-24 PROCEDURE — 86705 HEP B CORE ANTIBODY IGM: CPT

## 2021-05-24 PROCEDURE — U0003: CPT

## 2021-05-24 PROCEDURE — 82947 ASSAY GLUCOSE BLOOD QUANT: CPT

## 2021-05-24 PROCEDURE — 85018 HEMOGLOBIN: CPT

## 2021-05-24 PROCEDURE — 84295 ASSAY OF SERUM SODIUM: CPT

## 2021-05-24 PROCEDURE — 97161 PT EVAL LOW COMPLEX 20 MIN: CPT

## 2021-05-24 PROCEDURE — 82962 GLUCOSE BLOOD TEST: CPT

## 2021-05-24 PROCEDURE — C2617: CPT

## 2021-05-24 PROCEDURE — 83605 ASSAY OF LACTIC ACID: CPT

## 2021-05-24 PROCEDURE — 85610 PROTHROMBIN TIME: CPT

## 2021-05-24 PROCEDURE — 80197 ASSAY OF TACROLIMUS: CPT

## 2021-05-24 PROCEDURE — 86706 HEP B SURFACE ANTIBODY: CPT

## 2021-05-24 PROCEDURE — 86901 BLOOD TYPING SEROLOGIC RH(D): CPT

## 2021-05-24 PROCEDURE — 86850 RBC ANTIBODY SCREEN: CPT

## 2021-05-24 PROCEDURE — 82435 ASSAY OF BLOOD CHLORIDE: CPT

## 2021-05-24 PROCEDURE — 83735 ASSAY OF MAGNESIUM: CPT

## 2021-05-24 PROCEDURE — 86900 BLOOD TYPING SEROLOGIC ABO: CPT

## 2021-05-24 PROCEDURE — 86704 HEP B CORE ANTIBODY TOTAL: CPT

## 2021-05-24 PROCEDURE — 87389 HIV-1 AG W/HIV-1&-2 AB AG IA: CPT

## 2021-05-24 PROCEDURE — 84100 ASSAY OF PHOSPHORUS: CPT

## 2021-05-24 PROCEDURE — C1889: CPT

## 2021-05-24 PROCEDURE — 86922 COMPATIBILITY TEST ANTIGLOB: CPT

## 2021-05-24 PROCEDURE — 86880 COOMBS TEST DIRECT: CPT

## 2021-05-24 PROCEDURE — 86803 HEPATITIS C AB TEST: CPT

## 2021-05-24 PROCEDURE — 93005 ELECTROCARDIOGRAM TRACING: CPT

## 2021-05-24 PROCEDURE — 84132 ASSAY OF SERUM POTASSIUM: CPT

## 2021-05-24 PROCEDURE — 71045 X-RAY EXAM CHEST 1 VIEW: CPT

## 2021-05-24 PROCEDURE — 86905 BLOOD TYPING RBC ANTIGENS: CPT

## 2021-05-24 PROCEDURE — 85025 COMPLETE CBC W/AUTO DIFF WBC: CPT

## 2021-05-24 PROCEDURE — 82330 ASSAY OF CALCIUM: CPT

## 2021-05-24 RX ORDER — FUROSEMIDE 40 MG
80 TABLET ORAL DAILY
Refills: 0 | Status: DISCONTINUED | OUTPATIENT
Start: 2021-05-24 | End: 2021-05-24

## 2021-05-24 RX ORDER — NIFEDIPINE 30 MG
1 TABLET, EXTENDED RELEASE 24 HR ORAL
Qty: 0 | Refills: 0 | DISCHARGE
Start: 2021-05-24

## 2021-05-24 RX ORDER — SODIUM BICARBONATE 1 MEQ/ML
2 SYRINGE (ML) INTRAVENOUS
Qty: 180 | Refills: 5
Start: 2021-05-24 | End: 2021-11-19

## 2021-05-24 RX ORDER — LABETALOL HCL 100 MG
2 TABLET ORAL
Qty: 0 | Refills: 5 | DISCHARGE
Start: 2021-05-24 | End: 2021-11-19

## 2021-05-24 RX ORDER — HYDRALAZINE HCL 50 MG
1 TABLET ORAL
Qty: 0 | Refills: 0 | DISCHARGE

## 2021-05-24 RX ORDER — ASPIRIN/CALCIUM CARB/MAGNESIUM 324 MG
1 TABLET ORAL
Qty: 0 | Refills: 0 | DISCHARGE
Start: 2021-05-24

## 2021-05-24 RX ORDER — TACROLIMUS 5 MG/1
9 CAPSULE ORAL
Refills: 0 | Status: DISCONTINUED | OUTPATIENT
Start: 2021-05-25 | End: 2021-05-24

## 2021-05-24 RX ORDER — AMLODIPINE BESYLATE 2.5 MG/1
1 TABLET ORAL
Qty: 0 | Refills: 0 | DISCHARGE

## 2021-05-24 RX ORDER — TACROLIMUS 5 MG/1
9 CAPSULE ORAL ONCE
Refills: 0 | Status: COMPLETED | OUTPATIENT
Start: 2021-05-24 | End: 2021-05-24

## 2021-05-24 RX ORDER — PANTOPRAZOLE SODIUM 20 MG/1
1 TABLET, DELAYED RELEASE ORAL
Qty: 0 | Refills: 0 | DISCHARGE
Start: 2021-05-24

## 2021-05-24 RX ORDER — ISOSORBIDE MONONITRATE 60 MG/1
10 TABLET, EXTENDED RELEASE ORAL
Qty: 0 | Refills: 0 | DISCHARGE

## 2021-05-24 RX ORDER — SENNA PLUS 8.6 MG/1
2 TABLET ORAL
Qty: 0 | Refills: 0 | DISCHARGE
Start: 2021-05-24

## 2021-05-24 RX ORDER — FUROSEMIDE 40 MG
1 TABLET ORAL
Qty: 0 | Refills: 0 | DISCHARGE
Start: 2021-05-24

## 2021-05-24 RX ORDER — TACROLIMUS 5 MG/1
9 CAPSULE ORAL
Qty: 0 | Refills: 0 | DISCHARGE
Start: 2021-05-24

## 2021-05-24 RX ORDER — HYDRALAZINE HCL 50 MG
1 TABLET ORAL
Qty: 90 | Refills: 5
Start: 2021-05-24 | End: 2021-11-19

## 2021-05-24 RX ORDER — LABETALOL HCL 100 MG
2 TABLET ORAL
Qty: 120 | Refills: 5
Start: 2021-05-24 | End: 2021-11-19

## 2021-05-24 RX ORDER — TACROLIMUS 5 MG/1
2 CAPSULE ORAL
Qty: 0 | Refills: 0 | DISCHARGE
Start: 2021-05-24

## 2021-05-24 RX ORDER — NYSTATIN 500MM UNIT
5 POWDER (EA) MISCELLANEOUS
Qty: 0 | Refills: 0 | DISCHARGE
Start: 2021-05-24

## 2021-05-24 RX ORDER — VALGANCICLOVIR 450 MG/1
1 TABLET, FILM COATED ORAL
Qty: 0 | Refills: 0 | DISCHARGE
Start: 2021-05-24

## 2021-05-24 RX ADMIN — Medication 2001 MILLIGRAM(S): at 08:21

## 2021-05-24 RX ADMIN — Medication 500000 UNIT(S): at 01:17

## 2021-05-24 RX ADMIN — Medication 500000 UNIT(S): at 06:10

## 2021-05-24 RX ADMIN — Medication 1 TABLET(S): at 12:52

## 2021-05-24 RX ADMIN — Medication 10 MILLIGRAM(S): at 06:10

## 2021-05-24 RX ADMIN — Medication 60 MILLIGRAM(S): at 06:11

## 2021-05-24 RX ADMIN — PANTOPRAZOLE SODIUM 40 MILLIGRAM(S): 20 TABLET, DELAYED RELEASE ORAL at 06:11

## 2021-05-24 RX ADMIN — Medication 1300 MILLIGRAM(S): at 15:21

## 2021-05-24 RX ADMIN — MYCOPHENOLATE MOFETIL 1 GRAM(S): 250 CAPSULE ORAL at 08:21

## 2021-05-24 RX ADMIN — TACROLIMUS 9 MILLIGRAM(S): 5 CAPSULE ORAL at 12:51

## 2021-05-24 RX ADMIN — Medication 81 MILLIGRAM(S): at 12:52

## 2021-05-24 RX ADMIN — Medication 100 MILLIGRAM(S): at 15:21

## 2021-05-24 RX ADMIN — Medication 1300 MILLIGRAM(S): at 06:10

## 2021-05-24 RX ADMIN — VALGANCICLOVIR 450 MILLIGRAM(S): 450 TABLET, FILM COATED ORAL at 08:21

## 2021-05-24 RX ADMIN — Medication 200 MILLIGRAM(S): at 06:11

## 2021-05-24 RX ADMIN — Medication 100 MILLIGRAM(S): at 06:10

## 2021-05-24 RX ADMIN — CHLORHEXIDINE GLUCONATE 1 APPLICATION(S): 213 SOLUTION TOPICAL at 12:48

## 2021-05-24 RX ADMIN — Medication 500000 UNIT(S): at 12:52

## 2021-05-24 NOTE — PROGRESS NOTE ADULT - ATTENDING COMMENTS
see np note
Allograft function with DGF due to ATN requiring dialysis  d/c home today . Next dialysis session on Wednesday at her unit and f/u in clinic on Thursday  IS meds as above   d/c on Lasix 80 mg po daily
POD1 DDRT  ATN from ischemia reperfusion injury.    K elevated - plan for dialysis today.   Continue immunosuppression protocol.  Simulect induction.
POD1 sp DDRTx  oliguric. likely DGF  Immuno: tac/cellcept/pred
pt feels well.  Still with ATN, will try diuretic challenge.   Cont immunosuppressive protocol.
POD 2 s/p R DDRT c/b DGF  HD yesterday    P  diuretics - metolazone and lasix  renal doppler    dose env by level  mmf, pred
POD 4 s/p R DDRT c/b DGF  - Creatinine trending up, K 5.1,   - No acute indication for HD today.  Will likely need HD tomorrow; Repeat BMP this afternoon  -Immuno: Env by level, MMF 1g bid, Pred taper, Simulect today  - PPX: valcyte/bactrim/nystatin  - strict I&Os via sethi/KATHARINA  - Diet: Regular  - Pain control: tramadol prn  - Remove Sethi/KATHARINA this afternoon  - add sodium bicarb 1300mg tid

## 2021-05-24 NOTE — PROGRESS NOTE ADULT - SUBJECTIVE AND OBJECTIVE BOX
[facility] DIVISION OF KIDNEY DISEASES AND HYPERTENSION -- FOLLOW UP NOTE  --------------------------------------------------------------------------------      24 hour events/subjective: Pt seen & examined.  Off lasix gtt yesterday (no response); received HD last night. Savanah/KATHARINA removed          PAST HISTORY  --------------------------------------------------------------------------------  No significant changes to PMH, PSH, FHx, SHx, unless otherwise noted    ALLERGIES & MEDICATIONS  --------------------------------------------------------------------------------  Allergies    Mushrooms (Anaphylaxis)  penicillin (Rash)    Intolerances      Standing Inpatient Medications  aspirin  chewable 81 milliGRAM(s) Oral daily  chlorhexidine 2% Cloths 1 Application(s) Topical daily  furosemide    Tablet 80 milliGRAM(s) Oral daily  hydrALAZINE 100 milliGRAM(s) Oral three times a day  labetalol 200 milliGRAM(s) Oral two times a day  latanoprost 0.005% Ophthalmic Solution 1 Drop(s) Both EYES at bedtime  methylPREDNISolone sodium succinate IVPB 500 milliGRAM(s) IV Intermittent once  mycophenolate mofetil 1 Gram(s) Oral <User Schedule>  NIFEdipine XL 60 milliGRAM(s) Oral two times a day  nystatin    Suspension 581422 Unit(s) Swish and Swallow four times a day  pantoprazole    Tablet 40 milliGRAM(s) Oral before breakfast  predniSONE   Tablet 10 milliGRAM(s) Oral two times a day  senna 2 Tablet(s) Oral at bedtime  sodium bicarbonate 1300 milliGRAM(s) Oral three times a day  trimethoprim   80 mG/sulfamethoxazole 400 mG 1 Tablet(s) Oral daily  valGANciclovir 450 milliGRAM(s) Oral <User Schedule>    PRN Inpatient Medications  HYDROmorphone  Injectable 0.25 milliGRAM(s) IV Push every 10 minutes PRN  naloxone Injectable 0.1 milliGRAM(s) IV Push every 3 minutes PRN  ondansetron Injectable 4 milliGRAM(s) IV Push every 6 hours PRN  traMADol 25 milliGRAM(s) Oral every 6 hours PRN  traMADol 50 milliGRAM(s) Oral every 6 hours PRN      REVIEW OF SYSTEMS  --------------------------------------------------------------------------------  Gen: No fatigue, fevers/chills, weakness  Skin: No rashes  Head/Eyes/Ears/Mouth: No headache;No sore throat  Respiratory: No dyspnea, cough,   CV: No chest pain, PND, orthopnea  GI: No abdominal pain, diarrhea, constipation, nausea, vomiting  Transplant: No pain  : No increased frequency, dysuria, hematuria, nocturia  MSK: No joint pain/swelling; no back pain; no edema  Neuro: No dizziness/lightheadedness, weakness, seizures, numbness, tingling  Psych: No significant nervousness, anxiety, stress, depression    All other systems were reviewed and are negative, except as noted.    VITALS/PHYSICAL EXAM  --------------------------------------------------------------------------------  T(C): 36.6 (05-24-21 @ 13:01), Max: 36.8 (05-24-21 @ 01:00)  HR: 84 (05-24-21 @ 13:01) (69 - 84)  BP: 148/63 (05-24-21 @ 13:01) (144/64 - 164/76)  RR: 18 (05-24-21 @ 13:01) (18 - 19)  SpO2: 99% (05-24-21 @ 13:01) (94% - 99%)  Wt(kg): --        05-23-21 @ 07:01  -  05-24-21 @ 07:00  --------------------------------------------------------  IN: 2020 mL / OUT: 1196 mL / NET: 824 mL      Physical Exam:  	Gen: NAD, well-appearing  	HEENT: PERRL, supple neck  	Pulm: CTA B/L  	CV: RRR, S1S2; no rub  	Back: No spinal or CVA tenderness; no sacral edema  	Abd: +BS, soft, nontender/nondistended  	: No suprapubic tenderness  	UE: Warm, FROM, no clubbing, intact strength; no edema  	LE: Warm, FROM, no clubbing, intact strength; no LE edema b/l, LUE edema better  	Neuro: No focal deficits, intact gait  	Psych: Normal affect and mood  	Skin: Warm, without rashes  	Vascular access: SHILOH PABLO           LABS/STUDIES  --------------------------------------------------------------------------------              8.5    3.32  >-----------<  152      [05-24-21 @ 07:52]              25.4     134  |  94  |  42  ----------------------------<  96      [05-24-21 @ 07:52]  4.1   |  23  |  4.67        Ca     8.8     [05-24-21 @ 07:52]      Mg     2.1     [05-24-21 @ 07:52]      Phos  5.8     [05-24-21 @ 07:52]    TPro  5.8  /  Alb  3.5  /  TBili  0.4  /  DBili  x   /  AST  12  /  ALT  5   /  AlkPhos  65  [05-24-21 @ 07:52]          Creatinine Trend:  SCr 4.67 [05-24 @ 07:52]  SCr 8.76 [05-23 @ 15:26]  SCr 8.15 [05-23 @ 06:48]  SCr 6.63 [05-22 @ 06:02]  SCr 4.73 [05-21 @ 06:22]    Tacrolimus (), Serum: 11.4 ng/mL (05-24 @ 08:31)  Tacrolimus (), Serum: 21.2 ng/mL (05-23 @ 07:51)  Tacrolimus (), Serum: 6.9 ng/mL (05-22 @ 08:09)  Tacrolimus (), Serum: 3.9 ng/mL (05-21 @ 07:20)                HbA1c 4.6      [05-28-19 @ 09:49]  TSH 3.75      [02-02-21 @ 19:57]  Lipid: chol 162, TG 63, HDL 81, LDL --      [02-02-21 @ 19:57]    HBsAb 22.4      [05-19-21 @ 10:42]  HBsAg Nonreact      [05-19-21 @ 10:42]  HBcAb Nonreact      [05-19-21 @ 10:42]  HCV 0.42, Nonreact      [05-19-21 @ 10:42]  HIV Nonreact      [05-19-21 @ 10:45]       [facility] DIVISION OF KIDNEY DISEASES AND HYPERTENSION -- FOLLOW UP NOTE  --------------------------------------------------------------------------------    24 hour events/subjective: Pt seen & examined.  Off lasix gtt yesterday (no response); received HD last night. Savanah/KATHARINA removed    PAST HISTORY  --------------------------------------------------------------------------------  No significant changes to PMH, PSH, FHx, SHx, unless otherwise noted    ALLERGIES & MEDICATIONS  --------------------------------------------------------------------------------  Allergies    Mushrooms (Anaphylaxis)  penicillin (Rash)    Intolerances      Standing Inpatient Medications  aspirin  chewable 81 milliGRAM(s) Oral daily  chlorhexidine 2% Cloths 1 Application(s) Topical daily  furosemide    Tablet 80 milliGRAM(s) Oral daily  hydrALAZINE 100 milliGRAM(s) Oral three times a day  labetalol 200 milliGRAM(s) Oral two times a day  latanoprost 0.005% Ophthalmic Solution 1 Drop(s) Both EYES at bedtime  methylPREDNISolone sodium succinate IVPB 500 milliGRAM(s) IV Intermittent once  mycophenolate mofetil 1 Gram(s) Oral <User Schedule>  NIFEdipine XL 60 milliGRAM(s) Oral two times a day  nystatin    Suspension 809406 Unit(s) Swish and Swallow four times a day  pantoprazole    Tablet 40 milliGRAM(s) Oral before breakfast  predniSONE   Tablet 10 milliGRAM(s) Oral two times a day  senna 2 Tablet(s) Oral at bedtime  sodium bicarbonate 1300 milliGRAM(s) Oral three times a day  trimethoprim   80 mG/sulfamethoxazole 400 mG 1 Tablet(s) Oral daily  valGANciclovir 450 milliGRAM(s) Oral <User Schedule>    PRN Inpatient Medications  HYDROmorphone  Injectable 0.25 milliGRAM(s) IV Push every 10 minutes PRN  naloxone Injectable 0.1 milliGRAM(s) IV Push every 3 minutes PRN  ondansetron Injectable 4 milliGRAM(s) IV Push every 6 hours PRN  traMADol 25 milliGRAM(s) Oral every 6 hours PRN  traMADol 50 milliGRAM(s) Oral every 6 hours PRN      REVIEW OF SYSTEMS  --------------------------------------------------------------------------------  Gen: No fatigue, fevers/chills, weakness  Skin: No rashes  Head/Eyes/Ears/Mouth: No headache;No sore throat  Respiratory: No dyspnea, cough,   CV: No chest pain, PND, orthopnea  GI: No abdominal pain, diarrhea, constipation, nausea, vomiting  Transplant: No pain  : No increased frequency, dysuria, hematuria, nocturia  MSK: No joint pain/swelling; no back pain; no edema  Neuro: No dizziness/lightheadedness, weakness, seizures, numbness, tingling  Psych: No significant nervousness, anxiety, stress, depression    All other systems were reviewed and are negative, except as noted.    VITALS/PHYSICAL EXAM  --------------------------------------------------------------------------------  T(C): 36.6 (05-24-21 @ 13:01), Max: 36.8 (05-24-21 @ 01:00)  HR: 84 (05-24-21 @ 13:01) (69 - 84)  BP: 148/63 (05-24-21 @ 13:01) (144/64 - 164/76)  RR: 18 (05-24-21 @ 13:01) (18 - 19)  SpO2: 99% (05-24-21 @ 13:01) (94% - 99%)  Wt(kg): --        05-23-21 @ 07:01  -  05-24-21 @ 07:00  --------------------------------------------------------  IN: 2020 mL / OUT: 1196 mL / NET: 824 mL      Physical Exam:  	Gen: NAD, well-appearing  	HEENT: PERRL, supple neck  	Pulm: CTA B/L  	CV: RRR, S1S2; no rub  	Back: No spinal or CVA tenderness; no sacral edema  	Abd: +BS, soft, nontender/nondistended  	: No suprapubic tenderness  	UE: Warm, FROM, no clubbing, intact strength; no edema  	LE: Warm, FROM, no clubbing, intact strength; no LE edema b/l, LUE edema better  	Neuro: No focal deficits, intact gait  	Psych: Normal affect and mood  	Skin: Warm, without rashes  	Vascular access: SHILOH PABLO           LABS/STUDIES  --------------------------------------------------------------------------------              8.5    3.32  >-----------<  152      [05-24-21 @ 07:52]              25.4     134  |  94  |  42  ----------------------------<  96      [05-24-21 @ 07:52]  4.1   |  23  |  4.67        Ca     8.8     [05-24-21 @ 07:52]      Mg     2.1     [05-24-21 @ 07:52]      Phos  5.8     [05-24-21 @ 07:52]    TPro  5.8  /  Alb  3.5  /  TBili  0.4  /  DBili  x   /  AST  12  /  ALT  5   /  AlkPhos  65  [05-24-21 @ 07:52]          Creatinine Trend:  SCr 4.67 [05-24 @ 07:52]  SCr 8.76 [05-23 @ 15:26]  SCr 8.15 [05-23 @ 06:48]  SCr 6.63 [05-22 @ 06:02]  SCr 4.73 [05-21 @ 06:22]    Tacrolimus (), Serum: 11.4 ng/mL (05-24 @ 08:31)  Tacrolimus (), Serum: 21.2 ng/mL (05-23 @ 07:51)  Tacrolimus (), Serum: 6.9 ng/mL (05-22 @ 08:09)  Tacrolimus (), Serum: 3.9 ng/mL (05-21 @ 07:20)                HbA1c 4.6      [05-28-19 @ 09:49]  TSH 3.75      [02-02-21 @ 19:57]  Lipid: chol 162, TG 63, HDL 81, LDL --      [02-02-21 @ 19:57]    HBsAb 22.4      [05-19-21 @ 10:42]  HBsAg Nonreact      [05-19-21 @ 10:42]  HBcAb Nonreact      [05-19-21 @ 10:42]  HCV 0.42, Nonreact      [05-19-21 @ 10:42]  HIV Nonreact      [05-19-21 @ 10:45]

## 2021-05-24 NOTE — PROGRESS NOTE ADULT - SUBJECTIVE AND OBJECTIVE BOX
Transplant Surgery Multidisciplinary Progress Note  --------------------------------------------------------------  R DDRT 5/19/2021 POD 5    Present:   Patient seen and examined with multidisciplinary team including Transplant Surgeon: Dr. El, Transplant Nephrologist:  Dr. SUSANNAH Ayala, CHRISTOPHER Dinero, Transplant Pharmacist Faizan Saxena, and unit RN during am rounds.  Disciplines not in attendance will be notified of the plan.     HPI: 75F with HTN, ESRD on HD 2017 (MWF via R AVF--oliguric, etiology unknown, no bx, Dr. Treviño), Glaucoma, h/o DVT to L subclavian vein (2017).  Underwent R DDRT (1a/1v/1u--stented). Simulect induction. Post op course c/b DGF requiring HD.     Interval events:   - Afebrile, stable VSS  - Dced lasix gtt yesterday; received HD  - Pavon/KATHARINA removed  - BP better controlled    Potential Discharge date: today  Education:  Medications  Plan of care:  See Below    MEDICATIONS  (STANDING):  aspirin  chewable 81 milliGRAM(s) Oral daily  chlorhexidine 2% Cloths 1 Application(s) Topical daily  furosemide    Tablet 80 milliGRAM(s) Oral daily  hydrALAZINE 100 milliGRAM(s) Oral three times a day  labetalol 200 milliGRAM(s) Oral two times a day  latanoprost 0.005% Ophthalmic Solution 1 Drop(s) Both EYES at bedtime  methylPREDNISolone sodium succinate IVPB 500 milliGRAM(s) IV Intermittent once  mycophenolate mofetil 1 Gram(s) Oral <User Schedule>  NIFEdipine XL 60 milliGRAM(s) Oral two times a day  nystatin    Suspension [id number] Unit(s) Swish and Swallow four times a day  pantoprazole    Tablet 40 milliGRAM(s) Oral before breakfast  predniSONE   Tablet 10 milliGRAM(s) Oral two times a day  senna 2 Tablet(s) Oral at bedtime  sodium bicarbonate 1300 milliGRAM(s) Oral three times a day  tacrolimus ER Tablet (ENVARSUS XR) 9 milliGRAM(s) Oral once  trimethoprim   80 mG/sulfamethoxazole 400 mG 1 Tablet(s) Oral daily  valGANciclovir 450 milliGRAM(s) Oral <User Schedule>    MEDICATIONS  (PRN):  HYDROmorphone  Injectable 0.25 milliGRAM(s) IV Push every 10 minutes PRN Severe Pain (7 - 10)  naloxone Injectable 0.1 milliGRAM(s) IV Push every 3 minutes PRN For ANY of the following changes in patient status:  A. RR LESS THAN 10 breaths per minute, B. Oxygen saturation LESS THAN 90%, C. Sedation score of 6  ondansetron Injectable 4 milliGRAM(s) IV Push every 6 hours PRN Nausea and/or Vomiting  traMADol 25 milliGRAM(s) Oral every 6 hours PRN Moderate Pain (4 - 6)  traMADol 50 milliGRAM(s) Oral every 6 hours PRN Severe Pain (7 - 10)      PAST MEDICAL & SURGICAL HISTORY:  HTN (hypertension)  Glaucoma  Cataract  ESRD (end stage renal disease) on dialysis  DVT (deep venous thrombosis)  of Left subclavian vein, 06/12/17  Hemodialysis patient MWF  Acquired cataract extraction with b/l lense placement, 2016  H/O: glaucoma surgery, 2002  AV fistula 2015  S/P KEVEN-BSO for fibroids, 2012  Hemodialysis access, AV graft    Vital Signs Last 24 Hrs  T(C): 36.4 (24 May 2021 10:09), Max: 36.8 (24 May 2021 01:00)  T(F): 97.5 (24 May 2021 10:09), Max: 98.2 (24 May 2021 01:00)  HR: 69 (24 May 2021 10:09) (68 - 84)  BP: 144/64 (24 May 2021 10:09) (144/64 - 164/76)  BP(mean): 109 (24 May 2021 05:00) (105 - 109)  RR: 18 (24 May 2021 10:09) (18 - 19)  SpO2: 96% (24 May 2021 10:09) (94% - 98%)    I&O's Summary    23 May 2021 07:01  -  24 May 2021 07:00  --------------------------------------------------------  IN: 2020 mL / OUT: 1196 mL / NET: 824 mL                         8.5    3.32  )-----------( 152      ( 24 May 2021 07:52 )             25.4     05-24    134<L>  |  94<L>  |  42<H>  ----------------------------<  96  4.1   |  23  |  4.67<H>    Ca    8.8      24 May 2021 07:52  Phos  5.8     05-24  Mg     2.1     05-24    TPro  5.8<L>  /  Alb  3.5  /  TBili  0.4  /  DBili  x   /  AST  12  /  ALT  5<L>  /  AlkPhos  65  05-24    Tacrolimus (), Serum: 11.4 ng/mL (05-24 @ 08:31)    Review of systems  Gen: No weight changes, fatigue, fevers/chills, weakness  Skin: No rashes  Head/Eyes/Ears/Mouth: No headache; Normal hearing; Normal vision w/o blurriness; No sinus pain/discomfort, sore throat  Respiratory: No dyspnea, cough, wheezing, hemoptysis  CV: No chest pain, PND, orthopnea  GI: Mild abdominal pain at surgical incision site; denies diarrhea, constipation, nausea, vomiting, melena, hematochezia  : No increased frequency, dysuria, hematuria, nocturia  MSK: No joint pain/swelling; no back pain; no edema  Neuro: No dizziness/lightheadedness, weakness, seizures, numbness, tingling  Heme: No easy bruising or bleeding  Endo: No heat/cold intolerance  Psych: No significant nervousness, anxiety, stress, depression  All other systems were reviewed and are negative, except as noted.    PHYSICAL EXAM:  Constitutional: Well developed / well nourished  Eyes: Anicteric, PERRLA  ENMT: nc/at  Neck: supple   Respiratory: CTA B/L  Cardiovascular: RRR  Gastrointestinal: Soft, ND. incision c/d/i,  Genitourinary: voiding spontaneously   Extremities: SCD's in place and working bilaterally, R AVF palpable.  no edema  Vascular: Palpable dp pulses bilaterally  Neurological: A&O x3  Skin: no rashes, ulcerations or lesions;  Musculoskeletal: Moving all extremities  Psychiatric: Responsive

## 2021-05-24 NOTE — PROGRESS NOTE ADULT - PROVIDER SPECIALTY LIST ADULT
Anesthesia
Transplant Nephrology
Transplant Nephrology
Transplant Surgery
Transplant Surgery
Anesthesia
Pharmacy
Transplant Nephrology
Transplant Surgery

## 2021-05-24 NOTE — DISCHARGE NOTE NURSING/CASE MANAGEMENT/SOCIAL WORK - PATIENT PORTAL LINK FT
You can access the FollowMyHealth Patient Portal offered by [facility] by registering at the following website: [email or web address]. By joining "[employer]"’s FollowMyHealth portal, you will also be able to view your health information using other applications (apps) compatible with our system.

## 2021-05-24 NOTE — PROGRESS NOTE ADULT - NSICDXPILOT_GEN_ALL_CORE
[place]

## 2021-05-24 NOTE — PROGRESS NOTE ADULT - ASSESSMENT
74 y/o female w/ PMH significant for HTN x since age 24,  ESRD on HD x6 years (MWF via RUE AVG)-  anuric,  L subclavian DVT 2017,   Has received Pfizer covid vaccine x 2 doses.  Presents today for a DDRT.  She feels well, offers no complaints denies weakness, fatigue, SOB, chest pain, palp, recent travel or sick contacts.  Last HD was done on 5/17 via RUE AVG. Will receive HD today prior to DDRT. Simulect will be used for induction.          ESRD on HD x6 years (MWF via RUE AVG)-  s/p DDRT 5/19/21.  POD #5. Pt now with DGF due to ATN from ischemia reperfusion injury requiring HD  Off lasix gtt yesterday (no response); received HD last night  Last HD done on 5/23.   Needs HD as outpatient ([facility]). Gets HD [facility] [phone]  Start lasix 80mg qD on discharge  Monitor BP  D/c calcium acetate  cont sodium bicarb 1300mg tid      Induction: Simulect/ solumedrol  - Lasix 80mg IV x1 + Metolazone 5mg PO x1 today with no response. Off lasix infusion  - strict I&Os   - Immuno:  Tacro level 11.  Env 9mg daily, MMF 1g bid, Pred taper, Simulect completed  -PPx: Nystatin/Valcyte/Bactrim/Protonix    HTN  - Restarted home regimen    - BP elevated- - Labetalol 200mg bid, Hydralazine 100mg tid, nifedipine 60mg bid            If you have any questions, please feel free to contact me  Violeta Weir  Nephrology Fellow  [phone]  (After 5pm or on weekends please page the on-call fellow)

## 2021-05-24 NOTE — PROGRESS NOTE ADULT - ASSESSMENT
75F with HTN, ESRD on HD 2017 (MWF via R AVF--oliguria, etiology unknown, no bx, Dr. Treviño), Glaucoma, h/o DVT to L subclavian vein (2017.   s/p R DDRT (1a/1v/1u--stented). Simulect induction.    [] POD 5 s/p R DDRT c/b DGF requiring HD  - received  HD yesterday; set up for outpt HD  - Immuno: Env 9mg daily, MMF 1g bid, Pred taper, Simulect completed  - PPX: valcyte/bactrim/nystatin  - Diet: Regular  - Pain control: tramadol prn  - Bowel regimen  - SCDs while in bed at all times, no SQH, encourage spirometry, OOB  - cont sodium bicarb 1300mg tid  - dc ca acetate  - lasix 80mg daily     [] HTN  - Labetalol 200mg bid, Hydralazine 100mg tid, nifedipine 60mg bid    [] Dispo:  - dc today, f/u in clinic Thursday

## 2021-05-27 ENCOUNTER — APPOINTMENT (OUTPATIENT)
Dept: NEPHROLOGY | Facility: CLINIC | Age: 76
End: 2021-05-27
Payer: MEDICARE

## 2021-05-27 VITALS
BODY MASS INDEX: 22.82 KG/M2 | WEIGHT: 137 LBS | OXYGEN SATURATION: 98 % | RESPIRATION RATE: 14 BRPM | TEMPERATURE: 97.9 F | HEIGHT: 65 IN | HEART RATE: 74 BPM | SYSTOLIC BLOOD PRESSURE: 164 MMHG | DIASTOLIC BLOOD PRESSURE: 63 MMHG

## 2021-05-27 DIAGNOSIS — N18.6 END STAGE RENAL DISEASE: ICD-10-CM

## 2021-05-27 DIAGNOSIS — Z79.899 OTHER LONG TERM (CURRENT) DRUG THERAPY: ICD-10-CM

## 2021-05-27 DIAGNOSIS — Z99.2 END STAGE RENAL DISEASE: ICD-10-CM

## 2021-05-27 DIAGNOSIS — Z01.818 ENCOUNTER FOR OTHER PREPROCEDURAL EXAMINATION: ICD-10-CM

## 2021-05-27 PROCEDURE — 99214 OFFICE O/P EST MOD 30 MIN: CPT

## 2021-05-27 RX ORDER — PREDNISONE 5 MG/1
5 TABLET ORAL
Qty: 41 | Refills: 0 | Status: DISCONTINUED | COMMUNITY
Start: 2021-05-20 | End: 2021-05-27

## 2021-05-27 RX ORDER — ASPIRIN 81 MG/1
81 TABLET ORAL DAILY
Refills: 0 | Status: ACTIVE | COMMUNITY
Start: 2021-05-27

## 2021-05-27 NOTE — PLAN
[FreeTextEntry1] : 1.  Renal transplant - DDRT from 42 y.o with ATN (creatinine 3.3) and diabetes.  Has DGF but UO is improving.  Monitor for recovery.  For now plan dialysis tomorrow and Monday.  Will bring pt back for f/u Tuesday.\par 2.  Immunosuppression - simulect induction (no thymo due to advanced age).  Target tacrolimus trough of 8-10, MMF 1gm BID and prednisone 5.  \par 3.  HTN - controlled adequately at home.  Want to avoid intradialytic hypotension. \par 4.  anemia - should be getting epo at dialysis.  Will check Hb today. \par \par F/u next week. \par

## 2021-05-27 NOTE — PHYSICAL EXAM
[General Appearance - Alert] : alert [General Appearance - In No Acute Distress] : in no acute distress [General Appearance - Well Nourished] : well nourished [General Appearance - Well Developed] : well developed [Sclera] : the sclera and conjunctiva were normal [Extraocular Movements] : extraocular movements were intact [Neck Appearance] : the appearance of the neck was normal [Neck Cervical Mass (___cm)] : no neck mass was observed [] : no respiratory distress [Respiration, Rhythm And Depth] : normal respiratory rhythm and effort [Exaggerated Use Of Accessory Muscles For Inspiration] : no accessory muscle use [Auscultation Breath Sounds / Voice Sounds] : lungs were clear to auscultation bilaterally [Heart Rate And Rhythm] : heart rate was normal and rhythm regular [Heart Sounds] : normal S1 and S2 [Heart Sounds Gallop] : no gallops [Bowel Sounds] : normal bowel sounds [Abdomen Soft] : soft [Abdomen Tenderness] : non-tender [Cervical Lymph Nodes Enlarged Posterior Bilaterally] : posterior cervical [Cervical Lymph Nodes Enlarged Anterior Bilaterally] : anterior cervical [Supraclavicular Lymph Nodes Enlarged Bilaterally] : supraclavicular [Abnormal Walk] : normal gait [Skin Color & Pigmentation] : normal skin color and pigmentation [Skin Turgor] : normal skin turgor [Cranial Nerves] : cranial nerves 2-12 were intact [No Focal Deficits] : no focal deficits [Oriented To Time, Place, And Person] : oriented to person, place, and time [Impaired Insight] : insight and judgment were intact [Affect] : the affect was normal [FreeTextEntry1] : left hand bruised.

## 2021-05-27 NOTE — HISTORY OF PRESENT ILLNESS
[ Donor] :  donor [Basiliximab] : basiliximab [Other: ___] : [unfilled] [Terminal Creatinine: ____] : Terminal Creatinine: [unfilled] [TextBox_52] : Donor was a 42 y.o female, KDPI 69%, history of diabetes on insulin, terminal creatinine 3.3.  CMV negative, EBV positive.  [de-identified] : Pt had DGF in hospital requiring dialysis.  Here for first post op visit.  Discharged 3 days ago.  She is making more urine - about 500cc but seems to be increasing.  Has slight swelling of right leg.  Pain is controlled.  Ambulating with a walker.  here with her son.  Blood pressure 140 before dialysis yesterday.   [FreeTextEntry1] : 74 years old [place] lady, (looks much younger) retired beautician and . ESRD (on dialysis since 2016) , f/u by Dr. Treviño

## 2021-05-28 ENCOUNTER — NON-APPOINTMENT (OUTPATIENT)
Age: 76
End: 2021-05-28

## 2021-05-28 LAB
ALBUMIN SERPL ELPH-MCNC: 3.7 G/DL
ALP BLD-CCNC: 69 U/L
ALT SERPL-CCNC: 10 U/L
ANION GAP SERPL CALC-SCNC: 14 MMOL/L
APPEARANCE: ABNORMAL
AST SERPL-CCNC: 15 U/L
BACTERIA: ABNORMAL
BASOPHILS # BLD AUTO: 0.02 K/UL
BASOPHILS NFR BLD AUTO: 0.4 %
BILIRUB SERPL-MCNC: 0.4 MG/DL
BILIRUBIN URINE: NEGATIVE
BLOOD URINE: ABNORMAL
BUN SERPL-MCNC: 43 MG/DL
CALCIUM SERPL-MCNC: 8.5 MG/DL
CHLORIDE SERPL-SCNC: 97 MMOL/L
CO2 SERPL-SCNC: 30 MMOL/L
COLOR: NORMAL
CREAT SERPL-MCNC: 5.7 MG/DL
CREAT SPEC-SCNC: 17 MG/DL
CREAT/PROT UR: 6.6 RATIO
EOSINOPHIL # BLD AUTO: 0.1 K/UL
EOSINOPHIL NFR BLD AUTO: 1.8 %
FERRITIN SERPL-MCNC: 1749 NG/ML
FOLATE SERPL-MCNC: 8.8 NG/ML
GLUCOSE QUALITATIVE U: NEGATIVE
GLUCOSE SERPL-MCNC: 100 MG/DL
HCT VFR BLD CALC: 26 %
HGB BLD-MCNC: 8.2 G/DL
IMM GRANULOCYTES NFR BLD AUTO: 0.7 %
IRON SATN MFR SERPL: 23 %
IRON SERPL-MCNC: 45 UG/DL
KETONES URINE: NEGATIVE
LDH SERPL-CCNC: 222 U/L
LEUKOCYTE ESTERASE URINE: NEGATIVE
LYMPHOCYTES # BLD AUTO: 0.48 K/UL
LYMPHOCYTES NFR BLD AUTO: 8.8 %
MAGNESIUM SERPL-MCNC: 2.1 MG/DL
MAN DIFF?: NORMAL
MCHC RBC-ENTMCNC: 28.5 PG
MCHC RBC-ENTMCNC: 31.5 GM/DL
MCV RBC AUTO: 90.3 FL
MICROSCOPIC-UA: NORMAL
MONOCYTES # BLD AUTO: 0.31 K/UL
MONOCYTES NFR BLD AUTO: 5.7 %
NEUTROPHILS # BLD AUTO: 4.5 K/UL
NEUTROPHILS NFR BLD AUTO: 82.6 %
NITRITE URINE: NEGATIVE
PH URINE: 8
PHOSPHATE SERPL-MCNC: 5.5 MG/DL
PLATELET # BLD AUTO: 201 K/UL
POTASSIUM SERPL-SCNC: 4.1 MMOL/L
PROT SERPL-MCNC: 5.6 G/DL
PROT UR-MCNC: 114 MG/DL
PROTEIN URINE: ABNORMAL
RBC # BLD: 2.88 M/UL
RBC # BLD: 2.88 M/UL
RBC # FLD: 18 %
RED BLOOD CELLS URINE: 213 /HPF
RETICS # AUTO: 1.5 %
RETICS AGGREG/RBC NFR: 42.3 K/UL
SODIUM SERPL-SCNC: 140 MMOL/L
SPECIFIC GRAVITY URINE: 1.02
SQUAMOUS EPITHELIAL CELLS: 2 /HPF
TACROLIMUS SERPL-MCNC: 6.2 NG/ML
TIBC SERPL-MCNC: 198 UG/DL
UIBC SERPL-MCNC: 153 UG/DL
URATE SERPL-MCNC: 4.4 MG/DL
UROBILINOGEN URINE: NORMAL
WBC # FLD AUTO: 5.45 K/UL
WHITE BLOOD CELLS URINE: 5 /HPF

## 2021-06-01 LAB — VIT B12 USB SERPL-MCNC: 1170 PG/ML

## 2021-06-03 ENCOUNTER — APPOINTMENT (OUTPATIENT)
Dept: TRANSPLANT | Facility: CLINIC | Age: 76
End: 2021-06-03
Payer: MEDICARE

## 2021-06-03 VITALS
WEIGHT: 124 LBS | TEMPERATURE: 97.8 F | OXYGEN SATURATION: 100 % | BODY MASS INDEX: 20.66 KG/M2 | HEART RATE: 71 BPM | SYSTOLIC BLOOD PRESSURE: 148 MMHG | HEIGHT: 65 IN | DIASTOLIC BLOOD PRESSURE: 69 MMHG

## 2021-06-03 LAB
ALBUMIN SERPL ELPH-MCNC: 4.3 G/DL
ALP BLD-CCNC: 86 U/L
ALT SERPL-CCNC: 5 U/L
ANION GAP SERPL CALC-SCNC: 15 MMOL/L
APPEARANCE: CLEAR
AST SERPL-CCNC: 11 U/L
BACTERIA: NEGATIVE
BASOPHILS # BLD AUTO: 0.02 K/UL
BASOPHILS NFR BLD AUTO: 0.3 %
BILIRUB SERPL-MCNC: 0.3 MG/DL
BILIRUBIN URINE: NEGATIVE
BLOOD URINE: NEGATIVE
BUN SERPL-MCNC: 33 MG/DL
CALCIUM SERPL-MCNC: 9.8 MG/DL
CALCIUM SERPL-MCNC: 9.8 MG/DL
CHLORIDE SERPL-SCNC: 100 MMOL/L
CO2 SERPL-SCNC: 25 MMOL/L
COLOR: NORMAL
CREAT SERPL-MCNC: 4.47 MG/DL
CREAT SPEC-SCNC: 37 MG/DL
CREAT/PROT UR: 0.7 RATIO
EOSINOPHIL # BLD AUTO: 0.05 K/UL
EOSINOPHIL NFR BLD AUTO: 0.8 %
GLUCOSE QUALITATIVE U: NEGATIVE
GLUCOSE SERPL-MCNC: 96 MG/DL
HCT VFR BLD CALC: 28.1 %
HGB BLD-MCNC: 8.7 G/DL
HYALINE CASTS: 0 /LPF
IMM GRANULOCYTES NFR BLD AUTO: 0.3 %
KETONES URINE: NEGATIVE
LDH SERPL-CCNC: 262 U/L
LEUKOCYTE ESTERASE URINE: NEGATIVE
LYMPHOCYTES # BLD AUTO: 0.48 K/UL
LYMPHOCYTES NFR BLD AUTO: 8.1 %
MAGNESIUM SERPL-MCNC: 1.9 MG/DL
MAN DIFF?: NORMAL
MCHC RBC-ENTMCNC: 28.7 PG
MCHC RBC-ENTMCNC: 31 GM/DL
MCV RBC AUTO: 92.7 FL
MICROSCOPIC-UA: NORMAL
MONOCYTES # BLD AUTO: 0.15 K/UL
MONOCYTES NFR BLD AUTO: 2.5 %
NEUTROPHILS # BLD AUTO: 5.2 K/UL
NEUTROPHILS NFR BLD AUTO: 88 %
NITRITE URINE: NEGATIVE
PARATHYROID HORMONE INTACT: 821 PG/ML
PH URINE: 7.5
PHOSPHATE SERPL-MCNC: 4.6 MG/DL
PLATELET # BLD AUTO: 260 K/UL
POTASSIUM SERPL-SCNC: 4.7 MMOL/L
PROT SERPL-MCNC: 6.5 G/DL
PROT UR-MCNC: 26 MG/DL
PROTEIN URINE: ABNORMAL
RBC # BLD: 3.03 M/UL
RBC # FLD: 18.2 %
RED BLOOD CELLS URINE: 8 /HPF
SODIUM SERPL-SCNC: 140 MMOL/L
SPECIFIC GRAVITY URINE: 1.01
SQUAMOUS EPITHELIAL CELLS: 0 /HPF
TACROLIMUS SERPL-MCNC: 7.1 NG/ML
URATE SERPL-MCNC: 4.9 MG/DL
UROBILINOGEN URINE: NORMAL
WBC # FLD AUTO: 5.92 K/UL
WHITE BLOOD CELLS URINE: 3 /HPF

## 2021-06-03 PROCEDURE — 99212 OFFICE O/P EST SF 10 MIN: CPT

## 2021-06-03 RX ORDER — TACROLIMUS 1 MG/1
1 TABLET, EXTENDED RELEASE ORAL
Qty: 90 | Refills: 11 | Status: DISCONTINUED | COMMUNITY
Start: 2021-05-20 | End: 2021-06-03

## 2021-06-03 NOTE — ASSESSMENT
[FreeTextEntry1] : doing well two weeks post DDRT with DGF\par still on dialysis but UOP increasing; now over two liters per day\par creatinine today down to 4 from 6; will hold dialysis today\par increase labetalol to 200mg tid\par decrease lasix to 40mg daily\par labs and follow up monday\par

## 2021-06-03 NOTE — HISTORY OF PRESENT ILLNESS
[ Donor] :  donor [Basiliximab] : basiliximab [Other: ___] : [unfilled] [Terminal Creatinine: ____] : Terminal Creatinine: [unfilled] [TextBox_7] : May 19 2021 [TextBox_52] : Donor was a 42 y.o female, KDPI 69%, history of diabetes on insulin, terminal creatinine 3.3.  CMV negative, EBV positive.  [de-identified] : doing well\par recorded urine output over 2.5 liters a day\par feels good\par BP slightly up\par no lower ext swelling\par still on dialysis and due for dialysis later today

## 2021-06-07 ENCOUNTER — LABORATORY RESULT (OUTPATIENT)
Age: 76
End: 2021-06-07

## 2021-06-07 ENCOUNTER — APPOINTMENT (OUTPATIENT)
Dept: TRANSPLANT | Facility: CLINIC | Age: 76
End: 2021-06-07

## 2021-06-07 ENCOUNTER — NON-APPOINTMENT (OUTPATIENT)
Age: 76
End: 2021-06-07

## 2021-06-07 LAB
ALBUMIN SERPL ELPH-MCNC: 4.2 G/DL
ALP BLD-CCNC: 90 U/L
ALT SERPL-CCNC: 6 U/L
ANION GAP SERPL CALC-SCNC: 13 MMOL/L
APPEARANCE: CLEAR
AST SERPL-CCNC: 10 U/L
BACTERIA: NEGATIVE
BASOPHILS # BLD AUTO: 0.02 K/UL
BASOPHILS NFR BLD AUTO: 0.5 %
BILIRUB SERPL-MCNC: 0.2 MG/DL
BILIRUBIN URINE: NEGATIVE
BKV DNA SPEC QL NAA+PROBE: NEGATIVE COPIES/ML
BLOOD URINE: NEGATIVE
BUN SERPL-MCNC: 51 MG/DL
CALCIUM SERPL-MCNC: 9.5 MG/DL
CALCIUM SERPL-MCNC: 9.5 MG/DL
CHLORIDE SERPL-SCNC: 105 MMOL/L
CO2 SERPL-SCNC: 21 MMOL/L
COLOR: NORMAL
CREAT SERPL-MCNC: 5.05 MG/DL
CREAT SPEC-SCNC: 146 MG/DL
CREAT/PROT UR: 0.3 RATIO
EOSINOPHIL # BLD AUTO: 0.03 K/UL
EOSINOPHIL NFR BLD AUTO: 0.7 %
GLUCOSE QUALITATIVE U: NEGATIVE
GLUCOSE SERPL-MCNC: 96 MG/DL
HCT VFR BLD CALC: 25.9 %
HGB BLD-MCNC: 8.3 G/DL
HYALINE CASTS: 0 /LPF
IMM GRANULOCYTES NFR BLD AUTO: 0.2 %
KETONES URINE: NEGATIVE
LDH SERPL-CCNC: 215 U/L
LEUKOCYTE ESTERASE URINE: NEGATIVE
LOG 10 BK QUANTITATION PCR: NORMAL
LYMPHOCYTES # BLD AUTO: 0.44 K/UL
LYMPHOCYTES NFR BLD AUTO: 10.4 %
MAGNESIUM SERPL-MCNC: 1.9 MG/DL
MAN DIFF?: NORMAL
MCHC RBC-ENTMCNC: 29.1 PG
MCHC RBC-ENTMCNC: 32 GM/DL
MCV RBC AUTO: 90.9 FL
MICROSCOPIC-UA: NORMAL
MONOCYTES # BLD AUTO: 0.17 K/UL
MONOCYTES NFR BLD AUTO: 4 %
NEUTROPHILS # BLD AUTO: 3.58 K/UL
NEUTROPHILS NFR BLD AUTO: 84.2 %
NITRITE URINE: NEGATIVE
PARATHYROID HORMONE INTACT: 742 PG/ML
PH URINE: 6.5
PHOSPHATE SERPL-MCNC: 5 MG/DL
PLATELET # BLD AUTO: 211 K/UL
POTASSIUM SERPL-SCNC: 5.2 MMOL/L
PROT SERPL-MCNC: 6.6 G/DL
PROT UR-MCNC: 39 MG/DL
PROTEIN URINE: ABNORMAL
RBC # BLD: 2.85 M/UL
RBC # FLD: 17.7 %
RED BLOOD CELLS URINE: 1 /HPF
SODIUM SERPL-SCNC: 139 MMOL/L
SPECIFIC GRAVITY URINE: 1.02
SQUAMOUS EPITHELIAL CELLS: 0 /HPF
TACROLIMUS SERPL-MCNC: 6.5 NG/ML
URATE SERPL-MCNC: 6.2 MG/DL
UROBILINOGEN URINE: NORMAL
WBC # FLD AUTO: 4.25 K/UL
WHITE BLOOD CELLS URINE: 0 /HPF

## 2021-06-09 ENCOUNTER — APPOINTMENT (OUTPATIENT)
Dept: NEPHROLOGY | Facility: CLINIC | Age: 76
End: 2021-06-09
Payer: MEDICARE

## 2021-06-09 VITALS
HEART RATE: 65 BPM | SYSTOLIC BLOOD PRESSURE: 164 MMHG | WEIGHT: 122 LBS | RESPIRATION RATE: 14 BRPM | TEMPERATURE: 97 F | HEIGHT: 65 IN | DIASTOLIC BLOOD PRESSURE: 62 MMHG | OXYGEN SATURATION: 100 % | BODY MASS INDEX: 20.33 KG/M2

## 2021-06-09 LAB
ALBUMIN SERPL ELPH-MCNC: 4.4 G/DL
ALP BLD-CCNC: 96 U/L
ALT SERPL-CCNC: 6 U/L
ANION GAP SERPL CALC-SCNC: 14 MMOL/L
APPEARANCE: CLEAR
AST SERPL-CCNC: 8 U/L
BACTERIA: NEGATIVE
BASOPHILS # BLD AUTO: 0.01 K/UL
BASOPHILS NFR BLD AUTO: 0.2 %
BILIRUB SERPL-MCNC: 0.2 MG/DL
BILIRUBIN URINE: NEGATIVE
BLOOD URINE: NEGATIVE
BUN SERPL-MCNC: 62 MG/DL
CALCIUM SERPL-MCNC: 10 MG/DL
CHLORIDE SERPL-SCNC: 106 MMOL/L
CO2 SERPL-SCNC: 18 MMOL/L
COLOR: NORMAL
CREAT SERPL-MCNC: 4.8 MG/DL
CREAT SPEC-SCNC: 61 MG/DL
CREAT/PROT UR: 0.4 RATIO
EOSINOPHIL # BLD AUTO: 0.02 K/UL
EOSINOPHIL NFR BLD AUTO: 0.5 %
GLUCOSE QUALITATIVE U: NEGATIVE
GLUCOSE SERPL-MCNC: 108 MG/DL
HCT VFR BLD CALC: 26 %
HGB BLD-MCNC: 8.1 G/DL
HYALINE CASTS: 1 /LPF
IMM GRANULOCYTES NFR BLD AUTO: 0.2 %
KETONES URINE: NEGATIVE
LDH SERPL-CCNC: 187 U/L
LEUKOCYTE ESTERASE URINE: NEGATIVE
LYMPHOCYTES # BLD AUTO: 0.38 K/UL
LYMPHOCYTES NFR BLD AUTO: 9.2 %
MAGNESIUM SERPL-MCNC: 1.8 MG/DL
MAN DIFF?: NORMAL
MCHC RBC-ENTMCNC: 28.6 PG
MCHC RBC-ENTMCNC: 31.2 GM/DL
MCV RBC AUTO: 91.9 FL
MICROSCOPIC-UA: NORMAL
MONOCYTES # BLD AUTO: 0.08 K/UL
MONOCYTES NFR BLD AUTO: 1.9 %
NEUTROPHILS # BLD AUTO: 3.61 K/UL
NEUTROPHILS NFR BLD AUTO: 88 %
NITRITE URINE: NEGATIVE
PH URINE: 7
PHOSPHATE SERPL-MCNC: 5.2 MG/DL
PLATELET # BLD AUTO: 230 K/UL
POTASSIUM SERPL-SCNC: 5.8 MMOL/L
PROT SERPL-MCNC: 6.7 G/DL
PROT UR-MCNC: 24 MG/DL
PROTEIN URINE: ABNORMAL
RBC # BLD: 2.83 M/UL
RBC # FLD: 17.6 %
RED BLOOD CELLS URINE: 12 /HPF
SODIUM SERPL-SCNC: 137 MMOL/L
SPECIFIC GRAVITY URINE: 1.01
SQUAMOUS EPITHELIAL CELLS: 1 /HPF
TACROLIMUS SERPL-MCNC: 9.1 NG/ML
URATE SERPL-MCNC: 6.7 MG/DL
UROBILINOGEN URINE: NORMAL
WBC # FLD AUTO: 4.11 K/UL
WHITE BLOOD CELLS URINE: 1 /HPF

## 2021-06-09 PROCEDURE — 99215 OFFICE O/P EST HI 40 MIN: CPT

## 2021-06-09 NOTE — ASSESSMENT
[FreeTextEntry1] : \par S/p DDRT on 5/19/2021 from 42 yr old donor with KDPI of 69% and terminal scr of 3.3. \par Delayed graft function due to ATN. UOP good 1.6-2.3 liters. Last HD was on Friday 6/4/21. \par BUN/Cr 61/5.05 on 6/7/21. Electrolytes are acceptable. \par No signs of fluid overload on exam. On lasix 40mg po daily. \par Will check blood work today and decide if further dialysis is needed. \par \par \par Immunosuppression \par - S/p Simulect induction , thymo not given due to advanced age \par - Diarrhea likely due to cellcept - will reduce dose to 500mg po bid\par - Continue Envarsus 10gm po daily, follow level \par - Prednisone 5mg daily \par \par Prophylaxis \par - Continue Valcyte 450gm po MWF - renal dose \par - Continue Bactrim ss daily and nystatin swish and swallow qid. \par - Completed COVID vaccine prior to transplant. Also had COVID infection in March 2020. \par \par \par Hypertension \par  On Nifedipine 60mg po bid, Hydralazine 100mg po TID and labetalol 200mg po TID. Remains uncontrolled at home and in office. WIll increase labetalol to 400mg po TID. Continue to monitor BP at home. \par \par Metabolic acidosis - continue sodium bicarb 1300mg po BID \par \par Anemia of CKD + meds. Hgb 8.2 \par Start procrit 10,000 units sq weekly - being delivered today. Son comfortable giving injections. \par \par Hyperparathyroidism  - iPTH 742, calcium normal. If PTH remains elevated will start sensipar. \par \par F/u next week with Dr. Henderson \par \par \par

## 2021-06-09 NOTE — PHYSICAL EXAM
[General Appearance - Alert] : alert [General Appearance - In No Acute Distress] : in no acute distress [PERRL With Normal Accommodation] : pupils were equal in size, round, and reactive to light [Oropharynx] : the oropharynx was normal [Jugular Venous Distention Increased] : there was no jugular-venous distention [Respiration, Rhythm And Depth] : normal respiratory rhythm and effort [Exaggerated Use Of Accessory Muscles For Inspiration] : no accessory muscle use [Auscultation Breath Sounds / Voice Sounds] : lungs were clear to auscultation bilaterally [Apical Impulse] : the apical impulse was normal [Heart Rate And Rhythm] : heart rate was normal and rhythm regular [Edema] : there was no peripheral edema [Bowel Sounds] : normal bowel sounds [Abdomen Soft] : soft [___ (cm) Fistula] : [unfilled] (cm) fistula [Bruit] : a bruit was present [Thrill] : a thrill was present [] : no rash [No Focal Deficits] : no focal deficits [Oriented To Time, Place, And Person] : oriented to person, place, and time [FreeTextEntry1] : RLQ wound well healed, minimal tenderness, no discharge or erythema.

## 2021-06-09 NOTE — HISTORY OF PRESENT ILLNESS
[FreeTextEntry1] : \par 75 year old woman with ESRD due to HTN, was on hemodialysis since 2016. Primary Nephrologist is Dr. Treviño. \par History of COVID-19 infection, hospitalized in March 2020 for only 1 day. \par \par Received DDRT on 5/19/21 from  a 42 yr old woman, KDPI 69%, history of diabetes on insulin, terminal creatinine 3.3. Course was complicated by DGF. \par Last hemodialysis was on Friday 6/4/21 \par \par Envarsus dose was increased from 8 to 10mg last week for low level 6.5 \par Presents for scheduled follow up visit accompanied by her son. She feels well. \par She is urinating 1.6-2.3 liters per day\par BP at home 160-170 systolic\par Blood glucose 150-200\par Has watery diarrhea 4x per day for the past 2 days\par Appetite is fair. weight down to 122 lbs. \par Ambulates with a walker or cane - also used walker/cane prior to transplant \par No leg swelling. no shortness of breath or chest pain. \par Prescribed Procrit for anemia but not yet delivered\par Son manages her meds. \par

## 2021-06-10 LAB
BKV DNA SPEC QL NAA+PROBE: NEGATIVE COPIES/ML
LOG 10 BK QUANTITATION PCR: NORMAL

## 2021-06-11 ENCOUNTER — NON-APPOINTMENT (OUTPATIENT)
Age: 76
End: 2021-06-11

## 2021-06-11 ENCOUNTER — APPOINTMENT (OUTPATIENT)
Dept: TRANSPLANT | Facility: CLINIC | Age: 76
End: 2021-06-11

## 2021-06-11 LAB
ALBUMIN SERPL ELPH-MCNC: 4.3 G/DL
ALP BLD-CCNC: 95 U/L
ALT SERPL-CCNC: <5 U/L
ANION GAP SERPL CALC-SCNC: 15 MMOL/L
APPEARANCE: CLEAR
AST SERPL-CCNC: 8 U/L
BACTERIA: NEGATIVE
BASOPHILS # BLD AUTO: 0.01 K/UL
BASOPHILS NFR BLD AUTO: 0.4 %
BILIRUB SERPL-MCNC: 0.2 MG/DL
BILIRUBIN URINE: NEGATIVE
BLOOD URINE: NEGATIVE
BUN SERPL-MCNC: 62 MG/DL
CALCIUM SERPL-MCNC: 10.1 MG/DL
CHLORIDE SERPL-SCNC: 107 MMOL/L
CO2 SERPL-SCNC: 17 MMOL/L
COLOR: NORMAL
CREAT SERPL-MCNC: 4.41 MG/DL
CREAT SPEC-SCNC: 59 MG/DL
CREAT/PROT UR: 0.5 RATIO
EOSINOPHIL # BLD AUTO: 0.05 K/UL
EOSINOPHIL NFR BLD AUTO: 2 %
GLUCOSE QUALITATIVE U: NEGATIVE
GLUCOSE SERPL-MCNC: 103 MG/DL
HCT VFR BLD CALC: 27.4 %
HGB BLD-MCNC: 8.5 G/DL
HYALINE CASTS: 0 /LPF
IMM GRANULOCYTES NFR BLD AUTO: 0 %
KETONES URINE: NEGATIVE
LDH SERPL-CCNC: 175 U/L
LEUKOCYTE ESTERASE URINE: NEGATIVE
LYMPHOCYTES # BLD AUTO: 0.52 K/UL
LYMPHOCYTES NFR BLD AUTO: 21.3 %
MAGNESIUM SERPL-MCNC: 1.9 MG/DL
MAN DIFF?: NORMAL
MCHC RBC-ENTMCNC: 28.7 PG
MCHC RBC-ENTMCNC: 31 GM/DL
MCV RBC AUTO: 92.6 FL
MICROSCOPIC-UA: NORMAL
MONOCYTES # BLD AUTO: 0.1 K/UL
MONOCYTES NFR BLD AUTO: 4.1 %
NEUTROPHILS # BLD AUTO: 1.76 K/UL
NEUTROPHILS NFR BLD AUTO: 72.2 %
NITRITE URINE: NEGATIVE
PH URINE: 6.5
PHOSPHATE SERPL-MCNC: 4.8 MG/DL
PLATELET # BLD AUTO: 243 K/UL
POTASSIUM SERPL-SCNC: 5 MMOL/L
PROT SERPL-MCNC: 6.6 G/DL
PROT UR-MCNC: 27 MG/DL
PROTEIN URINE: ABNORMAL
RBC # BLD: 2.96 M/UL
RBC # FLD: 17.4 %
RED BLOOD CELLS URINE: 2 /HPF
SODIUM SERPL-SCNC: 139 MMOL/L
SPECIFIC GRAVITY URINE: 1.01
SQUAMOUS EPITHELIAL CELLS: 0 /HPF
TACROLIMUS SERPL-MCNC: 9.6 NG/ML
UROBILINOGEN URINE: NORMAL
WBC # FLD AUTO: 2.44 K/UL
WHITE BLOOD CELLS URINE: 2 /HPF

## 2021-06-15 LAB
BKV DNA SPEC QL NAA+PROBE: NEGATIVE COPIES/ML
LOG 10 BK QUANTITATION PCR: NORMAL

## 2021-06-17 ENCOUNTER — APPOINTMENT (OUTPATIENT)
Dept: TRANSPLANT | Facility: CLINIC | Age: 76
End: 2021-06-17

## 2021-06-17 VITALS
OXYGEN SATURATION: 100 % | TEMPERATURE: 98 F | BODY MASS INDEX: 20.33 KG/M2 | WEIGHT: 122 LBS | RESPIRATION RATE: 12 BRPM | DIASTOLIC BLOOD PRESSURE: 75 MMHG | SYSTOLIC BLOOD PRESSURE: 191 MMHG | HEART RATE: 70 BPM | HEIGHT: 65 IN

## 2021-06-18 LAB
ALBUMIN SERPL ELPH-MCNC: 4.5 G/DL
ALP BLD-CCNC: 101 U/L
ALT SERPL-CCNC: 5 U/L
ANION GAP SERPL CALC-SCNC: 13 MMOL/L
APPEARANCE: CLEAR
AST SERPL-CCNC: 11 U/L
BACTERIA: NEGATIVE
BASOPHILS # BLD AUTO: 0.01 K/UL
BASOPHILS NFR BLD AUTO: 0.4 %
BILIRUB SERPL-MCNC: 0.2 MG/DL
BILIRUBIN URINE: NEGATIVE
BLOOD URINE: NEGATIVE
BUN SERPL-MCNC: 47 MG/DL
CALCIUM SERPL-MCNC: 10.3 MG/DL
CALCIUM SERPL-MCNC: 10.3 MG/DL
CHLORIDE SERPL-SCNC: 106 MMOL/L
CO2 SERPL-SCNC: 20 MMOL/L
COLOR: NORMAL
CREAT SERPL-MCNC: 3.46 MG/DL
CREAT SPEC-SCNC: 54 MG/DL
CREAT/PROT UR: 0.3 RATIO
EOSINOPHIL # BLD AUTO: 0.01 K/UL
EOSINOPHIL NFR BLD AUTO: 0.4 %
GLUCOSE QUALITATIVE U: NEGATIVE
GLUCOSE SERPL-MCNC: 101 MG/DL
HBV SURFACE AG SER QL: NONREACTIVE
HCT VFR BLD CALC: 28.5 %
HCV AB SER QL: NONREACTIVE
HCV S/CO RATIO: 0.34 S/CO
HGB BLD-MCNC: 8.9 G/DL
HIV1 RNA # SERPL NAA+PROBE: NORMAL
HIV1 RNA # SERPL NAA+PROBE: NORMAL COPIES/ML
HIV1+2 AB SPEC QL IA.RAPID: NONREACTIVE
HYALINE CASTS: 0 /LPF
IMM GRANULOCYTES NFR BLD AUTO: 0.4 %
KETONES URINE: NEGATIVE
LDH SERPL-CCNC: 169 U/L
LEUKOCYTE ESTERASE URINE: NEGATIVE
LYMPHOCYTES # BLD AUTO: 0.55 K/UL
LYMPHOCYTES NFR BLD AUTO: 19.6 %
MAGNESIUM SERPL-MCNC: 1.7 MG/DL
MAN DIFF?: NORMAL
MCHC RBC-ENTMCNC: 29 PG
MCHC RBC-ENTMCNC: 31.2 GM/DL
MCV RBC AUTO: 92.8 FL
MICROSCOPIC-UA: NORMAL
MONOCYTES # BLD AUTO: 0.12 K/UL
MONOCYTES NFR BLD AUTO: 4.3 %
NEUTROPHILS # BLD AUTO: 2.11 K/UL
NEUTROPHILS NFR BLD AUTO: 74.9 %
NITRITE URINE: NEGATIVE
PARATHYROID HORMONE INTACT: 622 PG/ML
PH URINE: 7
PHOSPHATE SERPL-MCNC: 3.7 MG/DL
PLATELET # BLD AUTO: 295 K/UL
POTASSIUM SERPL-SCNC: 5.6 MMOL/L
PROT SERPL-MCNC: 7 G/DL
PROT UR-MCNC: 18 MG/DL
PROTEIN URINE: NORMAL
RBC # BLD: 3.07 M/UL
RBC # FLD: 17.7 %
RED BLOOD CELLS URINE: 3 /HPF
SODIUM SERPL-SCNC: 139 MMOL/L
SPECIFIC GRAVITY URINE: 1.01
SQUAMOUS EPITHELIAL CELLS: 0 /HPF
TACROLIMUS SERPL-MCNC: 10.7 NG/ML
URATE SERPL-MCNC: 5.7 MG/DL
UROBILINOGEN URINE: NORMAL
VIRAL LOAD INTERP: NORMAL
VIRAL LOAD LOG: NORMAL LG COP/ML
WBC # FLD AUTO: 2.81 K/UL
WHITE BLOOD CELLS URINE: 1 /HPF

## 2021-06-21 LAB
HBV DNA # SERPL NAA+PROBE: NOT DETECTED IU/ML
HCV RNA SERPL NAA DL=5-ACNC: NOT DETECTED IU/ML
HCV RNA SERPL NAA+PROBE-LOG IU: NOT DETECTED LOG10IU/ML
HEPB DNA PCR LOG: NOT DETECTED LOG10IU/ML

## 2021-06-22 ENCOUNTER — APPOINTMENT (OUTPATIENT)
Dept: NEPHROLOGY | Facility: CLINIC | Age: 76
End: 2021-06-22
Payer: MEDICARE

## 2021-06-22 ENCOUNTER — LABORATORY RESULT (OUTPATIENT)
Age: 76
End: 2021-06-22

## 2021-06-22 VITALS
TEMPERATURE: 97 F | SYSTOLIC BLOOD PRESSURE: 177 MMHG | BODY MASS INDEX: 18.96 KG/M2 | RESPIRATION RATE: 12 BRPM | HEIGHT: 66 IN | HEART RATE: 68 BPM | DIASTOLIC BLOOD PRESSURE: 71 MMHG | WEIGHT: 118 LBS | OXYGEN SATURATION: 100 %

## 2021-06-22 LAB
BKV DNA SPEC QL NAA+PROBE: NEGATIVE COPIES/ML
LOG 10 BK QUANTITATION PCR: NORMAL
TACROLIMUS SERPL-MCNC: 11.4 NG/ML

## 2021-06-22 PROCEDURE — 99215 OFFICE O/P EST HI 40 MIN: CPT

## 2021-06-22 RX ORDER — FUROSEMIDE 40 MG/1
40 TABLET ORAL DAILY
Qty: 30 | Refills: 11 | Status: COMPLETED | COMMUNITY
Start: 2021-05-20 | End: 2021-06-22

## 2021-06-23 LAB
ALBUMIN SERPL ELPH-MCNC: 4.5 G/DL
ANION GAP SERPL CALC-SCNC: 11 MMOL/L
BASOPHILS # BLD AUTO: 0.01 K/UL
BASOPHILS NFR BLD AUTO: 0.2 %
BUN SERPL-MCNC: 42 MG/DL
CALCIUM SERPL-MCNC: 10.4 MG/DL
CHLORIDE SERPL-SCNC: 108 MMOL/L
CO2 SERPL-SCNC: 20 MMOL/L
CREAT SERPL-MCNC: 3.11 MG/DL
EOSINOPHIL # BLD AUTO: 0.01 K/UL
EOSINOPHIL NFR BLD AUTO: 0.2 %
GLUCOSE SERPL-MCNC: 101 MG/DL
HCT VFR BLD CALC: 28.8 %
HGB BLD-MCNC: 9 G/DL
IMM GRANULOCYTES NFR BLD AUTO: 0.7 %
LYMPHOCYTES # BLD AUTO: 0.45 K/UL
LYMPHOCYTES NFR BLD AUTO: 10.8 %
MAN DIFF?: NORMAL
MCHC RBC-ENTMCNC: 29 PG
MCHC RBC-ENTMCNC: 31.3 GM/DL
MCV RBC AUTO: 92.9 FL
MONOCYTES # BLD AUTO: 0.23 K/UL
MONOCYTES NFR BLD AUTO: 5.5 %
NEUTROPHILS # BLD AUTO: 3.43 K/UL
NEUTROPHILS NFR BLD AUTO: 82.6 %
PHOSPHATE SERPL-MCNC: 3.2 MG/DL
PLATELET # BLD AUTO: 267 K/UL
POTASSIUM SERPL-SCNC: 5.2 MMOL/L
RBC # BLD: 3.1 M/UL
RBC # FLD: 17.9 %
SODIUM SERPL-SCNC: 139 MMOL/L
WBC # FLD AUTO: 4.16 K/UL

## 2021-06-23 NOTE — ASSESSMENT
[FreeTextEntry1] : \par S/p DDRT on 5/19/2021 from 42 yr old donor with KDPI of 69% and terminal scr of 3.3. \par Delayed graft function due to ATN. Last HD was on  6/4/21. \par Creatinine declining slowly 3.1mg/dL today. No signs of fluid overload on exam. \par \par Immunosuppression \par - S/p Simulect induction , thymo not given due to advanced age \par - Cellcept 500mg po daily - dose reduced for abdominal bloating. Will increase back to 500mg po bid , GI sx improved. \par - On Envarsus 7gm po daily, level 11.4 - will reduce to 6mg daily \par - Continue Prednisone 5mg daily \par \par Prophylaxis \par - Continue Valcyte 450gm po MWF - renal dose \par - Continue Bactrim ss daily and nystatin swish and swallow qid. \par - Completed COVID vaccine prior to transplant. Also had COVID infection in March 2020. \par \par \par Hypertension \par  On Nifedipine 60mg po daily, Hydralazine 100mg po TID and labetalol 200mg po AM, 400mg po at noon and evening. \par BP continues to fluctuate with episodes of symptomatic hypotension, and increased BP to 180 systolic. Nifedipine dose reduced from BID to daily.  BP elevated to 170/71 in office today.  Will increase AM dose of labetalol to 400mg. Continue all other BP meds, continue to monitor. \par  \par Hyperkalemia - continue lokelma 10mg po daily K 5.2 \par Metabolic acidosis - improving,  continue sodium bicarb 1300mg po BID \par \par Anemia of CKD + meds. \par Continue procrit 10,000 units sq weekly\par \par Hyperparathyroidism - iPTH slowly improving, calcium normal. \par \par F/u on 7/6/21

## 2021-06-23 NOTE — HISTORY OF PRESENT ILLNESS
[FreeTextEntry1] : \par 75 year old woman with ESRD due to HTN, was on hemodialysis since 2016. Primary Nephrologist is Dr. Treviño. \par History of COVID-19 infection, hospitalized in March 2020 for only 1 day. \par \par Received DDRT on 5/19/21 from  a 42 yr old woman, KDPI 69%, history of diabetes on insulin, terminal creatinine 3.3. Course was complicated by DGF. \par Last hemodialysis was on 6/4/21. Creatinine slowly falling 3.46 last week. \par \par Presents for scheduled follow up visit accompanied by her son. \par She reports feeling better. Energy is improving and she feels well. \par Appetite is still low. Weight down 4lbs. No leg swelling. \par She has no nausea, vomiting or diarrhea. \par Blood pressure fluctuating b/n 120-180 systolic. \par Urinating without difficulty. \par Takes all meds as prescribed. Lives with her son who manages her meds.\par On Envarsus 7mg daily, Cellcept 500mg daily - dose lowered for GI bloating/gas.Gas pain improved on OTC simethicone. \par Taking Procrit once a week for anemia. \par Ambulates with a walker or cane - also used walker/cane prior to transplant \par No leg swelling. no shortness of breath or chest pain. \par  \par

## 2021-06-23 NOTE — PHYSICAL EXAM
[General Appearance - Alert] : alert [General Appearance - In No Acute Distress] : in no acute distress [PERRL With Normal Accommodation] : pupils were equal in size, round, and reactive to light [Oropharynx] : the oropharynx was normal [Jugular Venous Distention Increased] : there was no jugular-venous distention [Respiration, Rhythm And Depth] : normal respiratory rhythm and effort [Exaggerated Use Of Accessory Muscles For Inspiration] : no accessory muscle use [Auscultation Breath Sounds / Voice Sounds] : lungs were clear to auscultation bilaterally [Heart Rate And Rhythm] : heart rate was normal and rhythm regular [Apical Impulse] : the apical impulse was normal [Edema] : there was no peripheral edema [Bowel Sounds] : normal bowel sounds [Abdomen Soft] : soft [___ (cm) Fistula] : [unfilled] (cm) fistula [Bruit] : a bruit was present [Thrill] : a thrill was present [] : no rash [No Focal Deficits] : no focal deficits [Oriented To Time, Place, And Person] : oriented to person, place, and time [Sclera] : the sclera and conjunctiva were normal [Involuntary Movements] : no involuntary movements were seen [FreeTextEntry1] : RLQ scar well healed, no tenderness, no bruit

## 2021-06-29 ENCOUNTER — APPOINTMENT (OUTPATIENT)
Dept: VASCULAR SURGERY | Facility: CLINIC | Age: 76
End: 2021-06-29
Payer: MEDICARE

## 2021-06-29 PROCEDURE — 99213 OFFICE O/P EST LOW 20 MIN: CPT

## 2021-06-29 NOTE — HISTORY OF PRESENT ILLNESS
[FreeTextEntry1] : 74 yo female with a history of esrd on hd presents for follow up of right upper extremity avg. \par recently underwent kidney transplant [] : in right upper arm

## 2021-06-29 NOTE — DISCUSSION/SUMMARY
[FreeTextEntry1] : 76 yo female with a history of esrd on hd presents for follow up of right upper extremity avg\par \par no need further evaluation given transplant \par

## 2021-06-29 NOTE — PHYSICAL EXAM
[Thrill] : thrill [Bleeding] : no bleeding [Hand well perfused] : hand well perfused [Ulcer] : no ulcer [2+] : right 2+ [Normal] : coordination grossly intact, no focal deficits [de-identified] :  strength 5/5 b/l upper extremities [de-identified] : intact

## 2021-07-06 ENCOUNTER — APPOINTMENT (OUTPATIENT)
Dept: NEPHROLOGY | Facility: CLINIC | Age: 76
End: 2021-07-06
Payer: MEDICARE

## 2021-07-06 ENCOUNTER — LABORATORY RESULT (OUTPATIENT)
Age: 76
End: 2021-07-06

## 2021-07-06 VITALS
TEMPERATURE: 97.9 F | HEART RATE: 71 BPM | SYSTOLIC BLOOD PRESSURE: 183 MMHG | RESPIRATION RATE: 12 BRPM | WEIGHT: 117 LBS | OXYGEN SATURATION: 99 % | DIASTOLIC BLOOD PRESSURE: 72 MMHG | BODY MASS INDEX: 18.8 KG/M2 | HEIGHT: 66 IN

## 2021-07-06 PROCEDURE — 99215 OFFICE O/P EST HI 40 MIN: CPT

## 2021-07-06 NOTE — ASSESSMENT
[FreeTextEntry1] : \par S/p DDRT on 5/19/2021 from 44 yr old donor with KDPI of 69% and terminal scr of 3.3. \par Delayed graft function due to ATN. Last HD was on  6/4/21. \par Creatinine declining slowly 2.69mg/dL today \par Stent removal to be scheduled by Dr. Henderson \par \par Immunosuppression \par - S/p Simulect induction , thymo not given due to advanced age \par - Cellcept 500mg po bid  - dose reduced for GI side effects \par - On Envarsus 6gm po daily, level 11.5. Will reduce to 5mg daily. \par - Continue Prednisone 5mg daily \par \par Prophylaxis \par - Continue Valcyte 450gm po MWF - renal dose \par - Continue Bactrim ss daily and nystatin swish and swallow qid. \par - Completed COVID vaccine prior to transplant. Also had COVID infection in March 2020. \par \par \par Hypertension \par  On Nifedipine 60mg po daily, Hydralazine 100mg po TID and labetalol 200mg po AM, 400mg po at noon and evening. BP elevated. Increase Nifedipine to 60mg po bid \par \par Hyperkalemia - continue Lokelma 10mg po daily K 5.5 \par Metabolic acidosis - increase sodium bicarb to 1300mg po TID \par \par Anemia of CKD + meds. improving Hgb 9.6\par Continue procrit 10,000 units sq weekly until Hgb reaches 10.0 \par \par Hyperparathyroidism - iPTH slowly improving, calcium normal. \par \par F/u with Dr. Henderson in 2 weeks

## 2021-07-06 NOTE — HISTORY OF PRESENT ILLNESS
[FreeTextEntry1] : \par 75 year old woman with ESRD due to HTN, was on hemodialysis since 2016. Primary Nephrologist is Dr. Treviño. \par History of COVID-19 infection, hospitalized in March 2020 for only 1 day. \par \par Received DDRT on 5/19/21 from  a 42 yr old woman, KDPI 69%, history of diabetes on insulin, terminal creatinine 3.3. Course was complicated by DGF. \par Last hemodialysis was on 6/4/21. Creatinine slowly falling 3.46 last week. \par \par Presents for scheduled follow up visit accompanied by her son. \par She feels well. Appetite is good. Energy is good. \par Weight remains unchanged. \par No fever or chills. No NVD. No cough or shortness of breath. \par BP at home 150-180 systolic. \par Urinating without difficulty. \par Takes all meds as prescribed. Lives with her son who manages her meds.\par Taking Procrit once a week for anemia. \par \par

## 2021-07-06 NOTE — PHYSICAL EXAM
[General Appearance - Alert] : alert [General Appearance - In No Acute Distress] : in no acute distress [Sclera] : the sclera and conjunctiva were normal [PERRL With Normal Accommodation] : pupils were equal in size, round, and reactive to light [Oropharynx] : the oropharynx was normal [Jugular Venous Distention Increased] : there was no jugular-venous distention [Respiration, Rhythm And Depth] : normal respiratory rhythm and effort [Exaggerated Use Of Accessory Muscles For Inspiration] : no accessory muscle use [Auscultation Breath Sounds / Voice Sounds] : lungs were clear to auscultation bilaterally [Apical Impulse] : the apical impulse was normal [Heart Rate And Rhythm] : heart rate was normal and rhythm regular [Edema] : there was no peripheral edema [Bowel Sounds] : normal bowel sounds [Abdomen Soft] : soft [Involuntary Movements] : no involuntary movements were seen [___ (cm) Fistula] : [unfilled] (cm) fistula [Bruit] : a bruit was present [Thrill] : a thrill was present [] : no rash [No Focal Deficits] : no focal deficits [Oriented To Time, Place, And Person] : oriented to person, place, and time [FreeTextEntry1] : RLQ scar well healed, no tenderness, bruit +

## 2021-07-07 LAB
ALBUMIN SERPL ELPH-MCNC: 4.4 G/DL
ALP BLD-CCNC: 121 U/L
ALT SERPL-CCNC: 5 U/L
ANION GAP SERPL CALC-SCNC: 12 MMOL/L
APPEARANCE: CLEAR
AST SERPL-CCNC: 10 U/L
BACTERIA: NEGATIVE
BASOPHILS # BLD AUTO: 0 K/UL
BASOPHILS NFR BLD AUTO: 0 %
BILIRUB SERPL-MCNC: 0.2 MG/DL
BILIRUBIN URINE: NEGATIVE
BLOOD URINE: NEGATIVE
BUN SERPL-MCNC: 44 MG/DL
CALCIUM SERPL-MCNC: 10.3 MG/DL
CHLORIDE SERPL-SCNC: 109 MMOL/L
CO2 SERPL-SCNC: 17 MMOL/L
COLOR: NORMAL
CREAT SERPL-MCNC: 2.69 MG/DL
CREAT SPEC-SCNC: 97 MG/DL
CREAT/PROT UR: 0.2 RATIO
EOSINOPHIL # BLD AUTO: 0 K/UL
EOSINOPHIL NFR BLD AUTO: 0 %
GLUCOSE QUALITATIVE U: NEGATIVE
GLUCOSE SERPL-MCNC: 128 MG/DL
HCT VFR BLD CALC: 30.7 %
HGB BLD-MCNC: 9.6 G/DL
HYALINE CASTS: 0 /LPF
KETONES URINE: NEGATIVE
LDH SERPL-CCNC: 163 U/L
LEUKOCYTE ESTERASE URINE: NEGATIVE
LYMPHOCYTES # BLD AUTO: 0.24 K/UL
LYMPHOCYTES NFR BLD AUTO: 6.1 %
MAGNESIUM SERPL-MCNC: 1.7 MG/DL
MAN DIFF?: NORMAL
MCHC RBC-ENTMCNC: 29 PG
MCHC RBC-ENTMCNC: 31.3 GM/DL
MCV RBC AUTO: 92.7 FL
MICROSCOPIC-UA: NORMAL
MONOCYTES # BLD AUTO: 0.07 K/UL
MONOCYTES NFR BLD AUTO: 1.7 %
NEUTROPHILS # BLD AUTO: 3.63 K/UL
NEUTROPHILS NFR BLD AUTO: 91.3 %
NITRITE URINE: NEGATIVE
PH URINE: 6.5
PHOSPHATE SERPL-MCNC: 2.9 MG/DL
PLATELET # BLD AUTO: 192 K/UL
POTASSIUM SERPL-SCNC: 5.5 MMOL/L
PROT SERPL-MCNC: 6.6 G/DL
PROT UR-MCNC: 19 MG/DL
PROTEIN URINE: NORMAL
RBC # BLD: 3.31 M/UL
RBC # FLD: 17.1 %
RED BLOOD CELLS URINE: 2 /HPF
SODIUM SERPL-SCNC: 138 MMOL/L
SPECIFIC GRAVITY URINE: 1.02
SQUAMOUS EPITHELIAL CELLS: 0 /HPF
TACROLIMUS SERPL-MCNC: 11.5 NG/ML
URATE SERPL-MCNC: 6 MG/DL
UROBILINOGEN URINE: NORMAL
WBC # FLD AUTO: 3.98 K/UL
WHITE BLOOD CELLS URINE: 3 /HPF

## 2021-07-08 LAB
BKV DNA SPEC QL NAA+PROBE: NEGATIVE COPIES/ML
LOG 10 BK QUANTITATION PCR: NORMAL

## 2021-07-22 ENCOUNTER — APPOINTMENT (OUTPATIENT)
Dept: TRANSPLANT | Facility: CLINIC | Age: 76
End: 2021-07-22
Payer: MEDICARE

## 2021-07-22 ENCOUNTER — LABORATORY RESULT (OUTPATIENT)
Age: 76
End: 2021-07-22

## 2021-07-22 VITALS
HEART RATE: 66 BPM | TEMPERATURE: 98 F | HEIGHT: 66 IN | WEIGHT: 121 LBS | SYSTOLIC BLOOD PRESSURE: 160 MMHG | RESPIRATION RATE: 12 BRPM | OXYGEN SATURATION: 100 % | DIASTOLIC BLOOD PRESSURE: 71 MMHG | BODY MASS INDEX: 19.44 KG/M2

## 2021-07-22 PROCEDURE — 99024 POSTOP FOLLOW-UP VISIT: CPT

## 2021-07-22 NOTE — HISTORY OF PRESENT ILLNESS
[ Donor] :  donor [Basiliximab] : basiliximab [Other: ___] : [unfilled] [Terminal Creatinine: ____] : Terminal Creatinine: [unfilled] [TextBox_7] : May 19 2021 [TextBox_52] : Donor was a 42 y.o female, KDPI 69%, history of diabetes on insulin, terminal creatinine 3.3.  CMV negative, EBV positive.  [de-identified] : doing well\par creatinine slowly trending down\par last creatinine 2.69\par envarsus dose reduced to 5mg\par blood pressure remains fluctuating\par

## 2021-07-23 LAB
ALBUMIN SERPL ELPH-MCNC: 4.7 G/DL
ALP BLD-CCNC: 128 U/L
ALT SERPL-CCNC: <5 U/L
ANION GAP SERPL CALC-SCNC: 11 MMOL/L
APPEARANCE: CLEAR
AST SERPL-CCNC: 9 U/L
BACTERIA: NEGATIVE
BASOPHILS # BLD AUTO: 0.01 K/UL
BASOPHILS NFR BLD AUTO: 0.2 %
BILIRUB SERPL-MCNC: 0.2 MG/DL
BILIRUBIN URINE: NEGATIVE
BLOOD URINE: NEGATIVE
BUN SERPL-MCNC: 50 MG/DL
CALCIUM SERPL-MCNC: 10.4 MG/DL
CALCIUM SERPL-MCNC: 10.4 MG/DL
CHLORIDE SERPL-SCNC: 110 MMOL/L
CO2 SERPL-SCNC: 20 MMOL/L
COLOR: YELLOW
CREAT SERPL-MCNC: 2.2 MG/DL
CREAT SPEC-SCNC: 90 MG/DL
CREAT/PROT UR: 0.2 RATIO
EOSINOPHIL # BLD AUTO: 0.04 K/UL
EOSINOPHIL NFR BLD AUTO: 0.9 %
GLUCOSE QUALITATIVE U: NEGATIVE
GLUCOSE SERPL-MCNC: 108 MG/DL
HCT VFR BLD CALC: 35.8 %
HGB BLD-MCNC: 10.8 G/DL
HYALINE CASTS: 1 /LPF
IMM GRANULOCYTES NFR BLD AUTO: 0.2 %
KETONES URINE: NEGATIVE
LDH SERPL-CCNC: 172 U/L
LEUKOCYTE ESTERASE URINE: NEGATIVE
LYMPHOCYTES # BLD AUTO: 0.49 K/UL
LYMPHOCYTES NFR BLD AUTO: 10.7 %
MAGNESIUM SERPL-MCNC: 1.7 MG/DL
MAN DIFF?: NORMAL
MCHC RBC-ENTMCNC: 28.7 PG
MCHC RBC-ENTMCNC: 30.2 GM/DL
MCV RBC AUTO: 95.2 FL
MICROSCOPIC-UA: NORMAL
MONOCYTES # BLD AUTO: 0.2 K/UL
MONOCYTES NFR BLD AUTO: 4.3 %
NEUTROPHILS # BLD AUTO: 3.85 K/UL
NEUTROPHILS NFR BLD AUTO: 83.7 %
NITRITE URINE: NEGATIVE
PARATHYROID HORMONE INTACT: 306 PG/ML
PH URINE: 6.5
PHOSPHATE SERPL-MCNC: 2.8 MG/DL
PLATELET # BLD AUTO: 190 K/UL
POTASSIUM SERPL-SCNC: 5.4 MMOL/L
PROT SERPL-MCNC: 6.7 G/DL
PROT UR-MCNC: 14 MG/DL
PROTEIN URINE: NORMAL
RBC # BLD: 3.76 M/UL
RBC # FLD: 16 %
RED BLOOD CELLS URINE: 2 /HPF
SODIUM SERPL-SCNC: 141 MMOL/L
SPECIFIC GRAVITY URINE: 1.02
SQUAMOUS EPITHELIAL CELLS: 0 /HPF
TACROLIMUS SERPL-MCNC: 6.2 NG/ML
URATE SERPL-MCNC: 6 MG/DL
UROBILINOGEN URINE: NORMAL
WBC # FLD AUTO: 4.6 K/UL
WHITE BLOOD CELLS URINE: 3 /HPF

## 2021-07-27 ENCOUNTER — NON-APPOINTMENT (OUTPATIENT)
Age: 76
End: 2021-07-27

## 2021-07-27 LAB
BKV DNA SPEC QL NAA+PROBE: NEGATIVE COPIES/ML
LOG 10 BK QUANTITATION PCR: NORMAL

## 2021-07-28 ENCOUNTER — APPOINTMENT (OUTPATIENT)
Dept: TRANSPLANT | Facility: CLINIC | Age: 76
End: 2021-07-28

## 2021-07-28 ENCOUNTER — OUTPATIENT (OUTPATIENT)
Dept: OUTPATIENT SERVICES | Facility: HOSPITAL | Age: 76
LOS: 1 days | End: 2021-07-28
Payer: MEDICARE

## 2021-07-28 VITALS
SYSTOLIC BLOOD PRESSURE: 180 MMHG | HEART RATE: 65 BPM | HEIGHT: 64.57 IN | RESPIRATION RATE: 16 BRPM | TEMPERATURE: 98 F | OXYGEN SATURATION: 98 % | WEIGHT: 164.02 LBS | DIASTOLIC BLOOD PRESSURE: 80 MMHG

## 2021-07-28 DIAGNOSIS — Z94.0 KIDNEY TRANSPLANT STATUS: ICD-10-CM

## 2021-07-28 DIAGNOSIS — I10 ESSENTIAL (PRIMARY) HYPERTENSION: ICD-10-CM

## 2021-07-28 DIAGNOSIS — Z90.710 ACQUIRED ABSENCE OF BOTH CERVIX AND UTERUS: Chronic | ICD-10-CM

## 2021-07-28 DIAGNOSIS — Z99.2 DEPENDENCE ON RENAL DIALYSIS: Chronic | ICD-10-CM

## 2021-07-28 DIAGNOSIS — Z86.69 PERSONAL HISTORY OF OTHER DISEASES OF THE NERVOUS SYSTEM AND SENSE ORGANS: Chronic | ICD-10-CM

## 2021-07-28 DIAGNOSIS — H26.9 UNSPECIFIED CATARACT: Chronic | ICD-10-CM

## 2021-07-28 DIAGNOSIS — I77.0 ARTERIOVENOUS FISTULA, ACQUIRED: Chronic | ICD-10-CM

## 2021-07-28 DIAGNOSIS — Z94.0 KIDNEY TRANSPLANT STATUS: Chronic | ICD-10-CM

## 2021-07-28 DIAGNOSIS — Z01.818 ENCOUNTER FOR OTHER PREPROCEDURAL EXAMINATION: ICD-10-CM

## 2021-07-28 PROCEDURE — 87086 URINE CULTURE/COLONY COUNT: CPT

## 2021-07-28 PROCEDURE — G0463: CPT

## 2021-07-28 RX ORDER — MYCOPHENOLATE MOFETIL 250 MG/1
2 CAPSULE ORAL
Qty: 0 | Refills: 0 | DISCHARGE

## 2021-07-28 RX ORDER — CHLORHEXIDINE GLUCONATE 213 G/1000ML
1 SOLUTION TOPICAL ONCE
Refills: 0 | Status: DISCONTINUED | OUTPATIENT
Start: 2021-08-02 | End: 2021-08-16

## 2021-07-28 RX ORDER — CIPROFLOXACIN LACTATE 400MG/40ML
400 VIAL (ML) INTRAVENOUS ONCE
Refills: 0 | Status: DISCONTINUED | OUTPATIENT
Start: 2021-08-02 | End: 2021-08-16

## 2021-07-28 RX ORDER — LIDOCAINE HCL 20 MG/ML
0.2 VIAL (ML) INJECTION ONCE
Refills: 0 | Status: DISCONTINUED | OUTPATIENT
Start: 2021-08-02 | End: 2021-08-16

## 2021-07-28 RX ORDER — SODIUM CHLORIDE 9 MG/ML
3 INJECTION INTRAMUSCULAR; INTRAVENOUS; SUBCUTANEOUS EVERY 8 HOURS
Refills: 0 | Status: DISCONTINUED | OUTPATIENT
Start: 2021-08-02 | End: 2021-08-16

## 2021-07-28 RX ORDER — ALBUTEROL 90 UG/1
2 AEROSOL, METERED ORAL
Qty: 0 | Refills: 0 | DISCHARGE

## 2021-07-28 NOTE — H&P PST ADULT - NSICDXPASTMEDICALHX_GEN_ALL_CORE_FT
PAST MEDICAL HISTORY:  Cataract     DVT (deep venous thrombosis) of Left subclavian vein, 06/12/17    ESRD (end stage renal disease) on dialysis     Glaucoma     Hemodialysis patient MWF    HTN (hypertension)

## 2021-07-28 NOTE — H&P PST ADULT - NSALCOHOLFREQ_GEN_A_CORE_SD
occasionally Hatchet Flap Text: The defect edges were debeveled with a #15c scalpel blade.  Given the location of the defect, shape of the defect and the proximity to free margins a hatchet flap was deemed most appropriate.  Using a sterile surgical marker, an appropriate hatchet flap was drawn incorporating the defect and placing the expected incisions within the relaxed skin tension lines where possible.    The area thus outlined was incised deep to adipose tissue with a #15 scalpel blade.  The skin margins were undermined to an appropriate distance in all directions utilizing iris scissors.

## 2021-07-28 NOTE — H&P PST ADULT - NSICDXPROBLEM_GEN_ALL_CORE_FT
PROBLEM DIAGNOSES  Problem: Transplanted kidney  Assessment and Plan:  Cystoscopy Right Stent Removal From Transplanted Kidney on 8/2/21  Pre-op education provided - all questions answered. Pt verbalized understanding     Problem: HTN (hypertension)  Assessment and Plan: continue on antihypertensive medications

## 2021-07-28 NOTE — H&P PST ADULT - HISTORY OF PRESENT ILLNESS
74 y/o [place]ish speaking female  (accompanied by daughter Rosio [phone])   w/ PMH significant for HTN x since age 24, L subclavian DVT 2017,   ESRD on HD x6 years via [facility] PABLO s/p kidney transplant on 5/19/21. Today she presents to PST for scheduled Cystoscopy Right Stent Removal From Transplanted Kidney on 8/2/21. Denies any palpitations, SOB, N/V, fever or chills.   *** Pfizer series done 4/2021, pt will email card

## 2021-07-28 NOTE — H&P PST ADULT - NSICDXPASTSURGICALHX_GEN_ALL_CORE_FT
PAST SURGICAL HISTORY:  Acquired cataract extraction with b/l lense placement, 2016    AV fistula 2015    H/O: glaucoma surgery, 2002    Hemodialysis access, AV graft     S/P KEVEN-BSO for fibroids, 2012    Transplanted kidney 5/19/21

## 2021-07-28 NOTE — H&P PST ADULT - NS MD HP PULSE CAROTID
Patient is here for the following complaints:  1. Scalp laceration doi 3/5/2018    HISTORY:  She is here with daughter  She tripped and fell and had laceration to right scalp  She did not have syncope   up to date with tetanus  She is doing well and just here for staple removal    EXAMINATION:  Scalp laceration: some bruising but healing ok  3 staple removed    IMPRESSION:  1. Laceration right scalp       PLAN:  1. Recheck prn  2. She asked about cane. Some what festinating gait but no tremor but wonder about mild parkinsons         right normal/left normal

## 2021-07-29 LAB
CULTURE RESULTS: SIGNIFICANT CHANGE UP
SPECIMEN SOURCE: SIGNIFICANT CHANGE UP

## 2021-08-01 ENCOUNTER — TRANSCRIPTION ENCOUNTER (OUTPATIENT)
Age: 76
End: 2021-08-01

## 2021-08-01 NOTE — PRE-ANESTHESIA EVALUATION ADULT - NSANTHPMHFT_GEN_ALL_CORE
75F PMH: ESRD- s/p RT ( 5/20210), HTN. GERD, Anemia, DVT ,  EF= >55% (2021) presents for stent removal -right following RT on 8/2/2

## 2021-08-01 NOTE — PRE-ANESTHESIA EVALUATION ADULT - NSRADCARDRESULTSFT_GEN_ALL_CORE
< from: Transthoracic Echocardiogram (02.03.21 @ 08:14) >    CONCLUSIONS:  1. Mitral annular calcification. Trace mitral  regurgitation.  2.  Trace aortic regurgitation.  3. Severely dilated left atrium.  LA volume index = 72  cc/m2.  4. Severe concentric left ventricular hypertrophy.  5. Normal left ventricular systolic function.   Mild  diastolic dysfunction (stage I).  6. Normal right ventricular size and function.  7. RV systolic pressure is 44 mm Hg. Mild pulmonary  hypertension.  8. Small pericardial effusion.   No echocardiographic  evidence of tamponade physiology.    ------------------------------------------------------------------------  Confirmed on  2/4/2021 - 11:54:02 by Mike White MD  ------------------------------------------------------------------------    < end of copied text >    < from: Transthoracic Echocardiogram (02.03.21 @ 08:14) >    Ejection Fraction Visual Estimate: >55 %    < end of copied text >

## 2021-08-02 ENCOUNTER — OUTPATIENT (OUTPATIENT)
Dept: OUTPATIENT SERVICES | Facility: HOSPITAL | Age: 76
LOS: 1 days | End: 2021-08-02
Payer: MEDICARE

## 2021-08-02 ENCOUNTER — APPOINTMENT (OUTPATIENT)
Dept: TRANSPLANT | Facility: HOSPITAL | Age: 76
End: 2021-08-02
Payer: MEDICARE

## 2021-08-02 VITALS
DIASTOLIC BLOOD PRESSURE: 83 MMHG | RESPIRATION RATE: 12 BRPM | HEART RATE: 70 BPM | OXYGEN SATURATION: 100 % | SYSTOLIC BLOOD PRESSURE: 173 MMHG

## 2021-08-02 VITALS
DIASTOLIC BLOOD PRESSURE: 68 MMHG | SYSTOLIC BLOOD PRESSURE: 156 MMHG | HEART RATE: 69 BPM | WEIGHT: 164.02 LBS | TEMPERATURE: 98 F | HEIGHT: 64.57 IN | RESPIRATION RATE: 16 BRPM | OXYGEN SATURATION: 99 %

## 2021-08-02 DIAGNOSIS — I77.0 ARTERIOVENOUS FISTULA, ACQUIRED: Chronic | ICD-10-CM

## 2021-08-02 DIAGNOSIS — Z86.69 PERSONAL HISTORY OF OTHER DISEASES OF THE NERVOUS SYSTEM AND SENSE ORGANS: Chronic | ICD-10-CM

## 2021-08-02 DIAGNOSIS — Z90.710 ACQUIRED ABSENCE OF BOTH CERVIX AND UTERUS: Chronic | ICD-10-CM

## 2021-08-02 DIAGNOSIS — Z99.2 DEPENDENCE ON RENAL DIALYSIS: Chronic | ICD-10-CM

## 2021-08-02 DIAGNOSIS — H26.9 UNSPECIFIED CATARACT: Chronic | ICD-10-CM

## 2021-08-02 DIAGNOSIS — Z94.0 KIDNEY TRANSPLANT STATUS: Chronic | ICD-10-CM

## 2021-08-02 DIAGNOSIS — Z94.0 KIDNEY TRANSPLANT STATUS: ICD-10-CM

## 2021-08-02 LAB
BASE EXCESS BLDV CALC-SCNC: -2.2 MMOL/L — LOW (ref -2–2)
CA-I SERPL-SCNC: 1.38 MMOL/L — HIGH (ref 1.12–1.3)
CHLORIDE BLDV-SCNC: 112 MMOL/L — HIGH (ref 96–108)
CO2 BLDV-SCNC: 25 MMOL/L — SIGNIFICANT CHANGE UP (ref 22–30)
GAS PNL BLDV: 139 MMOL/L — SIGNIFICANT CHANGE UP (ref 135–145)
GAS PNL BLDV: SIGNIFICANT CHANGE UP
GAS PNL BLDV: SIGNIFICANT CHANGE UP
GLUCOSE BLDV-MCNC: 96 MG/DL — SIGNIFICANT CHANGE UP (ref 70–99)
HCO3 BLDV-SCNC: 24 MMOL/L — SIGNIFICANT CHANGE UP (ref 21–29)
HCT VFR BLDA CALC: 35 % — LOW (ref 39–50)
HGB BLD CALC-MCNC: 11.2 G/DL — LOW (ref 11.5–15.5)
LACTATE BLDV-MCNC: 1 MMOL/L — SIGNIFICANT CHANGE UP (ref 0.7–2)
OTHER CELLS CSF MANUAL: 7 ML/DL — LOW (ref 18–22)
PCO2 BLDV: 47 MMHG — SIGNIFICANT CHANGE UP (ref 35–50)
PH BLDV: 7.32 — LOW (ref 7.35–7.45)
PO2 BLDV: 29 MMHG — SIGNIFICANT CHANGE UP (ref 25–45)
POTASSIUM BLDV-SCNC: 4.9 MMOL/L — SIGNIFICANT CHANGE UP (ref 3.5–5.3)
SAO2 % BLDV: 45 % — LOW (ref 67–88)

## 2021-08-02 PROCEDURE — 84132 ASSAY OF SERUM POTASSIUM: CPT

## 2021-08-02 PROCEDURE — 82435 ASSAY OF BLOOD CHLORIDE: CPT

## 2021-08-02 PROCEDURE — 52310 CYSTOSCOPY AND TREATMENT: CPT | Mod: 58

## 2021-08-02 PROCEDURE — 82803 BLOOD GASES ANY COMBINATION: CPT

## 2021-08-02 PROCEDURE — 84295 ASSAY OF SERUM SODIUM: CPT

## 2021-08-02 PROCEDURE — 82330 ASSAY OF CALCIUM: CPT

## 2021-08-02 PROCEDURE — 85018 HEMOGLOBIN: CPT

## 2021-08-02 PROCEDURE — 83605 ASSAY OF LACTIC ACID: CPT

## 2021-08-02 PROCEDURE — 52310 CYSTOSCOPY AND TREATMENT: CPT

## 2021-08-02 PROCEDURE — 82947 ASSAY GLUCOSE BLOOD QUANT: CPT

## 2021-08-02 PROCEDURE — 85014 HEMATOCRIT: CPT

## 2021-08-02 RX ORDER — ONDANSETRON 8 MG/1
4 TABLET, FILM COATED ORAL ONCE
Refills: 0 | Status: DISCONTINUED | OUTPATIENT
Start: 2021-08-02 | End: 2021-08-02

## 2021-08-02 RX ORDER — FENTANYL CITRATE 50 UG/ML
25 INJECTION INTRAVENOUS
Refills: 0 | Status: DISCONTINUED | OUTPATIENT
Start: 2021-08-02 | End: 2021-08-02

## 2021-08-02 RX ORDER — ACETAMINOPHEN 500 MG
1000 TABLET ORAL ONCE
Refills: 0 | Status: DISCONTINUED | OUTPATIENT
Start: 2021-08-02 | End: 2021-08-02

## 2021-08-02 NOTE — ASU DISCHARGE PLAN (ADULT/PEDIATRIC) - ASU DC SPECIAL INSTRUCTIONSFT
continue your transplant medication regimen as prescribed by your Transplant team  call clinic with any questions at [phone] or return to [facility]

## 2021-08-02 NOTE — BRIEF OPERATIVE NOTE - OPERATION/FINDINGS
ureteral stent identified and removed in tact without issues via cystoscopy. bladder irrigated, straight catheter placed at end of case to drain bladder.

## 2021-08-03 ENCOUNTER — APPOINTMENT (OUTPATIENT)
Dept: NEPHROLOGY | Facility: CLINIC | Age: 76
End: 2021-08-03
Payer: MEDICARE

## 2021-08-03 ENCOUNTER — NON-APPOINTMENT (OUTPATIENT)
Age: 76
End: 2021-08-03

## 2021-08-03 VITALS
TEMPERATURE: 97 F | WEIGHT: 122 LBS | HEART RATE: 66 BPM | SYSTOLIC BLOOD PRESSURE: 177 MMHG | OXYGEN SATURATION: 97 % | DIASTOLIC BLOOD PRESSURE: 63 MMHG | RESPIRATION RATE: 12 BRPM | BODY MASS INDEX: 19.61 KG/M2 | HEIGHT: 66 IN

## 2021-08-03 LAB
ALBUMIN SERPL ELPH-MCNC: 4.4 G/DL
ALP BLD-CCNC: 129 U/L
ALT SERPL-CCNC: <5 U/L
ANION GAP SERPL CALC-SCNC: 12 MMOL/L
APPEARANCE: CLEAR
AST SERPL-CCNC: 10 U/L
BACTERIA: NEGATIVE
BASOPHILS # BLD AUTO: 0.01 K/UL
BASOPHILS NFR BLD AUTO: 0.3 %
BILIRUB SERPL-MCNC: 0.3 MG/DL
BILIRUBIN URINE: NEGATIVE
BLOOD URINE: NEGATIVE
BUN SERPL-MCNC: 46 MG/DL
CALCIUM SERPL-MCNC: 10 MG/DL
CHLORIDE SERPL-SCNC: 111 MMOL/L
CO2 SERPL-SCNC: 22 MMOL/L
COLOR: NORMAL
CREAT SERPL-MCNC: 2 MG/DL
CREAT SPEC-SCNC: 60 MG/DL
CREAT/PROT UR: 0.2 RATIO
EOSINOPHIL # BLD AUTO: 0.05 K/UL
EOSINOPHIL NFR BLD AUTO: 1.3 %
GLUCOSE QUALITATIVE U: NEGATIVE
GLUCOSE SERPL-MCNC: 104 MG/DL
HCT VFR BLD CALC: 38 %
HGB BLD-MCNC: 11.7 G/DL
HYALINE CASTS: 0 /LPF
IMM GRANULOCYTES NFR BLD AUTO: 0.3 %
KETONES URINE: NEGATIVE
LDH SERPL-CCNC: 174 U/L
LEUKOCYTE ESTERASE URINE: NEGATIVE
LYMPHOCYTES # BLD AUTO: 0.56 K/UL
LYMPHOCYTES NFR BLD AUTO: 14.3 %
MAGNESIUM SERPL-MCNC: 1.8 MG/DL
MAN DIFF?: NORMAL
MCHC RBC-ENTMCNC: 29 PG
MCHC RBC-ENTMCNC: 30.8 GM/DL
MCV RBC AUTO: 94.1 FL
MICROSCOPIC-UA: NORMAL
MONOCYTES # BLD AUTO: 0.24 K/UL
MONOCYTES NFR BLD AUTO: 6.1 %
NEUTROPHILS # BLD AUTO: 3.05 K/UL
NEUTROPHILS NFR BLD AUTO: 77.7 %
NITRITE URINE: NEGATIVE
PH URINE: 6.5
PHOSPHATE SERPL-MCNC: 3 MG/DL
PLATELET # BLD AUTO: 193 K/UL
POTASSIUM SERPL-SCNC: 5.1 MMOL/L
PROT SERPL-MCNC: 6.6 G/DL
PROT UR-MCNC: 12 MG/DL
PROTEIN URINE: NORMAL
RBC # BLD: 4.04 M/UL
RBC # FLD: 15.3 %
RED BLOOD CELLS URINE: 1 /HPF
SODIUM SERPL-SCNC: 145 MMOL/L
SPECIFIC GRAVITY URINE: 1.02
SQUAMOUS EPITHELIAL CELLS: 1 /HPF
TACROLIMUS SERPL-MCNC: 22.8 NG/ML
URATE SERPL-MCNC: 6.4 MG/DL
UROBILINOGEN URINE: NORMAL
WBC # FLD AUTO: 3.92 K/UL
WHITE BLOOD CELLS URINE: 0 /HPF

## 2021-08-03 PROCEDURE — 99215 OFFICE O/P EST HI 40 MIN: CPT

## 2021-08-03 NOTE — ASSESSMENT
[FreeTextEntry1] : \par 75 year old woman with ESRD due to HTN S/p DDRT on 5/19/2021 from 44 yr old donor with KDPI of 69% and terminal scr of 3.3. Delayed graft function due to ATN. Last HD was on  6/4/21. \par Creatinine declining slowly - 2.0mg/dL today. \par Transplant ureteric stent removed yesterday. \par No significant proteinuria ~ 200mg/day \par \par Immunosuppression \par - S/p Simulect induction , thymo not given due to advanced age \par - Cellcept 500mg po bid  - dose reduced for GI side effects \par - On Envarsus 6gm po daily, level 22.8 likely peak, will confirm with patient \par - Continue Prednisone 5mg daily \par \par Prophylaxis \par - Continue Valcyte 450gm po MWF - renal dose \par - Continue Bactrim ss daily and nystatin swish and swallow qid. \par - Completed COVID vaccine prior to transplant. Also had COVID infection in March 2020. \par \par Hypertension \par On Nifedipine 90mg po daily, Hydralazine 100mg po TID and labetalol 200mg po bid. BP sub optimal. \par Will increase Nifedipine to 60mg po bid. \par \par Hyperkalemia - controlled. K 5.1 today.  Continue Lokelma 10mg po daily for now, d/c if K < 5\par Metabolic acidosis - Improved, bicarb 22. Continue sodium bicarb to 1300mg po TID \par \par Anemia of CKD + meds.  Improved. Hgb 11.7. No longer requiring Procrit. \par \par Hyperparathyroidism - iPTH slowly improving, calcium normal. \par \par F/u with Dr. Henderson on 8/19/21

## 2021-08-03 NOTE — HISTORY OF PRESENT ILLNESS
[Not Working] : Not working [70: Cares for self; unalbe to carry on normal activity or do active work.] : 70: Cares for self; unable to carry on normal activity or do active work. [FreeTextEntry1] : \raissa 75 year old woman with ESRD due to HTN, was on hemodialysis since 2016. Primary Nephrologist is Dr. Treviño. \par History of COVID-19 infection, hospitalized in March 2020 for only 1 day. \par marry Received DDRT on 5/19/21 from  a 44 yr old woman, KDPI 69%, history of diabetes on insulin, terminal creatinine 3.3. Course was complicated by DGF. Last hemodialysis was on 6/4/21.\par marry Presents for scheduled follow up accompanied by her son. \par Transplant ureteric stent removed yesterday. Voiding urine without difficulty, no hematuria or dysuria. No fever or chills. No NVD. Appetite is good, weight is up 5lbs since  last month now 122lbs. \raissa Walks 2 blocks every day. \par Home BP records reviewed  150-170 in AM,  140-150's PM. \raissa Takes all meds as prescribed. Lives with her son who manages her meds.

## 2021-08-06 LAB
BKV DNA SPEC QL NAA+PROBE: NEGATIVE COPIES/ML
LOG 10 BK QUANTITATION PCR: NORMAL

## 2021-08-09 ENCOUNTER — APPOINTMENT (OUTPATIENT)
Dept: TRANSPLANT | Facility: CLINIC | Age: 76
End: 2021-08-09

## 2021-08-09 LAB
ALBUMIN SERPL ELPH-MCNC: 4.7 G/DL
ALP BLD-CCNC: 133 U/L
ALT SERPL-CCNC: <5 U/L
ANION GAP SERPL CALC-SCNC: 9 MMOL/L
APPEARANCE: CLEAR
AST SERPL-CCNC: 10 U/L
BACTERIA: NEGATIVE
BASOPHILS # BLD AUTO: 0.01 K/UL
BASOPHILS NFR BLD AUTO: 0.3 %
BILIRUB SERPL-MCNC: 0.2 MG/DL
BILIRUBIN URINE: NEGATIVE
BLOOD URINE: NEGATIVE
BUN SERPL-MCNC: 39 MG/DL
CALCIUM SERPL-MCNC: 10.5 MG/DL
CALCIUM SERPL-MCNC: 10.5 MG/DL
CHLORIDE SERPL-SCNC: 111 MMOL/L
CO2 SERPL-SCNC: 21 MMOL/L
COLOR: NORMAL
CREAT SERPL-MCNC: 1.97 MG/DL
CREAT SPEC-SCNC: 54 MG/DL
CREAT/PROT UR: 0.2 RATIO
EOSINOPHIL # BLD AUTO: 0.05 K/UL
EOSINOPHIL NFR BLD AUTO: 1.3 %
GLUCOSE QUALITATIVE U: NEGATIVE
GLUCOSE SERPL-MCNC: 107 MG/DL
HCT VFR BLD CALC: 36.8 %
HGB BLD-MCNC: 11.2 G/DL
HYALINE CASTS: 0 /LPF
IMM GRANULOCYTES NFR BLD AUTO: 1 %
KETONES URINE: NEGATIVE
LDH SERPL-CCNC: 185 U/L
LEUKOCYTE ESTERASE URINE: NEGATIVE
LYMPHOCYTES # BLD AUTO: 0.51 K/UL
LYMPHOCYTES NFR BLD AUTO: 12.8 %
MAGNESIUM SERPL-MCNC: 1.7 MG/DL
MAN DIFF?: NORMAL
MCHC RBC-ENTMCNC: 28.5 PG
MCHC RBC-ENTMCNC: 30.4 GM/DL
MCV RBC AUTO: 93.6 FL
MICROSCOPIC-UA: NORMAL
MONOCYTES # BLD AUTO: 0.2 K/UL
MONOCYTES NFR BLD AUTO: 5 %
NEUTROPHILS # BLD AUTO: 3.18 K/UL
NEUTROPHILS NFR BLD AUTO: 79.6 %
NITRITE URINE: NEGATIVE
PARATHYROID HORMONE INTACT: 454 PG/ML
PH URINE: 7.5
PHOSPHATE SERPL-MCNC: 2.6 MG/DL
PLATELET # BLD AUTO: 174 K/UL
POTASSIUM SERPL-SCNC: 6.4 MMOL/L
PROT SERPL-MCNC: 6.7 G/DL
PROT UR-MCNC: 11 MG/DL
PROTEIN URINE: NORMAL
RBC # BLD: 3.93 M/UL
RBC # FLD: 14.9 %
RED BLOOD CELLS URINE: 1 /HPF
SODIUM SERPL-SCNC: 141 MMOL/L
SPECIFIC GRAVITY URINE: 1.01
SQUAMOUS EPITHELIAL CELLS: 0 /HPF
TACROLIMUS SERPL-MCNC: 14.8 NG/ML
URATE SERPL-MCNC: 6.4 MG/DL
UROBILINOGEN URINE: NORMAL
WBC # FLD AUTO: 3.99 K/UL
WHITE BLOOD CELLS URINE: 0 /HPF

## 2021-08-09 RX ORDER — SULFAMETHOXAZOLE AND TRIMETHOPRIM 400; 80 MG/1; MG/1
400-80 TABLET ORAL
Qty: 30 | Refills: 11 | Status: DISCONTINUED | COMMUNITY
Start: 2021-05-20 | End: 2021-08-09

## 2021-08-10 RX ORDER — TACROLIMUS 1 MG/1
1 TABLET, EXTENDED RELEASE ORAL DAILY
Qty: 60 | Refills: 11 | Status: DISCONTINUED | COMMUNITY
Start: 2021-06-18 | End: 2021-08-10

## 2021-08-11 ENCOUNTER — APPOINTMENT (OUTPATIENT)
Dept: TRANSPLANT | Facility: CLINIC | Age: 76
End: 2021-08-11

## 2021-08-11 ENCOUNTER — LABORATORY RESULT (OUTPATIENT)
Age: 76
End: 2021-08-11

## 2021-08-12 ENCOUNTER — NON-APPOINTMENT (OUTPATIENT)
Age: 76
End: 2021-08-12

## 2021-08-13 LAB
ALBUMIN SERPL ELPH-MCNC: 4.6 G/DL
ALP BLD-CCNC: 132 U/L
ALT SERPL-CCNC: <5 U/L
ANION GAP SERPL CALC-SCNC: 13 MMOL/L
APPEARANCE: CLEAR
AST SERPL-CCNC: 10 U/L
BACTERIA: NEGATIVE
BASOPHILS # BLD AUTO: 0.01 K/UL
BASOPHILS NFR BLD AUTO: 0.2 %
BILIRUB SERPL-MCNC: 0.3 MG/DL
BILIRUBIN URINE: NEGATIVE
BKV DNA SPEC QL NAA+PROBE: NEGATIVE COPIES/ML
BLOOD URINE: NEGATIVE
BUN SERPL-MCNC: 42 MG/DL
CALCIUM SERPL-MCNC: 10.2 MG/DL
CALCIUM SERPL-MCNC: 10.2 MG/DL
CHLORIDE SERPL-SCNC: 110 MMOL/L
CO2 SERPL-SCNC: 21 MMOL/L
COLOR: NORMAL
CREAT SERPL-MCNC: 1.86 MG/DL
CREAT SPEC-SCNC: 96 MG/DL
CREAT/PROT UR: 0.1 RATIO
EOSINOPHIL # BLD AUTO: 0.05 K/UL
EOSINOPHIL NFR BLD AUTO: 1.2 %
GLUCOSE QUALITATIVE U: NEGATIVE
GLUCOSE SERPL-MCNC: 108 MG/DL
HCT VFR BLD CALC: 37.6 %
HGB BLD-MCNC: 11.3 G/DL
HYALINE CASTS: 0 /LPF
IMM GRANULOCYTES NFR BLD AUTO: 1 %
KETONES URINE: NEGATIVE
LDH SERPL-CCNC: 188 U/L
LEUKOCYTE ESTERASE URINE: NEGATIVE
LOG 10 BK QUANTITATION PCR: NORMAL
LYMPHOCYTES # BLD AUTO: 0.47 K/UL
LYMPHOCYTES NFR BLD AUTO: 11.5 %
MAGNESIUM SERPL-MCNC: 1.6 MG/DL
MAN DIFF?: NORMAL
MCHC RBC-ENTMCNC: 28.5 PG
MCHC RBC-ENTMCNC: 30.1 GM/DL
MCV RBC AUTO: 94.7 FL
MICROSCOPIC-UA: NORMAL
MONOCYTES # BLD AUTO: 0.21 K/UL
MONOCYTES NFR BLD AUTO: 5.1 %
NEUTROPHILS # BLD AUTO: 3.3 K/UL
NEUTROPHILS NFR BLD AUTO: 81 %
NITRITE URINE: NEGATIVE
PARATHYROID HORMONE INTACT: 454 PG/ML
PH URINE: 6
PHOSPHATE SERPL-MCNC: 2.9 MG/DL
PLATELET # BLD AUTO: 175 K/UL
POTASSIUM SERPL-SCNC: 4.7 MMOL/L
PROT SERPL-MCNC: 6.6 G/DL
PROT UR-MCNC: 12 MG/DL
PROTEIN URINE: NORMAL
RBC # BLD: 3.97 M/UL
RBC # FLD: 14.8 %
RED BLOOD CELLS URINE: 2 /HPF
SODIUM SERPL-SCNC: 143 MMOL/L
SPECIFIC GRAVITY URINE: 1.02
SQUAMOUS EPITHELIAL CELLS: 0 /HPF
TACROLIMUS SERPL-MCNC: 11.6 NG/ML
URATE SERPL-MCNC: 6.9 MG/DL
UROBILINOGEN URINE: NORMAL
WBC # FLD AUTO: 4.08 K/UL
WHITE BLOOD CELLS URINE: 1 /HPF

## 2021-08-18 LAB — G6PD SER-CCNC: 17.9 U/G HGB

## 2021-08-18 RX ORDER — ATOVAQUONE 750 MG/5ML
750 SUSPENSION ORAL DAILY
Qty: 1 | Refills: 3 | Status: DISCONTINUED | COMMUNITY
Start: 2021-08-12 | End: 2021-08-18

## 2021-08-19 ENCOUNTER — LABORATORY RESULT (OUTPATIENT)
Age: 76
End: 2021-08-19

## 2021-08-19 ENCOUNTER — APPOINTMENT (OUTPATIENT)
Dept: TRANSPLANT | Facility: CLINIC | Age: 76
End: 2021-08-19
Payer: MEDICARE

## 2021-08-19 VITALS
DIASTOLIC BLOOD PRESSURE: 70 MMHG | BODY MASS INDEX: 21.05 KG/M2 | HEART RATE: 66 BPM | TEMPERATURE: 98 F | WEIGHT: 131 LBS | HEIGHT: 66 IN | OXYGEN SATURATION: 99 % | SYSTOLIC BLOOD PRESSURE: 192 MMHG

## 2021-08-19 PROCEDURE — 99213 OFFICE O/P EST LOW 20 MIN: CPT

## 2021-08-19 NOTE — ASSESSMENT
[FreeTextEntry1] : doing well three months post  donor kidney transplant\par last creatinine 1.8 trending down\par immunosuppression tacro, mmf and steroids\par jj stent removed\par will check labs today\par follow up in two weeks

## 2021-08-19 NOTE — HISTORY OF PRESENT ILLNESS
[ Donor] :  donor [Basiliximab] : basiliximab [Other: ___] : [unfilled] [Terminal Creatinine: ____] : Terminal Creatinine: [unfilled] [TextBox_7] : May 19 2021 [TextBox_52] : Donor was a 42 y.o female, KDPI 69%, history of diabetes on insulin, terminal creatinine 3.3.  CMV negative, EBV positive.  [de-identified] : doing well\par jj stent removed two weeks ago\par last creatinine 1.8\par feels good\par BP better controlled\par

## 2021-08-20 LAB
ALBUMIN SERPL ELPH-MCNC: 4.3 G/DL
ALP BLD-CCNC: 132 U/L
ALT SERPL-CCNC: 7 U/L
ANION GAP SERPL CALC-SCNC: 12 MMOL/L
APPEARANCE: CLEAR
AST SERPL-CCNC: 14 U/L
BACTERIA: NEGATIVE
BASOPHILS # BLD AUTO: 0.02 K/UL
BASOPHILS NFR BLD AUTO: 0.8 %
BILIRUB SERPL-MCNC: 0.3 MG/DL
BILIRUBIN URINE: NEGATIVE
BLOOD URINE: NEGATIVE
BUN SERPL-MCNC: 36 MG/DL
CALCIUM SERPL-MCNC: 10 MG/DL
CALCIUM SERPL-MCNC: 10 MG/DL
CHLORIDE SERPL-SCNC: 112 MMOL/L
CO2 SERPL-SCNC: 23 MMOL/L
COLOR: NORMAL
CREAT SERPL-MCNC: 1.24 MG/DL
CREAT SPEC-SCNC: 60 MG/DL
CREAT/PROT UR: 0.5 RATIO
EOSINOPHIL # BLD AUTO: 0.11 K/UL
EOSINOPHIL NFR BLD AUTO: 4.3 %
GLUCOSE QUALITATIVE U: NEGATIVE
GLUCOSE SERPL-MCNC: 96 MG/DL
HCT VFR BLD CALC: 34.2 %
HGB BLD-MCNC: 10.5 G/DL
HYALINE CASTS: 0 /LPF
IMM GRANULOCYTES NFR BLD AUTO: 0.8 %
KETONES URINE: NEGATIVE
LDH SERPL-CCNC: 221 U/L
LEUKOCYTE ESTERASE URINE: NEGATIVE
LYMPHOCYTES # BLD AUTO: 0.66 K/UL
LYMPHOCYTES NFR BLD AUTO: 25.6 %
MAGNESIUM SERPL-MCNC: 1.4 MG/DL
MAN DIFF?: NORMAL
MCHC RBC-ENTMCNC: 28.6 PG
MCHC RBC-ENTMCNC: 30.7 GM/DL
MCV RBC AUTO: 93.2 FL
MICROSCOPIC-UA: NORMAL
MONOCYTES # BLD AUTO: 0.26 K/UL
MONOCYTES NFR BLD AUTO: 10.1 %
NEUTROPHILS # BLD AUTO: 1.51 K/UL
NEUTROPHILS NFR BLD AUTO: 58.4 %
NITRITE URINE: NEGATIVE
PARATHYROID HORMONE INTACT: 428 PG/ML
PH URINE: 6.5
PHOSPHATE SERPL-MCNC: 2.4 MG/DL
PLATELET # BLD AUTO: 140 K/UL
POTASSIUM SERPL-SCNC: 4 MMOL/L
PROT SERPL-MCNC: 6.3 G/DL
PROT UR-MCNC: 29 MG/DL
PROTEIN URINE: ABNORMAL
RBC # BLD: 3.67 M/UL
RBC # FLD: 14.3 %
RED BLOOD CELLS URINE: 2 /HPF
SODIUM SERPL-SCNC: 146 MMOL/L
SPECIFIC GRAVITY URINE: 1.01
SQUAMOUS EPITHELIAL CELLS: 0 /HPF
TACROLIMUS SERPL-MCNC: 5.9 NG/ML
URATE SERPL-MCNC: 6.3 MG/DL
UROBILINOGEN URINE: NORMAL
WBC # FLD AUTO: 2.58 K/UL
WHITE BLOOD CELLS URINE: 1 /HPF

## 2021-08-23 LAB
BKV DNA SPEC QL NAA+PROBE: NEGATIVE COPIES/ML
LOG 10 BK QUANTITATION PCR: NORMAL

## 2021-08-24 ENCOUNTER — LABORATORY RESULT (OUTPATIENT)
Age: 76
End: 2021-08-24

## 2021-08-24 ENCOUNTER — APPOINTMENT (OUTPATIENT)
Dept: TRANSPLANT | Facility: CLINIC | Age: 76
End: 2021-08-24

## 2021-08-25 LAB
ALBUMIN SERPL ELPH-MCNC: 4.4 G/DL
ALP BLD-CCNC: 136 U/L
ALT SERPL-CCNC: 6 U/L
ANION GAP SERPL CALC-SCNC: 10 MMOL/L
APPEARANCE: CLEAR
AST SERPL-CCNC: 11 U/L
BACTERIA: NEGATIVE
BASOPHILS # BLD AUTO: 0.03 K/UL
BASOPHILS NFR BLD AUTO: 0.9 %
BILIRUB SERPL-MCNC: 0.3 MG/DL
BILIRUBIN URINE: NEGATIVE
BLOOD URINE: NEGATIVE
BUN SERPL-MCNC: 37 MG/DL
CALCIUM SERPL-MCNC: 10.1 MG/DL
CALCIUM SERPL-MCNC: 10.1 MG/DL
CHLORIDE SERPL-SCNC: 110 MMOL/L
CO2 SERPL-SCNC: 25 MMOL/L
COLOR: YELLOW
COVID-19 SPIKE DOMAIN ANTIBODY INTERPRETATION: POSITIVE
CREAT SERPL-MCNC: 1.58 MG/DL
CREAT SPEC-SCNC: 184 MG/DL
CREAT/PROT UR: 0.2 RATIO
EOSINOPHIL # BLD AUTO: 0.08 K/UL
EOSINOPHIL NFR BLD AUTO: 2.7 %
GLUCOSE QUALITATIVE U: NEGATIVE
GLUCOSE SERPL-MCNC: 101 MG/DL
HCT VFR BLD CALC: 31.9 %
HGB BLD-MCNC: 10.1 G/DL
HYALINE CASTS: 2 /LPF
KETONES URINE: NEGATIVE
LDH SERPL-CCNC: 233 U/L
LEUKOCYTE ESTERASE URINE: NEGATIVE
LYMPHOCYTES # BLD AUTO: 0.44 K/UL
LYMPHOCYTES NFR BLD AUTO: 15.2 %
MAGNESIUM SERPL-MCNC: 1.5 MG/DL
MAN DIFF?: NORMAL
MCHC RBC-ENTMCNC: 29.2 PG
MCHC RBC-ENTMCNC: 31.7 GM/DL
MCV RBC AUTO: 92.2 FL
MICROSCOPIC-UA: NORMAL
MONOCYTES # BLD AUTO: 0.08 K/UL
MONOCYTES NFR BLD AUTO: 2.7 %
NEUTROPHILS # BLD AUTO: 2.25 K/UL
NEUTROPHILS NFR BLD AUTO: 78.5 %
NITRITE URINE: NEGATIVE
PARATHYROID HORMONE INTACT: 518 PG/ML
PH URINE: 6
PHOSPHATE SERPL-MCNC: 2.6 MG/DL
PLATELET # BLD AUTO: 137 K/UL
POTASSIUM SERPL-SCNC: 3.9 MMOL/L
PROT SERPL-MCNC: 6.3 G/DL
PROT UR-MCNC: 32 MG/DL
PROTEIN URINE: ABNORMAL
RBC # BLD: 3.46 M/UL
RBC # FLD: 14 %
RED BLOOD CELLS URINE: 2 /HPF
SARS-COV-2 AB SERPL IA-ACNC: >250 U/ML
SODIUM SERPL-SCNC: 144 MMOL/L
SPECIFIC GRAVITY URINE: 1.02
SQUAMOUS EPITHELIAL CELLS: 1 /HPF
TACROLIMUS SERPL-MCNC: 9.5 NG/ML
URATE SERPL-MCNC: 6.9 MG/DL
UROBILINOGEN URINE: NORMAL
WBC # FLD AUTO: 2.87 K/UL
WHITE BLOOD CELLS URINE: 3 /HPF

## 2021-08-29 LAB
BKV DNA SPEC QL NAA+PROBE: NEGATIVE COPIES/ML
LOG 10 BK QUANTITATION PCR: NORMAL

## 2021-09-01 ENCOUNTER — APPOINTMENT (OUTPATIENT)
Dept: TRANSPLANT | Facility: CLINIC | Age: 76
End: 2021-09-01
Payer: MEDICARE

## 2021-09-01 VITALS
SYSTOLIC BLOOD PRESSURE: 171 MMHG | HEART RATE: 70 BPM | WEIGHT: 124 LBS | BODY MASS INDEX: 19.93 KG/M2 | TEMPERATURE: 97 F | OXYGEN SATURATION: 100 % | DIASTOLIC BLOOD PRESSURE: 62 MMHG | HEIGHT: 66 IN | RESPIRATION RATE: 12 BRPM

## 2021-09-01 PROCEDURE — 99213 OFFICE O/P EST LOW 20 MIN: CPT

## 2021-09-01 NOTE — HISTORY OF PRESENT ILLNESS
[ Donor] :  donor [Basiliximab] : basiliximab [Other: ___] : [unfilled] [Terminal Creatinine: ____] : Terminal Creatinine: [unfilled] [TextBox_7] : May 19 2021 [TextBox_52] : Donor was a 42 y.o female, KDPI 69%, history of diabetes on insulin, terminal creatinine 3.3.  CMV negative, EBV positive.  [de-identified] : doing generally well\par reports occasionally feeling palpitations\par BP records from home measurements all good\par here blood pressure 170/60\par last creatinine 1.5\par

## 2021-09-01 NOTE — ASSESSMENT
[FreeTextEntry1] : doing well\par occasional palpitations; will follow with her cardiologist\par BP (home records) well controlled\par on envarsus 4mg daily\par will check labs today\par will stop nystatin and bicarb\par change Valcyte to daily dose\par follow up in two weeks\par

## 2021-09-02 LAB
ALBUMIN SERPL ELPH-MCNC: 4.5 G/DL
ALP BLD-CCNC: 158 U/L
ALT SERPL-CCNC: 7 U/L
ANION GAP SERPL CALC-SCNC: 10 MMOL/L
APPEARANCE: CLEAR
AST SERPL-CCNC: 11 U/L
BACTERIA: NEGATIVE
BASOPHILS # BLD AUTO: 0.02 K/UL
BASOPHILS NFR BLD AUTO: 0.7 %
BILIRUB SERPL-MCNC: 0.3 MG/DL
BILIRUBIN URINE: NEGATIVE
BLOOD URINE: NEGATIVE
BUN SERPL-MCNC: 40 MG/DL
CALCIUM SERPL-MCNC: 10.2 MG/DL
CALCIUM SERPL-MCNC: 10.2 MG/DL
CHLORIDE SERPL-SCNC: 112 MMOL/L
CO2 SERPL-SCNC: 23 MMOL/L
COLOR: YELLOW
COVID-19 SPIKE DOMAIN ANTIBODY INTERPRETATION: POSITIVE
CREAT SERPL-MCNC: 1.37 MG/DL
CREAT SPEC-SCNC: 74 MG/DL
CREAT/PROT UR: 0.3 RATIO
EOSINOPHIL # BLD AUTO: 0.08 K/UL
EOSINOPHIL NFR BLD AUTO: 2.7 %
GLUCOSE QUALITATIVE U: NEGATIVE
GLUCOSE SERPL-MCNC: 108 MG/DL
HCT VFR BLD CALC: 31.3 %
HGB BLD-MCNC: 9.6 G/DL
HYALINE CASTS: 0 /LPF
IMM GRANULOCYTES NFR BLD AUTO: 1 %
KETONES URINE: NEGATIVE
LDH SERPL-CCNC: 210 U/L
LEUKOCYTE ESTERASE URINE: NEGATIVE
LYMPHOCYTES # BLD AUTO: 0.7 K/UL
LYMPHOCYTES NFR BLD AUTO: 23.7 %
MAGNESIUM SERPL-MCNC: 1.7 MG/DL
MAN DIFF?: NORMAL
MCHC RBC-ENTMCNC: 28.5 PG
MCHC RBC-ENTMCNC: 30.7 GM/DL
MCV RBC AUTO: 92.9 FL
MICROSCOPIC-UA: NORMAL
MONOCYTES # BLD AUTO: 0.35 K/UL
MONOCYTES NFR BLD AUTO: 11.9 %
NEUTROPHILS # BLD AUTO: 1.77 K/UL
NEUTROPHILS NFR BLD AUTO: 60 %
NITRITE URINE: NEGATIVE
PARATHYROID HORMONE INTACT: 423 PG/ML
PH URINE: 7
PHOSPHATE SERPL-MCNC: 2.5 MG/DL
PLATELET # BLD AUTO: 142 K/UL
POTASSIUM SERPL-SCNC: 4.1 MMOL/L
PROT SERPL-MCNC: 6.5 G/DL
PROT UR-MCNC: 25 MG/DL
PROTEIN URINE: ABNORMAL
RBC # BLD: 3.37 M/UL
RBC # FLD: 13.6 %
RED BLOOD CELLS URINE: 6 /HPF
SARS-COV-2 AB SERPL IA-ACNC: >250 U/ML
SODIUM SERPL-SCNC: 145 MMOL/L
SPECIFIC GRAVITY URINE: 1.02
SQUAMOUS EPITHELIAL CELLS: 0 /HPF
TACROLIMUS SERPL-MCNC: 5.9 NG/ML
URATE SERPL-MCNC: 6.9 MG/DL
URINE COMMENTS: NORMAL
UROBILINOGEN URINE: NORMAL
WBC # FLD AUTO: 2.95 K/UL
WHITE BLOOD CELLS URINE: 2 /HPF

## 2021-09-06 LAB
BKV DNA SPEC QL NAA+PROBE: NEGATIVE COPIES/ML
LOG 10 BK QUANTITATION PCR: NORMAL

## 2021-09-14 ENCOUNTER — APPOINTMENT (OUTPATIENT)
Dept: NEPHROLOGY | Facility: CLINIC | Age: 76
End: 2021-09-14
Payer: MEDICARE

## 2021-09-14 VITALS — SYSTOLIC BLOOD PRESSURE: 140 MMHG | DIASTOLIC BLOOD PRESSURE: 60 MMHG | WEIGHT: 120 LBS | BODY MASS INDEX: 19.37 KG/M2

## 2021-09-14 DIAGNOSIS — E87.5 HYPERKALEMIA: ICD-10-CM

## 2021-09-14 PROCEDURE — 99215 OFFICE O/P EST HI 40 MIN: CPT

## 2021-09-14 RX ORDER — LABETALOL HYDROCHLORIDE 100 MG/1
100 TABLET, FILM COATED ORAL
Qty: 120 | Refills: 0 | Status: DISCONTINUED | COMMUNITY
Start: 2021-05-24

## 2021-09-14 RX ORDER — NIFEDIPINE 60 MG/1
60 TABLET, FILM COATED, EXTENDED RELEASE ORAL
Qty: 120 | Refills: 0 | Status: DISCONTINUED | COMMUNITY
Start: 2021-07-08

## 2021-09-14 RX ORDER — NYSTATIN 100000 [USP'U]/ML
100000 SUSPENSION ORAL 4 TIMES DAILY
Qty: 2 | Refills: 3 | Status: DISCONTINUED | COMMUNITY
Start: 2021-05-20 | End: 2021-09-14

## 2021-09-14 RX ORDER — NIFEDIPINE 90 MG/1
90 TABLET, EXTENDED RELEASE ORAL
Qty: 30 | Refills: 0 | Status: DISCONTINUED | COMMUNITY
Start: 2021-09-10

## 2021-09-14 RX ORDER — SODIUM BICARBONATE 650 MG/1
650 TABLET ORAL
Qty: 270 | Refills: 4 | Status: DISCONTINUED | COMMUNITY
Start: 2021-05-27 | End: 2021-09-14

## 2021-09-14 NOTE — HISTORY OF PRESENT ILLNESS
[FreeTextEntry1] : \par 75 year old woman with ESRD due to HTN, was on hemodialysis since 2016. Primary Nephrologist is Dr. Treviño. \par History of COVID-19 infection, hospitalized in March 2020 for only 1 day. \par \raissa Received DDRT on 5/19/21 from  a 42 yr old woman, KDPI 69%, history of diabetes on insulin, terminal creatinine 3.3. Course was complicated by DGF. Last hemodialysis was on 6/4/21. Transplant ureteric stent was removed on 8/2/21. \par \raissa Presents for scheduled follow up. She feels well. \par Voiding urine without difficulty, no hematuria or dysuria. No fever or chills. \par Complaining of lower back pain, takes Tylenol as needed. \par C/o allergies, runny nose, prescribed allergy medication by PMD. Symptoms improving.  \par No NVD. Appetite is good, weight 120 lbs. down 4lbs.\par Walks 4 blocks every day. \par Home -140 systolic.  this AM at home, feels tired. \raissa Takes all meds as prescribed. Lives with her son who manages her meds.

## 2021-09-14 NOTE — PHYSICAL EXAM
[General Appearance - Alert] : alert [General Appearance - In No Acute Distress] : in no acute distress [Sclera] : the sclera and conjunctiva were normal [PERRL With Normal Accommodation] : pupils were equal in size, round, and reactive to light [Oropharynx] : the oropharynx was normal [Jugular Venous Distention Increased] : there was no jugular-venous distention [Respiration, Rhythm And Depth] : normal respiratory rhythm and effort [Exaggerated Use Of Accessory Muscles For Inspiration] : no accessory muscle use [Auscultation Breath Sounds / Voice Sounds] : lungs were clear to auscultation bilaterally [Apical Impulse] : the apical impulse was normal [Heart Rate And Rhythm] : heart rate was normal and rhythm regular [Edema] : there was no peripheral edema [Bowel Sounds] : normal bowel sounds [Abdomen Soft] : soft [Involuntary Movements] : no involuntary movements were seen [___ (cm) Fistula] : [unfilled] (cm) fistula [Bruit] : a bruit was present [Thrill] : a thrill was present [] : no rash [No Focal Deficits] : no focal deficits [Oriented To Time, Place, And Person] : oriented to person, place, and time [Abdomen Tenderness] : non-tender [FreeTextEntry1] : RLQ scar well healed, no tenderness, bruit +present

## 2021-09-14 NOTE — ASSESSMENT
[FreeTextEntry1] : \par S/p DDRT on 5/19/2021 from 44 yr old donor with KDPI of 69% and terminal scr of 3.3. \par Delayed graft function due to ATN. Last HD was on  6/4/21. \par Creatinine stable at 1.2-1.3mg/dL.  \par \par Hyperkalemia  K = 6.1 today. Will start Lokelma 10gm po daily. Low K diet. \par \par Immunosuppression \par - S/p Simulect induction , thymo not given due to advanced age \par - CellCept 500mg po bid  - dose reduced for GI side effects and leukopenia. \par - On Envarsus 5gm po daily, level 4.7. Increase dose to 7mg daily. \par - Continue Prednisone 5mg daily \par \par Prophylaxis \par - Continue Valcyte 450gm po daily until Nov 2021\par - Bactrim d/jair for hyperkalemia. Continue Dapsone 100mg podaily. \par - Completed COVID vaccine prior to transplant. Also had COVID infection in March 2020. Shubham Ab +ve. Scheduled for booster on 9/20/21 \par \par Hypertension - acceptable control. \par On Nifedipine 60mg po bid,  Hydralazine 100mg po TID and labetalol 200mg po bid. \par \par Anemia of CKD + meds.  Hgb declining off Procrit. 7.8g/dL today. Resume Procrit 10,000 units sq weekly. \par Check iron studies, B12 and folate. \par \par Check blood work next week - K, Tac level and creatinine, Hgb \par F/u scheduled with Dr. Henderson on 9/30 \par

## 2021-09-15 LAB
ALBUMIN SERPL ELPH-MCNC: 4.6 G/DL
ALP BLD-CCNC: 179 U/L
ALT SERPL-CCNC: 6 U/L
ANION GAP SERPL CALC-SCNC: 11 MMOL/L
APPEARANCE: CLEAR
AST SERPL-CCNC: 9 U/L
BACTERIA: NEGATIVE
BASOPHILS # BLD AUTO: 0.01 K/UL
BASOPHILS NFR BLD AUTO: 0.3 %
BILIRUB SERPL-MCNC: 0.5 MG/DL
BILIRUBIN URINE: NEGATIVE
BLOOD URINE: NEGATIVE
BUN SERPL-MCNC: 45 MG/DL
CALCIUM SERPL-MCNC: 10.3 MG/DL
CALCIUM SERPL-MCNC: 10.3 MG/DL
CHLORIDE SERPL-SCNC: 110 MMOL/L
CO2 SERPL-SCNC: 18 MMOL/L
COLOR: NORMAL
CREAT SERPL-MCNC: 1.44 MG/DL
CREAT SPEC-SCNC: 64 MG/DL
CREAT/PROT UR: 0.2 RATIO
EOSINOPHIL # BLD AUTO: 0.07 K/UL
EOSINOPHIL NFR BLD AUTO: 1.8 %
GLUCOSE QUALITATIVE U: NEGATIVE
GLUCOSE SERPL-MCNC: 106 MG/DL
HCT VFR BLD CALC: 26.8 %
HGB BLD-MCNC: 7.8 G/DL
HYALINE CASTS: 0 /LPF
IMM GRANULOCYTES NFR BLD AUTO: 0.5 %
KETONES URINE: NEGATIVE
LDH SERPL-CCNC: 189 U/L
LEUKOCYTE ESTERASE URINE: NEGATIVE
LYMPHOCYTES # BLD AUTO: 0.68 K/UL
LYMPHOCYTES NFR BLD AUTO: 17.9 %
MAGNESIUM SERPL-MCNC: 1.9 MG/DL
MAN DIFF?: NORMAL
MCHC RBC-ENTMCNC: 27.8 PG
MCHC RBC-ENTMCNC: 29.1 GM/DL
MCV RBC AUTO: 95.4 FL
MICROSCOPIC-UA: NORMAL
MONOCYTES # BLD AUTO: 0.18 K/UL
MONOCYTES NFR BLD AUTO: 4.7 %
NEUTROPHILS # BLD AUTO: 2.83 K/UL
NEUTROPHILS NFR BLD AUTO: 74.8 %
NITRITE URINE: NEGATIVE
PARATHYROID HORMONE INTACT: 329 PG/ML
PH URINE: 6
PHOSPHATE SERPL-MCNC: 2.9 MG/DL
PLATELET # BLD AUTO: 137 K/UL
POTASSIUM SERPL-SCNC: 6.2 MMOL/L
PROT SERPL-MCNC: 6.5 G/DL
PROT UR-MCNC: 11 MG/DL
PROTEIN URINE: NEGATIVE
RBC # BLD: 2.81 M/UL
RBC # FLD: 14.1 %
RED BLOOD CELLS URINE: 1 /HPF
SODIUM SERPL-SCNC: 139 MMOL/L
SPECIFIC GRAVITY URINE: 1.01
SQUAMOUS EPITHELIAL CELLS: 0 /HPF
TACROLIMUS SERPL-MCNC: 4.7 NG/ML
URATE SERPL-MCNC: 6.9 MG/DL
UROBILINOGEN URINE: NORMAL
WBC # FLD AUTO: 3.79 K/UL
WHITE BLOOD CELLS URINE: 0 /HPF

## 2021-09-16 LAB
FERRITIN SERPL-MCNC: 2129 NG/ML
FOLATE SERPL-MCNC: 10.6 NG/ML
IRON SATN MFR SERPL: 86 %
IRON SERPL-MCNC: 195 UG/DL
TIBC SERPL-MCNC: 227 UG/DL
UIBC SERPL-MCNC: 32 UG/DL
VIT B12 SERPL-MCNC: 401 PG/ML

## 2021-09-21 ENCOUNTER — APPOINTMENT (OUTPATIENT)
Dept: TRANSPLANT | Facility: CLINIC | Age: 76
End: 2021-09-21

## 2021-09-21 LAB
BKV DNA SPEC QL NAA+PROBE: NEGATIVE COPIES/ML
LOG 10 BK QUANTITATION PCR: NORMAL

## 2021-09-30 ENCOUNTER — APPOINTMENT (OUTPATIENT)
Dept: TRANSPLANT | Facility: CLINIC | Age: 76
End: 2021-09-30

## 2021-10-05 ENCOUNTER — INPATIENT (INPATIENT)
Facility: HOSPITAL | Age: 76
LOS: 2 days | Discharge: HOME | DRG: 812 | End: 2021-10-08
Attending: TRANSPLANT SURGERY | Admitting: TRANSPLANT SURGERY
Payer: MEDICARE

## 2021-10-05 ENCOUNTER — APPOINTMENT (OUTPATIENT)
Dept: TRANSPLANT | Facility: CLINIC | Age: 76
End: 2021-10-05

## 2021-10-05 ENCOUNTER — APPOINTMENT (OUTPATIENT)
Dept: NEPHROLOGY | Facility: CLINIC | Age: 76
End: 2021-10-05

## 2021-10-05 ENCOUNTER — NON-APPOINTMENT (OUTPATIENT)
Age: 76
End: 2021-10-05

## 2021-10-05 VITALS
SYSTOLIC BLOOD PRESSURE: 180 MMHG | OXYGEN SATURATION: 98 % | TEMPERATURE: 98 F | HEART RATE: 73 BPM | HEIGHT: 68 IN | WEIGHT: 122.36 LBS | RESPIRATION RATE: 20 BRPM | DIASTOLIC BLOOD PRESSURE: 67 MMHG

## 2021-10-05 DIAGNOSIS — Z99.2 DEPENDENCE ON RENAL DIALYSIS: Chronic | ICD-10-CM

## 2021-10-05 DIAGNOSIS — Z94.0 KIDNEY TRANSPLANT STATUS: Chronic | ICD-10-CM

## 2021-10-05 DIAGNOSIS — I77.0 ARTERIOVENOUS FISTULA, ACQUIRED: Chronic | ICD-10-CM

## 2021-10-05 DIAGNOSIS — Z86.69 PERSONAL HISTORY OF OTHER DISEASES OF THE NERVOUS SYSTEM AND SENSE ORGANS: Chronic | ICD-10-CM

## 2021-10-05 DIAGNOSIS — Z90.710 ACQUIRED ABSENCE OF BOTH CERVIX AND UTERUS: Chronic | ICD-10-CM

## 2021-10-05 DIAGNOSIS — H26.9 UNSPECIFIED CATARACT: Chronic | ICD-10-CM

## 2021-10-05 LAB
ALBUMIN SERPL ELPH-MCNC: 4.5 G/DL — SIGNIFICANT CHANGE UP (ref 3.3–5)
ALP SERPL-CCNC: 142 U/L — HIGH (ref 40–120)
ALT FLD-CCNC: 10 U/L — SIGNIFICANT CHANGE UP (ref 10–45)
ANION GAP SERPL CALC-SCNC: 11 MMOL/L — SIGNIFICANT CHANGE UP (ref 5–17)
APPEARANCE UR: CLEAR — SIGNIFICANT CHANGE UP
AST SERPL-CCNC: 59 U/L — HIGH (ref 10–40)
BILIRUB SERPL-MCNC: 0.5 MG/DL — SIGNIFICANT CHANGE UP (ref 0.2–1.2)
BILIRUB UR-MCNC: NEGATIVE — SIGNIFICANT CHANGE UP
BLD GP AB SCN SERPL QL: POSITIVE — SIGNIFICANT CHANGE UP
BUN SERPL-MCNC: 34 MG/DL — HIGH (ref 7–23)
CALCIUM SERPL-MCNC: 9.6 MG/DL — SIGNIFICANT CHANGE UP (ref 8.4–10.5)
CHLORIDE SERPL-SCNC: 112 MMOL/L — HIGH (ref 96–108)
CO2 SERPL-SCNC: 13 MMOL/L — LOW (ref 22–31)
COLOR SPEC: SIGNIFICANT CHANGE UP
CREAT SERPL-MCNC: 1.61 MG/DL — HIGH (ref 0.5–1.3)
D DIMER BLD IA.RAPID-MCNC: 1317 NG/ML DDU — HIGH
DIFF PNL FLD: NEGATIVE — SIGNIFICANT CHANGE UP
FIBRINOGEN PPP-MCNC: 303 MG/DL — SIGNIFICANT CHANGE UP (ref 290–520)
GLUCOSE SERPL-MCNC: 105 MG/DL — HIGH (ref 70–99)
GLUCOSE UR QL: NEGATIVE — SIGNIFICANT CHANGE UP
HCT VFR BLD CALC: 16.5 % — CRITICAL LOW (ref 34.5–45)
HGB BLD-MCNC: 4.8 G/DL — CRITICAL LOW (ref 11.5–15.5)
KETONES UR-MCNC: NEGATIVE — SIGNIFICANT CHANGE UP
LEUKOCYTE ESTERASE UR-ACNC: NEGATIVE — SIGNIFICANT CHANGE UP
MCHC RBC-ENTMCNC: 28.7 PG — SIGNIFICANT CHANGE UP (ref 27–34)
MCHC RBC-ENTMCNC: 29.1 GM/DL — LOW (ref 32–36)
MCV RBC AUTO: 98.8 FL — SIGNIFICANT CHANGE UP (ref 80–100)
NITRITE UR-MCNC: NEGATIVE — SIGNIFICANT CHANGE UP
PH UR: 6 — SIGNIFICANT CHANGE UP (ref 5–8)
PLATELET # BLD AUTO: 199 K/UL — SIGNIFICANT CHANGE UP (ref 150–400)
POTASSIUM SERPL-MCNC: 7 MMOL/L — CRITICAL HIGH (ref 3.5–5.3)
POTASSIUM SERPL-SCNC: 7 MMOL/L — CRITICAL HIGH (ref 3.5–5.3)
PROT SERPL-MCNC: 6.7 G/DL — SIGNIFICANT CHANGE UP (ref 6–8.3)
PROT UR-MCNC: SIGNIFICANT CHANGE UP
RBC # BLD: 1.52 M/UL — LOW (ref 3.8–5.2)
RBC # BLD: 1.67 M/UL — LOW (ref 3.8–5.2)
RBC # FLD: 18.9 % — HIGH (ref 10.3–14.5)
RETICS #: 135.1 K/UL — HIGH (ref 25–125)
RETICS/RBC NFR: 8.9 % — HIGH (ref 0.5–2.5)
RH IG SCN BLD-IMP: POSITIVE — SIGNIFICANT CHANGE UP
SODIUM SERPL-SCNC: 136 MMOL/L — SIGNIFICANT CHANGE UP (ref 135–145)
SP GR SPEC: 1.01 — SIGNIFICANT CHANGE UP (ref 1.01–1.02)
UROBILINOGEN FLD QL: NEGATIVE — SIGNIFICANT CHANGE UP
WBC # BLD: 4.37 K/UL — SIGNIFICANT CHANGE UP (ref 3.8–10.5)
WBC # FLD AUTO: 4.37 K/UL — SIGNIFICANT CHANGE UP (ref 3.8–10.5)

## 2021-10-05 PROCEDURE — 76770 US EXAM ABDO BACK WALL COMP: CPT | Mod: 26

## 2021-10-05 PROCEDURE — 99291 CRITICAL CARE FIRST HOUR: CPT

## 2021-10-05 PROCEDURE — 71045 X-RAY EXAM CHEST 1 VIEW: CPT | Mod: 26

## 2021-10-05 NOTE — ED ADULT NURSE NOTE - OBJECTIVE STATEMENT
75yF presents to the ED from home as referred by outpatient MD for abnormal lab results. 75yF presents to the ED from home as referred by outpatient MD for abnormal lab results. PMHx of HTN and renal transplant 4 months. Pt gets weekly testing done s/p renal transplant and pt blood work today showed hbg of 5 and pt referred to ED for blood transfusion. Pt reports increased HARLEY, weakness/tiredness and chest pressure x1week. Denies palpitations, lightheadedness/dizziness/syncope, fevers/chills, blood in urine, blood in stool, N/V, HA, C/D, abdominal pain, back pain, numbness/tingling. Pt AAOx4, VS as documented. Pt gross neuro intact. Pt lungs clear BL. Pt has no increased WOB, labored respirations, or retractions. Pt abdomen soft and nontender to palpation. Pt has + pulses in UE and LE BL. Pt has no edema in LE BL. Bed locked in lowest position with appropriate side rails raised. Pt given call bell and explained call bell system. Pt aware of plan to await lab results. Pt has no other questions or concerns at this time.

## 2021-10-05 NOTE — ED PROVIDER NOTE - CLINICAL SUMMARY MEDICAL DECISION MAKING FREE TEXT BOX
O'Brayan DO PGY-2: pt presents due to abnormal labs. Hgb on vbg was 11.2. Pt denies any symptoms of cp, sob or lightheadedness. No abd pain, hematuria or melanotic stool. Ordered cbc and cmp to assess current hgb level. Will reassess. Dispo pending workup. O'Brayan DO PGY-2: pt presents due to abnormal labs. Hgb on labs outpatient today was 5.0. Pt denies any symptoms of cp, sob or lightheadedness. No abd pain, hematuria or melanotic stool. Ordered cbc and cmp to assess current hgb level. Will reassess. Dispo pending workup.

## 2021-10-05 NOTE — ED PROVIDER NOTE - PHYSICAL EXAMINATION
CONSTITUTIONAL: Well-developed; well-nourished; in no acute distress.   SKIN: warm, dry  HEAD: Normocephalic; atraumatic.  EYES: no conjunctival injection. PERRL.   ENT: No nasal discharge; airway clear.  NECK: Supple; non tender.  CARD: S1, S2 normal; no murmurs, gallops, or rubs. Regular rate and rhythm.   RESP: No wheezes, rales or rhonchi. Good air movement bilaterally.   ABD: soft ntnd, no guarding, no distention, no rigidity.   EXT: Ambulates independently.  No cyanosis or edema.   LYMPH: No acute cervical adenopathy.  NEURO: Alert, oriented, grossly unremarkable  PSYCH: Cooperative, appropriate.

## 2021-10-05 NOTE — H&P ADULT - ASSESSMENT
Plan:   -Admit to Transplant surgery    75 year old female who is S/P DDRT from 5/19/21 PMHx of HTN, glaucoma, DVT of right subclavian vein was sent to the ED from Transplant center for anemia with Hgb of 5 on Labs drawn earlier today . Denies fever, chills, N/V/D chest pain, SOB, abd pain, hematuria, melanotic stool. Denies changes to urine output.         Plan:   -Admit to Transplant Surgery  -CBC,CMP,Mg/Phos,Coags,Tacro level  -Trend CBC  -Monitor for s/s active bleeding  -2U PRBC and recheck CBC   -Transfuse PRN    -Restart home BP meds        Immuno:  -Envarsus 3mg   - BID  -Pred 5mg  -PPx with Bactrim            75 year old female who is S/P DDRT from 5/19/21 PMHx of HTN, glaucoma, DVT of right subclavian vein was sent to the ED from Transplant center for anemia with Hgb of 5 on Labs drawn earlier today . Denies fever, chills, N/V/D chest pain, SOB, abd pain, hematuria, melanotic stool. Denies changes to urine output.         Plan:   -Admit to Transplant Surgery  -CBC,CMP,Mg/Phos,Coags,Tacro level  -Trend CBC  -Monitor for s/s active bleeding  -2U PRBC and recheck CBC   -Transfuse PRN    -Restart home BP meds        Immuno:  -Envarsus 3mg   - BID  -Pred 5mg  -PPx with dapsone

## 2021-10-05 NOTE — ED ADULT NURSE NOTE - NS ED NURSE RECORD ANOTHER HT AND WT
Pt gave food per okay from provider.  Pt is resting comfortable in no visible distress      Campos Farrell, RN  10/15/20 [id number] Yes

## 2021-10-05 NOTE — H&P ADULT - NSHPPHYSICALEXAM_GEN_ALL_CORE
PHYSICAL EXAM:  Constitutional: Well developed / well nourished  Eyes: Anicteric, PERRLA  ENMT: nc/at  Neck: Supple  Respiratory: CTA B/L  Cardiovascular: RRR  Gastrointestinal: Soft abdomen, NT, ND  Genitourinary: Voiding spontaneously  Extremities: SCD's in place and working bilaterally  Vascular: Palpable dp pulses bilaterally  Neurological: A&O x3  Skin: warm, dry, intact throughout   Musculoskeletal: Moving all extremities  Psychiatric: Responsive

## 2021-10-05 NOTE — H&P ADULT - HISTORY OF PRESENT ILLNESS
75 year old female who is S/P DDRT from 5/19/21 PMHx of HTN, glaucoma, DVT of right subclavian vein was sent to the ED from Transplant center for anemia with Hgb of 5 on Labs drawn earlier today . Denies fever, chills, N/V/D chest pain, SOB, abd pain, hematuria, melanotic stool. Denies changes to urine output.

## 2021-10-05 NOTE — ED ADULT TRIAGE NOTE - CHIEF COMPLAINT QUOTE
low blood count today pt had kidney transplant 4 mos ago here blood was drawn today pt feels tired with activity

## 2021-10-05 NOTE — H&P ADULT - NSHPREVIEWOFSYSTEMS_GEN_ALL_CORE
Review of systems  Gen: No weight changes, fatigue, fevers/chills, weakness, +tired with walking   Skin: No rashes  Head/Eyes/Ears/Mouth: No headache; Normal hearing; Normal vision w/o blurriness; No sinus pain/discomfort, sore throat  Respiratory: No dyspnea, cough, wheezing, hemoptysis  CV: No chest pain, PND, orthopnea  GI: C/O mild abdominal pain at surgical site, No diarrhea, constipation, nausea, vomiting, melena, hematochezia  : No increased frequency, dysuria, hematuria, nocturia  MSK: No joint pain/swelling; no back pain; no edema  Neuro: No dizziness/lightheadedness, weakness, seizures, numbness, tingling  Heme: No easy bruising or bleeding  Endo: No heat/cold intolerance  Psych: No significant nervousness, anxiety, stress, depression  All other systems were reviewed and are negative, except as noted.

## 2021-10-05 NOTE — ED PROVIDER NOTE - OBJECTIVE STATEMENT
74 y/o F w/ hx of HTN and renal transplant 4 months ago presents due to abnormal lab results. Had a vbg performed earlier today and Hgb was 11.2.   Denies f/c, n/v, cp, sob, abd pain, hematuria, melanotic stool. Denies changes to urine output. Pt states she has been compliant w/ her meds (Envarsus and Cellcept). 74 y/o F w/ hx of HTN and renal transplant 4 months ago presents due to abnormal lab results. Had labs performed earlier today and Hgb was 5.0.   Denies f/c, n/v, cp, sob, abd pain, hematuria, melanotic stool. Denies changes to urine output. Pt states she has been compliant w/ her meds (Envarsus and Cellcept).

## 2021-10-05 NOTE — ED PROVIDER NOTE - ATTENDING CONTRIBUTION TO CARE
Agree with above except noted:     75 year old female who is S/P DDRT from 5/19/21 PMHx of HTN, glaucoma, DVT of right subclavian vein was sent to the ED from Transplant center for anemia with Hgb of 5 on Labs drawn earlier today . Denies fever, chills, N/V/D chest pain, SOB, abd pain, hematuria, melanotic stool. Denies changes to urine output. no trauma or obvious sign of bleeding. concerning for anemia due to chronic kidney failure vs RP bleed. will get ctap and u/s of the transplant kidney, will consult transplant. nontoxic appearing, NAD, vital signs wnl, non tachycardiac despite severe anemia, suggesting possible chronic in nature. will likely admit for further eval and give blood in the meantime.

## 2021-10-05 NOTE — H&P ADULT - NSHPLABSRESULTS_GEN_ALL_CORE
Vital Signs Last 24 Hrs  T(C): 37 (05 Oct 2021 21:31), Max: 37 (05 Oct 2021 21:31)  T(F): 98.6 (05 Oct 2021 21:31), Max: 98.6 (05 Oct 2021 21:31)  HR: 63 (05 Oct 2021 21:31) (63 - 73)  BP: 149/61 (05 Oct 2021 21:31) (149/61 - 180/67)  BP(mean): 88 (05 Oct 2021 21:31) (88 - 88)  RR: 20 (05 Oct 2021 21:31) (20 - 20)  SpO2: 99% (05 Oct 2021 21:31) (98% - 99%)      LABS:                        4.8    4.37  )-----------( 199      ( 05 Oct 2021 21:11 )             16.5     10-05    136  |  112<H>  |  34<H>  ----------------------------<  105<H>  7.0<HH>   |  13<L>  |  1.61<H>    Ca    9.6      05 Oct 2021 21:11    TPro  6.7  /  Alb  4.5  /  TBili  0.5  /  DBili  x   /  AST  59<H>  /  ALT  10  /  AlkPhos  142<H>  10-05

## 2021-10-05 NOTE — ED PROVIDER NOTE - CARE PLAN
Principal Discharge DX:	Anemia due to acute blood loss  Secondary Diagnosis:	Renal transplant recipient   1 Principal Discharge DX:	Anemia  Secondary Diagnosis:	Renal transplant recipient  Secondary Diagnosis:	Immunosuppressed status  Secondary Diagnosis:	Hypertension

## 2021-10-06 ENCOUNTER — TRANSCRIPTION ENCOUNTER (OUTPATIENT)
Age: 76
End: 2021-10-06

## 2021-10-06 DIAGNOSIS — I10 ESSENTIAL (PRIMARY) HYPERTENSION: ICD-10-CM

## 2021-10-06 DIAGNOSIS — D62 ACUTE POSTHEMORRHAGIC ANEMIA: ICD-10-CM

## 2021-10-06 DIAGNOSIS — D84.9 IMMUNODEFICIENCY, UNSPECIFIED: ICD-10-CM

## 2021-10-06 DIAGNOSIS — Z94.0 KIDNEY TRANSPLANT STATUS: ICD-10-CM

## 2021-10-06 DIAGNOSIS — R71.0 PRECIPITOUS DROP IN HEMATOCRIT: ICD-10-CM

## 2021-10-06 LAB
ALBUMIN SERPL ELPH-MCNC: 4.1 G/DL — SIGNIFICANT CHANGE UP (ref 3.3–5)
ALP SERPL-CCNC: 138 U/L — HIGH (ref 40–120)
ALT FLD-CCNC: 5 U/L — LOW (ref 10–45)
ANION GAP SERPL CALC-SCNC: 11 MMOL/L — SIGNIFICANT CHANGE UP (ref 5–17)
ANION GAP SERPL CALC-SCNC: 12 MMOL/L — SIGNIFICANT CHANGE UP (ref 5–17)
ANISOCYTOSIS BLD QL: SIGNIFICANT CHANGE UP
APTT BLD: 34.2 SEC — SIGNIFICANT CHANGE UP (ref 27.5–35.5)
AST SERPL-CCNC: 8 U/L — LOW (ref 10–40)
BASOPHILS # BLD AUTO: 0.01 K/UL — SIGNIFICANT CHANGE UP (ref 0–0.2)
BASOPHILS # BLD AUTO: 0.04 K/UL — SIGNIFICANT CHANGE UP (ref 0–0.2)
BASOPHILS NFR BLD AUTO: 0.2 % — SIGNIFICANT CHANGE UP (ref 0–2)
BASOPHILS NFR BLD AUTO: 0.9 % — SIGNIFICANT CHANGE UP (ref 0–2)
BILIRUB SERPL-MCNC: 0.6 MG/DL — SIGNIFICANT CHANGE UP (ref 0.2–1.2)
BUN SERPL-MCNC: 28 MG/DL — HIGH (ref 7–23)
BUN SERPL-MCNC: 34 MG/DL — HIGH (ref 7–23)
CALCIUM SERPL-MCNC: 9.6 MG/DL — SIGNIFICANT CHANGE UP (ref 8.4–10.5)
CALCIUM SERPL-MCNC: 9.8 MG/DL — SIGNIFICANT CHANGE UP (ref 8.4–10.5)
CHLORIDE SERPL-SCNC: 113 MMOL/L — HIGH (ref 96–108)
CHLORIDE SERPL-SCNC: 114 MMOL/L — HIGH (ref 96–108)
CO2 SERPL-SCNC: 14 MMOL/L — LOW (ref 22–31)
CO2 SERPL-SCNC: 15 MMOL/L — LOW (ref 22–31)
CREAT SERPL-MCNC: 1.38 MG/DL — HIGH (ref 0.5–1.3)
CREAT SERPL-MCNC: 1.59 MG/DL — HIGH (ref 0.5–1.3)
DACRYOCYTES BLD QL SMEAR: SLIGHT — SIGNIFICANT CHANGE UP
EBV EA AB SER IA-ACNC: 23.1 U/ML — HIGH
EBV EA AB TITR SER IF: POSITIVE
EBV EA IGG SER-ACNC: POSITIVE
EBV NA IGG SER IA-ACNC: 293 U/ML — HIGH
EBV PATRN SPEC IB-IMP: SIGNIFICANT CHANGE UP
EBV VCA IGG AVIDITY SER QL IA: POSITIVE
EBV VCA IGM SER IA-ACNC: 693 U/ML — HIGH
EBV VCA IGM SER IA-ACNC: <10 U/ML — SIGNIFICANT CHANGE UP
EBV VCA IGM TITR FLD: NEGATIVE — SIGNIFICANT CHANGE UP
EOSINOPHIL # BLD AUTO: 0.04 K/UL — SIGNIFICANT CHANGE UP (ref 0–0.5)
EOSINOPHIL # BLD AUTO: 0.04 K/UL — SIGNIFICANT CHANGE UP (ref 0–0.5)
EOSINOPHIL NFR BLD AUTO: 0.9 % — SIGNIFICANT CHANGE UP (ref 0–6)
EOSINOPHIL NFR BLD AUTO: 1 % — SIGNIFICANT CHANGE UP (ref 0–6)
FERRITIN SERPL-MCNC: 1921 NG/ML — HIGH (ref 15–150)
GLUCOSE SERPL-MCNC: 93 MG/DL — SIGNIFICANT CHANGE UP (ref 70–99)
GLUCOSE SERPL-MCNC: 96 MG/DL — SIGNIFICANT CHANGE UP (ref 70–99)
HAPTOGLOB SERPL-MCNC: <20 MG/DL — LOW (ref 34–200)
HCT VFR BLD CALC: 19.3 % — CRITICAL LOW (ref 34.5–45)
HCT VFR BLD CALC: 24.4 % — LOW (ref 34.5–45)
HGB BLD-MCNC: 5.7 G/DL — CRITICAL LOW (ref 11.5–15.5)
HGB BLD-MCNC: 7.6 G/DL — LOW (ref 11.5–15.5)
IMM GRANULOCYTES NFR BLD AUTO: 1 % — SIGNIFICANT CHANGE UP (ref 0–1.5)
INR BLD: 1.16 RATIO — SIGNIFICANT CHANGE UP (ref 0.88–1.16)
LYMPHOCYTES # BLD AUTO: 0.53 K/UL — LOW (ref 1–3.3)
LYMPHOCYTES # BLD AUTO: 1.19 K/UL — SIGNIFICANT CHANGE UP (ref 1–3.3)
LYMPHOCYTES # BLD AUTO: 13 % — SIGNIFICANT CHANGE UP (ref 13–44)
LYMPHOCYTES # BLD AUTO: 27.2 % — SIGNIFICANT CHANGE UP (ref 13–44)
MACROCYTES BLD QL: SIGNIFICANT CHANGE UP
MAGNESIUM SERPL-MCNC: 1.7 MG/DL — SIGNIFICANT CHANGE UP (ref 1.6–2.6)
MANUAL SMEAR VERIFICATION: SIGNIFICANT CHANGE UP
MCHC RBC-ENTMCNC: 28.4 PG — SIGNIFICANT CHANGE UP (ref 27–34)
MCHC RBC-ENTMCNC: 28.6 PG — SIGNIFICANT CHANGE UP (ref 27–34)
MCHC RBC-ENTMCNC: 29.5 GM/DL — LOW (ref 32–36)
MCHC RBC-ENTMCNC: 31.1 GM/DL — LOW (ref 32–36)
MCV RBC AUTO: 91 FL — SIGNIFICANT CHANGE UP (ref 80–100)
MCV RBC AUTO: 97 FL — SIGNIFICANT CHANGE UP (ref 80–100)
METAMYELOCYTES # FLD: 0.9 % — HIGH (ref 0–0)
MONOCYTES # BLD AUTO: 0.23 K/UL — SIGNIFICANT CHANGE UP (ref 0–0.9)
MONOCYTES # BLD AUTO: 0.31 K/UL — SIGNIFICANT CHANGE UP (ref 0–0.9)
MONOCYTES NFR BLD AUTO: 5.6 % — SIGNIFICANT CHANGE UP (ref 2–14)
MONOCYTES NFR BLD AUTO: 7 % — SIGNIFICANT CHANGE UP (ref 2–14)
NEUTROPHILS # BLD AUTO: 2.76 K/UL — SIGNIFICANT CHANGE UP (ref 1.8–7.4)
NEUTROPHILS # BLD AUTO: 3.23 K/UL — SIGNIFICANT CHANGE UP (ref 1.8–7.4)
NEUTROPHILS NFR BLD AUTO: 63.1 % — SIGNIFICANT CHANGE UP (ref 43–77)
NEUTROPHILS NFR BLD AUTO: 79.2 % — HIGH (ref 43–77)
NRBC # BLD: 0 /100 WBCS — SIGNIFICANT CHANGE UP (ref 0–0)
NRBC # BLD: 0 /100 WBCS — SIGNIFICANT CHANGE UP (ref 0–0)
NRBC # BLD: 1 /100 — HIGH (ref 0–0)
PHOSPHATE SERPL-MCNC: 2.7 MG/DL — SIGNIFICANT CHANGE UP (ref 2.5–4.5)
PLAT MORPH BLD: NORMAL — SIGNIFICANT CHANGE UP
PLATELET # BLD AUTO: 205 K/UL — SIGNIFICANT CHANGE UP (ref 150–400)
PLATELET # BLD AUTO: 211 K/UL — SIGNIFICANT CHANGE UP (ref 150–400)
PLATELET COUNT - ESTIMATE: NORMAL — SIGNIFICANT CHANGE UP
POIKILOCYTOSIS BLD QL AUTO: SLIGHT — SIGNIFICANT CHANGE UP
POLYCHROMASIA BLD QL SMEAR: SLIGHT — SIGNIFICANT CHANGE UP
POTASSIUM SERPL-MCNC: 5 MMOL/L — SIGNIFICANT CHANGE UP (ref 3.5–5.3)
POTASSIUM SERPL-MCNC: 5.3 MMOL/L — SIGNIFICANT CHANGE UP (ref 3.5–5.3)
POTASSIUM SERPL-SCNC: 5 MMOL/L — SIGNIFICANT CHANGE UP (ref 3.5–5.3)
POTASSIUM SERPL-SCNC: 5.3 MMOL/L — SIGNIFICANT CHANGE UP (ref 3.5–5.3)
PROT SERPL-MCNC: 6 G/DL — SIGNIFICANT CHANGE UP (ref 6–8.3)
PROTHROM AB SERPL-ACNC: 13.8 SEC — HIGH (ref 10.6–13.6)
RAPID RVP RESULT: SIGNIFICANT CHANGE UP
RBC # BLD: 1.99 M/UL — LOW (ref 3.8–5.2)
RBC # BLD: 2.68 M/UL — LOW (ref 3.8–5.2)
RBC # FLD: 17.9 % — HIGH (ref 10.3–14.5)
RBC # FLD: 21.4 % — HIGH (ref 10.3–14.5)
RBC BLD AUTO: ABNORMAL
SARS-COV-2 RNA SPEC QL NAA+PROBE: SIGNIFICANT CHANGE UP
SCHISTOCYTES BLD QL AUTO: SLIGHT — SIGNIFICANT CHANGE UP
SODIUM SERPL-SCNC: 139 MMOL/L — SIGNIFICANT CHANGE UP (ref 135–145)
SODIUM SERPL-SCNC: 140 MMOL/L — SIGNIFICANT CHANGE UP (ref 135–145)
TACROLIMUS SERPL-MCNC: 12.7 NG/ML — SIGNIFICANT CHANGE UP
TACROLIMUS SERPL-MCNC: 7.2 NG/ML — SIGNIFICANT CHANGE UP
WBC # BLD: 4.08 K/UL — SIGNIFICANT CHANGE UP (ref 3.8–10.5)
WBC # BLD: 4.24 K/UL — SIGNIFICANT CHANGE UP (ref 3.8–10.5)
WBC # FLD AUTO: 4.08 K/UL — SIGNIFICANT CHANGE UP (ref 3.8–10.5)
WBC # FLD AUTO: 4.24 K/UL — SIGNIFICANT CHANGE UP (ref 3.8–10.5)

## 2021-10-06 PROCEDURE — 74176 CT ABD & PELVIS W/O CONTRAST: CPT | Mod: 26

## 2021-10-06 PROCEDURE — 99231 SBSQ HOSP IP/OBS SF/LOW 25: CPT

## 2021-10-06 PROCEDURE — 86077 PHYS BLOOD BANK SERV XMATCH: CPT

## 2021-10-06 PROCEDURE — 99223 1ST HOSP IP/OBS HIGH 75: CPT

## 2021-10-06 RX ORDER — MYCOPHENOLATE MOFETIL 250 MG/1
500 CAPSULE ORAL
Refills: 0 | Status: DISCONTINUED | OUTPATIENT
Start: 2021-10-06 | End: 2021-10-08

## 2021-10-06 RX ORDER — TACROLIMUS 5 MG/1
4 CAPSULE ORAL ONCE
Refills: 0 | Status: COMPLETED | OUTPATIENT
Start: 2021-10-06 | End: 2021-10-06

## 2021-10-06 RX ORDER — NIFEDIPINE 30 MG
60 TABLET, EXTENDED RELEASE 24 HR ORAL
Refills: 0 | Status: DISCONTINUED | OUTPATIENT
Start: 2021-10-06 | End: 2021-10-08

## 2021-10-06 RX ORDER — PANTOPRAZOLE SODIUM 20 MG/1
40 TABLET, DELAYED RELEASE ORAL
Refills: 0 | Status: DISCONTINUED | OUTPATIENT
Start: 2021-10-06 | End: 2021-10-08

## 2021-10-06 RX ORDER — HYDRALAZINE HCL 50 MG
100 TABLET ORAL THREE TIMES A DAY
Refills: 0 | Status: DISCONTINUED | OUTPATIENT
Start: 2021-10-06 | End: 2021-10-08

## 2021-10-06 RX ORDER — LABETALOL HCL 100 MG
200 TABLET ORAL
Refills: 0 | Status: DISCONTINUED | OUTPATIENT
Start: 2021-10-06 | End: 2021-10-08

## 2021-10-06 RX ORDER — TACROLIMUS 5 MG/1
3 CAPSULE ORAL
Refills: 0 | Status: DISCONTINUED | OUTPATIENT
Start: 2021-10-06 | End: 2021-10-06

## 2021-10-06 RX ORDER — ASPIRIN/CALCIUM CARB/MAGNESIUM 324 MG
81 TABLET ORAL DAILY
Refills: 0 | Status: DISCONTINUED | OUTPATIENT
Start: 2021-10-06 | End: 2021-10-07

## 2021-10-06 RX ORDER — LATANOPROST 0.05 MG/ML
1 SOLUTION/ DROPS OPHTHALMIC; TOPICAL AT BEDTIME
Refills: 0 | Status: DISCONTINUED | OUTPATIENT
Start: 2021-10-06 | End: 2021-10-08

## 2021-10-06 RX ORDER — DAPSONE 100 MG/1
100 TABLET ORAL DAILY
Refills: 0 | Status: DISCONTINUED | OUTPATIENT
Start: 2021-10-06 | End: 2021-10-06

## 2021-10-06 RX ORDER — ASPIRIN/CALCIUM CARB/MAGNESIUM 324 MG
81 TABLET ORAL DAILY
Refills: 0 | Status: DISCONTINUED | OUTPATIENT
Start: 2021-10-06 | End: 2021-10-06

## 2021-10-06 RX ADMIN — MYCOPHENOLATE MOFETIL 500 MILLIGRAM(S): 250 CAPSULE ORAL at 06:10

## 2021-10-06 RX ADMIN — PANTOPRAZOLE SODIUM 40 MILLIGRAM(S): 20 TABLET, DELAYED RELEASE ORAL at 06:18

## 2021-10-06 RX ADMIN — Medication 81 MILLIGRAM(S): at 13:06

## 2021-10-06 RX ADMIN — Medication 60 MILLIGRAM(S): at 06:18

## 2021-10-06 RX ADMIN — TACROLIMUS 3 MILLIGRAM(S): 5 CAPSULE ORAL at 09:34

## 2021-10-06 RX ADMIN — Medication 100 MILLIGRAM(S): at 06:18

## 2021-10-06 RX ADMIN — Medication 60 MILLIGRAM(S): at 17:06

## 2021-10-06 RX ADMIN — LATANOPROST 1 DROP(S): 0.05 SOLUTION/ DROPS OPHTHALMIC; TOPICAL at 23:12

## 2021-10-06 RX ADMIN — Medication 100 MILLIGRAM(S): at 13:06

## 2021-10-06 RX ADMIN — Medication 200 MILLIGRAM(S): at 09:32

## 2021-10-06 RX ADMIN — Medication 200 MILLIGRAM(S): at 17:06

## 2021-10-06 RX ADMIN — Medication 5 MILLIGRAM(S): at 06:19

## 2021-10-06 RX ADMIN — MYCOPHENOLATE MOFETIL 500 MILLIGRAM(S): 250 CAPSULE ORAL at 17:06

## 2021-10-06 RX ADMIN — Medication 100 MILLIGRAM(S): at 21:05

## 2021-10-06 RX ADMIN — TACROLIMUS 4 MILLIGRAM(S): 5 CAPSULE ORAL at 13:05

## 2021-10-06 NOTE — PROGRESS NOTE ADULT - SUBJECTIVE AND OBJECTIVE BOX
Transplant Surgery - Multidisciplinary Rounds  --------------------------------------------------------------  DDRT / LDRT    Date:         POD#    Present:   Patient seen and examined with multidisciplinary team including Transplant Surgeon: Dr. Sebastian Transplant Nephrologist: Dr. Winkler,   Pharmacist: Pham Faulkner. Surgical residents Dr. Jimenez and Dr. Sun, [facility], and unit RN during am rounds.  Disciplines not in attendance will be notified of the plan.     HPI: 75 year old female who is S/P DDRT from 5/19/21 PMHx of HTN, glaucoma, DVT of right subclavian vein was sent to the ED from Transplant center for anemia with Hgb of 5 on Labs drawn earlier today . Denies fever, chills, N/V/D chest pain, SOB, abd pain, hematuria, melanotic stool. Denies changes to urine output.     Interval Events:  -admitted w/ inpatient H/H of 4.8/16.5; given 1U pRBC, H/H improved to 5.7/19.3; currently getting 2nd unit pRBC.    Potential Discharge date:    Education:  Medications    Plan of care:  See Below    MEDICATIONS  (STANDING):  aspirin  chewable 81 milliGRAM(s) Oral daily  hydrALAZINE 100 milliGRAM(s) Oral three times a day  labetalol 200 milliGRAM(s) Oral two times a day  latanoprost 0.005% Ophthalmic Solution 1 Drop(s) Both EYES at bedtime  mycophenolate mofetil 500 milliGRAM(s) Oral two times a day  NIFEdipine XL 60 milliGRAM(s) Oral two times a day  pantoprazole    Tablet 40 milliGRAM(s) Oral before breakfast  predniSONE   Tablet 5 milliGRAM(s) Oral daily  tacrolimus ER Tablet (ENVARSUS XR) 3 milliGRAM(s) Oral <User Schedule>    MEDICATIONS  (PRN):      PAST MEDICAL & SURGICAL HISTORY:  HTN (hypertension)    Glaucoma    Cataract    ESRD (end stage renal disease) on dialysis    DVT (deep venous thrombosis)  of Left subclavian vein, 06/12/17    Hemodialysis patient  MWF    Acquired cataract  extraction with b/l lense placement, 2016    H/O: glaucoma  surgery, 2002    AV fistula  2015    S/P KEVEN-BSO  for fibroids, 2012    Hemodialysis access, AV graft    Transplanted kidney  5/19/21        Vital Signs Last 24 Hrs  T(C): 36.7 (06 Oct 2021 08:25), Max: 37.1 (06 Oct 2021 03:20)  T(F): 98 (06 Oct 2021 08:25), Max: 98.8 (06 Oct 2021 03:20)  HR: 76 (06 Oct 2021 08:25) (61 - 81)  BP: 173/72 (06 Oct 2021 08:25) (149/61 - 196/74)  BP(mean): 88 (05 Oct 2021 21:31) (88 - 88)  RR: 18 (06 Oct 2021 08:25) (17 - 20)  SpO2: 100% (06 Oct 2021 08:25) (98% - 100%)    I&O's Summary    05 Oct 2021 07:01  -  06 Oct 2021 07:00  --------------------------------------------------------  IN: 100 mL / OUT: 300 mL / NET: -200 mL                              5.7    4.08  )-----------( 205      ( 06 Oct 2021 08:14 )             19.3     10-06    139  |  113<H>  |  28<H>  ----------------------------<  96  5.0   |  15<L>  |  1.38<H>    Ca    9.8      06 Oct 2021 08:14  Phos  2.7     10-06  Mg     1.7     10-06    TPro  6.0  /  Alb  4.1  /  TBili  0.6  /  DBili  x   /  AST  8<L>  /  ALT  5<L>  /  AlkPhos  138<H>  10-06    Tacrolimus (), Serum: 12.7 ng/mL (10-06 @ 00:57)          Review of systems  Gen: No weight changes, fatigue, fevers/chills, weakness  Skin: No rashes  Head/Eyes/Ears/Mouth: No headache; Normal hearing; Normal vision w/o blurriness; No sinus pain/discomfort, sore throat  Respiratory: No dyspnea, cough, wheezing, hemoptysis  CV: No chest pain, PND, orthopnea  GI: Mild abdominal pain at surgical incision site; denies diarrhea, constipation, nausea, vomiting, melena, hematochezia  : No increased frequency, dysuria, hematuria, nocturia  MSK: No joint pain/swelling; no back pain; no edema  Neuro: No dizziness/lightheadedness, weakness, seizures, numbness, tingling  Heme: No easy bruising or bleeding  Endo: No heat/cold intolerance  Psych: No significant nervousness, anxiety, stress, depression  All other systems were reviewed and are negative, except as noted.      PHYSICAL EXAM:  Constitutional: Well developed / well nourished  Eyes: Anicteric, PERRLA  ENMT: nc/at  Neck: central line *****************  Respiratory: CTA B/L  Cardiovascular: RRR  Gastrointestinal: Soft, non distended, mild tenderness at the incision site; incision c/d/i; KATHARINA .....   Genitourinary: Urinary catheter in place*****Voiding spontaneously  Extremities: SCD's in place and working bilaterally, AVF.....  Vascular: Palpable dp pulses bilaterally  Neurological: A&O x3  Skin: no rashes, ulcerations or lesions;  Musculoskeletal: Moving all extremities  Psychiatric: Responsive     Transplant Surgery - Multidisciplinary Rounds  --------------------------------------------------------------  DDRT 5/19/21     Present:   Patient seen and examined with multidisciplinary team including Transplant Surgeon: Dr. Sebastian Transplant Nephrologist: Dr. Winkler,   Pharmacist: Pham Faulkner. Surgical residents Dr. Jimenez and Dr. Sun, [facility], and unit RN during am rounds.  Disciplines not in attendance will be notified of the plan.     HPI: 75 year old female who is S/P DDRT from 5/19/21 w/ simulect induction. PMHx of HTN, glaucoma, DVT of right subclavian vein was sent to the ED from Transplant center for anemia with Hgb of 5 on Labs drawn earlier today . Denies fever, chills, N/V/D chest pain, SOB, abd pain, hematuria, melanotic stool. Denies changes to urine output.     Interval Events:  -admitted w/ inpatient H/H of 4.8/16.5; given 1U pRBC, H/H improved to 5.7/19.3; currently getting 2nd unit pRBC.  -was hypertensive overnight; however was not given BP meds in ED; BP now improving after starting home HTNsive meds.   -Abd/pelvis CT neg for retroperitoneal hematoma.   -Renal US w/ no hydro, and decrease in size of previously seen collection- currently measuring 1.7 x 1.1 x 0.9 cm (previously was 6.2 x 3.2 x 3.2 cm)    Potential Discharge date:    Education:  Medications    Plan of care:  See Below    MEDICATIONS  (STANDING):  aspirin  chewable 81 milliGRAM(s) Oral daily  hydrALAZINE 100 milliGRAM(s) Oral three times a day  labetalol 200 milliGRAM(s) Oral two times a day  latanoprost 0.005% Ophthalmic Solution 1 Drop(s) Both EYES at bedtime  mycophenolate mofetil 500 milliGRAM(s) Oral two times a day  NIFEdipine XL 60 milliGRAM(s) Oral two times a day  pantoprazole    Tablet 40 milliGRAM(s) Oral before breakfast  predniSONE   Tablet 5 milliGRAM(s) Oral daily  tacrolimus ER Tablet (ENVARSUS XR) 3 milliGRAM(s) Oral <User Schedule>    MEDICATIONS  (PRN):      PAST MEDICAL & SURGICAL HISTORY:  HTN (hypertension)    Glaucoma    Cataract    ESRD (end stage renal disease) on dialysis    DVT (deep venous thrombosis)  of Left subclavian vein, 06/12/17    Hemodialysis patient  MWF    Acquired cataract  extraction with b/l lense placement, 2016    H/O: glaucoma  surgery, 2002    AV fistula  2015    S/P KEVEN-BSO  for fibroids, 2012    Hemodialysis access, AV graft    Transplanted kidney  5/19/21        Vital Signs Last 24 Hrs  T(C): 36.7 (06 Oct 2021 08:25), Max: 37.1 (06 Oct 2021 03:20)  T(F): 98 (06 Oct 2021 08:25), Max: 98.8 (06 Oct 2021 03:20)  HR: 76 (06 Oct 2021 08:25) (61 - 81)  BP: 173/72 (06 Oct 2021 08:25) (149/61 - 196/74)  BP(mean): 88 (05 Oct 2021 21:31) (88 - 88)  RR: 18 (06 Oct 2021 08:25) (17 - 20)  SpO2: 100% (06 Oct 2021 08:25) (98% - 100%)    I&O's Summary    05 Oct 2021 07:01  -  06 Oct 2021 07:00  --------------------------------------------------------  IN: 100 mL / OUT: 300 mL / NET: -200 mL                              5.7    4.08  )-----------( 205      ( 06 Oct 2021 08:14 )             19.3     10-06    139  |  113<H>  |  28<H>  ----------------------------<  96  5.0   |  15<L>  |  1.38<H>    Ca    9.8      06 Oct 2021 08:14  Phos  2.7     10-06  Mg     1.7     10-06    TPro  6.0  /  Alb  4.1  /  TBili  0.6  /  DBili  x   /  AST  8<L>  /  ALT  5<L>  /  AlkPhos  138<H>  10-06    Tacrolimus (), Serum: 12.7 ng/mL (10-06 @ 00:57)      Review of systems  Gen: No weight changes, fatigue, fevers/chills, weakness, +tired with walking   Skin: No rashes  Head/Eyes/Ears/Mouth: No headache; Normal hearing; Normal vision w/o blurriness; No sinus pain/discomfort, sore throat  Respiratory: No dyspnea, cough, wheezing, hemoptysis  CV: No chest pain, PND, orthopnea  GI: C/O mild abdominal pain at surgical site, No diarrhea, constipation, nausea, vomiting, melena, hematochezia  : No increased frequency, dysuria, hematuria, nocturia  MSK: No joint pain/swelling; no back pain; no edema  Neuro: No dizziness/lightheadedness, weakness, seizures, numbness, tingling  Heme: No easy bruising or bleeding  Endo: No heat/cold intolerance  Psych: No significant nervousness, anxiety, stress, depression  All other systems were reviewed and are negative, except as noted.      PHYSICAL EXAM:  Constitutional: Well developed / well nourished  Eyes: Anicteric, PERRLA  ENMT: nc/at  Neck: Supple  Respiratory: CTA B/L  Cardiovascular: RRR  Gastrointestinal: Soft abdomen, NT, ND  Genitourinary: Voiding spontaneously  Extremities: SCD's in place and working bilaterally  Vascular: Palpable dp pulses bilaterally  Neurological: A&O x3  Skin: warm, dry, intact throughout   Musculoskeletal: Moving all extremities  Psychiatric: Responsive

## 2021-10-06 NOTE — CONSULT NOTE ADULT - TIME BILLING
Kidney Transplant recipient with functioning allograft  Severe anemia  possible etiology:  Dapsone related  r/o Parvo B19  will check for GI blood loss  Creatinine trend noted  Comorbidities reviewed. HTN  Patient seen, examined and reviewed available clinical and lab data including history,  progress notes and consult notes.  Reviewed immunosuppression and allograft function including urine out put, creatinine trend, urine studies and any  imaging.  Reviewed medication regimen for  blood pressure control and prophylaxis  Suggestions:  1. Stop Dapsone, switch to Mepron  2. Send Parvo B19 PCR, Serum immunofixation, Fe, B12, Folate, ferritin, Transferrin  3. Stool for occult blood  4. PRBC  5. Complete Abdominal sonogram  Tacrolimus trough level target 6-8 ng/ml  Will follow  I was present during and reviewed clinical and lab data as well as assessment and plan as documented by the house staff as noted. Please contact if any additional questions with any change in clinical condition or on availability of any additional information or reports. Kidney Transplant recipient with functioning allograft  Severe anemia  possible etiology:  Dapsone related  r/o Parvo B19  will check for GI blood loss  Creatinine trend noted  Comorbidities reviewed. HTN  Patient seen, examined and reviewed available clinical and lab data including history,  progress notes and consult notes.  Reviewed immunosuppression and allograft function including urine out put, creatinine trend, urine studies and any  imaging.  Reviewed medication regimen for  blood pressure control and prophylaxis  Suggestions:  1. Stop Dapsone, switch to Mepron  2. Send Parvo B19 PCR, Serum immunofixation, Fe, B12, Folate, ferritin, Transferrin, Haptoglobin, LDH, Peripheral smear, G6PD level  3. Stool for occult blood  4. PRBC  5. Complete Abdominal sonogram  Tacrolimus trough level target 6-8 ng/ml  Will follow  I was present during and reviewed clinical and lab data as well as assessment and plan as documented by the house staff as noted. Please contact if any additional questions with any change in clinical condition or on availability of any additional information or reports.

## 2021-10-06 NOTE — ED ADULT NURSE REASSESSMENT NOTE - NS ED NURSE REASSESS COMMENT FT1
1 unit PRBC given. Consent in chart. Risks and benefits explained to patient. Patient verbalized understanding of risks and benefits. Patient aware of possible side effects, call bell within reach. Vital signs stable. Second RN at bedside for confirmation.

## 2021-10-06 NOTE — ED ADULT NURSE REASSESSMENT NOTE - NS ED NURSE REASSESS COMMENT FT1
PRBC released but per blood bank pt with antibodies so blood is not yet available . blood bank asks to check back in 1 hour for available blood.

## 2021-10-06 NOTE — PROGRESS NOTE ADULT - ASSESSMENT
75 year old female who is S/P DDRT from 5/19/21 PMHx of HTN, glaucoma, DVT of right subclavian vein was sent to the ED from Transplant center for anemia with Hgb of 5 on Labs drawn earlier today . Denies fever, chills, N/V/D chest pain, SOB, abd pain, hematuria, melanotic stool. Denies changes to urine output.     DDRT 5/19/21- admitted w/ low H/H  -daily CBC,CMP,Mg/Phos,Coags,Tacro level  -Trend CBC  -Monitor for s/s active bleeding  -CT neg for bleed  -transfused 1U pRBC this AM; getting 2nd unit now; will repeat post transfusion CBC  -send iron studies and Bk/CMV/Parvo/EBV PCRs  -Transfuse PRN    -Restart home BP meds    Immuno:  -Envarsus 3mg   - BID  -Pred 5mg  -PPx with dapsone

## 2021-10-06 NOTE — PROVIDER CONTACT NOTE (CRITICAL VALUE NOTIFICATION) - BACKGROUND
S/P DDRT from 5/19/21 PMHx of HTN, glaucoma, DVT of right subclavian vein was sent to the ED from Transplant center for anemia with Hgb of 5

## 2021-10-06 NOTE — CONSULT NOTE ADULT - SUBJECTIVE AND OBJECTIVE BOX
CONSULT NOTE  --------------------------------------------------------------------------------  HPI: Recent kidney transplant recipient , admitted with low hemoglobin, incidentally noted in labs for evaluation.  She has h/o HTN, ESRD on HD for over 5 years prior to transplant.  Reports no bleeding from any site/joint pain/NSAID intake/chestpain/dizziness/SOB.  Lives with . Pr nephrologist: Dr Treviño.  Reviewed H& P and details noted as below.  75 year old female who is S/P DDRT from 5/19/21 PMHx of HTN, glaucoma, DVT of right subclavian vein was sent to the ED from Transplant center for anemia with Hgb of 5 on Labs drawn earlier today . Denies fever, chills, N/V/D chest pain, SOB, abd pain, hematuria, melanotic stool. Denies changes to urine output.         PAST HISTORY  --------------------------------------------------------------------------------  PAST MEDICAL & SURGICAL HISTORY:  HTN (hypertension)    Glaucoma    Cataract    ESRD (end stage renal disease) on dialysis    DVT (deep venous thrombosis)  of Left subclavian vein, 06/12/17    Hemodialysis patient  MWF    Acquired cataract  extraction with b/l lense placement, 2016    H/O: glaucoma  surgery, 2002    AV fistula  2015    S/P KEVEN-BSO  for fibroids, 2012    Hemodialysis access, AV graft    Transplanted kidney  5/19/21      FAMILY HISTORY:  Family history of cerebrovascular accident (CVA)    Family history of colon cancer (Sibling)      PAST SOCIAL HISTORY:    ALLERGIES & MEDICATIONS  --------------------------------------------------------------------------------  Allergies    Mushrooms (Anaphylaxis)  penicillin (Rash)    Intolerances      Standing Inpatient Medications  aspirin  chewable 81 milliGRAM(s) Oral daily  dapsone 100 milliGRAM(s) Oral daily  hydrALAZINE 100 milliGRAM(s) Oral three times a day  labetalol 200 milliGRAM(s) Oral two times a day  latanoprost 0.005% Ophthalmic Solution 1 Drop(s) Both EYES at bedtime  mycophenolate mofetil 500 milliGRAM(s) Oral two times a day  NIFEdipine XL 60 milliGRAM(s) Oral two times a day  pantoprazole    Tablet 40 milliGRAM(s) Oral before breakfast  predniSONE   Tablet 5 milliGRAM(s) Oral daily  tacrolimus ER Tablet (ENVARSUS XR) 3 milliGRAM(s) Oral <User Schedule>    PRN Inpatient Medications      REVIEW OF SYSTEMS  --------------------------------------------------------------------------------      Gen: No weight changes, fatigue, fevers/chills, weakness, +tired with walking   Skin: No rashes  Head/Eyes/Ears/Mouth: No headache; Normal hearing; Normal vision w/o blurriness; No sinus pain/discomfort, sore throat  Respiratory: No dyspnea, cough, wheezing, hemoptysis  CV: No chest pain, PND, orthopnea  GI: C/O mild abdominal pain at surgical site, No diarrhea, constipation, nausea, vomiting, melena, hematochezia  : No increased frequency, dysuria, hematuria, nocturia  MSK: No joint pain/swelling; no back pain; no edema  Neuro: No dizziness/lightheadedness, weakness, seizures, numbness, tingling  Heme: No easy bruising or bleeding  Endo: No heat/cold intolerance  Psych: No significant nervousness, anxiety, stress, depression  All other systems were reviewed and are negative, except as noted.    VITALS/PHYSICAL EXAM  --------------------------------------------------------------------------------  T(C): 36.8 (10-06-21 @ 05:55), Max: 37.1 (10-06-21 @ 03:20)  HR: 61 (10-06-21 @ 05:55) (61 - 81)  BP: 196/74 (10-06-21 @ 05:55) (149/61 - 196/74)  RR: 18 (10-06-21 @ 05:55) (17 - 20)  SpO2: 100% (10-06-21 @ 05:55) (98% - 100%)     Height (cm): 172.7 (10-05-21 @ 18:21)  Weight (kg): 54.5 (10-06-21 @ 05:55)  BMI (kg/m2): 18.3 (10-06-21 @ 05:55)  BSA (m2): 1.65 (10-06-21 @ 05:55)      Physical Exam:  Constitutional: Well developed / well nourished  Eyes: Anicteric, Pallor+  ENMT: nc/at  Neck: Supple No JVD  Respiratory: CTA B/L  Cardiovascular: RRR, no gallop  Gastrointestinal: Soft abdomen, NT, ND. Liver and spleen not palpable  Genitourinary:No tenderness in suprapubic area  Extremities: SCD's in place and working bilaterally, no edema  Vascular: Palpable dp pulses bilaterally  Neurological: A&O x3 speech normal  Skin: warm, dry, intact throughout   Musculoskeletal: Moving all extremities  Psychiatric: Responsive      LABS/STUDIES  --------------------------------------------------------------------------------              4.8    4.37  >-----------<  199      [10-05-21 @ 21:11]              16.5     140  |  114  |  34  ----------------------------<  93      [10-06-21 @ 00:46]  5.3   |  14  |  1.59        Ca     9.6     [10-06-21 @ 00:46]    TPro  5.9  /  Alb  4.2  /  TBili  0.5  /  DBili  <0.1  /  AST  11  /  ALT  <5  /  AlkPhos  136  [10-06-21 @ 02:04]          Creatinine Trend:  SCr 1.59 [10-06 @ 00:46]  SCr 1.61 [10-05 @ 21:11]    Urinalysis - [10-05-21 @ 22:43]      Color Light Yellow / Appearance Clear / SG 1.014 / pH 6.0      Gluc Negative / Ketone Negative  / Bili Negative / Urobili Negative       Blood Negative / Protein Trace / Leuk Est Negative / Nitrite Negative      RBC  / WBC  / Hyaline  / Gran  / Sq Epi  / Non Sq Epi  / Bacteria       HbA1c 4.6      [05-28-19 @ 09:49]  TSH 3.75      [02-02-21 @ 19:57]  Lipid: chol 162, TG 63, HDL 81, LDL --      [02-02-21 @ 19:57]    HBsAb 22.4      [05-19-21 @ 10:42]  HBsAb Reactive      [10-19-19 @ 01:09]  HBsAg Nonreact      [05-19-21 @ 10:42]  HBcAb Nonreact      [05-19-21 @ 10:42]  HCV 0.42, Nonreact      [05-19-21 @ 10:42]  HIV Nonreact      [05-19-21 @ 10:45]    STEPHANIE: titer Negative, pattern --      [08-13-20 @ 10:27]  Immunofixation Serum:   No Monoclonal Band Identified    Reference Range: None Detected      [02-05-21 @ 06:39]  SPEP Interpretation: Normal Electrophoresis Pattern      [02-05-21 @ 06:39]    TacrolimusTacrolimus (), Serum: 12.7 ng/mL (10-06 @ 00:57)    Cyclosporine  Sirolimus  Mycophenolate  BK PCR  CMV PCR  Parvo PCR  EBV PCR

## 2021-10-07 DIAGNOSIS — D61.811 OTHER DRUG-INDUCED PANCYTOPENIA: ICD-10-CM

## 2021-10-07 LAB
ALBUMIN SERPL ELPH-MCNC: 4.1 G/DL — SIGNIFICANT CHANGE UP (ref 3.3–5)
ALP SERPL-CCNC: 132 U/L — HIGH (ref 40–120)
ALT FLD-CCNC: <5 U/L — LOW (ref 10–45)
ANION GAP SERPL CALC-SCNC: 12 MMOL/L — SIGNIFICANT CHANGE UP (ref 5–17)
AST SERPL-CCNC: 7 U/L — LOW (ref 10–40)
BASOPHILS # BLD AUTO: 0.03 K/UL — SIGNIFICANT CHANGE UP (ref 0–0.2)
BASOPHILS NFR BLD AUTO: 0.9 % — SIGNIFICANT CHANGE UP (ref 0–2)
BILIRUB SERPL-MCNC: 0.6 MG/DL — SIGNIFICANT CHANGE UP (ref 0.2–1.2)
BUN SERPL-MCNC: 35 MG/DL — HIGH (ref 7–23)
CALCIUM SERPL-MCNC: 9.5 MG/DL — SIGNIFICANT CHANGE UP (ref 8.4–10.5)
CHLORIDE SERPL-SCNC: 113 MMOL/L — HIGH (ref 96–108)
CO2 SERPL-SCNC: 15 MMOL/L — LOW (ref 22–31)
COVID-19 SPIKE DOMAIN AB INTERP: POSITIVE
COVID-19 SPIKE DOMAIN ANTIBODY RESULT: >250 U/ML — HIGH
CREAT SERPL-MCNC: 1.54 MG/DL — HIGH (ref 0.5–1.3)
CULTURE RESULTS: SIGNIFICANT CHANGE UP
EOSINOPHIL # BLD AUTO: 0.1 K/UL — SIGNIFICANT CHANGE UP (ref 0–0.5)
EOSINOPHIL NFR BLD AUTO: 2.7 % — SIGNIFICANT CHANGE UP (ref 0–6)
GLUCOSE SERPL-MCNC: 98 MG/DL — SIGNIFICANT CHANGE UP (ref 70–99)
HCT VFR BLD CALC: 23.4 % — LOW (ref 34.5–45)
HGB BLD-MCNC: 7.3 G/DL — LOW (ref 11.5–15.5)
IRON SATN MFR SERPL: 33 % — SIGNIFICANT CHANGE UP (ref 14–50)
IRON SATN MFR SERPL: 68 UG/DL — SIGNIFICANT CHANGE UP (ref 30–160)
LYMPHOCYTES # BLD AUTO: 0.78 K/UL — LOW (ref 1–3.3)
LYMPHOCYTES # BLD AUTO: 20.5 % — SIGNIFICANT CHANGE UP (ref 13–44)
MAGNESIUM SERPL-MCNC: 2 MG/DL — SIGNIFICANT CHANGE UP (ref 1.6–2.6)
MANUAL SMEAR VERIFICATION: SIGNIFICANT CHANGE UP
MCHC RBC-ENTMCNC: 28.1 PG — SIGNIFICANT CHANGE UP (ref 27–34)
MCHC RBC-ENTMCNC: 31.2 GM/DL — LOW (ref 32–36)
MCV RBC AUTO: 90 FL — SIGNIFICANT CHANGE UP (ref 80–100)
MONOCYTES # BLD AUTO: 0.24 K/UL — SIGNIFICANT CHANGE UP (ref 0–0.9)
MONOCYTES NFR BLD AUTO: 6.3 % — SIGNIFICANT CHANGE UP (ref 2–14)
NEUTROPHILS # BLD AUTO: 2.65 K/UL — SIGNIFICANT CHANGE UP (ref 1.8–7.4)
NEUTROPHILS NFR BLD AUTO: 69.6 % — SIGNIFICANT CHANGE UP (ref 43–77)
PHOSPHATE SERPL-MCNC: 3.3 MG/DL — SIGNIFICANT CHANGE UP (ref 2.5–4.5)
PLAT MORPH BLD: NORMAL — SIGNIFICANT CHANGE UP
PLATELET # BLD AUTO: 201 K/UL — SIGNIFICANT CHANGE UP (ref 150–400)
POTASSIUM SERPL-MCNC: 4.9 MMOL/L — SIGNIFICANT CHANGE UP (ref 3.5–5.3)
POTASSIUM SERPL-SCNC: 4.9 MMOL/L — SIGNIFICANT CHANGE UP (ref 3.5–5.3)
PROT SERPL-MCNC: 5.8 G/DL — LOW (ref 6–8.3)
RBC # BLD: 2.6 M/UL — LOW (ref 3.8–5.2)
RBC # FLD: 21.8 % — HIGH (ref 10.3–14.5)
RBC BLD AUTO: SIGNIFICANT CHANGE UP
SARS-COV-2 IGG+IGM SERPL QL IA: >250 U/ML — HIGH
SARS-COV-2 IGG+IGM SERPL QL IA: POSITIVE
SODIUM SERPL-SCNC: 140 MMOL/L — SIGNIFICANT CHANGE UP (ref 135–145)
SPECIMEN SOURCE: SIGNIFICANT CHANGE UP
TACROLIMUS SERPL-MCNC: 8.2 NG/ML — SIGNIFICANT CHANGE UP
TIBC SERPL-MCNC: 205 UG/DL — LOW (ref 220–430)
UIBC SERPL-MCNC: 137 UG/DL — SIGNIFICANT CHANGE UP (ref 110–370)
WBC # BLD: 3.81 K/UL — SIGNIFICANT CHANGE UP (ref 3.8–10.5)
WBC # FLD AUTO: 3.81 K/UL — SIGNIFICANT CHANGE UP (ref 3.8–10.5)

## 2021-10-07 PROCEDURE — 99232 SBSQ HOSP IP/OBS MODERATE 35: CPT

## 2021-10-07 RX ORDER — SODIUM BICARBONATE 1 MEQ/ML
1300 SYRINGE (ML) INTRAVENOUS
Refills: 0 | Status: DISCONTINUED | OUTPATIENT
Start: 2021-10-07 | End: 2021-10-08

## 2021-10-07 RX ORDER — TACROLIMUS 5 MG/1
7 CAPSULE ORAL
Refills: 0 | Status: DISCONTINUED | OUTPATIENT
Start: 2021-10-08 | End: 2021-10-08

## 2021-10-07 RX ORDER — VALGANCICLOVIR 450 MG/1
450 TABLET, FILM COATED ORAL DAILY
Refills: 0 | Status: DISCONTINUED | OUTPATIENT
Start: 2021-10-07 | End: 2021-10-08

## 2021-10-07 RX ORDER — TACROLIMUS 5 MG/1
7 CAPSULE ORAL ONCE
Refills: 0 | Status: COMPLETED | OUTPATIENT
Start: 2021-10-07 | End: 2021-10-07

## 2021-10-07 RX ORDER — ASPIRIN/CALCIUM CARB/MAGNESIUM 324 MG
81 TABLET ORAL DAILY
Refills: 0 | Status: DISCONTINUED | OUTPATIENT
Start: 2021-10-07 | End: 2021-10-08

## 2021-10-07 RX ADMIN — Medication 60 MILLIGRAM(S): at 17:28

## 2021-10-07 RX ADMIN — Medication 1 TABLET(S): at 13:41

## 2021-10-07 RX ADMIN — LATANOPROST 1 DROP(S): 0.05 SOLUTION/ DROPS OPHTHALMIC; TOPICAL at 21:12

## 2021-10-07 RX ADMIN — Medication 60 MILLIGRAM(S): at 06:30

## 2021-10-07 RX ADMIN — MYCOPHENOLATE MOFETIL 500 MILLIGRAM(S): 250 CAPSULE ORAL at 17:27

## 2021-10-07 RX ADMIN — Medication 1300 MILLIGRAM(S): at 17:33

## 2021-10-07 RX ADMIN — Medication 100 MILLIGRAM(S): at 06:30

## 2021-10-07 RX ADMIN — MYCOPHENOLATE MOFETIL 500 MILLIGRAM(S): 250 CAPSULE ORAL at 06:30

## 2021-10-07 RX ADMIN — Medication 200 MILLIGRAM(S): at 18:42

## 2021-10-07 RX ADMIN — Medication 100 MILLIGRAM(S): at 21:12

## 2021-10-07 RX ADMIN — Medication 81 MILLIGRAM(S): at 11:33

## 2021-10-07 RX ADMIN — Medication 200 MILLIGRAM(S): at 09:28

## 2021-10-07 RX ADMIN — VALGANCICLOVIR 450 MILLIGRAM(S): 450 TABLET, FILM COATED ORAL at 11:33

## 2021-10-07 RX ADMIN — PANTOPRAZOLE SODIUM 40 MILLIGRAM(S): 20 TABLET, DELAYED RELEASE ORAL at 06:30

## 2021-10-07 RX ADMIN — Medication 100 MILLIGRAM(S): at 13:40

## 2021-10-07 RX ADMIN — TACROLIMUS 7 MILLIGRAM(S): 5 CAPSULE ORAL at 11:28

## 2021-10-07 RX ADMIN — Medication 5 MILLIGRAM(S): at 06:30

## 2021-10-07 NOTE — DISCHARGE NOTE PROVIDER - CARE PROVIDER_API CALL
Simi Louise (MD; MPH)  Internal Medicine; Nephrology  [address] [place] [address]  Phone: [phone]  Fax: [phone]  Follow Up Time:

## 2021-10-07 NOTE — DISCHARGE NOTE PROVIDER - NSDCMRMEDTOKEN_GEN_ALL_CORE_FT
aspirin 81 mg oral tablet, chewable: 1 tab(s) orally once a day  CellCept 500 mg oral tablet: 1 tab(s) orally 2 times a day  dapsone 100 mg oral tablet: 1 tab(s) orally once a day  Envarsus XR 1 mg oral tablet, extended release: 2 tab(s) orally once a day  Envarsus XR 4 mg oral tablet, extended release: 1 tab(s) orally once a day (in the morning)  hydrALAZINE 100 mg oral tablet: 1 tab(s) orally 3 times a day   labetalol 100 mg oral tablet: 2 tab(s) orally 2 times a day  latanoprost 0.005% ophthalmic solution: 1 drop(s) to each affected eye once a day (in the evening) both eyes  NIFEdipine 60 mg oral tablet, extended release: 1 tab(s) orally once a day (at bedtime)  pantoprazole 40 mg oral delayed release tablet: 1 tab(s) orally once a day (before a meal)  predniSONE 5 mg oral tablet: 1 tab(s) orally once a day  senna oral tablet: 2 tab(s) orally once a day (at bedtime), As Needed  valGANciclovir 450 mg oral tablet: 1 tab(s) orally Monday, Wednesday, and Friday   aspirin 81 mg oral tablet, chewable: 1 tab(s) orally once a day  CellCept 500 mg oral tablet: 1 tab(s) orally 2 times a day  hydrALAZINE 100 mg oral tablet: 1 tab(s) orally 3 times a day  labetalol 200 mg oral tablet: 1 tab(s) orally 2 times a day  latanoprost 0.005% ophthalmic solution: 1 drop(s) to each affected eye once a day (at bedtime)  NIFEdipine 60 mg oral tablet, extended release: 1 tab(s) orally 2 times a day  pantoprazole 40 mg oral delayed release tablet: 1 tab(s) orally once a day (before a meal)  predniSONE 5 mg oral tablet: 1 tab(s) orally once a day  senna oral tablet: 2 tab(s) orally once a day (at bedtime), As Needed  sodium bicarbonate 650 mg oral tablet: 2 tab(s) orally 2 times a day  sulfamethoxazole-trimethoprim 400 mg-80 mg oral tablet: 1 tab(s) orally once a day  tacrolimus 1 mg oral tablet, extended release: 7 tab(s) orally   valGANciclovir 450 mg oral tablet: 1 tab(s) orally once a day

## 2021-10-07 NOTE — DISCHARGE NOTE PROVIDER - NSDCCPCAREPLAN_GEN_ALL_CORE_FT
PRINCIPAL DISCHARGE DIAGNOSIS  Diagnosis: Anemia  Assessment and Plan of Treatment: you received blood transfusions and medications due to low hemoglobin.  continue to take medications as prescribed. return if you develop any signs of bleeding or any other changes to your health      SECONDARY DISCHARGE DIAGNOSES  Diagnosis: Renal transplant recipient  Assessment and Plan of Treatment: continue your transplant medication regimen as instructed     PRINCIPAL DISCHARGE DIAGNOSIS  Diagnosis: Anemia  Assessment and Plan of Treatment: you received blood transfusions and medications due to low hemoglobin.  continue to take medications as prescribed. return if you develop any signs of bleeding or any other changes to your health      SECONDARY DISCHARGE DIAGNOSES  Diagnosis: Hypertension  Assessment and Plan of Treatment: continue your medication regimen as instructed. watch diet and get plenty of exercise    Diagnosis: Immunosuppressed status  Assessment and Plan of Treatment: continue all precautions. practice social distancing, mask wearing and good hand hygeine    Diagnosis: Renal transplant recipient  Assessment and Plan of Treatment: continue your transplant medication regimen as instructed

## 2021-10-07 NOTE — PROGRESS NOTE ADULT - ASSESSMENT
75F with PMHx of HTN, glaucoma, DVT of right subclavian vein (2017), ESRD on HD s/p  R DDRT (1a/1v/1u) with Simulect induction and ureteral stent placement (removed 8/2/21), post op course c/b DGF requiring HD (now off, baseline Cr 1.4-1.6), sent to ED on 10/5 for anemia. H/H 4.8/16 on admission. Imaging negative for hematoma/collection,     [] s/p DDRT  - good graft function  - Renal doppler on admission with good perfusion  - start sodium bicarb 1300mg bid  - resume ASA    [] Immuno:  - Tacro level 8.2; continue Env 7mg daily, MMF 500mg bid, Pred 5  - Ppx: start Bactrim/resume Valcyte    [] Anemia  - s/p 2U PRBC  - Dapsone dced   - check guaiac  - f/u pending viral studies: parvo/cmv/bk/ebv     [] Dispo:   - dc home tomorrow if h/h remains stable

## 2021-10-07 NOTE — PROGRESS NOTE ADULT - SUBJECTIVE AND OBJECTIVE BOX
--------------------------------------------------------------------------------  Chief Complaint: no new symptoms    24 hour events/subjective:    reviewed    PAST HISTORY  --------------------------------------------------------------------------------  No significant changes to PMH, PSH, FHx, SHx, unless otherwise noted    ALLERGIES & MEDICATIONS  --------------------------------------------------------------------------------  Allergies    Mushrooms (Anaphylaxis)  penicillin (Rash)    Intolerances      Standing Inpatient Medications  aspirin  chewable 81 milliGRAM(s) Oral daily  hydrALAZINE 100 milliGRAM(s) Oral three times a day  labetalol 200 milliGRAM(s) Oral two times a day  latanoprost 0.005% Ophthalmic Solution 1 Drop(s) Both EYES at bedtime  mycophenolate mofetil 500 milliGRAM(s) Oral two times a day  NIFEdipine XL 60 milliGRAM(s) Oral two times a day  pantoprazole    Tablet 40 milliGRAM(s) Oral before breakfast  predniSONE   Tablet 5 milliGRAM(s) Oral daily  sodium bicarbonate 1300 milliGRAM(s) Oral two times a day  trimethoprim   80 mG/sulfamethoxazole 400 mG 1 Tablet(s) Oral daily  valGANciclovir 450 milliGRAM(s) Oral daily    PRN Inpatient Medications      REVIEW OF SYSTEMS  --------------------------------------------------------------------------------  Gen:NO fatigue, fevers/chills, weakness  Skin: No rashes  Head/Eyes/Ears/Mouth: No headache;No sore throat  Respiratory: No dyspnea, cough,   CV: No chest pain, PND, orthopnea  GI: No abdominal pain, diarrhea, constipation, nausea, vomiting  Transplant: No pain  : No increased frequency, dysuria, hematuria, nocturia  MSK: No joint pain/swelling; no back pain; no edema  Neuro: No dizziness/lightheadedness, weakness, seizures, numbness, tingling  Psych: No significant nervousness, anxiety, stress, depression    All other systems were reviewed and are negative, except as noted.    VITALS/PHYSICAL EXAM  --------------------------------------------------------------------------------  T(C): 36.8 (10-07-21 @ 05:45), Max: 37 (10-06-21 @ 13:10)  HR: 71 (10-07-21 @ 09:26) (58 - 73)  BP: 144/56 (10-07-21 @ 09:26) (136/61 - 156/69)  RR: 18 (10-07-21 @ 05:45) (18 - 20)  SpO2: 98% (10-07-21 @ 05:45) (95% - 99%)  Height (cm): 172.7 (10-05-21 @ 18:21)  Weight (kg): 54.5 (10-06-21 @ 05:55)  BMI (kg/m2): 18.3 (10-06-21 @ 05:55)  BSA (m2): 1.65 (10-06-21 @ 05:55)      10-06-21 @ 07:01  -  10-07-21 @ 07:00  --------------------------------------------------------  IN: 1220 mL / OUT: 1400 mL / NET: -180 mL      Physical Exam:  	Gen: NAD, well-appearing  	HEENT: PERRL, supple neck, clear oropharynx, Pallor+  	Pulm: CTA B/L  	CV: RRR, S1S2; no rub  	Abd: +BS, soft, nontender/nondistended                      Transplant: No tenderness, swelling  	: No suprapubic tenderness  	UE:  no asterixis  	LE: Warm, FROM, intact strength; no edema  	Neuro: No focal deficits, intact gait  	Psych: Normal affect and mood  	Skin: Warm, without rashes      LABS/STUDIES  --------------------------------------------------------------------------------              7.3    3.81  >-----------<  201      [10-07-21 @ 06:41]              23.4     140  |  113  |  35  ----------------------------<  98      [10-07-21 @ 06:41]  4.9   |  15  |  1.54        Ca     9.5     [10-07-21 @ 06:41]      Mg     2.0     [10-07-21 @ 06:41]      Phos  3.3     [10-07-21 @ 06:41]    TPro  5.8  /  Alb  4.1  /  TBili  0.6  /  DBili  x   /  AST  7   /  ALT  <5  /  AlkPhos  132  [10-07-21 @ 06:41]    PT/INR: PT 13.8 , INR 1.16       [10-06-21 @ 08:14]  PTT: 34.2       [10-06-21 @ 08:14]      Creatinine Trend:  SCr 1.54 [10-07 @ 06:41]  SCr 1.38 [10-06 @ 08:14]  SCr 1.59 [10-06 @ 00:46]  SCr 1.61 [10-05 @ 21:11]    Tacrolimus (), Serum: 7.2 ng/mL (10-06 @ 11:43)  Tacrolimus (), Serum: 12.7 ng/mL (10-06 @ 00:57)           Urinalysis - [10-05-21 @ 22:43]      Color Light Yellow / Appearance Clear / SG 1.014 / pH 6.0      Gluc Negative / Ketone Negative  / Bili Negative / Urobili Negative       Blood Negative / Protein Trace / Leuk Est Negative / Nitrite Negative      RBC  / WBC  / Hyaline  / Gran  / Sq Epi  / Non Sq Epi  / Bacteria       Iron 68, TIBC 205, %sat 33      [10-06-21 @ 15:58]  Ferritin 1921      [10-06-21 @ 16:15]  HbA1c 4.6      [05-28-19 @ 09:49]  TSH 3.75      [02-02-21 @ 19:57]  Lipid: chol 162, TG 63, HDL 81, LDL --      [02-02-21 @ 19:57]

## 2021-10-07 NOTE — DISCHARGE NOTE PROVIDER - HOSPITAL COURSE
75F with PMHx of HTN, glaucoma, DVT of right subclavian vein (2017), ESRD on HD s/p  R DDRT (1a/1v/1u) with Simulect induction and ureteral stent placement (removed 8/2/21), post op course c/b DGF requiring HD (now off, baseline Cr 1.4-1.6), sent to ED on 10/5 for anemia to 5 seen on outpatient labs.    -Received 2U PRBCs with response. W/U revealed-----  -Imaging unremarkable  -Graft with good function, d/c Cr was---  -remained hemodynamically stable throughout course    Evaluated daily by our multidisciplinary team of surgeons, nephrologists, ACPs, Pharmacists, RNs and deemed safe for d/c home with the following plan:  -ENV----, /500, Pred 5  -f/u with Dr. Louise on----  -Qweekly Procrit 10K     75F with PMHx of HTN, glaucoma, DVT of right subclavian vein (2017), ESRD on HD s/p  R DDRT (1a/1v/1u) with Simulect induction and ureteral stent placement (removed 8/2/21), post op course c/b DGF requiring HD (now off, baseline Cr 1.4-1.6). Sent to ED with anemia, HgB 4.8/16 on admission. Imaging unremarkable.       -Received 2U PRBCs with response.   -Graft with good function, d/c Cr was---  -remained hemodynamically stable throughout course  -Anemia likely secondary to dapsone, which was discontinued on admission.     Evaluated daily by our multidisciplinary team of surgeons, nephrologists, ACPs, Pharmacists, RNs and deemed safe for d/c home with the following plan:  -ENV----, /500, Pred 5  -f/u with Dr. Louise on----  -Qweekly Procrit 10K     75F with PMHx of HTN, glaucoma, DVT of right subclavian vein (2017), ESRD on HD s/p  R DDRT (1a/1v/1u) with Simulect induction and ureteral stent placement (removed 8/2/21), post op course c/b DGF requiring HD (now off, baseline Cr 1.4-1.6). Sent to ED with anemia, HgB 4.8/16 on admission. Imaging unremarkable.       -Received 2U PRBCs with response.   -Graft with good function, d/c Cr was 1.61  -remained hemodynamically stable throughout course  -Anemia likely secondary to dapsone, which was discontinued on admission.     Evaluated daily by our multidisciplinary team of surgeons, nephrologists, ACPs, Pharmacists, RNs and deemed safe for d/c home with the following plan:  -ENV 7mg, /500, Pred 5  -f/u with Dr. Louise on 10/12/21  -Qweekly Procrit 10K

## 2021-10-07 NOTE — PROGRESS NOTE ADULT - SUBJECTIVE AND OBJECTIVE BOX
Transplant Surgery - Multidisciplinary Rounds  --------------------------------------------------------------  DDRT 5/19/21     Present:   Patient seen and examined with multidisciplinary team including Transplant Surgeon: Dr. Sebastian Transplant Nephrologist: Dr. Winkler,   Pharmacist: Pham Faulkner. Surgical residents Dr. Jimenez and Dr. Sun, ACPs Jose/Rosette and unit RN during am rounds.  Disciplines not in attendance will be notified of the plan.     HPI: 75F with PMHx of HTN, glaucoma, DVT of right subclavian vein (2017), ESRD on HD s/p  R DDRT (1a/1v/1u) with Simulect induction and ureteral stent placement (removed 8/2/21), post op course c/b DGF requiring HD (now off, baseline Cr 1.4-1.6), sent to ED on 10/5 for anemia. H/H 4.8/16 on admission. Imaging negative for hematoma/collection,     Interval Events:  - Received 2u PRBC, H/H 7.3/23  - Dapsone held  - Feeling well, no complaints.  - denies BRBPR/melena    Immunosuppression  Maintenance immuno: Env 7mg, MMF 500mg bid, Pred 5 daily.  Ongoing monitoring for immunosuppression     Potential Discharge date: Tomorrow if H/H remains stable  Education:  Medications  Plan of care:  See Below    MEDICATIONS  (STANDING):  aspirin  chewable 81 milliGRAM(s) Oral daily  hydrALAZINE 100 milliGRAM(s) Oral three times a day  labetalol 200 milliGRAM(s) Oral two times a day  latanoprost 0.005% Ophthalmic Solution 1 Drop(s) Both EYES at bedtime  mycophenolate mofetil 500 milliGRAM(s) Oral two times a day  NIFEdipine XL 60 milliGRAM(s) Oral two times a day  pantoprazole    Tablet 40 milliGRAM(s) Oral before breakfast  predniSONE   Tablet 5 milliGRAM(s) Oral daily  sodium bicarbonate 1300 milliGRAM(s) Oral two times a day  tacrolimus ER Tablet (ENVARSUS XR) 7 milliGRAM(s) Oral once  trimethoprim   80 mG/sulfamethoxazole 400 mG 1 Tablet(s) Oral daily  valGANciclovir 450 milliGRAM(s) Oral daily    PAST MEDICAL & SURGICAL HISTORY:  HTN (hypertension)  Glaucoma  Cataract  ESRD (end stage renal disease) on dialysis  DVT (deep venous thrombosis)  of Left subclavian vein, 06/12/17  Hemodialysis patient MWF  Acquired cataract extraction with b/l lense placement, 2016  H/O: glaucoma surgery, 2002  AV fistula 2015  S/P KEVEN-BSO for fibroids, 2012  Hemodialysis access, AV graft  Transplanted kidney 5/19/21    Vital Signs Last 24 Hrs  T(C): 36.8 (07 Oct 2021 05:45), Max: 37 (06 Oct 2021 13:10)  T(F): 98.2 (07 Oct 2021 05:45), Max: 98.6 (06 Oct 2021 13:10)  HR: 71 (07 Oct 2021 09:26) (58 - 73)  BP: 144/56 (07 Oct 2021 09:26) (136/61 - 156/69)  BP(mean): --  RR: 18 (07 Oct 2021 05:45) (18 - 20)  SpO2: 98% (07 Oct 2021 05:45) (95% - 99%)    I&O's Summary    06 Oct 2021 07:01  -  07 Oct 2021 07:00  --------------------------------------------------------  IN: 1220 mL / OUT: 1400 mL / NET: -180 mL                          7.3    3.81  )-----------( 201      ( 07 Oct 2021 06:41 )             23.4     10-07    140  |  113<H>  |  35<H>  ----------------------------<  98  4.9   |  15<L>  |  1.54<H>    Ca    9.5      07 Oct 2021 06:41  Phos  3.3     10-07  Mg     2.0     10-07    TPro  5.8<L>  /  Alb  4.1  /  TBili  0.6  /  DBili  x   /  AST  7<L>  /  ALT  <5<L>  /  AlkPhos  132<H>  10-07    Tacrolimus (), Serum: 8.2 ng/mL (10-07 @ 07:45)    Culture - Urine (collected 10-06-21 @ 00:30)  Source: Clean Catch Clean Catch (Midstream)  Final Report (10-07-21 @ 10:17):    <10,000 CFU/mL Normal Urogenital Rosalinda      Review of systems  Gen: No weight changes, fatigue, fevers/chills, weakness, +tired with walking   Skin: No rashes  Head/Eyes/Ears/Mouth: No headache; Normal hearing; Normal vision w/o blurriness; No sinus pain/discomfort, sore throat  Respiratory: No dyspnea, cough, wheezing, hemoptysis  CV: No chest pain, PND, orthopnea  GI: C/O mild abdominal pain at surgical site, No diarrhea, constipation, nausea, vomiting, melena, hematochezia  : No increased frequency, dysuria, hematuria, nocturia  MSK: No joint pain/swelling; no back pain; no edema  Neuro: No dizziness/lightheadedness, weakness, seizures, numbness, tingling  Heme: No easy bruising or bleeding  Endo: No heat/cold intolerance  Psych: No significant nervousness, anxiety, stress, depression  All other systems were reviewed and are negative, except as noted.      PHYSICAL EXAM:  Constitutional: Well developed / well nourished  Eyes: Anicteric, PERRLA  ENMT: nc/at  Neck: Supple  Respiratory: CTA B/L  Cardiovascular: RRR  Gastrointestinal: Soft abdomen, NT, ND  Genitourinary: Voiding spontaneously  Extremities: SCD's in place and working bilaterally  Vascular: Palpable dp pulses bilaterally  Neurological: A&O x3  Skin: warm, dry, intact throughout   Musculoskeletal: Moving all extremities  Psychiatric: Responsive

## 2021-10-07 NOTE — DISCHARGE NOTE PROVIDER - NSDCFUSCHEDAPPT_GEN_ALL_CORE_FT
ARIANNE MCNAMARA ; 10/12/2021 ; [facility] ARIANNE Reyes ; 10/28/2021 ; [facility] [address] ARIANNE Reyes ; 11/09/2021 ; [facility]

## 2021-10-08 ENCOUNTER — TRANSCRIPTION ENCOUNTER (OUTPATIENT)
Age: 76
End: 2021-10-08

## 2021-10-08 VITALS — SYSTOLIC BLOOD PRESSURE: 129 MMHG | DIASTOLIC BLOOD PRESSURE: 60 MMHG | HEART RATE: 66 BPM

## 2021-10-08 DIAGNOSIS — Z94.0 KIDNEY TRANSPLANT STATUS: ICD-10-CM

## 2021-10-08 LAB
ALBUMIN SERPL ELPH-MCNC: 3.9 G/DL — SIGNIFICANT CHANGE UP (ref 3.3–5)
ALP SERPL-CCNC: 127 U/L — HIGH (ref 40–120)
ALT FLD-CCNC: 5 U/L — LOW (ref 10–45)
ANION GAP SERPL CALC-SCNC: 11 MMOL/L — SIGNIFICANT CHANGE UP (ref 5–17)
AST SERPL-CCNC: 10 U/L — SIGNIFICANT CHANGE UP (ref 10–40)
BASOPHILS # BLD AUTO: 0.01 K/UL — SIGNIFICANT CHANGE UP (ref 0–0.2)
BASOPHILS NFR BLD AUTO: 0.2 % — SIGNIFICANT CHANGE UP (ref 0–2)
BILIRUB SERPL-MCNC: 0.5 MG/DL — SIGNIFICANT CHANGE UP (ref 0.2–1.2)
BUN SERPL-MCNC: 33 MG/DL — HIGH (ref 7–23)
CALCIUM SERPL-MCNC: 9.6 MG/DL — SIGNIFICANT CHANGE UP (ref 8.4–10.5)
CHLORIDE SERPL-SCNC: 115 MMOL/L — HIGH (ref 96–108)
CO2 SERPL-SCNC: 14 MMOL/L — LOW (ref 22–31)
CREAT SERPL-MCNC: 1.61 MG/DL — HIGH (ref 0.5–1.3)
EOSINOPHIL # BLD AUTO: 0.07 K/UL — SIGNIFICANT CHANGE UP (ref 0–0.5)
EOSINOPHIL NFR BLD AUTO: 1.7 % — SIGNIFICANT CHANGE UP (ref 0–6)
GLUCOSE SERPL-MCNC: 99 MG/DL — SIGNIFICANT CHANGE UP (ref 70–99)
HCT VFR BLD CALC: 24.8 % — LOW (ref 34.5–45)
HGB BLD-MCNC: 7.3 G/DL — LOW (ref 11.5–15.5)
IMM GRANULOCYTES NFR BLD AUTO: 0.5 % — SIGNIFICANT CHANGE UP (ref 0–1.5)
LYMPHOCYTES # BLD AUTO: 0.99 K/UL — LOW (ref 1–3.3)
LYMPHOCYTES # BLD AUTO: 24.6 % — SIGNIFICANT CHANGE UP (ref 13–44)
MAGNESIUM SERPL-MCNC: 1.8 MG/DL — SIGNIFICANT CHANGE UP (ref 1.6–2.6)
MCHC RBC-ENTMCNC: 27.2 PG — SIGNIFICANT CHANGE UP (ref 27–34)
MCHC RBC-ENTMCNC: 29.4 GM/DL — LOW (ref 32–36)
MCV RBC AUTO: 92.5 FL — SIGNIFICANT CHANGE UP (ref 80–100)
MONOCYTES # BLD AUTO: 0.48 K/UL — SIGNIFICANT CHANGE UP (ref 0–0.9)
MONOCYTES NFR BLD AUTO: 11.9 % — SIGNIFICANT CHANGE UP (ref 2–14)
MYCOPHENOLATE SERPL-MCNC: 4.5 UG/ML — HIGH (ref 1–3.5)
MYCOPHENOLIC ACID GLUCURONIDE: 36 UG/ML — SIGNIFICANT CHANGE UP (ref 15–125)
NEUTROPHILS # BLD AUTO: 2.46 K/UL — SIGNIFICANT CHANGE UP (ref 1.8–7.4)
NEUTROPHILS NFR BLD AUTO: 61.1 % — SIGNIFICANT CHANGE UP (ref 43–77)
NRBC # BLD: 0 /100 WBCS — SIGNIFICANT CHANGE UP (ref 0–0)
PHOSPHATE SERPL-MCNC: 3.3 MG/DL — SIGNIFICANT CHANGE UP (ref 2.5–4.5)
PLATELET # BLD AUTO: 209 K/UL — SIGNIFICANT CHANGE UP (ref 150–400)
POTASSIUM SERPL-MCNC: 5 MMOL/L — SIGNIFICANT CHANGE UP (ref 3.5–5.3)
POTASSIUM SERPL-SCNC: 5 MMOL/L — SIGNIFICANT CHANGE UP (ref 3.5–5.3)
PROT SERPL-MCNC: 5.8 G/DL — LOW (ref 6–8.3)
RBC # BLD: 2.68 M/UL — LOW (ref 3.8–5.2)
RBC # FLD: 21.8 % — HIGH (ref 10.3–14.5)
SODIUM SERPL-SCNC: 140 MMOL/L — SIGNIFICANT CHANGE UP (ref 135–145)
TACROLIMUS SERPL-MCNC: 8 NG/ML — SIGNIFICANT CHANGE UP
WBC # BLD: 4.03 K/UL — SIGNIFICANT CHANGE UP (ref 3.8–10.5)
WBC # FLD AUTO: 4.03 K/UL — SIGNIFICANT CHANGE UP (ref 3.8–10.5)

## 2021-10-08 PROCEDURE — 99232 SBSQ HOSP IP/OBS MODERATE 35: CPT

## 2021-10-08 RX ORDER — LATANOPROST 0.05 MG/ML
1 SOLUTION/ DROPS OPHTHALMIC; TOPICAL
Qty: 0 | Refills: 0 | DISCHARGE
Start: 2021-10-08

## 2021-10-08 RX ORDER — TACROLIMUS 5 MG/1
1 CAPSULE ORAL
Qty: 0 | Refills: 0 | DISCHARGE

## 2021-10-08 RX ORDER — ASPIRIN/CALCIUM CARB/MAGNESIUM 324 MG
1 TABLET ORAL
Qty: 0 | Refills: 0 | DISCHARGE
Start: 2021-10-08

## 2021-10-08 RX ORDER — TACROLIMUS 5 MG/1
4 CAPSULE ORAL
Qty: 0 | Refills: 0 | DISCHARGE
Start: 2021-10-08

## 2021-10-08 RX ORDER — NIFEDIPINE 30 MG
1 TABLET, EXTENDED RELEASE 24 HR ORAL
Refills: 0 | DISCHARGE
Start: 2021-10-08

## 2021-10-08 RX ORDER — TACROLIMUS 5 MG/1
7 CAPSULE ORAL
Qty: 0 | Refills: 0 | DISCHARGE
Start: 2021-10-08

## 2021-10-08 RX ORDER — TACROLIMUS 5 MG/1
3 CAPSULE ORAL
Qty: 0 | Refills: 0 | DISCHARGE
Start: 2021-10-08

## 2021-10-08 RX ORDER — SODIUM BICARBONATE 1 MEQ/ML
2 SYRINGE (ML) INTRAVENOUS
Qty: 120 | Refills: 0
Start: 2021-10-08 | End: 2021-11-06

## 2021-10-08 RX ORDER — MAGNESIUM SULFATE 500 MG/ML
1 VIAL (ML) INJECTION ONCE
Refills: 0 | Status: COMPLETED | OUTPATIENT
Start: 2021-10-08 | End: 2021-10-08

## 2021-10-08 RX ORDER — PANTOPRAZOLE SODIUM 20 MG/1
1 TABLET, DELAYED RELEASE ORAL
Qty: 0 | Refills: 0 | DISCHARGE
Start: 2021-10-08

## 2021-10-08 RX ORDER — LABETALOL HCL 100 MG
1 TABLET ORAL
Qty: 0 | Refills: 0 | DISCHARGE
Start: 2021-10-08

## 2021-10-08 RX ORDER — LATANOPROST 0.05 MG/ML
1 SOLUTION/ DROPS OPHTHALMIC; TOPICAL
Qty: 0 | Refills: 0 | DISCHARGE

## 2021-10-08 RX ORDER — VALGANCICLOVIR 450 MG/1
1 TABLET, FILM COATED ORAL
Qty: 0 | Refills: 0 | DISCHARGE
Start: 2021-10-08

## 2021-10-08 RX ORDER — HYDRALAZINE HCL 50 MG
1 TABLET ORAL
Qty: 0 | Refills: 0 | DISCHARGE
Start: 2021-10-08

## 2021-10-08 RX ORDER — DAPSONE 100 MG/1
1 TABLET ORAL
Qty: 0 | Refills: 0 | DISCHARGE

## 2021-10-08 RX ADMIN — MYCOPHENOLATE MOFETIL 500 MILLIGRAM(S): 250 CAPSULE ORAL at 05:51

## 2021-10-08 RX ADMIN — Medication 200 MILLIGRAM(S): at 07:57

## 2021-10-08 RX ADMIN — Medication 60 MILLIGRAM(S): at 05:50

## 2021-10-08 RX ADMIN — Medication 100 MILLIGRAM(S): at 13:13

## 2021-10-08 RX ADMIN — Medication 100 MILLIGRAM(S): at 05:48

## 2021-10-08 RX ADMIN — Medication 100 GRAM(S): at 09:38

## 2021-10-08 RX ADMIN — Medication 5 MILLIGRAM(S): at 05:48

## 2021-10-08 RX ADMIN — Medication 1 TABLET(S): at 11:09

## 2021-10-08 RX ADMIN — VALGANCICLOVIR 450 MILLIGRAM(S): 450 TABLET, FILM COATED ORAL at 11:10

## 2021-10-08 RX ADMIN — Medication 1300 MILLIGRAM(S): at 05:50

## 2021-10-08 RX ADMIN — PANTOPRAZOLE SODIUM 40 MILLIGRAM(S): 20 TABLET, DELAYED RELEASE ORAL at 05:50

## 2021-10-08 RX ADMIN — Medication 81 MILLIGRAM(S): at 11:10

## 2021-10-08 RX ADMIN — TACROLIMUS 7 MILLIGRAM(S): 5 CAPSULE ORAL at 08:01

## 2021-10-08 NOTE — PROGRESS NOTE ADULT - TIME BILLING
Kidney Transplant recipient with functioning allograft  Severe anemia, s/p PRBC  possible etiology:  Dapsone related  r/o Parvo B19  will check for GI blood loss  Creatinine trend noted  Comorbidities reviewed. HTN  Patient seen, examined and reviewed available clinical and lab data including history,  progress notes and consult notes.  Reviewed immunosuppression and allograft function including urine out put, creatinine trend, urine studies and any  imaging.  Reviewed medication regimen for  blood pressure control and prophylaxis  Suggestions:  1. Check Retic count  2. F/u Parvo B19 PCR, Serum immunofixation,   Haptoglobin, LDH, Peripheral smear, G6PD level  3. Stool for occult blood  4. PRBC  5. Follow Hb/Hct    Tacrolimus trough level target 6-8 ng/ml  Will follow  I was present during and reviewed clinical and lab data as well as assessment and plan as documented by the house staff as noted. Please contact if any additional questions with any change in clinical condition or on availability of any additional information or reports.
Kidney Transplant recipient with functioning allograft  Severe anemia, s/p PRBC  etiology:  Dapsone related  Parvo B19 pending but appropriate retic response noted, hence less likely  will check for GI blood loss  Creatinine trend noted  Comorbidities reviewed. HTN  Patient seen, examined and reviewed available clinical and lab data including history,  progress notes and consult notes.  Reviewed immunosuppression and allograft function including urine out put, creatinine trend, urine studies and any  imaging.  Reviewed medication regimen for  blood pressure control and prophylaxis  Suggestions:  On discharge will f/u in transplant clinic  Tacrolimus trough level target 6-8 ng/ml  F/u Parvo B19 PCR  Will follow  I was present during and reviewed clinical and lab data as well as assessment and plan as documented . Please contact if any additional questions with any change in clinical condition or on availability of any additional information or reports

## 2021-10-08 NOTE — PROGRESS NOTE ADULT - SUBJECTIVE AND OBJECTIVE BOX
Transplant Surgery - Multidisciplinary Rounds  --------------------------------------------------------------  DDRT 5/19/21     Present:   Patient seen and examined with multidisciplinary team including Transplant Surgeon: Dr. Sebastian Transplant Nephrologist: Dr. Winkler,   Pharmacist: Faizan Saxena. Surgical residents Dr. Jimenez and Dr. Sun, ACPs Jose/Rosette and unit RN during am rounds.  Disciplines not in attendance will be notified of the plan.     HPI: 75F with PMHx of HTN, glaucoma, DVT of right subclavian vein (2017), ESRD on HD s/p  R DDRT (1a/1v/1u) with Simulect induction and ureteral stent placement (removed 8/2/21), post op course c/b DGF requiring HD (now off, baseline Cr 1.4-1.6), sent to ED on 10/5 for anemia. H/H 4.8/16 on admission. Imaging negative for hematoma/collection. Received 2u PRBC on admission. Dapsone held.     Interval Events:  - no acute overnight events  - Feeling well, no complaints  - H/H stable    Immunosuppression  Maintenance immuno: Tac level yest 8.2, continued on Env 7mg, MMF 500mg bid, Pred 5 daily.  Ongoing monitoring for immunosuppression     Potential Discharge date: today  Education:  Medications  Plan of care:  See Below    MEDICATIONS  (STANDING):  aspirin  chewable 81 milliGRAM(s) Oral daily  hydrALAZINE 100 milliGRAM(s) Oral three times a day  labetalol 200 milliGRAM(s) Oral two times a day  latanoprost 0.005% Ophthalmic Solution 1 Drop(s) Both EYES at bedtime  mycophenolate mofetil 500 milliGRAM(s) Oral two times a day  NIFEdipine XL 60 milliGRAM(s) Oral two times a day  pantoprazole    Tablet 40 milliGRAM(s) Oral before breakfast  predniSONE   Tablet 5 milliGRAM(s) Oral daily  sodium bicarbonate 1300 milliGRAM(s) Oral two times a day  tacrolimus ER Tablet (ENVARSUS XR) 7 milliGRAM(s) Oral <User Schedule>  trimethoprim   80 mG/sulfamethoxazole 400 mG 1 Tablet(s) Oral daily  valGANciclovir 450 milliGRAM(s) Oral daily    PAST MEDICAL & SURGICAL HISTORY:  HTN (hypertension)  Glaucoma  Cataract  ESRD (end stage renal disease) on dialysis  DVT (deep venous thrombosis) of Left subclavian vein, 06/12/17  Hemodialysis patient MWF  Acquired cataract extraction with b/l lense placement, 2016  H/O: glaucoma surgery, 2002  AV fistula 2015  S/P KEVEN-BSO for fibroids, 2012  Hemodialysis access, AV graft  Transplanted kidney 5/19/21    Vital Signs Last 24 Hrs  T(C): 37.1 (08 Oct 2021 05:35), Max: 37.2 (07 Oct 2021 21:00)  T(F): 98.7 (08 Oct 2021 05:35), Max: 99 (07 Oct 2021 21:00)  HR: 68 (08 Oct 2021 07:40) (64 - 70)  BP: 133/60 (08 Oct 2021 07:40) (129/52 - 165/72)  BP(mean): --  RR: 18 (08 Oct 2021 05:35) (18 - 20)  SpO2: 97% (08 Oct 2021 05:35) (97% - 97%)    I&O's Summary    07 Oct 2021 07:01  -  08 Oct 2021 07:00  --------------------------------------------------------  IN: 840 mL / OUT: 1000 mL / NET: -160 mL                          7.3    4.03  )-----------( 209      ( 08 Oct 2021 06:48 )             24.8     10-08    140  |  115<H>  |  33<H>  ----------------------------<  99  5.0   |  14<L>  |  1.61<H>    Ca    9.6      08 Oct 2021 06:47  Phos  3.3     10-08  Mg     1.8     10-08    TPro  5.8<L>  /  Alb  3.9  /  TBili  0.5  /  DBili  x   /  AST  10  /  ALT  5<L>  /  AlkPhos  127<H>  10-08    Tacrolimus (), Serum: 8.0 ng/mL (10-08 @ 07:58)    Culture - Urine (collected 10-06-21 @ 00:30)  Source: Clean Catch Clean Catch (Midstream)  Final Report (10-07-21 @ 10:17):    <10,000 CFU/mL Normal Urogenital Rosalinda    Review of systems  Gen: No weight changes, fatigue, fevers/chills, weakness, +tired with walking   Skin: No rashes  Head/Eyes/Ears/Mouth: No headache; Normal hearing; Normal vision w/o blurriness; No sinus pain/discomfort, sore throat  Respiratory: No dyspnea, cough, wheezing, hemoptysis  CV: No chest pain, PND, orthopnea  GI: C/O mild abdominal pain at surgical site, No diarrhea, constipation, nausea, vomiting, melena, hematochezia  : No increased frequency, dysuria, hematuria, nocturia  MSK: No joint pain/swelling; no back pain; no edema  Neuro: No dizziness/lightheadedness, weakness, seizures, numbness, tingling  Heme: No easy bruising or bleeding  Endo: No heat/cold intolerance  Psych: No significant nervousness, anxiety, stress, depression  All other systems were reviewed and are negative, except as noted.      PHYSICAL EXAM:  Constitutional: Well developed / well nourished  Eyes: Anicteric, PERRLA  ENMT: nc/at  Neck: Supple  Respiratory: CTA B/L  Cardiovascular: RRR  Gastrointestinal: Soft abdomen, NT, ND  Genitourinary: Voiding spontaneously  Extremities: SCD's in place and working bilaterally  Vascular: Palpable dp pulses bilaterally  Neurological: A&O x3  Skin: warm, dry, intact throughout   Musculoskeletal: Moving all extremities  Psychiatric: Responsive

## 2021-10-08 NOTE — PROGRESS NOTE ADULT - ASSESSMENT
75F with PMHx of HTN, glaucoma, DVT of right subclavian vein (2017), ESRD on HD s/p  R DDRT (1a/1v/1u) with Simulect induction and ureteral stent placement (removed 8/2/21), post op course c/b DGF requiring HD (now off, baseline Cr 1.4-1.6), sent to ED on 10/5 for anemia. H/H 4.8/16 on admission. Imaging negative for hematoma/collection,     [] s/p DDRT  - good graft function  - Renal doppler on admission with good perfusion    [] Immuno:  - Tacro level 8, continue Env 7mg daily, MMF 500mg bid, Pred 5  - Ppx: Bactrim/Valcyte    [] Anemia  - Stable H/H  - s/p 2U PRBC on admission  - f/u pending viral studies: parvo/cmv/bk/ebv     [] Dispo:   - dc home today, f/u in transplant clinic next week.   75F with PMHx of HTN, glaucoma, DVT of right subclavian vein (2017), ESRD on HD s/p  R DDRT (1a/1v/1u) with Simulect induction and ureteral stent placement (removed 8/2/21), post op course c/b DGF requiring HD (now off, baseline Cr 1.4-1.6), sent to ED on 10/5 for anemia. H/H 4.8/16 on admission. Imaging negative for hematoma/collection,     [] s/p DDRT  - good graft function  - Renal doppler on admission with good perfusion  - mag repleted    [] Immuno:  - Tacro level 8, continue Env 7mg daily, MMF 500mg bid, Pred 5  - Ppx: Bactrim/Valcyte    [] Anemia  - Stable H/H  - s/p 2U PRBC on admission  - f/u pending viral studies: parvo/cmv/bk/ebv     [] Dispo:   - dc home today, f/u in transplant clinic next week.

## 2021-10-08 NOTE — PROGRESS NOTE ADULT - SUBJECTIVE AND OBJECTIVE BOX
--------------------------------------------------------------------------------  Chief Complaint:    No acute symptoms    24 hour events/subjective:      reviewed  PAST HISTORY  --------------------------------------------------------------------------------  No significant changes to PMH, PSH, FHx, SHx, unless otherwise noted    ALLERGIES & MEDICATIONS  --------------------------------------------------------------------------------  Allergies    Mushrooms (Anaphylaxis)  penicillin (Rash)    Intolerances      Standing Inpatient Medications  aspirin  chewable 81 milliGRAM(s) Oral daily  hydrALAZINE 100 milliGRAM(s) Oral three times a day  labetalol 200 milliGRAM(s) Oral two times a day  latanoprost 0.005% Ophthalmic Solution 1 Drop(s) Both EYES at bedtime  mycophenolate mofetil 500 milliGRAM(s) Oral two times a day  NIFEdipine XL 60 milliGRAM(s) Oral two times a day  pantoprazole    Tablet 40 milliGRAM(s) Oral before breakfast  predniSONE   Tablet 5 milliGRAM(s) Oral daily  sodium bicarbonate 1300 milliGRAM(s) Oral two times a day  tacrolimus ER Tablet (ENVARSUS XR) 7 milliGRAM(s) Oral <User Schedule>  trimethoprim   80 mG/sulfamethoxazole 400 mG 1 Tablet(s) Oral daily  valGANciclovir 450 milliGRAM(s) Oral daily    PRN Inpatient Medications      REVIEW OF SYSTEMS  --------------------------------------------------------------------------------  Gen: No fatigue, fevers/chills, weakness  Skin: No rashes  Head/Eyes/Ears/Mouth: No headache;No sore throat  Respiratory: No dyspnea, cough,   CV: No chest pain, PND, orthopnea  GI: No abdominal pain, diarrhea, constipation, nausea, vomiting  Transplant: No pain  : No increased frequency, dysuria, hematuria, nocturia  MSK: No joint pain/swelling; no back pain; no edema  Neuro: No dizziness/lightheadedness, weakness, seizures, numbness, tingling  Psych: No significant nervousness, anxiety, stress, depression    All other systems were reviewed and are negative, except as noted.    VITALS/PHYSICAL EXAM  --------------------------------------------------------------------------------  T(C): 37.1 (10-08-21 @ 05:35), Max: 37.2 (10-07-21 @ 21:00)  HR: 68 (10-08-21 @ 07:40) (64 - 70)  BP: 133/60 (10-08-21 @ 07:40) (129/52 - 165/72)  RR: 18 (10-08-21 @ 05:35) (18 - 20)  SpO2: 97% (10-08-21 @ 05:35) (97% - 97%)  Wt(kg): --        10-07-21 @ 07:01  -  10-08-21 @ 07:00  --------------------------------------------------------  IN: 840 mL / OUT: 1000 mL / NET: -160 mL      Physical Exam:  	Gen: NAD, well-appearing  	HEENT: PERRL, supple neck, clear oropharynx  	Pulm: CTA B/L  	CV: RRR, S1S2; no rub  Abd: +BS, soft, nontender/nondistended               Transplant: No tenderness, swelling  	UE: Warm, FROM, intact strength; no edema; no asterixis  	LE: Warm, FROM, intact strength; no edema  	Neuro: No focal deficits, intact gait  	Psych: Normal affect and mood  	Skin: Warm, without rashes      LABS/STUDIES  --------------------------------------------------------------------------------              7.3    4.03  >-----------<  209      [10-08-21 @ 06:48]              24.8     140  |  115  |  33  ----------------------------<  99      [10-08-21 @ 06:47]  5.0   |  14  |  1.61        Ca     9.6     [10-08-21 @ 06:47]      Mg     1.8     [10-08-21 @ 06:47]      Phos  3.3     [10-08-21 @ 06:47]    TPro  5.8  /  Alb  3.9  /  TBili  0.5  /  DBili  x   /  AST  10  /  ALT  5   /  AlkPhos  127  [10-08-21 @ 06:47]              [10-07-21 @ 06:41]    Creatinine Trend:  SCr 1.61 [10-08 @ 06:47]  SCr 1.54 [10-07 @ 06:41]  SCr 1.38 [10-06 @ 08:14]  SCr 1.59 [10-06 @ 00:46]  SCr 1.61 [10-05 @ 21:11]    Tacrolimus (), Serum: 8.0 ng/mL (10-08 @ 07:58)  Tacrolimus (), Serum: 8.2 ng/mL (10-07 @ 07:45)  Tacrolimus (), Serum: 7.2 ng/mL (10-06 @ 11:43)  Tacrolimus (), Serum: 12.7 ng/mL (10-06 @ 00:57)    Urinalysis - [10-05-21 @ 22:43]      Color Light Yellow / Appearance Clear / SG 1.014 / pH 6.0      Gluc Negative / Ketone Negative  / Bili Negative / Urobili Negative       Blood Negative / Protein Trace / Leuk Est Negative / Nitrite Negative      RBC  / WBC  / Hyaline  / Gran  / Sq Epi  / Non Sq Epi  / Bacteria       Iron 68, TIBC 205, %sat 33      [10-06-21 @ 15:58]  Ferritin 1921      [10-06-21 @ 16:15]  HbA1c 4.6      [05-28-19 @ 09:49]  TSH 3.75      [02-02-21 @ 19:57]  Lipid: chol 162, TG 63, HDL 81, LDL --      [02-02-21 @ 19:57]    Immunofixation, Serum (02.05.21 @ 06:39)    Immunofixation, Serum:   No Monoclonal Band Identified    Reference Range: None Detected    Reticulocyte Count (10.05.21 @ 22:23)    RBC Count: 1.52 M/uL    Reticulocyte Percent: 8.9 %    Absolute Reticulocytes: 135.1 K/uL    < from: CT Abdomen and Pelvis No Cont (10.06.21 @ 00:41) >  FINDINGS:  LOWER CHEST: Cardiomegaly. Trace pericardial effusion. Hypodenseblood pool consistent with anemia.    LIVER: Diffusely increased hepatic attenuation, which can be seen in the setting of repeated transfusions or amiodarone use.  BILE DUCTS: Normal caliber.  GALLBLADDER: Within normal limits.  SPLEEN: Within normallimits.  PANCREAS: Within normal limits.  ADRENALS: Within normal limits.  KIDNEYS/URETERS: Atrophic native kidneys. Right lower quadrant renal transplant with small amount of surrounding fluid.    BLADDER: Within normal limits.  REPRODUCTIVE ORGANS:Hysterectomy.    BOWEL: Residual contrast is seen throughout the colon. No bowel obstruction. Appendix is normal.  PERITONEUM: Small peritransplant fluid.  VESSELS: Atherosclerotic changes.  RETROPERITONEUM/LYMPH NODES: No lymphadenopathy. No retroperitoneal.  ABDOMINAL WALL: Within normal limits.  BONES: Partially visualized left femoral hardware. Diffusely increased osseous density, suggestive of renal osteodystrophy.    IMPRESSION:  No retroperitoneal hematoma.        < end of copied text >      Haptoglobin, Serum (10.06.21 @ 15:58)    Haptoglobin, Serum: <20: Test Repeated mg/dL

## 2021-10-08 NOTE — DISCHARGE NOTE NURSING/CASE MANAGEMENT/SOCIAL WORK - PATIENT PORTAL LINK FT
You can access the FollowMyHealth Patient Portal offered by [facility] by registering at the following website: [email or web address]. By joining [facility]’s FollowMyHealth portal, you will also be able to view your health information using other applications (apps) compatible with our system.

## 2021-10-08 NOTE — PROGRESS NOTE ADULT - ATTENDING COMMENTS
doing well.  will trend HCT.  likely d/c tomorrow.  immuno: tac/cellcept/pred.
doing well.  HCT responded to transfusion.   no e/o ongoing hemolysis.  anemia seems 2/2 dapsone which has been discontinued.  immuno: tac/cellcept/pred.
significant anemia. suspect 2/2 Dapsone.  transfused previously  immuno - tac/cellcept/pred  awaiting LD/hapto/retic count.

## 2021-10-09 LAB
B19V DNA FLD QL NAA+PROBE: SIGNIFICANT CHANGE UP IU/ML
BKV DNA SPEC QL NAA+PROBE: SIGNIFICANT CHANGE UP
CMV DNA CSF QL NAA+PROBE: SIGNIFICANT CHANGE UP
EBV DNA SERPL NAA+PROBE-ACNC: SIGNIFICANT CHANGE UP IU/ML
LOG10 BK QUANTITATION PCR: SIGNIFICANT CHANGE UP

## 2021-10-10 LAB
ALBUMIN SERPL ELPH-MCNC: 4.6 G/DL
ALP BLD-CCNC: 160 U/L
ALT SERPL-CCNC: 5 U/L
ANION GAP SERPL CALC-SCNC: 10 MMOL/L
APPEARANCE: CLEAR
AST SERPL-CCNC: 9 U/L
BACTERIA: NEGATIVE
BASOPHILS # BLD AUTO: 0.01 K/UL
BASOPHILS NFR BLD AUTO: 0.2 %
BILIRUB SERPL-MCNC: 0.7 MG/DL
BILIRUBIN URINE: NEGATIVE
BKV DNA SPEC QL NAA+PROBE: NEGATIVE COPIES/ML
BLOOD URINE: NEGATIVE
BUN SERPL-MCNC: 33 MG/DL
CALCIUM SERPL-MCNC: 10.2 MG/DL
CALCIUM SERPL-MCNC: 10.2 MG/DL
CHLORIDE SERPL-SCNC: 116 MMOL/L
CO2 SERPL-SCNC: 16 MMOL/L
COLOR: YELLOW
COVID-19 SPIKE DOMAIN ANTIBODY INTERPRETATION: POSITIVE
CREAT SERPL-MCNC: 1.67 MG/DL
CREAT SPEC-SCNC: 105 MG/DL
CREAT/PROT UR: 0.1 RATIO
EOSINOPHIL # BLD AUTO: 0.03 K/UL
EOSINOPHIL NFR BLD AUTO: 0.7 %
GLUCOSE QUALITATIVE U: NEGATIVE
GLUCOSE SERPL-MCNC: 131 MG/DL
HCT VFR BLD CALC: 16.9 %
HGB BLD-MCNC: 5 G/DL
HYALINE CASTS: 0 /LPF
IMM GRANULOCYTES NFR BLD AUTO: 0.7 %
KETONES URINE: NEGATIVE
LDH SERPL-CCNC: 199 U/L
LEUKOCYTE ESTERASE URINE: NEGATIVE
LOG 10 BK QUANTITATION PCR: NORMAL
LYMPHOCYTES # BLD AUTO: 0.6 K/UL
LYMPHOCYTES NFR BLD AUTO: 13.6 %
MAGNESIUM SERPL-MCNC: 1.8 MG/DL
MAN DIFF?: NORMAL
MCHC RBC-ENTMCNC: 29.4 PG
MCHC RBC-ENTMCNC: 29.6 GM/DL
MCV RBC AUTO: 99.4 FL
MICROSCOPIC-UA: NORMAL
MONOCYTES # BLD AUTO: 0.21 K/UL
MONOCYTES NFR BLD AUTO: 4.8 %
NEUTROPHILS # BLD AUTO: 3.54 K/UL
NEUTROPHILS NFR BLD AUTO: 80 %
NITRITE URINE: NEGATIVE
PARATHYROID HORMONE INTACT: 326 PG/ML
PH URINE: 5.5
PHOSPHATE SERPL-MCNC: 3 MG/DL
PLATELET # BLD AUTO: 229 K/UL
POTASSIUM SERPL-SCNC: 5.6 MMOL/L
PROT SERPL-MCNC: 6.3 G/DL
PROT UR-MCNC: 13 MG/DL
PROTEIN URINE: NORMAL
RBC # BLD: 1.7 M/UL
RBC # FLD: 18.6 %
RED BLOOD CELLS URINE: 1 /HPF
SARS-COV-2 AB SERPL IA-ACNC: >250 U/ML
SODIUM SERPL-SCNC: 142 MMOL/L
SPECIFIC GRAVITY URINE: 1.02
SQUAMOUS EPITHELIAL CELLS: 0 /HPF
TACROLIMUS SERPL-MCNC: 5.5 NG/ML
URATE SERPL-MCNC: 7.5 MG/DL
UROBILINOGEN URINE: NORMAL
WBC # FLD AUTO: 4.42 K/UL
WHITE BLOOD CELLS URINE: 1 /HPF

## 2021-10-11 RX ORDER — DAPSONE 100 MG/1
100 TABLET ORAL DAILY
Qty: 30 | Refills: 11 | Status: DISCONTINUED | COMMUNITY
Start: 2021-08-18 | End: 2021-10-11

## 2021-10-12 ENCOUNTER — APPOINTMENT (OUTPATIENT)
Dept: NEPHROLOGY | Facility: CLINIC | Age: 76
End: 2021-10-12
Payer: MEDICARE

## 2021-10-12 ENCOUNTER — LABORATORY RESULT (OUTPATIENT)
Age: 76
End: 2021-10-12

## 2021-10-12 VITALS — SYSTOLIC BLOOD PRESSURE: 150 MMHG | DIASTOLIC BLOOD PRESSURE: 70 MMHG | BODY MASS INDEX: 19.69 KG/M2 | WEIGHT: 122 LBS

## 2021-10-12 PROCEDURE — 99215 OFFICE O/P EST HI 40 MIN: CPT

## 2021-10-12 RX ORDER — SODIUM ZIRCONIUM CYCLOSILICATE 10 G/10G
10 POWDER, FOR SUSPENSION ORAL
Qty: 30 | Refills: 1 | Status: DISCONTINUED | COMMUNITY
Start: 2021-06-09 | End: 2021-10-12

## 2021-10-12 NOTE — PHYSICAL EXAM
[General Appearance - Alert] : alert [General Appearance - In No Acute Distress] : in no acute distress [Sclera] : the sclera and conjunctiva were normal [PERRL With Normal Accommodation] : pupils were equal in size, round, and reactive to light [Oropharynx] : the oropharynx was normal [Jugular Venous Distention Increased] : there was no jugular-venous distention [Exaggerated Use Of Accessory Muscles For Inspiration] : no accessory muscle use [Respiration, Rhythm And Depth] : normal respiratory rhythm and effort [Auscultation Breath Sounds / Voice Sounds] : lungs were clear to auscultation bilaterally [Apical Impulse] : the apical impulse was normal [Heart Rate And Rhythm] : heart rate was normal and rhythm regular [Edema] : there was no peripheral edema [Bowel Sounds] : normal bowel sounds [Abdomen Soft] : soft [Abdomen Tenderness] : non-tender [Involuntary Movements] : no involuntary movements were seen [___ (cm) Fistula] : [unfilled] (cm) fistula [Bruit] : a bruit was present [Thrill] : a thrill was present [] : no rash [No Focal Deficits] : no focal deficits [Oriented To Time, Place, And Person] : oriented to person, place, and time [No CVA Tenderness] : no ~M costovertebral angle tenderness [FreeTextEntry1] : RLQ scar well healed, no tenderness, bruit +present

## 2021-10-12 NOTE — ASSESSMENT
[FreeTextEntry1] : \par S/p DDRT on 5/19/2021 from 42 yr old donor with KDPI of 69% and terminal scr of 3.3. \par Delayed graft function due to ATN. Last HD was on  6/4/21. \par Creatinine stable at 1.2-1.3mg/dL. No proteinuria.   \par \par Hyperkalemia and metabolic acidosis. K 5.5, bicarb 17. Start sodium bicarb 650mg po bid. Continue Lokelma 10mg po daily. Low K diet. \par \par Immunosuppression \par - S/p Simulect induction\par - CellCept 500mg po bid  - dose reduced for GI side effects and leukopenia. \par - On Envarsus 7gm po daily, level 10.5. Reduce Envarsus to 6mg po daily.  \par - Continue Prednisone 5mg daily \par \par Prophylaxis \par - Continue Valcyte 450gm po daily until Nov 2021\par - Bactrim SS po daily. Dapsone discontinued for hemolytic anemia. \par - Completed COVID vaccine prior to transplant. Also had COVID infection in March 2020. Shubham Ab +ve. Scheduled for 3rd dose on 10/19/21 \par - Flu shot received 2 weeks ago. \par \par Hypertension - acceptable control. \par Continue Nifedipine 60mg po bid,  Hydralazine 100mg po TID and labetalol 200mg po bid. \par \par Anemia multifactorial - BM suppression due to med, recent worsening due to hemolytic anemia from dapsone. Hgb 8.2 today improving. Iron stores, B12 and folate normal.  Continue Procrit 10,000 units sq weekly. \par \par Repeat labs in 2 weeks \par F/u on 11/9 \par \par

## 2021-10-12 NOTE — HISTORY OF PRESENT ILLNESS
[FreeTextEntry1] : \par 75 year old woman with ESRD due to HTN, was on hemodialysis since 2016. Primary Nephrologist is Dr. Treviño. \par History of COVID-19 infection, hospitalized in March 2020 for only 1 day. \par \par Received DDRT on 5/19/21 from  a 42 yr old woman, KDPI 69%, history of diabetes on insulin, terminal creatinine 3.3. Course was complicated by DGF. Last hemodialysis was on 6/4/21. Transplant ureteric stent was removed on 8/2/21. \par \raissa Presents for scheduled follow up. \par She was hospitalized Oct 7-8th for severe symptomatic anemia. Hgb was 5g/dL. Anemia was detected on routine blood draw but pt was c/o fatigue for a few days prior to presentation. On w/u she had low haptoglobin and high reticulocyte count c/w  hemolytic anemia due to Dapsone. Pt had normal G6PD levels in August 2021, was started on Dapsone in Sept.\par She was given 2 units PRBC with good response. Hgb improved to 7.3. Dapsone was discontinued and bactrim resumed. Since discharge she reports feeling much better. Fatigue has improved, she is no longer requiring a walker, she is using her cane and she is back walking 1-2 blocks every day. No chest pain or shortness of breath. \par No fever or chills. No urinary complaints. No NVD. \par Appetite is ok. Son concerned that she is not gaining enough weight. Weight 122lbs stable. Advised ok to start ensure 1 can a day. \par \par Home -140 at home.\par Weight 122 lbs today \par Takes all meds as prescribed. Lives with her son who manages her meds.

## 2021-10-13 LAB
ALBUMIN SERPL ELPH-MCNC: 4.6 G/DL
ALP BLD-CCNC: 138 U/L
ALT SERPL-CCNC: <5 U/L
ANION GAP SERPL CALC-SCNC: 10 MMOL/L
APPEARANCE: CLEAR
AST SERPL-CCNC: 8 U/L
BACTERIA: NEGATIVE
BASOPHILS # BLD AUTO: 0.02 K/UL
BASOPHILS NFR BLD AUTO: 0.5 %
BILIRUB SERPL-MCNC: 0.4 MG/DL
BILIRUBIN URINE: NEGATIVE
BLOOD URINE: NEGATIVE
BUN SERPL-MCNC: 27 MG/DL
CALCIUM SERPL-MCNC: 10 MG/DL
CALCIUM SERPL-MCNC: 10 MG/DL
CHLORIDE SERPL-SCNC: 116 MMOL/L
CO2 SERPL-SCNC: 17 MMOL/L
COLOR: NORMAL
COVID-19 SPIKE DOMAIN ANTIBODY INTERPRETATION: POSITIVE
CREAT SERPL-MCNC: 1.23 MG/DL
CREAT SPEC-SCNC: 63 MG/DL
CREAT/PROT UR: 0.2 RATIO
EOSINOPHIL # BLD AUTO: 0.07 K/UL
EOSINOPHIL NFR BLD AUTO: 1.7 %
GLUCOSE QUALITATIVE U: NEGATIVE
GLUCOSE SERPL-MCNC: 97 MG/DL
HCT VFR BLD CALC: 27.7 %
HGB BLD-MCNC: 8.1 G/DL
HYALINE CASTS: 0 /LPF
IMM GRANULOCYTES NFR BLD AUTO: 0.7 %
KETONES URINE: NEGATIVE
LDH SERPL-CCNC: 186 U/L
LEUKOCYTE ESTERASE URINE: NEGATIVE
LYMPHOCYTES # BLD AUTO: 0.47 K/UL
LYMPHOCYTES NFR BLD AUTO: 11.1 %
MAGNESIUM SERPL-MCNC: 1.8 MG/DL
MAN DIFF?: NORMAL
MCHC RBC-ENTMCNC: 28.7 PG
MCHC RBC-ENTMCNC: 29.2 GM/DL
MCV RBC AUTO: 98.2 FL
MICROSCOPIC-UA: NORMAL
MONOCYTES # BLD AUTO: 0.32 K/UL
MONOCYTES NFR BLD AUTO: 7.5 %
NEUTROPHILS # BLD AUTO: 3.33 K/UL
NEUTROPHILS NFR BLD AUTO: 78.5 %
NITRITE URINE: NEGATIVE
PARATHYROID HORMONE INTACT: 348 PG/ML
PH URINE: 6
PHOSPHATE SERPL-MCNC: 2.7 MG/DL
PLATELET # BLD AUTO: 200 K/UL
POTASSIUM SERPL-SCNC: 5.5 MMOL/L
PROT SERPL-MCNC: 6.6 G/DL
PROT UR-MCNC: 14 MG/DL
PROTEIN URINE: NORMAL
RBC # BLD: 2.82 M/UL
RBC # FLD: 20.2 %
RED BLOOD CELLS URINE: 2 /HPF
SARS-COV-2 AB SERPL IA-ACNC: >250 U/ML
SODIUM SERPL-SCNC: 143 MMOL/L
SPECIFIC GRAVITY URINE: 1.01
SQUAMOUS EPITHELIAL CELLS: 0 /HPF
TACROLIMUS SERPL-MCNC: 10.5 NG/ML
URATE SERPL-MCNC: 7.2 MG/DL
UROBILINOGEN URINE: NORMAL
WBC # FLD AUTO: 4.24 K/UL
WHITE BLOOD CELLS URINE: 1 /HPF

## 2021-10-19 LAB
BKV DNA SPEC QL NAA+PROBE: NEGATIVE COPIES/ML
LOG 10 BK QUANTITATION PCR: NORMAL

## 2021-10-22 NOTE — ED PROVIDER NOTE - CROS ED SKIN ALL NEG
Continuity of Care Form    Patient Name: Yamil Hyatt   :  1933  MRN:  [id number]    Admit date:  10/11/2021  Discharge date:  10/25/21    Code Status Order: DNR-CCA   Advance Directives:   Advance Care Flowsheet Documentation       Date/Time Healthcare Directive Type of Healthcare Directive Copy in [address] Agent's Name Healthcare Agent's Phone Number    10/19/21 1233  Yes, patient has an advance directive for healthcare treatment  Durable power of  for health care  --  --  --  --    10/13/21 1226  Yes, patient has an advance directive for healthcare treatment  Durable power of  for health care  Yes, copy in chart  Legal Guardian  --  --            Admitting Physician:  Harvey Haskins MD  PCP: Eloisa Marsh MD    Discharging Nurse: [facility] Unit/Room#: 0287/7955-24  Discharging Unit Phone Number: [phone]    Emergency Contact:   Extended Emergency Contact Information  Primary Emergency Contact: Renard Hayes [facility] Phone: [phone]  Relation: Grandchild  Secondary Emergency Contact: Po Moore  [place] Phone: [phone]  Relation: Child    Past Surgical History:  Past Surgical History:   Procedure Laterality Date    CHOLECYSTECTOMY      COLONOSCOPY N/A 2018    COLONOSCOPY DIAGNOSTIC OR SCREENING performed by Feli Venegas MD at [place] [address]    ? number grafts    HIP FRACTURE SURGERY Right 10/13/2021    HIP OPEN REDUCTION INTERNAL FIXATION-NAILING performed by Mango Navarro DO at [facility] Left 10/19/2021    HIP OPEN REDUCTION INTERNAL FIXATION - IM NAIL performed by Mango Navarro DO at [address] [place] [address] Right    [facility] [place] [address] SALPINGO-OOPHORECTOMY  1960       Immunization History:   Immunization History   Administered Date(s) Administered    COVID-19, Huggins Peter, PF, 30mcg/0.3mL 2021, 2021    Influenza, High Dose (Fluzone 65 yrs and older) 10/17/2016, 09/18/2018    Influenza, Tennie Mock, IM, PF (6 mo and older Fluzone, Flulaval, Fluarix, and 3 yrs and older Afluria) 10/06/2017    Pneumococcal Conjugate 13-valent ([id number]) 09/18/2018    Pneumococcal Polysaccharide ([id number]) 10/06/2016    Tdap (Boostrix, Adacel) 10/06/2010       Active Problems:  Patient Active Problem List   Diagnosis Code    Benign essential tremor G25.0    Uncontrolled hypertension I10    Type 2 diabetes mellitus ([facility] E11.9    Dyslipidemia E78.5    Major depression F32.9    Arthritis of knee M17.10    Vitamin B12 deficiency E53.8    Anemia D64.9    Acute lower gastrointestinal bleeding K92.2    S/P CABG (coronary artery bypass graft) Z95.1    S/P angioplasty with stent Z95.820    Closed right hip fracture, initial encounter ([facility] S72.001A    ASHD (arteriosclerotic heart disease) I25.10    Acute cystitis without hematuria N30.00    Abnormal ECG R94.31    Closed left hip fracture, initial encounter ([facility]) S72.002A    Acute blood loss as cause of postoperative anemia D62    Hypomagnesemia E83.42       Isolation/Infection:   Isolation            No Isolation          Patient Infection Status       None to display            Nurse Assessment:  Last Vital Signs: BP (!) 145/52   Pulse 72   Temp 96.9 °F (36.1 °C) (Temporal)   Resp 18   Ht 5' (1.524 m)   Wt 153 lb 6.4 oz (69.6 kg)   SpO2 98%   BMI 29.96 kg/m²     Last documented pain score (0-10 scale): Pain Level: 10  Last Weight:   Wt Readings from Last 1 Encounters:   10/22/21 153 lb 6.4 oz (69.6 kg)     Mental Status:  oriented and alert    IV Access:  22G RAC placed 10/21/21    Nursing Mobility/ADLs:  Walking   Dependent  Transfer  Dependent  Bathing  Dependent  Dressing  Dependent  Toileting  Dependent  Feeding  Assisted  Med Admin  Assisted  Med Delivery   whole    Wound Care Documentation and Therapy:        Elimination:  Continence:   · Bowel: No  · Bladder: ***  Urinary Stable    Rehab Potential (if transferring to Rehab): Fair    Recommended Labs or Other Treatments After Discharge: Normal Saline at 50 ml/hr continuous. CBC with Diff and BMP in 1 week, continue Tanvir    Physician Certification: I certify the above information and transfer of Philip Halsted  is necessary for the continuing treatment of the diagnosis listed and that she requires [facility] for less 30 days.      Update Admission H&P: No change in H&P    PHYSICIAN SIGNATURE:  Electronically signed by SIMBA Mckeon CNP on 10/25/21 at 12:43 PM EDT negative...

## 2021-10-26 PROCEDURE — 80076 HEPATIC FUNCTION PANEL: CPT

## 2021-10-26 PROCEDURE — 80048 BASIC METABOLIC PNL TOTAL CA: CPT

## 2021-10-26 PROCEDURE — 86880 COOMBS TEST DIRECT: CPT

## 2021-10-26 PROCEDURE — 86769 SARS-COV-2 COVID-19 ANTIBODY: CPT

## 2021-10-26 PROCEDURE — 85025 COMPLETE CBC W/AUTO DIFF WBC: CPT

## 2021-10-26 PROCEDURE — 85027 COMPLETE CBC AUTOMATED: CPT

## 2021-10-26 PROCEDURE — 84100 ASSAY OF PHOSPHORUS: CPT

## 2021-10-26 PROCEDURE — 36415 COLL VENOUS BLD VENIPUNCTURE: CPT

## 2021-10-26 PROCEDURE — 85379 FIBRIN DEGRADATION QUANT: CPT

## 2021-10-26 PROCEDURE — 36000 PLACE NEEDLE IN VEIN: CPT

## 2021-10-26 PROCEDURE — 86901 BLOOD TYPING SEROLOGIC RH(D): CPT

## 2021-10-26 PROCEDURE — 86850 RBC ANTIBODY SCREEN: CPT

## 2021-10-26 PROCEDURE — 83010 ASSAY OF HAPTOGLOBIN QUANT: CPT

## 2021-10-26 PROCEDURE — 83735 ASSAY OF MAGNESIUM: CPT

## 2021-10-26 PROCEDURE — 86900 BLOOD TYPING SEROLOGIC ABO: CPT

## 2021-10-26 PROCEDURE — 86664 EPSTEIN-BARR NUCLEAR ANTIGEN: CPT

## 2021-10-26 PROCEDURE — 74176 CT ABD & PELVIS W/O CONTRAST: CPT | Mod: MA

## 2021-10-26 PROCEDURE — 81003 URINALYSIS AUTO W/O SCOPE: CPT

## 2021-10-26 PROCEDURE — 85045 AUTOMATED RETICULOCYTE COUNT: CPT

## 2021-10-26 PROCEDURE — 85610 PROTHROMBIN TIME: CPT

## 2021-10-26 PROCEDURE — 80197 ASSAY OF TACROLIMUS: CPT

## 2021-10-26 PROCEDURE — 82728 ASSAY OF FERRITIN: CPT

## 2021-10-26 PROCEDURE — 87086 URINE CULTURE/COLONY COUNT: CPT

## 2021-10-26 PROCEDURE — 86922 COMPATIBILITY TEST ANTIGLOB: CPT

## 2021-10-26 PROCEDURE — 86663 EPSTEIN-BARR ANTIBODY: CPT

## 2021-10-26 PROCEDURE — 83615 LACTATE (LD) (LDH) ENZYME: CPT

## 2021-10-26 PROCEDURE — 86870 RBC ANTIBODY IDENTIFICATION: CPT

## 2021-10-26 PROCEDURE — 86665 EPSTEIN-BARR CAPSID VCA: CPT

## 2021-10-26 PROCEDURE — 80180 DRUG SCRN QUAN MYCOPHENOLATE: CPT

## 2021-10-26 PROCEDURE — 0225U NFCT DS DNA&RNA 21 SARSCOV2: CPT

## 2021-10-26 PROCEDURE — 80053 COMPREHEN METABOLIC PANEL: CPT

## 2021-10-26 PROCEDURE — 85384 FIBRINOGEN ACTIVITY: CPT

## 2021-10-26 PROCEDURE — 76770 US EXAM ABDO BACK WALL COMP: CPT

## 2021-10-26 PROCEDURE — 85730 THROMBOPLASTIN TIME PARTIAL: CPT

## 2021-10-26 PROCEDURE — 83540 ASSAY OF IRON: CPT

## 2021-10-26 PROCEDURE — 71045 X-RAY EXAM CHEST 1 VIEW: CPT

## 2021-10-26 PROCEDURE — 99291 CRITICAL CARE FIRST HOUR: CPT | Mod: 25

## 2021-10-26 PROCEDURE — 83550 IRON BINDING TEST: CPT

## 2021-10-26 PROCEDURE — 87799 DETECT AGENT NOS DNA QUANT: CPT

## 2021-10-28 ENCOUNTER — APPOINTMENT (OUTPATIENT)
Dept: TRANSPLANT | Facility: CLINIC | Age: 76
End: 2021-10-28
Payer: MEDICARE

## 2021-10-28 ENCOUNTER — LABORATORY RESULT (OUTPATIENT)
Age: 76
End: 2021-10-28

## 2021-10-28 VITALS
DIASTOLIC BLOOD PRESSURE: 70 MMHG | WEIGHT: 120 LBS | SYSTOLIC BLOOD PRESSURE: 167 MMHG | HEIGHT: 66 IN | OXYGEN SATURATION: 96 % | HEART RATE: 71 BPM | TEMPERATURE: 97.4 F | BODY MASS INDEX: 19.29 KG/M2

## 2021-10-28 PROCEDURE — 99212 OFFICE O/P EST SF 10 MIN: CPT

## 2021-10-28 NOTE — HISTORY OF PRESENT ILLNESS
[ Donor] :  donor [Basiliximab] : basiliximab [Other: ___] : [unfilled] [Terminal Creatinine: ____] : Terminal Creatinine: [unfilled] [TextBox_7] : May 19 2021 [TextBox_52] : Donor was a 42 y.o female, KDPI 69%, history of diabetes on insulin, terminal creatinine 3.3.  CMV negative, EBV positive.  [de-identified] : doing well\par was admitted three weeks ago with anemia; transfused and dapsone stopped\par now feeling good\par walks few blocks\par last creatinine 1.3\par

## 2021-10-28 NOTE — ASSESSMENT
[FreeTextEntry1] : doing well post  donor kidney transplant\par  reasonable graft function; last creatinine 1.3\par jj stent removed\par immunosuppression tacro, mmf and steroids\par follow up with Dr Yates

## 2021-10-29 LAB
ALBUMIN SERPL ELPH-MCNC: 4.7 G/DL
ALP BLD-CCNC: 141 U/L
ALT SERPL-CCNC: <5 U/L
ANION GAP SERPL CALC-SCNC: 12 MMOL/L
APPEARANCE: CLEAR
AST SERPL-CCNC: 9 U/L
BACTERIA: NEGATIVE
BASOPHILS # BLD AUTO: 0.01 K/UL
BASOPHILS NFR BLD AUTO: 0.2 %
BILIRUB SERPL-MCNC: 0.2 MG/DL
BILIRUBIN URINE: NEGATIVE
BLOOD URINE: NEGATIVE
BUN SERPL-MCNC: 37 MG/DL
CALCIUM SERPL-MCNC: 9.7 MG/DL
CALCIUM SERPL-MCNC: 9.7 MG/DL
CHLORIDE SERPL-SCNC: 115 MMOL/L
CO2 SERPL-SCNC: 15 MMOL/L
COLOR: YELLOW
CREAT SERPL-MCNC: 1.57 MG/DL
CREAT SPEC-SCNC: 74 MG/DL
CREAT/PROT UR: 0.3 RATIO
EOSINOPHIL # BLD AUTO: 0.01 K/UL
EOSINOPHIL NFR BLD AUTO: 0.2 %
GLUCOSE QUALITATIVE U: NEGATIVE
GLUCOSE SERPL-MCNC: 116 MG/DL
HCT VFR BLD CALC: 30.2 %
HGB BLD-MCNC: 9 G/DL
HYALINE CASTS: 0 /LPF
IMM GRANULOCYTES NFR BLD AUTO: 0.2 %
KETONES URINE: NEGATIVE
LDH SERPL-CCNC: 170 U/L
LEUKOCYTE ESTERASE URINE: NEGATIVE
LYMPHOCYTES # BLD AUTO: 0.39 K/UL
LYMPHOCYTES NFR BLD AUTO: 9.6 %
MAGNESIUM SERPL-MCNC: 1.7 MG/DL
MAN DIFF?: NORMAL
MCHC RBC-ENTMCNC: 28.5 PG
MCHC RBC-ENTMCNC: 29.8 GM/DL
MCV RBC AUTO: 95.6 FL
MICROSCOPIC-UA: NORMAL
MONOCYTES # BLD AUTO: 0.11 K/UL
MONOCYTES NFR BLD AUTO: 2.7 %
NEUTROPHILS # BLD AUTO: 3.52 K/UL
NEUTROPHILS NFR BLD AUTO: 87.1 %
NITRITE URINE: NEGATIVE
PARATHYROID HORMONE INTACT: 441 PG/ML
PH URINE: 6
PHOSPHATE SERPL-MCNC: 3.4 MG/DL
PLATELET # BLD AUTO: 177 K/UL
POTASSIUM SERPL-SCNC: 5.6 MMOL/L
PROT SERPL-MCNC: 6.7 G/DL
PROT UR-MCNC: 22 MG/DL
PROTEIN URINE: NORMAL
RBC # BLD: 3.16 M/UL
RBC # FLD: 16.5 %
RED BLOOD CELLS URINE: 1 /HPF
SODIUM SERPL-SCNC: 142 MMOL/L
SPECIFIC GRAVITY URINE: 1.02
SQUAMOUS EPITHELIAL CELLS: 0 /HPF
TACROLIMUS SERPL-MCNC: 9.3 NG/ML
URATE SERPL-MCNC: 7 MG/DL
UROBILINOGEN URINE: NORMAL
WBC # FLD AUTO: 4.05 K/UL
WHITE BLOOD CELLS URINE: 1 /HPF

## 2021-11-01 LAB
BKV DNA SPEC QL NAA+PROBE: NEGATIVE COPIES/ML
LOG 10 BK QUANTITATION PCR: NORMAL

## 2021-11-09 ENCOUNTER — LABORATORY RESULT (OUTPATIENT)
Age: 76
End: 2021-11-09

## 2021-11-09 ENCOUNTER — APPOINTMENT (OUTPATIENT)
Dept: NEPHROLOGY | Facility: CLINIC | Age: 76
End: 2021-11-09
Payer: MEDICARE

## 2021-11-09 VITALS
BODY MASS INDEX: 19.77 KG/M2 | DIASTOLIC BLOOD PRESSURE: 76 MMHG | HEIGHT: 66 IN | RESPIRATION RATE: 12 BRPM | SYSTOLIC BLOOD PRESSURE: 178 MMHG | TEMPERATURE: 98 F | OXYGEN SATURATION: 99 % | WEIGHT: 123 LBS | HEART RATE: 72 BPM

## 2021-11-09 PROCEDURE — 99215 OFFICE O/P EST HI 40 MIN: CPT

## 2021-11-09 RX ORDER — ERYTHROPOIETIN 10000 [IU]/ML
10000 INJECTION, SOLUTION INTRAVENOUS; SUBCUTANEOUS
Qty: 4 | Refills: 1 | Status: DISCONTINUED | COMMUNITY
Start: 2021-06-03 | End: 2021-11-09

## 2021-11-09 RX ORDER — CETIRIZINE HYDROCHLORIDE 10 MG/1
10 TABLET, COATED ORAL
Qty: 14 | Refills: 0 | Status: DISCONTINUED | COMMUNITY
Start: 2021-07-20 | End: 2021-11-09

## 2021-11-09 NOTE — HISTORY OF PRESENT ILLNESS
[FreeTextEntry1] : \par 75 year old woman with ESRD due to HTN, was on hemodialysis since 2016. Received DDRT on 5/19/21 \par \par Donor was a 42 yr old woman, KDPI 69%, history of diabetes on insulin, terminal creatinine 3.3. \par Post transplant course was complicated by DGF. Last hemodialysis was on 6/4/21. Transplant ureteric stent was removed on 8/2/21. \par \par Other PMH includes :History of COVID-19 infection, hospitalized in March 2020 for only 1 day. \par \par She was hospitalized Oct 7-8th for severe symptomatic anemia (Hgb 5g/dL) due to dapsone. Transfused 2 units PRBC.\par \par She presents for follow up visit accompanied by her son. \par She reports feeling well. Energy has significantly improved. \par Appetite is better, weight is up 3lbs. \par No nausea, vomiting or diarrhea. \par No fever or chills. No cough or shortness of breath. \par Voiding urine well. No dysuria or hematuria. \par Home -140 at home.\par Weight 123 lbs today \par Takes all meds as prescribed. Lives with her son who manages her meds.

## 2021-11-09 NOTE — PHYSICAL EXAM
[General Appearance - Alert] : alert [General Appearance - In No Acute Distress] : in no acute distress [Sclera] : the sclera and conjunctiva were normal [PERRL With Normal Accommodation] : pupils were equal in size, round, and reactive to light [Oropharynx] : the oropharynx was normal [Jugular Venous Distention Increased] : there was no jugular-venous distention [Respiration, Rhythm And Depth] : normal respiratory rhythm and effort [Exaggerated Use Of Accessory Muscles For Inspiration] : no accessory muscle use [Auscultation Breath Sounds / Voice Sounds] : lungs were clear to auscultation bilaterally [Heart Rate And Rhythm] : heart rate was normal and rhythm regular [Edema] : there was no peripheral edema [Bowel Sounds] : normal bowel sounds [Abdomen Soft] : soft [Abdomen Tenderness] : non-tender [No CVA Tenderness] : no ~M costovertebral angle tenderness [Involuntary Movements] : no involuntary movements were seen [___ (cm) Fistula] : [unfilled] (cm) fistula [Bruit] : a bruit was present [Thrill] : a thrill was present [No Focal Deficits] : no focal deficits [Oriented To Time, Place, And Person] : oriented to person, place, and time [] : normal voice and communication [Heart Sounds] : normal S1 and S2 [Heart Sounds Gallop] : no gallops [FreeTextEntry1] : RLQ scar well healed, no tenderness, bruit +present

## 2021-11-09 NOTE — ASSESSMENT
[FreeTextEntry1] : \par S/p DDRT on 5/19/2021 complicated by DGF due to ATN. \par Graft function recovered. Last HD was on  6/4/21. \par Baseline creatinine 1.3mg/dL. No proteinuria. \par \par YANELY creatinine elevated to 1.65mg/dL. Maybe due to high tacrolimus level vs TRAS. \par WIll reduce tacrolimus dose, obtain transplant renal US to evaluate for TRAS given high BP and bruit on exam. \par \par \par Immunosuppression \par - S/p Simulect induction\par - CellCept 500mg po bid  - dose reduced for GI side effects and leukopenia. \par - On Envarsus 6gm po daily, level 11.2 high. Reduce to 5mg po daily. \par - Continue Prednisone 5mg daily \par \par Prophylaxis \par - D/c Valcyte - has completed 6 months prophylaxis \par - Bactrim SS po daily. Dapsone discontinued for hemolytic anemia. \par - Completed COVID vaccine prior to transplant. Also had COVID infection in March 2020. 3rd shot received on Oct 19th. Shubham Ab +ve\par - Flu shot received in October. \par \par Hypertension - acceptable control at home. 130-140 systolic. Elevated in office today. \par Continue Nifedipine 60mg po bid,  Hydralazine 100mg po TID and labetalol 200mg po bid. \par \par Anemia multifactorial - BM suppression due to med, recent worsening due to hemolytic anemia from dapsone. Hgb 9.9 today improving. Iron stores, B12 and folate normal.  D/c Procrit. \par \par Hyperkalemia - on Lokelma 10gm po daily. Reduce to every other day. Low K diet. \par Metabolic acidosis - bicarb improving. Continue sodium bicarbonate 1300mg po bid. \par \par Vitamin D deficiency - start Vitamin D 1000 units po daily. \par \par Check blood work in 2 weeks. \par Follow up in clinic on Dec 14th\par

## 2021-11-10 LAB
25(OH)D3 SERPL-MCNC: 14.4 NG/ML
ALBUMIN SERPL ELPH-MCNC: 4.4 G/DL
ALP BLD-CCNC: 135 U/L
ALT SERPL-CCNC: 7 U/L
ANION GAP SERPL CALC-SCNC: 13 MMOL/L
APPEARANCE: CLEAR
AST SERPL-CCNC: 12 U/L
BACTERIA: NEGATIVE
BASOPHILS # BLD AUTO: 0 K/UL
BASOPHILS NFR BLD AUTO: 0 %
BILIRUB SERPL-MCNC: 0.2 MG/DL
BILIRUBIN URINE: NEGATIVE
BLOOD URINE: NEGATIVE
BUN SERPL-MCNC: 31 MG/DL
CALCIUM SERPL-MCNC: 9.7 MG/DL
CALCIUM SERPL-MCNC: 9.7 MG/DL
CHLORIDE SERPL-SCNC: 113 MMOL/L
CHOLEST SERPL-MCNC: 169 MG/DL
CO2 SERPL-SCNC: 18 MMOL/L
COLOR: NORMAL
COVID-19 SPIKE DOMAIN ANTIBODY INTERPRETATION: POSITIVE
CREAT SERPL-MCNC: 1.65 MG/DL
CREAT SPEC-SCNC: 81 MG/DL
CREAT/PROT UR: 0.4 RATIO
EOSINOPHIL # BLD AUTO: 0.04 K/UL
EOSINOPHIL NFR BLD AUTO: 0.9 %
ESTIMATED AVERAGE GLUCOSE: 74 MG/DL
GLUCOSE QUALITATIVE U: NEGATIVE
GLUCOSE SERPL-MCNC: 115 MG/DL
HBA1C MFR BLD HPLC: 4.2 %
HCT VFR BLD CALC: 31.3 %
HDLC SERPL-MCNC: 75 MG/DL
HGB BLD-MCNC: 9.9 G/DL
HYALINE CASTS: 0 /LPF
KETONES URINE: NEGATIVE
LDLC SERPL CALC-MCNC: 68 MG/DL
LEUKOCYTE ESTERASE URINE: NEGATIVE
LYMPHOCYTES # BLD AUTO: 0.36 K/UL
LYMPHOCYTES NFR BLD AUTO: 8.5 %
MAGNESIUM SERPL-MCNC: 1.5 MG/DL
MAN DIFF?: NORMAL
MCHC RBC-ENTMCNC: 29 PG
MCHC RBC-ENTMCNC: 31.6 GM/DL
MCV RBC AUTO: 91.8 FL
MICROSCOPIC-UA: NORMAL
MONOCYTES # BLD AUTO: 0.07 K/UL
MONOCYTES NFR BLD AUTO: 1.7 %
NEUTROPHILS # BLD AUTO: 3.71 K/UL
NEUTROPHILS NFR BLD AUTO: 88 %
NITRITE URINE: NEGATIVE
NONHDLC SERPL-MCNC: 94 MG/DL
PARATHYROID HORMONE INTACT: 466 PG/ML
PH URINE: 6.5
PHOSPHATE SERPL-MCNC: 3 MG/DL
PLATELET # BLD AUTO: 185 K/UL
POTASSIUM SERPL-SCNC: 4.6 MMOL/L
PROT SERPL-MCNC: 6.4 G/DL
PROT UR-MCNC: 33 MG/DL
PROTEIN URINE: ABNORMAL
RBC # BLD: 3.41 M/UL
RBC # FLD: 16.4 %
RED BLOOD CELLS URINE: 2 /HPF
SARS-COV-2 AB SERPL IA-ACNC: >250 U/ML
SODIUM SERPL-SCNC: 144 MMOL/L
SPECIFIC GRAVITY URINE: 1.02
SQUAMOUS EPITHELIAL CELLS: 0 /HPF
TACROLIMUS SERPL-MCNC: 11.2 NG/ML
TRIGL SERPL-MCNC: 127 MG/DL
URATE SERPL-MCNC: 7.9 MG/DL
UROBILINOGEN URINE: NORMAL
WBC # FLD AUTO: 4.22 K/UL
WHITE BLOOD CELLS URINE: 1 /HPF

## 2021-11-10 RX ORDER — ERYTHROPOIETIN 10000 [IU]/ML
10000 INJECTION, SOLUTION INTRAVENOUS; SUBCUTANEOUS WEEKLY
Qty: 5 | Refills: 3 | Status: DISCONTINUED | COMMUNITY
Start: 2021-09-14 | End: 2021-11-10

## 2021-11-12 ENCOUNTER — APPOINTMENT (OUTPATIENT)
Dept: ULTRASOUND IMAGING | Facility: HOSPITAL | Age: 76
End: 2021-11-12

## 2021-11-12 ENCOUNTER — OUTPATIENT (OUTPATIENT)
Dept: OUTPATIENT SERVICES | Facility: HOSPITAL | Age: 76
LOS: 1 days | End: 2021-11-12
Payer: MEDICARE

## 2021-11-12 DIAGNOSIS — I77.0 ARTERIOVENOUS FISTULA, ACQUIRED: Chronic | ICD-10-CM

## 2021-11-12 DIAGNOSIS — Z94.0 KIDNEY TRANSPLANT STATUS: Chronic | ICD-10-CM

## 2021-11-12 DIAGNOSIS — Z94.0 KIDNEY TRANSPLANT STATUS: ICD-10-CM

## 2021-11-12 DIAGNOSIS — H26.9 UNSPECIFIED CATARACT: Chronic | ICD-10-CM

## 2021-11-12 DIAGNOSIS — Z99.2 DEPENDENCE ON RENAL DIALYSIS: Chronic | ICD-10-CM

## 2021-11-12 DIAGNOSIS — Z86.69 PERSONAL HISTORY OF OTHER DISEASES OF THE NERVOUS SYSTEM AND SENSE ORGANS: Chronic | ICD-10-CM

## 2021-11-12 DIAGNOSIS — Z90.710 ACQUIRED ABSENCE OF BOTH CERVIX AND UTERUS: Chronic | ICD-10-CM

## 2021-11-12 PROCEDURE — 76776 US EXAM K TRANSPL W/DOPPLER: CPT | Mod: 26,RT

## 2021-11-12 PROCEDURE — 76776 US EXAM K TRANSPL W/DOPPLER: CPT

## 2021-11-15 LAB
BKV DNA SPEC QL NAA+PROBE: NEGATIVE COPIES/ML
CMV DNA SPEC QL NAA+PROBE: NOT DETECTED IU/ML
LOG 10 BK QUANTITATION PCR: NORMAL

## 2021-11-18 ENCOUNTER — APPOINTMENT (OUTPATIENT)
Dept: TRANSPLANT | Facility: CLINIC | Age: 76
End: 2021-11-18

## 2021-11-18 ENCOUNTER — APPOINTMENT (OUTPATIENT)
Dept: TRANSPLANT | Facility: CLINIC | Age: 76
End: 2021-11-18
Payer: MEDICARE

## 2021-11-18 ENCOUNTER — LABORATORY RESULT (OUTPATIENT)
Age: 76
End: 2021-11-18

## 2021-11-18 PROCEDURE — 86833 HLA CLASS II HIGH DEFIN QUAL: CPT

## 2021-11-18 PROCEDURE — 86832 HLA CLASS I HIGH DEFIN QUAL: CPT

## 2021-11-18 PROCEDURE — 99001T: CUSTOM

## 2021-11-19 ENCOUNTER — NON-APPOINTMENT (OUTPATIENT)
Age: 76
End: 2021-11-19

## 2021-11-19 LAB
ALBUMIN SERPL ELPH-MCNC: 4.7 G/DL
ALP BLD-CCNC: 129 U/L
ALT SERPL-CCNC: 7 U/L
ANION GAP SERPL CALC-SCNC: 14 MMOL/L
APPEARANCE: CLEAR
AST SERPL-CCNC: 10 U/L
BACTERIA: NEGATIVE
BASOPHILS # BLD AUTO: 0.01 K/UL
BASOPHILS NFR BLD AUTO: 0.3 %
BILIRUB SERPL-MCNC: 0.2 MG/DL
BILIRUBIN URINE: NEGATIVE
BLOOD URINE: NEGATIVE
BUN SERPL-MCNC: 37 MG/DL
CALCIUM SERPL-MCNC: 10.3 MG/DL
CHLORIDE SERPL-SCNC: 112 MMOL/L
CO2 SERPL-SCNC: 18 MMOL/L
COLOR: YELLOW
CREAT SERPL-MCNC: 1.67 MG/DL
CREAT SPEC-SCNC: 100 MG/DL
CREAT/PROT UR: 0.5 RATIO
EOSINOPHIL # BLD AUTO: 0.06 K/UL
EOSINOPHIL NFR BLD AUTO: 1.7 %
GLUCOSE QUALITATIVE U: NEGATIVE
GLUCOSE SERPL-MCNC: 89 MG/DL
HCT VFR BLD CALC: 32.3 %
HGB BLD-MCNC: 10.1 G/DL
HYALINE CASTS: 0 /LPF
IMM GRANULOCYTES NFR BLD AUTO: 0.6 %
KETONES URINE: NEGATIVE
LEUKOCYTE ESTERASE URINE: NEGATIVE
LYMPHOCYTES # BLD AUTO: 0.47 K/UL
LYMPHOCYTES NFR BLD AUTO: 13.2 %
MAGNESIUM SERPL-MCNC: 1.7 MG/DL
MAN DIFF?: NORMAL
MCHC RBC-ENTMCNC: 28.7 PG
MCHC RBC-ENTMCNC: 31.3 GM/DL
MCV RBC AUTO: 91.8 FL
MICROSCOPIC-UA: NORMAL
MONOCYTES # BLD AUTO: 0.19 K/UL
MONOCYTES NFR BLD AUTO: 5.3 %
NEUTROPHILS # BLD AUTO: 2.81 K/UL
NEUTROPHILS NFR BLD AUTO: 78.9 %
NITRITE URINE: NEGATIVE
PH URINE: 6
PHOSPHATE SERPL-MCNC: 3.1 MG/DL
PLATELET # BLD AUTO: 177 K/UL
POTASSIUM SERPL-SCNC: 5.4 MMOL/L
PROT SERPL-MCNC: 6.4 G/DL
PROT UR-MCNC: 47 MG/DL
PROTEIN URINE: ABNORMAL
RBC # BLD: 3.52 M/UL
RBC # FLD: 16.1 %
RED BLOOD CELLS URINE: 2 /HPF
SODIUM SERPL-SCNC: 144 MMOL/L
SPECIFIC GRAVITY URINE: 1.02
SQUAMOUS EPITHELIAL CELLS: 1 /HPF
TACROLIMUS SERPL-MCNC: 9.2 NG/ML
URATE SERPL-MCNC: 8.1 MG/DL
UROBILINOGEN URINE: NORMAL
WBC # FLD AUTO: 3.56 K/UL
WHITE BLOOD CELLS URINE: 2 /HPF

## 2021-11-22 LAB
BKV DNA SPEC QL NAA+PROBE: NOT DETECTED COPIES/ML
CMV DNA SPEC QL NAA+PROBE: NOT DETECTED IU/ML

## 2021-11-23 ENCOUNTER — APPOINTMENT (OUTPATIENT)
Dept: TRANSPLANT | Facility: CLINIC | Age: 76
End: 2021-11-23

## 2021-12-14 ENCOUNTER — APPOINTMENT (OUTPATIENT)
Dept: NEPHROLOGY | Facility: CLINIC | Age: 76
End: 2021-12-14
Payer: MEDICARE

## 2021-12-14 VITALS
TEMPERATURE: 97.5 F | SYSTOLIC BLOOD PRESSURE: 156 MMHG | BODY MASS INDEX: 19.44 KG/M2 | DIASTOLIC BLOOD PRESSURE: 56 MMHG | WEIGHT: 121 LBS | OXYGEN SATURATION: 99 % | HEIGHT: 66 IN | HEART RATE: 75 BPM | RESPIRATION RATE: 12 BRPM

## 2021-12-14 PROCEDURE — 99215 OFFICE O/P EST HI 40 MIN: CPT

## 2021-12-14 NOTE — PHYSICAL EXAM
[General Appearance - Alert] : alert [General Appearance - In No Acute Distress] : in no acute distress [Sclera] : the sclera and conjunctiva were normal [PERRL With Normal Accommodation] : pupils were equal in size, round, and reactive to light [Oropharynx] : the oropharynx was normal [Jugular Venous Distention Increased] : there was no jugular-venous distention [Respiration, Rhythm And Depth] : normal respiratory rhythm and effort [Exaggerated Use Of Accessory Muscles For Inspiration] : no accessory muscle use [Auscultation Breath Sounds / Voice Sounds] : lungs were clear to auscultation bilaterally [Heart Rate And Rhythm] : heart rate was normal and rhythm regular [Heart Sounds] : normal S1 and S2 [Heart Sounds Gallop] : no gallops [Edema] : there was no peripheral edema [Bowel Sounds] : normal bowel sounds [Abdomen Soft] : soft [Abdomen Tenderness] : non-tender [No CVA Tenderness] : no ~M costovertebral angle tenderness [Involuntary Movements] : no involuntary movements were seen [___ (cm) Fistula] : [unfilled] (cm) fistula [Bruit] : a bruit was present [Thrill] : a thrill was present [] : no rash [No Focal Deficits] : no focal deficits [Oriented To Time, Place, And Person] : oriented to person, place, and time [FreeTextEntry1] : RLQ scar well healed, no tenderness, bruit +present

## 2021-12-14 NOTE — ASSESSMENT
[FreeTextEntry1] : \par S/p DDRT on 5/19/2021 complicated by DGF due to ATN. \par Graft function recovered. Last HD was on  6/4/21. Oswaldo creatinine 1.3mg/dL. \par Creatinine increased to 1.6mg/dL last month with US showing elevated velocities concerning for TRAS.  Conservatively managed. No DSA, cFDNA 0.58%.  No proteinuria. Cr stable 1.57mg/dL today. \par \par Immunosuppression \par - S/p Simulect induction\par - CellCept 500mg po bid  - dose reduced for GI side effects and leukopenia. \par - On Envarsus 5mg po daily, level 10.0. Will reduce to 4mg daily. \par - Continue Prednisone 5mg daily \par \par Prophylaxis \par - Completed 6 months prophylaxis \par - Bactrim SS po daily. Dapsone discontinued for hemolytic anemia. \par - Completed COVID vaccine prior to transplant. Also had COVID infection in March 2020. 3rd shot received on Oct 19th. Shubham Ab +ve\par - Flu shot received in October. \par \par Hypertension - acceptable control at home. 130-150 systolic. \par Continue Nifedipine 60mg po bid,  Hydralazine 100mg po TID and labetalol 200mg po bid. \par \par Anemia multifactorial - Iron stores, B12 and folate normal.  Was on procrit d/jair last month.  \par \par Hyperkalemia - Continue Lokelma 10gm po daily. Low K diet. \par Metabolic acidosis -. Continue sodium bicarbonate 1300mg po bid. \par \par Vitamin D deficiency - Continue Vitamin D 1000 units po daily. \par \par Follow up in clinic on Jan 18th \par

## 2021-12-14 NOTE — HISTORY OF PRESENT ILLNESS
[FreeTextEntry1] : \par 75 year old woman with ESRD due to HTN, was on hemodialysis since 2016. Received DDRT on 5/19/21 \par \par Donor was a 44 yr old woman, KDPI 69%, history of diabetes on insulin, terminal creatinine 3.3. \par Post transplant course was complicated by DGF. Last hemodialysis was on 6/4/21. Transplant ureteric stent was removed on 8/2/21. \par \par Other PMH includes :History of COVID-19 infection, hospitalized in March 2020 for only 1 day. \par \par Post transplant course \par Hospitalized  10/2021 for severe symptomatic anemia (Hgb 5g/dL) due to dapsone. Transfused 2 units PRBC.\par Nov 2021 Noted to have YANELY creatinine elevated to 1.65mg/dL. Tac level was 11.2, dose was reduced. US showed increased velocities concerning for TRAS but no tardus parvus waves. DSA negative,  and cFDNA  0.5%.  Discussed with Dr. Henderson and opted for conservative management.  \par \par She presents for follow up visit accompanied by her son. \par She reports feeling well. Energy is fair. \par Appetite good. Weight 119lbs. \par No nausea, vomiting or diarrhea. \par No fever or chills. No cough or shortness of breath. \par Voiding urine well. No dysuria or hematuria. \par Home -150 systolic. Eating less sodium. \par Weight 119lbs. No leg swelling. \par Takes all meds as prescribed. Lives with her son who manages her meds.

## 2021-12-15 ENCOUNTER — NON-APPOINTMENT (OUTPATIENT)
Age: 76
End: 2021-12-15

## 2021-12-15 LAB
ALBUMIN SERPL ELPH-MCNC: 4.5 G/DL
ALP BLD-CCNC: 173 U/L
ALT SERPL-CCNC: <5 U/L
ANION GAP SERPL CALC-SCNC: 12 MMOL/L
APPEARANCE: CLEAR
AST SERPL-CCNC: 12 U/L
BACTERIA: NEGATIVE
BASOPHILS # BLD AUTO: 0.01 K/UL
BASOPHILS NFR BLD AUTO: 0.3 %
BILIRUB SERPL-MCNC: <0.2 MG/DL
BILIRUBIN URINE: NEGATIVE
BLOOD URINE: NEGATIVE
BUN SERPL-MCNC: 37 MG/DL
CALCIUM SERPL-MCNC: 9.8 MG/DL
CHLORIDE SERPL-SCNC: 114 MMOL/L
CO2 SERPL-SCNC: 18 MMOL/L
COLOR: NORMAL
CREAT SERPL-MCNC: 1.59 MG/DL
CREAT SPEC-SCNC: 68 MG/DL
CREAT/PROT UR: 0.5 RATIO
EOSINOPHIL # BLD AUTO: 0.02 K/UL
EOSINOPHIL NFR BLD AUTO: 0.6 %
GLUCOSE QUALITATIVE U: NEGATIVE
GLUCOSE SERPL-MCNC: 125 MG/DL
HCT VFR BLD CALC: 34.7 %
HGB BLD-MCNC: 10.3 G/DL
HYALINE CASTS: 0 /LPF
IMM GRANULOCYTES NFR BLD AUTO: 0.6 %
KETONES URINE: NEGATIVE
LEUKOCYTE ESTERASE URINE: NEGATIVE
LYMPHOCYTES # BLD AUTO: 0.51 K/UL
LYMPHOCYTES NFR BLD AUTO: 14.3 %
MAGNESIUM SERPL-MCNC: 1.7 MG/DL
MAN DIFF?: NORMAL
MCHC RBC-ENTMCNC: 27.4 PG
MCHC RBC-ENTMCNC: 29.7 GM/DL
MCV RBC AUTO: 92.3 FL
MICROSCOPIC-UA: NORMAL
MONOCYTES # BLD AUTO: 0.15 K/UL
MONOCYTES NFR BLD AUTO: 4.2 %
NEUTROPHILS # BLD AUTO: 2.85 K/UL
NEUTROPHILS NFR BLD AUTO: 80 %
NITRITE URINE: NEGATIVE
PH URINE: 6
PHOSPHATE SERPL-MCNC: 3.4 MG/DL
PLATELET # BLD AUTO: 127 K/UL
POTASSIUM SERPL-SCNC: 5.6 MMOL/L
PROT SERPL-MCNC: 6.5 G/DL
PROT UR-MCNC: 35 MG/DL
PROTEIN URINE: ABNORMAL
RBC # BLD: 3.76 M/UL
RBC # FLD: 14.8 %
RED BLOOD CELLS URINE: 2 /HPF
SODIUM SERPL-SCNC: 144 MMOL/L
SPECIFIC GRAVITY URINE: 1.02
SQUAMOUS EPITHELIAL CELLS: 1 /HPF
TACROLIMUS SERPL-MCNC: 10 NG/ML
UROBILINOGEN URINE: NORMAL
WBC # FLD AUTO: 3.56 K/UL
WHITE BLOOD CELLS URINE: 1 /HPF

## 2021-12-15 RX ORDER — TACROLIMUS 1 MG/1
1 TABLET, EXTENDED RELEASE ORAL DAILY
Qty: 90 | Refills: 3 | Status: DISCONTINUED | COMMUNITY
Start: 2021-08-20 | End: 2021-12-15

## 2021-12-16 LAB
BKV DNA SPEC QL NAA+PROBE: NOT DETECTED COPIES/ML
CMV DNA SPEC QL NAA+PROBE: NOT DETECTED IU/ML

## 2022-01-03 ENCOUNTER — NON-APPOINTMENT (OUTPATIENT)
Age: 77
End: 2022-01-03

## 2022-01-03 ENCOUNTER — APPOINTMENT (OUTPATIENT)
Dept: TRANSPLANT | Facility: CLINIC | Age: 77
End: 2022-01-03

## 2022-01-03 LAB
ALBUMIN SERPL ELPH-MCNC: 4.2 G/DL
ALP BLD-CCNC: 150 U/L
ALT SERPL-CCNC: <5 U/L
ANION GAP SERPL CALC-SCNC: 10 MMOL/L
APPEARANCE: CLEAR
AST SERPL-CCNC: 11 U/L
BACTERIA: NEGATIVE
BASOPHILS # BLD AUTO: 0.01 K/UL
BASOPHILS NFR BLD AUTO: 0.2 %
BILIRUB SERPL-MCNC: <0.2 MG/DL
BILIRUBIN URINE: NEGATIVE
BLOOD URINE: NEGATIVE
BUN SERPL-MCNC: 40 MG/DL
CALCIUM SERPL-MCNC: 10 MG/DL
CALCIUM SERPL-MCNC: 10 MG/DL
CHLORIDE SERPL-SCNC: 114 MMOL/L
CO2 SERPL-SCNC: 19 MMOL/L
COLOR: NORMAL
CREAT SERPL-MCNC: 1.81 MG/DL
CREAT SPEC-SCNC: 54 MG/DL
CREAT/PROT UR: 0.6 RATIO
EOSINOPHIL # BLD AUTO: 0.18 K/UL
EOSINOPHIL NFR BLD AUTO: 3.8 %
GLUCOSE QUALITATIVE U: NEGATIVE
GLUCOSE SERPL-MCNC: 88 MG/DL
HCT VFR BLD CALC: 29.7 %
HGB BLD-MCNC: 8.9 G/DL
HYALINE CASTS: 1 /LPF
IMM GRANULOCYTES NFR BLD AUTO: 0.6 %
KETONES URINE: NEGATIVE
LDH SERPL-CCNC: 186 U/L
LEUKOCYTE ESTERASE URINE: NEGATIVE
LYMPHOCYTES # BLD AUTO: 0.97 K/UL
LYMPHOCYTES NFR BLD AUTO: 20.4 %
MAGNESIUM SERPL-MCNC: 1.6 MG/DL
MAN DIFF?: NORMAL
MCHC RBC-ENTMCNC: 27.6 PG
MCHC RBC-ENTMCNC: 30 GM/DL
MCV RBC AUTO: 92 FL
MICROSCOPIC-UA: NORMAL
MONOCYTES # BLD AUTO: 0.52 K/UL
MONOCYTES NFR BLD AUTO: 10.9 %
NEUTROPHILS # BLD AUTO: 3.05 K/UL
NEUTROPHILS NFR BLD AUTO: 64.1 %
NITRITE URINE: NEGATIVE
PARATHYROID HORMONE INTACT: 352 PG/ML
PH URINE: 6.5
PHOSPHATE SERPL-MCNC: 3.5 MG/DL
PLATELET # BLD AUTO: 148 K/UL
POTASSIUM SERPL-SCNC: 5.3 MMOL/L
PROT SERPL-MCNC: 6.3 G/DL
PROT UR-MCNC: 34 MG/DL
PROTEIN URINE: ABNORMAL
RBC # BLD: 3.23 M/UL
RBC # FLD: 14.6 %
RED BLOOD CELLS URINE: 3 /HPF
SODIUM SERPL-SCNC: 143 MMOL/L
SPECIFIC GRAVITY URINE: 1.02
SQUAMOUS EPITHELIAL CELLS: 1 /HPF
TACROLIMUS SERPL-MCNC: 6.3 NG/ML
URATE SERPL-MCNC: 7.1 MG/DL
UROBILINOGEN URINE: NORMAL
WBC # FLD AUTO: 4.76 K/UL
WHITE BLOOD CELLS URINE: 0 /HPF

## 2022-01-06 LAB
BKV DNA SPEC QL NAA+PROBE: NOT DETECTED COPIES/ML
CMV DNA SPEC QL NAA+PROBE: NOT DETECTED IU/ML

## 2022-01-19 ENCOUNTER — APPOINTMENT (OUTPATIENT)
Dept: NEPHROLOGY | Facility: CLINIC | Age: 77
End: 2022-01-19
Payer: MEDICARE

## 2022-01-19 VITALS
OXYGEN SATURATION: 100 % | DIASTOLIC BLOOD PRESSURE: 62 MMHG | BODY MASS INDEX: 19.13 KG/M2 | WEIGHT: 119 LBS | SYSTOLIC BLOOD PRESSURE: 154 MMHG | HEART RATE: 71 BPM | HEIGHT: 66 IN | TEMPERATURE: 97.6 F | RESPIRATION RATE: 12 BRPM

## 2022-01-19 DIAGNOSIS — D64.9 ANEMIA, UNSPECIFIED: ICD-10-CM

## 2022-01-19 DIAGNOSIS — D58.0 HEREDITARY SPHEROCYTOSIS: ICD-10-CM

## 2022-01-19 LAB
ALBUMIN SERPL ELPH-MCNC: 4.3 G/DL
ALP BLD-CCNC: 131 U/L
ALT SERPL-CCNC: 5 U/L
ANION GAP SERPL CALC-SCNC: 13 MMOL/L
APPEARANCE: CLEAR
AST SERPL-CCNC: 11 U/L
BACTERIA: NEGATIVE
BASOPHILS # BLD AUTO: 0.01 K/UL
BASOPHILS NFR BLD AUTO: 0.2 %
BILIRUB SERPL-MCNC: 0.2 MG/DL
BILIRUBIN URINE: NEGATIVE
BLOOD URINE: NEGATIVE
BUN SERPL-MCNC: 41 MG/DL
CALCIUM SERPL-MCNC: 9.9 MG/DL
CHLORIDE SERPL-SCNC: 113 MMOL/L
CO2 SERPL-SCNC: 18 MMOL/L
COLOR: NORMAL
CREAT SERPL-MCNC: 1.74 MG/DL
CREAT SPEC-SCNC: 60 MG/DL
CREAT/PROT UR: 1 RATIO
EOSINOPHIL # BLD AUTO: 0.19 K/UL
EOSINOPHIL NFR BLD AUTO: 4 %
GLUCOSE QUALITATIVE U: NEGATIVE
GLUCOSE SERPL-MCNC: 101 MG/DL
HCT VFR BLD CALC: 27.4 %
HGB BLD-MCNC: 8.2 G/DL
HYALINE CASTS: 0 /LPF
IMM GRANULOCYTES NFR BLD AUTO: 0.6 %
KETONES URINE: NEGATIVE
LDH SERPL-CCNC: 178 U/L
LEUKOCYTE ESTERASE URINE: NEGATIVE
LYMPHOCYTES # BLD AUTO: 0.6 K/UL
LYMPHOCYTES NFR BLD AUTO: 12.7 %
MAGNESIUM SERPL-MCNC: 1.6 MG/DL
MAN DIFF?: NORMAL
MCHC RBC-ENTMCNC: 27 PG
MCHC RBC-ENTMCNC: 29.9 GM/DL
MCV RBC AUTO: 90.1 FL
MICROSCOPIC-UA: NORMAL
MONOCYTES # BLD AUTO: 0.27 K/UL
MONOCYTES NFR BLD AUTO: 5.7 %
NEUTROPHILS # BLD AUTO: 3.64 K/UL
NEUTROPHILS NFR BLD AUTO: 76.8 %
NITRITE URINE: NEGATIVE
PH URINE: 6
PHOSPHATE SERPL-MCNC: 3.5 MG/DL
PLATELET # BLD AUTO: 149 K/UL
POTASSIUM SERPL-SCNC: 5.2 MMOL/L
PROT SERPL-MCNC: 6.2 G/DL
PROT UR-MCNC: 57 MG/DL
PROTEIN URINE: ABNORMAL
RBC # BLD: 3.04 M/UL
RBC # FLD: 14.4 %
RED BLOOD CELLS URINE: 3 /HPF
SODIUM SERPL-SCNC: 143 MMOL/L
SPECIFIC GRAVITY URINE: 1.01
SQUAMOUS EPITHELIAL CELLS: 0 /HPF
TACROLIMUS SERPL-MCNC: 10.1 NG/ML
URATE SERPL-MCNC: 6.7 MG/DL
UROBILINOGEN URINE: NORMAL
WBC # FLD AUTO: 4.74 K/UL
WHITE BLOOD CELLS URINE: 0 /HPF

## 2022-01-19 PROCEDURE — 99215 OFFICE O/P EST HI 40 MIN: CPT

## 2022-01-19 RX ORDER — TACROLIMUS 4 MG/1
4 TABLET, EXTENDED RELEASE ORAL
Qty: 30 | Refills: 11 | Status: DISCONTINUED | COMMUNITY
Start: 2021-05-20 | End: 2022-01-19

## 2022-01-19 RX ORDER — VALGANCICLOVIR HYDROCHLORIDE 450 MG/1
450 TABLET ORAL
Qty: 30 | Refills: 5 | Status: DISCONTINUED | COMMUNITY
Start: 2021-05-20 | End: 2022-01-19

## 2022-01-19 NOTE — HISTORY OF PRESENT ILLNESS
[FreeTextEntry1] : \par 76 year old woman with ESRD due to HTN, was on hemodialysis since 2016. Received DDRT on 5/19/21 \par \par Donor was a 44 yr old woman, KDPI 69%, history of diabetes on insulin, terminal creatinine 3.3. \par Post transplant course was complicated by DGF. Last hemodialysis was on 6/4/21. Transplant ureteric stent was removed on 8/2/21. \par \par Other PMH includes :History of COVID-19 infection, hospitalized in March 2020 for only 1 day. \par \par Post transplant course complicated by severe symptomatic anemia (Hgb 5g/dL) requiring hospitalization 10/2021   Transfused 2 units PRBC. Anemia was due to hemolysis caused by dapsone (despite normal G6PD levels prior to initiation). \par Nov 2021 Noted to have YANELY creatinine elevated to 1.65mg/dL.  US showed increased velocities concerning for TRAS but no tardus parvus waves. DSA negative,  and cFDNA  0.5%.  Discussed with Dr. Henderson and opted for conservative management.  \par \par She presents for follow up visit. She reports feeling well. Energy is fair. Walking better. \par Appetite good. Weight relatively stable ~ 118lbs\par No nausea, vomiting or diarrhea. \par No fever or chills. No cough or shortness of breath. \par Voiding urine well. No dysuria or hematuria. \par Home BP mostly 110-130's occasional 160's. \par No leg swelling. \par Takes all meds as prescribed. Lives with her son who manages her meds.

## 2022-01-19 NOTE — ASSESSMENT
[FreeTextEntry1] : \par S/p DDRT on 5/19/2021 complicated by DGF due to ATN. \par Graft function recovered. Last HD was on  6/4/21. Oswaldo creatinine 1.3mg/dL. \par Creatinine rising 1.6mg/dL in Nov 2021, 1.8mg/dL in Jan 2022. Ultrasound done Nov 2021 with US showing elevated velocities concerning for TRAS.  Conservatively managed. No DSA, cFDNA 0.58%.  \par Creatinine today 1.74mg/dL relatively stable. Proteinuria ~ 1gram/day. \par \par Immunosuppression \par - S/p Simulect induction\par - CellCept 500mg po bid  - dose reduced for GI side effects and leukopenia. \par - On Envarsus 4mg po daily, level 10.1 . Will reduce to 3mg po daily.\par - Continue Prednisone 5mg daily \par \par Prophylaxis \par - Completed 6 months prophylaxis \par - Bactrim SS po daily. Dapsone discontinued for hemolytic anemia. \par - Completed COVID vaccine prior to transplant. Also had COVID infection in March 2020. 3rd shot received on Oct 19th. Shuhbam Ab strongly +ve\par - Flu shot received in October. \par \par Hypertension - acceptable control at home. 110-130 systolic with occasional 160's. \par Continue Nifedipine 60mg po bid,  Hydralazine 100mg po TID and labetalol 200mg po bid. \par \par Anemia multifactorial -  Was on procrit d/jair Nov 2021. Hgb down to 8.2. Will resume procrit 10,000 units sq every week. Obtain Iron studies, B12, Folate, haptoglobin. \par \par Hyperkalemia - Continue Lokelma 10gm po daily. Low K diet. \par Metabolic acidosis -. Continue sodium bicarbonate 1300mg po bid. \par \par Vitamin D deficiency - Continue Vitamin D 1000 units po daily. \par \par Repeat labs in 2 weeks - check tac level, Hgb, iron studies, B12 and folate \par F/u 3/1/22\par

## 2022-01-20 LAB
BKV DNA SPEC QL NAA+PROBE: NOT DETECTED COPIES/ML
CMV DNA SPEC QL NAA+PROBE: NOT DETECTED IU/ML

## 2022-01-26 NOTE — ED ADULT TRIAGE NOTE - CHIEF COMPLAINT QUOTE
Is This A New Presentation, Or A Follow-Up?: Skin Lesions
Dialysis fistula not working  no thrill/bruit
Has Your Skin Lesion Been Treated?: not been treated

## 2022-02-02 ENCOUNTER — APPOINTMENT (OUTPATIENT)
Dept: TRANSPLANT | Facility: CLINIC | Age: 77
End: 2022-02-02

## 2022-02-02 ENCOUNTER — INPATIENT (INPATIENT)
Facility: HOSPITAL | Age: 77
LOS: 1 days | Discharge: HOME HEALTH | DRG: 640 | End: 2022-02-04
Attending: HOSPITALIST
Payer: MEDICARE

## 2022-02-02 ENCOUNTER — LABORATORY RESULT (OUTPATIENT)
Age: 77
End: 2022-02-02

## 2022-02-02 VITALS
OXYGEN SATURATION: 100 % | DIASTOLIC BLOOD PRESSURE: 74 MMHG | WEIGHT: 123.02 LBS | TEMPERATURE: 99 F | SYSTOLIC BLOOD PRESSURE: 185 MMHG | HEIGHT: 66 IN | HEART RATE: 76 BPM | RESPIRATION RATE: 16 BRPM

## 2022-02-02 DIAGNOSIS — I77.0 ARTERIOVENOUS FISTULA, ACQUIRED: Chronic | ICD-10-CM

## 2022-02-02 DIAGNOSIS — Z86.69 PERSONAL HISTORY OF OTHER DISEASES OF THE NERVOUS SYSTEM AND SENSE ORGANS: Chronic | ICD-10-CM

## 2022-02-02 DIAGNOSIS — Z94.0 KIDNEY TRANSPLANT STATUS: ICD-10-CM

## 2022-02-02 DIAGNOSIS — Z94.0 KIDNEY TRANSPLANT STATUS: Chronic | ICD-10-CM

## 2022-02-02 DIAGNOSIS — Z99.2 DEPENDENCE ON RENAL DIALYSIS: Chronic | ICD-10-CM

## 2022-02-02 DIAGNOSIS — Z90.710 ACQUIRED ABSENCE OF BOTH CERVIX AND UTERUS: Chronic | ICD-10-CM

## 2022-02-02 DIAGNOSIS — H26.9 UNSPECIFIED CATARACT: Chronic | ICD-10-CM

## 2022-02-02 LAB
ALBUMIN SERPL ELPH-MCNC: 4.2 G/DL — SIGNIFICANT CHANGE UP (ref 3.3–5)
ALBUMIN SERPL ELPH-MCNC: 4.3 G/DL
ALP BLD-CCNC: 113 U/L
ALP SERPL-CCNC: 117 U/L — SIGNIFICANT CHANGE UP (ref 40–120)
ALT FLD-CCNC: <5 U/L — LOW (ref 10–45)
ALT SERPL-CCNC: 6 U/L
ANION GAP SERPL CALC-SCNC: 12 MMOL/L
ANION GAP SERPL CALC-SCNC: 15 MMOL/L — SIGNIFICANT CHANGE UP (ref 5–17)
APPEARANCE UR: CLEAR — SIGNIFICANT CHANGE UP
APPEARANCE: CLEAR
APTT BLD: 32.6 SEC — SIGNIFICANT CHANGE UP (ref 27.5–35.5)
AST SERPL-CCNC: 12 U/L — SIGNIFICANT CHANGE UP (ref 10–40)
AST SERPL-CCNC: 9 U/L
BACTERIA # UR AUTO: NEGATIVE — SIGNIFICANT CHANGE UP
BACTERIA: NEGATIVE
BASOPHILS # BLD AUTO: 0.01 K/UL
BASOPHILS # BLD AUTO: 0.01 K/UL — SIGNIFICANT CHANGE UP (ref 0–0.2)
BASOPHILS NFR BLD AUTO: 0.3 %
BASOPHILS NFR BLD AUTO: 0.3 % — SIGNIFICANT CHANGE UP (ref 0–2)
BILIRUB SERPL-MCNC: 0.2 MG/DL
BILIRUB SERPL-MCNC: 0.2 MG/DL — SIGNIFICANT CHANGE UP (ref 0.2–1.2)
BILIRUB UR-MCNC: NEGATIVE — SIGNIFICANT CHANGE UP
BILIRUBIN URINE: NEGATIVE
BLD GP AB SCN SERPL QL: POSITIVE — SIGNIFICANT CHANGE UP
BLOOD URINE: NEGATIVE
BUN SERPL-MCNC: 42 MG/DL
BUN SERPL-MCNC: 43 MG/DL — HIGH (ref 7–23)
CALCIUM SERPL-MCNC: 9.5 MG/DL — SIGNIFICANT CHANGE UP (ref 8.4–10.5)
CALCIUM SERPL-MCNC: 9.7 MG/DL
CHLORIDE SERPL-SCNC: 111 MMOL/L — HIGH (ref 96–108)
CHLORIDE SERPL-SCNC: 112 MMOL/L
CO2 SERPL-SCNC: 14 MMOL/L — LOW (ref 22–31)
CO2 SERPL-SCNC: 17 MMOL/L
COLOR SPEC: COLORLESS — SIGNIFICANT CHANGE UP
COLOR: NORMAL
CREAT SERPL-MCNC: 1.7 MG/DL
CREAT SERPL-MCNC: 2 MG/DL — HIGH (ref 0.5–1.3)
DIFF PNL FLD: NEGATIVE — SIGNIFICANT CHANGE UP
EOSINOPHIL # BLD AUTO: 0.08 K/UL
EOSINOPHIL # BLD AUTO: 0.1 K/UL — SIGNIFICANT CHANGE UP (ref 0–0.5)
EOSINOPHIL NFR BLD AUTO: 2 %
EOSINOPHIL NFR BLD AUTO: 2.5 % — SIGNIFICANT CHANGE UP (ref 0–6)
EPI CELLS # UR: 0 /HPF — SIGNIFICANT CHANGE UP
FERRITIN SERPL-MCNC: 1937 NG/ML
FOLATE SERPL-MCNC: 9 NG/ML
GLUCOSE QUALITATIVE U: NEGATIVE
GLUCOSE SERPL-MCNC: 103 MG/DL — HIGH (ref 70–99)
GLUCOSE SERPL-MCNC: 95 MG/DL
GLUCOSE UR QL: NEGATIVE — SIGNIFICANT CHANGE UP
HCT VFR BLD CALC: 26.6 % — LOW (ref 34.5–45)
HCT VFR BLD CALC: 26.7 %
HGB BLD-MCNC: 7.8 G/DL — LOW (ref 11.5–15.5)
HGB BLD-MCNC: 8.1 G/DL
HYALINE CASTS: 0 /LPF
IMM GRANULOCYTES NFR BLD AUTO: 0.5 % — SIGNIFICANT CHANGE UP (ref 0–1.5)
IMM GRANULOCYTES NFR BLD AUTO: 0.8 %
INR BLD: 1.05 RATIO — SIGNIFICANT CHANGE UP (ref 0.88–1.16)
IRON SATN MFR SERPL: 32 %
IRON SERPL-MCNC: 62 UG/DL
KETONES UR-MCNC: NEGATIVE — SIGNIFICANT CHANGE UP
KETONES URINE: NEGATIVE
LDH SERPL L TO P-CCNC: 206 U/L — SIGNIFICANT CHANGE UP (ref 50–242)
LEUKOCYTE ESTERASE UR-ACNC: NEGATIVE — SIGNIFICANT CHANGE UP
LEUKOCYTE ESTERASE URINE: NEGATIVE
LYMPHOCYTES # BLD AUTO: 0.41 K/UL
LYMPHOCYTES # BLD AUTO: 0.64 K/UL — LOW (ref 1–3.3)
LYMPHOCYTES # BLD AUTO: 16.1 % — SIGNIFICANT CHANGE UP (ref 13–44)
LYMPHOCYTES NFR BLD AUTO: 10.3 %
MAGNESIUM SERPL-MCNC: 1.6 MG/DL — SIGNIFICANT CHANGE UP (ref 1.6–2.6)
MAN DIFF?: NORMAL
MCHC RBC-ENTMCNC: 27.1 PG — SIGNIFICANT CHANGE UP (ref 27–34)
MCHC RBC-ENTMCNC: 27.8 PG
MCHC RBC-ENTMCNC: 29.3 GM/DL — LOW (ref 32–36)
MCHC RBC-ENTMCNC: 30.3 GM/DL
MCV RBC AUTO: 91.8 FL
MCV RBC AUTO: 92.4 FL — SIGNIFICANT CHANGE UP (ref 80–100)
MICROSCOPIC-UA: NORMAL
MONOCYTES # BLD AUTO: 0.26 K/UL
MONOCYTES # BLD AUTO: 0.39 K/UL — SIGNIFICANT CHANGE UP (ref 0–0.9)
MONOCYTES NFR BLD AUTO: 6.5 %
MONOCYTES NFR BLD AUTO: 9.8 % — SIGNIFICANT CHANGE UP (ref 2–14)
NEUTROPHILS # BLD AUTO: 2.81 K/UL — SIGNIFICANT CHANGE UP (ref 1.8–7.4)
NEUTROPHILS # BLD AUTO: 3.18 K/UL
NEUTROPHILS NFR BLD AUTO: 70.8 % — SIGNIFICANT CHANGE UP (ref 43–77)
NEUTROPHILS NFR BLD AUTO: 80.1 %
NITRITE UR-MCNC: NEGATIVE — SIGNIFICANT CHANGE UP
NITRITE URINE: NEGATIVE
NRBC # BLD: 0 /100 WBCS — SIGNIFICANT CHANGE UP (ref 0–0)
PH UR: 6.5 — SIGNIFICANT CHANGE UP (ref 5–8)
PH URINE: 6.5
PHOSPHATE SERPL-MCNC: 3.7 MG/DL — SIGNIFICANT CHANGE UP (ref 2.5–4.5)
PLATELET # BLD AUTO: 193 K/UL
PLATELET # BLD AUTO: 196 K/UL — SIGNIFICANT CHANGE UP (ref 150–400)
POTASSIUM SERPL-MCNC: 4.8 MMOL/L — SIGNIFICANT CHANGE UP (ref 3.5–5.3)
POTASSIUM SERPL-SCNC: 4.8 MMOL/L — SIGNIFICANT CHANGE UP (ref 3.5–5.3)
POTASSIUM SERPL-SCNC: 6.3 MMOL/L
PROT SERPL-MCNC: 6.2 G/DL
PROT SERPL-MCNC: 6.2 G/DL — SIGNIFICANT CHANGE UP (ref 6–8.3)
PROT UR-MCNC: ABNORMAL
PROTEIN URINE: ABNORMAL
PROTHROM AB SERPL-ACNC: 12.6 SEC — SIGNIFICANT CHANGE UP (ref 10.6–13.6)
RBC # BLD: 2.88 M/UL — LOW (ref 3.8–5.2)
RBC # BLD: 2.91 M/UL
RBC # FLD: 15.1 % — HIGH (ref 10.3–14.5)
RBC # FLD: 15.3 %
RBC CASTS # UR COMP ASSIST: 1 /HPF — SIGNIFICANT CHANGE UP (ref 0–4)
RED BLOOD CELLS URINE: 4 /HPF
RH IG SCN BLD-IMP: POSITIVE — SIGNIFICANT CHANGE UP
SODIUM SERPL-SCNC: 140 MMOL/L — SIGNIFICANT CHANGE UP (ref 135–145)
SODIUM SERPL-SCNC: 141 MMOL/L
SP GR SPEC: 1.01 — LOW (ref 1.01–1.02)
SPECIFIC GRAVITY URINE: 1.01
SQUAMOUS EPITHELIAL CELLS: 0 /HPF
TACROLIMUS SERPL-MCNC: 3.4 NG/ML
TIBC SERPL-MCNC: 194 UG/DL
TRANSFERRIN SERPL-MCNC: 162 MG/DL
UIBC SERPL-MCNC: 132 UG/DL
UROBILINOGEN FLD QL: NEGATIVE — SIGNIFICANT CHANGE UP
UROBILINOGEN URINE: NORMAL
VIT B12 SERPL-MCNC: 535 PG/ML
WBC # BLD: 3.97 K/UL — SIGNIFICANT CHANGE UP (ref 3.8–10.5)
WBC # FLD AUTO: 3.97 K/UL
WBC # FLD AUTO: 3.97 K/UL — SIGNIFICANT CHANGE UP (ref 3.8–10.5)
WBC UR QL: 0 /HPF — SIGNIFICANT CHANGE UP (ref 0–5)
WHITE BLOOD CELLS URINE: 0 /HPF

## 2022-02-02 PROCEDURE — 99223 1ST HOSP IP/OBS HIGH 75: CPT

## 2022-02-02 PROCEDURE — 93010 ELECTROCARDIOGRAM REPORT: CPT

## 2022-02-02 RX ORDER — NYSTATIN 500MM UNIT
500000 POWDER (EA) MISCELLANEOUS
Refills: 0 | Status: DISCONTINUED | OUTPATIENT
Start: 2022-02-02 | End: 2022-02-03

## 2022-02-02 RX ORDER — SODIUM ZIRCONIUM CYCLOSILICATE 10 G/10G
10 POWDER, FOR SUSPENSION ORAL DAILY
Refills: 0 | Status: DISCONTINUED | OUTPATIENT
Start: 2022-02-02 | End: 2022-02-04

## 2022-02-02 RX ORDER — SODIUM BICARBONATE 1 MEQ/ML
1300 SYRINGE (ML) INTRAVENOUS
Refills: 0 | Status: DISCONTINUED | OUTPATIENT
Start: 2022-02-02 | End: 2022-02-04

## 2022-02-02 RX ORDER — NIFEDIPINE 30 MG
60 TABLET, EXTENDED RELEASE 24 HR ORAL
Refills: 0 | Status: DISCONTINUED | OUTPATIENT
Start: 2022-02-03 | End: 2022-02-04

## 2022-02-02 RX ORDER — PANTOPRAZOLE SODIUM 20 MG/1
40 TABLET, DELAYED RELEASE ORAL
Refills: 0 | Status: DISCONTINUED | OUTPATIENT
Start: 2022-02-03 | End: 2022-02-04

## 2022-02-02 RX ORDER — HYDRALAZINE HCL 50 MG
100 TABLET ORAL ONCE
Refills: 0 | Status: COMPLETED | OUTPATIENT
Start: 2022-02-02 | End: 2022-02-02

## 2022-02-02 RX ORDER — FUROSEMIDE 40 MG
40 TABLET ORAL ONCE
Refills: 0 | Status: COMPLETED | OUTPATIENT
Start: 2022-02-02 | End: 2022-02-02

## 2022-02-02 RX ORDER — LATANOPROST 0.05 MG/ML
1 SOLUTION/ DROPS OPHTHALMIC; TOPICAL AT BEDTIME
Refills: 0 | Status: DISCONTINUED | OUTPATIENT
Start: 2022-02-02 | End: 2022-02-04

## 2022-02-02 RX ADMIN — Medication 100 MILLIGRAM(S): at 22:02

## 2022-02-02 RX ADMIN — Medication 40 MILLIGRAM(S): at 22:03

## 2022-02-02 NOTE — H&P ADULT - HISTORY OF PRESENT ILLNESS
77 y/o female with PMHx of ESRD, HTN, Glaucoma. anemia requiring hospitalization and blood transfusions in 10/202 ,  DDRT on 5/19/21 post operative course complicated by DGF secondary to ATN (last hd was 6/4/21). In November she had an YANELY with  creatinine of 1.65mm/dl. Renal US demonstrated 77 y/o female with PMHx of ESRDHTN, Glaucoma. anemia 2/2 hereditary spherocytosis requiring hospitalization and blood transfusions in 10/202,  cataract s/p extraction with b/l lense placement (20156), DVT of left subclavian vein (6/2017). S/P DDRT on 5/19/21 post operative course complicated by DGF requiring HD, 2/2 ATN (last hd was 6/4/21). In November she had an YANELY with creatinine of 1.65mm/dl. Renal US demonstrated elevated velocities concerning for TRAS. Conservatively managed.                          75 y/o female with PMHx of ESRDHTN, Glaucoma. anemia 2/2 hereditary spherocytosis requiring hospitalization and blood transfusions in 10/202,  cataract s/p extraction with b/l lense placement (20156), DVT of left subclavian vein (6/2017). She is s/p  DDRT on 5/19/21 post operative course complicated by DGF requiring HD, 2/2 ATN (last hd was 6/4/21). In November she had an YANELY with creatinine of 1.65mm/dl. Renal US demonstrated elevated velocities concerning for TRAS. Conservatively managed. She was sent in for hyperkalemia on out patient labs this morning her K was 6.3, on admission K 4.8. She took a dose of Lokelma before coming to the hospital.  She currently denies fever, chills , chest pain, SOB, n/v/d, dysuria or recent sick contacts.      Previous admission for symptomatic anemia (Hgb 5g/dL) requiring hospitalization 10/2021 Transfused 2 units PRBC. Anemia was due to hemolysis caused by dapsone (despite normal G6PD levels prior to initiation).

## 2022-02-02 NOTE — H&P ADULT - NSICDXPASTSURGICALHX_GEN_ALL_CORE_FT
PAST SURGICAL HISTORY:  Acquired cataract extraction with b/l lense placement, 2016    AV fistula 2015    H/O: glaucoma surgery, 2002    Hemodialysis access, AV graft     History of renal transplantation DDRT 5/19/2021    S/P KEVEN-BSO for fibroids, 2012    Transplanted kidney 5/19/21

## 2022-02-02 NOTE — H&P ADULT - NSHPLABSRESULTS_GEN_ALL_CORE
Vital Signs Last 24 Hrs  T(C): 37.1 (02 Feb 2022 21:11), Max: 37.2 (02 Feb 2022 20:03)  T(F): 98.7 (02 Feb 2022 21:11), Max: 98.9 (02 Feb 2022 20:03)  HR: 76 (02 Feb 2022 21:11) (76 - 76)  BP: 165/72 (02 Feb 2022 21:11) (165/72 - 185/74)  BP(mean): --  RR: 16 (02 Feb 2022 21:11) (16 - 16)  SpO2: 99% (02 Feb 2022 21:11) (99% - 100%)      LABS:                        7.8    3.97  )-----------( 196      ( 02 Feb 2022 21:41 )             26.6     02-02    140  |  111<H>  |  43<H>  ----------------------------<  103<H>  4.8   |  14<L>  |  2.00<H>    Ca    9.5      02 Feb 2022 21:41  Phos  3.7     02-02  Mg     1.6     02-02    TPro  6.2  /  Alb  4.2  /  TBili  0.2  /  DBili  x   /  AST  12  /  ALT  <5<L>  /  AlkPhos  117  02-02    PT/INR - ( 02 Feb 2022 21:41 )   PT: 12.6 sec;   INR: 1.05 ratio         PTT - ( 02 Feb 2022 21:41 )  PTT:32.6 sec

## 2022-02-02 NOTE — PATIENT PROFILE ADULT - FALL HARM RISK - UNIVERSAL INTERVENTIONS
Bed in lowest position, wheels locked, appropriate side rails in place/Call bell, personal items and telephone in reach/Instruct patient to call for assistance before getting out of bed or chair/Non-slip footwear when patient is out of bed/[place] to call system/Physically safe environment - no spills, clutter or unnecessary equipment/Purposeful Proactive Rounding/Room/bathroom lighting operational, light cord in reach

## 2022-02-02 NOTE — H&P ADULT - NSHPPHYSICALEXAM_GEN_ALL_CORE
PHYSICAL EXAM:  Constitutional: NAD  HEENT: EOMI, throat clear  Neck: No LAD, supple  Respiratory: CTA, no adventitious breath sounds noted   Cardiovascular: S1 and S2, RRR,   Gastrointestinal: BS+, soft, NT/ND, neg HSM,  Extremities: No peripheral edema, neg clubbing, cyanosis, right AVF +thrill/+bruit   Vascular: 2+ peripheral pulses  Neurological: A/O x 1-2, no focal deficits  Psychiatric: Normal mood, normal affect  Skin: No rashes

## 2022-02-02 NOTE — H&P ADULT - NSHPREVIEWOFSYSTEMS_GEN_ALL_CORE
Review of Systems:  General:  No wt loss, fevers, chills, night sweats, fatigue   Eyes:  Good vision, no reported pain  ENT:  No sore throat, pain, runny nose, dysphagia  CV:  No pain, palpitations, hypo/hypertension  Resp:  No dyspnea, cough, tachypnea, wheezing  GI:  No pain, No nausea, No vomiting, No diarrhea, No constipation  :  No pain, bleeding, incontinence, nocturia  Muscle:  No pain, weakness  Neuro:  No weakness, tingling, memory problems  Psych:  No fatigue, insomnia, mood problems, depression  Endocrine:  No polyuria, polydypsia, cold/heat intolerance  Heme:  No petechiae, ecchymosis, easy bruisability  Skin:  No rash, tattoos, scars, edema        PHYSICAL EXAM:  Constitutional: NAD  HEENT: EOMI, throat clear  Neck: No LAD, supple  Respiratory: CTA, no adventitious breat sounds noted   Cardiovascular: S1 and S2, RRR,   Gastrointestinal: BS+, soft, NT/ND, neg HSM,  Extremities: No peripheral edema, neg clubbing, cyanosis  Vascular: 2+ peripheral pulses  Neurological: A/O x 1-2, no focal deficits  Psychiatric: Normal mood, normal affect  Skin: No rashes Review of Systems:  General:  No wt loss, fevers, chills, night sweats, fatigue   Eyes:  Good vision, no reported pain  ENT:  No sore throat, pain, runny nose, dysphagia  CV:  No pain, palpitations, hypo/hypertension  Resp:  No dyspnea, cough, tachypnea, wheezing  GI:  No pain, No nausea, No vomiting, No diarrhea, No constipation  :  No pain, bleeding, incontinence, nocturia  Muscle:  No pain, weakness  Neuro:  No weakness, tingling, memory problems  Psych:  No fatigue, insomnia, mood problems, depression  Endocrine:  No polyuria, polydypsia, cold/heat intolerance  Heme:  No petechiae, ecchymosis, easy bruisability  Skin:  No rash, tattoos, scars, edema

## 2022-02-02 NOTE — H&P ADULT - ASSESSMENT
75 y/o female with PMHx of ESRDHTN, Glaucoma. anemia 2/2 hereditary spherocytosis requiring hospitalization and blood transfusions in 10/202,  cataract s/p extraction with b/l lense placement (20156), DVT of left subclavian vein (6/2017). She is s/p  DDRT on 5/19/21 post operative course complicated by DGF requiring HD, 2/2 ATN (last hd was 6/4/21). In November she had an YANELY with creatinine of 1.65mm/dl. Renal US demonstrated elevated velocities concerning for TRAS. Conservatively managed. She was sent in for hyperkalemia w/ a K of 6.3 on out pt labs.    Plan: As discussed with Dr. Maldonado and Dr. Winkler   -Admit to Transplant Surgery  -CBC,CMP,Mg/Phos,Coags,T&S,Tacro level    -Daily labs  -Renal diet  -Lasix 40 mg IVP x1  -Loklema 10 mg daily   -f/u anemia studies  -f/u G6pd  -Bedside tele monitoring   -Hold bactrim   -Home BP meds Nifedipine 60mg BID, Hydralazine 100mg TID, Labetalol 200mg BID       Immuno:  -Envarsus 3mg q daily, am level pending will adjust accordingly  -MMF on hold (WBC 3.9)  -Pred 5mg qD

## 2022-02-03 ENCOUNTER — TRANSCRIPTION ENCOUNTER (OUTPATIENT)
Age: 77
End: 2022-02-03

## 2022-02-03 DIAGNOSIS — I10 ESSENTIAL (PRIMARY) HYPERTENSION: ICD-10-CM

## 2022-02-03 DIAGNOSIS — U07.1 COVID-19: ICD-10-CM

## 2022-02-03 DIAGNOSIS — Z29.9 ENCOUNTER FOR PROPHYLACTIC MEASURES, UNSPECIFIED: ICD-10-CM

## 2022-02-03 DIAGNOSIS — D64.9 ANEMIA, UNSPECIFIED: ICD-10-CM

## 2022-02-03 DIAGNOSIS — Z94.0 KIDNEY TRANSPLANT STATUS: Chronic | ICD-10-CM

## 2022-02-03 DIAGNOSIS — H40.9 UNSPECIFIED GLAUCOMA: ICD-10-CM

## 2022-02-03 DIAGNOSIS — Z94.0 KIDNEY TRANSPLANT STATUS: ICD-10-CM

## 2022-02-03 DIAGNOSIS — E87.5 HYPERKALEMIA: ICD-10-CM

## 2022-02-03 DIAGNOSIS — N17.9 ACUTE KIDNEY FAILURE, UNSPECIFIED: ICD-10-CM

## 2022-02-03 LAB
ALBUMIN SERPL ELPH-MCNC: 3.9 G/DL — SIGNIFICANT CHANGE UP (ref 3.3–5)
ALBUMIN SERPL ELPH-MCNC: 4.3 G/DL — SIGNIFICANT CHANGE UP (ref 3.3–5)
ALP SERPL-CCNC: 108 U/L — SIGNIFICANT CHANGE UP (ref 40–120)
ALP SERPL-CCNC: 117 U/L — SIGNIFICANT CHANGE UP (ref 40–120)
ALT FLD-CCNC: 6 U/L — LOW (ref 10–45)
ALT FLD-CCNC: <5 U/L — LOW (ref 10–45)
ANION GAP SERPL CALC-SCNC: 12 MMOL/L — SIGNIFICANT CHANGE UP (ref 5–17)
ANION GAP SERPL CALC-SCNC: 14 MMOL/L — SIGNIFICANT CHANGE UP (ref 5–17)
AST SERPL-CCNC: 10 U/L — SIGNIFICANT CHANGE UP (ref 10–40)
AST SERPL-CCNC: 18 U/L — SIGNIFICANT CHANGE UP (ref 10–40)
BASOPHILS # BLD AUTO: 0 K/UL — SIGNIFICANT CHANGE UP (ref 0–0.2)
BASOPHILS NFR BLD AUTO: 0 % — SIGNIFICANT CHANGE UP (ref 0–2)
BILIRUB SERPL-MCNC: 0.2 MG/DL — SIGNIFICANT CHANGE UP (ref 0.2–1.2)
BILIRUB SERPL-MCNC: 0.3 MG/DL — SIGNIFICANT CHANGE UP (ref 0.2–1.2)
BUN SERPL-MCNC: 41 MG/DL — HIGH (ref 7–23)
BUN SERPL-MCNC: 43 MG/DL — HIGH (ref 7–23)
CALCIUM SERPL-MCNC: 9.4 MG/DL — SIGNIFICANT CHANGE UP (ref 8.4–10.5)
CALCIUM SERPL-MCNC: 9.7 MG/DL — SIGNIFICANT CHANGE UP (ref 8.4–10.5)
CHLORIDE SERPL-SCNC: 107 MMOL/L — SIGNIFICANT CHANGE UP (ref 96–108)
CHLORIDE SERPL-SCNC: 109 MMOL/L — HIGH (ref 96–108)
CO2 SERPL-SCNC: 17 MMOL/L — LOW (ref 22–31)
CO2 SERPL-SCNC: 18 MMOL/L — LOW (ref 22–31)
CREAT SERPL-MCNC: 1.75 MG/DL — HIGH (ref 0.5–1.3)
CREAT SERPL-MCNC: 1.82 MG/DL — HIGH (ref 0.5–1.3)
EOSINOPHIL # BLD AUTO: 0.06 K/UL — SIGNIFICANT CHANGE UP (ref 0–0.5)
EOSINOPHIL NFR BLD AUTO: 1.5 % — SIGNIFICANT CHANGE UP (ref 0–6)
FOLATE SERPL-MCNC: 9.2 NG/ML — SIGNIFICANT CHANGE UP
GLUCOSE SERPL-MCNC: 133 MG/DL — HIGH (ref 70–99)
GLUCOSE SERPL-MCNC: 90 MG/DL — SIGNIFICANT CHANGE UP (ref 70–99)
HAPTOGLOB SERPL-MCNC: 49 MG/DL — SIGNIFICANT CHANGE UP (ref 34–200)
HCT VFR BLD CALC: 24.6 % — LOW (ref 34.5–45)
HCT VFR BLD CALC: 27.7 % — LOW (ref 34.5–45)
HGB BLD-MCNC: 7.4 G/DL — LOW (ref 11.5–15.5)
HGB BLD-MCNC: 8.3 G/DL — LOW (ref 11.5–15.5)
IMM GRANULOCYTES NFR BLD AUTO: 0.8 % — SIGNIFICANT CHANGE UP (ref 0–1.5)
IRON SATN MFR SERPL: 26 % — SIGNIFICANT CHANGE UP (ref 14–50)
IRON SATN MFR SERPL: 53 UG/DL — SIGNIFICANT CHANGE UP (ref 30–160)
LYMPHOCYTES # BLD AUTO: 0.45 K/UL — LOW (ref 1–3.3)
LYMPHOCYTES # BLD AUTO: 11.5 % — LOW (ref 13–44)
MAGNESIUM SERPL-MCNC: 1.6 MG/DL — SIGNIFICANT CHANGE UP (ref 1.6–2.6)
MAGNESIUM SERPL-MCNC: 1.6 MG/DL — SIGNIFICANT CHANGE UP (ref 1.6–2.6)
MCHC RBC-ENTMCNC: 26.9 PG — LOW (ref 27–34)
MCHC RBC-ENTMCNC: 27 PG — SIGNIFICANT CHANGE UP (ref 27–34)
MCHC RBC-ENTMCNC: 30 GM/DL — LOW (ref 32–36)
MCHC RBC-ENTMCNC: 30.1 GM/DL — LOW (ref 32–36)
MCV RBC AUTO: 89.8 FL — SIGNIFICANT CHANGE UP (ref 80–100)
MCV RBC AUTO: 89.9 FL — SIGNIFICANT CHANGE UP (ref 80–100)
MONOCYTES # BLD AUTO: 0.17 K/UL — SIGNIFICANT CHANGE UP (ref 0–0.9)
MONOCYTES NFR BLD AUTO: 4.3 % — SIGNIFICANT CHANGE UP (ref 2–14)
NEUTROPHILS # BLD AUTO: 3.21 K/UL — SIGNIFICANT CHANGE UP (ref 1.8–7.4)
NEUTROPHILS NFR BLD AUTO: 81.9 % — HIGH (ref 43–77)
NRBC # BLD: 0 /100 WBCS — SIGNIFICANT CHANGE UP (ref 0–0)
NRBC # BLD: 0 /100 WBCS — SIGNIFICANT CHANGE UP (ref 0–0)
PHOSPHATE SERPL-MCNC: 4 MG/DL — SIGNIFICANT CHANGE UP (ref 2.5–4.5)
PHOSPHATE SERPL-MCNC: 4.4 MG/DL — SIGNIFICANT CHANGE UP (ref 2.5–4.5)
PLATELET # BLD AUTO: 190 K/UL — SIGNIFICANT CHANGE UP (ref 150–400)
PLATELET # BLD AUTO: 219 K/UL — SIGNIFICANT CHANGE UP (ref 150–400)
POTASSIUM SERPL-MCNC: 3.9 MMOL/L — SIGNIFICANT CHANGE UP (ref 3.5–5.3)
POTASSIUM SERPL-MCNC: 4.7 MMOL/L — SIGNIFICANT CHANGE UP (ref 3.5–5.3)
POTASSIUM SERPL-SCNC: 3.9 MMOL/L — SIGNIFICANT CHANGE UP (ref 3.5–5.3)
POTASSIUM SERPL-SCNC: 4.7 MMOL/L — SIGNIFICANT CHANGE UP (ref 3.5–5.3)
PROT SERPL-MCNC: 5.8 G/DL — LOW (ref 6–8.3)
PROT SERPL-MCNC: 6.6 G/DL — SIGNIFICANT CHANGE UP (ref 6–8.3)
RBC # BLD: 2.74 M/UL — LOW (ref 3.8–5.2)
RBC # BLD: 3.08 M/UL — LOW (ref 3.8–5.2)
RBC # FLD: 15.1 % — HIGH (ref 10.3–14.5)
RBC # FLD: 15.1 % — HIGH (ref 10.3–14.5)
SARS-COV-2 RNA SPEC QL NAA+PROBE: DETECTED
SODIUM SERPL-SCNC: 138 MMOL/L — SIGNIFICANT CHANGE UP (ref 135–145)
SODIUM SERPL-SCNC: 139 MMOL/L — SIGNIFICANT CHANGE UP (ref 135–145)
TACROLIMUS SERPL-MCNC: 2.5 NG/ML — SIGNIFICANT CHANGE UP
TACROLIMUS SERPL-MCNC: 2.6 NG/ML — SIGNIFICANT CHANGE UP
TIBC SERPL-MCNC: 205 UG/DL — LOW (ref 220–430)
TRANSFERRIN SERPL-MCNC: 160 MG/DL — LOW (ref 200–360)
UIBC SERPL-MCNC: 152 UG/DL — SIGNIFICANT CHANGE UP (ref 110–370)
VIT B12 SERPL-MCNC: 538 PG/ML — SIGNIFICANT CHANGE UP (ref 232–1245)
WBC # BLD: 3.71 K/UL — LOW (ref 3.8–10.5)
WBC # BLD: 3.92 K/UL — SIGNIFICANT CHANGE UP (ref 3.8–10.5)
WBC # FLD AUTO: 3.71 K/UL — LOW (ref 3.8–10.5)
WBC # FLD AUTO: 3.92 K/UL — SIGNIFICANT CHANGE UP (ref 3.8–10.5)

## 2022-02-03 PROCEDURE — 99222 1ST HOSP IP/OBS MODERATE 55: CPT

## 2022-02-03 PROCEDURE — 76776 US EXAM K TRANSPL W/DOPPLER: CPT | Mod: 26,RT

## 2022-02-03 PROCEDURE — 86077 PHYS BLOOD BANK SERV XMATCH: CPT

## 2022-02-03 PROCEDURE — 99222 1ST HOSP IP/OBS MODERATE 55: CPT | Mod: GC

## 2022-02-03 PROCEDURE — 99232 SBSQ HOSP IP/OBS MODERATE 35: CPT

## 2022-02-03 RX ORDER — HYDRALAZINE HCL 50 MG
10 TABLET ORAL ONCE
Refills: 0 | Status: COMPLETED | OUTPATIENT
Start: 2022-02-03 | End: 2022-02-03

## 2022-02-03 RX ORDER — ASPIRIN/CALCIUM CARB/MAGNESIUM 324 MG
81 TABLET ORAL DAILY
Refills: 0 | Status: DISCONTINUED | OUTPATIENT
Start: 2022-02-03 | End: 2022-02-03

## 2022-02-03 RX ORDER — HYDRALAZINE HCL 50 MG
100 TABLET ORAL THREE TIMES A DAY
Refills: 0 | Status: DISCONTINUED | OUTPATIENT
Start: 2022-02-03 | End: 2022-02-04

## 2022-02-03 RX ORDER — LABETALOL HCL 100 MG
200 TABLET ORAL
Refills: 0 | Status: DISCONTINUED | OUTPATIENT
Start: 2022-02-04 | End: 2022-02-04

## 2022-02-03 RX ORDER — LABETALOL HCL 100 MG
100 TABLET ORAL ONCE
Refills: 0 | Status: DISCONTINUED | OUTPATIENT
Start: 2022-02-03 | End: 2022-02-04

## 2022-02-03 RX ORDER — TACROLIMUS 5 MG/1
3 CAPSULE ORAL DAILY
Refills: 0 | Status: DISCONTINUED | OUTPATIENT
Start: 2022-02-03 | End: 2022-02-03

## 2022-02-03 RX ORDER — NIFEDIPINE 30 MG
60 TABLET, EXTENDED RELEASE 24 HR ORAL ONCE
Refills: 0 | Status: COMPLETED | OUTPATIENT
Start: 2022-02-03 | End: 2022-02-03

## 2022-02-03 RX ORDER — TACROLIMUS 5 MG/1
3 CAPSULE ORAL ONCE
Refills: 0 | Status: COMPLETED | OUTPATIENT
Start: 2022-02-03 | End: 2022-02-03

## 2022-02-03 RX ORDER — TACROLIMUS 5 MG/1
3 CAPSULE ORAL
Refills: 0 | Status: DISCONTINUED | OUTPATIENT
Start: 2022-02-04 | End: 2022-02-04

## 2022-02-03 RX ORDER — SODIUM BICARBONATE 1 MEQ/ML
50 SYRINGE (ML) INTRAVENOUS ONCE
Refills: 0 | Status: COMPLETED | OUTPATIENT
Start: 2022-02-03 | End: 2022-02-03

## 2022-02-03 RX ORDER — ATOVAQUONE 750 MG/5ML
1500 SUSPENSION ORAL DAILY
Refills: 0 | Status: DISCONTINUED | OUTPATIENT
Start: 2022-02-03 | End: 2022-02-04

## 2022-02-03 RX ADMIN — Medication 100 MILLIGRAM(S): at 14:26

## 2022-02-03 RX ADMIN — Medication 500000 UNIT(S): at 05:24

## 2022-02-03 RX ADMIN — Medication 50 MILLIEQUIVALENT(S): at 03:45

## 2022-02-03 RX ADMIN — Medication 1300 MILLIGRAM(S): at 18:03

## 2022-02-03 RX ADMIN — SODIUM ZIRCONIUM CYCLOSILICATE 10 GRAM(S): 10 POWDER, FOR SUSPENSION ORAL at 16:00

## 2022-02-03 RX ADMIN — Medication 60 MILLIGRAM(S): at 22:08

## 2022-02-03 RX ADMIN — Medication 5 MILLIGRAM(S): at 05:24

## 2022-02-03 RX ADMIN — Medication 1300 MILLIGRAM(S): at 05:23

## 2022-02-03 RX ADMIN — ATOVAQUONE 1500 MILLIGRAM(S): 750 SUSPENSION ORAL at 13:07

## 2022-02-03 RX ADMIN — LATANOPROST 1 DROP(S): 0.05 SOLUTION/ DROPS OPHTHALMIC; TOPICAL at 22:13

## 2022-02-03 RX ADMIN — Medication 500000 UNIT(S): at 00:05

## 2022-02-03 RX ADMIN — SODIUM ZIRCONIUM CYCLOSILICATE 10 GRAM(S): 10 POWDER, FOR SUSPENSION ORAL at 00:01

## 2022-02-03 RX ADMIN — Medication 60 MILLIGRAM(S): at 05:23

## 2022-02-03 RX ADMIN — Medication 60 MILLIGRAM(S): at 00:48

## 2022-02-03 RX ADMIN — PANTOPRAZOLE SODIUM 40 MILLIGRAM(S): 20 TABLET, DELAYED RELEASE ORAL at 05:35

## 2022-02-03 RX ADMIN — LATANOPROST 1 DROP(S): 0.05 SOLUTION/ DROPS OPHTHALMIC; TOPICAL at 00:01

## 2022-02-03 RX ADMIN — Medication 100 MILLIGRAM(S): at 22:09

## 2022-02-03 RX ADMIN — TACROLIMUS 3 MILLIGRAM(S): 5 CAPSULE ORAL at 13:07

## 2022-02-03 RX ADMIN — Medication 10 MILLIGRAM(S): at 00:46

## 2022-02-03 RX ADMIN — Medication 100 MILLIGRAM(S): at 05:23

## 2022-02-03 NOTE — CONSULT NOTE ADULT - SUBJECTIVE AND OBJECTIVE BOX
Patient is a 76y old  Female who presents with a chief complaint of Hyperkalemia (2022 11:12)    HPI:    75 y/o female with PMHx of ESRDHTN, Glaucoma. anemia 2/2 hereditary spherocytosis requiring hospitalization and blood transfusions in 10/202,  cataract s/p extraction with b/l lense placement (), DVT of left subclavian vein (2017). She is s/p  DDRT on 21 post operative course complicated by DGF requiring HD, 2/2 ATN (last hd was 21). In November she had an YANELY with creatinine of 1.65mm/dl. Renal US demonstrated elevated velocities concerning for TRAS. Conservatively managed. She was sent in for hyperkalemia on out patient labs this morning her K was 6.3, on admission K 4.8. She took a dose of Lokelma before coming to the hospital.  She currently denies fever, chills , chest pain, SOB, n/v/d, dysuria or recent sick contacts.    Previous admission for symptomatic anemia (Hgb 5g/dL) requiring hospitalization 10/2021 Transfused 2 units PRBC. Anemia was due to hemolysis caused by dapsone (despite normal G6PD levels prior to initiation).      (2022 22:25)     prior hospital charts reviewed [  ]  primary team notes reviewed [  ]  other consultant notes reviewed [  ]    PAST MEDICAL & SURGICAL HISTORY:  HTN (hypertension)    Glaucoma    Cataract    ESRD (end stage renal disease) on dialysis    DVT (deep venous thrombosis)  of Left subclavian vein, 17    Hemodialysis patient  MWF    Acquired cataract  extraction with b/l lense placement,     H/O: glaucoma  surgery,     AV fistula      S/P KEVEN-BSO  for fibroids,     Hemodialysis access, AV graft    Transplanted kidney  21    History of renal transplantation  DDRT 2021        Allergies  Mushrooms (Anaphylaxis)  penicillin (Rash)    ANTIMICROBIALS (past 90 days)  MEDICATIONS  (STANDING):    nystatin    Suspension   541045 Unit(s) Oral (22 @ 05:24)   [id number] Unit(s) Oral (22 @ 00:05)      ANTIMICROBIALS:    atovaquone  Suspension 1500 daily    OTHER MEDS: MEDICATIONS  (STANDING):  hydrALAZINE 100 three times a day  labetalol 100 once  NIFEdipine XL 60 two times a day  pantoprazole    Tablet 40 before breakfast  predniSONE   Tablet 5 daily  tacrolimus ER Tablet (ENVARSUS XR) 3 once    SOCIAL HISTORY:   hx smoking  non-smoker    FAMILY HISTORY:  Family history of cerebrovascular accident (CVA)    Family history of colon cancer (Sibling)      REVIEW OF SYSTEMS  [  ] ROS unobtainable because:    [  ] All other systems negative except as noted below:	    Constitutional:  [ ] fever [ ] chills  [ ] weight loss  [ ] weakness  Skin:  [ ] rash [ ] phlebitis	  Eyes: [ ] icterus [ ] pain  [ ] discharge	  ENMT: [ ] sore throat  [ ] thrush [ ] ulcers [ ] exudates  Respiratory: [ ] dyspnea [ ] hemoptysis [ ] cough [ ] sputum	  Cardiovascular:  [ ] chest pain [ ] palpitations [ ] edema	  Gastrointestinal:  [ ] nausea [ ] vomiting [ ] diarrhea [ ] constipation [ ] pain	  Genitourinary:  [ ] dysuria [ ] frequency [ ] hematuria [ ] discharge [ ] flank pain  [ ] incontinence  Musculoskeletal:  [ ] myalgias [ ] arthralgias [ ] arthritis  [ ] back pain  Neurological:  [ ] headache [ ] seizures  [ ] confusion/altered mental status  Psychiatric:  [ ] anxiety [ ] depression	  Hematology/Lymphatics:  [ ] lymphadenopathy  Endocrine:  [ ] adrenal [ ] thyroid  Allergic/Immunologic:	 [ ] transplant [ ] seasonal    Vital Signs Last 24 Hrs  T(F): 98.2 (22 @ 11:23), Max: 98.9 (22 @ 20:03)  Vital Signs Last 24 Hrs  HR: 75 (22 @ 11:23) (70 - 76)  BP: 149/57 (22 @ 11:23) (144/70 - 185/74)  RR: 18 (22 @ 11:23)  SpO2: 100% (22 @ 11:23) (99% - 100%)  Wt(kg): --    PHYSICAL EXAMINATION:  General: Alert and Awake, NAD  HEENT: PERRL, EOMI, No subconjunctival hemorrhages, Oropharynx Clear, MMM  Neck: Supple, No SUDHEER  Cardiac: RRR, No M/R/G  Resp: CTAB, No Wh/Rh/Ra  Abdomen: NBS, NT/ND, No HSM, No rigidity or guarding  MSK: No LE edema. No stigmata of IE. No evidence of phlebitis. No evidence of synovitis.  : No sethi  Skin: No rashes or lesions. Skin is warm and dry to the touch.   Neuro: Alert and Awake. CN 2-12 Grossly intact. Moves all four extremities spontaneously.  Psych: Calm, Pleasant, Cooperative                          8.3    3.92  )-----------( 219      ( 2022 08:58 )             27.7     02-    138  |  107  |  41<H>  ----------------------------<  133<H>  4.7   |  17<L>  |  1.75<H>    Ca    9.7      2022 09:11  Phos  4.0     -  Mg     1.6     -    TPro  6.6  /  Alb  4.3  /  TBili  0.3  /  DBili  x   /  AST  18  /  ALT  6<L>  /  AlkPhos  117  -    Urinalysis Basic - ( 2022 23:35 )    Color: Colorless / Appearance: Clear / S.008 / pH: x  Gluc: x / Ketone: Negative  / Bili: Negative / Urobili: Negative   Blood: x / Protein: Trace / Nitrite: Negative   Leuk Esterase: Negative / RBC: 1 /hpf / WBC 0 /HPF   Sq Epi: x / Non Sq Epi: 0 /hpf / Bacteria: Negative    MICROBIOLOGY:      RADIOLOGY:    <The imaging below has been reviewed and visualized by me independently. Findings as detailed in report below>    < from: US Trans Kidney w/ Doppler, Right (21 @ 15:42) >  IMPRESSION:    Elevated renal transplant artery velocities proximally, increased since 2021, raising the possibility of stenosis. Findings are possibly technical, and continued attention on follow-up imaging is recommended.    Elevated resistive indices, similar to the prior ultrasound exam of 2021.    Trace perinephric fluid along the upper pole of the transplant, decreased since 10/5/2021.    No hydronephrosis.    Findings were conveyed via secure message to with AG COPPOLA 2021 4:02 PM.    < end of copied text >     Patient is a 76y old  Female who presents with a chief complaint of Hyperkalemia (2022 11:12)    HPI:    75 y/o female with PMHx of ESRDHTN, Glaucoma. anemia 2/2 hereditary spherocytosis requiring hospitalization and blood transfusions in 10/202,  cataract s/p extraction with b/l lense placement (), DVT of left subclavian vein (2017). She is s/p  DDRT on 21 post operative course complicated by DGF requiring HD, 2/2 ATN (last hd was 21). In November she had an YANELY with creatinine of 1.65mm/dl. Renal US demonstrated elevated velocities concerning for TRAS. Conservatively managed. She was sent in for hyperkalemia on out patient labs this morning her K was 6.3, on admission K 4.8. She took a dose of Lokelma before coming to the hospital.  She currently denies fever, chills , chest pain, SOB, n/v/d, dysuria or recent sick contacts.    Previous admission for symptomatic anemia (Hgb 5g/dL) requiring hospitalization 10/2021 Transfused 2 units PRBC. Anemia was due to hemolysis caused by dapsone (despite normal G6PD levels prior to initiation).      (2022 22:25)     prior hospital charts reviewed [  ]  primary team notes reviewed [ x ]  other consultant notes reviewed [  x]    PAST MEDICAL & SURGICAL HISTORY:  HTN (hypertension)    Glaucoma    Cataract    ESRD (end stage renal disease) on dialysis    DVT (deep venous thrombosis)  of Left subclavian vein, 17    Hemodialysis patient  MWF    Acquired cataract  extraction with b/l lense placement,     H/O: glaucoma  surgery,     AV fistula      S/P KEVEN-BSO  for fibroids,     Hemodialysis access, AV graft    Transplanted kidney  21    History of renal transplantation  DDRT 2021        Allergies  Mushrooms (Anaphylaxis)  penicillin (Rash)    ANTIMICROBIALS (past 90 days)  MEDICATIONS  (STANDING):    nystatin    Suspension   [id number] Unit(s) Oral (22 @ 05:24)   [id number] Unit(s) Oral (22 @ 00:05)      ANTIMICROBIALS:    atovaquone  Suspension 1500 daily    OTHER MEDS: MEDICATIONS  (STANDING):  hydrALAZINE 100 three times a day  labetalol 100 once  NIFEdipine XL 60 two times a day  pantoprazole    Tablet 40 before breakfast  predniSONE   Tablet 5 daily  tacrolimus ER Tablet (ENVARSUS XR) 3 once    SOCIAL HISTORY: denies smoking or etoh use. no drug use.     FAMILY HISTORY:  Family history of cerebrovascular accident (CVA)    Family history of colon cancer (Sibling)      REVIEW OF SYSTEMS  [  ] ROS unobtainable because:    [ x ] All other systems negative except as noted below:	    Constitutional:  [ ] fever [ ] chills  [ ] weight loss  [ ] weakness  Skin:  [ ] rash [ ] phlebitis	  Eyes: [ ] icterus [ ] pain  [ ] discharge	  ENMT: [ ] sore throat  [ ] thrush [ ] ulcers [ ] exudates  Respiratory: [ ] dyspnea [ ] hemoptysis [ ] cough [ ] sputum	  Cardiovascular:  [ ] chest pain [ ] palpitations [ ] edema	  Gastrointestinal:  [ ] nausea [ ] vomiting [ ] diarrhea [ ] constipation [ ] pain	  Genitourinary:  [ ] dysuria [ ] frequency [ ] hematuria [ ] discharge [ ] flank pain  [ ] incontinence  Musculoskeletal:  [ ] myalgias [ ] arthralgias [ ] arthritis  [ ] back pain  Neurological:  [ ] headache [ ] seizures  [ ] confusion/altered mental status  Psychiatric:  [ ] anxiety [ ] depression	  Hematology/Lymphatics:  [ ] lymphadenopathy  Endocrine:  [ ] adrenal [ ] thyroid  Allergic/Immunologic:	 [ ] transplant [ ] seasonal    Vital Signs Last 24 Hrs  T(F): 98.2 (22 @ 11:23), Max: 98.9 (22 @ 20:03)  Vital Signs Last 24 Hrs  HR: 75 (22 @ 11:23) (70 - 76)  BP: 149/57 (22 @ 11:23) (144/70 - 185/74)  RR: 18 (22 @ 11:23)  SpO2: 100% (22 @ 11:23) (99% - 100%)  Wt(kg): --    PHYSICAL EXAMINATION:  General: Alert and Awake, NAD  HEENT: PERRL, EOMI  Neck: Supple, No SUDHEER  Cardiac: RRR, No M/R/G  Resp: CTAB, No Wh/Rh/Ra  Abdomen: NBS, NT/ND, No HSM, No rigidity or guarding  MSK: No LE edema. No stigmata of IE. No evidence of phlebitis. No evidence of synovitis.  : No sethi  Skin: No rashes or lesions. Skin is warm and dry to the touch.   Neuro: Alert and Awake. CN 2-12 Grossly intact. Moves all four extremities spontaneously.  Psych: Calm, Pleasant, Cooperative                          8.3    3.92  )-----------( 219      ( 2022 08:58 )             27.7         138  |  107  |  41<H>  ----------------------------<  133<H>  4.7   |  17<L>  |  1.75<H>    Ca    9.7      2022 09:11  Phos  4.0       Mg     1.6         TPro  6.6  /  Alb  4.3  /  TBili  0.3  /  DBili  x   /  AST  18  /  ALT  6<L>  /  AlkPhos  117  -    Urinalysis Basic - ( 2022 23:35 )    Color: Colorless / Appearance: Clear / S.008 / pH: x  Gluc: x / Ketone: Negative  / Bili: Negative / Urobili: Negative   Blood: x / Protein: Trace / Nitrite: Negative   Leuk Esterase: Negative / RBC: 1 /hpf / WBC 0 /HPF   Sq Epi: x / Non Sq Epi: 0 /hpf / Bacteria: Negative    MICROBIOLOGY:    COVID-19 PCR . (22 @ 21:43)   COVID-19 PCR: Detected:    RADIOLOGY:    <The imaging below has been reviewed and visualized by me independently. Findings as detailed in report below>    < from: US Trans Kidney w/ Doppler, Right (21 @ 15:42) >  IMPRESSION:    Elevated renal transplant artery velocities proximally, increased since 2021, raising the possibility of stenosis. Findings are possibly technical, and continued attention on follow-up imaging is recommended.    Elevated resistive indices, similar to the prior ultrasound exam of 2021.    Trace perinephric fluid along the upper pole of the transplant, decreased since 10/5/2021.    No hydronephrosis.    Findings were conveyed via secure message to with AG COPPOLA 2021 4:02 PM.    < end of copied text >

## 2022-02-03 NOTE — CONSULT NOTE ADULT - PROBLEM SELECTOR RECOMMENDATION 4
- Monoclonal Abs today.          If you have any questions, please feel free to contact me  Violeta Weir  Nephrology Fellow  Pager NS: [phone]/ [facility]: [phone]    (After 5 pm or on weekends please page the on-call fellow, can check [email or web address] for schedule. Login is jp alves, schedule under [facility] medicine, psych, derm)

## 2022-02-03 NOTE — CONSULT NOTE ADULT - SUBJECTIVE AND OBJECTIVE BOX
[facility] DIVISION OF KIDNEY DISEASES AND HYPERTENSION -- [phone]  -- INITIAL CONSULT NOTE  --------------------------------------------------------------------------------  HPI: 77 y/o female with PMHx of ESRD, HTN, Glaucoma, anemia 2/2 hereditary spherocytosis requiring hospitalization and blood transfusions in 10/202,  cataract s/p extraction with b/l lense placement (20156), DVT of left subclavian vein (6/2017). She is s/p DDRT on 5/19/21 (Dr. Henderson) post operative course complicated by DGF requiring HD, 2/2 ATN (last hd was 6/4/21). In November she had an YANELY with creatinine of 1.65mm/dl. Renal US demonstrated elevated velocities concerning for TRAS. Conservatively managed. She was sent in for hyperkalemia on out patient labs this morning her K was 6.3, on admission K 4.8. She took a dose of Lokelma before coming to the hospital.  She currently denies fever, chills , chest pain, SOB, n/v/d, dysuria or recent sick contacts. Previous admission for symptomatic anemia (Hgb 5g/dL) requiring hospitalization 10/2021 Transfused 2 units PRBC. Anemia was due to hemolysis caused by dapsone (despite normal G6PD levels prior to initiation). Hg on admission was 7.4              Patient seen & examined. Denies chest pain, fever, chills, increased frequency, dysuria, hematuria, pus in urine, frothy urine, SOB, leg edema, N/V. Pt Received lasix 40mg IV and Lokelma.  K+ 3.9 today. UOP 800cc in 24 hrs.       PAST HISTORY  --------------------------------------------------------------------------------  PAST MEDICAL & SURGICAL HISTORY:  HTN (hypertension)    Glaucoma    Cataract    ESRD (end stage renal disease) on dialysis    DVT (deep venous thrombosis)  of Left subclavian vein, 06/12/17    Hemodialysis patient  MWF    Acquired cataract  extraction with b/l lense placement, 2016    H/O: glaucoma  surgery, 2002    AV fistula  2015    S/P KEVEN-BSO  for fibroids, 2012    Hemodialysis access, AV graft    Transplanted kidney  5/19/21    History of renal transplantation  DDRT 5/19/2021      FAMILY HISTORY:  Family history of cerebrovascular accident (CVA)    Family history of colon cancer (Sibling)      PAST SOCIAL HISTORY:    ALLERGIES & MEDICATIONS  --------------------------------------------------------------------------------  Allergies    Mushrooms (Anaphylaxis)  penicillin (Rash)    Intolerances      Standing Inpatient Medications  atovaquone  Suspension 1500 milliGRAM(s) Oral daily  hydrALAZINE 100 milliGRAM(s) Oral three times a day  labetalol 100 milliGRAM(s) Oral once  latanoprost 0.005% Ophthalmic Solution 1 Drop(s) Both EYES at bedtime  NIFEdipine XL 60 milliGRAM(s) Oral two times a day  pantoprazole    Tablet 40 milliGRAM(s) Oral before breakfast  predniSONE   Tablet 5 milliGRAM(s) Oral daily  sodium bicarbonate 1300 milliGRAM(s) Oral two times a day  sodium zirconium cyclosilicate 10 Gram(s) Oral daily    PRN Inpatient Medications      REVIEW OF SYSTEMS  --------------------------------------------------------------------------------  Gen: No  fevers/chills  Respiratory: No dyspnea, cough  CV: No chest pain  GI: No abdominal pain, diarrhea,  nausea, vomiting  : No increased frequency, dysuria, hematuria  MSK:  no edema  Neuro: No dizziness/lightheadedness    All other systems were reviewed and are negative, except as noted.    VITALS/PHYSICAL EXAM  --------------------------------------------------------------------------------  T(C): 36.8 (02-03-22 @ 11:23), Max: 37.2 (02-02-22 @ 20:03)  HR: 75 (02-03-22 @ 11:23) (70 - 76)  BP: 149/57 (02-03-22 @ 11:23) (144/70 - 185/74)  RR: 18 (02-03-22 @ 11:23) (16 - 18)  SpO2: 100% (02-03-22 @ 11:23) (99% - 100%)  Wt(kg): --  Height (cm): 167.6 (02-02-22 @ 20:03)  Weight (kg): 55.8 (02-02-22 @ 20:03)  BMI (kg/m2): 19.9 (02-02-22 @ 20:03)  BSA (m2): 1.63 (02-02-22 @ 20:03)      02-02-22 @ 07:01  -  02-03-22 @ 07:00  --------------------------------------------------------  IN: 0 mL / OUT: 800 mL / NET: -800 mL      Physical Exam:  	Gen: NAD  	HEENT: MMM  	Pulm: CTA B/L  	CV: S1S2  	Abd: Soft, +BS   	Ext: No LE edema B/L, no asterixis  	Neuro: Awake, alert  	Skin: Warm and dry  	Vascular access:    LABS/STUDIES  --------------------------------------------------------------------------------              8.3    3.92  >-----------<  219      [02-03-22 @ 08:58]              27.7     138  |  107  |  41  ----------------------------<  133      [02-03-22 @ 09:11]  4.7   |  17  |  1.75        Ca     9.7     [02-03-22 @ 09:11]      Mg     1.6     [02-03-22 @ 09:11]      Phos  4.0     [02-03-22 @ 09:11]    TPro  6.6  /  Alb  4.3  /  TBili  0.3  /  DBili  x   /  AST  18  /  ALT  6   /  AlkPhos  117  [02-03-22 @ 09:11]    PT/INR: PT 12.6 , INR 1.05       [02-02-22 @ 21:41]  PTT: 32.6       [02-02-22 @ 21:41]          [02-02-22 @ 21:41]    Creatinine Trend:  SCr 1.75 [02-03 @ 09:11]  SCr 1.82 [02-03 @ 05:39]  SCr 2.00 [02-02 @ 21:41]    Urinalysis - [02-02-22 @ 23:35]      Color Colorless / Appearance Clear / SG 1.008 / pH 6.5      Gluc Negative / Ketone Negative  / Bili Negative / Urobili Negative       Blood Negative / Protein Trace / Leuk Est Negative / Nitrite Negative      RBC 1 / WBC 0 / Hyaline  / Gran  / Sq Epi  / Non Sq Epi 0 / Bacteria Negative      Iron 53, TIBC 205, %sat 26      [02-03-22 @ 02:41]  Ferritin 1921      [10-06-21 @ 16:15]  HbA1c 4.6      [05-28-19 @ 09:49]    HBsAb 22.4      [05-19-21 @ 10:42]  HBsAb Reactive      [10-19-19 @ 01:09]  HBsAg Nonreact      [05-19-21 @ 10:42]  HBcAb Nonreact      [05-19-21 @ 10:42]  HCV 0.42, Nonreact      [05-19-21 @ 10:42]  HIV Nonreact      [05-19-21 @ 10:45]    STEPHANIE: titer Negative, pattern --      [08-13-20 @ 10:27]  Immunofixation Serum:   No Monoclonal Band Identified    Reference Range: None Detected      [02-05-21 @ 06:39]  SPEP Interpretation: Normal Electrophoresis Pattern      [02-05-21 @ 06:39]   [facility] DIVISION OF KIDNEY DISEASES AND HYPERTENSION -- [phone]  -- INITIAL CONSULT NOTE  --------------------------------------------------------------------------------  HPI: 77 y/o female with PMHx of ESRD, HTN, Glaucoma, anemia 2/2 hereditary spherocytosis requiring hospitalization and blood transfusions in 10/202,  cataract s/p extraction with b/l lense placement (20156), DVT of left subclavian vein (6/2017). She is s/p DDRT on 5/19/21 (Dr. Henderson) post operative course complicated by DGF requiring HD, 2/2 ATN (last hd was 6/4/21). In November she had an YANELY with creatinine of 1.65mm/dl. Renal US demonstrated elevated velocities concerning for TRAS. Conservatively managed. She was sent in for hyperkalemia on out patient labs this morning her K was 6.3, on admission K 4.8. She took a dose of Lokelma before coming to the hospital.  She currently denies fever, chills , chest pain, SOB, n/v/d, dysuria or recent sick contacts. Previous admission for symptomatic anemia (Hgb 5g/dL) requiring hospitalization 10/2021 Transfused 2 units PRBC. Anemia was due to hemolysis caused by dapsone (despite normal G6PD levels prior to initiation). Hg on admission was 7.4              Patient seen & examined. Denies chest pain, fever, chills, increased frequency, dysuria, hematuria, pus in urine, frothy urine, SOB, leg edema, N/V. Pt Received lasix 40mg IV and Lokelma.  K+ 3.9 today. UOP 800cc in 24 hrs.       PAST HISTORY  --------------------------------------------------------------------------------  PAST MEDICAL & SURGICAL HISTORY:  HTN (hypertension)    Glaucoma    Cataract    ESRD (end stage renal disease) on dialysis    DVT (deep venous thrombosis)  of Left subclavian vein, 06/12/17    Hemodialysis patient  MWF    Acquired cataract  extraction with b/l lense placement, 2016    H/O: glaucoma  surgery, 2002    AV fistula  2015    S/P KEVEN-BSO  for fibroids, 2012    Hemodialysis access, AV graft    Transplanted kidney  5/19/21    History of renal transplantation  DDRT 5/19/2021      FAMILY HISTORY:  Family history of cerebrovascular accident (CVA)    Family history of colon cancer (Sibling)      PAST SOCIAL HISTORY:    ALLERGIES & MEDICATIONS  --------------------------------------------------------------------------------  Allergies    Mushrooms (Anaphylaxis)  penicillin (Rash)    Intolerances      Standing Inpatient Medications  atovaquone  Suspension 1500 milliGRAM(s) Oral daily  hydrALAZINE 100 milliGRAM(s) Oral three times a day  labetalol 100 milliGRAM(s) Oral once  latanoprost 0.005% Ophthalmic Solution 1 Drop(s) Both EYES at bedtime  NIFEdipine XL 60 milliGRAM(s) Oral two times a day  pantoprazole    Tablet 40 milliGRAM(s) Oral before breakfast  predniSONE   Tablet 5 milliGRAM(s) Oral daily  sodium bicarbonate 1300 milliGRAM(s) Oral two times a day  sodium zirconium cyclosilicate 10 Gram(s) Oral daily    PRN Inpatient Medications      REVIEW OF SYSTEMS  --------------------------------------------------------------------------------  Gen: No  fevers/chills  Respiratory: No dyspnea, cough  CV: No chest pain  GI: No abdominal pain, diarrhea,  nausea, vomiting  : No increased frequency, dysuria, hematuria  MSK:  no edema  Neuro: No dizziness/lightheadedness    All other systems were reviewed and are negative, except as noted.    VITALS/PHYSICAL EXAM  --------------------------------------------------------------------------------  T(C): 36.8 (02-03-22 @ 11:23), Max: 37.2 (02-02-22 @ 20:03)  HR: 75 (02-03-22 @ 11:23) (70 - 76)  BP: 149/57 (02-03-22 @ 11:23) (144/70 - 185/74)  RR: 18 (02-03-22 @ 11:23) (16 - 18)  SpO2: 100% (02-03-22 @ 11:23) (99% - 100%)  Wt(kg): --  Height (cm): 167.6 (02-02-22 @ 20:03)  Weight (kg): 55.8 (02-02-22 @ 20:03)  BMI (kg/m2): 19.9 (02-02-22 @ 20:03)  BSA (m2): 1.63 (02-02-22 @ 20:03)      02-02-22 @ 07:01  -  02-03-22 @ 07:00  --------------------------------------------------------  IN: 0 mL / OUT: 800 mL / NET: -800 mL      Physical Exam:  	Gen: NAD  	HEENT: MMM  	Pulm: CTA B/L  	CV: S1S2  	Abd: Soft, +BS   	Ext: No LE edema B/L, no asterixis  	Neuro: Awake, alert  	Skin: Warm and dry      LABS/STUDIES  --------------------------------------------------------------------------------              8.3    3.92  >-----------<  219      [02-03-22 @ 08:58]              27.7     138  |  107  |  41  ----------------------------<  133      [02-03-22 @ 09:11]  4.7   |  17  |  1.75        Ca     9.7     [02-03-22 @ 09:11]      Mg     1.6     [02-03-22 @ 09:11]      Phos  4.0     [02-03-22 @ 09:11]    TPro  6.6  /  Alb  4.3  /  TBili  0.3  /  DBili  x   /  AST  18  /  ALT  6   /  AlkPhos  117  [02-03-22 @ 09:11]    PT/INR: PT 12.6 , INR 1.05       [02-02-22 @ 21:41]  PTT: 32.6       [02-02-22 @ 21:41]          [02-02-22 @ 21:41]    Creatinine Trend:  SCr 1.75 [02-03 @ 09:11]  SCr 1.82 [02-03 @ 05:39]  SCr 2.00 [02-02 @ 21:41]    Urinalysis - [02-02-22 @ 23:35]      Color Colorless / Appearance Clear / SG 1.008 / pH 6.5      Gluc Negative / Ketone Negative  / Bili Negative / Urobili Negative       Blood Negative / Protein Trace / Leuk Est Negative / Nitrite Negative      RBC 1 / WBC 0 / Hyaline  / Gran  / Sq Epi  / Non Sq Epi 0 / Bacteria Negative      Iron 53, TIBC 205, %sat 26      [02-03-22 @ 02:41]  Ferritin 1921      [10-06-21 @ 16:15]  HbA1c 4.6      [05-28-19 @ 09:49]    HBsAb 22.4      [05-19-21 @ 10:42]  HBsAb Reactive      [10-19-19 @ 01:09]  HBsAg Nonreact      [05-19-21 @ 10:42]  HBcAb Nonreact      [05-19-21 @ 10:42]  HCV 0.42, Nonreact      [05-19-21 @ 10:42]  HIV Nonreact      [05-19-21 @ 10:45]    STEPHANIE: titer Negative, pattern --      [08-13-20 @ 10:27]  Immunofixation Serum:   No Monoclonal Band Identified    Reference Range: None Detected      [02-05-21 @ 06:39]  SPEP Interpretation: Normal Electrophoresis Pattern      [02-05-21 @ 06:39]

## 2022-02-03 NOTE — CONSULT NOTE ADULT - ASSESSMENT
76 year old female PMH of ESRD s/p DDRT on 5/19/21 post operative course complicated by DGF requiring HD, 2/2 ATN (last hd was 6/4/21), Anemia 2/2 hereditary spherocytosis requiring hospitalization and blood transfusions in 10/202, cataract s/p extraction with b/l lens placement (20156), DVT of left subclavian vein (6/2017) who presents to [facility] due to hyperkalemia on outpatient labwork.     Found to be COVID19 PCR Positive  Patient triple vaccinated with Pfizer with dose 3 in 10/2021  Denies any symptoms at the present time   was sick with COVID19 ~1 week ago    As the patient does not have symptoms she does not qualify for monoclonal antibody or remdesivir at the present time  She would qualify for monoclonal antibody if she develops symptoms    #COVID19  --If patient develops symptoms secondary to COVID19 patient would qualify for Sotrovimab  --No therapies at present time for COVID19  --Recommend isolating for COVID19 for 20 days from positive PCR    #Renal Transplant Recipient  --Continue Atovaquone for PCP PPx (was on bactrim which is held due to hyperkalemia)  --Follow up on CMV PCR (off of valcyte)    I will continue to follow. Please feel free to contact me with any further questions.    Omar Tadeo M.D.  [facility] Division of Infectious Disease  8AM-5PM: Pager Number [phone]  After Hours (or if no response): Please contact the Infectious Diseases Office at [phone]     The above assessment and plan were discussed with transplant surgery team

## 2022-02-03 NOTE — PHARMACOTHERAPY INTERVENTION NOTE - COMMENTS
Performed medication reconciliation and home medication list updated in outpatient medication review. Medications verified with patient's son Akbar. Son believes hyperkalemia episode is due to pt ingesting too many plantains. Sees nephrology Dr. Arguelles at [address] [place] [address].     Home Medications:  aspirin 81 mg chewable tab daily - Per son, pt told by PCP to take It EVERY OTHER DAY  CellCept 500 mg oral tablet: 1 tab(s) orally 2 times a day   hydrALAZINE 100 mg oral tablet: 1 tab(s) orally 3 times a day   labetalol 200 mg oral tablet: 1 tab(s) orally 2 times a day  latanoprost 0.005% ophthalmic solution: 1 drop(s) to each affected eye once a day (at bedtime)  NIFEdipine 60 mg oral tablet, extended release: 1 tab(s) orally 2 times a day   pantoprazole 40 mg oral DR daily  predniSONE 5 mg oral tablet: 1 tab(s) orally once a day   Procrit 10,000 units/mL preservative-free injectable solution: injectable once a week on Wednesdays - pt due for a dose yesterday but missed it  tacrolimus 1 mg oral tablet, extended release (Envarsus XR) 3 milligram(s) orally once a day   Laxatives PRN  Lokelma 10g daily  Bactrim 400/80mg daily  sodium bicarb 650mg, 2 tab twice daily    Valganciclovir d/c a month and half ago.     Please refer to specifics in home medication list (outpatient medication review).    Doreen Kolb, PharmD, [facility]  Clinical Pharmacy Specialist  [phone] or Teams

## 2022-02-03 NOTE — CONSULT NOTE ADULT - TIME BILLING
Kidney Recipient with functioning allograft  COVID 19 infection  hyperkalemia, improved  Anemia  Reviewed immunosuppression, allograft function, management regimen for comorbidities  Suggestions:  Maintain low therapeutic Tacrolimus level, Hold MMF and continue steroids.  Will follow  I was present during and reviewed clinical and lab data as well as assessment and plan as documented by the house staff as noted. Please contact if any additional questions with any change in clinical condition or on availability of any additional information or reports.

## 2022-02-03 NOTE — DISCHARGE NOTE PROVIDER - NSDCCPCAREPLAN_GEN_ALL_CORE_FT
PRINCIPAL DISCHARGE DIAGNOSIS  Diagnosis: Hyperkalemia  Assessment and Plan of Treatment: Now resolved  Please follow up with your primary care physician or nephrologist in one week for repeat blood work   No concentrated potassium diet         SECONDARY DISCHARGE DIAGNOSES  Diagnosis: 2019 novel coronavirus disease (COVID-19)  Assessment and Plan of Treatment: You were evaluted by infectious disease doctor   No respiratory distress at this time, No therapies at present time for COVID19  Recommend isolating for COVID19 for 20 days from positive COVID PCR ( 2/2/22)  Continue with supportive care, take cough medicine as needed, Tylenol for pain or fevers, and drink plenty of fluids to keep hydrated. Continue to quarantine for 20 days from your initial positive test, wash your hands frequently, and wear your mask around others. If you experience worsening shortness of breath, difficulty breathing, chest pain, lower extremity pain or swelling, coughing up blood, or persistent fevers, contact your medical doctor or return to the emergency room.       Diagnosis: YANELY (acute kidney injury)  Assessment and Plan of Treatment: Baseline ScR is 1.3-1.6. SCr on arrival was 2.   Creatinine downtrending to 1.69.    Renal doppler and US- Elevated renal transplant artery velocities, improved compared to prior study, No hydronephrosis, Increased renal parenchymal echogenicity, which may represent renal medical disease.  please follow up with your nephrologist in one week   Repeat blood work in one week       Diagnosis: Anemia  Assessment and Plan of Treatment: Previous admission for symptomatic anemia (Hgb 5g/dL) requiring hospitalization 10/2021   Hg 7.4-> 8.3  Anemia likely due to heriditary spherocytosis ( normal G6PD levels)  Please follow up with your primary care physician and transplant nephrology     PRINCIPAL DISCHARGE DIAGNOSIS  Diagnosis: Hyperkalemia  Assessment and Plan of Treatment: Now resolved  Please follow up with your primary care physician or nephrologist in one week for repeat blood work   No concentrated potassium diet         SECONDARY DISCHARGE DIAGNOSES  Diagnosis: 2019 novel coronavirus disease (COVID-19)  Assessment and Plan of Treatment: You were evaluted by infectious disease doctor   No respiratory distress at this time, No therapies at present time for COVID19  Recommend isolating for COVID19 for 20 days from positive COVID PCR ( 2/2/22)  Continue with supportive care, take cough medicine as needed, Tylenol for pain or fevers, and drink plenty of fluids to keep hydrated. Continue to quarantine for 20 days from your initial positive test, wash your hands frequently, and wear your mask around others. If you experience worsening shortness of breath, difficulty breathing, chest pain, lower extremity pain or swelling, coughing up blood, or persistent fevers, contact your medical doctor or return to the emergency room.       Diagnosis: YANELY (acute kidney injury)  Assessment and Plan of Treatment: Baseline ScR is 1.3-1.6. SCr on arrival was 2.   Creatinine downtrending to 1.69.    Renal doppler and US- Elevated renal transplant artery velocities, improved compared to prior study, No hydronephrosis, Increased renal parenchymal echogenicity, which may represent renal medical disease.  please follow up with your nephrologist in one week   Repeat blood work in one week       Diagnosis: Anemia  Assessment and Plan of Treatment: Previous admission for symptomatic anemia (Hgb 5g/dL) requiring hospitalization 10/2021   Hg 7.4-> 8.3  Anemia likely due to heriditary spherocytosis ( normal G6PD levels)  Please follow up with your primary care physician and transplant nephrology    Diagnosis: Renal transplant recipient  Assessment and Plan of Treatment: Hold cellcept for now  To increase Tacrolimus to 4 mg daily   Need repeat level in one week. Please follow up with your nephrologist

## 2022-02-03 NOTE — CONSULT NOTE ADULT - PROBLEM SELECTOR RECOMMENDATION 2
DDRT 5/19/21  - Baseline ScR is 1.3-1.6. SCr on arrival was 2.  Creatinine downtrending to 1.7.  YANELY likely from anemia  - US 11/2021 w/ elevated velocities concerning for TRAS. Conservatively managed.  Will repeat today  -UA noted  Check urine electrolytes, spot urine TP/CR.   Avoid NSAIDs, ACEI/ARBS, RCA and nephrotoxins. Dose medications as per eGFR.  Metab acidosis from CKD- Continue sodium bicarb 1300 bid   f/u BMP    - Immunosuppression: Envarsus 3mg qd, Tacro goal 6-8. Prednisone 5mg QD.  Hold MMF due to infection/leukopenia  - PPX: [id number] PO QD  - Fu G6PD  - check Tacrolimus level (send 30 minutes prior to AM dose daily)

## 2022-02-03 NOTE — PROGRESS NOTE ADULT - SUBJECTIVE AND OBJECTIVE BOX
75 y/o female with PMHx of ESRD, HTN, Glaucoma, anemia 2/2 hereditary spherocytosis requiring hospitalization and blood transfusions in 10/202,  cataract s/p extraction with b/l lense placement (20156), DVT of left subclavian vein (6/2017). She is s/p DDRT on 5/19/21 (Dr. Henderson) post operative course complicated by DGF requiring HD, 2/2 ATN (last hd was 6/4/21). In November she had an YANELY with creatinine of 1.65mm/dl. Renal US demonstrated elevated velocities concerning for TRAS. Conservatively managed. She was sent in for hyperkalemia on out patient labs this morning her K was 6.3, on admission K 4.8. She took a dose of Lokelma before coming to the hospital.  +  had covid last week. Patient is vaccinated with Pfizer x 3.     She has been on transplant surgery service for YANELY and hyperkalemia which are no resolving. Today she feels well. no cough, fevers, chills, runny nose, diarrhea, abdominal pain, melena or hematochezia.     REVIEW OF SYSTEMS:    CONSTITUTIONAL: Denies any fatigue, weakness, fevers or chills  EYES/ENT: No visual changes;  No vertigo or throat pain   NECK: No pain or stiffness  RESPIRATORY: No cough, wheezing, hemoptysis; No shortness of breath  CARDIOVASCULAR: No chest pain or palpitations  GASTROINTESTINAL: No abdominal or epigastric pain. No nausea, vomiting, or hematemesis; No diarrhea or constipation. No melena or hematochezia.  GENITOURINARY: No dysuria, frequency or hematuria  NEUROLOGICAL: No numbness or weakness  SKIN: No itching, burning, rashes, or lesions   All other review of systems is negative unless indicated above.   Received lasix 40mg IV and Lokelma.  K+ 3.9 today  - SCr 1.82 from 2.00     - H/H 7.4/24.6    Immunosupression:   Induction:        Simulect                                        Maintenance immunosuppression: ENV 3mg qd, MMF 500mg BID (held), Prednisone 5mg qd  Ongoing monitoring for signs of rejection.    Potential Discharge date: pending clinical improvement    Education:  Medications    Plan of care:  See Below    MEDICATIONS  (STANDING):  atovaquone  Suspension 1500 milliGRAM(s) Oral daily  hydrALAZINE 100 milliGRAM(s) Oral three times a day  labetalol 100 milliGRAM(s) Oral once  latanoprost 0.005% Ophthalmic Solution 1 Drop(s) Both EYES at bedtime  NIFEdipine XL 60 milliGRAM(s) Oral two times a day  pantoprazole    Tablet 40 milliGRAM(s) Oral before breakfast  predniSONE   Tablet 5 milliGRAM(s) Oral daily  sodium bicarbonate 1300 milliGRAM(s) Oral two times a day  sodium zirconium cyclosilicate 10 Gram(s) Oral daily  tacrolimus ER Tablet (ENVARSUS XR) 3 milliGRAM(s) Oral once    MEDICATIONS  (PRN):      PAST MEDICAL & SURGICAL HISTORY:  HTN (hypertension)    Glaucoma    Cataract    ESRD (end stage renal disease) on dialysis    DVT (deep venous thrombosis)  of Left subclavian vein, 06/12/17    Hemodialysis patient  MWF    Acquired cataract  extraction with b/l lense placement, 2016    H/O: glaucoma  surgery, 2002    AV fistula  2015    S/P KEVEN-BSO  for fibroids, 2012    Hemodialysis access, AV graft    Transplanted kidney  5/19/21    History of renal transplantation  DDRT 5/19/2021    chaitanya Signs Last 24 Hrs  T(C): 36.7 (03 Feb 2022 05:55), Max: 37.2 (02 Feb 2022 20:03)  T(F): 98.1 (03 Feb 2022 05:55), Max: 98.9 (02 Feb 2022 20:03)  HR: 70 (03 Feb 2022 05:55) (70 - 76)  BP: 165/65 (03 Feb 2022 05:55) (144/70 - 185/74)  BP(mean): 117 (03 Feb 2022 01:17) (117 - 117)  RR: 18 (03 Feb 2022 05:55) (16 - 18)  SpO2: 99% (03 Feb 2022 05:55) (99% - 100%)  Vital Signs Last 24 Hrs  T(C): 36.8 (03 Feb 2022 11:23), Max: 37.2 (02 Feb 2022 20:03)  T(F): 98.2 (03 Feb 2022 11:23), Max: 98.9 (02 Feb 2022 20:03)  HR: 75 (03 Feb 2022 11:23) (70 - 76)  BP: 149/57 (03 Feb 2022 11:23) (144/70 - 185/74)  BP(mean): 117 (03 Feb 2022 01:17) (117 - 117)  RR: 18 (03 Feb 2022 11:23) (16 - 18)  SpO2: 100% (03 Feb 2022 11:23) (99% - 100%)    CONSTITUTIONAL: Well-groomed, in no apparent distress  EYES: No conjunctival or scleral injection, non-icteric; PERRL and symmetric  ENMT: No external nasal lesions; nasal mucosa not inflamed; normal dentition; no pharyngeal injection or exudates, oral mucosa with moist membranes  NECK: Trachea midline without palpable neck mass; thyroid not enlarged and non-tender  RESPIRATORY: Breathing comfortably; no dullness to percussion; lungs CTA without wheeze/rhonchi/rales  CARDIOVASCULAR: +S1S2, RRR, no M/G/R; no carotid bruits; pedal pulses full and symmetric; no lower extremity edema  GASTROINTESTINAL: No palpable masses or tenderness, +BS throughout, no rebound/guarding; no hepatosplenomegaly; no hernia palpated  LYMPHATIC: No cervical LAD or tenderness; no axillary LAD or tenderness; no inguinal LAD or tenderness  MUSCULOSKELETAL:  no digital clubbing or cyanosis; no paraspinal tenderness; examination of the(head/neck, spine/ribs/pelvis, RUE, LUE, RLE, LLE) without misalignment, normal strength and tone of extremities  SKIN: No rashes or ulcers noted; no subcutaneous nodules or induration palpable right AVF+ thrill/+bruit  NEUROLOGIC: CN II-XII intact; normal reflexes in upper and lower extremities; sensation intact in LEs b/l to light touch  PSYCHIATRIC: A+O x 3; mood and affect appropriate; appropriate insight and judgment      I&O's Summary    02 Feb 2022 07:01  -  03 Feb 2022 07:00  --------------------------------------------------------  IN: 0 mL / OUT: 800 mL / NET: -800 mL                              8.3    3.92  )-----------( 219      ( 03 Feb 2022 08:58 )             27.7     02-03    138  |  107  |  41<H>  ----------------------------<  133<H>  4.7   |  17<L>  |  1.75<H>    Ca    9.7      03 Feb 2022 09:11  Phos  4.0     02-03  Mg     1.6     02-03    TPro  6.6  /  Alb  4.3  /  TBili  0.3  /  DBili  x   /  AST  18  /  ALT  6<L>  /  AlkPhos  117  02-03    Tacrolimus (), Serum: 2.6 ng/mL (02-03 @ 06:37)

## 2022-02-03 NOTE — CONSULT NOTE ADULT - PROBLEM SELECTOR RECOMMENDATION 3
-Previous admission for symptomatic anemia (Hgb 5g/dL) requiring hospitalization 10/2021   Hg 7.4 on this admission. Transfused 2 units PRBC.   Anemia likely due to heriditary spherocytosis ( normal G6PD levels)  FU iron studies. Check retic, LDH, hapto  Parvovirus PCR negative in the past

## 2022-02-03 NOTE — DISCHARGE NOTE PROVIDER - NSDCFUSCHEDAPPT_GEN_ALL_CORE_FT
ARIANNE MCNAMARA ; 03/01/2022 ; [facility]  ARIANNE MCNAMARA ; 02/15/2022 ; [facility] ARIANNE Reyes ; 03/01/2022 ; [facility]

## 2022-02-03 NOTE — PROGRESS NOTE ADULT - PROBLEM SELECTOR PLAN 5
v/w nifedipine 60mg bid, hydralazine 100mg tid, labetalol 200mg bid  BPs have improved 149/50 range, continue to monitor, can increase Hydralazine as needed

## 2022-02-03 NOTE — DISCHARGE NOTE PROVIDER - CARE PROVIDER_API CALL
Ethan Winkler)  Internal Medicine; Nephrology  [address] [place] [id number]  Phone: [phone]  Fax: [phone]  Follow Up Time: 1 week    Kourtney Tabares  [place] [address]  Phone: [phone]  Fax: [phone]  Follow Up Time: 1 week

## 2022-02-03 NOTE — PROGRESS NOTE ADULT - PROBLEM SELECTOR PLAN 4
vaccinated x 3 with Pfizer  due to transplant recipient status patient qualifies for MAB  ID to follow

## 2022-02-03 NOTE — DISCHARGE NOTE PROVIDER - HOSPITAL COURSE
77 y/o female with PMHx of ESRD, HTN, Glaucoma, anemia 2/2 hereditary spherocytosis requiring hospitalization and blood transfusions in 10/202,  cataract s/p extraction with b/l lense placement (20156), DVT of left subclavian vein (6/2017). She is s/p DDRT on 5/19/21 p/w YANELY, hyperkalemia in the setting of asymptomatic COVID 19 infection      Problem/Plan - 1:  ·  Problem: YANELY (acute kidney injury).   ·  Plan: unclear cause ? dehydration ? COVID renal US 10/21 showing elevated velocities concerning for TRAS  improved now- baseline crt 1.3-1.6  peaked at 2.0 now down to 1.75  doubt rejection  monitor crt closely  avoid nephrotoxic agents  renal US with dopplers pending  transplant renal to follow up today.     Problem/Plan - 2:  ·  Problem: Hyperkalemia.   ·  Plan: s/p lokelma  now resolved.     Problem/Plan - 3:  ·  Problem: Renal transplant recipient.   ·  Plan: c/w Envarsus 3mg daily, follow up AM levels daily  - MMF on hold  - c/w prednisone 5mg daily  - transplant ID, surgery following  -c/w mepron 1500mg daily.     Problem/Plan - 4:  ·  Problem: 2019 novel coronavirus disease (COVID-19).   ·  Plan: vaccinated x 3 with Pfizer  due to transplant recipient status patient qualifies for MAB  ID to follow.     Problem/Plan - 5:  ·  Problem: Hypertension.   ·  Plan: v/w nifedipine 60mg bid, hydralazine 100mg tid, labetalol 200mg bid  BPs have improved 149/50 range, continue to monitor, can increase Hydralazine as needed.     Problem/Plan - 6:  ·  Problem: Glaucoma.   ·  Plan: c/w xalatan 1 drop eyes at bedtime.     Problem/Plan - 7:  ·  Problem: Need for prophylactic measure.   ·  Plan: ambulatory   renal US*****************************************************     77 y/o female with PMHx of ESRD, HTN, Glaucoma, anemia 2/2 hereditary spherocytosis requiring hospitalization and blood transfusions in 10/2021,  cataract s/p extraction with b/l lense placement (20156), DVT of left subclavian vein (6/2017). She is s/p DDRT on 5/19/21 p/w YANELY, hyperkalemia in the setting of asymptomatic COVID 19 infection   YANELY (acute kidney injury) from unclear cause  ? dehydration or ? COVID or Anemia. Baseline Cr  1.3- 1.6, peaked at 2.0 now down to 1.75--> 1.69  Renal US with doppler  showed Elevated renal transplant artery velocities, improved compared to prior study, No hydronephrosis, Increased renal parenchymal echogenicity, which may represent renal medical disease  Evaluated by transplant nephrology   For COViD, vaccinated x 3 with Pfizer, evaluated by ID, MAB not offered as she is not symptomatic   Hyperkalemia resolved after Lokelma      Problem/Plan - 3:  ·  Problem: Renal transplant recipient.   ·  Plan: c/w Envarsus 3mg daily, follow up AM levels daily  - MMF on hold  - c/w prednisone 5mg daily  - transplant ID, surgery following  -c/w mepron 1500mg daily.     Problem/Plan - 4:  ·  Problem: 2019 novel coronavirus disease (COVID-19).   ·  Plan: vaccinated x 3 with Pfizer  due to transplant recipient status patient qualifies for MAB  ID to follow.     Problem/Plan - 5:  ·  Problem: Hypertension.   ·  Plan: v/w nifedipine 60mg bid, hydralazine 100mg tid, labetalol 200mg bid  BPs have improved 149/50 range, continue to monitor, can increase Hydralazine as needed.     Problem/Plan - 6:  ·  Problem: Glaucoma.   ·  Plan: c/w xalatan 1 drop eyes at bedtime.     Problem/Plan - 7:  ·  Problem: Need for prophylactic measure.   ·  Plan: ambulatory  pending renal US, transplant renal clearance  likely in next 24hr- 48hrs.   75 y/o female with PMHx of ESRD, HTN, Glaucoma, anemia 2/2 hereditary spherocytosis requiring hospitalization and blood transfusions in 10/2021,  cataract s/p extraction with b/l lense placement (20156), DVT of left subclavian vein (6/2017). She is s/p DDRT on 5/19/21 p/w YANELY, hyperkalemia in the setting of asymptomatic COVID 19 infection   YANELY (acute kidney injury) from unclear cause  ? dehydration or ? COVID or Anemia. Baseline Cr  1.3- 1.6, peaked at 2.0 now down to 1.75--> 1.69  Renal US with doppler  showed Elevated renal transplant artery velocities, improved compared to prior study, No hydronephrosis, Increased renal parenchymal echogenicity, which may represent renal medical disease  Evaluated by transplant nephrology   For COViD, vaccinated x 3 with Pfizer, evaluated by ID, MAB not offered as she is not symptomatic   Hyperkalemia resolved after Lokelma   As per renal, to increase Tacrolimus to 4 mg daily , repeat Tacro level in one week 75 y/o female with PMHx of ESRD, HTN, Glaucoma, anemia 2/2 hereditary spherocytosis requiring hospitalization and blood transfusions in 10/2021,  cataract s/p extraction with b/l lense placement (20156), DVT of left subclavian vein (6/2017). She is s/p DDRT on 5/19/21 p/w YANELY, hyperkalemia in the setting of asymptomatic COVID 19 infection   YANELY (acute kidney injury) from unclear cause  ? dehydration or ? COVID or Anemia. Baseline Cr  1.3- 1.6, peaked at 2.0 now down to 1.75--> 1.69  Renal US with doppler  showed Elevated renal transplant artery velocities, improved compared to prior study, No hydronephrosis, Increased renal parenchymal echogenicity, which may represent renal medical disease  Evaluated by transplant nephrology   For COViD, vaccinated x 3 with Pfizer, evaluated by ID, MAB not offered as she is not symptomatic   Hyperkalemia resolved after Lokelma   As per renal, to increase Tacrolimus to 4 mg daily , repeat Tacro level in one week   Medically cleared for discharge by Dr. Sultana  pt to follow up with PCP and Nephrology in one week and BMP and Tacro level in one week   Spoke with son and daughter in law. they verbalized understanding 77 y/o female with PMHx of ESRD, HTN, Glaucoma, anemia 2/2 hereditary spherocytosis requiring hospitalization and blood transfusions in 10/2021,  cataract s/p extraction with b/l lense placement (20156), DVT of left subclavian vein (6/2017). She is s/p DDRT on 5/19/21 p/w YANELY, hyperkalemia in the setting of asymptomatic COVID 19 infection   YANELY (acute kidney injury) from unclear cause  ? dehydration or ? COVID or Anemia. Baseline Cr  1.3- 1.6, peaked at 2.0 now down to 1.75--> 1.69  Renal US with doppler  showed Elevated renal transplant artery velocities, improved compared to prior study, No hydronephrosis, Increased renal parenchymal echogenicity, which may represent renal medical disease  Evaluated by transplant nephrology   For COViD, vaccinated x 3 with Pfizer, evaluated by ID, MAB not offered as she is not symptomatic   Hyperkalemia resolved after Lokelma   As per renal, to increase Tacrolimus to 4 mg daily , repeat Tacro level in one week   Medically cleared for discharge by Dr. Sultana  pt to follow up with PCP and Nephrology in one week and BMP and Tacro level in one week   Spoke with son and daughter in law. they verbalized understanding       Discharge Diagnoses:  # YANELY on CKD of transplanted kidney  # Renal transplant recipient  # 2019 novel coronavirus disease (COVID-19)  # Hyperkalemia  # Hypertension    Per discussion with ID and nephrology, the patient is cleared for discharge. She's asymptomatic from COVID-19 infection perspective. Will follow up with renal and blood work in about 1 week.

## 2022-02-03 NOTE — PROGRESS NOTE ADULT - ATTENDING COMMENTS
IMMUNOSUPPRESSION:  Envarsus level today: 2.6  Aim for level of: 4-6  Current dose of envarsus:  being held due to COVID  Dose modification:   - envarsus 3 mg by mouth in AM

## 2022-02-03 NOTE — DISCHARGE NOTE PROVIDER - PROVIDER TOKENS
PROVIDER:[TOKEN:[[id number]],FOLLOWUP:[1 week]],PROVIDER:[TOKEN:[[id number]],FOLLOWUP:[1 week]]

## 2022-02-03 NOTE — PROGRESS NOTE ADULT - PROBLEM SELECTOR PLAN 1
unclear cause ? dehydration ? COVID renal US 10/21 showing elevated velocities concerning for TRAS  improved now- baseline crt 1.3-1.6  peaked at 2.0 now down to 1.75  doubt rejection  monitor crt closely  avoid nephrotoxic agents  renal US with dopplers pending  transplant renal to follow up today

## 2022-02-03 NOTE — DISCHARGE NOTE PROVIDER - NSDCMRMEDTOKEN_GEN_ALL_CORE_FT
aspirin 81 mg oral tablet, chewable: 1 tab(s) orally every other day  CellCept 500 mg oral tablet: 1 tab(s) orally 2 times a day  hydrALAZINE 100 mg oral tablet: 1 tab(s) orally 3 times a day  labetalol 200 mg oral tablet: 1 tab(s) orally 2 times a day  latanoprost 0.005% ophthalmic solution: 1 drop(s) to each affected eye once a day (at bedtime)  Lokelma 10 g oral powder for reconstitution: 1 packet(s) orally once a day  NIFEdipine 60 mg oral tablet, extended release: 1 tab(s) orally 2 times a day  pantoprazole 40 mg oral delayed release tablet: 1 tab(s) orally once a day (before a meal)  predniSONE 5 mg oral tablet: 1 tab(s) orally once a day  Procrit 10,000 units/mL preservative-free injectable solution: injectable once a week  sodium bicarbonate 650 mg oral tablet: 2 tab(s) orally 2 times a day  sulfamethoxazole-trimethoprim 400 mg-80 mg oral tablet: 1 tab(s) orally once a day  tacrolimus 1 mg oral tablet, extended release: 3 milligram(s) orally once a day   aspirin 81 mg oral tablet, chewable: 1 tab(s) orally every other day  atovaquone 750 mg/5 mL oral suspension: 10 milliliter(s) orally once a day  hydrALAZINE 100 mg oral tablet: 1 tab(s) orally 3 times a day  labetalol 200 mg oral tablet: 1 tab(s) orally 2 times a day  latanoprost 0.005% ophthalmic solution: 1 drop(s) to each affected eye once a day (at bedtime)  Lokelma 10 g oral powder for reconstitution: 1 packet(s) orally once a day  NIFEdipine 60 mg oral tablet, extended release: 1 tab(s) orally 2 times a day  pantoprazole 40 mg oral delayed release tablet: 1 tab(s) orally once a day (before a meal)  predniSONE 5 mg oral tablet: 1 tab(s) orally once a day  Procrit 10,000 units/mL preservative-free injectable solution: injectable once a week  sodium bicarbonate 650 mg oral tablet: 2 tab(s) orally 2 times a day  tacrolimus 1 mg oral tablet, extended release: 4 milligram(s) orally once a day

## 2022-02-03 NOTE — DISCHARGE NOTE PROVIDER - CARE PROVIDERS DIRECT ADDRESSES
[email or web address]

## 2022-02-03 NOTE — CONSULT NOTE ADULT - PROBLEM SELECTOR RECOMMENDATION 9
s/p lokelma & lasix  K wnl  f/u BMP  Check for hemolysis- LDH, hapto, retic (hx of spherocytosis). Could potentially lead to hyperkalemia during episodes

## 2022-02-03 NOTE — PROGRESS NOTE ADULT - PROBLEM SELECTOR PLAN 3
c/w Envarsus 3mg daily, follow up AM levels daily  - MMF on hold  - c/w prednisone 5mg daily  - transplant ID, surgery following  -c/w mepron 1500mg daily

## 2022-02-04 ENCOUNTER — TRANSCRIPTION ENCOUNTER (OUTPATIENT)
Age: 77
End: 2022-02-04

## 2022-02-04 VITALS — SYSTOLIC BLOOD PRESSURE: 166 MMHG | HEART RATE: 75 BPM | DIASTOLIC BLOOD PRESSURE: 63 MMHG

## 2022-02-04 LAB
ALBUMIN SERPL ELPH-MCNC: 3.9 G/DL — SIGNIFICANT CHANGE UP (ref 3.3–5)
ALP SERPL-CCNC: 104 U/L — SIGNIFICANT CHANGE UP (ref 40–120)
ALT FLD-CCNC: 5 U/L — LOW (ref 10–45)
ANION GAP SERPL CALC-SCNC: 12 MMOL/L — SIGNIFICANT CHANGE UP (ref 5–17)
AST SERPL-CCNC: 7 U/L — LOW (ref 10–40)
BILIRUB SERPL-MCNC: 0.3 MG/DL — SIGNIFICANT CHANGE UP (ref 0.2–1.2)
BUN SERPL-MCNC: 43 MG/DL — HIGH (ref 7–23)
CALCIUM SERPL-MCNC: 9.6 MG/DL — SIGNIFICANT CHANGE UP (ref 8.4–10.5)
CHLORIDE SERPL-SCNC: 111 MMOL/L — HIGH (ref 96–108)
CO2 SERPL-SCNC: 18 MMOL/L — LOW (ref 22–31)
CREAT SERPL-MCNC: 1.69 MG/DL — HIGH (ref 0.5–1.3)
GLUCOSE SERPL-MCNC: 98 MG/DL — SIGNIFICANT CHANGE UP (ref 70–99)
HCT VFR BLD CALC: 26.7 % — LOW (ref 34.5–45)
HGB BLD-MCNC: 8.3 G/DL — LOW (ref 11.5–15.5)
MAGNESIUM SERPL-MCNC: 1.6 MG/DL — SIGNIFICANT CHANGE UP (ref 1.6–2.6)
MCHC RBC-ENTMCNC: 27.5 PG — SIGNIFICANT CHANGE UP (ref 27–34)
MCHC RBC-ENTMCNC: 31.1 GM/DL — LOW (ref 32–36)
MCV RBC AUTO: 88.4 FL — SIGNIFICANT CHANGE UP (ref 80–100)
NRBC # BLD: 0 /100 WBCS — SIGNIFICANT CHANGE UP (ref 0–0)
PHOSPHATE SERPL-MCNC: 3.5 MG/DL — SIGNIFICANT CHANGE UP (ref 2.5–4.5)
PLATELET # BLD AUTO: 183 K/UL — SIGNIFICANT CHANGE UP (ref 150–400)
POTASSIUM SERPL-MCNC: 4 MMOL/L — SIGNIFICANT CHANGE UP (ref 3.5–5.3)
POTASSIUM SERPL-SCNC: 4 MMOL/L — SIGNIFICANT CHANGE UP (ref 3.5–5.3)
PROT SERPL-MCNC: 5.9 G/DL — LOW (ref 6–8.3)
RBC # BLD: 3.02 M/UL — LOW (ref 3.8–5.2)
RBC # FLD: 14.8 % — HIGH (ref 10.3–14.5)
SODIUM SERPL-SCNC: 141 MMOL/L — SIGNIFICANT CHANGE UP (ref 135–145)
TACROLIMUS SERPL-MCNC: 2.1 NG/ML — SIGNIFICANT CHANGE UP
TACROLIMUS SERPL-MCNC: <2 NG/ML — SIGNIFICANT CHANGE UP
WBC # BLD: 3.58 K/UL — LOW (ref 3.8–10.5)
WBC # FLD AUTO: 3.58 K/UL — LOW (ref 3.8–10.5)

## 2022-02-04 PROCEDURE — 81001 URINALYSIS AUTO W/SCOPE: CPT

## 2022-02-04 PROCEDURE — 99239 HOSP IP/OBS DSCHRG MGMT >30: CPT

## 2022-02-04 PROCEDURE — 87798 DETECT AGENT NOS DNA AMP: CPT

## 2022-02-04 PROCEDURE — 84100 ASSAY OF PHOSPHORUS: CPT

## 2022-02-04 PROCEDURE — 80197 ASSAY OF TACROLIMUS: CPT

## 2022-02-04 PROCEDURE — 84466 ASSAY OF TRANSFERRIN: CPT

## 2022-02-04 PROCEDURE — 83550 IRON BINDING TEST: CPT

## 2022-02-04 PROCEDURE — 99231 SBSQ HOSP IP/OBS SF/LOW 25: CPT

## 2022-02-04 PROCEDURE — 86900 BLOOD TYPING SEROLOGIC ABO: CPT

## 2022-02-04 PROCEDURE — 83615 LACTATE (LD) (LDH) ENZYME: CPT

## 2022-02-04 PROCEDURE — 82746 ASSAY OF FOLIC ACID SERUM: CPT

## 2022-02-04 PROCEDURE — 36415 COLL VENOUS BLD VENIPUNCTURE: CPT

## 2022-02-04 PROCEDURE — 85027 COMPLETE CBC AUTOMATED: CPT

## 2022-02-04 PROCEDURE — 86870 RBC ANTIBODY IDENTIFICATION: CPT

## 2022-02-04 PROCEDURE — 83010 ASSAY OF HAPTOGLOBIN QUANT: CPT

## 2022-02-04 PROCEDURE — 82607 VITAMIN B-12: CPT

## 2022-02-04 PROCEDURE — 83735 ASSAY OF MAGNESIUM: CPT

## 2022-02-04 PROCEDURE — U0005: CPT

## 2022-02-04 PROCEDURE — U0003: CPT

## 2022-02-04 PROCEDURE — 80053 COMPREHEN METABOLIC PANEL: CPT

## 2022-02-04 PROCEDURE — 86850 RBC ANTIBODY SCREEN: CPT

## 2022-02-04 PROCEDURE — 86922 COMPATIBILITY TEST ANTIGLOB: CPT

## 2022-02-04 PROCEDURE — 93005 ELECTROCARDIOGRAM TRACING: CPT

## 2022-02-04 PROCEDURE — 76776 US EXAM K TRANSPL W/DOPPLER: CPT

## 2022-02-04 PROCEDURE — 85730 THROMBOPLASTIN TIME PARTIAL: CPT

## 2022-02-04 PROCEDURE — 85025 COMPLETE CBC W/AUTO DIFF WBC: CPT

## 2022-02-04 PROCEDURE — 82955 ASSAY OF G6PD ENZYME: CPT

## 2022-02-04 PROCEDURE — 83540 ASSAY OF IRON: CPT

## 2022-02-04 PROCEDURE — 86880 COOMBS TEST DIRECT: CPT

## 2022-02-04 PROCEDURE — 85610 PROTHROMBIN TIME: CPT

## 2022-02-04 PROCEDURE — 86901 BLOOD TYPING SEROLOGIC RH(D): CPT

## 2022-02-04 RX ORDER — ATOVAQUONE 750 MG/5ML
10 SUSPENSION ORAL
Qty: 300 | Refills: 0
Start: 2022-02-04 | End: 2022-03-05

## 2022-02-04 RX ORDER — MYCOPHENOLATE MOFETIL 250 MG/1
1 CAPSULE ORAL
Qty: 0 | Refills: 0 | DISCHARGE

## 2022-02-04 RX ORDER — ATOVAQUONE 750 MG/5ML
10 SUSPENSION ORAL
Qty: 300 | Refills: 0
Start: 2022-02-04 | End: 2023-12-14

## 2022-02-04 RX ADMIN — SODIUM ZIRCONIUM CYCLOSILICATE 10 GRAM(S): 10 POWDER, FOR SUSPENSION ORAL at 11:43

## 2022-02-04 RX ADMIN — Medication 5 MILLIGRAM(S): at 05:20

## 2022-02-04 RX ADMIN — Medication 200 MILLIGRAM(S): at 17:35

## 2022-02-04 RX ADMIN — Medication 100 MILLIGRAM(S): at 15:43

## 2022-02-04 RX ADMIN — Medication 1300 MILLIGRAM(S): at 17:34

## 2022-02-04 RX ADMIN — ATOVAQUONE 1500 MILLIGRAM(S): 750 SUSPENSION ORAL at 11:44

## 2022-02-04 RX ADMIN — Medication 1300 MILLIGRAM(S): at 05:20

## 2022-02-04 RX ADMIN — Medication 60 MILLIGRAM(S): at 05:21

## 2022-02-04 RX ADMIN — PANTOPRAZOLE SODIUM 40 MILLIGRAM(S): 20 TABLET, DELAYED RELEASE ORAL at 05:20

## 2022-02-04 RX ADMIN — Medication 200 MILLIGRAM(S): at 05:20

## 2022-02-04 RX ADMIN — TACROLIMUS 3 MILLIGRAM(S): 5 CAPSULE ORAL at 08:55

## 2022-02-04 RX ADMIN — Medication 60 MILLIGRAM(S): at 17:34

## 2022-02-04 RX ADMIN — Medication 100 MILLIGRAM(S): at 05:23

## 2022-02-04 NOTE — CHART NOTE - NSCHARTNOTEFT_GEN_A_CORE
Medicine Attending - Discharge Day Note:  ================================================    # YANELY on CKD of transplanted kidney  # Renal transplant recipient  # 2019 novel coronavirus disease (COVID-19)  # Hyperkalemia  # Hypertension    Per discussion with ID and nephrology, the patient is cleared for discharge. She's asymptomatic from COVID-19 infection perspective. Will follow up with renal and blood work in about 1 week.       Discharge time spent 35 minutes.     Lloyd Sultana MD, MARLENE  Available on [employer] Teams

## 2022-02-04 NOTE — PROGRESS NOTE ADULT - SUBJECTIVE AND OBJECTIVE BOX
Follow Up:      Interval History:    REVIEW OF SYSTEMS  [  ] ROS unobtainable because:    [  ] All other systems negative except as noted below    Constitutional:  [ ] fever [ ] chills  [ ] weight loss  [ ] weakness  Skin:  [ ] rash [ ] phlebitis	  Eyes: [ ] icterus [ ] pain  [ ] discharge	  ENMT: [ ] sore throat  [ ] thrush [ ] ulcers [ ] exudates  Respiratory: [ ] dyspnea [ ] hemoptysis [ ] cough [ ] sputum	  Cardiovascular:  [ ] chest pain [ ] palpitations [ ] edema	  Gastrointestinal:  [ ] nausea [ ] vomiting [ ] diarrhea [ ] constipation [ ] pain	  Genitourinary:  [ ] dysuria [ ] frequency [ ] hematuria [ ] discharge [ ] flank pain  [ ] incontinence  Musculoskeletal:  [ ] myalgias [ ] arthralgias [ ] arthritis  [ ] back pain  Neurological:  [ ] headache [ ] seizures  [ ] confusion/altered mental status    Allergies  Mushrooms (Anaphylaxis)  penicillin (Rash)        ANTIMICROBIALS:  atovaquone  Suspension 1500 daily      OTHER MEDS:  MEDICATIONS  (STANDING):  hydrALAZINE 100 three times a day  labetalol 100 once  labetalol 200 two times a day  NIFEdipine XL 60 two times a day  pantoprazole    Tablet 40 before breakfast  predniSONE   Tablet 5 daily  tacrolimus ER Tablet (ENVARSUS XR) 3 <User Schedule>      Vital Signs Last 24 Hrs  T(C): 36.6 (2022 10:48), Max: 37 (2022 20:25)  T(F): 97.8 (2022 10:48), Max: 98.6 (2022 20:25)  HR: 71 (2022 10:48) (70 - 75)  BP: 137/57 (2022 10:48) (137/57 - 178/71)  BP(mean): --  RR: 18 (2022 10:48) (18 - 20)  SpO2: 98% (2022 10:48) (96% - 100%)    PHYSICAL EXAMINATION:  General: Alert and Awake, NAD  HEENT: PERRL, EOMI  Neck: Supple  Cardiac: RRR, No M/R/G  Resp: CTAB, No Wh/Rh/Ra  Abdomen: NBS, NT/ND, No HSM, No rigidity or guarding  MSK: No LE edema. No Calf tenderness  : No sethi  Skin: No rashes or lesions. Skin is warm and dry to the touch.   Neuro: Alert and Awake. CN 2-12 Grossly intact. Moves all four extremities spontaneously.  Psych: Calm, Pleasant, Cooperative                          8.3    3.58  )-----------( 183      ( 2022 06:18 )             26.7       02-04    141  |  111<H>  |  43<H>  ----------------------------<  98  4.0   |  18<L>  |  1.69<H>    Ca    9.6      2022 06:18  Phos  3.5     02-04  Mg     1.6     02-04    TPro  5.9<L>  /  Alb  3.9  /  TBili  0.3  /  DBili  x   /  AST  7<L>  /  ALT  5<L>  /  AlkPhos  104  02-04      Urinalysis Basic - ( 2022 23:35 )    Color: Colorless / Appearance: Clear / S.008 / pH: x  Gluc: x / Ketone: Negative  / Bili: Negative / Urobili: Negative   Blood: x / Protein: Trace / Nitrite: Negative   Leuk Esterase: Negative / RBC: 1 /hpf / WBC 0 /HPF   Sq Epi: x / Non Sq Epi: 0 /hpf / Bacteria: Negative        MICROBIOLOGY:  v            RADIOLOGY:    <The imaging below has been reviewed and visualized by me independently. Findings as detailed in report below> Follow Up:  covid19    Interval History: afebrile. no acute overnight events. no COVID19 symptoms.     REVIEW OF SYSTEMS  [  ] ROS unobtainable because:    [ x ] All other systems negative except as noted below    Constitutional:  [ ] fever [ ] chills  [ ] weight loss  [ ] weakness  Skin:  [ ] rash [ ] phlebitis	  Eyes: [ ] icterus [ ] pain  [ ] discharge	  ENMT: [ ] sore throat  [ ] thrush [ ] ulcers [ ] exudates  Respiratory: [ ] dyspnea [ ] hemoptysis [ ] cough [ ] sputum	  Cardiovascular:  [ ] chest pain [ ] palpitations [ ] edema	  Gastrointestinal:  [ ] nausea [ ] vomiting [ ] diarrhea [ ] constipation [ ] pain	  Genitourinary:  [ ] dysuria [ ] frequency [ ] hematuria [ ] discharge [ ] flank pain  [ ] incontinence  Musculoskeletal:  [ ] myalgias [ ] arthralgias [ ] arthritis  [ ] back pain  Neurological:  [ ] headache [ ] seizures  [ ] confusion/altered mental status    Allergies  Mushrooms (Anaphylaxis)  penicillin (Rash)        ANTIMICROBIALS:  atovaquone  Suspension 1500 daily      OTHER MEDS:  MEDICATIONS  (STANDING):  hydrALAZINE 100 three times a day  labetalol 100 once  labetalol 200 two times a day  NIFEdipine XL 60 two times a day  pantoprazole    Tablet 40 before breakfast  predniSONE   Tablet 5 daily  tacrolimus ER Tablet (ENVARSUS XR) 3 <User Schedule>      Vital Signs Last 24 Hrs  T(C): 36.6 (2022 10:48), Max: 37 (2022 20:25)  T(F): 97.8 (2022 10:48), Max: 98.6 (2022 20:25)  HR: 71 (2022 10:48) (70 - 75)  BP: 137/57 (2022 10:48) (137/57 - 178/71)  BP(mean): --  RR: 18 (2022 10:48) (18 - 20)  SpO2: 98% (2022 10:48) (96% - 100%)    PHYSICAL EXAMINATION:  General: Alert and Awake, NAD  Cardiac: RRR, No M/R/G  Resp: CTAB, No Wh/Rh/Ra  Abdomen: NBS, NT/ND, No HSM, No rigidity or guarding  MSK: No LE edema. No Calf tenderness  : No sethi  Skin: No rashes or lesions. Skin is warm and dry to the touch.   Neuro: Alert and Awake. CN 2-12 Grossly intact. Moves all four extremities spontaneously.  Psych: Calm, Pleasant, Cooperative                          8.3    3.58  )-----------( 183      ( 2022 06:18 )             26.7       02-04    141  |  111<H>  |  43<H>  ----------------------------<  98  4.0   |  18<L>  |  1.69<H>    Ca    9.6      2022 06:18  Phos  3.5     02-04  Mg     1.6     02-04    TPro  5.9<L>  /  Alb  3.9  /  TBili  0.3  /  DBili  x   /  AST  7<L>  /  ALT  5<L>  /  AlkPhos  104  02-04      Urinalysis Basic - ( 2022 23:35 )    Color: Colorless / Appearance: Clear / S.008 / pH: x  Gluc: x / Ketone: Negative  / Bili: Negative / Urobili: Negative   Blood: x / Protein: Trace / Nitrite: Negative   Leuk Esterase: Negative / RBC: 1 /hpf / WBC 0 /HPF   Sq Epi: x / Non Sq Epi: 0 /hpf / Bacteria: Negative        MICROBIOLOGY:    COVID-19 PCR . (22 @ 21:43)   COVID-19 PCR: Detected:    RADIOLOGY:    <The imaging below has been reviewed and visualized by me independently. Findings as detailed in report below>    < from: US Trans Kidney w/ Doppler, Right (21 @ 15:42) >  IMPRESSION:    Elevated renal transplant artery velocities proximally, increased since 2021, raising the possibility of stenosis. Findings are possibly technical, and continued attention on follow-up imaging is recommended.    Elevated resistive indices, similar to the prior ultrasound exam of 2021.    Trace perinephric fluid along the upper pole of the transplant, decreased since 10/5/2021.    No hydronephrosis.    Findings were conveyed via secure message to with AG COPPOLA 2021 4:02 PM.    < end of copied text >

## 2022-02-04 NOTE — PROGRESS NOTE ADULT - ASSESSMENT
77 y/o female with PMHx of ESRD, HTN, Glaucoma, anemia 2/2 hereditary spherocytosis requiring hospitalization and blood transfusions in 10/202,  cataract s/p extraction with b/l lense placement (20156), DVT of left subclavian vein (6/2017) s/p DDRT on 5/19/21 (Dr. Henderson) c/b DGF requiring HD, 2/2 ATN (last hd was 6/4/21).     DDRT 5/19/21  - YANELY.  Creatinine downtrending.  K+ stable s/p Lokelma and lasix  - US 11/2021 w/ elevated velocities concerning for TRAS. Conservatively managed.  Will repeat today  - Immunosuppression: Envarsus 3mg qd, Prednisone 5mg QD.  Hold MMF due to infection/leukopenia  - PPX: [id number] PO QD  - Fu G6PD  - Daily CBC, BMP, Mag, Phos, Tacrolimus level (send 30 minutes prior to AM dose daily)    COVID  - PCR + 2/2/22  - Monoclonal Abs today.  Discussed with transplant Nephrology and ID    Anemia  -Previous admission for symptomatic anemia (Hgb 5g/dL) requiring hospitalization 10/2021 Transfused 2 units PRBC. Anemia was due to hemolysis caused by dapsone (despite normal G6PD levels prior to initiation).   - FU iron studies    HTN:  Continue home meds    Please page Transplant surgery for any questions [phone]
77 y/o female with PMHx of ESRD, HTN, Glaucoma, anemia 2/2 hereditary spherocytosis requiring hospitalization and blood transfusions in 10/202,  cataract s/p extraction with b/l lense placement (20156), DVT of left subclavian vein (6/2017). She is s/p DDRT on 5/19/21 p/w YANELY, hyperkalemia in the setting of asymptomatic COVID 19 infection 
76 year old female PMH of ESRD s/p DDRT on 5/19/21 post operative course complicated by DGF requiring HD, 2/2 ATN (last hd was 6/4/21), Anemia 2/2 hereditary spherocytosis requiring hospitalization and blood transfusions in 10/202, cataract s/p extraction with b/l lens placement (20156), DVT of left subclavian vein (6/2017) who presents to [facility] due to hyperkalemia on outpatient labwork.     Found to be COVID19 PCR Positive  Patient triple vaccinated with Pfizer with dose 3 in 10/2021  Denies any symptoms at the present time   was sick with COVID19 ~1 week ago    As the patient does not have symptoms she does not qualify for monoclonal antibody or remdesivir at the present time  She would qualify for monoclonal antibody if she develops symptoms    #COVID19  --If patient develops symptoms secondary to COVID19 patient would qualify for Sotrovimab  --No therapies at present time for COVID19  --Recommend isolating for COVID19 for 20 days from positive PCR    #Renal Transplant Recipient  --Continue Atovaquone for PCP PPx (was on bactrim which is held due to hyperkalemia)  --Follow up on CMV PCR (off of valcyte)    I will be away over this upcoming weekend. Please contact the Infectious Diseases Office with any further questions or concerns.     Omar Tadeo M.D.  [facility] Division of Infectious Disease  8AM-5PM Monday - Friday: Available on [employer] Teams  After Hours and Holidays (or if no response on [employer] Teams): Please contact the Infectious Diseases Office at [phone]

## 2022-02-04 NOTE — DISCHARGE NOTE NURSING/CASE MANAGEMENT/SOCIAL WORK - PATIENT PORTAL LINK FT
You can access the FollowMyHealth Patient Portal offered by [facility] by registering at the following website: [email or web address]. By joining [facility]’s FollowMyHealth portal, you will also be able to view your health information using other applications (apps) compatible with our system.

## 2022-02-04 NOTE — DISCHARGE NOTE NURSING/CASE MANAGEMENT/SOCIAL WORK - NSDCPEFALRISK_GEN_ALL_CORE
For information on Fall & Injury Prevention, visit: [email or web address] OR  [email or web address] OR  https://www.cdc.gov/steadi/patient.html

## 2022-02-05 ENCOUNTER — TRANSCRIPTION ENCOUNTER (OUTPATIENT)
Age: 77
End: 2022-02-05

## 2022-02-05 LAB — CMV DNA CSF QL NAA+PROBE: SIGNIFICANT CHANGE UP

## 2022-02-06 LAB — G6PD RBC-CCNC: 16.6 U/G HGB — SIGNIFICANT CHANGE UP (ref 7–20.5)

## 2022-02-07 LAB
BKV DNA SPEC QL NAA+PROBE: SIGNIFICANT CHANGE UP
JCPYV DNA SPEC QL NAA+PROBE: SIGNIFICANT CHANGE UP
SPECIMEN SOURCE: SIGNIFICANT CHANGE UP

## 2022-02-15 ENCOUNTER — APPOINTMENT (OUTPATIENT)
Dept: NEPHROLOGY | Facility: CLINIC | Age: 77
End: 2022-02-15
Payer: MEDICARE

## 2022-02-15 ENCOUNTER — LABORATORY RESULT (OUTPATIENT)
Age: 77
End: 2022-02-15

## 2022-02-15 VITALS
WEIGHT: 128 LBS | HEIGHT: 66 IN | OXYGEN SATURATION: 100 % | HEART RATE: 73 BPM | SYSTOLIC BLOOD PRESSURE: 189 MMHG | BODY MASS INDEX: 20.57 KG/M2 | DIASTOLIC BLOOD PRESSURE: 77 MMHG

## 2022-02-15 PROCEDURE — 99215 OFFICE O/P EST HI 40 MIN: CPT

## 2022-02-15 RX ORDER — HYDRALAZINE HYDROCHLORIDE 100 MG/1
100 TABLET ORAL 3 TIMES DAILY
Qty: 90 | Refills: 11 | Status: DISCONTINUED | COMMUNITY
Start: 2021-05-27 | End: 2022-02-15

## 2022-02-15 NOTE — ASSESSMENT
[FreeTextEntry1] : \par S/p DDRT on 5/19/2021 complicated by DGF due to ATN. \par Graft function recovered. Last HD was on  6/4/21. Oswaldo creatinine 1.3mg/dL. \par Creatinine ranging 1.6-1.8mg/dL since Nov 2021, proteinuria 1gram/day. Ultrasound done Nov 2021 with US showing elevated velocities concerning for TRAS.  Conservatively managed. No DSA, cFDNA 0.58%.  \par Creatinine pending today. \par \par LE edema and uncontrolled BP due to TRAS?\par  - If creatinine remains stable will continue non invasive management. \par - Start lasix 40mg po daily \par \par Immunosuppression \par - S/p Simulect induction\par - CellCept 500mg po bid  - dose reduced for GI side effects and leukopenia. \par - On Envarsus 4mg po daily, level pending. Tacrolimus goal 8-10\par - Continue Prednisone 5mg daily \par \par Prophylaxis \par - Completed 6 months prophylaxis \par - Bactrim SS po daily. Dapsone discontinued for hemolytic anemia. \par - Completed COVID vaccine prior to transplant. Also had COVID infection in March 2020. 3rd shot received on Oct 19th. Shubham Ab strongly +ve. COVID. COVID +ve 2 weeks ago, no symptoms. \par - Flu shot received in October. \par \par Hypertension - BP elevated due to TRAS. \par - Continue Nifedipine xl 60mg po bid, labetalol 200mg po bid, \par - D/c hydralazine. Start doxazosin 4mg po qhs. Lasix 40mg po daily. \par \par \par Anemia multifactorial -  Intermittently requiring Procrit. \par - Continue 10,000 units Procrit sq every week. Iron stores replete, normal B12 and Folate. \par \par Hyperkalemia - Continue Lokelma 10gm po daily. Low K diet. \par Metabolic acidosis -. Continue sodium bicarbonate 1300mg po bid. \par \par Vitamin D deficiency - Continue Vitamin D 1000 units po daily. \par \par Labs in 2 weeks. F/u in 4 weeks.

## 2022-02-15 NOTE — HISTORY OF PRESENT ILLNESS
[FreeTextEntry1] : \par 76 year old woman with ESRD due to HTN, was on hemodialysis since 2016. Received DDRT on 5/19/21 \par \par Donor was a 44 yr old woman, KDPI 69%, history of diabetes on insulin, terminal creatinine 3.3. \par Post transplant course was complicated by DGF. Last hemodialysis was on 6/4/21. Transplant ureteric stent was removed on 8/2/21. \par \par Other PMH includes :History of COVID-19 infection, hospitalized in March 2020 for only 1 day. \par \par Post transplant course complicated by severe symptomatic anemia (Hgb 5g/dL) requiring hospitalization 10/2021   Transfused 2 units PRBC. Anemia was due to hemolysis caused by dapsone (despite normal G6PD levels prior to initiation). \par Nov 2021 Noted to have YANELY creatinine elevated to 1.65mg/dL.  US showed increased velocities concerning for TRAS but no tardus parvus waves. DSA negative,  and cFDNA  0.5%.  Discussed with Dr. Henderson and opted for conservative management.  \par \par She presents for follow up visit.\par She was hospitalized 2 weeks ago for hyperkalemia and worsening anemia. Tested +ve for COVID but had no symptoms. Presents for follow up. \par C/o swelling in legs and face. Energy is poor. \par Weight up 128lbs. BP elevated 160-170 at home. \par No nausea, vomiting or diarrhea. \par No fever or chills. No cough or shortness of breath. \par Voiding urine well. No dysuria or hematuria. \par

## 2022-02-16 LAB
ALBUMIN SERPL ELPH-MCNC: 4.2 G/DL
ALP BLD-CCNC: 119 U/L
ALT SERPL-CCNC: <5 U/L
ANION GAP SERPL CALC-SCNC: 13 MMOL/L
APPEARANCE: CLEAR
AST SERPL-CCNC: 11 U/L
BACTERIA: NEGATIVE
BASOPHILS # BLD AUTO: 0.02 K/UL
BASOPHILS NFR BLD AUTO: 0.5 %
BILIRUB SERPL-MCNC: 0.2 MG/DL
BILIRUBIN URINE: NEGATIVE
BLOOD URINE: NEGATIVE
BUN SERPL-MCNC: 35 MG/DL
CALCIUM SERPL-MCNC: 9.8 MG/DL
CHLORIDE SERPL-SCNC: 112 MMOL/L
CO2 SERPL-SCNC: 20 MMOL/L
COLOR: NORMAL
CREAT SERPL-MCNC: 1.74 MG/DL
CREAT SPEC-SCNC: 54 MG/DL
CREAT/PROT UR: 2.2 RATIO
EOSINOPHIL # BLD AUTO: 0.03 K/UL
EOSINOPHIL NFR BLD AUTO: 0.8 %
GLUCOSE QUALITATIVE U: NEGATIVE
GLUCOSE SERPL-MCNC: 101 MG/DL
HCT VFR BLD CALC: 29.7 %
HGB BLD-MCNC: 8.7 G/DL
HYALINE CASTS: 0 /LPF
IMM GRANULOCYTES NFR BLD AUTO: 0.8 %
KETONES URINE: NEGATIVE
LDH SERPL-CCNC: 179 U/L
LEUKOCYTE ESTERASE URINE: NEGATIVE
LYMPHOCYTES # BLD AUTO: 0.45 K/UL
LYMPHOCYTES NFR BLD AUTO: 11.8 %
MAGNESIUM SERPL-MCNC: 1.7 MG/DL
MAN DIFF?: NORMAL
MCHC RBC-ENTMCNC: 27.9 PG
MCHC RBC-ENTMCNC: 29.3 GM/DL
MCV RBC AUTO: 95.2 FL
MICROSCOPIC-UA: NORMAL
MONOCYTES # BLD AUTO: 0.29 K/UL
MONOCYTES NFR BLD AUTO: 7.6 %
NEUTROPHILS # BLD AUTO: 2.99 K/UL
NEUTROPHILS NFR BLD AUTO: 78.5 %
NITRITE URINE: NEGATIVE
PH URINE: 7
PHOSPHATE SERPL-MCNC: 3.5 MG/DL
PLATELET # BLD AUTO: 201 K/UL
POTASSIUM SERPL-SCNC: 4.5 MMOL/L
PROT SERPL-MCNC: 6 G/DL
PROT UR-MCNC: 118 MG/DL
PROTEIN URINE: ABNORMAL
RBC # BLD: 3.12 M/UL
RBC # FLD: 15.6 %
RED BLOOD CELLS URINE: 8 /HPF
SODIUM SERPL-SCNC: 146 MMOL/L
SPECIFIC GRAVITY URINE: 1.01
SQUAMOUS EPITHELIAL CELLS: 0 /HPF
TACROLIMUS SERPL-MCNC: 4 NG/ML
URATE SERPL-MCNC: 9.3 MG/DL
UROBILINOGEN URINE: NORMAL
WBC # FLD AUTO: 3.81 K/UL
WHITE BLOOD CELLS URINE: 0 /HPF

## 2022-02-21 LAB
BKV DNA SPEC QL NAA+PROBE: NOT DETECTED COPIES/ML
CMV DNA SPEC QL NAA+PROBE: NOT DETECTED IU/ML

## 2022-03-01 ENCOUNTER — APPOINTMENT (OUTPATIENT)
Dept: NEPHROLOGY | Facility: CLINIC | Age: 77
End: 2022-03-01
Payer: MEDICARE

## 2022-03-01 VITALS
OXYGEN SATURATION: 98 % | SYSTOLIC BLOOD PRESSURE: 150 MMHG | HEART RATE: 64 BPM | RESPIRATION RATE: 12 BRPM | HEIGHT: 66 IN | BODY MASS INDEX: 19.29 KG/M2 | WEIGHT: 120 LBS | DIASTOLIC BLOOD PRESSURE: 67 MMHG

## 2022-03-01 LAB
ALBUMIN SERPL ELPH-MCNC: 4.2 G/DL
ALP BLD-CCNC: 110 U/L
ALT SERPL-CCNC: 6 U/L
ANION GAP SERPL CALC-SCNC: 17 MMOL/L
AST SERPL-CCNC: 14 U/L
BASOPHILS # BLD AUTO: 0.02 K/UL
BASOPHILS NFR BLD AUTO: 0.5 %
BILIRUB SERPL-MCNC: 0.2 MG/DL
BUN SERPL-MCNC: 33 MG/DL
CALCIUM SERPL-MCNC: 10.4 MG/DL
CHLORIDE SERPL-SCNC: 114 MMOL/L
CO2 SERPL-SCNC: 18 MMOL/L
CREAT SERPL-MCNC: 1.78 MG/DL
CREAT SPEC-SCNC: 85 MG/DL
CREAT/PROT UR: 2.2 RATIO
EGFR: 29 ML/MIN/1.73M2
EOSINOPHIL # BLD AUTO: 0.11 K/UL
EOSINOPHIL NFR BLD AUTO: 2.9 %
GLUCOSE SERPL-MCNC: 86 MG/DL
HCT VFR BLD CALC: 33.6 %
HGB BLD-MCNC: 10 G/DL
IMM GRANULOCYTES NFR BLD AUTO: 0.5 %
LYMPHOCYTES # BLD AUTO: 0.79 K/UL
LYMPHOCYTES NFR BLD AUTO: 20.9 %
MAGNESIUM SERPL-MCNC: 1.8 MG/DL
MAN DIFF?: NORMAL
MCHC RBC-ENTMCNC: 28.2 PG
MCHC RBC-ENTMCNC: 29.8 GM/DL
MCV RBC AUTO: 94.6 FL
MONOCYTES # BLD AUTO: 0.38 K/UL
MONOCYTES NFR BLD AUTO: 10.1 %
NEUTROPHILS # BLD AUTO: 2.46 K/UL
NEUTROPHILS NFR BLD AUTO: 65.1 %
PHOSPHATE SERPL-MCNC: 3.9 MG/DL
PLATELET # BLD AUTO: 231 K/UL
POTASSIUM SERPL-SCNC: 4.5 MMOL/L
PROT SERPL-MCNC: 6.3 G/DL
PROT UR-MCNC: 187 MG/DL
RBC # BLD: 3.55 M/UL
RBC # FLD: 14.9 %
SODIUM SERPL-SCNC: 149 MMOL/L
TACROLIMUS SERPL-MCNC: 6.8 NG/ML
URATE SERPL-MCNC: 8.7 MG/DL
WBC # FLD AUTO: 3.78 K/UL

## 2022-03-01 PROCEDURE — 99215 OFFICE O/P EST HI 40 MIN: CPT

## 2022-03-01 NOTE — PHYSICAL EXAM
[General Appearance - Alert] : alert [General Appearance - In No Acute Distress] : in no acute distress [Sclera] : the sclera and conjunctiva were normal [PERRL With Normal Accommodation] : pupils were equal in size, round, and reactive to light [Oropharynx] : the oropharynx was normal [Jugular Venous Distention Increased] : there was no jugular-venous distention [Exaggerated Use Of Accessory Muscles For Inspiration] : no accessory muscle use [Respiration, Rhythm And Depth] : normal respiratory rhythm and effort [Auscultation Breath Sounds / Voice Sounds] : lungs were clear to auscultation bilaterally [Heart Rate And Rhythm] : heart rate was normal and rhythm regular [Heart Sounds] : normal S1 and S2 [Heart Sounds Gallop] : no gallops [Edema] : there was no peripheral edema [Bowel Sounds] : normal bowel sounds [Abdomen Soft] : soft [Abdomen Tenderness] : non-tender [No CVA Tenderness] : no ~M costovertebral angle tenderness [Involuntary Movements] : no involuntary movements were seen [___ (cm) Fistula] : [unfilled] (cm) fistula [Bruit] : a bruit was present [Thrill] : a thrill was present [] : no rash [No Focal Deficits] : no focal deficits [Oriented To Time, Place, And Person] : oriented to person, place, and time [FreeTextEntry1] : RLQ scar well healed, no tenderness, bruit +present

## 2022-03-01 NOTE — HISTORY OF PRESENT ILLNESS
[FreeTextEntry1] : \par 76 year old woman with ESRD due to HTN, was on hemodialysis since 2016. Received DDRT on 5/19/21 \par \par Donor was a 44 yr old woman, KDPI 69%, history of diabetes on insulin, terminal creatinine 3.3. \par Post transplant course was complicated by DGF. Last hemodialysis was on 6/4/21. Transplant ureteric stent was removed on 8/2/21. \par \par Other PMH includes :History of COVID-19 infection, hospitalized in March 2020 for only 1 day. \par \par - Hospitalized 10/2021 with severe symptomatic anemia (Hgb 5g/dL). Transfused 2 units PRBC. Anemia was due to hemolysis caused by dapsone (despite normal G6PD levels prior to initiation). \par - Nov 2021 Noted to have YANELY creatinine elevated to 1.65mg/dL.  US showed increased velocities concerning for TRAS but no tardus parvus waves. DSA negative,  and cFDNA  0.5%.  Discussed with Dr. Henderson and opted for conservative management.  \par - Hospitalized Jan 2022 with worsening anemia, hyperkalemia, tested +ve for COVID but had no symptoms. \par \par Seen 2 weeks ago - had uncontrolled BP, leg and facial swelling. Lasix 40mg po daily started, hydralazine d/jair, doxazosin 4mg po qhs started. \par \par She presents for follow up visit.\par Feels much better. BP control much better 120-130 systolic. \par Leg swelling and facial swelling has subsided. \par Energy is good. No NVD. No fever or chills. \par Voiding urine well. No dysuria or hematuria.\par No Shortness of breath , no cough \par No light headedness or dizziness. \par Weight back down to 119lbs.\par \par \par

## 2022-03-01 NOTE — ASSESSMENT
[FreeTextEntry1] : \par 76 yr old woman with ESRD due to HTN S/p DDRT on 5/19/2021 complicated by DGF due to ATN. \par Graft function recovered. Last HD was on  6/4/21. Oswaldo creatinine 1.3mg/dL. \par Creatinine rising 1.6 -1.8mg/dL since Nov 2021 with 2/2 g/d proteinuria. No DSA, cFDNA 0.58%.   Ultrasound done Nov 2021 with US showing elevated velocities but no tardus parvus.  \par HTN, graft dysfunction, edema and proteinuria all consistent with transplant renal artery stenosis. Conservatively managed for now as long as graft function remains stable. \par \par Cr 1.78mg/dL stable. \par Proteinuria ~ 2.2 grams/day stalbe. Not on ACEi due to hyperkalemia. \par \par Immunosuppression \par - S/p Simulect induction\par - CellCept 500mg po bid  - dose reduced for GI side effects and leukopenia. \par - On Envarsus 4mg po daily, level 6.8 acceptable. Goal 6-8\par - Continue Prednisone 5mg daily \par \par Prophylaxis \par - Completed 6 months prophylaxis \par - Bactrim SS po daily. Dapsone discontinued for hemolytic anemia. \par - Completed 2 doses COVID vaccine prior to transplant. 3rd shot received 10/19/22. Also had COVID infection in March 2020 and Jan 2022. Shubham Ab strongly +ve\par - Flu shot received in October. \par \par Hypertension - Controlled much better on current regimen. \par  Nifedipine 60mg po bid, labetalol 200mg po bid, Doxazosin 4mg po qhs \par \par Anemia -  On Procrit 10,000 units sq every week on Wednesdays. Iron stores replete, Normal B12 and Folate. \par Hgg improved 10.0, reduce procrit to 10,000units sq every 2 weeks. \par \par Hyperkalemia - Decrease Lokelma 10gm po to every other daily. Low K diet. \par \par Metabolic acidosis -. Continue sodium bicarbonate 1300mg po bid. \par \par Vitamin D deficiency - Continue Vitamin D 1000 units po daily. \par \par F/u April 12th \par F/u with Nephrologist Dr. Huded -starting May  she will make appt. \par

## 2022-03-02 LAB
APPEARANCE: CLEAR
BACTERIA: NEGATIVE
BILIRUBIN URINE: NEGATIVE
BLOOD URINE: NORMAL
COLOR: NORMAL
GLUCOSE QUALITATIVE U: NEGATIVE
HYALINE CASTS: 1 /LPF
KETONES URINE: NEGATIVE
LEUKOCYTE ESTERASE URINE: ABNORMAL
MICROSCOPIC-UA: NORMAL
NITRITE URINE: NEGATIVE
PH URINE: 6
PROTEIN URINE: ABNORMAL
RED BLOOD CELLS URINE: 5 /HPF
SPECIFIC GRAVITY URINE: 1.01
SQUAMOUS EPITHELIAL CELLS: 2 /HPF
UROBILINOGEN URINE: NORMAL
WHITE BLOOD CELLS URINE: 12 /HPF

## 2022-03-03 LAB
BKV DNA SPEC QL NAA+PROBE: NOT DETECTED COPIES/ML
CMV DNA SPEC QL NAA+PROBE: NOT DETECTED IU/ML

## 2022-03-16 NOTE — ED ADULT TRIAGE NOTE - HEART RATE (BEATS/MIN)
Your pleurx was removed today. Keep dressing on for 48 hours after removal. Once removed, you may shower. After 48 hours, dressing may be removed and left open to air.      Continue follow up care with your oncologist team, Dr. Gonzales.  Please let us know if we can assist in any way.     CHICO Mcgee, NP   [phone]             
[phone]

## 2022-04-12 ENCOUNTER — APPOINTMENT (OUTPATIENT)
Dept: NEPHROLOGY | Facility: CLINIC | Age: 77
End: 2022-04-12
Payer: MEDICARE

## 2022-04-12 ENCOUNTER — LABORATORY RESULT (OUTPATIENT)
Age: 77
End: 2022-04-12

## 2022-04-12 ENCOUNTER — APPOINTMENT (OUTPATIENT)
Dept: NEPHROLOGY | Facility: CLINIC | Age: 77
End: 2022-04-12

## 2022-04-12 VITALS
SYSTOLIC BLOOD PRESSURE: 206 MMHG | OXYGEN SATURATION: 99 % | BODY MASS INDEX: 19.44 KG/M2 | HEART RATE: 63 BPM | HEIGHT: 66 IN | RESPIRATION RATE: 12 BRPM | DIASTOLIC BLOOD PRESSURE: 72 MMHG | WEIGHT: 121 LBS | TEMPERATURE: 97.3 F

## 2022-04-12 VITALS — DIASTOLIC BLOOD PRESSURE: 70 MMHG | SYSTOLIC BLOOD PRESSURE: 180 MMHG

## 2022-04-12 PROCEDURE — 99215 OFFICE O/P EST HI 40 MIN: CPT

## 2022-04-12 NOTE — PHYSICAL EXAM
[General Appearance - Alert] : alert [General Appearance - In No Acute Distress] : in no acute distress [Sclera] : the sclera and conjunctiva were normal [PERRL With Normal Accommodation] : pupils were equal in size, round, and reactive to light [Oropharynx] : the oropharynx was normal [Jugular Venous Distention Increased] : there was no jugular-venous distention [Respiration, Rhythm And Depth] : normal respiratory rhythm and effort [Exaggerated Use Of Accessory Muscles For Inspiration] : no accessory muscle use [Auscultation Breath Sounds / Voice Sounds] : lungs were clear to auscultation bilaterally [Heart Rate And Rhythm] : heart rate was normal and rhythm regular [Heart Sounds] : normal S1 and S2 [Heart Sounds Gallop] : no gallops [Edema] : there was no peripheral edema [Abdomen Soft] : soft [Bowel Sounds] : normal bowel sounds [Abdomen Tenderness] : non-tender [Involuntary Movements] : no involuntary movements were seen [___ (cm) Fistula] : [unfilled] (cm) fistula [Bruit] : a bruit was present [Thrill] : a thrill was present [] : no rash [No Focal Deficits] : no focal deficits [Oriented To Time, Place, And Person] : oriented to person, place, and time [FreeTextEntry1] : RLQ scar well healed, no tenderness, bruit +present

## 2022-04-12 NOTE — HISTORY OF PRESENT ILLNESS
[FreeTextEntry1] : \par 76 year old woman with ESRD due to HTN, was on hemodialysis since 2016. Received DDRT on 5/19/21 \par \par Donor was a 44 yr old woman, KDPI 69%, history of diabetes on insulin, terminal creatinine 3.3. \par Post transplant course was complicated by DGF. Last hemodialysis was on 6/4/21. Transplant ureteric stent was removed on 8/2/21. \par \par Other PMH includes :History of COVID-19 infection, hospitalized in March 2020 for only 1 day. \par \par - Hospitalized 10/2021 with severe symptomatic anemia (Hgb 5g/dL). Transfused 2 units PRBC. Anemia was due to hemolysis caused by dapsone (despite normal G6PD levels prior to initiation). \par - Nov 2021 Noted to have YANELY creatinine elevated to 1.65mg/dL.  US showed increased velocities concerning for TRAS but no tardus parvus waves. DSA negative,  and cFDNA  0.5%.  Discussed with Dr. Henderson and opted for conservative management.  \par - Hospitalized Jan 2022 with worsening anemia, hyperkalemia, tested +ve for COVID but had no symptoms. \par \par \par She presents for follow up visit.\par C/o muscle cramping, using a cane today. Otherwise feels well. No NVD. No urinary complaints. No fever or chills. \par No leg swelling. No shortness of breath. Good energy.\par BP much better controlled at home on current regimen.  Reviewed records 120-140 systolic. \par No light headedness or dizziness. \par Weight stable at 121 lbs.  \par

## 2022-04-12 NOTE — ASSESSMENT
[FreeTextEntry1] : \par 76 yr old woman with ESRD due to HTN S/p DDRT on 5/19/2021 complicated by DGF due to ATN. \par Graft function recovered. Last HD was on  6/4/21. Oswaldo creatinine 1.3mg/dL. \par Creatinine rising 1.6 -1.8mg/dL since Nov 2021 with 2 g/d proteinuria. No DSA, cFDNA 0.58%.   Ultrasound done Nov 2021 with US showing elevated velocities but no tardus parvus.  \par HTN, graft dysfunction, edema and proteinuria all consistent with transplant renal artery stenosis. Conservatively managed for now as long as graft function remains stable. \par \par Cr 1.78mg/dL on last visit, proteinuria 2.2 grams/day. Not on ACEi due to hyperkalemia. \par Labs pending today. \par D/c lasix - use only as needed. \par \par Immunosuppression \par - S/p Simulect induction\par - CellCept 500mg po bid  - dose reduced for GI side effects and leukopenia. \par - On Envarsus 4mg po daily, level pending. Goal 6-8\par - Continue Prednisone 5mg daily \par \par Prophylaxis \par - Completed 6 months prophylaxis \par - Bactrim SS po daily. Dapsone discontinued for hemolytic anemia. \par - Completed 2 doses COVID vaccine prior to transplant. 3rd shot received 10/19/22. Also had COVID infection in March 2020 and Jan 2022. Shubham Ab strongly +ve\par - Flu shot received in October. 2021 \par \par Hypertension - Controlled at home on current regimen. BP elevated in office today\par  Nifedipine 60mg po bid, Labetalol 200mg po bid, Doxazosin 4mg po qhs \par \par Anemia -  On Procrit 10,000 units sq every 2 weeks on Wednesdays. Iron stores replete, Normal B12 and Folate. \par F/u Hgb, will d/c if Hgb > 11 \par \par Hyperkalemia - Decrease Lokelma 10gm po to every other daily. Low K diet. \par \par Metabolic acidosis -. Continue sodium bicarbonate 1300mg po bid. \par \par Vitamin D deficiency - Continue Vitamin D 1000 units po daily. \par \par Trying to get appt with Nephrologist \par F/u here in June 7th \par

## 2022-04-13 ENCOUNTER — NON-APPOINTMENT (OUTPATIENT)
Age: 77
End: 2022-04-13

## 2022-04-13 LAB
25(OH)D3 SERPL-MCNC: 16.4 NG/ML
ALBUMIN SERPL ELPH-MCNC: 4 G/DL
ALP BLD-CCNC: 108 U/L
ALT SERPL-CCNC: 5 U/L
ANION GAP SERPL CALC-SCNC: 11 MMOL/L
APPEARANCE: CLEAR
AST SERPL-CCNC: 13 U/L
BACTERIA: NEGATIVE
BASOPHILS # BLD AUTO: 0.06 K/UL
BASOPHILS NFR BLD AUTO: 1.7 %
BILIRUB SERPL-MCNC: <0.2 MG/DL
BILIRUBIN URINE: NEGATIVE
BLOOD URINE: NEGATIVE
BUN SERPL-MCNC: 42 MG/DL
CALCIUM SERPL-MCNC: 9.8 MG/DL
CALCIUM SERPL-MCNC: 9.8 MG/DL
CHLORIDE SERPL-SCNC: 112 MMOL/L
CHOLEST SERPL-MCNC: 245 MG/DL
CO2 SERPL-SCNC: 20 MMOL/L
COLOR: NORMAL
CREAT SERPL-MCNC: 2.04 MG/DL
CREAT SPEC-SCNC: 62 MG/DL
CREAT/PROT UR: 1.6 RATIO
EGFR: 25 ML/MIN/1.73M2
EOSINOPHIL # BLD AUTO: 0 K/UL
EOSINOPHIL NFR BLD AUTO: 0 %
GLUCOSE QUALITATIVE U: NEGATIVE
GLUCOSE SERPL-MCNC: 127 MG/DL
HCT VFR BLD CALC: 37.7 %
HDLC SERPL-MCNC: 80 MG/DL
HGB BLD-MCNC: 11.1 G/DL
HYALINE CASTS: 0 /LPF
KETONES URINE: NEGATIVE
LDLC SERPL CALC-MCNC: 132 MG/DL
LEUKOCYTE ESTERASE URINE: NEGATIVE
LYMPHOCYTES # BLD AUTO: 0.52 K/UL
LYMPHOCYTES NFR BLD AUTO: 16 %
MAGNESIUM SERPL-MCNC: 1.8 MG/DL
MAN DIFF?: NORMAL
MCHC RBC-ENTMCNC: 27.6 PG
MCHC RBC-ENTMCNC: 29.4 GM/DL
MCV RBC AUTO: 93.8 FL
MICROSCOPIC-UA: NORMAL
MONOCYTES # BLD AUTO: 0.3 K/UL
MONOCYTES NFR BLD AUTO: 9.2 %
NEUTROPHILS # BLD AUTO: 2.37 K/UL
NEUTROPHILS NFR BLD AUTO: 73.1 %
NITRITE URINE: NEGATIVE
NONHDLC SERPL-MCNC: 165 MG/DL
PARATHYROID HORMONE INTACT: 384 PG/ML
PH URINE: 6
PHOSPHATE SERPL-MCNC: 3.7 MG/DL
PLATELET # BLD AUTO: 183 K/UL
POTASSIUM SERPL-SCNC: 5.7 MMOL/L
PROT SERPL-MCNC: 6.3 G/DL
PROT UR-MCNC: 99 MG/DL
PROTEIN URINE: ABNORMAL
RBC # BLD: 4.02 M/UL
RBC # FLD: 14.3 %
RED BLOOD CELLS URINE: 1 /HPF
SODIUM SERPL-SCNC: 143 MMOL/L
SPECIFIC GRAVITY URINE: 1.01
SQUAMOUS EPITHELIAL CELLS: 0 /HPF
TACROLIMUS SERPL-MCNC: 6 NG/ML
TRIGL SERPL-MCNC: 164 MG/DL
URATE SERPL-MCNC: 8.4 MG/DL
UROBILINOGEN URINE: NORMAL
WBC # FLD AUTO: 3.24 K/UL
WHITE BLOOD CELLS URINE: 1 /HPF

## 2022-04-13 RX ORDER — ERYTHROPOIETIN 10000 [IU]/ML
10000 INJECTION, SOLUTION INTRAVENOUS; SUBCUTANEOUS
Qty: 2 | Refills: 0 | Status: DISCONTINUED | COMMUNITY
Start: 2022-01-19 | End: 2022-04-13

## 2022-04-14 ENCOUNTER — OUTPATIENT (OUTPATIENT)
Dept: OUTPATIENT SERVICES | Facility: HOSPITAL | Age: 77
LOS: 1 days | End: 2022-04-14
Payer: MEDICARE

## 2022-04-14 ENCOUNTER — APPOINTMENT (OUTPATIENT)
Dept: ULTRASOUND IMAGING | Facility: HOSPITAL | Age: 77
End: 2022-04-14
Payer: MEDICARE

## 2022-04-14 ENCOUNTER — APPOINTMENT (OUTPATIENT)
Dept: TRANSPLANT | Facility: CLINIC | Age: 77
End: 2022-04-14

## 2022-04-14 DIAGNOSIS — Z94.0 KIDNEY TRANSPLANT STATUS: Chronic | ICD-10-CM

## 2022-04-14 DIAGNOSIS — Z99.2 DEPENDENCE ON RENAL DIALYSIS: Chronic | ICD-10-CM

## 2022-04-14 DIAGNOSIS — Z90.710 ACQUIRED ABSENCE OF BOTH CERVIX AND UTERUS: Chronic | ICD-10-CM

## 2022-04-14 DIAGNOSIS — Z00.00 ENCOUNTER FOR GENERAL ADULT MEDICAL EXAMINATION WITHOUT ABNORMAL FINDINGS: ICD-10-CM

## 2022-04-14 DIAGNOSIS — Z94.0 KIDNEY TRANSPLANT STATUS: ICD-10-CM

## 2022-04-14 DIAGNOSIS — Z86.69 PERSONAL HISTORY OF OTHER DISEASES OF THE NERVOUS SYSTEM AND SENSE ORGANS: Chronic | ICD-10-CM

## 2022-04-14 DIAGNOSIS — I77.0 ARTERIOVENOUS FISTULA, ACQUIRED: Chronic | ICD-10-CM

## 2022-04-14 DIAGNOSIS — H26.9 UNSPECIFIED CATARACT: Chronic | ICD-10-CM

## 2022-04-14 PROCEDURE — 76776 US EXAM K TRANSPL W/DOPPLER: CPT

## 2022-04-14 PROCEDURE — 76776 US EXAM K TRANSPL W/DOPPLER: CPT | Mod: 26,RT

## 2022-04-14 NOTE — PATIENT PROFILE ADULT. - HEALTHCARE QUESTIONS, PROFILE
Impression: Type 2 diabetes mellitus w/ proliferative diabetic retinopathy w/o macular edema, right eye: N31.0212. Plan: NVD, no edema.  see #1 none

## 2022-04-18 LAB
BKV DNA SPEC QL NAA+PROBE: NOT DETECTED COPIES/ML
CMV DNA SPEC QL NAA+PROBE: NOT DETECTED IU/ML

## 2022-05-22 NOTE — PROGRESS NOTE ADULT - SUBJECTIVE AND OBJECTIVE BOX
Patient is a 71y old  Female who presents with a chief complaint of Fevers. (15 Jarod 2017 19:44)      SUBJECTIVE / OVERNIGHT EVENTS:   Feels better.  Denies CP/SOB/Palpitation/HA.    MEDICATIONS  (STANDING):  heparin  Infusion. Unit(s)/Hr IV Continuous <Continuous>  pantoprazole    Tablet 40milliGRAM(s) Oral before breakfast  amLODIPine   Tablet 10milliGRAM(s) Oral daily  hydrALAZINE 100milliGRAM(s) Oral three times a day  epoetin ivelisse Injectable 4000Unit(s) IV Push <User Schedule>  calcium acetate 2001milliGRAM(s) Oral three times a day with meals    MEDICATIONS  (PRN):  heparin  Injectable 4500Unit(s) IV Push every 6 hours PRN For aPTT less than 40  heparin  Injectable 2000Unit(s) IV Push every 6 hours PRN For aPTT between 40 - 57  acetaminophen   Tablet 650milliGRAM(s) Oral every 6 hours PRN For Temp greater than 38 C (100.4 F)  acetaminophen   Tablet. 650milliGRAM(s) Oral every 6 hours PRN Mild Pain (1 - 3)      Vital Signs Last 24 Hrs  T(C): 37, Max: 37.1 (06-17 @ 04:47)  HR: 103 (76 - 103)  BP: 175/78 (144/76 - 175/78)  RR: 19 (18 - 19)  SpO2: 96% (96% - 98%)  Wt(kg): --  CAPILLARY BLOOD GLUCOSE    I&O's Summary  I & Os for 24h ending 17 Jun 2017 07:00  =============================================  IN: 1321 ml / OUT: 100 ml / NET: 1221 ml    I & Os for current day (as of 18 Jun 2017 02:01)  =============================================  IN: 887 ml / OUT: 1800 ml / NET: -913 ml      PHYSICAL EXAM:  GENERAL: NAD, well-developed  HEAD:  Atraumatic, Normocephalic  EYES: EOMI, PERRLA, conjunctiva and sclera clear  NECK: Supple, No JVD  CHEST/LUNG: Clear to auscultation bilaterally; No wheeze  HEART: Regular rate and rhythm; No murmurs, rubs, or gallops  ABDOMEN: Soft, Nontender, Nondistended; Bowel sounds present  EXTREMITIES:  2+ Peripheral Pulses, No clubbing, cyanosis, or edema  PSYCH: AAOx3  NEUROLOGY: non-focal  SKIN: No rashes or lesions    LABS:                        9.9    4.14  )-----------( 303      ( 17 Jun 2017 08:27 )             30.5     06-17    134<L>  |  92<L>  |  26<H>  ----------------------------<  81  4.3   |  24  |  7.14<H>    Ca    9.3      17 Jun 2017 08:23      PT/INR - ( 17 Jun 2017 08:27 )   PT: 12.6 sec;   INR: 1.11 ratio         PTT - ( 17 Jun 2017 08:27 )  PTT:67.3 sec        CAPILLARY BLOOD GLUCOSE    06-12 @ 18:22  Culture-urine --  Culture results   No growth at 5 days.  method type --  Organism --  Organism Identification --  Specimen source .Blood Blood  06-12 @ 14:45  Culture-urine --  Culture results   No growth at 5 days.  method type --  Organism --  Organism Identification --  Specimen source .Blood Blood           06-12 @ 18:22  Culture blood --  Culture results   No growth at 5 days.  Gram stain --  Gram stain blood --  Method type --  Organism --  Organism identification --  Specimen source .Blood Blood   06-12 @ 14:45  Culture blood --  Culture results   No growth at 5 days.  Gram stain --  Gram stain blood --  Method type --  Organism --  Organism identification --  Specimen source .Blood Blood      RADIOLOGY & ADDITIONAL TESTS:    Imaging Personally Reviewed:    Consultant(s) Notes Reviewed:      Care Discussed with Consultants/Other Providers: 24

## 2022-05-31 NOTE — DISCHARGE NOTE ADULT - VISION (WITH CORRECTIVE LENSES IF THE PATIENT USUALLY WEARS THEM):
"Anesthesia Transfer of Care Note    Patient: Adrian Claudet    Procedure(s) Performed: Procedure(s) (LRB):  EGD (ESOPHAGOGASTRODUODENOSCOPY) (N/A)    Patient location: GI    Anesthesia Type: MAC    Transport from OR: Transported from OR on room air with adequate spontaneous ventilation    Post pain: adequate analgesia    Post assessment: no apparent anesthetic complications and tolerated procedure well    Post vital signs: stable    Level of consciousness: responds to stimulation and sedated    Nausea/Vomiting: no nausea/vomiting    Complications: none    Transfer of care protocol was followed      Last vitals:   Visit Vitals  /66 (BP Location: Left arm, Patient Position: Lying)   Pulse 77   Temp 37.2 °C (99 °F) (Temporal)   Resp 13   Ht 5' 7" (1.702 m)   Wt 55.6 kg (122 lb 9.2 oz)   SpO2 100%   BMI 19.20 kg/m²     "
Normal vision: sees adequately in most situations; can see medication labels, newsprint

## 2022-06-07 ENCOUNTER — LABORATORY RESULT (OUTPATIENT)
Age: 77
End: 2022-06-07

## 2022-06-07 ENCOUNTER — NON-APPOINTMENT (OUTPATIENT)
Age: 77
End: 2022-06-07

## 2022-06-07 ENCOUNTER — APPOINTMENT (OUTPATIENT)
Dept: NEPHROLOGY | Facility: CLINIC | Age: 77
End: 2022-06-07
Payer: MEDICARE

## 2022-06-07 VITALS
TEMPERATURE: 97.8 F | HEIGHT: 66 IN | SYSTOLIC BLOOD PRESSURE: 137 MMHG | OXYGEN SATURATION: 99 % | BODY MASS INDEX: 19.93 KG/M2 | RESPIRATION RATE: 14 BRPM | DIASTOLIC BLOOD PRESSURE: 52 MMHG | WEIGHT: 124 LBS | HEART RATE: 66 BPM

## 2022-06-07 LAB
APPEARANCE: CLEAR
BACTERIA: NEGATIVE
BASOPHILS # BLD AUTO: 0 K/UL
BASOPHILS NFR BLD AUTO: 0 %
BILIRUBIN URINE: NEGATIVE
BLOOD URINE: NORMAL
COLOR: YELLOW
CREAT SPEC-SCNC: 147 MG/DL
CREAT/PROT UR: 0.8 RATIO
EOSINOPHIL # BLD AUTO: 0.05 K/UL
EOSINOPHIL NFR BLD AUTO: 1.7 %
GLUCOSE QUALITATIVE U: NEGATIVE
HCT VFR BLD CALC: 28.6 %
HGB BLD-MCNC: 9 G/DL
HYALINE CASTS: 0 /LPF
KETONES URINE: NEGATIVE
LEUKOCYTE ESTERASE URINE: NEGATIVE
LYMPHOCYTES # BLD AUTO: 0.5 K/UL
LYMPHOCYTES NFR BLD AUTO: 17.4 %
MAGNESIUM SERPL-MCNC: 1.7 MG/DL
MAN DIFF?: NORMAL
MCHC RBC-ENTMCNC: 26.7 PG
MCHC RBC-ENTMCNC: 31.5 GM/DL
MCV RBC AUTO: 84.9 FL
MICROSCOPIC-UA: NORMAL
MONOCYTES # BLD AUTO: 0.2 K/UL
MONOCYTES NFR BLD AUTO: 7 %
NEUTROPHILS # BLD AUTO: 2.14 K/UL
NEUTROPHILS NFR BLD AUTO: 73.9 %
NITRITE URINE: NEGATIVE
PH URINE: 6
PHOSPHATE SERPL-MCNC: 3.8 MG/DL
PLATELET # BLD AUTO: 143 K/UL
PROT UR-MCNC: 113 MG/DL
PROTEIN URINE: ABNORMAL
RBC # BLD: 3.37 M/UL
RBC # FLD: 14.7 %
RED BLOOD CELLS URINE: 2 /HPF
SPECIFIC GRAVITY URINE: 1.02
SQUAMOUS EPITHELIAL CELLS: 0 /HPF
TACROLIMUS SERPL-MCNC: 5.1 NG/ML
UROBILINOGEN URINE: NORMAL
WBC # FLD AUTO: 2.89 K/UL
WHITE BLOOD CELLS URINE: 1 /HPF

## 2022-06-07 PROCEDURE — 99215 OFFICE O/P EST HI 40 MIN: CPT

## 2022-06-07 RX ORDER — FUROSEMIDE 40 MG/1
40 TABLET ORAL DAILY
Qty: 90 | Refills: 3 | Status: DISCONTINUED | COMMUNITY
Start: 2022-02-15 | End: 2022-06-07

## 2022-06-07 NOTE — ASSESSMENT
[FreeTextEntry1] : \par 76 yr old woman with ESRD due to HTN S/p DDRT on 5/19/2021, course complicated by DGF due to ATN. \par Graft function recovered. Last HD was on  6/4/21. Oswaldo creatinine 1.3mg/dL. \par Creatinine elevated to 1.6-2mg/dL since Nov 2021 with 2 g/d proteinuria. \par No DSA, cFDNA 0.58%.   Ultrasound done Nov 2021 with US showing elevated velocities but no tardus parvus.  \par Repeat US 4/2022 unchanged, cFDNA remains 0.58%. No DSA. \par Graft dysfunction and proteinuria likely due to TRAS. Conservatively managed as long as cr remains stable and BP controlled. \par Creatinine today 1.9mg/dL. Urine protein/cr 0.8 . Pt not on ACEi due to hyperkalemia \par \par Immunosuppression \par - S/p Simulect induction\par - CellCept 500mg po bid  - dose reduced for GI side effects and leukopenia. \par - On Envarsus 4mg po daily. level 5.1 ok. Trough Goal 5-8\par - Continue Prednisone 5mg daily \par \par Prophylaxis \par - Bactrim SS po daily. Dapsone discontinued for hemolytic anemia. \par - Completed 2 doses COVID vaccine prior to transplant. 3rd shot received 10/19/22. Also had COVID infection in March 2020 and Jan 2022. Shubham Ab strongly +ve\par - Flu shot received in October. \par \par Hypertension - Controlled at home on current regimen. \par  Nifedipine 60mg po bid, labetalol 200mg po bid, Doxazosin 4mg po qhs \par \par Anemia -  Procrit d/jair in April. Hgb declined from 11.1 to 9.0. Also has leukopenia WBC 2.89 and thrombocytopenia Pl 143. \par  Repeat labs in 2 weeks. \par \par Hyperkalemia - On Lokelma 10gm po to every other daily. Low K diet. Controlled. \par \par Metabolic acidosis -. Continue sodium bicarbonate 1300mg po bid. \par \par Vitamin D deficiency - Continue Vitamin D 1000 units po daily. \par \par Unable to schedule appt with primary nephrologist\par Labs in 2 weeks. \par F/u here 8/9/22\par

## 2022-06-07 NOTE — HISTORY OF PRESENT ILLNESS
[FreeTextEntry1] : \par 76 year old woman with ESRD due to HTN, was on hemodialysis since 2016. Received DDRT on 5/19/21 \par \par Donor was a 44 yr old woman, KDPI 69%, history of diabetes on insulin, terminal creatinine 3.3. \par Post transplant course was complicated by DGF. Last hemodialysis was on 6/4/21. Transplant ureteric stent was removed on 8/2/21. \par \par Other PMH includes :History of COVID-19 infection, hospitalized in March 2020 for only 1 day. \par \par - Hospitalized 10/2021 with severe symptomatic anemia (Hgb 5g/dL). Transfused 2 units PRBC. Anemia was due to hemolysis caused by dapsone (despite normal G6PD levels prior to initiation). \par - Nov 2021 Noted to have YANELY creatinine elevated to 1.65mg/dL.  US showed increased velocities concerning for TRAS but no tardus parvus waves. DSA negative,  and cFDNA  0.5%.  Discussed with Dr. Henderson and opted for conservative management.  \par - Hospitalized Jan 2022 with worsening anemia, hyperkalemia, tested +ve for COVID but had no symptoms. \par \par She presents for follow up visit. Last seen 2 months ago. \par No major events since last seen. \par Feels well. Good appetite. No NVD. Urinating well, no dysuria, no hematuria. \par No cough, shortness of breath or chest pain. \par No fever, no chills. \par Weight up 4 lbs, still feels tired. Not exercising. \par Home BP usually 120-140 systolic. This AM SBP was 100 - felt dizzy , drank apple juice then recovered. \par Lives with , has HHA. Children live nearby and help out. \par

## 2022-06-07 NOTE — PHYSICAL EXAM

## 2022-06-08 LAB
CMV DNA SPEC QL NAA+PROBE: ABNORMAL IU/ML
CMVPCR LOG: ABNORMAL LOG10IU/ML

## 2022-06-09 LAB
ALBUMIN SERPL ELPH-MCNC: 4 G/DL
ALP BLD-CCNC: 92 U/L
ALT SERPL-CCNC: 9 U/L
ANION GAP SERPL CALC-SCNC: 13 MMOL/L
AST SERPL-CCNC: 13 U/L
BILIRUB SERPL-MCNC: 0.2 MG/DL
BUN SERPL-MCNC: 41 MG/DL
CALCIUM SERPL-MCNC: 9.6 MG/DL
CHLORIDE SERPL-SCNC: 112 MMOL/L
CO2 SERPL-SCNC: 19 MMOL/L
CREAT SERPL-MCNC: 1.91 MG/DL
EGFR: 27 ML/MIN/1.73M2
GLUCOSE SERPL-MCNC: 116 MG/DL
POTASSIUM SERPL-SCNC: 5.3 MMOL/L
PROT SERPL-MCNC: 5.8 G/DL
SODIUM SERPL-SCNC: 144 MMOL/L

## 2022-06-10 LAB — BKV DNA SPEC QL NAA+PROBE: NOT DETECTED COPIES/ML

## 2022-06-17 ENCOUNTER — APPOINTMENT (OUTPATIENT)
Dept: TRANSPLANT | Facility: CLINIC | Age: 77
End: 2022-06-17

## 2022-06-17 ENCOUNTER — LABORATORY RESULT (OUTPATIENT)
Age: 77
End: 2022-06-17

## 2022-06-17 ENCOUNTER — NON-APPOINTMENT (OUTPATIENT)
Age: 77
End: 2022-06-17

## 2022-06-20 LAB
ALBUMIN SERPL ELPH-MCNC: 3.9 G/DL
ANION GAP SERPL CALC-SCNC: 10 MMOL/L
BASOPHILS # BLD AUTO: 0.01 K/UL
BASOPHILS NFR BLD AUTO: 0.3 %
BUN SERPL-MCNC: 39 MG/DL
CALCIUM SERPL-MCNC: 10 MG/DL
CHLORIDE SERPL-SCNC: 114 MMOL/L
CO2 SERPL-SCNC: 20 MMOL/L
CREAT SERPL-MCNC: 1.67 MG/DL
EGFR: 31 ML/MIN/1.73M2
EOSINOPHIL # BLD AUTO: 0.1 K/UL
EOSINOPHIL NFR BLD AUTO: 3.1 %
GLUCOSE SERPL-MCNC: 99 MG/DL
HCT VFR BLD CALC: 29.4 %
HGB BLD-MCNC: 8.6 G/DL
IMM GRANULOCYTES NFR BLD AUTO: 0.9 %
LYMPHOCYTES # BLD AUTO: 0.48 K/UL
LYMPHOCYTES NFR BLD AUTO: 14.9 %
MAN DIFF?: NORMAL
MCHC RBC-ENTMCNC: 26.4 PG
MCHC RBC-ENTMCNC: 29.3 GM/DL
MCV RBC AUTO: 90.2 FL
MONOCYTES # BLD AUTO: 0.34 K/UL
MONOCYTES NFR BLD AUTO: 10.6 %
NEUTROPHILS # BLD AUTO: 2.26 K/UL
NEUTROPHILS NFR BLD AUTO: 70.2 %
PHOSPHATE SERPL-MCNC: 3.6 MG/DL
PLATELET # BLD AUTO: 172 K/UL
POTASSIUM SERPL-SCNC: 5 MMOL/L
RBC # BLD: 3.26 M/UL
RBC # FLD: 14.7 %
SODIUM SERPL-SCNC: 144 MMOL/L
TACROLIMUS SERPL-MCNC: 3.6 NG/ML
WBC # FLD AUTO: 3.22 K/UL

## 2022-07-05 NOTE — DISCHARGE NOTE ADULT - MEDICATION SUMMARY - MEDICATIONS TO TAKE
I will START or STAY ON the medications listed below when I get home from the hospital:    amLODIPine 10 mg oral tablet  -- 1 tab(s) by mouth once a day  -- Indication: For Htn     EpiPen 2-Rakesh 0.3 mg injectable kit  -- 0.3 milligram(s) injectable once x 1 days, As Needed allergic reaction  -- Obtain medical advice before taking any non-prescription drugs as some may affect the action of this medication.    -- Indication: For Allergy     epoetin ivelisse  -- 4000 unit(s) subcutaneous Tuesday, Thursday, Saturday  at dialysis  -- Indication: For Anemia due to chronic kidney disease    calcium acetate 667 mg oral tablet  -- 2001 milligram(s) by mouth 3 times a day  -- Indication: For vitamin     pantoprazole 40 mg oral delayed release tablet  -- 1 tab(s) by mouth once a day (before a meal)  -- Indication: For gerd     hydrALAZINE 25 mg oral tablet  -- 1 tab(s) by mouth 3 times a day  -- Indication: For Htn     Vitamin D3  -- 1 cap(s) by mouth once a week  -- Indication: For vitamin I will START or STAY ON the medications listed below when I get home from the hospital:    Coumadin 6 mg oral tablet  -- 1 tab(s) by mouth once a day  -- Do not take this drug if you are pregnant.  It is very important that you take or use this exactly as directed.  Do not skip doses or discontinue unless directed by your doctor.  Obtain medical advice before taking any non-prescription drugs as some may affect the action of this medication.    -- Indication: For dvt     amLODIPine 10 mg oral tablet  -- 1 tab(s) by mouth once a day  -- Indication: For Htn     EpiPen 2-Rakesh 0.3 mg injectable kit  -- 0.3 milligram(s) injectable once x 1 days, As Needed allergic reaction  -- Obtain medical advice before taking any non-prescription drugs as some may affect the action of this medication.    -- Indication: For Allergy     epoetin ivelisse  -- 4000 unit(s) subcutaneous Tuesday, Thursday, Saturday  at dialysis  -- Indication: For Anemia due to chronic kidney disease    calcium acetate 667 mg oral tablet  -- 2001 milligram(s) by mouth 3 times a day  -- Indication: For vitamin     pantoprazole 40 mg oral delayed release tablet  -- 1 tab(s) by mouth once a day (before a meal)  -- Indication: For gerd     hydrALAZINE 25 mg oral tablet  -- 1 tab(s) by mouth 3 times a day  -- Indication: For Htn     Vitamin D3  -- 1 cap(s) by mouth once a week  -- Indication: For vitamin I will START or STAY ON the medications listed below when I get home from the hospital:    Coumadin 6 mg oral tablet  -- 1 tab(s) by mouth once a day  -- Do not take this drug if you are pregnant.  It is very important that you take or use this exactly as directed.  Do not skip doses or discontinue unless directed by your doctor.  Obtain medical advice before taking any non-prescription drugs as some may affect the action of this medication.    -- Indication: For dvt     amLODIPine 10 mg oral tablet  -- 1 tab(s) by mouth once a day  -- Indication: For Htn     EpiPen 2-Rakesh 0.3 mg injectable kit  -- 0.3 milligram(s) injectable once x 1 days, As Needed allergic reaction  -- Obtain medical advice before taking any non-prescription drugs as some may affect the action of this medication.    -- Indication: For Allergy     epoetin ivelisse  -- 4000 unit(s) subcutaneous Tuesday, Thursday, Saturday  at dialysis  -- Indication: For Anemia due to chronic kidney disease    calcium acetate 667 mg oral tablet  -- 2001 milligram(s) by mouth 3 times a day  -- Indication: For vitamin     pantoprazole 40 mg oral delayed release tablet  -- 1 tab(s) by mouth once a day (before a meal)  -- Indication: For gerd     hydrALAZINE 100 mg oral tablet  -- 1 tab(s) by mouth 3 times a day  -- Indication: For Htn    Vitamin D3  -- 1 cap(s) by mouth once a week  -- Indication: For vitamin Patient presents to ED with c/o intermittent nausea and sweats with clammy hands and intermittent brief twinges in his chest for 4-5 days. Today he is feeling weak with decreased appetite. He states that he at times feels extra heart beats but states that is not new and has occurred occasionally since his CABG surgery.

## 2022-08-09 ENCOUNTER — APPOINTMENT (OUTPATIENT)
Dept: NEPHROLOGY | Facility: CLINIC | Age: 77
End: 2022-08-09

## 2022-08-09 VITALS
DIASTOLIC BLOOD PRESSURE: 65 MMHG | TEMPERATURE: 97.8 F | HEART RATE: 67 BPM | RESPIRATION RATE: 14 BRPM | SYSTOLIC BLOOD PRESSURE: 158 MMHG | BODY MASS INDEX: 19.93 KG/M2 | OXYGEN SATURATION: 98 % | WEIGHT: 124 LBS | HEIGHT: 66 IN

## 2022-08-09 PROCEDURE — 99215 OFFICE O/P EST HI 40 MIN: CPT

## 2022-08-09 NOTE — HISTORY OF PRESENT ILLNESS
[FreeTextEntry1] : \par 76 year old woman with ESRD due to HTN, was on hemodialysis since 2016. Received DDRT on 5/19/21 \par \par Donor was a 44 yr old woman, KDPI 69%, history of diabetes on insulin, terminal creatinine 3.3. \par Post transplant course was complicated by DGF. Last hemodialysis was on 6/4/21. Transplant ureteric stent was removed on 8/2/21. \par \par Other PMH includes :History of COVID-19 infection, hospitalized in March 2020 for only 1 day. \par \par - Hospitalized 10/2021 with severe symptomatic anemia (Hgb 5g/dL). Transfused 2 units PRBC. Anemia was due to hemolysis caused by dapsone (despite normal G6PD levels prior to initiation). \par - Nov 2021 Noted to have YANELY creatinine elevated to 1.65mg/dL.  US showed increased velocities concerning for TRAS but no tardus parvus waves. DSA negative,  and cFDNA  0.5%.  Discussed with Dr. Henderson and opted for conservative management.  \par - Hospitalized Jan 2022 with worsening anemia, hyperkalemia, tested +ve for COVID but had no symptoms. \par \par She presents for follow up visit. Last seen 2 months ago. \par No major events since last seen. \par Feels well. Good appetite. No NVD. Urinating well, no dysuria, no hematuria. \par No cough, shortness of breath or chest pain. \par No fever, no chills. \par Energy is good. No longer using a cane. \par Home BP usually 120-140 systolic.  \par Lives with , has HHA. Children live nearby and help out. \par

## 2022-08-09 NOTE — ASSESSMENT
[FreeTextEntry1] : \par 76 yr old woman with ESRD due to HTN S/p DDRT on 5/19/2021, course complicated by DGF due to ATN. \par Graft function recovered. Last HD was on  6/4/21. Oswaldo creatinine 1.3mg/dL. \par Creatinine rising 1.6 -1.8mg/dL since Nov 2021 with 2 g/d proteinuria. No DSA, cFDNA 0.58%.   Ultrasound done Nov 2021 with US showing elevated velocities but no tardus parvus.  \par Repeat US 4/2022 unchanged, cFDNA remains 0.58%. No DSA. \par Graft dysfunction and proteinuria likely due to TRAS. Conservatively managed as long as cr remains stable and BP controlled. \par Creatinine today 1.96mg/dL \par \par Immunosuppression \par - S/p Simulect induction\par - CellCept 500mg po bid  - dose reduced for GI side effects and leukopenia. \par - On Envarsus 4mg po daily. Level 2.0 low. Increase dose to 6mg daily. Trough Goal 6-8. Repeat labs in 2 weeks. \par - Continue Prednisone 5mg daily \par \par Prophylaxis \par - Bactrim SS po daily. Dapsone discontinued for hemolytic anemia. \par - Completed 2 doses COVID vaccine prior to transplant. 3rd shot received 10/19/22. Also had COVID infection in March 2020 and Jan 2022. Shubham Ab strongly +ve\par - Flu shot received in October. 2021\par - CMV low level too low to quantify in June 2022- f/u CMV PCR drawn today \par \par Hypertension - Controlled at home on current regimen. \par  Nifedipine 60mg po bid, labetalol 200mg po bid, Doxazosin 4mg po qhs \par \par Anemia -  Procrit d/jair in April. 2022 Iron stores replete, normal B12 and Folate. Hgb 8.9 stable.  \par \par Hyperkalemia - Stable On Lokelma 10gm po to every other daily. Low K diet. \par \par Metabolic acidosis -. continue sodium bicarbonate 1300mg po bid. \par \par Vitamin D deficiency -  improving on supplementation, continue Vitamin D 1000 units po daily. \par \par Unable to get appt with primary nephrologist \par F/u here in 3 months \par

## 2022-08-10 ENCOUNTER — NON-APPOINTMENT (OUTPATIENT)
Age: 77
End: 2022-08-10

## 2022-08-10 LAB
25(OH)D3 SERPL-MCNC: 18.2 NG/ML
ALBUMIN SERPL ELPH-MCNC: 3.9 G/DL
ALP BLD-CCNC: 74 U/L
ALT SERPL-CCNC: 10 U/L
ANION GAP SERPL CALC-SCNC: 10 MMOL/L
APPEARANCE: CLEAR
AST SERPL-CCNC: 12 U/L
BACTERIA: NEGATIVE
BASOPHILS # BLD AUTO: 0.01 K/UL
BASOPHILS NFR BLD AUTO: 0.3 %
BILIRUB SERPL-MCNC: 0.2 MG/DL
BILIRUBIN URINE: NEGATIVE
BLOOD URINE: NORMAL
BUN SERPL-MCNC: 38 MG/DL
CALCIUM SERPL-MCNC: 10.1 MG/DL
CHLORIDE SERPL-SCNC: 113 MMOL/L
CO2 SERPL-SCNC: 19 MMOL/L
COLOR: YELLOW
CREAT SERPL-MCNC: 1.96 MG/DL
CREAT SPEC-SCNC: 131 MG/DL
CREAT/PROT UR: 1.1 RATIO
EGFR: 26 ML/MIN/1.73M2
EOSINOPHIL # BLD AUTO: 0.09 K/UL
EOSINOPHIL NFR BLD AUTO: 2.4 %
ESTIMATED AVERAGE GLUCOSE: 97 MG/DL
GLUCOSE QUALITATIVE U: NEGATIVE
GLUCOSE SERPL-MCNC: 107 MG/DL
HBA1C MFR BLD HPLC: 5 %
HCT VFR BLD CALC: 29.2 %
HGB BLD-MCNC: 8.9 G/DL
HYALINE CASTS: 1 /LPF
IMM GRANULOCYTES NFR BLD AUTO: 0.3 %
KETONES URINE: NEGATIVE
LDH SERPL-CCNC: 174 U/L
LEUKOCYTE ESTERASE URINE: NEGATIVE
LYMPHOCYTES # BLD AUTO: 0.53 K/UL
LYMPHOCYTES NFR BLD AUTO: 14 %
MAGNESIUM SERPL-MCNC: 1.7 MG/DL
MAN DIFF?: NORMAL
MCHC RBC-ENTMCNC: 27.7 PG
MCHC RBC-ENTMCNC: 30.5 GM/DL
MCV RBC AUTO: 91 FL
MICROSCOPIC-UA: NORMAL
MONOCYTES # BLD AUTO: 0.34 K/UL
MONOCYTES NFR BLD AUTO: 9 %
NEUTROPHILS # BLD AUTO: 2.8 K/UL
NEUTROPHILS NFR BLD AUTO: 74 %
NITRITE URINE: NEGATIVE
PH URINE: 6
PHOSPHATE SERPL-MCNC: 3.5 MG/DL
PLATELET # BLD AUTO: 153 K/UL
POTASSIUM SERPL-SCNC: 5.1 MMOL/L
PROT SERPL-MCNC: 5.8 G/DL
PROT UR-MCNC: 147 MG/DL
PROTEIN URINE: ABNORMAL
RBC # BLD: 3.21 M/UL
RBC # FLD: 13.8 %
RED BLOOD CELLS URINE: 2 /HPF
SODIUM SERPL-SCNC: 142 MMOL/L
SPECIFIC GRAVITY URINE: 1.02
SQUAMOUS EPITHELIAL CELLS: 1 /HPF
TACROLIMUS SERPL-MCNC: 2 NG/ML
URATE SERPL-MCNC: 7.6 MG/DL
UROBILINOGEN URINE: NORMAL
WBC # FLD AUTO: 3.78 K/UL
WHITE BLOOD CELLS URINE: 1 /HPF

## 2022-08-11 LAB — BKV DNA SPEC QL NAA+PROBE: NOT DETECTED IU/ML

## 2022-08-23 ENCOUNTER — APPOINTMENT (OUTPATIENT)
Dept: TRANSPLANT | Facility: CLINIC | Age: 77
End: 2022-08-23

## 2022-08-23 NOTE — H&P PST ADULT - INTERPRETATION SERVICES DECLINED
Patient/Caregiver requests family/friend to interpret. Sarecycline Pregnancy And Lactation Text: This medication is Pregnancy Category D and not consider safe during pregnancy. It is also excreted in breast milk.

## 2022-09-01 ENCOUNTER — LABORATORY RESULT (OUTPATIENT)
Age: 77
End: 2022-09-01

## 2022-09-01 ENCOUNTER — APPOINTMENT (OUTPATIENT)
Dept: TRANSPLANT | Facility: CLINIC | Age: 77
End: 2022-09-01

## 2022-09-02 ENCOUNTER — NON-APPOINTMENT (OUTPATIENT)
Age: 77
End: 2022-09-02

## 2022-09-02 LAB
ALBUMIN SERPL ELPH-MCNC: 4.2 G/DL
ANION GAP SERPL CALC-SCNC: 10 MMOL/L
BASOPHILS # BLD AUTO: 0 K/UL
BASOPHILS NFR BLD AUTO: 0 %
BUN SERPL-MCNC: 41 MG/DL
CALCIUM SERPL-MCNC: 10.5 MG/DL
CHLORIDE SERPL-SCNC: 114 MMOL/L
CO2 SERPL-SCNC: 21 MMOL/L
CREAT SERPL-MCNC: 2.13 MG/DL
EGFR: 23 ML/MIN/1.73M2
EOSINOPHIL # BLD AUTO: 0.07 K/UL
EOSINOPHIL NFR BLD AUTO: 2.6 %
GLUCOSE SERPL-MCNC: 101 MG/DL
HCT VFR BLD CALC: 31.9 %
HGB BLD-MCNC: 9.8 G/DL
IMM GRANULOCYTES NFR BLD AUTO: 0.4 %
LYMPHOCYTES # BLD AUTO: 0.62 K/UL
LYMPHOCYTES NFR BLD AUTO: 23 %
MAN DIFF?: NORMAL
MCHC RBC-ENTMCNC: 27.3 PG
MCHC RBC-ENTMCNC: 30.7 GM/DL
MCV RBC AUTO: 88.9 FL
MONOCYTES # BLD AUTO: 0.34 K/UL
MONOCYTES NFR BLD AUTO: 12.6 %
NEUTROPHILS # BLD AUTO: 1.65 K/UL
NEUTROPHILS NFR BLD AUTO: 61.4 %
PHOSPHATE SERPL-MCNC: 3.6 MG/DL
PLATELET # BLD AUTO: 161 K/UL
POTASSIUM SERPL-SCNC: 5.7 MMOL/L
RBC # BLD: 3.59 M/UL
RBC # FLD: 13.5 %
SODIUM SERPL-SCNC: 145 MMOL/L
TACROLIMUS SERPL-MCNC: 8.5 NG/ML
WBC # FLD AUTO: 2.69 K/UL

## 2022-09-15 ENCOUNTER — APPOINTMENT (OUTPATIENT)
Dept: TRANSPLANT | Facility: CLINIC | Age: 77
End: 2022-09-15

## 2022-09-15 LAB
ALBUMIN SERPL ELPH-MCNC: 4 G/DL
ANION GAP SERPL CALC-SCNC: 9 MMOL/L
BUN SERPL-MCNC: 43 MG/DL
CALCIUM SERPL-MCNC: 9.6 MG/DL
CHLORIDE SERPL-SCNC: 116 MMOL/L
CO2 SERPL-SCNC: 19 MMOL/L
CREAT SERPL-MCNC: 2.21 MG/DL
EGFR: 22 ML/MIN/1.73M2
GLUCOSE SERPL-MCNC: 88 MG/DL
PHOSPHATE SERPL-MCNC: 3.7 MG/DL
POTASSIUM SERPL-SCNC: 5.1 MMOL/L
SODIUM SERPL-SCNC: 144 MMOL/L
TACROLIMUS SERPL-MCNC: 6.9 NG/ML

## 2022-09-19 ENCOUNTER — NON-APPOINTMENT (OUTPATIENT)
Age: 77
End: 2022-09-19

## 2022-09-21 ENCOUNTER — OUTPATIENT (OUTPATIENT)
Dept: OUTPATIENT SERVICES | Facility: HOSPITAL | Age: 77
LOS: 1 days | End: 2022-09-21
Payer: MEDICARE

## 2022-09-21 ENCOUNTER — APPOINTMENT (OUTPATIENT)
Dept: ULTRASOUND IMAGING | Facility: HOSPITAL | Age: 77
End: 2022-09-21

## 2022-09-21 DIAGNOSIS — Z99.2 DEPENDENCE ON RENAL DIALYSIS: Chronic | ICD-10-CM

## 2022-09-21 DIAGNOSIS — Z94.0 KIDNEY TRANSPLANT STATUS: Chronic | ICD-10-CM

## 2022-09-21 DIAGNOSIS — H26.9 UNSPECIFIED CATARACT: Chronic | ICD-10-CM

## 2022-09-21 DIAGNOSIS — Z90.710 ACQUIRED ABSENCE OF BOTH CERVIX AND UTERUS: Chronic | ICD-10-CM

## 2022-09-21 DIAGNOSIS — Z86.69 PERSONAL HISTORY OF OTHER DISEASES OF THE NERVOUS SYSTEM AND SENSE ORGANS: Chronic | ICD-10-CM

## 2022-09-21 DIAGNOSIS — Z94.0 KIDNEY TRANSPLANT STATUS: ICD-10-CM

## 2022-09-21 DIAGNOSIS — I77.0 ARTERIOVENOUS FISTULA, ACQUIRED: Chronic | ICD-10-CM

## 2022-09-21 LAB
INR PPP: 1.06 RATIO
PT BLD: 12.5 SEC

## 2022-09-21 PROCEDURE — 76776 US EXAM K TRANSPL W/DOPPLER: CPT | Mod: 26,RT

## 2022-09-21 PROCEDURE — 76776 US EXAM K TRANSPL W/DOPPLER: CPT

## 2022-09-23 ENCOUNTER — NON-APPOINTMENT (OUTPATIENT)
Age: 77
End: 2022-09-23

## 2022-09-26 ENCOUNTER — OUTPATIENT (OUTPATIENT)
Dept: OUTPATIENT SERVICES | Facility: HOSPITAL | Age: 77
LOS: 1 days | End: 2022-09-26
Payer: MEDICARE

## 2022-09-26 DIAGNOSIS — H26.9 UNSPECIFIED CATARACT: Chronic | ICD-10-CM

## 2022-09-26 DIAGNOSIS — Z94.0 KIDNEY TRANSPLANT STATUS: Chronic | ICD-10-CM

## 2022-09-26 DIAGNOSIS — Z99.2 DEPENDENCE ON RENAL DIALYSIS: Chronic | ICD-10-CM

## 2022-09-26 DIAGNOSIS — Z11.52 ENCOUNTER FOR SCREENING FOR COVID-19: ICD-10-CM

## 2022-09-26 DIAGNOSIS — Z90.710 ACQUIRED ABSENCE OF BOTH CERVIX AND UTERUS: Chronic | ICD-10-CM

## 2022-09-26 DIAGNOSIS — Z86.69 PERSONAL HISTORY OF OTHER DISEASES OF THE NERVOUS SYSTEM AND SENSE ORGANS: Chronic | ICD-10-CM

## 2022-09-26 DIAGNOSIS — I77.0 ARTERIOVENOUS FISTULA, ACQUIRED: Chronic | ICD-10-CM

## 2022-09-26 LAB — SARS-COV-2 RNA SPEC QL NAA+PROBE: SIGNIFICANT CHANGE UP

## 2022-09-26 PROCEDURE — U0003: CPT

## 2022-09-26 PROCEDURE — U0005: CPT

## 2022-09-26 PROCEDURE — C9803: CPT

## 2022-09-29 ENCOUNTER — OUTPATIENT (OUTPATIENT)
Dept: OUTPATIENT SERVICES | Facility: HOSPITAL | Age: 77
LOS: 1 days | End: 2022-09-29

## 2022-09-29 ENCOUNTER — APPOINTMENT (OUTPATIENT)
Dept: ULTRASOUND IMAGING | Facility: HOSPITAL | Age: 77
End: 2022-09-29

## 2022-09-29 ENCOUNTER — RESULT REVIEW (OUTPATIENT)
Age: 77
End: 2022-09-29

## 2022-09-29 DIAGNOSIS — Z94.0 KIDNEY TRANSPLANT STATUS: Chronic | ICD-10-CM

## 2022-09-29 DIAGNOSIS — Z94.0 KIDNEY TRANSPLANT STATUS: ICD-10-CM

## 2022-09-29 DIAGNOSIS — H26.9 UNSPECIFIED CATARACT: Chronic | ICD-10-CM

## 2022-09-29 DIAGNOSIS — I77.0 ARTERIOVENOUS FISTULA, ACQUIRED: Chronic | ICD-10-CM

## 2022-09-29 DIAGNOSIS — Z90.710 ACQUIRED ABSENCE OF BOTH CERVIX AND UTERUS: Chronic | ICD-10-CM

## 2022-09-29 DIAGNOSIS — Z99.2 DEPENDENCE ON RENAL DIALYSIS: Chronic | ICD-10-CM

## 2022-09-29 DIAGNOSIS — Z86.69 PERSONAL HISTORY OF OTHER DISEASES OF THE NERVOUS SYSTEM AND SENSE ORGANS: Chronic | ICD-10-CM

## 2022-09-29 PROCEDURE — 88305 TISSUE EXAM BY PATHOLOGIST: CPT | Mod: 26

## 2022-09-29 PROCEDURE — 76942 ECHO GUIDE FOR BIOPSY: CPT | Mod: 26

## 2022-09-29 PROCEDURE — 88346 IMFLUOR 1ST 1ANTB STAIN PX: CPT | Mod: 26

## 2022-09-29 PROCEDURE — 88341 IMHCHEM/IMCYTCHM EA ADD ANTB: CPT | Mod: 26

## 2022-09-29 PROCEDURE — 88348 ELECTRON MICROSCOPY DX: CPT | Mod: 26

## 2022-09-29 PROCEDURE — 88342 IMHCHEM/IMCYTCHM 1ST ANTB: CPT | Mod: 26

## 2022-09-29 PROCEDURE — 88350 IMFLUOR EA ADDL 1ANTB STN PX: CPT | Mod: 26

## 2022-09-29 PROCEDURE — 88313 SPECIAL STAINS GROUP 2: CPT | Mod: 26

## 2022-09-29 PROCEDURE — 50200 RENAL BIOPSY PERQ: CPT | Mod: RT

## 2022-09-30 ENCOUNTER — INPATIENT (INPATIENT)
Facility: HOSPITAL | Age: 77
LOS: 6 days | Discharge: HOME HEALTH | DRG: 699 | End: 2022-10-07
Attending: TRANSPLANT SURGERY | Admitting: TRANSPLANT SURGERY
Payer: MEDICARE

## 2022-09-30 ENCOUNTER — NON-APPOINTMENT (OUTPATIENT)
Age: 77
End: 2022-09-30

## 2022-09-30 VITALS
RESPIRATION RATE: 18 BRPM | SYSTOLIC BLOOD PRESSURE: 165 MMHG | OXYGEN SATURATION: 99 % | HEIGHT: 66 IN | DIASTOLIC BLOOD PRESSURE: 80 MMHG | TEMPERATURE: 98 F | HEART RATE: 64 BPM | WEIGHT: 121.7 LBS

## 2022-09-30 DIAGNOSIS — Z94.0 KIDNEY TRANSPLANT STATUS: Chronic | ICD-10-CM

## 2022-09-30 DIAGNOSIS — H26.9 UNSPECIFIED CATARACT: Chronic | ICD-10-CM

## 2022-09-30 DIAGNOSIS — I77.0 ARTERIOVENOUS FISTULA, ACQUIRED: Chronic | ICD-10-CM

## 2022-09-30 DIAGNOSIS — Z90.710 ACQUIRED ABSENCE OF BOTH CERVIX AND UTERUS: Chronic | ICD-10-CM

## 2022-09-30 DIAGNOSIS — Z86.69 PERSONAL HISTORY OF OTHER DISEASES OF THE NERVOUS SYSTEM AND SENSE ORGANS: Chronic | ICD-10-CM

## 2022-09-30 DIAGNOSIS — Z94.0 KIDNEY TRANSPLANT STATUS: ICD-10-CM

## 2022-09-30 DIAGNOSIS — Z99.2 DEPENDENCE ON RENAL DIALYSIS: Chronic | ICD-10-CM

## 2022-09-30 LAB
BASOPHILS # BLD AUTO: 0 K/UL — SIGNIFICANT CHANGE UP (ref 0–0.2)
BASOPHILS NFR BLD AUTO: 0 % — SIGNIFICANT CHANGE UP (ref 0–2)
BLD GP AB SCN SERPL QL: NEGATIVE — SIGNIFICANT CHANGE UP
EOSINOPHIL # BLD AUTO: 0.02 K/UL — SIGNIFICANT CHANGE UP (ref 0–0.5)
EOSINOPHIL NFR BLD AUTO: 0.9 % — SIGNIFICANT CHANGE UP (ref 0–6)
HCT VFR BLD CALC: 31.9 % — LOW (ref 34.5–45)
HGB BLD-MCNC: 9.6 G/DL — LOW (ref 11.5–15.5)
LYMPHOCYTES # BLD AUTO: 0.67 K/UL — LOW (ref 1–3.3)
LYMPHOCYTES # BLD AUTO: 25 % — SIGNIFICANT CHANGE UP (ref 13–44)
MAGNESIUM SERPL-MCNC: 1.6 MG/DL — SIGNIFICANT CHANGE UP (ref 1.6–2.6)
MANUAL SMEAR VERIFICATION: SIGNIFICANT CHANGE UP
MCHC RBC-ENTMCNC: 26.5 PG — LOW (ref 27–34)
MCHC RBC-ENTMCNC: 30.1 GM/DL — LOW (ref 32–36)
MCV RBC AUTO: 88.1 FL — SIGNIFICANT CHANGE UP (ref 80–100)
MONOCYTES # BLD AUTO: 0.12 K/UL — SIGNIFICANT CHANGE UP (ref 0–0.9)
MONOCYTES NFR BLD AUTO: 4.3 % — SIGNIFICANT CHANGE UP (ref 2–14)
NEUTROPHILS # BLD AUTO: 1.88 K/UL — SIGNIFICANT CHANGE UP (ref 1.8–7.4)
NEUTROPHILS NFR BLD AUTO: 69.8 % — SIGNIFICANT CHANGE UP (ref 43–77)
PHOSPHATE SERPL-MCNC: 3.8 MG/DL — SIGNIFICANT CHANGE UP (ref 2.5–4.5)
PLAT MORPH BLD: NORMAL — SIGNIFICANT CHANGE UP
PLATELET # BLD AUTO: 143 K/UL — LOW (ref 150–400)
RBC # BLD: 3.62 M/UL — LOW (ref 3.8–5.2)
RBC # FLD: 13.4 % — SIGNIFICANT CHANGE UP (ref 10.3–14.5)
RBC BLD AUTO: SIGNIFICANT CHANGE UP
RH IG SCN BLD-IMP: POSITIVE — SIGNIFICANT CHANGE UP
WBC # BLD: 2.69 K/UL — LOW (ref 3.8–10.5)
WBC # FLD AUTO: 2.69 K/UL — LOW (ref 3.8–10.5)

## 2022-09-30 PROCEDURE — 93010 ELECTROCARDIOGRAM REPORT: CPT

## 2022-09-30 RX ORDER — LABETALOL HCL 100 MG
200 TABLET ORAL
Refills: 0 | Status: DISCONTINUED | OUTPATIENT
Start: 2022-10-01 | End: 2022-10-03

## 2022-09-30 RX ORDER — NIFEDIPINE 30 MG
60 TABLET, EXTENDED RELEASE 24 HR ORAL
Refills: 0 | Status: DISCONTINUED | OUTPATIENT
Start: 2022-10-01 | End: 2022-10-07

## 2022-09-30 RX ORDER — ATOVAQUONE 750 MG/5ML
1500 SUSPENSION ORAL DAILY
Refills: 0 | Status: DISCONTINUED | OUTPATIENT
Start: 2022-10-01 | End: 2022-10-07

## 2022-09-30 RX ORDER — SODIUM BICARBONATE 1 MEQ/ML
650 SYRINGE (ML) INTRAVENOUS
Refills: 0 | Status: DISCONTINUED | OUTPATIENT
Start: 2022-10-01 | End: 2022-10-03

## 2022-09-30 RX ORDER — PANTOPRAZOLE SODIUM 20 MG/1
40 TABLET, DELAYED RELEASE ORAL
Refills: 0 | Status: DISCONTINUED | OUTPATIENT
Start: 2022-09-30 | End: 2022-10-07

## 2022-09-30 RX ORDER — SODIUM CHLORIDE 9 MG/ML
1000 INJECTION, SOLUTION INTRAVENOUS
Refills: 0 | Status: DISCONTINUED | OUTPATIENT
Start: 2022-09-30 | End: 2022-10-05

## 2022-09-30 RX ORDER — ERYTHROPOIETIN 10000 [IU]/ML
10000 INJECTION, SOLUTION INTRAVENOUS; SUBCUTANEOUS
Refills: 0 | Status: DISCONTINUED | OUTPATIENT
Start: 2022-10-03 | End: 2022-10-07

## 2022-09-30 RX ORDER — MYCOPHENOLATE MOFETIL 250 MG/1
500 CAPSULE ORAL
Refills: 0 | Status: DISCONTINUED | OUTPATIENT
Start: 2022-10-01 | End: 2022-10-01

## 2022-09-30 RX ORDER — HYDRALAZINE HCL 50 MG
100 TABLET ORAL THREE TIMES A DAY
Refills: 0 | Status: DISCONTINUED | OUTPATIENT
Start: 2022-10-01 | End: 2022-10-01

## 2022-09-30 RX ORDER — GLUCAGON INJECTION, SOLUTION 0.5 MG/.1ML
1 INJECTION, SOLUTION SUBCUTANEOUS ONCE
Refills: 0 | Status: DISCONTINUED | OUTPATIENT
Start: 2022-09-30 | End: 2022-10-05

## 2022-09-30 RX ORDER — LATANOPROST 0.05 MG/ML
1 SOLUTION/ DROPS OPHTHALMIC; TOPICAL AT BEDTIME
Refills: 0 | Status: DISCONTINUED | OUTPATIENT
Start: 2022-09-30 | End: 2022-10-07

## 2022-09-30 RX ORDER — INSULIN LISPRO 100/ML
VIAL (ML) SUBCUTANEOUS
Refills: 0 | Status: DISCONTINUED | OUTPATIENT
Start: 2022-09-30 | End: 2022-10-05

## 2022-09-30 RX ORDER — DEXTROSE 50 % IN WATER 50 %
15 SYRINGE (ML) INTRAVENOUS ONCE
Refills: 0 | Status: DISCONTINUED | OUTPATIENT
Start: 2022-09-30 | End: 2022-10-05

## 2022-09-30 RX ORDER — DEXTROSE 50 % IN WATER 50 %
25 SYRINGE (ML) INTRAVENOUS ONCE
Refills: 0 | Status: DISCONTINUED | OUTPATIENT
Start: 2022-09-30 | End: 2022-10-05

## 2022-09-30 RX ORDER — TACROLIMUS 5 MG/1
5 CAPSULE ORAL
Refills: 0 | Status: DISCONTINUED | OUTPATIENT
Start: 2022-10-01 | End: 2022-10-03

## 2022-09-30 RX ORDER — INSULIN LISPRO 100/ML
VIAL (ML) SUBCUTANEOUS AT BEDTIME
Refills: 0 | Status: DISCONTINUED | OUTPATIENT
Start: 2022-09-30 | End: 2022-10-05

## 2022-09-30 RX ORDER — DEXTROSE 50 % IN WATER 50 %
12.5 SYRINGE (ML) INTRAVENOUS ONCE
Refills: 0 | Status: DISCONTINUED | OUTPATIENT
Start: 2022-09-30 | End: 2022-10-05

## 2022-09-30 NOTE — H&P ADULT - NSHPREVIEWOFSYSTEMS_GEN_ALL_CORE
Review of systems  Gen: No weight changes, fatigue, fevers/chills, weakness  Skin: No rashes  Head/Eyes/Ears/Mouth: No headache; Normal hearing; Normal vision w/o blurriness; No sinus pain/discomfort, sore throat  Respiratory: No dyspnea, cough, wheezing, hemoptysis  CV: No chest pain, PND, orthopnea  GI: Mild abdominal pain at surgical incision site; denies diarrhea, constipation, nausea, vomiting, melena, hematochezia  : No increased frequency, dysuria, hematuria, nocturia  MSK: No joint pain/swelling; no back pain; no edema  Neuro: No dizziness/lightheadedness, weakness, seizures, numbness, tingling  Heme: No easy bruising or bleeding  Endo: No heat/cold intolerance  Psych: No significant nervousness, anxiety, stress, depression  All other systems were reviewed and are negative, except as noted.

## 2022-09-30 NOTE — H&P ADULT - ASSESSMENT
76F with ESRD due to HTN, was on HD since 2016. Received a R DDRT on 5/19/21 under Simulect induction (stent removed).  Post transplant course was complicated by DGF. Last HD was on 6/4/21. Now admitted for management of biopsy proven AMR on 9/29    AMR s/p DDRT  -negative DSA, +C4D.  -start pulse steroids (500 tonight-->500-->125, followed by taper)  -plan for PLEX with IVIG x 3 sessions starting Monday. Will consult the Apheresis team in AM  -SCDs  -I&Os  -telemetry  -diet as tolerated, watch K  -will start SQH and home ASA if CBC stable (bx was on 9/29)  -watch glucose while on steroids, no hx of DM    HTN  -restart home meds, adjust accordingly     76F with ESRD due to HTN, was on HD since 2016 via R AVF (previously oliguric), Glaucoma, h/o DVT to L subclavian vein (2017), s/p R DDRT on 5/19/21 under Simulect (stent removed), post transplant course was complicated by DGF. Last HD was on 6/4/21. Now admitted for management of biopsy proven AMR on 9/29    AMR s/p DDRT  -negative DSA, +C4D.  -start pulse steroids (500 tonight-->500-->125, followed by taper)  -plan for PLEX with IVIG x 3 sessions starting Monday. Will consult the Apheresis team in AM  -SCDs  -I&Os  -telemetry  -diet as tolerated, watch K  -will start SQH and home ASA if CBC stable (bx was on 9/29)  -watch glucose while on steroids, no hx of DM    HTN  -restart home meds, adjust accordingly

## 2022-09-30 NOTE — H&P ADULT - HISTORY OF PRESENT ILLNESS
76F with ESRD due to HTN, was on HD since 2016. Received a R DDRT on 5/19/21 under Simulect induction.  Post transplant course was complicated by DGF. Last HD was on 6/4/21. Ureteral stent was removed on 8/2/21.     - Hospitalized 10/2021 with severe symptomatic anemia (Hgb 5g/dL). Transfused 2 units PRBC. Anemia was due to hemolysis caused by dapsone (despite normal G6PD levels prior to initiation).   - Nov 2021: Noted to have YANELY creatinine elevated to 1.65mg/dL. US showed increased velocities concerning for TRAS but no tardus parvus waves. DSA negative, and cFDNA 0.5%.   - Hospitalized Jan 2022 with worsening anemia, hyperkalemia, tested positive for COVID but had no symptoms.     -Cr now up to 2.21, recent US ok.  Graft bx on 9/29 with +AMR, admitted for tx  -preliminary reading with glomerulitis, peritubular capillaritis, interstitial inflammation, no tubulitis. +C4D.  -DSA negative      On admission, no complaints at this time      Donor:  ABO A  30-40s  KDPI 69%, history of diabetes on insulin, terminal creatinine 3.3.   mismatch 2,2,1    Recipient:  PRA 79  ABO A  CMV -/+  EBV +/+                       76F with ESRD due to HTN, was on HD since 2016 via R AVF (previously oliguric), Glaucoma, h/o DVT to L subclavian vein (2017), s/p R DDRT on 5/19/21 under Simulect,     Post transplant course was complicated by DGF. Last HD was on 6/4/21. Ureteral stent was removed on 8/2/21.     - Hospitalized 10/2021 with severe symptomatic anemia (Hgb 5g/dL). Transfused 2 units PRBC. Anemia was due to hemolysis caused by dapsone (despite normal G6PD levels prior to initiation).   - Nov 2021: Noted to have YANELY creatinine elevated to 1.65mg/dL. US showed increased velocities concerning for TRAS but no tardus parvus waves. DSA negative, and cFDNA 0.5%. No intervention.  - Hospitalized Jan 2022 with worsening anemia, hyperkalemia, tested positive for COVID but had no symptoms.     -Cr now up to 2.21, recent US ok.  Graft bx on 9/29 with +AMR, admitted for tx  -preliminary reading with glomerulitis, peritubular capillaritis, interstitial inflammation, no tubulitis. +C4D.  -DSA negative      On admission, no complaints at this time      Donor:  ABO A  30-40s  KDPI 69%, history of diabetes on insulin, terminal creatinine 3.3.   mismatch 2,2,1    Recipient:  PRA 81  ABO A  CMV -/+  EBV +/+

## 2022-09-30 NOTE — H&P ADULT - NSHPSOURCEINFORD_GEN_ALL_CORE
EMS states that the patients son fond her on the floor unresponsive. Upon EMS arrival the found the patient with a respiratory rate of 4 and unresponsive. BS was found to be 23. Patient was given D50 IV and glucagon and the sugar increased to 98.  Patient arrived gurgling with oral secretions and pitting generalized edema
Chart(s)/Patient

## 2022-09-30 NOTE — H&P ADULT - NS ATTEND AMEND GEN_ALL_CORE FT
admitted with AMR diagnosed after YANELY noted on labs.  treatment under the direction of txp nephrology.   immuno: tac/cellcept/pulse steroids.

## 2022-09-30 NOTE — H&P ADULT - NSHPPHYSICALEXAM_GEN_ALL_CORE
Constitutional: Well developed / well nourished  Eyes: Anicteric, PERRLA  ENMT: nc/at  Neck: supple  Respiratory: CTA B/L  Cardiovascular: RRR  Gastrointestinal: Soft, ND/NT. well healed R incisional scar  Genitourinary: Voiding spontaneously  Extremities: SCD's in place and working bilaterally, R AVF palpable, no edema  Vascular: Palpable dp pulses bilaterally  Neurological: A&O x3  Skin: no rashes, ulcerations or lesions;  Musculoskeletal: Moving all extremities  Psychiatric: Responsive

## 2022-10-01 LAB
ALBUMIN SERPL ELPH-MCNC: 4.3 G/DL — SIGNIFICANT CHANGE UP (ref 3.3–5)
ALP SERPL-CCNC: 74 U/L — SIGNIFICANT CHANGE UP (ref 40–120)
ALT FLD-CCNC: 7 U/L — LOW (ref 10–45)
ANION GAP SERPL CALC-SCNC: 12 MMOL/L — SIGNIFICANT CHANGE UP (ref 5–17)
APTT BLD: 31.7 SEC — SIGNIFICANT CHANGE UP (ref 27.5–35.5)
AST SERPL-CCNC: 13 U/L — SIGNIFICANT CHANGE UP (ref 10–40)
BILIRUB SERPL-MCNC: 0.1 MG/DL — LOW (ref 0.2–1.2)
BUN SERPL-MCNC: 39 MG/DL — HIGH (ref 7–23)
CALCIUM SERPL-MCNC: 10.5 MG/DL — SIGNIFICANT CHANGE UP (ref 8.4–10.5)
CHLORIDE SERPL-SCNC: 112 MMOL/L — HIGH (ref 96–108)
CO2 SERPL-SCNC: 16 MMOL/L — LOW (ref 22–31)
CREAT SERPL-MCNC: 1.8 MG/DL — HIGH (ref 0.5–1.3)
EGFR: 28 ML/MIN/1.73M2 — LOW
GLUCOSE SERPL-MCNC: 126 MG/DL — HIGH (ref 70–99)
HCT VFR BLD CALC: 34.1 % — LOW (ref 34.5–45)
HGB BLD-MCNC: 10.3 G/DL — LOW (ref 11.5–15.5)
INR BLD: 1.06 RATIO — SIGNIFICANT CHANGE UP (ref 0.88–1.16)
MAGNESIUM SERPL-MCNC: 2.5 MG/DL — SIGNIFICANT CHANGE UP (ref 1.6–2.6)
MCHC RBC-ENTMCNC: 26.4 PG — LOW (ref 27–34)
MCHC RBC-ENTMCNC: 30.2 GM/DL — LOW (ref 32–36)
MCV RBC AUTO: 87.4 FL — SIGNIFICANT CHANGE UP (ref 80–100)
NRBC # BLD: 0 /100 WBCS — SIGNIFICANT CHANGE UP (ref 0–0)
PHOSPHATE SERPL-MCNC: 3.7 MG/DL — SIGNIFICANT CHANGE UP (ref 2.5–4.5)
PLATELET # BLD AUTO: 156 K/UL — SIGNIFICANT CHANGE UP (ref 150–400)
POTASSIUM SERPL-MCNC: 5.3 MMOL/L — SIGNIFICANT CHANGE UP (ref 3.5–5.3)
POTASSIUM SERPL-SCNC: 5.3 MMOL/L — SIGNIFICANT CHANGE UP (ref 3.5–5.3)
PROT SERPL-MCNC: 6.7 G/DL — SIGNIFICANT CHANGE UP (ref 6–8.3)
PROTHROM AB SERPL-ACNC: 12.3 SEC — SIGNIFICANT CHANGE UP (ref 10.5–13.4)
RBC # BLD: 3.9 M/UL — SIGNIFICANT CHANGE UP (ref 3.8–5.2)
RBC # FLD: 13.5 % — SIGNIFICANT CHANGE UP (ref 10.3–14.5)
SARS-COV-2 RNA SPEC QL NAA+PROBE: SIGNIFICANT CHANGE UP
SODIUM SERPL-SCNC: 140 MMOL/L — SIGNIFICANT CHANGE UP (ref 135–145)
TACROLIMUS SERPL-MCNC: 8.8 NG/ML — SIGNIFICANT CHANGE UP
WBC # BLD: 3 K/UL — LOW (ref 3.8–10.5)
WBC # FLD AUTO: 3 K/UL — LOW (ref 3.8–10.5)

## 2022-10-01 PROCEDURE — 99223 1ST HOSP IP/OBS HIGH 75: CPT

## 2022-10-01 RX ORDER — MYCOPHENOLATE MOFETIL 250 MG/1
1000 CAPSULE ORAL
Refills: 0 | Status: DISCONTINUED | OUTPATIENT
Start: 2022-10-01 | End: 2022-10-07

## 2022-10-01 RX ORDER — MAGNESIUM SULFATE 500 MG/ML
2 VIAL (ML) INJECTION ONCE
Refills: 0 | Status: COMPLETED | OUTPATIENT
Start: 2022-10-01 | End: 2022-10-01

## 2022-10-01 RX ORDER — MYCOPHENOLATE MOFETIL 250 MG/1
500 CAPSULE ORAL ONCE
Refills: 0 | Status: COMPLETED | OUTPATIENT
Start: 2022-10-01 | End: 2022-10-01

## 2022-10-01 RX ADMIN — LATANOPROST 1 DROP(S): 0.05 SOLUTION/ DROPS OPHTHALMIC; TOPICAL at 23:22

## 2022-10-01 RX ADMIN — MYCOPHENOLATE MOFETIL 1000 MILLIGRAM(S): 250 CAPSULE ORAL at 21:01

## 2022-10-01 RX ADMIN — Medication 50 MILLIGRAM(S): at 00:05

## 2022-10-01 RX ADMIN — Medication 200 MILLIGRAM(S): at 17:24

## 2022-10-01 RX ADMIN — MYCOPHENOLATE MOFETIL 500 MILLIGRAM(S): 250 CAPSULE ORAL at 12:57

## 2022-10-01 RX ADMIN — ATOVAQUONE 1500 MILLIGRAM(S): 750 SUSPENSION ORAL at 12:57

## 2022-10-01 RX ADMIN — Medication 25 GRAM(S): at 03:13

## 2022-10-01 RX ADMIN — Medication 60 MILLIGRAM(S): at 17:25

## 2022-10-01 RX ADMIN — MYCOPHENOLATE MOFETIL 500 MILLIGRAM(S): 250 CAPSULE ORAL at 08:53

## 2022-10-01 RX ADMIN — TACROLIMUS 5 MILLIGRAM(S): 5 CAPSULE ORAL at 08:53

## 2022-10-01 RX ADMIN — Medication 200 MILLIGRAM(S): at 06:32

## 2022-10-01 RX ADMIN — Medication 100 MILLIGRAM(S): at 23:22

## 2022-10-01 RX ADMIN — Medication 650 MILLIGRAM(S): at 17:25

## 2022-10-01 RX ADMIN — Medication 60 MILLIGRAM(S): at 06:32

## 2022-10-01 RX ADMIN — Medication 650 MILLIGRAM(S): at 06:32

## 2022-10-01 RX ADMIN — PANTOPRAZOLE SODIUM 40 MILLIGRAM(S): 20 TABLET, DELAYED RELEASE ORAL at 06:32

## 2022-10-01 NOTE — PROGRESS NOTE ADULT - ASSESSMENT
76F with ESRD due to HTN, was on HD since 2016 via R AVF (previously oliguric), Glaucoma, h/o DVT to L subclavian vein (2017), s/p R DDRT on 5/19/21 under Simulect (stent removed), post transplant course was complicated by DGF. Last HD was on 6/4/21. Now admitted for management of biopsy proven AMR on 9/29    AMR s/p DDRT  -negative DSA, +C4D.  -started pulse steroids (500 tonight-->250-->125, followed by taper)  -plan for PLEX with IVIG x 3 sessions starting Monday.  Consulted the Apheresis team yesterday  -SCDs  -I&Os  -telemetry  -diet as tolerated, watch K  -start SQH and home ASA    -watch glucose while on steroids, no hx of DM      Immuno:  Env per level, MMF increase to 1000mg bid, medrol 250mg iv x1    HTN  -restart home meds, adjust accordingly

## 2022-10-01 NOTE — PROGRESS NOTE ADULT - SUBJECTIVE AND OBJECTIVE BOX
Transplant Surgery - Multidisciplinary Rounds  --------------------------------------------------------------     Present:   Patient seen and examined with multidisciplinary team including Transplant Surgeon: Dr. Sebastian. Transplant Nephrologist: [facility], and unit RN during am rounds.  Disciplines not in attendance will be notified of the plan.     76F with ESRD due to HTN, was on HD since 2016 via R AVF (previously oliguric), Glaucoma, h/o DVT to L subclavian vein (2017), s/p R DDRT on 5/19/21 under Simulect,     Post transplant course was complicated by DGF. Last HD was on 6/4/21. Ureteral stent was removed on 8/2/21.     - Hospitalized 10/2021 with severe symptomatic anemia (Hgb 5g/dL). Transfused 2 units PRBC. Anemia was due to hemolysis caused by dapsone (despite normal G6PD levels prior to initiation).   - Nov 2021: Noted to have YANELY creatinine elevated to 1.65mg/dL. US showed increased velocities concerning for TRAS but no tardus parvus waves. DSA negative, and cFDNA 0.5%. No intervention.  - Hospitalized Jan 2022 with worsening anemia, hyperkalemia, tested positive for COVID but had no symptoms.     -Cr now up to 2.21, recent US ok.  Graft bx on 9/29 with +AMR, admitted for tx  -preliminary reading with glomerulitis, peritubular capillaritis, interstitial inflammation, no tubulitis. +C4D.  -DSA negative    Donor:  ABO A  30-40s  KDPI 69%, history of diabetes on insulin, terminal creatinine 3.3.   mismatch 2,2,1    Recipient:  PRA 81  ABO A  CMV -/+  EBV +/+      Interval Events:  Admitted overnight for AMR   Received one dose of medrol 500mg iv x1    Immunosuppression:     Maintenance Immunosuppression:  Envarsus per level, MMF increase to 1g BID, pulse steroid (500mg x1 on 10/1, 250mg today)  Ongoing monitoring for signs of rejection.    Potential Discharge date: pending clinical improvement  Education:  Medications  Plan of care:  See Below    MEDICATIONS  (STANDING):  atovaquone  Suspension 1500 milliGRAM(s) Oral daily  dextrose 5%. 1000 milliLiter(s) (50 mL/Hr) IV Continuous <Continuous>  dextrose 5%. 1000 milliLiter(s) (100 mL/Hr) IV Continuous <Continuous>  dextrose 50% Injectable 25 Gram(s) IV Push once  dextrose 50% Injectable 12.5 Gram(s) IV Push once  dextrose 50% Injectable 25 Gram(s) IV Push once  glucagon  Injectable 1 milliGRAM(s) IntraMuscular once  insulin lispro (ADMELOG) corrective regimen sliding scale   SubCutaneous three times a day before meals  insulin lispro (ADMELOG) corrective regimen sliding scale   SubCutaneous at bedtime  labetalol 200 milliGRAM(s) Oral two times a day  latanoprost 0.005% Ophthalmic Solution 1 Drop(s) Both EYES at bedtime  methylPREDNISolone sodium succinate IVPB 250 milliGRAM(s) IV Intermittent <User Schedule>  mycophenolate mofetil 1000 milliGRAM(s) Oral <User Schedule>  mycophenolate mofetil 500 milliGRAM(s) Oral once  NIFEdipine XL 60 milliGRAM(s) Oral two times a day  pantoprazole    Tablet 40 milliGRAM(s) Oral before breakfast  sodium bicarbonate 650 milliGRAM(s) Oral two times a day  tacrolimus ER Tablet (ENVARSUS XR) 5 milliGRAM(s) Oral <User Schedule>    MEDICATIONS  (PRN):  dextrose Oral Gel 15 Gram(s) Oral once PRN Blood Glucose LESS THAN 70 milliGRAM(s)/deciliter      PAST MEDICAL & SURGICAL HISTORY:  HTN (hypertension)  Glaucoma  Cataract  ESRD (end stage renal disease) on dialysis  DVT (deep venous thrombosis)  of Left subclavian vein, 06/12/17  Hemodialysis patient  MWF  Acquired cataract  extraction with b/l lense placement, 2016  H/O: glaucoma  surgery, 2002  AV fistula  2015  S/P KEVEN-BSO  for fibroids, 2012  Hemodialysis access, AV graft  Transplanted kidney  5/19/21  History of renal transplantation  DDRT 5/19/2021      Vital Signs Last 24 Hrs  T(C): 37.1 (01 Oct 2022 09:00), Max: 37.2 (01 Oct 2022 05:00)  T(F): 98.8 (01 Oct 2022 09:00), Max: 98.9 (01 Oct 2022 05:00)  HR: 67 (01 Oct 2022 09:00) (62 - 73)  BP: 150/69 (01 Oct 2022 09:00) (150/69 - 165/80)  BP(mean): --  RR: 18 (01 Oct 2022 09:00) (18 - 18)  SpO2: 98% (01 Oct 2022 09:00) (97% - 99%)    Parameters below as of 01 Oct 2022 09:00  Patient On (Oxygen Delivery Method): room air        I&O's Summary    30 Sep 2022 07:01  -  01 Oct 2022 07:00  --------------------------------------------------------  IN: 300 mL / OUT: 0 mL / NET: 300 mL                          10.3   3.00  )-----------( 156      ( 01 Oct 2022 06:56 )             34.1     10-01    140  |  112<H>  |  39<H>  ----------------------------<  126<H>  5.3   |  16<L>  |  1.80<H>    Ca    10.5      01 Oct 2022 06:59  Phos  3.7     10-01  Mg     2.5     10-01    TPro  6.7  /  Alb  4.3  /  TBili  0.1<L>  /  DBili  x   /  AST  13  /  ALT  7<L>  /  AlkPhos  74  10-01    Tacrolimus (), Serum: 8.8 ng/mL (10-01 @ 06:56)      REVIEW OF SYSTEMS  --------------------------------------------------------------------------------  Gen: No weight changes, fatigue, fevers/chills, weakness  Skin: No rashes  Head/Eyes/Ears/Mouth: No headache; Normal hearing; Normal vision w/o blurriness; No sinus pain/discomfort, sore throat  Respiratory: No dyspnea, cough, wheezing, hemoptysis  CV: No chest pain, PND, orthopnea  GI: No abdominal pain, diarrhea, constipation, nausea, vomiting, melena, hematochezia  : No increased frequency, dysuria, hematuria, nocturia  MSK: No joint pain/swelling; no back pain; no edema  Neuro: No dizziness/lightheadedness, weakness, seizures, numbness, tingling  Heme: No easy bruising or bleeding  Endo: No heat/cold intolerance  Psych: No significant nervousness, anxiety, stress, depression  All other systems were reviewed and are negative, except as noted.      PHYSICAL EXAM:  Constitutional: Well developed / well nourished  Eyes: Anicteric, PERRLA  ENMT: nc/at  Neck: Supple  Respiratory: CTA B/L  Cardiovascular: RRR  Gastrointestinal: Soft abdomen, NT, ND  Genitourinary:  Voiding spontaneously   Extremities: SCD's in place and working bilaterally  Vascular: Palpable dp pulses bilaterally  Neurological: A&O x3  Skin: Wound  healed   Musculoskeletal: Moving all extremities  Psychiatric: Responsive

## 2022-10-01 NOTE — CONSULT NOTE ADULT - ASSESSMENT
76 year old female with ESRD due to HTN was on HD since 2016  s/p R DDRT on 5/19/21 under Simulect Induction admitted for AMR  Post transplant course was complicated by DGF. Last HD was on 6/4/21. Ureteral stent was removed on 8/2/21.     1.s/p DDRT on 5/19/21 - Allograft function now with YANELY due to AMR.   Txp bx on 9/29 with +AMR (preliminary reading with glomerulitis, peritubular capillaritis, interstitial inflammation, no tubulitis. +C4D. DSA negative) . Started on pulse dose steroids. Plan fro PLEX starting Monday . Cr improved from  2.2 to 1.8 today.   2. IS meds- Dosing tac by level. goal level 8-10 . Continue Tac , increase MMF to full dose .   3. HTN controlled on current meds

## 2022-10-02 LAB
ALBUMIN SERPL ELPH-MCNC: 3.7 G/DL — SIGNIFICANT CHANGE UP (ref 3.3–5)
ALP SERPL-CCNC: 66 U/L — SIGNIFICANT CHANGE UP (ref 40–120)
ALT FLD-CCNC: 9 U/L — LOW (ref 10–45)
ANION GAP SERPL CALC-SCNC: 13 MMOL/L — SIGNIFICANT CHANGE UP (ref 5–17)
AST SERPL-CCNC: 10 U/L — SIGNIFICANT CHANGE UP (ref 10–40)
BASOPHILS # BLD AUTO: 0 K/UL — SIGNIFICANT CHANGE UP (ref 0–0.2)
BASOPHILS NFR BLD AUTO: 0 % — SIGNIFICANT CHANGE UP (ref 0–2)
BILIRUB SERPL-MCNC: <0.1 MG/DL — LOW (ref 0.2–1.2)
BUN SERPL-MCNC: 52 MG/DL — HIGH (ref 7–23)
CALCIUM SERPL-MCNC: 9.6 MG/DL — SIGNIFICANT CHANGE UP (ref 8.4–10.5)
CHLORIDE SERPL-SCNC: 110 MMOL/L — HIGH (ref 96–108)
CO2 SERPL-SCNC: 15 MMOL/L — LOW (ref 22–31)
CREAT SERPL-MCNC: 1.96 MG/DL — HIGH (ref 0.5–1.3)
EGFR: 26 ML/MIN/1.73M2 — LOW
EOSINOPHIL # BLD AUTO: 0 K/UL — SIGNIFICANT CHANGE UP (ref 0–0.5)
EOSINOPHIL NFR BLD AUTO: 0 % — SIGNIFICANT CHANGE UP (ref 0–6)
GLUCOSE SERPL-MCNC: 134 MG/DL — HIGH (ref 70–99)
HCT VFR BLD CALC: 30.8 % — LOW (ref 34.5–45)
HGB BLD-MCNC: 9.4 G/DL — LOW (ref 11.5–15.5)
IMM GRANULOCYTES NFR BLD AUTO: 0.9 % — SIGNIFICANT CHANGE UP (ref 0–0.9)
LYMPHOCYTES # BLD AUTO: 0.31 K/UL — LOW (ref 1–3.3)
LYMPHOCYTES # BLD AUTO: 9.7 % — LOW (ref 13–44)
MAGNESIUM SERPL-MCNC: 1.9 MG/DL — SIGNIFICANT CHANGE UP (ref 1.6–2.6)
MCHC RBC-ENTMCNC: 26.8 PG — LOW (ref 27–34)
MCHC RBC-ENTMCNC: 30.5 GM/DL — LOW (ref 32–36)
MCV RBC AUTO: 87.7 FL — SIGNIFICANT CHANGE UP (ref 80–100)
MONOCYTES # BLD AUTO: 0.06 K/UL — SIGNIFICANT CHANGE UP (ref 0–0.9)
MONOCYTES NFR BLD AUTO: 1.9 % — LOW (ref 2–14)
NEUTROPHILS # BLD AUTO: 2.78 K/UL — SIGNIFICANT CHANGE UP (ref 1.8–7.4)
NEUTROPHILS NFR BLD AUTO: 87.5 % — HIGH (ref 43–77)
NRBC # BLD: 0 /100 WBCS — SIGNIFICANT CHANGE UP (ref 0–0)
PHOSPHATE SERPL-MCNC: 2.9 MG/DL — SIGNIFICANT CHANGE UP (ref 2.5–4.5)
PLATELET # BLD AUTO: 149 K/UL — LOW (ref 150–400)
POTASSIUM SERPL-MCNC: 5.6 MMOL/L — HIGH (ref 3.5–5.3)
POTASSIUM SERPL-SCNC: 5.6 MMOL/L — HIGH (ref 3.5–5.3)
PROT SERPL-MCNC: 5.8 G/DL — LOW (ref 6–8.3)
RBC # BLD: 3.51 M/UL — LOW (ref 3.8–5.2)
RBC # FLD: 13.5 % — SIGNIFICANT CHANGE UP (ref 10.3–14.5)
SODIUM SERPL-SCNC: 138 MMOL/L — SIGNIFICANT CHANGE UP (ref 135–145)
TACROLIMUS SERPL-MCNC: 7.2 NG/ML — SIGNIFICANT CHANGE UP
WBC # BLD: 3.18 K/UL — LOW (ref 3.8–10.5)
WBC # FLD AUTO: 3.18 K/UL — LOW (ref 3.8–10.5)

## 2022-10-02 PROCEDURE — 99232 SBSQ HOSP IP/OBS MODERATE 35: CPT

## 2022-10-02 RX ORDER — LABETALOL HCL 100 MG
10 TABLET ORAL ONCE
Refills: 0 | Status: COMPLETED | OUTPATIENT
Start: 2022-10-02 | End: 2022-10-02

## 2022-10-02 RX ORDER — HYDRALAZINE HCL 50 MG
10 TABLET ORAL ONCE
Refills: 0 | Status: COMPLETED | OUTPATIENT
Start: 2022-10-02 | End: 2022-10-02

## 2022-10-02 RX ORDER — SODIUM ZIRCONIUM CYCLOSILICATE 10 G/10G
10 POWDER, FOR SUSPENSION ORAL ONCE
Refills: 0 | Status: COMPLETED | OUTPATIENT
Start: 2022-10-02 | End: 2022-10-02

## 2022-10-02 RX ORDER — NYSTATIN 500MM UNIT
500000 POWDER (EA) MISCELLANEOUS
Refills: 0 | Status: DISCONTINUED | OUTPATIENT
Start: 2022-10-02 | End: 2022-10-07

## 2022-10-02 RX ORDER — VALGANCICLOVIR 450 MG/1
450 TABLET, FILM COATED ORAL
Refills: 0 | Status: DISCONTINUED | OUTPATIENT
Start: 2022-10-02 | End: 2022-10-07

## 2022-10-02 RX ADMIN — Medication 650 MILLIGRAM(S): at 17:55

## 2022-10-02 RX ADMIN — ATOVAQUONE 1500 MILLIGRAM(S): 750 SUSPENSION ORAL at 12:10

## 2022-10-02 RX ADMIN — Medication 200 MILLIGRAM(S): at 17:56

## 2022-10-02 RX ADMIN — Medication 10 MILLIGRAM(S): at 23:24

## 2022-10-02 RX ADMIN — SODIUM ZIRCONIUM CYCLOSILICATE 10 GRAM(S): 10 POWDER, FOR SUSPENSION ORAL at 10:45

## 2022-10-02 RX ADMIN — LATANOPROST 1 DROP(S): 0.05 SOLUTION/ DROPS OPHTHALMIC; TOPICAL at 21:17

## 2022-10-02 RX ADMIN — Medication 650 MILLIGRAM(S): at 05:25

## 2022-10-02 RX ADMIN — Medication 10 MILLIGRAM(S): at 01:49

## 2022-10-02 RX ADMIN — Medication 60 MILLIGRAM(S): at 17:55

## 2022-10-02 RX ADMIN — MYCOPHENOLATE MOFETIL 1000 MILLIGRAM(S): 250 CAPSULE ORAL at 21:17

## 2022-10-02 RX ADMIN — Medication 200 MILLIGRAM(S): at 05:25

## 2022-10-02 RX ADMIN — Medication 60 MILLIGRAM(S): at 05:26

## 2022-10-02 RX ADMIN — Medication 10 MILLIGRAM(S): at 03:24

## 2022-10-02 RX ADMIN — Medication 100 MILLIGRAM(S): at 13:24

## 2022-10-02 RX ADMIN — PANTOPRAZOLE SODIUM 40 MILLIGRAM(S): 20 TABLET, DELAYED RELEASE ORAL at 05:25

## 2022-10-02 RX ADMIN — Medication 500000 UNIT(S): at 22:55

## 2022-10-02 RX ADMIN — Medication 500000 UNIT(S): at 05:28

## 2022-10-02 RX ADMIN — Medication 500000 UNIT(S): at 17:55

## 2022-10-02 RX ADMIN — Medication 1: at 17:50

## 2022-10-02 RX ADMIN — MYCOPHENOLATE MOFETIL 1000 MILLIGRAM(S): 250 CAPSULE ORAL at 08:49

## 2022-10-02 RX ADMIN — Medication 500000 UNIT(S): at 12:10

## 2022-10-02 RX ADMIN — TACROLIMUS 5 MILLIGRAM(S): 5 CAPSULE ORAL at 08:49

## 2022-10-02 NOTE — PROVIDER CONTACT NOTE (CRITICAL VALUE NOTIFICATION) - ASSESSMENT
pt.[email or web address], pt's on telemonitoring , [employer] report sinus rhythm with 1 st degree AV Block,  CHRISTOPHER Vo made aware. pt. denied chest pain, headache, dizziness. continue to monitor pt. closely.

## 2022-10-02 NOTE — PROGRESS NOTE ADULT - ASSESSMENT
76F with ESRD due to HTN, was on HD since 2016 via R AVF (previously oliguric), Glaucoma, h/o DVT to L subclavian vein (2017), s/p R DDRT on 5/19/21 under Simulect (stent removed), post transplant course was complicated by DGF. Last HD was on 6/4/21. Now admitted for management of biopsy proven AMR on 9/29    [] s/p DDRT, now admitted with AMR   -negative DSA, +C4D.  -started pulse steroids: Solumedrol 500mg (9/30), Sol 250mg (10/1), Sol 250mg (10/2)   -plan for PLEX with IVIG x 3 sessions starting Monday.    -Lokelma x1  -SCDs  -I&Os  -diet as tolerated, watch K  -watch glucose while on steroids, no hx of DM    [] Immuno  - Tac level 7.2, cont Env 5mg, MMF 1/1, pulse steroids  - PPX: valcyte/atovaqone/nystatin    [] HTN  - continue current regimen   76F with ESRD due to HTN, was on HD since 2016 via R AVF (previously oliguric), Glaucoma, h/o DVT to L subclavian vein (2017), s/p R DDRT on 5/19/21 under Simulect (stent removed), post transplant course was complicated by DGF. Last HD was on 6/4/21. Now admitted for management of biopsy proven AMR on 9/29    [] s/p DDRT, now admitted with AMR   -negative DSA, +C4D.  -started pulse steroids: Solumedrol 500mg (9/30), Sol 250mg (10/1), Sol 250mg (10/2) , Sol 125mg today   -plan for PLEX with IVIG x 3 sessions starting today.    -SCDs  -I&Os  -diet as tolerated, watch K  -watch glucose while on steroids, no hx of DM    [] Immuno  - Env by level, MMF 1/1, pulse steroids  - PPX: valcyte/atovaqone/nystatin    [] HTN  - continue current regimen

## 2022-10-02 NOTE — PROGRESS NOTE ADULT - SUBJECTIVE AND OBJECTIVE BOX
Transplant Surgery - Multidisciplinary Rounds  --------------------------------------------------------------     Present:   Patient seen and examined with multidisciplinary team including Transplant Surgeon: Dr. Sebastian. Transplant Nephrologist: Dr. ZO Ayala, CHRISTOPHER Dinero and unit RN during am rounds.  Disciplines not in attendance will be notified of the plan.     HPI: 76F with ESRD due to HTN, was on HD since 2016 via R AVF (previously oliguric), Glaucoma, h/o DVT to L subclavian vein (2017), s/p R DDRT on 5/19/21 with Simulect,   Post transplant course was complicated by DGF. Last HD was on 6/4/21. Ureteral stent was removed on 8/2/21.     Readmitted 10/2021 with severe symptomatic anemia (Hgb 5g/dL).  - Transfused 2 units PRBC.  -  Anemia was due to hemolysis caused by dapsone (despite normal G6PD levels prior to initiation).     Readmitted Nov 2021 with YANELY   - US showed increased velocities concerning for TRAS but no tardus parvus waves. DSA negative, and cFDNA 0.5%. No intervention.    Readmitted Jan 2022 with worsening anemia, hyperkalemia, tested positive for COVID but had no symptoms.     Now readmitted with rising SCr and AMR on recent graft bx (9/29) +C4D. DSA negative  Started pulse steroids    Interval Events:  - No overnight events  - SCr uptrending 1.96 (1.8); K 5.6  - Plan for PLEX on Monday     Immunosuppression:     Maintenance Immuno: Envarsus per level, MMF 1/1, pulse steroids  Ongoing monitoring for signs of rejection.    Potential Discharge date: pending clinical improvement  Education:  Medications  Plan of care:  See Below    MEDICATIONS  (STANDING):  atovaquone  Suspension 1500 milliGRAM(s) Oral daily  dextrose 5%. 1000 milliLiter(s) (100 mL/Hr) IV Continuous <Continuous>  dextrose 5%. 1000 milliLiter(s) (50 mL/Hr) IV Continuous <Continuous>  dextrose 50% Injectable 25 Gram(s) IV Push once  dextrose 50% Injectable 12.5 Gram(s) IV Push once  dextrose 50% Injectable 25 Gram(s) IV Push once  glucagon  Injectable 1 milliGRAM(s) IntraMuscular once  insulin lispro (ADMELOG) corrective regimen sliding scale   SubCutaneous three times a day before meals  insulin lispro (ADMELOG) corrective regimen sliding scale   SubCutaneous at bedtime  labetalol 200 milliGRAM(s) Oral two times a day  latanoprost 0.005% Ophthalmic Solution 1 Drop(s) Both EYES at bedtime  mycophenolate mofetil 1000 milliGRAM(s) Oral <User Schedule>  NIFEdipine XL 60 milliGRAM(s) Oral two times a day  nystatin    Suspension 666521 Unit(s) Oral four times a day  pantoprazole    Tablet 40 milliGRAM(s) Oral before breakfast  sodium bicarbonate 650 milliGRAM(s) Oral two times a day  tacrolimus ER Tablet (ENVARSUS XR) 5 milliGRAM(s) Oral <User Schedule>  valGANciclovir 450 milliGRAM(s) Oral <User Schedule>    MEDICATIONS  (PRN):  dextrose Oral Gel 15 Gram(s) Oral once PRN Blood Glucose LESS THAN 70 milliGRAM(s)/deciliter    PAST MEDICAL & SURGICAL HISTORY:  HTN (hypertension)  Glaucoma  Cataract  ESRD (end stage renal disease) on dialysis  DVT (deep venous thrombosis)  of Left subclavian vein, 06/12/17  Hemodialysis patient MWF  Acquired cataract  extraction with b/l lense placement, 2016  H/O: glaucoma surgery, 2002  AV fistula 2015  S/P KEVEN-BSO  for fibroids, 2012  Hemodialysis access, AV graft  Transplanted kidney 5/19/21  History of renal transplantation DDRT 5/19/2021    Vital Signs Last 24 Hrs  T(C): 37.1 (02 Oct 2022 13:00), Max: 37.3 (02 Oct 2022 04:02)  T(F): 98.7 (02 Oct 2022 13:00), Max: 99.2 (02 Oct 2022 04:02)  HR: 72 (02 Oct 2022 13:00) (67 - 82)  BP: 162/70 (02 Oct 2022 13:00) (153/63 - 188/78)  BP(mean): --  RR: 18 (02 Oct 2022 13:00) (18 - 18)  SpO2: 99% (02 Oct 2022 13:00) (98% - 100%)    I&O's Summary    01 Oct 2022 07:01  -  02 Oct 2022 07:00  --------------------------------------------------------  IN: 1010 mL / OUT: 600 mL / NET: 410 mL                          9.4    3.18  )-----------( 149      ( 02 Oct 2022 06:54 )             30.8     10-02    138  |  110<H>  |  52<H>  ----------------------------<  134<H>  5.6<H>   |  15<L>  |  1.96<H>    Ca    9.6      02 Oct 2022 06:50  Phos  2.9     10-02  Mg     1.9     10-02    TPro  5.8<L>  /  Alb  3.7  /  TBili  <0.1<L>  /  DBili  x   /  AST  10  /  ALT  9<L>  /  AlkPhos  66  10-02    Tacrolimus (), Serum: 7.2 ng/mL (10-02 @ 06:54)      REVIEW OF SYSTEMS  --------------------------------------------------------------------------------  Gen: No weight changes, fatigue, fevers/chills, weakness  Skin: No rashes  Head/Eyes/Ears/Mouth: No headache; Normal hearing; Normal vision w/o blurriness; No sinus pain/discomfort, sore throat  Respiratory: No dyspnea, cough, wheezing, hemoptysis  CV: No chest pain, PND, orthopnea  GI: No abdominal pain, diarrhea, constipation, nausea, vomiting, melena, hematochezia  : No increased frequency, dysuria, hematuria, nocturia  MSK: No joint pain/swelling; no back pain; no edema  Neuro: No dizziness/lightheadedness, weakness, seizures, numbness, tingling  Heme: No easy bruising or bleeding  Endo: No heat/cold intolerance  Psych: No significant nervousness, anxiety, stress, depression  All other systems were reviewed and are negative, except as noted.      PHYSICAL EXAM:  Constitutional: Well developed / well nourished  Eyes: Anicteric, PERRLA  ENMT: nc/at  Neck: Supple  Respiratory: CTA B/L  Cardiovascular: RRR  Gastrointestinal: Soft abdomen, NT, ND  Genitourinary:  Voiding spontaneously   Extremities: SCD's in place and working bilaterally  Vascular: Palpable dp pulses bilaterally  Neurological: A&O x3  Skin: Wound  healed   Musculoskeletal: Moving all extremities  Psychiatric: Responsive                                    Transplant Surgery - Multidisciplinary Rounds  --------------------------------------------------------------     Present:   Patient seen and examined with multidisciplinary team including Transplant Surgeon: Dr. Hagen, Transplant Nephrologist: Dr. ZO Ayala, CHRISTOPHER Dinero, Pharmacist Nancy, and unit RN during am rounds.  Disciplines not in attendance will be notified of the plan.     HPI: 76F with ESRD due to HTN, was on HD since 2016 via R AVF (previously oliguric), Glaucoma, h/o DVT to L subclavian vein (2017), s/p R DDRT on 5/19/21 with Simulect,   Post transplant course was complicated by DGF. Last HD was on 6/4/21. Ureteral stent was removed on 8/2/21.     Readmitted 10/2021 with severe symptomatic anemia (Hgb 5g/dL).  - Transfused 2 units PRBC.  -  Anemia was due to hemolysis caused by dapsone (despite normal G6PD levels prior to initiation).     Readmitted Nov 2021 with YANELY   - US showed increased velocities concerning for TRAS but no tardus parvus waves. DSA negative, and cFDNA 0.5%. No intervention.    Readmitted Jan 2022 with worsening anemia, hyperkalemia, tested positive for COVID but had no symptoms.     Now readmitted with rising SCr and AMR on recent graft bx (9/29) +C4D. DSA negative  Started pulse steroids    Interval Events:  - No overnight events  - Received Sol 500mg -> 250mg->250mg   - SCr ~1.98   - Plan for PLEX and IVIG today     Immunosuppression:     Maintenance Immuno: Envarsus per level, MMF 1/1, pulse steroids  Ongoing monitoring for signs of rejection.    Potential Discharge date: pending clinical improvement  Education:  Medications  Plan of care:  See Below    MEDICATIONS  (STANDING):  atovaquone  Suspension 1500 milliGRAM(s) Oral daily  dextrose 5%. 1000 milliLiter(s) (50 mL/Hr) IV Continuous <Continuous>  dextrose 5%. 1000 milliLiter(s) (100 mL/Hr) IV Continuous <Continuous>  dextrose 50% Injectable 25 Gram(s) IV Push once  dextrose 50% Injectable 12.5 Gram(s) IV Push once  dextrose 50% Injectable 25 Gram(s) IV Push once  epoetin ivelisse-epbx (RETACRIT) Injectable 45667 Unit(s) SubCutaneous every 7 days  glucagon  Injectable 1 milliGRAM(s) IntraMuscular once  insulin lispro (ADMELOG) corrective regimen sliding scale   SubCutaneous three times a day before meals  insulin lispro (ADMELOG) corrective regimen sliding scale   SubCutaneous at bedtime  labetalol 200 milliGRAM(s) Oral three times a day  latanoprost 0.005% Ophthalmic Solution 1 Drop(s) Both EYES at bedtime  mycophenolate mofetil 1000 milliGRAM(s) Oral <User Schedule>  NIFEdipine XL 60 milliGRAM(s) Oral two times a day  nystatin    Suspension 130007 Unit(s) Oral four times a day  pantoprazole    Tablet 40 milliGRAM(s) Oral before breakfast  sodium bicarbonate 1300 milliGRAM(s) Oral three times a day  tacrolimus ER Tablet (ENVARSUS XR) 5 milliGRAM(s) Oral <User Schedule>  valGANciclovir 450 milliGRAM(s) Oral <User Schedule>    MEDICATIONS  (PRN):  dextrose Oral Gel 15 Gram(s) Oral once PRN Blood Glucose LESS THAN 70 milliGRAM(s)/deciliter      PAST MEDICAL & SURGICAL HISTORY:  HTN (hypertension)  Glaucoma  Cataract  ESRD (end stage renal disease) on dialysis  DVT (deep venous thrombosis)  of Left subclavian vein, 06/12/17  Hemodialysis patient  Acquired cataract  extraction with b/l lense placement, 2016  H/O: glaucoma  surgery, 2002  AV fistula 2015  S/P KEVEN-BSO  for fibroids, 2012  Hemodialysis access, AV graft  Transplanted kidney 5/19/21  History of renal transplantation DDRT 5/19/2021    Vital Signs Last 24 Hrs  T(C): 37.2 (03 Oct 2022 05:29), Max: 37.3 (02 Oct 2022 21:00)  T(F): 98.9 (03 Oct 2022 05:29), Max: 99.1 (02 Oct 2022 21:00)  HR: 65 (03 Oct 2022 05:29) (65 - 82)  BP: 141/63 (03 Oct 2022 05:29) (141/63 - 189/80)  BP(mean): --  RR: 18 (03 Oct 2022 05:29) (18 - 18)  SpO2: 98% (03 Oct 2022 05:29) (97% - 99%)    I&O's Summary    02 Oct 2022 07:01  -  03 Oct 2022 07:00  --------------------------------------------------------  IN: 1050 mL / OUT: 1150 mL / NET: -100 mL                       9.4    3.70  )-----------( 145      ( 03 Oct 2022 06:22 )             30.6     10-03    140  |  112<H>  |  60<H>  ----------------------------<  108<H>  5.1   |  16<L>  |  1.97<H>    Ca    9.8      03 Oct 2022 06:23  Phos  3.4     10-03  Mg     2.0     10-03    TPro  5.8<L>  /  Alb  3.5  /  TBili  0.1<L>  /  DBili  x   /  AST  10  /  ALT  6<L>  /  AlkPhos  65  10-03    Tacrolimus (), Serum: 7.2 ng/mL (10-02 @ 06:54)    REVIEW OF SYSTEMS  --------------------------------------------------------------------------------  Gen: No weight changes, fatigue, fevers/chills, weakness  Skin: No rashes  Head/Eyes/Ears/Mouth: No headache; Normal hearing; Normal vision w/o blurriness; No sinus pain/discomfort, sore throat  Respiratory: No dyspnea, cough, wheezing, hemoptysis  CV: No chest pain, PND, orthopnea  GI: No abdominal pain, diarrhea, constipation, nausea, vomiting, melena, hematochezia  : No increased frequency, dysuria, hematuria, nocturia  MSK: No joint pain/swelling; no back pain; no edema  Neuro: No dizziness/lightheadedness, weakness, seizures, numbness, tingling  Heme: No easy bruising or bleeding  Endo: No heat/cold intolerance  Psych: No significant nervousness, anxiety, stress, depression  All other systems were reviewed and are negative, except as noted.      PHYSICAL EXAM:  Constitutional: Well developed / well nourished  Eyes: Anicteric, PERRLA  ENMT: nc/at  Neck: Supple  Respiratory: CTA B/L  Cardiovascular: RRR  Gastrointestinal: Soft abdomen, NT, ND  Genitourinary:  Voiding spontaneously   Extremities: SCD's in place and working bilaterally  Vascular: Palpable dp pulses bilaterally  Neurological: A&O x3  Skin: Wound  healed   Musculoskeletal: Moving all extremities  Psychiatric: Responsive

## 2022-10-02 NOTE — PROGRESS NOTE ADULT - NS ATTEND AMEND GEN_ALL_CORE FT
continuing AMR therapy.  Immuno: tac/cellcept/pred s/p DDRT 5/2022 admitted with AMR  cont steroids  plasmapheresis with IVIg, with plan for 5 sessions every other day  Immunosuppression management - Envarsus 5 mg daily, steroid taper, cellcept 1gm bid  PPX: valcyte/atovaqone/nystatin

## 2022-10-02 NOTE — PROGRESS NOTE ADULT - SUBJECTIVE AND OBJECTIVE BOX
[facility] DIVISION OF KIDNEY DISEASES AND HYPERTENSION -- FOLLOW UP NOTE  --------------------------------------------------------------------------------  Chief Complaint:    24 hour events/subjective:  Patient was seen and examined at bedside  denies any complaints       PAST HISTORY  --------------------------------------------------------------------------------  No significant changes to PMH, PSH, FHx, SHx, unless otherwise noted    ALLERGIES & MEDICATIONS  --------------------------------------------------------------------------------  Allergies    Mushrooms (Anaphylaxis)  penicillin (Rash)    Intolerances      Standing Inpatient Medications  atovaquone  Suspension 1500 milliGRAM(s) Oral daily  dextrose 5%. 1000 milliLiter(s) IV Continuous <Continuous>  dextrose 5%. 1000 milliLiter(s) IV Continuous <Continuous>  dextrose 50% Injectable 25 Gram(s) IV Push once  dextrose 50% Injectable 12.5 Gram(s) IV Push once  dextrose 50% Injectable 25 Gram(s) IV Push once  glucagon  Injectable 1 milliGRAM(s) IntraMuscular once  insulin lispro (ADMELOG) corrective regimen sliding scale   SubCutaneous three times a day before meals  insulin lispro (ADMELOG) corrective regimen sliding scale   SubCutaneous at bedtime  labetalol 200 milliGRAM(s) Oral two times a day  latanoprost 0.005% Ophthalmic Solution 1 Drop(s) Both EYES at bedtime  mycophenolate mofetil 1000 milliGRAM(s) Oral <User Schedule>  NIFEdipine XL 60 milliGRAM(s) Oral two times a day  nystatin    Suspension 875602 Unit(s) Oral four times a day  pantoprazole    Tablet 40 milliGRAM(s) Oral before breakfast  sodium bicarbonate 650 milliGRAM(s) Oral two times a day  sodium zirconium cyclosilicate 10 Gram(s) Oral once  tacrolimus ER Tablet (ENVARSUS XR) 5 milliGRAM(s) Oral <User Schedule>  valGANciclovir 450 milliGRAM(s) Oral <User Schedule>    PRN Inpatient Medications  dextrose Oral Gel 15 Gram(s) Oral once PRN      REVIEW OF SYSTEMS  --------------------------------------------------------------------------------  Gen: No fatigue, fevers/chills, weakness  Skin: No rashes  Head/Eyes/Ears/Mouth: No headache;No sore throat  Respiratory: No dyspnea, cough,   CV: No chest pain, PND, orthopnea  GI: No abdominal pain, diarrhea, constipation, nausea, vomiting  Transplant: No pain  : No increased frequency, dysuria, hematuria, nocturia  MSK: No joint pain/swelling; no back pain; no edema  Neuro: No dizziness/lightheadedness, weakness, seizures, numbness, tingling  Psych: No significant nervousness, anxiety, stress, depression    All other systems were reviewed and are negative, except as noted.    VITALS/PHYSICAL EXAM  --------------------------------------------------------------------------------  T(C): 37.3 (10-02-22 @ 09:00), Max: 37.3 (10-02-22 @ 04:02)  HR: 82 (10-02-22 @ 09:00) (67 - 82)  BP: 160/80 (10-02-22 @ 09:00) (153/63 - 188/78)  RR: 18 (10-02-22 @ 09:00) (18 - 18)  SpO2: 99% (10-02-22 @ 09:00) (98% - 100%)  Wt(kg): --  Height (cm): 167.6 (09-30-22 @ 20:14)  Weight (kg): 55.2 (09-30-22 @ 20:14)  BMI (kg/m2): 19.7 (09-30-22 @ 20:14)  BSA (m2): 1.62 (09-30-22 @ 20:14)      10-01-22 @ 07:01  -  10-02-22 @ 07:00  --------------------------------------------------------  IN: 1010 mL / OUT: 600 mL / NET: 410 mL      Physical Exam:  	Gen: NAD  	HEENT: PERRL, supple neck, clear oropharynx  	Pulm: CTA B/L  	CV: RRR, S1S2; no rub  	Back: No spinal or CVA tenderness; no sacral edema  	Abd: +BS, soft, nontender/nondistended                      Transplant: No tenderness, swelling  	: No suprapubic tenderness  	UE: Warm, FROM; no edema; no asterixis  	LE: Warm, FROM; no edema  	Neuro: No focal deficits  	Psych: Normal affect and mood  	Skin: Warm, without rashes      LABS/STUDIES  --------------------------------------------------------------------------------              9.4    3.18  >-----------<  149      [10-02-22 @ 06:54]              30.8     138  |  110  |  52  ----------------------------<  134      [10-02-22 @ 06:50]  5.6   |  15  |  1.96        Ca     9.6     [10-02-22 @ 06:50]      Mg     1.9     [10-02-22 @ 06:50]      Phos  2.9     [10-02-22 @ 06:50]    TPro  5.8  /  Alb  3.7  /  TBili  <0.1  /  DBili  x   /  AST  10  /  ALT  9   /  AlkPhos  66  [10-02-22 @ 06:50]    PT/INR: PT 12.3 , INR 1.06       [09-30-22 @ 23:05]  PTT: 31.7       [09-30-22 @ 23:05]      Creatinine Trend:  SCr 1.96 [10-02 @ 06:50]  SCr 1.80 [10-01 @ 06:59]  SCr 2.02 [09-30 @ 23:05]    Tacrolimus (), Serum: 8.8 ng/mL (10-01 @ 06:56)                Iron 53, TIBC 205, %sat 26      [02-03-22 @ 02:41]  Ferritin 1921      [10-06-21 @ 16:15]  HbA1c 4.6      [05-28-19 @ 09:49]

## 2022-10-02 NOTE — PROGRESS NOTE ADULT - ASSESSMENT
76 year old female with ESRD due to HTN was on HD since 2016  s/p R DDRT on 5/19/21 under Simulect Induction admitted for AMR  Post transplant course was complicated by DGF. Last HD was on 6/4/21. Ureteral stent was removed on 8/2/21.     1.s/p DDRT on 5/19/21 - Allograft function now with YANELY due to AMR. Txp bx on 9/29 with +AMR (preliminary reading with glomerulitis, peritubular capillaritis, interstitial inflammation, no tubulitis. +C4D. DSA negative).   Started on pulse dose steroids- Solumedrol 250 mg  today and  Plan for PLEX starting Monday . Cr is 19 today ( 2.2 on admission)   2. IS meds- Dosing tac by level. goal level 8-10 . Continue Tac ,MMF increased to full dose   3. HTN controlled on current meds

## 2022-10-03 DIAGNOSIS — T86.11 KIDNEY TRANSPLANT REJECTION: ICD-10-CM

## 2022-10-03 DIAGNOSIS — Z79.899 OTHER LONG TERM (CURRENT) DRUG THERAPY: ICD-10-CM

## 2022-10-03 LAB
ALBUMIN SERPL ELPH-MCNC: 3.5 G/DL — SIGNIFICANT CHANGE UP (ref 3.3–5)
ALP SERPL-CCNC: 65 U/L — SIGNIFICANT CHANGE UP (ref 40–120)
ALT FLD-CCNC: 6 U/L — LOW (ref 10–45)
ANION GAP SERPL CALC-SCNC: 12 MMOL/L — SIGNIFICANT CHANGE UP (ref 5–17)
AST SERPL-CCNC: 10 U/L — SIGNIFICANT CHANGE UP (ref 10–40)
BASOPHILS # BLD AUTO: 0 K/UL — SIGNIFICANT CHANGE UP (ref 0–0.2)
BASOPHILS NFR BLD AUTO: 0 % — SIGNIFICANT CHANGE UP (ref 0–2)
BILIRUB SERPL-MCNC: 0.1 MG/DL — LOW (ref 0.2–1.2)
BUN SERPL-MCNC: 60 MG/DL — HIGH (ref 7–23)
CA-I BLD-SCNC: 1.41 MMOL/L — HIGH (ref 1.15–1.33)
CALCIUM SERPL-MCNC: 9.8 MG/DL — SIGNIFICANT CHANGE UP (ref 8.4–10.5)
CHLORIDE SERPL-SCNC: 112 MMOL/L — HIGH (ref 96–108)
CO2 SERPL-SCNC: 16 MMOL/L — LOW (ref 22–31)
CREAT SERPL-MCNC: 1.97 MG/DL — HIGH (ref 0.5–1.3)
EGFR: 26 ML/MIN/1.73M2 — LOW
EOSINOPHIL # BLD AUTO: 0 K/UL — SIGNIFICANT CHANGE UP (ref 0–0.5)
EOSINOPHIL NFR BLD AUTO: 0 % — SIGNIFICANT CHANGE UP (ref 0–6)
FIBRINOGEN PPP-MCNC: 309 MG/DL — LOW (ref 330–520)
GLUCOSE SERPL-MCNC: 108 MG/DL — HIGH (ref 70–99)
HCT VFR BLD CALC: 30.6 % — LOW (ref 34.5–45)
HGB BLD-MCNC: 9.4 G/DL — LOW (ref 11.5–15.5)
IMM GRANULOCYTES NFR BLD AUTO: 0.8 % — SIGNIFICANT CHANGE UP (ref 0–0.9)
LYMPHOCYTES # BLD AUTO: 0.61 K/UL — LOW (ref 1–3.3)
LYMPHOCYTES # BLD AUTO: 16.5 % — SIGNIFICANT CHANGE UP (ref 13–44)
MAGNESIUM SERPL-MCNC: 2 MG/DL — SIGNIFICANT CHANGE UP (ref 1.6–2.6)
MCHC RBC-ENTMCNC: 26.6 PG — LOW (ref 27–34)
MCHC RBC-ENTMCNC: 30.7 GM/DL — LOW (ref 32–36)
MCV RBC AUTO: 86.7 FL — SIGNIFICANT CHANGE UP (ref 80–100)
MONOCYTES # BLD AUTO: 0.34 K/UL — SIGNIFICANT CHANGE UP (ref 0–0.9)
MONOCYTES NFR BLD AUTO: 9.2 % — SIGNIFICANT CHANGE UP (ref 2–14)
NEUTROPHILS # BLD AUTO: 2.72 K/UL — SIGNIFICANT CHANGE UP (ref 1.8–7.4)
NEUTROPHILS NFR BLD AUTO: 73.5 % — SIGNIFICANT CHANGE UP (ref 43–77)
NRBC # BLD: 0 /100 WBCS — SIGNIFICANT CHANGE UP (ref 0–0)
PHOSPHATE SERPL-MCNC: 3.4 MG/DL — SIGNIFICANT CHANGE UP (ref 2.5–4.5)
PLATELET # BLD AUTO: 145 K/UL — LOW (ref 150–400)
POTASSIUM SERPL-MCNC: 5.1 MMOL/L — SIGNIFICANT CHANGE UP (ref 3.5–5.3)
POTASSIUM SERPL-SCNC: 5.1 MMOL/L — SIGNIFICANT CHANGE UP (ref 3.5–5.3)
PROT SERPL-MCNC: 5.8 G/DL — LOW (ref 6–8.3)
RBC # BLD: 3.53 M/UL — LOW (ref 3.8–5.2)
RBC # FLD: 13.7 % — SIGNIFICANT CHANGE UP (ref 10.3–14.5)
SODIUM SERPL-SCNC: 140 MMOL/L — SIGNIFICANT CHANGE UP (ref 135–145)
TACROLIMUS SERPL-MCNC: 5 NG/ML — SIGNIFICANT CHANGE UP
WBC # BLD: 3.7 K/UL — LOW (ref 3.8–10.5)
WBC # FLD AUTO: 3.7 K/UL — LOW (ref 3.8–10.5)

## 2022-10-03 PROCEDURE — 99233 SBSQ HOSP IP/OBS HIGH 50: CPT | Mod: 25

## 2022-10-03 PROCEDURE — 36514 APHERESIS PLASMA: CPT

## 2022-10-03 PROCEDURE — 99232 SBSQ HOSP IP/OBS MODERATE 35: CPT | Mod: GC

## 2022-10-03 RX ORDER — TACROLIMUS 5 MG/1
2 CAPSULE ORAL ONCE
Refills: 0 | Status: COMPLETED | OUTPATIENT
Start: 2022-10-03 | End: 2022-10-03

## 2022-10-03 RX ORDER — SODIUM BICARBONATE 1 MEQ/ML
1300 SYRINGE (ML) INTRAVENOUS THREE TIMES A DAY
Refills: 0 | Status: DISCONTINUED | OUTPATIENT
Start: 2022-10-03 | End: 2022-10-07

## 2022-10-03 RX ORDER — TACROLIMUS 5 MG/1
7 CAPSULE ORAL
Refills: 0 | Status: DISCONTINUED | OUTPATIENT
Start: 2022-10-04 | End: 2022-10-04

## 2022-10-03 RX ORDER — ONDANSETRON 8 MG/1
4 TABLET, FILM COATED ORAL ONCE
Refills: 0 | Status: COMPLETED | OUTPATIENT
Start: 2022-10-03 | End: 2022-10-03

## 2022-10-03 RX ORDER — ACETAMINOPHEN 500 MG
650 TABLET ORAL ONCE
Refills: 0 | Status: COMPLETED | OUTPATIENT
Start: 2022-10-03 | End: 2022-10-03

## 2022-10-03 RX ORDER — LABETALOL HCL 100 MG
200 TABLET ORAL THREE TIMES A DAY
Refills: 0 | Status: DISCONTINUED | OUTPATIENT
Start: 2022-10-03 | End: 2022-10-04

## 2022-10-03 RX ORDER — IMMUNE GLOBULIN (HUMAN) 10 G/100ML
6 INJECTION INTRAVENOUS; SUBCUTANEOUS ONCE
Refills: 0 | Status: COMPLETED | OUTPATIENT
Start: 2022-10-03 | End: 2022-10-03

## 2022-10-03 RX ORDER — DIPHENHYDRAMINE HCL 50 MG
50 CAPSULE ORAL ONCE
Refills: 0 | Status: COMPLETED | OUTPATIENT
Start: 2022-10-03 | End: 2022-10-03

## 2022-10-03 RX ADMIN — Medication 50 MILLIGRAM(S): at 17:00

## 2022-10-03 RX ADMIN — TACROLIMUS 5 MILLIGRAM(S): 5 CAPSULE ORAL at 10:10

## 2022-10-03 RX ADMIN — Medication 1: at 17:11

## 2022-10-03 RX ADMIN — Medication 200 MILLIGRAM(S): at 13:17

## 2022-10-03 RX ADMIN — IMMUNE GLOBULIN (HUMAN) 10 GRAM(S): 10 INJECTION INTRAVENOUS; SUBCUTANEOUS at 17:30

## 2022-10-03 RX ADMIN — ERYTHROPOIETIN 10000 UNIT(S): 10000 INJECTION, SOLUTION INTRAVENOUS; SUBCUTANEOUS at 11:52

## 2022-10-03 RX ADMIN — Medication 500000 UNIT(S): at 23:15

## 2022-10-03 RX ADMIN — Medication 650 MILLIGRAM(S): at 05:57

## 2022-10-03 RX ADMIN — Medication 650 MILLIGRAM(S): at 17:30

## 2022-10-03 RX ADMIN — LATANOPROST 1 DROP(S): 0.05 SOLUTION/ DROPS OPHTHALMIC; TOPICAL at 21:04

## 2022-10-03 RX ADMIN — Medication 200 MILLIGRAM(S): at 05:57

## 2022-10-03 RX ADMIN — Medication 1300 MILLIGRAM(S): at 13:18

## 2022-10-03 RX ADMIN — MYCOPHENOLATE MOFETIL 1000 MILLIGRAM(S): 250 CAPSULE ORAL at 20:38

## 2022-10-03 RX ADMIN — Medication 1300 MILLIGRAM(S): at 21:07

## 2022-10-03 RX ADMIN — Medication 500000 UNIT(S): at 05:56

## 2022-10-03 RX ADMIN — Medication 60 MILLIGRAM(S): at 17:12

## 2022-10-03 RX ADMIN — TACROLIMUS 2 MILLIGRAM(S): 5 CAPSULE ORAL at 16:06

## 2022-10-03 RX ADMIN — Medication 650 MILLIGRAM(S): at 17:00

## 2022-10-03 RX ADMIN — ONDANSETRON 4 MILLIGRAM(S): 8 TABLET, FILM COATED ORAL at 23:15

## 2022-10-03 RX ADMIN — Medication 500000 UNIT(S): at 17:12

## 2022-10-03 RX ADMIN — VALGANCICLOVIR 450 MILLIGRAM(S): 450 TABLET, FILM COATED ORAL at 05:56

## 2022-10-03 RX ADMIN — Medication 200 MILLIGRAM(S): at 21:04

## 2022-10-03 RX ADMIN — ATOVAQUONE 1500 MILLIGRAM(S): 750 SUSPENSION ORAL at 11:42

## 2022-10-03 RX ADMIN — Medication 500000 UNIT(S): at 11:43

## 2022-10-03 RX ADMIN — Medication 125 MILLIGRAM(S): at 11:52

## 2022-10-03 RX ADMIN — PANTOPRAZOLE SODIUM 40 MILLIGRAM(S): 20 TABLET, DELAYED RELEASE ORAL at 05:57

## 2022-10-03 RX ADMIN — Medication 60 MILLIGRAM(S): at 05:57

## 2022-10-03 RX ADMIN — MYCOPHENOLATE MOFETIL 1000 MILLIGRAM(S): 250 CAPSULE ORAL at 10:09

## 2022-10-03 NOTE — CONSULT NOTE ADULT - ASSESSMENT
76F with ESRD due to HTN, was on HD since 2016 via R AVF (previously oliguric), Glaucoma, h/o DVT to L subclavian vein (2017), s/p R DDRT on 5/19/21. Post transplant course was complicated by DGF. Last HD was on 6/4/21. Ureteral stent was removed on 8/2/21. Hospitalized 10/2021 with severe symptomatic anemia (Hgb 5g/dL). Transfused 2 units PRBC. Anemia was due to hemolysis caused by dapsone (despite normal G6PD levels prior to initiation). Nov 2021: Noted to have YANELY creatinine elevated to 1.65mg/dL. US showed increased velocities concerning for TRAS but no tardus parvus waves. DSA negative, and cFDNA 0.5%. No intervention. Hospitalized Jan 2022 with worsening anemia, hyperkalemia, tested positive for COVID but had no symptoms.    Recent US ok.  Graft bx on 9/29/22: preliminary reading with glomerulitis, peritubular capillaritis, interstitial inflammation, no tubulitis. +C4D. DSA negative).  Started on pulse dose steroids- Solumedrol 250 mg.    Transfusion Medicine was consulted to initiate therapeutic plasma exchange (PLEX) for AMR. We have agreed to a course of 5 treatments, every other day. Today I reviewed the patient's medical record notes and lab results. No acute events overnight. Cr is 1.97 mg/dL, BUN 60 mg/dL, ionized calcium 1.47 mmol/L, fibrinogen 309 mg/dL.    Today we will complete therapeutic plasma exchange (PLEX#1), one plasma volume using 5% albumin for replacement fluid.    PLEX#2 is scheduled on Wednesday, 10/05/22. Please monitor CBC, ionized calcium and fibrinogen levels on the morning of each plasma exchange procedure.

## 2022-10-03 NOTE — PROGRESS NOTE ADULT - ASSESSMENT
76 year old female with ESRD secondary to hypertension, patient hsitory of hemodialysis prior to DDRT on 5/19/2021, induction with basiliximab. Patient with biopsy on 9/29 demonstrating AMR  (preliminary reading with glomerulitis, peritubular capillaritis, interstitial inflammation, no tubulitis. +C4D. DSA negative)

## 2022-10-03 NOTE — CONSULT NOTE ADULT - SUBJECTIVE AND OBJECTIVE BOX
[facility] DIVISION OF KIDNEY DISEASES AND HYPERTENSION -- INITIAL CONSULT NOTE  --------------------------------------------------------------------------------  HPI:  76 year old female with ESRD due to HTN, was on HD since 2016 via R AVF (previously oliguric), Glaucoma, h/o DVT to L subclavian vein (2017), s/p R DDRT on 5/19/21 under Simulect, Post transplant course was complicated by DGF. Last HD was on 6/4/21. Ureteral stent was removed on 8/2/21.     Hospitalized 10/2021 with severe symptomatic anemia (Hgb 5g/dL). Transfused 2 units PRBC. Anemia was due to hemolysis caused by dapsone (despite normal G6PD levels prior to initiation).   Nov 2021: Noted to have YANELY creatinine elevated to 1.65mg/dL. US showed increased velocities concerning for TRAS but no tardus parvus waves. DSA negative, and cFDNA 0.5%. No intervention.  Hospitalized Jan 2022 with worsening anemia, hyperkalemia, tested positive for COVID but had no symptoms.     Cr now up to 2.21, recent US ok.  Graft bx on 9/29 with +AMR, admitted for tx. preliminary reading with glomerulitis, peritubular capillaritis, interstitial inflammation, no tubulitis. +C4D. DSA negative      PAST HISTORY  --------------------------------------------------------------------------------  PAST MEDICAL & SURGICAL HISTORY:  HTN (hypertension)      Glaucoma      Cataract      ESRD (end stage renal disease) on dialysis      DVT (deep venous thrombosis)  of Left subclavian vein, 06/12/17      Hemodialysis patient  MWF      Acquired cataract  extraction with b/l lense placement, 2016      H/O: glaucoma  surgery, 2002      AV fistula  2015      S/P KEVEN-BSO  for fibroids, 2012      Hemodialysis access, AV graft      Transplanted kidney  5/19/21      History of renal transplantation  DDRT 5/19/2021        FAMILY HISTORY:  Family history of cerebrovascular accident (CVA)    Family history of colon cancer (Sibling)      PAST SOCIAL HISTORY:    ALLERGIES & MEDICATIONS  --------------------------------------------------------------------------------  Allergies    Mushrooms (Anaphylaxis)  penicillin (Rash)    Intolerances      Standing Inpatient Medications  atovaquone  Suspension 1500 milliGRAM(s) Oral daily  dextrose 5%. 1000 milliLiter(s) IV Continuous <Continuous>  dextrose 5%. 1000 milliLiter(s) IV Continuous <Continuous>  dextrose 50% Injectable 25 Gram(s) IV Push once  dextrose 50% Injectable 12.5 Gram(s) IV Push once  dextrose 50% Injectable 25 Gram(s) IV Push once  glucagon  Injectable 1 milliGRAM(s) IntraMuscular once  insulin lispro (ADMELOG) corrective regimen sliding scale   SubCutaneous three times a day before meals  insulin lispro (ADMELOG) corrective regimen sliding scale   SubCutaneous at bedtime  labetalol 200 milliGRAM(s) Oral two times a day  latanoprost 0.005% Ophthalmic Solution 1 Drop(s) Both EYES at bedtime  mycophenolate mofetil 500 milliGRAM(s) Oral <User Schedule>  NIFEdipine XL 60 milliGRAM(s) Oral two times a day  pantoprazole    Tablet 40 milliGRAM(s) Oral before breakfast  sodium bicarbonate 650 milliGRAM(s) Oral two times a day  tacrolimus ER Tablet (ENVARSUS XR) 5 milliGRAM(s) Oral <User Schedule>    PRN Inpatient Medications  dextrose Oral Gel 15 Gram(s) Oral once PRN      REVIEW OF SYSTEMS  --------------------------------------------------------------------------------  Gen: No weight changes, fatigue, fevers/chills, weakness  Skin: No rashes  Head/Eyes/Ears/Mouth: No headache; Normal hearing; Normal vision w/o blurriness; No sinus pain/discomfort, sore throat  Respiratory: No dyspnea, cough, wheezing, hemoptysis  CV: No chest pain, PND, orthopnea  GI: No abdominal pain, diarrhea, constipation, nausea, vomiting, melena, hematochezia  : No increased frequency, dysuria, hematuria, nocturia  MSK: No joint pain/swelling; no back pain; no edema  Neuro: No dizziness/lightheadedness, weakness, seizures, numbness, tingling  Heme: No easy bruising or bleeding  Endo: No heat/cold intolerance  Psych: No significant nervousness, anxiety, stress, depression    All other systems were reviewed and are negative, except as noted.    VITALS/PHYSICAL EXAM  --------------------------------------------------------------------------------  T(C): 37.1 (10-01-22 @ 09:00), Max: 37.2 (10-01-22 @ 05:00)  HR: 67 (10-01-22 @ 09:00) (62 - 73)  BP: 150/69 (10-01-22 @ 09:00) (150/69 - 165/80)  RR: 18 (10-01-22 @ 09:00) (18 - 18)  SpO2: 98% (10-01-22 @ 09:00) (97% - 99%)  Wt(kg): --  Height (cm): 167.6 (09-30-22 @ 20:14)  Weight (kg): 55.2 (09-30-22 @ 20:14)  BMI (kg/m2): 19.7 (09-30-22 @ 20:14)  BSA (m2): 1.62 (09-30-22 @ 20:14)      09-30-22 @ 07:01  -  10-01-22 @ 07:00  --------------------------------------------------------  IN: 300 mL / OUT: 0 mL / NET: 300 mL      Physical Exam:  	Gen: NAD, well-appearing  	HEENT: PERRL, supple neck, clear oropharynx  	Pulm: CTA B/L  	CV: RRR, S1S2; no rub  	Back: No spinal or CVA tenderness; no sacral edema  	Abd: +BS, soft, nontender/nondistended                      Transplant:  	: No suprapubic tenderness  	UE: Warm, FROM, no clubbing, intact strength; no edema; no asterixis  	LE: Warm, FROM, no clubbing, intact strength; no edema  	Neuro: No focal deficits, intact gait  	Psych: Normal affect and mood  	Skin: Warm, without rashes  	Vascular access:    LABS/STUDIES  --------------------------------------------------------------------------------              10.3   3.00  >-----------<  156      [10-01-22 @ 06:56]              34.1     140  |  112  |  39  ----------------------------<  126      [10-01-22 @ 06:59]  5.3   |  16  |  1.80        Ca     10.5     [10-01-22 @ 06:59]      Mg     2.5     [10-01-22 @ 06:59]      Phos  3.7     [10-01-22 @ 06:59]    TPro  6.7  /  Alb  4.3  /  TBili  0.1  /  DBili  x   /  AST  13  /  ALT  7   /  AlkPhos  74  [10-01-22 @ 06:59]    PT/INR: PT 12.3 , INR 1.06       [09-30-22 @ 23:05]  PTT: 31.7       [09-30-22 @ 23:05]      Creatinine Trend:  SCr 1.80 [10-01 @ 06:59]  SCr 2.02 [09-30 @ 23:05]    Urinalysis - [02-02-22 @ 23:35]      Color Colorless / Appearance Clear / SG 1.008 / pH 6.5      Gluc Negative / Ketone Negative  / Bili Negative / Urobili Negative       Blood Negative / Protein Trace / Leuk Est Negative / Nitrite Negative      RBC 1 / WBC 0 / Hyaline  / Gran  / Sq Epi  / Non Sq Epi 0 / Bacteria Negative      Iron 53, TIBC 205, %sat 26      [02-03-22 @ 02:41]  Ferritin 1921      [10-06-21 @ 16:15]  HbA1c 4.6      [05-28-19 @ 09:49]    HBsAb 22.4      [05-19-21 @ 10:42]  HBsAb Reactive      [10-19-19 @ 01:09]  HBsAg Nonreact      [05-19-21 @ 10:42]  HBcAb Nonreact      [05-19-21 @ 10:42]  HCV 0.42, Nonreact      [05-19-21 @ 10:42]  HIV Nonreact      [05-19-21 @ 10:45]    STEPHANIE: titer Negative, pattern --      [08-13-20 @ 10:27]  Immunofixation Serum:   No Monoclonal Band Identified    Reference Range: None Detected      [02-05-21 @ 06:39]  SPEP Interpretation: Normal Electrophoresis Pattern      [02-05-21 @ 06:39]    TacrolimusTacrolimus (), Serum: 8.8 ng/mL (10-01 @ 06:56)    Cyclosporine  Sirolimus  Mycophenolate  BK PCR  CMV PCR  Parvo
Patient is a 76y old  Female who presents on 22 with a chief complaint of AMR.      HPI:  76F with ESRD due to HTN, was on HD since 2016 via R AVF (previously oliguric), Glaucoma, h/o DVT to L subclavian vein (), s/p R DDRT on 21 under Simulect,     Post transplant course was complicated by DGF. Last HD was on 21. Ureteral stent was removed on 21. Hospitalized 10/2021 with severe symptomatic anemia (Hgb 5g/dL). Transfused 2 units PRBC. Anemia was due to hemolysis caused by dapsone (despite normal G6PD levels prior to initiation). 2021: Noted to have YANELY creatinine elevated to 1.65mg/dL. US showed increased velocities concerning for TRAS but no tardus parvus waves. DSA negative, and cFDNA 0.5%. No intervention. Hospitalized 2022 with worsening anemia, hyperkalemia, tested positive for COVID but had no symptoms.     Recent US ok.  Graft bx on 22: preliminary reading with glomerulitis, peritubular capillaritis, interstitial inflammation, no tubulitis. +C4D. DSA negative).  Started on pulse dose steroids- Solumedrol 250 mg.    Transfusion Medicine was consulted to initiate therapeutic plasma exchange for AMR. We have agreed to a course of 5 treatments, every other day. Today I reviewed the patient's medical record notes and lab results. No acute events overnight. Cr is 1.97 mg/dL, BUN 60 mg/dL, ionized calcium 1.47 mmol/L, fibrinogen 309 mg/dL.      PAST MEDICAL & SURGICAL HISTORY:  HTN (hypertension)  Glaucoma  Cataract  ESRD (end stage renal disease) on dialysis  DVT (deep venous thrombosis) of Left subclavian vein, 17  AV fistula   S/P KEVEN-BSO for fibroids,   Transplanted kidney 21    MEDICATIONS  (STANDING):  acetaminophen     Tablet .. 650 milliGRAM(s) Oral once  atovaquone  Suspension 1500 milliGRAM(s) Oral daily  dextrose 5%. 1000 milliLiter(s) (50 mL/Hr) IV Continuous <Continuous>  dextrose 5%. 1000 milliLiter(s) (100 mL/Hr) IV Continuous <Continuous>  dextrose 50% Injectable 25 Gram(s) IV Push once  dextrose 50% Injectable 12.5 Gram(s) IV Push once  dextrose 50% Injectable 25 Gram(s) IV Push once  diphenhydrAMINE 50 milliGRAM(s) Oral once  epoetin ivelisse-epbx (RETACRIT) Injectable 11161 Unit(s) SubCutaneous every 7 days  glucagon  Injectable 1 milliGRAM(s) IntraMuscular once  immune   globulin 10% (GAMMAGARD) IVPB 6 Gram(s) IV Intermittent once  insulin lispro (ADMELOG) corrective regimen sliding scale   SubCutaneous three times a day before meals  insulin lispro (ADMELOG) corrective regimen sliding scale   SubCutaneous at bedtime  labetalol 200 milliGRAM(s) Oral three times a day  latanoprost 0.005% Ophthalmic Solution 1 Drop(s) Both EYES at bedtime  mycophenolate mofetil 1000 milliGRAM(s) Oral <User Schedule>  NIFEdipine XL 60 milliGRAM(s) Oral two times a day  nystatin    Suspension 437442 Unit(s) Oral four times a day  pantoprazole    Tablet 40 milliGRAM(s) Oral before breakfast  sodium bicarbonate 1300 milliGRAM(s) Oral three times a day  tacrolimus ER Tablet (ENVARSUS XR) 2 milliGRAM(s) Oral once  valGANciclovir 450 milliGRAM(s) Oral <User Schedule>    MEDICATIONS  (PRN):  dextrose Oral Gel 15 Gram(s) Oral once PRN Blood Glucose LESS THAN 70 milliGRAM(s)/deciliter    FAMILY HISTORY:  Family history of cerebrovascular accident (CVA)  Family history of colon cancer (Sibling)    Allergies  Mushrooms (Anaphylaxis)  penicillin (Rash)    REVIEW OF SYSTEMS:  CONSTITUTIONAL: No weakness, fevers or chills  EYES/ENT: No visual changes;  No vertigo or throat pain   RESPIRATORY: No cough, wheezing, hemoptysis; No shortness of breath  CARDIOVASCULAR: No chest pain or palpitations  GASTROINTESTINAL: No abdominal or epigastric pain. No nausea, vomiting, or hematemesis; No diarrhea or constipation. No melena or hematochezia.  MUSCULOSKELETAL: Full range of motion  NEUROLOGICAL: No numbness or weakness  HEMATOLOGY: No anemia, no bleeding  SKIN: No itching, burning, rashes, petechia, or lesions  ENDOCRINE: No diabetes, no thyroid disease  All other review of systems is negative unless indicated above.    Vital Signs Last 24 Hrs  T(C): 37.3 (03 Oct 2022 13:15), Max: 37.3 (02 Oct 2022 21:00)  T(F): 99.1 (03 Oct 2022 13:15), Max: 99.1 (02 Oct 2022 21:00)  HR: 63 (03 Oct 2022 13:15) (63 - 82)  BP: 166/73 (03 Oct 2022 13:15) (141/63 - 189/80)  RR: 18 (03 Oct 2022 13:15) (18 - 18)  SpO2: 99% (03 Oct 2022 13:15) (97% - 100%)    Daily     Daily Weight in k.4 (03 Oct 2022 06:25)    PHYSICAL EXAM:  General: WN/WD NAD  Eyes: No jaundice  ENT: No abnormal secretions  Respiratory: CTA B/L  CV: RRR, no murmurs  Abdominal: Soft, NT, ND +BS  Musculoskeletal/Extremities: full range of motion X 4, no edema. AV fistula on right arm. Old AV fistula on left arm.  Neurology: A&Ox3, no focal deficits   Skin: No rash, no petechia                          9.4    3.70  )-----------( 145      ( 03 Oct 2022 06:22 )             30.6       Hematocrit: 30.6 % (10-03 @ 06:22)  Hematocrit: 30.8 % (10-02 @ 06:54)  Hematocrit: 34.1 % (10-01 @ 06:56)  Hematocrit: 31.9 % ( @ 23:05)    10-03    140  |  112<H>  |  60<H>  ----------------------------<  108<H>  5.1   |  16<L>  |  1.97<H>    Ca    9.8      03 Oct 2022 06:23  Phos  3.4     10-  Mg     2.0     10-03    TPro  5.8<L>  /  Alb  3.5  /  TBili  0.1<L>  /  DBili  x   /  AST  10  /  ALT  6<L>  /  AlkPhos  65  10-03    Fibrinogen Assay: 309 mg/dL (10-03 @ 06:22)

## 2022-10-03 NOTE — CONSULT NOTE ADULT - CONSULT REASON
Consultation for therapeutic plasma exchange for antibody-mediated rejection of transplanted kidney.
DDRT recipient admitted with AMR

## 2022-10-03 NOTE — CONSULT NOTE ADULT - NSCONSULTADDITIONALINFOA_GEN_ALL_CORE
Informed Consent for therapeutic plasma exchange was obtained from the patient with assistance from online English-. Therapeutic plasma exchange (PLEX) procedure (including replacement with 5% albumin and/or plasma and anticoagulation), benefits, risks and complications of the procedure (electrolyte imbalance, fluid imbalance, transfusion transmitted infections and transfusion reactions which may be life threatening) and alternative options explained in detail to the patient who verbalized understanding and consented to the procedure after all questions answered.

## 2022-10-03 NOTE — PROGRESS NOTE ADULT - ASSESSMENT
EMERGENCY DEPARTMENT HISTORY AND PHYSICAL EXAM          Date: 10/3/2022  Patient Name: Ember Ramírez    History of Presenting Illness     Chief Complaint   Patient presents with    Fall       History Provided By: EMS, [facility]    HPI: Ember Ramírez is a 78 y.o. male, pmhx listed below, who presents to the ED c/o fall. According to [facility], patient was in the bathroom when he had a mechanical fall. They report no head injury but also reported fall was unwitnessed. Patient was awake and at baseline on the floor when found. No loss of consciousness. In emergency department, patient has no complaints, denies pain. PCP: Daphnie Cardoso NP    There are no other complaints, changes, or physical findings at this time.          Past History       Past Medical History:  Past Medical History:   Diagnosis Date    Carotid artery disease ([facility] [address]     Coronary atherosclerosis of native coronary artery     Diabetes mellitus, type II (HCC)     DJD (degenerative joint disease)     HCVD (hypertensive cardiovascular disease)     Pure hypercholesterolemia        Past Surgical History:  Past Surgical History:   Procedure Laterality Date    HX CATARACT REMOVAL Bilateral     HX HEART CATHETERIZATION  2010    LVEDP 9, mild ant hypo EF 55-60%, LAD 70% 99%, mod D1, OM3 30%, PDA 90%    HX PTCA  2010    Stent-LAD 2.5X18 ANDREA    HX PTCA  3/2010    Stent PDA ANDREA       Family History:  Family History   Problem Relation Age of Onset    Heart Disease Mother     Cancer Father        Social History:  Social History     Tobacco Use    Smoking status: Former     Types: Cigarettes     Quit date: 10/17/1963     Years since quittin.0    Smokeless tobacco: Never   Vaping Use    Vaping Use: Never used   Substance Use Topics    Alcohol use: Not Currently     Comment: stopped 2018/used to drink heavy    Drug use: Never       Current Outpatient Medications   Medication Sig Dispense Refill acetaminophen (TYLENOL) 325 mg tablet Take 2 Tablets by mouth every six (6) hours as needed. amLODIPine (NORVASC) 5 mg tablet Take 1 tablet by mouth once daily 90 Tab 1    atorvastatin (LIPITOR) 40 mg tablet Take 1 Tab by mouth nightly. 90 Tab 1    lisinopriL (PRINIVIL, ZESTRIL) 10 mg tablet Take 1 tablet by mouth twice daily 180 Tab 1    insulin aspart protamine/insulin aspart (NovoLOG Mix 70-30FlexPen U-100) 100 unit/mL (70-30) inpn Administer 30 units in the am and 10 units in the evening by subcutaneous injection. 5 Adjustable Dose Pre-filled Pen Syringe 5    cholecalciferol, vitamin D3, (VITAMIN D3) 2,000 unit tab Take  by mouth daily. aspirin delayed-release 81 mg tablet Take 81 mg by mouth daily. ferrous sulfate (IRON) 325 mg (65 mg iron) tablet Take  by mouth Daily (before breakfast). Allergies: Allergies   Allergen Reactions    Aspirin Other (comments)     NOSE BLEED  MG IS TAKEN  Can take low dose aspirin    Pcn [Penicillins] Shortness of Breath     Itching/hives         Review of Systems   Review of Systems   Unable to perform ROS: Dementia     Physical Exam     Vital Signs-Reviewed the patient's vital signs. Patient Vitals for the past 12 hrs:   Temp Pulse Resp BP SpO2   10/03/22 0841 -- 70 18 (!) 189/79 100 %   10/03/22 0732 98 °F (36.7 °C) 72 18 (!) 148/99 99 %       Physical Exam  Constitutional:       General: He is not in acute distress. Appearance: Normal appearance. He is not ill-appearing. HENT:      Head: Normocephalic and atraumatic. Nose: Nose normal.   Eyes:      Extraocular Movements: Extraocular movements intact. Pupils: Pupils are equal, round, and reactive to light. Cardiovascular:      Rate and Rhythm: Normal rate and regular rhythm. Pulmonary:      Effort: Pulmonary effort is normal.      Breath sounds: Normal breath sounds. Abdominal:      Palpations: Abdomen is soft. Tenderness: There is no abdominal tenderness. Musculoskeletal:      Cervical back: Neck supple. Comments: All extremities nontender, bilateral clavicles nontender, bilateral arms and legs nontender. Able to lift arms and legs off bed with no increased pain   Neurological:      Mental Status: He is alert. Comments: Oriented to self only. Very hard of hearing. Strength intact in all extremities. Psychiatric:         Mood and Affect: Mood normal.       Diagnostic Study Results     Labs -   No results found for this or any previous visit (from the past 12 hour(s)). Radiologic Studies -   CT HEAD WO CONT   Final Result   1. No acute intracranial abnormality. 2.  Left paranasal sinusitis. CT Results  (Last 48 hours)                 10/03/22 0808  CT HEAD WO CONT Final result    Impression:  1. No acute intracranial abnormality. 2.  Left paranasal sinusitis. Narrative:  EXAM: CT HEAD WO CONT       INDICATION: fall       COMPARISON: CT head 7/15/2021. CONTRAST: None. TECHNIQUE: Unenhanced CT of the head was performed using 5 mm images. Brain and   bone windows were generated. Coronal and sagittal reformats. CT dose reduction   was achieved through use of a standardized protocol tailored for this   examination and automatic exposure control for dose modulation. FINDINGS:   Generalized volume loss. Scattered white matter hypodensities may reflect   chronic microangiopathic change. There is no intracranial hemorrhage,   extra-axial collection, or mass effect. The basilar cisterns are open. No CT   evidence of acute infarct. The bone windows demonstrate no abnormalities. Left frontal sinus, ethmoid air   cell, and maxillary sinus mucosal thickening and fluid. Mastoid air cells are   clear. Bilateral lens replacements. CXR Results  (Last 48 hours)      None              Medical Decision Making   I am the first provider for this patient.     I reviewed the vital signs, available nursing notes, past medical history, past surgical history, family history and social history. Records Reviewed: Nursing Notes and Old Medical Records    Provider Notes (Medical Decision Making):   MDM: 78-year-old male with fall. No external signs of injury. Now awake and alert but cannot provide any history of fall, reports he feels well and has no pain. We will plan for CT head now. Initial assessment performed. The patients presenting problems have been discussed, and they are in agreement with the care plan formulated and outlined with them. I have encouraged them to ask questions as they arise throughout their visit. PROGRESS NOTE:  CT head shows sinusitis. Patient is not experiencing any facial pain or congestion. Nursing home called and given report by nurse Madelin Cruz, transport will pick patient up shortly. Discharge note:   Vital signs stable for discharge. Diagnosis     Clinical Impression:   1. Fall, initial encounter            Disposition:  Discharged    Current Discharge Medication List            Please note, this dictation was completed with [employer], the computer voice recognition software. Quite often unanticipated grammatical, syntax, homophones, and other interpretive errors are inadvertently transcribed by the computer software. Please disregard these errors. Please excuse any errors that have escaped final proof reading. 75 year old  female with PMH significant for HTN x since age 24,  ESRD on HD x6 years (MWF via RUE AVG)-  anuric,  L subclavian DVT 2017,   Has received Pfizer covid vaccine x 2 doses.  Presents today for a DDRT.  She feels well, offers no complaints denies weakness, fatigue, SOB, chest pain, palp, recent travel or sick contacts.  Last HD was done on 5/17 via RUE AVG. Will receive HD today prior to DDRT. Simulect will be used for induction.    1. s/p DDRT 5/19/21. Allograft function with DGF due to ATN from ischemia reperfusion injury.    Last HD done on 5/20 (no UF, no heparin)  Started on Lasix gtt , UOP has improved will monitor closely and reassess dialysis needs daily.     2. IS meds- Simulect Induction. Second dose on POD day 4   On Envarsus dosing per level ( goal 8-10) 8mg this am, f/ur level, MMF 1g BID and Steroid taper.   PPx: Nystatin/Valcyte/Bactrim/Protonix    3. HTN- Increase Nifedipine to 60 mg po bid , continue labetalol 100mg po bid and Hydralazine 100 po tid

## 2022-10-03 NOTE — PROGRESS NOTE ADULT - SUBJECTIVE AND OBJECTIVE BOX
[facility] DIVISION OF KIDNEY DISEASES AND HYPERTENSION -- FOLLOW UP NOTE  --------------------------------------------------------------------------------  Chief Complaint:    24 hour events/subjective:        PAST HISTORY  --------------------------------------------------------------------------------  No significant changes to PMH, PSH, FHx, SHx, unless otherwise noted    ALLERGIES & MEDICATIONS  --------------------------------------------------------------------------------  Allergies    Mushrooms (Anaphylaxis)  penicillin (Rash)    Intolerances      Standing Inpatient Medications  atovaquone  Suspension 1500 milliGRAM(s) Oral daily  dextrose 5%. 1000 milliLiter(s) IV Continuous <Continuous>  dextrose 5%. 1000 milliLiter(s) IV Continuous <Continuous>  dextrose 50% Injectable 25 Gram(s) IV Push once  dextrose 50% Injectable 12.5 Gram(s) IV Push once  dextrose 50% Injectable 25 Gram(s) IV Push once  epoetin ivelisse-epbx (RETACRIT) Injectable 49083 Unit(s) SubCutaneous every 7 days  glucagon  Injectable 1 milliGRAM(s) IntraMuscular once  insulin lispro (ADMELOG) corrective regimen sliding scale   SubCutaneous three times a day before meals  insulin lispro (ADMELOG) corrective regimen sliding scale   SubCutaneous at bedtime  labetalol 200 milliGRAM(s) Oral three times a day  latanoprost 0.005% Ophthalmic Solution 1 Drop(s) Both EYES at bedtime  mycophenolate mofetil 1000 milliGRAM(s) Oral <User Schedule>  NIFEdipine XL 60 milliGRAM(s) Oral two times a day  nystatin    Suspension 509670 Unit(s) Oral four times a day  pantoprazole    Tablet 40 milliGRAM(s) Oral before breakfast  sodium bicarbonate 1300 milliGRAM(s) Oral three times a day  tacrolimus ER Tablet (ENVARSUS XR) 5 milliGRAM(s) Oral <User Schedule>  valGANciclovir 450 milliGRAM(s) Oral <User Schedule>    PRN Inpatient Medications  dextrose Oral Gel 15 Gram(s) Oral once PRN      REVIEW OF SYSTEMS  --------------------------------------------------------------------------------  Gen: No fevers/chills  Skin: No rashes  Head/Eyes/Ears: Normal hearing,   Respiratory: No dyspnea, cough  CV: No chest pain  GI: No abdominal pain, diarrhea  : No dysuria, hematuria  MSK: No  edema  Heme: No easy bruising or bleeding  Psych: No significant depression      All other systems were reviewed and are negative, except as noted.    VITALS/PHYSICAL EXAM  --------------------------------------------------------------------------------  T(C): 36.6 (10-03-22 @ 10:00), Max: 37.3 (10-02-22 @ 21:00)  HR: 63 (10-03-22 @ 10:00) (63 - 82)  BP: 144/63 (10-03-22 @ 10:00) (141/63 - 189/80)  RR: 18 (10-03-22 @ 10:00) (18 - 18)  SpO2: 100% (10-03-22 @ 10:00) (97% - 100%)  Wt(kg): --        10-02-22 @ 07:01  -  10-03-22 @ 07:00  --------------------------------------------------------  IN: 1050 mL / OUT: 1150 mL / NET: -100 mL        Physical Exam:  	Gen: NAD  	HEENT: MMM  	Pulm: CTA B/L  	CV: S1S2  	Abd: Soft, +BS   	Ext: No LE edema B/L  	Neuro: Awake  	Skin: Warm and dry  	Vascular access:      LABS/STUDIES  --------------------------------------------------------------------------------              9.4    3.70  >-----------<  145      [10-03-22 @ 06:22]              30.6     140  |  112  |  60  ----------------------------<  108      [10-03-22 @ 06:23]  5.1   |  16  |  1.97        Ca     9.8     [10-03-22 @ 06:23]      iCa    1.41     [10-03 @ 06:23]      Mg     2.0     [10-03-22 @ 06:23]      Phos  3.4     [10-03-22 @ 06:23]    TPro  5.8  /  Alb  3.5  /  TBili  0.1  /  DBili  x   /  AST  10  /  ALT  6   /  AlkPhos  65  [10-03-22 @ 06:23]          Creatinine Trend:  SCr 1.97 [10-03 @ 06:23]  SCr 1.96 [10-02 @ 06:50]  SCr 1.80 [10-01 @ 06:59]  SCr 2.02 [09-30 @ 23:05]        Iron 53, TIBC 205, %sat 26      [02-03-22 @ 02:41]  Ferritin 1921      [10-06-21 @ 16:15]  HbA1c 4.6      [05-28-19 @ 09:49]

## 2022-10-03 NOTE — PROGRESS NOTE ADULT - PROBLEM SELECTOR PLAN 1
Patient on Tacrolimus and MMF   [] Continue Tacrolimus 5 mg PO daily  [] Continue mycophenolate 1000 mg PO BID   [] Monitor Tacrolimus level , obtain 30 minutes prior to next dose, goal level of 8-10

## 2022-10-03 NOTE — PROGRESS NOTE ADULT - PROBLEM SELECTOR PLAN 2
Patient with biopsy proven [facility]R  [] Plan for Plasmapheresis today as well as pulse steroids (500 mg (9/30)  -> 250 ( 10/2) -> 125 mg 10/3).

## 2022-10-04 DIAGNOSIS — I10 ESSENTIAL (PRIMARY) HYPERTENSION: ICD-10-CM

## 2022-10-04 DIAGNOSIS — E87.20 ACIDOSIS, UNSPECIFIED: ICD-10-CM

## 2022-10-04 LAB
ALBUMIN SERPL ELPH-MCNC: 3.8 G/DL — SIGNIFICANT CHANGE UP (ref 3.3–5)
ALP SERPL-CCNC: 36 U/L — LOW (ref 40–120)
ALT FLD-CCNC: 7 U/L — LOW (ref 10–45)
ANION GAP SERPL CALC-SCNC: 10 MMOL/L — SIGNIFICANT CHANGE UP (ref 5–17)
AST SERPL-CCNC: 8 U/L — LOW (ref 10–40)
BASOPHILS # BLD AUTO: 0 K/UL — SIGNIFICANT CHANGE UP (ref 0–0.2)
BASOPHILS NFR BLD AUTO: 0 % — SIGNIFICANT CHANGE UP (ref 0–2)
BILIRUB SERPL-MCNC: 0.1 MG/DL — LOW (ref 0.2–1.2)
BUN SERPL-MCNC: 62 MG/DL — HIGH (ref 7–23)
CA-I BLD-SCNC: 1.4 MMOL/L — HIGH (ref 1.15–1.33)
CALCIUM SERPL-MCNC: 9.5 MG/DL — SIGNIFICANT CHANGE UP (ref 8.4–10.5)
CHLORIDE SERPL-SCNC: 116 MMOL/L — HIGH (ref 96–108)
CO2 SERPL-SCNC: 15 MMOL/L — LOW (ref 22–31)
CREAT SERPL-MCNC: 2.13 MG/DL — HIGH (ref 0.5–1.3)
EGFR: 23 ML/MIN/1.73M2 — LOW
EOSINOPHIL # BLD AUTO: 0 K/UL — SIGNIFICANT CHANGE UP (ref 0–0.5)
EOSINOPHIL NFR BLD AUTO: 0 % — SIGNIFICANT CHANGE UP (ref 0–6)
FIBRINOGEN PPP-MCNC: 155 MG/DL — LOW (ref 330–520)
GLUCOSE SERPL-MCNC: 105 MG/DL — HIGH (ref 70–99)
HCT VFR BLD CALC: 30.4 % — LOW (ref 34.5–45)
HGB BLD-MCNC: 9.5 G/DL — LOW (ref 11.5–15.5)
IMM GRANULOCYTES NFR BLD AUTO: 1.2 % — HIGH (ref 0–0.9)
LYMPHOCYTES # BLD AUTO: 0.72 K/UL — LOW (ref 1–3.3)
LYMPHOCYTES # BLD AUTO: 17.5 % — SIGNIFICANT CHANGE UP (ref 13–44)
MAGNESIUM SERPL-MCNC: 1.7 MG/DL — SIGNIFICANT CHANGE UP (ref 1.6–2.6)
MCHC RBC-ENTMCNC: 27 PG — SIGNIFICANT CHANGE UP (ref 27–34)
MCHC RBC-ENTMCNC: 31.3 GM/DL — LOW (ref 32–36)
MCV RBC AUTO: 86.4 FL — SIGNIFICANT CHANGE UP (ref 80–100)
MONOCYTES # BLD AUTO: 0.45 K/UL — SIGNIFICANT CHANGE UP (ref 0–0.9)
MONOCYTES NFR BLD AUTO: 10.9 % — SIGNIFICANT CHANGE UP (ref 2–14)
NEUTROPHILS # BLD AUTO: 2.89 K/UL — SIGNIFICANT CHANGE UP (ref 1.8–7.4)
NEUTROPHILS NFR BLD AUTO: 70.4 % — SIGNIFICANT CHANGE UP (ref 43–77)
NRBC # BLD: 0 /100 WBCS — SIGNIFICANT CHANGE UP (ref 0–0)
PHOSPHATE SERPL-MCNC: 3.8 MG/DL — SIGNIFICANT CHANGE UP (ref 2.5–4.5)
PLATELET # BLD AUTO: 150 K/UL — SIGNIFICANT CHANGE UP (ref 150–400)
POTASSIUM SERPL-MCNC: 5.4 MMOL/L — HIGH (ref 3.5–5.3)
POTASSIUM SERPL-SCNC: 5.4 MMOL/L — HIGH (ref 3.5–5.3)
PROT SERPL-MCNC: 5.3 G/DL — LOW (ref 6–8.3)
RBC # BLD: 3.52 M/UL — LOW (ref 3.8–5.2)
RBC # FLD: 13.8 % — SIGNIFICANT CHANGE UP (ref 10.3–14.5)
SODIUM SERPL-SCNC: 141 MMOL/L — SIGNIFICANT CHANGE UP (ref 135–145)
SURGICAL PATHOLOGY STUDY: SIGNIFICANT CHANGE UP
TACROLIMUS SERPL-MCNC: 6.1 NG/ML — SIGNIFICANT CHANGE UP
WBC # BLD: 4.11 K/UL — SIGNIFICANT CHANGE UP (ref 3.8–10.5)
WBC # FLD AUTO: 4.11 K/UL — SIGNIFICANT CHANGE UP (ref 3.8–10.5)

## 2022-10-04 PROCEDURE — 99232 SBSQ HOSP IP/OBS MODERATE 35: CPT | Mod: FS,GC

## 2022-10-04 PROCEDURE — 99232 SBSQ HOSP IP/OBS MODERATE 35: CPT | Mod: GC

## 2022-10-04 RX ORDER — TACROLIMUS 5 MG/1
8 CAPSULE ORAL
Refills: 0 | Status: DISCONTINUED | OUTPATIENT
Start: 2022-10-05 | End: 2022-10-06

## 2022-10-04 RX ORDER — MAGNESIUM SULFATE 500 MG/ML
2 VIAL (ML) INJECTION ONCE
Refills: 0 | Status: COMPLETED | OUTPATIENT
Start: 2022-10-04 | End: 2022-10-04

## 2022-10-04 RX ORDER — LABETALOL HCL 100 MG
300 TABLET ORAL THREE TIMES A DAY
Refills: 0 | Status: DISCONTINUED | OUTPATIENT
Start: 2022-10-04 | End: 2022-10-05

## 2022-10-04 RX ORDER — TACROLIMUS 5 MG/1
1 CAPSULE ORAL ONCE
Refills: 0 | Status: COMPLETED | OUTPATIENT
Start: 2022-10-04 | End: 2022-10-04

## 2022-10-04 RX ADMIN — MYCOPHENOLATE MOFETIL 1000 MILLIGRAM(S): 250 CAPSULE ORAL at 20:50

## 2022-10-04 RX ADMIN — Medication 300 MILLIGRAM(S): at 13:03

## 2022-10-04 RX ADMIN — Medication 20 MILLIGRAM(S): at 17:21

## 2022-10-04 RX ADMIN — TACROLIMUS 7 MILLIGRAM(S): 5 CAPSULE ORAL at 08:38

## 2022-10-04 RX ADMIN — Medication 1300 MILLIGRAM(S): at 06:09

## 2022-10-04 RX ADMIN — Medication 60 MILLIGRAM(S): at 17:20

## 2022-10-04 RX ADMIN — Medication 500000 UNIT(S): at 06:10

## 2022-10-04 RX ADMIN — Medication 1300 MILLIGRAM(S): at 21:00

## 2022-10-04 RX ADMIN — LATANOPROST 1 DROP(S): 0.05 SOLUTION/ DROPS OPHTHALMIC; TOPICAL at 21:00

## 2022-10-04 RX ADMIN — TACROLIMUS 1 MILLIGRAM(S): 5 CAPSULE ORAL at 13:02

## 2022-10-04 RX ADMIN — Medication 60 MILLIGRAM(S): at 09:23

## 2022-10-04 RX ADMIN — PANTOPRAZOLE SODIUM 40 MILLIGRAM(S): 20 TABLET, DELAYED RELEASE ORAL at 06:10

## 2022-10-04 RX ADMIN — Medication 1300 MILLIGRAM(S): at 13:02

## 2022-10-04 RX ADMIN — ATOVAQUONE 1500 MILLIGRAM(S): 750 SUSPENSION ORAL at 11:27

## 2022-10-04 RX ADMIN — MYCOPHENOLATE MOFETIL 1000 MILLIGRAM(S): 250 CAPSULE ORAL at 08:38

## 2022-10-04 RX ADMIN — Medication 500000 UNIT(S): at 11:27

## 2022-10-04 RX ADMIN — Medication 300 MILLIGRAM(S): at 21:00

## 2022-10-04 RX ADMIN — Medication 500000 UNIT(S): at 23:30

## 2022-10-04 RX ADMIN — Medication 25 GRAM(S): at 12:59

## 2022-10-04 RX ADMIN — Medication 500000 UNIT(S): at 17:19

## 2022-10-04 RX ADMIN — Medication 200 MILLIGRAM(S): at 06:09

## 2022-10-04 NOTE — PROGRESS NOTE ADULT - ASSESSMENT
76F with ESRD due to HTN, was on HD since 2016 via R AVF (previously oliguric), Glaucoma, h/o DVT to L subclavian vein (2017), s/p R DDRT on 5/19/21 under Simulect (stent removed), post transplant course was complicated by DGF. Last HD was on 6/4/21. Now admitted for management of biopsy proven AMR on 9/29    [] S/P DDRT, now admitted with AMR   -negative DSA, +C4D.  -started pulse steroids: Solumedrol 500mg (9/30), Sol 250mg (10/1), Sol 250mg (10/2) , Sol 125mg  -start prednisone taper today   -plan for PLEX with IVIG x 3 sessions started first session yesterday (next 2 sessions set up for Wed and Friday).    -SCDs  -I&Os  -diet as tolerated, watch K  -watch glucose while on steroids, no hx of DM    [] Immuno  - Env by level, MMF 1/1, pulse steroids  - PPX: valcyte/atovaqone/nystatin    [] HTN  - continue current regimen  - Increase labetolol to 300mg TID   - C/W nifedipine 60 bid

## 2022-10-04 NOTE — PROGRESS NOTE ADULT - PROBLEM SELECTOR PLAN 3
Bicarbonate 16   [] Continue Sodium bicarbonate 1300 three times a day Bicarbonate 16 -> 15  [] Continue Sodium bicarbonate 1300 three times a day, can consider increasing if not improving

## 2022-10-04 NOTE — PROGRESS NOTE ADULT - PROBLEM SELECTOR PLAN 2
Patient with biopsy proven AMR  [] Plan for Plasmapheresis today as well as pulse steroids (500 mg (9/30)  -> 250 ( 10/2) -> 125 mg 10/3). Patient with biopsy proven [facility]R (planned for MWF plex and IVIG dosing)   [] Plan for Plasmapheresis today as well as pulse steroids (500 mg (9/30)  -> 250 ( 10/2) -> 125 mg 10/3) -> 20 mg PO BID   [] s/p IvIg

## 2022-10-04 NOTE — PROGRESS NOTE ADULT - NS ATTEND AMEND GEN_ALL_CORE FT
s/p DDRT 5/2022 admitted with AMR  cont steroids  plasmapheresis with IVIg, with plan for 5 sessions every other day. Tolerated first session yesterday  Immunosuppression management - Envarsus increased to 8 mg daily, steroid taper, cellcept 1gm bid  PPX: valcyte/atovaqone/nystatin

## 2022-10-04 NOTE — PROGRESS NOTE ADULT - SUBJECTIVE AND OBJECTIVE BOX
[facility] DIVISION OF KIDNEY DISEASES AND HYPERTENSION -- FOLLOW UP NOTE  --------------------------------------------------------------------------------  Chief Complaint:    24 hour events/subjective:    Patient BP fluctuant   Urine output 775 ml in the last 24 hours     PAST HISTORY  --------------------------------------------------------------------------------  No significant changes to PMH, PSH, FHx, SHx, unless otherwise noted    ALLERGIES & MEDICATIONS  --------------------------------------------------------------------------------  Allergies    Mushrooms (Anaphylaxis)  penicillin (Rash)    Intolerances      Standing Inpatient Medications  atovaquone  Suspension 1500 milliGRAM(s) Oral daily  dextrose 5%. 1000 milliLiter(s) IV Continuous <Continuous>  dextrose 5%. 1000 milliLiter(s) IV Continuous <Continuous>  dextrose 50% Injectable 25 Gram(s) IV Push once  dextrose 50% Injectable 12.5 Gram(s) IV Push once  dextrose 50% Injectable 25 Gram(s) IV Push once  epoetin ivelisse-epbx (RETACRIT) Injectable 33145 Unit(s) SubCutaneous every 7 days  glucagon  Injectable 1 milliGRAM(s) IntraMuscular once  insulin lispro (ADMELOG) corrective regimen sliding scale   SubCutaneous three times a day before meals  insulin lispro (ADMELOG) corrective regimen sliding scale   SubCutaneous at bedtime  labetalol 200 milliGRAM(s) Oral three times a day  latanoprost 0.005% Ophthalmic Solution 1 Drop(s) Both EYES at bedtime  mycophenolate mofetil 1000 milliGRAM(s) Oral <User Schedule>  NIFEdipine XL 60 milliGRAM(s) Oral two times a day  nystatin    Suspension 993575 Unit(s) Oral four times a day  pantoprazole    Tablet 40 milliGRAM(s) Oral before breakfast  sodium bicarbonate 1300 milliGRAM(s) Oral three times a day  tacrolimus ER Tablet (ENVARSUS XR) 7 milliGRAM(s) Oral <User Schedule>  valGANciclovir 450 milliGRAM(s) Oral <User Schedule>    PRN Inpatient Medications  dextrose Oral Gel 15 Gram(s) Oral once PRN      REVIEW OF SYSTEMS  --------------------------------------------------------------------------------  Gen: No fevers/chills  Skin: No rashes  Head/Eyes/Ears: Normal hearing,   Respiratory: No dyspnea, cough  CV: No chest pain  GI: No abdominal pain, diarrhea  : No dysuria, hematuria  MSK: No  edema  Heme: No easy bruising or bleeding  Psych: No significant depression      All other systems were reviewed and are negative, except as noted.    VITALS/PHYSICAL EXAM  --------------------------------------------------------------------------------  T(C): 37 (10-04-22 @ 05:41), Max: 37.3 (10-03-22 @ 13:15)  HR: 66 (10-04-22 @ 05:41) (63 - 79)  BP: 126/68 (10-04-22 @ 05:41) (126/68 - 170/78)  RR: 18 (10-04-22 @ 05:41) (18 - 18)  SpO2: 96% (10-04-22 @ 05:41) (96% - 100%)  Wt(kg): --        10-02-22 @ 07:01  -  10-03-22 @ 07:00  --------------------------------------------------------  IN: 1050 mL / OUT: 1150 mL / NET: -100 mL    10-03-22 @ 07:01  -  10-04-22 @ 06:37  --------------------------------------------------------  IN: 1200 mL / OUT: 1675 mL / NET: -475 mL        Physical Exam:  	Gen: NAD  	HEENT: MMM  	Pulm: CTA B/L  	CV: S1S2  	Abd: Soft, +BS   	Ext: No LE edema B/L  	Neuro: Awake  	Skin: Warm and dry  	Vascular access:      LABS/STUDIES  --------------------------------------------------------------------------------              9.4    3.70  >-----------<  145      [10-03-22 @ 06:22]              30.6     140  |  112  |  60  ----------------------------<  108      [10-03-22 @ 06:23]  5.1   |  16  |  1.97        Ca     9.8     [10-03-22 @ 06:23]      iCa    1.41     [10-03 @ 06:23]      Mg     2.0     [10-03-22 @ 06:23]      Phos  3.4     [10-03-22 @ 06:23]    TPro  5.8  /  Alb  3.5  /  TBili  0.1  /  DBili  x   /  AST  10  /  ALT  6   /  AlkPhos  65  [10-03-22 @ 06:23]          Creatinine Trend:  SCr 1.97 [10-03 @ 06:23]  SCr 1.96 [10-02 @ 06:50]  SCr 1.80 [10-01 @ 06:59]  SCr 2.02 [09-30 @ 23:05]        Iron 53, TIBC 205, %sat 26      [02-03-22 @ 02:41]  Ferritin 1921      [10-06-21 @ 16:15]  HbA1c 4.6      [05-28-19 @ 09:49]       [facility] DIVISION OF KIDNEY DISEASES AND HYPERTENSION -- FOLLOW UP NOTE  --------------------------------------------------------------------------------  Chief Complaint:    24 hour events/subjective:    Patient was seen and examined at the bedside, with no active complaints.   Patient BP fluctuant   Urine output 775 ml in the last 24 hours   S/p Plex and IVIG. Creatinine fluctuating, uptrending 2.13 <- 1.97 from yesterday     PAST HISTORY  --------------------------------------------------------------------------------  No significant changes to PMH, PSH, FHx, SHx, unless otherwise noted    ALLERGIES & MEDICATIONS  --------------------------------------------------------------------------------  Allergies    Mushrooms (Anaphylaxis)  penicillin (Rash)    Intolerances      Standing Inpatient Medications  atovaquone  Suspension 1500 milliGRAM(s) Oral daily  dextrose 5%. 1000 milliLiter(s) IV Continuous <Continuous>  dextrose 5%. 1000 milliLiter(s) IV Continuous <Continuous>  dextrose 50% Injectable 25 Gram(s) IV Push once  dextrose 50% Injectable 12.5 Gram(s) IV Push once  dextrose 50% Injectable 25 Gram(s) IV Push once  epoetin ivelisse-epbx (RETACRIT) Injectable 81755 Unit(s) SubCutaneous every 7 days  glucagon  Injectable 1 milliGRAM(s) IntraMuscular once  insulin lispro (ADMELOG) corrective regimen sliding scale   SubCutaneous three times a day before meals  insulin lispro (ADMELOG) corrective regimen sliding scale   SubCutaneous at bedtime  labetalol 200 milliGRAM(s) Oral three times a day  latanoprost 0.005% Ophthalmic Solution 1 Drop(s) Both EYES at bedtime  mycophenolate mofetil 1000 milliGRAM(s) Oral <User Schedule>  NIFEdipine XL 60 milliGRAM(s) Oral two times a day  nystatin    Suspension 541579 Unit(s) Oral four times a day  pantoprazole    Tablet 40 milliGRAM(s) Oral before breakfast  sodium bicarbonate 1300 milliGRAM(s) Oral three times a day  tacrolimus ER Tablet (ENVARSUS XR) 7 milliGRAM(s) Oral <User Schedule>  valGANciclovir 450 milliGRAM(s) Oral <User Schedule>    PRN Inpatient Medications  dextrose Oral Gel 15 Gram(s) Oral once PRN      REVIEW OF SYSTEMS  --------------------------------------------------------------------------------        All other systems were reviewed and are negative, except as noted.    VITALS/PHYSICAL EXAM  --------------------------------------------------------------------------------  T(C): 37 (10-04-22 @ 05:41), Max: 37.3 (10-03-22 @ 13:15)  HR: 66 (10-04-22 @ 05:41) (63 - 79)  BP: 126/68 (10-04-22 @ 05:41) (126/68 - 170/78)  RR: 18 (10-04-22 @ 05:41) (18 - 18)  SpO2: 96% (10-04-22 @ 05:41) (96% - 100%)  Wt(kg): --        10-02-22 @ 07:01  -  10-03-22 @ 07:00  --------------------------------------------------------  IN: 1050 mL / OUT: 1150 mL / NET: -100 mL    10-03-22 @ 07:01  -  10-04-22 @ 06:37  --------------------------------------------------------  IN: 1200 mL / OUT: 1675 mL / NET: -475 mL        Physical Exam:  	Gen: NAD. Normocephalic   	HEENT: MMM, anicteric   	Pulm: CTA B/L, no respiratory distress   	CV: S1S2, regular rate and rhythm   	Abd: Soft, +BS   	Ext: No LE edema B/L  	Neuro: Awake and oriented to person place and time   	Skin: Warm and dry  	Vascular access: No CVC      LABS/STUDIES  --------------------------------------------------------------------------------              9.4    3.70  >-----------<  145      [10-03-22 @ 06:22]              30.6     140  |  112  |  60  ----------------------------<  108      [10-03-22 @ 06:23]  5.1   |  16  |  1.97        Ca     9.8     [10-03-22 @ 06:23]      iCa    1.41     [10-03 @ 06:23]      Mg     2.0     [10-03-22 @ 06:23]      Phos  3.4     [10-03-22 @ 06:23]    TPro  5.8  /  Alb  3.5  /  TBili  0.1  /  DBili  x   /  AST  10  /  ALT  6   /  AlkPhos  65  [10-03-22 @ 06:23]          Creatinine Trend:  SCr 1.97 [10-03 @ 06:23]  SCr 1.96 [10-02 @ 06:50]  SCr 1.80 [10-01 @ 06:59]  SCr 2.02 [09-30 @ 23:05]        Iron 53, TIBC 205, %sat 26      [02-03-22 @ 02:41]  Ferritin 1921      [10-06-21 @ 16:15]  HbA1c 4.6      [05-28-19 @ 09:49]

## 2022-10-04 NOTE — PROGRESS NOTE ADULT - PROBLEM SELECTOR PLAN 1
Patient on Tacrolimus and MMF   [] Continue Tacrolimus 5 mg PO daily  [] Continue mycophenolate 1000 mg PO BID   [] Monitor Tacrolimus level , obtain 30 minutes prior to next dose, goal level of 8-10.  [] Patient on PCP prophylaxis with Atovaquone 1500 mg PO daily, Continue Valcyclovir 450 mg daily, Continue Nystatin suspension. Patient on Tacrolimus and MMF   [] increased Tacrolimus 7 mg PO daily, [place] level today 6.1   [] Continue mycophenolate 1000 mg PO BID   [] Taper prednisone, s/p pulse dose, start prednisone 20 mg PO BID   [] Monitor Tacrolimus level , obtain 30 minutes prior to next dose, goal level of 8-10.  [] Patient on PCP prophylaxis with Atovaquone 1500 mg PO daily, Continue Valcyclovir 450 mg daily, Continue Nystatin suspension.

## 2022-10-04 NOTE — PROGRESS NOTE ADULT - SUBJECTIVE AND OBJECTIVE BOX
Transplant Surgery - Multidisciplinary Rounds  --------------------------------------------------------------     Present:   Patient seen and examined with multidisciplinary team including Transplant Surgeon: Dr. Hagen, Transplant Nephrologist Dr. Dunham, nephrology fellow, AG Garcia, during am rounds. Disciplines not in attendance will be notified of the plan.     HPI: 76F with ESRD due to HTN, was on HD since 2016 via R AVF (previously oliguric), Glaucoma, h/o DVT to L subclavian vein (2017), s/p R DDRT on 5/19/21 with Simulect,   Post transplant course was complicated by DGF. Last HD was on 6/4/21. Ureteral stent was removed on 8/2/21.     Readmitted 10/2021 with severe symptomatic anemia (Hgb 5g/dL).  - Transfused 2 units PRBC.  - Anemia was due to hemolysis caused by dapsone (despite normal G6PD levels prior to initiation).     Readmitted Nov 2021 with YANELY   - US showed increased velocities concerning for TRAS but no tardus parvus waves. DSA negative, and cFDNA 0.5%. No intervention.    Readmitted Jan 2022 with worsening anemia, hyperkalemia, tested positive for COVID but had no symptoms.     Now readmitted with rising SCr and AMR on recent graft bx (9/29) +C4D. DSA negative  Started pulse steroids    Interval Events:  - No overnight events  - Received Sol 500mg -> 250mg->250mg -> 125mg  - SCr 2.15 from 1.98   - S/P first session of PLEX and IVIG yesterday   - Increased labetolol yesterday to 200mg TID for elevated BP now 126/68  - Bicarb low 16 started on PO bicarb yesterday        Immunosuppression:     Maintenance Immuno: Envarsus per level, MMF 1/1, pulse steroids  Ongoing monitoring for signs of rejection.    Potential Discharge date: pending clinical improvement  Education:  Medications  Plan of care:  See Below       MEDICATIONS  (STANDING):  atovaquone  Suspension 1500 milliGRAM(s) Oral daily  dextrose 5%. 1000 milliLiter(s) (50 mL/Hr) IV Continuous <Continuous>  dextrose 5%. 1000 milliLiter(s) (100 mL/Hr) IV Continuous <Continuous>  dextrose 50% Injectable 25 Gram(s) IV Push once  dextrose 50% Injectable 12.5 Gram(s) IV Push once  dextrose 50% Injectable 25 Gram(s) IV Push once  epoetin ivelisse-epbx (RETACRIT) Injectable 79008 Unit(s) SubCutaneous every 7 days  glucagon  Injectable 1 milliGRAM(s) IntraMuscular once  insulin lispro (ADMELOG) corrective regimen sliding scale   SubCutaneous three times a day before meals  insulin lispro (ADMELOG) corrective regimen sliding scale   SubCutaneous at bedtime  labetalol 300 milliGRAM(s) Oral three times a day  latanoprost 0.005% Ophthalmic Solution 1 Drop(s) Both EYES at bedtime  magnesium sulfate  IVPB 2 Gram(s) IV Intermittent once  mycophenolate mofetil 1000 milliGRAM(s) Oral <User Schedule>  NIFEdipine XL 60 milliGRAM(s) Oral two times a day  nystatin    Suspension 861217 Unit(s) Oral four times a day  pantoprazole    Tablet 40 milliGRAM(s) Oral before breakfast  predniSONE   Tablet 20 milliGRAM(s) Oral two times a day  predniSONE   Tablet   Oral   sodium bicarbonate 1300 milliGRAM(s) Oral three times a day  tacrolimus ER Tablet (ENVARSUS XR) 7 milliGRAM(s) Oral <User Schedule>  valGANciclovir 450 milliGRAM(s) Oral <User Schedule>    MEDICATIONS  (PRN):  dextrose Oral Gel 15 Gram(s) Oral once PRN Blood Glucose LESS THAN 70 milliGRAM(s)/deciliter      PAST MEDICAL & SURGICAL HISTORY:  HTN (hypertension)      Glaucoma      Cataract      ESRD (end stage renal disease) on dialysis      DVT (deep venous thrombosis)  of Left subclavian vein, 06/12/17      Hemodialysis patient  MWF      Acquired cataract  extraction with b/l lense placement, 2016      H/O: glaucoma  surgery, 2002      AV fistula  2015      S/P KEVEN-BSO  for fibroids, 2012      Hemodialysis access, AV graft      Transplanted kidney  5/19/21      History of renal transplantation  DDRT 5/19/2021          Vital Signs Last 24 Hrs  T(C): 36.3 (04 Oct 2022 09:00), Max: 37.3 (03 Oct 2022 13:15)  T(F): 97.4 (04 Oct 2022 09:00), Max: 99.1 (03 Oct 2022 13:15)  HR: 64 (04 Oct 2022 09:00) (63 - 79)  BP: 147/71 (04 Oct 2022 09:00) (126/68 - 170/78)  BP(mean): --  RR: 18 (04 Oct 2022 09:00) (18 - 18)  SpO2: 98% (04 Oct 2022 09:00) (96% - 100%)    Parameters below as of 04 Oct 2022 09:00  Patient On (Oxygen Delivery Method): room air        I&O's Summary    03 Oct 2022 07:01  -  04 Oct 2022 07:00  --------------------------------------------------------  IN: 1200 mL / OUT: 1675 mL / NET: -475 mL                              9.5    4.11  )-----------( 150      ( 04 Oct 2022 06:37 )             30.4     10-04    141  |  116<H>  |  62<H>  ----------------------------<  105<H>  5.4<H>   |  15<L>  |  2.13<H>    Ca    9.5      04 Oct 2022 06:39  Phos  3.8     10-04  Mg     1.7     10-04    TPro  5.3<L>  /  Alb  3.8  /  TBili  0.1<L>  /  DBili  x   /  AST  8<L>  /  ALT  7<L>  /  AlkPhos  36<L>  10-04    Tacrolimus (), Serum: 6.1 ng/mL (10-04 @ 06:36)                  REVIEW OF SYSTEMS  --------------------------------------------------------------------------------  Gen: No weight changes, fatigue, fevers/chills, weakness  Skin: No rashes  Head/Eyes/Ears/Mouth: No headache; Normal hearing; Normal vision w/o blurriness; No sinus pain/discomfort, sore throat  Respiratory: No dyspnea, cough, wheezing, hemoptysis  CV: No chest pain, PND, orthopnea  GI: No abdominal pain, diarrhea, constipation, nausea, vomiting, melena, hematochezia  : No increased frequency, dysuria, hematuria, nocturia  MSK: No joint pain/swelling; no back pain; no edema  Neuro: No dizziness/lightheadedness, weakness, seizures, numbness, tingling  Heme: No easy bruising or bleeding  Endo: No heat/cold intolerance  Psych: No significant nervousness, anxiety, stress, depression  All other systems were reviewed and are negative, except as noted.      PHYSICAL EXAM:  Constitutional: Well developed / well nourished  Eyes: Anicteric, PERRLA  ENMT: nc/at  Neck: Supple  Respiratory: CTA B/L  Cardiovascular: RRR  Gastrointestinal: Soft abdomen, NT, ND  Genitourinary:  Voiding spontaneously   Extremities: SCD's in place and working bilaterally  Vascular: Palpable dp pulses bilaterally  Neurological: A&O x3  Skin: Wound  healed   Musculoskeletal: Moving all extremities  Psychiatric: Responsive

## 2022-10-04 NOTE — PROGRESS NOTE ADULT - ASSESSMENT
76 year old female with ESRD secondary to hypertension, patient history of hemodialysis prior to DDRT on 5/19/2021, induction with basiliximab. Patient with biopsy on 9/29 demonstrating AMR  (preliminary reading with glomerulitis, peritubular capillaritis, interstitial inflammation, no tubulitis. +C4D. DSA negative)

## 2022-10-05 LAB
ALBUMIN SERPL ELPH-MCNC: 3.6 G/DL — SIGNIFICANT CHANGE UP (ref 3.3–5)
ALP SERPL-CCNC: 40 U/L — SIGNIFICANT CHANGE UP (ref 40–120)
ALT FLD-CCNC: 7 U/L — LOW (ref 10–45)
ANION GAP SERPL CALC-SCNC: 9 MMOL/L — SIGNIFICANT CHANGE UP (ref 5–17)
AST SERPL-CCNC: 8 U/L — LOW (ref 10–40)
BASOPHILS # BLD AUTO: 0 K/UL — SIGNIFICANT CHANGE UP (ref 0–0.2)
BASOPHILS NFR BLD AUTO: 0 % — SIGNIFICANT CHANGE UP (ref 0–2)
BILIRUB SERPL-MCNC: 0.1 MG/DL — LOW (ref 0.2–1.2)
BUN SERPL-MCNC: 62 MG/DL — HIGH (ref 7–23)
CA-I BLD-SCNC: 1.38 MMOL/L — HIGH (ref 1.15–1.33)
CALCIUM SERPL-MCNC: 9.2 MG/DL — SIGNIFICANT CHANGE UP (ref 8.4–10.5)
CHLORIDE SERPL-SCNC: 116 MMOL/L — HIGH (ref 96–108)
CO2 SERPL-SCNC: 16 MMOL/L — LOW (ref 22–31)
CREAT SERPL-MCNC: 2.02 MG/DL — HIGH (ref 0.5–1.3)
EGFR: 25 ML/MIN/1.73M2 — LOW
EOSINOPHIL # BLD AUTO: 0 K/UL — SIGNIFICANT CHANGE UP (ref 0–0.5)
EOSINOPHIL NFR BLD AUTO: 0 % — SIGNIFICANT CHANGE UP (ref 0–6)
FIBRINOGEN PPP-MCNC: 218 MG/DL — LOW (ref 330–520)
GLUCOSE SERPL-MCNC: 145 MG/DL — HIGH (ref 70–99)
HCT VFR BLD CALC: 28.9 % — LOW (ref 34.5–45)
HGB BLD-MCNC: 9 G/DL — LOW (ref 11.5–15.5)
LYMPHOCYTES # BLD AUTO: 0.31 K/UL — LOW (ref 1–3.3)
LYMPHOCYTES # BLD AUTO: 8.9 % — LOW (ref 13–44)
MAGNESIUM SERPL-MCNC: 2.2 MG/DL — SIGNIFICANT CHANGE UP (ref 1.6–2.6)
MANUAL SMEAR VERIFICATION: SIGNIFICANT CHANGE UP
MCHC RBC-ENTMCNC: 26.9 PG — LOW (ref 27–34)
MCHC RBC-ENTMCNC: 31.1 GM/DL — LOW (ref 32–36)
MCV RBC AUTO: 86.5 FL — SIGNIFICANT CHANGE UP (ref 80–100)
MONOCYTES # BLD AUTO: 0.06 K/UL — SIGNIFICANT CHANGE UP (ref 0–0.9)
MONOCYTES NFR BLD AUTO: 1.8 % — LOW (ref 2–14)
NEUTROPHILS # BLD AUTO: 3.12 K/UL — SIGNIFICANT CHANGE UP (ref 1.8–7.4)
NEUTROPHILS NFR BLD AUTO: 89.3 % — HIGH (ref 43–77)
PHOSPHATE SERPL-MCNC: 2.9 MG/DL — SIGNIFICANT CHANGE UP (ref 2.5–4.5)
PLAT MORPH BLD: NORMAL — SIGNIFICANT CHANGE UP
PLATELET # BLD AUTO: 130 K/UL — LOW (ref 150–400)
POTASSIUM SERPL-MCNC: 5.9 MMOL/L — HIGH (ref 3.5–5.3)
POTASSIUM SERPL-SCNC: 5.9 MMOL/L — HIGH (ref 3.5–5.3)
PROT SERPL-MCNC: 5.2 G/DL — LOW (ref 6–8.3)
RBC # BLD: 3.34 M/UL — LOW (ref 3.8–5.2)
RBC # FLD: 13.9 % — SIGNIFICANT CHANGE UP (ref 10.3–14.5)
RBC BLD AUTO: SIGNIFICANT CHANGE UP
SODIUM SERPL-SCNC: 141 MMOL/L — SIGNIFICANT CHANGE UP (ref 135–145)
TACROLIMUS SERPL-MCNC: 6.1 NG/ML — SIGNIFICANT CHANGE UP
WBC # BLD: 3.49 K/UL — LOW (ref 3.8–10.5)
WBC # FLD AUTO: 3.49 K/UL — LOW (ref 3.8–10.5)

## 2022-10-05 PROCEDURE — 36514 APHERESIS PLASMA: CPT

## 2022-10-05 PROCEDURE — 99232 SBSQ HOSP IP/OBS MODERATE 35: CPT | Mod: GC

## 2022-10-05 RX ORDER — ACETAMINOPHEN 500 MG
650 TABLET ORAL ONCE
Refills: 0 | Status: COMPLETED | OUTPATIENT
Start: 2022-10-05 | End: 2022-10-05

## 2022-10-05 RX ORDER — IMMUNE GLOBULIN (HUMAN) 10 G/100ML
6 INJECTION INTRAVENOUS; SUBCUTANEOUS ONCE
Refills: 0 | Status: COMPLETED | OUTPATIENT
Start: 2022-10-05 | End: 2022-10-05

## 2022-10-05 RX ORDER — LOSARTAN POTASSIUM 100 MG/1
25 TABLET, FILM COATED ORAL DAILY
Refills: 0 | Status: DISCONTINUED | OUTPATIENT
Start: 2022-10-06 | End: 2022-10-07

## 2022-10-05 RX ORDER — DIPHENHYDRAMINE HCL 50 MG
50 CAPSULE ORAL ONCE
Refills: 0 | Status: COMPLETED | OUTPATIENT
Start: 2022-10-05 | End: 2022-10-05

## 2022-10-05 RX ORDER — HEPARIN SODIUM 5000 [USP'U]/ML
5000 INJECTION INTRAVENOUS; SUBCUTANEOUS EVERY 12 HOURS
Refills: 0 | Status: DISCONTINUED | OUTPATIENT
Start: 2022-10-05 | End: 2022-10-07

## 2022-10-05 RX ORDER — LABETALOL HCL 100 MG
200 TABLET ORAL THREE TIMES A DAY
Refills: 0 | Status: DISCONTINUED | OUTPATIENT
Start: 2022-10-05 | End: 2022-10-07

## 2022-10-05 RX ORDER — LOSARTAN POTASSIUM 100 MG/1
25 TABLET, FILM COATED ORAL ONCE
Refills: 0 | Status: COMPLETED | OUTPATIENT
Start: 2022-10-05 | End: 2022-10-05

## 2022-10-05 RX ORDER — SODIUM ZIRCONIUM CYCLOSILICATE 10 G/10G
10 POWDER, FOR SUSPENSION ORAL ONCE
Refills: 0 | Status: COMPLETED | OUTPATIENT
Start: 2022-10-05 | End: 2022-10-05

## 2022-10-05 RX ADMIN — Medication 650 MILLIGRAM(S): at 17:09

## 2022-10-05 RX ADMIN — Medication 60 MILLIGRAM(S): at 17:09

## 2022-10-05 RX ADMIN — SODIUM ZIRCONIUM CYCLOSILICATE 10 GRAM(S): 10 POWDER, FOR SUSPENSION ORAL at 13:16

## 2022-10-05 RX ADMIN — Medication 500000 UNIT(S): at 23:49

## 2022-10-05 RX ADMIN — Medication 50 MILLIGRAM(S): at 17:08

## 2022-10-05 RX ADMIN — PANTOPRAZOLE SODIUM 40 MILLIGRAM(S): 20 TABLET, DELAYED RELEASE ORAL at 05:55

## 2022-10-05 RX ADMIN — Medication 1300 MILLIGRAM(S): at 05:56

## 2022-10-05 RX ADMIN — Medication 60 MILLIGRAM(S): at 05:55

## 2022-10-05 RX ADMIN — Medication 1300 MILLIGRAM(S): at 13:17

## 2022-10-05 RX ADMIN — LOSARTAN POTASSIUM 25 MILLIGRAM(S): 100 TABLET, FILM COATED ORAL at 08:53

## 2022-10-05 RX ADMIN — TACROLIMUS 8 MILLIGRAM(S): 5 CAPSULE ORAL at 08:52

## 2022-10-05 RX ADMIN — Medication 200 MILLIGRAM(S): at 20:26

## 2022-10-05 RX ADMIN — Medication 1300 MILLIGRAM(S): at 20:22

## 2022-10-05 RX ADMIN — Medication 500000 UNIT(S): at 12:02

## 2022-10-05 RX ADMIN — Medication 500000 UNIT(S): at 17:09

## 2022-10-05 RX ADMIN — Medication 200 MILLIGRAM(S): at 13:19

## 2022-10-05 RX ADMIN — Medication 20 MILLIGRAM(S): at 05:55

## 2022-10-05 RX ADMIN — MYCOPHENOLATE MOFETIL 1000 MILLIGRAM(S): 250 CAPSULE ORAL at 08:51

## 2022-10-05 RX ADMIN — ATOVAQUONE 1500 MILLIGRAM(S): 750 SUSPENSION ORAL at 12:02

## 2022-10-05 RX ADMIN — HEPARIN SODIUM 5000 UNIT(S): 5000 INJECTION INTRAVENOUS; SUBCUTANEOUS at 17:11

## 2022-10-05 RX ADMIN — Medication 300 MILLIGRAM(S): at 05:56

## 2022-10-05 RX ADMIN — MYCOPHENOLATE MOFETIL 1000 MILLIGRAM(S): 250 CAPSULE ORAL at 20:22

## 2022-10-05 RX ADMIN — Medication 500000 UNIT(S): at 05:55

## 2022-10-05 RX ADMIN — LATANOPROST 1 DROP(S): 0.05 SOLUTION/ DROPS OPHTHALMIC; TOPICAL at 20:22

## 2022-10-05 RX ADMIN — Medication 650 MILLIGRAM(S): at 18:09

## 2022-10-05 RX ADMIN — IMMUNE GLOBULIN (HUMAN) 10 GRAM(S): 10 INJECTION INTRAVENOUS; SUBCUTANEOUS at 17:43

## 2022-10-05 RX ADMIN — VALGANCICLOVIR 450 MILLIGRAM(S): 450 TABLET, FILM COATED ORAL at 05:55

## 2022-10-05 RX ADMIN — Medication 10 MILLIGRAM(S): at 17:09

## 2022-10-05 NOTE — PROGRESS NOTE ADULT - PROBLEM SELECTOR PLAN 2
Patient with biopsy proven AMR (planned for MWF plex and IVIG dosing)   [] Plan for Plasmapheresis today as well as pulse steroids (500 mg (9/30)  -> 250 ( 10/2) -> 125 mg 10/3)   [] Continue 20 mg PO BID and continue taper   [] Patient scheduled for second dose fo IVIG and PLEX  [] Patient with negative DSA with positive AMR on biopsy, would send for Non HLA mediated panel  [] Will start ARB Losartan 25 mg PO daily for possibility of TRISTEN mediated AMR  [] AVOID ACE inhibitor in the setting of PLEX

## 2022-10-05 NOTE — PROGRESS NOTE ADULT - SUBJECTIVE AND OBJECTIVE BOX
Transplant Surgery - Multidisciplinary Rounds  --------------------------------------------------------------     Present:   Patient seen and examined with multidisciplinary team including Transplant Surgeon: Dr. Maldonado, Transplant surgery fellow Dr. Chacon, Transplant Nephrologist Dr. Ayala, nephrology fellow, AG Nieto, during am rounds. Disciplines not in attendance will be notified of the plan.     HPI: 76F with ESRD due to HTN, was on HD since 2016 via R AVF (previously oliguric), Glaucoma, h/o DVT to L subclavian vein (2017), s/p R DDRT on 5/19/21 with Simulect,   Post transplant course was complicated by DGF. Last HD was on 6/4/21. Ureteral stent was removed on 8/2/21.     Readmitted 10/2021 with severe symptomatic anemia (Hgb 5g/dL).  - Transfused 2 units PRBC.  - Anemia was due to hemolysis caused by dapsone (despite normal G6PD levels prior to initiation).     Readmitted Nov 2021 with YANELY   - US showed increased velocities concerning for TRAS but no tardus parvus waves. DSA negative, and cFDNA 0.5%. No intervention.    Readmitted Jan 2022 with worsening anemia, hyperkalemia, tested positive for COVID but had no symptoms.     Now readmitted with rising SCr and AMR on recent graft bx (9/29) +C4D. DSA negative  Started pulse steroids    Interval Events:  - Feels good. Denies complaints  - SCr 2.02 from 2.15  UOP 1.3  - Increased Labetalol to 300mg TID      Immunosuppression:     Maintenance Immuno: Envarsus per level, MMF 1/1, pulse steroids  Ongoing monitoring for signs of rejection.    Potential Discharge date: pending clinical improvement  Education:  Medications  Plan of care:  See Below       MEDICATIONS  (STANDING):  atovaquone  Suspension 1500 milliGRAM(s) Oral daily  dextrose 5%. 1000 milliLiter(s) (50 mL/Hr) IV Continuous <Continuous>  dextrose 5%. 1000 milliLiter(s) (100 mL/Hr) IV Continuous <Continuous>  dextrose 50% Injectable 25 Gram(s) IV Push once  dextrose 50% Injectable 12.5 Gram(s) IV Push once  dextrose 50% Injectable 25 Gram(s) IV Push once  epoetin ivelisse-epbx (RETACRIT) Injectable 79870 Unit(s) SubCutaneous every 7 days  glucagon  Injectable 1 milliGRAM(s) IntraMuscular once  insulin lispro (ADMELOG) corrective regimen sliding scale   SubCutaneous three times a day before meals  insulin lispro (ADMELOG) corrective regimen sliding scale   SubCutaneous at bedtime  labetalol 200 milliGRAM(s) Oral three times a day  latanoprost 0.005% Ophthalmic Solution 1 Drop(s) Both EYES at bedtime  mycophenolate mofetil 1000 milliGRAM(s) Oral <User Schedule>  NIFEdipine XL 60 milliGRAM(s) Oral two times a day  nystatin    Suspension 766997 Unit(s) Oral four times a day  pantoprazole    Tablet 40 milliGRAM(s) Oral before breakfast  predniSONE   Tablet 10 milliGRAM(s) Oral two times a day  predniSONE   Tablet   Oral   sodium bicarbonate 1300 milliGRAM(s) Oral three times a day  tacrolimus ER Tablet (ENVARSUS XR) 8 milliGRAM(s) Oral <User Schedule>  valGANciclovir 450 milliGRAM(s) Oral <User Schedule>    MEDICATIONS  (PRN):  dextrose Oral Gel 15 Gram(s) Oral once PRN Blood Glucose LESS THAN 70 milliGRAM(s)/deciliter      PAST MEDICAL & SURGICAL HISTORY:  HTN (hypertension)      Glaucoma      Cataract      ESRD (end stage renal disease) on dialysis      DVT (deep venous thrombosis)  of Left subclavian vein, 06/12/17      Hemodialysis patient  MWF      Acquired cataract  extraction with b/l lense placement, 2016      H/O: glaucoma  surgery, 2002      AV fistula  2015      S/P KEVEN-BSO  for fibroids, 2012      Hemodialysis access, AV graft      Transplanted kidney  5/19/21      History of renal transplantation  DDRT 5/19/2021          Vital Signs Last 24 Hrs  T(C): 36.9 (05 Oct 2022 09:00), Max: 37.3 (04 Oct 2022 16:59)  T(F): 98.5 (05 Oct 2022 09:00), Max: 99.1 (04 Oct 2022 16:59)  HR: 68 (05 Oct 2022 09:00) (61 - 68)  BP: 136/63 (05 Oct 2022 09:00) (136/63 - 161/63)  BP(mean): --  RR: 18 (05 Oct 2022 09:00) (18 - 18)  SpO2: 98% (05 Oct 2022 09:00) (96% - 100%)    Parameters below as of 05 Oct 2022 09:00  Patient On (Oxygen Delivery Method): room air        I&O's Summary    04 Oct 2022 07:01  -  05 Oct 2022 07:00  --------------------------------------------------------  IN: 1080 mL / OUT: 1300 mL / NET: -220 mL                              9.0    3.49  )-----------( 130      ( 05 Oct 2022 06:26 )             28.9     10-05    141  |  116<H>  |  62<H>  ----------------------------<  145<H>  5.9<H>   |  16<L>  |  2.02<H>    Ca    9.2      05 Oct 2022 06:27  Phos  2.9     10-05  Mg     2.2     10-05    TPro  5.2<L>  /  Alb  3.6  /  TBili  0.1<L>  /  DBili  x   /  AST  8<L>  /  ALT  7<L>  /  AlkPhos  40  10-05    Tacrolimus (), Serum: 6.1 ng/mL (10-05 @ 06:26)        REVIEW OF SYSTEMS  --------------------------------------------------------------------------------  Gen: No weight changes, fatigue, fevers/chills, weakness  Skin: No rashes  Head/Eyes/Ears/Mouth: No headache; Normal hearing; Normal vision w/o blurriness; No sinus pain/discomfort, sore throat  Respiratory: No dyspnea, cough, wheezing, hemoptysis  CV: No chest pain, PND, orthopnea  GI: No abdominal pain, diarrhea, constipation, nausea, vomiting, melena, hematochezia  : No increased frequency, dysuria, hematuria, nocturia  MSK: No joint pain/swelling; no back pain; no edema  Neuro: No dizziness/lightheadedness, weakness, seizures, numbness, tingling  Heme: No easy bruising or bleeding  Endo: No heat/cold intolerance  Psych: No significant nervousness, anxiety, stress, depression  All other systems were reviewed and are negative, except as noted.      PHYSICAL EXAM:  Constitutional: Well developed / well nourished  Eyes: Anicteric, PERRLA  ENMT: nc/at  Neck: Supple  Respiratory: CTA B/L  Cardiovascular: RRR  Gastrointestinal: Soft abdomen, NT, ND  Genitourinary:  Voiding spontaneously   Extremities: SCD's in place and working bilaterally  Vascular: Palpable dp pulses bilaterally  Neurological: A&O x3  Skin: Wound  healed   Musculoskeletal: Moving all extremities  Psychiatric: Responsive

## 2022-10-05 NOTE — PROGRESS NOTE ADULT - ASSESSMENT
76F with ESRD due to HTN, was on HD since 2016 via R AVF (previously oliguric), Glaucoma, h/o DVT to L subclavian vein (2017), s/p R DDRT on 5/19/21 under Simulect (stent removed), post transplant course was complicated by DGF. Last HD was on 6/4/21. Now admitted for management of biopsy proven AMR on 9/29    [] S/P DDRT, now admitted with AMR   -negative DSA, +C4D.  - Pulse steroids: Solumedrol 500mg (9/30), Sol 250mg (10/1), Sol 250mg (10/2) , Sol 125mg 10/3 now on Prednisone taper  - Plan for PLEX QOD with IVIG x 5 sessions.  PLEX # 2  today + IVIG 6gm.   - Send non-HLA Ab  - Start ARB w/ Losartan 25mg po qd  -SCDs  -I&Os  -diet as tolerated, watch K  -watch glucose while on steroids, no hx of DM    [] Immuno  - Env by level, MMF 1/1, pulse steroids  - PPX: valcyte/atovaqone/nystatin    [] HTN  - Controlled  - Decrease Labetolol to 200mg TID given addition of Losartan today  - C/W nifedipine 60 bid

## 2022-10-05 NOTE — PROGRESS NOTE ADULT - PROBLEM SELECTOR PLAN 1
Patient on Tacrolimus and MMF   [] increased Tacrolimus 7 mg PO daily, [place] level today 6.1 (10/5)   [] Continue mycophenolate 1000 mg PO BID   [] Taper prednisone, s/p pulse dose, start prednisone 20 mg PO BID   [] Monitor Tacrolimus level , obtain 30 minutes prior to next dose, goal level of 8-10.  [] Patient on PCP prophylaxis with Atovaquone 1500 mg PO daily, Continue Valcyclovir 450 mg daily, Continue Nystatin suspension.

## 2022-10-05 NOTE — CHART NOTE - NSCHARTNOTEFT_GEN_A_CORE
76F with ESRD due to HTN, was on HD since 2016 via R AVF (previously oliguric), Glaucoma, h/o DVT to L subclavian vein (2017), s/p R DDRT on 5/19/21. Recent Scr 2.12. Graft bx on 9/29/22 demonstrating AMR, C4D+. DSA negative. Admitted for tx. Started on pulse dose steroids. PLEX initiated on 10/3/22. A course of 5 PLEX in alternate day setting planned.    PLEX#1 on 10/3/22 tolerated well. Patient received IVIG after PLEX procedure.    No acute interval events. SCr 2.02 today. Urine output 775ml in the last 24 hours.    PLEX#2 today, one plasma volume with 5% albumin as replacement fluid.    PLEX#3 currently scheduled on 10/7/22. Please draw a CBC, ionized calcium, and fibrinogen in the morning of each PLEX procedure.

## 2022-10-05 NOTE — PROGRESS NOTE ADULT - SUBJECTIVE AND OBJECTIVE BOX
[facility] DIVISION OF KIDNEY DISEASES AND HYPERTENSION -- FOLLOW UP NOTE  --------------------------------------------------------------------------------  Chief Complaint:    24 hour events/subjective:    Patient seen and examined at the bedside, denies any active complaints  Patient planned for PLEX and IVIG Session 2/3 ([facility])   Creatinine noted to be up trending 2.13 <- 1.97  Urine output 1300     PAST HISTORY  --------------------------------------------------------------------------------  No significant changes to PMH, PSH, FHx, SHx, unless otherwise noted    ALLERGIES & MEDICATIONS  --------------------------------------------------------------------------------  Allergies    Mushrooms (Anaphylaxis)  penicillin (Rash)    Intolerances      Standing Inpatient Medications  atovaquone  Suspension 1500 milliGRAM(s) Oral daily  dextrose 5%. 1000 milliLiter(s) IV Continuous <Continuous>  dextrose 5%. 1000 milliLiter(s) IV Continuous <Continuous>  dextrose 50% Injectable 25 Gram(s) IV Push once  dextrose 50% Injectable 12.5 Gram(s) IV Push once  dextrose 50% Injectable 25 Gram(s) IV Push once  epoetin ivelisse-epbx (RETACRIT) Injectable 00196 Unit(s) SubCutaneous every 7 days  glucagon  Injectable 1 milliGRAM(s) IntraMuscular once  insulin lispro (ADMELOG) corrective regimen sliding scale   SubCutaneous three times a day before meals  insulin lispro (ADMELOG) corrective regimen sliding scale   SubCutaneous at bedtime  labetalol 200 milliGRAM(s) Oral three times a day  latanoprost 0.005% Ophthalmic Solution 1 Drop(s) Both EYES at bedtime  mycophenolate mofetil 1000 milliGRAM(s) Oral <User Schedule>  NIFEdipine XL 60 milliGRAM(s) Oral two times a day  nystatin    Suspension 664248 Unit(s) Oral four times a day  pantoprazole    Tablet 40 milliGRAM(s) Oral before breakfast  predniSONE   Tablet 10 milliGRAM(s) Oral two times a day  predniSONE   Tablet   Oral   sodium bicarbonate 1300 milliGRAM(s) Oral three times a day  tacrolimus ER Tablet (ENVARSUS XR) 8 milliGRAM(s) Oral <User Schedule>  valGANciclovir 450 milliGRAM(s) Oral <User Schedule>    PRN Inpatient Medications  dextrose Oral Gel 15 Gram(s) Oral once PRN      REVIEW OF SYSTEMS  --------------------------------------------------------------------------------  Gen: No fevers/chills  Skin: No rashes  Head/Eyes/Ears: Normal hearing,   Respiratory: No dyspnea, cough  CV: No chest pain  GI: No abdominal pain, diarrhea  : No dysuria, hematuria  MSK: No  edema  Heme: No easy bruising or bleeding  Psych: No significant depression      All other systems were reviewed and are negative, except as noted.    VITALS/PHYSICAL EXAM  --------------------------------------------------------------------------------  T(C): 36.9 (10-05-22 @ 09:00), Max: 37.3 (10-04-22 @ 16:59)  HR: 68 (10-05-22 @ 09:00) (61 - 68)  BP: 136/63 (10-05-22 @ 09:00) (136/63 - 161/63)  RR: 18 (10-05-22 @ 09:00) (18 - 18)  SpO2: 98% (10-05-22 @ 09:00) (96% - 100%)  Wt(kg): --        10-04-22 @ 07:01  -  10-05-22 @ 07:00  --------------------------------------------------------  IN: 1080 mL / OUT: 1300 mL / NET: -220 mL        Physical Exam:  	Gen: NAD, Normocephalic   	HEENT: MMM, No jaundice   	Pulm: CTA B/L  	CV: S1S, regular rate and rhythm   	Abd: Soft, +BS   	Ext: No LE edema B/L  	Neuro: Awake and oriented to person place and time   	Skin: Warm and dry  	Vascular access: right sided AV fistula       LABS/STUDIES  --------------------------------------------------------------------------------              9.0    3.49  >-----------<  130      [10-05-22 @ 06:26]              28.9     141  |  116  |  62  ----------------------------<  145      [10-05-22 @ 06:27]  5.9   |  16  |  2.02        Ca     9.2     [10-05-22 @ 06:27]      iCa    1.38     [10-05 @ 06:45]      Mg     2.2     [10-05-22 @ 06:27]      Phos  2.9     [10-05-22 @ 06:27]    TPro  5.2  /  Alb  3.6  /  TBili  0.1  /  DBili  x   /  AST  8   /  ALT  7   /  AlkPhos  40  [10-05-22 @ 06:27]          Creatinine Trend:  SCr 2.02 [10-05 @ 06:27]  SCr 2.13 [10-04 @ 06:39]  SCr 1.97 [10-03 @ 06:23]  SCr 1.96 [10-02 @ 06:50]  SCr 1.80 [10-01 @ 06:59]        Iron 53, TIBC 205, %sat 26      [02-03-22 @ 02:41]  Ferritin 1921      [10-06-21 @ 16:15]  HbA1c 4.6      [05-28-19 @ 09:49]

## 2022-10-06 LAB
ALBUMIN SERPL ELPH-MCNC: 3.8 G/DL — SIGNIFICANT CHANGE UP (ref 3.3–5)
ALP SERPL-CCNC: 25 U/L — LOW (ref 40–120)
ALT FLD-CCNC: 6 U/L — LOW (ref 10–45)
ANION GAP SERPL CALC-SCNC: 11 MMOL/L — SIGNIFICANT CHANGE UP (ref 5–17)
AST SERPL-CCNC: 8 U/L — LOW (ref 10–40)
BASOPHILS # BLD AUTO: 0.01 K/UL — SIGNIFICANT CHANGE UP (ref 0–0.2)
BASOPHILS NFR BLD AUTO: 0.3 % — SIGNIFICANT CHANGE UP (ref 0–2)
BILIRUB SERPL-MCNC: 0.2 MG/DL — SIGNIFICANT CHANGE UP (ref 0.2–1.2)
BUN SERPL-MCNC: 60 MG/DL — HIGH (ref 7–23)
CA-I BLD-SCNC: 1.37 MMOL/L — HIGH (ref 1.15–1.33)
CALCIUM SERPL-MCNC: 9.5 MG/DL — SIGNIFICANT CHANGE UP (ref 8.4–10.5)
CHLORIDE SERPL-SCNC: 115 MMOL/L — HIGH (ref 96–108)
CO2 SERPL-SCNC: 14 MMOL/L — LOW (ref 22–31)
CREAT SERPL-MCNC: 1.96 MG/DL — HIGH (ref 0.5–1.3)
EGFR: 26 ML/MIN/1.73M2 — LOW
EOSINOPHIL # BLD AUTO: 0.01 K/UL — SIGNIFICANT CHANGE UP (ref 0–0.5)
EOSINOPHIL NFR BLD AUTO: 0.3 % — SIGNIFICANT CHANGE UP (ref 0–6)
FIBRINOGEN PPP-MCNC: 134 MG/DL — LOW (ref 330–520)
GLUCOSE SERPL-MCNC: 112 MG/DL — HIGH (ref 70–99)
HCT VFR BLD CALC: 29 % — LOW (ref 34.5–45)
HGB BLD-MCNC: 9.1 G/DL — LOW (ref 11.5–15.5)
IMM GRANULOCYTES NFR BLD AUTO: 0.8 % — SIGNIFICANT CHANGE UP (ref 0–0.9)
LYMPHOCYTES # BLD AUTO: 0.8 K/UL — LOW (ref 1–3.3)
LYMPHOCYTES # BLD AUTO: 20.3 % — SIGNIFICANT CHANGE UP (ref 13–44)
MAGNESIUM SERPL-MCNC: 2 MG/DL — SIGNIFICANT CHANGE UP (ref 1.6–2.6)
MCHC RBC-ENTMCNC: 27.2 PG — SIGNIFICANT CHANGE UP (ref 27–34)
MCHC RBC-ENTMCNC: 31.4 GM/DL — LOW (ref 32–36)
MCV RBC AUTO: 86.6 FL — SIGNIFICANT CHANGE UP (ref 80–100)
MONOCYTES # BLD AUTO: 0.43 K/UL — SIGNIFICANT CHANGE UP (ref 0–0.9)
MONOCYTES NFR BLD AUTO: 10.9 % — SIGNIFICANT CHANGE UP (ref 2–14)
NEUTROPHILS # BLD AUTO: 2.67 K/UL — SIGNIFICANT CHANGE UP (ref 1.8–7.4)
NEUTROPHILS NFR BLD AUTO: 67.4 % — SIGNIFICANT CHANGE UP (ref 43–77)
NRBC # BLD: 0 /100 WBCS — SIGNIFICANT CHANGE UP (ref 0–0)
PHOSPHATE SERPL-MCNC: 4.1 MG/DL — SIGNIFICANT CHANGE UP (ref 2.5–4.5)
PLATELET # BLD AUTO: 127 K/UL — LOW (ref 150–400)
POTASSIUM SERPL-MCNC: 5 MMOL/L — SIGNIFICANT CHANGE UP (ref 3.5–5.3)
POTASSIUM SERPL-SCNC: 5 MMOL/L — SIGNIFICANT CHANGE UP (ref 3.5–5.3)
PROT SERPL-MCNC: 5.1 G/DL — LOW (ref 6–8.3)
RBC # BLD: 3.35 M/UL — LOW (ref 3.8–5.2)
RBC # FLD: 14 % — SIGNIFICANT CHANGE UP (ref 10.3–14.5)
SODIUM SERPL-SCNC: 140 MMOL/L — SIGNIFICANT CHANGE UP (ref 135–145)
TACROLIMUS SERPL-MCNC: 6.1 NG/ML — SIGNIFICANT CHANGE UP
WBC # BLD: 3.95 K/UL — SIGNIFICANT CHANGE UP (ref 3.8–10.5)
WBC # FLD AUTO: 3.95 K/UL — SIGNIFICANT CHANGE UP (ref 3.8–10.5)

## 2022-10-06 PROCEDURE — 99232 SBSQ HOSP IP/OBS MODERATE 35: CPT | Mod: GC

## 2022-10-06 PROCEDURE — 99232 SBSQ HOSP IP/OBS MODERATE 35: CPT | Mod: FS,GC

## 2022-10-06 RX ORDER — ASPIRIN/CALCIUM CARB/MAGNESIUM 324 MG
81 TABLET ORAL DAILY
Refills: 0 | Status: DISCONTINUED | OUTPATIENT
Start: 2022-10-06 | End: 2022-10-07

## 2022-10-06 RX ORDER — TACROLIMUS 5 MG/1
9 CAPSULE ORAL ONCE
Refills: 0 | Status: DISCONTINUED | OUTPATIENT
Start: 2022-10-06 | End: 2022-10-06

## 2022-10-06 RX ORDER — TACROLIMUS 5 MG/1
1 CAPSULE ORAL ONCE
Refills: 0 | Status: COMPLETED | OUTPATIENT
Start: 2022-10-06 | End: 2022-10-06

## 2022-10-06 RX ORDER — SENNA PLUS 8.6 MG/1
2 TABLET ORAL AT BEDTIME
Refills: 0 | Status: DISCONTINUED | OUTPATIENT
Start: 2022-10-06 | End: 2022-10-07

## 2022-10-06 RX ORDER — TACROLIMUS 5 MG/1
9 CAPSULE ORAL
Refills: 0 | Status: DISCONTINUED | OUTPATIENT
Start: 2022-10-07 | End: 2022-10-07

## 2022-10-06 RX ADMIN — HEPARIN SODIUM 5000 UNIT(S): 5000 INJECTION INTRAVENOUS; SUBCUTANEOUS at 18:17

## 2022-10-06 RX ADMIN — MYCOPHENOLATE MOFETIL 1000 MILLIGRAM(S): 250 CAPSULE ORAL at 08:22

## 2022-10-06 RX ADMIN — Medication 1300 MILLIGRAM(S): at 13:34

## 2022-10-06 RX ADMIN — Medication 1300 MILLIGRAM(S): at 06:05

## 2022-10-06 RX ADMIN — Medication 500000 UNIT(S): at 06:08

## 2022-10-06 RX ADMIN — TACROLIMUS 8 MILLIGRAM(S): 5 CAPSULE ORAL at 08:35

## 2022-10-06 RX ADMIN — Medication 81 MILLIGRAM(S): at 11:46

## 2022-10-06 RX ADMIN — TACROLIMUS 1 MILLIGRAM(S): 5 CAPSULE ORAL at 10:12

## 2022-10-06 RX ADMIN — Medication 500000 UNIT(S): at 21:11

## 2022-10-06 RX ADMIN — Medication 500000 UNIT(S): at 18:17

## 2022-10-06 RX ADMIN — Medication 60 MILLIGRAM(S): at 06:05

## 2022-10-06 RX ADMIN — SENNA PLUS 2 TABLET(S): 8.6 TABLET ORAL at 21:12

## 2022-10-06 RX ADMIN — ATOVAQUONE 1500 MILLIGRAM(S): 750 SUSPENSION ORAL at 11:40

## 2022-10-06 RX ADMIN — HEPARIN SODIUM 5000 UNIT(S): 5000 INJECTION INTRAVENOUS; SUBCUTANEOUS at 06:10

## 2022-10-06 RX ADMIN — PANTOPRAZOLE SODIUM 40 MILLIGRAM(S): 20 TABLET, DELAYED RELEASE ORAL at 06:08

## 2022-10-06 RX ADMIN — LATANOPROST 1 DROP(S): 0.05 SOLUTION/ DROPS OPHTHALMIC; TOPICAL at 21:11

## 2022-10-06 RX ADMIN — LOSARTAN POTASSIUM 25 MILLIGRAM(S): 100 TABLET, FILM COATED ORAL at 06:05

## 2022-10-06 RX ADMIN — Medication 60 MILLIGRAM(S): at 18:18

## 2022-10-06 RX ADMIN — MYCOPHENOLATE MOFETIL 1000 MILLIGRAM(S): 250 CAPSULE ORAL at 20:23

## 2022-10-06 RX ADMIN — Medication 200 MILLIGRAM(S): at 13:38

## 2022-10-06 RX ADMIN — Medication 200 MILLIGRAM(S): at 08:22

## 2022-10-06 RX ADMIN — Medication 200 MILLIGRAM(S): at 21:12

## 2022-10-06 RX ADMIN — Medication 10 MILLIGRAM(S): at 06:05

## 2022-10-06 RX ADMIN — Medication 1300 MILLIGRAM(S): at 21:12

## 2022-10-06 RX ADMIN — Medication 500000 UNIT(S): at 11:39

## 2022-10-06 NOTE — PROGRESS NOTE ADULT - ASSESSMENT
76F with ESRD due to HTN, was on HD since 2016 via R AVF (previously oliguric), Glaucoma, h/o DVT to L subclavian vein (2017), s/p R DDRT on 5/19/21 under Simulect (stent removed), post transplant course was complicated by DGF. Last HD was on 6/4/21. 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  C4D positive.  - DSA Negative DSA  - Pulse steroids: Solumedrol 500mg x1 -> 250mg x2 -> 125mg x1 ->  Prednisone taper to stay at Prednisone 10mg po qd  - Plan for PLEX QOD with IVIG x 5 sessions. Next PLEX + 6gm IVIG session due 10/7   - Continue Losartan 25mg po qd for possible non-HLA Ab: [id number]  - Resume home ASA 81mg qd  - PPX SQH BID  - SCDs/Mobilize OOB  - Strict I/O  - Diet as tolerated  - Senna bowel regimen    [] Immuno  - Env by level, MMF 1/1, Steroid taper  - PPX: valcyte/atovaqone/nystatin    [] HTN  - Controlled  - Continue Labetolol 200mg TID, Nifedipine

## 2022-10-06 NOTE — PROGRESS NOTE ADULT - SUBJECTIVE AND OBJECTIVE BOX
[facility] DIVISION OF KIDNEY DISEASES AND HYPERTENSION -- FOLLOW UP NOTE  --------------------------------------------------------------------------------  Chief Complaint:    24 hour events/subjective:  Patient s/p Plex and IVIG  Increased Tacrolimus to 9 mg PO daily  Blood pressure fluctuant s/p Losartan inititation   UOP: 1350   Non- HLA Ab not sent   Fibrinogen: 134 Ica: 1.34   Patient denies any active complaints      PAST HISTORY  --------------------------------------------------------------------------------  No significant changes to PMH, PSH, FHx, SHx, unless otherwise noted    ALLERGIES & MEDICATIONS  --------------------------------------------------------------------------------  Allergies    Mushrooms (Anaphylaxis)  penicillin (Rash)    Intolerances      Standing Inpatient Medications  aspirin enteric coated 81 milliGRAM(s) Oral daily  atovaquone  Suspension 1500 milliGRAM(s) Oral daily  epoetin ivelisse-epbx (RETACRIT) Injectable 12513 Unit(s) SubCutaneous every 7 days  heparin   Injectable 5000 Unit(s) SubCutaneous every 12 hours  labetalol 200 milliGRAM(s) Oral three times a day  latanoprost 0.005% Ophthalmic Solution 1 Drop(s) Both EYES at bedtime  losartan 25 milliGRAM(s) Oral daily  mycophenolate mofetil 1000 milliGRAM(s) Oral <User Schedule>  NIFEdipine XL 60 milliGRAM(s) Oral two times a day  nystatin    Suspension 241355 Unit(s) Oral four times a day  pantoprazole    Tablet 40 milliGRAM(s) Oral before breakfast  predniSONE   Tablet   Oral   predniSONE   Tablet 10 milliGRAM(s) Oral daily  senna 2 Tablet(s) Oral at bedtime  sodium bicarbonate 1300 milliGRAM(s) Oral three times a day  valGANciclovir 450 milliGRAM(s) Oral <User Schedule>    PRN Inpatient Medications      REVIEW OF SYSTEMS  --------------------------------------------------------------------------------      All other systems were reviewed and are negative, except as noted.    VITALS/PHYSICAL EXAM  --------------------------------------------------------------------------------  T(C): 36.4 (10-06-22 @ 08:00), Max: 37.1 (10-05-22 @ 16:55)  HR: 86 (10-06-22 @ 08:00) (60 - 86)  BP: 151/68 (10-06-22 @ 08:00) (134/66 - 162/79)  RR: 18 (10-06-22 @ 08:00) (18 - 18)  SpO2: 98% (10-06-22 @ 08:00) (98% - 100%)  Wt(kg): --        10-05-22 @ 07:01  -  10-06-22 @ 07:00  --------------------------------------------------------  IN: 960 mL / OUT: 1850 mL / NET: -890 mL        Physical Exam:  	Gen: NAD, Normocephalic   	HEENT: MMM, Anicteric   	Pulm: CTA B/L, no respiratory distress   	CV: S1S2, regular rate and rhythm   	Abd: Soft, +BS  	Ext: No LE edema B/L  	Neuro: Awake and oriented to person place and time   	Skin: Warm and dry  	Vascular access: Right AV fistula       LABS/STUDIES  --------------------------------------------------------------------------------              9.1    3.95  >-----------<  127      [10-06-22 @ 06:51]              29.0     140  |  115  |  60  ----------------------------<  112      [10-06-22 @ 06:30]  5.0   |  14  |  1.96        Ca     9.5     [10-06-22 @ 06:30]      iCa    1.37     [10-06 @ 06:30]      Mg     2.0     [10-06-22 @ 06:30]      Phos  4.1     [10-06-22 @ 06:30]    TPro  5.1  /  Alb  3.8  /  TBili  0.2  /  DBili  x   /  AST  8   /  ALT  6   /  AlkPhos  25  [10-06-22 @ 06:30]          Creatinine Trend:  SCr 1.96 [10-06 @ 06:30]  SCr 2.02 [10-05 @ 06:27]  SCr 2.13 [10-04 @ 06:39]  SCr 1.97 [10-03 @ 06:23]  SCr 1.96 [10-02 @ 06:50]        Iron 53, TIBC 205, %sat 26      [02-03-22 @ 02:41]  Ferritin 1921      [10-06-21 @ 16:15]  HbA1c 4.6      [05-28-19 @ 09:49]

## 2022-10-06 NOTE — PROGRESS NOTE ADULT - PROBLEM SELECTOR PLAN 2
Patient with biopsy proven AMR (planned for F plex and IVIG dosing)   [] Plan for Plasmapheresis today as well as pulse steroids (500 mg (9/30)  -> 250 ( 10/2) -> 125 mg 10/3)   [] Continue 10 mg PO daily   [] Patient s/p 2/5 sessions of Plex and IVIG   [] Patient with negative DSA with positive AMR on biopsy, would send for Non HLA Ab panel  [] Started ARB Losartan 25 mg PO daily for possibility of TRISTEN mediated AMR  [] AVOID ACE inhibitor in the setting of PLEX.

## 2022-10-06 NOTE — PROGRESS NOTE ADULT - PROBLEM SELECTOR PLAN 3
Patient bicarbonate still low patient however unlikely acidemic   [] Continue Sodium bicarbonate 1300 mg PO TID   [] Continue to monitor Renal panel

## 2022-10-06 NOTE — PROGRESS NOTE ADULT - SUBJECTIVE AND OBJECTIVE BOX
Transplant Surgery - Multidisciplinary Rounds  --------------------------------------------------------------     Present:   Patient seen and examined with multidisciplinary team including Transplant Surgeon: Dr. Hagen, Transplant Nephrologist Dr. Ayala, nephrology fellow, PGY3 Surgery resident Dr. Cornell rowe, [facility], during am rounds. Disciplines not in attendance will be notified of the plan.     HPI: 76F with ESRD due to HTN, was on HD since 2016 via R AVF (previously oliguric), Glaucoma, h/o DVT to L subclavian vein (2017), s/p R DDRT on 5/19/21 with Simulect,   Post transplant course was complicated by DGF. Last HD was on 6/4/21. Ureteral stent was removed on 8/2/21.     Readmitted 10/2021 with severe symptomatic anemia (Hgb 5g/dL).  - Transfused 2 units PRBC.  - Anemia was due to hemolysis caused by dapsone (despite normal G6PD levels prior to initiation).     Readmitted Nov 2021 with YANELY   - US showed increased velocities concerning for TRAS but no tardus parvus waves. DSA negative, and cFDNA 0.5%. No intervention.    Readmitted Jan 2022 with worsening anemia, hyperkalemia, tested positive for COVID but had no symptoms.     Now readmitted with rising SCr and AMR on recent graft bx (9/29) +C4D. DSA negative  9/30 Started pulse steroids  10/3 PLEX #1 with plan for total 5 sessions    Interval Events:  - Feels good. Denies complaints  - PLEX #2 + IVIG 6gm  - SCr 1.96 from 2.02  UOP 1.3L  - Added Losartan for possible non-HLA Ab rejection, [id number]  - PPX SQH started      Immunosuppression:     Maintenance Immuno: Envarsus per level, MMF 1/1, pulse steroids  Ongoing monitoring for signs of rejection.    Potential Discharge date: pending clinical improvement  Education:  Medications  Plan of care:  See Below      MEDICATIONS  (STANDING):  aspirin enteric coated 81 milliGRAM(s) Oral daily  atovaquone  Suspension 1500 milliGRAM(s) Oral daily  epoetin ivelisse-epbx (RETACRIT) Injectable 81690 Unit(s) SubCutaneous every 7 days  heparin   Injectable 5000 Unit(s) SubCutaneous every 12 hours  labetalol 200 milliGRAM(s) Oral three times a day  latanoprost 0.005% Ophthalmic Solution 1 Drop(s) Both EYES at bedtime  losartan 25 milliGRAM(s) Oral daily  mycophenolate mofetil 1000 milliGRAM(s) Oral <User Schedule>  NIFEdipine XL 60 milliGRAM(s) Oral two times a day  nystatin    Suspension 585318 Unit(s) Oral four times a day  pantoprazole    Tablet 40 milliGRAM(s) Oral before breakfast  predniSONE   Tablet   Oral   predniSONE   Tablet 10 milliGRAM(s) Oral daily  sodium bicarbonate 1300 milliGRAM(s) Oral three times a day  tacrolimus ER Tablet (ENVARSUS XR) 1 milliGRAM(s) Oral once  valGANciclovir 450 milliGRAM(s) Oral <User Schedule>    MEDICATIONS  (PRN):      PAST MEDICAL & SURGICAL HISTORY:  HTN (hypertension)      Glaucoma      Cataract      ESRD (end stage renal disease) on dialysis      DVT (deep venous thrombosis)  of Left subclavian vein, 06/12/17      Hemodialysis patient  MWF      Acquired cataract  extraction with b/l lense placement, 2016      H/O: glaucoma  surgery, 2002      AV fistula  2015      S/P KEVEN-BSO  for fibroids, 2012      Hemodialysis access, AV graft      Transplanted kidney  5/19/21      History of renal transplantation  DDRT 5/19/2021          Vital Signs Last 24 Hrs  T(C): 36.3 (06 Oct 2022 06:15), Max: 37.1 (05 Oct 2022 16:55)  T(F): 97.4 (06 Oct 2022 06:15), Max: 98.8 (05 Oct 2022 16:55)  HR: 60 (06 Oct 2022 06:15) (60 - 77)  BP: 162/79 (06 Oct 2022 06:15) (134/66 - 162/79)  BP(mean): --  RR: 18 (06 Oct 2022 06:15) (18 - 18)  SpO2: 98% (06 Oct 2022 06:15) (98% - 100%)    Parameters below as of 06 Oct 2022 06:15  Patient On (Oxygen Delivery Method): room air        I&O's Summary    05 Oct 2022 07:01  -  06 Oct 2022 07:00  --------------------------------------------------------  IN: 960 mL / OUT: 1850 mL / NET: -890 mL                              9.1    3.95  )-----------( 127      ( 06 Oct 2022 06:51 )             29.0     10-06    140  |  115<H>  |  60<H>  ----------------------------<  112<H>  5.0   |  14<L>  |  1.96<H>    Ca    9.5      06 Oct 2022 06:30  Phos  4.1     10-06  Mg     2.0     10-06    TPro  5.1<L>  /  Alb  3.8  /  TBili  0.2  /  DBili  x   /  AST  8<L>  /  ALT  6<L>  /  AlkPhos  25<L>  10-06    Tacrolimus (), Serum: 6.1 ng/mL (10-05 @ 06:26)       REVIEW OF SYSTEMS  --------------------------------------------------------------------------------  Gen: No weight changes, fatigue, fevers/chills, weakness  Skin: No rashes  Head/Eyes/Ears/Mouth: No headache; Normal hearing; Normal vision w/o blurriness; No sinus pain/discomfort, sore throat  Respiratory: No dyspnea, cough, wheezing, hemoptysis  CV: No chest pain, PND, orthopnea  GI: No abdominal pain, diarrhea, constipation, nausea, vomiting, melena, hematochezia  : No increased frequency, dysuria, hematuria, nocturia  MSK: No joint pain/swelling; no back pain; no edema  Neuro: No dizziness/lightheadedness, weakness, seizures, numbness, tingling  Heme: No easy bruising or bleeding  Endo: No heat/cold intolerance  Psych: No significant nervousness, anxiety, stress, depression  All other systems were reviewed and are negative, except as noted.      PHYSICAL EXAM:  Constitutional: Well developed / well nourished  Eyes: Anicteric, PERRLA  ENMT: nc/at  Neck: Supple  Respiratory: CTA B/L  Cardiovascular: RRR  Gastrointestinal: Soft abdomen, NT, ND  Genitourinary:  Voiding spontaneously   Extremities: SCD's in place and working bilaterally  Vascular: Palpable dp pulses bilaterally  Neurological: A&O x3  Skin: Wound  healed   Musculoskeletal: Moving all extremities  Psychiatric: Responsive

## 2022-10-06 NOTE — PROGRESS NOTE ADULT - NS ATTEND AMEND GEN_ALL_CORE FT
s/p DDRT 5/2021 admitted with AMR  Cont plasmapheresis with IVIg, with plan for 5 sessions every other day. Tolerated first two sessions   Immunosuppression management - Envarsus 8 mg daily, Prednisone 10mg daily, cellcept 1gm bid  PPX: valcyte/atovaqone/nystatin  Started on losartan for possible non-HLA Ab: [id number]

## 2022-10-06 NOTE — PROGRESS NOTE ADULT - PROBLEM SELECTOR PLAN 1
[] Increased Tacrolimus 9 mg PO daily, Tacro level today 6.1 (10/6)   [] Continue mycophenolate 1000 mg PO BID   [] Taper prednisone, s/p pulse dose, start prednisone 20 mg PO BID   [] Monitor Tacrolimus level , obtain 30 minutes prior to next dose, goal level of 8-10.  [] Patient on PCP prophylaxis with Atovaquone 1500 mg PO daily, Continue Valcyclovir 450 mg daily, Continue Nystatin suspension.

## 2022-10-07 ENCOUNTER — TRANSCRIPTION ENCOUNTER (OUTPATIENT)
Age: 77
End: 2022-10-07

## 2022-10-07 VITALS
SYSTOLIC BLOOD PRESSURE: 123 MMHG | RESPIRATION RATE: 18 BRPM | DIASTOLIC BLOOD PRESSURE: 61 MMHG | TEMPERATURE: 98 F | OXYGEN SATURATION: 99 % | HEART RATE: 63 BPM

## 2022-10-07 LAB
ALBUMIN SERPL ELPH-MCNC: 3.7 G/DL — SIGNIFICANT CHANGE UP (ref 3.3–5)
ALP SERPL-CCNC: 26 U/L — LOW (ref 40–120)
ALT FLD-CCNC: 7 U/L — LOW (ref 10–45)
ANION GAP SERPL CALC-SCNC: 10 MMOL/L — SIGNIFICANT CHANGE UP (ref 5–17)
AST SERPL-CCNC: 9 U/L — LOW (ref 10–40)
BASOPHILS # BLD AUTO: 0.01 K/UL — SIGNIFICANT CHANGE UP (ref 0–0.2)
BASOPHILS NFR BLD AUTO: 0.3 % — SIGNIFICANT CHANGE UP (ref 0–2)
BILIRUB SERPL-MCNC: 0.2 MG/DL — SIGNIFICANT CHANGE UP (ref 0.2–1.2)
BUN SERPL-MCNC: 59 MG/DL — HIGH (ref 7–23)
CA-I BLD-SCNC: 1.33 MMOL/L — SIGNIFICANT CHANGE UP (ref 1.15–1.33)
CALCIUM SERPL-MCNC: 9.6 MG/DL — SIGNIFICANT CHANGE UP (ref 8.4–10.5)
CHLORIDE SERPL-SCNC: 114 MMOL/L — HIGH (ref 96–108)
CO2 SERPL-SCNC: 18 MMOL/L — LOW (ref 22–31)
CREAT SERPL-MCNC: 1.76 MG/DL — HIGH (ref 0.5–1.3)
EGFR: 29 ML/MIN/1.73M2 — LOW
EOSINOPHIL # BLD AUTO: 0.04 K/UL — SIGNIFICANT CHANGE UP (ref 0–0.5)
EOSINOPHIL NFR BLD AUTO: 1.4 % — SIGNIFICANT CHANGE UP (ref 0–6)
FIBRINOGEN PPP-MCNC: 178 MG/DL — LOW (ref 330–520)
GLUCOSE SERPL-MCNC: 95 MG/DL — SIGNIFICANT CHANGE UP (ref 70–99)
HCT VFR BLD CALC: 28.4 % — LOW (ref 34.5–45)
HGB BLD-MCNC: 8.6 G/DL — LOW (ref 11.5–15.5)
IMM GRANULOCYTES NFR BLD AUTO: 0.7 % — SIGNIFICANT CHANGE UP (ref 0–0.9)
LYMPHOCYTES # BLD AUTO: 1.04 K/UL — SIGNIFICANT CHANGE UP (ref 1–3.3)
LYMPHOCYTES # BLD AUTO: 35.7 % — SIGNIFICANT CHANGE UP (ref 13–44)
MAGNESIUM SERPL-MCNC: 1.9 MG/DL — SIGNIFICANT CHANGE UP (ref 1.6–2.6)
MCHC RBC-ENTMCNC: 26.6 PG — LOW (ref 27–34)
MCHC RBC-ENTMCNC: 30.3 GM/DL — LOW (ref 32–36)
MCV RBC AUTO: 87.9 FL — SIGNIFICANT CHANGE UP (ref 80–100)
MONOCYTES # BLD AUTO: 0.29 K/UL — SIGNIFICANT CHANGE UP (ref 0–0.9)
MONOCYTES NFR BLD AUTO: 10 % — SIGNIFICANT CHANGE UP (ref 2–14)
NEUTROPHILS # BLD AUTO: 1.51 K/UL — LOW (ref 1.8–7.4)
NEUTROPHILS NFR BLD AUTO: 51.9 % — SIGNIFICANT CHANGE UP (ref 43–77)
NRBC # BLD: 0 /100 WBCS — SIGNIFICANT CHANGE UP (ref 0–0)
PHOSPHATE SERPL-MCNC: 3.2 MG/DL — SIGNIFICANT CHANGE UP (ref 2.5–4.5)
PLATELET # BLD AUTO: 123 K/UL — LOW (ref 150–400)
POTASSIUM SERPL-MCNC: 4.9 MMOL/L — SIGNIFICANT CHANGE UP (ref 3.5–5.3)
POTASSIUM SERPL-SCNC: 4.9 MMOL/L — SIGNIFICANT CHANGE UP (ref 3.5–5.3)
PROT SERPL-MCNC: 4.8 G/DL — LOW (ref 6–8.3)
RBC # BLD: 3.23 M/UL — LOW (ref 3.8–5.2)
RBC # FLD: 14 % — SIGNIFICANT CHANGE UP (ref 10.3–14.5)
SARS-COV-2 RNA SPEC QL NAA+PROBE: SIGNIFICANT CHANGE UP
SODIUM SERPL-SCNC: 142 MMOL/L — SIGNIFICANT CHANGE UP (ref 135–145)
TACROLIMUS SERPL-MCNC: 9 NG/ML — SIGNIFICANT CHANGE UP
WBC # BLD: 2.91 K/UL — LOW (ref 3.8–10.5)
WBC # FLD AUTO: 2.91 K/UL — LOW (ref 3.8–10.5)

## 2022-10-07 PROCEDURE — 99232 SBSQ HOSP IP/OBS MODERATE 35: CPT | Mod: FS,GC

## 2022-10-07 PROCEDURE — 36514 APHERESIS PLASMA: CPT

## 2022-10-07 PROCEDURE — 99232 SBSQ HOSP IP/OBS MODERATE 35: CPT | Mod: GC

## 2022-10-07 RX ORDER — ACETAMINOPHEN 500 MG
650 TABLET ORAL ONCE
Refills: 0 | Status: COMPLETED | OUTPATIENT
Start: 2022-10-07 | End: 2022-10-07

## 2022-10-07 RX ORDER — SENNA PLUS 8.6 MG/1
2 TABLET ORAL
Qty: 0 | Refills: 0 | DISCHARGE
Start: 2022-10-07

## 2022-10-07 RX ORDER — ERYTHROPOIETIN 10000 [IU]/ML
0 INJECTION, SOLUTION INTRAVENOUS; SUBCUTANEOUS
Qty: 0 | Refills: 0 | DISCHARGE

## 2022-10-07 RX ORDER — ACETAMINOPHEN 500 MG
2 TABLET ORAL
Qty: 4 | Refills: 0
Start: 2022-10-07 | End: 2022-10-08

## 2022-10-07 RX ORDER — SODIUM ZIRCONIUM CYCLOSILICATE 10 G/10G
1 POWDER, FOR SUSPENSION ORAL
Qty: 0 | Refills: 0 | DISCHARGE

## 2022-10-07 RX ORDER — MYCOPHENOLATE MOFETIL 250 MG/1
1 CAPSULE ORAL
Qty: 0 | Refills: 0 | DISCHARGE
Start: 2022-10-07

## 2022-10-07 RX ORDER — IMMUNE GLOBULIN (HUMAN) 10 G/100ML
6 INJECTION INTRAVENOUS; SUBCUTANEOUS ONCE
Refills: 0 | Status: COMPLETED | OUTPATIENT
Start: 2022-10-07 | End: 2022-10-07

## 2022-10-07 RX ORDER — VALGANCICLOVIR 450 MG/1
1 TABLET, FILM COATED ORAL
Qty: 0 | Refills: 0 | DISCHARGE
Start: 2022-10-07

## 2022-10-07 RX ORDER — LABETALOL HCL 100 MG
1 TABLET ORAL
Qty: 90 | Refills: 0
Start: 2022-10-07 | End: 2022-11-05

## 2022-10-07 RX ORDER — NYSTATIN 500MM UNIT
5 POWDER (EA) MISCELLANEOUS
Qty: 0 | Refills: 0 | DISCHARGE
Start: 2022-10-07

## 2022-10-07 RX ORDER — LOSARTAN POTASSIUM 100 MG/1
1 TABLET, FILM COATED ORAL
Qty: 30 | Refills: 0
Start: 2022-10-07 | End: 2022-11-05

## 2022-10-07 RX ORDER — MYCOPHENOLATE MOFETIL 250 MG/1
1000 CAPSULE ORAL
Qty: 0 | Refills: 0 | DISCHARGE
Start: 2022-10-07

## 2022-10-07 RX ORDER — DIPHENHYDRAMINE HCL 50 MG
25 CAPSULE ORAL ONCE
Refills: 0 | Status: COMPLETED | OUTPATIENT
Start: 2022-10-07 | End: 2022-10-07

## 2022-10-07 RX ORDER — SODIUM BICARBONATE 1 MEQ/ML
2 SYRINGE (ML) INTRAVENOUS
Qty: 180 | Refills: 0
Start: 2022-10-07 | End: 2022-11-05

## 2022-10-07 RX ORDER — LOSARTAN POTASSIUM 100 MG/1
1 TABLET, FILM COATED ORAL
Qty: 0 | Refills: 0 | DISCHARGE
Start: 2022-10-07

## 2022-10-07 RX ORDER — DIPHENHYDRAMINE HCL 50 MG
1 CAPSULE ORAL
Qty: 2 | Refills: 0
Start: 2022-10-07 | End: 2022-10-08

## 2022-10-07 RX ORDER — TACROLIMUS 5 MG/1
9 CAPSULE ORAL
Qty: 0 | Refills: 0 | DISCHARGE
Start: 2022-10-07

## 2022-10-07 RX ORDER — TACROLIMUS 5 MG/1
5 CAPSULE ORAL
Refills: 0 | DISCHARGE
Start: 2022-10-07

## 2022-10-07 RX ADMIN — Medication 200 MILLIGRAM(S): at 05:14

## 2022-10-07 RX ADMIN — LOSARTAN POTASSIUM 25 MILLIGRAM(S): 100 TABLET, FILM COATED ORAL at 12:50

## 2022-10-07 RX ADMIN — PANTOPRAZOLE SODIUM 40 MILLIGRAM(S): 20 TABLET, DELAYED RELEASE ORAL at 05:13

## 2022-10-07 RX ADMIN — Medication 25 MILLIGRAM(S): at 13:47

## 2022-10-07 RX ADMIN — HEPARIN SODIUM 5000 UNIT(S): 5000 INJECTION INTRAVENOUS; SUBCUTANEOUS at 05:13

## 2022-10-07 RX ADMIN — IMMUNE GLOBULIN (HUMAN) 10 GRAM(S): 10 INJECTION INTRAVENOUS; SUBCUTANEOUS at 14:26

## 2022-10-07 RX ADMIN — TACROLIMUS 9 MILLIGRAM(S): 5 CAPSULE ORAL at 09:29

## 2022-10-07 RX ADMIN — Medication 500000 UNIT(S): at 17:07

## 2022-10-07 RX ADMIN — VALGANCICLOVIR 450 MILLIGRAM(S): 450 TABLET, FILM COATED ORAL at 05:14

## 2022-10-07 RX ADMIN — HEPARIN SODIUM 5000 UNIT(S): 5000 INJECTION INTRAVENOUS; SUBCUTANEOUS at 17:07

## 2022-10-07 RX ADMIN — MYCOPHENOLATE MOFETIL 1000 MILLIGRAM(S): 250 CAPSULE ORAL at 09:29

## 2022-10-07 RX ADMIN — MYCOPHENOLATE MOFETIL 1000 MILLIGRAM(S): 250 CAPSULE ORAL at 20:37

## 2022-10-07 RX ADMIN — Medication 60 MILLIGRAM(S): at 17:08

## 2022-10-07 RX ADMIN — Medication 1300 MILLIGRAM(S): at 13:45

## 2022-10-07 RX ADMIN — ATOVAQUONE 1500 MILLIGRAM(S): 750 SUSPENSION ORAL at 12:48

## 2022-10-07 RX ADMIN — Medication 81 MILLIGRAM(S): at 12:48

## 2022-10-07 RX ADMIN — Medication 200 MILLIGRAM(S): at 13:45

## 2022-10-07 RX ADMIN — Medication 500000 UNIT(S): at 12:48

## 2022-10-07 RX ADMIN — Medication 1300 MILLIGRAM(S): at 05:14

## 2022-10-07 RX ADMIN — Medication 10 MILLIGRAM(S): at 05:14

## 2022-10-07 RX ADMIN — Medication 60 MILLIGRAM(S): at 05:14

## 2022-10-07 RX ADMIN — Medication 650 MILLIGRAM(S): at 13:46

## 2022-10-07 RX ADMIN — Medication 500000 UNIT(S): at 05:13

## 2022-10-07 NOTE — DISCHARGE NOTE PROVIDER - NSDCCPCAREPLAN_GEN_ALL_CORE_FT
PRINCIPAL DISCHARGE DIAGNOSIS  Diagnosis: Antibody mediated rejection of renal transplant  Assessment and Plan of Treatment: It is very important to take madications that suppress your immune system to protect your kidney from    Never stop taking these medications unless specifically advised by your transplant physician.   You have been receving plasmapheresis to remove the antibodies causing rejection and then have been getting IVIG to replace the healthy antibodies. You received 3 sessions of plasmapheresis and IVIG in the hospital and the remaining two are going to be done as outpatient. You have been schedule for plasmapheresis on  Mon/Wed (10/10 and 10/12) at 3 PM (appointment card was given to you). And you have been scheduled for at [address] [facility], [address], [facility] [address]. It is scheduled for Tues/Thurs (10/11 and 10/13) Infusion center ([place]) to call patient w/ timing. Per infusion center, your appointment will likely between 8:45 to 9AM on those days. It is important that the IVIG is done early on Tues because there needs to be at least 24 hours between IVIG and the next session of plasmapheresis.      SECONDARY DISCHARGE DIAGNOSES  Diagnosis: Transplanted kidney  Assessment and Plan of Treatment: - Avoid heavy lifting aything over 5lbs for six weeks following your surgery date. Do NOT drive a car or operate machinery unless cleared by your transplant surgeon during your follow up visit. Avoid straining, use stool softners as needed. Stop stool softners if diarrhea develops.   - Call transplant clinic if you develop fever, increased abdominal pain, redness/swelling or bleeding around your incision site  - Call transplant clinic if you have difficulty urinating, notice decrease in urine output or blood in urine.   - Follow FOOD SAFETY instructions provided to you in your patient education guide booklet   - Bathing: Shower is allowed, you can let soap and water flow over your incision; do NOT rub the area. Bath is NOT allowed until your incision is healed and you have been cleared by your transplant surgeon.    Diagnosis: Immunosuppressed status  Assessment and Plan of Treatment: - Transplant recipients are at risk for developing infection because immune system is lowered due to transplant medications.   - Avoid contact with sick people; practice good hand hygiene;   - Take medications as directed by your translant team. Never stop taking medications unless instructed by your transplant physician.  - Do NOT double up medication dose if you missed your dose; call the transplant office for instructions.

## 2022-10-07 NOTE — PROGRESS NOTE ADULT - PROBLEM SELECTOR PLAN 2
20-Sep-2019 23:07 Patient with biopsy proven AMR (planned for MWF plex and IVIG dosing)   [] Plan for Plasmapheresis today as well as pulse steroids (500 mg (9/30)  -> 250 ( 10/2) -> 125 mg 10/3)   [] Continue Prednisone Taper   [] Patient scheduled for second dose fo IVIG and PLEX  [] Patient with negative DSA with positive AMR on biopsy, would send for Non HLA mediated panel

## 2022-10-07 NOTE — DISCHARGE NOTE PROVIDER - CARE PROVIDERS DIRECT ADDRESSES
[email or web address]

## 2022-10-07 NOTE — PROGRESS NOTE ADULT - SUBJECTIVE AND OBJECTIVE BOX
Transplant Surgery - Multidisciplinary Rounds  --------------------------------------------------------------     Present:   Patient seen and examined with multidisciplinary team including Transplant Surgeon: Dr. Hagen, Transplant Nephrologist Dr. Ayala, nephrology fellow, PGY3 Surgery resident Dr. Cornell rowe, [facility], during am rounds. Disciplines not in attendance will be notified of the plan.     HPI: 76F with ESRD due to HTN, was on HD since 2016 via R AVF (previously oliguric), Glaucoma, h/o DVT to L subclavian vein (2017), s/p R DDRT on 5/19/21 with Simulect,   Post transplant course was complicated by DGF. Last HD was on 6/4/21. Ureteral stent was removed on 8/2/21.     Readmitted 10/2021 with severe symptomatic anemia (Hgb 5g/dL).  - Transfused 2 units PRBC.  - Anemia was due to hemolysis caused by dapsone (despite normal G6PD levels prior to initiation).     Readmitted Nov 2021 with YANELY   - US showed increased velocities concerning for TRAS but no tardus parvus waves. DSA negative, and cFDNA 0.5%. No intervention.    Readmitted Jan 2022 with worsening anemia, hyperkalemia, tested positive for COVID but had no symptoms.     Now readmitted with rising SCr and AMR on recent graft bx (9/29) +C4D. DSA negative  9/30 Started pulse steroids  10/3 PLEX #1 with plan for total 5 sessions    Interval Events:  - Feels good. Denies complaints  - PLEX #2 + IVIG 6gm on 10/5  - SCr down to 1.76 from 1.96.   - On Losartan for possible non-HLA Ab rejection, [id number]      Immunosuppression:     Maintenance Immuno: Envarsus per level, MMF 1/1, pulse steroids  Ongoing monitoring for signs of rejection.    Potential Discharge date: pending clinical improvement  Education:  Medications  Plan of care:  See Below      MEDICATIONS  (STANDING):  acetaminophen     Tablet .. 650 milliGRAM(s) Oral once  aspirin enteric coated 81 milliGRAM(s) Oral daily  atovaquone  Suspension 1500 milliGRAM(s) Oral daily  diphenhydrAMINE 25 milliGRAM(s) Oral once  epoetin ivelisse-epbx (RETACRIT) Injectable 07511 Unit(s) SubCutaneous every 7 days  heparin   Injectable 5000 Unit(s) SubCutaneous every 12 hours  immune   globulin 10% (GAMMAGARD) IVPB 6 Gram(s) IV Intermittent once  labetalol 200 milliGRAM(s) Oral three times a day  latanoprost 0.005% Ophthalmic Solution 1 Drop(s) Both EYES at bedtime  losartan 25 milliGRAM(s) Oral daily  mycophenolate mofetil 1000 milliGRAM(s) Oral <User Schedule>  NIFEdipine XL 60 milliGRAM(s) Oral two times a day  nystatin    Suspension 388118 Unit(s) Oral four times a day  pantoprazole    Tablet 40 milliGRAM(s) Oral before breakfast  predniSONE   Tablet 10 milliGRAM(s) Oral daily  predniSONE   Tablet   Oral   senna 2 Tablet(s) Oral at bedtime  sodium bicarbonate 1300 milliGRAM(s) Oral three times a day  tacrolimus ER Tablet (ENVARSUS XR) 9 milliGRAM(s) Oral <User Schedule>  valGANciclovir 450 milliGRAM(s) Oral <User Schedule>    MEDICATIONS  (PRN):      PAST MEDICAL & SURGICAL HISTORY:  HTN (hypertension)      Glaucoma      Cataract      ESRD (end stage renal disease) on dialysis      DVT (deep venous thrombosis)  of Left subclavian vein, 06/12/17      Hemodialysis patient  MWF      Acquired cataract  extraction with b/l lense placement, 2016      H/O: glaucoma  surgery, 2002      AV fistula  2015      S/P KEVEN-BSO  for fibroids, 2012      Hemodialysis access, AV graft      Transplanted kidney  5/19/21      History of renal transplantation  DDRT 5/19/2021          Vital Signs Last 24 Hrs  T(C): 36.4 (07 Oct 2022 10:02), Max: 37.1 (07 Oct 2022 01:00)  T(F): 97.6 (07 Oct 2022 10:02), Max: 98.7 (07 Oct 2022 01:00)  HR: 68 (07 Oct 2022 10:02) (60 - 68)  BP: 132/69 (07 Oct 2022 10:02) (125/62 - 171/70)  BP(mean): --  RR: 18 (07 Oct 2022 10:02) (18 - 18)  SpO2: 98% (07 Oct 2022 10:02) (96% - 99%)    Parameters below as of 07 Oct 2022 10:02  Patient On (Oxygen Delivery Method): room air        I&O's Summary    06 Oct 2022 07:01  -  07 Oct 2022 07:00  --------------------------------------------------------  IN: 960 mL / OUT: 1220 mL / NET: -260 mL    07 Oct 2022 07:01  -  07 Oct 2022 12:10  --------------------------------------------------------  IN: 240 mL / OUT: 400 mL / NET: -160 mL                              8.6    2.91  )-----------( 123      ( 07 Oct 2022 06:40 )             28.4     10-07    142  |  114<H>  |  59<H>  ----------------------------<  95  4.9   |  18<L>  |  1.76<H>    Ca    9.6      07 Oct 2022 06:41  Phos  3.2     10-07  Mg     1.9     10-07    TPro  4.8<L>  /  Alb  3.7  /  TBili  0.2  /  DBili  x   /  AST  9<L>  /  ALT  7<L>  /  AlkPhos  26<L>  10-07    Tacrolimus (), Serum: 9.0 ng/mL (10-07 @ 06:39)        REVIEW OF SYSTEMS  --------------------------------------------------------------------------------  Gen: No weight changes, fatigue, fevers/chills, weakness  Skin: No rashes  Head/Eyes/Ears/Mouth: No headache; Normal hearing; Normal vision w/o blurriness; No sinus pain/discomfort, sore throat  Respiratory: No dyspnea, cough, wheezing, hemoptysis  CV: No chest pain, PND, orthopnea  GI: No abdominal pain, diarrhea, constipation, nausea, vomiting, melena, hematochezia  : No increased frequency, dysuria, hematuria, nocturia  MSK: No joint pain/swelling; no back pain; no edema  Neuro: No dizziness/lightheadedness, weakness, seizures, numbness, tingling  Heme: No easy bruising or bleeding  Endo: No heat/cold intolerance  Psych: No significant nervousness, anxiety, stress, depression  All other systems were reviewed and are negative, except as noted.      PHYSICAL EXAM:  Constitutional: Well developed / well nourished  Eyes: Anicteric, PERRLA  ENMT: nc/at  Neck: Supple  Respiratory: CTA B/L  Cardiovascular: RRR  Gastrointestinal: Soft abdomen, NT, ND  Genitourinary:  Voiding spontaneously   Extremities: SCD's in place and working bilaterally  Vascular: Palpable dp pulses bilaterally  Neurological: A&O x3  Skin: Wound  healed   Musculoskeletal: Moving all extremities  Psychiatric: Responsive

## 2022-10-07 NOTE — DISCHARGE NOTE PROVIDER - NSDCFUSCHEDAPPT_GEN_ALL_CORE_FT
Simi Louise  [facility]  NEPHRO [address] [place] [address]  Scheduled Appointment: 11/09/2022

## 2022-10-07 NOTE — PROGRESS NOTE ADULT - ATTENDING COMMENTS
76 year old female with ESRD due to HTN was on HD since 2016  s/p R DDRT on 5/19/21 under Simulect Induction admitted for AMR  Post transplant course was complicated by DGF. Last HD was on 6/4/21. Ureteral stent was removed on 8/2/21.     1.s/p DDRT on 5/19/21 - Allograft function now with YANELY due to AMR. Txp bx on 9/29 with +AMR (preliminary reading with glomerulitis, peritubular capillaritis, interstitial inflammation, no tubulitis. +C4D. DSA negative).  s/p pulse dose steroids, now on steroid taper.  Plan for PLEX today  ( total 3 sessions) . Cr is 1.9 today ( 2.2 on admission)   2. IS meds- Dosing tac by level. goal level 8-10 . Continue Tac ,MMF 1gm po bid and steroid taper.   3. HTN - controlled on current meds
76 year old female with ESRD secondary to hypertension, patient history of hemodialysis prior to DDRT on 5/19/2021, induction with basiliximab. Patient with biopsy on 9/29 demonstrating AMR  (preliminary reading with glomerulitis, peritubular capillaritis, interstitial inflammation, no tubulitis. +C4D. DSA negative)     Cr improved to 1.7 today   Plan for total 5 sessions of plasma exchange  followed by IVIg.   Today is PLEX session #3. next 2 sessions planned as outpatient.   Unclear DSA - will check non-HLA antibodies.    Started on cozaar for possible anti-angiotensin receptor antibodies.
76 year old female with ESRD secondary to hypertension, patient history of hemodialysis prior to DDRT on 5/19/2021, induction with basiliximab. Patient with biopsy on 9/29 demonstrating AMR  (preliminary reading with glomerulitis, peritubular capillaritis, interstitial inflammation, no tubulitis. +C4D. DSA negative)   Plan for plasma exchange today followed by IVIg.  Goal 5 treatments.    Unclear DSA - check non-HLA antibodies.  Start cozaar for possible anti-angiotensin receptor antibodies.
76 year old female with ESRD secondary to hypertension, patient history of hemodialysis prior to DDRT on 5/19/2021, induction with basiliximab. Patient with biopsy on 9/29 demonstrating AMR  (preliminary reading with glomerulitis, peritubular capillaritis, interstitial inflammation, no tubulitis. +C4D. DSA negative)   Plan for total 5 sessions of plasma exchange  followed by IVIg.     Unclear DSA - check non-HLA antibodies.  Started on cozaar for possible anti-angiotensin receptor antibodies.
76 year old female with ESRD due to HTN was on HD since 2016  s/p R DDRT on 5/19/21 under Simulect Induction admitted for AMR  Post transplant course was complicated by DGF. Last HD was on 6/4/21. Ureteral stent was removed on 8/2/21.     1.s/p DDRT on 5/19/21 - Allograft function now with YANELY due to AMR. Txp bx on 9/29 with +AMR (preliminary reading with glomerulitis, peritubular capillaritis, interstitial inflammation, no tubulitis. +C4D. DSA negative).  s/p pulse dose steroids, now on steroid taper.  1st session of PLEX done on 10/3 followed by IVIG , next session planned for tomw  ( total 3 sessions)   2. IS meds- Dosing tac by level. goal level 8-10 . Continue Tac ,MMF 1gm po bid and steroid taper.   3. HTN - controlled on current meds

## 2022-10-07 NOTE — DISCHARGE NOTE PROVIDER - NSDCFUADDAPPT_GEN_ALL_CORE_FT
1. Please call to make a follow-up appointment with Dr. Louise for 7/14/22.   2. Please follow up with your primary care physician in one week regarding your hospitalization.

## 2022-10-07 NOTE — DISCHARGE NOTE NURSING/CASE MANAGEMENT/SOCIAL WORK - PATIENT PORTAL LINK FT
You can access the FollowMyHealth Patient Portal offered by [facility] by registering at the following website: [email or web address]. By joining [employer]’s FollowMyHealth portal, you will also be able to view your health information using other applications (apps) compatible with our system.

## 2022-10-07 NOTE — PROGRESS NOTE ADULT - PROVIDER SPECIALTY LIST ADULT
Transplant Nephrology
Transplant Nephrology
Transplant Surgery
Transplant Surgery
Transplant Nephrology
Transplant Surgery
Transplant Nephrology
Transplant Surgery
Nephrology
Transplant Nephrology

## 2022-10-07 NOTE — PROGRESS NOTE ADULT - NS ATTEND AMEND GEN_ALL_CORE FT
s/p DDRT 5/2021 admitted with AMR, Cr slightly improved  Cont plasmapheresis with IVIg, with plan for 5 sessions every other day. Tolerated first two sessions, due for third today. 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

## 2022-10-07 NOTE — DISCHARGE NOTE PROVIDER - HOSPITAL COURSE
76F with ESRD due to HTN, was on HD since 2016 via R AVF (previously oliguric), Glaucoma, h/o DVT to L subclavian vein (2017), s/p R DDRT on 5/19/21 with Simulect,   Post transplant course was complicated by DGF. Last HD was on 6/4/21. Ureteral stent was removed on 8/2/21.     Readmitted 10/2021 with severe symptomatic anemia (Hgb 5g/dL).  - Transfused 2 units PRBC.  - Anemia was due to hemolysis caused by dapsone (despite normal G6PD levels prior to initiation).     Readmitted Nov 2021 with YANELY   - US showed increased velocities concerning for TRAS but no tardus parvus waves. DSA negative, and cFDNA 0.5%. No intervention.    Readmitted Jan 2022 with worsening anemia, hyperkalemia, tested positive for COVID but had no symptoms.     Now readmitted with rising SCr and AMR on recent graft bx (9/29) +C4D. DSA negative  9/30 Started pulse steroids (was given solumedrol 500/250/125/followed by taper).   10/3 PLEX and IVIG were started; received  total of 3 sessions (last on 10/7). Planning for total of 5 sessions of PLEX/ IVIG w/ remaining two sessions to be completed as outpatient.      She was evaluated by the multi-disciplinary team including surgeon, nephrologist, ACP, pharmacist, nutrition, social work, and nursing and deemed stable for discharge with the following plan:     -Is set up for PLEX w/ pheresis in hospital on Mon/Wed (10/10 and 10/12) at 3 PM.  -Is set up for IVIG at [address] [facility], [address], [place] [address]. Schedule for Tues/Thurs (10/11 and 10/13) Infusion center ([place]) to call patient w/ timing, likely will be at 9AM.  -IS: Env 9, MMF 1g BID, Pred 10 QD.  -Needs labwork and follow up w/ Dr. Louise on 7/14/22.         Renal txp recipient:       Immunosupression      HTN  Be sure to follow a low salt diet, avoid caffeine, reduce alcohol intake.  If you have been prescribed antihypertensive medications to control your blood pressure, be sure to take them every day as prescribed and do not miss any doses, the medications do not work if they are not taken consistently. Follow up with your Primary Care Doctor and have your Blood Pressure checked regularly.     DM      Rejection:      UTI  Practice good personal hygiene. Drink plenty of fluids to flush bacteria out of urinary tract.   Emtpy your bladder completely as soon as you feel the urge or every few hours.   Complete antibiotic course as prescribed.     Hyperkalemia  Hyperkalemia may develop slowly over weeks to months. It can reoccur and become ongoing.   Symptoms, if any, are usually mild and non specific and may include: fatigue, muscle weakness, numbness, or tingling.   Follow a low potassium diet. Most common High potassium foods include Fruids: bananas, melons, oranges, serena, prunes, pomegranate; Vegetables: avocados, broccoli, sweek potatoes, brussel sproiuts, tomato/tomato based products, green leafy vegetables; Dairy products: milk, yogurt, nuts 76F with ESRD due to HTN, was on HD since 2016 via R AVF (previously oliguric), Glaucoma, h/o DVT to L subclavian vein (2017), s/p R DDRT on 5/19/21 with Simulect,   Post transplant course was complicated by DGF. Last HD was on 6/4/21. Ureteral stent was removed on 8/2/21.     Readmitted 10/2021 with severe symptomatic anemia (Hgb 5g/dL).  - Transfused 2 units PRBC.  - Anemia was due to hemolysis caused by dapsone (despite normal G6PD levels prior to initiation).     Readmitted Nov 2021 with YANELY   - US showed increased velocities concerning for TRAS but no tardus parvus waves. DSA negative, and cFDNA 0.5%. No intervention.    Readmitted Jan 2022 with worsening anemia, hyperkalemia, tested positive for COVID but had no symptoms.     Now readmitted with rising SCr and AMR on recent graft bx (9/29) +C4D. DSA negative  9/30 Started pulse steroids (was given solumedrol 500/250/125/followed by taper).   10/3 PLEX and IVIG were started; received  total of 3 sessions (last on 10/7). Planning for total of 5 sessions of PLEX/ IVIG w/ remaining two sessions to be completed as outpatient.      She was evaluated by the multi-disciplinary team including surgeon, nephrologist, ACP, pharmacist, nutrition, social work, and nursing and deemed stable for discharge with the following plan:     -Is set up for PLEX w/ pheresis in hospital on Mon/Wed (10/10 and 10/12) at 3 PM.  -Is set up for IVIG at [address] [facility], [address], [facility] [address]. Schedule for Tues/Thurs (10/11 and 10/13) Infusion center ([place]) to call patient w/ timing, likely will be at 9AM.  -IS: Env 9, MMF 1g BID, Pred 10 QD.  -Needs labwork and follow up w/ Dr. Louise on 7/14/22.        76F with ESRD due to HTN, was on HD since 2016 via R AVF (previously oliguric), Glaucoma, h/o DVT to L subclavian vein (2017), s/p R DDRT on 5/19/21 with Simulect,   Post transplant course was complicated by DGF. Last HD was on 6/4/21. Ureteral stent was removed on 8/2/21.     Readmitted 10/2021 with severe symptomatic anemia (Hgb 5g/dL).  - Transfused 2 units PRBC.  - Anemia was due to hemolysis caused by dapsone (despite normal G6PD levels prior to initiation).     Readmitted Nov 2021 with YANELY   - US showed increased velocities concerning for TRAS but no tardus parvus waves. DSA negative, and cFDNA 0.5%. No intervention.    Readmitted Jan 2022 with worsening anemia, hyperkalemia, tested positive for COVID but had no symptoms.     Now readmitted with rising SCr and AMR on recent graft bx (9/29) +C4D. DSA negative  9/30 Started pulse steroids (was given solumedrol 500/250/125/followed by taper).   10/3 PLEX and IVIG were started; received  total of 3 sessions (last on 10/7). Planning for total of 5 sessions of PLEX/ IVIG w/ remaining two sessions to be completed as outpatient. He was started on losartan during this admission for questionable non HLA Ab rejection.     She was evaluated by the multi-disciplinary team including surgeon, nephrologist, ACP, pharmacist, nutrition, social work, and nursing and deemed stable for discharge with the following plan:     -Is set up for PLEX w/ pheresis in hospital on Mon/Wed (10/10 and 10/12) at 3 PM.  -Is set up for IVIG at [address] [facility], [address], [place] [id number]. Schedule for Tues/Thurs (10/11 and 10/13) Infusion center ([place]) to call patient w/ timing, likely will be at 9AM.  -IS: Env 9, MMF 1g BID, Pred 10 QD.  -Needs labwork and follow up w/ Dr. Louise on 7/14/22.        76F with ESRD due to HTN, was on HD since 2016 via R AVF (previously oliguric), Glaucoma, h/o DVT to L subclavian vein (2017), s/p R DDRT on 5/19/21 with Simulect,   Post transplant course was complicated by DGF. Last HD was on 6/4/21. Ureteral stent was removed on 8/2/21.     Readmitted 10/2021 with severe symptomatic anemia (Hgb 5g/dL).  - Transfused 2 units PRBC.  - Anemia was due to hemolysis caused by dapsone (despite normal G6PD levels prior to initiation).     Readmitted Nov 2021 with YANELY   - US showed increased velocities concerning for TRAS but no tardus parvus waves. DSA negative, and cFDNA 0.5%. No intervention.    Readmitted Jan 2022 with worsening anemia, hyperkalemia, tested positive for COVID but had no symptoms.     Now readmitted with rising SCr and AMR on recent graft bx (9/29) +C4D. DSA negative  9/30 Started pulse steroids (was given solumedrol 500/250/125/followed by taper).   10/3 PLEX and IVIG were started; received  total of 3 sessions (last on 10/7). Planning for total of 5 sessions of PLEX/ IVIG w/ remaining two sessions to be completed as outpatient. He was started on losartan during this admission for questionable non HLA Ab rejection.     She was evaluated by the multi-disciplinary team including surgeon, nephrologist, ACP, pharmacist, nutrition, social work, and nursing and deemed stable for discharge with the following plan:     -Is set up for PLEX w/ pheresis in hospital on Mon/Wed (10/10 and 10/12) at 3 PM.  -Is set up for IVIG at [address] [facility], [address], [place] [id number]. Schedule for Tues/Thurs (10/11 and 10/13) Infusion center ([place]) to call patient w/ timing, likely will be at 9AM.  -IS: Env 9, MMF 1g BID, Pred 10 QD.  -Needs labwork and follow up w/ Dr. Louise on 10/14/22.

## 2022-10-07 NOTE — PROGRESS NOTE ADULT - PROBLEM SELECTOR PROBLEM 1
Immunosuppressive management encounter following kidney transplant

## 2022-10-07 NOTE — CHART NOTE - NSCHARTNOTEFT_GEN_A_CORE
The patient is a 76F with ESRD due to HTN who was on HD since 2016 via R AVF s/p underwent DDRT brian 5/19/21, glaucoma, and DVT to L subclavian vein (2017). Recent serum Cr was 2.12 mg/dL. Graft biopsy on 9/29/22 showed AMR, C4D+. DSA testing is negative. The patient was admitted for therapy including pulse steroids and plasma exchange. PLEX was initiated on 10/3/22. A course of 5 PLEX in alternate day setting is planned.    PLEX#1 on 10/3/22 tolerated well. Patient received IVIG after PLEX procedure.    PLEX#2 on 10/5/22, one plasma volume with 5% albumin as replacement fluid, tolerated well.     PLEX#3 currently scheduled on 10/7/22, one plasma volume with 5% albumin as replacement fluid. Recent Cr is 1.76 mg/dL.     PLEX#4 _____. The patient is a 76 year old female with ESRD due to HTN who was on HD since 2016 via R AVF s/p DDRT on 5/19/21, glaucoma, and DVT to L subclavian vein (2017). Recent serum Cr was 2.12 mg/dL. Graft biopsy on 9/29/22 showed AMR, C4D+. DSA testing is negative. The patient was admitted for AMR treatmnent including pulse steroids and plasma exchange. PLEX was initiated on 10/3/22. A course of 5 PLEX in alternate day setting is planned.    PLEX#1 on 10/3/22, tolerated well.     PLEX#2 on 10/5/22, tolerated well.     PLEX#3 today, 10/7/22, one plasma volume with 5% albumin as replacement fluid. Recent Cr is 1.76 mg/dL. Patient tolerated well. Patient will receive IVIG following today's procedure and be discharged. We will plan for remainder of procedures as an outpatient, in coordination with the Transplant team.    PLEX#4 scheduled for Monday, 10/10/22 and PLEX #5 scheduled for Wednesday 10/12/22.

## 2022-10-07 NOTE — DISCHARGE NOTE PROVIDER - NSDCMRMEDTOKEN_GEN_ALL_CORE_FT
aspirin 81 mg oral tablet, chewable: 1 tab(s) orally every other day  atovaquone 750 mg/5 mL oral suspension: 10 milliliter(s) orally once a day  Benadryl 25 mg oral tablet: 1 tab(s) orally once a day   hydrALAZINE 100 mg oral tablet: 1 tab(s) orally 3 times a day  Immune globulin 10% IVPB 10g on 10/13/22: 1 application intravenous once a day   Immune globumin 10% IVPB 6g on 10/11/22 in the AM: 1 application intravenous once a day   labetalol 200 mg oral tablet: 1 tab(s) orally 2 times a day  latanoprost 0.005% ophthalmic solution: 1 drop(s) to each affected eye once a day (at bedtime)  Lokelma 10 g oral powder for reconstitution: 1 packet(s) orally once a day  losartan 25 mg oral tablet: 1 tab(s) orally once a day  NIFEdipine 60 mg oral tablet, extended release: 1 tab(s) orally 2 times a day  Octagam 10g on 10/13/22: 1 application intravenous once a day   octogam 6g on 10/11/22: 1 application intravenous once a day   pantoprazole 40 mg oral delayed release tablet: 1 tab(s) orally once a day (before a meal)  Plamapheresis two more times as outpatient. Scheduled for Monday 10/10/22 and Wed 10/12/22. : 1 application subcutaneous once a day   predniSONE 5 mg oral tablet: 2 tab(s) orally once a day  Procrit 10,000 units/mL preservative-free injectable solution: injectable once a week  sodium bicarbonate 650 mg oral tablet: 2 tab(s) orally 2 times a day  tacrolimus 1 mg oral tablet, extended release: 4 milligram(s) orally once a day   aspirin 81 mg oral tablet, chewable: 1 tab(s) orally every other day  atovaquone 750 mg/5 mL oral suspension: 10 milliliter(s) orally once a day  labetalol 200 mg oral tablet: 1 tab(s) orally 3 times a day   latanoprost 0.005% ophthalmic solution: 1 drop(s) to each affected eye once a day (at bedtime)  losartan 25 mg oral tablet: 1 tab(s) orally once a day   mycophenolate mofetil 250 mg oral capsule: 1000 milligram(s) orally 2 times a day  NIFEdipine 60 mg oral tablet, extended release: 1 tab(s) orally 2 times a day  nystatin 100,000 units/mL oral suspension: 5 milliliter(s) orally 4 times a day  Octagam 10g on 10/13/22: 1 application intravenous once a day   octogam 6g on 10/11/22: 1 application intravenous once a day   pantoprazole 40 mg oral delayed release tablet: 1 tab(s) orally once a day (before a meal)  Plamapheresis two more times as outpatient. Scheduled for Monday 10/10/22 and Wed 10/12/22. : 1 application subcutaneous once a day   predniSONE 5 mg oral tablet: 2 tab(s) orally once a day  senna leaf extract oral tablet: 2 tab(s) orally once a day (at bedtime) as needed for constipation.  tacrolimus 1 mg oral tablet, extended release: 9 tab(s) orally once a day  valGANciclovir 450 mg oral tablet: 1 tab(s) orally Monday, Wednesday, and Friday

## 2022-10-07 NOTE — PROGRESS NOTE ADULT - PROBLEM SELECTOR PROBLEM 2
Antibody mediated rejection of renal transplant

## 2022-10-07 NOTE — PROGRESS NOTE ADULT - NS ATTEND BILL GEN_ALL_CORE
Provider E-Visit time total (minutes): 4  
Attending to bill

## 2022-10-07 NOTE — PROGRESS NOTE ADULT - PROBLEM SELECTOR PLAN 4
Patient blood pressure still elevated   [] Continue Losartan 25 mg PO daily and consider increasing to 50 mg PO daily if Renal panel remains stable   [] Consider increasing Labetalol to 300 m PO TID if patient still persistently hypertensive and heart rate permits  [] Continue Nifedipine 60 mg PO BID
Patient blood pressure within normal to high range  [] Continue  Losartan 25 mg PO daily  [] Increased Labetalol to 300 mg PO TID   []  Continue Nifedipine 60 mg PO daily
Patient blood pressure within normal to high range  [] Start Losartan 25 mg PO daily  [] Decrease Labetalol 200 mg PO TID  []  Continue Nifedipine 60 mg PO daily
Increase labetalol to 300 mg PO BID  continue nifedipine 60 mg PO daily  [] monitor BP daily and monitor hemodynamics

## 2022-10-07 NOTE — DISCHARGE NOTE NURSING/CASE MANAGEMENT/SOCIAL WORK - HAS THE PATIENT RECEIVED THE INFLUENZA VACCINE THIS SEASON?
patient refused to get nasally suctioned at first but upon auscultation, b/l crackles & audibly sounds junky. yes...

## 2022-10-07 NOTE — DISCHARGE NOTE NURSING/CASE MANAGEMENT/SOCIAL WORK - NSDCPEFALRISK_GEN_ALL_CORE
For information on Fall & Injury Prevention, visit: [email or web address] OR  [email or web address] OR  https://www.cdc.gov/steadi/patient.html
Specialty Care

## 2022-10-07 NOTE — PROGRESS NOTE ADULT - ASSESSMENT
76F with ESRD due to HTN, was on HD since 2016 via R AVF (previously oliguric), Glaucoma, h/o DVT to L subclavian vein (2017), s/p R DDRT on 5/19/21 under Simulect (stent removed), post transplant course was complicated by DGF. Last HD was on 6/4/21. 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  C4D positive.  - DSA Negative DSA  - Pulse steroids: Solumedrol 500mg x1 -> 250mg x2 -> 125mg x1 ->  Prednisone taper to stay at Prednisone 10mg po qd  - Plan for PLEX QOD with IVIG x 5 sessions. Next PLEX + 6gm IVIG session today. Setting up PLEX/IVIG for 2 more doses as outpatient.   - Continue Losartan 25mg po qd for possible non-HLA Ab: [id number]  - Resume home ASA 81mg qd  - PPX SQH BID  - SCDs/Mobilize OOB  - Strict I/O  - Diet as tolerated  - bowel regimen    [] Immuno  - Env by level, MMF 1/1, Steroid taper  - PPX: valcyte/atovaqone/nystatin    [] HTN  - Controlled  - Continue Labetolol 200mg TID, Nifedipine    []Dispo  -likely home today if able to set up PLEX/IVIG outpatient.

## 2022-10-07 NOTE — DISCHARGE NOTE PROVIDER - CARE PROVIDER_API CALL
Simi Louise (MD; MPH)  Internal Medicine; Nephrology  [address] [place] [address]  Phone: [phone]  Fax: [phone]  Follow Up Time:

## 2022-10-07 NOTE — PROGRESS NOTE ADULT - SUBJECTIVE AND OBJECTIVE BOX
[facility] DIVISION OF KIDNEY DISEASES AND HYPERTENSION -- FOLLOW UP NOTE  --------------------------------------------------------------------------------  Chief Complaint:    24 hour events/subjective:    Patient seen and examined at the bedside   BP: 150s-170s   UOP: 1220 ml   S/p 2/5 PLEX sessions and IVIG infusion     PAST HISTORY  --------------------------------------------------------------------------------  No significant changes to PMH, PSH, FHx, SHx, unless otherwise noted    ALLERGIES & MEDICATIONS  --------------------------------------------------------------------------------  Allergies    Mushrooms (Anaphylaxis)  penicillin (Rash)    Intolerances      Standing Inpatient Medications  aspirin enteric coated 81 milliGRAM(s) Oral daily  atovaquone  Suspension 1500 milliGRAM(s) Oral daily  epoetin ivelisse-epbx (RETACRIT) Injectable 30973 Unit(s) SubCutaneous every 7 days  heparin   Injectable 5000 Unit(s) SubCutaneous every 12 hours  labetalol 200 milliGRAM(s) Oral three times a day  latanoprost 0.005% Ophthalmic Solution 1 Drop(s) Both EYES at bedtime  losartan 25 milliGRAM(s) Oral daily  mycophenolate mofetil 1000 milliGRAM(s) Oral <User Schedule>  NIFEdipine XL 60 milliGRAM(s) Oral two times a day  nystatin    Suspension 498581 Unit(s) Oral four times a day  pantoprazole    Tablet 40 milliGRAM(s) Oral before breakfast  predniSONE   Tablet 10 milliGRAM(s) Oral daily  predniSONE   Tablet   Oral   senna 2 Tablet(s) Oral at bedtime  sodium bicarbonate 1300 milliGRAM(s) Oral three times a day  tacrolimus ER Tablet (ENVARSUS XR) 9 milliGRAM(s) Oral <User Schedule>  valGANciclovir 450 milliGRAM(s) Oral <User Schedule>    PRN Inpatient Medications      REVIEW OF SYSTEMS  --------------------------------------------------------------------------------      All other systems were reviewed and are negative, except as noted.    VITALS/PHYSICAL EXAM  --------------------------------------------------------------------------------  T(C): 36.4 (10-07-22 @ 05:00), Max: 37.1 (10-07-22 @ 01:00)  HR: 60 (10-07-22 @ 05:00) (60 - 86)  BP: 151/71 (10-07-22 @ 05:00) (125/62 - 171/70)  RR: 18 (10-07-22 @ 05:00) (18 - 18)  SpO2: 98% (10-07-22 @ 05:00) (96% - 99%)  Wt(kg): --        10-05-22 @ 07:01  -  10-06-22 @ 07:00  --------------------------------------------------------  IN: 960 mL / OUT: 1850 mL / NET: -890 mL    10-06-22 @ 07:01  -  10-07-22 @ 06:51  --------------------------------------------------------  IN: 960 mL / OUT: 1220 mL / NET: -260 mL        Physical Exam:  	Gen: NAD, Normocephalic   	HEENT: MMM, Anicteric   	Pulm: CTA B/L, No respiratory distress   	CV: S1S2, regular rate and rhythm   	Abd: Soft, +BS   	Ext: No LE edema B/L  	Neuro: Awake and oriented   	Skin: Warm and dry  	Vascular access: RIght UE fistula       LABS/STUDIES  --------------------------------------------------------------------------------              9.1    3.95  >-----------<  127      [10-06-22 @ 06:51]              29.0     140  |  115  |  60  ----------------------------<  112      [10-06-22 @ 06:30]  5.0   |  14  |  1.96        Ca     9.5     [10-06-22 @ 06:30]      iCa    1.37     [10-06 @ 06:30]      Mg     2.0     [10-06-22 @ 06:30]      Phos  4.1     [10-06-22 @ 06:30]    TPro  5.1  /  Alb  3.8  /  TBili  0.2  /  DBili  x   /  AST  8   /  ALT  6   /  AlkPhos  25  [10-06-22 @ 06:30]          Creatinine Trend:  SCr 1.96 [10-06 @ 06:30]  SCr 2.02 [10-05 @ 06:27]  SCr 2.13 [10-04 @ 06:39]  SCr 1.97 [10-03 @ 06:23]  SCr 1.96 [10-02 @ 06:50]        Iron 53, TIBC 205, %sat 26      [02-03-22 @ 02:41]  HbA1c 4.6      [05-28-19 @ 09:49]

## 2022-10-10 ENCOUNTER — OUTPATIENT (OUTPATIENT)
Dept: OUTPATIENT SERVICES | Facility: HOSPITAL | Age: 77
LOS: 1 days | End: 2022-10-10

## 2022-10-10 DIAGNOSIS — Z94.0 KIDNEY TRANSPLANT STATUS: Chronic | ICD-10-CM

## 2022-10-10 DIAGNOSIS — Z86.69 PERSONAL HISTORY OF OTHER DISEASES OF THE NERVOUS SYSTEM AND SENSE ORGANS: Chronic | ICD-10-CM

## 2022-10-10 DIAGNOSIS — H26.9 UNSPECIFIED CATARACT: Chronic | ICD-10-CM

## 2022-10-10 DIAGNOSIS — Z99.2 DEPENDENCE ON RENAL DIALYSIS: Chronic | ICD-10-CM

## 2022-10-10 DIAGNOSIS — Z90.710 ACQUIRED ABSENCE OF BOTH CERVIX AND UTERUS: Chronic | ICD-10-CM

## 2022-10-10 DIAGNOSIS — I77.0 ARTERIOVENOUS FISTULA, ACQUIRED: Chronic | ICD-10-CM

## 2022-10-10 DIAGNOSIS — T86.11 KIDNEY TRANSPLANT REJECTION: ICD-10-CM

## 2022-10-10 LAB
APTT BLD: 31.8 SEC — SIGNIFICANT CHANGE UP (ref 27.5–35.5)
CA-I BLD-SCNC: 1.26 MMOL/L — SIGNIFICANT CHANGE UP (ref 1.15–1.33)
FIBRINOGEN PPP-MCNC: 204 MG/DL — LOW (ref 330–520)
HCT VFR BLD CALC: 24.2 % — LOW (ref 34.5–45)
HGB BLD-MCNC: 7.6 G/DL — LOW (ref 11.5–15.5)
INR BLD: 1.14 RATIO — SIGNIFICANT CHANGE UP (ref 0.88–1.16)
MCHC RBC-ENTMCNC: 27.1 PG — SIGNIFICANT CHANGE UP (ref 27–34)
MCHC RBC-ENTMCNC: 31.4 GM/DL — LOW (ref 32–36)
MCV RBC AUTO: 86.4 FL — SIGNIFICANT CHANGE UP (ref 80–100)
NRBC # BLD: 0 /100 WBCS — SIGNIFICANT CHANGE UP (ref 0–0)
PLATELET # BLD AUTO: 119 K/UL — LOW (ref 150–400)
PROTHROM AB SERPL-ACNC: 13.1 SEC — SIGNIFICANT CHANGE UP (ref 10.5–13.4)
RBC # BLD: 2.8 M/UL — LOW (ref 3.8–5.2)
RBC # FLD: 14.4 % — SIGNIFICANT CHANGE UP (ref 10.3–14.5)
WBC # BLD: 3.1 K/UL — LOW (ref 3.8–10.5)
WBC # FLD AUTO: 3.1 K/UL — LOW (ref 3.8–10.5)

## 2022-10-10 PROCEDURE — 36514 APHERESIS PLASMA: CPT

## 2022-10-12 ENCOUNTER — OUTPATIENT (OUTPATIENT)
Dept: OUTPATIENT SERVICES | Facility: HOSPITAL | Age: 77
LOS: 1 days | End: 2022-10-12

## 2022-10-12 ENCOUNTER — APPOINTMENT (OUTPATIENT)
Dept: NEPHROLOGY | Facility: CLINIC | Age: 77
End: 2022-10-12

## 2022-10-12 ENCOUNTER — INPATIENT (INPATIENT)
Facility: HOSPITAL | Age: 77
LOS: 0 days | Discharge: HOME | DRG: 640 | End: 2022-10-13
Attending: STUDENT IN AN ORGANIZED HEALTH CARE EDUCATION/TRAINING PROGRAM | Admitting: STUDENT IN AN ORGANIZED HEALTH CARE EDUCATION/TRAINING PROGRAM
Payer: MEDICARE

## 2022-10-12 ENCOUNTER — LABORATORY RESULT (OUTPATIENT)
Age: 77
End: 2022-10-12

## 2022-10-12 VITALS
HEIGHT: 66 IN | HEART RATE: 65 BPM | OXYGEN SATURATION: 100 % | RESPIRATION RATE: 18 BRPM | DIASTOLIC BLOOD PRESSURE: 81 MMHG | SYSTOLIC BLOOD PRESSURE: 213 MMHG | WEIGHT: 130.07 LBS

## 2022-10-12 VITALS
DIASTOLIC BLOOD PRESSURE: 63 MMHG | TEMPERATURE: 97.9 F | HEIGHT: 66 IN | BODY MASS INDEX: 20.89 KG/M2 | SYSTOLIC BLOOD PRESSURE: 158 MMHG | OXYGEN SATURATION: 99 % | RESPIRATION RATE: 18 BRPM | HEART RATE: 66 BPM | WEIGHT: 130 LBS

## 2022-10-12 DIAGNOSIS — Z99.2 DEPENDENCE ON RENAL DIALYSIS: Chronic | ICD-10-CM

## 2022-10-12 DIAGNOSIS — I77.0 ARTERIOVENOUS FISTULA, ACQUIRED: Chronic | ICD-10-CM

## 2022-10-12 DIAGNOSIS — Z86.69 PERSONAL HISTORY OF OTHER DISEASES OF THE NERVOUS SYSTEM AND SENSE ORGANS: Chronic | ICD-10-CM

## 2022-10-12 DIAGNOSIS — Z94.0 KIDNEY TRANSPLANT STATUS: Chronic | ICD-10-CM

## 2022-10-12 DIAGNOSIS — T86.11 KIDNEY TRANSPLANT REJECTION: ICD-10-CM

## 2022-10-12 DIAGNOSIS — H26.9 UNSPECIFIED CATARACT: Chronic | ICD-10-CM

## 2022-10-12 DIAGNOSIS — Z90.710 ACQUIRED ABSENCE OF BOTH CERVIX AND UTERUS: Chronic | ICD-10-CM

## 2022-10-12 DIAGNOSIS — L03.90 CELLULITIS, UNSPECIFIED: ICD-10-CM

## 2022-10-12 LAB
ALBUMIN SERPL ELPH-MCNC: 4.5 G/DL
ALBUMIN SERPL ELPH-MCNC: 4.6 G/DL — SIGNIFICANT CHANGE UP (ref 3.3–5)
ALP BLD-CCNC: 31 U/L
ALP SERPL-CCNC: 31 U/L — LOW (ref 40–120)
ALT FLD-CCNC: 11 U/L — SIGNIFICANT CHANGE UP (ref 10–45)
ALT SERPL-CCNC: 10 U/L
ANION GAP SERPL CALC-SCNC: 10 MMOL/L
ANION GAP SERPL CALC-SCNC: 10 MMOL/L — SIGNIFICANT CHANGE UP (ref 5–17)
ANION GAP SERPL CALC-SCNC: 10 MMOL/L — SIGNIFICANT CHANGE UP (ref 5–17)
APTT BLD: 31.7 SEC — SIGNIFICANT CHANGE UP (ref 27.5–35.5)
AST SERPL-CCNC: 12 U/L
AST SERPL-CCNC: 13 U/L — SIGNIFICANT CHANGE UP (ref 10–40)
BASE EXCESS BLDV CALC-SCNC: -6.6 MMOL/L — LOW (ref -2–3)
BASOPHILS # BLD AUTO: 0.03 K/UL — SIGNIFICANT CHANGE UP (ref 0–0.2)
BASOPHILS NFR BLD AUTO: 0.8 % — SIGNIFICANT CHANGE UP (ref 0–2)
BILIRUB SERPL-MCNC: 0.3 MG/DL
BILIRUB SERPL-MCNC: 0.3 MG/DL — SIGNIFICANT CHANGE UP (ref 0.2–1.2)
BUN SERPL-MCNC: 40 MG/DL — HIGH (ref 7–23)
BUN SERPL-MCNC: 43 MG/DL
BUN SERPL-MCNC: 43 MG/DL — HIGH (ref 7–23)
CA-I BLD-SCNC: 1.28 MMOL/L — SIGNIFICANT CHANGE UP (ref 1.15–1.33)
CA-I SERPL-SCNC: 1.42 MMOL/L — HIGH (ref 1.15–1.33)
CALCIUM SERPL-MCNC: 9.7 MG/DL — SIGNIFICANT CHANGE UP (ref 8.4–10.5)
CALCIUM SERPL-MCNC: 9.7 MG/DL — SIGNIFICANT CHANGE UP (ref 8.4–10.5)
CALCIUM SERPL-MCNC: 9.8 MG/DL
CHLORIDE BLDV-SCNC: 114 MMOL/L — HIGH (ref 96–108)
CHLORIDE SERPL-SCNC: 115 MMOL/L — HIGH (ref 96–108)
CHLORIDE SERPL-SCNC: 116 MMOL/L — HIGH (ref 96–108)
CHLORIDE SERPL-SCNC: 117 MMOL/L
CO2 BLDV-SCNC: 21 MMOL/L — LOW (ref 22–26)
CO2 SERPL-SCNC: 17 MMOL/L — LOW (ref 22–31)
CO2 SERPL-SCNC: 17 MMOL/L — LOW (ref 22–31)
CO2 SERPL-SCNC: 18 MMOL/L
CREAT SERPL-MCNC: 1.41 MG/DL — HIGH (ref 0.5–1.3)
CREAT SERPL-MCNC: 1.47 MG/DL — HIGH (ref 0.5–1.3)
CREAT SERPL-MCNC: 1.74 MG/DL
CREAT SPEC-SCNC: 67 MG/DL
CREAT/PROT UR: 1.4 RATIO
DACRYOCYTES BLD QL SMEAR: SLIGHT — SIGNIFICANT CHANGE UP
EGFR: 30 ML/MIN/1.73M2
EGFR: 36 ML/MIN/1.73M2 — LOW
EGFR: 38 ML/MIN/1.73M2 — LOW
EOSINOPHIL # BLD AUTO: 0 K/UL — SIGNIFICANT CHANGE UP (ref 0–0.5)
EOSINOPHIL NFR BLD AUTO: 0 % — SIGNIFICANT CHANGE UP (ref 0–6)
FIBRINOGEN PPP-MCNC: 194 MG/DL — LOW (ref 330–520)
FLUAV AG NPH QL: SIGNIFICANT CHANGE UP
FLUBV AG NPH QL: SIGNIFICANT CHANGE UP
GAS PNL BLDV: 140 MMOL/L — SIGNIFICANT CHANGE UP (ref 136–145)
GAS PNL BLDV: SIGNIFICANT CHANGE UP
GAS PNL BLDV: SIGNIFICANT CHANGE UP
GLUCOSE BLDC GLUCOMTR-MCNC: 122 MG/DL — HIGH (ref 70–99)
GLUCOSE BLDV-MCNC: 91 MG/DL — SIGNIFICANT CHANGE UP (ref 70–99)
GLUCOSE SERPL-MCNC: 104 MG/DL
GLUCOSE SERPL-MCNC: 110 MG/DL — HIGH (ref 70–99)
GLUCOSE SERPL-MCNC: 97 MG/DL — SIGNIFICANT CHANGE UP (ref 70–99)
HCO3 BLDV-SCNC: 20 MMOL/L — LOW (ref 22–29)
HCT VFR BLD CALC: 23.2 % — LOW (ref 34.5–45)
HCT VFR BLD CALC: 25.9 % — LOW (ref 34.5–45)
HCT VFR BLDA CALC: 24 % — LOW (ref 34.5–46.5)
HGB BLD CALC-MCNC: 7.9 G/DL — LOW (ref 11.7–16.1)
HGB BLD-MCNC: 7.3 G/DL — LOW (ref 11.5–15.5)
HGB BLD-MCNC: 7.9 G/DL — LOW (ref 11.5–15.5)
INR BLD: 1.25 RATIO — HIGH (ref 0.88–1.16)
LACTATE BLDV-MCNC: 0.7 MMOL/L — SIGNIFICANT CHANGE UP (ref 0.5–2)
LYMPHOCYTES # BLD AUTO: 0.41 K/UL — LOW (ref 1–3.3)
LYMPHOCYTES # BLD AUTO: 12.2 % — LOW (ref 13–44)
MAGNESIUM SERPL-MCNC: 1.5 MG/DL
MANUAL SMEAR VERIFICATION: SIGNIFICANT CHANGE UP
MCHC RBC-ENTMCNC: 27 PG — SIGNIFICANT CHANGE UP (ref 27–34)
MCHC RBC-ENTMCNC: 27.2 PG — SIGNIFICANT CHANGE UP (ref 27–34)
MCHC RBC-ENTMCNC: 30.5 GM/DL — LOW (ref 32–36)
MCHC RBC-ENTMCNC: 31.5 GM/DL — LOW (ref 32–36)
MCV RBC AUTO: 85.9 FL — SIGNIFICANT CHANGE UP (ref 80–100)
MCV RBC AUTO: 89.3 FL — SIGNIFICANT CHANGE UP (ref 80–100)
MONOCYTES # BLD AUTO: 0.03 K/UL — SIGNIFICANT CHANGE UP (ref 0–0.9)
MONOCYTES NFR BLD AUTO: 0.9 % — LOW (ref 2–14)
NEUTROPHILS # BLD AUTO: 2.87 K/UL — SIGNIFICANT CHANGE UP (ref 1.8–7.4)
NEUTROPHILS NFR BLD AUTO: 86.1 % — HIGH (ref 43–77)
NRBC # BLD: 0 /100 WBCS — SIGNIFICANT CHANGE UP (ref 0–0)
OVALOCYTES BLD QL SMEAR: SLIGHT — SIGNIFICANT CHANGE UP
PCO2 BLDV: 42 MMHG — SIGNIFICANT CHANGE UP (ref 39–42)
PH BLDV: 7.28 — LOW (ref 7.32–7.43)
PHOSPHATE SERPL-MCNC: 3.1 MG/DL
PLAT MORPH BLD: NORMAL — SIGNIFICANT CHANGE UP
PLATELET # BLD AUTO: 125 K/UL — LOW (ref 150–400)
PLATELET # BLD AUTO: 127 K/UL — LOW (ref 150–400)
PO2 BLDV: 30 MMHG — SIGNIFICANT CHANGE UP (ref 25–45)
POIKILOCYTOSIS BLD QL AUTO: SLIGHT — SIGNIFICANT CHANGE UP
POTASSIUM BLDV-SCNC: 5 MMOL/L — SIGNIFICANT CHANGE UP (ref 3.5–5.1)
POTASSIUM SERPL-MCNC: 5.3 MMOL/L — SIGNIFICANT CHANGE UP (ref 3.5–5.3)
POTASSIUM SERPL-MCNC: 6.6 MMOL/L — CRITICAL HIGH (ref 3.5–5.3)
POTASSIUM SERPL-SCNC: 5.3 MMOL/L — SIGNIFICANT CHANGE UP (ref 3.5–5.3)
POTASSIUM SERPL-SCNC: 6.1 MMOL/L
POTASSIUM SERPL-SCNC: 6.6 MMOL/L — CRITICAL HIGH (ref 3.5–5.3)
PROT SERPL-MCNC: 5.6 G/DL
PROT SERPL-MCNC: 5.9 G/DL — LOW (ref 6–8.3)
PROT UR-MCNC: 96 MG/DL
PROTHROM AB SERPL-ACNC: 14.4 SEC — HIGH (ref 10.5–13.4)
RBC # BLD: 2.7 M/UL — LOW (ref 3.8–5.2)
RBC # BLD: 2.9 M/UL — LOW (ref 3.8–5.2)
RBC # FLD: 14.8 % — HIGH (ref 10.3–14.5)
RBC # FLD: 14.8 % — HIGH (ref 10.3–14.5)
RBC BLD AUTO: ABNORMAL
RSV RNA NPH QL NAA+NON-PROBE: SIGNIFICANT CHANGE UP
SAO2 % BLDV: 48.6 % — LOW (ref 67–88)
SARS-COV-2 RNA SPEC QL NAA+PROBE: SIGNIFICANT CHANGE UP
SODIUM SERPL-SCNC: 142 MMOL/L — SIGNIFICANT CHANGE UP (ref 135–145)
SODIUM SERPL-SCNC: 143 MMOL/L — SIGNIFICANT CHANGE UP (ref 135–145)
SODIUM SERPL-SCNC: 145 MMOL/L
URATE SERPL-MCNC: 7.9 MG/DL
WBC # BLD: 3.33 K/UL — LOW (ref 3.8–10.5)
WBC # BLD: 3.51 K/UL — LOW (ref 3.8–10.5)
WBC # FLD AUTO: 3.33 K/UL — LOW (ref 3.8–10.5)
WBC # FLD AUTO: 3.51 K/UL — LOW (ref 3.8–10.5)

## 2022-10-12 PROCEDURE — 99215 OFFICE O/P EST HI 40 MIN: CPT

## 2022-10-12 PROCEDURE — 99214 OFFICE O/P EST MOD 30 MIN: CPT

## 2022-10-12 PROCEDURE — 99291 CRITICAL CARE FIRST HOUR: CPT | Mod: FS

## 2022-10-12 RX ORDER — MYCOPHENOLATE MOFETIL 250 MG/1
1000 CAPSULE ORAL ONCE
Refills: 0 | Status: COMPLETED | OUTPATIENT
Start: 2022-10-12 | End: 2022-10-12

## 2022-10-12 RX ORDER — INSULIN HUMAN 100 [IU]/ML
5 INJECTION, SOLUTION SUBCUTANEOUS ONCE
Refills: 0 | Status: COMPLETED | OUTPATIENT
Start: 2022-10-12 | End: 2022-10-12

## 2022-10-12 RX ORDER — DOXAZOSIN 4 MG/1
4 TABLET ORAL
Qty: 90 | Refills: 3 | Status: DISCONTINUED | COMMUNITY
Start: 2022-02-15 | End: 2022-10-12

## 2022-10-12 RX ORDER — SODIUM ZIRCONIUM CYCLOSILICATE 10 G/10G
10 POWDER, FOR SUSPENSION ORAL
Qty: 1 | Refills: 3 | Status: DISCONTINUED | COMMUNITY
Start: 2021-09-14 | End: 2022-10-12

## 2022-10-12 RX ORDER — SULFAMETHOXAZOLE AND TRIMETHOPRIM 400; 80 MG/1; MG/1
400-80 TABLET ORAL
Qty: 90 | Refills: 3 | Status: DISCONTINUED | COMMUNITY
Start: 2021-10-11 | End: 2022-10-12

## 2022-10-12 RX ORDER — TACROLIMUS 1 MG/1
1 CAPSULE ORAL DAILY
Qty: 60 | Refills: 11 | Status: DISCONTINUED | COMMUNITY
Start: 2022-08-09 | End: 2022-10-12

## 2022-10-12 RX ORDER — NIFEDIPINE 30 MG
60 TABLET, EXTENDED RELEASE 24 HR ORAL ONCE
Refills: 0 | Status: COMPLETED | OUTPATIENT
Start: 2022-10-12 | End: 2022-10-12

## 2022-10-12 RX ORDER — DEXTROSE 50 % IN WATER 50 %
50 SYRINGE (ML) INTRAVENOUS ONCE
Refills: 0 | Status: COMPLETED | OUTPATIENT
Start: 2022-10-12 | End: 2022-10-12

## 2022-10-12 RX ORDER — FUROSEMIDE 40 MG
40 TABLET ORAL ONCE
Refills: 0 | Status: COMPLETED | OUTPATIENT
Start: 2022-10-12 | End: 2022-10-12

## 2022-10-12 RX ORDER — SODIUM ZIRCONIUM CYCLOSILICATE 10 G/10G
10 POWDER, FOR SUSPENSION ORAL ONCE
Refills: 0 | Status: COMPLETED | OUTPATIENT
Start: 2022-10-12 | End: 2022-10-12

## 2022-10-12 RX ADMIN — Medication 60 MILLIGRAM(S): at 21:06

## 2022-10-12 RX ADMIN — INSULIN HUMAN 5 UNIT(S): 100 INJECTION, SOLUTION SUBCUTANEOUS at 21:04

## 2022-10-12 RX ADMIN — Medication 40 MILLIGRAM(S): at 21:06

## 2022-10-12 RX ADMIN — MYCOPHENOLATE MOFETIL 1000 MILLIGRAM(S): 250 CAPSULE ORAL at 21:06

## 2022-10-12 RX ADMIN — Medication 50 MILLILITER(S): at 20:59

## 2022-10-12 RX ADMIN — SODIUM ZIRCONIUM CYCLOSILICATE 10 GRAM(S): 10 POWDER, FOR SUSPENSION ORAL at 21:06

## 2022-10-12 NOTE — ED PROVIDER NOTE - PHYSICAL EXAMINATION
GENERAL: Not in acute distress, non-toxic appearing  HEAD: normocephalic, atraumatic  HEENT: PERRLA, EOMI, normal conjunctiva, oral mucosa moist, neck supple  CARDIAC: regular rate and rhythm, normal S1 and S2,  no appreciable murmurs  PULM: clear to ascultation bilaterally, no crackles, rales, rhonchi, or wheezing  GI: + mild RLQ tenderness around the site of a recent biopsy, abdomen nondistended, soft, no guarding or rebound tenderness  NEURO: alert and oriented x 3, normal speech, no focal motor or sensory deficits, gait normal, no gross neurologic deficit  MSK: No visible deformities, no peripheral edema, calf tenderness/redness/swelling  SKIN: No visible rashes, dry, well-perfused  PSYCH: appropriate mood and affect

## 2022-10-12 NOTE — ED PROVIDER NOTE - OBJECTIVE STATEMENT
76 year old female with hx of kidney transplant and HTN was supposed to receive her last plasma phoresis earlier today around 2pm where she was found to have a systolic BP of 213. The plasma phoresis was not done and she was sent to the ED for eval and BP control. She reports having a BP in the 130s this morning at home and she has been taking her at home HTN meds consistently. She was given 2 extra doses of her home dose of Labetalol. She denies any symptoms 76 year old female with hx of kidney transplant and HTN was supposed to receive her last plasma phoresis earlier today around 2pm where she was found to have a systolic BP of 213. The plasma phoresis was not done and she was sent to the ED for eval and BP control. She reports having a BP in the 130s this morning at home and she has been taking her at home HTN meds consistently. She was given 2 extra doses of her home dose of Labetalol. She denies any symptoms of chest pain, lightheadedness, SOB, nausea, vomiting, abd pain, diaphoresis, palpitations. Her nephrologist said that they can call back tomorrow and be scheduled to get the plasma phoresis tomorrow.

## 2022-10-12 NOTE — CONSULT NOTE ADULT - ASSESSMENT
76F with ESRD due to HTN, was on HD since 2016 via R AVF (previously oliguric), Glaucoma, h/o DVT to L subclavian vein (2017), s/p R DDRT on 5/19/21 with Simulect,   Post transplant course was complicated by DGF. Last HD was on 6/4/21. Ureteral stent was removed on 8/2/21. She was recently admitted in 9/2022 w/ [facility] on renal biospy, s/p pulse steriods, got PLEX + IVIG X3 times and was discharged home for the remaining two sessions of PLEX and IVIG, now in ED after was found to be hypertensive prior to PLEX #5 session, and also noted to be hyperkalemic on ED labs.    -rec giving lasix 40IV, and lokelma and repeating BMP  -continue home meds and home immunosuppression. Please give Cellcept 1g  once now.   -Please dose Nif 60 X1 now (if patient's home med, on Nif 60 BID).  -If repeat K <5.6, please discharge patient home on low K diet. Patient was educated on maintaing a low K diet.  -Will contact outpatient transplant team to reschedule last session of PLEX and IVIG.   -If repeat K is still elevated, please page us (Transplant Surgery) at [phone] for admission.   -Plan discussed w/ Transplant Nephrologist Dr. Winkler    76F with ESRD due to HTN, was on HD since 2016 via R AVF (previously oliguric), Glaucoma, h/o DVT to L subclavian vein (2017), s/p R DDRT on 5/19/21 with Simulect,   Post transplant course was complicated by DGF. Last HD was on 6/4/21. Ureteral stent was removed on 8/2/21. She was recently admitted in 9/2022 w/ [facility] on renal biospy, s/p pulse steriods, got PLEX + IVIG X3 times and was discharged home for the remaining two sessions of PLEX and IVIG, now in ED after was found to be hypertensive prior to PLEX #5 session, and also noted to be hyperkalemic (likely 2/2 Losartan) on ED labs.    -rec giving lasix 40IV, and lokelma and repeating BMP  -continue home meds and home immunosuppression. Please give Cellcept 1g  once now.   -Please dose Nif 60 X1 now (if patient's home med, on Nif 60 BID).  -If repeat K <5.6, please discharge patient home on low K diet. Patient was educated on maintaining a low K diet.  -Will contact outpatient transplant team to reschedule last session of PLEX and IVIG.   -Please dc home on lokelma 10 QD  -Will need f/u transplant for labs and to see transplant nephrologist Dr. Louise next week (10/14/22).   -If repeat K is still elevated, please page us (Transplant Surgery) at [phone] for admission.   -Plan discussed w/ Transplant Nephrologist Dr. Winkler

## 2022-10-12 NOTE — ED PROVIDER NOTE - CLINICAL SUMMARY MEDICAL DECISION MAKING FREE TEXT BOX
76 year old female with hx of kidney transplant and HTN was supposed to receive her last plasma phoresis earlier today around 2pm where she was found to have a systolic BP of 213. Pt is asymptomatic with benign 76 year old female with hx of kidney transplant and HTN was supposed to receive her last plasma phoresis earlier today around 2pm where she was found to have a systolic BP of 213. Pt is asymptomatic with benign exam. Will order CBC, CMP. Pt can be dc home to follow up with nephro for plasma phoresis tomorrow. If labs come back without abnormalities.

## 2022-10-12 NOTE — ED PROVIDER NOTE - NSFOLLOWUPINSTRUCTIONS_ED_ALL_ED_FT
You were seen in the Emergency Department and found to have high Potassium. The repeat blood work showed that it has come down and we spoke with your transplant team who agree with the plan to discharge you home. Please continue to take your Lokelma and you can resume the rest of your medications.     You will receive a call from your nephrology team to help set up an appointment for your Plasma phoresis.       Hiperpotasemia    Hyperkalemia      La hiperpotasemia se produce cuando el nivel de potasio en la manuela es demasiado alto. El potasio es un nutriente importante que ayuda a los músculos y los nervios a funcionar con normalidad. Influye en el funcionamiento del corazón y ayuda a mantener el equilibrio de líquidos y minerales en el organismo. El exceso de potasio en la manuela puede afectar el funcionamiento normal del corazón.    Normalmente los riñones eliminan (excretan) el potasio del organismo. La hiperpotasemia puede deberse a diferentes afecciones. Puede variar de leve a grave.      ¿Cuáles son las causas?    Esta afección puede ser causada por lo siguiente:•Consumo excesivo de potasio. Para esto, puede hacer lo siguiente:  •Uso de sustitutos de la sal. Estos sustitutos contienen mucha cantidad de potasio.      •Consumo de suplementos de potasio.      •Consumo de alimentos con alto contenido de potasio.      •Eliminación de poca cantidad de potasio. [place] puede suceder si:  •Los riñones no funcionan vahe corresponde. La enfermedad en los riñones (renal), incluida la insuficiencia renal a corto o jessica plazo, es dali causa frecuente de hiperpotasemia.      •Está tomando medicamentos que reduzcan la eliminación de potasio.      •Tiene la enfermedad de [place].      •Tiene dali obstrucción en las vías urinarias, vahe cálculos renales.      •Está en tratamiento para limpiar la manuela de forma mecánica (diálisis) y omite dali sesión.      •Liberación de dali ximena cantidad de potasio de las células en la manuela. [place] puede suceder debido a lo siguiente:  •Lesión en los músculos (rabdomiólisis) u otros tejidos. La mayor parte del potasio se almacena en los músculos.      •Infecciones o quemaduras graves.      •Plasma sanguíneo ácido (acidosis). La acidosis puede aparecer por muchas enfermedades, vahe la diabetes no controlada.          ¿Qué incrementa el riesgo?    Los siguientes factores pueden hacer que sea más propenso a desarrollar esta afección:  •Enfermedad renal. [place]les le genera el mayor riesgo.      •Enfermedad de [place]. Se trata de dali afección por la cual las glándulas suprarrenales no producen suficientes hormonas.      •Alcoholismo o abuso de drogas.      •Uso de determinados medicamentos para la presión arterial, vahe inhibidores de la enzima convertidora de angiotensina (IECA), bloqueadores de receptores de angiotensina II (SOUTH) o diuréticos ahorradores de potasio, vahe espironolactona.      •Lesión o quemadura grave.        ¿Cuáles son los signos o síntomas?    En muchos casos no hay síntomas. Sin embargo, cuando el nivel de potasio se eleva lo suficiente, usted puede manifestar síntomas vahe los siguientes:  •Latidos cardíacos irregulares o muy lentos.      •Náuseas.      •Cansancio (fatiga).      •Confusión.      •Hormigueo en la piel o adormecimiento de las melodie o los pies.      •Calambres musculares.      •Debilidad muscular.      •Incapacidad de moverse (parálisis).        ¿Cómo se diagnostica?    Esta afección se puede diagnosticar en función de lo siguiente:  •Los síntomas y los antecedentes médicos. El médico le hará preguntas sobre el uso de medicamentos recetados o de venta sherice.      •Realizarle un examen físico.      •Realizar análisis de manuela.      •Electrocardiograma (ECG).        ¿Cómo se trata?    El tratamiento depende de la causa y de la gravedad de la afección. Es posible que sea necesario realizar un tratamiento en el hospital. El tratamiento puede incluir:  •Glucosa (azúcar) por vía intravenosa junto con insulina para que el potasio salga de la manuela e ingrese en las células.      •Un medicamento denominado albuterol para que el potasio salga de la manuela e ingrese en las células.      •Medicamentos para eliminar el potasio del cuerpo.      •Diálisis para eliminar el potasio del cuerpo.      •Calcio para proteger el corazón de los efectos del potasio elevado, vahe los ritmos irregulares (arritmias).        Siga estas instrucciones en hay casa:     •Use los medicamentos de venta sherice y los recetados solamente vahe se lo haya indicado el médico.      • No use ningún suplemento, producto natural, hierba o vitamina sin antes consultarlo con hay médico. Determinados suplementos y productos alimenticios naturales contienen mucha cantidad de potasio.      •Limite el consumo de alcohol vahe se lo haya indicado el médico.      • No consuma drogas. Si necesitas ayuda para dejar de consumir estos productos, consulta al médico.      •Si tiene dali enfermedad renal, es posible que deba seguir dali dieta baja en potasio. Un nutricionista puede ayudarlo a conocer qué alimentos tienen cantidades altas o bajas de potasio.      •Concurre a todas las visitas de seguimiento vahe te lo haya indicado el médico. [place] es importante.        Comuníquese con un médico si:    •Tiene latidos cardíacos irregulares o muy lentos.      •Se siente mareado.      •Se sienta débil.      •Siente náuseas.      •Tiene hormigueo o adormecimiento en las melodie o los pies.        Solicite ayuda de inmediato si:    •Le falta el aire.      •Tiene dolor u opresión en el pecho.      •Se desmaya.      •Tiene parálisis muscular.        Resumen    •La hiperpotasemia se produce cuando el nivel de potasio en la manuela es demasiado alto.      •Esta afección puede ser causada por recibir demasiada cantidad de potasio, excretar poca cantidad de potasio o liberar dali ximena cantidad de potasio de las células a la manuela.      •La hiperpotasemia puede ser el resultado de muchas afecciones preexistentes, especialmente la enfermedad renal crónica o por el uso de ciertos medicamentos.      •El tratamiento de la hiperpotasemia puede incluir medicamentos para que el potasio salga de la manuela e ingrese en las células, o para eliminar el potasio del cuerpo.      •Si tiene dali enfermedad renal, es posible que deba seguir dali dieta baja en potasio. Un nutricionista puede ayudarlo a conocer qué alimentos tienen cantidades altas o bajas de potasio.

## 2022-10-12 NOTE — ED PROVIDER NOTE - ATTENDING CONTRIBUTION TO CARE
Pt here with son with abnormal labs, specifically elevated potassium.  Pt denies any chest pain, dyspnea, vomiting.  Missed her plasmapheresis session today.  Pt adamant on leaving, has had these issues in the past, also recently told to stop her lokelma. Pt appears well, nontoxic, no respiratory distress. EKG with no typical signs of hyperkalemia.

## 2022-10-12 NOTE — ED PROVIDER NOTE - PROGRESS NOTE DETAILS
Pt's K came back as 6.6 not hemolyzed. Will get an ECG and place her on the monitor. Spoke with nephrology fellow and transplant team about Pt's hyperkalemia. Discussed plan with Pt but she insists on going home because she does not want to stay in the hospital. Spoke with nephrology fellow and transplant team about Pt's hyperkalemia. Discussed plan with Pt but she insists on going home because she does not want to stay in the hospital. Dr. Baltazar spoke with the Pt and explained the importance of staying and the severity of her K. Pt and family agreeable to staying. Will admit Pt. Pt's K is improved to 5.3. Spoke with Pt and her family. Explained to take her Lokelma and continue her home medications. They will get a call from nephro to reschedule her plasmaphoresis. Dr. Baltazar Note: please note, pt was admitted in error, stayed under the ED care in consultation with the transplant team, pt treated for her hyperkalemia, and expedited outpatient care was planned. Pt was discharged by me from the ED.

## 2022-10-12 NOTE — ED ADULT NURSE NOTE - OBJECTIVE STATEMENT
77 yo female PMH HTN, kidney transplant, A&Ox3, [place] speaking, presents to ED c/o HTN.  Pt reports took BP today and found it to be high (200systolic), took extra of her Labetelol and came to ED.  breathing even and unlabored, abdomen soft nontender, no pedal edema, Right av fistula (not in use).  Pt denies chest pain, palpitations, shortness of breath, headache, visual disturbances, numbness/tingling, fever, chills, diaphoresis,  nausea, vomiting, constipation, diarrhea, or urinary symptoms.

## 2022-10-12 NOTE — ED PROVIDER NOTE - PATIENT PORTAL LINK FT
You can access the FollowMyHealth Patient Portal offered by [facility] by registering at the following website: [email or web address]. By joining [employer]’s FollowMyHealth portal, you will also be able to view your health information using other applications (apps) compatible with our system.

## 2022-10-12 NOTE — ED PROVIDER NOTE - CHILD ABUSE FACILITY
OFFICE VISIT      Patient: Roselyn Weaver   : 1965 MRN: [id number]    SUBJECTIVE:  Chief Complaint   Patient presents with   • fatigue     weakness       A 55 year old female is here for a follow-up.    HISTORY OF PRESENT ILLNESS:  S/P right knee surgery; Chronic pain of right knee; Postural dizziness with presyncope; Acute blood loss anemia: Underwent revision arthroplasty surgery on the right knee for concerns of chronic pain in the right knee on 2021. However, on 2021 she was in the ER for lightheadedness, weakness, and syncopal episode, with two episodes when she passed out. The only significant finding was that prior to surgery her hemoglobin level was around 12.5, and had dropped to 8.7 g/dL when checked on 2021, post surgery. Had concerns of headaches with muffled hearing which kept increasing in intensity. Was referred to neurology if she notices symptoms worsening or she develops blurry vision.     Currently, she complains of weakness, and fatigue. Her appetite is good, and keeps herself hydrated well. Did not undergo blood transfusion at the hospital. Is non-adherent to iron supplements. Undergoing physical therapy. Has concerns of swelling of the right leg and ankle. Was recommended compression stockings at the hospital. Is on Tramadol 50 mg one to two tablets every six hours as needed, and Tylenol 325 mg two tablets every six hours. No longer needs Oxycodone. Has a tendency for constipation which is improving currently.     PAST MEDICAL HISTORY:  Past Medical History:   Diagnosis Date   • Agatston CAC score, >400    • Agatston coronary artery calcium score greater than 400 2019   • Arthritis of right knee 4/3/2018    Severe - had injections through Dr. Bowman   • Atopic dermatitis    • Bilateral carotid artery stenosis 3/29/2021   • BMI 36.0-36.9,adult    • Bunion    • Carotid disease, bilateral (CMS/HCC)     Moderate   • Elevated fasting glucose 4/3/2018   • FH ischemic  heart disease    • Heel spur    • Hemorrhoids    • Measles    • Mild hyperlipidemia 7/17/2018   • Mild mitral valve regurgitation 3/29/2021   • MRSA (methicillin resistant staph aureus) culture positive    • Nonrheumatic mitral valve insufficiency     Mild-moderate   • Postmenopausal    • Situational anxiety    • THEO (stress urinary incontinence, female)    • Tubular adenoma of colon    • Varicose vein of leg    • 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  Had repeat colposcopy done by Dr. Bourne 8/2018.  Showed low grade dysplasia.  Repeat colpo in 1 year.       MEDICATIONS:  Current Outpatient Medications   Medication Sig   • traMADol (ULTRAM) 50 MG tablet TAKE 1 TO 2 TABLETS BY MOUTH EVERY 6 HOURS AS NEEDED FOR MILD TO MODERATE PAIN. MAX OF 4 TABLETS PER DAY   • pantoprazole (PROTONIX) 20 MG tablet Take 20 mg by mouth.   • oxyCODONE, IMM REL, (ROXICODONE) 5 MG immediate release tablet Take 5-10 mg by mouth every 4 hours as needed.   • acetaminophen (TYLENOL) 325 MG tablet Take 650 mg by mouth every 6 hours.   • bisacodyl (DULCOLAX) 10 MG suppository Place 10 mg rectally every 24 hours as needed. Do not start before April 11, 2021.   • meloxicam (MOBIC) 7.5 MG tablet Take 7.5 mg by mouth daily.   • polyethylene glycol (MiraLax) 17 GM/SCOOP powder Take 17 g by mouth 2 times daily as needed (as needed to have 1 larger or 2 medium bowel movements per day).   • atorvastatin (LIPITOR) 40 MG tablet Take 1 tablet by mouth daily.   • diclofenac (VOLTAREN) 1 % gel Apply 4 gr to joint 4 times daily; Max 32 grams/day   • aspirin 81 MG EC tablet Take 1 tablet by mouth twice daily after meals. (Patient taking differently: Take 81 mg by mouth daily. )   • triamcinolone (ARISTOCORT) 0.1 % cream Apply twice daily as needed to rash on hands   • betamethasone dipropionate (DIPROSONE) 0.05 % ointment Apply topically 2 times daily as needed  (rash on hands).   • Magnesium 400 MG Cap Take 1 capsule by mouth daily.   • cholecalciferol (VITAMIN D3) 1000 UNITS tablet Take 2 tablets by mouth daily. (Patient taking differently: Take 1,000 Units by mouth daily. )   • Potassium 95 MG Tab Take 1 tablet by mouth daily.   • calcium carbonate (CALCIUM-CARB 600) 600 MG Tab Take 600 mg by mouth daily.   • docusate sodium (COLACE) 100 MG capsule Take 100 mg by mouth daily.    • docusate sodium-sennosides (SENOKOT S) 50-8.6 MG per tablet Take 1 tablet by mouth.   • aspirin 81 MG EC tablet Take 81 mg by mouth.   • amoxicillin (AMOXIL) 500 MG capsule 4 caps by mouth one hour prior to procedure     No current facility-administered medications for this visit.       ALLERGIES:  ALLERGIES:  No Known Allergies    PAST SURGICAL HISTORY:  Past Surgical History:   Procedure Laterality Date   • Anes revise/remove sling repair  10/21/2010    Dr. Zheng @ [facility]   • Anes revise/remove sling repair  12/09/2010    Dr. Zheng @ [facility] in [place]   • Biopsy vulva/perineum,one lesn  10/20/2015   • Bladder surgery      stimulator   • Carpal tunnel release Right 05/2015   • Colonoscopy w/ polypectomy  12/29/2016   • Colposcopy  10/20/2015   • Colposcopy  03/24/2015   • Colposcopy  10/29/2013   • Cystourethroscopy  12/18/2012    with hydrodistention with Dr. Aguila @ [facility]   • Incis implant neurolec,sacral  4/21/2014    Dr. Garrett - Interstim Implant (Stage I and II)   • Joint replacement Left 04/09/2018    Left SEGUN - Dr Rajan Tsai   • Joint replacement Right 11/18/2019    right TKA Dr. Rajan Tsai    • Knee surgery  11/09/2016    arthroscopy   • Knee surgery Right 01/30/2020    TKA manipulation  Dr. Rajan Tsai    • Percutaneous implant neurostim electrode array, sacral n  3/28/2014   • Plantar fascia release  04/2006    right   • Plantar fascia release  02/2005    left   • Radiofrequency ablation knee Right  2020    [facility]   • Removal of nail plate  2013   • Salpingoophorectomy      right   • Shoulder arthroscopy Left 2018    RCR, SAD, DCR, tenodesis Dr Rajan Tsai   • Shoulder surgery Right 2014   • Urethral sling  10/07/2010    SPARC sling with Dr. Zheng @ [facility] in [place]   • Urethral sling  10/09/2012    SPARC sling with Dr. Aguila @ [facility] in [place]   • Varicose vein surgery  2014   • Varicose vein surgery  2014   • Vulva surgery  2013       FAMILY HISTORY:  Family History   Problem Relation Age of Onset   • Diabetes Mother    • Hypertension Mother    • Clotting Disorder Mother    • Cancer, Colon Mother    • Heart Mother         open heart surgery   • Congestive Heart Failure Mother    • Cancer Mother    • Cancer, Lung Father         with mets to brain   • Diabetes Sister    • Hypertension Sister    • Diabetes Brother    • Hypertension Brother    • Depression Brother    • Hypertension Brother    • Diabetes Brother    • Cancer, Colon Other         grandmother   • Cancer Other         aunts, uncles       SOCIAL HISTORY:  Social History     Tobacco Use   Smoking Status Former Smoker   • Packs/day: 0.10   • Years: 10.00   • Pack years: 1.00   • Types: Cigarettes   • Quit date: 1990   • Years since quittin.3   Smokeless Tobacco Never Used     Social History     Substance and Sexual Activity   Alcohol Use Yes    Comment: Socially. Every , 3-4 drinks per sitting       ROS  Constitutional: Per HPI.  HEENT: Per HPI.   Respiratory: Per HPI.   Cardiovascular: Per HPI.   Gastrointestinal: Per HPI.   Metabolic/Endocrine: Per HPI.   Neuro: Per HPI.   Musculoskeletal: Per HPI.       OBJECTIVE:    Vitals:    21 0959   BP: 132/74   BP Location: LUE - Left upper extremity   Patient Position: Sitting   Cuff Size: Large Adult   Pulse: 80   Temp: 97.9 °F (36.6 °C)   TempSrc: Temporal   Weight: 91 kg (200 lb 9.9 oz)    Height: 5' 3\" (1.6 m)   LMP: 08/21/2013       Body mass index is 35.54 kg/m².    PHYSICAL EXAM  GENERAL: The patient is alert and oriented x3, in no acute distress.   HEENT: Pupils are equally round and reactive to light. Extraocular muscles are intact. Oropharynx mucous membranes are moist, no erythema or edema, no exudate.   NECK: Supple with no lymphadenopathy, no masses.   RESPIRATORY: Respiratory effort is normal. Lungs are clear to auscultation bilaterally with equal breath sounds.  CARDIO: Normal rate, regular rhythm. No murmurs, rubs or gallops.   ABDOMEN: Soft, nondistended, nontender. No hepatosplenomegaly, no masses, no rebound or guarding.   EXTREMITIES: Right leg is swollen with mild edema, consistent with recent knee surgery. No calf tenderness, warmth, or edema. Surgical bandage in place.  SKIN: Warm and dry. No rashes.  PSYCH: Normal affect, mood, and behavior.      DIAGNOSTIC STUDIES:  LAB RESULTS:  Lab Services on 04/14/2021   Component Date Value Ref Range Status   • WBC 04/14/2021 7.5  4.2 - 11.0 K/mcL Final   • RBC 04/14/2021 3.10* 4.00 - 5.20 mil/mcL Final   • HGB 04/14/2021 9.1* 12.0 - 15.5 g/dL Final   • HCT 04/14/2021 28.0* 36.0 - 46.5 % Final   • MCV 04/14/2021 90.3  78.0 - 100.0 fl Final   • MCH 04/14/2021 29.4  26.0 - 34.0 pg Final   • MCHC 04/14/2021 32.5  32.0 - 36.5 g/dL Final   • RDW-CV 04/14/2021 13.2  11.0 - 15.0 % Final   • RDW-SD 04/14/2021 43.5  39.0 - 50.0 fL Final   • PLT 04/14/2021 376  140 - 450 K/mcL Final   • NRBC 04/14/2021 0  <=0 /100 WBC Final   • Neutrophil, Percent 04/14/2021 59  % Final   • Lymphocytes, Percent 04/14/2021 25  % Final   • Mono, Percent 04/14/2021 9  % Final   • Eosinophils, Percent 04/14/2021 5  % Final   • Basophils, Percent 04/14/2021 1  % Final   • Immature Granulocytes 04/14/2021 1  % Final   • Absolute Neutrophils 04/14/2021 4.5  1.8 - 7.7 K/mcL Final   • Absolute Lymphocytes 04/14/2021 1.9  1.0 - 4.0 K/mcL Final   • Absolute Monocytes  [facility] 04/14/2021 0.7  0.3 - 0.9 K/mcL Final   • Absolute Eosinophils  04/14/2021 0.4  0.0 - 0.5 K/mcL Final   • Absolute Basophils 04/14/2021 0.1  0.0 - 0.3 K/mcL Final   • Absolute Immmature Granulocytes 04/14/2021 0.1  0.0 - 0.2 K/mcL Final   Admission on 04/09/2021, Discharged on 04/09/2021   Component Date Value Ref Range Status   • Extra Tube 04/09/2021 Hold for Add Ons   Final   • Extra Tube 04/09/2021 Hold for Add Ons   Final   • Extra Tube 04/09/2021 Hold for Add Ons   Final   • Extra Tube 04/09/2021 Hold for Add Ons   Final   • Extra Tube 04/09/2021 Hold for Add Ons   Final   • Sodium 04/09/2021 137  135 - 145 mmol/L Final   • Potassium 04/09/2021 3.4  3.4 - 5.1 mmol/L Final   • Chloride 04/09/2021 101  98 - 107 mmol/L Final   • Carbon Dioxide 04/09/2021 26  21 - 32 mmol/L Final   • Anion Gap 04/09/2021 13  10 - 20 mmol/L Final   • Glucose 04/09/2021 129* 65 - 99 mg/dL Final   • BUN 04/09/2021 9  6 - 20 mg/dL Final   • Creatinine 04/09/2021 0.58  0.51 - 0.95 mg/dL Final   • Glomerular Filtration Rate 04/09/2021 >90  >90 mL/min/1.73m2 Final   • BUN/ Creatinine Ratio 04/09/2021 16  7 - 25 Final   • Calcium 04/09/2021 8.9  8.4 - 10.2 mg/dL Final   • Bilirubin, Total 04/09/2021 0.5  0.2 - 1.0 mg/dL Final   • GOT/AST 04/09/2021 15  <=37 Units/L Final   • GPT/ALT 04/09/2021 21  <64 Units/L Final   • Alkaline Phosphatase 04/09/2021 62  45 - 117 Units/L Final   • Albumin 04/09/2021 2.9* 3.6 - 5.1 g/dL Final   • Protein, Total 04/09/2021 6.6  6.4 - 8.2 g/dL Final   • Globulin 04/09/2021 3.7  2.0 - 4.0 g/dL Final   • A/G Ratio 04/09/2021 0.8* 1.0 - 2.4 Final   • Magnesium 04/09/2021 1.7  1.7 - 2.4 mg/dL Final   • Systolic Blood Pressure 04/09/2021 146   Final   • Diastolic Blood Pressure 04/09/2021 66   Final   • Ventricular Rate EKG/Min (BPM) 04/09/2021 92   Final   • Atrial Rate (BPM) 04/09/2021 92   Final   • W[place]-Interval (MSEC) 04/09/2021 112   Final   • QRS-Interval (MSEC) 04/09/2021 86   Final   • QT-Interval (MSEC)  04/09/2021 344   Final   • QTc 04/09/2021 425   Final   • P Axis (Degrees) 04/09/2021 24   Final   • R Axis (Degrees) 04/09/2021 12   Final   • T Axis (Degrees) 04/09/2021 18   Final   • REPORT TEXT 04/09/2021    Final                    Value:Normal sinus rhythm  Normal ECG  When compared with ECG of  05-NOV-2019 14:32,  No significant change was found  Confirmed by DAYNE WESTON MD ([id number]),  Denisha Petty ([id number]) on 4/9/2021 11:14:02 AM     • COLOR, URINALYSIS 04/09/2021 Yellow   Final   • APPEARANCE, URINALYSIS 04/09/2021 Clear   Final   • GLUCOSE, URINALYSIS 04/09/2021 Negative  Negative mg/dL Final   • BILIRUBIN, URINALYSIS 04/09/2021 Negative  Negative Final   • KETONES, URINALYSIS 04/09/2021 Trace* Negative mg/dL Final   • SPECIFIC GRAVITY, URINALYSIS 04/09/2021 <1.005* 1.005 - 1.030 Final   • OCCULT BLOOD, URINALYSIS 04/09/2021 Trace* Negative Final   • PH, URINALYSIS 04/09/2021 6.5  5.0 - 7.0 Final   • PROTEIN, URINALYSIS 04/09/2021 Negative  Negative mg/dL Final   • UROBILINOGEN, URINALYSIS 04/09/2021 0.2  0.2, 1.0 mg/dL Final   • NITRITE, URINALYSIS 04/09/2021 Negative  Negative Final   • LEUKOCYTE ESTERASE, URINALYSIS 04/09/2021 Negative  Negative Final   • SQUAMOUS EPITHELIAL, URINALYSIS 04/09/2021 1 to 5  None Seen, 1 to 5 /hpf Final   • ERYTHROCYTES, URINALYSIS 04/09/2021 1 to 2  None Seen, 1 to 2 /hpf Final   • LEUKOCYTES, URINALYSIS 04/09/2021 1 to 5  None Seen, 1 to 5 /hpf Final   • BACTERIA, URINALYSIS 04/09/2021 None Seen  None Seen /hpf Final   • HYALINE CASTS, URINALYSIS 04/09/2021 None Seen  None Seen, 1 to 5 /lpf Final   • TRANSITIONAL EPITHELIALS 04/09/2021 1 to 5  1 to 5, None Seen /hpf Final   • RENAL EPITHELIALS 04/09/2021 1 to 5* None Seen /hpf Final   • NT-proBNP 04/09/2021 310* <=125 pg/mL Final   • Troponin I, Ultra Sensitive 04/09/2021 <0.02  <=0.04 ng/mL Final   • TSH 04/09/2021 1.042  0.350 - 5.000 mcUnits/mL Final   • WBC 04/09/2021 10.6  4.2 - 11.0 K/mcL Final   • RBC  04/09/2021 2.97* 4.00 - 5.20 mil/mcL Final   • HGB 04/09/2021 8.7* 12.0 - 15.5 g/dL Final   • HCT 04/09/2021 26.1* 36.0 - 46.5 % Final   • MCV 04/09/2021 87.9  78.0 - 100.0 fl Final   • MCH 04/09/2021 29.3  26.0 - 34.0 pg Final   • MCHC 04/09/2021 33.3  32.0 - 36.5 g/dL Final   • RDW-CV 04/09/2021 13.3  11.0 - 15.0 % Final   • RDW-SD 04/09/2021 43.0  39.0 - 50.0 fL Final   • PLT 04/09/2021 210  140 - 450 K/mcL Final   • NRBC 04/09/2021 0  <=0 /100 WBC Final   • Neutrophil, Percent 04/09/2021 74  % Final   • Lymphocytes, Percent 04/09/2021 15  % Final   • Mono, Percent 04/09/2021 8  % Final   • Eosinophils, Percent 04/09/2021 1  % Final   • Basophils, Percent 04/09/2021 1  % Final   • Immature Granulocytes 04/09/2021 1  % Final   • Absolute Neutrophils 04/09/2021 7.9* 1.8 - 7.7 K/mcL Final   • Absolute Lymphocytes 04/09/2021 1.6  1.0 - 4.0 K/mcL Final   • Absolute Monocytes 04/09/2021 0.9  0.3 - 0.9 K/mcL Final   • Absolute Eosinophils  04/09/2021 0.1  0.0 - 0.5 K/mcL Final   • Absolute Basophils 04/09/2021 0.1  0.0 - 0.3 K/mcL Final   • Absolute Immmature Granulocytes 04/09/2021 0.1  0.0 - 0.2 K/mcL Final   Office Visit on 03/29/2021   Component Date Value Ref Range Status   • POCT Color 03/29/2021 Yellow   Final   • POCT Appearance 03/29/2021 Clear   Final   • POCT Glucose Urine 03/29/2021 Negative  Negative Final   • POCT Bilirubin 03/29/2021 Negative  Negative Final   • POCT Ketones 03/29/2021 Negative  Negative Final   • POCT Specific Gravity 03/29/2021 1.025  1.005 - 1.03 Final   • POCT Occult Blood 03/29/2021 Trace - Intact  Negative Final   • POCT pH 03/29/2021 7.0  5 - 9 Final   • POCT Protein 03/29/2021 Negative  Negative Final   • POCT Urobilinogen 03/29/2021 0.2  0 - 1 mg/dL Final   • Urine Nitrite 03/29/2021 Negative  Negative Final   • WBC (Leukocyte) Esterase POC 03/29/2021 Negative  Negative Final   Lab Services on 03/22/2021   Component Date Value Ref Range Status   • Sodium 03/22/2021 142  135 - 145  mmol/L Final   • Potassium 03/22/2021 4.3  3.4 - 5.1 mmol/L Final   • Chloride 03/22/2021 105  98 - 107 mmol/L Final   • Carbon Dioxide 03/22/2021 26  21 - 32 mmol/L Final   • Anion Gap 03/22/2021 15  10 - 20 mmol/L Final   • Glucose 03/22/2021 101* 65 - 99 mg/dL Final   • BUN 03/22/2021 19  6 - 20 mg/dL Final   • Creatinine 03/22/2021 1.02* 0.51 - 0.95 mg/dL Final   • Glomerular Filtration Rate 03/22/2021 61* >90 mL/min/1.73m2 Final   • BUN/ Creatinine Ratio 03/22/2021 19  7 - 25 Final   • Calcium 03/22/2021 9.7  8.4 - 10.2 mg/dL Final   • Bilirubin, Total 03/22/2021 0.3  0.2 - 1.0 mg/dL Final   • GOT/AST 03/22/2021 20  <=37 Units/L Final   • GPT/ALT 03/22/2021 32  <64 Units/L Final   • Alkaline Phosphatase 03/22/2021 85  45 - 117 Units/L Final   • Albumin 03/22/2021 3.8  3.6 - 5.1 g/dL Final   • Protein, Total 03/22/2021 7.9  6.4 - 8.2 g/dL Final   • Globulin 03/22/2021 4.1* 2.0 - 4.0 g/dL Final   • A/G Ratio 03/22/2021 0.9* 1.0 - 2.4 Final   • Hemoglobin A1C 03/22/2021 6.1* 4.5 - 5.6 % Final   • Cholesterol 03/22/2021 172  <=199 mg/dL Final   • Triglycerides 03/22/2021 94  <=149 mg/dL Final   • HDL 03/22/2021 87  >=50 mg/dL Final   • LDL 03/22/2021 66  <=129 mg/dL Final   • Non-HDL Cholesterol 03/22/2021 85  mg/dL Final   • Cholesterol/ HDL Ratio 03/22/2021 2.0  <=4.4 Final       ASSESSMENT AND PLAN:  This is a 55 year old female who presents with :    1. S/P right knee surgery    2. Chronic pain of right knee    3. Postural dizziness with presyncope    4. Acute blood loss anemia        Orders Placed This Encounter   • CBC with Automated Differential   • traMADol (ULTRAM) 50 MG tablet       Plan:    1. S/P right knee surgery  Pain well controlled with Tramadol, and Tylenol..   Reviewed and discussed previous reports.  Ordered CBC with differential today, will call back with lab work results.  Continue with Tramadol 50 mg one to two tablets every six hours as needed, and Tylenol 325 mg two tablets every six  hours.  Recommended starting with OTC Ferrous sulfate 325 mg one tablet daily as the next line of management based on lab work results, monitoring regular bowel movements as iron causes concerns of constipation as an adverse effect.  Recommended surgical stockings for the edema in the legs, taking care that it does not rub against the dressing.  Suggested can cancel referral to the neurologist currently.    2. Chronic pain of right knee  Refer to plan for assessment 1.    3. Postural dizziness with presyncope  Refer to plan for assessment 1.    4. Acute blood loss anemia  Refer to plan for assessment 1.    Refer to orders.  Medical compliance with plan discussed and risks of non-compliance reviewed.  Patient education completed on disease process, etiology & prognosis.  Proper usage and side effects of medications reviewed & discussed.  Return to clinic as clinically indicated as discussed with patient who verbalized understanding of the plan and is in agreement with the plan.    Return if symptoms worsen or fail to improve.    I,  Dr. Shelby Saravia, have created a visit summary document based on the audio recording between Dr. Regan Gonzalez MD and this patient for the physician to review, edit as needed, and authenticate.  Creation Date: 4/15/2021     I have reviewed and edited the visit summary above and attest that it is accurate.

## 2022-10-12 NOTE — ED PROVIDER NOTE - RAPID ASSESSMENT
75 y/o F w/ PMHx kidney transplant, was here to receive her last dose of plasma phoresis, when they found her BP to be >200. Pt took 2 more of home dose of labetalol 200mg. Denies any other acute complaints. Pt is well appearing in triage.     Delmi MOSES) have documented this rapid assessment note under the dictation of Yenny Ambrocio (PA)  which has been reviewed and affirmed to be accurate. Patient was seen as a QPA patient. The patient will be seen and further worked up in the main emergency department and their care will be completed by the main emergency department team along with a thorough physical exam. Receiving team will follow up on labs, analgesia, any clinical imaging, reassess and disposition as clinically indicated, all decisions regarding the progression of care will be made at their discretion. 75 y/o F w/ PMHx HTN and kidney transplant, was here to receive her last dose of plasma phoresis, when they found her BP to be >200. Pt took 2 more of home dose of labetalol 200mg. Denies any headache, CP or any other acute complaints. Pt is well appearing in triage.     Delmi MOSES) have documented this rapid assessment note under the dictation of Yenny Ambrocio (PA)  which has been reviewed and affirmed to be accurate. Patient was seen as a QPA patient. The patient will be seen and further worked up in the main emergency department and their care will be completed by the main emergency department team along with a thorough physical exam. Receiving team will follow up on labs, analgesia, any clinical imaging, reassess and disposition as clinically indicated, all decisions regarding the progression of care will be made at their discretion. 77 y/o F w/ PMHx HTN and kidney transplant, was here to receive her last dose of plasma phoresis, when they found her BP to be >200. Pt took 2 more of home dose of labetalol 200mg. Denies any headache, CP or any other acute complaints. Pt is well appearing in triage.     Delmi MOSES (Kaleigh) have documented this rapid assessment note under the dictation of Yenny Ambrocio (PA)  which has been reviewed and affirmed to be accurate. Patient was seen as a QPA patient. The patient will be seen and further worked up in the main emergency department and their care will be completed by the main emergency department team along with a thorough physical exam. Receiving team will follow up on labs, analgesia, any clinical imaging, reassess and disposition as clinically indicated, all decisions regarding the progression of care will be made at their discretion.    Rapid assessment by Yenny Ambrocio PA-C full eval to be performed in ED. Above documentation completed by scribe above. I was present for and agree with documentation.   Yenny Ambrocio PA-C

## 2022-10-12 NOTE — ED ADULT TRIAGE NOTE - CHIEF COMPLAINT QUOTE
hx kidney transplant was here to get her last dose of plasma phoresis when they found her bp to be >200. Took two more of her home doses of labetalol 200mg.   SHILOH AV fistula

## 2022-10-12 NOTE — CONSULT NOTE ADULT - TIME BILLING
Kidney recipient with acute rejection, Hypertension, On Apheresis, IV Ig regimen, now being evaluated for hypertension and hyperkalemia. On Losartan for possible Ang 2 receptor antibodies.  Patient is being discharged once K issue and hypertenson improve. She will be set up for Apheresis and IV Ig as outpatient once discharged.  Will f/u with transplant team on discharge.  Suggest Lokelma while on Losartan and low K diet.  I was present during and reviewed clinical and lab data as well as assessment and plan as documented by the PA as noted. Please contact if any additional questions with any change in clinical condition or on availability of any additional information or reports.

## 2022-10-12 NOTE — ED PROVIDER NOTE - NS ED ROS FT
GENERAL: No fever, chills  HEENT: No cough, congestion, odynophagia, dysphagia  CARDIAC: No chest pain, palpitations, lightheadedness, syncope  PULM: No dyspnea, cough, wheezing   GI: No abdominal pain, nausea, vomiting, diarrhea,, hematochezia  : No urinary dysuria, frequency, incontinence, hematuria  NEURO: No headache, motor weakness, sensory changes  MSK: + chronic back pain, + mild pain in upper legs which is unchanged from baseline  SKIN: no rashes, hives, urticaria  HEME: no active bleeding, bruising

## 2022-10-12 NOTE — CONSULT NOTE ADULT - SUBJECTIVE AND OBJECTIVE BOX
Transplant Surgery - Consult Note    76F with ESRD due to HTN, was on HD since 2016 via R AVF (previously oliguric), Glaucoma, h/o DVT to L subclavian vein (2017), s/p R DDRT on 5/19/21 with Simulect,   Post transplant course was complicated by DGF. Last HD was on 6/4/21. Ureteral stent was removed on 8/2/21.     -Readmitted 10/2021 with severe symptomatic anemia (Hgb 5g/dL). Was transfused 2 units pRBCs. Anemia was due to hemolysis caused by dapsone (despite normal G6PD levels prior to initiation).   -Readmitted Nov 2021 with YANELY. US showed increased velocities concerning for TRAS but no tardus parvus waves. DSA negative, and cFDNA 0.5%. No intervention was done.   -Readmitted Jan 2022 with worsening anemia, hyperkalemia, tested positive for COVID but had no symptoms.   -Readmitted 9/2022 w/ [facility] on renal bx (9.29). +C4D. DSA negative. S/p pulse steroids. Got PLEX X3 and IVIG X3. Was discharged home to complete the remaining 2 sessions of PLEX and IVIG as outpatient.    She now presents to the ED after being hypertensive before the PLEX #5 session and IVIG #5 was planned as outpatient for tomorrow (10/13). SBP was in 200s and she took her home labetalol 400. Once in the ED, her initial BP was 213/81, and BP now is 176/81 w/ HR in 60s. Labwork was sig for K of 6.6. Cr is at its lowest since last admission at 1.47, BUN also down to 43 from 59. She currently has no complaints and states that she feels well. She denies SOB, CP, HA, dizziness, palpitations, N/V/DC, fevers/chills.      MEDICATIONS  (STANDING):  dextrose 50% Injectable 50 milliLiter(s) IV Push once  furosemide   Injectable 40 milliGRAM(s) IV Push once  insulin regular  human recombinant 5 Unit(s) IV Push once    MEDICATIONS  (PRN):      PAST MEDICAL & SURGICAL HISTORY:  HTN (hypertension)      Glaucoma      Cataract      ESRD (end stage renal disease) on dialysis      DVT (deep venous thrombosis)  of Left subclavian vein, 06/12/17      Hemodialysis patient  MWF      Acquired cataract  extraction with b/l lense placement, 2016      H/O: glaucoma  surgery, 2002      AV fistula  2015      S/P KEVEN-BSO  for fibroids, 2012      Hemodialysis access, AV graft      Transplanted kidney  5/19/21      History of renal transplantation  DDRT 5/19/2021          Vital Signs Last 24 Hrs  T(C): 37.1 (12 Oct 2022 17:40), Max: 37.1 (12 Oct 2022 17:29)  T(F): 98.7 (12 Oct 2022 17:40), Max: 98.8 (12 Oct 2022 17:29)  HR: 64 (12 Oct 2022 17:40) (64 - 65)  BP: 176/81 (12 Oct 2022 17:40) (174/73 - 213/81)  BP(mean): --  RR: 20 (12 Oct 2022 17:40) (18 - 20)  SpO2: 99% (12 Oct 2022 17:40) (99% - 100%)    Parameters below as of 12 Oct 2022 17:40  Patient On (Oxygen Delivery Method): room air        I&O's Summary                            7.9    3.33  )-----------( 125      ( 12 Oct 2022 19:04 )             25.9     10-12    143  |  116<H>  |  43<H>  ----------------------------<  110<H>  6.6<HH>   |  17<L>  |  1.47<H>    Ca    9.7      12 Oct 2022 19:04    TPro  5.9<L>  /  Alb  4.6  /  TBili  0.3  /  DBili  x   /  AST  13  /  ALT  11  /  AlkPhos  31<L>  10-12      Review of systems  Gen: No weight changes, fatigue, fevers/chills, weakness  Skin: No rashes  Head/Eyes/Ears/Mouth: No headache; Normal hearing; Normal vision w/o blurriness; No sinus pain/discomfort, sore throat  Respiratory: No dyspnea, cough, wheezing, hemoptysis  CV: No chest pain, PND, orthopnea  GI:  denies diarrhea, constipation, nausea, vomiting, melena, hematochezia  : No increased frequency, dysuria, hematuria, nocturia  MSK: No joint pain/swelling; no back pain; no edema  Neuro: No dizziness/lightheadedness, weakness, seizures, numbness, tingling  Heme: No easy bruising or bleeding  Endo: No heat/cold intolerance  Psych: No significant nervousness, anxiety, stress, depression  All other systems were reviewed and are negative, except as noted.      PHYSICAL EXAM:  Constitutional: Well developed / well nourished  Eyes: Anicteric, PERRLA  ENMT: nc/at  Respiratory: CTA B/L  Cardiovascular: RRR  Gastrointestinal: Soft, non distended, NT.   Genitourinary: Voiding spontaneously  Extremities: SCD's in place and working bilaterally  Vascular: Palpable dp pulses bilaterally  Neurological: A&O x3  Skin: no rashes, ulcerations or lesions  Musculoskeletal: Moving all extremities  Psychiatric: Responsive

## 2022-10-13 ENCOUNTER — NON-APPOINTMENT (OUTPATIENT)
Age: 77
End: 2022-10-13

## 2022-10-13 ENCOUNTER — OUTPATIENT (OUTPATIENT)
Dept: OUTPATIENT SERVICES | Facility: HOSPITAL | Age: 77
LOS: 1 days | End: 2022-10-13
Payer: MEDICARE

## 2022-10-13 VITALS
RESPIRATION RATE: 18 BRPM | TEMPERATURE: 98 F | DIASTOLIC BLOOD PRESSURE: 74 MMHG | SYSTOLIC BLOOD PRESSURE: 179 MMHG | HEART RATE: 66 BPM | OXYGEN SATURATION: 98 %

## 2022-10-13 DIAGNOSIS — T86.11 KIDNEY TRANSPLANT REJECTION: ICD-10-CM

## 2022-10-13 DIAGNOSIS — H26.9 UNSPECIFIED CATARACT: Chronic | ICD-10-CM

## 2022-10-13 DIAGNOSIS — Z94.0 KIDNEY TRANSPLANT STATUS: Chronic | ICD-10-CM

## 2022-10-13 DIAGNOSIS — Z86.69 PERSONAL HISTORY OF OTHER DISEASES OF THE NERVOUS SYSTEM AND SENSE ORGANS: Chronic | ICD-10-CM

## 2022-10-13 DIAGNOSIS — Z90.710 ACQUIRED ABSENCE OF BOTH CERVIX AND UTERUS: Chronic | ICD-10-CM

## 2022-10-13 DIAGNOSIS — Z99.2 DEPENDENCE ON RENAL DIALYSIS: Chronic | ICD-10-CM

## 2022-10-13 DIAGNOSIS — I77.0 ARTERIOVENOUS FISTULA, ACQUIRED: Chronic | ICD-10-CM

## 2022-10-13 LAB
APPEARANCE: CLEAR
BACTERIA: NEGATIVE
BASOPHILS # BLD AUTO: 0.07 K/UL
BASOPHILS NFR BLD AUTO: 1.7 %
BILIRUBIN URINE: NEGATIVE
BLOOD URINE: NEGATIVE
CMV DNA SPEC QL NAA+PROBE: 52 IU/ML
CMVPCR LOG: 1.72 LOG10IU/ML
COLOR: NORMAL
EOSINOPHIL # BLD AUTO: 0.04 K/UL
EOSINOPHIL NFR BLD AUTO: 0.9 %
GLUCOSE QUALITATIVE U: NEGATIVE
HCT VFR BLD CALC: 24.1 % — LOW (ref 34.5–45)
HCT VFR BLD CALC: 25.1 %
HGB BLD-MCNC: 7.5 G/DL — LOW (ref 11.5–15.5)
HGB BLD-MCNC: 7.8 G/DL
HYALINE CASTS: 0 /LPF
KETONES URINE: NEGATIVE
LEUKOCYTE ESTERASE URINE: NEGATIVE
LYMPHOCYTES # BLD AUTO: 0.32 K/UL
LYMPHOCYTES NFR BLD AUTO: 7.9 %
MAN DIFF?: NORMAL
MCHC RBC-ENTMCNC: 27.2 PG
MCHC RBC-ENTMCNC: 27.2 PG — SIGNIFICANT CHANGE UP (ref 27–34)
MCHC RBC-ENTMCNC: 31.1 GM/DL
MCHC RBC-ENTMCNC: 31.1 GM/DL — LOW (ref 32–36)
MCV RBC AUTO: 87.3 FL — SIGNIFICANT CHANGE UP (ref 80–100)
MCV RBC AUTO: 87.5 FL
MICROSCOPIC-UA: NORMAL
MONOCYTES # BLD AUTO: 0.07 K/UL
MONOCYTES NFR BLD AUTO: 1.8 %
NEUTROPHILS # BLD AUTO: 3.6 K/UL
NEUTROPHILS NFR BLD AUTO: 87.7 %
NITRITE URINE: NEGATIVE
NRBC # BLD: 0 /100 WBCS — SIGNIFICANT CHANGE UP (ref 0–0)
PH URINE: 6
PLATELET # BLD AUTO: 122 K/UL — LOW (ref 150–400)
PLATELET # BLD AUTO: 134 K/UL
PROTEIN URINE: ABNORMAL
RBC # BLD: 2.76 M/UL — LOW (ref 3.8–5.2)
RBC # BLD: 2.87 M/UL
RBC # FLD: 14.8 % — HIGH (ref 10.3–14.5)
RBC # FLD: 15 %
RED BLOOD CELLS URINE: 2 /HPF
SPECIFIC GRAVITY URINE: 1.02
SQUAMOUS EPITHELIAL CELLS: 0 /HPF
TACROLIMUS SERPL-MCNC: 7.4 NG/ML — SIGNIFICANT CHANGE UP
TACROLIMUS SERPL-MCNC: 9.6 NG/ML
UROBILINOGEN URINE: NORMAL
WBC # BLD: 4.47 K/UL — SIGNIFICANT CHANGE UP (ref 3.8–10.5)
WBC # FLD AUTO: 4.11 K/UL
WBC # FLD AUTO: 4.47 K/UL — SIGNIFICANT CHANGE UP (ref 3.8–10.5)
WHITE BLOOD CELLS URINE: 0 /HPF

## 2022-10-13 PROCEDURE — 85027 COMPLETE CBC AUTOMATED: CPT

## 2022-10-13 PROCEDURE — 36514 APHERESIS PLASMA: CPT

## 2022-10-13 NOTE — ED ADULT NURSE REASSESSMENT NOTE - NS ED NURSE REASSESS COMMENT FT1
Pt d/c after initial admission after pt stating she would like to go home, BP improved and potassium improved. Pt denies any complaints.

## 2022-10-13 NOTE — ASSESSMENT
[FreeTextEntry1] : \par 76 yr old woman with ESRD due to HTN S/p DDRT on 5/19/2021, course complicated by DGF due to ATN. Last HD was on  6/4/21. Oswaldo creatinine 1.3mg/dL. Had worsening graft function Cr 1.6 -1.8mg/dL Nov 2021 with 2 g/d proteinuria. No DSA, cFDNA 0.58%.   Ultrasound done Nov 2021 with US showing elevated velocities but no tardus parvus. Conservatively management.  She had transplant kidney biopsy for further worsening creatinine 2.2mg/dL. 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 DSA was negative. 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 \par Cr 1.7mg/dL today slightly better. \par \par Hyperkalemia: K = 6.1. Resume Lokelma 10grams po daily. Low K diet. 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 Level  9.6 at goal. Goal 8-10 \par - Reduce Prednisone from 10 to  5mg daily \par \par Prophylaxis resumed after rx of rejection \par - Continue Mepron. Bactrim discontinued for high K.  Dapsone discontinued for hemolytic anemia. \par - Nystatin SS 4 times a day \par - Valcyte 450mg po daily \par - Completed 2 doses COVID vaccine prior to transplant. 3rd shot received 10/19/22. Also had COVID infection in March 2020 and Jan 2022. Shubham Ab strongly +ve\par - Flu shot received in October. 2021. Will hold off for now b/c she is being rx for rejection. Plan for next visit \par - CMV low level too low to quantify in June 2022- f/u CMV PCR drawn today \par \par Hypertension - Controlled at home on current regimen. 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 \par \par Anemia -  Procrit d/jair in April. 2022 Iron stores replete, normal B12 and Folate. Hgb low. Resume Procrit 10,000 units weekly. \par \par Metabolic acidosis -. continue sodium bicarbonate 1300mg po tid \par \par Vitamin D deficiency -  continue Vitamin D 1000 units po daily. \par \par F/u here in 4 weeks \par

## 2022-10-13 NOTE — HISTORY OF PRESENT ILLNESS
[FreeTextEntry1] : \par 76 year old woman with ESRD due to HTN, was on hemodialysis since 2016. Received DDRT on 5/19/21 \par \par Donor was a 44 yr old woman, KDPI 69%, history of diabetes on insulin, terminal creatinine 3.3. \par Post transplant course was complicated by DGF. Last hemodialysis was on 6/4/21. Transplant ureteric stent was removed on 8/2/21. \par \par Other PMH includes :History of COVID-19 infection, hospitalized in March 2020 for only 1 day. \par \par - Hospitalized 10/2021 with severe symptomatic anemia (Hgb 5g/dL). Transfused 2 units PRBC. Anemia was due to hemolysis caused by dapsone (despite normal G6PD levels prior to initiation). \par - Nov 2021 Noted to have YANELY creatinine 1.2-> 1.65mg/dL.  US showed increased velocities concerning for TRAS but no tardus parvus waves. DSA negative,  and cFDNA  0.5%. Conservatively managed. \par - Hospitalized Jan 2022 with worsening anemia, hyperkalemia, tested +ve for COVID but had no symptoms. \par - Sept 2022 - Worsening YANELY with cr to 2.2mg/dL. DSA was negative. cFDNA 0.68%. \par Transplant kidney biopsy done on 9/29/22  showed mild chronic-active antibody-mediated rejection\par (There is minimal glomerulitis, mild peritubular capillaritis, no vasculitis, and very focal signs of remodeling of the glomerular capillary walls by electron microscopy) C4d was focally positive in peritubular capillaries 20-30%. Mild interstitial inflammation, no tubulitis or endothelialitis) \par Diffuse and nodular diabetic glomerulosclerosis, donor related\par IFTA 30%\par \par Hospitalized 10/3/22-10/7/22, received pulse dose steroids, plasmapheresis and IVIG. 5th (last) dose of pheresis planned today, IVIG scheduled tomorrow. \par \par Presents today for follow up. \par Reports feeling tired. Has good appetite. No NVD. Urinating well, no dysuria, no hematuria. \par No cough,No fever, no chills. \par Home BP usually 130 systolic \par Lives with , has HHA. Children live nearby and help out. \par

## 2022-10-14 LAB — BKV DNA SPEC QL NAA+PROBE: NOT DETECTED IU/ML

## 2022-10-17 ENCOUNTER — APPOINTMENT (OUTPATIENT)
Dept: TRANSPLANT | Facility: CLINIC | Age: 77
End: 2022-10-17

## 2022-10-17 ENCOUNTER — NON-APPOINTMENT (OUTPATIENT)
Age: 77
End: 2022-10-17

## 2022-10-17 ENCOUNTER — LABORATORY RESULT (OUTPATIENT)
Age: 77
End: 2022-10-17

## 2022-10-17 LAB
ALBUMIN SERPL ELPH-MCNC: 4.5 G/DL
ANION GAP SERPL CALC-SCNC: 10 MMOL/L
BUN SERPL-MCNC: 36 MG/DL
CALCIUM SERPL-MCNC: 9.6 MG/DL
CHLORIDE SERPL-SCNC: 118 MMOL/L
CO2 SERPL-SCNC: 18 MMOL/L
CREAT SERPL-MCNC: 1.58 MG/DL
EGFR: 33 ML/MIN/1.73M2
GLUCOSE SERPL-MCNC: 110 MG/DL
PHOSPHATE SERPL-MCNC: 3.6 MG/DL
POTASSIUM SERPL-SCNC: 4.9 MMOL/L
SODIUM SERPL-SCNC: 146 MMOL/L
TACROLIMUS SERPL-MCNC: 11 NG/ML

## 2022-10-18 LAB
BASOPHILS # BLD AUTO: 0 K/UL
BASOPHILS NFR BLD AUTO: 0 %
EOSINOPHIL # BLD AUTO: 0.03 K/UL
EOSINOPHIL NFR BLD AUTO: 0.8 %
HCT VFR BLD CALC: 24.1 %
HGB BLD-MCNC: 7.2 G/DL
IMM GRANULOCYTES NFR BLD AUTO: 0.3 %
LYMPHOCYTES # BLD AUTO: 0.49 K/UL
LYMPHOCYTES NFR BLD AUTO: 13.3 %
MAN DIFF?: NORMAL
MCHC RBC-ENTMCNC: 26.6 PG
MCHC RBC-ENTMCNC: 29.9 GM/DL
MCV RBC AUTO: 88.9 FL
MONOCYTES # BLD AUTO: 0.23 K/UL
MONOCYTES NFR BLD AUTO: 6.3 %
NEUTROPHILS # BLD AUTO: 2.92 K/UL
NEUTROPHILS NFR BLD AUTO: 79.3 %
PLATELET # BLD AUTO: 135 K/UL
RBC # BLD: 2.71 M/UL
RBC # FLD: 15.5 %
WBC # FLD AUTO: 3.68 K/UL

## 2022-10-19 ENCOUNTER — APPOINTMENT (OUTPATIENT)
Dept: NEPHROLOGY | Facility: CLINIC | Age: 77
End: 2022-10-19

## 2022-10-19 VITALS
HEIGHT: 66 IN | SYSTOLIC BLOOD PRESSURE: 193 MMHG | DIASTOLIC BLOOD PRESSURE: 84 MMHG | RESPIRATION RATE: 16 BRPM | HEART RATE: 66 BPM | WEIGHT: 132 LBS | BODY MASS INDEX: 21.21 KG/M2 | OXYGEN SATURATION: 99 %

## 2022-10-19 PROCEDURE — 99215 OFFICE O/P EST HI 40 MIN: CPT

## 2022-10-19 NOTE — PHYSICAL EXAM
[General Appearance - Alert] : alert [General Appearance - In No Acute Distress] : in no acute distress [Sclera] : the sclera and conjunctiva were normal [PERRL With Normal Accommodation] : pupils were equal in size, round, and reactive to light [Oropharynx] : the oropharynx was normal [Jugular Venous Distention Increased] : there was no jugular-venous distention [Respiration, Rhythm And Depth] : normal respiratory rhythm and effort [Exaggerated Use Of Accessory Muscles For Inspiration] : no accessory muscle use [Auscultation Breath Sounds / Voice Sounds] : lungs were clear to auscultation bilaterally [Heart Rate And Rhythm] : heart rate was normal and rhythm regular [Heart Sounds Gallop] : no gallops [Heart Sounds] : normal S1 and S2 [Bowel Sounds] : normal bowel sounds [Abdomen Soft] : soft [Abdomen Tenderness] : non-tender [Involuntary Movements] : no involuntary movements were seen [___ (cm) Fistula] : [unfilled] (cm) fistula [Bruit] : a bruit was present [Thrill] : a thrill was present [] : no rash [No Focal Deficits] : no focal deficits [Oriented To Time, Place, And Person] : oriented to person, place, and time [FreeTextEntry1] : RLQ scar well healed, no tenderness, bruit +present

## 2022-10-19 NOTE — HISTORY OF PRESENT ILLNESS
[FreeTextEntry1] : \par 76 year old woman with ESRD due to HTN, was on hemodialysis since 2016. Received DDRT on 5/19/21 \par \par Donor was a 44 yr old woman, KDPI 69%, history of diabetes on insulin, terminal creatinine 3.3. \par Post transplant course was complicated by DGF. Last hemodialysis was on 6/4/21. Transplant ureteric stent was removed on 8/2/21. \par \par Other PMH includes :History of COVID-19 infection, hospitalized in March 2020 for only 1 day. \par \par - Hospitalized 10/2021 with severe symptomatic anemia (Hgb 5g/dL). Transfused 2 units PRBC. Anemia was due to hemolysis caused by dapsone (despite normal G6PD levels prior to initiation). \par - Nov 2021 Noted to have YANELY creatinine 1.2-> 1.65mg/dL.  US showed increased velocities concerning for TRAS but no tardus parvus waves. DSA negative,  and cFDNA  0.5%. Conservatively managed. \par - Hospitalized Jan 2022 with worsening anemia, hyperkalemia, tested +ve for COVID but had no symptoms. \par - Sept 2022 - Worsening YANELY with cr to 2.2mg/dL. DSA was negative. cFDNA 0.68%. \par Transplant kidney biopsy done on 9/29/22  showed mild chronic-active antibody-mediated rejection\par (There is minimal glomerulitis, mild peritubular capillaritis, no vasculitis, and very focal signs of remodeling of the glomerular capillary walls by electron microscopy) C4d was focally positive in peritubular capillaries 20-30%. Mild interstitial inflammation, no tubulitis or endothelialitis) \par Diffuse and nodular diabetic glomerulosclerosis, donor related\par IFTA 30%\par \par Hospitalized 10/3/22-10/7/22, received pulse dose steroids, plasmapheresis and IVIG. \par Presented to ED on 10/12/22 after her last clinic visit for elevated BP of 200 systolic when she presented for plasmapheresis. Given her home BP meds in the ED, given lokelma for hyperkalemia and sent home. \par \par \raissa Presents today for follow up accompanied by her son. \par BP has been fluctuating occasionally as high as 190's at home, son stopped losartan and resumed doxazosin, BP improved to 150's systolic. \par Reports feeling  Has good appetite. No NVD. Urinating well, no dysuria, no hematuria. \par No cough,No fever, no chills. \par Weight 142 lbs \par Lives with . Children live nearby and help out. \par

## 2022-10-19 NOTE — ASSESSMENT
[FreeTextEntry1] : \par 76 yr old woman with ESRD due to HTN S/p DDRT on 5/19/2021, course complicated by DGF due to ATN. Last HD was on  6/4/21. Oswaldo creatinine 1.3mg/dL. Had worsening graft function Cr 1.6 -1.8mg/dL Nov 2021 with 2 g/d proteinuria. No DSA, cFDNA 0.58%.   Ultrasound done Nov 2021 with US showing elevated velocities but no tardus parvus. Conservatively managed.  She had transplant kidney biopsy for further worsening creatinine 2.2mg/dL. 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 DSA was negative. 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 \par Cr improved to 1.58mg/dL. Proteinuria 1.4 grams is relatively stable. \par \par Hyperkalemia: improved. Continue Lokelma 10grams po daily. Low K diet. \par \par Immunosuppression \par - S/p Simulect induction\par - CellCept 1000mg po bid  \par - On Envarsus 8mg po daily - reduced from 9mg for level of 11. Trough Goal 8-10 \par - Prednisone 5mg daily \par \par Infection Prophylaxis \par - Continue Mepron. Bactrim discontinued for high K.  Dapsone discontinued for hemolytic anemia. \par - Nystatin SS 4 times a day resumed after rx of rejection \par - Valcyte 450mg po daily resumed after rx of rejection \par - Completed 2 doses COVID vaccine prior to transplant. 3rd shot received 10/19/22. Also had COVID infection in March 2020 and Jan 2022. Shubham Ab strongly +ve\par - Flu shot received in October. 2021. Held for now due to recent rx for rejection. Will plan on next visit. \par - CMV low level 52 copies/ml  on 10/12 - continue to trend for now. Pt already on prophylactic dose of valcyte. \par \par Hypertension - high in office, better at home. 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 \par Resume lower dose cardura 2mg qhs \par \par Anemia/Pancytopenia - Worsening due to meds/recent rejection rx. Resumed Procrit last week. Continue Procrit 10,000 units weekly. Obtain anemia w/u, B12 and Folate, Iron studies. \par Continue to trend WBC (3.6)  and platelets (135) . \par \par Metabolic acidosis -. continue sodium bicarbonate 1300mg po bid\par \par Vitamin D deficiency -  continue Vitamin D 1000 units po daily. \par \par F/u Nov 9th \par \par

## 2022-10-20 PROCEDURE — 93005 ELECTROCARDIOGRAM TRACING: CPT

## 2022-10-20 PROCEDURE — 85018 HEMOGLOBIN: CPT

## 2022-10-20 PROCEDURE — 83605 ASSAY OF LACTIC ACID: CPT

## 2022-10-20 PROCEDURE — 88350 IMFLUOR EA ADDL 1ANTB STN PX: CPT

## 2022-10-20 PROCEDURE — 82435 ASSAY OF BLOOD CHLORIDE: CPT

## 2022-10-20 PROCEDURE — 88348 ELECTRON MICROSCOPY DX: CPT

## 2022-10-20 PROCEDURE — 36415 COLL VENOUS BLD VENIPUNCTURE: CPT

## 2022-10-20 PROCEDURE — 88305 TISSUE EXAM BY PATHOLOGIST: CPT

## 2022-10-20 PROCEDURE — 86922 COMPATIBILITY TEST ANTIGLOB: CPT

## 2022-10-20 PROCEDURE — 84295 ASSAY OF SERUM SODIUM: CPT

## 2022-10-20 PROCEDURE — 85014 HEMATOCRIT: CPT

## 2022-10-20 PROCEDURE — 85027 COMPLETE CBC AUTOMATED: CPT

## 2022-10-20 PROCEDURE — 85384 FIBRINOGEN ACTIVITY: CPT

## 2022-10-20 PROCEDURE — 82962 GLUCOSE BLOOD TEST: CPT

## 2022-10-20 PROCEDURE — 85025 COMPLETE CBC W/AUTO DIFF WBC: CPT

## 2022-10-20 PROCEDURE — 86900 BLOOD TYPING SEROLOGIC ABO: CPT

## 2022-10-20 PROCEDURE — 80053 COMPREHEN METABOLIC PANEL: CPT

## 2022-10-20 PROCEDURE — 80197 ASSAY OF TACROLIMUS: CPT

## 2022-10-20 PROCEDURE — 85730 THROMBOPLASTIN TIME PARTIAL: CPT

## 2022-10-20 PROCEDURE — 84132 ASSAY OF SERUM POTASSIUM: CPT

## 2022-10-20 PROCEDURE — 85610 PROTHROMBIN TIME: CPT

## 2022-10-20 PROCEDURE — 76942 ECHO GUIDE FOR BIOPSY: CPT

## 2022-10-20 PROCEDURE — 82947 ASSAY GLUCOSE BLOOD QUANT: CPT

## 2022-10-20 PROCEDURE — 82330 ASSAY OF CALCIUM: CPT

## 2022-10-20 PROCEDURE — 87637 SARSCOV2&INF A&B&RSV AMP PRB: CPT

## 2022-10-20 PROCEDURE — 50200 RENAL BIOPSY PERQ: CPT

## 2022-10-20 PROCEDURE — P9045: CPT

## 2022-10-20 PROCEDURE — 88346 IMFLUOR 1ST 1ANTB STAIN PX: CPT

## 2022-10-20 PROCEDURE — 86901 BLOOD TYPING SEROLOGIC RH(D): CPT

## 2022-10-20 PROCEDURE — P9041: CPT

## 2022-10-20 PROCEDURE — 99285 EMERGENCY DEPT VISIT HI MDM: CPT

## 2022-10-20 PROCEDURE — 84100 ASSAY OF PHOSPHORUS: CPT

## 2022-10-20 PROCEDURE — U0003: CPT

## 2022-10-20 PROCEDURE — 82803 BLOOD GASES ANY COMBINATION: CPT

## 2022-10-20 PROCEDURE — 83735 ASSAY OF MAGNESIUM: CPT

## 2022-10-20 PROCEDURE — 96374 THER/PROPH/DIAG INJ IV PUSH: CPT

## 2022-10-20 PROCEDURE — 36514 APHERESIS PLASMA: CPT

## 2022-10-20 PROCEDURE — 96375 TX/PRO/DX INJ NEW DRUG ADDON: CPT

## 2022-10-20 PROCEDURE — 88341 IMHCHEM/IMCYTCHM EA ADD ANTB: CPT

## 2022-10-20 PROCEDURE — 80048 BASIC METABOLIC PNL TOTAL CA: CPT

## 2022-10-20 PROCEDURE — 86850 RBC ANTIBODY SCREEN: CPT

## 2022-10-20 PROCEDURE — 88342 IMHCHEM/IMCYTCHM 1ST ANTB: CPT

## 2022-10-20 PROCEDURE — 88313 SPECIAL STAINS GROUP 2: CPT

## 2022-10-20 PROCEDURE — U0005: CPT

## 2022-11-07 ENCOUNTER — APPOINTMENT (OUTPATIENT)
Dept: TRANSPLANT | Facility: CLINIC | Age: 77
End: 2022-11-07

## 2022-11-09 ENCOUNTER — APPOINTMENT (OUTPATIENT)
Dept: NEPHROLOGY | Facility: CLINIC | Age: 77
End: 2022-11-09

## 2022-11-09 VITALS
RESPIRATION RATE: 16 BRPM | BODY MASS INDEX: 21.38 KG/M2 | OXYGEN SATURATION: 98 % | TEMPERATURE: 97.8 F | DIASTOLIC BLOOD PRESSURE: 80 MMHG | SYSTOLIC BLOOD PRESSURE: 209 MMHG | HEART RATE: 65 BPM | WEIGHT: 133 LBS | HEIGHT: 66 IN

## 2022-11-09 LAB
ALBUMIN SERPL ELPH-MCNC: 4.1 G/DL
ALP BLD-CCNC: 61 U/L
ALT SERPL-CCNC: 5 U/L
ANION GAP SERPL CALC-SCNC: 11 MMOL/L
APPEARANCE: CLEAR
AST SERPL-CCNC: 10 U/L
BACTERIA: NEGATIVE
BASOPHILS # BLD AUTO: 0.01 K/UL
BASOPHILS NFR BLD AUTO: 0.2 %
BILIRUB SERPL-MCNC: 0.2 MG/DL
BILIRUBIN URINE: NEGATIVE
BKV DNA SPEC QL NAA+PROBE: NOT DETECTED IU/ML
BLOOD URINE: ABNORMAL
BUN SERPL-MCNC: 29 MG/DL
CALCIUM SERPL-MCNC: 9.8 MG/DL
CHLORIDE SERPL-SCNC: 111 MMOL/L
CMV DNA SPEC QL NAA+PROBE: NOT DETECTED IU/ML
CMVPCR LOG: NOT DETECTED LOG10IU/ML
CO2 SERPL-SCNC: 21 MMOL/L
COLOR: YELLOW
CREAT SERPL-MCNC: 1.71 MG/DL
CREAT SPEC-SCNC: 188 MG/DL
CREAT/PROT UR: 1.4 RATIO
EGFR: 30 ML/MIN/1.73M2
EOSINOPHIL # BLD AUTO: 0.04 K/UL
EOSINOPHIL NFR BLD AUTO: 0.9 %
GLUCOSE QUALITATIVE U: NEGATIVE
GLUCOSE SERPL-MCNC: 101 MG/DL
HCT VFR BLD CALC: 28.1 %
HGB BLD-MCNC: 8.3 G/DL
HYALINE CASTS: 1 /LPF
IMM GRANULOCYTES NFR BLD AUTO: 0.5 %
KETONES URINE: NEGATIVE
LEUKOCYTE ESTERASE URINE: NEGATIVE
LYMPHOCYTES # BLD AUTO: 0.56 K/UL
LYMPHOCYTES NFR BLD AUTO: 13.1 %
MAGNESIUM SERPL-MCNC: 1.5 MG/DL
MAN DIFF?: NORMAL
MCHC RBC-ENTMCNC: 27.1 PG
MCHC RBC-ENTMCNC: 29.5 GM/DL
MCV RBC AUTO: 91.8 FL
MICROSCOPIC-UA: NORMAL
MONOCYTES # BLD AUTO: 0.35 K/UL
MONOCYTES NFR BLD AUTO: 8.2 %
NEUTROPHILS # BLD AUTO: 3.3 K/UL
NEUTROPHILS NFR BLD AUTO: 77.1 %
NITRITE URINE: NEGATIVE
PH URINE: 6
PHOSPHATE SERPL-MCNC: 3.3 MG/DL
PLATELET # BLD AUTO: 195 K/UL
POTASSIUM SERPL-SCNC: 4.5 MMOL/L
PROT SERPL-MCNC: 5.3 G/DL
PROT UR-MCNC: 257 MG/DL
PROTEIN URINE: ABNORMAL
RBC # BLD: 3.06 M/UL
RBC # FLD: 16 %
RED BLOOD CELLS URINE: 2 /HPF
SODIUM SERPL-SCNC: 143 MMOL/L
SPECIFIC GRAVITY URINE: 1.02
SQUAMOUS EPITHELIAL CELLS: 1 /HPF
TACROLIMUS SERPL-MCNC: 9.7 NG/ML
URATE SERPL-MCNC: 8.9 MG/DL
UROBILINOGEN URINE: NORMAL
WBC # FLD AUTO: 4.28 K/UL
WHITE BLOOD CELLS URINE: 2 /HPF

## 2022-11-09 PROCEDURE — 99215 OFFICE O/P EST HI 40 MIN: CPT

## 2022-11-09 RX ORDER — TACROLIMUS 1 MG/1
1 TABLET, EXTENDED RELEASE ORAL DAILY
Qty: 30 | Refills: 5 | Status: DISCONTINUED | COMMUNITY
Start: 2022-08-11 | End: 2022-11-09

## 2022-11-09 NOTE — PHYSICAL EXAM

## 2022-11-09 NOTE — HISTORY OF PRESENT ILLNESS
[FreeTextEntry1] : \par 76 year old woman with ESRD due to HTN, was on hemodialysis since 2016. Received DDRT on 5/19/21 \par \par Donor was a 44 yr old woman, KDPI 69%, history of diabetes on insulin, terminal creatinine 3.3. \par Post transplant course was complicated by DGF. Last hemodialysis was on 6/4/21. Transplant ureteric stent was removed on 8/2/21. \par \par Other PMH includes :History of COVID-19 infection, hospitalized in March 2020 for only 1 day. \par \par - Hospitalized 10/2021 with severe symptomatic anemia (Hgb 5g/dL). Transfused 2 units PRBC. Anemia was due to hemolysis caused by dapsone (despite normal G6PD levels prior to initiation). \par - Nov 2021 Noted to have YANELY creatinine 1.2-> 1.65mg/dL.  US showed increased velocities concerning for TRAS but no tardus parvus waves. DSA negative,  and cFDNA  0.5%. Conservatively managed. \par - Hospitalized Jan 2022 with worsening anemia, hyperkalemia, tested +ve for COVID but had no symptoms. \par - Sept 2022 - Worsening YANELY with cr to 2.2mg/dL. DSA was negative. cFDNA 0.68%. \par Transplant kidney biopsy done on 9/29/22  showed mild chronic-active antibody-mediated rejection\par (There is minimal glomerulitis, mild peritubular capillaritis, no vasculitis, and very focal signs of remodeling of the glomerular capillary walls by electron microscopy) C4d was focally positive in peritubular capillaries 20-30%. Mild interstitial inflammation, no tubulitis or endothelialitis) \par Diffuse and nodular diabetic glomerulosclerosis, donor related\par IFTA 30%\par \par Hospitalized 10/3/22-10/7/22, received pulse dose steroids, plasmapheresis and IVIG. \par \par Last seen in office 3 weeks ago. No major events since last visit, no ER visits or hospitalizations. \par Voiding urine without difficulty. No dysuria or hematuria. \par Has loose stools daily in AM for the past week. No abdominal pain. \par No nausea, vomiting. Appetite good. \par No fever, chills. Energy is good.  \par No chest pain or shortness of breath.  \par Taking all medications as prescribed \par BP fluctuating 140-180 systolic. Has bilateral leg swelling. \par Weight 133 lbs , stable. \par Physical activity: walks outside the house for 1 hour. \par Lives with . Children live nearby and help out. \par

## 2022-11-09 NOTE — ASSESSMENT
[FreeTextEntry1] : \par 76 yr old woman with ESRD due to HTN S/p DDRT on 5/19/2021, course complicated by DGF due to ATN. Last HD was on  6/4/21. Oswaldo creatinine 1.3mg/dL. Had worsening graft function Cr 1.6 -1.8mg/dL Nov 2021 with 2 g/d proteinuria. No DSA, cFDNA 0.58%.   Ultrasound done Nov 2021 with US showing elevated velocities but no tardus parvus. Conservatively management.  She had transplant kidney biopsy for further worsening creatinine 2.2mg/dL on 9/29/22. 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 DSA was negative. 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 2022. Creatinine improved from 2.2 to 1.7. Also started losartan 25mg daily.  \par Cr 1.7mg/dL overall stable. Proteinuria 1.4 grams stable. \par Hyperkalemia: improved on Lokelma.  Continue 10 grams daily. \par \par Transplant US 9/2022 with increased velocities concerning for TRAS. BP elevated, has edema. Will refer to IR for angiogram. \par \par Immunosuppression \par - S/p Simulect induction\par - CellCept 1000mg po bid  \par - On Envarsus 8mg po daily. Level  9.7 at goal. Goal 8-10 \par - Prednisone  5mg daily \par \par Prophylaxis resumed after rx of rejection \par - Continue Mepron. Bactrim discontinued for high K.  Dapsone discontinued for hemolytic anemia. \par - Nystatin SS 4 times a day \par - Valcyte 450mg po daily - until January 2023. CMV PCR negative. \par - Completed 2 doses COVID vaccine prior to transplant. 3rd shot received 10/19/22. Also had COVID infection in March 2020 and Jan 2022. Shubham Ab strongly +ve\par - Flu shot today. \par \par \par Hypertension - uncontrolled, likely due to TRAS. 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 Increase Losartan to 50mg po daily. Take lasix 20mg po daily as needed for edema. \par \par Anemia on procrit-  Iron stores replete, normal B12 and Folate. Hgb improving, continue Procrit 10,000 units weekly. \par \par Metabolic acidosis -stable  continue sodium bicarbonate 1300mg po tid \par \par Vitamin D deficiency -  continue Vitamin D 1000 units po daily. \par \par Refer to IR for angiogram of TRAS\par F/u here 12/7/2022 \par

## 2022-11-22 ENCOUNTER — APPOINTMENT (OUTPATIENT)
Dept: INTERVENTIONAL RADIOLOGY/VASCULAR | Facility: CLINIC | Age: 77
End: 2022-11-22
Payer: MEDICARE

## 2022-11-22 PROCEDURE — XXXXX: CPT

## 2022-12-04 NOTE — H&P ADULT - NSICDXPASTMEDICALHX_GEN_ALL_CORE_FT
no discharge, no irritation, no pain, no redness, and no visual changes.
PAST MEDICAL HISTORY:  Cataract     DVT (deep venous thrombosis) of Left subclavian vein, 06/12/17    ESRD (end stage renal disease) on dialysis     Glaucoma     Hemodialysis access, fistula mature JASON    Hemodialysis patient     HTN (hypertension)

## 2022-12-07 ENCOUNTER — LABORATORY RESULT (OUTPATIENT)
Age: 77
End: 2022-12-07

## 2022-12-07 ENCOUNTER — APPOINTMENT (OUTPATIENT)
Dept: NEPHROLOGY | Facility: CLINIC | Age: 77
End: 2022-12-07

## 2022-12-07 VITALS
TEMPERATURE: 98 F | HEIGHT: 66 IN | OXYGEN SATURATION: 99 % | SYSTOLIC BLOOD PRESSURE: 158 MMHG | DIASTOLIC BLOOD PRESSURE: 73 MMHG | WEIGHT: 129 LBS | HEART RATE: 72 BPM | RESPIRATION RATE: 16 BRPM | BODY MASS INDEX: 20.73 KG/M2

## 2022-12-07 PROCEDURE — 99215 OFFICE O/P EST HI 40 MIN: CPT

## 2022-12-07 RX ORDER — SENNOSIDES 8.6 MG TABLETS 8.6 MG/1
8.6 TABLET ORAL
Qty: 30 | Refills: 11 | Status: COMPLETED | COMMUNITY
Start: 2021-05-20 | End: 2022-12-07

## 2022-12-07 NOTE — HISTORY OF PRESENT ILLNESS
[FreeTextEntry1] : \par 76 year old woman with ESRD due to HTN, was on hemodialysis since 2016. Received DDRT on 5/19/21 \par \par Donor was a 44 yr old woman, KDPI 69%, history of diabetes on insulin, terminal creatinine 3.3. \par Post transplant course was complicated by DGF. Last hemodialysis was on 6/4/21. Transplant ureteric stent was removed on 8/2/21. \par \par Other PMH includes :History of COVID-19 infection, hospitalized in March 2020 for only 1 day. \par \par - Hospitalized 10/2021 with severe symptomatic anemia (Hgb 5g/dL). Transfused 2 units PRBC. Anemia was due to hemolysis caused by dapsone (despite normal G6PD levels prior to initiation). \par - Nov 2021 Noted to have YANELY creatinine 1.2-> 1.65mg/dL.  US showed increased velocities concerning for TRAS but no tardus parvus waves. DSA negative,  and cFDNA  0.5%. Conservatively managed. \par - Hospitalized Jan 2022 with worsening anemia, hyperkalemia, tested +ve for COVID but had no symptoms. \par - Sept 2022 - Worsening YANELY with cr to 2.2mg/dL. DSA was negative. cFDNA 0.68%. \par \par Transplant kidney biopsy done on 9/29/22  showed mild chronic-active antibody-mediated rejection\par (There is minimal glomerulitis, mild peritubular capillaritis, no vasculitis, and very focal signs of remodeling of the glomerular capillary walls by electron microscopy) C4d was focally positive in peritubular capillaries 20-30%. Mild interstitial inflammation, no tubulitis or endothelialitis) \par Diffuse and nodular diabetic glomerulosclerosis, donor related\par IFTA 30%\par \par Hospitalized 10/3/22-10/7/22, received pulse dose steroids, plasmapheresis and IVIG. \par \par Last seen in office 4 weeks ago. No major events since last visit, no ER visits or hospitalizations. \par BP at home 140-170 systolic. LE edema persists. She is concerned b/c she does not like "holding water in her body".  She takes lasix 20mg ~ 3 times a week for edema. \par Urinating without difficulty. No dysuria, no hematuria. No fever. \par No NVD. Loose stools subsided.  No abdominal pain. \par No nausea, no vomiting. Appetite good. Energy is good.  \par No chest pain or shortness of breath.  \par Taking all medications as prescribed \par Weight 128lbs 5lbs less. \par Physical activity: walks outside the house for 1 hour. \par Lives with . Children live nearby and help out. \par

## 2022-12-07 NOTE — PATIENT PROFILE ADULT. - FUNCTIONAL SCREEN CURRENT LEVEL: TOILETING, MLM
"Per FAUZIA SEQUEIRA \"trending down should go back to ER\" call placed to son Jadiel and advised \"well get her back there\"  ER" (0) independent

## 2022-12-07 NOTE — ASSESSMENT
[FreeTextEntry1] : \par 76 yr old woman with ESRD due to HTN S/p DDRT on 5/19/2021, course complicated by DGF due to ATN. Last HD was on  6/4/21. Oswaldo creatinine 1.3mg/dL. Had worsening graft function Cr 1.6 -1.8mg/dL Nov 2021 with 2 g/d proteinuria. No DSA, cFDNA 0.58%.   Ultrasound done Nov 2021 with US showing elevated velocities but no tardus parvus. Conservatively management.  She had transplant kidney biopsy for further worsening creatinine 2.2mg/dL on 9/29/22. 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 DSA was negative. 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 2022. Creatinine improved from 2.2 to 1.7. Also started losartan for non HLA mediated rejection possibly due to anti angiotensin Ab. \par \par Cr today 1.8, proteinuria 1.8 grams\par Hyperkalemia controlled on Lokelma - continue 10 grams daily \par \par Transplant US 9/2022 with increased velocities concerning for TRAS. Also has uncontrolled HTN and edema. Referred to IR for angiogram. Scheduled to see Dr. Stafford on 12/13/22.\par \par Immunosuppression \par - S/p Simulect induction\par - On CellCept 1000mg po bid . WBC low at 1.6. Will reduce dose to 500mg po bid \par - On Envarsus 8mg po daily. Level 13.3 elevated. Reduce dose to 6mg daily. Goal 8-10 \par - Prednisone  5mg daily \par \par Prophylaxis resumed after rx of rejection \par - Continue Mepron. Bactrim discontinued for high K.  Dapsone discontinued for hemolytic anemia. \par - Nystatin SS 4 times a day - until January 2023\par - Valcyte 450mg po daily - Until April 2023 .\par - Completed 2 doses COVID vaccine prior to transplant. 3rd shot received 10/19/22. Also had COVID infection in March 2020 and Jan 2022. \par - Flu shot received Nov 2022 \par \par Hypertension - uncontrolled, likely due to TRAS. Referred to IR for angiogram \par Continue Nifedipine 60mg po bid, labetalol 200mg po tid, Losartan 50mg po daily , doxazosin 1mg daily\par Take Lasix 20mg po daily as needed for edema.\par \par Anemia on procrit-  Iron stores replete, normal B12 and Folate. Hgb improving, Reduce Procrit 10,000 units every 2 weeks. \par \par Metabolic acidosis -controlled. Reduce sodium bicarbonate to 1300mg po BID. \par \par Vitamin D deficiency -  continue Vitamin D 1000 units po daily. \par \par F/u with IR for angiogram of TRAS - scheduled to see Dr. Stafford on 12/13/22 \par Labs next week - check WBC count and tac level\par F/u here on 1/18/23 \par \par

## 2022-12-08 ENCOUNTER — NON-APPOINTMENT (OUTPATIENT)
Age: 77
End: 2022-12-08

## 2022-12-08 LAB
ALBUMIN SERPL ELPH-MCNC: 3.8 G/DL
ALP BLD-CCNC: 65 U/L
ALT SERPL-CCNC: 7 U/L
ANION GAP SERPL CALC-SCNC: 10 MMOL/L
APPEARANCE: CLEAR
AST SERPL-CCNC: 14 U/L
BACTERIA: NEGATIVE
BASOPHILS # BLD AUTO: 0 K/UL
BASOPHILS NFR BLD AUTO: 0 %
BILIRUB SERPL-MCNC: 0.3 MG/DL
BILIRUBIN URINE: NEGATIVE
BLOOD URINE: NEGATIVE
BUN SERPL-MCNC: 34 MG/DL
CALCIUM SERPL-MCNC: 9.4 MG/DL
CHLORIDE SERPL-SCNC: 109 MMOL/L
CO2 SERPL-SCNC: 23 MMOL/L
COLOR: YELLOW
CREAT SERPL-MCNC: 1.82 MG/DL
CREAT SPEC-SCNC: 123 MG/DL
CREAT/PROT UR: 1.8 RATIO
EGFR: 28 ML/MIN/1.73M2
EOSINOPHIL # BLD AUTO: 0.03 K/UL
EOSINOPHIL NFR BLD AUTO: 1.8 %
GLUCOSE QUALITATIVE U: NEGATIVE
GLUCOSE SERPL-MCNC: 107 MG/DL
HCT VFR BLD CALC: 31.1 %
HGB BLD-MCNC: 9.5 G/DL
HYALINE CASTS: 0 /LPF
KETONES URINE: NEGATIVE
LEUKOCYTE ESTERASE URINE: NEGATIVE
LYMPHOCYTES # BLD AUTO: 0.37 K/UL
LYMPHOCYTES NFR BLD AUTO: 22.8 %
MAGNESIUM SERPL-MCNC: 1.6 MG/DL
MAN DIFF?: NORMAL
MCHC RBC-ENTMCNC: 27.5 PG
MCHC RBC-ENTMCNC: 30.5 GM/DL
MCV RBC AUTO: 89.9 FL
MICROSCOPIC-UA: NORMAL
MONOCYTES # BLD AUTO: 0.04 K/UL
MONOCYTES NFR BLD AUTO: 2.6 %
NEUTROPHILS # BLD AUTO: 1.17 K/UL
NEUTROPHILS NFR BLD AUTO: 71.9 %
NITRITE URINE: NEGATIVE
PH URINE: 6
PHOSPHATE SERPL-MCNC: 4 MG/DL
PLATELET # BLD AUTO: 181 K/UL
POTASSIUM SERPL-SCNC: 4.4 MMOL/L
PROT SERPL-MCNC: 5.8 G/DL
PROT UR-MCNC: 224 MG/DL
PROTEIN URINE: ABNORMAL
RBC # BLD: 3.46 M/UL
RBC # FLD: 14.6 %
RED BLOOD CELLS URINE: 3 /HPF
SODIUM SERPL-SCNC: 143 MMOL/L
SPECIFIC GRAVITY URINE: 1.02
SQUAMOUS EPITHELIAL CELLS: 0 /HPF
TACROLIMUS SERPL-MCNC: 13.3 NG/ML
URATE SERPL-MCNC: 7.9 MG/DL
UROBILINOGEN URINE: NORMAL
WBC # FLD AUTO: 1.63 K/UL
WHITE BLOOD CELLS URINE: 1 /HPF

## 2022-12-09 LAB
BKV DNA SPEC QL NAA+PROBE: NOT DETECTED IU/ML
CMV DNA SPEC QL NAA+PROBE: NOT DETECTED IU/ML
CMVPCR LOG: NOT DETECTED LOG10IU/ML

## 2022-12-13 ENCOUNTER — APPOINTMENT (OUTPATIENT)
Dept: INTERVENTIONAL RADIOLOGY/VASCULAR | Facility: CLINIC | Age: 77
End: 2022-12-13

## 2022-12-13 DIAGNOSIS — R79.89 OTHER SPECIFIED ABNORMAL FINDINGS OF BLOOD CHEMISTRY: ICD-10-CM

## 2022-12-13 DIAGNOSIS — R94.4 ABNORMAL RESULTS OF KIDNEY FUNCTION STUDIES: ICD-10-CM

## 2022-12-13 DIAGNOSIS — I70.1 OTHER COMPLICATION OF KIDNEY TRANSPLANT: ICD-10-CM

## 2022-12-13 DIAGNOSIS — I31.39 OTHER PERICARDIAL EFFUSION (NONINFLAMMATORY): ICD-10-CM

## 2022-12-13 DIAGNOSIS — R93.89 ABNORMAL FINDINGS ON DIAGNOSTIC IMAGING OF OTHER SPECIFIED BODY STRUCTURES: ICD-10-CM

## 2022-12-13 DIAGNOSIS — I10 ESSENTIAL (PRIMARY) HYPERTENSION: ICD-10-CM

## 2022-12-13 DIAGNOSIS — Z01.818 ENCOUNTER FOR OTHER PREPROCEDURAL EXAMINATION: ICD-10-CM

## 2022-12-13 DIAGNOSIS — T86.19 OTHER COMPLICATION OF KIDNEY TRANSPLANT: ICD-10-CM

## 2022-12-13 PROCEDURE — 99443: CPT | Mod: 95

## 2022-12-15 ENCOUNTER — LABORATORY RESULT (OUTPATIENT)
Age: 77
End: 2022-12-15

## 2022-12-19 LAB
ANION GAP SERPL CALC-SCNC: 12 MMOL/L
BASOPHILS # BLD AUTO: 0 K/UL
BASOPHILS NFR BLD AUTO: 0 %
BUN SERPL-MCNC: 36 MG/DL
CALCIUM SERPL-MCNC: 9.5 MG/DL
CHLORIDE SERPL-SCNC: 111 MMOL/L
CO2 SERPL-SCNC: 23 MMOL/L
CREAT SERPL-MCNC: 2 MG/DL
EGFR: 25 ML/MIN/1.73M2
EOSINOPHIL # BLD AUTO: 0.03 K/UL
EOSINOPHIL NFR BLD AUTO: 0.9 %
GLUCOSE SERPL-MCNC: 107 MG/DL
HCT VFR BLD CALC: 30.5 %
HGB BLD-MCNC: 9.1 G/DL
INR PPP: 1.04 RATIO
LYMPHOCYTES # BLD AUTO: 0.61 K/UL
LYMPHOCYTES NFR BLD AUTO: 20 %
MAN DIFF?: NORMAL
MCHC RBC-ENTMCNC: 27.6 PG
MCHC RBC-ENTMCNC: 29.8 GM/DL
MCV RBC AUTO: 92.4 FL
MONOCYTES # BLD AUTO: 0.24 K/UL
MONOCYTES NFR BLD AUTO: 7.8 %
NEUTROPHILS # BLD AUTO: 1.84 K/UL
NEUTROPHILS NFR BLD AUTO: 42.6 %
PLATELET # BLD AUTO: 158 K/UL
POTASSIUM SERPL-SCNC: 4.4 MMOL/L
PT BLD: 12.1 SEC
RBC # BLD: 3.3 M/UL
RBC # FLD: 14.7 %
SARS-COV-2 N GENE NPH QL NAA+PROBE: NOT DETECTED
SODIUM SERPL-SCNC: 147 MMOL/L
WBC # FLD AUTO: 3.06 K/UL

## 2022-12-21 ENCOUNTER — OUTPATIENT (OUTPATIENT)
Dept: OUTPATIENT SERVICES | Facility: HOSPITAL | Age: 77
LOS: 1 days | End: 2022-12-21
Payer: MEDICARE

## 2022-12-21 VITALS
HEIGHT: 63 IN | RESPIRATION RATE: 16 BRPM | OXYGEN SATURATION: 97 % | SYSTOLIC BLOOD PRESSURE: 160 MMHG | TEMPERATURE: 98 F | WEIGHT: 128.97 LBS | DIASTOLIC BLOOD PRESSURE: 69 MMHG | HEART RATE: 67 BPM

## 2022-12-21 DIAGNOSIS — I77.0 ARTERIOVENOUS FISTULA, ACQUIRED: Chronic | ICD-10-CM

## 2022-12-21 DIAGNOSIS — Z99.2 DEPENDENCE ON RENAL DIALYSIS: Chronic | ICD-10-CM

## 2022-12-21 DIAGNOSIS — Z94.0 KIDNEY TRANSPLANT STATUS: Chronic | ICD-10-CM

## 2022-12-21 DIAGNOSIS — H26.9 UNSPECIFIED CATARACT: Chronic | ICD-10-CM

## 2022-12-21 DIAGNOSIS — Z86.69 PERSONAL HISTORY OF OTHER DISEASES OF THE NERVOUS SYSTEM AND SENSE ORGANS: Chronic | ICD-10-CM

## 2022-12-21 DIAGNOSIS — Z01.818 ENCOUNTER FOR OTHER PREPROCEDURAL EXAMINATION: ICD-10-CM

## 2022-12-21 DIAGNOSIS — Z90.710 ACQUIRED ABSENCE OF BOTH CERVIX AND UTERUS: Chronic | ICD-10-CM

## 2022-12-21 DIAGNOSIS — Z94.0 KIDNEY TRANSPLANT STATUS: ICD-10-CM

## 2022-12-21 LAB
ANION GAP SERPL CALC-SCNC: 11 MMOL/L — SIGNIFICANT CHANGE UP (ref 5–17)
BLD GP AB SCN SERPL QL: NEGATIVE — SIGNIFICANT CHANGE UP
BUN SERPL-MCNC: 37 MG/DL — HIGH (ref 7–23)
CALCIUM SERPL-MCNC: 9.4 MG/DL — SIGNIFICANT CHANGE UP (ref 8.4–10.5)
CHLORIDE SERPL-SCNC: 107 MMOL/L — SIGNIFICANT CHANGE UP (ref 96–108)
CO2 SERPL-SCNC: 25 MMOL/L — SIGNIFICANT CHANGE UP (ref 22–31)
CREAT SERPL-MCNC: 2.01 MG/DL — HIGH (ref 0.5–1.3)
EGFR: 25 ML/MIN/1.73M2 — LOW
GLUCOSE SERPL-MCNC: 114 MG/DL — HIGH (ref 70–99)
HCT VFR BLD CALC: 28.8 % — LOW (ref 34.5–45)
HGB BLD-MCNC: 8.7 G/DL — LOW (ref 11.5–15.5)
MCHC RBC-ENTMCNC: 27 PG — SIGNIFICANT CHANGE UP (ref 27–34)
MCHC RBC-ENTMCNC: 30.2 GM/DL — LOW (ref 32–36)
MCV RBC AUTO: 89.4 FL — SIGNIFICANT CHANGE UP (ref 80–100)
NRBC # BLD: 0 /100 WBCS — SIGNIFICANT CHANGE UP (ref 0–0)
PLATELET # BLD AUTO: 157 K/UL — SIGNIFICANT CHANGE UP (ref 150–400)
POTASSIUM SERPL-MCNC: 4.8 MMOL/L — SIGNIFICANT CHANGE UP (ref 3.5–5.3)
POTASSIUM SERPL-SCNC: 4.8 MMOL/L — SIGNIFICANT CHANGE UP (ref 3.5–5.3)
RBC # BLD: 3.22 M/UL — LOW (ref 3.8–5.2)
RBC # FLD: 14.4 % — SIGNIFICANT CHANGE UP (ref 10.3–14.5)
RH IG SCN BLD-IMP: POSITIVE — SIGNIFICANT CHANGE UP
SODIUM SERPL-SCNC: 143 MMOL/L — SIGNIFICANT CHANGE UP (ref 135–145)
WBC # BLD: 2.63 K/UL — LOW (ref 3.8–10.5)
WBC # FLD AUTO: 2.63 K/UL — LOW (ref 3.8–10.5)

## 2022-12-21 PROCEDURE — 86901 BLOOD TYPING SEROLOGIC RH(D): CPT

## 2022-12-21 PROCEDURE — 86900 BLOOD TYPING SEROLOGIC ABO: CPT

## 2022-12-21 PROCEDURE — 80048 BASIC METABOLIC PNL TOTAL CA: CPT

## 2022-12-21 PROCEDURE — 85027 COMPLETE CBC AUTOMATED: CPT

## 2022-12-21 PROCEDURE — 86850 RBC ANTIBODY SCREEN: CPT

## 2022-12-21 PROCEDURE — G0463: CPT

## 2022-12-21 RX ORDER — MYCOPHENOLATE MOFETIL 250 MG/1
1 CAPSULE ORAL
Qty: 0 | Refills: 0 | DISCHARGE

## 2022-12-21 NOTE — H&P PST ADULT - NS HP PST ANES REACTION
What Is The Reason For Today's Visit?: Annual Full Body Skin Examination with No Concerns What Is The Reason For Today's Visit? (Being Monitored For X): concerning skin lesions on an annual basis No

## 2022-12-21 NOTE — H&P PST ADULT - NSICDXPASTMEDICALHX_GEN_ALL_CORE_FT
PAST MEDICAL HISTORY:  Anemia of chronic disease     Cataract     DVT (deep venous thrombosis) of Left subclavian vein, 06/12/17    ESRD (end stage renal disease) on dialysis     Glaucoma     History of arteriovenostomy for renal dialysis     [place] (hard of hearing)     HTN (hypertension)     Kidney transplant recipient

## 2022-12-21 NOTE — H&P PST ADULT - NSICDXPASTSURGICALHX_GEN_ALL_CORE_FT
PAST SURGICAL HISTORY:  Acquired cataract extraction with b/l lense placement, 2016    AV fistula     Hemodialysis access, AV graft     History of renal transplantation DDRT 5/19/2021    S/P KEVEN-BSO for fibroids, 2012

## 2022-12-21 NOTE — H&P PST ADULT - PROBLEM SELECTOR PLAN 1
scheduled for transplant renal artery angio +/- angioplasty +/- stent on 12/28/2022.   PST instructions provided, surgical scrub given, patient verbalized understanding   CBC, BMP,T&S collected and sent   Covid PCR 12/26/22 @ [facility]   transplant notes in HIE reviewed

## 2022-12-21 NOTE — H&P PST ADULT - HISTORY OF PRESENT ILLNESS
76 yr old F with history of ESRD due to HTN, HD x 5 years, underwent DDRT 5/19/2021 course complicated by delayed graft function due to ATN. Patient has rising creatinine, s/p renal bx 9/29/22 that revealed mild ABMR and diabetic nephropathy ( donor origin ). Patient was treated with steroids, plasmapheresis and IVIG (10/2022) creatinine improved 1.8-2.0.  Chronic anemia treated with Procrit injections. Presents to [facility] for scheduled transplant renal artery angio +/- angioplasty +/- stent on 12/28/2022.  Denies UTI symptoms, no fever, chills, no acute complaints. Denies Covid exposure. Covid PCR 12/26 2 [facility].  Ambulating with cane.    Covid - summer 2021 -asymptomatic     Patient speaks [place]ic , understands basic English . Accompanied by daughter in law, Charito,  who is assisting with translations. Patient denies need for .

## 2022-12-26 ENCOUNTER — OUTPATIENT (OUTPATIENT)
Dept: OUTPATIENT SERVICES | Facility: HOSPITAL | Age: 77
LOS: 1 days | End: 2022-12-26

## 2022-12-26 DIAGNOSIS — Z90.710 ACQUIRED ABSENCE OF BOTH CERVIX AND UTERUS: Chronic | ICD-10-CM

## 2022-12-26 DIAGNOSIS — Z94.0 KIDNEY TRANSPLANT STATUS: Chronic | ICD-10-CM

## 2022-12-26 DIAGNOSIS — H26.9 UNSPECIFIED CATARACT: Chronic | ICD-10-CM

## 2022-12-26 DIAGNOSIS — Z11.52 ENCOUNTER FOR SCREENING FOR COVID-19: ICD-10-CM

## 2022-12-26 DIAGNOSIS — Z99.2 DEPENDENCE ON RENAL DIALYSIS: Chronic | ICD-10-CM

## 2022-12-26 DIAGNOSIS — I77.0 ARTERIOVENOUS FISTULA, ACQUIRED: Chronic | ICD-10-CM

## 2022-12-26 LAB — SARS-COV-2 RNA SPEC QL NAA+PROBE: SIGNIFICANT CHANGE UP

## 2022-12-28 ENCOUNTER — TRANSCRIPTION ENCOUNTER (OUTPATIENT)
Age: 77
End: 2022-12-28

## 2022-12-28 ENCOUNTER — OUTPATIENT (OUTPATIENT)
Dept: INPATIENT UNIT | Facility: HOSPITAL | Age: 77
LOS: 1 days | End: 2022-12-28
Payer: MEDICARE

## 2022-12-28 VITALS
DIASTOLIC BLOOD PRESSURE: 77 MMHG | HEART RATE: 64 BPM | RESPIRATION RATE: 15 BRPM | WEIGHT: 130.07 LBS | OXYGEN SATURATION: 100 % | TEMPERATURE: 98 F | HEIGHT: 63 IN | SYSTOLIC BLOOD PRESSURE: 188 MMHG

## 2022-12-28 VITALS
SYSTOLIC BLOOD PRESSURE: 168 MMHG | RESPIRATION RATE: 16 BRPM | OXYGEN SATURATION: 100 % | HEART RATE: 65 BPM | DIASTOLIC BLOOD PRESSURE: 72 MMHG

## 2022-12-28 DIAGNOSIS — Z94.0 KIDNEY TRANSPLANT STATUS: Chronic | ICD-10-CM

## 2022-12-28 DIAGNOSIS — Z99.2 DEPENDENCE ON RENAL DIALYSIS: Chronic | ICD-10-CM

## 2022-12-28 DIAGNOSIS — Z94.0 KIDNEY TRANSPLANT STATUS: ICD-10-CM

## 2022-12-28 DIAGNOSIS — H26.9 UNSPECIFIED CATARACT: Chronic | ICD-10-CM

## 2022-12-28 DIAGNOSIS — I77.0 ARTERIOVENOUS FISTULA, ACQUIRED: Chronic | ICD-10-CM

## 2022-12-28 DIAGNOSIS — Z90.710 ACQUIRED ABSENCE OF BOTH CERVIX AND UTERUS: Chronic | ICD-10-CM

## 2022-12-28 LAB
BLD GP AB SCN SERPL QL: NEGATIVE — SIGNIFICANT CHANGE UP
RH IG SCN BLD-IMP: POSITIVE — SIGNIFICANT CHANGE UP

## 2022-12-28 PROCEDURE — 86850 RBC ANTIBODY SCREEN: CPT

## 2022-12-28 PROCEDURE — C1894: CPT

## 2022-12-28 PROCEDURE — 36251 INS CATH REN ART 1ST UNILAT: CPT

## 2022-12-28 PROCEDURE — C1769: CPT

## 2022-12-28 PROCEDURE — 86922 COMPATIBILITY TEST ANTIGLOB: CPT

## 2022-12-28 PROCEDURE — 86900 BLOOD TYPING SEROLOGIC ABO: CPT

## 2022-12-28 PROCEDURE — C9803: CPT

## 2022-12-28 PROCEDURE — U0005: CPT

## 2022-12-28 PROCEDURE — 76937 US GUIDE VASCULAR ACCESS: CPT

## 2022-12-28 PROCEDURE — 86901 BLOOD TYPING SEROLOGIC RH(D): CPT

## 2022-12-28 PROCEDURE — C1887: CPT

## 2022-12-28 PROCEDURE — U0003: CPT

## 2022-12-28 NOTE — PROCEDURE NOTE - PROCEDURE FINDINGS AND DETAILS
Transplant renal angiogram demonstrates no hemodynamically significant stenosis.  Full report to follow.

## 2022-12-28 NOTE — ASU DISCHARGE PLAN (ADULT/PEDIATRIC) - NS MD DC FALL RISK RISK
For information on Fall & Injury Prevention, visit: [email or web address] OR  [email or web address] OR  https://www.cdc.gov/steadi/patient.html

## 2022-12-28 NOTE — PRE PROCEDURE NOTE - ATTENDING COMMENTS
AS above, pt seen in clinic with US demonstrating possible stenosis of renal artery to transplant for angiogram and possible intervention.

## 2022-12-28 NOTE — ASU DISCHARGE PLAN (ADULT/PEDIATRIC) - NURSING INSTRUCTIONS
Please feel free to contact us at [phone] if any problems arise. After 6PM, Monday through Friday, on weekends and on holidays, please call [phone] and ask for the radiology resident on call to be paged.

## 2022-12-28 NOTE — ASU DISCHARGE PLAN (ADULT/PEDIATRIC) - ASU DC SPECIAL INSTRUCTIONSFT
You had an angiogram of your transplant kidney in Interventional Radiology (IR) on 12/28/2022.        - You may shower in 24 hours. No soaking or swimming until the site is completely healed.  - Keep the area covered and dry for the next 24 hours.  - Do not perform any heavy lifting for the next few days or until the site is healed.  - You may resume your normal diet.  - You may resume your normal medications however you should wait 48 hours before restarting aspirin, plavix, or blood thinners.  - It is normal to experience some pain over the site for the next few days. You may take apply ice to the area (20 minutes on, 20 minutes off) and take Tylenol for that pain. Do not take more frequently than every 6 hours and do not exceed more than 3000mg of Tylenol in a 24 hour period.    - You were given conscious sedation which may make you drowsy, therefore you need someone to stay with you until the morning following the procedure.  - Do not drive, engage in heavy lifting or strenuous activity, or drink any alcoholic beverages for the next 24 hours.   - You may resume normal activity in 24 hours.    If you have a problem that you believe requires IMMEDIATE attention, please go to your NEAREST Emergency Room. If you believe your problem can safely wait until you speak to a physician, please call Interventional Radiology for any concerns.    During Normal Weekday Business Hours- You can contact the Interventional Radiology department during normal business hours via telephone.  During Evenings and Weekends- If you need to contact Interventional Radiology during off hours, do so by calling the hospital and requesting to be connected to the Interventional Radiologist on call. You had an angiogram of your transplant kidney in Interventional Radiology (IR) on 12/28/2022.        - You may shower in 24 hours. No soaking or swimming until the site is completely healed.  - Keep the area covered and dry for the next 24 hours.  - Do not perform any heavy lifting for the next few days or until the site is healed.  - You may resume your normal diet.  - You may resume your normal medications however you should wait 48 hours before restarting aspirin, plavix, or blood thinners.  - It is normal to experience some pain over the site for the next few days. You may take apply ice to the area (20 minutes on, 20 minutes off) and take Tylenol for that pain. Do not take more frequently than every 6 hours and do not exceed more than 3000mg of Tylenol in a 24 hour period.    - You were given conscious sedation which may make you drowsy, therefore you need someone to stay with you until the morning following the procedure.  - Do not drive, engage in heavy lifting or strenuous activity, or drink any alcoholic beverages for the next 24 hours.   - You may resume normal activity in 24 hours.    If you have a problem that you believe requires IMMEDIATE attention, please go to your NEAREST Emergency Room. If you believe your problem can safely wait until you speak to a physician, please call Interventional Radiology for any concerns.    During Normal Weekday Business Hours- You can contact the Interventional Radiology department during normal business hours via telephone.  During Evenings and Weekends- If you need to contact Interventional Radiology during off hours, do so by calling the hospital and requesting to be connected to the Interventional Radiologist on call.      Please follow up with your transplant nephrologist (kidney doctor). You had an angiogram of your transplant kidney in Interventional Radiology (IR) on 12/28/2022.        - You may shower in 24 hours. No soaking or swimming until the site is completely healed.  - Keep the area covered and dry for the next 24 hours.  - Do not perform any heavy lifting for the next few days or until the site is healed.  - You may resume your normal diet.  - You may resume your normal medications however you should wait 48 hours before restarting aspirin, plavix, or blood thinners.  - It is normal to experience some pain over the site for the next few days. You may take apply ice to the area (20 minutes on, 20 minutes off) and take Tylenol for that pain. Do not take more frequently than every 6 hours and do not exceed more than 3000mg of Tylenol in a 24 hour period.    - You were given conscious sedation which may make you drowsy, therefore you need someone to stay with you until the morning following the procedure.  - Do not drive, engage in heavy lifting or strenuous activity, or drink any alcoholic beverages for the next 24 hours.   - You may resume normal activity in 24 hours.    If you have a problem that you believe requires IMMEDIATE attention, please go to your NEAREST Emergency Room. If you believe your problem can safely wait until you speak to a physician, please call Interventional Radiology for any concerns.    During Normal Weekday Business Hours- You can contact the Interventional Radiology department during normal business hours via telephone.  During Evenings and Weekends- If you need to contact Interventional Radiology during off hours, do so by calling the hospital and requesting to be connected to the Interventional Radiologist on call.      Please follow up with your transplant nephrologist (kidney doctor).    You may resume your home aspirin in 24 hours.

## 2023-01-10 NOTE — PROVIDER CONTACT NOTE (CRITICAL VALUE NOTIFICATION) - NS PROVIDER READ BACK TO LAB
yes
Quality 110: Preventive Care And Screening: Influenza Immunization: Influenza Immunization Administered during Influenza season
Quality 226: Preventive Care And Screening: Tobacco Use: Screening And Cessation Intervention: Patient screened for tobacco use and is an ex/non-smoker
Detail Level: Detailed
Quality 431: Preventive Care And Screening: Unhealthy Alcohol Use - Screening: Patient not identified as an unhealthy alcohol user when screened for unhealthy alcohol use using a systematic screening method

## 2023-01-10 NOTE — ED ADULT NURSE NOTE - RESPIRATORY ASSESSMENT
[de-identified] : I have consulted the  registry for the purpose of reviewing the patient's controlled substance.\par \par Overall there is at least a 30-50% reduction in pain with the prescribed analgesics. The patient denies any adverse side effects due to the medication (sleeping disturbance, constipation, sleepiness, hallucinations and/or urination problems). \par \par There are no inconsistencies on urine toxicology screening which would necessitate an alteration of therapy.\par \par The patient will return to the office in 4 weeks time and is aware to contact me with any question or concerns in the interim.\par \par Helen Jessica PA-C \par Tea Villar MD\par  - - -

## 2023-01-18 ENCOUNTER — LABORATORY RESULT (OUTPATIENT)
Age: 78
End: 2023-01-18

## 2023-01-18 ENCOUNTER — APPOINTMENT (OUTPATIENT)
Dept: NEPHROLOGY | Facility: CLINIC | Age: 78
End: 2023-01-18
Payer: MEDICARE

## 2023-01-18 VITALS
WEIGHT: 130 LBS | HEART RATE: 65 BPM | TEMPERATURE: 96.8 F | SYSTOLIC BLOOD PRESSURE: 165 MMHG | HEIGHT: 66 IN | RESPIRATION RATE: 18 BRPM | BODY MASS INDEX: 20.89 KG/M2 | DIASTOLIC BLOOD PRESSURE: 69 MMHG | OXYGEN SATURATION: 99 %

## 2023-01-18 PROBLEM — H91.90 UNSPECIFIED HEARING LOSS, UNSPECIFIED EAR: Chronic | Status: ACTIVE | Noted: 2022-12-21

## 2023-01-18 PROBLEM — Z94.0 KIDNEY TRANSPLANT STATUS: Chronic | Status: ACTIVE | Noted: 2022-12-21

## 2023-01-18 PROBLEM — Z99.2 DEPENDENCE ON RENAL DIALYSIS: Chronic | Status: ACTIVE | Noted: 2022-12-21

## 2023-01-18 PROBLEM — D63.8 ANEMIA IN OTHER CHRONIC DISEASES CLASSIFIED ELSEWHERE: Chronic | Status: ACTIVE | Noted: 2022-12-21

## 2023-01-18 PROCEDURE — 99215 OFFICE O/P EST HI 40 MIN: CPT

## 2023-01-18 RX ORDER — DOXAZOSIN 2 MG/1
2 TABLET ORAL
Qty: 30 | Refills: 5 | Status: COMPLETED | COMMUNITY
Start: 2022-10-19 | End: 2023-01-18

## 2023-01-18 RX ORDER — NYSTATIN 100000 [USP'U]/ML
100000 SUSPENSION ORAL 4 TIMES DAILY
Qty: 2 | Refills: 2 | Status: COMPLETED | COMMUNITY
Start: 2022-10-12 | End: 2023-01-18

## 2023-01-18 NOTE — ASSESSMENT
[FreeTextEntry1] : \par 77 yr old woman with ESRD due to HTN S/p DDRT on 5/19/2021, course complicated by DGF due to ATN. Last HD was on  6/4/21. Oswaldo creatinine 1.3mg/dL. Had worsening graft function Cr 1.6 -1.8mg/dL Nov 2021 with 2 g/d proteinuria. No DSA, cFDNA 0.58%.   Ultrasound done Nov 2021 with US showing elevated velocities but no tardus parvus. Conservatively management.  She had transplant kidney biopsy for further worsening creatinine 2.2mg/dL on 9/29/22. 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 DSA was negative. 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 2022. Creatinine improved from 2.2 to 1.7. Also started losartan for non HLA mediated rejection possibly due to anti angiotensin Ab. \par \par Transplant US 9/2022 with increased velocities concerning for TRAS. Also has uncontrolled HTN and edema. Referred to IR for angiogram, performed on 12/28/23 by Dr. Stafford, no hemodynamically significant stenosis seen. \par \par Creatinine ranging 1.8-2.1mg/dL. Proteinuria 2.8 grams/day (up from 1.8) pt had been off losartan. \par Hyperkalemia controlled on Lokelma - continue 10 grams daily \par \par Immunosuppression \par - S/p Simulect induction\par - On CellCept 500mg po bid - dose lowered for leukopenia. \par - On Envarsus 6mg po daily. Level 8.8 acceptable, target trough  8-10 \par - Prednisone  5mg daily \par \par Prophylaxis resumed after rx of rejection \par - Continue Mepron. Bactrim discontinued for high K.  Dapsone discontinued for hemolytic anemia.\par - D/c Nystatin \par - Valcyte 450mg po daily - Until April 2023 .\par - Completed 2 doses COVID vaccine prior to transplant. 3rd shot received 10/19/22. Also had COVID infection in March 2020 and Jan 2022. \par - Flu shot received Nov 2022 \par \par Hypertension - BP sub optimal, also has LE edema. \par Reduce Nifedipine to 60mg qhs for edema, Resume Losartan 50mg daily, Continue Labetalol 200mg po TID. Discontinue Doxazosin\par Add Chlorthalidone 25mg po qam. \par Lasix 20mg po prn leg swelling. \par \par Anemia on Procrit-  Iron stores replete, normal B12 and Folate. On Procrit 10,000 units every 2 weeks. \par \par Metabolic acidosis - Improved. Reduce sodium bicarb 1300mg po bid \par \par Vitamin D deficiency -  continue Vitamin D 1000 units po daily. \par \par Blood work Feb 15th \par F/u on 3/15/23 \par \par

## 2023-01-19 LAB
ALBUMIN SERPL ELPH-MCNC: 4.1 G/DL
ALP BLD-CCNC: 79 U/L
ALT SERPL-CCNC: 6 U/L
ANION GAP SERPL CALC-SCNC: 12 MMOL/L
APPEARANCE: CLEAR
AST SERPL-CCNC: 12 U/L
BACTERIA: NEGATIVE
BASOPHILS # BLD AUTO: 0.04 K/UL
BASOPHILS NFR BLD AUTO: 1.8 %
BILIRUB SERPL-MCNC: 0.2 MG/DL
BILIRUBIN URINE: NEGATIVE
BKV DNA SPEC QL NAA+PROBE: NOT DETECTED IU/ML
BLOOD URINE: ABNORMAL
BUN SERPL-MCNC: 43 MG/DL
CALCIUM SERPL-MCNC: 9.7 MG/DL
CHLORIDE SERPL-SCNC: 112 MMOL/L
CMV DNA SPEC QL NAA+PROBE: NOT DETECTED IU/ML
CMVPCR LOG: NOT DETECTED LOG10IU/ML
CO2 SERPL-SCNC: 22 MMOL/L
COLOR: YELLOW
CREAT SERPL-MCNC: 2.09 MG/DL
CREAT SPEC-SCNC: 114 MG/DL
CREAT/PROT UR: 2.8 RATIO
EGFR: 24 ML/MIN/1.73M2
EOSINOPHIL # BLD AUTO: 0.07 K/UL
EOSINOPHIL NFR BLD AUTO: 3.6 %
GLUCOSE QUALITATIVE U: NEGATIVE
GLUCOSE SERPL-MCNC: 118 MG/DL
HCT VFR BLD CALC: 31 %
HGB BLD-MCNC: 9.3 G/DL
HYALINE CASTS: 1 /LPF
KETONES URINE: NEGATIVE
LDH SERPL-CCNC: 275 U/L
LEUKOCYTE ESTERASE URINE: NEGATIVE
LYMPHOCYTES # BLD AUTO: 0.83 K/UL
LYMPHOCYTES NFR BLD AUTO: 42 %
MAGNESIUM SERPL-MCNC: 1.5 MG/DL
MAN DIFF?: NORMAL
MCHC RBC-ENTMCNC: 27 PG
MCHC RBC-ENTMCNC: 30 GM/DL
MCV RBC AUTO: 90.1 FL
MICROSCOPIC-UA: NORMAL
MONOCYTES # BLD AUTO: 0.14 K/UL
MONOCYTES NFR BLD AUTO: 7.1 %
NEUTROPHILS # BLD AUTO: 0.81 K/UL
NEUTROPHILS NFR BLD AUTO: 41.1 %
NITRITE URINE: NEGATIVE
PH URINE: 6.5
PHOSPHATE SERPL-MCNC: 4.2 MG/DL
PLATELET # BLD AUTO: 173 K/UL
POTASSIUM SERPL-SCNC: 4.9 MMOL/L
PROT SERPL-MCNC: 5.8 G/DL
PROT UR-MCNC: 319 MG/DL
PROTEIN URINE: ABNORMAL
RBC # BLD: 3.44 M/UL
RBC # FLD: 14.6 %
RED BLOOD CELLS URINE: 2 /HPF
SODIUM SERPL-SCNC: 146 MMOL/L
SPECIFIC GRAVITY URINE: 1.02
SQUAMOUS EPITHELIAL CELLS: 1 /HPF
TACROLIMUS SERPL-MCNC: 8.8 NG/ML
URATE SERPL-MCNC: 7.7 MG/DL
UROBILINOGEN URINE: NORMAL
WBC # FLD AUTO: 1.97 K/UL
WHITE BLOOD CELLS URINE: 1 /HPF

## 2023-02-01 ENCOUNTER — LABORATORY RESULT (OUTPATIENT)
Age: 78
End: 2023-02-01

## 2023-02-01 ENCOUNTER — APPOINTMENT (OUTPATIENT)
Dept: TRANSPLANT | Facility: CLINIC | Age: 78
End: 2023-02-01

## 2023-02-02 ENCOUNTER — NON-APPOINTMENT (OUTPATIENT)
Age: 78
End: 2023-02-02

## 2023-02-02 LAB
25(OH)D3 SERPL-MCNC: 17.9 NG/ML
ALBUMIN SERPL ELPH-MCNC: 4 G/DL
ALP BLD-CCNC: 82 U/L
ALT SERPL-CCNC: 7 U/L
ANION GAP SERPL CALC-SCNC: 11 MMOL/L
APPEARANCE: CLEAR
AST SERPL-CCNC: 11 U/L
BACTERIA: NEGATIVE
BASOPHILS # BLD AUTO: 0.02 K/UL
BASOPHILS NFR BLD AUTO: 0.9 %
BILIRUB SERPL-MCNC: 0.2 MG/DL
BILIRUBIN URINE: NEGATIVE
BKV DNA SPEC QL NAA+PROBE: NOT DETECTED IU/ML
BLOOD URINE: ABNORMAL
BUN SERPL-MCNC: 58 MG/DL
CALCIUM SERPL-MCNC: 9.9 MG/DL
CALCIUM SERPL-MCNC: 9.9 MG/DL
CHLORIDE SERPL-SCNC: 110 MMOL/L
CHOLEST SERPL-MCNC: 211 MG/DL
CMV DNA SPEC QL NAA+PROBE: NOT DETECTED IU/ML
CMVPCR LOG: NOT DETECTED LOG10IU/ML
CO2 SERPL-SCNC: 20 MMOL/L
COLOR: NORMAL
CREAT SERPL-MCNC: 2.12 MG/DL
CREAT SPEC-SCNC: 86 MG/DL
CREAT/PROT UR: 2 RATIO
EGFR: 23 ML/MIN/1.73M2
EOSINOPHIL # BLD AUTO: 0.11 K/UL
EOSINOPHIL NFR BLD AUTO: 4.4 %
ESTIMATED AVERAGE GLUCOSE: 108 MG/DL
GLUCOSE QUALITATIVE U: NEGATIVE
GLUCOSE SERPL-MCNC: 98 MG/DL
HBA1C MFR BLD HPLC: 5.4 %
HCT VFR BLD CALC: 31.7 %
HDLC SERPL-MCNC: 80 MG/DL
HGB BLD-MCNC: 9.6 G/DL
HYALINE CASTS: 0 /LPF
KETONES URINE: NEGATIVE
LDH SERPL-CCNC: 235 U/L
LDLC SERPL CALC-MCNC: 104 MG/DL
LEUKOCYTE ESTERASE URINE: NEGATIVE
LYMPHOCYTES # BLD AUTO: 0.45 K/UL
LYMPHOCYTES NFR BLD AUTO: 17.4 %
MAGNESIUM SERPL-MCNC: 1.7 MG/DL
MAN DIFF?: NORMAL
MCHC RBC-ENTMCNC: 27.4 PG
MCHC RBC-ENTMCNC: 30.3 GM/DL
MCV RBC AUTO: 90.3 FL
MICROSCOPIC-UA: NORMAL
MONOCYTES # BLD AUTO: 0.27 K/UL
MONOCYTES NFR BLD AUTO: 10.4 %
NEUTROPHILS # BLD AUTO: 1.61 K/UL
NEUTROPHILS NFR BLD AUTO: 62.6 %
NITRITE URINE: NEGATIVE
NONHDLC SERPL-MCNC: 130 MG/DL
PARATHYROID HORMONE INTACT: 566 PG/ML
PH URINE: 6
PHOSPHATE SERPL-MCNC: 4 MG/DL
PLATELET # BLD AUTO: 160 K/UL
POTASSIUM SERPL-SCNC: 4.6 MMOL/L
PROT SERPL-MCNC: 5.8 G/DL
PROT UR-MCNC: 170 MG/DL
PROTEIN URINE: ABNORMAL
RBC # BLD: 3.51 M/UL
RBC # FLD: 14.6 %
RED BLOOD CELLS URINE: 2 /HPF
SODIUM SERPL-SCNC: 141 MMOL/L
SPECIFIC GRAVITY URINE: 1.01
SQUAMOUS EPITHELIAL CELLS: 0 /HPF
TACROLIMUS SERPL-MCNC: 9 NG/ML
TRIGL SERPL-MCNC: 132 MG/DL
URATE SERPL-MCNC: 9.2 MG/DL
UROBILINOGEN URINE: NORMAL
WBC # FLD AUTO: 2.57 K/UL
WHITE BLOOD CELLS URINE: 0 /HPF

## 2023-03-13 NOTE — PATIENT PROFILE ADULT - NSPROGENPREVTRANSF_GEN_A_NUR
[FreeTextEntry1] : Stress echo performed today revealed no evidence of exercise-induced ischemia at 85% MPHR.\par \par AS: The impression is mild-moderate aortic stenosis.  Recommend a repeat echocardiogram in 6 months to reevaluate gradients. \par \par HTN: The impression is hypertension. Currently, the condition is stable. There are no changes in medication management. Continue current medical therapy. Other planned treatments include an exercise regimen, weight loss, low sodium diet, and alcohol moderation.\par \par HLD: The impression is hyperlipidemia. Currently, the condition is stable. There are no changes in medication management. Continue current medical therapy. Other planned treatment includes diet modification, exercise, and weight loss.\par \par Instructed to follow up in 6 months for repeat echo. \par \par Plan was discussed with the patient. no

## 2023-03-15 ENCOUNTER — LABORATORY RESULT (OUTPATIENT)
Age: 78
End: 2023-03-15

## 2023-03-15 ENCOUNTER — APPOINTMENT (OUTPATIENT)
Dept: NEPHROLOGY | Facility: CLINIC | Age: 78
End: 2023-03-15
Payer: MEDICARE

## 2023-03-15 VITALS
RESPIRATION RATE: 17 BRPM | HEART RATE: 64 BPM | OXYGEN SATURATION: 98 % | WEIGHT: 128 LBS | SYSTOLIC BLOOD PRESSURE: 165 MMHG | BODY MASS INDEX: 20.57 KG/M2 | DIASTOLIC BLOOD PRESSURE: 67 MMHG | HEIGHT: 66 IN | TEMPERATURE: 98 F

## 2023-03-15 PROCEDURE — 99215 OFFICE O/P EST HI 40 MIN: CPT

## 2023-03-15 RX ORDER — FLUTICASONE PROPIONATE 50 UG/1
50 SPRAY, METERED NASAL
Qty: 16 | Refills: 0 | Status: DISCONTINUED | COMMUNITY
Start: 2021-07-20 | End: 2023-03-15

## 2023-03-15 RX ORDER — VALGANCICLOVIR HYDROCHLORIDE 450 MG/1
450 TABLET ORAL
Qty: 30 | Refills: 5 | Status: DISCONTINUED | COMMUNITY
Start: 2022-10-12 | End: 2023-03-15

## 2023-03-15 RX ORDER — CHROMIUM 200 MCG
25 MCG TABLET ORAL
Qty: 30 | Refills: 3 | Status: DISCONTINUED | COMMUNITY
Start: 2021-11-10 | End: 2023-03-15

## 2023-03-15 RX ORDER — FUROSEMIDE 20 MG/1
20 TABLET ORAL
Qty: 30 | Refills: 3 | Status: DISCONTINUED | COMMUNITY
Start: 2022-12-07 | End: 2023-03-15

## 2023-03-15 NOTE — PHYSICAL EXAM
[General Appearance - Alert] : alert [General Appearance - In No Acute Distress] : in no acute distress [Sclera] : the sclera and conjunctiva were normal [PERRL With Normal Accommodation] : pupils were equal in size, round, and reactive to light [Oropharynx] : the oropharynx was normal [Jugular Venous Distention Increased] : there was no jugular-venous distention [Respiration, Rhythm And Depth] : normal respiratory rhythm and effort [Exaggerated Use Of Accessory Muscles For Inspiration] : no accessory muscle use [Auscultation Breath Sounds / Voice Sounds] : lungs were clear to auscultation bilaterally [Heart Rate And Rhythm] : heart rate was normal and rhythm regular [Heart Sounds] : normal S1 and S2 [Heart Sounds Gallop] : no gallops [Abdomen Soft] : soft [Bowel Sounds] : normal bowel sounds [Abdomen Tenderness] : non-tender [Involuntary Movements] : no involuntary movements were seen [___ (cm) Fistula] : [unfilled] (cm) fistula [Bruit] : a bruit was present [Thrill] : a thrill was present [] : no rash [No Focal Deficits] : no focal deficits [Oriented To Time, Place, And Person] : oriented to person, place, and time [Edema] : there was no peripheral edema [FreeTextEntry1] : RLQ scar well healed, no tenderness, bruit +present

## 2023-03-15 NOTE — HISTORY OF PRESENT ILLNESS
[FreeTextEntry1] : \par 77 year old woman with ESRD due to HTN, was on hemodialysis since 2016. Received DDRT on 5/19/21 \par \par Donor was a 44 yr old woman, KDPI 69%, history of diabetes on insulin, terminal creatinine 3.3. \par Post transplant course was complicated by DGF. Last hemodialysis was on 6/4/21. Transplant ureteric stent was removed on 8/2/21. \par \par Other PMH includes :History of COVID-19 infection, hospitalized in March 2020 for only 1 day. \par \par - Hospitalized 10/2021 with severe symptomatic anemia (Hgb 5g/dL). Transfused 2 units PRBC. Anemia was due to hemolysis caused by dapsone (despite normal G6PD levels prior to initiation). \par - Nov 2021 Noted to have YANELY creatinine 1.2-> 1.65mg/dL.  US showed increased velocities concerning for TRAS but no tardus parvus waves. DSA negative,  and cFDNA  0.5%. Conservatively managed. \par - Hospitalized Jan 2022 with worsening anemia, hyperkalemia, tested +ve for COVID but had no symptoms. \par - Sept 2022 - Worsening YANELY with cr to 2.2mg/dL. DSA was negative. cFDNA 0.68%. \par \par Transplant kidney biopsy done on 9/29/22  showed mild chronic-active antibody-mediated rejection\par (There is minimal glomerulitis, mild peritubular capillaritis, no vasculitis, and very focal signs of remodeling of the glomerular capillary walls by electron microscopy) C4d was focally positive in peritubular capillaries 20-30%. Mild interstitial inflammation, no tubulitis or endothelialitis) \par Diffuse and nodular diabetic glomerulosclerosis, donor related\par IFTA 30%\par \par Hospitalized 10/3/22-10/7/22, received pulse dose steroids, plasmapheresis and IVIG. \par \par Transplant renal artery angiogram done by Dr. Stafford 12/28/22 showed no hemodynamically significant stenosis. \par \par Last seen in office 2 months ago. No major events since last visit, no ER visits or hospitalizations. \par BP at home 120-140 systolic at home. LE edema subsided. She feels much better. Does not take lasix anymore.  \par Urinating without difficulty. No dysuria, no hematuria. No fever. No NVD. \par  No abdominal pain. No nausea, no vomiting. Appetite good. Energy is good.  \par No chest pain or shortness of breath.  \par Taking all medications as prescribed \par Weight 128lbs stable.\par Physical activity: Walking for 1 hour outside the house.\par Lives with . Children live nearby and help out. \par

## 2023-03-15 NOTE — ASSESSMENT
[FreeTextEntry1] : \par 77 yr old woman with ESRD due to HTN S/p DDRT on 5/19/2021, course complicated by DGF due to ATN. Last HD was on  6/4/21. Oswaldo creatinine 1.3mg/dL. Had worsening graft function Cr 1.6 -1.8mg/dL Nov 2021 with 2 g/d proteinuria. No DSA, cFDNA 0.58%. She had transplant kidney biopsy for further worsening creatinine 2.2mg/dL on 9/29/22. 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 DSA was negative. \par \par Treated with pulse steroids, plasmapheresis and IVIG for possible non-HLA mediated ABMR in Oct. 2022. Creatinine improved from 2.2 to 1.7. Also started losartan for non HLA mediated rejection possibly due to anti angiotensin Ab. \par \par Transplant US 9/2022 with increased velocities concerning for TRAS. Also has uncontrolled HTN and edema. Referred to IR for angiogram, performed on 12/28/22 by Dr. Stafford, no hemodynamically significant stenosis seen. \par \par Creatinine ranging 1.8-2.2mg/dL, relatively stable\par Proteinuria 2.5 grams per day relatively stable- likely from diabetic nephropathy (donor origin). \par Hyperkalemia controlled on Lokelma - continue 10 grams daily \par \par Immunosuppression \par - S/p Simulect induction\par - On CellCept 500mg po bid - dose lowered for leukopenia. \par - On Envarsus 5mg po daily. Level 6.2 at goal.  Goal 6-8\par - Prednisone  5mg daily \par \par Prophylaxis \par - Continue Mepron. Bactrim discontinued for high K.  Dapsone discontinued for hemolytic anemia. \par \par Hypertension - better controlled. Edema resolved.  Continue current regimen. \par Nifedipine 60mg qhs, Losartan 50mg daily,  Labetalol 200mg po TID. Chlorthalidone 25mg po qam. \par  \par Anemia on Procrit-  Iron stores replete, normal B12 and Folate. Hgb stable. Continue Procrit 10,000 units every 2 weeks. \par \par Metabolic acidosis -  Continue Sodium bicarb 1300mg po bid \par \par Vitamin D deficiency -  continue Vitamin D 50,000 units once a week \par \par Health maintenance \par Completed 2 doses COVID vaccine prior to transplant. 3rd shot received 10/19/22. Also had COVID infection in March 2020 and Jan 2022. \par Flu shot received Nov 2022\par \par Follow up in 2 months \par \par

## 2023-03-16 ENCOUNTER — NON-APPOINTMENT (OUTPATIENT)
Age: 78
End: 2023-03-16

## 2023-03-16 LAB
ALBUMIN SERPL ELPH-MCNC: 3.9 G/DL
ALP BLD-CCNC: 76 U/L
ALT SERPL-CCNC: <5 U/L
ANION GAP SERPL CALC-SCNC: 11 MMOL/L
APPEARANCE: CLEAR
AST SERPL-CCNC: 11 U/L
BACTERIA: NEGATIVE
BASOPHILS # BLD AUTO: 0.03 K/UL
BASOPHILS NFR BLD AUTO: 0.9 %
BILIRUB SERPL-MCNC: 0.3 MG/DL
BILIRUBIN URINE: NEGATIVE
BKV DNA SPEC QL NAA+PROBE: NOT DETECTED IU/ML
BLOOD URINE: ABNORMAL
BUN SERPL-MCNC: 57 MG/DL
CALCIUM SERPL-MCNC: 10.2 MG/DL
CHLORIDE SERPL-SCNC: 113 MMOL/L
CO2 SERPL-SCNC: 20 MMOL/L
COLOR: NORMAL
CREAT SERPL-MCNC: 2.17 MG/DL
CREAT SPEC-SCNC: 100 MG/DL
CREAT/PROT UR: 2.5 RATIO
EGFR: 23 ML/MIN/1.73M2
EOSINOPHIL # BLD AUTO: 0.03 K/UL
EOSINOPHIL NFR BLD AUTO: 0.8 %
GLUCOSE QUALITATIVE U: NEGATIVE
GLUCOSE SERPL-MCNC: 115 MG/DL
HCT VFR BLD CALC: 30.3 %
HGB BLD-MCNC: 9.4 G/DL
HYALINE CASTS: 0 /LPF
KETONES URINE: NEGATIVE
LEUKOCYTE ESTERASE URINE: NEGATIVE
LYMPHOCYTES # BLD AUTO: 0.21 K/UL
LYMPHOCYTES NFR BLD AUTO: 6.1 %
MAGNESIUM SERPL-MCNC: 1.5 MG/DL
MAN DIFF?: NORMAL
MCHC RBC-ENTMCNC: 27.5 PG
MCHC RBC-ENTMCNC: 31 GM/DL
MCV RBC AUTO: 88.6 FL
MICROSCOPIC-UA: NORMAL
MONOCYTES # BLD AUTO: 0.21 K/UL
MONOCYTES NFR BLD AUTO: 6.1 %
NEUTROPHILS # BLD AUTO: 2.98 K/UL
NEUTROPHILS NFR BLD AUTO: 86.1 %
NITRITE URINE: NEGATIVE
PH URINE: 6
PHOSPHATE SERPL-MCNC: 4.3 MG/DL
PLATELET # BLD AUTO: 167 K/UL
POTASSIUM SERPL-SCNC: 5.2 MMOL/L
PROT SERPL-MCNC: 5.9 G/DL
PROT UR-MCNC: 251 MG/DL
PROTEIN URINE: ABNORMAL
RBC # BLD: 3.42 M/UL
RBC # FLD: 14.6 %
RED BLOOD CELLS URINE: 14 /HPF
SODIUM SERPL-SCNC: 144 MMOL/L
SPECIFIC GRAVITY URINE: 1.02
SQUAMOUS EPITHELIAL CELLS: 1 /HPF
TACROLIMUS SERPL-MCNC: 6.2 NG/ML
URATE SERPL-MCNC: 8.1 MG/DL
UROBILINOGEN URINE: NORMAL
WBC # FLD AUTO: 3.46 K/UL
WHITE BLOOD CELLS URINE: 1 /HPF

## 2023-03-21 ENCOUNTER — NON-APPOINTMENT (OUTPATIENT)
Age: 78
End: 2023-03-21

## 2023-03-21 LAB
CMV DNA SPEC QL NAA+PROBE: ABNORMAL IU/ML
CMVPCR LOG: ABNORMAL LOG10IU/ML

## 2023-04-18 NOTE — H&P PST ADULT - TEMPERATURE IN CELSIUS (DEGREES C)
36.9 Methotrexate Counseling:  Patient counseled regarding adverse effects of methotrexate including but not limited to nausea, vomiting, abnormalities in liver function tests. Patients may develop mouth sores, rash, diarrhea, and abnormalities in blood counts. The patient understands that monitoring is required including LFT's and blood counts.  There is a rare possibility of scarring of the liver and lung problems that can occur when taking methotrexate. Persistent nausea, loss of appetite, pale stools, dark urine, cough, and shortness of breath should be reported immediately. Patient advised to discontinue methotrexate treatment at least three months before attempting to become pregnant.  I discussed the need for folate supplements while taking methotrexate.  These supplements can decrease side effects during methotrexate treatment. The patient verbalized understanding of the proper use and possible adverse effects of methotrexate.  All of the patient's questions and concerns were addressed.

## 2023-04-20 NOTE — H&P PST ADULT - AIRWAY
OFFICE VISIT      Patient: Latasha Womack   : 1941 MRN: [id number]    SUBJECTIVE:  Chief Complaint   Patient presents with   • Office Visit   • Follow-up     Epigastric pain   • Weight Loss   • Convey Results     Had labs and CT     • Hypertension   • Diabetes       This 81 year old female is here for follow-up after CT abdomen and pelvis.    Patient has given consent to record this visit for documentation in their clinical record.      HISTORY OF PRESENT ILLNESS:    Epigastric abdominal pain   She has epigastric pain which radiates to midabdomen.  She has CT abdomen and pelvis on 2023 which showed Nodular wall thickening in the region of the gastric fundus noted.  Underlying infiltrating/neoplastic process cannot be excluded.  This should be evaluated with upper endoscopy.    She is losing weight.  She has early satiety.  No family history of stomach cancer.  Is on Pantoprazole, Reglan before meals.  She has lost 10 pounds since 2023.  Denies nausea, vomiting.    Hyperkalemia   Last labs show elevated potassium level.    Pulmonary emphysema  She has CT abdomen and pelvis on 2023 show Pulmonary emphysema and granuloma in lung.   Denies SOB, wheezing, chronic cough.  She quit smoking long time ago.    Coronary artery calcification seen on CAT scan   She has CT abdomen and pelvis on 2023 show Coronary artery calcification.  Is on Atorvastatin.    Labs: last vitamin D level was low.      PAST MEDICAL HISTORY:  Past Medical History:   Diagnosis Date   • Arthritis    • Cataract    • Chronic fatigue syndrome 2013   • Constipation 2013   • Diabetes mellitus (CMD)    • Essential (primary) hypertension    • GERD (gastroesophageal reflux disease)    • Hyperlipidemia    • Hyperparathyroidism (CMD)    • Neuromuscular disorder (CMD)    • Primary hyperparathyroidism (CMD) 2015   • Thyroid disease      MEDICATIONS:  Current Outpatient Medications   Medication Sig   • zoster vaccine recomb  adjuvanted (SHINGRIX) 50 MCG/0.5ML injection Inject 0.5 mLs into the muscle 1 time. Repeat dose in 2 to 6 months (unless 1 dose already given), for a total of 2 doses.   • docusate sodium (COLACE) 100 MG capsule Take 1 capsule by mouth in the morning and 1 capsule in the evening.   • metoCLOPramide (REGLAN) 5 MG tablet Take 1 tablet by mouth in the morning and 1 tablet at noon and 1 tablet in the evening. Take before meals.   • lisinopril (ZESTRIL) 20 MG tablet TAKE 1 TABLET EVERY DAY   • pantoprazole (PROTONIX) 40 MG tablet TAKE 1 TABLET BY MOUTH DAILY AS NEEDED (INDIGESTION AND ACID REFLUX).   • latanoprost (XALATAN) 0.005 % ophthalmic solution    • amLODIPine (NORVASC) 5 MG tablet TAKE 1 TABLET EVERY DAY   • baclofen (LIORESAL) 10 MG tablet TAKE 1 TABLET AT BEDTIME AS NEEDED FOR  BACK  SPASMS   • atorvastatin (LIPITOR) 40 MG tablet Take 1 tablet by mouth daily.   • Cholecalciferol (D3 Dots) 50 mcg (2,000 units) disintegrating tablet Take by mouth daily.   • Multiple Vitamins-Minerals (CENTRUM SILVER ADULT 50+) Tab Take 1 tablet by mouth 3 days a week. Reports she does not take daily, just when she wants to take it.   • aspirin 81 MG tablet Take 81 mg by mouth daily.     No current facility-administered medications for this visit.     ALLERGIES:  ALLERGIES:  No Known Allergies  PAST SURGICAL HISTORY:  Past Surgical History:   Procedure Laterality Date   • Bunionectomy Bilateral 2004   • Extracapsular cataract removal w insert io lens prosth wo ecp     • Hb cystic fibrosis gene     • Hysterectomy     • Intracapsular cataract extraction Left 2018     FAMILY HISTORY:  Family History   Problem Relation Age of Onset   • Hypertension Mother    • Stroke Father    • Cancer Sister    • Diabetes Son    • Cancer Son      SOCIAL HISTORY:  Social History     Tobacco Use   Smoking Status Some Days   • Current packs/day: 0.25   • Types: Cigarettes   Smokeless Tobacco Never   Tobacco Comments    1-2 times a week   Vaping Use    Vaping Status Not on file     Social History     Substance and Sexual Activity   Alcohol Use Never       Review of Systems    Const: as per HPI.  Respi: as per HPI.  GI: as per HPI.    All other systems reviewed and negative.      OBJECTIVE:  Vitals:    04/19/23 0820 04/19/23 1002   BP: 123/75 110/60   BP Location: LUE - Left upper extremity    Patient Position: Sitting    Cuff Size: Regular    Pulse: 73    Resp: 18    Temp: 98.8 °F (37.1 °C)    TempSrc: Oral    SpO2: 100%    Weight: 48.7 kg (107 lb 5.8 oz)    Height: 5' 1\"    PainSc:  0      Body mass index is 20.29 kg/m².    Physical Exam      Constitutional: Alert, in no acute distress and current vital signs reviewed.     Head and Face: Atraumatic and normocephalic.     Eyes: No discharge, no eyelid swelling and the sclerae were normal.     ENT: Oropharynx normal. Normal appearing outer ear. Tympanic membranes are bilaterally clear, normal appearing nose and normal lips.     Oral cavity: has dry oral mucosa.    Neck: Normal appearing neck and supple neck.     Pulmonary: Breath sounds clear to auscultation bilaterally, but no respiratory distress and normal respiratory rate and effort.     Cardiovascular: Normal rate, regular rhythm, normal S1 and S2. Systolic murmur on left upper sternal border 2/6.     Abdomen: Soft. Mild tenderness. No guarding in epigastrium. When I press hard in epigastric area i feel round mass.    Psychiatric: Alert and awake, interactive and mood/affect were appropriate.     Skin, Hair, Nails: Normal skin color and pigmentation.    Lymph nodes: no inguinal or supra clavicular lymph nodes palpable.    BP measured by me is 110/60 mmHg.      DIAGNOSTIC STUDIES:  LAB RESULTS:    DIAGNOSTIC STUDIES:  LAB RESULTS:  No visits with results within 1 Month(s) from this visit.   Latest known visit with results is:   Lab Services on 12/07/2022   Component Date Value Ref Range Status   • TSH 12/07/2022 1.490  0.350 - 5.000 mcUnits/mL Final   •  Vitamin D, 25-Hydroxy 12/07/2022 15.1 (L)  30.0 - 100.0 ng/mL Final   • Hemoglobin A1C 12/07/2022 5.8 (H)  4.5 - 5.6 % Final   • Cholesterol 12/07/2022 181  <=199 mg/dL Final   • Triglycerides 12/07/2022 99  <=149 mg/dL Final   • HDL 12/07/2022 96  >=50 mg/dL Final   • LDL 12/07/2022 65  <=129 mg/dL Final   • Non-HDL Cholesterol 12/07/2022 85  mg/dL Final   • Cholesterol/ HDL Ratio 12/07/2022 1.9  <=4.4 Final   • Sodium 12/07/2022 142  135 - 145 mmol/L Final   • Potassium 12/07/2022 5.3 (H)  3.4 - 5.1 mmol/L Final   • Chloride 12/07/2022 106  97 - 110 mmol/L Final   • Carbon Dioxide 12/07/2022 28  21 - 32 mmol/L Final   • Anion Gap 12/07/2022 13  7 - 19 mmol/L Final   • Glucose 12/07/2022 99  70 - 99 mg/dL Final   • BUN 12/07/2022 20  6 - 20 mg/dL Final   • Creatinine 12/07/2022 0.81  0.51 - 0.95 mg/dL Final   • Glomerular Filtration Rate 12/07/2022 72  >=60 Final   • BUN/Cr 12/07/2022 25  7 - 25 Final   • Calcium 12/07/2022 10.9 (H)  8.4 - 10.2 mg/dL Final   • Bilirubin, Total 12/07/2022 1.0  0.2 - 1.0 mg/dL Final   • GOT/AST 12/07/2022 24  <=37 Units/L Final   • GPT/ALT 12/07/2022 21  <64 Units/L Final   • Alkaline Phosphatase 12/07/2022 85  45 - 117 Units/L Final   • Albumin 12/07/2022 4.4  3.6 - 5.1 g/dL Final   • Protein, Total 12/07/2022 7.7  6.4 - 8.2 g/dL Final   • Globulin 12/07/2022 3.3  2.0 - 4.0 g/dL Final   • A/G Ratio 12/07/2022 1.3  1.0 - 2.4 Final   • Vitamin B12 12/07/2022 578  211 - 911 pg/mL Final   • Folate 12/07/2022 13.3  >=5.5 ng/mL Final   • WBC 12/07/2022 4.9  4.2 - 11.0 K/mcL Final   • RBC 12/07/2022 4.24  4.00 - 5.20 mil/mcL Final   • HGB 12/07/2022 13.4  12.0 - 15.5 g/dL Final   • HCT 12/07/2022 42.3  36.0 - 46.5 % Final   • MCV 12/07/2022 99.8  78.0 - 100.0 fl Final   • MCH 12/07/2022 31.6  26.0 - 34.0 pg Final   • MCHC 12/07/2022 31.7 (L)  32.0 - 36.5 g/dL Final   • RDW-CV 12/07/2022 14.8  11.0 - 15.0 % Final   • RDW-SD 12/07/2022 53.9 (H)  39.0 - 50.0 fL Final   • PLT 12/07/2022 305   140 - 450 K/mcL Final   • NRBC 12/07/2022 0  <=0 /100 WBC Final   • Neutrophil, Percent 12/07/2022 49  % Final   • Lymphocytes, Percent 12/07/2022 39  % Final   • Mono, Percent 12/07/2022 9  % Final   • Eosinophils, Percent 12/07/2022 2  % Final   • Basophils, Percent 12/07/2022 1  % Final   • Immature Granulocytes 12/07/2022 0  % Final   • Absolute Neutrophils 12/07/2022 2.4  1.8 - 7.7 K/mcL Final   • Absolute Lymphocytes 12/07/2022 1.9  1.0 - 4.0 K/mcL Final   • Absolute Monocytes 12/07/2022 0.4  0.3 - 0.9 K/mcL Final   • Absolute Eosinophils  12/07/2022 0.1  0.0 - 0.5 K/mcL Final   • Absolute Basophils 12/07/2022 0.0  0.0 - 0.3 K/mcL Final   • Absolute Immature Granulocytes 12/07/2022 0.0  0.0 - 0.2 K/mcL Final       ASSESSMENT AND PLAN:  This 81 year old female presents with :  1. Epigastric abdominal pain    2. Lesion of gastric mucosa    3. Abdominal fullness    4. Early satiety    5. Weight loss, non-intentional    6. Hyperkalemia    7. Pulmonary emphysema, unspecified emphysema type (CMD)    8. Granulomatous lung disease (CMD)    9. Coronary artery calcification seen on CAT scan        Orders Placed This Encounter   • SERVICE TO GASTROENTEROLOGY       Plan:    Epigastric abdominal pain   Lesion of gastric mucosa   Abdominal fullness   Early satiety   Weight loss, non-intentional   She had CT abdomen and pelvis on 4/5/2023 which showed Nodular wall thickening in the region of the gastric fundus noted.  Underlying infiltrating/neoplastic process cannot be excluded.  This should be evaluated with upper endoscopy.  She is losing weight  Continue Pantoprazole, Reglan before meals.  Referred to gastroenterology.  Eat small meals.    Hyperkalemia   Last labs show elevated potassium level.  Avoid banana daily.  Given list of low potassium diet.    Pulmonary emphysema, unspecified emphysema type (CMD)  Granulomatous lung disease (CMD)   She has CT abdomen and pelvis on 4/5/2023 show Pulmonary emphysema and granuloma  in lung.   Continue to monitor.    Coronary artery calcification seen on CAT scan   Continue Atorvastatin.  Advised to be on a low CH diet.   She can go for heart scan in the future.    Has DM controlled with diet.  BP is controlled.  Take Tylenol with meals for back pain.    Labs: last vitamin D level was low.    Follow-up in 1 month.         Refer to orders.  Medical compliance with plan discussed and risks of non-compliance reviewed.  Patient education completed on disease process, etiology & prognosis.  Proper usage and side effects of medications reviewed & discussed.  Patient understands and agrees with the plan.  Return to clinic as clinically indicated as discussed with patient who verbalized understanding of the plan and is in agreement with the plan.    IJemima, have created a visit summary document based on the audio recording between Alexandria Parker MD and this patient for the physician to review, edit as needed, and authenticate.  Creation Date: 4/20/2023.   Provider Attestation: The documentation recorded by the scribe accurately reflects the service I personally performed and the decisions made by Alexandria painting MD     normal

## 2023-05-10 ENCOUNTER — LABORATORY RESULT (OUTPATIENT)
Age: 78
End: 2023-05-10

## 2023-05-10 ENCOUNTER — APPOINTMENT (OUTPATIENT)
Dept: NEPHROLOGY | Facility: CLINIC | Age: 78
End: 2023-05-10
Payer: MEDICARE

## 2023-05-10 VITALS
BODY MASS INDEX: 18.8 KG/M2 | HEIGHT: 66 IN | SYSTOLIC BLOOD PRESSURE: 211 MMHG | OXYGEN SATURATION: 100 % | DIASTOLIC BLOOD PRESSURE: 89 MMHG | WEIGHT: 117 LBS | RESPIRATION RATE: 18 BRPM | HEART RATE: 58 BPM | TEMPERATURE: 97.6 F

## 2023-05-10 PROCEDURE — 99215 OFFICE O/P EST HI 40 MIN: CPT

## 2023-05-10 NOTE — ASSESSMENT
[FreeTextEntry1] : \par 77 yr old woman with ESRD due to HTN S/p DDRT on 5/19/2021, course complicated by DGF due to ATN. Last HD was on  6/4/21. Oswaldo creatinine 1.3mg/dL. Had worsening graft function Cr 1.6 -1.8mg/dL Nov 2021 with 2 g/d proteinuria. No DSA, cFDNA 0.58%. She had transplant kidney biopsy for further worsening creatinine 2.2mg/dL on 9/29/22. 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 DSA was negative. \par \par Treated with pulse steroids, plasmapheresis and IVIG for possible non-HLA mediated ABMR in Oct. 2022. Creatinine improved from 2.2 to 1.7. Also started losartan for non HLA mediated rejection possibly due to anti angiotensin Ab. \par \par Transplant US 9/2022 with increased velocities concerning for TRAS. Also has uncontrolled HTN and edema. Referred to IR for angiogram, performed on 12/28/22 by Dr. Stafford, no hemodynamically significant stenosis seen. \par \par Creatinine ranging 1.8-2.2mg/dL. \par Proteinuria 2.5 grams per day- likely from diabetic nephropathy (donor origin). \par Hyperkalemia controlled on Lokelma - continue 10 grams daily \par \par Immunosuppression \par - S/p Simulect induction\par - On CellCept 500mg po bid - dose lowered for leukopenia. \par - On Envarsus 5mg po daily. Trough  Goal 6-8\par - Prednisone  5mg daily \par \par Prophylaxis \par - Continue Mepron. Bactrim discontinued for high K.  Dapsone discontinued for hemolytic anemia. \par \par Hypertension - better controlled. Edema resolved.  Continue current regimen. \par Nifedipine 60mg qhs, Losartan 50mg qam,  Labetalol 200mg po TID. Chlorthalidone 25mg po qam. \par  \par Anemia on Procrit-  Iron stores replete, normal B12 and Folate. Hgb stable. Continue Procrit 10,000 units every 2 weeks. \par \par Metabolic acidosis -  Continue Sodium bicarb 1300mg po bid \par \par Vitamin D deficiency -  continue Vitamin D 50,000 units once a week \par \par Health maintenance \par Completed 2 doses COVID vaccine prior to transplant. 3rd shot received 10/19/22. Also had COVID infection in March 2020 and Jan 2022. \par Flu shot received Nov 2022\par \par Follow up in 2 months \par \par

## 2023-05-10 NOTE — HISTORY OF PRESENT ILLNESS
[FreeTextEntry1] : \par 77 year old woman with ESRD due to HTN, was on hemodialysis since 2016. Received DDRT on 5/19/21 \par \par Donor was a 44 yr old woman, KDPI 69%, history of diabetes on insulin, terminal creatinine 3.3. \par Post transplant course was complicated by DGF. Last hemodialysis was on 6/4/21. Transplant ureteric stent was removed on 8/2/21. \par \par Other PMH includes :History of COVID-19 infection, hospitalized in March 2020 for only 1 day. \par \par - Hospitalized 10/2021 with severe symptomatic anemia (Hgb 5g/dL). Transfused 2 units PRBC. Anemia was due to hemolysis caused by dapsone (despite normal G6PD levels prior to initiation). \par - Nov 2021 Noted to have YANELY creatinine 1.2-> 1.65mg/dL.  US showed increased velocities concerning for TRAS but no tardus parvus waves. DSA negative,  and cFDNA  0.5%. Conservatively managed. \par - Hospitalized Jan 2022 with worsening anemia, hyperkalemia, tested +ve for COVID but had no symptoms. \par - Sept 2022 - Worsening YANELY with cr to 2.2mg/dL. DSA was negative. cFDNA 0.68%. \par \par Transplant kidney biopsy done on 9/29/22  showed mild chronic-active antibody-mediated rejection\par (There is minimal glomerulitis, mild peritubular capillaritis, no vasculitis, and very focal signs of remodeling of the glomerular capillary walls by electron microscopy) C4d was focally positive in peritubular capillaries 20-30%. Mild interstitial inflammation, no tubulitis or endothelialitis) \par Diffuse and nodular diabetic glomerulosclerosis, donor related\par IFTA 30%\par \par Hospitalized 10/3/22-10/7/22, received pulse dose steroids, plasmapheresis and IVIG. \par \par Transplant renal artery angiogram done by Dr. Stafford 12/28/22 showed no hemodynamically significant stenosis. \par \par Last seen in office 2 months ago. No major events since last visit. \par Had NVD 3 weeks ago, went to urgent care, resolved in 2 days. \par Now feels better. \par \par Voiding urine without difficulty. No dysuria or hematuria. \par No abdominal pain, nausea, vomiting or diarrhea. Appetite good. \par No fever, chills. Energy fair. \par No chest pain or shortness of breath, no leg swelling. \par Taking all medications as prescribed \par -140 systolic at home. \par Weight down 9lbs, thinks this is due to diarrhea, Now improving. \par Physical activity : Walking 10 blocks twice  a week \par Lives with . Children live nearby and help out. \par

## 2023-05-11 ENCOUNTER — NON-APPOINTMENT (OUTPATIENT)
Age: 78
End: 2023-05-11

## 2023-05-11 LAB
ALBUMIN SERPL ELPH-MCNC: 3.8 G/DL
ALP BLD-CCNC: 86 U/L
ALT SERPL-CCNC: 7 U/L
ANION GAP SERPL CALC-SCNC: 12 MMOL/L
APPEARANCE: CLEAR
AST SERPL-CCNC: 11 U/L
BACTERIA: NEGATIVE /HPF
BASOPHILS # BLD AUTO: 0 K/UL
BASOPHILS NFR BLD AUTO: 0 %
BILIRUB SERPL-MCNC: 0.2 MG/DL
BILIRUBIN URINE: NEGATIVE
BLOOD URINE: ABNORMAL
BUN SERPL-MCNC: 58 MG/DL
CALCIUM SERPL-MCNC: 9.7 MG/DL
CAST: 0 /LPF
CHLORIDE SERPL-SCNC: 114 MMOL/L
CO2 SERPL-SCNC: 18 MMOL/L
COLOR: YELLOW
CREAT SERPL-MCNC: 2.32 MG/DL
CREAT SPEC-SCNC: 60 MG/DL
CREAT/PROT UR: 3.7 RATIO
EGFR: 21 ML/MIN/1.73M2
EOSINOPHIL # BLD AUTO: 0.03 K/UL
EOSINOPHIL NFR BLD AUTO: 0.9 %
EPITHELIAL CELLS: 0 /HPF
GLUCOSE QUALITATIVE U: NEGATIVE MG/DL
GLUCOSE SERPL-MCNC: 123 MG/DL
HCT VFR BLD CALC: 29.2 %
HGB BLD-MCNC: 8.8 G/DL
KETONES URINE: NEGATIVE MG/DL
LEUKOCYTE ESTERASE URINE: NEGATIVE
LYMPHOCYTES # BLD AUTO: 0.38 K/UL
LYMPHOCYTES NFR BLD AUTO: 13 %
MAGNESIUM SERPL-MCNC: 1.5 MG/DL
MAN DIFF?: NORMAL
MCHC RBC-ENTMCNC: 27.3 PG
MCHC RBC-ENTMCNC: 30.1 GM/DL
MCV RBC AUTO: 90.7 FL
MICROSCOPIC-UA: NORMAL
MONOCYTES # BLD AUTO: 0.08 K/UL
MONOCYTES NFR BLD AUTO: 2.6 %
NEUTROPHILS # BLD AUTO: 2.09 K/UL
NEUTROPHILS NFR BLD AUTO: 54.8 %
NITRITE URINE: NEGATIVE
PH URINE: 6
PHOSPHATE SERPL-MCNC: 3.6 MG/DL
PLATELET # BLD AUTO: 149 K/UL
POTASSIUM SERPL-SCNC: 5.2 MMOL/L
PROT SERPL-MCNC: 5.8 G/DL
PROT UR-MCNC: 223 MG/DL
PROTEIN URINE: 300 MG/DL
RBC # BLD: 3.22 M/UL
RBC # FLD: 13.9 %
RED BLOOD CELLS URINE: 2 /HPF
SODIUM SERPL-SCNC: 143 MMOL/L
SPECIFIC GRAVITY URINE: 1.01
TACROLIMUS SERPL-MCNC: 4.6 NG/ML
URATE SERPL-MCNC: 7.8 MG/DL
UROBILINOGEN URINE: 0.2 MG/DL
WBC # FLD AUTO: 2.9 K/UL
WHITE BLOOD CELLS URINE: 0 /HPF

## 2023-05-12 LAB
BKV DNA SPEC QL NAA+PROBE: NOT DETECTED IU/ML
CMV DNA SPEC QL NAA+PROBE: 83 IU/ML
CMVPCR LOG: 1.92 LOG10IU/ML

## 2023-06-12 ENCOUNTER — LABORATORY RESULT (OUTPATIENT)
Age: 78
End: 2023-06-12

## 2023-06-12 ENCOUNTER — APPOINTMENT (OUTPATIENT)
Dept: TRANSPLANT | Facility: CLINIC | Age: 78
End: 2023-06-12

## 2023-06-13 ENCOUNTER — NON-APPOINTMENT (OUTPATIENT)
Age: 78
End: 2023-06-13

## 2023-06-13 LAB
ALBUMIN SERPL ELPH-MCNC: 3.6 G/DL
ALP BLD-CCNC: 81 U/L
ALT SERPL-CCNC: 6 U/L
ANION GAP SERPL CALC-SCNC: 10 MMOL/L
APPEARANCE: CLEAR
AST SERPL-CCNC: 12 U/L
BACTERIA: NEGATIVE /HPF
BILIRUB SERPL-MCNC: 0.2 MG/DL
BILIRUBIN URINE: NEGATIVE
BKV DNA SPEC QL NAA+PROBE: NOT DETECTED IU/ML
BLOOD URINE: ABNORMAL
BUN SERPL-MCNC: 47 MG/DL
CALCIUM SERPL-MCNC: 9.1 MG/DL
CAST: 0 /LPF
CHLORIDE SERPL-SCNC: 112 MMOL/L
CMV DNA SPEC QL NAA+PROBE: 42 IU/ML
CMVPCR LOG: 1.63 LOG10IU/ML
CO2 SERPL-SCNC: 20 MMOL/L
COLOR: YELLOW
CREAT SERPL-MCNC: 2.58 MG/DL
CREAT SPEC-SCNC: 137 MG/DL
CREAT/PROT UR: 2.3 RATIO
EGFR: 18 ML/MIN/1.73M2
EPITHELIAL CELLS: 0 /HPF
GLUCOSE QUALITATIVE U: NEGATIVE MG/DL
GLUCOSE SERPL-MCNC: 102 MG/DL
KETONES URINE: NEGATIVE MG/DL
LEUKOCYTE ESTERASE URINE: NEGATIVE
MAGNESIUM SERPL-MCNC: 1.6 MG/DL
MICROSCOPIC-UA: NORMAL
NITRITE URINE: NEGATIVE
PH URINE: 5.5
PHOSPHATE SERPL-MCNC: 4.1 MG/DL
POTASSIUM SERPL-SCNC: 4.8 MMOL/L
PROT SERPL-MCNC: 5.2 G/DL
PROT UR-MCNC: 322 MG/DL
PROTEIN URINE: 300 MG/DL
RED BLOOD CELLS URINE: 2 /HPF
SODIUM SERPL-SCNC: 142 MMOL/L
SPECIFIC GRAVITY URINE: 1.02
TACROLIMUS SERPL-MCNC: 4.2 NG/ML
URATE SERPL-MCNC: 8.2 MG/DL
UROBILINOGEN URINE: 0.2 MG/DL
WHITE BLOOD CELLS URINE: 1 /HPF

## 2023-06-29 ENCOUNTER — APPOINTMENT (OUTPATIENT)
Dept: TRANSPLANT | Facility: CLINIC | Age: 78
End: 2023-06-29

## 2023-07-12 ENCOUNTER — APPOINTMENT (OUTPATIENT)
Dept: NEPHROLOGY | Facility: CLINIC | Age: 78
End: 2023-07-12
Payer: MEDICARE

## 2023-07-12 ENCOUNTER — LABORATORY RESULT (OUTPATIENT)
Age: 78
End: 2023-07-12

## 2023-07-12 PROCEDURE — 99215 OFFICE O/P EST HI 40 MIN: CPT

## 2023-07-12 RX ORDER — PANTOPRAZOLE 40 MG/1
40 TABLET, DELAYED RELEASE ORAL
Qty: 30 | Refills: 11 | Status: DISCONTINUED | COMMUNITY
Start: 2021-05-20 | End: 2023-07-12

## 2023-07-12 NOTE — HISTORY OF PRESENT ILLNESS
[FreeTextEntry1] : \par 77 year old woman with ESRD due to HTN, was on hemodialysis since 2016. Received DDRT on 5/19/21 \par \par Donor was a 44 yr old woman, KDPI 69%, history of diabetes on insulin, terminal creatinine 3.3. \par Post transplant course was complicated by DGF. Last hemodialysis was on 6/4/21. Transplant ureteric stent was removed on 8/2/21. \par \par Other PMH includes :History of COVID-19 infection, hospitalized in March 2020 for only 1 day. \par \par - Hospitalized 10/2021 with severe symptomatic anemia (Hgb 5g/dL). Transfused 2 units PRBC. Anemia was due to hemolysis caused by dapsone (despite normal G6PD levels prior to initiation). \par - Nov 2021 Noted to have YANELY creatinine 1.2-> 1.65mg/dL.  US showed increased velocities concerning for TRAS but no tardus parvus waves. DSA negative,  and cFDNA  0.5%. Conservatively managed. \par - Hospitalized Jan 2022 with worsening anemia, hyperkalemia, tested +ve for COVID but had no symptoms. \par - Sept 2022 - Worsening YANELY with cr to 2.2mg/dL. DSA was negative. cFDNA 0.68%. \par \par Transplant kidney biopsy done on 9/29/22  showed mild chronic-active antibody-mediated rejection\par (There is minimal glomerulitis, mild peritubular capillaritis, no vasculitis, and very focal signs of remodeling of the glomerular capillary walls by electron microscopy) C4d was focally positive in peritubular capillaries 20-30%. Mild interstitial inflammation, no tubulitis or endothelialitis) \par Diffuse and nodular diabetic glomerulosclerosis, donor related\par IFTA 30%\par \par Hospitalized 10/3/22-10/7/22, received pulse dose steroids, plasmapheresis and IVIG. \par \par Transplant renal artery angiogram done by Dr. Stafford 12/28/22 showed no hemodynamically significant stenosis. \par \par Last seen in office 2 months ago. No major events since last visit. \par Had bronchitis last week with symptoms of cough, fever, poor appetite. Went to her PCP,  treated with azithromycin. Feels better now. Appetite improving. Energy is much better. Lost few pounds during her recent illness but now stable. \par Voiding urine without difficulty. No dysuria or hematuria. \par No abdominal pain, nausea, vomiting or diarrhea. \par No fever, chills. \par No chest pain or shortness of breath, minimal leg swelling. \par Taking all medications as prescribed \par BP well controlled 120/70\par Physical activity : Walking 10 blocks 3-4 times a week \par Lives with . Children live nearby and help out. \par

## 2023-07-12 NOTE — ASSESSMENT
[FreeTextEntry1] : \par 77 yr old woman with ESRD due to HTN S/p DDRT on 5/19/2021, course complicated by DGF due to ATN. Last HD was on  6/4/21. Oswaldo creatinine 1.3mg/dL. Had worsening graft function Cr 1.6 -1.8mg/dL Nov 2021 with 2 g/d proteinuria. No DSA, cFDNA 0.58%. She had transplant kidney biopsy for further worsening creatinine 2.2mg/dL on 9/29/22. 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 DSA was negative. \par \par Treated with pulse steroids, plasmapheresis and IVIG for possible non-HLA mediated ABMR in Oct. 2022. Also started losartan for non HLA mediated rejection possibly due to anti angiotensin Ab. \par \par Transplant US 9/2022 with increased velocities concerning for TRAS. Also has uncontrolled HTN and edema. Referred to IR for angiogram, performed on 12/28/22 by Dr. Stafford, no hemodynamically significant stenosis seen. \par \par Creatinine slowly rising 1.8->2.2-> 2.5mg/dL. Electrolytes and volume status fair. \par Proteinuria 2.5 grams per day relatively stable- likely from diabetic nephropathy (donor origin). \par Hyperkalemia controlled on Lokelma - continue 10 grams daily \par \par Immunosuppression \par - S/p Simulect induction\par - On CellCept 500mg po bid - dose lowered for leukopenia and low level CMV viremia. \par - On Envarsus 5mg po daily. Goal 4-6 \par - Prednisone  5mg daily \par \par Prophylaxis \par - Continue Mepron. Bactrim discontinued for high K.  Dapsone discontinued for hemolytic anemia. \par \par CMV viremia - very low level. peak 83 IU/ml on 5/10/23, down to 43 IU/ml on 6/12/23. Check CMV PCR today. \par Continue low dose IS - tac goal of 4-6 and lower dose MMF. \par \par Hypertension - controlled on current regimen. \par Nifedipine 60mg qhs, Losartan 50mg daily,  Labetalol 200mg po TID. Chlorthalidone 25mg po qam. \par  \par Anemia - Hgb declining on Procrit. Will check iron stores, B12 and folate. Increase Procrit to 10,000 units q week. \par \par Metabolic acidosis -  Continue Sodium bicarb 1300mg po bid \par \par Vitamin D deficiency -  continue Vitamin D 50,000 units once a week \par \par Health maintenance \par Completed 2 doses COVID vaccine prior to transplant. 3rd shot received 10/19/22. Also had COVID infection in March 2020 and Jan 2022. \par Flu shot received Nov 2022\par \par Labs in 4 weeks \par Follow up in 2 months Sept 2023 \par \par

## 2023-07-13 ENCOUNTER — NON-APPOINTMENT (OUTPATIENT)
Age: 78
End: 2023-07-13

## 2023-07-13 LAB
ALBUMIN SERPL ELPH-MCNC: 3.7 G/DL
ALP BLD-CCNC: 72 U/L
ALT SERPL-CCNC: <5 U/L
ANION GAP SERPL CALC-SCNC: 13 MMOL/L
APPEARANCE: CLEAR
AST SERPL-CCNC: 11 U/L
BACTERIA: NEGATIVE /HPF
BILIRUB SERPL-MCNC: 0.2 MG/DL
BILIRUBIN URINE: NEGATIVE
BKV DNA SPEC QL NAA+PROBE: NOT DETECTED IU/ML
BLOOD URINE: NEGATIVE
BUN SERPL-MCNC: 61 MG/DL
CALCIUM SERPL-MCNC: 9.3 MG/DL
CAST: 1 /LPF
CHLORIDE SERPL-SCNC: 113 MMOL/L
CMV DNA SPEC QL NAA+PROBE: 48 IU/ML
CMVPCR LOG: 1.69 LOG10IU/ML
CO2 SERPL-SCNC: 19 MMOL/L
COLOR: YELLOW
CREAT SERPL-MCNC: 2.47 MG/DL
CREAT SPEC-SCNC: 118 MG/DL
CREAT/PROT UR: 2.8 RATIO
EGFR: 19 ML/MIN/1.73M2
EPITHELIAL CELLS: 1 /HPF
GLUCOSE QUALITATIVE U: NEGATIVE MG/DL
GLUCOSE SERPL-MCNC: 114 MG/DL
KETONES URINE: NEGATIVE MG/DL
LEUKOCYTE ESTERASE URINE: NEGATIVE
MAGNESIUM SERPL-MCNC: 1.6 MG/DL
MICROSCOPIC-UA: NORMAL
NITRITE URINE: NEGATIVE
PH URINE: 5.5
PHOSPHATE SERPL-MCNC: 4.1 MG/DL
POTASSIUM SERPL-SCNC: 5 MMOL/L
PROT SERPL-MCNC: 5.7 G/DL
PROT UR-MCNC: 324 MG/DL
PROTEIN URINE: 300 MG/DL
RED BLOOD CELLS URINE: 1 /HPF
SODIUM SERPL-SCNC: 145 MMOL/L
SPECIFIC GRAVITY URINE: 1.02
TACROLIMUS SERPL-MCNC: 5.4 NG/ML
UROBILINOGEN URINE: 0.2 MG/DL
WHITE BLOOD CELLS URINE: 2 /HPF

## 2023-08-04 NOTE — H&P ADULT - NSICDXPASTMEDICALHX_GEN_ALL_CORE_FT
Braydon
PAST MEDICAL HISTORY:  Cataract     DVT (deep venous thrombosis) of Left subclavian vein, 06/12/17    ESRD (end stage renal disease) on dialysis     Glaucoma     Hemodialysis access, fistula mature JASON    Hemodialysis patient     HTN (hypertension)

## 2023-09-20 ENCOUNTER — APPOINTMENT (OUTPATIENT)
Dept: NEPHROLOGY | Facility: CLINIC | Age: 78
End: 2023-09-20
Payer: MEDICARE

## 2023-09-20 VITALS
TEMPERATURE: 98 F | SYSTOLIC BLOOD PRESSURE: 162 MMHG | HEIGHT: 66 IN | BODY MASS INDEX: 19.13 KG/M2 | HEART RATE: 78 BPM | DIASTOLIC BLOOD PRESSURE: 70 MMHG | OXYGEN SATURATION: 98 % | WEIGHT: 119 LBS

## 2023-09-20 PROCEDURE — 99215 OFFICE O/P EST HI 40 MIN: CPT

## 2023-09-20 RX ORDER — ATOVAQUONE 750 MG/5ML
750 SUSPENSION ORAL DAILY
Qty: 10 | Refills: 3 | Status: DISCONTINUED | COMMUNITY
Start: 2022-10-12 | End: 2023-09-20

## 2023-09-22 LAB
ALBUMIN SERPL ELPH-MCNC: 3.7 G/DL
ALP BLD-CCNC: 74 U/L
ALT SERPL-CCNC: 6 U/L
ANION GAP SERPL CALC-SCNC: 14 MMOL/L
APPEARANCE: CLEAR
AST SERPL-CCNC: 13 U/L
BACTERIA: NEGATIVE /HPF
BILIRUB SERPL-MCNC: 0.2 MG/DL
BILIRUBIN URINE: NEGATIVE
BKV DNA SPEC QL NAA+PROBE: NOT DETECTED IU/ML
BLOOD URINE: ABNORMAL
BUN SERPL-MCNC: 65 MG/DL
CALCIUM SERPL-MCNC: 8.8 MG/DL
CAST: 0 /LPF
CHLORIDE SERPL-SCNC: 113 MMOL/L
CMV DNA SPEC QL NAA+PROBE: ABNORMAL IU/ML
CMVPCR LOG: ABNORMAL LOG10IU/ML
CO2 SERPL-SCNC: 16 MMOL/L
COLOR: YELLOW
CREAT SERPL-MCNC: 2.81 MG/DL
CREAT SPEC-SCNC: 45 MG/DL
CREAT/PROT UR: 3.9 RATIO
EGFR: 17 ML/MIN/1.73M2
EPITHELIAL CELLS: 0 /HPF
GLUCOSE QUALITATIVE U: NEGATIVE MG/DL
GLUCOSE SERPL-MCNC: 108 MG/DL
HCT VFR BLD CALC: 28.5 %
HGB BLD-MCNC: 8.6 G/DL
KETONES URINE: NEGATIVE MG/DL
LEUKOCYTE ESTERASE URINE: NEGATIVE
MAGNESIUM SERPL-MCNC: 1.7 MG/DL
MCHC RBC-ENTMCNC: 26.8 PG
MCHC RBC-ENTMCNC: 30.2 GM/DL
MCV RBC AUTO: 88.8 FL
MICROSCOPIC-UA: NORMAL
NITRITE URINE: NEGATIVE
PH URINE: 6
PHOSPHATE SERPL-MCNC: 4.3 MG/DL
PLATELET # BLD AUTO: 142 K/UL
POTASSIUM SERPL-SCNC: 5.6 MMOL/L
PROT SERPL-MCNC: 5.6 G/DL
PROT UR-MCNC: 178 MG/DL
PROTEIN URINE: 300 MG/DL
RBC # BLD: 3.21 M/UL
RBC # FLD: 14.9 %
RED BLOOD CELLS URINE: 1 /HPF
SODIUM SERPL-SCNC: 144 MMOL/L
SPECIFIC GRAVITY URINE: 1.01
TACROLIMUS SERPL-MCNC: 5.1 NG/ML
UROBILINOGEN URINE: 0.2 MG/DL
WBC # FLD AUTO: 2.21 K/UL
WHITE BLOOD CELLS URINE: 0 /HPF

## 2023-09-22 RX ORDER — SODIUM ZIRCONIUM CYCLOSILICATE 10 G/10G
10 POWDER, FOR SUSPENSION ORAL DAILY
Qty: 1 | Refills: 5 | Status: ACTIVE | COMMUNITY
Start: 2022-10-12 | End: 1900-01-01

## 2023-10-10 NOTE — ED PROVIDER NOTE - PRINCIPAL DIAGNOSIS
Please call patient and ask her to call other pharmacies and check which one possibly has it and let  Us know and will send medication there.  ANGIE Hale Fever

## 2023-10-27 ENCOUNTER — LABORATORY RESULT (OUTPATIENT)
Age: 78
End: 2023-10-27

## 2023-10-27 ENCOUNTER — NON-APPOINTMENT (OUTPATIENT)
Age: 78
End: 2023-10-27

## 2023-10-27 ENCOUNTER — APPOINTMENT (OUTPATIENT)
Dept: TRANSPLANT | Facility: CLINIC | Age: 78
End: 2023-10-27

## 2023-10-27 LAB
ALBUMIN SERPL ELPH-MCNC: 3.5 G/DL
ALP BLD-CCNC: 75 U/L
ALT SERPL-CCNC: 6 U/L
ANION GAP SERPL CALC-SCNC: 12 MMOL/L
AST SERPL-CCNC: 10 U/L
BILIRUB SERPL-MCNC: 0.2 MG/DL
BUN SERPL-MCNC: 65 MG/DL
CALCIUM SERPL-MCNC: 9 MG/DL
CHLORIDE SERPL-SCNC: 113 MMOL/L
CO2 SERPL-SCNC: 18 MMOL/L
CREAT SERPL-MCNC: 2.61 MG/DL
CREAT SPEC-SCNC: 55 MG/DL
EGFR: 18 ML/MIN/1.73M2
GLUCOSE SERPL-MCNC: 99 MG/DL
HCT VFR BLD CALC: 26.2 %
HGB BLD-MCNC: 8.1 G/DL
MAGNESIUM SERPL-MCNC: 1.6 MG/DL
MCHC RBC-ENTMCNC: 27 PG
MCHC RBC-ENTMCNC: 30.9 GM/DL
MCV RBC AUTO: 87.3 FL
MICROALBUMIN 24H UR DL<=1MG/L-MCNC: 151 MG/DL
MICROALBUMIN/CREAT 24H UR-RTO: 2765 MG/G
PHOSPHATE SERPL-MCNC: 4.6 MG/DL
PLATELET # BLD AUTO: 140 K/UL
POTASSIUM SERPL-SCNC: 4.7 MMOL/L
PROT SERPL-MCNC: 5.4 G/DL
RBC # BLD: 3 M/UL
RBC # FLD: 14.5 %
SODIUM SERPL-SCNC: 143 MMOL/L
TACROLIMUS SERPL-MCNC: 5.1 NG/ML
WBC # FLD AUTO: 1.93 K/UL

## 2023-10-30 LAB
APPEARANCE: CLEAR
BILIRUBIN URINE: NEGATIVE
BKV DNA SPEC QL NAA+PROBE: NOT DETECTED IU/ML
BLOOD URINE: ABNORMAL
CMV DNA SPEC QL NAA+PROBE: ABNORMAL IU/ML
CMVPCR LOG: ABNORMAL LOG10IU/ML
COLOR: YELLOW
GLUCOSE QUALITATIVE U: NEGATIVE MG/DL
KETONES URINE: NEGATIVE MG/DL
LEUKOCYTE ESTERASE URINE: NEGATIVE
NITRITE URINE: NEGATIVE
PH URINE: 6
PROTEIN URINE: 300 MG/DL
SPECIFIC GRAVITY URINE: 1.01
UROBILINOGEN URINE: 0.2 MG/DL

## 2023-10-30 RX ORDER — MYCOPHENOLATE MOFETIL 500 MG/1
500 TABLET ORAL
Qty: 180 | Refills: 3 | Status: DISCONTINUED | COMMUNITY
Start: 2021-05-20 | End: 2023-10-30

## 2023-10-31 NOTE — ED ADULT NURSE NOTE - IS THE PATIENT ABLE TO BE SCREENED?
Rounded on pt. Pt resting in bed, respirations even and unlabored, no behavorial indicators of agitation or distress noted, 1:1 sitter in direct view of pt.   Yes

## 2023-11-02 ENCOUNTER — APPOINTMENT (OUTPATIENT)
Dept: TRANSPLANT | Facility: CLINIC | Age: 78
End: 2023-11-02

## 2023-11-02 ENCOUNTER — LABORATORY RESULT (OUTPATIENT)
Age: 78
End: 2023-11-02

## 2023-11-06 RX ORDER — ERGOCALCIFEROL 1.25 MG/1
1.25 MG CAPSULE, LIQUID FILLED ORAL
Qty: 4 | Refills: 0 | Status: ACTIVE | COMMUNITY
Start: 2023-02-02 | End: 1900-01-01

## 2023-11-08 ENCOUNTER — NON-APPOINTMENT (OUTPATIENT)
Age: 78
End: 2023-11-08

## 2023-11-08 ENCOUNTER — EMERGENCY (EMERGENCY)
Facility: HOSPITAL | Age: 78
LOS: 1 days | Discharge: ANOTHER HOSPITAL | End: 2023-11-08
Attending: EMERGENCY MEDICINE
Payer: MEDICARE

## 2023-11-08 ENCOUNTER — INPATIENT (INPATIENT)
Facility: HOSPITAL | Age: 78
LOS: 5 days | Discharge: HOME | DRG: 291 | End: 2023-11-14
Attending: INTERNAL MEDICINE | Admitting: STUDENT IN AN ORGANIZED HEALTH CARE EDUCATION/TRAINING PROGRAM
Payer: MEDICARE

## 2023-11-08 VITALS
DIASTOLIC BLOOD PRESSURE: 89 MMHG | OXYGEN SATURATION: 97 % | SYSTOLIC BLOOD PRESSURE: 209 MMHG | RESPIRATION RATE: 20 BRPM | TEMPERATURE: 98 F | HEART RATE: 70 BPM

## 2023-11-08 VITALS
HEIGHT: 64 IN | DIASTOLIC BLOOD PRESSURE: 84 MMHG | SYSTOLIC BLOOD PRESSURE: 180 MMHG | TEMPERATURE: 99 F | HEART RATE: 80 BPM | RESPIRATION RATE: 20 BRPM | WEIGHT: 123.02 LBS

## 2023-11-08 VITALS
WEIGHT: 125 LBS | RESPIRATION RATE: 20 BRPM | TEMPERATURE: 98 F | DIASTOLIC BLOOD PRESSURE: 94 MMHG | HEART RATE: 84 BPM | OXYGEN SATURATION: 96 % | SYSTOLIC BLOOD PRESSURE: 221 MMHG | HEIGHT: 64 IN

## 2023-11-08 DIAGNOSIS — I77.0 ARTERIOVENOUS FISTULA, ACQUIRED: Chronic | ICD-10-CM

## 2023-11-08 DIAGNOSIS — Z99.2 DEPENDENCE ON RENAL DIALYSIS: Chronic | ICD-10-CM

## 2023-11-08 DIAGNOSIS — H26.9 UNSPECIFIED CATARACT: Chronic | ICD-10-CM

## 2023-11-08 DIAGNOSIS — Z90.710 ACQUIRED ABSENCE OF BOTH CERVIX AND UTERUS: Chronic | ICD-10-CM

## 2023-11-08 DIAGNOSIS — Z94.0 KIDNEY TRANSPLANT STATUS: Chronic | ICD-10-CM

## 2023-11-08 LAB
ALBUMIN SERPL ELPH-MCNC: 2.6 G/DL — LOW (ref 3.5–5)
ALBUMIN SERPL ELPH-MCNC: 2.6 G/DL — LOW (ref 3.5–5)
ALBUMIN SERPL ELPH-MCNC: 3.5 G/DL
ALP BLD-CCNC: 77 U/L
ALP SERPL-CCNC: 74 U/L — SIGNIFICANT CHANGE UP (ref 40–120)
ALP SERPL-CCNC: 74 U/L — SIGNIFICANT CHANGE UP (ref 40–120)
ALT FLD-CCNC: 12 U/L DA — SIGNIFICANT CHANGE UP (ref 10–60)
ALT FLD-CCNC: 12 U/L DA — SIGNIFICANT CHANGE UP (ref 10–60)
ALT SERPL-CCNC: 7 U/L
ANION GAP SERPL CALC-SCNC: 11 MMOL/L
ANION GAP SERPL CALC-SCNC: 8 MMOL/L — SIGNIFICANT CHANGE UP (ref 5–17)
ANION GAP SERPL CALC-SCNC: 8 MMOL/L — SIGNIFICANT CHANGE UP (ref 5–17)
APPEARANCE UR: CLEAR — SIGNIFICANT CHANGE UP
APPEARANCE UR: CLEAR — SIGNIFICANT CHANGE UP
APPEARANCE: CLEAR
APTT BLD: 36.4 SEC — HIGH (ref 24.5–35.6)
APTT BLD: 36.4 SEC — HIGH (ref 24.5–35.6)
AST SERPL-CCNC: 13 U/L — SIGNIFICANT CHANGE UP (ref 10–40)
AST SERPL-CCNC: 13 U/L — SIGNIFICANT CHANGE UP (ref 10–40)
AST SERPL-CCNC: 8 U/L
BACTERIA: NEGATIVE /HPF
BASOPHILS # BLD AUTO: 0 K/UL
BASOPHILS # BLD AUTO: 0 K/UL — SIGNIFICANT CHANGE UP (ref 0–0.2)
BASOPHILS # BLD AUTO: 0 K/UL — SIGNIFICANT CHANGE UP (ref 0–0.2)
BASOPHILS NFR BLD AUTO: 0 %
BASOPHILS NFR BLD AUTO: 0 % — SIGNIFICANT CHANGE UP (ref 0–2)
BASOPHILS NFR BLD AUTO: 0 % — SIGNIFICANT CHANGE UP (ref 0–2)
BILIRUB SERPL-MCNC: 0.2 MG/DL
BILIRUB SERPL-MCNC: 0.3 MG/DL — SIGNIFICANT CHANGE UP (ref 0.2–1.2)
BILIRUB SERPL-MCNC: 0.3 MG/DL — SIGNIFICANT CHANGE UP (ref 0.2–1.2)
BILIRUB UR-MCNC: NEGATIVE — SIGNIFICANT CHANGE UP
BILIRUB UR-MCNC: NEGATIVE — SIGNIFICANT CHANGE UP
BILIRUBIN URINE: NEGATIVE
BKV DNA SPEC QL NAA+PROBE: NOT DETECTED IU/ML
BLOOD URINE: NEGATIVE
BUN SERPL-MCNC: 58 MG/DL — HIGH (ref 7–18)
BUN SERPL-MCNC: 58 MG/DL — HIGH (ref 7–18)
BUN SERPL-MCNC: 63 MG/DL
CALCIUM SERPL-MCNC: 8.6 MG/DL — SIGNIFICANT CHANGE UP (ref 8.4–10.5)
CALCIUM SERPL-MCNC: 8.6 MG/DL — SIGNIFICANT CHANGE UP (ref 8.4–10.5)
CALCIUM SERPL-MCNC: 8.8 MG/DL
CAST: 2 /LPF
CHLORIDE SERPL-SCNC: 110 MMOL/L — HIGH (ref 96–108)
CHLORIDE SERPL-SCNC: 110 MMOL/L — HIGH (ref 96–108)
CHLORIDE SERPL-SCNC: 113 MMOL/L
CMV DNA SPEC QL NAA+PROBE: ABNORMAL IU/ML
CMVPCR LOG: ABNORMAL LOG10IU/ML
CO2 SERPL-SCNC: 20 MMOL/L
CO2 SERPL-SCNC: 20 MMOL/L — LOW (ref 22–31)
CO2 SERPL-SCNC: 20 MMOL/L — LOW (ref 22–31)
COLOR SPEC: YELLOW — SIGNIFICANT CHANGE UP
COLOR SPEC: YELLOW — SIGNIFICANT CHANGE UP
COLOR: YELLOW
CREAT SERPL-MCNC: 2.75 MG/DL
CREAT SERPL-MCNC: 2.88 MG/DL — HIGH (ref 0.5–1.3)
CREAT SERPL-MCNC: 2.88 MG/DL — HIGH (ref 0.5–1.3)
CREAT SPEC-SCNC: 70 MG/DL
CREAT/PROT UR: 4.1 RATIO
DIFF PNL FLD: ABNORMAL
DIFF PNL FLD: ABNORMAL
EGFR: 16 ML/MIN/1.73M2 — LOW
EGFR: 16 ML/MIN/1.73M2 — LOW
EGFR: 17 ML/MIN/1.73M2
EOSINOPHIL # BLD AUTO: 0.04 K/UL
EOSINOPHIL # BLD AUTO: 0.05 K/UL — SIGNIFICANT CHANGE UP (ref 0–0.5)
EOSINOPHIL # BLD AUTO: 0.05 K/UL — SIGNIFICANT CHANGE UP (ref 0–0.5)
EOSINOPHIL NFR BLD AUTO: 1.8 %
EOSINOPHIL NFR BLD AUTO: 3 % — SIGNIFICANT CHANGE UP (ref 0–6)
EOSINOPHIL NFR BLD AUTO: 3 % — SIGNIFICANT CHANGE UP (ref 0–6)
EPITHELIAL CELLS: 2 /HPF
GLUCOSE QUALITATIVE U: NEGATIVE MG/DL
GLUCOSE SERPL-MCNC: 120 MG/DL — HIGH (ref 70–99)
GLUCOSE SERPL-MCNC: 120 MG/DL — HIGH (ref 70–99)
GLUCOSE SERPL-MCNC: 96 MG/DL
GLUCOSE UR QL: NEGATIVE MG/DL — SIGNIFICANT CHANGE UP
GLUCOSE UR QL: NEGATIVE MG/DL — SIGNIFICANT CHANGE UP
HCT VFR BLD CALC: 26 % — LOW (ref 34.5–45)
HCT VFR BLD CALC: 26 % — LOW (ref 34.5–45)
HCT VFR BLD CALC: 26.5 %
HGB BLD-MCNC: 7.9 G/DL — LOW (ref 11.5–15.5)
HGB BLD-MCNC: 7.9 G/DL — LOW (ref 11.5–15.5)
HGB BLD-MCNC: 8 G/DL
HIV 1 & 2 AB SERPL IA.RAPID: SIGNIFICANT CHANGE UP
HIV 1 & 2 AB SERPL IA.RAPID: SIGNIFICANT CHANGE UP
INR BLD: 1.05 RATIO — SIGNIFICANT CHANGE UP (ref 0.85–1.18)
INR BLD: 1.05 RATIO — SIGNIFICANT CHANGE UP (ref 0.85–1.18)
KETONES UR-MCNC: NEGATIVE MG/DL — SIGNIFICANT CHANGE UP
KETONES UR-MCNC: NEGATIVE MG/DL — SIGNIFICANT CHANGE UP
KETONES URINE: NEGATIVE MG/DL
LACTATE SERPL-SCNC: 0.9 MMOL/L — SIGNIFICANT CHANGE UP (ref 0.7–2)
LACTATE SERPL-SCNC: 0.9 MMOL/L — SIGNIFICANT CHANGE UP (ref 0.7–2)
LEUKOCYTE ESTERASE UR-ACNC: NEGATIVE — SIGNIFICANT CHANGE UP
LEUKOCYTE ESTERASE UR-ACNC: NEGATIVE — SIGNIFICANT CHANGE UP
LEUKOCYTE ESTERASE URINE: NEGATIVE
LYMPHOCYTES # BLD AUTO: 0.25 K/UL
LYMPHOCYTES # BLD AUTO: 0.45 K/UL — LOW (ref 1–3.3)
LYMPHOCYTES # BLD AUTO: 0.45 K/UL — LOW (ref 1–3.3)
LYMPHOCYTES # BLD AUTO: 25 % — SIGNIFICANT CHANGE UP (ref 13–44)
LYMPHOCYTES # BLD AUTO: 25 % — SIGNIFICANT CHANGE UP (ref 13–44)
LYMPHOCYTES NFR BLD AUTO: 12.2 %
MAGNESIUM SERPL-MCNC: 1.5 MG/DL
MAGNESIUM SERPL-MCNC: 1.6 MG/DL — SIGNIFICANT CHANGE UP (ref 1.6–2.6)
MAGNESIUM SERPL-MCNC: 1.6 MG/DL — SIGNIFICANT CHANGE UP (ref 1.6–2.6)
MAN DIFF?: NORMAL
MCHC RBC-ENTMCNC: 26.3 PG — LOW (ref 27–34)
MCHC RBC-ENTMCNC: 26.3 PG — LOW (ref 27–34)
MCHC RBC-ENTMCNC: 26.7 PG
MCHC RBC-ENTMCNC: 30.2 GM/DL
MCHC RBC-ENTMCNC: 30.4 GM/DL — LOW (ref 32–36)
MCHC RBC-ENTMCNC: 30.4 GM/DL — LOW (ref 32–36)
MCV RBC AUTO: 86.7 FL — SIGNIFICANT CHANGE UP (ref 80–100)
MCV RBC AUTO: 86.7 FL — SIGNIFICANT CHANGE UP (ref 80–100)
MCV RBC AUTO: 88.3 FL
MICROSCOPIC-UA: NORMAL
MONOCYTES # BLD AUTO: 0.11 K/UL
MONOCYTES # BLD AUTO: 0.23 K/UL — SIGNIFICANT CHANGE UP (ref 0–0.9)
MONOCYTES # BLD AUTO: 0.23 K/UL — SIGNIFICANT CHANGE UP (ref 0–0.9)
MONOCYTES NFR BLD AUTO: 13 % — SIGNIFICANT CHANGE UP (ref 2–14)
MONOCYTES NFR BLD AUTO: 13 % — SIGNIFICANT CHANGE UP (ref 2–14)
MONOCYTES NFR BLD AUTO: 5.2 %
NEUTROPHILS # BLD AUTO: 0.98 K/UL — LOW (ref 1.8–7.4)
NEUTROPHILS # BLD AUTO: 0.98 K/UL — LOW (ref 1.8–7.4)
NEUTROPHILS # BLD AUTO: 1.61 K/UL
NEUTROPHILS NFR BLD AUTO: 39 % — LOW (ref 43–77)
NEUTROPHILS NFR BLD AUTO: 39 % — LOW (ref 43–77)
NEUTROPHILS NFR BLD AUTO: 79.1 %
NITRITE UR-MCNC: NEGATIVE — SIGNIFICANT CHANGE UP
NITRITE UR-MCNC: NEGATIVE — SIGNIFICANT CHANGE UP
NITRITE URINE: NEGATIVE
NT-PROBNP SERPL-SCNC: HIGH PG/ML (ref 0–450)
NT-PROBNP SERPL-SCNC: HIGH PG/ML (ref 0–450)
PH UR: 7.5 — SIGNIFICANT CHANGE UP (ref 5–8)
PH UR: 7.5 — SIGNIFICANT CHANGE UP (ref 5–8)
PH URINE: 6
PHOSPHATE SERPL-MCNC: 4 MG/DL
PLATELET # BLD AUTO: 186 K/UL
PLATELET # BLD AUTO: 201 K/UL — SIGNIFICANT CHANGE UP (ref 150–400)
PLATELET # BLD AUTO: 201 K/UL — SIGNIFICANT CHANGE UP (ref 150–400)
POTASSIUM SERPL-MCNC: 5.6 MMOL/L — HIGH (ref 3.5–5.3)
POTASSIUM SERPL-MCNC: 5.6 MMOL/L — HIGH (ref 3.5–5.3)
POTASSIUM SERPL-SCNC: 4.7 MMOL/L
POTASSIUM SERPL-SCNC: 5.6 MMOL/L — HIGH (ref 3.5–5.3)
POTASSIUM SERPL-SCNC: 5.6 MMOL/L — HIGH (ref 3.5–5.3)
PROT SERPL-MCNC: 5.5 G/DL
PROT SERPL-MCNC: 5.9 G/DL — LOW (ref 6–8.3)
PROT SERPL-MCNC: 5.9 G/DL — LOW (ref 6–8.3)
PROT UR-MCNC: 283 MG/DL
PROT UR-MCNC: 300 MG/DL
PROT UR-MCNC: 300 MG/DL
PROTEIN URINE: 300 MG/DL
PROTHROM AB SERPL-ACNC: 12 SEC — SIGNIFICANT CHANGE UP (ref 9.5–13)
PROTHROM AB SERPL-ACNC: 12 SEC — SIGNIFICANT CHANGE UP (ref 9.5–13)
RAPID RVP RESULT: SIGNIFICANT CHANGE UP
RAPID RVP RESULT: SIGNIFICANT CHANGE UP
RBC # BLD: 3 M/UL
RBC # BLD: 3 M/UL — LOW (ref 3.8–5.2)
RBC # BLD: 3 M/UL — LOW (ref 3.8–5.2)
RBC # FLD: 14.6 %
RBC # FLD: 14.6 % — HIGH (ref 10.3–14.5)
RBC # FLD: 14.6 % — HIGH (ref 10.3–14.5)
RED BLOOD CELLS URINE: 2 /HPF
SARS-COV-2 RNA SPEC QL NAA+PROBE: SIGNIFICANT CHANGE UP
SARS-COV-2 RNA SPEC QL NAA+PROBE: SIGNIFICANT CHANGE UP
SODIUM SERPL-SCNC: 138 MMOL/L — SIGNIFICANT CHANGE UP (ref 135–145)
SODIUM SERPL-SCNC: 138 MMOL/L — SIGNIFICANT CHANGE UP (ref 135–145)
SODIUM SERPL-SCNC: 144 MMOL/L
SP GR SPEC: 1.01 — SIGNIFICANT CHANGE UP (ref 1–1.03)
SP GR SPEC: 1.01 — SIGNIFICANT CHANGE UP (ref 1–1.03)
SPECIFIC GRAVITY URINE: 1.01
TACROLIMUS SERPL-MCNC: 6 NG/ML
URATE SERPL-MCNC: 7.5 MG/DL
UROBILINOGEN FLD QL: 0.2 MG/DL — SIGNIFICANT CHANGE UP (ref 0.2–1)
UROBILINOGEN FLD QL: 0.2 MG/DL — SIGNIFICANT CHANGE UP (ref 0.2–1)
UROBILINOGEN URINE: 0.2 MG/DL
WBC # BLD: 1.79 K/UL — LOW (ref 3.8–10.5)
WBC # BLD: 1.79 K/UL — LOW (ref 3.8–10.5)
WBC # FLD AUTO: 1.79 K/UL — LOW (ref 3.8–10.5)
WBC # FLD AUTO: 1.79 K/UL — LOW (ref 3.8–10.5)
WBC # FLD AUTO: 2.04 K/UL
WHITE BLOOD CELLS URINE: 1 /HPF

## 2023-11-08 PROCEDURE — 81001 URINALYSIS AUTO W/SCOPE: CPT

## 2023-11-08 PROCEDURE — 96374 THER/PROPH/DIAG INJ IV PUSH: CPT

## 2023-11-08 PROCEDURE — 99285 EMERGENCY DEPT VISIT HI MDM: CPT | Mod: 25

## 2023-11-08 PROCEDURE — 84484 ASSAY OF TROPONIN QUANT: CPT

## 2023-11-08 PROCEDURE — 83735 ASSAY OF MAGNESIUM: CPT

## 2023-11-08 PROCEDURE — 83605 ASSAY OF LACTIC ACID: CPT

## 2023-11-08 PROCEDURE — 99285 EMERGENCY DEPT VISIT HI MDM: CPT | Mod: GC

## 2023-11-08 PROCEDURE — 71045 X-RAY EXAM CHEST 1 VIEW: CPT

## 2023-11-08 PROCEDURE — 93005 ELECTROCARDIOGRAM TRACING: CPT

## 2023-11-08 PROCEDURE — 87040 BLOOD CULTURE FOR BACTERIA: CPT

## 2023-11-08 PROCEDURE — 83880 ASSAY OF NATRIURETIC PEPTIDE: CPT

## 2023-11-08 PROCEDURE — 0225U NFCT DS DNA&RNA 21 SARSCOV2: CPT

## 2023-11-08 PROCEDURE — 86703 HIV-1/HIV-2 1 RESULT ANTBDY: CPT

## 2023-11-08 PROCEDURE — 99285 EMERGENCY DEPT VISIT HI MDM: CPT

## 2023-11-08 PROCEDURE — 87086 URINE CULTURE/COLONY COUNT: CPT

## 2023-11-08 PROCEDURE — 85025 COMPLETE CBC W/AUTO DIFF WBC: CPT

## 2023-11-08 PROCEDURE — 85730 THROMBOPLASTIN TIME PARTIAL: CPT

## 2023-11-08 PROCEDURE — 80053 COMPREHEN METABOLIC PANEL: CPT

## 2023-11-08 PROCEDURE — 96376 TX/PRO/DX INJ SAME DRUG ADON: CPT

## 2023-11-08 PROCEDURE — 85610 PROTHROMBIN TIME: CPT

## 2023-11-08 PROCEDURE — 96375 TX/PRO/DX INJ NEW DRUG ADDON: CPT

## 2023-11-08 PROCEDURE — 36415 COLL VENOUS BLD VENIPUNCTURE: CPT

## 2023-11-08 PROCEDURE — 71045 X-RAY EXAM CHEST 1 VIEW: CPT | Mod: 26

## 2023-11-08 RX ORDER — HYDRALAZINE HCL 50 MG
10 TABLET ORAL ONCE
Refills: 0 | Status: COMPLETED | OUTPATIENT
Start: 2023-11-08 | End: 2023-11-08

## 2023-11-08 RX ORDER — FUROSEMIDE 40 MG
80 TABLET ORAL ONCE
Refills: 0 | Status: DISCONTINUED | OUTPATIENT
Start: 2023-11-08 | End: 2023-11-08

## 2023-11-08 RX ORDER — FUROSEMIDE 40 MG
80 TABLET ORAL ONCE
Refills: 0 | Status: COMPLETED | OUTPATIENT
Start: 2023-11-08 | End: 2023-11-08

## 2023-11-08 RX ORDER — FUROSEMIDE 40 MG
80 TABLET ORAL DAILY
Refills: 0 | Status: DISCONTINUED | OUTPATIENT
Start: 2023-11-08 | End: 2023-11-08

## 2023-11-08 RX ADMIN — Medication 10 MILLIGRAM(S): at 20:17

## 2023-11-08 RX ADMIN — Medication 80 MILLIGRAM(S): at 17:37

## 2023-11-08 RX ADMIN — Medication 10 MILLIGRAM(S): at 17:37

## 2023-11-08 NOTE — ED ADULT NURSE NOTE - ED STAT RN HANDOFF DETAILS
RN to RN handoff completed to Jennifer RN pt alert and oriented able to make all needs known, moving all extremities ambulating with cane and one person assist. htn at this time Sergio MACHADO aware, medicated for htn son at bedside

## 2023-11-08 NOTE — CONSULT NOTE ADULT - SUBJECTIVE AND OBJECTIVE BOX
NEPHROLOGY MEDICAL CARE, [facility] - Dr. Luis Fernando Lloyd/ Dr. Jorge Treviño/ Dr. Robson Soriano/ Dr. Lauren Paul    Date of Service: 23    Patient was seen and examined at bedside.     Consultation requested by:  Unknown Doctor    Reason for Consult:     HPI:  78 y/o female with PMH ESRd due to HTN since , s/p DDRT (2021), HTN came to ER for sob and swelling of the legs. Renal consulted for kidney transplant management.   pt was admitted with Scr around 2.8mg/dl with mild hyperkal around 5.6meq/L. Pt had post transplant course was complicated by DGF with last HD 2021. 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 Pt also had transplant renal artery angiogram in 2022 which showed no stenosis  patient saw Dr. Louise (transplant nephro) in 2023 during that time patient had scr increasing to 2.5mg/dL and also had ankle edema thus chlorthalidone was increased. Pt had h/o proteinuria around 2.8gms and h/o hyperkalemia with usage of lokelma. Cellcept was reduced to 500mg bid due to leukopenia and low level CMV viremia. Last few months, pt scr has been around 2.5 to 2.8mg/dL. patient also has chronic anemia and receiving epo 98928 weekly.   in the Er, patient received lasix 80mg iv once and hydralazine 10mg iv once.   PMH:   HTN (hypertension)  Glaucoma    Cataract    ESRD (end stage renal disease) on dialysis    DVT (deep venous thrombosis)    Hemodialysis access, fistula mature    Kidney transplant recipient    Anemia of chronic disease    History of arteriovenostomy for renal dialysis    [place] (hard of hearing)        PSH:   Acquired cataract    Hip fracture    H/O: hysterectomy    H/O: glaucoma    AV fistula    S/P KVEEN-BSO    Hemodialysis access, AV graft    Transplanted kidney    History of renal transplantation    AV fistula        FAMILY HISTORY:  Family history of cerebrovascular accident (CVA)    Family history of colon cancer (Sibling)        Social History:  non-smoker/ non-alcoholic     Home Meds:  Home Medications:  aspirin 81 mg oral tablet, chewable: 1 tab(s) orally once a day (28 Dec 2022 13:28)  doxazosin 2 mg oral tablet: 0.5 tab(s) orally once a day (at bedtime) (28 Dec 2022 13:28)  latanoprost 0.005% ophthalmic solution: 1 drop(s) to each affected eye once a day (at bedtime) (28 Dec 2022 13:28)  losartan 50 mg oral tablet: 1 tab(s) orally once a day (2023 12:46)  mycophenolate mofetil 500 mg oral tablet: 1 tab(s) orally 2 times a day (21 Dec 2022 15:20)  NIFEdipine 60 mg oral tablet, extended release: 1 tab(s) orally 2 times a day (21 Dec 2022 15:20)  nystatin 100,000 units/mL oral suspension: 5 milliliter(s) orally 4 times a day (21 Dec 2022 15:20)  pantoprazole 40 mg oral delayed release tablet: 1 tab(s) orally once a day (before a meal) (21 Dec 2022 15:20)  predniSONE 5 mg oral tablet: 1 tab(s) orally once a day (21 Dec 2022 15:20)  Procrit 10,000 units/mL preservative-free injectable solution: every 2 weeks Monday  (21 Dec 2022 15:20)  senna leaf extract oral tablet: 2 tab(s) orally once a day (at bedtime) as needed for constipation. (21 Dec 2022 15:20)  sodium bicarbonate 650 mg oral tablet: 2 tab(s) orally 3 times a day (21 Dec 2022 15:20)  tacrolimus 1 mg oral tablet, extended release: 6 tab(s) orally once a day (21 Dec 2022 15:20)  valGANciclovir 450 mg oral tablet: 1 tab(s) orally once a day (21 Dec 2022 15:20)  Vitamin D3 25 mcg (1000 intl units) oral tablet: 1 tab(s) orally once a day (21 Dec 2022 15:20)      Allergies:  Allergies    Mushrooms (Anaphylaxis)  penicillin (Rash)    Intolerances        REVIEW OF SYSTEMS:  CONSTITUTIONAL: No fever; No weight loss; No fatigue  EYES: No eye pain; No visual disturbances; No discharge  ENMT:  No difficulty hearing; No tinnitus;  No vertigo; No sinus; No throat pain  NECK: No pain; No stiffness  BREASTS: No pain; No masses; No nipple discharge  RESPIRATORY: No cough; No wheezing; No chills; No hemoptysis; shortness of breath  CARDIOVASCULAR: No chest pain; No palpitations; No dizziness; No leg swelling  GASTROINTESTINAL: No abdominal pain; No epigastric pain; No nausea; No vomiting; No hematemesis; No diarrhea; No constipation. No melena   GENITOURINARY: No dysuria No frequency; No hematuria; No incontinence  NEUROLOGICAL: No headaches; No memory loss; No loss of strength; No numbness; No tremors  SKIN: No itching; No burning; No rashes  ENDOCRINE: No heat or cold intolerance; No hair loss  MUSCULOSKELETAL: No joint pain or swelling; No muscle, back, or extremity pain  PSYCHIATRIC: No depression; No anxiety; No mood swings; No difficulty sleeping  HEME/LYMPH: No easy bruising; No bleeding gums  ALLERY AND IMMUNOLOGIC: No hives or eczema    Vital Signs Last 24 Hrs  T(C): 36.7 (2023 18:29), Max: 37.1 (2023 12:06)  T(F): 98.1 (2023 18:29), Max: 98.8 (2023 12:06)  HR: 70 (2023 18:29) (70 - 80)  BP: 209/89 (2023 18:29) (180/84 - 210/93)  BP(mean): --  RR: 20 (2023 18:29) (18 - 20)  SpO2: 97% (2023 18:29) (97% - 98%)    Parameters below as of 2023 18:29  Patient On (Oxygen Delivery Method): room air            PHYSICAL EXAM:  General: No acute respiratory distress.  Eyes: conjunctiva and sclera clear  ENMT: Atraumatic, Normocephalic, supple, No JVD present. Moist mucous membranes  Respiratory: Bilateral rales at the bases and no rhonchi, wheezing  Cardiovascular: S1S2+; no m/r/g  Gastrointestinal: Soft, Non-tender, Nondistended; Bowel sounds present, no tenderness at RLQ transplant site.  Neuro:  Awake, Alert & Oriented X3, No focal deficits present.   Ext:  2+ Peripheral Pulses and b/l 2 + pedal edema, No Cyanosis  Skin: No visible rashes        LABS:                        7.9    1.79  )-----------( 201      ( 2023 13:20 )             26.0         138  |  110<H>  |  58<H>  ----------------------------<  120<H>  5.6<H>   |  20<L>  |  2.88<H>    Ca    8.6      2023 13:20  Mg     1.6         TPro  5.9<L>  /  Alb  2.6<L>  /  TBili  0.3  /  DBili  x   /  AST  13  /  ALT  12  /  AlkPhos  74      PT/INR - ( 2023 13:20 )   PT: 12.0 sec;   INR: 1.05 ratio         PTT - ( 2023 13:20 )  PTT:36.4 sec  Urinalysis Basic - ( 2023 14:40 )    Color: Yellow / Appearance: Clear / S.012 / pH: x  Gluc: x / Ketone: Negative mg/dL  / Bili: Negative / Urobili: 0.2 mg/dL   Blood: x / Protein: 300 mg/dL / Nitrite: Negative   Leuk Esterase: Negative / RBC: 5 /HPF / WBC 2 /HPF   Sq Epi: x / Non Sq Epi: x / Bacteria: Few /HPF      Magnesium: 1.6 mg/dL ( @ 13:20)    Urine studies      Medications:  MEDICATIONS  (STANDING):    MEDICATIONS  (PRN):           NEPHROLOGY MEDICAL CARE, [facility] - Dr. Luis Fernando Lloyd/ Dr. Jorge Treviño/ Dr. Robson Soriano/ Dr. Lauren Paul    Date of Service: 23    Patient was seen and examined at bedside.     Consultation requested by:  Unknown Doctor    Reason for Consult:     HPI:  76 y/o female with PMH ESRd due to HTN since , s/p DDRT (2021), HTN came to ER for sob and swelling of the legs. Renal consulted for kidney transplant management.   pt was admitted with Scr around 2.8mg/dl with mild hyperkal around 5.6meq/L. Pt had post transplant course was complicated by DGF with last HD 2021. 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 Pt also had transplant renal artery angiogram in 2022 which showed no stenosis  patient saw Dr. Louise (transplant nephro) in 2023 during that time patient had scr increasing to 2.5mg/dL and also had ankle edema thus chlorthalidone was increased. Pt had h/o proteinuria around 2.8gms and h/o hyperkalemia with usage of lokelma. Cellcept was reduced to 500mg bid due to leukopenia and low level CMV viremia. Last few months, pt scr has been around 2.5 to 2.8mg/dL. patient also has chronic anemia and receiving epo 25987 weekly.   in the Er, patient received lasix 80mg iv once and hydralazine 10mg iv once.   PMH:   HTN (hypertension)  Glaucoma    Cataract    ESRD (end stage renal disease) on dialysis    DVT (deep venous thrombosis)    Hemodialysis access, fistula mature    Kidney transplant recipient    Anemia of chronic disease    History of arteriovenostomy for renal dialysis    [place] (hard of hearing)        PSH:   Acquired cataract    Hip fracture    H/O: hysterectomy    H/O: glaucoma    AV fistula    S/P KEVEN-BSO    Hemodialysis access, AV graft    Transplanted kidney    History of renal transplantation    AV fistula        FAMILY HISTORY:  Family history of cerebrovascular accident (CVA)    Family history of colon cancer (Sibling)        Social History:  non-smoker/ non-alcoholic     Home Meds:  Home Medications:  aspirin 81 mg oral tablet, chewable: 1 tab(s) orally once a day (28 Dec 2022 13:28)  doxazosin 2 mg oral tablet: 0.5 tab(s) orally once a day (at bedtime) (28 Dec 2022 13:28)  latanoprost 0.005% ophthalmic solution: 1 drop(s) to each affected eye once a day (at bedtime) (28 Dec 2022 13:28)  losartan 50 mg oral tablet: 1 tab(s) orally once a day (2023 12:46)  mycophenolate mofetil 500 mg oral tablet: 1 tab(s) orally 2 times a day (21 Dec 2022 15:20)  NIFEdipine 60 mg oral tablet, extended release: 1 tab(s) orally 2 times a day (21 Dec 2022 15:20)  nystatin 100,000 units/mL oral suspension: 5 milliliter(s) orally 4 times a day (21 Dec 2022 15:20)  pantoprazole 40 mg oral delayed release tablet: 1 tab(s) orally once a day (before a meal) (21 Dec 2022 15:20)  predniSONE 5 mg oral tablet: 1 tab(s) orally once a day (21 Dec 2022 15:20)  Procrit 10,000 units/mL preservative-free injectable solution: every 2 weeks Monday  (21 Dec 2022 15:20)  senna leaf extract oral tablet: 2 tab(s) orally once a day (at bedtime) as needed for constipation. (21 Dec 2022 15:20)  sodium bicarbonate 650 mg oral tablet: 2 tab(s) orally 3 times a day (21 Dec 2022 15:20)  tacrolimus 1 mg oral tablet, extended release: 6 tab(s) orally once a day (21 Dec 2022 15:20)  valGANciclovir 450 mg oral tablet: 1 tab(s) orally once a day (21 Dec 2022 15:20)  Vitamin D3 25 mcg (1000 intl units) oral tablet: 1 tab(s) orally once a day (21 Dec 2022 15:20)      Allergies:  Allergies    Mushrooms (Anaphylaxis)  penicillin (Rash)    Intolerances        REVIEW OF SYSTEMS:  CONSTITUTIONAL: No fever; No weight loss; No fatigue  EYES: No eye pain; No visual disturbances; No discharge  ENMT:  No difficulty hearing; No tinnitus;  No vertigo; No sinus; No throat pain  NECK: No pain; No stiffness  BREASTS: No pain; No masses; No nipple discharge  RESPIRATORY: No cough; No wheezing; No chills; No hemoptysis; shortness of breath  CARDIOVASCULAR: No chest pain; No palpitations; No dizziness; No leg swelling  GASTROINTESTINAL: No abdominal pain; No epigastric pain; No nausea; No vomiting; No hematemesis; No diarrhea; No constipation. No melena   GENITOURINARY: No dysuria No frequency; No hematuria; No incontinence  NEUROLOGICAL: No headaches; No memory loss; No loss of strength; No numbness; No tremors  SKIN: No itching; No burning; No rashes  ENDOCRINE: No heat or cold intolerance; No hair loss  MUSCULOSKELETAL: No joint pain or swelling; No muscle, back, or extremity pain  PSYCHIATRIC: No depression; No anxiety; No mood swings; No difficulty sleeping  HEME/LYMPH: No easy bruising; No bleeding gums  ALLERY AND IMMUNOLOGIC: No hives or eczema    Vital Signs Last 24 Hrs  T(C): 36.7 (2023 18:29), Max: 37.1 (2023 12:06)  T(F): 98.1 (2023 18:29), Max: 98.8 (2023 12:06)  HR: 70 (2023 18:29) (70 - 80)  BP: 209/89 (2023 18:29) (180/84 - 210/93)  BP(mean): --  RR: 20 (2023 18:29) (18 - 20)  SpO2: 97% (2023 18:29) (97% - 98%)    Parameters below as of 2023 18:29  Patient On (Oxygen Delivery Method): room air            PHYSICAL EXAM:  General: No acute respiratory distress.  Eyes: conjunctiva and sclera clear  ENMT: Atraumatic, Normocephalic, supple, No JVD present. Moist mucous membranes  Respiratory: Bilateral rales at the bases and no rhonchi, wheezing  Cardiovascular: S1S2+; no m/r/g  Gastrointestinal: Soft, Non-tender, Nondistended; Bowel sounds present, no tenderness at RLQ transplant site.  Neuro:  Awake, Alert & Oriented X3, No focal deficits present.   Ext:  2+ Peripheral Pulses and b/l 2 + pedal edema, No Cyanosis  Skin: No visible rashes  Dialysis Access: Rt arm AVG +thrill/bruit      LABS:                        7.9    1.79  )-----------( 201      ( 2023 13:20 )             26.0     11    138  |  110<H>  |  58<H>  ----------------------------<  120<H>  5.6<H>   |  20<L>  |  2.88<H>    Ca    8.6      2023 13:20  Mg     1.6         TPro  5.9<L>  /  Alb  2.6<L>  /  TBili  0.3  /  DBili  x   /  AST  13  /  ALT  12  /  AlkPhos  74      PT/INR - ( 2023 13:20 )   PT: 12.0 sec;   INR: 1.05 ratio         PTT - ( 2023 13:20 )  PTT:36.4 sec  Urinalysis Basic - ( 2023 14:40 )    Color: Yellow / Appearance: Clear / S.012 / pH: x  Gluc: x / Ketone: Negative mg/dL  / Bili: Negative / Urobili: 0.2 mg/dL   Blood: x / Protein: 300 mg/dL / Nitrite: Negative   Leuk Esterase: Negative / RBC: 5 /HPF / WBC 2 /HPF   Sq Epi: x / Non Sq Epi: x / Bacteria: Few /HPF      Magnesium: 1.6 mg/dL ( @ 13:20)    Urine studies      Medications:  MEDICATIONS  (STANDING):    MEDICATIONS  (PRN):

## 2023-11-08 NOTE — ED ADULT NURSE NOTE - NSICDXPASTMEDICALHX_GEN_ALL_CORE_FT
PAST MEDICAL HISTORY:  Anemia of chronic disease     Cataract     DVT (deep venous thrombosis) of Left subclavian vein, 06/12/17    ESRD (end stage renal disease) on dialysis     Glaucoma     History of arteriovenostomy for renal dialysis     [place] (hard of hearing)     HTN (hypertension)     Kidney transplant recipient

## 2023-11-08 NOTE — ED ADULT NURSE NOTE - NSFALLRISKINTERV_ED_ALL_ED

## 2023-11-08 NOTE — CONSULT NOTE ADULT - ASSESSMENT
1. CKD stage 3 most likely s/p DDRT (s/p DDRT (05/19/2021) with chronic rejection (AMR) and HTN  -at present, around her baseline scr 2.8mg/dL; last few months her scr around 2.5 to 2.8mg/dl  -Keep patient euvolemic and renal diet  -Avoid Nephrotoxic Meds/ Agents such as (NSAIDs, IV contrast, Aminoglycosides such as gentamicin, -Gadolinium contrast, Phosphate containing enemas, etc..)  -Adjust Medications according to eGFR  2. Immunosuppressant medications:  -continue Envarsus XR 5mg daily (if unavailable then tacrolimus 5mg bid)  -cellcept 500mg bid  -prednisone 5mg daily  -keep tacrolimus level 4 to 6.  3. HTN:   -bp is elevated.   -continue labetolol 200mg tid; nifedipine 60mg daily.   -continue bp meds  -titrate bp meds to keep sbp >110 and < 130  4. Anemia:  -hb is okay; check iron panel, folic acid and vit B12  -F/u CBC daily  -transfuse if HB < 7.0.  5. Hyperkalemia due to ckd.   -Recommend: lokelma 10gm daily; hold Losartan for now.  -start low K diet. give Lokelma prn if K >5.5  -Keep pt euvolemic. Avoid ACE inh/ ARBs, NSAIDs, and Aldactone or potassium sparing diuretics. Monitor K+ daily.  6. Acidosis:  -co2 is okay   -will hold off sodium bicarbonate for now.  7. Mineral Bone Disease:  -Order phos and PTH intact in am  8. Sob possible pneumonia vs volume overload  -s/p lasix 80mg iv once and pt feels better  -bandemia and iv levofloxacin given.   -plan as per primary team.     Pt may benefit to be transferred to [facility] for higher level care in regards to her renal transplant.   Discussed with patient and son at bedside in detail regarding the renal plan and care.  d/w ER physician.

## 2023-11-08 NOTE — ED PROVIDER NOTE - NSICDXPASTMEDICALHX_GEN_ALL_CORE_FT
PAST MEDICAL HISTORY:  Anemia of chronic disease     Cataract     DVT (deep venous thrombosis) of Left subclavian vein, 06/12/17    ESRD (end stage renal disease) on dialysis     Glaucoma     History of arteriovenostomy for renal dialysis     [place] (hard of hearing)     HTN (hypertension)     Kidney transplant recipient

## 2023-11-08 NOTE — ED PROVIDER NOTE - CARE PLAN
1 Principal Discharge DX:	Shortness of breath   Principal Discharge DX:	Shortness of breath  Secondary Diagnosis:	HTN (hypertension)  Secondary Diagnosis:	Elevated troponin  Secondary Diagnosis:	Transplanted kidney

## 2023-11-08 NOTE — ED ADULT NURSE REASSESSMENT NOTE - NS ED NURSE REASSESS COMMENT FT1
pt is aaox4 /no acute distress noted /safety maintained /medicated as ordered /vitals checked and md made aware/report given [facility] night nurse /bandar

## 2023-11-08 NOTE — ED PROVIDER NOTE - EKG #1 DATE/TIME
Patient called to be triaged, Rachel Black verified two patient identifiers, name and , patient stated that her blood pressure was 190/117 and she is having numbness and tingling in her left arm, patient denies visual disturbances and pain in her jaw or shoulders. Patient stated that she has checked her blood pressure several times and the last reading was 178/120 left arm, writer advised patient to go to ER to be evaluated and treated. Writer asked patient if she had transportation to ED, patient stated that her  can take her, patient stated that she did not want to go by EMS.
08-Nov-2023 23:32

## 2023-11-08 NOTE — ED PROVIDER NOTE - PHYSICAL EXAMINATION
GENERAL: NAD  HEENT:  Atraumatic  CHEST/LUNG: Chest rise equal bilaterally  HEART: Regular rate and rhythm  ABDOMEN: Soft, Nontender, Nondistended  EXTREMITIES:  Extremities warm. +1 bilateral pitting edema   PSYCH: A&Ox3  SKIN: No obvious rashes or lesions  NEUROLOGY: strength and sensation intact in all extremities. Ambulatory without difficulty.

## 2023-11-08 NOTE — ED PROVIDER NOTE - NSICDXPASTMEDICALHX_GEN_ALL_CORE_FT
PAST MEDICAL HISTORY:  Anemia of chronic disease     Cataract     DVT (deep venous thrombosis) of Left subclavian vein, 06/12/17    ESRD (end stage renal disease) on dialysis     Glaucoma     History of arteriovenostomy for renal dialysis     [place] (hard of hearing)     HTN (hypertension)     Kidney transplant recipient

## 2023-11-08 NOTE — ED PROVIDER NOTE - CLINICAL SUMMARY MEDICAL DECISION MAKING FREE TEXT BOX
78 y/o female w/ PMH CKD stage 3 most likely s/p DDRT (s/p DDRT (05/19/2021) with chronic rejection (AMR) and HTN transfer from [facility] c/o 2 day history of increasing SOB that began at rest yesterday worsened w/ ambulation. Pt is otherwise asymptomatic. Denies fevers, chills, nausea, vomiting, dizziness, chest pain, abdominal pain, dysuria, hematuria. +2 bilateral pitting edema, hypertensive, SOB, concerning for CHF exacerbation w/ pulmonary edema. Renal transplant patient, judicious fluid management. Repeat bloodwork and reassess w/ likely admit for new onset CHF.

## 2023-11-08 NOTE — ED PROVIDER NOTE - PROGRESS NOTE DETAILS
Amadou MACHADO: Nephrology fellow consulted for transplant and hyperkalemia. Will administer lokelma and treat BP w/ labetalol as per nephrology and admit for CHF and worsening YANELY. Patient hypertensive after labetalol home dose and IV hydralazine.  Medication list from 2022 which patient provides reviewed patient used to be on 4 antihypertensives Procardia, losartan, labetalol, doxazosin.  Appears all of this been stopped except for labetalol 200 mg 3 times daily.  We will administer the Procardia 60 mg p.o.  Elevated troponin noted however patient does not have any chest pain, EKG unchanged and nonischemic, suspect demand from fluid overload.  Will trend and monitor. SYL. . Hospitalist defers admission pending improved BP control. Pt has received hydralazine 10 mg IV, Labetalol 200 PO, and Nifedipine 60 mg PO to date. Cardiology and Nephrology consulted for BP control. Nephrology fellow Dr. Tobar called for HTN urgency in renal transplant patient, fellow defers consult as AM team will be in house before she can review case. Call AM team at 6:30AM. CCU fellow consulted. SYL. . Hospitalist defers admission pending improved BP control. Pt has received hydralazine 10 mg IV, Labetalol 200 PO, and Nifedipine 60 mg PO to date. Cardiology and Nephrology consulted for BP control. JUNIOR CCU fellow defers to private cardiologist. SYL. CCU fellow defers to private cardiologist. Private cardiologist NAx2. SYL. CCU fellow consulted. SYL. CCU fellow consulted. Overnight nephrology defers to AM nephrology. SYL. . Hospitalist defers admission pending improved BP control. Pt NAD. Case discussed with Transplant Nephrology attending. Team will round on patient this AM in ED. Recommends trial of oral Clonidine. SYL.

## 2023-11-08 NOTE — ED ADULT NURSE NOTE - NSFALLUNIVINTERV_ED_ALL_ED
Bed/Stretcher in lowest position, wheels locked, appropriate side rails in place/Call bell, personal items and telephone in reach/Instruct patient to call for assistance before getting out of bed/chair/stretcher/Non-slip footwear applied when patient is off stretcher/[place] to call system/Physically safe environment - no spills, clutter or unnecessary equipment/Purposeful proactive rounding/Room/bathroom lighting operational, light cord in reach

## 2023-11-08 NOTE — ED ADULT NURSE NOTE - OBJECTIVE STATEMENT
pt is here c/o SOB since yesterday worsening this morning   pt has h/o renal tranplant 2 years ago   right arm a/v graft not in use

## 2023-11-08 NOTE — ED PROVIDER NOTE - OBJECTIVE STATEMENT
76 y/o female w/ PMH CKD stage 3 most likely s/p DDRT (s/p DDRT (05/19/2021) with chronic rejection (AMR) and HTN transfer from [facility] c/o 2 day history of increasing SOB that began at rest yesterday worsened w/ ambulation. Pt is otherwise asymptomatic. Denies fevers, chills, nausea, vomiting, dizziness, chest pain, abdominal pain, dysuria, hematuria.

## 2023-11-08 NOTE — ED ADULT NURSE NOTE - OBJECTIVE STATEMENT
77y female A&OX4 BIBEMS transfer from [place] complaining of shortness of breath. PMHX renal transplant two years ago, HTN, HLD. Pt reports going to [place] in regards to having shortness of breath when doing daily activity. PT states it started yesterday and has gotten worse today. Pt states when she is sitting and resting she doesn't feel the shortness of breath. EMS states pt was transferred due to transplant and kidney function are elevated. Pt denies chest pain, abd pain, N/V/D, fever. Labs was drawn and sent to lab. PT is able to speak in complete sentences. PT pending dispo.

## 2023-11-08 NOTE — ED PROVIDER NOTE - OBJECTIVE STATEMENT
77 year old female PMH kidney transplant, HTN coming in with cough, fever, SOB. pt states that she had a fever last night and not since then. states today with cough and SOB so came to the ED for eval.

## 2023-11-09 DIAGNOSIS — R06.02 SHORTNESS OF BREATH: ICD-10-CM

## 2023-11-09 DIAGNOSIS — I16.1 HYPERTENSIVE EMERGENCY: ICD-10-CM

## 2023-11-09 DIAGNOSIS — N18.9 CHRONIC KIDNEY DISEASE, UNSPECIFIED: ICD-10-CM

## 2023-11-09 DIAGNOSIS — D75.89 OTHER SPECIFIED DISEASES OF BLOOD AND BLOOD-FORMING ORGANS: ICD-10-CM

## 2023-11-09 DIAGNOSIS — Z29.9 ENCOUNTER FOR PROPHYLACTIC MEASURES, UNSPECIFIED: ICD-10-CM

## 2023-11-09 DIAGNOSIS — Z94.0 KIDNEY TRANSPLANT STATUS: ICD-10-CM

## 2023-11-09 LAB
ALBUMIN SERPL ELPH-MCNC: 3.1 G/DL — LOW (ref 3.3–5)
ALBUMIN SERPL ELPH-MCNC: 3.1 G/DL — LOW (ref 3.3–5)
ALP SERPL-CCNC: 76 U/L — SIGNIFICANT CHANGE UP (ref 40–120)
ALP SERPL-CCNC: 76 U/L — SIGNIFICANT CHANGE UP (ref 40–120)
ALT FLD-CCNC: <5 U/L — LOW (ref 10–45)
ALT FLD-CCNC: <5 U/L — LOW (ref 10–45)
ANION GAP SERPL CALC-SCNC: 11 MMOL/L — SIGNIFICANT CHANGE UP (ref 5–17)
ANION GAP SERPL CALC-SCNC: 11 MMOL/L — SIGNIFICANT CHANGE UP (ref 5–17)
ANION GAP SERPL CALC-SCNC: 14 MMOL/L — SIGNIFICANT CHANGE UP (ref 5–17)
ANION GAP SERPL CALC-SCNC: 14 MMOL/L — SIGNIFICANT CHANGE UP (ref 5–17)
APTT BLD: 35.5 SEC — SIGNIFICANT CHANGE UP (ref 24.5–35.6)
APTT BLD: 35.5 SEC — SIGNIFICANT CHANGE UP (ref 24.5–35.6)
AST SERPL-CCNC: 9 U/L — LOW (ref 10–40)
AST SERPL-CCNC: 9 U/L — LOW (ref 10–40)
BASE EXCESS BLDV CALC-SCNC: -7.1 MMOL/L — LOW (ref -2–3)
BASE EXCESS BLDV CALC-SCNC: -7.1 MMOL/L — LOW (ref -2–3)
BASOPHILS # BLD AUTO: 0 K/UL — SIGNIFICANT CHANGE UP (ref 0–0.2)
BASOPHILS # BLD AUTO: 0 K/UL — SIGNIFICANT CHANGE UP (ref 0–0.2)
BASOPHILS NFR BLD AUTO: 0 % — SIGNIFICANT CHANGE UP (ref 0–2)
BASOPHILS NFR BLD AUTO: 0 % — SIGNIFICANT CHANGE UP (ref 0–2)
BILIRUB SERPL-MCNC: 0.2 MG/DL — SIGNIFICANT CHANGE UP (ref 0.2–1.2)
BILIRUB SERPL-MCNC: 0.2 MG/DL — SIGNIFICANT CHANGE UP (ref 0.2–1.2)
BUN SERPL-MCNC: 57 MG/DL — HIGH (ref 7–23)
BUN SERPL-MCNC: 57 MG/DL — HIGH (ref 7–23)
BUN SERPL-MCNC: 58 MG/DL — HIGH (ref 7–23)
BUN SERPL-MCNC: 58 MG/DL — HIGH (ref 7–23)
CA-I SERPL-SCNC: 1.29 MMOL/L — SIGNIFICANT CHANGE UP (ref 1.15–1.33)
CA-I SERPL-SCNC: 1.29 MMOL/L — SIGNIFICANT CHANGE UP (ref 1.15–1.33)
CALCIUM SERPL-MCNC: 9.2 MG/DL — SIGNIFICANT CHANGE UP (ref 8.4–10.5)
CALCIUM SERPL-MCNC: 9.2 MG/DL — SIGNIFICANT CHANGE UP (ref 8.4–10.5)
CALCIUM SERPL-MCNC: 9.5 MG/DL — SIGNIFICANT CHANGE UP (ref 8.4–10.5)
CALCIUM SERPL-MCNC: 9.5 MG/DL — SIGNIFICANT CHANGE UP (ref 8.4–10.5)
CHLORIDE BLDV-SCNC: 109 MMOL/L — HIGH (ref 96–108)
CHLORIDE BLDV-SCNC: 109 MMOL/L — HIGH (ref 96–108)
CHLORIDE SERPL-SCNC: 106 MMOL/L — SIGNIFICANT CHANGE UP (ref 96–108)
CHLORIDE SERPL-SCNC: 106 MMOL/L — SIGNIFICANT CHANGE UP (ref 96–108)
CHLORIDE SERPL-SCNC: 108 MMOL/L — SIGNIFICANT CHANGE UP (ref 96–108)
CHLORIDE SERPL-SCNC: 108 MMOL/L — SIGNIFICANT CHANGE UP (ref 96–108)
CO2 BLDV-SCNC: 19 MMOL/L — LOW (ref 22–26)
CO2 BLDV-SCNC: 19 MMOL/L — LOW (ref 22–26)
CO2 SERPL-SCNC: 16 MMOL/L — LOW (ref 22–31)
CREAT SERPL-MCNC: 2.95 MG/DL — HIGH (ref 0.5–1.3)
CREAT SERPL-MCNC: 2.95 MG/DL — HIGH (ref 0.5–1.3)
CREAT SERPL-MCNC: 2.96 MG/DL — HIGH (ref 0.5–1.3)
CREAT SERPL-MCNC: 2.96 MG/DL — HIGH (ref 0.5–1.3)
CULTURE RESULTS: SIGNIFICANT CHANGE UP
CULTURE RESULTS: SIGNIFICANT CHANGE UP
EGFR: 16 ML/MIN/1.73M2 — LOW
EOSINOPHIL # BLD AUTO: 0.02 K/UL — SIGNIFICANT CHANGE UP (ref 0–0.5)
EOSINOPHIL # BLD AUTO: 0.02 K/UL — SIGNIFICANT CHANGE UP (ref 0–0.5)
EOSINOPHIL NFR BLD AUTO: 1 % — SIGNIFICANT CHANGE UP (ref 0–6)
EOSINOPHIL NFR BLD AUTO: 1 % — SIGNIFICANT CHANGE UP (ref 0–6)
FLUAV AG NPH QL: SIGNIFICANT CHANGE UP
FLUAV AG NPH QL: SIGNIFICANT CHANGE UP
FLUBV AG NPH QL: SIGNIFICANT CHANGE UP
FLUBV AG NPH QL: SIGNIFICANT CHANGE UP
GAS PNL BLDV: 134 MMOL/L — LOW (ref 136–145)
GAS PNL BLDV: 134 MMOL/L — LOW (ref 136–145)
GAS PNL BLDV: SIGNIFICANT CHANGE UP
GLUCOSE BLDV-MCNC: 92 MG/DL — SIGNIFICANT CHANGE UP (ref 70–99)
GLUCOSE BLDV-MCNC: 92 MG/DL — SIGNIFICANT CHANGE UP (ref 70–99)
GLUCOSE SERPL-MCNC: 93 MG/DL — SIGNIFICANT CHANGE UP (ref 70–99)
GLUCOSE SERPL-MCNC: 93 MG/DL — SIGNIFICANT CHANGE UP (ref 70–99)
GLUCOSE SERPL-MCNC: 94 MG/DL — SIGNIFICANT CHANGE UP (ref 70–99)
GLUCOSE SERPL-MCNC: 94 MG/DL — SIGNIFICANT CHANGE UP (ref 70–99)
HCO3 BLDV-SCNC: 18 MMOL/L — LOW (ref 22–29)
HCO3 BLDV-SCNC: 18 MMOL/L — LOW (ref 22–29)
HCT VFR BLD CALC: 25.5 % — LOW (ref 34.5–45)
HCT VFR BLD CALC: 25.5 % — LOW (ref 34.5–45)
HCT VFR BLD CALC: 25.8 % — LOW (ref 34.5–45)
HCT VFR BLD CALC: 25.8 % — LOW (ref 34.5–45)
HCT VFR BLDA CALC: 25 % — LOW (ref 34.5–46.5)
HCT VFR BLDA CALC: 25 % — LOW (ref 34.5–46.5)
HGB BLD CALC-MCNC: 8.3 G/DL — LOW (ref 11.7–16.1)
HGB BLD CALC-MCNC: 8.3 G/DL — LOW (ref 11.7–16.1)
HGB BLD-MCNC: 7.9 G/DL — LOW (ref 11.5–15.5)
HGB BLD-MCNC: 7.9 G/DL — LOW (ref 11.5–15.5)
HGB BLD-MCNC: 8 G/DL — LOW (ref 11.5–15.5)
HGB BLD-MCNC: 8 G/DL — LOW (ref 11.5–15.5)
INR BLD: 1.18 RATIO — SIGNIFICANT CHANGE UP (ref 0.85–1.18)
INR BLD: 1.18 RATIO — SIGNIFICANT CHANGE UP (ref 0.85–1.18)
LACTATE BLDV-MCNC: 0.9 MMOL/L — SIGNIFICANT CHANGE UP (ref 0.5–2)
LACTATE BLDV-MCNC: 0.9 MMOL/L — SIGNIFICANT CHANGE UP (ref 0.5–2)
LYMPHOCYTES # BLD AUTO: 0.51 K/UL — LOW (ref 1–3.3)
LYMPHOCYTES # BLD AUTO: 0.51 K/UL — LOW (ref 1–3.3)
LYMPHOCYTES # BLD AUTO: 25 % — SIGNIFICANT CHANGE UP (ref 13–44)
LYMPHOCYTES # BLD AUTO: 25 % — SIGNIFICANT CHANGE UP (ref 13–44)
MANUAL SMEAR VERIFICATION: SIGNIFICANT CHANGE UP
MANUAL SMEAR VERIFICATION: SIGNIFICANT CHANGE UP
MCHC RBC-ENTMCNC: 26.5 PG — LOW (ref 27–34)
MCHC RBC-ENTMCNC: 30.6 GM/DL — LOW (ref 32–36)
MCHC RBC-ENTMCNC: 30.6 GM/DL — LOW (ref 32–36)
MCHC RBC-ENTMCNC: 31.4 GM/DL — LOW (ref 32–36)
MCHC RBC-ENTMCNC: 31.4 GM/DL — LOW (ref 32–36)
MCV RBC AUTO: 84.4 FL — SIGNIFICANT CHANGE UP (ref 80–100)
MCV RBC AUTO: 84.4 FL — SIGNIFICANT CHANGE UP (ref 80–100)
MCV RBC AUTO: 86.6 FL — SIGNIFICANT CHANGE UP (ref 80–100)
MCV RBC AUTO: 86.6 FL — SIGNIFICANT CHANGE UP (ref 80–100)
METAMYELOCYTES # FLD: 1 % — HIGH (ref 0–0)
METAMYELOCYTES # FLD: 1 % — HIGH (ref 0–0)
MONOCYTES # BLD AUTO: 0.22 K/UL — SIGNIFICANT CHANGE UP (ref 0–0.9)
MONOCYTES # BLD AUTO: 0.22 K/UL — SIGNIFICANT CHANGE UP (ref 0–0.9)
MONOCYTES NFR BLD AUTO: 11 % — SIGNIFICANT CHANGE UP (ref 2–14)
MONOCYTES NFR BLD AUTO: 11 % — SIGNIFICANT CHANGE UP (ref 2–14)
MYELOCYTES NFR BLD: 2 % — HIGH (ref 0–0)
MYELOCYTES NFR BLD: 2 % — HIGH (ref 0–0)
NEUTROPHILS # BLD AUTO: 1.22 K/UL — LOW (ref 1.8–7.4)
NEUTROPHILS # BLD AUTO: 1.22 K/UL — LOW (ref 1.8–7.4)
NEUTROPHILS NFR BLD AUTO: 56 % — SIGNIFICANT CHANGE UP (ref 43–77)
NEUTROPHILS NFR BLD AUTO: 56 % — SIGNIFICANT CHANGE UP (ref 43–77)
NEUTS BAND # BLD: 4 % — SIGNIFICANT CHANGE UP (ref 0–8)
NEUTS BAND # BLD: 4 % — SIGNIFICANT CHANGE UP (ref 0–8)
NRBC # BLD: 0 /100 WBCS — SIGNIFICANT CHANGE UP (ref 0–0)
NRBC # BLD: 0 /100 WBCS — SIGNIFICANT CHANGE UP (ref 0–0)
NRBC # BLD: 0 /100 — SIGNIFICANT CHANGE UP (ref 0–0)
NRBC # BLD: 0 /100 — SIGNIFICANT CHANGE UP (ref 0–0)
NT-PROBNP SERPL-SCNC: HIGH PG/ML (ref 0–300)
NT-PROBNP SERPL-SCNC: HIGH PG/ML (ref 0–300)
PCO2 BLDV: 32 MMHG — LOW (ref 39–42)
PCO2 BLDV: 32 MMHG — LOW (ref 39–42)
PH BLDV: 7.35 — SIGNIFICANT CHANGE UP (ref 7.32–7.43)
PH BLDV: 7.35 — SIGNIFICANT CHANGE UP (ref 7.32–7.43)
PLAT MORPH BLD: NORMAL — SIGNIFICANT CHANGE UP
PLAT MORPH BLD: NORMAL — SIGNIFICANT CHANGE UP
PLATELET # BLD AUTO: 190 K/UL — SIGNIFICANT CHANGE UP (ref 150–400)
PLATELET # BLD AUTO: 190 K/UL — SIGNIFICANT CHANGE UP (ref 150–400)
PLATELET # BLD AUTO: 199 K/UL — SIGNIFICANT CHANGE UP (ref 150–400)
PLATELET # BLD AUTO: 199 K/UL — SIGNIFICANT CHANGE UP (ref 150–400)
PO2 BLDV: 42 MMHG — SIGNIFICANT CHANGE UP (ref 25–45)
PO2 BLDV: 42 MMHG — SIGNIFICANT CHANGE UP (ref 25–45)
POTASSIUM BLDV-SCNC: 5.8 MMOL/L — HIGH (ref 3.5–5.1)
POTASSIUM BLDV-SCNC: 5.8 MMOL/L — HIGH (ref 3.5–5.1)
POTASSIUM SERPL-MCNC: 5.1 MMOL/L — SIGNIFICANT CHANGE UP (ref 3.5–5.3)
POTASSIUM SERPL-MCNC: 5.1 MMOL/L — SIGNIFICANT CHANGE UP (ref 3.5–5.3)
POTASSIUM SERPL-MCNC: 5.5 MMOL/L — HIGH (ref 3.5–5.3)
POTASSIUM SERPL-MCNC: 5.5 MMOL/L — HIGH (ref 3.5–5.3)
POTASSIUM SERPL-SCNC: 5.1 MMOL/L — SIGNIFICANT CHANGE UP (ref 3.5–5.3)
POTASSIUM SERPL-SCNC: 5.1 MMOL/L — SIGNIFICANT CHANGE UP (ref 3.5–5.3)
POTASSIUM SERPL-SCNC: 5.5 MMOL/L — HIGH (ref 3.5–5.3)
POTASSIUM SERPL-SCNC: 5.5 MMOL/L — HIGH (ref 3.5–5.3)
PROCALCITONIN SERPL-MCNC: 0.19 NG/ML — HIGH (ref 0.02–0.1)
PROCALCITONIN SERPL-MCNC: 0.19 NG/ML — HIGH (ref 0.02–0.1)
PROT SERPL-MCNC: 5.6 G/DL — LOW (ref 6–8.3)
PROT SERPL-MCNC: 5.6 G/DL — LOW (ref 6–8.3)
PROTHROM AB SERPL-ACNC: 12.3 SEC — SIGNIFICANT CHANGE UP (ref 9.5–13)
PROTHROM AB SERPL-ACNC: 12.3 SEC — SIGNIFICANT CHANGE UP (ref 9.5–13)
RBC # BLD: 2.98 M/UL — LOW (ref 3.8–5.2)
RBC # BLD: 2.98 M/UL — LOW (ref 3.8–5.2)
RBC # BLD: 3.02 M/UL — LOW (ref 3.8–5.2)
RBC # BLD: 3.02 M/UL — LOW (ref 3.8–5.2)
RBC # FLD: 14.6 % — HIGH (ref 10.3–14.5)
RBC # FLD: 14.6 % — HIGH (ref 10.3–14.5)
RBC # FLD: 14.7 % — HIGH (ref 10.3–14.5)
RBC # FLD: 14.7 % — HIGH (ref 10.3–14.5)
RBC BLD AUTO: SIGNIFICANT CHANGE UP
RBC BLD AUTO: SIGNIFICANT CHANGE UP
RSV RNA NPH QL NAA+NON-PROBE: SIGNIFICANT CHANGE UP
RSV RNA NPH QL NAA+NON-PROBE: SIGNIFICANT CHANGE UP
SAO2 % BLDV: 69.1 % — SIGNIFICANT CHANGE UP (ref 67–88)
SAO2 % BLDV: 69.1 % — SIGNIFICANT CHANGE UP (ref 67–88)
SARS-COV-2 RNA SPEC QL NAA+PROBE: SIGNIFICANT CHANGE UP
SARS-COV-2 RNA SPEC QL NAA+PROBE: SIGNIFICANT CHANGE UP
SODIUM SERPL-SCNC: 135 MMOL/L — SIGNIFICANT CHANGE UP (ref 135–145)
SODIUM SERPL-SCNC: 135 MMOL/L — SIGNIFICANT CHANGE UP (ref 135–145)
SODIUM SERPL-SCNC: 136 MMOL/L — SIGNIFICANT CHANGE UP (ref 135–145)
SODIUM SERPL-SCNC: 136 MMOL/L — SIGNIFICANT CHANGE UP (ref 135–145)
SPECIMEN SOURCE: SIGNIFICANT CHANGE UP
SPECIMEN SOURCE: SIGNIFICANT CHANGE UP
TACROLIMUS SERPL-MCNC: 7.9 NG/ML — SIGNIFICANT CHANGE UP
TACROLIMUS SERPL-MCNC: 7.9 NG/ML — SIGNIFICANT CHANGE UP
TROPONIN T, HIGH SENSITIVITY RESULT: 102 NG/L — HIGH (ref 0–51)
TROPONIN T, HIGH SENSITIVITY RESULT: 102 NG/L — HIGH (ref 0–51)
TROPONIN T, HIGH SENSITIVITY RESULT: 103 NG/L — HIGH (ref 0–51)
TROPONIN T, HIGH SENSITIVITY RESULT: 103 NG/L — HIGH (ref 0–51)
TROPONIN T, HIGH SENSITIVITY RESULT: 96 NG/L — HIGH (ref 0–51)
TROPONIN T, HIGH SENSITIVITY RESULT: 96 NG/L — HIGH (ref 0–51)
WBC # BLD: 2.01 K/UL — LOW (ref 3.8–10.5)
WBC # BLD: 2.01 K/UL — LOW (ref 3.8–10.5)
WBC # BLD: 2.03 K/UL — LOW (ref 3.8–10.5)
WBC # BLD: 2.03 K/UL — LOW (ref 3.8–10.5)
WBC # FLD AUTO: 2.01 K/UL — LOW (ref 3.8–10.5)
WBC # FLD AUTO: 2.01 K/UL — LOW (ref 3.8–10.5)
WBC # FLD AUTO: 2.03 K/UL — LOW (ref 3.8–10.5)
WBC # FLD AUTO: 2.03 K/UL — LOW (ref 3.8–10.5)

## 2023-11-09 PROCEDURE — 99223 1ST HOSP IP/OBS HIGH 75: CPT | Mod: GC

## 2023-11-09 PROCEDURE — 71045 X-RAY EXAM CHEST 1 VIEW: CPT | Mod: 26

## 2023-11-09 PROCEDURE — 99222 1ST HOSP IP/OBS MODERATE 55: CPT | Mod: GC

## 2023-11-09 PROCEDURE — 76776 US EXAM K TRANSPL W/DOPPLER: CPT | Mod: 26,RT

## 2023-11-09 RX ORDER — LABETALOL HCL 100 MG
200 TABLET ORAL ONCE
Refills: 0 | Status: COMPLETED | OUTPATIENT
Start: 2023-11-09 | End: 2023-11-09

## 2023-11-09 RX ORDER — LANOLIN ALCOHOL/MO/W.PET/CERES
3 CREAM (GRAM) TOPICAL AT BEDTIME
Refills: 0 | Status: DISCONTINUED | OUTPATIENT
Start: 2023-11-09 | End: 2023-11-14

## 2023-11-09 RX ORDER — SODIUM BICARBONATE 1 MEQ/ML
1300 SYRINGE (ML) INTRAVENOUS
Refills: 0 | Status: DISCONTINUED | OUTPATIENT
Start: 2023-11-09 | End: 2023-11-09

## 2023-11-09 RX ORDER — SODIUM BICARBONATE 1 MEQ/ML
1300 SYRINGE (ML) INTRAVENOUS THREE TIMES A DAY
Refills: 0 | Status: DISCONTINUED | OUTPATIENT
Start: 2023-11-09 | End: 2023-11-14

## 2023-11-09 RX ORDER — SODIUM ZIRCONIUM CYCLOSILICATE 10 G/10G
10 POWDER, FOR SUSPENSION ORAL DAILY
Refills: 0 | Status: DISCONTINUED | OUTPATIENT
Start: 2023-11-09 | End: 2023-11-14

## 2023-11-09 RX ORDER — HEPARIN SODIUM 5000 [USP'U]/ML
5000 INJECTION INTRAVENOUS; SUBCUTANEOUS EVERY 12 HOURS
Refills: 0 | Status: DISCONTINUED | OUTPATIENT
Start: 2023-11-09 | End: 2023-11-14

## 2023-11-09 RX ORDER — SENNA PLUS 8.6 MG/1
2 TABLET ORAL AT BEDTIME
Refills: 0 | Status: DISCONTINUED | OUTPATIENT
Start: 2023-11-09 | End: 2023-11-14

## 2023-11-09 RX ORDER — ACETAMINOPHEN 500 MG
650 TABLET ORAL EVERY 6 HOURS
Refills: 0 | Status: DISCONTINUED | OUTPATIENT
Start: 2023-11-09 | End: 2023-11-13

## 2023-11-09 RX ORDER — CEFTRIAXONE 500 MG/1
1000 INJECTION, POWDER, FOR SOLUTION INTRAMUSCULAR; INTRAVENOUS ONCE
Refills: 0 | Status: COMPLETED | OUTPATIENT
Start: 2023-11-09 | End: 2023-11-09

## 2023-11-09 RX ORDER — DOXAZOSIN MESYLATE 4 MG
0.5 TABLET ORAL
Qty: 0 | Refills: 0 | DISCHARGE

## 2023-11-09 RX ORDER — HYDRALAZINE HCL 50 MG
10 TABLET ORAL ONCE
Refills: 0 | Status: COMPLETED | OUTPATIENT
Start: 2023-11-09 | End: 2023-11-09

## 2023-11-09 RX ORDER — LABETALOL HCL 100 MG
200 TABLET ORAL THREE TIMES A DAY
Refills: 0 | Status: DISCONTINUED | OUTPATIENT
Start: 2023-11-09 | End: 2023-11-14

## 2023-11-09 RX ORDER — CHOLECALCIFEROL (VITAMIN D3) 125 MCG
1000 CAPSULE ORAL DAILY
Refills: 0 | Status: DISCONTINUED | OUTPATIENT
Start: 2023-11-09 | End: 2023-11-14

## 2023-11-09 RX ORDER — FUROSEMIDE 40 MG
80 TABLET ORAL ONCE
Refills: 0 | Status: COMPLETED | OUTPATIENT
Start: 2023-11-09 | End: 2023-11-09

## 2023-11-09 RX ORDER — SODIUM ZIRCONIUM CYCLOSILICATE 10 G/10G
10 POWDER, FOR SUSPENSION ORAL ONCE
Refills: 0 | Status: COMPLETED | OUTPATIENT
Start: 2023-11-09 | End: 2023-11-09

## 2023-11-09 RX ORDER — LATANOPROST 0.05 MG/ML
1 SOLUTION/ DROPS OPHTHALMIC; TOPICAL AT BEDTIME
Refills: 0 | Status: DISCONTINUED | OUTPATIENT
Start: 2023-11-09 | End: 2023-11-14

## 2023-11-09 RX ORDER — ASPIRIN/CALCIUM CARB/MAGNESIUM 324 MG
81 TABLET ORAL DAILY
Refills: 0 | Status: DISCONTINUED | OUTPATIENT
Start: 2023-11-09 | End: 2023-11-10

## 2023-11-09 RX ORDER — HYDRALAZINE HCL 50 MG
100 TABLET ORAL ONCE
Refills: 0 | Status: COMPLETED | OUTPATIENT
Start: 2023-11-09 | End: 2023-11-09

## 2023-11-09 RX ORDER — NIFEDIPINE 30 MG
60 TABLET, EXTENDED RELEASE 24 HR ORAL DAILY
Refills: 0 | Status: DISCONTINUED | OUTPATIENT
Start: 2023-11-09 | End: 2023-11-12

## 2023-11-09 RX ORDER — ERYTHROPOIETIN 10000 [IU]/ML
10000 INJECTION, SOLUTION INTRAVENOUS; SUBCUTANEOUS
Refills: 0 | Status: DISCONTINUED | OUTPATIENT
Start: 2023-11-10 | End: 2023-11-13

## 2023-11-09 RX ORDER — ASPIRIN/CALCIUM CARB/MAGNESIUM 324 MG
162 TABLET ORAL ONCE
Refills: 0 | Status: COMPLETED | OUTPATIENT
Start: 2023-11-09 | End: 2023-11-09

## 2023-11-09 RX ORDER — NIFEDIPINE 30 MG
60 TABLET, EXTENDED RELEASE 24 HR ORAL ONCE
Refills: 0 | Status: COMPLETED | OUTPATIENT
Start: 2023-11-09 | End: 2023-11-09

## 2023-11-09 RX ORDER — ATOVAQUONE 750 MG/5ML
1500 SUSPENSION ORAL DAILY
Refills: 0 | Status: DISCONTINUED | OUTPATIENT
Start: 2023-11-09 | End: 2023-11-14

## 2023-11-09 RX ORDER — MYCOPHENOLATE MOFETIL 250 MG/1
500 CAPSULE ORAL
Refills: 0 | Status: DISCONTINUED | OUTPATIENT
Start: 2023-11-09 | End: 2023-11-12

## 2023-11-09 RX ADMIN — Medication 10 MILLIGRAM(S): at 04:09

## 2023-11-09 RX ADMIN — Medication 10 MILLIGRAM(S): at 05:58

## 2023-11-09 RX ADMIN — Medication 200 MILLIGRAM(S): at 00:23

## 2023-11-09 RX ADMIN — Medication 80 MILLIGRAM(S): at 15:30

## 2023-11-09 RX ADMIN — Medication 60 MILLIGRAM(S): at 04:37

## 2023-11-09 RX ADMIN — CEFTRIAXONE 100 MILLIGRAM(S): 500 INJECTION, POWDER, FOR SOLUTION INTRAMUSCULAR; INTRAVENOUS at 04:09

## 2023-11-09 RX ADMIN — Medication 200 MILLIGRAM(S): at 21:46

## 2023-11-09 RX ADMIN — SODIUM ZIRCONIUM CYCLOSILICATE 10 GRAM(S): 10 POWDER, FOR SUSPENSION ORAL at 02:00

## 2023-11-09 RX ADMIN — Medication 162 MILLIGRAM(S): at 04:37

## 2023-11-09 RX ADMIN — Medication 200 MILLIGRAM(S): at 05:59

## 2023-11-09 RX ADMIN — Medication 100 MILLIGRAM(S): at 05:58

## 2023-11-09 RX ADMIN — HEPARIN SODIUM 5000 UNIT(S): 5000 INJECTION INTRAVENOUS; SUBCUTANEOUS at 19:49

## 2023-11-09 RX ADMIN — Medication 0.1 MILLIGRAM(S): at 06:51

## 2023-11-09 RX ADMIN — Medication 1300 MILLIGRAM(S): at 21:46

## 2023-11-09 RX ADMIN — LATANOPROST 1 DROP(S): 0.05 SOLUTION/ DROPS OPHTHALMIC; TOPICAL at 21:45

## 2023-11-09 NOTE — MEDICAL STUDENT ADULT H&P (EDUCATION) - NS MD HP STUD MEDICATIONS FT
Tacrolimus 1mg oral - 5tab qd  Labetalol 200mg oral - tid  Atovaquone 750 mg/5ml - 10ml oral qd  Nifedipine ER 60mg oral qd   Aspirin 81mg oral qd  Prednisone 5mg oral qd  Losartan 50mg oral qd  Vitamin D 1000u oral qd  Procrit 89513g/mL injectable solution [id number]

## 2023-11-09 NOTE — H&P ADULT - NSHPLABSRESULTS_GEN_ALL_CORE
LABS: Personally reviewed labs, imaging, and ECG                          8.0    2.01  )-----------( 190      ( 09 Nov 2023 07:18 )             25.5       11-09    136  |  106  |  57<H>  ----------------------------<  94  5.1   |  16<L>  |  2.96<H>    Ca    9.2      09 Nov 2023 07:18  Mg     1.6     11-08    TPro  5.6<L>  /  Alb  3.1<L>  /  TBili  0.2  /  DBili  x   /  AST  9<L>  /  ALT  <5<L>  /  AlkPhos  76  11-09       LIVER FUNCTIONS - ( 09 Nov 2023 00:22 )  Alb: 3.1 g/dL / Pro: 5.6 g/dL / ALK PHOS: 76 U/L / ALT: <5 U/L / AST: 9 U/L / GGT: x                Urinalysis Basic - ( 09 Nov 2023 07:18 )    Color: x / Appearance: x / SG: x / pH: x  Gluc: 94 mg/dL / Ketone: x  / Bili: x / Urobili: x   Blood: x / Protein: x / Nitrite: x   Leuk Esterase: x / RBC: x / WBC x   Sq Epi: x / Non Sq Epi: x / Bacteria: x        PT/INR - ( 09 Nov 2023 00:22 )   PT: 12.3 sec;   INR: 1.18 ratio         PTT - ( 09 Nov 2023 00:22 )  PTT:35.5 sec    Lactate Trend  11-08 @ 13:20 Lactate:0.9       CAPILLARY BLOOD GLUCOSE      RADIOLOGY & ADDITIONAL TESTS:   < from: Xray Chest 1 View- PORTABLE-Urgent (11.09.23 @ 02:47) >    IMPRESSION:  Bilateral perihilar airspace opacities and prominent interstitial   opacities compatible with pulmonary edema.    < end of copied text >    < from: US Trans Kidney w/ Doppler, Right (11.09.23 @ 01:39) >    IMPRESSION:  No evidence of a significant renal artery stenosis.    Elevated resistive indices which can be seen in rejection versus acute   tubular necrosis.    Similar prominence of the transplant kidney ureter with urothelial   thickening. Echogenic debris is within the ureter concerning for   infection.    Trace fluid adjacent to the transplant kidney and small free fluid in the   pelvis.    < end of copied text >

## 2023-11-09 NOTE — H&P ADULT - NSHPREVIEWOFSYSTEMS_GEN_ALL_CORE
CONSTITUTIONAL:  No weight loss, fever, chills, weakness or fatigue.  HEENT:  Eyes:  No visual loss, blurred vision, double vision or yellow sclerae. Ears, Nose, Throat:  No hearing loss, sneezing, congestion, runny nose or sore throat.  SKIN:  No rash or itching.  CARDIOVASCULAR:  No chest pain, chest pressure or chest discomfort. No palpitations.  RESPIRATORY:  +shortness of breath  GASTROINTESTINAL:  No anorexia, nausea, vomiting or diarrhea. No abdominal pain or blood.  GENITOURINARY:  Denies hematuria, dysuria.   NEUROLOGICAL:  No headache, dizziness, syncope, paralysis, ataxia, numbness or tingling in the extremities. No change in bowel or bladder control.  MUSCULOSKELETAL:  No muscle, back pain, joint pain or stiffness.  HEMATOLOGIC:  No anemia, bleeding or bruising.  LYMPHATICS:  No enlarged nodes.   PSYCHIATRIC:  No history of depression or anxiety.  ENDOCRINOLOGIC:  No reports of sweating, cold or heat intolerance. No polyuria or polydipsia.  ALLERGIES:  No history of asthma, hives, eczema or rhinitis.

## 2023-11-09 NOTE — MEDICAL STUDENT ADULT H&P (EDUCATION) - NSHPSOCIALHISTORY_GEN_ALL_CORE
Pt lives at home in [place] with her , and is visited by a HHA for ~5hrs/day for assistance with cleaning, cooking and laundry. She is able to perform her own ADLs and walks with a walker with no recent history of falls or near falls. She is a former [employer] and also former hairdresser (retired ~13 years ago). She has three sons and feels safe at home.

## 2023-11-09 NOTE — H&P ADULT - NSHPPHYSICALEXAM_GEN_ALL_CORE
ICU Vital Signs Last 24 Hrs  T(C): 37.2 (09 Nov 2023 13:57), Max: 37.7 (09 Nov 2023 04:08)  T(F): 99 (09 Nov 2023 13:57), Max: 99.9 (09 Nov 2023 04:08)  HR: 65 (09 Nov 2023 13:57) (62 - 84)  BP: 154/86 (09 Nov 2023 13:57) (132/62 - 222/82)  BP(mean): 86 (09 Nov 2023 07:58) (85 - 86)  ABP: --  ABP(mean): --  RR: 15 (09 Nov 2023 13:57) (15 - 20)  SpO2: 94% (09 Nov 2023 13:57) (94% - 99%)    O2 Parameters below as of 09 Nov 2023 13:57  Patient On (Oxygen Delivery Method): room air    GENERAL APPEARANCE: Well developed, NAD  HEENT:  Evidence of prior cataract surgery bilaterally, vision grossly intact.   NECK: Neck supple, non-tender no lymphadenopathy, masses or thyromegaly.  CARDIAC: Systolic murmur appreciated, unclear if related to AVF in RUE. Normal S1 and S2. RRR  LUNGS: Crackles in lower lobes posteriorly, bilaterally.   ABDOMEN: Soft , NTND, bowel sounds normal. No guarding or rebound.   MUSCULOSKELETAL: ROM intact.  No joint erythema or tenderness.   EXTREMITIES: Trace tender edema to mid calves bilaterally.   NEUROLOGICAL: Non focal. Strength and sensation symmetric and intact throughout.   SKIN: Warm and dry , Well perfused  PSYCHIATRIC: AOx3, Normal mood and affect

## 2023-11-09 NOTE — MEDICAL STUDENT ADULT H&P (EDUCATION) - NS MD HP STUD FAM HX FT
Fatal MI mother/father with extensive cardiovascular disease Fatal MI in both mother/father with extensive cardiovascular disease hx

## 2023-11-09 NOTE — H&P ADULT - ATTENDING COMMENTS
-P/w SOB and pulm edema in setting of hypertensive emergency. -Renal recs appreciated; gave additional lasix 80mg iv x 1 today. F/u UO. -BP control. -F/u tacro level. -TTE. -VBG. Monitor off abx.

## 2023-11-09 NOTE — CONSULT NOTE ADULT - ATTENDING COMMENTS
78 y/o female with PMH ESRD due to HTN since 2016, s/p DDRT (05/19/2021- induction with basiliximab), HTN came to ER for sob and swelling of the legs. Transferred to [facility] and admitted with HTN urgency, pulm edema and YANELY.    YANELY - has chronic rejection on prior biopsy.  Cont home immunosuppression and check tacro trough tomorrow.  Check renal sonogram.   PUlm edema - possibly precipitated by HTN.  Cont BP control and suggest lasix 80mgs IV BID.

## 2023-11-09 NOTE — MEDICAL STUDENT ADULT H&P (EDUCATION) - NS MD HP STUD PSH FT
Total abdominal hysterectomy w/ bilateral salpingoophorectomy for fibroids -   Cataract extraction w/ b/l lens placement -   AV fistula and HD graft, most recently accessed    donor renal transplant performed May 2021 Statement Selected

## 2023-11-09 NOTE — H&P ADULT - NSICDXPASTMEDICALHX_GEN_ALL_CORE_FT
PAST MEDICAL HISTORY:  Anemia of chronic disease     Cataract     DVT (deep venous thrombosis) of Left subclavian vein, 06/12/17    ESRD (end stage renal disease) on dialysis     Glaucoma     History of arteriovenostomy for renal dialysis     [place] (hard of hearing)     HTN (hypertension)     Kidney transplant recipient

## 2023-11-09 NOTE — CONSULT NOTE ADULT - SUBJECTIVE AND OBJECTIVE BOX
[facility] [place] [facility]   MD SAMMY ORDONEZ MD ANGELA WONG, PA QIAN CHEN, NP      TEL:  OFFICE: [phone]  From 5pm-7am answering service [phone]    --- INITIAL RENAL CONSULT NOTE ---date of service 11-09-23 @ 16:58    HPI:  76 y/o female w/ PMH CKD stage 3 most likely s/p DDRT (s/p DDRT (05/19/2021) with chronic rejection ([facility]) and HTN transfer from [facility] c/o 2 day history of increasing SOB that began at rest yesterday worsened w/ ambulation. Pt is otherwise asymptomatic. Denies fevers, chills, nausea, vomiting, dizziness, chest pain, abdominal pain, dysuria, hematuria.      Allergies:  penicillin (Rash)  Mushrooms (Anaphylaxis)      PAST MEDICAL & SURGICAL HISTORY:  HTN (hypertension)      Glaucoma      Cataract      ESRD (end stage renal disease) on dialysis      DVT (deep venous thrombosis)  of Left subclavian vein, 06/12/17      Kidney transplant recipient      Anemia of chronic disease      History of arteriovenostomy for renal dialysis      [place] (hard of hearing)      Acquired cataract  extraction with b/l lense placement, 2016      S/P KEVEN-BSO  for fibroids, 2012      Hemodialysis access, AV graft      History of renal transplantation  DDRT 5/19/2021      AV fistula          Home Medications Reviewed    Hospital Medications:   MEDICATIONS  (STANDING):  aspirin  chewable 81 milliGRAM(s) Oral daily  atovaquone  Suspension 1500 milliGRAM(s) Oral daily  cholecalciferol 1000 Unit(s) Oral daily  heparin   Injectable 5000 Unit(s) SubCutaneous every 12 hours  labetalol 200 milliGRAM(s) Oral three times a day  latanoprost 0.005% Ophthalmic Solution 1 Drop(s) Both EYES at bedtime  mycophenolate mofetil 500 milliGRAM(s) Oral two times a day  NIFEdipine XL 60 milliGRAM(s) Oral daily  predniSONE   Tablet 5 milliGRAM(s) Oral daily  senna 2 Tablet(s) Oral at bedtime  sodium bicarbonate 1300 milliGRAM(s) Oral three times a day  sodium zirconium cyclosilicate 10 Gram(s) Oral daily      SOCIAL HISTORY:  Denies ETOh, Smoking,     FAMILY HISTORY:  Family history of cerebrovascular accident (CVA)    Family history of colon cancer (Sibling)        REVIEW OF SYSTEMS:  CONSTITUTIONAL: No weakness, fevers or chills  EYES/ENT: No visual changes;  No vertigo or throat pain   NECK: No pain or stiffness  RESPIRATORY: Per HPI  CARDIOVASCULAR: No chest pain or palpitations.  GASTROINTESTINAL: No abdominal or epigastric pain. No nausea, vomiting, or hematemesis; No diarrhea or constipation. No melena or hematochezia.  GENITOURINARY: No dysuria, frequency, foamy urine, urinary urgency, incontinence or hematuria  NEUROLOGICAL: No numbness or weakness  SKIN: No itching, burning, rashes, or lesions   VASCULAR: No bilateral lower extremity edema.   All other review of systems is negative unless indicated above.    VITALS:  T(F): 100.2 (11-09-23 @ 15:30), Max: 100.2 (11-09-23 @ 15:30)  HR: 80 (11-09-23 @ 15:30)  BP: 151/62 (11-09-23 @ 15:30)  RR: 16 (11-09-23 @ 15:30)  SpO2: 95% (11-09-23 @ 15:30)  Wt(kg): --    Height (cm): 162.6 (11-08 @ 22:50)  Weight (kg): 56.7 (11-08 @ 22:50)  BMI (kg/m2): 21.4 (11-08 @ 22:50)  BSA (m2): 1.6 (11-08 @ 22:50)    PHYSICAL EXAM:  General: NAD  HEENT: anicteric sclera, oropharynx clear, MMM  Neck: No JVD  Respiratory: CTAB, no wheezes, rales or rhonchi  Cardiovascular: S1, S2, RRR  Gastrointestinal: BS+, soft, NT/ND  Extremities: No cyanosis or clubbing. No peripheral edema  Neurological: A/O x 3, no focal deficits  Psychiatric: Normal mood, normal affect  : No CVA tenderness. No sethi.   Skin: No rashes  Vascular Access: LUE AVF    LABS:  11-09    136  |  106  |  57<H>  ----------------------------<  94  5.1   |  16<L>  |  2.96<H>    Ca    9.2      09 Nov 2023 07:18  Mg     1.6     11-08    TPro  5.6<L>  /  Alb  3.1<L>  /  TBili  0.2  /  DBili      /  AST  9<L>  /  ALT  <5<L>  /  AlkPhos  76  11-09    Creatinine Trend: 2.96 <--, 2.95 <--, 2.88 <--                        8.0    2.01  )-----------( 190      ( 09 Nov 2023 07:18 )             25.5     Urine Studies:  Urinalysis Basic - ( 09 Nov 2023 07:18 )    Color:  / Appearance:  / SG:  / pH:   Gluc: 94 mg/dL / Ketone:   / Bili:  / Urobili:    Blood:  / Protein:  / Nitrite:    Leuk Esterase:  / RBC:  / WBC    Sq Epi:  / Non Sq Epi:  / Bacteria:       RADIOLOGY & ADDITIONAL STUDIES:
[facility] DIVISION OF KIDNEY DISEASES AND HYPERTENSION -- [phone]  -- INITIAL CONSULT NOTE  --------------------------------------------------------------------------------  HPI: 78 y/o female with PMH ESRD due to HTN since 2016, s/p DDRT (05/19/2021- induction with basiliximab), HTN came to ER for sob and swelling of the legs. Transferred to [facility] and admitted with HTN urgency and possible flash pulm edema. Renal consulted for kidney transplant management.     Pt was admitted with Scr around 2.8mg/dl with mild hyperkal around 5.6meq/L. Pt had post transplant course was complicated by DGF with last HD 06/04/2021. 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 Pt also had transplant renal artery angiogram in 12/2022 which showed no stenosis. Patient saw Dr. Louise (transplant nephro) in sept 2023 during that time patient had scr increasing to 2.5mg/dL and also had ankle edema thus chlorthalidone was increased. Pt had h/o proteinuria around 2.8gms and h/o hyperkalemia with usage of lokelma. Cellcept was reduced to 500mg bid due to leukopenia and low level CMV viremia. Last few months, pt scr has been around 2.5 to 2.8mg/dL. patient also has chronic anemia and receiving epo [id number] weekly. In the ED at OSH, BNP elevated and CXR c/w pulm edema, patient received lasix 80mg iv once and hydralazine 10mg iv once. Transferred to [facility] ED, given Clonidine, hydral pushes, PO Labetalol and Nifedipine with improvement in BP.    Patient reports SOB has improved, urinating well with no dysuria. Denied fever, chest pain, n/v/d. Reports LE edema. Adherent with meds.        PAST HISTORY  --------------------------------------------------------------------------------  PAST MEDICAL & SURGICAL HISTORY:  HTN (hypertension)  Glaucoma  Cataract  ESRD (end stage renal disease) on dialysis  DVT (deep venous thrombosis)  of Left subclavian vein, 06/12/17    Kidney transplant recipient  Anemia of chronic disease  History of arteriovenostomy for renal dialysis  [place] (hard of hearing)  Acquired cataract  extraction with b/l lense placement, 2016    S/P KEVEN-BSO  for fibroids, 2012    Hemodialysis access, AV graft  History of renal transplantation  DDRT 5/19/2021    FAMILY HISTORY:  Family history of cerebrovascular accident (CVA)    Family history of colon cancer (Sibling)      PAST SOCIAL HISTORY:    ALLERGIES & MEDICATIONS  --------------------------------------------------------------------------------  Allergies    penicillin (Rash)  Mushrooms (Anaphylaxis)    Intolerances      Standing Inpatient Medications  aspirin  chewable 81 milliGRAM(s) Oral daily  atovaquone  Suspension 1500 milliGRAM(s) Oral daily  cholecalciferol 1000 Unit(s) Oral daily  heparin   Injectable 5000 Unit(s) SubCutaneous every 12 hours  labetalol 200 milliGRAM(s) Oral three times a day  latanoprost 0.005% Ophthalmic Solution 1 Drop(s) Both EYES at bedtime  NIFEdipine XL 60 milliGRAM(s) Oral daily  predniSONE   Tablet 5 milliGRAM(s) Oral daily  senna 2 Tablet(s) Oral at bedtime  sodium bicarbonate 1300 milliGRAM(s) Oral two times a day    PRN Inpatient Medications  acetaminophen     Tablet .. 650 milliGRAM(s) Oral every 6 hours PRN  melatonin 3 milliGRAM(s) Oral at bedtime PRN      REVIEW OF SYSTEMS  --------------------------------------------------------------------------------  Constitutional: No fevers/chills  HEENT: No HA, sore throat   Respiratory: No dyspnea, cough  Cardiovascular: No chest pain  Gastrointestinal: No abdominal pain, diarrhea, nausea, vomiting  Genitourinary: No dysuria, hematuria, urgency  Extremities: +edema  Skin: No rashes  Heme: No easy bruising or bleeding    All other systems were reviewed and are negative, except as noted.    VITALS  --------------------------------------------------------------------------------  T(C): 37.2 (11-09-23 @ 13:57), Max: 37.7 (11-09-23 @ 04:08)  HR: 65 (11-09-23 @ 13:57) (62 - 84)  BP: 154/86 (11-09-23 @ 13:57) (132/62 - 222/82)  RR: 15 (11-09-23 @ 13:57) (15 - 20)  SpO2: 94% (11-09-23 @ 13:57) (94% - 99%)  Wt(kg): --  Height (cm): 162.6 (11-08-23 @ 22:50)  Weight (kg): 56.7 (11-08-23 @ 22:50)  BMI (kg/m2): 21.4 (11-08-23 @ 22:50)  BSA (m2): 1.6 (11-08-23 @ 22:50)    PHYSICAL EXAM:  General: no acute distress  Neuro: no focal deficits  HEENT: NC/AT, anicteric, no JVD  Pulmonary: coarse breath sounds bilaterally  Cardiovascular/Chest: +S1S2, RRR  GI/Abdomen: soft, NT/ND, +bowel sounds        Transplant site: RLQ scar well-healed, no tenderness  Extremities: + LE edema, mostly ankle/pedal  : voiding spontaneously  Skin: Warm and dry  Vascular access: RUE AVF with palpable thrill    LABS/STUDIES  --------------------------------------------------------------------------------              8.0    2.01  >-----------<  190      [11-09-23 @ 07:18]              25.5     136  |  106  |  57  ----------------------------<  94      [11-09-23 @ 07:18]  5.1   |  16  |  2.96        Ca     9.2     [11-09-23 @ 07:18]      Mg     1.6     [11-08-23 @ 13:20]    TPro  5.6  /  Alb  3.1  /  TBili  0.2  /  DBili  x   /  AST  9   /  ALT  <5  /  AlkPhos  76  [11-09-23 @ 00:22]    PT/INR: PT 12.3 , INR 1.18       [11-09-23 @ 00:22]  PTT: 35.5       [11-09-23 @ 00:22]      Creatinine Trend:  SCr 2.96 [11-09 @ 07:18]  SCr 2.95 [11-09 @ 00:22]  SCr 2.88 [11-08 @ 13:20]    Urinalysis - [11-09-23 @ 07:18]      Color  / Appearance  / SG  / pH       Gluc 94 / Ketone   / Bili  / Urobili        Blood  / Protein  / Leuk Est  / Nitrite       RBC  / WBC  / Hyaline  / Gran  / Sq Epi  / Non Sq Epi  / Bacteria       HBsAb 22.4      [05-19-21 @ 10:42]  HBsAb Reactive      [10-19-19 @ 01:09]  HBsAg Nonreact      [05-19-21 @ 10:42]  HBcAb Nonreact      [05-19-21 @ 10:42]  HCV 0.42, Nonreact      [05-19-21 @ 10:42]  HIV Nonreact      [05-19-21 @ 10:45]  HIV Nonreact      [11-08-23 @ 13:20]    STEPHANIE: titer Negative, pattern --      [08-13-20 @ 10:27]  Immunofixation Serum:   No Monoclonal Band Identified    Reference Range: None Detected      [02-05-21 @ 06:39]  SPEP Interpretation: Normal Electrophoresis Pattern      [02-05-21 @ 06:39]

## 2023-11-09 NOTE — H&P ADULT - PROBLEM SELECTOR PLAN 4
Likely i/s/o immunosuppression, chronic   WBC ~3 (w slight neutropenia and lymphonenia)   Hb ~8-8.5 and stable, no signs of bleeding acutely  Of note was bandemic to 16%, which resolved on subsequent lab  s/p CTX in ED  Procal borderline  - Monitor WBC; monitor off abx for now since not toxic appearing  - f/u cultures regarding above  - Monitor Hb, transfuse if needed  - reticulocyte, iron studies  in AM

## 2023-11-09 NOTE — ED ADULT NURSE REASSESSMENT NOTE - NS ED NURSE REASSESS COMMENT FT1
Received report from JAMILA Valentine. Pt is awake, alert, and speaking in full coherent sentences. Vital signs stable. NAD noted. Pt is resting comfortably in stretcher, side rails up, and bed in lowest position. Pt is admitted and awaiting bed. Comfort and safety provided.

## 2023-11-09 NOTE — MEDICAL STUDENT ADULT H&P (EDUCATION) - BARRIERS TO MEDICATION ADHERENCE
Medication reconciliation required over 1hr to confirm which medications were currently being administered or taken. Pt seems to have discontinued certain medications w/o medical consultation. High number of medications (over 10 different medications) and unclear health literacy may predispose pt to confusion and difficulty with adherence to medications. Yes...

## 2023-11-09 NOTE — MEDICAL STUDENT ADULT H&P (EDUCATION) - NSHPPHYSICALEXAM_GEN_ALL_CORE
VS: 37, 65, 132/62, RR 15, 94-99% RA    GENERAL APPEARANCE: Well developed, No apparent distress  HEENT:  Evidence of prior cataract surgery bilaterally, vision grossly intact.   NECK: Neck supple, non-tender no lymphadenopathy, masses or thyromegaly.  CARDIAC: Systolic murmur appreciated, unclear if related to AVF in RUE. Normal S1 and S2. RRR  LUNGS: Crackles in lower lobes posteriorly, bilaterally.   ABDOMEN: Soft , NTND, bowel sounds normal. No guarding or rebound.   MUSCULOSKELETAL: ROM intact.  No joint erythema or tenderness.   EXTREMITIES: Trace tender edema to mid calves bilaterally, R>L. HD Fistula with palpable thrill on R. upper extremity.  NEUROLOGICAL: Non focal. Strength and sensation symmetric and intact throughout.   SKIN: Warm and dry , Well perfused  PSYCHIATRIC: AOx3, Normal mood and affect

## 2023-11-09 NOTE — PHYSICAL THERAPY INITIAL EVALUATION ADULT - ADDITIONAL COMMENTS
pt lives in an apartment with elevator access with . pt fully independent prior to admission. pt owns RW and cane. pt reports  at home to assist/supervise when need be.

## 2023-11-09 NOTE — MEDICAL STUDENT ADULT H&P (EDUCATION) - ASSESSMENT
77F PMHx of R. DDRT, HTN presenting with 2xd cough, subjective fever, SOB, found to be in hypertensive emergency now resolved s/p anti-hypertensive therapy and diuresis, with CXR concerning for possible pulmonary edema.    #Hypertensive emergency  - 220 systolic down to 130s s/p Hydralazine, Labetalol, Nifedipine, Clonidine  - Continue with home medications (labetalol/nifedipine) and continue monitoring blood pressures; consider holding addition antihypertensives for now given steep drop in BP over 24hr period    #YANELY vs. CKD vs. Tacrolimus toxicity  - SCr ~2.9, recent baseline 2.75 on Nov 2 2023,  - Pt is not currently uremic or significantly acidotic (VBG 7.35/32/42/18), hyperkalemia to 5.6 has since resolved s/p lokelma tx  - Pt is still producing urine with unremarkable UA. Continue monitoring I/Os for oliguria  - Renal U/S w/ possible ATN vs. rejection, no renal a. stenosis  - Appreciate renal and transplant recommendations  - Obtain serum tacolimus level  - hold lasix for now and trend BMP/SCr, continue observing     #Anemia vs. Immunosuppression vs. Sepsis  - leukopenia (WBC 3) and stable anemia (RBC 8), no thrombocytopenia  - bandemia to 16% i/s/o immunosuppression therapy for transplant has resolved on subsequent labs  - received levo/CTX in ED for possible sepsis  - monitor Hb and transfuse if drops, however likely etiology from ESRD, low suspicion for GI  #SOB vs. PNA vs. Volume overload

## 2023-11-09 NOTE — MEDICAL STUDENT ADULT H&P (EDUCATION) - NSHPREVIEWOFSYSTEMS_GEN_ALL_CORE
CONSTITUTIONAL:  No weight loss, fever, chills, weakness or fatigue.  HEENT:  Eyes:  No visual loss, blurred vision, double vision or yellow sclerae. Ears, Nose, Throat:  No hearing loss, sneezing, congestion, runny nose or sore throat.  SKIN:  No rash or itching.  CARDIOVASCULAR:  No chest pain, chest pressure or chest discomfort. No palpitations.  RESPIRATORY:  +shortness of breath  GASTROINTESTINAL:  No anorexia, nausea, vomiting or diarrhea. No abdominal pain or blood.  GENITOURINARY:  Denies hematuria, dysuria.   NEUROLOGICAL:  No headache, dizziness, syncope, paralysis, ataxia, numbness or tingling in the extremities. No change in bowel or bladder control.  MUSCULOSKELETAL:  No muscle, back pain, joint pain or stiffness.  HEMATOLOGIC:  No anemia, bleeding or bruising.  LYMPHATICS:  No enlarged nodes.   PSYCHIATRIC:  No history of depression or anxiety.  ENDOCRINOLOGIC:  No reports of sweating, cold or heat intolerance. No polyuria or polydipsia.  ALLERGIES:  No history of asthma, hives, eczema or rhinitis

## 2023-11-09 NOTE — H&P ADULT - PROBLEM SELECTOR PLAN 2
SCr ~2.9  W some hyperkalemia now s/p lokelma   S/p lasix 80 IV in [facility]  Pulmonary edema on CXR, trace LE edema  US renal with evidence of renal rejection, no evidence of renal artery stenosis.   - TTE to r/o cardiac involvement  - additional lasix 80 IV per renal, considering daily dose  - renal recs  - monitor SCr, avoid nephrotoxins  - transplant medications as below

## 2023-11-09 NOTE — PHYSICAL THERAPY INITIAL EVALUATION ADULT - PERTINENT HX OF CURRENT PROBLEM, REHAB EVAL
pt is a 76 y/o female with PMH ESRd due to HTN since 2016, s/p DDRT (05/19/2021- induction with basiliximab), HTN came to ER for sob and swelling of the legs. Renal consulted for kidney transplant management.   pt was admitted with Scr around 2.8mg/dl with mild hyperkal around 5.6meq/L. Pt had post transplant course was complicated by DGF with last HD 06/04/2021. 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 Pt also had transplant renal artery angiogram in 12/2022 which showed no stenosis  patient saw Dr. Louise (transplant nephro) in sept 2023 during that time patient had scr increasing to 2.5mg/dL and also had ankle edema thus chlorthalidone was increased. Pt had h/o proteinuria around 2.8gms and h/o hyperkalemia with usage of lokelma. Cellcept was reduced to 500mg bid due to leukopenia and low level CMV viremia. Last few months, pt scr has been around 2.5 to 2.8mg/dL. patient also has chronic anemia.  CXR: Bilateral perihilar airspace opacities and prominent interstitial opacities compatible with pulmonary edema.  US Trans Kidney: No evidence of a significant renal artery stenosis.

## 2023-11-09 NOTE — MEDICAL STUDENT ADULT H&P (EDUCATION) - IMPACT, CONCERN, EXPLANATORY MODEL
Pt noted that she was unable to take care of her daily activities following the onset of the symptoms (e.g. SOB) and has felt too nauseous to eat much. She was concerned especially about the headache and the leg swelling, which she reported had not occurred previously. She was unsure of what might be causing the symptoms.

## 2023-11-09 NOTE — H&P ADULT - PROBLEM SELECTOR PLAN 5
Diet: Regular, low K  DVT PPx: Heparin SQ 5000 q12  Dispo: BP stability, renal management + diuresis

## 2023-11-09 NOTE — PHYSICAL THERAPY INITIAL EVALUATION ADULT - ACTIVE RANGE OF MOTION EXAMINATION, REHAB EVAL
temo. upper extremity Active ROM was WNL (within normal limits)/bilateral lower extremity Active ROM was WNL (within normal limits)

## 2023-11-09 NOTE — H&P ADULT - PROBLEM SELECTOR PLAN 3
With chronic graft rejection, follows w nephrology most recently 1 month PTA  Home Meds: Pred 5, Tacro 5mg qd, Mepron  - tacro level in lab  - c/w prednisone 5 qd  - c/w mepron home med  - transplant renal recs regarding dosing of tacrolimus, but holding for now

## 2023-11-09 NOTE — CHART NOTE - NSCHARTNOTEFT_GEN_A_CORE
Consulted for this patient.  78 y/o female w/ PMH CKD stage 3 most likely s/p DDRT (s/p DDRT (05/19/2021) with chronic rejection (AMR) and HTN transfer from [facility] c/o 2 day history of increasing SOB.  Patient at baseline Cr 2.88 (2.75 Nov 2, 2023), K 5.6, non-oliguric on room air. SBP in 210-220's     Recs:  No need for urgent dialysis   Can give IV Hydralazine/IV Labetalol for BP control   Lokelma 10 gm x 1   Monitor I/O  Obtain Tacro level    Patient to be evaluated by transplant team in the morning with full consult note.      Jose Kaur  Nephrology Fellow  Feel free to contact me on TEAMS

## 2023-11-09 NOTE — CONSULT NOTE ADULT - ASSESSMENT
76 y/o female w/ PMH CKD stage 3 most likely s/p DDRT (s/p DDRT (05/19/2021) with chronic rejection (AMR) and HTN transfer from [facility] c/o 2 day history of increasing SOB that began at rest yesterday worsened w/ ambulation.  Nephrology consulted for YANELY.      A/P:  S/p DDRT (5/19/2021 - induction w/ Basiliximab)  Baseline S.Cr ~1.7-2.  S.Cr 2.9 on admission.  YANELY possibly due to progression of chronic rejection vs. cardiorenal.  UA with proteinuria and hematuria.  Check urine Na and urine creatinine.  Transplant kidney US: No evidence of a significant renal artery stenosis. Elevated resistive indices which can be seen in rejection versus acute tubular necrosis. Similar prominence of the transplant kidney ureter with urothelial  thickening. Echogenic debris is within the ureter concerning for infection. Trace fluid adjacent to the transplant kidney and small free fluid in the pelvis.  Continue immunosuppressants - mycophenolate 500mg BID, prednisone 5mg qd, and Envarsus 5mg qd.  Check Tacro level 30mins prior to next dose; goal 4-6  Monitor BMP and UO.    HTN:  BP suboptimal.  Resume nifedipine 60mg qd.  If remains elevated, increase nifedipine to 90mg qd.  Monitor BP.    Hyperkalemia:  K improving.  Possibly in setting of RF vs. acidosis vs. bactrim.  Off bactrim.  S/p Lokelma 10gm; continue daily dosing.  Low K diet.  Monitor K closely.    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  C/W NaHCO3 1300mg TID.    Anemia:  Likely in setting of CKD.  Check iron panel.  Start SHELDON [id number] TIW.  Hold SHELDON for BP >170/90.    CKD: MBD:  Check PTH.  Check Ca and PO4 daily.  Low PO4 diet.    Proteinuria/Hematuria:  Possibly in setting of UTI vs. diabetic nephropathy (donor origin)  Last seen by Dr. Louise 9/20/23: Proteinuria 2.8 grams; relatively stable  Received 1 dose of antibiotic - pt asymptomatic.  Monitor.   78 y/o female w/ PMH CKD stage 3 most likely s/p DDRT (s/p DDRT (05/19/2021) with chronic rejection (AMR) and HTN transfer from [facility] c/o 2 day history of increasing SOB that began at rest yesterday worsened w/ ambulation.  Nephrology consulted for YANELY.      A/P:  S/p DDRT (5/19/2021 - induction w/ Basiliximab)  Baseline S.Cr ~1.7-2.  S.Cr 2.9 on admission.  YANELY possibly due to progression of chronic rejection vs. cardiorenal.  UA with proteinuria and hematuria.  Check urine Na and urine creatinine.  Transplant kidney US: No evidence of a significant renal artery stenosis. Elevated resistive indices which can be seen in rejection versus acute tubular necrosis. Similar prominence of the transplant kidney ureter with urothelial  thickening. Echogenic debris is within the ureter concerning for infection. Trace fluid adjacent to the transplant kidney and small free fluid in the pelvis.  Continue immunosuppressants per trasnplant - mycophenolate 500mg BID, prednisone 5mg qd, and Envarsus 5mg qd.  Check Tacro level 30mins prior to next dose; goal 4-6  Monitor BMP and UO.    HTN:  BP suboptimal.  Resume nifedipine 60mg qd.  If remains elevated, increase nifedipine to 90mg qd.  Monitor BP.    Hyperkalemia:  K improving.  Possibly in setting of RF vs. acidosis vs. bactrim.  Off bactrim.  S/p Lokelma 10gm; continue daily dosing.  Low K diet.  Monitor K closely.    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  C/W NaHCO3 1300mg TID.    Anemia:  Likely in setting of CKD.  Check iron panel.  Start SHELDON [id number] TIW.  Hold SHELDON for BP >170/90.    CKD: MBD:  Check PTH.  Check Ca and PO4 daily.  Low PO4 diet.    Proteinuria/Hematuria:  Possibly in setting of UTI vs. diabetic nephropathy (donor origin)  Last seen by Dr. Louise 9/20/23: Proteinuria 2.8 grams; relatively stable  Received 1 dose of antibiotic - pt asymptomatic.  Monitor.

## 2023-11-09 NOTE — H&P ADULT - ASSESSMENT
76 y/o female w/ PMH CKD3 s/p DDRT  (05/19/2021) with chronic rejection (AMR) and HTN transfer from [facility] for hypertensive emergency causing flash pulmonary edema and shortness of breath, which has since resolved.     #HTN Emergency  - Vitals q4  - Resumed home meds  - resume homemeds-     #CKD    #S/p renal trnsplant  tacro  prednisone  - npehro recs  - daily tacro level    ##Bicytopenia  - stable   #bandemia   ?

## 2023-11-09 NOTE — H&P ADULT - HISTORY OF PRESENT ILLNESS
78 y/o female w/ PMH CKD stage 3 most likely s/p DDRT (s/p DDRT (05/19/2021) with chronic rejection (AMR) and HTN transfer from [facility] c/o 2 day history of increasing SOB that began at rest yesterday worsened w/ ambulation. Pt is otherwise asymptomatic. Denies fevers, chills, nausea, vomiting, dizziness, chest pain, abdominal pain, dysuria, hematuria.    Patient was in usual state of health until day prior to admission where she felt acutely short of breath, she went to the ER at [facility] and was found to be hypertensive. Patient denies CP, fevers, chills, n/v/d. She reports no recent changes in her diet, she did not miss any of her medications. She lives with her  at home and has ~5h per day of assistance for cooking and cleaning, but otherwise bathes, dresses herself and ambulates without assistance.     In ED she was given serial hydralazine and labetalol pushes for BP max systolic 220s. She was also given ceftriaxone and lokelma.   By the time of admission, she was in no respiratory distress and her BP was 130s systolic.

## 2023-11-09 NOTE — MEDICAL STUDENT ADULT H&P (EDUCATION) - HISTORY OF PRESENT ILLNESS
RB is a 77F PMHx of HERIBERTO MORALES (May 2021) , HTN p/w headache, SOB, cough, low grade fever and leg swelling x2days. She was in her usual state of health until 11/8, when she started feeling short of breath with exertion. She denied any chest pain or pain with deep inspiration, but found it difficult to catch her breath. The SOB was associated with nausea and a headache that did not resolve with rest and was not associated with any recent or new drug use, aura, or LOC; she indicated being adherent to all of my usual medications, including her transplant and hypertensive medications. She denied subjective fever, chills, vomiting, dizziness, dysuria or hematuria. As her symptoms were debilitating she presented 11/8/23 to [place] ED (-ED), where she was found to be in hypertensive emergency with systolic BP over 220. She was then transferred to [facility] ED for further management and for ease of facilitation of complications related to renal transplant.    In the ED she was given aggressive antihypertensive treatment including hydralazine, labetalol, clonidine, nifedipine. She was also administered one IV dose of lasix 80mg following CXR findings suggestive of flash pulmonary edema.     Given her low grade fever and CBC demonstrating 16% bandemia she was also given a dose of IV levofloxacin at -ED, and received a dose of CTX upon arrival at [facility] ED. She was also given a dose of lokelma given K+ to 5.6 By time of admission to floors HTN had resolved with BP systolic 130s.

## 2023-11-09 NOTE — MEDICAL STUDENT ADULT H&P (EDUCATION) - NS MD HP STUD PMH FT
B/L Cataracts and Glaucoma - 2016  DVT of left subclavian vein - June 2017  ESRD on regular dialysis with AV-fistula most recently accessed 2021  HTN -

## 2023-11-09 NOTE — PHYSICAL THERAPY INITIAL EVALUATION ADULT - NSPTDISCHREC_GEN_A_CORE
pt requires no skilled PT at this time. pt is functionally independent, TRU Rizvi aware./No skilled PT needs

## 2023-11-09 NOTE — H&P ADULT - PROBLEM SELECTOR PLAN 1
S/p Hydral, Labetalol, and Nifedipine in ED  Home Meds: Nifedipine 60 qd, Labetalol 200 TID, unclear if still taking losartan or if recently stopped  Patient was BP 220s systolic in ED, lowered serially with above. Now 130s systolic.   SOB resolved, but with resultant pulmonary edema.  No HA, CP, blurry vision.     - c/w home labetalol and nifedipine  - hold losartan until clarify if home med, and pending renal recommendation  - Vitals q4.   - ideally only lower BP by 30% in 24h, however patient already stably BP controlled. Will hold additional IV antihypertensives unless SBP > 180  - diuresis as below

## 2023-11-09 NOTE — PATIENT PROFILE ADULT - FALL HARM RISK - HARM RISK INTERVENTIONS
Assistance with ambulation/Assistance OOB with selected safe patient handling equipment/Monitor for mental status changes/Monitor gait and stability/Provide patient with walking aids - walker, cane, crutches/Reinforce activity limits and safety measures with patient and family/Reorient to person, place and time as needed/Review medications for side effects contributing to fall risk/Sit up slowly, dangle for a short time, stand at bedside before walking/Tailored Fall Risk Interventions/Use of alarms - bed, chair and/or voice tab/Visual Cue: Yellow wristband and red socks/Bed in lowest position, wheels locked, appropriate side rails in place/Call bell, personal items and telephone in reach/Instruct patient to call for assistance before getting out of bed or chair/Non-slip footwear when patient is out of bed/Dante to call system/Physically safe environment - no spills, clutter or unnecessary equipment/Purposeful Proactive Rounding/Room/bathroom lighting operational, light cord in reach

## 2023-11-09 NOTE — CONSULT NOTE ADULT - ASSESSMENT
76 y/o female with PMH ESRD due to HTN since 2016, s/p DDRT (05/19/2021- induction with basiliximab), HTN came to ER for sob and swelling of the legs. Transferred to [facility] and admitted with HTN urgency and possible flash pulm edema. Renal consulted for kidney transplant management.     Pt was admitted with Scr around 2.8mg/dl with mild hyperkal around 5.6meq/L. Pt had post transplant course was complicated by DGF with last HD 06/04/2021. 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 DSA was negative. Pt also had transplant renal artery angiogram in 12/2022 which showed no stenosis. Patient saw Dr. Louise (transplant nephro) in sept 2023 during that time patient had scr increasing to 2.5mg/dL and also had ankle edema thus chlorthalidone was increased. Pt had h/o proteinuria around 2.8gms and h/o hyperkalemia with usage of lokelma. Cellcept was reduced to 500mg bid due to leukopenia and low level CMV viremia. Last few months, pt scr has been around 2.5 to 2.8mg/dL. patient also has chronic anemia and receiving epo 35949 weekly. In the ED at OSH, BNP elevated and CXR c/w pulm edema, patient received lasix 80mg iv once and hydralazine 10mg iv once. Transferred to [facility] ED, given Clonidine, hydral pushes, PO Labetalol and Nifedipine with improvement in BP.      1. s/p DDRT (05/19/2021- induction with basiliximab)  - Cr 2.9 on admission, baseline 2.5-2.8. YANELY possibly due to progression of chronic rejection vs. cardiorenal.  - UA with known proteinuria and few bacteria, given one dose of CTX but no UTI symptoms  - Would obtain urine lytes and UPCR  - Last seen by Dr. Louise 9/20/23: Proteinuria 2.8 grams per day relatively stable- likely from diabetic nephropathy (donor origin).  - Transplant kidney US: No evidence of a significant renal artery stenosis. Elevated resistive indices which can be seen in rejection versus acute tubular necrosis. Similar prominence of the transplant kidney ureter with urothelial  thickening. Echogenic debris is within the ureter concerning for infection. Trace fluid adjacent to the transplant kidney and small free fluid in the pelvis.  - BNP elevated and CXR c/w pulm edema  - Given Lasix 80mg IV on 11/8 at OSH. Would give another dose today and monitor STRICT Is+Os. Will also help control hyperkalemia.  - Bicarb low, continue home bicarb 1300mg TID. Goal bicarb >20 will also help control hyperK  - continue Lokelma 10g daily    2. Immunosuppression  - s/p Simulect induction  - Envarsus 5mg daily (check daily troughs 30mins prior to next dose; tac goal 4-6), MMF 500mg BID, Pred 5mg  - PPx: Bactrim discontinued for high K. Dapsone discontinued for hemolytic anemia. Continue Mepron.    3. HTN  - Better controlled on home meds      Rajinder William DO  Nephrology Fellow  Feel free to contact me directly on TEAMS with any questions.  (After 5pm or on weekends, please call the on-call fellow).

## 2023-11-10 LAB
ALBUMIN SERPL ELPH-MCNC: 2.9 G/DL — LOW (ref 3.3–5)
ALBUMIN SERPL ELPH-MCNC: 2.9 G/DL — LOW (ref 3.3–5)
ALP SERPL-CCNC: 72 U/L — SIGNIFICANT CHANGE UP (ref 40–120)
ALP SERPL-CCNC: 72 U/L — SIGNIFICANT CHANGE UP (ref 40–120)
ALT FLD-CCNC: <5 U/L — LOW (ref 10–45)
ALT FLD-CCNC: <5 U/L — LOW (ref 10–45)
ANION GAP SERPL CALC-SCNC: 13 MMOL/L — SIGNIFICANT CHANGE UP (ref 5–17)
ANION GAP SERPL CALC-SCNC: 13 MMOL/L — SIGNIFICANT CHANGE UP (ref 5–17)
APPEARANCE UR: CLEAR — SIGNIFICANT CHANGE UP
APPEARANCE UR: CLEAR — SIGNIFICANT CHANGE UP
AST SERPL-CCNC: 11 U/L — SIGNIFICANT CHANGE UP (ref 10–40)
AST SERPL-CCNC: 11 U/L — SIGNIFICANT CHANGE UP (ref 10–40)
BASOPHILS # BLD AUTO: 0.01 K/UL — SIGNIFICANT CHANGE UP (ref 0–0.2)
BASOPHILS # BLD AUTO: 0.01 K/UL — SIGNIFICANT CHANGE UP (ref 0–0.2)
BASOPHILS NFR BLD AUTO: 0.5 % — SIGNIFICANT CHANGE UP (ref 0–2)
BASOPHILS NFR BLD AUTO: 0.5 % — SIGNIFICANT CHANGE UP (ref 0–2)
BILIRUB SERPL-MCNC: 0.2 MG/DL — SIGNIFICANT CHANGE UP (ref 0.2–1.2)
BILIRUB SERPL-MCNC: 0.2 MG/DL — SIGNIFICANT CHANGE UP (ref 0.2–1.2)
BILIRUB UR-MCNC: NEGATIVE — SIGNIFICANT CHANGE UP
BILIRUB UR-MCNC: NEGATIVE — SIGNIFICANT CHANGE UP
BUN SERPL-MCNC: 58 MG/DL — HIGH (ref 7–23)
BUN SERPL-MCNC: 58 MG/DL — HIGH (ref 7–23)
CALCIUM SERPL-MCNC: 8.5 MG/DL — SIGNIFICANT CHANGE UP (ref 8.4–10.5)
CALCIUM SERPL-MCNC: 8.5 MG/DL — SIGNIFICANT CHANGE UP (ref 8.4–10.5)
CALCIUM SERPL-MCNC: 8.7 MG/DL — SIGNIFICANT CHANGE UP (ref 8.4–10.5)
CALCIUM SERPL-MCNC: 8.7 MG/DL — SIGNIFICANT CHANGE UP (ref 8.4–10.5)
CHLORIDE SERPL-SCNC: 105 MMOL/L — SIGNIFICANT CHANGE UP (ref 96–108)
CHLORIDE SERPL-SCNC: 105 MMOL/L — SIGNIFICANT CHANGE UP (ref 96–108)
CO2 SERPL-SCNC: 17 MMOL/L — LOW (ref 22–31)
CO2 SERPL-SCNC: 17 MMOL/L — LOW (ref 22–31)
COLOR SPEC: YELLOW — SIGNIFICANT CHANGE UP
COLOR SPEC: YELLOW — SIGNIFICANT CHANGE UP
CREAT ?TM UR-MCNC: 117 MG/DL — SIGNIFICANT CHANGE UP
CREAT ?TM UR-MCNC: 117 MG/DL — SIGNIFICANT CHANGE UP
CREAT ?TM UR-MCNC: 118 MG/DL — SIGNIFICANT CHANGE UP
CREAT ?TM UR-MCNC: 118 MG/DL — SIGNIFICANT CHANGE UP
CREAT SERPL-MCNC: 3.59 MG/DL — HIGH (ref 0.5–1.3)
CREAT SERPL-MCNC: 3.59 MG/DL — HIGH (ref 0.5–1.3)
DIFF PNL FLD: NEGATIVE — SIGNIFICANT CHANGE UP
DIFF PNL FLD: NEGATIVE — SIGNIFICANT CHANGE UP
EGFR: 12 ML/MIN/1.73M2 — LOW
EGFR: 12 ML/MIN/1.73M2 — LOW
EOSINOPHIL # BLD AUTO: 0.03 K/UL — SIGNIFICANT CHANGE UP (ref 0–0.5)
EOSINOPHIL # BLD AUTO: 0.03 K/UL — SIGNIFICANT CHANGE UP (ref 0–0.5)
EOSINOPHIL NFR BLD AUTO: 1.5 % — SIGNIFICANT CHANGE UP (ref 0–6)
EOSINOPHIL NFR BLD AUTO: 1.5 % — SIGNIFICANT CHANGE UP (ref 0–6)
FERRITIN SERPL-MCNC: 1903 NG/ML — HIGH (ref 13–330)
FERRITIN SERPL-MCNC: 1903 NG/ML — HIGH (ref 13–330)
GLUCOSE SERPL-MCNC: 94 MG/DL — SIGNIFICANT CHANGE UP (ref 70–99)
GLUCOSE SERPL-MCNC: 94 MG/DL — SIGNIFICANT CHANGE UP (ref 70–99)
GLUCOSE UR QL: NEGATIVE MG/DL — SIGNIFICANT CHANGE UP
GLUCOSE UR QL: NEGATIVE MG/DL — SIGNIFICANT CHANGE UP
HCT VFR BLD CALC: 22.9 % — LOW (ref 34.5–45)
HCT VFR BLD CALC: 22.9 % — LOW (ref 34.5–45)
HGB BLD-MCNC: 7.2 G/DL — LOW (ref 11.5–15.5)
HGB BLD-MCNC: 7.2 G/DL — LOW (ref 11.5–15.5)
IMM GRANULOCYTES NFR BLD AUTO: 8.8 % — HIGH (ref 0–0.9)
IMM GRANULOCYTES NFR BLD AUTO: 8.8 % — HIGH (ref 0–0.9)
IRON SATN MFR SERPL: 11 % — LOW (ref 14–50)
IRON SATN MFR SERPL: 11 % — LOW (ref 14–50)
IRON SATN MFR SERPL: 15 UG/DL — LOW (ref 30–160)
IRON SATN MFR SERPL: 15 UG/DL — LOW (ref 30–160)
KETONES UR-MCNC: NEGATIVE MG/DL — SIGNIFICANT CHANGE UP
KETONES UR-MCNC: NEGATIVE MG/DL — SIGNIFICANT CHANGE UP
LEUKOCYTE ESTERASE UR-ACNC: NEGATIVE — SIGNIFICANT CHANGE UP
LEUKOCYTE ESTERASE UR-ACNC: NEGATIVE — SIGNIFICANT CHANGE UP
LYMPHOCYTES # BLD AUTO: 0.46 K/UL — LOW (ref 1–3.3)
LYMPHOCYTES # BLD AUTO: 0.46 K/UL — LOW (ref 1–3.3)
LYMPHOCYTES # BLD AUTO: 22.4 % — SIGNIFICANT CHANGE UP (ref 13–44)
LYMPHOCYTES # BLD AUTO: 22.4 % — SIGNIFICANT CHANGE UP (ref 13–44)
MCHC RBC-ENTMCNC: 25.7 PG — LOW (ref 27–34)
MCHC RBC-ENTMCNC: 25.7 PG — LOW (ref 27–34)
MCHC RBC-ENTMCNC: 31.4 GM/DL — LOW (ref 32–36)
MCHC RBC-ENTMCNC: 31.4 GM/DL — LOW (ref 32–36)
MCV RBC AUTO: 81.8 FL — SIGNIFICANT CHANGE UP (ref 80–100)
MCV RBC AUTO: 81.8 FL — SIGNIFICANT CHANGE UP (ref 80–100)
MONOCYTES # BLD AUTO: 0.32 K/UL — SIGNIFICANT CHANGE UP (ref 0–0.9)
MONOCYTES # BLD AUTO: 0.32 K/UL — SIGNIFICANT CHANGE UP (ref 0–0.9)
MONOCYTES NFR BLD AUTO: 15.6 % — HIGH (ref 2–14)
MONOCYTES NFR BLD AUTO: 15.6 % — HIGH (ref 2–14)
NEUTROPHILS # BLD AUTO: 1.05 K/UL — LOW (ref 1.8–7.4)
NEUTROPHILS # BLD AUTO: 1.05 K/UL — LOW (ref 1.8–7.4)
NEUTROPHILS NFR BLD AUTO: 51.2 % — SIGNIFICANT CHANGE UP (ref 43–77)
NEUTROPHILS NFR BLD AUTO: 51.2 % — SIGNIFICANT CHANGE UP (ref 43–77)
NITRITE UR-MCNC: NEGATIVE — SIGNIFICANT CHANGE UP
NITRITE UR-MCNC: NEGATIVE — SIGNIFICANT CHANGE UP
NRBC # BLD: 0 /100 WBCS — SIGNIFICANT CHANGE UP (ref 0–0)
NRBC # BLD: 0 /100 WBCS — SIGNIFICANT CHANGE UP (ref 0–0)
PH UR: 6 — SIGNIFICANT CHANGE UP (ref 5–8)
PH UR: 6 — SIGNIFICANT CHANGE UP (ref 5–8)
PLATELET # BLD AUTO: 201 K/UL — SIGNIFICANT CHANGE UP (ref 150–400)
PLATELET # BLD AUTO: 201 K/UL — SIGNIFICANT CHANGE UP (ref 150–400)
POTASSIUM SERPL-MCNC: 5.1 MMOL/L — SIGNIFICANT CHANGE UP (ref 3.5–5.3)
POTASSIUM SERPL-MCNC: 5.1 MMOL/L — SIGNIFICANT CHANGE UP (ref 3.5–5.3)
POTASSIUM SERPL-SCNC: 5.1 MMOL/L — SIGNIFICANT CHANGE UP (ref 3.5–5.3)
POTASSIUM SERPL-SCNC: 5.1 MMOL/L — SIGNIFICANT CHANGE UP (ref 3.5–5.3)
PROT ?TM UR-MCNC: 276 MG/DL — HIGH (ref 0–12)
PROT ?TM UR-MCNC: 276 MG/DL — HIGH (ref 0–12)
PROT SERPL-MCNC: 5.2 G/DL — LOW (ref 6–8.3)
PROT SERPL-MCNC: 5.2 G/DL — LOW (ref 6–8.3)
PROT UR-MCNC: 300 MG/DL
PROT UR-MCNC: 300 MG/DL
PROT/CREAT UR-RTO: 2.3 RATIO — HIGH (ref 0–0.2)
PROT/CREAT UR-RTO: 2.3 RATIO — HIGH (ref 0–0.2)
PTH-INTACT FLD-MCNC: 659 PG/ML — HIGH (ref 15–65)
PTH-INTACT FLD-MCNC: 659 PG/ML — HIGH (ref 15–65)
RBC # BLD: 2.8 M/UL — LOW (ref 3.8–5.2)
RBC # BLD: 2.8 M/UL — LOW (ref 3.8–5.2)
RBC # FLD: 14.6 % — HIGH (ref 10.3–14.5)
RBC # FLD: 14.6 % — HIGH (ref 10.3–14.5)
SODIUM SERPL-SCNC: 135 MMOL/L — SIGNIFICANT CHANGE UP (ref 135–145)
SODIUM SERPL-SCNC: 135 MMOL/L — SIGNIFICANT CHANGE UP (ref 135–145)
SODIUM UR-SCNC: 48 MMOL/L — SIGNIFICANT CHANGE UP
SODIUM UR-SCNC: 48 MMOL/L — SIGNIFICANT CHANGE UP
SP GR SPEC: 1.01 — SIGNIFICANT CHANGE UP (ref 1–1.03)
SP GR SPEC: 1.01 — SIGNIFICANT CHANGE UP (ref 1–1.03)
TACROLIMUS SERPL-MCNC: 2.6 NG/ML — SIGNIFICANT CHANGE UP
TACROLIMUS SERPL-MCNC: 2.6 NG/ML — SIGNIFICANT CHANGE UP
TIBC SERPL-MCNC: 140 UG/DL — LOW (ref 220–430)
TIBC SERPL-MCNC: 140 UG/DL — LOW (ref 220–430)
UIBC SERPL-MCNC: 125 UG/DL — SIGNIFICANT CHANGE UP (ref 110–370)
UIBC SERPL-MCNC: 125 UG/DL — SIGNIFICANT CHANGE UP (ref 110–370)
UROBILINOGEN FLD QL: 0.2 MG/DL — SIGNIFICANT CHANGE UP (ref 0.2–1)
UROBILINOGEN FLD QL: 0.2 MG/DL — SIGNIFICANT CHANGE UP (ref 0.2–1)
WBC # BLD: 2.05 K/UL — LOW (ref 3.8–10.5)
WBC # BLD: 2.05 K/UL — LOW (ref 3.8–10.5)
WBC # FLD AUTO: 2.05 K/UL — LOW (ref 3.8–10.5)
WBC # FLD AUTO: 2.05 K/UL — LOW (ref 3.8–10.5)

## 2023-11-10 PROCEDURE — 93306 TTE W/DOPPLER COMPLETE: CPT | Mod: 26

## 2023-11-10 PROCEDURE — 93356 MYOCRD STRAIN IMG SPCKL TRCK: CPT

## 2023-11-10 PROCEDURE — 99232 SBSQ HOSP IP/OBS MODERATE 35: CPT | Mod: GC

## 2023-11-10 PROCEDURE — 71250 CT THORAX DX C-: CPT | Mod: 26

## 2023-11-10 PROCEDURE — 76376 3D RENDER W/INTRP POSTPROCES: CPT | Mod: 26

## 2023-11-10 PROCEDURE — 99233 SBSQ HOSP IP/OBS HIGH 50: CPT | Mod: GC

## 2023-11-10 RX ORDER — CHLORHEXIDINE GLUCONATE 213 G/1000ML
1 SOLUTION TOPICAL
Refills: 0 | Status: DISCONTINUED | OUTPATIENT
Start: 2023-11-10 | End: 2023-11-14

## 2023-11-10 RX ORDER — CALCITRIOL 0.5 UG/1
0.25 CAPSULE ORAL DAILY
Refills: 0 | Status: DISCONTINUED | OUTPATIENT
Start: 2023-11-11 | End: 2023-11-14

## 2023-11-10 RX ORDER — TACROLIMUS 5 MG/1
5 CAPSULE ORAL DAILY
Refills: 0 | Status: DISCONTINUED | OUTPATIENT
Start: 2023-11-10 | End: 2023-11-13

## 2023-11-10 RX ADMIN — Medication 200 MILLIGRAM(S): at 15:33

## 2023-11-10 RX ADMIN — Medication 650 MILLIGRAM(S): at 05:27

## 2023-11-10 RX ADMIN — Medication 81 MILLIGRAM(S): at 11:45

## 2023-11-10 RX ADMIN — TACROLIMUS 5 MILLIGRAM(S): 5 CAPSULE ORAL at 10:42

## 2023-11-10 RX ADMIN — Medication 1300 MILLIGRAM(S): at 22:14

## 2023-11-10 RX ADMIN — Medication 200 MILLIGRAM(S): at 05:27

## 2023-11-10 RX ADMIN — Medication 1000 UNIT(S): at 11:44

## 2023-11-10 RX ADMIN — Medication 60 MILLIGRAM(S): at 05:28

## 2023-11-10 RX ADMIN — Medication 1300 MILLIGRAM(S): at 05:28

## 2023-11-10 RX ADMIN — Medication 5 MILLIGRAM(S): at 05:27

## 2023-11-10 RX ADMIN — ATOVAQUONE 1500 MILLIGRAM(S): 750 SUSPENSION ORAL at 11:45

## 2023-11-10 RX ADMIN — Medication 650 MILLIGRAM(S): at 05:54

## 2023-11-10 RX ADMIN — HEPARIN SODIUM 5000 UNIT(S): 5000 INJECTION INTRAVENOUS; SUBCUTANEOUS at 18:09

## 2023-11-10 RX ADMIN — ERYTHROPOIETIN 10000 UNIT(S): 10000 INJECTION, SOLUTION INTRAVENOUS; SUBCUTANEOUS at 13:52

## 2023-11-10 RX ADMIN — HEPARIN SODIUM 5000 UNIT(S): 5000 INJECTION INTRAVENOUS; SUBCUTANEOUS at 05:26

## 2023-11-10 RX ADMIN — SODIUM ZIRCONIUM CYCLOSILICATE 10 GRAM(S): 10 POWDER, FOR SUSPENSION ORAL at 13:41

## 2023-11-10 RX ADMIN — Medication 1300 MILLIGRAM(S): at 13:41

## 2023-11-10 RX ADMIN — LATANOPROST 1 DROP(S): 0.05 SOLUTION/ DROPS OPHTHALMIC; TOPICAL at 22:15

## 2023-11-10 RX ADMIN — CHLORHEXIDINE GLUCONATE 1 APPLICATION(S): 213 SOLUTION TOPICAL at 13:45

## 2023-11-10 RX ADMIN — Medication 200 MILLIGRAM(S): at 22:15

## 2023-11-10 NOTE — DIETITIAN INITIAL EVALUATION ADULT - PROBLEM SELECTOR PROBLEM 2
CKD (chronic kidney disease) Normal rate, regular rhythm.  Heart sounds S1, S2.  No murmurs, rubs or gallops.

## 2023-11-10 NOTE — DIETITIAN INITIAL EVALUATION ADULT - NSFNSGIIOFT_GEN_A_CORE
- Pt denies nausea, vomiting, diarrhea, or constipation.   - Last BM:this am;  currently ordered for bowel regimen (senna)

## 2023-11-10 NOTE — DIETITIAN INITIAL EVALUATION ADULT - PERTINENT MEDS FT
MEDICATIONS  (STANDING):  aspirin  chewable 81 milliGRAM(s) Oral daily  atovaquone  Suspension 1500 milliGRAM(s) Oral daily  chlorhexidine 2% Cloths 1 Application(s) Topical <User Schedule>  cholecalciferol 1000 Unit(s) Oral daily  epoetin ivelisse (EPOGEN) Injectable 99439 Unit(s) SubCutaneous <User Schedule>  heparin   Injectable 5000 Unit(s) SubCutaneous every 12 hours  labetalol 200 milliGRAM(s) Oral three times a day  latanoprost 0.005% Ophthalmic Solution 1 Drop(s) Both EYES at bedtime  mycophenolate mofetil 500 milliGRAM(s) Oral two times a day  NIFEdipine XL 60 milliGRAM(s) Oral daily  predniSONE   Tablet 5 milliGRAM(s) Oral daily  senna 2 Tablet(s) Oral at bedtime  sodium bicarbonate 1300 milliGRAM(s) Oral three times a day  sodium zirconium cyclosilicate 10 Gram(s) Oral daily  tacrolimus ER Tablet (ENVARSUS XR) 5 milliGRAM(s) Oral daily    MEDICATIONS  (PRN):  acetaminophen     Tablet .. 650 milliGRAM(s) Oral every 6 hours PRN Temp greater or equal to 38C (100.4F), Mild Pain (1 - 3)  melatonin 3 milliGRAM(s) Oral at bedtime PRN Insomnia

## 2023-11-10 NOTE — PROGRESS NOTE ADULT - SUBJECTIVE AND OBJECTIVE BOX
[facility]   MD SAMMY ORDONEZ MD ANGELA WONG, PA QIAN CHEN, NP    TEL:  OFFICE: [phone]  From 5pm-7am Answering Service [phone]    -- RENAL FOLLOW UP NOTE ---Date of Service 11-10-23 @ 18:15    Patient is a 77y old  Female who presents with a chief complaint of HTN Emergency (10 Nov 2023 13:14)      Patient seen and examined at bedside. No chest pain/sob    VITALS:  T(F): 98.7 (11-10-23 @ 08:35), Max: 100.7 (11-10-23 @ 04:15)  HR: 76 (11-10-23 @ 15:32)  BP: 135/62 (11-10-23 @ 15:32)  RR: 18 (11-10-23 @ 08:35)  SpO2: 96% (11-10-23 @ 13:55)  Wt(kg): --    11-09 @ 07:01  -  11-10 @ 07:00  --------------------------------------------------------  IN: 100 mL / OUT: 0 mL / NET: 100 mL    11-10 @ 07:01  -  11-10 @ 18:15  --------------------------------------------------------  IN: 480 mL / OUT: 0 mL / NET: 480 mL          PHYSICAL EXAM:  General: NAD  Neck: No JVD  Respiratory: CTAB, no wheezes, rales or rhonchi  Cardiovascular: S1, S2, RRR  Gastrointestinal: BS+, soft, NT/ND  Extremities: No peripheral edema    Hospital Medications:   MEDICATIONS  (STANDING):  aspirin  chewable 81 milliGRAM(s) Oral daily  atovaquone  Suspension 1500 milliGRAM(s) Oral daily  chlorhexidine 2% Cloths 1 Application(s) Topical <User Schedule>  cholecalciferol 1000 Unit(s) Oral daily  epoetin ivelisse (EPOGEN) Injectable 61494 Unit(s) SubCutaneous <User Schedule>  heparin   Injectable 5000 Unit(s) SubCutaneous every 12 hours  labetalol 200 milliGRAM(s) Oral three times a day  latanoprost 0.005% Ophthalmic Solution 1 Drop(s) Both EYES at bedtime  mycophenolate mofetil 500 milliGRAM(s) Oral two times a day  NIFEdipine XL 60 milliGRAM(s) Oral daily  predniSONE   Tablet 5 milliGRAM(s) Oral daily  senna 2 Tablet(s) Oral at bedtime  sodium bicarbonate 1300 milliGRAM(s) Oral three times a day  sodium zirconium cyclosilicate 10 Gram(s) Oral daily  tacrolimus ER Tablet (ENVARSUS XR) 5 milliGRAM(s) Oral daily    Tacrolimus (), Serum: 2.6 ng/mL (11-10 @ 06:01)    LABS:  11-10    135  |  105  |  58<H>  ----------------------------<  94  5.1   |  17<L>  |  3.59<H>    Ca    8.7      10 Nov 2023 06:01    TPro  5.2<L>  /  Alb  2.9<L>  /  TBili  0.2  /  DBili      /  AST  11  /  ALT  <5<L>  /  AlkPhos  72  11-10    Creatinine Trend: 3.59 <--, 2.96 <--, 2.95 <--, 2.88 <--    Albumin: 2.9 g/dL (11-10 @ 06:01)  Iron Total: 15 ug/dL (11-10 @ 06:01)  Iron - Total Binding Capacity.: 140 ug/dL (11-10 @ 06:01)  Ferritin: 1903 ng/mL (11-10 @ 06:01)    calcium8.5  intact [id number]  parathyroid hormone intact, serum--                            7.2    2.05  )-----------( 201      ( 10 Nov 2023 06:01 )             22.9     Urine Studies:  Urinalysis - [11-10-23 @ 06:01]      Color  / Appearance  / SG  / pH       Gluc 94 / Ketone   / Bili  / Urobili        Blood  / Protein  / Leuk Est  / Nitrite       RBC  / WBC  / Hyaline  / Gran  / Sq Epi  / Non Sq Epi  / Bacteria       Iron 15, TIBC 140, %sat 11      [11-10-23 @ 06:01]  Ferritin 1903      [11-10-23 @ 06:01]  PTH -- (Ca 8.5)      [11-10-23 @ 06:01]   659  HbA1c 4.6      [05-28-19 @ 09:49]    HIV Nonreact      [11-08-23 @ 13:20]      RADIOLOGY & ADDITIONAL STUDIES:

## 2023-11-10 NOTE — DIETITIAN INITIAL EVALUATION ADULT - ADD RECOMMEND
- Malnutrition sticker placed, RD to follow-up as per protocol   - Food preferences obtained and updated. Menu provided. RD to honor as able   - Will continue to monitor PO intake, weight, labs, skin, GI status, diet.   - Nutrition care plan to remain consistent with pt goals of care  - RD remains available to review diet education and adjust diet recommendations as needed.

## 2023-11-10 NOTE — PROGRESS NOTE ADULT - ATTENDING COMMENTS
PT feels better but creatinine has increased and has had a temperature  Renal sonogram ok  Check bladder PVR  Hold diuretics  Please pan culture and hold aspirin.  If no infection pt may need a kidney biopsy.   Give Envarsus 5mgs and cont MMF/ pred.

## 2023-11-10 NOTE — PROGRESS NOTE ADULT - PROBLEM SELECTOR PLAN 3
With chronic graft rejection, follows w nephrology most recently 1 month PTA  Home Meds: Pred 5, Tacro 5mg qd, Mepron  - tacro level in lab  - c/w prednisone 5 qd  - c/w mepron home med  - transplant renal recs regarding dosing of tacrolimus, but holding for now  - tacro level was 7.9 on 11/9. Goal 4-6 per renal  - 11/10 tacro pending, will dose per renal recs

## 2023-11-10 NOTE — PROGRESS NOTE ADULT - ASSESSMENT
78 y/o female with PMH ESRD due to HTN since 2016, s/p DDRT (05/19/2021- induction with basiliximab), HTN came to ER for sob and swelling of the legs. Transferred to [facility] and admitted with HTN urgency and possible flash pulm edema. Renal consulted for kidney transplant management.     Pt was admitted with Scr around 2.8mg/dl with mild hyperkal around 5.6meq/L. Pt had post transplant course was complicated by DGF with last HD 06/04/2021. 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 DSA was negative. Pt also had transplant renal artery angiogram in 12/2022 which showed no stenosis. Patient saw Dr. Louise (transplant nephro) in sept 2023 during that time patient had scr increasing to 2.5mg/dL and also had ankle edema thus chlorthalidone was increased. Pt had h/o proteinuria around 2.8gms and h/o hyperkalemia with usage of lokelma. Cellcept was reduced to 500mg bid due to leukopenia and low level CMV viremia. Last few months, pt scr has been around 2.5 to 2.8mg/dL. patient also has chronic anemia and receiving epo 05608 weekly. In the ED at OSH, BNP elevated and CXR c/w pulm edema, patient received lasix 80mg iv once and hydralazine 10mg iv once. Transferred to [facility] ED, given Clonidine, hydral pushes, PO Labetalol and Nifedipine with improvement in BP.      1. s/p DDRT (05/19/2021- induction with basiliximab)  - Cr 2.9 on admission, baseline 2.5-2.8. YANELY possibly due to progression of chronic rejection vs. cardiorenal.  - BNP elevated and CXR c/w pulm edema  - Given Lasix 80mg IV on 11/8 at OSH and another dose on 11/9. Please monitor STRICT Is+Os.  - Cr uptrending. Pt feels better. Hold diuretics for now and continue to monitor.  - UA with known proteinuria and few bacteria, given one dose of CTX but no UTI symptoms  - Would obtain urine lytes and UPCR  - Last seen by Dr. Louise 9/20/23: Proteinuria 2.8 grams per day relatively stable- likely from diabetic nephropathy (donor origin).  - Transplant kidney US: No evidence of a significant renal artery stenosis. Elevated resistive indices which can be seen in rejection versus acute tubular necrosis. Similar prominence of the transplant kidney ureter with urothelial  thickening. Echogenic debris is within the ureter concerning for infection. Trace fluid adjacent to the transplant kidney and small free fluid in the pelvis.  - Bicarb low, continue home bicarb 1300mg TID. Goal bicarb >20  - continue Lokelma 10g daily    2. Immunosuppression  - s/p Simulect induction  - Envarsus 5mg daily (check daily troughs 30mins prior to next dose; tac goal 4-6), MMF 500mg BID, Pred 5mg  - PPx: Bactrim discontinued for high K. Dapsone discontinued for hemolytic anemia. Continue Mepron.    3. HTN  - c/w home labetalol and nifedipine  - holding Losartan iso YANELY abd had hyperK on admission  - consider increasing Nifedipine or adding Hydral for better control    4. Anemia  - likely has component of anemia of renal disease; also iron studies c/w HUY but need to check ferritin prior to iron supplementation, may benefit from IV iron  - can continue EPO as well, although will be more effective if iron replete. Would hold for SBP >160    5. Fever  - Tmax 100.7F (11/10 @4AM)  - pan-culture  - non-toxic appearing, continue IS as above      Rajinder William DO  Nephrology Fellow  Feel free to contact me directly on TEAMS with any questions.  (After 5pm or on weekends, please call the on-call fellow).

## 2023-11-10 NOTE — PROGRESS NOTE ADULT - SUBJECTIVE AND OBJECTIVE BOX
[facility] DIVISION OF KIDNEY DISEASES AND HYPERTENSION   FOLLOW UP NOTE    --------------------------------------------------------------------------------    SUBJECTIVE / ROS / INTERVAL EVENTS:  - Patient seen and examined at bedside  - Pt received IV Lasix 80mg yesterday but UOP not recorded. She states she feels better, breathing better  - Cr uptrended  - had temp 100.7F this AM      PAST HISTORY  --------------------------------------------------------------------------------  No significant changes to PMH, PSH, FHx, SHx, unless otherwise noted    ALLERGIES & MEDICATIONS  --------------------------------------------------------------------------------  Allergies    penicillin (Rash)  Mushrooms (Anaphylaxis)    Intolerances      Standing Inpatient Medications  aspirin  chewable 81 milliGRAM(s) Oral daily  atovaquone  Suspension 1500 milliGRAM(s) Oral daily  chlorhexidine 2% Cloths 1 Application(s) Topical <User Schedule>  cholecalciferol 1000 Unit(s) Oral daily  epoetin ivelisse (EPOGEN) Injectable 32501 Unit(s) SubCutaneous <User Schedule>  heparin   Injectable 5000 Unit(s) SubCutaneous every 12 hours  labetalol 200 milliGRAM(s) Oral three times a day  latanoprost 0.005% Ophthalmic Solution 1 Drop(s) Both EYES at bedtime  mycophenolate mofetil 500 milliGRAM(s) Oral two times a day  NIFEdipine XL 60 milliGRAM(s) Oral daily  predniSONE   Tablet 5 milliGRAM(s) Oral daily  senna 2 Tablet(s) Oral at bedtime  sodium bicarbonate 1300 milliGRAM(s) Oral three times a day  sodium zirconium cyclosilicate 10 Gram(s) Oral daily  tacrolimus ER Tablet (ENVARSUS XR) 5 milliGRAM(s) Oral daily    PRN Inpatient Medications  acetaminophen     Tablet .. 650 milliGRAM(s) Oral every 6 hours PRN  melatonin 3 milliGRAM(s) Oral at bedtime PRN      VITALS  --------------------------------------------------------------------------------  T(C): 37.1 (11-10-23 @ 08:35), Max: 38.2 (11-10-23 @ 04:15)  HR: 71 (11-10-23 @ 08:35) (65 - 83)  BP: 128/54 (11-10-23 @ 08:35) (123/61 - 178/67)  RR: 18 (11-10-23 @ 08:35) (15 - 20)  SpO2: 91% (11-10-23 @ 08:35) (91% - 95%)  Wt(kg): --  Height (cm): 162.6 (11-08-23 @ 22:50)  Weight (kg): 56.7 (11-08-23 @ 22:50)  BMI (kg/m2): 21.4 (11-08-23 @ 22:50)  BSA (m2): 1.6 (11-08-23 @ 22:50)    11-09-23 @ 07:01  -  11-10-23 @ 07:00  --------------------------------------------------------  IN: 100 mL / OUT: 0 mL / NET: 100 mL      PHYSICAL EXAM:  General: no acute distress  Neuro: no focal deficits  HEENT: NC/AT, anicteric, no JVD  Pulmonary: lungs CTA B/L  Cardiovascular/Chest: +S1S2, RRR  GI/Abdomen: soft, NT/ND, +bowel sounds        Transplant site: non-tender  Extremities: pedal edema  : voiding spontaneously   Skin: Warm and dry    LABS/STUDIES  --------------------------------------------------------------------------------              7.2    2.05  >-----------<  201      [11-10-23 @ 06:01]              22.9     135  |  105  |  58  ----------------------------<  94      [11-10-23 @ 06:01]  5.1   |  17  |  3.59        Ca     8.7     [11-10-23 @ 06:01]      Mg     1.6     [11-08-23 @ 13:20]    TPro  5.2  /  Alb  2.9  /  TBili  0.2  /  DBili  x   /  AST  11  /  ALT  <5  /  AlkPhos  72  [11-10-23 @ 06:01]    PT/INR: PT 12.3 , INR 1.18       [11-09-23 @ 00:22]  PTT: 35.5       [11-09-23 @ 00:22]      Creatinine Trend:  SCr 3.59 [11-10 @ 06:01]  SCr 2.96 [11-09 @ 07:18]  SCr 2.95 [11-09 @ 00:22]  SCr 2.88 [11-08 @ 13:20]    Urinalysis - [11-10-23 @ 06:01]      Color  / Appearance  / SG  / pH       Gluc 94 / Ketone   / Bili  / Urobili        Blood  / Protein  / Leuk Est  / Nitrite       RBC  / WBC  / Hyaline  / Gran  / Sq Epi  / Non Sq Epi  / Bacteria       HIV Nonreact      [11-08-23 @ 13:20]      TacrolimusTacrolimus (), Serum: 2.6 ng/mL (11-10 @ 06:01)  Tacrolimus (), Serum: 7.9 ng/mL (11-09 @ 07:18)

## 2023-11-10 NOTE — DIETITIAN INITIAL EVALUATION ADULT - PERTINENT LABORATORY DATA
11-10    135  |  105  |  58<H>  ----------------------------<  94  5.1   |  17<L>  |  3.59<H>    Ca    8.7      10 Nov 2023 06:01  Mg     1.6     11-08    TPro  5.2<L>  /  Alb  2.9<L>  /  TBili  0.2  /  DBili  x   /  AST  11  /  ALT  <5<L>  /  AlkPhos  72  11-10   11-10    135  |  105  |  58<H>  ----------------------------<  94  5.1   |  17<L>  |  3.59<H>    Ca    8.7      10 Nov 2023 06:01  Mg     1.6     11-08    TPro  5.2<L>  /  Alb  2.9<L>  /  TBili  0.2  /  DBili  x   /  AST  11  /  ALT  <5<L>  /  AlkPhos  72  11-10    11-10 @ 06:01: Na 135, BUN 58<H>, Cr 3.59<H>, BG 94, K+ 5.1, Phos --, Mg --, Alk Phos 72, ALT/SGPT <5<L>, AST/SGOT 11, HbA1c --

## 2023-11-10 NOTE — DIETITIAN INITIAL EVALUATION ADULT - NSICDXPASTMEDICALHX_GEN_ALL_CORE_FT
PAST MEDICAL HISTORY:  Anemia of chronic disease     Cataract     DVT (deep venous thrombosis) of Left subclavian vein, 06/12/17    ESRD (end stage renal disease) on dialysis     Glaucoma     History of arteriovenostomy for renal dialysis     [place] (hard of hearing)     HTN (hypertension)     Kidney transplant recipient

## 2023-11-10 NOTE — DIETITIAN INITIAL EVALUATION ADULT - ORAL INTAKE PTA/DIET HISTORY
pt reports was following renal diet PTA; reports had reduced appetite with not feeling well this past week; and confirmed no known food allergies/ food intolerances

## 2023-11-10 NOTE — DIETITIAN INITIAL EVALUATION ADULT - ENERGY INTAKE
improved appetite now per pt report consuming ~50% meals  % meals consumption as per flowsheets/Fair (50-75%)

## 2023-11-10 NOTE — PROGRESS NOTE ADULT - ASSESSMENT
78 y/o female w/ PMH CKD3 s/p DDRT  (05/19/2021) with chronic rejection (AMR) and HTN transfer from [facility] for hypertensive emergency causing flash pulmonary edema and shortness of breath, which has since resolved.

## 2023-11-10 NOTE — PROGRESS NOTE ADULT - ATTENDING COMMENTS
-Feels better overall from volume status s/p IV lasix yesterday. Holding diuretics today. -Trend BMP. -Hold ASA per renal txp may need renal bx if Cr continues to worsen. -Strict I's/O's. Echo shows moderate diastolic dysfunction, f/u full read. -F/u renal txp recs. -K normal today, c/w lokelma daily. -F/u urine studies. -F/u daily tacro levels. -C/w sodium bicarb for h/o metabolic acidosis. F/u renal recs.   -Had fever this 11/10 am. Last cultures from 11/8 (blood/urine) - NGTD. Viral swab on admission was neg. Hold further abx for now. -F/u repeat pancultures and UA. -Will get CT chest to further eval opacities seen on admission CXR and also thickened pericardium seen on echo.   -Iron low but ferritin elevated. May need iron repletion. -F/u vitamin D level in am.   -Monitor BP closely. Thus far today better controlled.  -OOB to chair as tolerated. -Feels better overall from volume status s/p IV lasix yesterday. Holding diuretics today. -Trend BMP. -Hold ASA per renal txp may need renal bx if Cr continues to worsen. -Strict I's/O's. Echo shows moderate diastolic dysfunction, f/u full read. -F/u renal txp recs. -K normal today, c/w lokelma daily. -F/u urine studies. -F/u daily tacro levels. -C/w sodium bicarb for h/o metabolic acidosis. F/u renal recs.   -Had fever this 11/10 am. Last cultures from 11/8 (blood/urine) - NGTD. Viral swab on admission was neg. Hold further abx for now. -F/u repeat pancultures and UA. -Will get CT chest to further eval opacities seen on admission CXR and also thickened pericardium seen on echo. C/w atovaquone for PCP PPx. -If infection is confirmed or strongly suspected, would d/w renal txp regarding possibly holding MMF short term during ?infection.   -Iron low but ferritin elevated. May need iron repletion, but avoid IV iron in setting of ?infection/fever. -F/u vitamin D level in am.   -Monitor BP closely. Thus far today better controlled.   -OOB to chair as tolerated.

## 2023-11-10 NOTE — PROGRESS NOTE ADULT - ASSESSMENT
78 y/o female w/ PMH CKD stage 3 most likely s/p DDRT (s/p DDRT (05/19/2021) with chronic rejection (AMR) and HTN transfer from [facility] c/o 2 day history of increasing SOB that began at rest yesterday worsened w/ ambulation.  Nephrology consulted for YANELY.      A/P:  S/p DDRT (5/19/2021 - induction w/ Basiliximab)  Baseline S.Cr ~1.7-2.  S.Cr 2.9 on admission.  YANELY possibly due to progression of chronic rejection vs. cardiorenal.  S.Cr worsening.  Echo w/ EF >75% on 11/10.  Pt no longer SOB; hold diuretics.  Off ARB.  UA with proteinuria and hematuria.  Check urine Na, urine urea nigtrogen, and urine creatinine.  May need kidney biopsy.  Transplant kidney US: No evidence of a significant renal artery stenosis. Elevated resistive indices which can be seen in rejection versus acute tubular necrosis. Similar prominence of the transplant kidney ureter with urothelial  thickening. Echogenic debris is within the ureter concerning for infection. Trace fluid adjacent to the transplant kidney and small free fluid in the pelvis.  Continue immunosuppressants per transplant - mycophenolate 500mg BID, prednisone 5mg qd, and Envarsus 5mg qd.  Check Tacro level 30mins prior to next dose; goal 4-6  Monitor BMP and UO.    HTN:  BP better controlled.  Resume nifedipine 60mg qd.  If remains elevated, increase nifedipine to 90mg qd.  Off ARB d/t YANELY + hyperK.  Off Lasix d/t worsening renal function.  Monitor BP.    Hyperkalemia:  K improving.  Possibly in setting of RF vs. acidosis vs. bactrim.  Off bactrim.  S/p Lokelma 10gm; continue daily dosing.  Low K diet.  Monitor K closely.    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  C/W NaHCO3 1300mg TID.    Anemia:  Likely in setting of CKD vs iron deficiency.  Tsat low.  On SHELDON [id number] TIW.  Start venofer 200mg qd x5 doses.  Hold SHELDON for BP >170/90.    CKD: MBD:  .  Start calcitriol 0.25mcg qd.  Check Ca and PO4 daily.  Low PO4 diet.    Proteinuria/Hematuria:  Possibly in setting of UTI vs. diabetic nephropathy (donor origin)  Last seen by Dr. Louise 9/20/23: Proteinuria 2.8 grams; relatively stable  Received 1 dose of antibiotic - pt asymptomatic.  Monitor.   76 y/o female w/ PMH CKD stage 3 most likely s/p DDRT (s/p DDRT (05/19/2021) with chronic rejection (AMR) and HTN transfer from [facility] c/o 2 day history of increasing SOB that began at rest yesterday worsened w/ ambulation.  Nephrology consulted for YANELY.      A/P:  S/p DDRT (5/19/2021 - induction w/ Basiliximab)  Baseline S.Cr ~1.7-2.  S.Cr 2.9 on admission.  YANELY possibly due to progression of chronic rejection vs. cardiorenal.  S.Cr worsening.  Echo w/ EF >75% on 11/10.  Pt no longer SOB; hold diuretics.  Off ARB.  UA with proteinuria and hematuria.  Check urine Na, urine urea nigtrogen, and urine creatinine.  May need kidney biopsy.  Transplant kidney US: No evidence of a significant renal artery stenosis. Elevated resistive indices which can be seen in rejection versus acute tubular necrosis. Similar prominence of the transplant kidney ureter with urothelial  thickening. Echogenic debris is within the ureter concerning for infection. Trace fluid adjacent to the transplant kidney and small free fluid in the pelvis.  Continue immunosuppressants per transplant - mycophenolate 500mg BID, prednisone 5mg qd, and Envarsus 5mg qd.  Check Tacro level 30mins prior to next dose; goal 4-6  Monitor BMP and UO.    HTN:  BP better controlled.  Resume nifedipine 60mg qd.  If remains elevated, increase nifedipine to 90mg qd.  Off ARB d/t YANELY + hyperK.  Off Lasix d/t worsening renal function.  Monitor BP.    Hyperkalemia:  K improving.  Possibly in setting of RF vs. acidosis vs. bactrim.  Off bactrim.  S/p Lokelma 10gm; continue daily dosing.  Low K diet.  Monitor K closely.    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  C/W NaHCO3 1300mg TID.    Anemia:  Likely in setting of CKD vs iron deficiency.  Tsat low.  On SHELDON [id number] TIW.  Start venofer 200mg qd x5 doses.  Hold SHELDON for BP >170/90.    CKD: MBD:  .  Start calcitriol 0.25mcg qd.  On Vit. D3 1000units qd.  Check Ca and PO4 daily.  Low PO4 diet.    Proteinuria/Hematuria:  Possibly in setting of UTI vs. diabetic nephropathy (donor origin)  Last seen by Dr. Louise 9/20/23: Proteinuria 2.8 grams; relatively stable  Received 1 dose of antibiotic - pt asymptomatic.  Monitor.   78 y/o female w/ PMH CKD stage 3 most likely s/p DDRT (s/p DDRT (05/19/2021) with chronic rejection (AMR) and HTN transfer from [facility] c/o 2 day history of increasing SOB that began at rest yesterday worsened w/ ambulation.  Nephrology consulted for YANELY.      A/P:  S/p DDRT (5/19/2021 - induction w/ Basiliximab)  Baseline S.Cr ~1.7-2.  S.Cr 2.9 on admission.  YANELY possibly due to progression of chronic rejection vs. cardiorenal.  S.Cr worsening.  Echo w/ EF >75% on 11/10.  Pt no longer SOB; hold diuretics.  Off ARB.  + low grade fever; infectious workup in progress.  UA with proteinuria and hematuria.  Check urine Na, urine urea nigtrogen, and urine creatinine.  May need kidney biopsy.  Transplant kidney US: No evidence of a significant renal artery stenosis. Elevated resistive indices which can be seen in rejection versus acute tubular necrosis. Similar prominence of the transplant kidney ureter with urothelial  thickening. Echogenic debris is within the ureter concerning for infection. Trace fluid adjacent to the transplant kidney and small free fluid in the pelvis.  Continue immunosuppressants per transplant - mycophenolate 500mg BID, prednisone 5mg qd, and Envarsus 5mg qd.  Check Tacro level 30mins prior to next dose; goal 4-6  Monitor BMP and UO.    HTN:  BP better controlled.  Resume nifedipine 60mg qd.  If remains elevated, increase nifedipine to 90mg qd.  Off ARB d/t YANELY + hyperK.  Off Lasix d/t worsening renal function.  Monitor BP.    Hyperkalemia:  K improving.  Possibly in setting of RF vs. acidosis vs. bactrim.  Off bactrim.  S/p Lokelma 10gm; continue daily dosing.  Low K diet.  Monitor K closely.    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  C/W NaHCO3 1300mg TID.    Anemia:  Likely in setting of CKD vs iron deficiency.  Tsat low - hold venofer; pt w/ low grade fever; blood cultures pending.  On SHELDON [id number] TIW.  Hold SHELDON for BP >170/90.    CKD: MBD:  .  Start calcitriol 0.25mcg qd.  On Vit. D3 1000units qd.  Check Ca and PO4 daily.  Low PO4 diet.    Proteinuria/Hematuria:  Possibly in setting of UTI vs. diabetic nephropathy (donor origin)  Last seen by Dr. Louise 9/20/23: Proteinuria 2.8 grams; relatively stable  Received 1 dose of antibiotic - pt asymptomatic.  Monitor.   76 y/o female w/ PMH CKD stage 3 most likely s/p DDRT (s/p DDRT (05/19/2021) with chronic rejection (AMR) and HTN transfer from [facility] c/o 2 day history of increasing SOB that began at rest yesterday worsened w/ ambulation.  Nephrology consulted for YANELY.      A/P:  S/p DDRT (5/19/2021 - induction w/ Basiliximab)  Baseline S.Cr ~1.7-2.  S.Cr 2.9 on admission.  YANELY possibly due to progression of chronic rejection vs. cardiorenal.  S.Cr worsening.  Echo w/ EF >75% on 11/10.  Pt no longer SOB; hold diuretics.  Off ARB.  + low grade fever; infectious workup in progress.  UA with proteinuria and hematuria.  Transplant kidney US: No evidence of a significant renal artery stenosis. Elevated resistive indices which can be seen in rejection versus acute tubular necrosis. Similar prominence of the transplant kidney ureter with urothelial  thickening. Echogenic debris is within the ureter concerning for infection. Trace fluid adjacent to the transplant kidney and small free fluid in the pelvis.  Continue immunosuppressants per transplant - mycophenolate 500mg BID, prednisone 5mg qd, and Envarsus 5mg qd.  Check Tacro level 30mins prior to next dose; goal 4-6  Monitor BMP and UO.    HTN:  BP better controlled.  Resume nifedipine 60mg qd.  If remains elevated, increase nifedipine to 90mg qd.  Off ARB d/t YANELY + hyperK.  Off Lasix d/t worsening renal function.  Monitor BP.    Hyperkalemia:  K improving.  Possibly in setting of RF vs. acidosis vs. bactrim.  Off bactrim.  S/p Lokelma 10gm; continue daily dosing.  Low K diet.  Monitor K closely.    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  C/W NaHCO3 1300mg TID.    Anemia:  Likely in setting of CKD vs iron deficiency.  Tsat low - hold venofer; pt w/ low grade fever; blood cultures pending.  On SHELDON [id number] TIW.  Hold SHELDON for BP >170/90.    CKD: MBD:  .  Start calcitriol 0.25mcg qd.  On Vit. D3 1000units qd.  Check Ca and PO4 daily.  Low PO4 diet.    Proteinuria/Hematuria:  Possibly in setting of UTI vs. diabetic nephropathy (donor origin)  Last seen by Dr. Louise 9/20/23: Proteinuria 2.8 grams; relatively stable  Received 1 dose of antibiotic - pt asymptomatic.  Monitor.

## 2023-11-10 NOTE — DIETITIAN NUTRITION RISK NOTIFICATION - INADEQUATE ENERGY INTAKE
[facility]  Brief Operative Note    Surgery Date: 10/2/2020     Surgeon(s) and Role:     * Storm Bush MD - Primary    Assisting Surgeon: None    Pre-op Diagnosis:  Acute medial meniscus tear of right knee, initial encounter [S83.241A]    Post-op Diagnosis:  Post-Op Diagnosis Codes:     * Acute medial meniscus tear of right knee, initial encounter [S83.241A]    Procedure(s) (LRB):  ARTHROSCOPY, KNEE, WITH MENISCECTOMY (Right)  MENISCECTOMY, KNEE, MEDIAL (Right)    Anesthesia: General    Description of the findings of the procedure(s):  Right knee medial meniscal tear right knee arthroscopy partial medial meniscectomy    Estimated Blood Loss: * No values recorded between 10/2/2020  9:39 AM and 10/2/2020  9:58 AM * 10         Specimens:   Specimen (12h ago, onward)    None            Discharge Note    OUTCOME: Patient tolerated treatment/procedure well without complication and is now ready for discharge.    DISPOSITION: Home or Self Care    FINAL DIAGNOSIS:  Complex tear of medial meniscus of right knee as current injury    FOLLOWUP: In clinic    DISCHARGE INSTRUCTIONS:    Discharge Procedure Orders   CRUTCHES FOR HOME USE     Order Specific Question Answer Comments   Type: Axillary    Height: 6' (1.829 m)    Weight: 90.7 kg (200 lb)    Length of need (1-99 months): 99      COVID-19 Routine Screening   Standing Status: Future Number of Occurrences: 1 Standing Exp. Date: 11/23/21     Order Specific Question Answer Comments   Is the patient symptomatic? No    Is this needed for pre-procedure or pre-op testing? Yes    Diagnosis: Preop testing [541558]      Diet general     Passive ROM, SLR, Quad sets     Activity as tolerated     Sponge bath only until clinic visit     Weight bearing restrictions (specify)   Order Comments: Weight Bearing as tolerated using crutches as needed.     No driving, operating heavy equipment or signing legal documents while taking pain medication     Call MD for:   temperature >100.4     Call MD for:  persistent nausea and vomiting     Call MD for:  severe uncontrolled pain     Call MD for:  difficulty breathing, headache or visual disturbances     Call MD for:  redness, tenderness, or signs of infection (pain, swelling, redness, odor or green/yellow discharge around incision site)     Leave dressing on - Keep it clean, dry, and intact until clinic visit      < 75% for > 7 days

## 2023-11-10 NOTE — DIETITIAN INITIAL EVALUATION ADULT - PERSON TAUGHT/METHOD
Encouraged adequate consumption of meals/supplements to optimize protein-energy intake  discussed no concentrated potassium diet/verbal instruction/patient instructed

## 2023-11-10 NOTE — PROGRESS NOTE ADULT - SUBJECTIVE AND OBJECTIVE BOX
Subjective:    INTERVAL HPI/OVERNIGHT EVENTS:   No acute events overnight  - had slight feeling of sweats overnight, temp around 4am was 100.7, she reports that that has since resolved. She feels well this AM  - otherwise no complaints. No HA CP SOB. She reports she was urinating after she received the lasix.     Telemetry:    T(F): 98.7 (11-10-23 @ 08:35), Max: 100.7 (11-10-23 @ 04:15)  HR: 71 (11-10-23 @ 08:35) (65 - 83)  BP: 128/54 (11-10-23 @ 08:35) (123/61 - 178/67)  RR: 18 (11-10-23 @ 08:35) (15 - 20)  SpO2: 91% (11-10-23 @ 08:35) (91% - 95%)  I&O's Summary    09 Nov 2023 07:01  -  10 Nov 2023 07:00  --------------------------------------------------------  IN: 100 mL / OUT: 0 mL / NET: 100 mL      Weight (kg): 56.7 (11-08-23 @ 22:50), 55.792 (11-08-23 @ 12:06)    Medications:  acetaminophen     Tablet .. 650 milliGRAM(s) Oral every 6 hours PRN  aspirin  chewable 81 milliGRAM(s) Oral daily  atovaquone  Suspension 1500 milliGRAM(s) Oral daily  cholecalciferol 1000 Unit(s) Oral daily  epoetin ivelisse (EPOGEN) Injectable 80164 Unit(s) SubCutaneous <User Schedule>  heparin   Injectable 5000 Unit(s) SubCutaneous every 12 hours  labetalol 200 milliGRAM(s) Oral three times a day  latanoprost 0.005% Ophthalmic Solution 1 Drop(s) Both EYES at bedtime  melatonin 3 milliGRAM(s) Oral at bedtime PRN  mycophenolate mofetil 500 milliGRAM(s) Oral two times a day  NIFEdipine XL 60 milliGRAM(s) Oral daily  predniSONE   Tablet 5 milliGRAM(s) Oral daily  senna 2 Tablet(s) Oral at bedtime  sodium bicarbonate 1300 milliGRAM(s) Oral three times a day  sodium zirconium cyclosilicate 10 Gram(s) Oral daily    PHYSICAL EXAM:  GENERAL APPEARANCE: Well developed, NAD  HEENT:  Evidence of prior cataract surgery bilaterally, vision grossly intact.   NECK: Neck supple, non-tender no lymphadenopathy, masses or thyromegaly.  CARDIAC: Systolic murmur appreciated, unclear if related to AVF in RUE. Normal S1 and S2. RRR  LUNGS: Crackles in lower lobes posteriorly, bilaterally.   ABDOMEN: Soft , NTND, bowel sounds normal. No guarding or rebound.   MUSCULOSKELETAL: ROM intact.  No joint erythema or tenderness.   EXTREMITIES: Trace tender edema to mid calves bilaterally.   NEUROLOGICAL: Non focal. Strength and sensation symmetric and intact throughout.   SKIN: Warm and dry , Well perfused  PSYCHIATRIC: AOx3, Normal mood and affect    Notable Labs:    Labs:                          7.2    2.05  )-----------( 201      ( 10 Nov 2023 06:01 )             22.9     11-10    135  |  105  |  58<H>  ----------------------------<  94  5.1   |  17<L>  |  3.59<H>    Ca    8.7      10 Nov 2023 06:01  Mg     1.6     11-08    TPro  5.2<L>  /  Alb  2.9<L>  /  TBili  0.2  /  DBili  x   /  AST  11  /  ALT  <5<L>  /  AlkPhos  72  11-10    LIVER FUNCTIONS - ( 10 Nov 2023 06:01 )  Alb: 2.9 g/dL / Pro: 5.2 g/dL / ALK PHOS: 72 U/L / ALT: <5 U/L / AST: 11 U/L / GGT: x           PT/INR - ( 09 Nov 2023 00:22 )   PT: 12.3 sec;   INR: 1.18 ratio         PTT - ( 09 Nov 2023 00:22 )  PTT:35.5 sec  CAPILLARY BLOOD GLUCOSE    Urinalysis Basic - ( 10 Nov 2023 06:01 )    Color: x / Appearance: x / SG: x / pH: x  Gluc: 94 mg/dL / Ketone: x  / Bili: x / Urobili: x   Blood: x / Protein: x / Nitrite: x   Leuk Esterase: x / RBC: x / WBC x   Sq Epi: x / Non Sq Epi: x / Bacteria: x    Micro:    Culture - Urine (collected 11-08-23 @ 14:40)  Source: Clean Catch Clean Catch (Midstream)  Final Report (11-09-23 @ 23:06):    <10,000 CFU/mL Normal Urogenital Rosalinda    Culture - Blood (collected 11-08-23 @ 13:25)  Source: .Blood Blood-Peripheral  Preliminary Report (11-09-23 @ 19:01):    No growth at 24 hours    Culture - Blood (collected 11-08-23 @ 13:20)  Source: .Blood Blood-Peripheral  Preliminary Report (11-09-23 @ 19:01):    No growth at 24 hours    RADIOLOGY & ADDITIONAL TESTS:    NNI

## 2023-11-11 DIAGNOSIS — R50.9 FEVER, UNSPECIFIED: ICD-10-CM

## 2023-11-11 DIAGNOSIS — J18.9 PNEUMONIA, UNSPECIFIED ORGANISM: ICD-10-CM

## 2023-11-11 LAB
24R-OH-CALCIDIOL SERPL-MCNC: 16.5 NG/ML — LOW (ref 30–80)
24R-OH-CALCIDIOL SERPL-MCNC: 16.5 NG/ML — LOW (ref 30–80)
ALBUMIN SERPL ELPH-MCNC: 2.8 G/DL — LOW (ref 3.3–5)
ALBUMIN SERPL ELPH-MCNC: 2.8 G/DL — LOW (ref 3.3–5)
ALP SERPL-CCNC: 65 U/L — SIGNIFICANT CHANGE UP (ref 40–120)
ALP SERPL-CCNC: 65 U/L — SIGNIFICANT CHANGE UP (ref 40–120)
ALT FLD-CCNC: <5 U/L — LOW (ref 10–45)
ALT FLD-CCNC: <5 U/L — LOW (ref 10–45)
ANION GAP SERPL CALC-SCNC: 15 MMOL/L — SIGNIFICANT CHANGE UP (ref 5–17)
ANION GAP SERPL CALC-SCNC: 15 MMOL/L — SIGNIFICANT CHANGE UP (ref 5–17)
AST SERPL-CCNC: 10 U/L — SIGNIFICANT CHANGE UP (ref 10–40)
AST SERPL-CCNC: 10 U/L — SIGNIFICANT CHANGE UP (ref 10–40)
BILIRUB SERPL-MCNC: 0.2 MG/DL — SIGNIFICANT CHANGE UP (ref 0.2–1.2)
BILIRUB SERPL-MCNC: 0.2 MG/DL — SIGNIFICANT CHANGE UP (ref 0.2–1.2)
BUN SERPL-MCNC: 62 MG/DL — HIGH (ref 7–23)
BUN SERPL-MCNC: 62 MG/DL — HIGH (ref 7–23)
CALCIUM SERPL-MCNC: 8.3 MG/DL — LOW (ref 8.4–10.5)
CALCIUM SERPL-MCNC: 8.3 MG/DL — LOW (ref 8.4–10.5)
CHLORIDE SERPL-SCNC: 105 MMOL/L — SIGNIFICANT CHANGE UP (ref 96–108)
CHLORIDE SERPL-SCNC: 105 MMOL/L — SIGNIFICANT CHANGE UP (ref 96–108)
CO2 SERPL-SCNC: 17 MMOL/L — LOW (ref 22–31)
CO2 SERPL-SCNC: 17 MMOL/L — LOW (ref 22–31)
CREAT SERPL-MCNC: 3.74 MG/DL — HIGH (ref 0.5–1.3)
CREAT SERPL-MCNC: 3.74 MG/DL — HIGH (ref 0.5–1.3)
CRP SERPL-MCNC: 48 MG/L — HIGH (ref 0–4)
CRP SERPL-MCNC: 48 MG/L — HIGH (ref 0–4)
CULTURE RESULTS: SIGNIFICANT CHANGE UP
CULTURE RESULTS: SIGNIFICANT CHANGE UP
EGFR: 12 ML/MIN/1.73M2 — LOW
EGFR: 12 ML/MIN/1.73M2 — LOW
ERYTHROCYTE [SEDIMENTATION RATE] IN BLOOD: 45 MM/HR — HIGH (ref 0–20)
ERYTHROCYTE [SEDIMENTATION RATE] IN BLOOD: 45 MM/HR — HIGH (ref 0–20)
FERRITIN SERPL-MCNC: 1817 NG/ML — HIGH (ref 13–330)
FERRITIN SERPL-MCNC: 1817 NG/ML — HIGH (ref 13–330)
GAS PNL BLDV: SIGNIFICANT CHANGE UP
GAS PNL BLDV: SIGNIFICANT CHANGE UP
GLUCOSE SERPL-MCNC: 94 MG/DL — SIGNIFICANT CHANGE UP (ref 70–99)
GLUCOSE SERPL-MCNC: 94 MG/DL — SIGNIFICANT CHANGE UP (ref 70–99)
HCT VFR BLD CALC: 23 % — LOW (ref 34.5–45)
HCT VFR BLD CALC: 23 % — LOW (ref 34.5–45)
HGB BLD-MCNC: 7.2 G/DL — LOW (ref 11.5–15.5)
HGB BLD-MCNC: 7.2 G/DL — LOW (ref 11.5–15.5)
LDH SERPL L TO P-CCNC: 204 U/L — SIGNIFICANT CHANGE UP (ref 50–242)
LDH SERPL L TO P-CCNC: 204 U/L — SIGNIFICANT CHANGE UP (ref 50–242)
MAGNESIUM SERPL-MCNC: 1.6 MG/DL — SIGNIFICANT CHANGE UP (ref 1.6–2.6)
MAGNESIUM SERPL-MCNC: 1.6 MG/DL — SIGNIFICANT CHANGE UP (ref 1.6–2.6)
MCHC RBC-ENTMCNC: 26.1 PG — LOW (ref 27–34)
MCHC RBC-ENTMCNC: 26.1 PG — LOW (ref 27–34)
MCHC RBC-ENTMCNC: 31.3 GM/DL — LOW (ref 32–36)
MCHC RBC-ENTMCNC: 31.3 GM/DL — LOW (ref 32–36)
MCV RBC AUTO: 83.3 FL — SIGNIFICANT CHANGE UP (ref 80–100)
MCV RBC AUTO: 83.3 FL — SIGNIFICANT CHANGE UP (ref 80–100)
MRSA PCR RESULT.: SIGNIFICANT CHANGE UP
NRBC # BLD: 0 /100 WBCS — SIGNIFICANT CHANGE UP (ref 0–0)
NRBC # BLD: 0 /100 WBCS — SIGNIFICANT CHANGE UP (ref 0–0)
PHOSPHATE SERPL-MCNC: 4.1 MG/DL — SIGNIFICANT CHANGE UP (ref 2.5–4.5)
PHOSPHATE SERPL-MCNC: 4.1 MG/DL — SIGNIFICANT CHANGE UP (ref 2.5–4.5)
PLATELET # BLD AUTO: 210 K/UL — SIGNIFICANT CHANGE UP (ref 150–400)
PLATELET # BLD AUTO: 210 K/UL — SIGNIFICANT CHANGE UP (ref 150–400)
POTASSIUM SERPL-MCNC: 4.1 MMOL/L — SIGNIFICANT CHANGE UP (ref 3.5–5.3)
POTASSIUM SERPL-MCNC: 4.1 MMOL/L — SIGNIFICANT CHANGE UP (ref 3.5–5.3)
POTASSIUM SERPL-SCNC: 4.1 MMOL/L — SIGNIFICANT CHANGE UP (ref 3.5–5.3)
POTASSIUM SERPL-SCNC: 4.1 MMOL/L — SIGNIFICANT CHANGE UP (ref 3.5–5.3)
PROT SERPL-MCNC: 5.1 G/DL — LOW (ref 6–8.3)
PROT SERPL-MCNC: 5.1 G/DL — LOW (ref 6–8.3)
RBC # BLD: 2.76 M/UL — LOW (ref 3.8–5.2)
RBC # BLD: 2.76 M/UL — LOW (ref 3.8–5.2)
RBC # FLD: 14.6 % — HIGH (ref 10.3–14.5)
RBC # FLD: 14.6 % — HIGH (ref 10.3–14.5)
RHEUMATOID FACT SERPL-ACNC: 10 IU/ML — SIGNIFICANT CHANGE UP (ref 0–13)
RHEUMATOID FACT SERPL-ACNC: 10 IU/ML — SIGNIFICANT CHANGE UP (ref 0–13)
S AUREUS DNA NOSE QL NAA+PROBE: SIGNIFICANT CHANGE UP
SODIUM SERPL-SCNC: 137 MMOL/L — SIGNIFICANT CHANGE UP (ref 135–145)
SODIUM SERPL-SCNC: 137 MMOL/L — SIGNIFICANT CHANGE UP (ref 135–145)
SPECIMEN SOURCE: SIGNIFICANT CHANGE UP
SPECIMEN SOURCE: SIGNIFICANT CHANGE UP
TACROLIMUS SERPL-MCNC: 5.6 NG/ML — SIGNIFICANT CHANGE UP
TACROLIMUS SERPL-MCNC: 5.6 NG/ML — SIGNIFICANT CHANGE UP
URATE SERPL-MCNC: 8.7 MG/DL — HIGH (ref 2.5–7)
URATE SERPL-MCNC: 8.7 MG/DL — HIGH (ref 2.5–7)
UUN UR-MCNC: 506 MG/DL — SIGNIFICANT CHANGE UP
UUN UR-MCNC: 506 MG/DL — SIGNIFICANT CHANGE UP
VIT D25+D1,25 OH+D1,25 PNL SERPL-MCNC: 80.3 PG/ML — HIGH (ref 19.9–79.3)
VIT D25+D1,25 OH+D1,25 PNL SERPL-MCNC: 80.3 PG/ML — HIGH (ref 19.9–79.3)
WBC # BLD: 1.77 K/UL — LOW (ref 3.8–10.5)
WBC # BLD: 1.77 K/UL — LOW (ref 3.8–10.5)
WBC # FLD AUTO: 1.77 K/UL — LOW (ref 3.8–10.5)
WBC # FLD AUTO: 1.77 K/UL — LOW (ref 3.8–10.5)

## 2023-11-11 PROCEDURE — 99232 SBSQ HOSP IP/OBS MODERATE 35: CPT | Mod: GC

## 2023-11-11 RX ORDER — LABETALOL HCL 100 MG
200 TABLET ORAL ONCE
Refills: 0 | Status: COMPLETED | OUTPATIENT
Start: 2023-11-11 | End: 2023-11-11

## 2023-11-11 RX ORDER — CEFEPIME 1 G/1
1000 INJECTION, POWDER, FOR SOLUTION INTRAMUSCULAR; INTRAVENOUS EVERY 24 HOURS
Refills: 0 | Status: DISCONTINUED | OUTPATIENT
Start: 2023-11-11 | End: 2023-11-14

## 2023-11-11 RX ORDER — HYDRALAZINE HCL 50 MG
25 TABLET ORAL ONCE
Refills: 0 | Status: COMPLETED | OUTPATIENT
Start: 2023-11-11 | End: 2023-11-11

## 2023-11-11 RX ADMIN — HEPARIN SODIUM 5000 UNIT(S): 5000 INJECTION INTRAVENOUS; SUBCUTANEOUS at 05:15

## 2023-11-11 RX ADMIN — Medication 25 MILLIGRAM(S): at 06:09

## 2023-11-11 RX ADMIN — CALCITRIOL 0.25 MICROGRAM(S): 0.5 CAPSULE ORAL at 11:33

## 2023-11-11 RX ADMIN — TACROLIMUS 5 MILLIGRAM(S): 5 CAPSULE ORAL at 05:14

## 2023-11-11 RX ADMIN — Medication 200 MILLIGRAM(S): at 13:15

## 2023-11-11 RX ADMIN — Medication 1300 MILLIGRAM(S): at 21:05

## 2023-11-11 RX ADMIN — Medication 200 MILLIGRAM(S): at 05:09

## 2023-11-11 RX ADMIN — Medication 650 MILLIGRAM(S): at 05:10

## 2023-11-11 RX ADMIN — CEFEPIME 100 MILLIGRAM(S): 1 INJECTION, POWDER, FOR SOLUTION INTRAMUSCULAR; INTRAVENOUS at 18:13

## 2023-11-11 RX ADMIN — SODIUM ZIRCONIUM CYCLOSILICATE 10 GRAM(S): 10 POWDER, FOR SUSPENSION ORAL at 13:15

## 2023-11-11 RX ADMIN — Medication 5 MILLIGRAM(S): at 05:14

## 2023-11-11 RX ADMIN — Medication 60 MILLIGRAM(S): at 05:09

## 2023-11-11 RX ADMIN — Medication 1300 MILLIGRAM(S): at 05:15

## 2023-11-11 RX ADMIN — Medication 650 MILLIGRAM(S): at 06:09

## 2023-11-11 RX ADMIN — Medication 200 MILLIGRAM(S): at 21:05

## 2023-11-11 RX ADMIN — Medication 1300 MILLIGRAM(S): at 13:15

## 2023-11-11 RX ADMIN — CHLORHEXIDINE GLUCONATE 1 APPLICATION(S): 213 SOLUTION TOPICAL at 05:16

## 2023-11-11 RX ADMIN — ATOVAQUONE 1500 MILLIGRAM(S): 750 SUSPENSION ORAL at 11:33

## 2023-11-11 RX ADMIN — Medication 200 MILLIGRAM(S): at 00:57

## 2023-11-11 RX ADMIN — LATANOPROST 1 DROP(S): 0.05 SOLUTION/ DROPS OPHTHALMIC; TOPICAL at 21:06

## 2023-11-11 RX ADMIN — HEPARIN SODIUM 5000 UNIT(S): 5000 INJECTION INTRAVENOUS; SUBCUTANEOUS at 17:45

## 2023-11-11 RX ADMIN — Medication 1000 UNIT(S): at 11:32

## 2023-11-11 NOTE — PROGRESS NOTE ADULT - PROBLEM SELECTOR PLAN 5
Diet: Regular, low K  DVT PPx: Heparin SQ 5000 q12  Dispo: BP stability, renal management + diuresis Likely i/s/o immunosuppression, chronic   WBC ~3 (w slight neutropenia and lymphonenia)   Hb ~8-8.5 and stable, no signs of bleeding acutely  Of note was bandemic to 16%, which resolved on subsequent lab  s/p CTX in ED  Procal borderline  - Monitor WBC; monitor off abx for now since not toxic appearing  - f/u cultures regarding above  - Monitor Hb, transfuse if needed  - reticulocyte, iron studies  in AM

## 2023-11-11 NOTE — PROGRESS NOTE ADULT - ASSESSMENT
76 y/o female w/ PMH CKD3 s/p DDRT  (05/19/2021) with chronic rejection (AMR) and HTN transfer from [facility] for hypertensive emergency causing flash pulmonary edema and shortness of breath, which has since resolved. 76 y/o female w/ PMH CKD3 s/p DDRT  (05/19/2021) with chronic rejection (AMR) and HTN transfer from [facility] for hypertensive emergency causing flash pulmonary edema and shortness of breath, which has since resolved. c/c/b intermittent fever, CTC showed multifocal PNA.

## 2023-11-11 NOTE — PROGRESS NOTE ADULT - PROBLEM SELECTOR PLAN 3
With chronic graft rejection, follows w nephrology most recently 1 month PTA  Home Meds: Pred 5, Tacro 5mg qd, Mepron  - tacro level in lab  - c/w prednisone 5 qd  - c/w mepron home med  - transplant renal recs regarding dosing of tacrolimus, goal 4-6  - tacro level was 7.9 on 11/9. Goal 4-6 per renal  - 11/10 tacro pending, will dose per renal recs

## 2023-11-11 NOTE — PROGRESS NOTE ADULT - PROBLEM SELECTOR PLAN 4
Likely i/s/o immunosuppression, chronic   WBC ~3 (w slight neutropenia and lymphonenia)   Hb ~8-8.5 and stable, no signs of bleeding acutely  Of note was bandemic to 16%, which resolved on subsequent lab  s/p CTX in ED  Procal borderline  - Monitor WBC; monitor off abx for now since not toxic appearing  - f/u cultures regarding above  - Monitor Hb, transfuse if needed  - reticulocyte, iron studies  in AM Intermittent subjective and objective fever since admission. c/o associated sinus tenderness, rhinorrhea, nasal congestion since ~ 1 week ago. Denies chest pain, sob, n/v/d, dysuria. Patient endorse hx of past sinusitis and states feeling like she is having one    - possibly 2/2 sinusitis, likely viral in etiology; vs. PNA  - CTC 11/10: RLL consolidation, however patient nontoxic, no sob, borderline procal 0.19  - s/p CTX in ED  - will monitor off abx  - f/u Bcx, Ucx, sputum cx Intermittent subjective and objective fever since admission. c/o associated sinus tenderness, rhinorrhea, nasal congestion since ~ 1 week ago. Denies chest pain, sob, n/v/d, dysuria. Patient endorse hx of past sinusitis and states feeling like she is having one    - possibly 2/2 sinusitis, likely viral in etiology; vs. PNA  - CTC 11/10: multifocal PNA patient nontoxic, no sob, borderline procal 0.19  - s/p CTX in ED  - will monitor off abx  - f/u Bcx, Ucx, sputum cx Intermittent subjective and objective fever since admission. Likely PNA based on CTC findings    - s/p CTX in the ED  - CTC 11/10: multifocal PNA  - MRSA negative  - Will start Cefepime 1g q24  - f/u Bcx, Ucx, sputum cx

## 2023-11-11 NOTE — PROGRESS NOTE ADULT - PROBLEM SELECTOR PLAN 2
SCr ~2.9  W some hyperkalemia now s/p lokelma   S/p lasix 80 IV in [facility]  Pulmonary edema on CXR, trace LE edema  US renal with evidence of renal rejection, no evidence of renal artery stenosis.   - TTE 11/10: LV diastolic dysfunction, small pericardial effusion  - additional lasix 80 IV per renal, considering daily dose  - renal recs  - monitor SCr, avoid nephrotoxins  - transplant medications as below

## 2023-11-11 NOTE — PROGRESS NOTE ADULT - SUBJECTIVE AND OBJECTIVE BOX
[facility] [place] [facility]   MD SAMMY ORDONEZ MD RUORU WONG, PA    TEL:  FROM 9 AM to 5 PM ---OFFICE: [phone]  AVAILABLE ON TEAMS    FROM 5 PM - 9 AM PLEASE CALL ANSWERING SERVICE: [phone]    RENAL FOLLOW UP NOTE--Date of Service 11-11-23 @ 09:04  --------------------------------------------------------------------------------  HPI:      Pt seen and examined at bedside.       PAST HISTORY  --------------------------------------------------------------------------------  No significant changes to PMH, PSH, FHx, SHx, unless otherwise noted    ALLERGIES & MEDICATIONS  --------------------------------------------------------------------------------  Allergies    penicillin (Rash)  Mushrooms (Anaphylaxis)    Intolerances      Standing Inpatient Medications  atovaquone  Suspension 1500 milliGRAM(s) Oral daily  calcitriol   Capsule 0.25 MICROGram(s) Oral daily  chlorhexidine 2% Cloths 1 Application(s) Topical <User Schedule>  cholecalciferol 1000 Unit(s) Oral daily  epoetin ivelisse (EPOGEN) Injectable 32184 Unit(s) SubCutaneous <User Schedule>  heparin   Injectable 5000 Unit(s) SubCutaneous every 12 hours  labetalol 200 milliGRAM(s) Oral three times a day  latanoprost 0.005% Ophthalmic Solution 1 Drop(s) Both EYES at bedtime  mycophenolate mofetil 500 milliGRAM(s) Oral two times a day  NIFEdipine XL 60 milliGRAM(s) Oral daily  predniSONE   Tablet 5 milliGRAM(s) Oral daily  senna 2 Tablet(s) Oral at bedtime  sodium bicarbonate 1300 milliGRAM(s) Oral three times a day  sodium zirconium cyclosilicate 10 Gram(s) Oral daily  tacrolimus ER Tablet (ENVARSUS XR) 5 milliGRAM(s) Oral daily    PRN Inpatient Medications  acetaminophen     Tablet .. 650 milliGRAM(s) Oral every 6 hours PRN  melatonin 3 milliGRAM(s) Oral at bedtime PRN      REVIEW OF SYSTEMS  --------------------------------------------------------------------------------  General: no fever  MSK: no edema     VITALS/PHYSICAL EXAM  --------------------------------------------------------------------------------  T(C): 37.1 (11-11-23 @ 08:08), Max: 37.4 (11-11-23 @ 00:27)  HR: 74 (11-11-23 @ 08:08) (65 - 80)  BP: 178/78 (11-11-23 @ 08:08) (124/77 - 185/79)  RR: 18 (11-11-23 @ 08:08) (18 - 18)  SpO2: 96% (11-11-23 @ 08:08) (94% - 96%)  Wt(kg): --        11-10-23 @ 07:01  -  11-11-23 @ 07:00  --------------------------------------------------------  IN: 580 mL / OUT: 800 mL / NET: -220 mL      Physical Exam:  	Gen: NAD  	HEENT: MMM  	Pulm: CTA B/L  	CV: S1S2  	Abd: Soft, +BS  	Ext: No LE edema B/L                      Neuro: Awake   	Skin: Warm and Dry   	Vascular access: NO HD catheter            no  sethi  LABS/STUDIES  --------------------------------------------------------------------------------              7.2    1.77  >-----------<  210      [11-11-23 @ 06:09]              23.0     137  |  105  |  62  ----------------------------<  94      [11-11-23 @ 06:09]  4.1   |  17  |  3.74        Ca     8.3     [11-11-23 @ 06:09]      Mg     1.6     [11-11-23 @ 06:09]      Phos  4.1     [11-11-23 @ 06:09]    TPro  5.1  /  Alb  2.8  /  TBili  0.2  /  DBili  x   /  AST  10  /  ALT  <5  /  AlkPhos  65  [11-11-23 @ 06:09]        Uric acid 8.7      [11-11-23 @ 06:09]        [11-11-23 @ 06:09]    Creatinine Trend:  SCr 3.74 [11-11 @ 06:09]  SCr 3.59 [11-10 @ 06:01]  SCr 2.96 [11-09 @ 07:18]  SCr 2.95 [11-09 @ 00:22]  SCr 2.88 [11-08 @ 13:20]    Urinalysis - [11-11-23 @ 06:09]      Color  / Appearance  / SG  / pH       Gluc 94 / Ketone   / Bili  / Urobili        Blood  / Protein  / Leuk Est  / Nitrite       RBC  / WBC  / Hyaline  / Gran  / Sq Epi  / Non Sq Epi  / Bacteria     Urine Creatinine 117      [11-10-23 @ 20:09]  Urine Protein 276      [11-10-23 @ 20:09]  Urine Sodium 48      [11-10-23 @ 20:09]  Urine Urea Nitrogen 506      [11-10-23 @ 20:09]    Iron 15, TIBC 140, %sat 11      [11-10-23 @ 06:01]  Ferritin 1903      [11-10-23 @ 06:01]  PTH -- (Ca 8.5)      [11-10-23 @ 06:01]   659  HbA1c 4.6      [05-28-19 @ 09:49]    HIV Nonreact      [11-08-23 @ 13:20]

## 2023-11-11 NOTE — PROGRESS NOTE ADULT - ASSESSMENT
76 y/o female w/ PMH CKD stage 3 most likely s/p DDRT (s/p DDRT (05/19/2021) with chronic rejection (AMR) and HTN transfer from [facility] c/o 2 day history of increasing SOB that began at rest yesterday worsened w/ ambulation.  Nephrology consulted for YANELY.      A/P:  S/p DDRT (5/19/2021 - induction w/ Basiliximab)  Baseline S.Cr ~1.7-2.  S.Cr 2.9 on admission.  YANELY possibly due to progression of chronic rejection vs. cardiorenal.  S.Cr worsening.---> transplant follow up   Echo w/ EF >75% on 11/10.  Pt no longer SOB; hold diuretics.  Off ARB.  + low grade fever; infectious workup in progress.  UA with proteinuria and hematuria.  Transplant kidney US: No evidence of a significant renal artery stenosis. Elevated resistive indices which can be seen in rejection versus acute tubular necrosis. Similar prominence of the transplant kidney ureter with urothelial  thickening. Echogenic debris is within the ureter concerning for infection. Trace fluid adjacent to the transplant kidney and small free fluid in the pelvis.  Continue immunosuppressants per transplant - mycophenolate 500mg BID, prednisone 5mg qd, and Envarsus 5mg qd.  Check Tacro level 30mins prior to next dose; goal 4-6  Monitor BMP and UO.    HTN:  BP elevated  If remains elevated, increase nifedipine to 90mg qd.  Off ARB d/t YANELY + hyperK.  Off Lasix d/t worsening renal function.  Monitor BP.    Hyperkalemia:  K improving.  Possibly in setting of RF vs. acidosis vs. bactrim.  Off bactrim.  S/p Lokelma 10gm; continue daily dosing.  Low K diet.  Monitor K closely.    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  C/W NaHCO3 1300mg TID.    Anemia:  Likely in setting of CKD vs iron deficiency.  Tsat low - hold venofer; pt w/ low grade fever; blood cultures pending.  On SHELDON [id number] TIW.  Hold SHELDON for BP >170/90.    CKD: MBD:  .  Start calcitriol 0.25mcg qd.  On Vit. D3 1000units qd.  Check Ca and PO4 daily.  Low PO4 diet.    Proteinuria/Hematuria:  Possibly in setting of UTI vs. diabetic nephropathy (donor origin)  Last seen by Dr. Louise 9/20/23: Proteinuria 2.8 grams; relatively stable  Received 1 dose of antibiotic - pt asymptomatic.  Monitor.

## 2023-11-11 NOTE — PROGRESS NOTE ADULT - ATTENDING COMMENTS
76 y/o female w/ PMH CKD3 s/p DDRT  (05/19/2021) with chronic rejection (AMR) and HTN transfer from [facility] for hypertensive emergency causing flash pulmonary edema and shortness of breath, which has since resolved. Patient's hospital course is complicated by fever which is likely secondary to multifocal pneumonia.     Echo w/ EF >75% on 11/10.  CT Chest--> Multifocal pneumonia     1) PNA:   CT chest reveals multifocal pneumonia. Plan to start patient on Cefepime. MRSA swab negative. Followup cultures. Continue Atovaquone for PCP prophylaxis.     2) CKD  Tacrolimus levels 5.6. Will followup with transplant renal regarding dosing. Continue sodium bicarb for metabolic acidosis. Continue Lokelma. Started on calcitriol.     3) HTN emergency  BP improved today. Monitor VS    4) Bicytopenia  Avoid IV iron in setting of infection.

## 2023-11-11 NOTE — PROGRESS NOTE ADULT - SUBJECTIVE AND OBJECTIVE BOX
Patient is a 77y old  Female who presents with a chief complaint of HTN Emergency (10 Nov 2023 18:14)      ======Overnight/Subjective======  Overnight:    Subjective:    ======Medications======  MEDICATIONS  (STANDING):  atovaquone  Suspension 1500 milliGRAM(s) Oral daily  calcitriol   Capsule 0.25 MICROGram(s) Oral daily  chlorhexidine 2% Cloths 1 Application(s) Topical <User Schedule>  cholecalciferol 1000 Unit(s) Oral daily  epoetin ivelisse (EPOGEN) Injectable 51629 Unit(s) SubCutaneous <User Schedule>  heparin   Injectable 5000 Unit(s) SubCutaneous every 12 hours  labetalol 200 milliGRAM(s) Oral three times a day  latanoprost 0.005% Ophthalmic Solution 1 Drop(s) Both EYES at bedtime  mycophenolate mofetil 500 milliGRAM(s) Oral two times a day  NIFEdipine XL 60 milliGRAM(s) Oral daily  predniSONE   Tablet 5 milliGRAM(s) Oral daily  senna 2 Tablet(s) Oral at bedtime  sodium bicarbonate 1300 milliGRAM(s) Oral three times a day  sodium zirconium cyclosilicate 10 Gram(s) Oral daily  tacrolimus ER Tablet (ENVARSUS XR) 5 milliGRAM(s) Oral daily    MEDICATIONS  (PRN):  acetaminophen     Tablet .. 650 milliGRAM(s) Oral every 6 hours PRN Temp greater or equal to 38C (100.4F), Mild Pain (1 - 3)  melatonin 3 milliGRAM(s) Oral at bedtime PRN Insomnia      ======Vital Signs======  T(C): 37.3 (23 @ 04:52), Max: 37.4 (23 @ 00:27)  T(F): 99.2 (23 @ 04:52), Max: 99.3 (23 @ 00:27)  HR: 69 (23 @ 05:43) (65 - 80)  BP: 185/79 (23 @ 05:43) (124/77 - 185/79)  BP(mean): --  RR: 18 (23 @ 04:52) (18 - 18)  SpO2: 94% (23 @ 04:52) (91% - 96%)    ======Physical exams======  GENERAL: AAOx3, NAD  Cardiovascular: RRR, S1 S2, no m/r/g. No extremities edema, no JVD  LUNGS: Unlabored respirations, CTABL no wheeze/rhonchi/crackles  ABDOMEN: Soft, NTND, no rebound or guarding, BS presents. No CVA tenderness.  EXTREMITIES: Warm extremities, no clubbing, cyanosis, or edema, pulses 2+ bilaterally  NEURO: CN 2-12 grossly intact, strength 5/5 throughout, sensation intact    ======Labs======                7.2<L>  1.77<L> >----------< 210  (MCV: 83.3)                23.0<L>   137 | 105 | 62<H>  -----------------------< 94  4.1 | 17<L> | 3.74<H>    TPro: 5.1<L> / Alb: 2.8<L> / TBili: 0.2 / DBili: -- / AlkPhos: 65 / ALT: <5<L> / AST: 10 (23 @ 06:09)  Ca: 8.3<L> / Phos: 4.1 / M.6 (23 @ 06:09)    Gas: 7.35 / 33<L> / 120<H> / 18<L> / 98.9<H>% / -6.7<L> (23 @ 06:13)      ======Microbiology======  Urinalysis Basic - ( 2023 06:09 )    Color: x / Appearance: x / SG: x / pH: x  Gluc: 94 mg/dL / Ketone: x  / Bili: x / Urobili: x   Blood: x / Protein: x / Nitrite: x   Leuk Esterase: x / RBC: x / WBC x   Sq Epi: x / Non Sq Epi: x / Bacteria: x        Culture - Urine (collected 2023 14:40)  Source: Clean Catch Clean Catch (Midstream)  Final Report (2023 23:06):    <10,000 CFU/mL Normal Urogenital Rosalinda    Culture - Blood (collected 2023 13:25)  Source: .Blood Blood-Peripheral  Preliminary Report (10 Nov 2023 19:02):    No growth at 48 Hours    Culture - Blood (collected 2023 13:20)  Source: .Blood Blood-Peripheral  Preliminary Report (10 Nov 2023 19:02):    No growth at 48 Hours        ======I&O's======  I&O's Summary    10 Nov 2023 07:01  -  2023 07:00  --------------------------------------------------------  IN: 580 mL / OUT: 800 mL / NET: -220 mL         Patient is a 77y old  Female who presents with a chief complaint of HTN Emergency (10 Nov 2023 18:14)      ======Overnight/Subjective======  Overnight: No acute event overnight    Subjective: Again c/o feeling feverish overnight a/w sinus tenderness, rhinorrhea, nasal congestion and post-nasal drip. Otherwise denies SOB, chest pain, n/v/d.    ======Medications======  MEDICATIONS  (STANDING):  atovaquone  Suspension 1500 milliGRAM(s) Oral daily  calcitriol   Capsule 0.25 MICROGram(s) Oral daily  chlorhexidine 2% Cloths 1 Application(s) Topical <User Schedule>  cholecalciferol 1000 Unit(s) Oral daily  epoetin ivelisse (EPOGEN) Injectable 17573 Unit(s) SubCutaneous <User Schedule>  heparin   Injectable 5000 Unit(s) SubCutaneous every 12 hours  labetalol 200 milliGRAM(s) Oral three times a day  latanoprost 0.005% Ophthalmic Solution 1 Drop(s) Both EYES at bedtime  mycophenolate mofetil 500 milliGRAM(s) Oral two times a day  NIFEdipine XL 60 milliGRAM(s) Oral daily  predniSONE   Tablet 5 milliGRAM(s) Oral daily  senna 2 Tablet(s) Oral at bedtime  sodium bicarbonate 1300 milliGRAM(s) Oral three times a day  sodium zirconium cyclosilicate 10 Gram(s) Oral daily  tacrolimus ER Tablet (ENVARSUS XR) 5 milliGRAM(s) Oral daily    MEDICATIONS  (PRN):  acetaminophen     Tablet .. 650 milliGRAM(s) Oral every 6 hours PRN Temp greater or equal to 38C (100.4F), Mild Pain (1 - 3)  melatonin 3 milliGRAM(s) Oral at bedtime PRN Insomnia      ======Vital Signs======  T(C): 37.3 (23 @ 04:52), Max: 37.4 (23 @ 00:27)  T(F): 99.2 (23 @ 04:52), Max: 99.3 (23 @ 00:27)  HR: 69 (23 @ 05:43) (65 - 80)  BP: 185/79 (23 @ 05:43) (124/77 - 185/79)  BP(mean): --  RR: 18 (23 @ 04:52) (18 - 18)  SpO2: 94% (23 @ 04:52) (91% - 96%)    ======Physical exams======  GENERAL: AAOx3, NAD  HEENT: R maxillary and frontal sinus tenderness of palpation  Cardiovascular: RRR, S1 S2, no m/r/g. No extremities edema, no JVD  LUNGS: Unlabored respirations, CTABL no wheeze/rhonchi/crackles  ABDOMEN: Soft, NTND, no rebound or guarding, BS presents. No CVA tenderness.  EXTREMITIES: Warm extremities, no clubbing, cyanosis, or edema, pulses 2+ bilaterally  NEURO: CN 2-12 grossly intact, strength 5/5 throughout, sensation intact    ======Labs======                7.2<L>  1.77<L> >----------< 210  (MCV: 83.3)                23.0<L>   137 | 105 | 62<H>  -----------------------< 94  4.1 | 17<L> | 3.74<H>    TPro: 5.1<L> / Alb: 2.8<L> / TBili: 0.2 / DBili: -- / AlkPhos: 65 / ALT: <5<L> / AST: 10 (23 @ 06:09)  Ca: 8.3<L> / Phos: 4.1 / M.6 (23 @ 06:09)    Gas: 7.35 / 33<L> / 120<H> / 18<L> / 98.9<H>% / -6.7<L> (23 @ 06:13)      ======Microbiology======  Urinalysis Basic - ( 2023 06:09 )    Color: x / Appearance: x / SG: x / pH: x  Gluc: 94 mg/dL / Ketone: x  / Bili: x / Urobili: x   Blood: x / Protein: x / Nitrite: x   Leuk Esterase: x / RBC: x / WBC x   Sq Epi: x / Non Sq Epi: x / Bacteria: x        Culture - Urine (collected 2023 14:40)  Source: Clean Catch Clean Catch (Midstream)  Final Report (2023 23:06):    <10,000 CFU/mL Normal Urogenital Rosalinda    Culture - Blood (collected 2023 13:25)  Source: .Blood Blood-Peripheral  Preliminary Report (10 Nov 2023 19:02):    No growth at 48 Hours    Culture - Blood (collected 2023 13:20)  Source: .Blood Blood-Peripheral  Preliminary Report (10 Nov 2023 19:02):    No growth at 48 Hours        ======I&O's======  I&O's Summary    10 Nov 2023 07:01  -  2023 07:00  --------------------------------------------------------  IN: 580 mL / OUT: 800 mL / NET: -220 mL         Patient is a 77y old  Female who presents with a chief complaint of HTN Emergency (10 Nov 2023 18:14)      ======Overnight/Subjective======  Overnight: No acute event overnight    Subjective: Again c/o feeling feverish overnight a/w sinus tenderness, rhinorrhea, nasal congestion and coughing. Otherwise denies SOB, chest pain, n/v/d.    ======Medications======  MEDICATIONS  (STANDING):  atovaquone  Suspension 1500 milliGRAM(s) Oral daily  calcitriol   Capsule 0.25 MICROGram(s) Oral daily  chlorhexidine 2% Cloths 1 Application(s) Topical <User Schedule>  cholecalciferol 1000 Unit(s) Oral daily  epoetin ivelisse (EPOGEN) Injectable 71312 Unit(s) SubCutaneous <User Schedule>  heparin   Injectable 5000 Unit(s) SubCutaneous every 12 hours  labetalol 200 milliGRAM(s) Oral three times a day  latanoprost 0.005% Ophthalmic Solution 1 Drop(s) Both EYES at bedtime  mycophenolate mofetil 500 milliGRAM(s) Oral two times a day  NIFEdipine XL 60 milliGRAM(s) Oral daily  predniSONE   Tablet 5 milliGRAM(s) Oral daily  senna 2 Tablet(s) Oral at bedtime  sodium bicarbonate 1300 milliGRAM(s) Oral three times a day  sodium zirconium cyclosilicate 10 Gram(s) Oral daily  tacrolimus ER Tablet (ENVARSUS XR) 5 milliGRAM(s) Oral daily    MEDICATIONS  (PRN):  acetaminophen     Tablet .. 650 milliGRAM(s) Oral every 6 hours PRN Temp greater or equal to 38C (100.4F), Mild Pain (1 - 3)  melatonin 3 milliGRAM(s) Oral at bedtime PRN Insomnia      ======Vital Signs======  T(C): 37.3 (23 @ 04:52), Max: 37.4 (23 @ 00:27)  T(F): 99.2 (23 @ 04:52), Max: 99.3 (23 @ 00:27)  HR: 69 (23 @ 05:43) (65 - 80)  BP: 185/79 (23 @ 05:43) (124/77 - 185/79)  BP(mean): --  RR: 18 (23 @ 04:52) (18 - 18)  SpO2: 94% (23 @ 04:52) (91% - 96%)    ======Physical exams======  GENERAL: AAOx3, NAD  HEENT: R maxillary and frontal sinus tenderness of palpation  Cardiovascular: RRR, S1 S2, no m/r/g. No extremities edema, no JVD  LUNGS: Unlabored respirations, CTABL no wheeze/rhonchi/crackles  ABDOMEN: Soft, NTND, no rebound or guarding, BS presents. No CVA tenderness.  EXTREMITIES: Warm extremities, no clubbing, cyanosis, or edema, pulses 2+ bilaterally  NEURO: CN 2-12 grossly intact, strength 5/5 throughout, sensation intact    ======Labs======                7.2<L>  1.77<L> >----------< 210  (MCV: 83.3)                23.0<L>   137 | 105 | 62<H>  -----------------------< 94  4.1 | 17<L> | 3.74<H>    TPro: 5.1<L> / Alb: 2.8<L> / TBili: 0.2 / DBili: -- / AlkPhos: 65 / ALT: <5<L> / AST: 10 (23 @ 06:09)  Ca: 8.3<L> / Phos: 4.1 / M.6 (23 @ 06:09)    Gas: 7.35 / 33<L> / 120<H> / 18<L> / 98.9<H>% / -6.7<L> (23 @ 06:13)      ======Microbiology======  Urinalysis Basic - ( 2023 06:09 )    Color: x / Appearance: x / SG: x / pH: x  Gluc: 94 mg/dL / Ketone: x  / Bili: x / Urobili: x   Blood: x / Protein: x / Nitrite: x   Leuk Esterase: x / RBC: x / WBC x   Sq Epi: x / Non Sq Epi: x / Bacteria: x        Culture - Urine (collected 2023 14:40)  Source: Clean Catch Clean Catch (Midstream)  Final Report (2023 23:06):    <10,000 CFU/mL Normal Urogenital Rosalinda    Culture - Blood (collected 2023 13:25)  Source: .Blood Blood-Peripheral  Preliminary Report (10 Nov 2023 19:02):    No growth at 48 Hours    Culture - Blood (collected 2023 13:20)  Source: .Blood Blood-Peripheral  Preliminary Report (10 Nov 2023 19:02):    No growth at 48 Hours        ======I&O's======  I&O's Summary    10 Nov 2023 07:01  -  2023 07:00  --------------------------------------------------------  IN: 580 mL / OUT: 800 mL / NET: -220 mL

## 2023-11-12 ENCOUNTER — TRANSCRIPTION ENCOUNTER (OUTPATIENT)
Age: 78
End: 2023-11-12

## 2023-11-12 LAB
ALBUMIN SERPL ELPH-MCNC: 2.8 G/DL — LOW (ref 3.3–5)
ALBUMIN SERPL ELPH-MCNC: 2.8 G/DL — LOW (ref 3.3–5)
ALP SERPL-CCNC: 62 U/L — SIGNIFICANT CHANGE UP (ref 40–120)
ALP SERPL-CCNC: 62 U/L — SIGNIFICANT CHANGE UP (ref 40–120)
ALT FLD-CCNC: <5 U/L — LOW (ref 10–45)
ALT FLD-CCNC: <5 U/L — LOW (ref 10–45)
ANION GAP SERPL CALC-SCNC: 13 MMOL/L — SIGNIFICANT CHANGE UP (ref 5–17)
ANION GAP SERPL CALC-SCNC: 13 MMOL/L — SIGNIFICANT CHANGE UP (ref 5–17)
AST SERPL-CCNC: 7 U/L — LOW (ref 10–40)
AST SERPL-CCNC: 7 U/L — LOW (ref 10–40)
BASOPHILS # BLD AUTO: 0 K/UL — SIGNIFICANT CHANGE UP (ref 0–0.2)
BASOPHILS # BLD AUTO: 0 K/UL — SIGNIFICANT CHANGE UP (ref 0–0.2)
BASOPHILS NFR BLD AUTO: 0 % — SIGNIFICANT CHANGE UP (ref 0–2)
BASOPHILS NFR BLD AUTO: 0 % — SIGNIFICANT CHANGE UP (ref 0–2)
BILIRUB SERPL-MCNC: 0.1 MG/DL — LOW (ref 0.2–1.2)
BILIRUB SERPL-MCNC: 0.1 MG/DL — LOW (ref 0.2–1.2)
BUN SERPL-MCNC: 65 MG/DL — HIGH (ref 7–23)
BUN SERPL-MCNC: 65 MG/DL — HIGH (ref 7–23)
CALCIUM SERPL-MCNC: 8.5 MG/DL — SIGNIFICANT CHANGE UP (ref 8.4–10.5)
CALCIUM SERPL-MCNC: 8.5 MG/DL — SIGNIFICANT CHANGE UP (ref 8.4–10.5)
CHLORIDE SERPL-SCNC: 108 MMOL/L — SIGNIFICANT CHANGE UP (ref 96–108)
CHLORIDE SERPL-SCNC: 108 MMOL/L — SIGNIFICANT CHANGE UP (ref 96–108)
CO2 SERPL-SCNC: 18 MMOL/L — LOW (ref 22–31)
CO2 SERPL-SCNC: 18 MMOL/L — LOW (ref 22–31)
CREAT SERPL-MCNC: 3.56 MG/DL — HIGH (ref 0.5–1.3)
CREAT SERPL-MCNC: 3.56 MG/DL — HIGH (ref 0.5–1.3)
EGFR: 12 ML/MIN/1.73M2 — LOW
EGFR: 12 ML/MIN/1.73M2 — LOW
EOSINOPHIL # BLD AUTO: 0.06 K/UL — SIGNIFICANT CHANGE UP (ref 0–0.5)
EOSINOPHIL # BLD AUTO: 0.06 K/UL — SIGNIFICANT CHANGE UP (ref 0–0.5)
EOSINOPHIL NFR BLD AUTO: 2.7 % — SIGNIFICANT CHANGE UP (ref 0–6)
EOSINOPHIL NFR BLD AUTO: 2.7 % — SIGNIFICANT CHANGE UP (ref 0–6)
GIANT PLATELETS BLD QL SMEAR: PRESENT — SIGNIFICANT CHANGE UP
GIANT PLATELETS BLD QL SMEAR: PRESENT — SIGNIFICANT CHANGE UP
GLUCOSE SERPL-MCNC: 86 MG/DL — SIGNIFICANT CHANGE UP (ref 70–99)
GLUCOSE SERPL-MCNC: 86 MG/DL — SIGNIFICANT CHANGE UP (ref 70–99)
HCT VFR BLD CALC: 24.1 % — LOW (ref 34.5–45)
HCT VFR BLD CALC: 24.1 % — LOW (ref 34.5–45)
HGB BLD-MCNC: 7.4 G/DL — LOW (ref 11.5–15.5)
HGB BLD-MCNC: 7.4 G/DL — LOW (ref 11.5–15.5)
HYPOSEGMENTATION: PRESENT — SIGNIFICANT CHANGE UP
HYPOSEGMENTATION: PRESENT — SIGNIFICANT CHANGE UP
LYMPHOCYTES # BLD AUTO: 0.61 K/UL — LOW (ref 1–3.3)
LYMPHOCYTES # BLD AUTO: 0.61 K/UL — LOW (ref 1–3.3)
LYMPHOCYTES # BLD AUTO: 28.3 % — SIGNIFICANT CHANGE UP (ref 13–44)
LYMPHOCYTES # BLD AUTO: 28.3 % — SIGNIFICANT CHANGE UP (ref 13–44)
MAGNESIUM SERPL-MCNC: 1.7 MG/DL — SIGNIFICANT CHANGE UP (ref 1.6–2.6)
MAGNESIUM SERPL-MCNC: 1.7 MG/DL — SIGNIFICANT CHANGE UP (ref 1.6–2.6)
MANUAL SMEAR VERIFICATION: SIGNIFICANT CHANGE UP
MANUAL SMEAR VERIFICATION: SIGNIFICANT CHANGE UP
MCHC RBC-ENTMCNC: 25.7 PG — LOW (ref 27–34)
MCHC RBC-ENTMCNC: 25.7 PG — LOW (ref 27–34)
MCHC RBC-ENTMCNC: 30.7 GM/DL — LOW (ref 32–36)
MCHC RBC-ENTMCNC: 30.7 GM/DL — LOW (ref 32–36)
MCV RBC AUTO: 83.7 FL — SIGNIFICANT CHANGE UP (ref 80–100)
MCV RBC AUTO: 83.7 FL — SIGNIFICANT CHANGE UP (ref 80–100)
METAMYELOCYTES # FLD: 4.4 % — HIGH (ref 0–0)
METAMYELOCYTES # FLD: 4.4 % — HIGH (ref 0–0)
MONOCYTES # BLD AUTO: 0.21 K/UL — SIGNIFICANT CHANGE UP (ref 0–0.9)
MONOCYTES # BLD AUTO: 0.21 K/UL — SIGNIFICANT CHANGE UP (ref 0–0.9)
MONOCYTES NFR BLD AUTO: 9.7 % — SIGNIFICANT CHANGE UP (ref 2–14)
MONOCYTES NFR BLD AUTO: 9.7 % — SIGNIFICANT CHANGE UP (ref 2–14)
NEUTROPHILS # BLD AUTO: 1.17 K/UL — LOW (ref 1.8–7.4)
NEUTROPHILS # BLD AUTO: 1.17 K/UL — LOW (ref 1.8–7.4)
NEUTROPHILS NFR BLD AUTO: 39.8 % — LOW (ref 43–77)
NEUTROPHILS NFR BLD AUTO: 39.8 % — LOW (ref 43–77)
NEUTS BAND # BLD: 15.1 % — HIGH (ref 0–8)
NEUTS BAND # BLD: 15.1 % — HIGH (ref 0–8)
PHOSPHATE SERPL-MCNC: 4 MG/DL — SIGNIFICANT CHANGE UP (ref 2.5–4.5)
PHOSPHATE SERPL-MCNC: 4 MG/DL — SIGNIFICANT CHANGE UP (ref 2.5–4.5)
PLAT MORPH BLD: ABNORMAL
PLAT MORPH BLD: ABNORMAL
PLATELET # BLD AUTO: 225 K/UL — SIGNIFICANT CHANGE UP (ref 150–400)
PLATELET # BLD AUTO: 225 K/UL — SIGNIFICANT CHANGE UP (ref 150–400)
POTASSIUM SERPL-MCNC: 4 MMOL/L — SIGNIFICANT CHANGE UP (ref 3.5–5.3)
POTASSIUM SERPL-MCNC: 4 MMOL/L — SIGNIFICANT CHANGE UP (ref 3.5–5.3)
POTASSIUM SERPL-SCNC: 4 MMOL/L — SIGNIFICANT CHANGE UP (ref 3.5–5.3)
POTASSIUM SERPL-SCNC: 4 MMOL/L — SIGNIFICANT CHANGE UP (ref 3.5–5.3)
PROT SERPL-MCNC: 5.2 G/DL — LOW (ref 6–8.3)
PROT SERPL-MCNC: 5.2 G/DL — LOW (ref 6–8.3)
RBC # BLD: 2.88 M/UL — LOW (ref 3.8–5.2)
RBC # BLD: 2.88 M/UL — LOW (ref 3.8–5.2)
RBC # FLD: 14.6 % — HIGH (ref 10.3–14.5)
RBC # FLD: 14.6 % — HIGH (ref 10.3–14.5)
RBC BLD AUTO: SIGNIFICANT CHANGE UP
RBC BLD AUTO: SIGNIFICANT CHANGE UP
SODIUM SERPL-SCNC: 139 MMOL/L — SIGNIFICANT CHANGE UP (ref 135–145)
SODIUM SERPL-SCNC: 139 MMOL/L — SIGNIFICANT CHANGE UP (ref 135–145)
TACROLIMUS SERPL-MCNC: 10.4 NG/ML — SIGNIFICANT CHANGE UP
TACROLIMUS SERPL-MCNC: 10.4 NG/ML — SIGNIFICANT CHANGE UP
WBC # BLD: 2.14 K/UL — LOW (ref 3.8–10.5)
WBC # BLD: 2.14 K/UL — LOW (ref 3.8–10.5)
WBC # FLD AUTO: 2.14 K/UL — LOW (ref 3.8–10.5)
WBC # FLD AUTO: 2.14 K/UL — LOW (ref 3.8–10.5)

## 2023-11-12 PROCEDURE — 99232 SBSQ HOSP IP/OBS MODERATE 35: CPT | Mod: GC

## 2023-11-12 RX ORDER — NIFEDIPINE 30 MG
90 TABLET, EXTENDED RELEASE 24 HR ORAL DAILY
Refills: 0 | Status: DISCONTINUED | OUTPATIENT
Start: 2023-11-12 | End: 2023-11-14

## 2023-11-12 RX ADMIN — HEPARIN SODIUM 5000 UNIT(S): 5000 INJECTION INTRAVENOUS; SUBCUTANEOUS at 17:54

## 2023-11-12 RX ADMIN — CALCITRIOL 0.25 MICROGRAM(S): 0.5 CAPSULE ORAL at 11:49

## 2023-11-12 RX ADMIN — ATOVAQUONE 1500 MILLIGRAM(S): 750 SUSPENSION ORAL at 11:50

## 2023-11-12 RX ADMIN — MYCOPHENOLATE MOFETIL 500 MILLIGRAM(S): 250 CAPSULE ORAL at 05:01

## 2023-11-12 RX ADMIN — Medication 1300 MILLIGRAM(S): at 13:52

## 2023-11-12 RX ADMIN — HEPARIN SODIUM 5000 UNIT(S): 5000 INJECTION INTRAVENOUS; SUBCUTANEOUS at 05:04

## 2023-11-12 RX ADMIN — Medication 200 MILLIGRAM(S): at 05:02

## 2023-11-12 RX ADMIN — Medication 60 MILLIGRAM(S): at 05:02

## 2023-11-12 RX ADMIN — Medication 200 MILLIGRAM(S): at 13:51

## 2023-11-12 RX ADMIN — Medication 90 MILLIGRAM(S): at 11:53

## 2023-11-12 RX ADMIN — TACROLIMUS 5 MILLIGRAM(S): 5 CAPSULE ORAL at 05:02

## 2023-11-12 RX ADMIN — Medication 5 MILLIGRAM(S): at 05:02

## 2023-11-12 RX ADMIN — CHLORHEXIDINE GLUCONATE 1 APPLICATION(S): 213 SOLUTION TOPICAL at 06:32

## 2023-11-12 RX ADMIN — CEFEPIME 100 MILLIGRAM(S): 1 INJECTION, POWDER, FOR SOLUTION INTRAMUSCULAR; INTRAVENOUS at 13:55

## 2023-11-12 RX ADMIN — Medication 1000 UNIT(S): at 11:50

## 2023-11-12 RX ADMIN — Medication 1300 MILLIGRAM(S): at 22:08

## 2023-11-12 RX ADMIN — Medication 200 MILLIGRAM(S): at 22:09

## 2023-11-12 RX ADMIN — LATANOPROST 1 DROP(S): 0.05 SOLUTION/ DROPS OPHTHALMIC; TOPICAL at 22:09

## 2023-11-12 RX ADMIN — Medication 1300 MILLIGRAM(S): at 05:01

## 2023-11-12 NOTE — DISCHARGE NOTE PROVIDER - HOSPITAL COURSE
HPI:  76 y/o female w/ PMH CKD stage 3 most likely s/p DDRT (s/p DDRT (05/19/2021) with chronic rejection (AMR) and HTN transfer from [facility] c/o 2 day history of increasing SOB that began at rest yesterday worsened w/ ambulation. Pt is otherwise asymptomatic. Denies fevers, chills, nausea, vomiting, dizziness, chest pain, abdominal pain, dysuria, hematuria.    Patient was in usual state of health until day prior to admission where she felt acutely short of breath, she went to the ER at [facility] and was found to be hypertensive. Patient denies CP, fevers, chills, n/v/d. She reports no recent changes in her diet, she did not miss any of her medications. She lives with her  at home and has ~5h per day of assistance for cooking and cleaning, but otherwise bathes, dresses herself and ambulates without assistance.     In ED she was given serial hydralazine and labetalol pushes for BP max systolic 220s. She was also given ceftriaxone and lokelma.   By the time of admission, she was in no respiratory distress and her BP was 130s systolic.  (09 Nov 2023 14:22)    HTN emergency on arrival, resolved with nella IV pushes and resuming home meds.  Concern for infection on CT, multifocal PNA on cefepime.   Renal transplant- tacro and cellcept dosing per renal.  YANELY on CKD - nephrologists assisted with monitoring, resumed home meds of bicarb per renal. HPI:  76 y/o female w/ PMH CKD stage 3 most likely s/p DDRT (s/p DDRT (05/19/2021) with chronic rejection (AMR) and HTN transfer from [facility] c/o 2 day history of increasing SOB that began at rest yesterday worsened w/ ambulation. Pt is otherwise asymptomatic. Denies fevers, chills, nausea, vomiting, dizziness, chest pain, abdominal pain, dysuria, hematuria.    Patient was in usual state of health until day prior to admission where she felt acutely short of breath, she went to the ER at [facility] and was found to be hypertensive. Patient denies CP, fevers, chills, n/v/d. She reports no recent changes in her diet, she did not miss any of her medications. She lives with her  at home and has ~5h per day of assistance for cooking and cleaning, but otherwise bathes, dresses herself and ambulates without assistance.     In ED she was given serial hydralazine and labetalol pushes for BP max systolic 220s. She was also given ceftriaxone and lokelma.   By the time of admission, she was in no respiratory distress and her BP was 130s systolic.  (09 Nov 2023 14:22)    HTN emergency on arrival, resolved with some IV pushes. Nifedipine titrated to 120 mg with labetolol 100 TID with good BP control. Will continue on discharge and further titrations as needed outpatient.   Concern for infection on CT, multifocal PNA on cefepime x 3 days. will complete course with cefpodoxime 200 BID x 4 days to complete 7 day course.   Renal transplant- tacro and cellcept dosing to be resumed on discharge per home regimen. Patient to follow with Dr. Louise on 11/29 (has appointment set).  YANELY on CKD - nephrologists assisted with monitoring, resumed home meds of bicarb per renal.

## 2023-11-12 NOTE — DISCHARGE NOTE PROVIDER - NSDCHHENCOUNTER_GEN_ALL_CORE
Oculoplastic Surgeon Procedure Text (A): After obtaining clear surgical margins the patient was sent to oculoplastics for surgical repair.  The patient understands they will receive post-surgical care and follow-up from the referring physician's office. 15-Nov-2023

## 2023-11-12 NOTE — PROGRESS NOTE ADULT - PROBLEM SELECTOR PLAN 2
With ?YANELY. 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 SCr ~2.9  W some hyperkalemia now s/p lokelma   Pulmonary edema on CXR, trace LE edema  US renal with evidence of renal rejection, no evidence of renal artery stenosis.   - TTE 11/10: LV diastolic dysfunction, small pericardial effusion  - additional lasix 80 IV per renal, considering daily dose  - renal recs  - monitor SCr, avoid nephrotoxins  - transplant medications as below

## 2023-11-12 NOTE — PROGRESS NOTE ADULT - PROBLEM SELECTOR PLAN 4
Intermittent subjective and objective fever since admission.   ?PNA based on CTC findings, though patient without pulmonary symptoms. Though concern presentation is atypical since she is immunosuppressed.     - s/p CTX in the ED  - CTC 11/10: multifocal PNA  - MRSA negative  - Cefepime 1g q24 (11/11 - )  - f/u Bcx, Ucx, sputum cx

## 2023-11-12 NOTE — DISCHARGE NOTE PROVIDER - NSDCCPCAREPLAN_GEN_ALL_CORE_FT
PRINCIPAL DISCHARGE DIAGNOSIS  Diagnosis: Hypertensive emergency  Assessment and Plan of Treatment: You came to the hospital with shortness of breath and were found to have severely elevated blood pressure. We gave you IV medications to lower the blood pressure and started you back on your home medications; your blood pressure has since been more well controlled and your shortness of breath has resolved. Please follow up with your nephrologist and primary care doctors to monitor your blood pressure.      SECONDARY DISCHARGE DIAGNOSES  Diagnosis: Multifocal pneumonia  Assessment and Plan of Treatment: You had intermittent fevers while in the hospital, and some evidence on bloodwork of infection. You are also on immunosuppressant medications for your kidney transplant. A CT scan of your chest was done which showed pnumonia, which you were treated for with antibiotics.    Diagnosis: CKD (chronic kidney disease)  Assessment and Plan of Treatment: You wre found to have a slight injury to your already diseased transplanted kidney. THis was monitored while in the hospital by your nephrologist and the transplant nephrology team. Your transplant medications were dosed and held appropriately while in the hospital. Please take your medications as prescribed on discharge, and as recommended by your transplant and nephrology teams.     PRINCIPAL DISCHARGE DIAGNOSIS  Diagnosis: Hypertensive emergency  Assessment and Plan of Treatment: You came to the hospital with shortness of breath and were found to have severely elevated blood pressure. We gave you IV medications to lower the blood pressure and started you back on your home medications; your blood pressure has since been more well controlled and your shortness of breath has resolved. Please follow up with your nephrologist and primary care doctors to monitor your blood pressure. Given your worsening kidney function, your home losartan was held to prevent further kidney damage. Your home nidefpine dose was increased to nifedipine 120 daily and you were managed on labetolol 200 three times a day. Please continue to take your BP at home. If you notice low blood pressure readings, please call your primary care doctor or your nephrologist to discuss whether you should decrease the dose of your medications.      SECONDARY DISCHARGE DIAGNOSES  Diagnosis: CKD (chronic kidney disease)  Assessment and Plan of Treatment: You wre found to have a slight injury to your already diseased transplanted kidney. THis was monitored while in the hospital by your nephrologist and the transplant nephrology team. Your transplant medications were dosed and held appropriately while in the hospital. Please take your medications as prescribed on discharge, and as recommended by your transplant and nephrology teams. You have an appointment with Dr Louise on 11/29; please make sure to make it to your appointment for ongoing care.    Diagnosis: Multifocal pneumonia  Assessment and Plan of Treatment: You had intermittent fevers while in the hospital, and some evidence on bloodwork of infection. You are also on immunosuppressant medications for your kidney transplant. A CT scan of your chest was done which showed pnumonia, which you were treated for with antibiotics. On discharge, you are being prescribed cefpodoxime 200 mg twice a day for 4 days to complete a full course. Please take your medications as prescribed for completion of therapy.

## 2023-11-12 NOTE — DISCHARGE NOTE PROVIDER - NSDCCPTREATMENT_GEN_ALL_CORE_FT
PRINCIPAL PROCEDURE  Procedure: US Doppler renal artery  Findings and Treatment: FINDINGS:  Renal Transplant: 12.1 cm. No renal mass, hydronephrosis or calculi.   Trace fluid adjacent to the transplant. Similar prominence of the   proximal ureter with urothelial thickening and containing echogenic   debris's.  Urinary bladder: Within normal limits.  Color and spectral Doppler reveals homogeneous flow throughout the   transplant.  Peak iliac artery velocity is 140 cm/sec pre-anastomosis, 258 cm/sec at   the anastomosis, exz821 cm/sec post anastomosis.  Transplant Renal Artery:  Peak systolic velocity is 257 cm/sec anastomosis, 231 cm/sec proximal,   168 cm/sec mid, 159 cm/sec distal and 73 cm/sec hilum.  Resistive Indices Range: 0.7-0.9  Transplant Renal Vein: Patent.  Miscellaneous: Small pelvic free fluid.  IMPRESSION:  No evidence of a significant renal artery stenosis.  Elevated resistive indices which can be seen in rejection versus acute   tubular necrosis.  Similar prominence of the transplant kidney ureter with urothelial   thickening. Echogenic debris is within the ureter concerning for   infection.  Trace fluid adjacent to the transplant kidney and small free fluid in the   pelvis.        SECONDARY PROCEDURE  Procedure: CT chest wo con  Findings and Treatment: INTERPRETATION:  INDICATION: Abnormal chest radiograph, fever  TECHNIQUE: Unenhanced CT of the chest. Coronal, sagittal, and MIP images   were reconstructed and reviewed.  COMPARISON: 8/11/2020 chest CT.  FINDINGS:  AIRWAYS, LUNGS, PLEURA: Patent central airways. Tree-in-bud opacities   within all lobes and consolidation of right lower lobe likely   representing multifocal pneumonia. Small bilateral pleural effusions are   not significantly changed from 2020.  LYMPH NODES, MEDIASTINUM: No lymphadenopathy.  HEART, VESSELS: Cardiomegaly. Stable small/moderate pericardial effusion.   Mitral annulus calcification. No pericardial effusion. Thoracic aorta   normal in diameter. Dilated pulmonary arteries suggestive of pulmonary   hypertension.  VISUALIZED UPPER ABDOMEN: Atrophic kidneys.  CHEST WALL, BONES: The bones are dense likely representing renal   osteodystrophy.  LOWER NECK: Bilateral thyroid nodules.  IMPRESSION:  Tree-in-bud opacities within all lobes and consolidation of right lower   lobe likely representing multifocal pneumonia.  Pericardial and pleural effusions are not significantly changed from 2020.

## 2023-11-12 NOTE — DISCHARGE NOTE PROVIDER - NSDCFUADDAPPT_GEN_ALL_CORE_FT
APPTS ARE READY TO BE MADE: [ ] YES    Best Family or Patient Contact (if needed):    Additional Information about above appointments (if needed):    1: Nephrology  2: Transplant Nephrology  3: PCP    Other comments or requests:

## 2023-11-12 NOTE — PROGRESS NOTE ADULT - ASSESSMENT
76 y/o female w/ PMH CKD3 s/p DDRT  (05/19/2021) with chronic rejection (AMR) and HTN transfer from [facility] for hypertensive emergency causing flash pulmonary edema and shortness of breath, which has since resolved. c/c/b intermittent fever, CTC showed multifocal PNA.

## 2023-11-12 NOTE — DISCHARGE NOTE PROVIDER - CARE PROVIDER_API CALL
Simi Louise  Nephrology  [address] [place] [id number]  Phone: [phone]  Fax: [phone]  Established Patient  Follow Up Time: 1 week

## 2023-11-12 NOTE — PROGRESS NOTE ADULT - SUBJECTIVE AND OBJECTIVE BOX
[facility]   MD SAMMY ORDONEZ MD RUORU WONG, PA    TEL:  FROM 9 AM to 5 PM ---OFFICE: [phone]  AVAILABLE ON TEAMS    FROM 5 PM - 9 AM PLEASE CALL ANSWERING SERVICE: [phone]    RENAL FOLLOW UP NOTE--Date of Service 11-12-23 @ 08:23  --------------------------------------------------------------------------------  HPI:      Pt seen and examined at bedside.   Edvin SOB, chest pain     PAST HISTORY  --------------------------------------------------------------------------------  No significant changes to PMH, PSH, FHx, SHx, unless otherwise noted    ALLERGIES & MEDICATIONS  --------------------------------------------------------------------------------  Allergies    penicillin (Rash)  Mushrooms (Anaphylaxis)    Intolerances      Standing Inpatient Medications  atovaquone  Suspension 1500 milliGRAM(s) Oral daily  calcitriol   Capsule 0.25 MICROGram(s) Oral daily  cefepime   IVPB 1000 milliGRAM(s) IV Intermittent every 24 hours  chlorhexidine 2% Cloths 1 Application(s) Topical <User Schedule>  cholecalciferol 1000 Unit(s) Oral daily  epoetin ivelisse (EPOGEN) Injectable 51363 Unit(s) SubCutaneous <User Schedule>  heparin   Injectable 5000 Unit(s) SubCutaneous every 12 hours  labetalol 200 milliGRAM(s) Oral three times a day  latanoprost 0.005% Ophthalmic Solution 1 Drop(s) Both EYES at bedtime  NIFEdipine XL 90 milliGRAM(s) Oral daily  predniSONE   Tablet 5 milliGRAM(s) Oral daily  senna 2 Tablet(s) Oral at bedtime  sodium bicarbonate 1300 milliGRAM(s) Oral three times a day  sodium zirconium cyclosilicate 10 Gram(s) Oral daily  tacrolimus ER Tablet (ENVARSUS XR) 5 milliGRAM(s) Oral daily    PRN Inpatient Medications  acetaminophen     Tablet .. 650 milliGRAM(s) Oral every 6 hours PRN  melatonin 3 milliGRAM(s) Oral at bedtime PRN      REVIEW OF SYSTEMS  --------------------------------------------------------------------------------  General: no fever  CVS: no chest pain  MSK: no edema     VITALS/PHYSICAL EXAM  --------------------------------------------------------------------------------  T(C): 37.1 (11-12-23 @ 04:20), Max: 37.2 (11-11-23 @ 21:00)  HR: 95 (11-12-23 @ 04:20) (71 - 96)  BP: 172/74 (11-12-23 @ 04:20) (120/49 - 172/74)  RR: 18 (11-12-23 @ 04:20) (18 - 18)  SpO2: 93% (11-12-23 @ 04:20) (93% - 96%)  Wt(kg): --        11-11-23 @ 07:01  -  11-12-23 @ 07:00  --------------------------------------------------------  IN: 720 mL / OUT: 600 mL / NET: 120 mL      Physical Exam:  	Gen: NAD  	HEENT: MMM  	Pulm: CTA B/L  	CV: S1S2  	Abd: Soft, +BS  	Ext: No LE edema B/L                      Neuro: Awake   	Skin: Warm and Dry   	Vascular access: NO HD catheter            no sethi  LABS/STUDIES  --------------------------------------------------------------------------------              7.4    2.14  >-----------<  225      [11-12-23 @ 06:40]              24.1     139  |  108  |  65  ----------------------------<  86      [11-12-23 @ 06:40]  4.0   |  18  |  3.56        Ca     8.5     [11-12-23 @ 06:40]      Mg     1.7     [11-12-23 @ 06:40]      Phos  4.0     [11-12-23 @ 06:40]    TPro  5.2  /  Alb  2.8  /  TBili  0.1  /  DBili  x   /  AST  7   /  ALT  <5  /  AlkPhos  62  [11-12-23 @ 06:40]        Uric acid 8.7      [11-11-23 @ 06:09]        [11-11-23 @ 06:09]    Creatinine Trend:  SCr 3.56 [11-12 @ 06:40]  SCr 3.74 [11-11 @ 06:09]  SCr 3.59 [11-10 @ 06:01]  SCr 2.96 [11-09 @ 07:18]  SCr 2.95 [11-09 @ 00:22]    Urinalysis - [11-12-23 @ 06:40]      Color  / Appearance  / SG  / pH       Gluc 86 / Ketone   / Bili  / Urobili        Blood  / Protein  / Leuk Est  / Nitrite       RBC  / WBC  / Hyaline  / Gran  / Sq Epi  / Non Sq Epi  / Bacteria     Urine Creatinine 117      [11-10-23 @ 20:09]  Urine Protein 276      [11-10-23 @ 20:09]  Urine Sodium 48      [11-10-23 @ 20:09]  Urine Urea Nitrogen 506      [11-10-23 @ 20:09]    Iron 15, TIBC 140, %sat 11      [11-10-23 @ 06:01]  Ferritin 1817      [11-11-23 @ 06:09]  PTH -- (Ca 8.5)      [11-10-23 @ 06:01]   659  Vitamin D (25OH) 16.5      [11-11-23 @ 06:09]  HbA1c 4.6      [05-28-19 @ 09:49]    HIV Nonreact      [11-08-23 @ 13:20]    Rheumatoid Factor 10      [11-11-23 @ 06:09]

## 2023-11-12 NOTE — DISCHARGE NOTE PROVIDER - NSDCFUSCHEDAPPT_GEN_ALL_CORE_FT
Simi Louise  [facility]  NEPHRO [address] [place] [address]  Scheduled Appointment: 11/29/2023

## 2023-11-12 NOTE — DISCHARGE NOTE PROVIDER - NSDCMRMEDTOKEN_GEN_ALL_CORE_FT
aspirin 81 mg oral tablet, chewable: 1 tab(s) orally once a day  atovaquone 750 mg/5 mL oral suspension: 10 milliliter(s) orally once a day  labetalol 200 mg oral tablet: 1 tab(s) orally 3 times a day   latanoprost 0.005% ophthalmic solution: 1 drop(s) to each affected eye once a day (at bedtime)  losartan 50 mg oral tablet: 1 tab(s) orally once a day  mycophenolate mofetil 500 mg oral tablet: 1 tab(s) orally 2 times a day  NIFEdipine 60 mg oral tablet, extended release: 1 tab(s) orally once a day  predniSONE 5 mg oral tablet: 1 tab(s) orally once a day  Procrit 10,000 units/mL preservative-free injectable solution: every 2 weeks Monday   senna leaf extract oral tablet: 2 tab(s) orally once a day (at bedtime) as needed for constipation.  sodium bicarbonate 650 mg oral tablet: 2 tab(s) orally 2 times a day  tacrolimus 1 mg oral tablet, extended release: 5 tab(s) orally once a day  Vitamin D3 25 mcg (1000 intl units) oral tablet: 1 tab(s) orally once a day   aspirin 81 mg oral tablet, chewable: 1 tab(s) orally once a day  atovaquone 750 mg/5 mL oral suspension: 10 milliliter(s) orally once a day  cefpodoxime 200 mg oral tablet: 1 tab(s) orally 2 times a day  labetalol 200 mg oral tablet: 1 tab(s) orally 3 times a day  latanoprost 0.005% ophthalmic solution: 1 drop(s) to each affected eye once a day (at bedtime)  mycophenolate mofetil 500 mg oral tablet: 1 tab(s) orally 2 times a day  NIFEdipine 60 mg oral tablet, extended release: 2 tab(s) orally once a day  predniSONE 5 mg oral tablet: 1 tab(s) orally once a day  Procrit 10,000 units/mL preservative-free injectable solution: 10,000 unit(s) injectable every 2 weeks every 2 weeks Monday  senna leaf extract oral tablet: 2 tab(s) orally once a day (at bedtime) as needed for constipation.  sodium bicarbonate 650 mg oral tablet: 2 tab(s) orally 3 times a day  tacrolimus 1 mg oral tablet, extended release: 5 tab(s) orally once a day  Vitamin D3 25 mcg (1000 intl units) oral tablet: 1 tab(s) orally once a day

## 2023-11-12 NOTE — PROGRESS NOTE ADULT - SUBJECTIVE AND OBJECTIVE BOX
Subjective:    INTERVAL HPI/OVERNIGHT EVENTS:   No acute events overnight  Afebrile, hemodynamically stable   - BP labile, up to 170s systolic, suboptimal so increased Nifedipine to 90 qd  - started on cefepime for ?multifocal pna on CT   - afebrile overnight     Telemetry:    T(F): 98.7 (11-12-23 @ 04:20), Max: 99 (11-11-23 @ 21:00)  HR: 95 (11-12-23 @ 04:20) (71 - 96)  BP: 172/74 (11-12-23 @ 04:20) (120/49 - 178/78)  RR: 18 (11-12-23 @ 04:20) (18 - 18)  SpO2: 93% (11-12-23 @ 04:20) (93% - 96%)  I&O's Summary    11 Nov 2023 07:01  -  12 Nov 2023 07:00  --------------------------------------------------------  IN: 720 mL / OUT: 600 mL / NET: 120 mL    Medications:  acetaminophen     Tablet .. 650 milliGRAM(s) Oral every 6 hours PRN  atovaquone  Suspension 1500 milliGRAM(s) Oral daily  calcitriol   Capsule 0.25 MICROGram(s) Oral daily  cefepime   IVPB 1000 milliGRAM(s) IV Intermittent every 24 hours  chlorhexidine 2% Cloths 1 Application(s) Topical <User Schedule>  cholecalciferol 1000 Unit(s) Oral daily  epoetin ivelisse (EPOGEN) Injectable 88292 Unit(s) SubCutaneous <User Schedule>  heparin   Injectable 5000 Unit(s) SubCutaneous every 12 hours  labetalol 200 milliGRAM(s) Oral three times a day  latanoprost 0.005% Ophthalmic Solution 1 Drop(s) Both EYES at bedtime  melatonin 3 milliGRAM(s) Oral at bedtime PRN  mycophenolate mofetil 500 milliGRAM(s) Oral two times a day  NIFEdipine XL 90 milliGRAM(s) Oral daily  predniSONE   Tablet 5 milliGRAM(s) Oral daily  senna 2 Tablet(s) Oral at bedtime  sodium bicarbonate 1300 milliGRAM(s) Oral three times a day  sodium zirconium cyclosilicate 10 Gram(s) Oral daily  tacrolimus ER Tablet (ENVARSUS XR) 5 milliGRAM(s) Oral daily      PHYSICAL EXAM:  GENERAL: AAOx3, NAD  HEENT: R maxillary and frontal sinus tenderness of palpation  Cardiovascular: RRR, S1 S2, no m/r/g. No extremities edema, no JVD  LUNGS: Unlabored respirations, CTABL no wheeze/rhonchi/crackles  ABDOMEN: Soft, NTND, no rebound or guarding, BS presents. No CVA tenderness.  EXTREMITIES: Warm extremities, no clubbing, cyanosis, or edema, pulses 2+ bilaterally  NEURO: CN 2-12 grossly intact, strength 5/5 throughout, sensation intact    Notable Labs:    Labs:                          7.4    2.14  )-----------( 225      ( 12 Nov 2023 06:40 )             24.1     11-11    137  |  105  |  62<H>  ----------------------------<  94  4.1   |  17<L>  |  3.74<H>    Ca    8.3<L>      11 Nov 2023 06:09  Phos  4.1     11-11  Mg     1.6     11-11    TPro  5.1<L>  /  Alb  2.8<L>  /  TBili  0.2  /  DBili  x   /  AST  10  /  ALT  <5<L>  /  AlkPhos  65  11-11    LIVER FUNCTIONS - ( 11 Nov 2023 06:09 )  Alb: 2.8 g/dL / Pro: 5.1 g/dL / ALK PHOS: 65 U/L / ALT: <5 U/L / AST: 10 U/L / GGT: x             CAPILLARY BLOOD GLUCOSE    Urinalysis Basic - ( 11 Nov 2023 06:09 )    Color: x / Appearance: x / SG: x / pH: x  Gluc: 94 mg/dL / Ketone: x  / Bili: x / Urobili: x   Blood: x / Protein: x / Nitrite: x   Leuk Esterase: x / RBC: x / WBC x   Sq Epi: x / Non Sq Epi: x / Bacteria: x        Micro:    Culture - Urine (collected 11-10-23 @ 23:43)  Source: Clean Catch Clean Catch (Midstream)  Final Report (11-11-23 @ 20:55):    <10,000 CFU/mL Normal Urogenital Rosalinda        RADIOLOGY & ADDITIONAL TESTS:    CT  < from: CT Chest No Cont (11.10.23 @ 23:26) >  IMPRESSION:    Tree-in-bud opacities within all lobes and consolidation of right lower   lobe likely representing multifocal pneumonia.    Pericardial and pleural effusions are not significantly changed from 2020.    < end of copied text >

## 2023-11-12 NOTE — PROGRESS NOTE ADULT - ATTENDING COMMENTS
76 y/o female w/ PMH CKD3 s/p DDRT  (05/19/2021) with chronic rejection (AMR) and HTN transfer from [facility] for hypertensive emergency causing flash pulmonary edema and shortness of breath, which has since resolved. Patient's hospital course is complicated by fever which is likely secondary to multifocal pneumonia.     Echo w/ EF >75% on 11/10.  CT Chest--> Multifocal pneumonia     Cellcept held.     1) PNA:   CT chest reveals multifocal pneumonia. Plan to start patient on Cefepime. MRSA swab negative. Followup cultures. Continue Atovaquone for PCP prophylaxis.     2) CKD  Tacrolimus levels 5.6. Will followup with transplant renal regarding dosing. Continue sodium bicarb for metabolic acidosis. Continue Lokelma. Started on calcitriol.   Hold diuretic  Followup renal recs.   Cr improved from 3.74-->3.5     3) HTN emergency  BP improved today. Monitor VS  Nifedipine increased to 90mg.     4) Bicytopenia  Avoid IV iron in setting of infection.

## 2023-11-12 NOTE — PROGRESS NOTE ADULT - ASSESSMENT
78 y/o female w/ PMH CKD stage 3 most likely s/p DDRT (s/p DDRT (05/19/2021) with chronic rejection (AMR) and HTN transfer from [facility] c/o 2 day history of increasing SOB that began at rest yesterday worsened w/ ambulation.  Nephrology consulted for YANELY.      A/P:  S/p DDRT (5/19/2021 - induction w/ Basiliximab)  Baseline S.Cr ~1.7-2.  S.Cr 2.9 on admission.  YANELY possibly due to progression of chronic rejection vs. cardiorenal.  S.Cr improving today   Echo w/ EF >75% on 11/10.  Pt no longer SOB; hold diuretics.  Off ARB.  + low grade fever; infectious workup in progress.  UA with proteinuria and hematuria.  Transplant kidney US: No evidence of a significant renal artery stenosis. Elevated resistive indices which can be seen in rejection versus acute tubular necrosis. Similar prominence of the transplant kidney ureter with urothelial  thickening. Echogenic debris is within the ureter concerning for infection. Trace fluid adjacent to the transplant kidney and small free fluid in the pelvis.  Continue immunosuppressants per transplant - mycophenolate 500mg BID, prednisone 5mg qd, and Envarsus 5mg qd.  Check Tacro level 30mins prior to next dose; goal 4-6  Monitor BMP and UO.    HTN:  BP fluctuating  nifedipine increased to 90mg qd.  Off ARB d/t YANELY + hyperK.  Off Lasix d/t worsening renal function.  Monitor BP.    Hyperkalemia:  K improving.  Possibly in setting of RF vs. acidosis vs. bactrim.  Off bactrim.  S/p Lokelma 10gm; continue daily dosing.  Low K diet.  Monitor K closely.    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  C/W NaHCO3 1300mg TID.    Anemia:  Likely in setting of CKD vs iron deficiency.  Tsat low - hold venofer; pt w/ low grade fever; blood cultures pending.  On SHELDON [id number] TIW.  Hold SHELDON for BP >170/90.    CKD: MBD:  .--on  calcitriol 0.25mcg qd.  Vitamin D low--On Vit. D3 1000units qd.  Check Ca and PO4 daily.    Proteinuria/Hematuria:  Possibly in setting of UTI vs. diabetic nephropathy (donor origin)  Last seen by Dr. Louise 9/20/23: Proteinuria 2.8 grams; relatively stable  Received 1 dose of antibiotic - pt asymptomatic.  Monitor.

## 2023-11-13 LAB
ALBUMIN SERPL ELPH-MCNC: 3 G/DL — LOW (ref 3.3–5)
ALBUMIN SERPL ELPH-MCNC: 3 G/DL — LOW (ref 3.3–5)
ALP SERPL-CCNC: 65 U/L — SIGNIFICANT CHANGE UP (ref 40–120)
ALP SERPL-CCNC: 65 U/L — SIGNIFICANT CHANGE UP (ref 40–120)
ALT FLD-CCNC: 7 U/L — LOW (ref 10–45)
ALT FLD-CCNC: 7 U/L — LOW (ref 10–45)
ANION GAP SERPL CALC-SCNC: 14 MMOL/L — SIGNIFICANT CHANGE UP (ref 5–17)
ANION GAP SERPL CALC-SCNC: 14 MMOL/L — SIGNIFICANT CHANGE UP (ref 5–17)
AST SERPL-CCNC: 12 U/L — SIGNIFICANT CHANGE UP (ref 10–40)
AST SERPL-CCNC: 12 U/L — SIGNIFICANT CHANGE UP (ref 10–40)
BASOPHILS # BLD AUTO: 0.01 K/UL — SIGNIFICANT CHANGE UP (ref 0–0.2)
BASOPHILS # BLD AUTO: 0.01 K/UL — SIGNIFICANT CHANGE UP (ref 0–0.2)
BASOPHILS NFR BLD AUTO: 0.4 % — SIGNIFICANT CHANGE UP (ref 0–2)
BASOPHILS NFR BLD AUTO: 0.4 % — SIGNIFICANT CHANGE UP (ref 0–2)
BILIRUB SERPL-MCNC: 0.1 MG/DL — LOW (ref 0.2–1.2)
BILIRUB SERPL-MCNC: 0.1 MG/DL — LOW (ref 0.2–1.2)
BUN SERPL-MCNC: 67 MG/DL — HIGH (ref 7–23)
BUN SERPL-MCNC: 67 MG/DL — HIGH (ref 7–23)
CALCIUM SERPL-MCNC: 8.7 MG/DL — SIGNIFICANT CHANGE UP (ref 8.4–10.5)
CALCIUM SERPL-MCNC: 8.7 MG/DL — SIGNIFICANT CHANGE UP (ref 8.4–10.5)
CHLORIDE SERPL-SCNC: 108 MMOL/L — SIGNIFICANT CHANGE UP (ref 96–108)
CHLORIDE SERPL-SCNC: 108 MMOL/L — SIGNIFICANT CHANGE UP (ref 96–108)
CO2 SERPL-SCNC: 20 MMOL/L — LOW (ref 22–31)
CO2 SERPL-SCNC: 20 MMOL/L — LOW (ref 22–31)
CREAT SERPL-MCNC: 3.45 MG/DL — HIGH (ref 0.5–1.3)
CREAT SERPL-MCNC: 3.45 MG/DL — HIGH (ref 0.5–1.3)
CULTURE RESULTS: SIGNIFICANT CHANGE UP
EGFR: 13 ML/MIN/1.73M2 — LOW
EGFR: 13 ML/MIN/1.73M2 — LOW
EOSINOPHIL # BLD AUTO: 0.04 K/UL — SIGNIFICANT CHANGE UP (ref 0–0.5)
EOSINOPHIL # BLD AUTO: 0.04 K/UL — SIGNIFICANT CHANGE UP (ref 0–0.5)
EOSINOPHIL NFR BLD AUTO: 1.6 % — SIGNIFICANT CHANGE UP (ref 0–6)
EOSINOPHIL NFR BLD AUTO: 1.6 % — SIGNIFICANT CHANGE UP (ref 0–6)
GLUCOSE SERPL-MCNC: 96 MG/DL — SIGNIFICANT CHANGE UP (ref 70–99)
GLUCOSE SERPL-MCNC: 96 MG/DL — SIGNIFICANT CHANGE UP (ref 70–99)
HCT VFR BLD CALC: 26.5 % — LOW (ref 34.5–45)
HCT VFR BLD CALC: 26.5 % — LOW (ref 34.5–45)
HGB BLD-MCNC: 8 G/DL — LOW (ref 11.5–15.5)
HGB BLD-MCNC: 8 G/DL — LOW (ref 11.5–15.5)
IMM GRANULOCYTES NFR BLD AUTO: 8.6 % — HIGH (ref 0–0.9)
IMM GRANULOCYTES NFR BLD AUTO: 8.6 % — HIGH (ref 0–0.9)
LYMPHOCYTES # BLD AUTO: 0.66 K/UL — LOW (ref 1–3.3)
LYMPHOCYTES # BLD AUTO: 0.66 K/UL — LOW (ref 1–3.3)
LYMPHOCYTES # BLD AUTO: 27 % — SIGNIFICANT CHANGE UP (ref 13–44)
LYMPHOCYTES # BLD AUTO: 27 % — SIGNIFICANT CHANGE UP (ref 13–44)
MAGNESIUM SERPL-MCNC: 1.8 MG/DL — SIGNIFICANT CHANGE UP (ref 1.6–2.6)
MAGNESIUM SERPL-MCNC: 1.8 MG/DL — SIGNIFICANT CHANGE UP (ref 1.6–2.6)
MCHC RBC-ENTMCNC: 25.4 PG — LOW (ref 27–34)
MCHC RBC-ENTMCNC: 25.4 PG — LOW (ref 27–34)
MCHC RBC-ENTMCNC: 30.2 GM/DL — LOW (ref 32–36)
MCHC RBC-ENTMCNC: 30.2 GM/DL — LOW (ref 32–36)
MCV RBC AUTO: 84.1 FL — SIGNIFICANT CHANGE UP (ref 80–100)
MCV RBC AUTO: 84.1 FL — SIGNIFICANT CHANGE UP (ref 80–100)
MONOCYTES # BLD AUTO: 0.33 K/UL — SIGNIFICANT CHANGE UP (ref 0–0.9)
MONOCYTES # BLD AUTO: 0.33 K/UL — SIGNIFICANT CHANGE UP (ref 0–0.9)
MONOCYTES NFR BLD AUTO: 13.5 % — SIGNIFICANT CHANGE UP (ref 2–14)
MONOCYTES NFR BLD AUTO: 13.5 % — SIGNIFICANT CHANGE UP (ref 2–14)
NEUTROPHILS # BLD AUTO: 1.19 K/UL — LOW (ref 1.8–7.4)
NEUTROPHILS # BLD AUTO: 1.19 K/UL — LOW (ref 1.8–7.4)
NEUTROPHILS NFR BLD AUTO: 48.9 % — SIGNIFICANT CHANGE UP (ref 43–77)
NEUTROPHILS NFR BLD AUTO: 48.9 % — SIGNIFICANT CHANGE UP (ref 43–77)
NRBC # BLD: 0 /100 WBCS — SIGNIFICANT CHANGE UP (ref 0–0)
NRBC # BLD: 0 /100 WBCS — SIGNIFICANT CHANGE UP (ref 0–0)
PHOSPHATE SERPL-MCNC: 4.1 MG/DL — SIGNIFICANT CHANGE UP (ref 2.5–4.5)
PHOSPHATE SERPL-MCNC: 4.1 MG/DL — SIGNIFICANT CHANGE UP (ref 2.5–4.5)
PLATELET # BLD AUTO: 258 K/UL — SIGNIFICANT CHANGE UP (ref 150–400)
PLATELET # BLD AUTO: 258 K/UL — SIGNIFICANT CHANGE UP (ref 150–400)
POTASSIUM SERPL-MCNC: 4.1 MMOL/L — SIGNIFICANT CHANGE UP (ref 3.5–5.3)
POTASSIUM SERPL-MCNC: 4.1 MMOL/L — SIGNIFICANT CHANGE UP (ref 3.5–5.3)
POTASSIUM SERPL-SCNC: 4.1 MMOL/L — SIGNIFICANT CHANGE UP (ref 3.5–5.3)
POTASSIUM SERPL-SCNC: 4.1 MMOL/L — SIGNIFICANT CHANGE UP (ref 3.5–5.3)
PROT SERPL-MCNC: 5.4 G/DL — LOW (ref 6–8.3)
PROT SERPL-MCNC: 5.4 G/DL — LOW (ref 6–8.3)
RBC # BLD: 3.15 M/UL — LOW (ref 3.8–5.2)
RBC # BLD: 3.15 M/UL — LOW (ref 3.8–5.2)
RBC # FLD: 14.6 % — HIGH (ref 10.3–14.5)
RBC # FLD: 14.6 % — HIGH (ref 10.3–14.5)
SODIUM SERPL-SCNC: 142 MMOL/L — SIGNIFICANT CHANGE UP (ref 135–145)
SODIUM SERPL-SCNC: 142 MMOL/L — SIGNIFICANT CHANGE UP (ref 135–145)
SPECIMEN SOURCE: SIGNIFICANT CHANGE UP
TACROLIMUS SERPL-MCNC: 13.5 NG/ML — SIGNIFICANT CHANGE UP
TACROLIMUS SERPL-MCNC: 13.5 NG/ML — SIGNIFICANT CHANGE UP
WBC # BLD: 2.44 K/UL — LOW (ref 3.8–10.5)
WBC # BLD: 2.44 K/UL — LOW (ref 3.8–10.5)
WBC # FLD AUTO: 2.44 K/UL — LOW (ref 3.8–10.5)
WBC # FLD AUTO: 2.44 K/UL — LOW (ref 3.8–10.5)

## 2023-11-13 PROCEDURE — 99232 SBSQ HOSP IP/OBS MODERATE 35: CPT | Mod: GC

## 2023-11-13 RX ORDER — ERYTHROPOIETIN 10000 [IU]/ML
10000 INJECTION, SOLUTION INTRAVENOUS; SUBCUTANEOUS
Refills: 0 | Status: DISCONTINUED | OUTPATIENT
Start: 2023-11-13 | End: 2023-11-14

## 2023-11-13 RX ADMIN — Medication 200 MILLIGRAM(S): at 21:22

## 2023-11-13 RX ADMIN — LATANOPROST 1 DROP(S): 0.05 SOLUTION/ DROPS OPHTHALMIC; TOPICAL at 21:23

## 2023-11-13 RX ADMIN — CHLORHEXIDINE GLUCONATE 1 APPLICATION(S): 213 SOLUTION TOPICAL at 05:46

## 2023-11-13 RX ADMIN — Medication 90 MILLIGRAM(S): at 05:41

## 2023-11-13 RX ADMIN — SODIUM ZIRCONIUM CYCLOSILICATE 10 GRAM(S): 10 POWDER, FOR SUSPENSION ORAL at 13:59

## 2023-11-13 RX ADMIN — TACROLIMUS 5 MILLIGRAM(S): 5 CAPSULE ORAL at 05:38

## 2023-11-13 RX ADMIN — Medication 5 MILLIGRAM(S): at 05:37

## 2023-11-13 RX ADMIN — Medication 200 MILLIGRAM(S): at 05:38

## 2023-11-13 RX ADMIN — Medication 1000 UNIT(S): at 13:58

## 2023-11-13 RX ADMIN — HEPARIN SODIUM 5000 UNIT(S): 5000 INJECTION INTRAVENOUS; SUBCUTANEOUS at 05:42

## 2023-11-13 RX ADMIN — Medication 200 MILLIGRAM(S): at 14:08

## 2023-11-13 RX ADMIN — ATOVAQUONE 1500 MILLIGRAM(S): 750 SUSPENSION ORAL at 13:59

## 2023-11-13 RX ADMIN — CEFEPIME 100 MILLIGRAM(S): 1 INJECTION, POWDER, FOR SOLUTION INTRAMUSCULAR; INTRAVENOUS at 16:40

## 2023-11-13 RX ADMIN — CALCITRIOL 0.25 MICROGRAM(S): 0.5 CAPSULE ORAL at 13:59

## 2023-11-13 RX ADMIN — Medication 1300 MILLIGRAM(S): at 14:08

## 2023-11-13 RX ADMIN — Medication 1300 MILLIGRAM(S): at 05:38

## 2023-11-13 RX ADMIN — HEPARIN SODIUM 5000 UNIT(S): 5000 INJECTION INTRAVENOUS; SUBCUTANEOUS at 18:13

## 2023-11-13 RX ADMIN — Medication 1300 MILLIGRAM(S): at 21:20

## 2023-11-13 NOTE — PROGRESS NOTE ADULT - SUBJECTIVE AND OBJECTIVE BOX
[facility] DIVISION OF KIDNEY DISEASES AND HYPERTENSION   FOLLOW UP NOTE    --------------------------------------------------------------------------------    SUBJECTIVE / ROS / INTERVAL EVENTS:  - Patient seen and examined at bedside  - feels better, no SOB  - BP better controlled        PAST HISTORY  --------------------------------------------------------------------------------  No significant changes to PMH, PSH, FHx, SHx, unless otherwise noted    ALLERGIES & MEDICATIONS  --------------------------------------------------------------------------------  Allergies    penicillin (Rash)  Mushrooms (Anaphylaxis)    Intolerances      Standing Inpatient Medications  atovaquone  Suspension 1500 milliGRAM(s) Oral daily  calcitriol   Capsule 0.25 MICROGram(s) Oral daily  cefepime   IVPB 1000 milliGRAM(s) IV Intermittent every 24 hours  chlorhexidine 2% Cloths 1 Application(s) Topical <User Schedule>  cholecalciferol 1000 Unit(s) Oral daily  epoetin ivelisse (EPOGEN) Injectable 68488 Unit(s) SubCutaneous <User Schedule>  heparin   Injectable 5000 Unit(s) SubCutaneous every 12 hours  labetalol 200 milliGRAM(s) Oral three times a day  latanoprost 0.005% Ophthalmic Solution 1 Drop(s) Both EYES at bedtime  NIFEdipine XL 90 milliGRAM(s) Oral daily  predniSONE   Tablet 5 milliGRAM(s) Oral daily  senna 2 Tablet(s) Oral at bedtime  sodium bicarbonate 1300 milliGRAM(s) Oral three times a day  sodium zirconium cyclosilicate 10 Gram(s) Oral daily    PRN Inpatient Medications  acetaminophen     Tablet .. 650 milliGRAM(s) Oral every 6 hours PRN  melatonin 3 milliGRAM(s) Oral at bedtime PRN      VITALS  --------------------------------------------------------------------------------  T(C): 36.6 (11-13-23 @ 12:36), Max: 37.2 (11-13-23 @ 09:25)  HR: 65 (11-13-23 @ 12:36) (65 - 75)  BP: 140/62 (11-13-23 @ 12:36) (120/50 - 158/63)  RR: 18 (11-13-23 @ 12:36) (18 - 18)  SpO2: 92% (11-13-23 @ 12:36) (92% - 100%)  Wt(kg): --      PHYSICAL EXAM:  General: no acute distress  Neuro: no focal deficits  HEENT: NC/AT, anicteric, no JVD  Pulmonary: lungs CTA B/L  Cardiovascular/Chest: +S1S2, RRR  GI/Abdomen: soft, NT/ND, +bowel sounds        Transplant site: non-tender  Extremities: no edema  : voiding spontaneously   Skin: Warm and dry    LABS/STUDIES  --------------------------------------------------------------------------------              8.0    2.44  >-----------<  258      [11-13-23 @ 05:18]              26.5     142  |  108  |  67  ----------------------------<  96      [11-13-23 @ 05:19]  4.1   |  20  |  3.45        Ca     8.7     [11-13-23 @ 05:19]      Mg     1.8     [11-13-23 @ 05:19]      Phos  4.1     [11-13-23 @ 05:19]    TPro  5.4  /  Alb  3.0  /  TBili  0.1  /  DBili  x   /  AST  12  /  ALT  7   /  AlkPhos  65  [11-13-23 @ 05:19]          Creatinine Trend:  SCr 3.45 [11-13 @ 05:19]  SCr 3.56 [11-12 @ 06:40]  SCr 3.74 [11-11 @ 06:09]  SCr 3.59 [11-10 @ 06:01]  SCr 2.96 [11-09 @ 07:18]    Urinalysis - [11-13-23 @ 05:19]      Color  / Appearance  / SG  / pH       Gluc 96 / Ketone   / Bili  / Urobili        Blood  / Protein  / Leuk Est  / Nitrite       RBC  / WBC  / Hyaline  / Gran  / Sq Epi  / Non Sq Epi  / Bacteria     Urine Creatinine 117      [11-10-23 @ 20:09]  Urine Protein 276      [11-10-23 @ 20:09]  Urine Sodium 48      [11-10-23 @ 20:09]  Urine Urea Nitrogen 506      [11-10-23 @ 20:09]    HIV Nonreact      [11-08-23 @ 13:20]    Rheumatoid Factor 10      [11-11-23 @ 06:09]    TacrolimusTacrolimus (), Serum: 13.5 ng/mL (11-13 @ 05:18)  Tacrolimus (), Serum: 10.4 ng/mL (11-12 @ 06:40)  Tacrolimus (), Serum: 5.6 ng/mL (11-11 @ 06:31)  Tacrolimus (), Serum: 2.6 ng/mL (11-10 @ 06:01)

## 2023-11-13 NOTE — PROGRESS NOTE ADULT - SUBJECTIVE AND OBJECTIVE BOX
[facility]   MD SAMMY ORDONEZ MD RUORU WONG, PA    TEL:  FROM 9 AM to 5 PM ---OFFICE: [phone]  AVAILABLE ON TEAMS    FROM 5 PM - 9 AM PLEASE CALL ANSWERING SERVICE: [phone]    RENAL FOLLOW UP NOTE--Date of Service 11-13-23 @ 17:54  --------------------------------------------------------------------------------  HPI:      Pt seen and examined at bedside.   Edvin SOB, chest pain     PAST HISTORY  --------------------------------------------------------------------------------  No significant changes to PMH, PSH, FHx, SHx, unless otherwise noted    ALLERGIES & MEDICATIONS  --------------------------------------------------------------------------------  Allergies    penicillin (Rash)  Mushrooms (Anaphylaxis)    Intolerances      Standing Inpatient Medications  atovaquone  Suspension 1500 milliGRAM(s) Oral daily  calcitriol   Capsule 0.25 MICROGram(s) Oral daily  cefepime   IVPB 1000 milliGRAM(s) IV Intermittent every 24 hours  chlorhexidine 2% Cloths 1 Application(s) Topical <User Schedule>  cholecalciferol 1000 Unit(s) Oral daily  epoetin ivelisse (EPOGEN) Injectable 22987 Unit(s) SubCutaneous <User Schedule>  heparin   Injectable 5000 Unit(s) SubCutaneous every 12 hours  labetalol 200 milliGRAM(s) Oral three times a day  latanoprost 0.005% Ophthalmic Solution 1 Drop(s) Both EYES at bedtime  NIFEdipine XL 90 milliGRAM(s) Oral daily  predniSONE   Tablet 5 milliGRAM(s) Oral daily  senna 2 Tablet(s) Oral at bedtime  sodium bicarbonate 1300 milliGRAM(s) Oral three times a day  sodium zirconium cyclosilicate 10 Gram(s) Oral daily    PRN Inpatient Medications  acetaminophen     Tablet .. 650 milliGRAM(s) Oral every 6 hours PRN  melatonin 3 milliGRAM(s) Oral at bedtime PRN      REVIEW OF SYSTEMS  --------------------------------------------------------------------------------  General: no fever  CVS: no chest pain  MSK: no edema     VITALS/PHYSICAL EXAM  --------------------------------------------------------------------------------  T(C): 36.4 (11-13-23 @ 14:07), Max: 37.2 (11-13-23 @ 09:25)  HR: 72 (11-13-23 @ 14:07) (65 - 75)  BP: 138/62 (11-13-23 @ 14:07) (120/50 - 158/63)  RR: 18 (11-13-23 @ 14:07) (18 - 18)  SpO2: 97% (11-13-23 @ 14:07) (92% - 100%)  Wt(kg): --        Physical Exam:  	Gen: NAD  	HEENT: MMM  	Pulm: CTA B/L  	CV: S1S2  	Abd: Soft, +BS  	Ext: No LE edema B/L                      Neuro: Awake   	Skin: Warm and Dry   	Vascular access: NO HD catheter           Mendel sethi  LABS/STUDIES  --------------------------------------------------------------------------------              8.0    2.44  >-----------<  258      [11-13-23 @ 05:18]              26.5     142  |  108  |  67  ----------------------------<  96      [11-13-23 @ 05:19]  4.1   |  20  |  3.45        Ca     8.7     [11-13-23 @ 05:19]      Mg     1.8     [11-13-23 @ 05:19]      Phos  4.1     [11-13-23 @ 05:19]    TPro  5.4  /  Alb  3.0  /  TBili  0.1  /  DBili  x   /  AST  12  /  ALT  7   /  AlkPhos  65  [11-13-23 @ 05:19]          Creatinine Trend:  SCr 3.45 [11-13 @ 05:19]  SCr 3.56 [11-12 @ 06:40]  SCr 3.74 [11-11 @ 06:09]  SCr 3.59 [11-10 @ 06:01]  SCr 2.96 [11-09 @ 07:18]    Urinalysis - [11-13-23 @ 05:19]      Color  / Appearance  / SG  / pH       Gluc 96 / Ketone   / Bili  / Urobili        Blood  / Protein  / Leuk Est  / Nitrite       RBC  / WBC  / Hyaline  / Gran  / Sq Epi  / Non Sq Epi  / Bacteria     Urine Creatinine 117      [11-10-23 @ 20:09]  Urine Protein 276      [11-10-23 @ 20:09]  Urine Sodium 48      [11-10-23 @ 20:09]  Urine Urea Nitrogen 506      [11-10-23 @ 20:09]    Iron 15, TIBC 140, %sat 11      [11-10-23 @ 06:01]  Ferritin 1817      [11-11-23 @ 06:09]  PTH -- (Ca 8.5)      [11-10-23 @ 06:01]   659  Vitamin D (25OH) 16.5      [11-11-23 @ 06:09]  HbA1c 4.6      [05-28-19 @ 09:49]    HIV Nonreact      [11-08-23 @ 13:20]    Rheumatoid Factor 10      [11-11-23 @ 06:09]

## 2023-11-13 NOTE — PROGRESS NOTE ADULT - SUBJECTIVE AND OBJECTIVE BOX
Patient is a 77y old  Female who presents with a chief complaint of HTN Emergency (12 Nov 2023 11:13)      SUBJECTIVE / OVERNIGHT EVENTS:  Patient seen and evaluated at bedside.  No events overnight.    Vital Signs Last 24 Hrs  T(C): 36.6 (13 Nov 2023 04:42), Max: 37.3 (12 Nov 2023 08:34)  T(F): 97.9 (13 Nov 2023 04:42), Max: 99.2 (12 Nov 2023 08:34)  HR: 72 (13 Nov 2023 04:42) (67 - 77)  BP: 158/63 (13 Nov 2023 04:42) (122/51 - 158/63)  BP(mean): --  RR: 18 (13 Nov 2023 04:42) (18 - 18)  SpO2: 95% (13 Nov 2023 04:42) (93% - 100%)    Parameters below as of 13 Nov 2023 04:42  Patient On (Oxygen Delivery Method): room air        PHYSICAL EXAM:  GENERAL: NAD, well-developed  CHEST/LUNG: Clear to auscultation bilaterally; No wheeze  HEART: Regular rate and rhythm; Normal S1 S2, No murmurs, rubs, or gallops  ABDOMEN: Soft, Nontender, Nondistended; Bowel sounds present  EXTREMITIES:  2+ Peripheral Pulses, No clubbing, cyanosis, or edema  PSYCH: AAOx3    LABS:                        8.0    2.44  )-----------( 258      ( 13 Nov 2023 05:18 )             26.5     Hgb Trend: 8.0<--, 7.4<--, 7.2<--, 7.2<--, 8.0<--  11-13    142  |  108  |  67<H>  ----------------------------<  96  4.1   |  20<L>  |  3.45<H>    Ca    8.7      13 Nov 2023 05:19  Phos  4.1     11-13  Mg     1.8     11-13    TPro  5.4<L>  /  Alb  3.0<L>  /  TBili  0.1<L>  /  DBili  x   /  AST  12  /  ALT  7<L>  /  AlkPhos  65  11-13    Creatinine Trend: 3.45<--, 3.56<--, 3.74<--, 3.59<--, 2.96<--, 2.95<--  LIVER FUNCTIONS - ( 13 Nov 2023 05:19 )  Alb: 3.0 g/dL / Pro: 5.4 g/dL / ALK PHOS: 65 U/L / ALT: 7 U/L / AST: 12 U/L / GGT: x                 Urinalysis Basic - ( 13 Nov 2023 05:19 )    Color: x / Appearance: x / SG: x / pH: x  Gluc: 96 mg/dL / Ketone: x  / Bili: x / Urobili: x   Blood: x / Protein: x / Nitrite: x   Leuk Esterase: x / RBC: x / WBC x   Sq Epi: x / Non Sq Epi: x / Bacteria: x     Patient is a 77y old  Female who presents with a chief complaint of HTN Emergency (12 Nov 2023 11:13)      SUBJECTIVE / OVERNIGHT EVENTS:  Patient seen and evaluated at bedside.  No events overnight. Patient this AM reports feeling well. No obvious discomfort. Reports improvement in swelling. Urinating well.     Vital Signs Last 24 Hrs  T(C): 36.6 (13 Nov 2023 04:42), Max: 37.3 (12 Nov 2023 08:34)  T(F): 97.9 (13 Nov 2023 04:42), Max: 99.2 (12 Nov 2023 08:34)  HR: 72 (13 Nov 2023 04:42) (67 - 77)  BP: 158/63 (13 Nov 2023 04:42) (122/51 - 158/63)  BP(mean): --  RR: 18 (13 Nov 2023 04:42) (18 - 18)  SpO2: 95% (13 Nov 2023 04:42) (93% - 100%)    Parameters below as of 13 Nov 2023 04:42  Patient On (Oxygen Delivery Method): room air        PHYSICAL EXAM:  GENERAL: NAD, well-developed  CHEST/LUNG: Clear to auscultation bilaterally; No wheeze  HEART: Regular rate and rhythm; Normal S1 S2, No murmurs, rubs, or gallops  ABDOMEN: Soft, Nontender, Nondistended; Bowel sounds present  EXTREMITIES:  2+ Peripheral Pulses, No clubbing, cyanosis, or edema  PSYCH: AAOx3    LABS:                        8.0    2.44  )-----------( 258      ( 13 Nov 2023 05:18 )             26.5     Hgb Trend: 8.0<--, 7.4<--, 7.2<--, 7.2<--, 8.0<--  11-13    142  |  108  |  67<H>  ----------------------------<  96  4.1   |  20<L>  |  3.45<H>    Ca    8.7      13 Nov 2023 05:19  Phos  4.1     11-13  Mg     1.8     11-13    TPro  5.4<L>  /  Alb  3.0<L>  /  TBili  0.1<L>  /  DBili  x   /  AST  12  /  ALT  7<L>  /  AlkPhos  65  11-13    Creatinine Trend: 3.45<--, 3.56<--, 3.74<--, 3.59<--, 2.96<--, 2.95<--  LIVER FUNCTIONS - ( 13 Nov 2023 05:19 )  Alb: 3.0 g/dL / Pro: 5.4 g/dL / ALK PHOS: 65 U/L / ALT: 7 U/L / AST: 12 U/L / GGT: x                 Urinalysis Basic - ( 13 Nov 2023 05:19 )    Color: x / Appearance: x / SG: x / pH: x  Gluc: 96 mg/dL / Ketone: x  / Bili: x / Urobili: x   Blood: x / Protein: x / Nitrite: x   Leuk Esterase: x / RBC: x / WBC x   Sq Epi: x / Non Sq Epi: x / Bacteria: x

## 2023-11-13 NOTE — PROGRESS NOTE ADULT - PROBLEM SELECTOR PLAN 2
With ?YANELY. 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 SCr ~2.9  W some hyperkalemia now s/p lokelma   Pulmonary edema on CXR, trace LE edema  US renal with evidence of renal rejection, no evidence of renal artery stenosis.   - TTE 11/10: LV diastolic dysfunction, small pericardial effusion  - additional lasix 80 IV per renal, considering daily dose  - renal recs  - monitor SCr, avoid nephrotoxins  - transplant medications as below With YANELY on CKD. 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 SCr ~2.9  W some hyperkalemia now s/p lokelma   Pulmonary edema on CXR, trace LE edema  US renal with evidence of renal rejection, no evidence of renal artery stenosis.   - TTE 11/10: LV diastolic dysfunction, small pericardial effusion  - additional lasix 80 IV per renal, considering daily dose  - renal recs  - monitor SCr, avoid nephrotoxins  - transplant medications as below

## 2023-11-13 NOTE — PROGRESS NOTE ADULT - ASSESSMENT
76 y/o female with PMH ESRD due to HTN since 2016, s/p DDRT (05/19/2021- induction with basiliximab), HTN came to ER for sob and swelling of the legs. Transferred to [facility] and admitted with HTN urgency and possible flash pulm edema. Renal consulted for kidney transplant management.     Pt was admitted with Scr around 2.8mg/dl with mild hyperkal around 5.6meq/L. Pt had post transplant course was complicated by DGF with last HD 06/04/2021. 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 DSA was negative. Pt also had transplant renal artery angiogram in 12/2022 which showed no stenosis. Patient saw Dr. Louise (transplant nephro) in sept 2023 during that time patient had scr increasing to 2.5mg/dL and also had ankle edema thus chlorthalidone was increased. Pt had h/o proteinuria around 2.8gms and h/o hyperkalemia with usage of lokelma. Cellcept was reduced to 500mg bid due to leukopenia and low level CMV viremia. Last few months, pt scr has been around 2.5 to 2.8mg/dL. patient also has chronic anemia and receiving epo 27713 weekly. In the ED at OSH, BNP elevated and CXR c/w pulm edema, patient received lasix 80mg iv once and hydralazine 10mg iv once. Transferred to [facility] ED, given Clonidine, hydral pushes, PO Labetalol and Nifedipine with improvement in BP.      1. s/p DDRT (05/19/2021- induction with basiliximab)  - Cr 2.9 on admission, baseline 2.5-2.8. YANELY possibly due to progression of chronic rejection vs. cardiorenal (elevated BNP, severe LA dilation and diastolic dysfx on TTE)  - Given Lasix 80mg IV on 11/8 at OSH and another dose on 11/9. However, CT chest more c/w PNA rather than pulm edema.  - Cr uptrending. Pt feels better. Hold diuretics for now and continue to monitor. Please monitor STRICT Is+Os.  - UA with known proteinuria and few bacteria, given one dose of CTX but no UTI symptoms  - UPCR 2.3g  - Last seen by Dr. Louise 9/20/23: Proteinuria 2.8 grams per day relatively stable- likely from diabetic nephropathy (donor origin).  - Transplant kidney US: No evidence of a significant renal artery stenosis. Elevated resistive indices which can be seen in rejection versus acute tubular necrosis. Similar prominence of the transplant kidney ureter with urothelial  thickening. Echogenic debris is within the ureter concerning for infection. Trace fluid adjacent to the transplant kidney and small free fluid in the pelvis.  - Bicarb low, continue home bicarb 1300mg TID. Goal bicarb >20  - continue Lokelma 10g daily    2. Immunosuppression  - s/p Simulect induction  - Envarsus 5mg daily (check daily troughs 30mins prior to next dose; tac goal 4-6), MMF 500mg BID, Pred 5mg  - PPx: Bactrim discontinued for high K. Dapsone discontinued for hemolytic anemia. Continue Mepron.    3. HTN  - c/w home labetalol and nifedipine  - holding Losartan iso YANELY abd had hyperK on admission  - consider increasing Nifedipine or adding Hydral for better control    4. Anemia  - likely has component of anemia of renal disease; also iron studies c/w HUY but need to check ferritin prior to iron supplementation, may benefit from IV iron  - can continue EPO as well, although will be more effective if iron replete. Would hold for SBP >160    5. Fever  - Tmax 100.7F (11/10 @4AM)  - pan-culture  - non-toxic appearing, continue IS as above      Rajinder William, DO  Nephrology Fellow  Feel free to contact me directly on TEAMS with any questions.  (After 5pm or on weekends, please call the on-call fellow).

## 2023-11-13 NOTE — PROGRESS NOTE ADULT - ASSESSMENT
76 y/o female w/ PMH CKD stage 3 most likely s/p DDRT (s/p DDRT (05/19/2021) with chronic rejection (AMR) and HTN transfer from [facility] c/o 2 day history of increasing SOB that began at rest yesterday worsened w/ ambulation.  Nephrology consulted for YANELY.      A/P:  S/p DDRT (5/19/2021 - induction w/ Basiliximab)  Baseline S.Cr ~1.7-2.  S.Cr 2.9 on admission.  YANELY possibly due to progression of chronic rejection vs. cardiorenal.  S.Cr improving today   Echo w/ EF >75% on 11/10.  Off ARB.  + low grade fever; infectious workup in progress.  UA with proteinuria and hematuria.  Transplant kidney US: No evidence of a significant renal artery stenosis. Elevated resistive indices which can be seen in rejection versus acute tubular necrosis. Similar prominence of the transplant kidney ureter with urothelial  thickening. Echogenic debris is within the ureter concerning for infection. Trace fluid adjacent to the transplant kidney and small free fluid in the pelvis.  Continue immunosuppressants per transplant - mycophenolate 500mg BID, prednisone 5mg qd,   Tacro held as level high  Planned to resume when level <8  Check Tacro level 30mins prior to next dose; goal 4-6  Monitor BMP and UO.    HTN:  BP fluctuating  nifedipine increased to 90mg qd.  Off ARB d/t YANELY + hyperK.  Off Lasix d/t worsening renal function.  Monitor BP.    Hyperkalemia:  K improving.  Possibly in setting of RF vs. acidosis vs. bactrim.  Off bactrim.  S/p Lokelma 10gm; continue daily dosing.  Low K diet.  Monitor K closely.    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  C/W NaHCO3 1300mg TID.    Anemia:  Likely in setting of CKD vs iron deficiency.  Tsat low - hold venofer; pt w/ low grade fever; blood cultures pending.  On SHELDON 98530wdors TIW.  Hold SHELDON for BP >170/90.    CKD: MBD:  .--on  calcitriol 0.25mcg qd.  Vitamin D low--On Vit. D3 1000units qd.  Check Ca and PO4 daily.    Proteinuria/Hematuria:  Possibly in setting of UTI vs. diabetic nephropathy (donor origin)  Last seen by Dr. Louise 9/20/23: Proteinuria 2.8 grams; relatively stable  Received 1 dose of antibiotic - pt asymptomatic.  Monitor.

## 2023-11-13 NOTE — PROGRESS NOTE ADULT - ASSESSMENT
78 y/o female w/ PMH CKD3 s/p DDRT  (05/19/2021) with chronic rejection (AMR) and HTN transfer from [facility] for hypertensive emergency causing flash pulmonary edema and shortness of breath, which has since resolved. c/c/b intermittent fever, CTC showed multifocal PNA. 76 y/o female w/ PMH CKD3 s/p DDRT  (05/19/2021) with chronic rejection (AMR) and HTN transfer from [facility] for hypertensive emergency causing flash pulmonary edema and shortness of breath, which has since resolved. c/c/b intermittent fever, CTC showed multifocal PN now on antibiotics. Additionally noted to have YANELY on CKD process.

## 2023-11-13 NOTE — PROGRESS NOTE ADULT - PROBLEM SELECTOR PLAN 3
With chronic graft rejection, follows w nephrology most recently 1 month PTA  Home Meds: Pred 5, Tacro 5mg qd, Mepron  - c/w prednisone 5 qd  - c/w mepron home med  - tacro level was 7.9 on 11/9. Goal 4-6 per renal  - tacro 5mg qd resumed  - stopped cellcept on 11/12 since now treating for concerning infection ?multifocal pna  -- f/u with renal regarding cellcept, but hold for now With chronic graft rejection, follows w nephrology most recently 1 month PTA  Home Meds: Pred 5, Tacro 5mg qd, Mepron  - c/w prednisone 5 qd  - c/w mepron home med  - tacrolimus dose per level; goal 4-6  - hold home cellcept iso infection

## 2023-11-13 NOTE — PROGRESS NOTE ADULT - ATTENDING COMMENTS
78 y/o female w/ PMH CKD3 s/p DDRT  (05/19/2021) with chronic rejection (AMR) and HTN transfer from [facility] for hypertensive emergency causing flash pulmonary edema and shortness of breath, which has since resolved. Patient's hospital course is complicated by fever which is likely secondary to multifocal pneumonia.     Echo w/ EF >75% on 11/10.  CT Chest--> Multifocal pneumonia; BCX NGTD 11/10  Cellcept held.   CT chest reveals multifocal pneumonia. c/w IV Cefepime 5-7 day course; will consider switching to PO soon; MRSA swab negative. Followup cultures. Continue Atovaquone for PCP prophylaxis.   YANELY CKD stage III- improving baseline 2.5-2.8; YANELY possibly due to progression of chronic rejection vs. cardiorenal (elevated BNP, severe LA dilation and diastolic dysfx on TTE)  Given Lasix 80mg IV on 11/8 at OSH and another dose on 11/9. However, CT chest more c/w PNA rather than pulm edema now euvolemic  -- Envarsus 5mg daily (check daily troughs 30mins prior to next dose; tac goal 4-6), MMF 500mg BID, Pred 5mg  - tx kidney renal US: ATN?  c/w daily loGenero home med on mepron for PJP ppx  BP improved today on Nifedipine increased to 90mg and now normotensive range- no changes today; monitor closely

## 2023-11-13 NOTE — PROGRESS NOTE ADULT - TIME BILLING
Kidney recipient with functioning renal allograft  YANELY on CKD  CHF, diastolic dysfunction, Pneumonia on antibiotic  Hypertension  Suggestions:  If blood culture is negative continue on home immunosuppression dose of MMF and prednisone  Tacrolimus target trough level 4-6 ng/ml  Monitor Mg, K  Hold Envarsus until  tacrolimus trough level is <8 ng/ml  Will follow as needed.  I was present during and reviewed clinical and lab data as well as assessment and plan as documented by the house staff as noted. Please contact if any additional questions with any change in clinical condition or on availability of any additional information or reports.
Time-based billing (NON-critical care).     The necessity of the time spent during the encounter on this date of service was due to:     - Ordering, reviewing, and interpreting labs, testing, and imaging.  - Independently obtaining a review of systems and performing a physical exam  - Reviewing prior hospitalization and where necessary, outpatient records.  - Counselling and educating patient  regarding interpretation of aforementioned items and plan of care.
Time-based billing (NON-critical care).     The necessity of the time spent during the encounter on this date of service was due to:     - Ordering, reviewing, and interpreting labs, testing, and imaging.  - Independently obtaining a review of systems and performing a physical exam  - Reviewing prior hospitalization and where necessary, outpatient records.  - Counselling and educating patient  regarding interpretation of aforementioned items and plan of care.

## 2023-11-14 ENCOUNTER — TRANSCRIPTION ENCOUNTER (OUTPATIENT)
Age: 78
End: 2023-11-14

## 2023-11-14 VITALS
RESPIRATION RATE: 20 BRPM | HEART RATE: 69 BPM | DIASTOLIC BLOOD PRESSURE: 65 MMHG | SYSTOLIC BLOOD PRESSURE: 154 MMHG | OXYGEN SATURATION: 95 % | TEMPERATURE: 98 F

## 2023-11-14 LAB
ALBUMIN SERPL ELPH-MCNC: 2.9 G/DL — LOW (ref 3.3–5)
ALBUMIN SERPL ELPH-MCNC: 2.9 G/DL — LOW (ref 3.3–5)
ALP SERPL-CCNC: 65 U/L — SIGNIFICANT CHANGE UP (ref 40–120)
ALP SERPL-CCNC: 65 U/L — SIGNIFICANT CHANGE UP (ref 40–120)
ALT FLD-CCNC: 11 U/L — SIGNIFICANT CHANGE UP (ref 10–45)
ALT FLD-CCNC: 11 U/L — SIGNIFICANT CHANGE UP (ref 10–45)
ANA TITR SER: NEGATIVE — SIGNIFICANT CHANGE UP
ANA TITR SER: NEGATIVE — SIGNIFICANT CHANGE UP
ANION GAP SERPL CALC-SCNC: 13 MMOL/L — SIGNIFICANT CHANGE UP (ref 5–17)
ANION GAP SERPL CALC-SCNC: 13 MMOL/L — SIGNIFICANT CHANGE UP (ref 5–17)
AST SERPL-CCNC: 15 U/L — SIGNIFICANT CHANGE UP (ref 10–40)
AST SERPL-CCNC: 15 U/L — SIGNIFICANT CHANGE UP (ref 10–40)
BILIRUB SERPL-MCNC: 0.1 MG/DL — LOW (ref 0.2–1.2)
BILIRUB SERPL-MCNC: 0.1 MG/DL — LOW (ref 0.2–1.2)
BUN SERPL-MCNC: 69 MG/DL — HIGH (ref 7–23)
BUN SERPL-MCNC: 69 MG/DL — HIGH (ref 7–23)
CALCIUM SERPL-MCNC: 9 MG/DL — SIGNIFICANT CHANGE UP (ref 8.4–10.5)
CALCIUM SERPL-MCNC: 9 MG/DL — SIGNIFICANT CHANGE UP (ref 8.4–10.5)
CHLORIDE SERPL-SCNC: 107 MMOL/L — SIGNIFICANT CHANGE UP (ref 96–108)
CHLORIDE SERPL-SCNC: 107 MMOL/L — SIGNIFICANT CHANGE UP (ref 96–108)
CO2 SERPL-SCNC: 21 MMOL/L — LOW (ref 22–31)
CO2 SERPL-SCNC: 21 MMOL/L — LOW (ref 22–31)
CREAT SERPL-MCNC: 3.44 MG/DL — HIGH (ref 0.5–1.3)
CREAT SERPL-MCNC: 3.44 MG/DL — HIGH (ref 0.5–1.3)
EGFR: 13 ML/MIN/1.73M2 — LOW
EGFR: 13 ML/MIN/1.73M2 — LOW
GAMMA INTERFERON BACKGROUND BLD IA-ACNC: 0.11 IU/ML — SIGNIFICANT CHANGE UP
GAMMA INTERFERON BACKGROUND BLD IA-ACNC: 0.11 IU/ML — SIGNIFICANT CHANGE UP
GLUCOSE SERPL-MCNC: 81 MG/DL — SIGNIFICANT CHANGE UP (ref 70–99)
GLUCOSE SERPL-MCNC: 81 MG/DL — SIGNIFICANT CHANGE UP (ref 70–99)
HCT VFR BLD CALC: 25 % — LOW (ref 34.5–45)
HCT VFR BLD CALC: 25 % — LOW (ref 34.5–45)
HGB BLD-MCNC: 7.5 G/DL — LOW (ref 11.5–15.5)
HGB BLD-MCNC: 7.5 G/DL — LOW (ref 11.5–15.5)
M TB IFN-G BLD-IMP: NEGATIVE — SIGNIFICANT CHANGE UP
M TB IFN-G BLD-IMP: NEGATIVE — SIGNIFICANT CHANGE UP
M TB IFN-G CD4+ BCKGRND COR BLD-ACNC: 0 IU/ML — SIGNIFICANT CHANGE UP
M TB IFN-G CD4+ BCKGRND COR BLD-ACNC: 0 IU/ML — SIGNIFICANT CHANGE UP
M TB IFN-G CD4+CD8+ BCKGRND COR BLD-ACNC: 0 IU/ML — SIGNIFICANT CHANGE UP
M TB IFN-G CD4+CD8+ BCKGRND COR BLD-ACNC: 0 IU/ML — SIGNIFICANT CHANGE UP
MAGNESIUM SERPL-MCNC: 1.7 MG/DL — SIGNIFICANT CHANGE UP (ref 1.6–2.6)
MAGNESIUM SERPL-MCNC: 1.7 MG/DL — SIGNIFICANT CHANGE UP (ref 1.6–2.6)
MCHC RBC-ENTMCNC: 25.7 PG — LOW (ref 27–34)
MCHC RBC-ENTMCNC: 25.7 PG — LOW (ref 27–34)
MCHC RBC-ENTMCNC: 30 GM/DL — LOW (ref 32–36)
MCHC RBC-ENTMCNC: 30 GM/DL — LOW (ref 32–36)
MCV RBC AUTO: 85.6 FL — SIGNIFICANT CHANGE UP (ref 80–100)
MCV RBC AUTO: 85.6 FL — SIGNIFICANT CHANGE UP (ref 80–100)
NRBC # BLD: 0 /100 WBCS — SIGNIFICANT CHANGE UP (ref 0–0)
NRBC # BLD: 0 /100 WBCS — SIGNIFICANT CHANGE UP (ref 0–0)
PHOSPHATE SERPL-MCNC: 4.4 MG/DL — SIGNIFICANT CHANGE UP (ref 2.5–4.5)
PHOSPHATE SERPL-MCNC: 4.4 MG/DL — SIGNIFICANT CHANGE UP (ref 2.5–4.5)
PLATELET # BLD AUTO: 248 K/UL — SIGNIFICANT CHANGE UP (ref 150–400)
PLATELET # BLD AUTO: 248 K/UL — SIGNIFICANT CHANGE UP (ref 150–400)
POTASSIUM SERPL-MCNC: 4 MMOL/L — SIGNIFICANT CHANGE UP (ref 3.5–5.3)
POTASSIUM SERPL-MCNC: 4 MMOL/L — SIGNIFICANT CHANGE UP (ref 3.5–5.3)
POTASSIUM SERPL-SCNC: 4 MMOL/L — SIGNIFICANT CHANGE UP (ref 3.5–5.3)
POTASSIUM SERPL-SCNC: 4 MMOL/L — SIGNIFICANT CHANGE UP (ref 3.5–5.3)
PROT SERPL-MCNC: 5.3 G/DL — LOW (ref 6–8.3)
PROT SERPL-MCNC: 5.3 G/DL — LOW (ref 6–8.3)
QUANT TB PLUS MITOGEN MINUS NIL: 3.32 IU/ML — SIGNIFICANT CHANGE UP
QUANT TB PLUS MITOGEN MINUS NIL: 3.32 IU/ML — SIGNIFICANT CHANGE UP
RBC # BLD: 2.92 M/UL — LOW (ref 3.8–5.2)
RBC # BLD: 2.92 M/UL — LOW (ref 3.8–5.2)
RBC # FLD: 15 % — HIGH (ref 10.3–14.5)
RBC # FLD: 15 % — HIGH (ref 10.3–14.5)
SODIUM SERPL-SCNC: 141 MMOL/L — SIGNIFICANT CHANGE UP (ref 135–145)
SODIUM SERPL-SCNC: 141 MMOL/L — SIGNIFICANT CHANGE UP (ref 135–145)
TACROLIMUS SERPL-MCNC: 10.3 NG/ML — SIGNIFICANT CHANGE UP
TACROLIMUS SERPL-MCNC: 10.3 NG/ML — SIGNIFICANT CHANGE UP
WBC # BLD: 2.33 K/UL — LOW (ref 3.8–10.5)
WBC # BLD: 2.33 K/UL — LOW (ref 3.8–10.5)
WBC # FLD AUTO: 2.33 K/UL — LOW (ref 3.8–10.5)
WBC # FLD AUTO: 2.33 K/UL — LOW (ref 3.8–10.5)

## 2023-11-14 PROCEDURE — 71250 CT THORAX DX C-: CPT

## 2023-11-14 PROCEDURE — 86140 C-REACTIVE PROTEIN: CPT

## 2023-11-14 PROCEDURE — 80197 ASSAY OF TACROLIMUS: CPT

## 2023-11-14 PROCEDURE — 84295 ASSAY OF SERUM SODIUM: CPT

## 2023-11-14 PROCEDURE — 87641 MR-STAPH DNA AMP PROBE: CPT

## 2023-11-14 PROCEDURE — 85610 PROTHROMBIN TIME: CPT

## 2023-11-14 PROCEDURE — 84484 ASSAY OF TROPONIN QUANT: CPT

## 2023-11-14 PROCEDURE — 81001 URINALYSIS AUTO W/SCOPE: CPT

## 2023-11-14 PROCEDURE — 96375 TX/PRO/DX INJ NEW DRUG ADDON: CPT

## 2023-11-14 PROCEDURE — 84300 ASSAY OF URINE SODIUM: CPT

## 2023-11-14 PROCEDURE — 85014 HEMATOCRIT: CPT

## 2023-11-14 PROCEDURE — 85018 HEMOGLOBIN: CPT

## 2023-11-14 PROCEDURE — 84145 PROCALCITONIN (PCT): CPT

## 2023-11-14 PROCEDURE — 87640 STAPH A DNA AMP PROBE: CPT

## 2023-11-14 PROCEDURE — 82310 ASSAY OF CALCIUM: CPT

## 2023-11-14 PROCEDURE — 87637 SARSCOV2&INF A&B&RSV AMP PRB: CPT

## 2023-11-14 PROCEDURE — 93306 TTE W/DOPPLER COMPLETE: CPT

## 2023-11-14 PROCEDURE — 84132 ASSAY OF SERUM POTASSIUM: CPT

## 2023-11-14 PROCEDURE — 99239 HOSP IP/OBS DSCHRG MGMT >30: CPT

## 2023-11-14 PROCEDURE — 83735 ASSAY OF MAGNESIUM: CPT

## 2023-11-14 PROCEDURE — 85025 COMPLETE CBC W/AUTO DIFF WBC: CPT

## 2023-11-14 PROCEDURE — 82306 VITAMIN D 25 HYDROXY: CPT

## 2023-11-14 PROCEDURE — 85730 THROMBOPLASTIN TIME PARTIAL: CPT

## 2023-11-14 PROCEDURE — 82947 ASSAY GLUCOSE BLOOD QUANT: CPT

## 2023-11-14 PROCEDURE — 82330 ASSAY OF CALCIUM: CPT

## 2023-11-14 PROCEDURE — 93356 MYOCRD STRAIN IMG SPCKL TRCK: CPT

## 2023-11-14 PROCEDURE — 85027 COMPLETE CBC AUTOMATED: CPT

## 2023-11-14 PROCEDURE — 85652 RBC SED RATE AUTOMATED: CPT

## 2023-11-14 PROCEDURE — 87040 BLOOD CULTURE FOR BACTERIA: CPT

## 2023-11-14 PROCEDURE — 76376 3D RENDER W/INTRP POSTPROCES: CPT

## 2023-11-14 PROCEDURE — 83880 ASSAY OF NATRIURETIC PEPTIDE: CPT

## 2023-11-14 PROCEDURE — 82570 ASSAY OF URINE CREATININE: CPT

## 2023-11-14 PROCEDURE — 82803 BLOOD GASES ANY COMBINATION: CPT

## 2023-11-14 PROCEDURE — 83605 ASSAY OF LACTIC ACID: CPT

## 2023-11-14 PROCEDURE — 96374 THER/PROPH/DIAG INJ IV PUSH: CPT

## 2023-11-14 PROCEDURE — 82435 ASSAY OF BLOOD CHLORIDE: CPT

## 2023-11-14 PROCEDURE — 84550 ASSAY OF BLOOD/URIC ACID: CPT

## 2023-11-14 PROCEDURE — 96376 TX/PRO/DX INJ SAME DRUG ADON: CPT

## 2023-11-14 PROCEDURE — 36415 COLL VENOUS BLD VENIPUNCTURE: CPT

## 2023-11-14 PROCEDURE — 86431 RHEUMATOID FACTOR QUANT: CPT

## 2023-11-14 PROCEDURE — 83615 LACTATE (LD) (LDH) ENZYME: CPT

## 2023-11-14 PROCEDURE — 83970 ASSAY OF PARATHORMONE: CPT

## 2023-11-14 PROCEDURE — 80048 BASIC METABOLIC PNL TOTAL CA: CPT

## 2023-11-14 PROCEDURE — 86480 TB TEST CELL IMMUN MEASURE: CPT

## 2023-11-14 PROCEDURE — 84540 ASSAY OF URINE/UREA-N: CPT

## 2023-11-14 PROCEDURE — 82652 VIT D 1 25-DIHYDROXY: CPT

## 2023-11-14 PROCEDURE — 83550 IRON BINDING TEST: CPT

## 2023-11-14 PROCEDURE — 87086 URINE CULTURE/COLONY COUNT: CPT

## 2023-11-14 PROCEDURE — 82728 ASSAY OF FERRITIN: CPT

## 2023-11-14 PROCEDURE — 71045 X-RAY EXAM CHEST 1 VIEW: CPT

## 2023-11-14 PROCEDURE — 84156 ASSAY OF PROTEIN URINE: CPT

## 2023-11-14 PROCEDURE — 84100 ASSAY OF PHOSPHORUS: CPT

## 2023-11-14 PROCEDURE — 83540 ASSAY OF IRON: CPT

## 2023-11-14 PROCEDURE — 93005 ELECTROCARDIOGRAM TRACING: CPT

## 2023-11-14 PROCEDURE — 76776 US EXAM K TRANSPL W/DOPPLER: CPT

## 2023-11-14 PROCEDURE — 86038 ANTINUCLEAR ANTIBODIES: CPT

## 2023-11-14 PROCEDURE — 97161 PT EVAL LOW COMPLEX 20 MIN: CPT

## 2023-11-14 PROCEDURE — 80053 COMPREHEN METABOLIC PANEL: CPT

## 2023-11-14 PROCEDURE — 99285 EMERGENCY DEPT VISIT HI MDM: CPT

## 2023-11-14 RX ORDER — LOSARTAN POTASSIUM 100 MG/1
1 TABLET, FILM COATED ORAL
Qty: 0 | Refills: 0 | DISCHARGE

## 2023-11-14 RX ORDER — NIFEDIPINE 30 MG
2 TABLET, EXTENDED RELEASE 24 HR ORAL
Qty: 0 | Refills: 0 | DISCHARGE
Start: 2023-11-14

## 2023-11-14 RX ORDER — NIFEDIPINE 30 MG
30 TABLET, EXTENDED RELEASE 24 HR ORAL ONCE
Refills: 0 | Status: COMPLETED | OUTPATIENT
Start: 2023-11-14 | End: 2023-11-14

## 2023-11-14 RX ORDER — NIFEDIPINE 30 MG
120 TABLET, EXTENDED RELEASE 24 HR ORAL DAILY
Refills: 0 | Status: DISCONTINUED | OUTPATIENT
Start: 2023-11-14 | End: 2023-11-14

## 2023-11-14 RX ORDER — PANTOPRAZOLE SODIUM 20 MG/1
40 TABLET, DELAYED RELEASE ORAL
Refills: 0 | Status: DISCONTINUED | OUTPATIENT
Start: 2023-11-14 | End: 2023-11-14

## 2023-11-14 RX ORDER — SODIUM BICARBONATE 1 MEQ/ML
2 SYRINGE (ML) INTRAVENOUS
Qty: 0 | Refills: 0 | DISCHARGE
Start: 2023-11-14

## 2023-11-14 RX ORDER — LABETALOL HCL 100 MG
1 TABLET ORAL
Qty: 90 | Refills: 0
Start: 2023-11-14 | End: 2023-12-13

## 2023-11-14 RX ORDER — LABETALOL HCL 100 MG
1 TABLET ORAL
Qty: 0 | Refills: 0 | DISCHARGE
Start: 2023-11-14

## 2023-11-14 RX ORDER — CEFPODOXIME PROXETIL 100 MG
1 TABLET ORAL
Qty: 8 | Refills: 0
Start: 2023-11-14 | End: 2023-11-17

## 2023-11-14 RX ORDER — NIFEDIPINE 30 MG
2 TABLET, EXTENDED RELEASE 24 HR ORAL
Qty: 60 | Refills: 0
Start: 2023-11-14 | End: 2023-12-13

## 2023-11-14 RX ORDER — SODIUM BICARBONATE 1 MEQ/ML
2 SYRINGE (ML) INTRAVENOUS
Refills: 0 | DISCHARGE

## 2023-11-14 RX ORDER — NIFEDIPINE 30 MG
2 TABLET, EXTENDED RELEASE 24 HR ORAL
Qty: 60 | Refills: 0 | DISCHARGE
Start: 2023-11-14 | End: 2023-12-13

## 2023-11-14 RX ADMIN — Medication 90 MILLIGRAM(S): at 04:14

## 2023-11-14 RX ADMIN — Medication 1300 MILLIGRAM(S): at 05:37

## 2023-11-14 RX ADMIN — Medication 200 MILLIGRAM(S): at 14:10

## 2023-11-14 RX ADMIN — Medication 5 MILLIGRAM(S): at 05:38

## 2023-11-14 RX ADMIN — Medication 1000 UNIT(S): at 11:50

## 2023-11-14 RX ADMIN — CALCITRIOL 0.25 MICROGRAM(S): 0.5 CAPSULE ORAL at 11:50

## 2023-11-14 RX ADMIN — PANTOPRAZOLE SODIUM 40 MILLIGRAM(S): 20 TABLET, DELAYED RELEASE ORAL at 06:13

## 2023-11-14 RX ADMIN — ATOVAQUONE 1500 MILLIGRAM(S): 750 SUSPENSION ORAL at 11:49

## 2023-11-14 RX ADMIN — CHLORHEXIDINE GLUCONATE 1 APPLICATION(S): 213 SOLUTION TOPICAL at 05:56

## 2023-11-14 RX ADMIN — SODIUM ZIRCONIUM CYCLOSILICATE 10 GRAM(S): 10 POWDER, FOR SUSPENSION ORAL at 11:50

## 2023-11-14 RX ADMIN — Medication 1300 MILLIGRAM(S): at 14:09

## 2023-11-14 RX ADMIN — CEFEPIME 100 MILLIGRAM(S): 1 INJECTION, POWDER, FOR SOLUTION INTRAMUSCULAR; INTRAVENOUS at 14:10

## 2023-11-14 RX ADMIN — HEPARIN SODIUM 5000 UNIT(S): 5000 INJECTION INTRAVENOUS; SUBCUTANEOUS at 05:38

## 2023-11-14 RX ADMIN — Medication 30 MILLIGRAM(S): at 11:50

## 2023-11-14 RX ADMIN — Medication 200 MILLIGRAM(S): at 04:14

## 2023-11-14 NOTE — PROGRESS NOTE ADULT - REASON FOR ADMISSION
HTN Emergency

## 2023-11-14 NOTE — PROGRESS NOTE ADULT - ASSESSMENT
76 y/o female w/ PMH CKD3 s/p DDRT  (05/19/2021) with chronic rejection (AMR) and HTN transfer from [facility] for hypertensive emergency causing flash pulmonary edema and shortness of breath, which has since resolved. c/c/b intermittent fever, CTC showed multifocal PN now on antibiotics. Additionally noted to have YANELY on CKD process.

## 2023-11-14 NOTE — DISCHARGE NOTE NURSING/CASE MANAGEMENT/SOCIAL WORK - NSDCPNINST_GEN_ALL_CORE
Call and make follow up appointment with MD after discharged. Return to the nearest emergency room or call 911 for worsening symptoms.

## 2023-11-14 NOTE — PROGRESS NOTE ADULT - PROBLEM SELECTOR PLAN 1
The emergency has resolved, now with chronic hypertension.  S/p Hydral, Labetalol, and Nifedipine in ED  Home Meds: Nifedipine 60 qd, Labetalol 200 TID, unclear if still taking losartan or if recently stopped  Patient was BP 220s systolic in ED, lowered serially with above. Now 130s systolic.   SOB resolved, but with resultant pulmonary edema.  No HA, CP, blurry vision.     - c/w home labetalol and nifedipine  -- increased nifedipine to 90 qd per renal rec  - hold losartan and chlorthalidone, unclear if these are taken at home, but per renal hold regardless  - Vitals q4.
S/p Hydral, Labetalol, and Nifedipine in ED  Home Meds: Nifedipine 60 qd, Labetalol 200 TID, unclear if still taking losartan or if recently stopped  Patient was BP 220s systolic in ED, lowered serially with above. Now 130s systolic.   SOB resolved, but with resultant pulmonary edema.  No HA, CP, blurry vision.     - c/w home labetalol and nifedipine  - hold losartan until clarify if home med, and pending renal recommendation  - Vitals q4.   - ideally only lower BP by 30% in 24h, however patient already stably BP controlled. Will hold additional IV antihypertensives unless SBP > 180  - diuresis as below
The emergency has resolved, now with chronic hypertension.  S/p Hydral, Labetalol, and Nifedipine in ED  Home Meds: Nifedipine 60 qd, Labetalol 200 TID, unclear if still taking losartan or if recently stopped  Patient was BP 220s systolic in ED, lowered serially with above. Now 130s systolic.   SOB resolved, but with resultant pulmonary edema.  No HA, CP, blurry vision.     - c/w home labetalol and nifedipine  -- increased nifedipine to 90 qd per renal rec  - hold losartan and chlorthalidone, unclear if these are taken at home, but per renal hold regardless  - Vitals q4.
The emergency has resolved, now with chronic hypertension.  S/p Hydral, Labetalol, and Nifedipine in ED  Home Meds: Nifedipine 60 qd, Labetalol 200 TID, unclear if still taking losartan or if recently stopped  Patient was BP 220s systolic in ED, lowered serially with above. Now 130s systolic.   SOB resolved, but with resultant pulmonary edema.  No HA, CP, blurry vision.     - c/w home labetalol and nifedipine  -- increased nifedipine to 90 qd per renal rec  - hold losartan and chlorthalidone, unclear if these are taken at home, but per renal hold regardless  - Vitals q4.
S/p Hydral, Labetalol, and Nifedipine in ED  Home Meds: Nifedipine 60 qd, Labetalol 200 TID, unclear if still taking losartan or if recently stopped  Patient was BP 220s systolic in ED, lowered serially with above. Now 130s systolic.   SOB resolved, but with resultant pulmonary edema.  No HA, CP, blurry vision.     - c/w home labetalol and nifedipine  - hold losartan until clarify if home med, and pending renal recommendation  - Vitals q4.   - ideally only lower BP by 30% in 24h, however patient already stably BP controlled. Will hold additional IV antihypertensives unless SBP > 180  - diuresis as below

## 2023-11-14 NOTE — PROGRESS NOTE ADULT - ATTENDING COMMENTS
78 y/o female w/ PMH CKD3 s/p DDRT  (05/19/2021) with chronic rejection ([facility]) and HTN transfer from [facility] for hypertensive emergency causing flash pulmonary edema and shortness of breath, which has since resolved. Patient's hospital course is complicated by fever which is likely secondary to multifocal pneumonia.     medically ready for dc; OK'ed by tx renal- will restart home anti rejection meds; can transition to PO abx to complete a 7 day course   dc time 55 min

## 2023-11-14 NOTE — PROGRESS NOTE ADULT - PROBLEM SELECTOR PLAN 3
With chronic graft rejection, follows w nephrology most recently 1 month PTA  Home Meds: Pred 5, Tacro 5mg qd, Mepron  - c/w prednisone 5 qd  - c/w mepron home med  - tacrolimus dose per level; goal 4-6  - hold home cellcept iso infection

## 2023-11-14 NOTE — PROGRESS NOTE ADULT - PROBLEM SELECTOR PLAN 6
Diet: Regular, low K  DVT PPx: Heparin SQ 5000 q12  Dispo: BP stability, renal management, ?tx for pna
Diet: Regular, low K  DVT PPx: Heparin SQ 5000 q12  Dispo: BP stability, renal management + diuresis

## 2023-11-14 NOTE — PROGRESS NOTE ADULT - NUTRITIONAL ASSESSMENT
This patient has been assessed with a concern for Malnutrition and has been determined to have a diagnosis/diagnoses of Severe protein-calorie malnutrition.    This patient is being managed with:   Diet Regular-  No Concentrated Potassium  Supplement Feeding Modality:  Oral  Nepro Cans or Servings Per Day:  2       Frequency:  Daily  Entered: Nov 10 2023  4:21PM  

## 2023-11-14 NOTE — DISCHARGE NOTE NURSING/CASE MANAGEMENT/SOCIAL WORK - PATIENT PORTAL LINK FT
You can access the FollowMyHealth Patient Portal offered by [facility] by registering at the following website: [email or web address]. By joining [facility]’s FollowMyHealth portal, you will also be able to view your health information using other applications (apps) compatible with our system.
Allergy;

## 2023-11-14 NOTE — PROGRESS NOTE ADULT - PROBLEM SELECTOR PLAN 2
With YANELY on CKD. 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 SCr ~2.9  W some hyperkalemia now s/p lokelma   Pulmonary edema on CXR, trace LE edema  US renal with evidence of renal rejection, no evidence of renal artery stenosis.   - TTE 11/10: LV diastolic dysfunction, small pericardial effusion  - additional lasix 80 IV per renal, considering daily dose  - renal recs  - monitor SCr, avoid nephrotoxins  - transplant medications as below

## 2023-11-14 NOTE — DISCHARGE NOTE NURSING/CASE MANAGEMENT/SOCIAL WORK - NSDCPEFALRISK_GEN_ALL_CORE
For information on Fall & Injury Prevention, visit: [email or web address] OR  [email or web address] OR  https://www.cdc.gov/steadi/patient.html

## 2023-11-14 NOTE — PROGRESS NOTE ADULT - SUBJECTIVE AND OBJECTIVE BOX
Patient is a 77y old  Female who presents with a chief complaint of HTN Emergency (13 Nov 2023 17:54)      SUBJECTIVE / OVERNIGHT EVENTS:  Patient seen and evaluated at bedside.  No events overnight. Patient reports feeling well. No complaints at this time. ASking when she can go home     Vital Signs Last 24 Hrs  T(C): 36.8 (14 Nov 2023 04:03), Max: 37.2 (13 Nov 2023 09:25)  T(F): 98.3 (14 Nov 2023 04:03), Max: 98.9 (13 Nov 2023 09:25)  HR: 70 (14 Nov 2023 04:03) (65 - 75)  BP: 188/82 (14 Nov 2023 04:03) (120/50 - 188/82)  BP(mean): --  RR: 18 (14 Nov 2023 04:03) (18 - 18)  SpO2: 96% (14 Nov 2023 04:03) (92% - 97%)    Parameters below as of 14 Nov 2023 04:03  Patient On (Oxygen Delivery Method): room air        PHYSICAL EXAM:  GENERAL: NAD, well-developed  CHEST/LUNG: Clear to auscultation bilaterally; No wheeze  HEART: Regular rate and rhythm; Normal S1 S2, No murmurs, rubs, or gallops  ABDOMEN: Soft, Nontender, Nondistended; Bowel sounds present  EXTREMITIES:  2+ Peripheral Pulses, No clubbing, cyanosis, or edema  PSYCH: AAOx3    LABS:                        8.0    2.44  )-----------( 258      ( 13 Nov 2023 05:18 )             26.5     Hgb Trend: 8.0<--, 7.4<--, 7.2<--, 7.2<--, 8.0<--  11-13    142  |  108  |  67<H>  ----------------------------<  96  4.1   |  20<L>  |  3.45<H>    Ca    8.7      13 Nov 2023 05:19  Phos  4.1     11-13  Mg     1.8     11-13    TPro  5.4<L>  /  Alb  3.0<L>  /  TBili  0.1<L>  /  DBili  x   /  AST  12  /  ALT  7<L>  /  AlkPhos  65  11-13    Creatinine Trend: 3.45<--, 3.56<--, 3.74<--, 3.59<--, 2.96<--, 2.95<--  LIVER FUNCTIONS - ( 13 Nov 2023 05:19 )  Alb: 3.0 g/dL / Pro: 5.4 g/dL / ALK PHOS: 65 U/L / ALT: 7 U/L / AST: 12 U/L / GGT: x                 Urinalysis Basic - ( 13 Nov 2023 05:19 )    Color: x / Appearance: x / SG: x / pH: x  Gluc: 96 mg/dL / Ketone: x  / Bili: x / Urobili: x   Blood: x / Protein: x / Nitrite: x   Leuk Esterase: x / RBC: x / WBC x   Sq Epi: x / Non Sq Epi: x / Bacteria: x

## 2023-11-14 NOTE — PROGRESS NOTE ADULT - PROBLEM/PLAN-6
Well-Child Checkup: 6 to 10 Years    Even if your child is healthy, keep bringing him or her in for yearly checkups. These visits ensure your child’s health is protected with scheduled vaccinations and health screenings.  Your child's healthcare provider
DISPLAY PLAN FREE TEXT
· Help your child get at least 30 minutes to 60 minutes of active play per day. Moving around helps keep your child healthy. Go to the park, ride bikes, or play active games like tag or ball.   · Limit “screen time” to  a maximum of 1 hour to 2 hours each d
DISPLAY PLAN FREE TEXT
· TV, computer, and video games can agitate a child and make it hard to calm down for the night. Turn them off at least an hour before bed. Instead, read a chapter of a book together. · Remind your child to brush and floss his or her teeth before bed.  Dir
Bedwetting, or urinating when sleeping, can be frustrating for both you and your child. But it’s usually not a sign of a major problem. Your child’s body may simply need more time to mature.  If a child suddenly starts wetting the bed, the cause is often a
DISPLAY PLAN FREE TEXT
DISPLAY PLAN FREE TEXT

## 2023-11-14 NOTE — PROGRESS NOTE ADULT - PROVIDER SPECIALTY LIST ADULT
Nephrology
Nephrology
Transplant Nephrology
Transplant Nephrology
Nephrology
Nephrology
Internal Medicine

## 2023-11-15 ENCOUNTER — TRANSCRIPTION ENCOUNTER (OUTPATIENT)
Age: 78
End: 2023-11-15

## 2023-11-16 LAB
CULTURE RESULTS: SIGNIFICANT CHANGE UP
CULTURE RESULTS: SIGNIFICANT CHANGE UP
SPECIMEN SOURCE: SIGNIFICANT CHANGE UP
SPECIMEN SOURCE: SIGNIFICANT CHANGE UP

## 2023-11-17 ENCOUNTER — TRANSCRIPTION ENCOUNTER (OUTPATIENT)
Age: 78
End: 2023-11-17

## 2023-11-28 ENCOUNTER — TRANSCRIPTION ENCOUNTER (OUTPATIENT)
Age: 78
End: 2023-11-28

## 2023-11-29 ENCOUNTER — APPOINTMENT (OUTPATIENT)
Dept: NEPHROLOGY | Facility: CLINIC | Age: 78
End: 2023-11-29
Payer: MEDICARE

## 2023-11-29 VITALS
OXYGEN SATURATION: 98 % | WEIGHT: 111 LBS | RESPIRATION RATE: 18 BRPM | BODY MASS INDEX: 17.84 KG/M2 | HEART RATE: 82 BPM | HEIGHT: 66 IN | TEMPERATURE: 97.3 F | SYSTOLIC BLOOD PRESSURE: 186 MMHG | DIASTOLIC BLOOD PRESSURE: 74 MMHG

## 2023-11-29 PROCEDURE — 99215 OFFICE O/P EST HI 40 MIN: CPT

## 2023-11-29 RX ORDER — CHLORTHALIDONE 50 MG/1
50 TABLET ORAL DAILY
Qty: 30 | Refills: 11 | Status: DISCONTINUED | COMMUNITY
Start: 2023-01-18 | End: 2023-11-29

## 2023-11-29 RX ORDER — NIFEDIPINE 60 MG/1
60 TABLET, EXTENDED RELEASE ORAL DAILY
Qty: 90 | Refills: 3 | Status: DISCONTINUED | COMMUNITY
Start: 2021-05-20 | End: 2023-11-29

## 2023-11-29 RX ORDER — LOSARTAN POTASSIUM 50 MG/1
50 TABLET, FILM COATED ORAL DAILY
Qty: 30 | Refills: 11 | Status: DISCONTINUED | COMMUNITY
Start: 2022-10-12 | End: 2023-11-29

## 2023-11-30 ENCOUNTER — NON-APPOINTMENT (OUTPATIENT)
Age: 78
End: 2023-11-30

## 2023-11-30 LAB
ALBUMIN SERPL ELPH-MCNC: 3.5 G/DL
ALP BLD-CCNC: 85 U/L
ALT SERPL-CCNC: 6 U/L
ANION GAP SERPL CALC-SCNC: 11 MMOL/L
APPEARANCE: CLEAR
AST SERPL-CCNC: 13 U/L
BACTERIA: NEGATIVE /HPF
BASOPHILS # BLD AUTO: 0.01 K/UL
BASOPHILS NFR BLD AUTO: 0.2 %
BILIRUB SERPL-MCNC: 0.2 MG/DL
BILIRUBIN URINE: NEGATIVE
BKV DNA SPEC QL NAA+PROBE: NOT DETECTED IU/ML
BLOOD URINE: ABNORMAL
BUN SERPL-MCNC: 69 MG/DL
CALCIUM SERPL-MCNC: 9.2 MG/DL
CAST: 0 /LPF
CHLORIDE SERPL-SCNC: 111 MMOL/L
CMV DNA SPEC QL NAA+PROBE: ABNORMAL IU/ML
CMVPCR LOG: ABNORMAL LOG10IU/ML
CO2 SERPL-SCNC: 18 MMOL/L
COLOR: YELLOW
CREAT SERPL-MCNC: 3.13 MG/DL
CREAT SPEC-SCNC: 55 MG/DL
CREAT/PROT UR: 5.6 RATIO
EGFR: 15 ML/MIN/1.73M2
EOSINOPHIL # BLD AUTO: 0.08 K/UL
EOSINOPHIL NFR BLD AUTO: 1.3 %
EPITHELIAL CELLS: 0 /HPF
FERRITIN SERPL-MCNC: 2303 NG/ML
FOLATE SERPL-MCNC: 16.9 NG/ML
GLUCOSE QUALITATIVE U: NEGATIVE MG/DL
GLUCOSE SERPL-MCNC: 102 MG/DL
HAPTOGLOB SERPL-MCNC: 165 MG/DL
HCT VFR BLD CALC: 26.2 %
HGB BLD-MCNC: 7.8 G/DL
IMM GRANULOCYTES NFR BLD AUTO: 1 %
IRON SATN MFR SERPL: 46 %
IRON SERPL-MCNC: 79 UG/DL
KETONES URINE: NEGATIVE MG/DL
LDH SERPL-CCNC: 215 U/L
LEUKOCYTE ESTERASE URINE: NEGATIVE
LYMPHOCYTES # BLD AUTO: 0.62 K/UL
LYMPHOCYTES NFR BLD AUTO: 10.2 %
MAGNESIUM SERPL-MCNC: 1.6 MG/DL
MAN DIFF?: NORMAL
MCHC RBC-ENTMCNC: 25.8 PG
MCHC RBC-ENTMCNC: 29.8 GM/DL
MCV RBC AUTO: 86.8 FL
MICROSCOPIC-UA: NORMAL
MONOCYTES # BLD AUTO: 0.31 K/UL
MONOCYTES NFR BLD AUTO: 5.1 %
NEUTROPHILS # BLD AUTO: 5.01 K/UL
NEUTROPHILS NFR BLD AUTO: 82.2 %
NITRITE URINE: NEGATIVE
PH URINE: 7
PHOSPHATE SERPL-MCNC: 4.5 MG/DL
PLATELET # BLD AUTO: 139 K/UL
POTASSIUM SERPL-SCNC: 5.4 MMOL/L
PROT SERPL-MCNC: 6 G/DL
PROT UR-MCNC: 306 MG/DL
PROTEIN URINE: 300 MG/DL
RBC # BLD: 3.02 M/UL
RBC # FLD: 16.2 %
RED BLOOD CELLS URINE: 3 /HPF
SODIUM SERPL-SCNC: 140 MMOL/L
SPECIFIC GRAVITY URINE: 1.01
TACROLIMUS SERPL-MCNC: 3.6 NG/ML
TIBC SERPL-MCNC: 172 UG/DL
UIBC SERPL-MCNC: 93 UG/DL
URATE SERPL-MCNC: 7.8 MG/DL
UROBILINOGEN URINE: 0.2 MG/DL
WBC # FLD AUTO: 6.09 K/UL
WHITE BLOOD CELLS URINE: 0 /HPF

## 2023-12-01 RX ORDER — SODIUM BICARBONATE 650 MG/1
650 TABLET ORAL 3 TIMES DAILY
Qty: 180 | Refills: 11 | Status: ACTIVE | COMMUNITY
Start: 2021-10-13 | End: 1900-01-01

## 2023-12-01 RX ORDER — LABETALOL HYDROCHLORIDE 200 MG/1
200 TABLET, FILM COATED ORAL
Qty: 90 | Refills: 11 | Status: ACTIVE | COMMUNITY
Start: 2021-05-27 | End: 1900-01-01

## 2023-12-04 LAB — B19V DNA FLD QL NAA+PROBE: NOT DETECTED IU/ML

## 2023-12-06 LAB — VIT B12 USB SERPL-MCNC: 902 PG/ML

## 2023-12-18 NOTE — ASU PREOP CHECKLIST - SELECT TESTS ORDERED
Called and discussed with mom. See telephone encounter for details.    Gallito Mcclure MD  [facility], [facility]    
Called mom and rescheduled appointment to a virtual on 12/12/2023. Patient will be with father after school- mom is requesting a 3 way call be done as she will not be home. Can accommodate.     Samantha Tellez MA on 12/8/2023 at 12:43 PM    
Ok to schedule virtual visit? Please see Webtalk message.      Thank you    Liz CLEVELAND RN    
Yeah virtual visit is just fine. Ask mom to do a weight at home prior to that appointment.   Thanks!  Gallito Mcclure MD  [facility], [facility]    
Type and Screen

## 2024-01-03 ENCOUNTER — INPATIENT (INPATIENT)
Facility: HOSPITAL | Age: 79
LOS: 7 days | Discharge: HOME | DRG: 698 | End: 2024-01-11
Attending: INTERNAL MEDICINE | Admitting: INTERNAL MEDICINE
Payer: MEDICARE

## 2024-01-03 VITALS
HEART RATE: 66 BPM | HEIGHT: 64 IN | TEMPERATURE: 98 F | SYSTOLIC BLOOD PRESSURE: 189 MMHG | OXYGEN SATURATION: 98 % | WEIGHT: 119.93 LBS | RESPIRATION RATE: 20 BRPM | DIASTOLIC BLOOD PRESSURE: 69 MMHG

## 2024-01-03 DIAGNOSIS — Z90.710 ACQUIRED ABSENCE OF BOTH CERVIX AND UTERUS: Chronic | ICD-10-CM

## 2024-01-03 DIAGNOSIS — I77.0 ARTERIOVENOUS FISTULA, ACQUIRED: Chronic | ICD-10-CM

## 2024-01-03 DIAGNOSIS — E87.20 ACIDOSIS, UNSPECIFIED: ICD-10-CM

## 2024-01-03 DIAGNOSIS — Z99.2 DEPENDENCE ON RENAL DIALYSIS: Chronic | ICD-10-CM

## 2024-01-03 DIAGNOSIS — R60.0 LOCALIZED EDEMA: ICD-10-CM

## 2024-01-03 DIAGNOSIS — D64.9 ANEMIA, UNSPECIFIED: ICD-10-CM

## 2024-01-03 DIAGNOSIS — Z94.0 KIDNEY TRANSPLANT STATUS: Chronic | ICD-10-CM

## 2024-01-03 DIAGNOSIS — N18.30 CHRONIC KIDNEY DISEASE, STAGE 3 UNSPECIFIED: ICD-10-CM

## 2024-01-03 DIAGNOSIS — H26.9 UNSPECIFIED CATARACT: Chronic | ICD-10-CM

## 2024-01-03 DIAGNOSIS — Z29.9 ENCOUNTER FOR PROPHYLACTIC MEASURES, UNSPECIFIED: ICD-10-CM

## 2024-01-03 DIAGNOSIS — N18.4 CHRONIC KIDNEY DISEASE, STAGE 4 (SEVERE): ICD-10-CM

## 2024-01-03 DIAGNOSIS — R09.89 OTHER SPECIFIED SYMPTOMS AND SIGNS INVOLVING THE CIRCULATORY AND RESPIRATORY SYSTEMS: ICD-10-CM

## 2024-01-03 DIAGNOSIS — D84.9 IMMUNODEFICIENCY, UNSPECIFIED: ICD-10-CM

## 2024-01-03 DIAGNOSIS — Z94.0 KIDNEY TRANSPLANT STATUS: ICD-10-CM

## 2024-01-03 DIAGNOSIS — E87.5 HYPERKALEMIA: ICD-10-CM

## 2024-01-03 DIAGNOSIS — R06.02 SHORTNESS OF BREATH: ICD-10-CM

## 2024-01-03 DIAGNOSIS — R91.8 OTHER NONSPECIFIC ABNORMAL FINDING OF LUNG FIELD: ICD-10-CM

## 2024-01-03 DIAGNOSIS — I10 ESSENTIAL (PRIMARY) HYPERTENSION: ICD-10-CM

## 2024-01-03 LAB
ALBUMIN SERPL ELPH-MCNC: 3.1 G/DL — LOW (ref 3.3–5)
ALBUMIN SERPL ELPH-MCNC: 3.1 G/DL — LOW (ref 3.3–5)
ALBUMIN SERPL ELPH-MCNC: 3.2 G/DL — LOW (ref 3.3–5)
ALBUMIN SERPL ELPH-MCNC: 3.2 G/DL — LOW (ref 3.3–5)
ALP SERPL-CCNC: 74 U/L — SIGNIFICANT CHANGE UP (ref 40–120)
ALP SERPL-CCNC: 74 U/L — SIGNIFICANT CHANGE UP (ref 40–120)
ALP SERPL-CCNC: 76 U/L — SIGNIFICANT CHANGE UP (ref 40–120)
ALP SERPL-CCNC: 76 U/L — SIGNIFICANT CHANGE UP (ref 40–120)
ALT FLD-CCNC: 6 U/L — LOW (ref 10–45)
ALT FLD-CCNC: 6 U/L — LOW (ref 10–45)
ALT FLD-CCNC: <5 U/L — LOW (ref 10–45)
ALT FLD-CCNC: <5 U/L — LOW (ref 10–45)
ANION GAP SERPL CALC-SCNC: 12 MMOL/L — SIGNIFICANT CHANGE UP (ref 5–17)
ANION GAP SERPL CALC-SCNC: 12 MMOL/L — SIGNIFICANT CHANGE UP (ref 5–17)
ANION GAP SERPL CALC-SCNC: 13 MMOL/L — SIGNIFICANT CHANGE UP (ref 5–17)
ANION GAP SERPL CALC-SCNC: 13 MMOL/L — SIGNIFICANT CHANGE UP (ref 5–17)
APPEARANCE UR: CLEAR — SIGNIFICANT CHANGE UP
APPEARANCE UR: CLEAR — SIGNIFICANT CHANGE UP
APTT BLD: 34 SEC — SIGNIFICANT CHANGE UP (ref 24.5–35.6)
APTT BLD: 34 SEC — SIGNIFICANT CHANGE UP (ref 24.5–35.6)
AST SERPL-CCNC: 22 U/L — SIGNIFICANT CHANGE UP (ref 10–40)
AST SERPL-CCNC: 22 U/L — SIGNIFICANT CHANGE UP (ref 10–40)
AST SERPL-CCNC: 9 U/L — LOW (ref 10–40)
AST SERPL-CCNC: 9 U/L — LOW (ref 10–40)
BACTERIA # UR AUTO: NEGATIVE /HPF — SIGNIFICANT CHANGE UP
BACTERIA # UR AUTO: NEGATIVE /HPF — SIGNIFICANT CHANGE UP
BASE EXCESS BLDV CALC-SCNC: -7.9 MMOL/L — LOW (ref -2–3)
BASE EXCESS BLDV CALC-SCNC: -7.9 MMOL/L — LOW (ref -2–3)
BASOPHILS # BLD AUTO: 0.01 K/UL — SIGNIFICANT CHANGE UP (ref 0–0.2)
BASOPHILS # BLD AUTO: 0.01 K/UL — SIGNIFICANT CHANGE UP (ref 0–0.2)
BASOPHILS NFR BLD AUTO: 0.2 % — SIGNIFICANT CHANGE UP (ref 0–2)
BASOPHILS NFR BLD AUTO: 0.2 % — SIGNIFICANT CHANGE UP (ref 0–2)
BILIRUB SERPL-MCNC: 0.2 MG/DL — SIGNIFICANT CHANGE UP (ref 0.2–1.2)
BILIRUB UR-MCNC: NEGATIVE — SIGNIFICANT CHANGE UP
BILIRUB UR-MCNC: NEGATIVE — SIGNIFICANT CHANGE UP
BUN SERPL-MCNC: 47 MG/DL — HIGH (ref 7–23)
BUN SERPL-MCNC: 47 MG/DL — HIGH (ref 7–23)
BUN SERPL-MCNC: 49 MG/DL — HIGH (ref 7–23)
BUN SERPL-MCNC: 49 MG/DL — HIGH (ref 7–23)
CA-I SERPL-SCNC: 1.25 MMOL/L — SIGNIFICANT CHANGE UP (ref 1.15–1.33)
CA-I SERPL-SCNC: 1.25 MMOL/L — SIGNIFICANT CHANGE UP (ref 1.15–1.33)
CALCIUM SERPL-MCNC: 8.7 MG/DL — SIGNIFICANT CHANGE UP (ref 8.4–10.5)
CALCIUM SERPL-MCNC: 8.7 MG/DL — SIGNIFICANT CHANGE UP (ref 8.4–10.5)
CALCIUM SERPL-MCNC: 8.8 MG/DL — SIGNIFICANT CHANGE UP (ref 8.4–10.5)
CALCIUM SERPL-MCNC: 8.8 MG/DL — SIGNIFICANT CHANGE UP (ref 8.4–10.5)
CAST: 0 /LPF — SIGNIFICANT CHANGE UP (ref 0–4)
CAST: 0 /LPF — SIGNIFICANT CHANGE UP (ref 0–4)
CHLORIDE BLDV-SCNC: 109 MMOL/L — HIGH (ref 96–108)
CHLORIDE BLDV-SCNC: 109 MMOL/L — HIGH (ref 96–108)
CHLORIDE SERPL-SCNC: 109 MMOL/L — HIGH (ref 96–108)
CHLORIDE SERPL-SCNC: 109 MMOL/L — HIGH (ref 96–108)
CHLORIDE SERPL-SCNC: 110 MMOL/L — HIGH (ref 96–108)
CHLORIDE SERPL-SCNC: 110 MMOL/L — HIGH (ref 96–108)
CO2 BLDV-SCNC: 19 MMOL/L — LOW (ref 22–26)
CO2 BLDV-SCNC: 19 MMOL/L — LOW (ref 22–26)
CO2 SERPL-SCNC: 16 MMOL/L — LOW (ref 22–31)
CO2 SERPL-SCNC: 16 MMOL/L — LOW (ref 22–31)
CO2 SERPL-SCNC: 17 MMOL/L — LOW (ref 22–31)
CO2 SERPL-SCNC: 17 MMOL/L — LOW (ref 22–31)
COLOR SPEC: YELLOW — SIGNIFICANT CHANGE UP
COLOR SPEC: YELLOW — SIGNIFICANT CHANGE UP
CREAT SERPL-MCNC: 2.67 MG/DL — HIGH (ref 0.5–1.3)
CREAT SERPL-MCNC: 2.67 MG/DL — HIGH (ref 0.5–1.3)
CREAT SERPL-MCNC: 2.75 MG/DL — HIGH (ref 0.5–1.3)
CREAT SERPL-MCNC: 2.75 MG/DL — HIGH (ref 0.5–1.3)
DIFF PNL FLD: ABNORMAL
DIFF PNL FLD: ABNORMAL
EGFR: 17 ML/MIN/1.73M2 — LOW
EGFR: 17 ML/MIN/1.73M2 — LOW
EGFR: 18 ML/MIN/1.73M2 — LOW
EGFR: 18 ML/MIN/1.73M2 — LOW
EOSINOPHIL # BLD AUTO: 0.07 K/UL — SIGNIFICANT CHANGE UP (ref 0–0.5)
EOSINOPHIL # BLD AUTO: 0.07 K/UL — SIGNIFICANT CHANGE UP (ref 0–0.5)
EOSINOPHIL NFR BLD AUTO: 1.3 % — SIGNIFICANT CHANGE UP (ref 0–6)
EOSINOPHIL NFR BLD AUTO: 1.3 % — SIGNIFICANT CHANGE UP (ref 0–6)
FLUAV AG NPH QL: SIGNIFICANT CHANGE UP
FLUAV AG NPH QL: SIGNIFICANT CHANGE UP
FLUBV AG NPH QL: SIGNIFICANT CHANGE UP
FLUBV AG NPH QL: SIGNIFICANT CHANGE UP
GAS PNL BLDV: 137 MMOL/L — SIGNIFICANT CHANGE UP (ref 136–145)
GAS PNL BLDV: 137 MMOL/L — SIGNIFICANT CHANGE UP (ref 136–145)
GAS PNL BLDV: SIGNIFICANT CHANGE UP
GLUCOSE BLDV-MCNC: 93 MG/DL — SIGNIFICANT CHANGE UP (ref 70–99)
GLUCOSE BLDV-MCNC: 93 MG/DL — SIGNIFICANT CHANGE UP (ref 70–99)
GLUCOSE SERPL-MCNC: 130 MG/DL — HIGH (ref 70–99)
GLUCOSE SERPL-MCNC: 130 MG/DL — HIGH (ref 70–99)
GLUCOSE SERPL-MCNC: 97 MG/DL — SIGNIFICANT CHANGE UP (ref 70–99)
GLUCOSE SERPL-MCNC: 97 MG/DL — SIGNIFICANT CHANGE UP (ref 70–99)
GLUCOSE UR QL: NEGATIVE MG/DL — SIGNIFICANT CHANGE UP
GLUCOSE UR QL: NEGATIVE MG/DL — SIGNIFICANT CHANGE UP
HCO3 BLDV-SCNC: 18 MMOL/L — LOW (ref 22–29)
HCO3 BLDV-SCNC: 18 MMOL/L — LOW (ref 22–29)
HCT VFR BLD CALC: 23.8 % — LOW (ref 34.5–45)
HCT VFR BLD CALC: 23.8 % — LOW (ref 34.5–45)
HCT VFR BLDA CALC: 25 % — LOW (ref 34.5–46.5)
HCT VFR BLDA CALC: 25 % — LOW (ref 34.5–46.5)
HGB BLD CALC-MCNC: 8.2 G/DL — LOW (ref 11.7–16.1)
HGB BLD CALC-MCNC: 8.2 G/DL — LOW (ref 11.7–16.1)
HGB BLD-MCNC: 7.3 G/DL — LOW (ref 11.5–15.5)
HGB BLD-MCNC: 7.3 G/DL — LOW (ref 11.5–15.5)
IMM GRANULOCYTES NFR BLD AUTO: 0.4 % — SIGNIFICANT CHANGE UP (ref 0–0.9)
IMM GRANULOCYTES NFR BLD AUTO: 0.4 % — SIGNIFICANT CHANGE UP (ref 0–0.9)
INR BLD: 1.07 RATIO — SIGNIFICANT CHANGE UP (ref 0.85–1.18)
INR BLD: 1.07 RATIO — SIGNIFICANT CHANGE UP (ref 0.85–1.18)
KETONES UR-MCNC: NEGATIVE MG/DL — SIGNIFICANT CHANGE UP
KETONES UR-MCNC: NEGATIVE MG/DL — SIGNIFICANT CHANGE UP
LACTATE BLDV-MCNC: 0.9 MMOL/L — SIGNIFICANT CHANGE UP (ref 0.5–2)
LACTATE BLDV-MCNC: 0.9 MMOL/L — SIGNIFICANT CHANGE UP (ref 0.5–2)
LEUKOCYTE ESTERASE UR-ACNC: NEGATIVE — SIGNIFICANT CHANGE UP
LEUKOCYTE ESTERASE UR-ACNC: NEGATIVE — SIGNIFICANT CHANGE UP
LYMPHOCYTES # BLD AUTO: 0.53 K/UL — LOW (ref 1–3.3)
LYMPHOCYTES # BLD AUTO: 0.53 K/UL — LOW (ref 1–3.3)
LYMPHOCYTES # BLD AUTO: 10 % — LOW (ref 13–44)
LYMPHOCYTES # BLD AUTO: 10 % — LOW (ref 13–44)
MCHC RBC-ENTMCNC: 26.1 PG — LOW (ref 27–34)
MCHC RBC-ENTMCNC: 26.1 PG — LOW (ref 27–34)
MCHC RBC-ENTMCNC: 30.7 GM/DL — LOW (ref 32–36)
MCHC RBC-ENTMCNC: 30.7 GM/DL — LOW (ref 32–36)
MCV RBC AUTO: 85 FL — SIGNIFICANT CHANGE UP (ref 80–100)
MCV RBC AUTO: 85 FL — SIGNIFICANT CHANGE UP (ref 80–100)
MONOCYTES # BLD AUTO: 0.24 K/UL — SIGNIFICANT CHANGE UP (ref 0–0.9)
MONOCYTES # BLD AUTO: 0.24 K/UL — SIGNIFICANT CHANGE UP (ref 0–0.9)
MONOCYTES NFR BLD AUTO: 4.5 % — SIGNIFICANT CHANGE UP (ref 2–14)
MONOCYTES NFR BLD AUTO: 4.5 % — SIGNIFICANT CHANGE UP (ref 2–14)
NEUTROPHILS # BLD AUTO: 4.43 K/UL — SIGNIFICANT CHANGE UP (ref 1.8–7.4)
NEUTROPHILS # BLD AUTO: 4.43 K/UL — SIGNIFICANT CHANGE UP (ref 1.8–7.4)
NEUTROPHILS NFR BLD AUTO: 83.6 % — HIGH (ref 43–77)
NEUTROPHILS NFR BLD AUTO: 83.6 % — HIGH (ref 43–77)
NITRITE UR-MCNC: NEGATIVE — SIGNIFICANT CHANGE UP
NITRITE UR-MCNC: NEGATIVE — SIGNIFICANT CHANGE UP
NRBC # BLD: 0 /100 WBCS — SIGNIFICANT CHANGE UP (ref 0–0)
NRBC # BLD: 0 /100 WBCS — SIGNIFICANT CHANGE UP (ref 0–0)
NT-PROBNP SERPL-SCNC: HIGH PG/ML (ref 0–300)
NT-PROBNP SERPL-SCNC: HIGH PG/ML (ref 0–300)
PCO2 BLDV: 35 MMHG — LOW (ref 39–42)
PCO2 BLDV: 35 MMHG — LOW (ref 39–42)
PH BLDV: 7.31 — LOW (ref 7.32–7.43)
PH BLDV: 7.31 — LOW (ref 7.32–7.43)
PH UR: 6 — SIGNIFICANT CHANGE UP (ref 5–8)
PH UR: 6 — SIGNIFICANT CHANGE UP (ref 5–8)
PLATELET # BLD AUTO: 203 K/UL — SIGNIFICANT CHANGE UP (ref 150–400)
PLATELET # BLD AUTO: 203 K/UL — SIGNIFICANT CHANGE UP (ref 150–400)
PO2 BLDV: 46 MMHG — HIGH (ref 25–45)
PO2 BLDV: 46 MMHG — HIGH (ref 25–45)
POTASSIUM BLDV-SCNC: 4.8 MMOL/L — SIGNIFICANT CHANGE UP (ref 3.5–5.1)
POTASSIUM BLDV-SCNC: 4.8 MMOL/L — SIGNIFICANT CHANGE UP (ref 3.5–5.1)
POTASSIUM SERPL-MCNC: 4.3 MMOL/L — SIGNIFICANT CHANGE UP (ref 3.5–5.3)
POTASSIUM SERPL-MCNC: 4.3 MMOL/L — SIGNIFICANT CHANGE UP (ref 3.5–5.3)
POTASSIUM SERPL-MCNC: 5.5 MMOL/L — HIGH (ref 3.5–5.3)
POTASSIUM SERPL-MCNC: 5.5 MMOL/L — HIGH (ref 3.5–5.3)
POTASSIUM SERPL-SCNC: 4.3 MMOL/L — SIGNIFICANT CHANGE UP (ref 3.5–5.3)
POTASSIUM SERPL-SCNC: 4.3 MMOL/L — SIGNIFICANT CHANGE UP (ref 3.5–5.3)
POTASSIUM SERPL-SCNC: 5.5 MMOL/L — HIGH (ref 3.5–5.3)
POTASSIUM SERPL-SCNC: 5.5 MMOL/L — HIGH (ref 3.5–5.3)
PROCALCITONIN SERPL-MCNC: 0.12 NG/ML — HIGH (ref 0.02–0.1)
PROCALCITONIN SERPL-MCNC: 0.12 NG/ML — HIGH (ref 0.02–0.1)
PROCALCITONIN SERPL-MCNC: 0.13 NG/ML — HIGH (ref 0.02–0.1)
PROCALCITONIN SERPL-MCNC: 0.13 NG/ML — HIGH (ref 0.02–0.1)
PROT SERPL-MCNC: 5.6 G/DL — LOW (ref 6–8.3)
PROT SERPL-MCNC: 5.6 G/DL — LOW (ref 6–8.3)
PROT SERPL-MCNC: 5.9 G/DL — LOW (ref 6–8.3)
PROT SERPL-MCNC: 5.9 G/DL — LOW (ref 6–8.3)
PROT UR-MCNC: 300 MG/DL
PROT UR-MCNC: 300 MG/DL
PROTHROM AB SERPL-ACNC: 11.7 SEC — SIGNIFICANT CHANGE UP (ref 9.5–13)
PROTHROM AB SERPL-ACNC: 11.7 SEC — SIGNIFICANT CHANGE UP (ref 9.5–13)
RBC # BLD: 2.8 M/UL — LOW (ref 3.8–5.2)
RBC # BLD: 2.8 M/UL — LOW (ref 3.8–5.2)
RBC # FLD: 16.6 % — HIGH (ref 10.3–14.5)
RBC # FLD: 16.6 % — HIGH (ref 10.3–14.5)
RBC CASTS # UR COMP ASSIST: 3 /HPF — SIGNIFICANT CHANGE UP (ref 0–4)
RBC CASTS # UR COMP ASSIST: 3 /HPF — SIGNIFICANT CHANGE UP (ref 0–4)
RSV RNA NPH QL NAA+NON-PROBE: SIGNIFICANT CHANGE UP
RSV RNA NPH QL NAA+NON-PROBE: SIGNIFICANT CHANGE UP
SAO2 % BLDV: 75.8 % — SIGNIFICANT CHANGE UP (ref 67–88)
SAO2 % BLDV: 75.8 % — SIGNIFICANT CHANGE UP (ref 67–88)
SARS-COV-2 RNA SPEC QL NAA+PROBE: SIGNIFICANT CHANGE UP
SARS-COV-2 RNA SPEC QL NAA+PROBE: SIGNIFICANT CHANGE UP
SODIUM SERPL-SCNC: 138 MMOL/L — SIGNIFICANT CHANGE UP (ref 135–145)
SODIUM SERPL-SCNC: 138 MMOL/L — SIGNIFICANT CHANGE UP (ref 135–145)
SODIUM SERPL-SCNC: 139 MMOL/L — SIGNIFICANT CHANGE UP (ref 135–145)
SODIUM SERPL-SCNC: 139 MMOL/L — SIGNIFICANT CHANGE UP (ref 135–145)
SP GR SPEC: 1.01 — SIGNIFICANT CHANGE UP (ref 1–1.03)
SP GR SPEC: 1.01 — SIGNIFICANT CHANGE UP (ref 1–1.03)
SQUAMOUS # UR AUTO: 0 /HPF — SIGNIFICANT CHANGE UP (ref 0–5)
SQUAMOUS # UR AUTO: 0 /HPF — SIGNIFICANT CHANGE UP (ref 0–5)
TROPONIN T, HIGH SENSITIVITY RESULT: 104 NG/L — HIGH (ref 0–51)
TROPONIN T, HIGH SENSITIVITY RESULT: 104 NG/L — HIGH (ref 0–51)
UROBILINOGEN FLD QL: 0.2 MG/DL — SIGNIFICANT CHANGE UP (ref 0.2–1)
UROBILINOGEN FLD QL: 0.2 MG/DL — SIGNIFICANT CHANGE UP (ref 0.2–1)
WBC # BLD: 5.3 K/UL — SIGNIFICANT CHANGE UP (ref 3.8–10.5)
WBC # BLD: 5.3 K/UL — SIGNIFICANT CHANGE UP (ref 3.8–10.5)
WBC # FLD AUTO: 5.3 K/UL — SIGNIFICANT CHANGE UP (ref 3.8–10.5)
WBC # FLD AUTO: 5.3 K/UL — SIGNIFICANT CHANGE UP (ref 3.8–10.5)
WBC UR QL: 0 /HPF — SIGNIFICANT CHANGE UP (ref 0–5)
WBC UR QL: 0 /HPF — SIGNIFICANT CHANGE UP (ref 0–5)

## 2024-01-03 PROCEDURE — 99285 EMERGENCY DEPT VISIT HI MDM: CPT | Mod: FS

## 2024-01-03 PROCEDURE — 76776 US EXAM K TRANSPL W/DOPPLER: CPT | Mod: 26,RT

## 2024-01-03 PROCEDURE — 93970 EXTREMITY STUDY: CPT | Mod: 26

## 2024-01-03 PROCEDURE — 99223 1ST HOSP IP/OBS HIGH 75: CPT

## 2024-01-03 PROCEDURE — 93971 EXTREMITY STUDY: CPT | Mod: 26,LT

## 2024-01-03 PROCEDURE — 71045 X-RAY EXAM CHEST 1 VIEW: CPT | Mod: 26

## 2024-01-03 PROCEDURE — 71250 CT THORAX DX C-: CPT | Mod: 26

## 2024-01-03 RX ORDER — LATANOPROST 0.05 MG/ML
1 SOLUTION/ DROPS OPHTHALMIC; TOPICAL AT BEDTIME
Refills: 0 | Status: DISCONTINUED | OUTPATIENT
Start: 2024-01-03 | End: 2024-01-11

## 2024-01-03 RX ORDER — ASPIRIN/CALCIUM CARB/MAGNESIUM 324 MG
324 TABLET ORAL ONCE
Refills: 0 | Status: COMPLETED | OUTPATIENT
Start: 2024-01-03 | End: 2024-01-03

## 2024-01-03 RX ORDER — HEPARIN SODIUM 5000 [USP'U]/ML
5000 INJECTION INTRAVENOUS; SUBCUTANEOUS EVERY 8 HOURS
Refills: 0 | Status: DISCONTINUED | OUTPATIENT
Start: 2024-01-03 | End: 2024-01-11

## 2024-01-03 RX ORDER — FUROSEMIDE 40 MG
40 TABLET ORAL EVERY 12 HOURS
Refills: 0 | Status: DISCONTINUED | OUTPATIENT
Start: 2024-01-03 | End: 2024-01-05

## 2024-01-03 RX ORDER — ASPIRIN/CALCIUM CARB/MAGNESIUM 324 MG
81 TABLET ORAL DAILY
Refills: 0 | Status: DISCONTINUED | OUTPATIENT
Start: 2024-01-03 | End: 2024-01-11

## 2024-01-03 RX ORDER — TACROLIMUS 5 MG/1
5 CAPSULE ORAL DAILY
Refills: 0 | Status: DISCONTINUED | OUTPATIENT
Start: 2024-01-03 | End: 2024-01-11

## 2024-01-03 RX ORDER — CHOLECALCIFEROL (VITAMIN D3) 125 MCG
2000 CAPSULE ORAL DAILY
Refills: 0 | Status: DISCONTINUED | OUTPATIENT
Start: 2024-01-03 | End: 2024-01-11

## 2024-01-03 RX ORDER — NIFEDIPINE 30 MG
120 TABLET, EXTENDED RELEASE 24 HR ORAL DAILY
Refills: 0 | Status: DISCONTINUED | OUTPATIENT
Start: 2024-01-03 | End: 2024-01-05

## 2024-01-03 RX ORDER — SODIUM BICARBONATE 1 MEQ/ML
1300 SYRINGE (ML) INTRAVENOUS THREE TIMES A DAY
Refills: 0 | Status: DISCONTINUED | OUTPATIENT
Start: 2024-01-03 | End: 2024-01-11

## 2024-01-03 RX ORDER — ACETAMINOPHEN 500 MG
650 TABLET ORAL EVERY 6 HOURS
Refills: 0 | Status: DISCONTINUED | OUTPATIENT
Start: 2024-01-03 | End: 2024-01-11

## 2024-01-03 RX ORDER — LABETALOL HCL 100 MG
200 TABLET ORAL THREE TIMES A DAY
Refills: 0 | Status: DISCONTINUED | OUTPATIENT
Start: 2024-01-03 | End: 2024-01-11

## 2024-01-03 RX ORDER — LANOLIN ALCOHOL/MO/W.PET/CERES
3 CREAM (GRAM) TOPICAL AT BEDTIME
Refills: 0 | Status: DISCONTINUED | OUTPATIENT
Start: 2024-01-03 | End: 2024-01-11

## 2024-01-03 RX ORDER — ATOVAQUONE 750 MG/5ML
1500 SUSPENSION ORAL DAILY
Refills: 0 | Status: DISCONTINUED | OUTPATIENT
Start: 2024-01-03 | End: 2024-01-11

## 2024-01-03 RX ORDER — MYCOPHENOLATE MOFETIL 250 MG/1
500 CAPSULE ORAL
Refills: 0 | Status: DISCONTINUED | OUTPATIENT
Start: 2024-01-03 | End: 2024-01-11

## 2024-01-03 RX ORDER — SODIUM ZIRCONIUM CYCLOSILICATE 10 G/10G
10 POWDER, FOR SUSPENSION ORAL DAILY
Refills: 0 | Status: DISCONTINUED | OUTPATIENT
Start: 2024-01-03 | End: 2024-01-11

## 2024-01-03 RX ADMIN — LATANOPROST 1 DROP(S): 0.05 SOLUTION/ DROPS OPHTHALMIC; TOPICAL at 21:27

## 2024-01-03 RX ADMIN — Medication 40 MILLIGRAM(S): at 21:28

## 2024-01-03 RX ADMIN — Medication 324 MILLIGRAM(S): at 14:04

## 2024-01-03 RX ADMIN — Medication 200 MILLIGRAM(S): at 21:29

## 2024-01-03 RX ADMIN — SODIUM ZIRCONIUM CYCLOSILICATE 10 GRAM(S): 10 POWDER, FOR SUSPENSION ORAL at 19:00

## 2024-01-03 RX ADMIN — MYCOPHENOLATE MOFETIL 500 MILLIGRAM(S): 250 CAPSULE ORAL at 19:00

## 2024-01-03 RX ADMIN — HEPARIN SODIUM 5000 UNIT(S): 5000 INJECTION INTRAVENOUS; SUBCUTANEOUS at 21:29

## 2024-01-03 RX ADMIN — Medication 1300 MILLIGRAM(S): at 21:28

## 2024-01-03 NOTE — CONSULT NOTE ADULT - ASSESSMENT
78 y/o female with PMH ESRD due to HTN since 2016, s/p DDRT (05/19/2021- induction with basiliximab), HTN, Anemia who presented to [facility] due to worsening B/L LE edema, facial edema and LUE edema. 76 y/o female with PMH ESRD due to HTN since 2016, s/p DDRT (05/19/2021- induction with basiliximab), HTN, Anemia who presented to [facility] due to worsening B/L LE edema, facial edema and LUE edema.

## 2024-01-03 NOTE — ED PROVIDER NOTE - CLINICAL SUMMARY MEDICAL DECISION MAKING FREE TEXT BOX
Dr. Felix: 78-year-old female history of CKD stage III, status post DDRT on 5/19/2021, chronic rejection, hypertension, admitted in November multifocal pneumonia treated with cefepime, YANELY here today with shortness of breath, left upper extremity swelling today, bilateral lower extremity edema as well.  Patient is oliguric, has a right AV fistula in place but has not started dialysis yet.  Also complains of dry cough, no fevers or chills, no nausea vomiting, no abdominal pain, states this feels like when she had a pneumonia last year.    Gen: No acute distress  HEENT: Mucous membranes dry, pink conjunctivae, EOMI  CV: RRR, no clubbing/cyanosis  Resp: CTAB  GI: Abdomen soft, NT, ND. Normal BS. No rebound, no guarding  : No CVAT  Neuro: A&O x 3, moving all 4 extremities  MSK: Bilateral lower extremity 2+ pitting edema with no calf tenderness to palpation, no erythema or warmth.  Left upper extremity with diffuse forearm edema with no tenderness to palpation no erythema warmth   skin: No rashes    Patient with past medical history as above presenting with shortness of breath, cough, diffuse edema likely due to renal insufficiency, however we will get duplex rule out DVT.  Patient states symptoms feel like prior pneumonia, will test for COVID/FLU/RSV and rule out pneumonia.  Patient with no chest pain, no hypoxia, PE unlikely, given renal insufficiency will avoid contrast for CT at this time, patient could get VQ scan as inpatient.

## 2024-01-03 NOTE — ED PROVIDER NOTE - CARE PLAN
Principal Discharge DX:	SOB (shortness of breath)  Secondary Diagnosis:	Cardiac enzymes elevated   1 Principal Discharge DX:	Edema of extremities  Secondary Diagnosis:	Cardiac enzymes elevated  Secondary Diagnosis:	Shortness of breath

## 2024-01-03 NOTE — H&P ADULT - HISTORY OF PRESENT ILLNESS
78f h/o HTN, s/p Renal transplant, CKD, chronic rejection, recent admission for pneumonia s/p abx presented with shortness of breath since this morning. she also noticed significant LUE swelling that began 4-5 days ago. She denies any trauma to arm. she has chronic b/l LE edema as well which has increased slightly over the past few days. she has a cough at times productive of white sputum but denies any fevers or chills. She states she is compliant with all her medications. she has not noticed any change in urination. no cloudy urine. no abdominal pain.

## 2024-01-03 NOTE — CONSULT NOTE ADULT - SUBJECTIVE AND OBJECTIVE BOX
[facility] DIVISION OF KIDNEY DISEASES AND HYPERTENSION -- [phone]  -- INITIAL CONSULT NOTE  --------------------------------------------------------------------------------  HPI: 76 y/o female with PMH ESRD due to HTN since 2016, s/p DDRT (05/19/2021- induction with basiliximab), HTN, Anemia who presented to [facility] due to worsening B/L LE edema, facial edema and LUE edema. Transplant nephrology consulted as pt is on immunosuppression.      Kidney Hx: Pt's post transplant course was complicated by DGF with last HD 06/04/2021. 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 Pt also had transplant renal artery angiogram in 12/2022 which showed no stenosis. Patient saw Dr. Louise (transplant nephro) in sept 2023 during that time patient had scr increasing to 2.5mg/dL and also had ankle edema thus chlorthalidone was increased. Pt had h/o proteinuria around 2.8gms and h/o hyperkalemia with usage of Lokelma. Cellcept was reduced to 500mg bid due to leukopenia and low level CMV viremia. Last few months, pt scr has been around 2.5 to 2.8mg/dL. patient also has chronic anemia and receiving epo [id number] weekly.     Pt seen and examined in the ED, son at bedside. She states she noted worsening bilateral LE swelling since this past Saturday. She has been taking Lasix 40mg daily but swelling continued to worsen. She denied any difficulty with urination. She notes facial swelling and LUE swelling as well. Of note, she was admitted in November of 2023 with pneumonia. Most recent SCr prior to admission was 3.13 on 11/29/23. SCr today of 2.67 (near baseline).     PAST HISTORY  --------------------------------------------------------------------------------  PAST MEDICAL & SURGICAL HISTORY:  HTN (hypertension)  Glaucoma  Cataract  ESRD (end stage renal disease) on dialysis  DVT (deep venous thrombosis)  of Left subclavian vein, 06/12/17  Kidney transplant recipient  Anemia of chronic disease  History of arteriovenostomy for renal dialysis  [place] (hard of hearing)  Acquired cataract  extraction with b/l lense placement, 2016  S/P KEVEN-BSO  for fibroids, 2012  Hemodialysis access, AV graft  History of renal transplantation  DDRT 5/19/2021  AV fistula    FAMILY HISTORY:  Family history of cerebrovascular accident (CVA)  Family history of colon cancer (Sibling)    PAST SOCIAL HISTORY: Denied ETOH, tobacco, illicit drugs     ALLERGIES & MEDICATIONS  --------------------------------------------------------------------------------  Allergies  penicillin (Rash)  Mushrooms (Anaphylaxis)    Intolerances    Standing Inpatient Medications  aspirin  chewable 324 milliGRAM(s) Oral once    PRN Inpatient Medications    REVIEW OF SYSTEMS  --------------------------------------------------------------------------------  Gen: No fevers/chills  Head/Eyes/Ears: No HA  Respiratory: +cough, shortness of breath   CV: No chest pain  GI: No abdominal pain, diarrhea  : No dysuria, hematuria  MSK: +B/L LE edema and LUE edema  Skin: No rashes  Heme: No easy bruising or bleeding    All other systems were reviewed and are negative, except as noted.    VITALS/PHYSICAL EXAM  --------------------------------------------------------------------------------  T(C): 36.7 (01-03-24 @ 09:49), Max: 36.7 (01-03-24 @ 09:49)  HR: 66 (01-03-24 @ 09:49) (66 - 66)  BP: 189/69 (01-03-24 @ 09:49) (189/69 - 189/69)  RR: 20 (01-03-24 @ 09:49) (20 - 20)  SpO2: 98% (01-03-24 @ 09:49) (98% - 98%)  Wt(kg): --  Height (cm): 162.6 (01-03-24 @ 09:49)  Weight (kg): 54.4 (01-03-24 @ 09:49)  BMI (kg/m2): 20.6 (01-03-24 @ 09:49)  BSA (m2): 1.57 (01-03-24 @ 09:49)    Physical Exam:  	Gen: NAD  	HEENT: Anicteric  	Pulm: CTA B/L  	CV: S1S2+  	Abd: Soft, +BS                Transplant site: non tender, well healed surgical scar.  	Ext: ++ LE edema B/L and LUE Edema   	Neuro: Awake  	Skin: Warm and dry  	Dialysis access: RUE AVF with palpable thrill     LABS/STUDIES  --------------------------------------------------------------------------------              7.3    5.30  >-----------<  203      [01-03-24 @ 11:15]              23.8     138  |  110  |  47  ----------------------------<  97      [01-03-24 @ 11:15]  5.5   |  16  |  2.67        Ca     8.7     [01-03-24 @ 11:15]    TPro  5.9  /  Alb  3.1  /  TBili  0.2  /  DBili  x   /  AST  22  /  ALT  6   /  AlkPhos  74  [01-03-24 @ 11:15]    PT/INR: PT 11.7 , INR 1.07       [01-03-24 @ 11:15]  PTT: 34.0       [01-03-24 @ 11:15]    Creatinine Trend:  SCr 2.67 [01-03 @ 11:15]    HBsAb 22.4      [05-19-21 @ 10:42]  HBsAb Reactive      [10-19-19 @ 01:09]  HBsAg Nonreact      [05-19-21 @ 10:42]  HBcAb Nonreact      [05-19-21 @ 10:42]  HCV 0.42, Nonreact      [05-19-21 @ 10:42]  HIV Nonreact      [05-19-21 @ 10:45]  HIV Nonreact      [11-08-23 @ 13:20]    STEPHANIE: titer Negative, pattern --      [11-11-23 @ 06:09]  Rheumatoid Factor 10      [11-11-23 @ 06:09]  Immunofixation Serum: No Monoclonal Band Identified [facility] DIVISION OF KIDNEY DISEASES AND HYPERTENSION -- [phone]  -- INITIAL CONSULT NOTE  --------------------------------------------------------------------------------  HPI: 78 y/o female with PMH 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 Transplant nephrology consulted as pt is on immunosuppression.      Kidney Hx: Pt's post transplant course was complicated by DGF with last HD 06/04/2021. 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 Pt also had transplant renal artery angiogram in 12/2022 which showed no stenosis. Patient saw Dr. Louise (transplant nephro) in sept 2023 during that time patient had scr increasing to 2.5mg/dL and also had ankle edema thus chlorthalidone was increased. Pt had h/o proteinuria around 2.8gms and h/o hyperkalemia with usage of Lokelma. Cellcept was reduced to 500mg bid due to leukopenia and low level CMV viremia. Last few months, pt scr has been around 2.5 to 2.8mg/dL. patient also has chronic anemia and receiving epo [id number] weekly.     Pt seen and examined in the ED, son at bedside. She states she noted worsening bilateral LE swelling since this past Saturday. She has been taking Lasix 40mg daily but swelling continued to worsen. She denied any difficulty with urination. She notes facial swelling and LUE swelling as well. Of note, she was admitted in November of 2023 with pneumonia. Most recent SCr prior to admission was 3.13 on 11/29/23. SCr today of 2.67 (near baseline).     PAST HISTORY  --------------------------------------------------------------------------------  PAST MEDICAL & SURGICAL HISTORY:  HTN (hypertension)  Glaucoma  Cataract  ESRD (end stage renal disease) on dialysis  DVT (deep venous thrombosis)  of Left subclavian vein, 06/12/17  Kidney transplant recipient  Anemia of chronic disease  History of arteriovenostomy for renal dialysis  [place] (hard of hearing)  Acquired cataract  extraction with b/l lense placement, 2016  S/P KEVEN-BSO  for fibroids, 2012  Hemodialysis access, AV graft  History of renal transplantation  DDRT 5/19/2021  AV fistula    FAMILY HISTORY:  Family history of cerebrovascular accident (CVA)  Family history of colon cancer (Sibling)    PAST SOCIAL HISTORY: Denied ETOH, tobacco, illicit drugs     ALLERGIES & MEDICATIONS  --------------------------------------------------------------------------------  Allergies  penicillin (Rash)  Mushrooms (Anaphylaxis)    Intolerances    Standing Inpatient Medications  aspirin  chewable 324 milliGRAM(s) Oral once    PRN Inpatient Medications    REVIEW OF SYSTEMS  --------------------------------------------------------------------------------  Gen: No fevers/chills  Head/Eyes/Ears: No HA  Respiratory: +cough, shortness of breath   CV: No chest pain  GI: No abdominal pain, diarrhea  : No dysuria, hematuria  MSK: +B/L LE edema and LUE edema  Skin: No rashes  Heme: No easy bruising or bleeding    All other systems were reviewed and are negative, except as noted.    VITALS/PHYSICAL EXAM  --------------------------------------------------------------------------------  T(C): 36.7 (01-03-24 @ 09:49), Max: 36.7 (01-03-24 @ 09:49)  HR: 66 (01-03-24 @ 09:49) (66 - 66)  BP: 189/69 (01-03-24 @ 09:49) (189/69 - 189/69)  RR: 20 (01-03-24 @ 09:49) (20 - 20)  SpO2: 98% (01-03-24 @ 09:49) (98% - 98%)  Wt(kg): --  Height (cm): 162.6 (01-03-24 @ 09:49)  Weight (kg): 54.4 (01-03-24 @ 09:49)  BMI (kg/m2): 20.6 (01-03-24 @ 09:49)  BSA (m2): 1.57 (01-03-24 @ 09:49)    Physical Exam:  	Gen: NAD  	HEENT: Anicteric  	Pulm: CTA B/L  	CV: S1S2+  	Abd: Soft, +BS                Transplant site: non tender, well healed surgical scar.  	Ext: ++ LE edema B/L and LUE Edema   	Neuro: Awake  	Skin: Warm and dry  	Dialysis access: RUE AVF with palpable thrill     LABS/STUDIES  --------------------------------------------------------------------------------              7.3    5.30  >-----------<  203      [01-03-24 @ 11:15]              23.8     138  |  110  |  47  ----------------------------<  97      [01-03-24 @ 11:15]  5.5   |  16  |  2.67        Ca     8.7     [01-03-24 @ 11:15]    TPro  5.9  /  Alb  3.1  /  TBili  0.2  /  DBili  x   /  AST  22  /  ALT  6   /  AlkPhos  74  [01-03-24 @ 11:15]    PT/INR: PT 11.7 , INR 1.07       [01-03-24 @ 11:15]  PTT: 34.0       [01-03-24 @ 11:15]    Creatinine Trend:  SCr 2.67 [01-03 @ 11:15]    HBsAb 22.4      [05-19-21 @ 10:42]  HBsAb Reactive      [10-19-19 @ 01:09]  HBsAg Nonreact      [05-19-21 @ 10:42]  HBcAb Nonreact      [05-19-21 @ 10:42]  HCV 0.42, Nonreact      [05-19-21 @ 10:42]  HIV Nonreact      [05-19-21 @ 10:45]  HIV Nonreact      [11-08-23 @ 13:20]    STEPHANIE: titer Negative, pattern --      [11-11-23 @ 06:09]  Rheumatoid Factor 10      [11-11-23 @ 06:09]  Immunofixation Serum: No Monoclonal Band Identified

## 2024-01-03 NOTE — ED PROVIDER NOTE - OBJECTIVE STATEMENT
79yo F with HTN, CKD stage III, status post DDRT on 5/2021, chronic rejection, admission in 11/2023 for PNA treated with cefepime and d/c on cefpodoxime presenting with son at bedside c/o swelling of her L arm and b/l legs x 5 days and cough with white sputum and SOB x 2 days. Denies fever/chills, CP, abdominal pain, n/v. Makes urine. Denies urinary symptoms. Not on AC. 77yo F with HTN, CKD stage III, status post DDRT on 5/2021, chronic rejection, admission in 11/2023 for PNA treated with cefepime and d/c on cefpodoxime presenting with son at bedside c/o swelling of her L arm and b/l legs x 5 days and cough with white sputum and SOB x 2 days. Denies fever/chills, CP, abdominal pain, n/v. Makes urine. Denies urinary symptoms. Not on AC.

## 2024-01-03 NOTE — H&P ADULT - ASSESSMENT
78f h/o HTN, s/p Renal transplant, CKD, chronic rejection, recent admission for pneumonia s/p abx presented with shortness of breath with LUE and b/l LE edema concerning for volume overload.

## 2024-01-03 NOTE — CONSULT NOTE ADULT - PROBLEM SELECTOR RECOMMENDATION 9
Pt with ESRD due to HTN since 2016, s/p DDRT (05/19/2021- induction with basiliximab), currently admitted for peripheral edema. Scr appears at baseline of 2.6 today. Renal US from 11/9/23 demonstrated "No evidence of a significant renal artery stenosis. Elevated resistive indices which can be seen in rejection versus acute tubular necrosis. Similar prominence of the transplant kidney ureter with urothelial thickening. Echogenic debris is within the ureter concerning for infection. Trace fluid adjacent to the transplant kidney and small free fluid in the pelvis". ECHO from 11/10/23 demonstrating severe LVH, pericardial effusion and mod-severe pulm HTN. She has been on PO Lasix 40mg daily (see below for further recommendations). Monitor labs and urine output. Avoid any potential nephrotoxins. Dose medications as per eGFR.

## 2024-01-03 NOTE — CONSULT NOTE ADULT - PROBLEM SELECTOR RECOMMENDATION 7
Pt with chronic metabolic acidosis in setting of CKD. SCO2 low but stable at 16, pH of 7.31. Continue home sodium bicarbonate. Continue to monitor SCO2 and pH.

## 2024-01-03 NOTE — H&P ADULT - PROBLEM SELECTOR PLAN 1
with LUE and b/l LE edema   UA with protein 300  check urine protein to cr - evaluate for nephrotic syndrome  check EKG   lasix 40mg iv bid - renal consult appreciated   check TTE  covid, rsv, flu negative

## 2024-01-03 NOTE — CONSULT NOTE ADULT - PROBLEM SELECTOR RECOMMENDATION 2
s/p Simulect induction  - Envarsus 5mg daily (check daily troughs 30mins prior to next dose; tac goal 4-6), MMF 500mg BID, Pred 5mg  - PPx: Bactrim discontinued for high K. Dapsone discontinued for hemolytic anemia. Continue Mepro

## 2024-01-03 NOTE — H&P ADULT - PROBLEM SELECTOR PLAN 4
new RUL opacity   clinically c/w volume overload  procalcitonin low  monitor closely as patient on immunosuppressives - low threshold for starting empiric abx   check ct chest

## 2024-01-03 NOTE — CONSULT NOTE ADULT - ASSESSMENT
78f h/o HTN, s/p Renal transplant, CKD, chronic rejection, recent admission for pneumonia s/p abx presented with shortness of breath since this morning. she also noticed significant LUE swelling that began 4-5 days ago.      S/p DDRT (5/19/2021 - induction w/ Basiliximab)  Baseline S.Cr ~1.7-2.  YANELY etiology?  Echo w/ EF >75% on 11/10.  UA with proteinuria and hematuria.  YANELY workup per transplant team  Continue immunosuppressants per transplant   Check Tacro level 30mins prior to next dose; goal 4-6  Monitor BMP and UO.    HTN:  BP fluctuating  resume outpt antihypertensives  Monitor BP.    Hyperkalemia:  K improving.  Low K diet.  Monitor K closely.    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  C/W NaHCO3  TID    Anemia:  Likely in setting of CKD vs iron deficiency.  Check Iron studies   On SHELDON [id number] TIW.  Hold SHELDON for BP >170/90.      Proteinuria/Hematuria:  etiology? possibly  diabetic nephropathy (donor origin)  Follows  Dr. Louise 9/20/23:   Vasculitis work up per transplant team  Monitor.   78f h/o HTN, s/p Renal transplant, CKD, chronic rejection, recent admission for pneumonia s/p abx presented with shortness of breath since this morning. she also noticed significant LUE swelling that began 4-5 days ago.      S/p DDRT (5/19/2021 - induction w/ Basiliximab)  Baseline S.Cr ~1.7-2.  YANELY etiology?  Echo w/ EF >75% on 11/10.  UA with proteinuria and hematuria.  YANELY workup per transplant team  Continue immunosuppressants per transplant   Check Tacro level 30mins prior to next dose; goal 4-6  Monitor BMP and UO.    HTN:  BP fluctuating  resume outpt antihypertensives  Monitor BP.    Hyperkalemia:  K improving.  Low K diet.  Monitor K closely.    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  C/W NaHCO3  TID    Anemia:  Likely in setting of CKD vs iron deficiency.  Check Iron studies   On SHELDON [id number] TIW.  Hold SHELDON for BP >170/90.      Proteinuria/Hematuria:  etiology? possibly  diabetic nephropathy (donor origin)  Follows  Dr. Louise 9/20/23:   Vasculitis work up per transplant team  Monitor.

## 2024-01-03 NOTE — ED PROVIDER NOTE - NSICDXPASTSURGICALHX_GEN_ALL_CORE_FT
PAST SURGICAL HISTORY:  Acquired cataract extraction with b/l lense placement, 2016    AV fistula     Hemodialysis access, AV graft     History of renal transplantation DDRT 5/19/2021    S/P KEVEN-BSO for fibroids, 2012     PAST SURGICAL HISTORY:  Acquired cataract extraction with b/l lense placement, 2016    AV fistula     Hemodialysis access, AV graft     History of renal transplantation DDRT 5/19/2021    S/P EKVEN-BSO for fibroids, 2012

## 2024-01-03 NOTE — ED ADULT NURSE NOTE - OBJECTIVE STATEMENT
78 y.o F BIB son p/w c/o SOB. A+Ox4. Pt states since Saturday has been experiencing L arm edema w/ bruising, cool to touch. PMH R kidney transplant x2 yrs ago w/ R arm fistula (not yet on dialysis). States acutely worsened last night w/ sudden onset SOB that worsens while lying down. Upon initial assessment, unable to hear LS due to pt coughing, unproductive. B/L LE +3 pitting edema noted. Son states kidney functioning worsening so in-between lasix doses. Has appt w/ Dr. Watts tomorrow for dialysis discussion. Pulses present x4 extremities. No other complaints at this time, safety maintained.

## 2024-01-03 NOTE — ED ADULT NURSE NOTE - NSICDXPASTMEDICALHX_GEN_ALL_CORE_FT
PAST MEDICAL HISTORY:  Anemia of chronic disease     Cataract     DVT (deep venous thrombosis) of Left subclavian vein, 06/12/17    ESRD (end stage renal disease) on dialysis     Glaucoma     History of arteriovenostomy for renal dialysis     [place] (hard of hearing)     HTN (hypertension)     Kidney transplant recipient      PAST MEDICAL HISTORY:  Anemia of chronic disease     Cataract     DVT (deep venous thrombosis) of Left subclavian vein, 06/12/17    ESRD (end stage renal disease) on dialysis     Glaucoma     History of arteriovenostomy for renal dialysis     [place] (hard of hearing)     HTN (hypertension)     Kidney transplant recipient

## 2024-01-03 NOTE — CONSULT NOTE ADULT - ATTENDING COMMENTS
78 yr old woman with ESRD due to HTN S/p DDRT on 5/19/2021, course complicated by DGF due to ATN needed HD until 6/4/21. Oswaldo creatinine 1.3mg/dL.  She had transplant kidney biopsy for further worsening creatinine 2.2mg/dL on 9/29/22. 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 2022.   Graft function worsened with proteinuria to scr of 3mg/dL with nephrotic range proteinuria 3-5 grams/day.   Also has anemia requiring procrit , HTN, and diastolic dysfunction   Recent admission for pneumonia in November.   Echo done in November showed LVH and diastolic dysfunction with hyperdynamic LV  Presents with shortness of breath and generalized body swelling. No fever. No NVD. Voiding urine.   U/S of upper and LE no DVT, CXR congestion, proBNO 11,000, CT multifocal infiltrates, transplant kidney US no hydro, offical read pending.   Given IV Lasix, feels slightly better today.     Vital Signs Last 24 Hrs  T(C): 36.7 (04 Jan 2024 04:29), Max: 37.2 (04 Jan 2024 00:23)  T(F): 98.1 (04 Jan 2024 04:29), Max: 98.9 (04 Jan 2024 00:23)  HR: 81 (04 Jan 2024 04:29) (69 - 81)  BP: 170/72 (04 Jan 2024 04:29) (135/56 - 170/72)  BP(mean): 79 (03 Jan 2024 14:03) (79 - 79)  RR: 18 (04 Jan 2024 04:29) (18 - 29)  SpO2: 95% (04 Jan 2024 04:29) (93% - 96%)    01-04    139  |  108  |  54<H>  ----------------------------<  90  4.3   |  15<L>  |  2.95<H>    Ca    9.0      04 Jan 2024 07:43    TPro  5.6<L>  /  Alb  3.2<L>  /  TBili  0.2  /  DBili  x   /  AST  9<L>  /  ALT  <5<L>  /  AlkPhos  76  01-03                          7.0    4.59  )-----------( 189      ( 04 Jan 2024 07:43 )             23.6         S/p DDRT in 5/2021 with chronic graft dysfunction in the setting of donor origin diabetic nephropathy  Creatinine is at baseline  Chronic hyperkalemia - continue lokelma 10grm po daily   Metabolic acidosis - continue sodium bicarb 1300mg po TID  Volume overload - Continue lasix 40mg iv bid, strict I/O    Immunosuppression  - On CellCept 500mg po bid - dose lowered for leukopenia and low level CMV viremia.  - On Envarsus 5mg po daily. Trough Goal 4-6  - Prednisone 5mg daily  ?  Infection Prophylaxis  - Bactrim discontinued for high K. Dapsone discontinued for hemolytic anemia. Continue Mepron 750mg po daily.    ?  Hypertension - Sub optimal partly due to volume overload.  Continue Nifedipine 60mg po bid, Labetalol 200mg po TID. Lasix 40mg iv bid     Anemia of CKD.  Increase Procrit to 20,000 units q week. Check Iron stores, B12 and folate. Transfuse 1 unit PRBC   ?  ?

## 2024-01-03 NOTE — ED PROVIDER NOTE - PROGRESS NOTE DETAILS
Spoke with patient's nephrologist, recommending to call transplant and admits to Dr. Simeon. D/w nephro transplant fellow. - Tanya Moss PA-C Spoke with patient's nephrologist Dr. Kay, recommending to call transplant and admits to Dr. Simeon. D/w nephro transplant fellow, will see patient. - Tanya Moss PA-C

## 2024-01-03 NOTE — CONSULT NOTE ADULT - PROBLEM SELECTOR RECOMMENDATION 3
Pt with hx of chronic B/L LE edema however, has gotten acutely worse since this past Saturday with associated facial edema and LUE swelling. B/L LE US w/o evidence of DVT. LUE study pending. Hx of proteinuria with 2.3 g on UPCR from 11/10/23. UA from today reviewed, 300mg/dL of protein. Recommend repeat UPCR. Send serologic work up for nephrotic/nephritic syndromes including dsDNA, TSEPHANIE, P-ANCA, C-ANCA, HBsAg, Hep C antibody, HIV, Parvovirus, CMV, C3, C4, serum and urine immunofixation. Recommend continuing diuretics, would give Lasix IV 40mg BID to start and titrate as needed. Pt with hx of chronic B/L LE edema however, has gotten acutely worse since this past Saturday with associated facial edema and LUE swelling. B/L LE US w/o evidence of DVT. LUE study pending. Hx of proteinuria with 2.3 g on UPCR from 11/10/23. UA from today reviewed, 300mg/dL of protein. Recommend repeat UPCR. Send serologic work up for nephrotic/nephritic syndromes including dsDNA, STEPHANIE, P-ANCA, C-ANCA, HBsAg, Hep C antibody, HIV, Parvovirus, CMV, C3, C4, serum and urine immunofixation. Recommend continuing diuretics, would give Lasix IV 40mg BID to start and titrate as needed.

## 2024-01-03 NOTE — CONSULT NOTE ADULT - PROBLEM SELECTOR RECOMMENDATION 4
Pt with mild hyperkalemia in setting of CKD s/p transplant. Serum potassium of 5.5 (mod hemolyzed). Continue home Lokelma. Continue with diuretics as above.

## 2024-01-03 NOTE — ED PROVIDER NOTE - ATTENDING APP SHARED VISIT CONTRIBUTION OF CARE
Dr. Felix: I performed a face to face bedside interview with patient regarding history of present illness, review of symptoms and past medical history. I completed an independent physical exam.  I have discussed patient's plan of care with PA.   I agree with note as stated above, having amended the EMR as needed to reflect my findings.   This includes HISTORY OF PRESENT ILLNESS, HIV, PAST MEDICAL/SURGICAL/FAMILY/SOCIAL HISTORY, ALLERGIES AND HOME MEDICATIONS, REVIEW OF SYSTEMS, PHYSICAL EXAM, and any PROGRESS NOTES during the time I functioned as the attending physician for this patient.    see mdm

## 2024-01-03 NOTE — H&P ADULT - PROBLEM SELECTOR PLAN 2
creatinine improved since last admission   check urine protein to cr as above  check urine lytes, renal us   rheum studies ordered   renal transplant consult appreciated   continue tacrolimus 5mg qd (monitor levels), mycophenolate 500mg bid, prednisone 5mg qd  renally dose meds, avoid nephrotoxic medications

## 2024-01-03 NOTE — ED PROVIDER NOTE - NSICDXPASTMEDICALHX_GEN_ALL_CORE_FT
PAST MEDICAL HISTORY:  Anemia of chronic disease     Cataract     DVT (deep venous thrombosis) of Left subclavian vein, 06/12/17    ESRD (end stage renal disease) on dialysis     Glaucoma     History of arteriovenostomy for renal dialysis     [place] (hard of hearing)     HTN (hypertension)     Kidney transplant recipient      PAST MEDICAL HISTORY:  Anemia of chronic disease     Cataract     DVT (deep venous thrombosis) of Left subclavian vein, 06/12/17    ESRD (end stage renal disease) on dialysis     Glaucoma     History of arteriovenostomy for renal dialysis     [place] (hard of hearing)     HTN (hypertension)     Kidney transplant recipient

## 2024-01-03 NOTE — H&P ADULT - NSICDXPASTMEDICALHX_GEN_ALL_CORE_FT
PAST MEDICAL HISTORY:  Anemia of chronic disease     Cataract     DVT (deep venous thrombosis) of Left subclavian vein, 06/12/17    ESRD (end stage renal disease) on dialysis     Glaucoma     History of arteriovenostomy for renal dialysis     [place] (hard of hearing)     HTN (hypertension)     Kidney transplant recipient      PAST MEDICAL HISTORY:  Anemia of chronic disease     Cataract     DVT (deep venous thrombosis) of Left subclavian vein, 06/12/17    ESRD (end stage renal disease) on dialysis     Glaucoma     History of arteriovenostomy for renal dialysis     [place] (hard of hearing)     HTN (hypertension)     Kidney transplant recipient

## 2024-01-03 NOTE — H&P ADULT - NSHPLABSRESULTS_GEN_ALL_CORE
LABS:                        7.3    5.30  )-----------( 203      ( 03 Jan 2024 11:15 )             23.8     01-03    139  |  109<H>  |  49<H>  ----------------------------<  130<H>  4.3   |  17<L>  |  2.75<H>    Ca    8.8      03 Jan 2024 14:10    TPro  5.6<L>  /  Alb  3.2<L>  /  TBili  0.2  /  DBili  x   /  AST  9<L>  /  ALT  <5<L>  /  AlkPhos  76  01-03    PT/INR - ( 03 Jan 2024 11:15 )   PT: 11.7 sec;   INR: 1.07 ratio         PTT - ( 03 Jan 2024 11:15 )  PTT:34.0 sec      Urinalysis Basic - ( 03 Jan 2024 14:10 )    Color: x / Appearance: x / SG: x / pH: x  Gluc: 130 mg/dL / Ketone: x  / Bili: x / Urobili: x   Blood: x / Protein: x / Nitrite: x   Leuk Esterase: x / RBC: x / WBC x   Sq Epi: x / Non Sq Epi: x / Bacteria: x        RADIOLOGY & ADDITIONAL TESTS:    Imaging Personally Reviewed: cxr film reviewed - new RUL opacity, RLL opacity improved aeration.   LUE duplex and b/l LE duplex negative for DVT   Consultant(s) Notes Reviewed:    Care Discussed with Consultants/Other Providers:

## 2024-01-03 NOTE — ED ADULT NURSE NOTE - NSFALLUNIVINTERV_ED_ALL_ED
Bed/Stretcher in lowest position, wheels locked, appropriate side rails in place/Call bell, personal items and telephone in reach/Instruct patient to call for assistance before getting out of bed/chair/stretcher/Non-slip footwear applied when patient is off stretcher/[place] to call system/Physically safe environment - no spills, clutter or unnecessary equipment/Purposeful proactive rounding/Room/bathroom lighting operational, light cord in reach Bed/Stretcher in lowest position, wheels locked, appropriate side rails in place/Call bell, personal items and telephone in reach/Instruct patient to call for assistance before getting out of bed/chair/stretcher/Non-slip footwear applied when patient is off stretcher/[place] to call system/Physically safe environment - no spills, clutter or unnecessary equipment/Purposeful proactive rounding/Room/bathroom lighting operational, light cord in reach

## 2024-01-03 NOTE — H&P ADULT - TIME BILLING
reviewing documentation, reviewing and interpreting labs/imaging, interviewing and examining patient, discussing plan of care with patient, documentation, coordinating care with ACP/renal transplant.

## 2024-01-03 NOTE — H&P ADULT - NSHPREVIEWOFSYSTEMS_GEN_ALL_CORE
Review of Systems:   CONSTITUTIONAL: No fever or chills  EYES: No eye pain, visual disturbances  ENMT:  No difficulty hearing,   NECK: No pain or stiffness  RESPIRATORY: + cough, + shortness of breath  CARDIOVASCULAR: No chest pain, no palpitations,  GASTROINTESTINAL: No abdominal or epigastric pain. No nausea, vomiting,  GENITOURINARY: No dysuria,   NEUROLOGICAL: No headaches,   SKIN: No rashes  ENDOCRINE: No heat or cold intolerance  MUSCULOSKELETAL: No joint pain or swelling;   PSYCHIATRIC: No depression,   ALLERY AND IMMUNOLOGIC: No hives or eczema

## 2024-01-03 NOTE — H&P ADULT - NSHPPHYSICALEXAM_GEN_ALL_CORE
Vital Signs Last 24 Hrs  T(C): 36.8 (03 Jan 2024 16:37), Max: 36.9 (03 Jan 2024 14:03)  T(F): 98.3 (03 Jan 2024 16:37), Max: 98.4 (03 Jan 2024 14:03)  HR: 69 (03 Jan 2024 16:37) (66 - 69)  BP: 159/67 (03 Jan 2024 16:37) (135/56 - 189/69)  BP(mean): 79 (03 Jan 2024 14:03) (79 - 79)  RR: 20 (03 Jan 2024 16:37) (20 - 29)  SpO2: 93% (03 Jan 2024 16:37) (93% - 98%)    Parameters below as of 03 Jan 2024 16:37  Patient On (Oxygen Delivery Method): room air    PHYSICAL EXAM:  GENERAL: NAD, breathing normal  EYES: conjunctiva and sclera clear  NECK: supple, No JVD  CHEST/LUNG: +crackles at left base, decr bs right base  HEART: S1 S2 RRR  ABDOMEN: +BS Soft, NT/ND  EXTREMITIES:  2+ DP Pulses, No c/c. b/l 1-2+b/l LE edema, LUE edema greatest at elbow is mild bruising.   NEUROLOGY: AAOx3, no facial droop, moving all extremities   SKIN: No rashes or lesions

## 2024-01-03 NOTE — CONSULT NOTE ADULT - SUBJECTIVE AND OBJECTIVE BOX
[facility] [place] [facility]   MD SAMMY ORDONEZ MD RUORU WONG, PA    TEL:  FROM 9 AM to 5 PM--OFFICE: [phone]  AVAILABLE oN TEAMS   FROM 5 PM- 9 AM PLEASE CALL ANSWERING SERVICE AT [phone]    -- INITIAL RENAL CONSULT NOTE --- Date Of service 01-03-24 @ 19:30  --------------------------------------------------------------------------------  HPI:  78f h/o HTN, s/p Renal transplant, CKD, chronic rejection, recent admission for pneumonia s/p abx presented with shortness of breath since this morning. she also noticed significant LUE swelling that began 4-5 days ago. She denies any trauma to arm. she has chronic b/l LE edema as well which has increased slightly over the past few days. she has a cough at times productive of white sputum but denies any fevers or chills. She states she is compliant with all her medications. she has not noticed any change in urination. no cloudy urine. no abdominal pain.  (03 Jan 2024 17:17)        PAST HISTORY  --------------------------------------------------------------------------------  PAST MEDICAL & SURGICAL HISTORY:  HTN (hypertension)      Glaucoma      Cataract      ESRD (end stage renal disease) on dialysis      DVT (deep venous thrombosis)  of Left subclavian vein, 06/12/17      Kidney transplant recipient      Anemia of chronic disease      History of arteriovenostomy for renal dialysis      [place] (hard of hearing)      Acquired cataract  extraction with b/l lense placement, 2016      S/P KEVEN-BSO  for fibroids, 2012      Hemodialysis access, AV graft      History of renal transplantation  DDRT 5/19/2021      AV fistula        FAMILY HISTORY:  Family history of cerebrovascular accident (CVA)    Family history of colon cancer (Sibling)      PAST SOCIAL HISTORY:    ALLERGIES & MEDICATIONS  --------------------------------------------------------------------------------  Allergies    penicillin (Rash)  Mushrooms (Anaphylaxis)    Intolerances      Standing Inpatient Medications  aspirin enteric coated 81 milliGRAM(s) Oral daily  atovaquone  Suspension 1500 milliGRAM(s) Oral daily  cholecalciferol 2000 Unit(s) Oral daily  furosemide   Injectable 40 milliGRAM(s) IV Push every 12 hours  heparin   Injectable 5000 Unit(s) SubCutaneous every 8 hours  labetalol 200 milliGRAM(s) Oral three times a day  latanoprost 0.005% Ophthalmic Solution 1 Drop(s) Both EYES at bedtime  mycophenolate mofetil 500 milliGRAM(s) Oral two times a day  NIFEdipine  milliGRAM(s) Oral daily  predniSONE   Tablet 5 milliGRAM(s) Oral daily  sodium bicarbonate 1300 milliGRAM(s) Oral three times a day  sodium zirconium cyclosilicate 10 Gram(s) Oral daily  tacrolimus ER Tablet (ENVARSUS XR) 5 milliGRAM(s) Oral daily    PRN Inpatient Medications  acetaminophen     Tablet .. 650 milliGRAM(s) Oral every 6 hours PRN  melatonin 3 milliGRAM(s) Oral at bedtime PRN      REVIEW OF SYSTEMS  --------------------------------------------------------------------------------  Gen: No fevers/chills  Skin: No rashes  Head/Eyes/Ears: Normal hearing,  Normal vision   Respiratory: No dyspnea, cough  CV: No chest pain  GI: No abdominal pain, diarrhea, constipation, nausea, vomiting  : No dysuria, hematuria  MSK: No  edema  Heme: No easy bruising or bleeding  Psych: No significant depression    All other systems were reviewed and are negative, except as noted.    VITALS/PHYSICAL EXAM  --------------------------------------------------------------------------------  T(C): 36.8 (01-03-24 @ 16:37), Max: 36.9 (01-03-24 @ 14:03)  HR: 69 (01-03-24 @ 16:37) (66 - 69)  BP: 159/67 (01-03-24 @ 16:37) (135/56 - 189/69)  RR: 20 (01-03-24 @ 16:37) (20 - 29)  SpO2: 93% (01-03-24 @ 16:37) (93% - 98%)  Wt(kg): --  Height (cm): 162.6 (01-03-24 @ 09:49)  Weight (kg): 54.4 (01-03-24 @ 09:49)  BMI (kg/m2): 20.6 (01-03-24 @ 09:49)  BSA (m2): 1.57 (01-03-24 @ 09:49)      Physical Exam:  	Gen: NAD  	HEENT: MMM  	Pulm: CTA B/L  	CV: S1S2  	Abd: Soft, +BS   	Ext: No LE edema B/L  	Neuro: Awake, alert  	Skin: Warm and dry  	Vascular access: No HD catheter           : navdeep  LABS/STUDIES  --------------------------------------------------------------------------------              7.3    5.30  >-----------<  203      [01-03-24 @ 11:15]              23.8     139  |  109  |  49  ----------------------------<  130      [01-03-24 @ 14:10]  4.3   |  17  |  2.75        Ca     8.8     [01-03-24 @ 14:10]    TPro  5.6  /  Alb  3.2  /  TBili  0.2  /  DBili  x   /  AST  9   /  ALT  <5  /  AlkPhos  76  [01-03-24 @ 14:10]    PT/INR: PT 11.7 , INR 1.07       [01-03-24 @ 11:15]  PTT: 34.0       [01-03-24 @ 11:15]      Creatinine Trend:  SCr 2.75 [01-03 @ 14:10]  SCr 2.67 [01-03 @ 11:15]    Urinalysis - [01-03-24 @ 14:10]      Color  / Appearance  / SG  / pH       Gluc 130 / Ketone   / Bili  / Urobili        Blood  / Protein  / Leuk Est  / Nitrite       RBC  / WBC  / Hyaline  / Gran  / Sq Epi  / Non Sq Epi  / Bacteria       Iron 15, TIBC 140, %sat 11      [11-10-23 @ 06:01]  Ferritin 1817      [11-11-23 @ 06:09]  PTH -- (Ca 8.5)      [11-10-23 @ 06:01]   659  Vitamin D (25OH) 16.5      [11-11-23 @ 06:09]  HbA1c 4.6      [05-28-19 @ 09:49]    HBsAb 22.4      [05-19-21 @ 10:42]  HBsAb Reactive      [10-19-19 @ 01:09]  HBsAg Nonreact      [05-19-21 @ 10:42]  HBcAb Nonreact      [05-19-21 @ 10:42]  HCV 0.42, Nonreact      [05-19-21 @ 10:42]  HIV Nonreact      [05-19-21 @ 10:45]  HIV Nonreact      [11-08-23 @ 13:20]    STEPHANIE: titer Negative, pattern --      [11-11-23 @ 06:09]  Rheumatoid Factor 10      [11-11-23 @ 06:09]  Immunofixation Serum:   No Monoclonal Band Identified    Reference Range: None Detected      [02-05-21 @ 06:39]  SPEP Interpretation: Normal Electrophoresis Pattern      [02-05-21 @ 06:39]   [facility] [place] [facility]   MD SAMMY ORDONEZ MD RUORU WONG, PA    TEL:  FROM 9 AM to 5 PM--OFFICE: [phone]  AVAILABLE oN TEAMS   FROM 5 PM- 9 AM PLEASE CALL ANSWERING SERVICE AT [phone]    -- INITIAL RENAL CONSULT NOTE --- Date Of service 01-03-24 @ 19:30  --------------------------------------------------------------------------------  HPI:  78f h/o HTN, s/p Renal transplant, CKD, chronic rejection, recent admission for pneumonia s/p abx presented with shortness of breath since this morning. she also noticed significant LUE swelling that began 4-5 days ago. She denies any trauma to arm. she has chronic b/l LE edema as well which has increased slightly over the past few days. she has a cough at times productive of white sputum but denies any fevers or chills. She states she is compliant with all her medications. she has not noticed any change in urination. no cloudy urine. no abdominal pain.  (03 Jan 2024 17:17)        PAST HISTORY  --------------------------------------------------------------------------------  PAST MEDICAL & SURGICAL HISTORY:  HTN (hypertension)      Glaucoma      Cataract      ESRD (end stage renal disease) on dialysis      DVT (deep venous thrombosis)  of Left subclavian vein, 06/12/17      Kidney transplant recipient      Anemia of chronic disease      History of arteriovenostomy for renal dialysis      [place] (hard of hearing)      Acquired cataract  extraction with b/l lense placement, 2016      S/P KEVEN-BSO  for fibroids, 2012      Hemodialysis access, AV graft      History of renal transplantation  DDRT 5/19/2021      AV fistula        FAMILY HISTORY:  Family history of cerebrovascular accident (CVA)    Family history of colon cancer (Sibling)      PAST SOCIAL HISTORY:    ALLERGIES & MEDICATIONS  --------------------------------------------------------------------------------  Allergies    penicillin (Rash)  Mushrooms (Anaphylaxis)    Intolerances      Standing Inpatient Medications  aspirin enteric coated 81 milliGRAM(s) Oral daily  atovaquone  Suspension 1500 milliGRAM(s) Oral daily  cholecalciferol 2000 Unit(s) Oral daily  furosemide   Injectable 40 milliGRAM(s) IV Push every 12 hours  heparin   Injectable 5000 Unit(s) SubCutaneous every 8 hours  labetalol 200 milliGRAM(s) Oral three times a day  latanoprost 0.005% Ophthalmic Solution 1 Drop(s) Both EYES at bedtime  mycophenolate mofetil 500 milliGRAM(s) Oral two times a day  NIFEdipine  milliGRAM(s) Oral daily  predniSONE   Tablet 5 milliGRAM(s) Oral daily  sodium bicarbonate 1300 milliGRAM(s) Oral three times a day  sodium zirconium cyclosilicate 10 Gram(s) Oral daily  tacrolimus ER Tablet (ENVARSUS XR) 5 milliGRAM(s) Oral daily    PRN Inpatient Medications  acetaminophen     Tablet .. 650 milliGRAM(s) Oral every 6 hours PRN  melatonin 3 milliGRAM(s) Oral at bedtime PRN      REVIEW OF SYSTEMS  --------------------------------------------------------------------------------  Gen: No fevers/chills  Skin: No rashes  Head/Eyes/Ears: Normal hearing,  Normal vision   Respiratory: No dyspnea, cough  CV: No chest pain  GI: No abdominal pain, diarrhea, constipation, nausea, vomiting  : No dysuria, hematuria  MSK: No  edema  Heme: No easy bruising or bleeding  Psych: No significant depression    All other systems were reviewed and are negative, except as noted.    VITALS/PHYSICAL EXAM  --------------------------------------------------------------------------------  T(C): 36.8 (01-03-24 @ 16:37), Max: 36.9 (01-03-24 @ 14:03)  HR: 69 (01-03-24 @ 16:37) (66 - 69)  BP: 159/67 (01-03-24 @ 16:37) (135/56 - 189/69)  RR: 20 (01-03-24 @ 16:37) (20 - 29)  SpO2: 93% (01-03-24 @ 16:37) (93% - 98%)  Wt(kg): --  Height (cm): 162.6 (01-03-24 @ 09:49)  Weight (kg): 54.4 (01-03-24 @ 09:49)  BMI (kg/m2): 20.6 (01-03-24 @ 09:49)  BSA (m2): 1.57 (01-03-24 @ 09:49)      Physical Exam:  	Gen: NAD  	HEENT: MMM  	Pulm: CTA B/L  	CV: S1S2  	Abd: Soft, +BS   	Ext: No LE edema B/L  	Neuro: Awake, alert  	Skin: Warm and dry  	Vascular access: No HD catheter           : navdeep  LABS/STUDIES  --------------------------------------------------------------------------------              7.3    5.30  >-----------<  203      [01-03-24 @ 11:15]              23.8     139  |  109  |  49  ----------------------------<  130      [01-03-24 @ 14:10]  4.3   |  17  |  2.75        Ca     8.8     [01-03-24 @ 14:10]    TPro  5.6  /  Alb  3.2  /  TBili  0.2  /  DBili  x   /  AST  9   /  ALT  <5  /  AlkPhos  76  [01-03-24 @ 14:10]    PT/INR: PT 11.7 , INR 1.07       [01-03-24 @ 11:15]  PTT: 34.0       [01-03-24 @ 11:15]      Creatinine Trend:  SCr 2.75 [01-03 @ 14:10]  SCr 2.67 [01-03 @ 11:15]    Urinalysis - [01-03-24 @ 14:10]      Color  / Appearance  / SG  / pH       Gluc 130 / Ketone   / Bili  / Urobili        Blood  / Protein  / Leuk Est  / Nitrite       RBC  / WBC  / Hyaline  / Gran  / Sq Epi  / Non Sq Epi  / Bacteria       Iron 15, TIBC 140, %sat 11      [11-10-23 @ 06:01]  Ferritin 1817      [11-11-23 @ 06:09]  PTH -- (Ca 8.5)      [11-10-23 @ 06:01]   659  Vitamin D (25OH) 16.5      [11-11-23 @ 06:09]  HbA1c 4.6      [05-28-19 @ 09:49]    HBsAb 22.4      [05-19-21 @ 10:42]  HBsAb Reactive      [10-19-19 @ 01:09]  HBsAg Nonreact      [05-19-21 @ 10:42]  HBcAb Nonreact      [05-19-21 @ 10:42]  HCV 0.42, Nonreact      [05-19-21 @ 10:42]  HIV Nonreact      [05-19-21 @ 10:45]  HIV Nonreact      [11-08-23 @ 13:20]    STEPHANIE: titer Negative, pattern --      [11-11-23 @ 06:09]  Rheumatoid Factor 10      [11-11-23 @ 06:09]  Immunofixation Serum:   No Monoclonal Band Identified    Reference Range: None Detected      [02-05-21 @ 06:39]  SPEP Interpretation: Normal Electrophoresis Pattern      [02-05-21 @ 06:39]   [facility] [place] [facility]   MD SAMMY ORDONEZ MD RUORU WONG, PA    TEL:  FROM 9 AM to 5 PM--OFFICE: [phone]  AVAILABLE oN TEAMS   FROM 5 PM- 9 AM PLEASE CALL ANSWERING SERVICE AT [phone]    -- INITIAL RENAL CONSULT NOTE --- Date Of service 01-03-24 @ 19:30  --------------------------------------------------------------------------------  HPI:  78f h/o HTN, s/p Renal transplant, CKD, chronic rejection, recent admission for pneumonia s/p abx presented with shortness of breath since this morning. she also noticed significant LUE swelling that began 4-5 days ago. She denies any trauma to arm. she has chronic b/l LE edema as well which has increased slightly over the past few days. she has a cough at times productive of white sputum but denies any fevers or chills. She states she is compliant with all her medications. she has not noticed any change in urination. no cloudy urine. no abdominal pain.  (03 Jan 2024 17:17)        PAST HISTORY  --------------------------------------------------------------------------------  PAST MEDICAL & SURGICAL HISTORY:  HTN (hypertension)      Glaucoma      Cataract      ESRD (end stage renal disease) on dialysis      DVT (deep venous thrombosis)  of Left subclavian vein, 06/12/17      Kidney transplant recipient      Anemia of chronic disease      History of arteriovenostomy for renal dialysis      [place] (hard of hearing)      Acquired cataract  extraction with b/l lense placement, 2016      S/P KEVEN-BSO  for fibroids, 2012      Hemodialysis access, AV graft      History of renal transplantation  DDRT 5/19/2021      AV fistula        FAMILY HISTORY:  Family history of cerebrovascular accident (CVA)    Family history of colon cancer (Sibling)      PAST SOCIAL HISTORY:    ALLERGIES & MEDICATIONS  --------------------------------------------------------------------------------  Allergies    penicillin (Rash)  Mushrooms (Anaphylaxis)    Intolerances      Standing Inpatient Medications  aspirin enteric coated 81 milliGRAM(s) Oral daily  atovaquone  Suspension 1500 milliGRAM(s) Oral daily  cholecalciferol 2000 Unit(s) Oral daily  furosemide   Injectable 40 milliGRAM(s) IV Push every 12 hours  heparin   Injectable 5000 Unit(s) SubCutaneous every 8 hours  labetalol 200 milliGRAM(s) Oral three times a day  latanoprost 0.005% Ophthalmic Solution 1 Drop(s) Both EYES at bedtime  mycophenolate mofetil 500 milliGRAM(s) Oral two times a day  NIFEdipine  milliGRAM(s) Oral daily  predniSONE   Tablet 5 milliGRAM(s) Oral daily  sodium bicarbonate 1300 milliGRAM(s) Oral three times a day  sodium zirconium cyclosilicate 10 Gram(s) Oral daily  tacrolimus ER Tablet (ENVARSUS XR) 5 milliGRAM(s) Oral daily    PRN Inpatient Medications  acetaminophen     Tablet .. 650 milliGRAM(s) Oral every 6 hours PRN  melatonin 3 milliGRAM(s) Oral at bedtime PRN      REVIEW OF SYSTEMS  --------------------------------------------------------------------------------  Gen: No fevers/chills  Skin: No rashes  Head/Eyes/Ears: Normal hearing,  Normal vision   Respiratory: +dyspnea, cough  CV: No chest pain  GI: No abdominal pain, diarrhea, constipation, nausea, vomiting  : No dysuria, hematuria  MSK:+  edema  Heme: No easy bruising or bleeding  Psych: No significant depression    All other systems were reviewed and are negative, except as noted.    VITALS/PHYSICAL EXAM  --------------------------------------------------------------------------------  T(C): 36.8 (01-03-24 @ 16:37), Max: 36.9 (01-03-24 @ 14:03)  HR: 69 (01-03-24 @ 16:37) (66 - 69)  BP: 159/67 (01-03-24 @ 16:37) (135/56 - 189/69)  RR: 20 (01-03-24 @ 16:37) (20 - 29)  SpO2: 93% (01-03-24 @ 16:37) (93% - 98%)  Wt(kg): --  Height (cm): 162.6 (01-03-24 @ 09:49)  Weight (kg): 54.4 (01-03-24 @ 09:49)  BMI (kg/m2): 20.6 (01-03-24 @ 09:49)  BSA (m2): 1.57 (01-03-24 @ 09:49)      Physical Exam:  	Gen: NAD  	HEENT: MMM  	Pulm: CTA B/L  	CV: S1S2  	Abd: Soft, +BS   	Ext: + LE edema B/L  	Neuro: Awake,   	Skin: Warm and dry  	Vascular access: No HD catheter , avf          :no  navdeep  LABS/STUDIES  --------------------------------------------------------------------------------              7.3    5.30  >-----------<  203      [01-03-24 @ 11:15]              23.8     139  |  109  |  49  ----------------------------<  130      [01-03-24 @ 14:10]  4.3   |  17  |  2.75        Ca     8.8     [01-03-24 @ 14:10]    TPro  5.6  /  Alb  3.2  /  TBili  0.2  /  DBili  x   /  AST  9   /  ALT  <5  /  AlkPhos  76  [01-03-24 @ 14:10]    PT/INR: PT 11.7 , INR 1.07       [01-03-24 @ 11:15]  PTT: 34.0       [01-03-24 @ 11:15]      Creatinine Trend:  SCr 2.75 [01-03 @ 14:10]  SCr 2.67 [01-03 @ 11:15]    Urinalysis - [01-03-24 @ 14:10]      Color  / Appearance  / SG  / pH       Gluc 130 / Ketone   / Bili  / Urobili        Blood  / Protein  / Leuk Est  / Nitrite       RBC  / WBC  / Hyaline  / Gran  / Sq Epi  / Non Sq Epi  / Bacteria       Iron 15, TIBC 140, %sat 11      [11-10-23 @ 06:01]  Ferritin 1817      [11-11-23 @ 06:09]  PTH -- (Ca 8.5)      [11-10-23 @ 06:01]   659  Vitamin D (25OH) 16.5      [11-11-23 @ 06:09]  HbA1c 4.6      [05-28-19 @ 09:49]    HBsAb 22.4      [05-19-21 @ 10:42]  HBsAb Reactive      [10-19-19 @ 01:09]  HBsAg Nonreact      [05-19-21 @ 10:42]  HBcAb Nonreact      [05-19-21 @ 10:42]  HCV 0.42, Nonreact      [05-19-21 @ 10:42]  HIV Nonreact      [05-19-21 @ 10:45]  HIV Nonreact      [11-08-23 @ 13:20]    STEPHANIE: titer Negative, pattern --      [11-11-23 @ 06:09]  Rheumatoid Factor 10      [11-11-23 @ 06:09]  Immunofixation Serum:   No Monoclonal Band Identified    Reference Range: None Detected      [02-05-21 @ 06:39]  SPEP Interpretation: Normal Electrophoresis Pattern      [02-05-21 @ 06:39]   [facility] [place] [place] [facility]   MD SAMMY ORDONEZ MD RUORU WONG, PA    TEL:  FROM 9 AM to 5 PM--OFFICE: [phone]  AVAILABLE oN TEAMS   FROM 5 PM- 9 AM PLEASE CALL ANSWERING SERVICE AT [phone]    -- INITIAL RENAL CONSULT NOTE --- Date Of service 01-03-24 @ 19:30  --------------------------------------------------------------------------------  HPI:  78f h/o HTN, s/p Renal transplant, CKD, chronic rejection, recent admission for pneumonia s/p abx presented with shortness of breath since this morning. she also noticed significant LUE swelling that began 4-5 days ago. She denies any trauma to arm. she has chronic b/l LE edema as well which has increased slightly over the past few days. she has a cough at times productive of white sputum but denies any fevers or chills. She states she is compliant with all her medications. she has not noticed any change in urination. no cloudy urine. no abdominal pain.  (03 Jan 2024 17:17)        PAST HISTORY  --------------------------------------------------------------------------------  PAST MEDICAL & SURGICAL HISTORY:  HTN (hypertension)      Glaucoma      Cataract      ESRD (end stage renal disease) on dialysis      DVT (deep venous thrombosis)  of Left subclavian vein, 06/12/17      Kidney transplant recipient      Anemia of chronic disease      History of arteriovenostomy for renal dialysis      [place] (hard of hearing)      Acquired cataract  extraction with b/l lense placement, 2016      S/P KEVEN-BSO  for fibroids, 2012      Hemodialysis access, AV graft      History of renal transplantation  DDRT 5/19/2021      AV fistula        FAMILY HISTORY:  Family history of cerebrovascular accident (CVA)    Family history of colon cancer (Sibling)      PAST SOCIAL HISTORY:    ALLERGIES & MEDICATIONS  --------------------------------------------------------------------------------  Allergies    penicillin (Rash)  Mushrooms (Anaphylaxis)    Intolerances      Standing Inpatient Medications  aspirin enteric coated 81 milliGRAM(s) Oral daily  atovaquone  Suspension 1500 milliGRAM(s) Oral daily  cholecalciferol 2000 Unit(s) Oral daily  furosemide   Injectable 40 milliGRAM(s) IV Push every 12 hours  heparin   Injectable 5000 Unit(s) SubCutaneous every 8 hours  labetalol 200 milliGRAM(s) Oral three times a day  latanoprost 0.005% Ophthalmic Solution 1 Drop(s) Both EYES at bedtime  mycophenolate mofetil 500 milliGRAM(s) Oral two times a day  NIFEdipine  milliGRAM(s) Oral daily  predniSONE   Tablet 5 milliGRAM(s) Oral daily  sodium bicarbonate 1300 milliGRAM(s) Oral three times a day  sodium zirconium cyclosilicate 10 Gram(s) Oral daily  tacrolimus ER Tablet (ENVARSUS XR) 5 milliGRAM(s) Oral daily    PRN Inpatient Medications  acetaminophen     Tablet .. 650 milliGRAM(s) Oral every 6 hours PRN  melatonin 3 milliGRAM(s) Oral at bedtime PRN      REVIEW OF SYSTEMS  --------------------------------------------------------------------------------  Gen: No fevers/chills  Skin: No rashes  Head/Eyes/Ears: Normal hearing,  Normal vision   Respiratory: +dyspnea, cough  CV: No chest pain  GI: No abdominal pain, diarrhea, constipation, nausea, vomiting  : No dysuria, hematuria  MSK:+  edema  Heme: No easy bruising or bleeding  Psych: No significant depression    All other systems were reviewed and are negative, except as noted.    VITALS/PHYSICAL EXAM  --------------------------------------------------------------------------------  T(C): 36.8 (01-03-24 @ 16:37), Max: 36.9 (01-03-24 @ 14:03)  HR: 69 (01-03-24 @ 16:37) (66 - 69)  BP: 159/67 (01-03-24 @ 16:37) (135/56 - 189/69)  RR: 20 (01-03-24 @ 16:37) (20 - 29)  SpO2: 93% (01-03-24 @ 16:37) (93% - 98%)  Wt(kg): --  Height (cm): 162.6 (01-03-24 @ 09:49)  Weight (kg): 54.4 (01-03-24 @ 09:49)  BMI (kg/m2): 20.6 (01-03-24 @ 09:49)  BSA (m2): 1.57 (01-03-24 @ 09:49)      Physical Exam:  	Gen: NAD  	HEENT: MMM  	Pulm: CTA B/L  	CV: S1S2  	Abd: Soft, +BS   	Ext: + LE edema B/L  	Neuro: Awake,   	Skin: Warm and dry  	Vascular access: No HD catheter , avf          :no  navdeep  LABS/STUDIES  --------------------------------------------------------------------------------              7.3    5.30  >-----------<  203      [01-03-24 @ 11:15]              23.8     139  |  109  |  49  ----------------------------<  130      [01-03-24 @ 14:10]  4.3   |  17  |  2.75        Ca     8.8     [01-03-24 @ 14:10]    TPro  5.6  /  Alb  3.2  /  TBili  0.2  /  DBili  x   /  AST  9   /  ALT  <5  /  AlkPhos  76  [01-03-24 @ 14:10]    PT/INR: PT 11.7 , INR 1.07       [01-03-24 @ 11:15]  PTT: 34.0       [01-03-24 @ 11:15]      Creatinine Trend:  SCr 2.75 [01-03 @ 14:10]  SCr 2.67 [01-03 @ 11:15]    Urinalysis - [01-03-24 @ 14:10]      Color  / Appearance  / SG  / pH       Gluc 130 / Ketone   / Bili  / Urobili        Blood  / Protein  / Leuk Est  / Nitrite       RBC  / WBC  / Hyaline  / Gran  / Sq Epi  / Non Sq Epi  / Bacteria       Iron 15, TIBC 140, %sat 11      [11-10-23 @ 06:01]  Ferritin 1817      [11-11-23 @ 06:09]  PTH -- (Ca 8.5)      [11-10-23 @ 06:01]   659  Vitamin D (25OH) 16.5      [11-11-23 @ 06:09]  HbA1c 4.6      [05-28-19 @ 09:49]    HBsAb 22.4      [05-19-21 @ 10:42]  HBsAb Reactive      [10-19-19 @ 01:09]  HBsAg Nonreact      [05-19-21 @ 10:42]  HBcAb Nonreact      [05-19-21 @ 10:42]  HCV 0.42, Nonreact      [05-19-21 @ 10:42]  HIV Nonreact      [05-19-21 @ 10:45]  HIV Nonreact      [11-08-23 @ 13:20]    STEPHANIE: titer Negative, pattern --      [11-11-23 @ 06:09]  Rheumatoid Factor 10      [11-11-23 @ 06:09]  Immunofixation Serum:   No Monoclonal Band Identified    Reference Range: None Detected      [02-05-21 @ 06:39]  SPEP Interpretation: Normal Electrophoresis Pattern      [02-05-21 @ 06:39]

## 2024-01-04 ENCOUNTER — APPOINTMENT (OUTPATIENT)
Dept: VASCULAR SURGERY | Facility: CLINIC | Age: 79
End: 2024-01-04

## 2024-01-04 LAB
ANION GAP SERPL CALC-SCNC: 16 MMOL/L — SIGNIFICANT CHANGE UP (ref 5–17)
ANION GAP SERPL CALC-SCNC: 16 MMOL/L — SIGNIFICANT CHANGE UP (ref 5–17)
BUN SERPL-MCNC: 54 MG/DL — HIGH (ref 7–23)
BUN SERPL-MCNC: 54 MG/DL — HIGH (ref 7–23)
C3 SERPL-MCNC: 120 MG/DL — SIGNIFICANT CHANGE UP (ref 81–157)
C3 SERPL-MCNC: 120 MG/DL — SIGNIFICANT CHANGE UP (ref 81–157)
C4 SERPL-MCNC: 34 MG/DL — SIGNIFICANT CHANGE UP (ref 13–39)
C4 SERPL-MCNC: 34 MG/DL — SIGNIFICANT CHANGE UP (ref 13–39)
CALCIUM SERPL-MCNC: 9 MG/DL — SIGNIFICANT CHANGE UP (ref 8.4–10.5)
CALCIUM SERPL-MCNC: 9 MG/DL — SIGNIFICANT CHANGE UP (ref 8.4–10.5)
CHLORIDE SERPL-SCNC: 108 MMOL/L — SIGNIFICANT CHANGE UP (ref 96–108)
CHLORIDE SERPL-SCNC: 108 MMOL/L — SIGNIFICANT CHANGE UP (ref 96–108)
CHOLEST SERPL-MCNC: 191 MG/DL — SIGNIFICANT CHANGE UP
CHOLEST SERPL-MCNC: 191 MG/DL — SIGNIFICANT CHANGE UP
CO2 SERPL-SCNC: 15 MMOL/L — LOW (ref 22–31)
CO2 SERPL-SCNC: 15 MMOL/L — LOW (ref 22–31)
CREAT SERPL-MCNC: 2.95 MG/DL — HIGH (ref 0.5–1.3)
CREAT SERPL-MCNC: 2.95 MG/DL — HIGH (ref 0.5–1.3)
CULTURE RESULTS: SIGNIFICANT CHANGE UP
CULTURE RESULTS: SIGNIFICANT CHANGE UP
EGFR: 16 ML/MIN/1.73M2 — LOW
EGFR: 16 ML/MIN/1.73M2 — LOW
GLUCOSE SERPL-MCNC: 90 MG/DL — SIGNIFICANT CHANGE UP (ref 70–99)
GLUCOSE SERPL-MCNC: 90 MG/DL — SIGNIFICANT CHANGE UP (ref 70–99)
HBV SURFACE AG SER-ACNC: SIGNIFICANT CHANGE UP
HBV SURFACE AG SER-ACNC: SIGNIFICANT CHANGE UP
HCT VFR BLD CALC: 23.6 % — LOW (ref 34.5–45)
HCT VFR BLD CALC: 23.6 % — LOW (ref 34.5–45)
HCT VFR BLD CALC: 27.2 % — LOW (ref 34.5–45)
HCT VFR BLD CALC: 27.2 % — LOW (ref 34.5–45)
HCV AB S/CO SERPL IA: 0.27 S/CO — SIGNIFICANT CHANGE UP (ref 0–0.99)
HCV AB S/CO SERPL IA: 0.27 S/CO — SIGNIFICANT CHANGE UP (ref 0–0.99)
HCV AB SERPL-IMP: SIGNIFICANT CHANGE UP
HCV AB SERPL-IMP: SIGNIFICANT CHANGE UP
HDLC SERPL-MCNC: 64 MG/DL — SIGNIFICANT CHANGE UP
HDLC SERPL-MCNC: 64 MG/DL — SIGNIFICANT CHANGE UP
HGB BLD-MCNC: 7 G/DL — CRITICAL LOW (ref 11.5–15.5)
HGB BLD-MCNC: 7 G/DL — CRITICAL LOW (ref 11.5–15.5)
HGB BLD-MCNC: 8.4 G/DL — LOW (ref 11.5–15.5)
HGB BLD-MCNC: 8.4 G/DL — LOW (ref 11.5–15.5)
HIV 1+2 AB+HIV1 P24 AG SERPL QL IA: SIGNIFICANT CHANGE UP
HIV 1+2 AB+HIV1 P24 AG SERPL QL IA: SIGNIFICANT CHANGE UP
LIPID PNL WITH DIRECT LDL SERPL: 103 MG/DL — HIGH
LIPID PNL WITH DIRECT LDL SERPL: 103 MG/DL — HIGH
MCHC RBC-ENTMCNC: 25.5 PG — LOW (ref 27–34)
MCHC RBC-ENTMCNC: 25.5 PG — LOW (ref 27–34)
MCHC RBC-ENTMCNC: 26.2 PG — LOW (ref 27–34)
MCHC RBC-ENTMCNC: 26.2 PG — LOW (ref 27–34)
MCHC RBC-ENTMCNC: 29.7 GM/DL — LOW (ref 32–36)
MCHC RBC-ENTMCNC: 29.7 GM/DL — LOW (ref 32–36)
MCHC RBC-ENTMCNC: 30.9 GM/DL — LOW (ref 32–36)
MCHC RBC-ENTMCNC: 30.9 GM/DL — LOW (ref 32–36)
MCV RBC AUTO: 84.7 FL — SIGNIFICANT CHANGE UP (ref 80–100)
MCV RBC AUTO: 84.7 FL — SIGNIFICANT CHANGE UP (ref 80–100)
MCV RBC AUTO: 85.8 FL — SIGNIFICANT CHANGE UP (ref 80–100)
MCV RBC AUTO: 85.8 FL — SIGNIFICANT CHANGE UP (ref 80–100)
NON HDL CHOLESTEROL: 127 MG/DL — SIGNIFICANT CHANGE UP
NON HDL CHOLESTEROL: 127 MG/DL — SIGNIFICANT CHANGE UP
NRBC # BLD: 0 /100 WBCS — SIGNIFICANT CHANGE UP (ref 0–0)
PLATELET # BLD AUTO: 189 K/UL — SIGNIFICANT CHANGE UP (ref 150–400)
PLATELET # BLD AUTO: 189 K/UL — SIGNIFICANT CHANGE UP (ref 150–400)
PLATELET # BLD AUTO: 225 K/UL — SIGNIFICANT CHANGE UP (ref 150–400)
PLATELET # BLD AUTO: 225 K/UL — SIGNIFICANT CHANGE UP (ref 150–400)
POTASSIUM SERPL-MCNC: 4.3 MMOL/L — SIGNIFICANT CHANGE UP (ref 3.5–5.3)
POTASSIUM SERPL-MCNC: 4.3 MMOL/L — SIGNIFICANT CHANGE UP (ref 3.5–5.3)
POTASSIUM SERPL-SCNC: 4.3 MMOL/L — SIGNIFICANT CHANGE UP (ref 3.5–5.3)
POTASSIUM SERPL-SCNC: 4.3 MMOL/L — SIGNIFICANT CHANGE UP (ref 3.5–5.3)
RBC # BLD: 2.75 M/UL — LOW (ref 3.8–5.2)
RBC # BLD: 2.75 M/UL — LOW (ref 3.8–5.2)
RBC # BLD: 3.21 M/UL — LOW (ref 3.8–5.2)
RBC # BLD: 3.21 M/UL — LOW (ref 3.8–5.2)
RBC # FLD: 16 % — HIGH (ref 10.3–14.5)
RBC # FLD: 16 % — HIGH (ref 10.3–14.5)
RBC # FLD: 16.6 % — HIGH (ref 10.3–14.5)
RBC # FLD: 16.6 % — HIGH (ref 10.3–14.5)
SODIUM SERPL-SCNC: 139 MMOL/L — SIGNIFICANT CHANGE UP (ref 135–145)
SODIUM SERPL-SCNC: 139 MMOL/L — SIGNIFICANT CHANGE UP (ref 135–145)
SPECIMEN SOURCE: SIGNIFICANT CHANGE UP
SPECIMEN SOURCE: SIGNIFICANT CHANGE UP
TACROLIMUS SERPL-MCNC: 7.3 NG/ML — SIGNIFICANT CHANGE UP
TACROLIMUS SERPL-MCNC: 7.3 NG/ML — SIGNIFICANT CHANGE UP
TRIGL SERPL-MCNC: 137 MG/DL — SIGNIFICANT CHANGE UP
TRIGL SERPL-MCNC: 137 MG/DL — SIGNIFICANT CHANGE UP
WBC # BLD: 4.59 K/UL — SIGNIFICANT CHANGE UP (ref 3.8–10.5)
WBC # BLD: 4.59 K/UL — SIGNIFICANT CHANGE UP (ref 3.8–10.5)
WBC # BLD: 5.41 K/UL — SIGNIFICANT CHANGE UP (ref 3.8–10.5)
WBC # BLD: 5.41 K/UL — SIGNIFICANT CHANGE UP (ref 3.8–10.5)
WBC # FLD AUTO: 4.59 K/UL — SIGNIFICANT CHANGE UP (ref 3.8–10.5)
WBC # FLD AUTO: 4.59 K/UL — SIGNIFICANT CHANGE UP (ref 3.8–10.5)
WBC # FLD AUTO: 5.41 K/UL — SIGNIFICANT CHANGE UP (ref 3.8–10.5)
WBC # FLD AUTO: 5.41 K/UL — SIGNIFICANT CHANGE UP (ref 3.8–10.5)

## 2024-01-04 PROCEDURE — 93306 TTE W/DOPPLER COMPLETE: CPT | Mod: 26

## 2024-01-04 PROCEDURE — 99223 1ST HOSP IP/OBS HIGH 75: CPT | Mod: GC

## 2024-01-04 RX ADMIN — Medication 2000 UNIT(S): at 13:00

## 2024-01-04 RX ADMIN — Medication 5 MILLIGRAM(S): at 05:49

## 2024-01-04 RX ADMIN — ATOVAQUONE 1500 MILLIGRAM(S): 750 SUSPENSION ORAL at 12:59

## 2024-01-04 RX ADMIN — MYCOPHENOLATE MOFETIL 500 MILLIGRAM(S): 250 CAPSULE ORAL at 05:49

## 2024-01-04 RX ADMIN — HEPARIN SODIUM 5000 UNIT(S): 5000 INJECTION INTRAVENOUS; SUBCUTANEOUS at 21:36

## 2024-01-04 RX ADMIN — Medication 1300 MILLIGRAM(S): at 15:08

## 2024-01-04 RX ADMIN — Medication 81 MILLIGRAM(S): at 13:00

## 2024-01-04 RX ADMIN — Medication 200 MILLIGRAM(S): at 21:37

## 2024-01-04 RX ADMIN — HEPARIN SODIUM 5000 UNIT(S): 5000 INJECTION INTRAVENOUS; SUBCUTANEOUS at 12:59

## 2024-01-04 RX ADMIN — Medication 200 MILLIGRAM(S): at 13:00

## 2024-01-04 RX ADMIN — HEPARIN SODIUM 5000 UNIT(S): 5000 INJECTION INTRAVENOUS; SUBCUTANEOUS at 05:48

## 2024-01-04 RX ADMIN — Medication 1300 MILLIGRAM(S): at 05:49

## 2024-01-04 RX ADMIN — Medication 40 MILLIGRAM(S): at 17:48

## 2024-01-04 RX ADMIN — SODIUM ZIRCONIUM CYCLOSILICATE 10 GRAM(S): 10 POWDER, FOR SUSPENSION ORAL at 13:00

## 2024-01-04 RX ADMIN — Medication 40 MILLIGRAM(S): at 05:48

## 2024-01-04 RX ADMIN — Medication 1300 MILLIGRAM(S): at 21:36

## 2024-01-04 RX ADMIN — LATANOPROST 1 DROP(S): 0.05 SOLUTION/ DROPS OPHTHALMIC; TOPICAL at 21:38

## 2024-01-04 RX ADMIN — Medication 120 MILLIGRAM(S): at 05:49

## 2024-01-04 RX ADMIN — Medication 200 MILLIGRAM(S): at 05:49

## 2024-01-04 RX ADMIN — TACROLIMUS 5 MILLIGRAM(S): 5 CAPSULE ORAL at 06:01

## 2024-01-04 RX ADMIN — MYCOPHENOLATE MOFETIL 500 MILLIGRAM(S): 250 CAPSULE ORAL at 17:47

## 2024-01-04 NOTE — PATIENT PROFILE ADULT - FALL HARM RISK - HARM RISK INTERVENTIONS
Assistance with ambulation/Assistance OOB with selected safe patient handling equipment/Communicate Risk of Fall with Harm to all staff/Discuss with provider need for PT consult/Monitor gait and stability/Provide patient with walking aids - walker, cane, crutches/Reinforce activity limits and safety measures with patient and family/Tailored Fall Risk Interventions/Visual Cue: Yellow wristband and red socks/Bed in lowest position, wheels locked, appropriate side rails in place/Call bell, personal items and telephone in reach/Instruct patient to call for assistance before getting out of bed or chair/Non-slip footwear when patient is out of bed/[place] to call system/Physically safe environment - no spills, clutter or unnecessary equipment/Purposeful Proactive Rounding/Room/bathroom lighting operational, light cord in reach Assistance with ambulation/Assistance OOB with selected safe patient handling equipment/Communicate Risk of Fall with Harm to all staff/Discuss with provider need for PT consult/Monitor gait and stability/Provide patient with walking aids - walker, cane, crutches/Reinforce activity limits and safety measures with patient and family/Tailored Fall Risk Interventions/Visual Cue: Yellow wristband and red socks/Bed in lowest position, wheels locked, appropriate side rails in place/Call bell, personal items and telephone in reach/Instruct patient to call for assistance before getting out of bed or chair/Non-slip footwear when patient is out of bed/[place] to call system/Physically safe environment - no spills, clutter or unnecessary equipment/Purposeful Proactive Rounding/Room/bathroom lighting operational, light cord in reach

## 2024-01-04 NOTE — PROGRESS NOTE ADULT - ASSESSMENT
78f h/o HTN, s/p Renal transplant, CKD, chronic rejection, recent admission for pneumonia s/p abx presented with shortness of breath with LUE and b/l LE edema concerning for volume overload.        Problem/Plan - 1:  ·  Problem: Shortness of breath.   ·  Plan: with LUE and b/l LE edema   UA with protein 300  check urine protein to cr - evaluate for nephrotic syndrome  check EKG   lasix 40mg iv bid - renal consult appreciated   check TTE  covid, rsv, flu negative.     Problem/Plan - 2:  ·  Problem: Stage 4 chronic kidney disease.   ·  Plan: creatinine improved since last admission   check urine protein to cr as above  check urine lytes, renal us   rheum studies ordered   renal transplant consult appreciated   continue tacrolimus 5mg qd (monitor levels), mycophenolate 500mg bid, prednisone 5mg qd  renally dose meds, avoid nephrotoxic medications.     Problem/Plan - 3:  ·  Problem: HTN (hypertension).   ·  Plan: monitor bp   c/w nifedipine, labetalol at home doses.     Problem/Plan - 4:  ·  Problem: Opacity of lung on imaging study.   ·  Plan: new RUL opacity   clinically c/w volume overload  procalcitonin low  monitor closely as patient on immunosuppressives - low threshold for starting empiric abx   check ct chest.     Problem/Plan - 5:  ·  Problem: Metabolic acidosis.   ·  Plan: c/w sodium bicarb.     Problem/Plan - 6:  ·  Problem: Prophylactic measure.   ·  Plan: dvt proph - hsq.

## 2024-01-04 NOTE — PATIENT PROFILE ADULT - FUNCTIONAL ASSESSMENT - DAILY ACTIVITY 1.
4 = No assist / stand by assistance V-Y Plasty Text: The defect edges were debeveled with a #15 scalpel blade.  Given the location of the defect, shape of the defect and the proximity to free margins an V-Y advancement flap was deemed most appropriate.  Using a sterile surgical marker, an appropriate advancement flap was drawn incorporating the defect and placing the expected incisions within the relaxed skin tension lines where possible.    The area thus outlined was incised deep to adipose tissue with a #15 scalpel blade.  The skin margins were undermined to an appropriate distance in all directions utilizing iris scissors.

## 2024-01-04 NOTE — PROGRESS NOTE ADULT - ASSESSMENT
78f h/o HTN, s/p Renal transplant, CKD, chronic rejection, recent admission for pneumonia s/p abx presented with shortness of breath since this morning. she also noticed significant LUE swelling that began 4-5 days ago.  Nephrology consulted for CKD s/p renal transplant.    A/P:  S/p DDRT (5/19/2021 - induction w/ Basiliximab)  Baseline S.Cr ~1.7-2.  Renal function worsened during last admission; was d/c'd w/ S.Cr. 3.4 (11/14).  New baseline may be ~2.6-3; admitted w/ S.Cr. 2.6.  Renal function fluctuating, worsened today.  Possibly in setting of worsening anemia vs. proteinuria.  Repeat TTE 1/4 - Left ventricular wall thickness is moderately increased. Left ventricular systolic function is normal with a calculated ejection fraction of 63 % with moderate L ventricular dysfunction.  On furosemide 40mg IVP BID.  UA with proteinuria and hematuria.  Continue immunosuppressants per transplant - mycophenolate 500mg BID, prednisone 5mg qd, and Tacrolimus 5mg qd.  Tacro level 7.3 today - tacro administered prior to lab draw.  Check Tacro level 30mins prior to next dose; goal 4-6.  Avoid nephrotoxic agents.  Monitor BMP and UO.    HTN:  BP fluctuating  Continue current BP meds.  Avoid hypotension.  Monitor BP.    Hyperkalemia:  Possibly sec to hemolyzed sample.  K stable.  Low K diet.  Monitor K closely.    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  C/W NaHCO3 1300mg TID    Anemia:  Likely in setting of CKD vs iron deficiency.  Repeat Iron studies; prev. iron deficient in 11/2023.  Management per primary team.  Transfuse for Hgb <8.  Hold SHELDON for BP >170/90.    Proteinuria/Hematuria:  etiology? possibly  diabetic nephropathy (donor origin)  Follows Dr. Louise 9/20/23:   Consider checking UPr/Cr.  Vasculitis work up per transplant team  Monitor. 78f h/o HTN, s/p Renal transplant, CKD, chronic rejection, recent admission for pneumonia s/p abx presented with shortness of breath since this morning. she also noticed significant LUE swelling that began 4-5 days ago.  Nephrology consulted for CKD s/p renal transplant.    A/P:  S/p DDRT (5/19/2021 - induction w/ Basiliximab)  Baseline S.Cr ~1.7-2.  Renal function worsened during last admission; was d/c'd w/ S.Cr. 3.4 (11/14).  New baseline may be ~2.6-3; admitted w/ S.Cr. 2.6.  Renal function fluctuating, worsened today.  Possibly in setting of worsening anemia vs. proteinuria.  Repeat TTE 1/4 - Left ventricular wall thickness is moderately increased. Left ventricular systolic function is normal with a calculated ejection fraction of 63 % with moderate L ventricular dysfunction.  On furosemide 40mg IVP BID.  UA with proteinuria and hematuria.  Continue immunosuppressants per transplant - mycophenolate 500mg BID, prednisone 5mg qd, and Tacrolimus 5mg qd.  Tacro level 7.3 today - tacro administered prior to lab draw.  Check Tacro level 30mins prior to next dose; goal 4-6.  Avoid nephrotoxic agents.  Monitor BMP and UO.    HTN:  BP fluctuating  Continue current BP meds.  Avoid hypotension.  Monitor BP.    Hyperkalemia:  Possibly sec to hemolyzed sample.  K stable.  Low K diet.  Monitor K closely.    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  C/W NaHCO3 1300mg TID    Anemia:  Likely in setting of CKD vs iron deficiency.  Repeat Iron studies; prev. iron deficient in 11/2023.  Transfuse for Hgb <8.  Hold SHELDON for BP >170/90.    Proteinuria/Hematuria:  etiology? possibly  diabetic nephropathy (donor origin)  Follows Dr. Louise 9/20/23:   Consider checking UPr/Cr.  Vasculitis work up per transplant team  Monitor.

## 2024-01-04 NOTE — PROGRESS NOTE ADULT - SUBJECTIVE AND OBJECTIVE BOX
78f h/o HTN, s/p Renal transplant, CKD, chronic rejection, recent admission for pneumonia s/p abx presented with shortness of breath since this morning. she also noticed significant LUE swelling that began 4-5 days ago. She denies any trauma to arm. she has chronic b/l LE edema as well which has increased slightly over the past few days. she has a cough at times productive of white sputum but denies any fevers or chills. She states she is compliant with all her medications. she has not noticed any change in urination. no cloudy urine. no abdominal pain.  (03 Jan 2024 17:17)    01-04-24 @ 13:35  PAST MEDICAL & SURGICAL HISTORY:  HTN (hypertension)      Glaucoma      Cataract      ESRD (end stage renal disease) on dialysis      DVT (deep venous thrombosis)  of Left subclavian vein, 06/12/17      Kidney transplant recipient      Anemia of chronic disease      History of arteriovenostomy for renal dialysis      [place] (hard of hearing)      Acquired cataract  extraction with b/l lense placement, 2016      S/P KEVEN-BSO  for fibroids, 2012      Hemodialysis access, AV graft      History of renal transplantation  DDRT 5/19/2021      AV fistula          Review of Systems:   CONSTITUTIONAL: No fever, weight loss, or fatigue  EYES: No eye pain, visual disturbances, or discharge  ENMT:  No difficulty hearing, tinnitus, vertigo; No sinus or throat pain  NECK: No pain or stiffness  BREASTS: No pain, masses, or nipple discharge  RESPIRATORY: No cough, wheezing, chills or hemoptysis; No shortness of breath  CARDIOVASCULAR: No chest pain, palpitations, dizziness, or leg swelling  GASTROINTESTINAL: No abdominal or epigastric pain. No nausea, vomiting, or hematemesis; No diarrhea or constipation. No melena or hematochezia.  GENITOURINARY: No dysuria, frequency, hematuria, or incontinence  NEUROLOGICAL: No headaches, memory loss, loss of strength, numbness, or tremors  SKIN: No itching, burning, rashes, or lesions   LYMPH NODES: No enlarged glands  ENDOCRINE: No heat or cold intolerance; No hair loss  MUSCULOSKELETAL: No joint pain or swelling; No muscle, back, or extremity pain  PSYCHIATRIC: No depression, anxiety, mood swings, or difficulty sleeping  HEME/LYMPH: No easy bruising, or bleeding gums  ALLERY AND IMMUNOLOGIC: No hives or eczema    Allergies    penicillin (Rash)  Mushrooms (Anaphylaxis)    Intolerances        Social History:     FAMILY HISTORY:  Family history of cerebrovascular accident (CVA)    Family history of colon cancer (Sibling)        MEDICATIONS  (STANDING):  aspirin enteric coated 81 milliGRAM(s) Oral daily  atovaquone  Suspension 1500 milliGRAM(s) Oral daily  cholecalciferol 2000 Unit(s) Oral daily  furosemide   Injectable 40 milliGRAM(s) IV Push every 12 hours  heparin   Injectable 5000 Unit(s) SubCutaneous every 8 hours  labetalol 200 milliGRAM(s) Oral three times a day  latanoprost 0.005% Ophthalmic Solution 1 Drop(s) Both EYES at bedtime  mycophenolate mofetil 500 milliGRAM(s) Oral two times a day  NIFEdipine  milliGRAM(s) Oral daily  predniSONE   Tablet 5 milliGRAM(s) Oral daily  sodium bicarbonate 1300 milliGRAM(s) Oral three times a day  sodium zirconium cyclosilicate 10 Gram(s) Oral daily  tacrolimus ER Tablet (ENVARSUS XR) 5 milliGRAM(s) Oral daily    MEDICATIONS  (PRN):  acetaminophen     Tablet .. 650 milliGRAM(s) Oral every 6 hours PRN Temp greater or equal to 38C (100.4F), Mild Pain (1 - 3)  melatonin 3 milliGRAM(s) Oral at bedtime PRN Insomnia      Vital Signs Last 24 Hrs  T(C): 37.4 (04 Jan 2024 12:14), Max: 37.4 (04 Jan 2024 12:14)  T(F): 99.3 (04 Jan 2024 12:14), Max: 99.3 (04 Jan 2024 12:14)  HR: 74 (04 Jan 2024 12:14) (69 - 81)  BP: 124/58 (04 Jan 2024 12:14) (124/58 - 170/72)  BP(mean): 79 (03 Jan 2024 14:03) (79 - 79)  RR: 18 (04 Jan 2024 12:14) (18 - 29)  SpO2: 96% (04 Jan 2024 12:14) (93% - 96%)    Parameters below as of 04 Jan 2024 04:29  Patient On (Oxygen Delivery Method): room air      CAPILLARY BLOOD GLUCOSE        I&O's Summary    03 Jan 2024 07:01  -  04 Jan 2024 07:00  --------------------------------------------------------  IN: 240 mL / OUT: 0 mL / NET: 240 mL    04 Jan 2024 07:01  -  04 Jan 2024 13:35  --------------------------------------------------------  IN: 200 mL / OUT: 0 mL / NET: 200 mL        PHYSICAL EXAM:  GENERAL: NAD, well-developed  HEAD:  Atraumatic, Normocephalic  EYES: EOMI, PERRLA, conjunctiva and sclera clear  NECK: Supple, No JVD  CHEST/LUNG: Clear to auscultation bilaterally; No wheeze  HEART: Regular rate and rhythm; No murmurs, rubs, or gallops  ABDOMEN: Soft, Nontender, Nondistended; Bowel sounds present  EXTREMITIES:  2+ Peripheral Pulses, No clubbing, cyanosis, or edema  PSYCH: AAOx3  NEUROLOGY: non-focal  SKIN: No rashes or lesions    LABS:                        7.0    4.59  )-----------( 189      ( 04 Jan 2024 07:43 )             23.6     01-04    139  |  108  |  54<H>  ----------------------------<  90  4.3   |  15<L>  |  2.95<H>    Ca    9.0      04 Jan 2024 07:43    TPro  5.6<L>  /  Alb  3.2<L>  /  TBili  0.2  /  DBili  x   /  AST  9<L>  /  ALT  <5<L>  /  AlkPhos  76  01-03    PT/INR - ( 03 Jan 2024 11:15 )   PT: 11.7 sec;   INR: 1.07 ratio         PTT - ( 03 Jan 2024 11:15 )  PTT:34.0 sec      Urinalysis Basic - ( 04 Jan 2024 07:43 )    Color: x / Appearance: x / SG: x / pH: x  Gluc: 90 mg/dL / Ketone: x  / Bili: x / Urobili: x   Blood: x / Protein: x / Nitrite: x   Leuk Esterase: x / RBC: x / WBC x   Sq Epi: x / Non Sq Epi: x / Bacteria: x        RADIOLOGY & ADDITIONAL TESTS:    Imaging Personally Reviewed:    Consultant(s) Notes Reviewed:      Care Discussed with Consultants/Other Providers:   78f h/o HTN, s/p Renal transplant, CKD, chronic rejection, recent admission for pneumonia s/p abx presented with shortness of breath since this morning. she also noticed significant LUE swelling that began 4-5 days ago. She denies any trauma to arm. she has chronic b/l LE edema as well which has increased slightly over the past few days. she has a cough at times productive of white sputum but denies any fevers or chills. She states she is compliant with all her medications. she has not noticed any change in urination. no cloudy urine. no abdominal pain.  (03 Jan 2024 17:17)    01-04-24 @ 13:35  PAST MEDICAL & SURGICAL HISTORY:  HTN (hypertension)      Glaucoma      Cataract      ESRD (end stage renal disease) on dialysis      DVT (deep venous thrombosis)  of Left subclavian vein, 06/12/17      Kidney transplant recipient      Anemia of chronic disease      History of arteriovenostomy for renal dialysis      [place] (hard of hearing)      Acquired cataract  extraction with b/l lense placement, 2016      S/P KEVEN-BSO  for fibroids, 2012      Hemodialysis access, AV graft      History of renal transplantation  DDRT 5/19/2021      AV fistula          Review of Systems:   CONSTITUTIONAL: No fever, weight loss, or fatigue  EYES: No eye pain, visual disturbances, or discharge  ENMT:  No difficulty hearing, tinnitus, vertigo; No sinus or throat pain  NECK: No pain or stiffness  BREASTS: No pain, masses, or nipple discharge  RESPIRATORY: No cough, wheezing, chills or hemoptysis; No shortness of breath  CARDIOVASCULAR: No chest pain, palpitations, dizziness, or leg swelling  GASTROINTESTINAL: No abdominal or epigastric pain. No nausea, vomiting, or hematemesis; No diarrhea or constipation. No melena or hematochezia.  GENITOURINARY: No dysuria, frequency, hematuria, or incontinence  NEUROLOGICAL: No headaches, memory loss, loss of strength, numbness, or tremors  SKIN: No itching, burning, rashes, or lesions   LYMPH NODES: No enlarged glands  ENDOCRINE: No heat or cold intolerance; No hair loss  MUSCULOSKELETAL: No joint pain or swelling; No muscle, back, or extremity pain  PSYCHIATRIC: No depression, anxiety, mood swings, or difficulty sleeping  HEME/LYMPH: No easy bruising, or bleeding gums  ALLERY AND IMMUNOLOGIC: No hives or eczema    Allergies    penicillin (Rash)  Mushrooms (Anaphylaxis)    Intolerances        Social History:     FAMILY HISTORY:  Family history of cerebrovascular accident (CVA)    Family history of colon cancer (Sibling)        MEDICATIONS  (STANDING):  aspirin enteric coated 81 milliGRAM(s) Oral daily  atovaquone  Suspension 1500 milliGRAM(s) Oral daily  cholecalciferol 2000 Unit(s) Oral daily  furosemide   Injectable 40 milliGRAM(s) IV Push every 12 hours  heparin   Injectable 5000 Unit(s) SubCutaneous every 8 hours  labetalol 200 milliGRAM(s) Oral three times a day  latanoprost 0.005% Ophthalmic Solution 1 Drop(s) Both EYES at bedtime  mycophenolate mofetil 500 milliGRAM(s) Oral two times a day  NIFEdipine  milliGRAM(s) Oral daily  predniSONE   Tablet 5 milliGRAM(s) Oral daily  sodium bicarbonate 1300 milliGRAM(s) Oral three times a day  sodium zirconium cyclosilicate 10 Gram(s) Oral daily  tacrolimus ER Tablet (ENVARSUS XR) 5 milliGRAM(s) Oral daily    MEDICATIONS  (PRN):  acetaminophen     Tablet .. 650 milliGRAM(s) Oral every 6 hours PRN Temp greater or equal to 38C (100.4F), Mild Pain (1 - 3)  melatonin 3 milliGRAM(s) Oral at bedtime PRN Insomnia      Vital Signs Last 24 Hrs  T(C): 37.4 (04 Jan 2024 12:14), Max: 37.4 (04 Jan 2024 12:14)  T(F): 99.3 (04 Jan 2024 12:14), Max: 99.3 (04 Jan 2024 12:14)  HR: 74 (04 Jan 2024 12:14) (69 - 81)  BP: 124/58 (04 Jan 2024 12:14) (124/58 - 170/72)  BP(mean): 79 (03 Jan 2024 14:03) (79 - 79)  RR: 18 (04 Jan 2024 12:14) (18 - 29)  SpO2: 96% (04 Jan 2024 12:14) (93% - 96%)    Parameters below as of 04 Jan 2024 04:29  Patient On (Oxygen Delivery Method): room air      CAPILLARY BLOOD GLUCOSE        I&O's Summary    03 Jan 2024 07:01  -  04 Jan 2024 07:00  --------------------------------------------------------  IN: 240 mL / OUT: 0 mL / NET: 240 mL    04 Jan 2024 07:01  -  04 Jan 2024 13:35  --------------------------------------------------------  IN: 200 mL / OUT: 0 mL / NET: 200 mL        PHYSICAL EXAM:  GENERAL: NAD, well-developed  HEAD:  Atraumatic, Normocephalic  EYES: EOMI, PERRLA, conjunctiva and sclera clear  NECK: Supple, No JVD  CHEST/LUNG: Clear to auscultation bilaterally; No wheeze  HEART: Regular rate and rhythm; No murmurs, rubs, or gallops  ABDOMEN: Soft, Nontender, Nondistended; Bowel sounds present  EXTREMITIES:  2+ Peripheral Pulses, No clubbing, cyanosis, or edema  PSYCH: AAOx3  NEUROLOGY: non-focal  SKIN: No rashes or lesions    LABS:                        7.0    4.59  )-----------( 189      ( 04 Jan 2024 07:43 )             23.6     01-04    139  |  108  |  54<H>  ----------------------------<  90  4.3   |  15<L>  |  2.95<H>    Ca    9.0      04 Jan 2024 07:43    TPro  5.6<L>  /  Alb  3.2<L>  /  TBili  0.2  /  DBili  x   /  AST  9<L>  /  ALT  <5<L>  /  AlkPhos  76  01-03    PT/INR - ( 03 Jan 2024 11:15 )   PT: 11.7 sec;   INR: 1.07 ratio         PTT - ( 03 Jan 2024 11:15 )  PTT:34.0 sec      Urinalysis Basic - ( 04 Jan 2024 07:43 )    Color: x / Appearance: x / SG: x / pH: x  Gluc: 90 mg/dL / Ketone: x  / Bili: x / Urobili: x   Blood: x / Protein: x / Nitrite: x   Leuk Esterase: x / RBC: x / WBC x   Sq Epi: x / Non Sq Epi: x / Bacteria: x        RADIOLOGY & ADDITIONAL TESTS:    Imaging Personally Reviewed:    Consultant(s) Notes Reviewed:      Care Discussed with Consultants/Other Providers:

## 2024-01-04 NOTE — PROVIDER CONTACT NOTE (CRITICAL VALUE NOTIFICATION) - SITUATION
[place] [facility] [place] Critical lab notification [facility] [place] 7.0

## 2024-01-04 NOTE — PROGRESS NOTE ADULT - SUBJECTIVE AND OBJECTIVE BOX
[facility] [place] NEPHROLOGY PRACTICE   MD SAMMY ORDONEZ MD ANGELA WONG, PA QIAN CHEN, NP    TEL:  OFFICE: [phone]  From 5pm-7am Answering Service [phone]    -- RENAL FOLLOW UP NOTE ---Date of Service 01-04-24 @ 13:42    78f h/o HTN, s/p Renal transplant, CKD, chronic rejection, recent admission for pneumonia s/p abx presented with shortness of breath since this morning. she also noticed significant LUE swelling that began 4-5 days ago.     Patient seen and examined at bedside. No chest pain/sob    VITALS:  T(F): 99.3 (01-04-24 @ 12:14), Max: 99.3 (01-04-24 @ 12:14)  HR: 74 (01-04-24 @ 12:14)  BP: 124/58 (01-04-24 @ 12:14)  RR: 18 (01-04-24 @ 12:14)  SpO2: 96% (01-04-24 @ 12:14)  Wt(kg): --    01-03 @ 07:01  -  01-04 @ 07:00  --------------------------------------------------------  IN: 240 mL / OUT: 0 mL / NET: 240 mL    01-04 @ 07:01 - 01-04 @ 13:42  --------------------------------------------------------  IN: 200 mL / OUT: 0 mL / NET: 200 mL    PHYSICAL EXAM:  General: NAD  Neck: No JVD  Respiratory: CTAB, no wheezes, rales or rhonchi  Cardiovascular: S1, S2, RRR  Gastrointestinal: BS+, soft, NT/ND  Extremities: 2-3+ LUE edema.  1+ b/l LE edema.    Hospital Medications:   MEDICATIONS  (STANDING):  aspirin enteric coated 81 milliGRAM(s) Oral daily  atovaquone  Suspension 1500 milliGRAM(s) Oral daily  cholecalciferol 2000 Unit(s) Oral daily  furosemide Injectable 40 milliGRAM(s) IV Push every 12 hours  heparin  Injectable 5000 Unit(s) SubCutaneous every 8 hours  labetalol 200 milliGRAM(s) Oral three times a day  latanoprost 0.005% Ophthalmic Solution 1 Drop(s) Both EYES at bedtime  mycophenolate mofetil 500 milliGRAM(s) Oral two times a day  NIFEdipine  milliGRAM(s) Oral daily  predniSONE   Tablet 5 milliGRAM(s) Oral daily  sodium bicarbonate 1300 milliGRAM(s) Oral three times a day  sodium zirconium cyclosilicate 10 Gram(s) Oral daily  tacrolimus ER Tablet (ENVARSUS XR) 5 milliGRAM(s) Oral daily    Tacrolimus (), Serum: 7.3 ng/mL (01-04 @ 07:43)    LABS:  01-04    139  |  108  |  54<H>  ----------------------------<  90  4.3   |  15<L>  |  2.95<H>    Ca    9.0      04 Jan 2024 07:43    TPro  5.6<L>  /  Alb  3.2<L>  /  TBili  0.2  /  DBili      /  AST  9<L>  /  ALT  <5<L>  /  AlkPhos  76  01-03    Creatinine Trend: 2.95 <--, 2.75 <--, 2.67 <--    Albumin: 3.2 g/dL (01-03 @ 14:10)                          7.0    4.59  )-----------( 189      ( 04 Jan 2024 07:43 )             23.6     Urine Studies:  Urinalysis - [01-04-24 @ 07:43]      Color  / Appearance  / SG  / pH       Gluc 90 / Ketone   / Bili  / Urobili        Blood  / Protein  / Leuk Est  / Nitrite       RBC  / WBC  / Hyaline  / Gran  / Sq Epi  / Non Sq Epi  / Bacteria       Iron 15, TIBC 140, %sat 11      [11-10-23 @ 06:01]  Ferritin 1817      [11-11-23 @ 06:09]  PTH -- (Ca 8.5)      [11-10-23 @ 06:01]   659  Vitamin D (25OH) 16.5      [11-11-23 @ 06:09]  HbA1c 4.6      [05-28-19 @ 09:49]    HBsAg Nonreact      [01-04-24 @ 07:51]    C3 Complement 120      [01-04-24 @ 07:51]  C4 Complement 34      [01-04-24 @ 07:51]    RADIOLOGY & ADDITIONAL STUDIES:   [facility] [place] NEPHROLOGY PRACTICE   MD SAMMY ORDONEZ MD ANGELA WONG, PA QIAN CHEN, NP    TEL:  OFFICE: [phone]  From 5pm-7am Answering Service [phone]    -- RENAL FOLLOW UP NOTE ---Date of Service 01-04-24 @ 13:42    78f h/o HTN, s/p Renal transplant, CKD, chronic rejection, recent admission for pneumonia s/p abx presented with shortness of breath since this morning. she also noticed significant LUE swelling that began 4-5 days ago.     Patient seen and examined at bedside. No chest pain/sob    VITALS:  T(F): 99.3 (01-04-24 @ 12:14), Max: 99.3 (01-04-24 @ 12:14)  HR: 74 (01-04-24 @ 12:14)  BP: 124/58 (01-04-24 @ 12:14)  RR: 18 (01-04-24 @ 12:14)  SpO2: 96% (01-04-24 @ 12:14)  Wt(kg): --    01-03 @ 07:01  -  01-04 @ 07:00  --------------------------------------------------------  IN: 240 mL / OUT: 0 mL / NET: 240 mL    01-04 @ 07:01 - 01-04 @ 13:42  --------------------------------------------------------  IN: 200 mL / OUT: 0 mL / NET: 200 mL    PHYSICAL EXAM:  General: NAD  Neck: No JVD  Respiratory: CTAB, no wheezes, rales or rhonchi  Cardiovascular: S1, S2, RRR  Gastrointestinal: BS+, soft, NT/ND  Extremities: 2-3+ LUE edema.  1+ b/l LE edema.    Hospital Medications:   MEDICATIONS  (STANDING):  aspirin enteric coated 81 milliGRAM(s) Oral daily  atovaquone  Suspension 1500 milliGRAM(s) Oral daily  cholecalciferol 2000 Unit(s) Oral daily  furosemide Injectable 40 milliGRAM(s) IV Push every 12 hours  heparin  Injectable 5000 Unit(s) SubCutaneous every 8 hours  labetalol 200 milliGRAM(s) Oral three times a day  latanoprost 0.005% Ophthalmic Solution 1 Drop(s) Both EYES at bedtime  mycophenolate mofetil 500 milliGRAM(s) Oral two times a day  NIFEdipine  milliGRAM(s) Oral daily  predniSONE   Tablet 5 milliGRAM(s) Oral daily  sodium bicarbonate 1300 milliGRAM(s) Oral three times a day  sodium zirconium cyclosilicate 10 Gram(s) Oral daily  tacrolimus ER Tablet (ENVARSUS XR) 5 milliGRAM(s) Oral daily    Tacrolimus (), Serum: 7.3 ng/mL (01-04 @ 07:43)    LABS:  01-04    139  |  108  |  54<H>  ----------------------------<  90  4.3   |  15<L>  |  2.95<H>    Ca    9.0      04 Jan 2024 07:43    TPro  5.6<L>  /  Alb  3.2<L>  /  TBili  0.2  /  DBili      /  AST  9<L>  /  ALT  <5<L>  /  AlkPhos  76  01-03    Creatinine Trend: 2.95 <--, 2.75 <--, 2.67 <--    Albumin: 3.2 g/dL (01-03 @ 14:10)                          7.0    4.59  )-----------( 189      ( 04 Jan 2024 07:43 )             23.6     Urine Studies:  Urinalysis - [01-04-24 @ 07:43]      Color  / Appearance  / SG  / pH       Gluc 90 / Ketone   / Bili  / Urobili        Blood  / Protein  / Leuk Est  / Nitrite       RBC  / WBC  / Hyaline  / Gran  / Sq Epi  / Non Sq Epi  / Bacteria       Iron 15, TIBC 140, %sat 11      [11-10-23 @ 06:01]  Ferritin 1817      [11-11-23 @ 06:09]  PTH -- (Ca 8.5)      [11-10-23 @ 06:01]   659  Vitamin D (25OH) 16.5      [11-11-23 @ 06:09]  HbA1c 4.6      [05-28-19 @ 09:49]    HBsAg Nonreact      [01-04-24 @ 07:51]    C3 Complement 120      [01-04-24 @ 07:51]  C4 Complement 34      [01-04-24 @ 07:51]    RADIOLOGY & ADDITIONAL STUDIES:

## 2024-01-05 LAB
ANA PAT FLD IF-IMP: ABNORMAL
ANA PAT FLD IF-IMP: ABNORMAL
ANA TITR SER: ABNORMAL
ANA TITR SER: ABNORMAL
ANION GAP SERPL CALC-SCNC: 16 MMOL/L — SIGNIFICANT CHANGE UP (ref 5–17)
ANION GAP SERPL CALC-SCNC: 16 MMOL/L — SIGNIFICANT CHANGE UP (ref 5–17)
APPEARANCE UR: CLEAR — SIGNIFICANT CHANGE UP
APPEARANCE UR: CLEAR — SIGNIFICANT CHANGE UP
B19V IGG SER-ACNC: POSITIVE
B19V IGG SER-ACNC: POSITIVE
B19V IGG+IGM SER-IMP: SIGNIFICANT CHANGE UP
B19V IGG+IGM SER-IMP: SIGNIFICANT CHANGE UP
B19V IGM FLD-ACNC: NEGATIVE — SIGNIFICANT CHANGE UP
B19V IGM FLD-ACNC: NEGATIVE — SIGNIFICANT CHANGE UP
BACTERIA # UR AUTO: NEGATIVE /HPF — SIGNIFICANT CHANGE UP
BACTERIA # UR AUTO: NEGATIVE /HPF — SIGNIFICANT CHANGE UP
BILIRUB UR-MCNC: NEGATIVE — SIGNIFICANT CHANGE UP
BILIRUB UR-MCNC: NEGATIVE — SIGNIFICANT CHANGE UP
BUN SERPL-MCNC: 54 MG/DL — HIGH (ref 7–23)
BUN SERPL-MCNC: 54 MG/DL — HIGH (ref 7–23)
CALCIUM SERPL-MCNC: 8.8 MG/DL — SIGNIFICANT CHANGE UP (ref 8.4–10.5)
CALCIUM SERPL-MCNC: 8.8 MG/DL — SIGNIFICANT CHANGE UP (ref 8.4–10.5)
CAST: 12 /LPF — HIGH (ref 0–4)
CAST: 12 /LPF — HIGH (ref 0–4)
CHLORIDE SERPL-SCNC: 105 MMOL/L — SIGNIFICANT CHANGE UP (ref 96–108)
CHLORIDE SERPL-SCNC: 105 MMOL/L — SIGNIFICANT CHANGE UP (ref 96–108)
CMV DNA CSF QL NAA+PROBE: SIGNIFICANT CHANGE UP IU/ML
CMV DNA CSF QL NAA+PROBE: SIGNIFICANT CHANGE UP IU/ML
CMV DNA SPEC NAA+PROBE-LOG#: SIGNIFICANT CHANGE UP LOG10IU/ML
CMV DNA SPEC NAA+PROBE-LOG#: SIGNIFICANT CHANGE UP LOG10IU/ML
CO2 SERPL-SCNC: 16 MMOL/L — LOW (ref 22–31)
CO2 SERPL-SCNC: 16 MMOL/L — LOW (ref 22–31)
COARSE GRAN CASTS #/AREA URNS AUTO: PRESENT
COARSE GRAN CASTS #/AREA URNS AUTO: PRESENT
COLOR SPEC: YELLOW — SIGNIFICANT CHANGE UP
COLOR SPEC: YELLOW — SIGNIFICANT CHANGE UP
CREAT SERPL-MCNC: 3.23 MG/DL — HIGH (ref 0.5–1.3)
CREAT SERPL-MCNC: 3.23 MG/DL — HIGH (ref 0.5–1.3)
DIFF PNL FLD: ABNORMAL
DIFF PNL FLD: ABNORMAL
EGFR: 14 ML/MIN/1.73M2 — LOW
EGFR: 14 ML/MIN/1.73M2 — LOW
FERRITIN SERPL-MCNC: 1939 NG/ML — HIGH (ref 13–330)
FERRITIN SERPL-MCNC: 1939 NG/ML — HIGH (ref 13–330)
GLUCOSE SERPL-MCNC: 136 MG/DL — HIGH (ref 70–99)
GLUCOSE SERPL-MCNC: 136 MG/DL — HIGH (ref 70–99)
GLUCOSE UR QL: NEGATIVE MG/DL — SIGNIFICANT CHANGE UP
GLUCOSE UR QL: NEGATIVE MG/DL — SIGNIFICANT CHANGE UP
HCT VFR BLD CALC: 26.9 % — LOW (ref 34.5–45)
HCT VFR BLD CALC: 26.9 % — LOW (ref 34.5–45)
HGB BLD-MCNC: 8.4 G/DL — LOW (ref 11.5–15.5)
HGB BLD-MCNC: 8.4 G/DL — LOW (ref 11.5–15.5)
IRON SATN MFR SERPL: 11 % — LOW (ref 14–50)
IRON SATN MFR SERPL: 11 % — LOW (ref 14–50)
IRON SATN MFR SERPL: 21 UG/DL — LOW (ref 30–160)
IRON SATN MFR SERPL: 21 UG/DL — LOW (ref 30–160)
KETONES UR-MCNC: NEGATIVE MG/DL — SIGNIFICANT CHANGE UP
KETONES UR-MCNC: NEGATIVE MG/DL — SIGNIFICANT CHANGE UP
LEUKOCYTE ESTERASE UR-ACNC: NEGATIVE — SIGNIFICANT CHANGE UP
LEUKOCYTE ESTERASE UR-ACNC: NEGATIVE — SIGNIFICANT CHANGE UP
MCHC RBC-ENTMCNC: 26.4 PG — LOW (ref 27–34)
MCHC RBC-ENTMCNC: 26.4 PG — LOW (ref 27–34)
MCHC RBC-ENTMCNC: 31.2 GM/DL — LOW (ref 32–36)
MCHC RBC-ENTMCNC: 31.2 GM/DL — LOW (ref 32–36)
MCV RBC AUTO: 84.6 FL — SIGNIFICANT CHANGE UP (ref 80–100)
MCV RBC AUTO: 84.6 FL — SIGNIFICANT CHANGE UP (ref 80–100)
NITRITE UR-MCNC: NEGATIVE — SIGNIFICANT CHANGE UP
NITRITE UR-MCNC: NEGATIVE — SIGNIFICANT CHANGE UP
NRBC # BLD: 0 /100 WBCS — SIGNIFICANT CHANGE UP (ref 0–0)
NRBC # BLD: 0 /100 WBCS — SIGNIFICANT CHANGE UP (ref 0–0)
PH UR: 5 — SIGNIFICANT CHANGE UP (ref 5–8)
PH UR: 5 — SIGNIFICANT CHANGE UP (ref 5–8)
PLATELET # BLD AUTO: 197 K/UL — SIGNIFICANT CHANGE UP (ref 150–400)
PLATELET # BLD AUTO: 197 K/UL — SIGNIFICANT CHANGE UP (ref 150–400)
POTASSIUM SERPL-MCNC: 4.4 MMOL/L — SIGNIFICANT CHANGE UP (ref 3.5–5.3)
POTASSIUM SERPL-MCNC: 4.4 MMOL/L — SIGNIFICANT CHANGE UP (ref 3.5–5.3)
POTASSIUM SERPL-SCNC: 4.4 MMOL/L — SIGNIFICANT CHANGE UP (ref 3.5–5.3)
POTASSIUM SERPL-SCNC: 4.4 MMOL/L — SIGNIFICANT CHANGE UP (ref 3.5–5.3)
PROT UR-MCNC: 300 MG/DL
PROT UR-MCNC: 300 MG/DL
RAPID RVP RESULT: SIGNIFICANT CHANGE UP
RAPID RVP RESULT: SIGNIFICANT CHANGE UP
RBC # BLD: 3.18 M/UL — LOW (ref 3.8–5.2)
RBC # BLD: 3.18 M/UL — LOW (ref 3.8–5.2)
RBC # FLD: 16.3 % — HIGH (ref 10.3–14.5)
RBC # FLD: 16.3 % — HIGH (ref 10.3–14.5)
RBC CASTS # UR COMP ASSIST: 2 /HPF — SIGNIFICANT CHANGE UP (ref 0–4)
RBC CASTS # UR COMP ASSIST: 2 /HPF — SIGNIFICANT CHANGE UP (ref 0–4)
REVIEW: SIGNIFICANT CHANGE UP
REVIEW: SIGNIFICANT CHANGE UP
SARS-COV-2 RNA SPEC QL NAA+PROBE: SIGNIFICANT CHANGE UP
SARS-COV-2 RNA SPEC QL NAA+PROBE: SIGNIFICANT CHANGE UP
SODIUM SERPL-SCNC: 137 MMOL/L — SIGNIFICANT CHANGE UP (ref 135–145)
SODIUM SERPL-SCNC: 137 MMOL/L — SIGNIFICANT CHANGE UP (ref 135–145)
SP GR SPEC: 1.01 — SIGNIFICANT CHANGE UP (ref 1–1.03)
SP GR SPEC: 1.01 — SIGNIFICANT CHANGE UP (ref 1–1.03)
SQUAMOUS # UR AUTO: 0 /HPF — SIGNIFICANT CHANGE UP (ref 0–5)
SQUAMOUS # UR AUTO: 0 /HPF — SIGNIFICANT CHANGE UP (ref 0–5)
TACROLIMUS SERPL-MCNC: 7.5 NG/ML — SIGNIFICANT CHANGE UP
TACROLIMUS SERPL-MCNC: 7.5 NG/ML — SIGNIFICANT CHANGE UP
TIBC SERPL-MCNC: 193 UG/DL — LOW (ref 220–430)
TIBC SERPL-MCNC: 193 UG/DL — LOW (ref 220–430)
UIBC SERPL-MCNC: 172 UG/DL — SIGNIFICANT CHANGE UP (ref 110–370)
UIBC SERPL-MCNC: 172 UG/DL — SIGNIFICANT CHANGE UP (ref 110–370)
UROBILINOGEN FLD QL: 0.2 MG/DL — SIGNIFICANT CHANGE UP (ref 0.2–1)
UROBILINOGEN FLD QL: 0.2 MG/DL — SIGNIFICANT CHANGE UP (ref 0.2–1)
WBC # BLD: 6.16 K/UL — SIGNIFICANT CHANGE UP (ref 3.8–10.5)
WBC # BLD: 6.16 K/UL — SIGNIFICANT CHANGE UP (ref 3.8–10.5)
WBC # FLD AUTO: 6.16 K/UL — SIGNIFICANT CHANGE UP (ref 3.8–10.5)
WBC # FLD AUTO: 6.16 K/UL — SIGNIFICANT CHANGE UP (ref 3.8–10.5)
WBC UR QL: 1 /HPF — SIGNIFICANT CHANGE UP (ref 0–5)
WBC UR QL: 1 /HPF — SIGNIFICANT CHANGE UP (ref 0–5)

## 2024-01-05 PROCEDURE — 71045 X-RAY EXAM CHEST 1 VIEW: CPT | Mod: 26

## 2024-01-05 PROCEDURE — 99223 1ST HOSP IP/OBS HIGH 75: CPT

## 2024-01-05 PROCEDURE — 99233 SBSQ HOSP IP/OBS HIGH 50: CPT | Mod: GC

## 2024-01-05 RX ORDER — CEFEPIME 1 G/1
1000 INJECTION, POWDER, FOR SOLUTION INTRAMUSCULAR; INTRAVENOUS EVERY 24 HOURS
Refills: 0 | Status: DISCONTINUED | OUTPATIENT
Start: 2024-01-05 | End: 2024-01-11

## 2024-01-05 RX ORDER — FUROSEMIDE 40 MG
40 TABLET ORAL THREE TIMES A DAY
Refills: 0 | Status: DISCONTINUED | OUTPATIENT
Start: 2024-01-05 | End: 2024-01-08

## 2024-01-05 RX ORDER — NIFEDIPINE 30 MG
60 TABLET, EXTENDED RELEASE 24 HR ORAL
Refills: 0 | Status: DISCONTINUED | OUTPATIENT
Start: 2024-01-05 | End: 2024-01-10

## 2024-01-05 RX ORDER — ERYTHROPOIETIN 10000 [IU]/ML
20000 INJECTION, SOLUTION INTRAVENOUS; SUBCUTANEOUS ONCE
Refills: 0 | Status: DISCONTINUED | OUTPATIENT
Start: 2024-01-05 | End: 2024-01-11

## 2024-01-05 RX ADMIN — Medication 120 MILLIGRAM(S): at 05:28

## 2024-01-05 RX ADMIN — HEPARIN SODIUM 5000 UNIT(S): 5000 INJECTION INTRAVENOUS; SUBCUTANEOUS at 05:29

## 2024-01-05 RX ADMIN — Medication 40 MILLIGRAM(S): at 04:41

## 2024-01-05 RX ADMIN — SODIUM ZIRCONIUM CYCLOSILICATE 10 GRAM(S): 10 POWDER, FOR SUSPENSION ORAL at 13:24

## 2024-01-05 RX ADMIN — HEPARIN SODIUM 5000 UNIT(S): 5000 INJECTION INTRAVENOUS; SUBCUTANEOUS at 13:23

## 2024-01-05 RX ADMIN — ATOVAQUONE 1500 MILLIGRAM(S): 750 SUSPENSION ORAL at 13:20

## 2024-01-05 RX ADMIN — Medication 1300 MILLIGRAM(S): at 13:22

## 2024-01-05 RX ADMIN — Medication 81 MILLIGRAM(S): at 13:21

## 2024-01-05 RX ADMIN — MYCOPHENOLATE MOFETIL 500 MILLIGRAM(S): 250 CAPSULE ORAL at 17:28

## 2024-01-05 RX ADMIN — Medication 1300 MILLIGRAM(S): at 21:45

## 2024-01-05 RX ADMIN — MYCOPHENOLATE MOFETIL 500 MILLIGRAM(S): 250 CAPSULE ORAL at 05:29

## 2024-01-05 RX ADMIN — Medication 5 MILLIGRAM(S): at 05:29

## 2024-01-05 RX ADMIN — Medication 1300 MILLIGRAM(S): at 05:29

## 2024-01-05 RX ADMIN — Medication 200 MILLIGRAM(S): at 13:23

## 2024-01-05 RX ADMIN — LATANOPROST 1 DROP(S): 0.05 SOLUTION/ DROPS OPHTHALMIC; TOPICAL at 21:52

## 2024-01-05 RX ADMIN — Medication 200 MILLIGRAM(S): at 21:44

## 2024-01-05 RX ADMIN — Medication 200 MILLIGRAM(S): at 04:40

## 2024-01-05 RX ADMIN — HEPARIN SODIUM 5000 UNIT(S): 5000 INJECTION INTRAVENOUS; SUBCUTANEOUS at 21:44

## 2024-01-05 RX ADMIN — CEFEPIME 100 MILLIGRAM(S): 1 INJECTION, POWDER, FOR SOLUTION INTRAMUSCULAR; INTRAVENOUS at 21:47

## 2024-01-05 RX ADMIN — TACROLIMUS 5 MILLIGRAM(S): 5 CAPSULE ORAL at 05:28

## 2024-01-05 RX ADMIN — Medication 40 MILLIGRAM(S): at 21:44

## 2024-01-05 RX ADMIN — Medication 2000 UNIT(S): at 13:22

## 2024-01-05 RX ADMIN — Medication 60 MILLIGRAM(S): at 17:29

## 2024-01-05 NOTE — DIETITIAN INITIAL EVALUATION ADULT - NSFNSADHERENCEPTAFT_GEN_A_CORE
-- Hx of renal transplant in 2021, on Tacrolimus Cellcept and Prednisone PTA  -- On Lasix per H&P

## 2024-01-05 NOTE — PROGRESS NOTE ADULT - SUBJECTIVE AND OBJECTIVE BOX
[facility] DIVISION OF KIDNEY DISEASES AND HYPERTENSION   FOLLOW UP NOTE  --------------------------------------------------------------------------------  Chief Complaint: 76 y/o female with PMH ESRD due to HTN since 2016, s/p DDRT (05/19/2021- induction with basiliximab), HTN, Anemia who presented to [facility] due to worsening B/L LE edema, facial edema and LUE edema.    24 hour events/subjective: Pt. was seen and examined today. She is "feeling better". Received 1uPRBC on 1/4/24 and per primary team had a Tmax of 100.6F overnight. Continues to have bilateral LE and LUE edema. Shortness of breath improving.     PAST HISTORY  --------------------------------------------------------------------------------  No significant changes to PMH, PSH, FHx, SHx, unless otherwise noted    ALLERGIES & MEDICATIONS  --------------------------------------------------------------------------------  Allergies  penicillin (Rash)  Mushrooms (Anaphylaxis)    Intolerances    Standing Inpatient Medications  aspirin enteric coated 81 milliGRAM(s) Oral daily  atovaquone  Suspension 1500 milliGRAM(s) Oral daily  cholecalciferol 2000 Unit(s) Oral daily  epoetin ivelisse (EPOGEN) Injectable 72016 Unit(s) SubCutaneous once  furosemide   Injectable 40 milliGRAM(s) IV Push every 12 hours  heparin   Injectable 5000 Unit(s) SubCutaneous every 8 hours  labetalol 200 milliGRAM(s) Oral three times a day  latanoprost 0.005% Ophthalmic Solution 1 Drop(s) Both EYES at bedtime  mycophenolate mofetil 500 milliGRAM(s) Oral two times a day  NIFEdipine XL 60 milliGRAM(s) Oral two times a day  predniSONE   Tablet 5 milliGRAM(s) Oral daily  sodium bicarbonate 1300 milliGRAM(s) Oral three times a day  sodium zirconium cyclosilicate 10 Gram(s) Oral daily  tacrolimus ER Tablet (ENVARSUS XR) 5 milliGRAM(s) Oral daily    PRN Inpatient Medications  acetaminophen     Tablet .. 650 milliGRAM(s) Oral every 6 hours PRN  melatonin 3 milliGRAM(s) Oral at bedtime PRN    REVIEW OF SYSTEMS  --------------------------------------------------------------------------------  All other systems were reviewed and are negative, except as noted.    VITALS/PHYSICAL EXAM  --------------------------------------------------------------------------------  T(C): 38.1 (01-05-24 @ 09:39), Max: 38.1 (01-05-24 @ 09:39)  HR: 74 (01-05-24 @ 08:44) (74 - 80)  BP: 122/64 (01-05-24 @ 08:44) (122/64 - 218/81)  RR: 18 (01-05-24 @ 08:44) (18 - 18)  SpO2: 90% (01-05-24 @ 08:44) (90% - 98%)  Wt(kg): --    01-04-24 @ 07:01  -  01-05-24 @ 07:00  --------------------------------------------------------  IN: 380 mL / OUT: 0 mL / NET: 380 mL    Physical Exam:  	Gen: NAD  	HEENT: Anicteric  	Pulm: CTA B/L  	CV: S1S2+  	Abd: Soft, +BS                Transplant site: non tender, well healed surgical scar.  	Ext: ++ LE edema B/L and LUE Edema   	Neuro: Awake  	Skin: Warm and dry  	Dialysis access: RUE AVF with palpable thrill     LABS/STUDIES  --------------------------------------------------------------------------------              8.4    6.16  >-----------<  197      [01-05-24 @ 10:35]              26.9     139  |  108  |  54  ----------------------------<  90      [01-04-24 @ 07:43]  4.3   |  15  |  2.95        Ca     9.0     [01-04-24 @ 07:43]    TPro  5.6  /  Alb  3.2  /  TBili  0.2  /  DBili  x   /  AST  9   /  ALT  <5  /  AlkPhos  76  [01-03-24 @ 14:10]    PT/INR: PT 11.7 , INR 1.07       [01-03-24 @ 11:15]  PTT: 34.0       [01-03-24 @ 11:15]    Creatinine Trend:  SCr 2.95 [01-04 @ 07:43]  SCr 2.75 [01-03 @ 14:10]  SCr 2.67 [01-03 @ 11:15]    HBsAg Nonreact      [01-04-24 @ 07:51]  HCV 0.27, Nonreact      [01-04-24 @ 07:51]  HIV Nonreact      [01-04-24 @ 10:13]    C3 Complement 120      [01-04-24 @ 07:51]  C4 Complement 34      [01-04-24 @ 07:51]    Tacrolimus (), Serum: 7.3 ng/mL (01-04 @ 07:43) [facility] DIVISION OF KIDNEY DISEASES AND HYPERTENSION   FOLLOW UP NOTE  --------------------------------------------------------------------------------  Chief Complaint: 78 y/o female with PMH ESRD due to HTN since 2016, s/p DDRT (05/19/2021- induction with basiliximab), HTN, Anemia who presented to [facility] due to worsening B/L LE edema, facial edema and LUE edema.    24 hour events/subjective: Pt. was seen and examined today. She is "feeling better". Received 1uPRBC on 1/4/24 and per primary team had a Tmax of 100.6F overnight. Continues to have bilateral LE and LUE edema. Shortness of breath improving.     PAST HISTORY  --------------------------------------------------------------------------------  No significant changes to PMH, PSH, FHx, SHx, unless otherwise noted    ALLERGIES & MEDICATIONS  --------------------------------------------------------------------------------  Allergies  penicillin (Rash)  Mushrooms (Anaphylaxis)    Intolerances    Standing Inpatient Medications  aspirin enteric coated 81 milliGRAM(s) Oral daily  atovaquone  Suspension 1500 milliGRAM(s) Oral daily  cholecalciferol 2000 Unit(s) Oral daily  epoetin ivelisse (EPOGEN) Injectable 18804 Unit(s) SubCutaneous once  furosemide   Injectable 40 milliGRAM(s) IV Push every 12 hours  heparin   Injectable 5000 Unit(s) SubCutaneous every 8 hours  labetalol 200 milliGRAM(s) Oral three times a day  latanoprost 0.005% Ophthalmic Solution 1 Drop(s) Both EYES at bedtime  mycophenolate mofetil 500 milliGRAM(s) Oral two times a day  NIFEdipine XL 60 milliGRAM(s) Oral two times a day  predniSONE   Tablet 5 milliGRAM(s) Oral daily  sodium bicarbonate 1300 milliGRAM(s) Oral three times a day  sodium zirconium cyclosilicate 10 Gram(s) Oral daily  tacrolimus ER Tablet (ENVARSUS XR) 5 milliGRAM(s) Oral daily    PRN Inpatient Medications  acetaminophen     Tablet .. 650 milliGRAM(s) Oral every 6 hours PRN  melatonin 3 milliGRAM(s) Oral at bedtime PRN    REVIEW OF SYSTEMS  --------------------------------------------------------------------------------  All other systems were reviewed and are negative, except as noted.    VITALS/PHYSICAL EXAM  --------------------------------------------------------------------------------  T(C): 38.1 (01-05-24 @ 09:39), Max: 38.1 (01-05-24 @ 09:39)  HR: 74 (01-05-24 @ 08:44) (74 - 80)  BP: 122/64 (01-05-24 @ 08:44) (122/64 - 218/81)  RR: 18 (01-05-24 @ 08:44) (18 - 18)  SpO2: 90% (01-05-24 @ 08:44) (90% - 98%)  Wt(kg): --    01-04-24 @ 07:01  -  01-05-24 @ 07:00  --------------------------------------------------------  IN: 380 mL / OUT: 0 mL / NET: 380 mL    Physical Exam:  	Gen: NAD  	HEENT: Anicteric  	Pulm: CTA B/L  	CV: S1S2+  	Abd: Soft, +BS                Transplant site: non tender, well healed surgical scar.  	Ext: ++ LE edema B/L and LUE Edema   	Neuro: Awake  	Skin: Warm and dry  	Dialysis access: RUE AVF with palpable thrill     LABS/STUDIES  --------------------------------------------------------------------------------              8.4    6.16  >-----------<  197      [01-05-24 @ 10:35]              26.9     139  |  108  |  54  ----------------------------<  90      [01-04-24 @ 07:43]  4.3   |  15  |  2.95        Ca     9.0     [01-04-24 @ 07:43]    TPro  5.6  /  Alb  3.2  /  TBili  0.2  /  DBili  x   /  AST  9   /  ALT  <5  /  AlkPhos  76  [01-03-24 @ 14:10]    PT/INR: PT 11.7 , INR 1.07       [01-03-24 @ 11:15]  PTT: 34.0       [01-03-24 @ 11:15]    Creatinine Trend:  SCr 2.95 [01-04 @ 07:43]  SCr 2.75 [01-03 @ 14:10]  SCr 2.67 [01-03 @ 11:15]    HBsAg Nonreact      [01-04-24 @ 07:51]  HCV 0.27, Nonreact      [01-04-24 @ 07:51]  HIV Nonreact      [01-04-24 @ 10:13]    C3 Complement 120      [01-04-24 @ 07:51]  C4 Complement 34      [01-04-24 @ 07:51]    Tacrolimus (), Serum: 7.3 ng/mL (01-04 @ 07:43)

## 2024-01-05 NOTE — DIETITIAN INITIAL EVALUATION ADULT - NSICDXPASTMEDICALHX_GEN_ALL_CORE_FT
PAST MEDICAL HISTORY:  Anemia of chronic disease     Cataract     DVT (deep venous thrombosis) of Left subclavian vein, 06/12/17    ESRD (end stage renal disease) on dialysis     Glaucoma     History of arteriovenostomy for renal dialysis     [place] (hard of hearing)     HTN (hypertension)     Kidney transplant recipient      PAST MEDICAL HISTORY:  Anemia of chronic disease     Cataract     DVT (deep venous thrombosis) of Left subclavian vein, 06/12/17    ESRD (end stage renal disease) on dialysis     Glaucoma     History of arteriovenostomy for renal dialysis     [place] (hard of hearing)     HTN (hypertension)     Kidney transplant recipient

## 2024-01-05 NOTE — DIETITIAN INITIAL EVALUATION ADULT - OTHER INFO
-- s/p PRBC 1/4, on Epogen for anemia   -- Hx of kidney transplant: Continue on Prednisone, Tacrolimus, Cellcept. Also on NaHCO3 and Lokelma for abnormal renal function.   -- Micronutrient/Other supplementation: Vitamin D3   -- On Lasix

## 2024-01-05 NOTE — PROGRESS NOTE ADULT - ASSESSMENT
78f h/o HTN, s/p Renal transplant, CKD, chronic rejection, recent admission for pneumonia s/p abx presented with shortness of breath since this morning. she also noticed significant LUE swelling that began 4-5 days ago.  Nephrology consulted for CKD s/p renal transplant.    A/P:  S/p DDRT (5/19/2021 - induction w/ Basiliximab)  Baseline S.Cr ~1.7-2.  Renal function worsened during last admission; was d/c'd w/ S.Cr. 3.4 (11/14).  New baseline may be ~2.6-3; admitted w/ S.Cr. 2.6.  Renal function fluctuating, worsened today.  Possibly in setting of worsening anemia vs. proteinuria vs renal vasoconstriction  Repeat TTE 1/4 - Left ventricular wall thickness is moderately increased. Left ventricular systolic function is normal with a calculated ejection fraction of 63 % with moderate L ventricular dysfunction.  On furosemide 40mg IVP BID. - recommend by transplant to increase to TID  UA with proteinuria and hematuria.  Continue immunosuppressants per transplant - mycophenolate 500mg BID, prednisone 5mg qd, and Tacrolimus 5mg qd.  Tacro level pending today   Check Tacro level 30mins prior to next dose; goal 4-6.  Avoid nephrotoxic agents.  Monitor BMP and UO.    HTN:  BP fluctuating  BP worsened over night - nifedipine changed to BID dosing   BP stable currently  Continue current BP meds.  Avoid hypotension.  Monitor BP.    Hyperkalemia:  K stable.  Low K diet.  Monitor K closely.    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  On lasix  C/W NaHCO3 1300mg TID    Anemia:  Likely in setting of CKD vs iron deficiency.  Repeat Iron studies; prev. iron deficient in 11/2023.  s/p 1 unit PRBC 1/4  Hgb stable  Transfuse for Hgb <8.  Hold SHELDON for BP >170/90.    Proteinuria/Hematuria:  etiology? possibly diabetic nephropathy (donor origin)  Follows Dr. Louise 9/20/23:   pending UPr/Cr.  Vasculitis work up per transplant team  Monitor. 78f h/o HTN, s/p Renal transplant, CKD, chronic rejection, recent admission for pneumonia s/p abx presented with shortness of breath since this morning. she also noticed significant LUE swelling that began 4-5 days ago.  Nephrology consulted for CKD s/p renal transplant.    A/P:  S/p DDRT (5/19/2021 - induction w/ Basiliximab)  Baseline S.Cr ~3  Renal function worsened during last admission; was d/c'd w/ S.Cr. 3.4 (11/14).  New baseline may be ~2.6-3; admitted w/ S.Cr. 2.6.  Renal function fluctuating, worsened today.  Possibly in setting of worsening anemia vs. proteinuria vs renal vasoconstriction  Repeat TTE 1/4 - Left ventricular wall thickness is moderately increased. Left ventricular systolic function is normal with a calculated ejection fraction of 63 % with moderate L ventricular dysfunction.  On furosemide 40mg IVP BID. - recommend by transplant to increase to TID  UA with proteinuria and hematuria.  Continue immunosuppressants per transplant - mycophenolate 500mg BID, prednisone 5mg qd, and Tacrolimus 5mg qd.  Tacro level pending today   Check Tacro level 30mins prior to next dose; goal 4-6.  Avoid nephrotoxic agents.  Monitor BMP and UO.    HTN:  BP fluctuating  BP worsened over night - nifedipine changed to BID dosing   BP stable currently  Continue current BP meds.  Avoid hypotension.  Monitor BP.    Hyperkalemia:  K stable.  Low K diet.  Monitor K closely.    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  On lasix  C/W NaHCO3 1300mg TID    Anemia:  Likely in setting of CKD vs iron deficiency.  Repeat Iron studies; prev. iron deficient in 11/2023.  s/p 1 unit PRBC 1/4  Hgb stable  Transfuse for Hgb <8.  Hold SHELDON for BP >170/90.    Proteinuria/Hematuria:  etiology? possibly diabetic nephropathy (donor origin)  Follows Dr. Louise 9/20/23:   pending UPr/Cr.  Vasculitis work up per transplant team  Monitor. 78f h/o HTN, s/p Renal transplant, CKD, chronic rejection, recent admission for pneumonia s/p abx presented with shortness of breath since this morning. she also noticed significant LUE swelling that began 4-5 days ago.  Nephrology consulted for CKD s/p renal transplant.    A/P:  S/p DDRT (5/19/2021 - induction w/ Basiliximab)  Baseline S.Cr ~3  Renal function fluctuating,  Repeat TTE 1/4 - Left ventricular wall thickness is moderately increased. Left ventricular systolic function is normal with a calculated ejection fraction of 63 % with moderate L ventricular dysfunction.  On furosemide 40mg IVP BID. - recommend by transplant to increase to TID  UA with proteinuria and hematuria.  Continue immunosuppressants per transplant - mycophenolate 500mg BID, prednisone 5mg qd, and Tacrolimus 5mg qd.  Tacro level pending today   Check Tacro level 30mins prior to next dose; goal 4-6.  Avoid nephrotoxic agents.  Monitor BMP and UO.    HTN:  BP fluctuating  BP worsened over night - nifedipine changed to BID dosing   BP stable currently  Continue current BP meds.  Avoid hypotension.  Monitor BP.    Hyperkalemia:  K stable.  Low K diet.  Monitor K closely.    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  On lasix  C/W NaHCO3 1300mg TID    Anemia:  Likely in setting of CKD vs iron deficiency.  Repeat Iron studies; prev. iron deficient in 11/2023.  s/p 1 unit PRBC 1/4  Hgb stable  Transfuse for Hgb <8.  Hold SHELDON for BP >170/90.    Proteinuria/Hematuria:  etiology? possibly diabetic nephropathy (donor origin)  Follows Dr. Louise 9/20/23:   pending UPr/Cr.  Vasculitis work up per transplant team  Monitor.

## 2024-01-05 NOTE — DIETITIAN INITIAL EVALUATION ADULT - ADD RECOMMEND
-- Recommend Nephro-Sidney supplement, pending no medical contraindications, for micronutrient support.   -- Monitor PO intake, GI tolerance, skin integrity, labs, weight, and bowel movement regularity.   -- Will continue to honor food and beverage preferences within diet restriction of patient in an effort to maximize level of nutrient intake.  -- Assist with meals PRN and encourage PO intake.  -- Malnutrition placed in chart.

## 2024-01-05 NOTE — DIETITIAN INITIAL EVALUATION ADULT - PERTINENT LABORATORY DATA
01-05    137  |  105  |  54<H>  ----------------------------<  136<H>  4.4   |  16<L>  |  3.23<H>    Ca    8.8      05 Jan 2024 10:35

## 2024-01-05 NOTE — CONSULT NOTE ADULT - ATTENDING COMMENTS
78f h/o HTN, s/p Renal transplant, CKD, chronic rejection, recent admission for pneumonia s/p abx presented with shortness of breath, leg swelling and BNNp 11K, improved with Diuresis but now febrile. Ct chest with bilateral GGo and multifocal consolidations. Also noted new conjunctivitis with itching bilaterally    Agree with possible pneumonia component superimposed with edema  BC, UA, UC, induced sputum cx with bacterial, fungal and PCP PCR/legionella PCr  consideration of bronchoscopy based on clincial progress -  Agree with workup above  conjunctivitis in IS patient to be evaluated, Favor repeating RVP and adenovirus in plasma  will order CMv.HSV PCr in plasma      Thank you for involving us in the care of this patient  Transplant ID will continue to follow  Please call or page with additional questions  Pager; #[phone]  Teams: from 8 am to 5 pm  Shelia Manuel MD 78f h/o HTN, s/p Renal transplant, CKD, chronic rejection, recent admission for pneumonia s/p abx presented with shortness of breath, leg swelling and BNNp 11K, improved with Diuresis but now febrile. Ct chest with bilateral GGo and multifocal consolidations. Also noted new conjunctivitis with itching bilaterally    Agree with possible pneumonia component superimposed with edema  BC, UA, UC, induced sputum cx with bacterial, fungal and PCP PCR/legionella PCr  consideration of bronchoscopy based on clincial progress -  Agree with workup above  conjunctivitis in IS patient to be evaluated, Favor repeating RVP and adenovirus in plasma  will order CMv.HSV PCr in plasma      Thank you for involving us in the care of this patient  Transplant ID will continue to follow  Please call or page with additional questions  Pager; #[phone]  Teams: from 8 am to 5 pm  Shelia Manuel MD

## 2024-01-05 NOTE — CONSULT NOTE ADULT - ASSESSMENT
78f h/o HTN, s/p Renal transplant, CKD, chronic rejection, recent admission for pneumonia presented for SOB and LUE edema. Transplant ID called to evaluate for fever.    Recently hospitalized 11/9-11/14 for SOB, at that time had pulm edema vs. pneumonia, treated with BP control and cefepime -> cefpodoxime.  Fever 100.6 on 1/5, no leukocytosis  UA no pyuria  RVP negative  CXR: Mild to moderate pulmonary edema with increase in right lower lung opacities  CT chest: associated foci of consolidation and groundglass opacity in all 5 lobes, suggestive of infection. However given cardiomegaly, pericardial effusion, bibasilar pleural effusion, and anasarca there is likely superimposed pulmonary edema.    Micro:  BCx: pending   78f h/o HTN, s/p Renal transplant, CKD, chronic rejection, recent admission for pneumonia presented for SOB and LUE edema. Transplant ID called to evaluate for fever.    Recently hospitalized 11/9-11/14 for SOB, at that time had pulm edema vs. pneumonia, treated with BP control and cefepime -> cefpodoxime.  Fever 100.6 on 1/5, no leukocytosis  UA no pyuria  RVP negative  US LUE: no DVT  CXR: Mild to moderate pulmonary edema with increase in right lower lung opacities  CT chest: associated foci of consolidation and groundglass opacity in all 5 lobes, suggestive of infection. However given cardiomegaly, pericardial effusion, bibasilar pleural effusion, and anasarca there is likely superimposed pulmonary edema.    Micro:  BCx: pending  UCx: pending    #Fever  #Pneumonia  #Pulmonary edema  #LUE edema    Suggest:  - Cefepime 1g q24H (renally-dosed, may need to decrease if renal function deteriorates further)  - Blood and urine cultures pending  - Check serum cryptococcal antigen, aspergillus galactomannan, fungitell  - Please induce sputum for bacterial and fungal culture  - Check urine Legionella, urine Pneumococcal Ag  - MRSA PCR    D/w attending.    Matt Caldwell, PGY4  ID Fellow  [employer] Teams Preferred  After 5pm/weekends call [phone] 78f h/o HTN, s/p Renal transplant, CKD, chronic rejection, recent admission for pneumonia presented for SOB and LUE edema. Transplant ID called to evaluate for fever.    Recently hospitalized 11/9-11/14 for SOB, at that time had pulm edema vs. pneumonia, treated with BP control and cefepime -> cefpodoxime.  Fever 100.6 on 1/5, no leukocytosis  UA no pyuria  RVP negative  US LUE: no DVT  CXR: Mild to moderate pulmonary edema with increase in right lower lung opacities  CT chest: associated foci of consolidation and groundglass opacity in all 5 lobes, suggestive of infection. However given cardiomegaly, pericardial effusion, bibasilar pleural effusion, and anasarca there is likely superimposed pulmonary edema.    Micro:  BCx: pending  UCx: pending    #Fever  #Pneumonia  #Pulmonary edema  #LUE edema    Suggest:  - Cefepime 1g q24H (renally-dosed, may need to decrease if renal function deteriorates further)  - Blood and urine cultures pending  - Check serum cryptococcal antigen, aspergillus galactomannan, fungitell  - Please induce sputum for bacterial and fungal culture  - Check urine Legionella, urine Pneumococcal Ag  - MRSA PCR    D/w attending.    Matt Caldwell, PGY4  ID Fellow  [employer] Teams Preferred  After 5pm/weekends call [phone] 78f h/o HTN, s/p Renal transplant, CKD, chronic rejection, recent admission for pneumonia presented for SOB and LUE edema. Transplant ID called to evaluate for fever.    Recently hospitalized 11/9-11/14 for SOB, at that time had pulm edema vs. pneumonia, treated with BP control and cefepime -> cefpodoxime.  Fever 100.6 on 1/5, no leukocytosis  UA no pyuria  RVP negative  US LUE: no DVT  CXR: Mild to moderate pulmonary edema with increase in right lower lung opacities  CT chest: associated foci of consolidation and groundglass opacity in all 5 lobes, suggestive of infection. However given cardiomegaly, pericardial effusion, bibasilar pleural effusion, and anasarca there is likely superimposed pulmonary edema.    Micro:  BCx: pending  UCx: pending    #Fever  #Pneumonia  #Pulmonary edema  #LUE edema    Suggest:  - Cefepime 1g q24H (renally-dosed, may need to decrease if renal function deteriorates further)  - Blood and urine cultures pending  - Check serum cryptococcal antigen, aspergillus galactomannan, fungitell  - Please induce sputum for bacterial and fungal cultures, and PCP PCR  - Check urine Legionella, urine Pneumococcal Ag  - MRSA PCR    D/w attending.    Matt Caldwell, PGY4  ID Fellow  [employer] Preferred  After 5pm/weekends call [phone] 78f h/o HTN, s/p Renal transplant, CKD, chronic rejection, recent admission for pneumonia presented for SOB and LUE edema. Transplant ID called to evaluate for fever.    Recently hospitalized 11/9-11/14 for SOB, at that time had pulm edema vs. pneumonia, treated with BP control and cefepime -> cefpodoxime.  Fever 100.6 on 1/5, no leukocytosis  UA no pyuria  RVP negative  US LUE: no DVT  CXR: Mild to moderate pulmonary edema with increase in right lower lung opacities  CT chest: associated foci of consolidation and groundglass opacity in all 5 lobes, suggestive of infection. However given cardiomegaly, pericardial effusion, bibasilar pleural effusion, and anasarca there is likely superimposed pulmonary edema.    Micro:  BCx: pending  UCx: pending    #Fever  #Pneumonia  #Pulmonary edema  #LUE edema    Suggest:  - Cefepime 1g q24H (renally-dosed, may need to decrease if renal function deteriorates further)  - Blood and urine cultures pending  - Check serum cryptococcal antigen, aspergillus galactomannan, fungitell  - Please induce sputum for bacterial and fungal cultures, and PCP PCR  - Check urine Legionella, urine Pneumococcal Ag  - MRSA PCR    D/w attending.    Matt Caldwell, PGY4  ID Fellow  [employer] Teams Preferred  After 5pm/weekends call [phone] 78f h/o HTN, s/p Renal transplant, CKD, chronic rejection, recent admission for pneumonia presented for SOB and LUE edema. Transplant ID called to evaluate for fever.    Recently hospitalized 11/9-11/14 for SOB, at that time had pulm edema vs. pneumonia, treated with BP control and cefepime -> cefpodoxime.  Fever 100.6 on 1/5, no leukocytosis  UA no pyuria  RVP negative  US LUE: no DVT  CXR: Mild to moderate pulmonary edema with increase in right lower lung opacities  CT chest: associated foci of consolidation and groundglass opacity in all 5 lobes, suggestive of infection. However given cardiomegaly, pericardial effusion, bibasilar pleural effusion, and anasarca there is likely superimposed pulmonary edema.    Micro:  BCx: no growth  UCx: no growth    #Fever  #Pneumonia  #Pulmonary edema  #LUE edema    Suggest:  - Cefepime 1g q24H (renally-dosed, may need to decrease if renal function deteriorates further)  - Check serum cryptococcal antigen, aspergillus galactomannan, fungitell  - Please induce sputum for bacterial and fungal cultures, and PCP PCR  - Check urine Legionella, urine Pneumococcal Ag  - MRSA PCR    D/w attending.    Matt Caldwell, PGY4  ID Fellow  [employer] Teams Preferred  After 5pm/weekends call [phone] 78f h/o HTN, s/p Renal transplant, CKD, chronic rejection, recent admission for pneumonia presented for SOB and LUE edema. Transplant ID called to evaluate for fever.    Recently hospitalized 11/9-11/14 for SOB, at that time had pulm edema vs. pneumonia, treated with BP control and cefepime -> cefpodoxime.  Fever 100.6 on 1/5, no leukocytosis  UA no pyuria  RVP negative  US LUE: no DVT  CXR: Mild to moderate pulmonary edema with increase in right lower lung opacities  CT chest: associated foci of consolidation and groundglass opacity in all 5 lobes, suggestive of infection. However given cardiomegaly, pericardial effusion, bibasilar pleural effusion, and anasarca there is likely superimposed pulmonary edema.    Micro:  BCx: no growth  UCx: no growth    #Fever  #Pneumonia  #Pulmonary edema  #LUE edema    Suggest:  - Cefepime 1g q24H (renally-dosed, may need to decrease if renal function deteriorates further)  - Check serum cryptococcal antigen, aspergillus galactomannan, fungitell  - Please induce sputum for bacterial and fungal cultures, and PCP PCR  - Check urine Legionella, urine Pneumococcal Ag  - MRSA PCR    D/w attending.    Matt Caldwell, PGY4  ID Fellow  [employer] Teams Preferred  After 5pm/weekends call [phone] 78f h/o HTN, s/p Renal transplant, CKD, chronic rejection, recent admission for pneumonia presented for SOB and LUE edema. Transplant ID called to evaluate for fever.    Recently hospitalized 11/9-11/14 for SOB, at that time had pulm edema vs. pneumonia, treated with BP control and cefepime -> cefpodoxime.  Fever 100.6 on 1/5, no leukocytosis  UA no pyuria  RVP negative  US LUE: no DVT  CXR: Mild to moderate pulmonary edema with increase in right lower lung opacities  CT chest: associated foci of consolidation and groundglass opacity in all 5 lobes, suggestive of infection. However given cardiomegaly, pericardial effusion, bibasilar pleural effusion, and anasarca there is likely superimposed pulmonary edema.    Micro:  BCx: no growth  UCx: no growth    #Fever  #Pneumonia  #Conjunctivitis  #Pulmonary edema  #LUE edema    Suggest:  - Cefepime 1g q24H (renally-dosed, may need to decrease if renal function deteriorates further)  - Check serum cryptococcal antigen, aspergillus galactomannan, fungitell, adenovirus PCR  - Please induce sputum for bacterial and fungal cultures, and PCP PCR  - Check urine Legionella, urine Pneumococcal Ag  - MRSA PCR  - Non-urgent ophthalmology evaluation for bilateral conjunctivitis in setting of pneumonia    D/w attending.    Matt Caldwell, PGY4  ID Fellow  [employer] Teams Preferred  After 5pm/weekends call [phone] 78f h/o HTN, s/p Renal transplant, CKD, chronic rejection, recent admission for pneumonia presented for SOB and LUE edema. Transplant ID called to evaluate for fever.    Recently hospitalized 11/9-11/14 for SOB, at that time had pulm edema vs. pneumonia, treated with BP control and cefepime -> cefpodoxime.  Fever 100.6 on 1/5, no leukocytosis  UA no pyuria  RVP negative  US LUE: no DVT  CXR: Mild to moderate pulmonary edema with increase in right lower lung opacities  CT chest: associated foci of consolidation and groundglass opacity in all 5 lobes, suggestive of infection. However given cardiomegaly, pericardial effusion, bibasilar pleural effusion, and anasarca there is likely superimposed pulmonary edema.    Micro:  BCx: no growth  UCx: no growth    #Fever  #Pneumonia  #Conjunctivitis  #Pulmonary edema  #LUE edema    Suggest:  - Cefepime 1g q24H (renally-dosed, may need to decrease if renal function deteriorates further)  - Check serum cryptococcal antigen, aspergillus galactomannan, fungitell, adenovirus PCR  - Please induce sputum for bacterial and fungal cultures, and PCP PCR  - Check urine Legionella, urine Pneumococcal Ag  - MRSA PCR  - Non-urgent ophthalmology evaluation for bilateral conjunctivitis in setting of pneumonia    D/w attending.    Matt Caldwell, PGY4  ID Fellow  [employer] Teams Preferred  After 5pm/weekends call [phone] 78f h/o HTN, s/p Renal transplant, CKD, chronic rejection, recent admission for pneumonia presented for SOB and LUE edema. Transplant ID called to evaluate for fever.    Recently hospitalized 11/9-11/14 for SOB, at that time had pulm edema vs. pneumonia, treated with BP control and cefepime -> cefpodoxime.  Fever 100.6 on 1/5, no leukocytosis  UA no pyuria  RVP negative  US LUE: no DVT  CXR: Mild to moderate pulmonary edema with increase in right lower lung opacities  CT chest: associated foci of consolidation and groundglass opacity in all 5 lobes, suggestive of infection. However given cardiomegaly, pericardial effusion, bibasilar pleural effusion, and anasarca there is likely superimposed pulmonary edema.    Micro:  BCx: no growth  UCx: no growth    #Fever  #Pneumonia  #Conjunctivitis  #Pulmonary edema  #LUE edema    Suggest:  - Cefepime 1g q24H (renally-dosed, may need to decrease if renal function deteriorates further)  - Check serum cryptococcal antigen, aspergillus galactomannan, fungitell, adenovirus PCR, histoplasma antigen in urine   - Please induce sputum for bacterial and fungal cultures, and PCP PCR, legionella PCR  - Check urine Legionella, urine Pneumococcal Ag  - MRSA PCR  - check , HSV and CMV PCR in blood (ordered)  - Non-urgent ophthalmology evaluation for bilateral conjunctivitis  - please repeat RVP tomorrow in context of possible viral conjunctivitis  - consideration of bronchoscopy based on progress    D/w attending.    Matt Caldwell, PGY4  ID Fellow  [employer] Teams Preferred  After 5pm/weekends call [phone] 78f h/o HTN, s/p Renal transplant, CKD, chronic rejection, recent admission for pneumonia presented for SOB and LUE edema. Transplant ID called to evaluate for fever.    Recently hospitalized 11/9-11/14 for SOB, at that time had pulm edema vs. pneumonia, treated with BP control and cefepime -> cefpodoxime.  Fever 100.6 on 1/5, no leukocytosis  UA no pyuria  RVP negative  US LUE: no DVT  CXR: Mild to moderate pulmonary edema with increase in right lower lung opacities  CT chest: associated foci of consolidation and groundglass opacity in all 5 lobes, suggestive of infection. However given cardiomegaly, pericardial effusion, bibasilar pleural effusion, and anasarca there is likely superimposed pulmonary edema.    Micro:  BCx: no growth  UCx: no growth    #Fever  #Pneumonia  #Conjunctivitis  #Pulmonary edema  #LUE edema    Suggest:  - Cefepime 1g q24H (renally-dosed, may need to decrease if renal function deteriorates further)  - Check serum cryptococcal antigen, aspergillus galactomannan, fungitell, adenovirus PCR, histoplasma antigen in urine   - Please induce sputum for bacterial and fungal cultures, and PCP PCR, legionella PCR  - Check urine Legionella, urine Pneumococcal Ag  - MRSA PCR  - check , HSV and CMV PCR in blood (ordered)  - Non-urgent ophthalmology evaluation for bilateral conjunctivitis  - please repeat RVP tomorrow in context of possible viral conjunctivitis  - consideration of bronchoscopy based on progress    D/w attending.    Matt Caldwell, PGY4  ID Fellow  [employer] Preferred  After 5pm/weekends call [phone]

## 2024-01-05 NOTE — DIETITIAN INITIAL EVALUATION ADULT - PHYSCIAL ASSESSMENT
Drug Dosing Weight (kg): 54.4 (03 Jan 2024 09:49)- pt stated wt from admission     Ht per pt: 66"    Daily wt (standing or bed scale): none documented thus far   Wt obtained by RD: 135lb (1/5) - question accuracy of bed scale   UBW: ~120lb per pt. Pt on Lasix PTA might cause wt fluctuation  Wt history from previous RD notes: 56.7kg (11/10/23), 58.1kg (5/21/21), 61.2kg (8/14/20)      ** Pt with edema and on Lasix might cause wt fluctuation. RD will continue to monitor wt trends as available/able.     IBW: 130lb, 92% IBW

## 2024-01-05 NOTE — DIETITIAN INITIAL EVALUATION ADULT - EDUCATION DIETARY MODIFICATIONS
pt declines nutrition education at present due to fatigue. follow-up with nutrition education as able./(0) unable to meet; needs instruction

## 2024-01-05 NOTE — CONSULT NOTE ADULT - SUBJECTIVE AND OBJECTIVE BOX
Patient is a 78y old  Female who presents with a chief complaint of Localized edema     (2024 14:52)    HPI:  78f h/o HTN, s/p Renal transplant, CKD, chronic rejection, recent admission for pneumonia s/p abx presented with shortness of breath since this morning. she also noticed significant LUE swelling that began 4-5 days ago. She denies any trauma to arm. she has chronic b/l LE edema as well which has increased slightly over the past few days. she has a cough at times productive of white sputum but denies any fevers or chills. She states she is compliant with all her medications. she has not noticed any change in urination. no cloudy urine. no abdominal pain.  (2024 17:17)       REVIEW OF SYSTEMS  Constitutional: No fevers, chills, weight loss or fatigue   Skin: No rash, no phlebitis	  Eyes: No discharge	  ENMT: No sore throat, oral thrush, ulcers or exudate  Respiratory: No cough, no SOB  Cardiovascular:  No chest pain, palpitations or edema   Gastrointestinal: No pain, nausea, vomiting, diarrhea or constipation	  Genitourinary: No dysuria, discharge or flank pain  MSK: No arthralgias or back pain   Neurological: No HA, no weakness, no seizures, no AMS       prior hospital charts reviewed [V]  primary team notes reviewed [V]  other consultant notes reviewed [V]    PAST MEDICAL & SURGICAL HISTORY:  HTN (hypertension)      Glaucoma      Cataract      ESRD (end stage renal disease) on dialysis      DVT (deep venous thrombosis)  of Left subclavian vein, 15      Kidney transplant recipient      Anemia of chronic disease      History of arteriovenostomy for renal dialysis      [place] (hard of hearing)      Acquired cataract  extraction with b/l lense placement, 2016      S/P KEVEN-BSO  for fibroids,       Hemodialysis access, AV graft      History of renal transplantation  DDRT 2021      AV fistula          SOCIAL HISTORY:  - Denied smoking/vaping/alcohol/recreational drug use    FAMILY HISTORY:  Family history of cerebrovascular accident (CVA)    Family history of colon cancer (Sibling)        Allergies  penicillin (Rash)  Mushrooms (Anaphylaxis)        ANTIMICROBIALS:  atovaquone  Suspension 1500 daily      ANTIMICROBIALS (past 90 days):  MEDICATIONS  (STANDING):  atovaquone  Suspension   1500 milliGRAM(s) Oral (24 @ 13:20)   1500 milliGRAM(s) Oral (24 @ 12:59)        OTHER MEDS:   MEDICATIONS  (STANDING):  acetaminophen     Tablet .. 650 every 6 hours PRN  aspirin enteric coated 81 daily  epoetin ivelisse (EPOGEN) Injectable 54488 once  furosemide   Injectable 40 three times a day  heparin   Injectable 5000 every 8 hours  labetalol 200 three times a day  melatonin 3 at bedtime PRN  mycophenolate mofetil 500 two times a day  NIFEdipine XL 60 two times a day  predniSONE   Tablet 5 daily  tacrolimus ER Tablet (ENVARSUS XR) 5 daily      VITALS:  Vital Signs Last 24 Hrs  T(F): 100.6 (24 @ 09:39), Max: 100.6 (24 @ 09:39)    Vital Signs Last 24 Hrs  HR: 74 (24 @ 08:44) (74 - 80)  BP: 122/64 (24 @ 08:44) (122/64 - 218/81)  RR: 18 (24 @ 08:44)  SpO2: 90% (24 @ 08:44) (90% - 98%)  Wt(kg): --    EXAM:  General: Patient in no acute distress   HEENT: NCAT, EOMI, PERRL, no oral lesions  CV: S1+S2, no m/r/g appreciated   Lungs: No respiratory distress, CTAB  Abd: Soft, nontender, no guarding, no rebound tenderness, + bowel sounds   Ext: No cyanosis, no edema  Neuro: Alert and oriented, no focal deficits, CN II-XII grossly intact   Skin: No rash   IV: No phlebitis      Labs:                        8.4    6.16  )-----------( 197      ( 2024 10:35 )             26.9         137  |  105  |  54<H>  ----------------------------<  136<H>  4.4   |  16<L>  |  3.23<H>    Ca    8.8      2024 10:35        WBC Trend:  WBC Count: 6.16 (24 @ 10:35)  WBC Count: 5.41 (24 @ 22:28)  WBC Count: 4.59 (24 @ 07:43)  WBC Count: 5.30 (24 @ 11:15)      Auto Neutrophil #: 4.43 K/uL (24 @ 11:15)  Auto Neutrophil #: 1.19 K/uL (23 @ 05:18)  Auto Neutrophil #: 1.17 K/uL (23 @ 06:40)  Band Neutrophils %: 15.1 % (23 @ 06:40)  Auto Neutrophil #: 1.05 K/uL (11-10-23 @ 06:01)      Creatine Trend:  Creatinine: 3.23 ()  Creatinine: 2.95 ()  Creatinine: 2.75 ()  Creatinine: 2.67 ()      Liver Biochemical Testing Trend:  Alanine Aminotransferase (ALT/SGPT): <5 *L* ()  Alanine Aminotransferase (ALT/SGPT): 6 *L* ()  Alanine Aminotransferase (ALT/SGPT): 11 ()  Alanine Aminotransferase (ALT/SGPT): 7 *L* ()  Alanine Aminotransferase (ALT/SGPT): <5 *L* ()  Aspartate Aminotransferase (AST/SGOT): 9 (24 @ 14:10)  Aspartate Aminotransferase (AST/SGOT): 22 (24 @ 11:15)  Aspartate Aminotransferase (AST/SGOT): 15 (23 @ 07:42)  Aspartate Aminotransferase (AST/SGOT): 12 (23 @ 05:19)  Aspartate Aminotransferase (AST/SGOT): 7 (23 @ 06:40)  Bilirubin Total: 0.2 ()  Bilirubin Total: 0.2 ()  Bilirubin Total: 0.1 ()  Bilirubin Total: 0.1 ()  Bilirubin Total: 0.1 ()      Trend LDH  23 @ 06:09  204  22 @ 21:41  206  10-07-21 @ 06:41  270<H>      Auto Eosinophil %: 1.3 % (24 @ 11:15)      Urinalysis Basic - ( 2024 14:09 )    Color: Yellow / Appearance: Clear / S.015 / pH: x  Gluc: x / Ketone: Negative mg/dL  / Bili: Negative / Urobili: 0.2 mg/dL   Blood: x / Protein: 300 mg/dL / Nitrite: Negative   Leuk Esterase: Negative / RBC: 2 /HPF / WBC 1 /HPF   Sq Epi: x / Non Sq Epi: 0 /HPF / Bacteria: Negative /HPF        MICROBIOLOGY:    MRSA PCR Result.: NotDetec (23 @ 06:19)  MRSA PCR Result.: NotDetec (11-10-23 @ 19:55)      Culture - Urine (collected 2024 11:12)  Source: Clean Catch Clean Catch (Midstream)  Final Report:    <10,000 CFU/mL Normal Urogenital Rosalinda    Culture - Blood (collected 2024 10:55)  Source: .Blood Blood-Peripheral  Preliminary Report:    No growth at 24 hours    Culture - Blood (collected 2024 10:45)  Source: .Blood Blood-Peripheral  Preliminary Report:    No growth at 24 hours    Culture - Blood (collected 2023 06:09)  Source: .Blood Blood-Peripheral  Final Report:    No growth at 5 days    Culture - Urine (collected 10 Nov 2023 23:43)  Source: Clean Catch Clean Catch (Midstream)  Final Report:    <10,000 CFU/mL Normal Urogenital Rosalinda    Culture - Urine (collected 2023 14:40)  Source: Clean Catch Clean Catch (Midstream)  Final Report:    <10,000 CFU/mL Normal Urogenital Rosalinda    Culture - Blood (collected 2023 13:25)  Source: .Blood Blood-Peripheral  Final Report:    No growth at 5 days    Culture - Blood (collected 2023 13:20)  Source: .Blood Blood-Peripheral  Final Report:    No growth at 5 days    HIV-1/2 Combo Result: Nonreact (24 @ 10:13)    Cytomegalovirus By PCR: NotDetec IU/mL (24 @ 10:13)    Rapid RVP Result: NotDetec ( @ 07:01)    Procalcitonin, Serum: 0.13 ()  Procalcitonin, Serum: 0.12 ()    Troponin T, High Sensitivity Result: 104 ()    Blood Gas Venous - Lactate: 0.9 ( @ 10:50)      RADIOLOGY:  imaging below personally reviewed    < from: Xray Chest 1 View- PORTABLE-Urgent (Xray Chest 1 View- PORTABLE-Urgent .) (24 @ 11:37) >  IMPRESSION:    Mild to moderate pulmonary edema with increase in right lower lung   opacities    < end of copied text >  < from: US Trans Kidney w/ Doppler, Right (24 @ 20:37) >  IMPRESSION:    Status post renal transplant in the right lower quadrant. The transplant   vasculature is patent, as detailed above. However, there are markedly   elevated resistive indices obtained within the kidney, with little to no   diastolic flow. This has worsened compared to the prior exam. Elevated   resistiveindices are also obtained within the main renal artery. The   main renal vein and external iliac vein are demonstrated to be grossly   patent. There is no hydronephrosis. No perinephric fluid collection.   There is increased cortical echogenicity andmild urothelial thickening.    Differential considerations include rejection and ATN. Clinical and   laboratory correlation is advised.Close short term follow-up to   re-evaluate these findings is advised.    < end of copied text >  < from: CT Chest No Cont (24 @ 20:37) >  FINDINGS:    Study is limited by motion artifact.    AIRWAYS, LUNGS and PLEURA: Patent central airways. Aeration of the lung   parenchyma is limited due to respiratory motion artifact. Within this   tissue, there are diffuse airway associated foci of groundglass opacity   and a small consolidation in all 5 lobes. These foci are more prominent   in the lower lobes. Small bilateral pleural effusions with associated   compressive atelectasis, similar to prior imaging.    MEDIASTINUM AND YUSUF: Evaluation of mediastinal or hilar lymph nodes are   limited due to significant pericardial effusion. There is    HEART AND VESSELS: Cardiomegaly. Small pericardial effusion, similar to   prior imaging. Pulmonary artery pressures approximately 3.6 cm, stable   compared to prior imaging. Thoracic aorta is normal in diameter. Mitral   annular, aortic valve, and aortic calcification.    VISUALIZED UPPER ABDOMEN: Evaluation of the upper abdomen is limited due   to patient's arm positioning and photon starvation. Within this   limitation, kidneys appears atrophic. There is prominence of intrahepatic   biliary ducts.    CHEST WALL AND BONES: Patient is cachectic. There is diffuse mild   anasarca. There is a small focus of sclerosis in T3 vertebral body,   likely a bony island. Bony structures are diffusely sclerotic in keeping   with renal osteodystrophy.    LOWER NECK: There are hypodense nodules with calcification in right   thyroid lobe..    IMPRESSION:    An associated foci of consolidation and groundglass opacity in all 5   lobes, suggestive of infection. However given cardiomegaly, pericardial   effusion, bibasilar pleural effusion, and anasarca there is likely   superimposed pulmonary edema.  Small bilateral pleural effusions.    < end of copied text >   Patient is a 78y old  Female who presents with a chief complaint of Localized edema     (2024 14:52)    HPI:  78f h/o HTN, s/p Renal transplant, CKD, chronic rejection, recent admission for pneumonia s/p abx presented with shortness of breath since this morning. she also noticed significant LUE swelling that began 4-5 days ago. She denies any trauma to arm. she has chronic b/l LE edema as well which has increased slightly over the past few days. she has a cough at times productive of white sputum but denies any fevers or chills. She states she is compliant with all her medications. she has not noticed any change in urination. no cloudy urine. no abdominal pain.  (2024 17:17)       REVIEW OF SYSTEMS  Constitutional: No fevers, chills, weight loss or fatigue   Skin: No rash, no phlebitis	  Eyes: No discharge	  ENMT: No sore throat, oral thrush, ulcers or exudate  Respiratory: No cough, no SOB  Cardiovascular:  No chest pain, palpitations or edema   Gastrointestinal: No pain, nausea, vomiting, diarrhea or constipation	  Genitourinary: No dysuria, discharge or flank pain  MSK: No arthralgias or back pain   Neurological: No HA, no weakness, no seizures, no AMS       prior hospital charts reviewed [V]  primary team notes reviewed [V]  other consultant notes reviewed [V]    PAST MEDICAL & SURGICAL HISTORY:  HTN (hypertension)      Glaucoma      Cataract      ESRD (end stage renal disease) on dialysis      DVT (deep venous thrombosis)  of Left subclavian vein, 15      Kidney transplant recipient      Anemia of chronic disease      History of arteriovenostomy for renal dialysis      [place] (hard of hearing)      Acquired cataract  extraction with b/l lense placement, 2016      S/P KEVEN-BSO  for fibroids,       Hemodialysis access, AV graft      History of renal transplantation  DDRT 2021      AV fistula          SOCIAL HISTORY:  - Denied smoking/vaping/alcohol/recreational drug use    FAMILY HISTORY:  Family history of cerebrovascular accident (CVA)    Family history of colon cancer (Sibling)        Allergies  penicillin (Rash)  Mushrooms (Anaphylaxis)        ANTIMICROBIALS:  atovaquone  Suspension 1500 daily      ANTIMICROBIALS (past 90 days):  MEDICATIONS  (STANDING):  atovaquone  Suspension   1500 milliGRAM(s) Oral (24 @ 13:20)   1500 milliGRAM(s) Oral (24 @ 12:59)        OTHER MEDS:   MEDICATIONS  (STANDING):  acetaminophen     Tablet .. 650 every 6 hours PRN  aspirin enteric coated 81 daily  epoetin ivelisse (EPOGEN) Injectable 89838 once  furosemide   Injectable 40 three times a day  heparin   Injectable 5000 every 8 hours  labetalol 200 three times a day  melatonin 3 at bedtime PRN  mycophenolate mofetil 500 two times a day  NIFEdipine XL 60 two times a day  predniSONE   Tablet 5 daily  tacrolimus ER Tablet (ENVARSUS XR) 5 daily      VITALS:  Vital Signs Last 24 Hrs  T(F): 100.6 (24 @ 09:39), Max: 100.6 (24 @ 09:39)    Vital Signs Last 24 Hrs  HR: 74 (24 @ 08:44) (74 - 80)  BP: 122/64 (24 @ 08:44) (122/64 - 218/81)  RR: 18 (24 @ 08:44)  SpO2: 90% (24 @ 08:44) (90% - 98%)  Wt(kg): --    EXAM:  General: Patient in no acute distress   HEENT: NCAT, EOMI, PERRL, no oral lesions  CV: S1+S2, no m/r/g appreciated   Lungs: No respiratory distress, CTAB  Abd: Soft, nontender, no guarding, no rebound tenderness, + bowel sounds   Ext: No cyanosis, no edema  Neuro: Alert and oriented, no focal deficits, CN II-XII grossly intact   Skin: No rash   IV: No phlebitis      Labs:                        8.4    6.16  )-----------( 197      ( 2024 10:35 )             26.9         137  |  105  |  54<H>  ----------------------------<  136<H>  4.4   |  16<L>  |  3.23<H>    Ca    8.8      2024 10:35        WBC Trend:  WBC Count: 6.16 (24 @ 10:35)  WBC Count: 5.41 (24 @ 22:28)  WBC Count: 4.59 (24 @ 07:43)  WBC Count: 5.30 (24 @ 11:15)      Auto Neutrophil #: 4.43 K/uL (24 @ 11:15)  Auto Neutrophil #: 1.19 K/uL (23 @ 05:18)  Auto Neutrophil #: 1.17 K/uL (23 @ 06:40)  Band Neutrophils %: 15.1 % (23 @ 06:40)  Auto Neutrophil #: 1.05 K/uL (11-10-23 @ 06:01)      Creatine Trend:  Creatinine: 3.23 ()  Creatinine: 2.95 ()  Creatinine: 2.75 ()  Creatinine: 2.67 ()      Liver Biochemical Testing Trend:  Alanine Aminotransferase (ALT/SGPT): <5 *L* ()  Alanine Aminotransferase (ALT/SGPT): 6 *L* ()  Alanine Aminotransferase (ALT/SGPT): 11 ()  Alanine Aminotransferase (ALT/SGPT): 7 *L* ()  Alanine Aminotransferase (ALT/SGPT): <5 *L* ()  Aspartate Aminotransferase (AST/SGOT): 9 (24 @ 14:10)  Aspartate Aminotransferase (AST/SGOT): 22 (24 @ 11:15)  Aspartate Aminotransferase (AST/SGOT): 15 (23 @ 07:42)  Aspartate Aminotransferase (AST/SGOT): 12 (23 @ 05:19)  Aspartate Aminotransferase (AST/SGOT): 7 (23 @ 06:40)  Bilirubin Total: 0.2 ()  Bilirubin Total: 0.2 ()  Bilirubin Total: 0.1 ()  Bilirubin Total: 0.1 ()  Bilirubin Total: 0.1 ()      Trend LDH  23 @ 06:09  204  22 @ 21:41  206  10-07-21 @ 06:41  270<H>      Auto Eosinophil %: 1.3 % (24 @ 11:15)      Urinalysis Basic - ( 2024 14:09 )    Color: Yellow / Appearance: Clear / S.015 / pH: x  Gluc: x / Ketone: Negative mg/dL  / Bili: Negative / Urobili: 0.2 mg/dL   Blood: x / Protein: 300 mg/dL / Nitrite: Negative   Leuk Esterase: Negative / RBC: 2 /HPF / WBC 1 /HPF   Sq Epi: x / Non Sq Epi: 0 /HPF / Bacteria: Negative /HPF        MICROBIOLOGY:    MRSA PCR Result.: NotDetec (23 @ 06:19)  MRSA PCR Result.: NotDetec (11-10-23 @ 19:55)      Culture - Urine (collected 2024 11:12)  Source: Clean Catch Clean Catch (Midstream)  Final Report:    <10,000 CFU/mL Normal Urogenital Rosalinda    Culture - Blood (collected 2024 10:55)  Source: .Blood Blood-Peripheral  Preliminary Report:    No growth at 24 hours    Culture - Blood (collected 2024 10:45)  Source: .Blood Blood-Peripheral  Preliminary Report:    No growth at 24 hours    Culture - Blood (collected 2023 06:09)  Source: .Blood Blood-Peripheral  Final Report:    No growth at 5 days    Culture - Urine (collected 10 Nov 2023 23:43)  Source: Clean Catch Clean Catch (Midstream)  Final Report:    <10,000 CFU/mL Normal Urogenital Rosalinda    Culture - Urine (collected 2023 14:40)  Source: Clean Catch Clean Catch (Midstream)  Final Report:    <10,000 CFU/mL Normal Urogenital Rosalinda    Culture - Blood (collected 2023 13:25)  Source: .Blood Blood-Peripheral  Final Report:    No growth at 5 days    Culture - Blood (collected 2023 13:20)  Source: .Blood Blood-Peripheral  Final Report:    No growth at 5 days    HIV-1/2 Combo Result: Nonreact (24 @ 10:13)    Cytomegalovirus By PCR: NotDetec IU/mL (24 @ 10:13)    Rapid RVP Result: NotDetec ( @ 07:01)    Procalcitonin, Serum: 0.13 ()  Procalcitonin, Serum: 0.12 ()    Troponin T, High Sensitivity Result: 104 ()    Blood Gas Venous - Lactate: 0.9 ( @ 10:50)      RADIOLOGY:  imaging below personally reviewed    < from: Xray Chest 1 View- PORTABLE-Urgent (Xray Chest 1 View- PORTABLE-Urgent .) (24 @ 11:37) >  IMPRESSION:    Mild to moderate pulmonary edema with increase in right lower lung   opacities    < end of copied text >  < from: US Trans Kidney w/ Doppler, Right (24 @ 20:37) >  IMPRESSION:    Status post renal transplant in the right lower quadrant. The transplant   vasculature is patent, as detailed above. However, there are markedly   elevated resistive indices obtained within the kidney, with little to no   diastolic flow. This has worsened compared to the prior exam. Elevated   resistiveindices are also obtained within the main renal artery. The   main renal vein and external iliac vein are demonstrated to be grossly   patent. There is no hydronephrosis. No perinephric fluid collection.   There is increased cortical echogenicity andmild urothelial thickening.    Differential considerations include rejection and ATN. Clinical and   laboratory correlation is advised.Close short term follow-up to   re-evaluate these findings is advised.    < end of copied text >  < from: CT Chest No Cont (24 @ 20:37) >  FINDINGS:    Study is limited by motion artifact.    AIRWAYS, LUNGS and PLEURA: Patent central airways. Aeration of the lung   parenchyma is limited due to respiratory motion artifact. Within this   tissue, there are diffuse airway associated foci of groundglass opacity   and a small consolidation in all 5 lobes. These foci are more prominent   in the lower lobes. Small bilateral pleural effusions with associated   compressive atelectasis, similar to prior imaging.    MEDIASTINUM AND YUSUF: Evaluation of mediastinal or hilar lymph nodes are   limited due to significant pericardial effusion. There is    HEART AND VESSELS: Cardiomegaly. Small pericardial effusion, similar to   prior imaging. Pulmonary artery pressures approximately 3.6 cm, stable   compared to prior imaging. Thoracic aorta is normal in diameter. Mitral   annular, aortic valve, and aortic calcification.    VISUALIZED UPPER ABDOMEN: Evaluation of the upper abdomen is limited due   to patient's arm positioning and photon starvation. Within this   limitation, kidneys appears atrophic. There is prominence of intrahepatic   biliary ducts.    CHEST WALL AND BONES: Patient is cachectic. There is diffuse mild   anasarca. There is a small focus of sclerosis in T3 vertebral body,   likely a bony island. Bony structures are diffusely sclerotic in keeping   with renal osteodystrophy.    LOWER NECK: There are hypodense nodules with calcification in right   thyroid lobe..    IMPRESSION:    An associated foci of consolidation and groundglass opacity in all 5   lobes, suggestive of infection. However given cardiomegaly, pericardial   effusion, bibasilar pleural effusion, and anasarca there is likely   superimposed pulmonary edema.  Small bilateral pleural effusions.    < end of copied text >   Patient is a 78y old  Female who presents with a chief complaint of Localized edema     (2024 14:52)    HPI:  78f h/o HTN, s/p Renal transplant, CKD, chronic rejection, recent admission for pneumonia s/p abx presented with shortness of breath since this morning. she also noticed significant LUE swelling that began 4-5 days ago. She denies any trauma to arm. she has chronic b/l LE edema as well which has increased slightly over the past few days. she has a cough at times productive of white sputum but denies any fevers or chills. She states she is compliant with all her medications. she has not noticed any change in urination. no cloudy urine. no abdominal pain.  (2024 17:17)       REVIEW OF SYSTEMS  Constitutional: No fevers, chills, weight loss or fatigue   Skin: No rash, no phlebitis	  Eyes: No discharge	  ENMT: No sore throat, oral thrush, ulcers or exudate  Respiratory: No cough, no SOB  Cardiovascular:  No chest pain, palpitations or edema   Gastrointestinal: No pain, nausea, vomiting, diarrhea or constipation	  Genitourinary: No dysuria, discharge or flank pain  MSK: No arthralgias or back pain   Neurological: No HA, no weakness, no seizures, no AMS       prior hospital charts reviewed [V]  primary team notes reviewed [V]  other consultant notes reviewed [V]    PAST MEDICAL & SURGICAL HISTORY:  HTN (hypertension)      Glaucoma      Cataract      ESRD (end stage renal disease) on dialysis      DVT (deep venous thrombosis)  of Left subclavian vein, 15      Kidney transplant recipient      Anemia of chronic disease      History of arteriovenostomy for renal dialysis      [place] (hard of hearing)      Acquired cataract  extraction with b/l lense placement, 2016      S/P KEVEN-BSO  for fibroids,       Hemodialysis access, AV graft      History of renal transplantation  DDRT 2021      AV fistula          SOCIAL HISTORY:  - Denied smoking/vaping/alcohol/recreational drug use    FAMILY HISTORY:  Family history of cerebrovascular accident (CVA)    Family history of colon cancer (Sibling)        Allergies  penicillin (Rash)  Mushrooms (Anaphylaxis)        ANTIMICROBIALS:  atovaquone  Suspension 1500 daily      ANTIMICROBIALS (past 90 days):  MEDICATIONS  (STANDING):  atovaquone  Suspension   1500 milliGRAM(s) Oral (24 @ 13:20)   1500 milliGRAM(s) Oral (24 @ 12:59)        OTHER MEDS:   MEDICATIONS  (STANDING):  acetaminophen     Tablet .. 650 every 6 hours PRN  aspirin enteric coated 81 daily  epoetin ivelisse (EPOGEN) Injectable 67201 once  furosemide   Injectable 40 three times a day  heparin   Injectable 5000 every 8 hours  labetalol 200 three times a day  melatonin 3 at bedtime PRN  mycophenolate mofetil 500 two times a day  NIFEdipine XL 60 two times a day  predniSONE   Tablet 5 daily  tacrolimus ER Tablet (ENVARSUS XR) 5 daily      VITALS:  Vital Signs Last 24 Hrs  T(F): 100.6 (24 @ 09:39), Max: 100.6 (24 @ 09:39)    Vital Signs Last 24 Hrs  HR: 74 (24 @ 08:44) (74 - 80)  BP: 122/64 (24 @ 08:44) (122/64 - 218/81)  RR: 18 (24 @ 08:44)  SpO2: 90% (24 @ 08:44) (90% - 98%)  Wt(kg): --    EXAM:  General: Patient in no acute distress   HEENT: no oral lesions  CV: S1+S2, systolic murmur appreciated  Lungs: No respiratory distress, diffusely decreased breath sounds  Abd: Soft, nontender, no guarding  Ext: LUE edema > R, b/l LE edema  Neuro: Alert and oriented  Skin: No rash   IV: No phlebitis      Labs:                        8.4    6.16  )-----------( 197      ( 2024 10:35 )             26.9         137  |  105  |  54<H>  ----------------------------<  136<H>  4.4   |  16<L>  |  3.23<H>    Ca    8.8      2024 10:35        WBC Trend:  WBC Count: 6.16 (24 @ 10:35)  WBC Count: 5.41 (24 @ 22:28)  WBC Count: 4.59 (24 @ 07:43)  WBC Count: 5.30 (24 @ 11:15)      Auto Neutrophil #: 4.43 K/uL (24 @ 11:15)  Auto Neutrophil #: 1.19 K/uL (23 @ 05:18)  Auto Neutrophil #: 1.17 K/uL (23 @ 06:40)  Band Neutrophils %: 15.1 % (23 @ 06:40)  Auto Neutrophil #: 1.05 K/uL (11-10-23 @ 06:01)      Creatine Trend:  Creatinine: 3.23 ()  Creatinine: 2.95 ()  Creatinine: 2.75 ()  Creatinine: 2.67 ()      Liver Biochemical Testing Trend:  Alanine Aminotransferase (ALT/SGPT): <5 *L* ()  Alanine Aminotransferase (ALT/SGPT): 6 *L* ()  Alanine Aminotransferase (ALT/SGPT): 11 ()  Alanine Aminotransferase (ALT/SGPT): 7 *L* ()  Alanine Aminotransferase (ALT/SGPT): <5 *L* ()  Aspartate Aminotransferase (AST/SGOT): 9 (24 @ 14:10)  Aspartate Aminotransferase (AST/SGOT): 22 (24 @ 11:15)  Aspartate Aminotransferase (AST/SGOT): 15 (23 @ 07:42)  Aspartate Aminotransferase (AST/SGOT): 12 (23 @ 05:19)  Aspartate Aminotransferase (AST/SGOT): 7 (23 @ 06:40)  Bilirubin Total: 0.2 ()  Bilirubin Total: 0.2 ()  Bilirubin Total: 0.1 ()  Bilirubin Total: 0.1 ()  Bilirubin Total: 0.1 ()      Trend LDH  23 @ 06:09  204  22 @ 21:41  206  10-07-21 @ 06:41  270<H>      Auto Eosinophil %: 1.3 % (24 @ 11:15)      Urinalysis Basic - ( 2024 14:09 )    Color: Yellow / Appearance: Clear / S.015 / pH: x  Gluc: x / Ketone: Negative mg/dL  / Bili: Negative / Urobili: 0.2 mg/dL   Blood: x / Protein: 300 mg/dL / Nitrite: Negative   Leuk Esterase: Negative / RBC: 2 /HPF / WBC 1 /HPF   Sq Epi: x / Non Sq Epi: 0 /HPF / Bacteria: Negative /HPF        MICROBIOLOGY:    MRSA PCR Result.: NotDetec (23 @ 06:19)  MRSA PCR Result.: NotDetec (11-10-23 @ 19:55)      Culture - Urine (collected 2024 11:12)  Source: Clean Catch Clean Catch (Midstream)  Final Report:    <10,000 CFU/mL Normal Urogenital Rosalinda    Culture - Blood (collected 2024 10:55)  Source: .Blood Blood-Peripheral  Preliminary Report:    No growth at 24 hours    Culture - Blood (collected 2024 10:45)  Source: .Blood Blood-Peripheral  Preliminary Report:    No growth at 24 hours    Culture - Blood (collected 2023 06:09)  Source: .Blood Blood-Peripheral  Final Report:    No growth at 5 days    Culture - Urine (collected 10 Nov 2023 23:43)  Source: Clean Catch Clean Catch (Midstream)  Final Report:    <10,000 CFU/mL Normal Urogenital Rosalinda    Culture - Urine (collected 2023 14:40)  Source: Clean Catch Clean Catch (Midstream)  Final Report:    <10,000 CFU/mL Normal Urogenital Rosalinda    Culture - Blood (collected 2023 13:25)  Source: .Blood Blood-Peripheral  Final Report:    No growth at 5 days    Culture - Blood (collected 2023 13:20)  Source: .Blood Blood-Peripheral  Final Report:    No growth at 5 days    HIV-1/2 Combo Result: Nonreact (24 @ 10:13)    Cytomegalovirus By PCR: NotDetec IU/mL (24 @ 10:13)    Rapid RVP Result: NotDetec ( @ 07:01)    Procalcitonin, Serum: 0.13 ()  Procalcitonin, Serum: 0.12 ()    Troponin T, High Sensitivity Result: 104 ()    Blood Gas Venous - Lactate: 0.9 ( @ 10:50)      RADIOLOGY:  imaging below personally reviewed    < from: Xray Chest 1 View- PORTABLE-Urgent (Xray Chest 1 View- PORTABLE-Urgent .) (24 @ 11:37) >  IMPRESSION:    Mild to moderate pulmonary edema with increase in right lower lung   opacities    < end of copied text >  < from: US Trans Kidney w/ Doppler, Right (24 @ 20:37) >  IMPRESSION:    Status post renal transplant in the right lower quadrant. The transplant   vasculature is patent, as detailed above. However, there are markedly   elevated resistive indices obtained within the kidney, with little to no   diastolic flow. This has worsened compared to the prior exam. Elevated   resistiveindices are also obtained within the main renal artery. The   main renal vein and external iliac vein are demonstrated to be grossly   patent. There is no hydronephrosis. No perinephric fluid collection.   There is increased cortical echogenicity andmild urothelial thickening.    Differential considerations include rejection and ATN. Clinical and   laboratory correlation is advised.Close short term follow-up to   re-evaluate these findings is advised.    < end of copied text >  < from: CT Chest No Cont (24 @ 20:37) >  FINDINGS:    Study is limited by motion artifact.    AIRWAYS, LUNGS and PLEURA: Patent central airways. Aeration of the lung   parenchyma is limited due to respiratory motion artifact. Within this   tissue, there are diffuse airway associated foci of groundglass opacity   and a small consolidation in all 5 lobes. These foci are more prominent   in the lower lobes. Small bilateral pleural effusions with associated   compressive atelectasis, similar to prior imaging.    MEDIASTINUM AND YUSUF: Evaluation of mediastinal or hilar lymph nodes are   limited due to significant pericardial effusion. There is    HEART AND VESSELS: Cardiomegaly. Small pericardial effusion, similar to   prior imaging. Pulmonary artery pressures approximately 3.6 cm, stable   compared to prior imaging. Thoracic aorta is normal in diameter. Mitral   annular, aortic valve, and aortic calcification.    VISUALIZED UPPER ABDOMEN: Evaluation of the upper abdomen is limited due   to patient's arm positioning and photon starvation. Within this   limitation, kidneys appears atrophic. There is prominence of intrahepatic   biliary ducts.    CHEST WALL AND BONES: Patient is cachectic. There is diffuse mild   anasarca. There is a small focus of sclerosis in T3 vertebral body,   likely a bony island. Bony structures are diffusely sclerotic in keeping   with renal osteodystrophy.    LOWER NECK: There are hypodense nodules with calcification in right   thyroid lobe..    IMPRESSION:    An associated foci of consolidation and groundglass opacity in all 5   lobes, suggestive of infection. However given cardiomegaly, pericardial   effusion, bibasilar pleural effusion, and anasarca there is likely   superimposed pulmonary edema.  Small bilateral pleural effusions.    < end of copied text >   Patient is a 78y old  Female who presents with a chief complaint of Localized edema     (2024 14:52)    HPI:  78f h/o HTN, s/p Renal transplant, CKD, chronic rejection, recent admission for pneumonia s/p abx presented with shortness of breath since this morning. she also noticed significant LUE swelling that began 4-5 days ago. She denies any trauma to arm. she has chronic b/l LE edema as well which has increased slightly over the past few days. she has a cough at times productive of white sputum but denies any fevers or chills. She states she is compliant with all her medications. she has not noticed any change in urination. no cloudy urine. no abdominal pain.  (2024 17:17)       REVIEW OF SYSTEMS  Constitutional: No fevers, chills, weight loss or fatigue   Skin: No rash, no phlebitis	  Eyes: No discharge	  ENMT: No sore throat, oral thrush, ulcers or exudate  Respiratory: No cough, no SOB  Cardiovascular:  No chest pain, palpitations or edema   Gastrointestinal: No pain, nausea, vomiting, diarrhea or constipation	  Genitourinary: No dysuria, discharge or flank pain  MSK: No arthralgias or back pain   Neurological: No HA, no weakness, no seizures, no AMS       prior hospital charts reviewed [V]  primary team notes reviewed [V]  other consultant notes reviewed [V]    PAST MEDICAL & SURGICAL HISTORY:  HTN (hypertension)      Glaucoma      Cataract      ESRD (end stage renal disease) on dialysis      DVT (deep venous thrombosis)  of Left subclavian vein, 15      Kidney transplant recipient      Anemia of chronic disease      History of arteriovenostomy for renal dialysis      [place] (hard of hearing)      Acquired cataract  extraction with b/l lense placement, 2016      S/P KEVEN-BSO  for fibroids,       Hemodialysis access, AV graft      History of renal transplantation  DDRT 2021      AV fistula          SOCIAL HISTORY:  - Denied smoking/vaping/alcohol/recreational drug use    FAMILY HISTORY:  Family history of cerebrovascular accident (CVA)    Family history of colon cancer (Sibling)        Allergies  penicillin (Rash)  Mushrooms (Anaphylaxis)        ANTIMICROBIALS:  atovaquone  Suspension 1500 daily      ANTIMICROBIALS (past 90 days):  MEDICATIONS  (STANDING):  atovaquone  Suspension   1500 milliGRAM(s) Oral (24 @ 13:20)   1500 milliGRAM(s) Oral (24 @ 12:59)        OTHER MEDS:   MEDICATIONS  (STANDING):  acetaminophen     Tablet .. 650 every 6 hours PRN  aspirin enteric coated 81 daily  epoetin ivelisse (EPOGEN) Injectable 05017 once  furosemide   Injectable 40 three times a day  heparin   Injectable 5000 every 8 hours  labetalol 200 three times a day  melatonin 3 at bedtime PRN  mycophenolate mofetil 500 two times a day  NIFEdipine XL 60 two times a day  predniSONE   Tablet 5 daily  tacrolimus ER Tablet (ENVARSUS XR) 5 daily      VITALS:  Vital Signs Last 24 Hrs  T(F): 100.6 (24 @ 09:39), Max: 100.6 (24 @ 09:39)    Vital Signs Last 24 Hrs  HR: 74 (24 @ 08:44) (74 - 80)  BP: 122/64 (24 @ 08:44) (122/64 - 218/81)  RR: 18 (24 @ 08:44)  SpO2: 90% (24 @ 08:44) (90% - 98%)  Wt(kg): --    EXAM:  General: Patient in no acute distress   HEENT: no oral lesions  CV: S1+S2, systolic murmur appreciated  Lungs: No respiratory distress, diffusely decreased breath sounds  Abd: Soft, nontender, no guarding  Ext: LUE edema > R, b/l LE edema  Neuro: Alert and oriented  Skin: No rash   IV: No phlebitis      Labs:                        8.4    6.16  )-----------( 197      ( 2024 10:35 )             26.9         137  |  105  |  54<H>  ----------------------------<  136<H>  4.4   |  16<L>  |  3.23<H>    Ca    8.8      2024 10:35        WBC Trend:  WBC Count: 6.16 (24 @ 10:35)  WBC Count: 5.41 (24 @ 22:28)  WBC Count: 4.59 (24 @ 07:43)  WBC Count: 5.30 (24 @ 11:15)      Auto Neutrophil #: 4.43 K/uL (24 @ 11:15)  Auto Neutrophil #: 1.19 K/uL (23 @ 05:18)  Auto Neutrophil #: 1.17 K/uL (23 @ 06:40)  Band Neutrophils %: 15.1 % (23 @ 06:40)  Auto Neutrophil #: 1.05 K/uL (11-10-23 @ 06:01)      Creatine Trend:  Creatinine: 3.23 ()  Creatinine: 2.95 ()  Creatinine: 2.75 ()  Creatinine: 2.67 ()      Liver Biochemical Testing Trend:  Alanine Aminotransferase (ALT/SGPT): <5 *L* ()  Alanine Aminotransferase (ALT/SGPT): 6 *L* ()  Alanine Aminotransferase (ALT/SGPT): 11 ()  Alanine Aminotransferase (ALT/SGPT): 7 *L* ()  Alanine Aminotransferase (ALT/SGPT): <5 *L* ()  Aspartate Aminotransferase (AST/SGOT): 9 (24 @ 14:10)  Aspartate Aminotransferase (AST/SGOT): 22 (24 @ 11:15)  Aspartate Aminotransferase (AST/SGOT): 15 (23 @ 07:42)  Aspartate Aminotransferase (AST/SGOT): 12 (23 @ 05:19)  Aspartate Aminotransferase (AST/SGOT): 7 (23 @ 06:40)  Bilirubin Total: 0.2 ()  Bilirubin Total: 0.2 ()  Bilirubin Total: 0.1 ()  Bilirubin Total: 0.1 ()  Bilirubin Total: 0.1 ()      Trend LDH  23 @ 06:09  204  22 @ 21:41  206  10-07-21 @ 06:41  270<H>      Auto Eosinophil %: 1.3 % (24 @ 11:15)      Urinalysis Basic - ( 2024 14:09 )    Color: Yellow / Appearance: Clear / S.015 / pH: x  Gluc: x / Ketone: Negative mg/dL  / Bili: Negative / Urobili: 0.2 mg/dL   Blood: x / Protein: 300 mg/dL / Nitrite: Negative   Leuk Esterase: Negative / RBC: 2 /HPF / WBC 1 /HPF   Sq Epi: x / Non Sq Epi: 0 /HPF / Bacteria: Negative /HPF        MICROBIOLOGY:    MRSA PCR Result.: NotDetec (23 @ 06:19)  MRSA PCR Result.: NotDetec (11-10-23 @ 19:55)      Culture - Urine (collected 2024 11:12)  Source: Clean Catch Clean Catch (Midstream)  Final Report:    <10,000 CFU/mL Normal Urogenital Rosalinda    Culture - Blood (collected 2024 10:55)  Source: .Blood Blood-Peripheral  Preliminary Report:    No growth at 24 hours    Culture - Blood (collected 2024 10:45)  Source: .Blood Blood-Peripheral  Preliminary Report:    No growth at 24 hours    Culture - Blood (collected 2023 06:09)  Source: .Blood Blood-Peripheral  Final Report:    No growth at 5 days    Culture - Urine (collected 10 Nov 2023 23:43)  Source: Clean Catch Clean Catch (Midstream)  Final Report:    <10,000 CFU/mL Normal Urogenital Rosalinda    Culture - Urine (collected 2023 14:40)  Source: Clean Catch Clean Catch (Midstream)  Final Report:    <10,000 CFU/mL Normal Urogenital Rosalinda    Culture - Blood (collected 2023 13:25)  Source: .Blood Blood-Peripheral  Final Report:    No growth at 5 days    Culture - Blood (collected 2023 13:20)  Source: .Blood Blood-Peripheral  Final Report:    No growth at 5 days    HIV-1/2 Combo Result: Nonreact (24 @ 10:13)    Cytomegalovirus By PCR: NotDetec IU/mL (24 @ 10:13)    Rapid RVP Result: NotDetec ( @ 07:01)    Procalcitonin, Serum: 0.13 ()  Procalcitonin, Serum: 0.12 ()    Troponin T, High Sensitivity Result: 104 ()    Blood Gas Venous - Lactate: 0.9 ( @ 10:50)      RADIOLOGY:  imaging below personally reviewed    < from: Xray Chest 1 View- PORTABLE-Urgent (Xray Chest 1 View- PORTABLE-Urgent .) (24 @ 11:37) >  IMPRESSION:    Mild to moderate pulmonary edema with increase in right lower lung   opacities    < end of copied text >  < from: US Trans Kidney w/ Doppler, Right (24 @ 20:37) >  IMPRESSION:    Status post renal transplant in the right lower quadrant. The transplant   vasculature is patent, as detailed above. However, there are markedly   elevated resistive indices obtained within the kidney, with little to no   diastolic flow. This has worsened compared to the prior exam. Elevated   resistiveindices are also obtained within the main renal artery. The   main renal vein and external iliac vein are demonstrated to be grossly   patent. There is no hydronephrosis. No perinephric fluid collection.   There is increased cortical echogenicity andmild urothelial thickening.    Differential considerations include rejection and ATN. Clinical and   laboratory correlation is advised.Close short term follow-up to   re-evaluate these findings is advised.    < end of copied text >  < from: CT Chest No Cont (24 @ 20:37) >  FINDINGS:    Study is limited by motion artifact.    AIRWAYS, LUNGS and PLEURA: Patent central airways. Aeration of the lung   parenchyma is limited due to respiratory motion artifact. Within this   tissue, there are diffuse airway associated foci of groundglass opacity   and a small consolidation in all 5 lobes. These foci are more prominent   in the lower lobes. Small bilateral pleural effusions with associated   compressive atelectasis, similar to prior imaging.    MEDIASTINUM AND YUSUF: Evaluation of mediastinal or hilar lymph nodes are   limited due to significant pericardial effusion. There is    HEART AND VESSELS: Cardiomegaly. Small pericardial effusion, similar to   prior imaging. Pulmonary artery pressures approximately 3.6 cm, stable   compared to prior imaging. Thoracic aorta is normal in diameter. Mitral   annular, aortic valve, and aortic calcification.    VISUALIZED UPPER ABDOMEN: Evaluation of the upper abdomen is limited due   to patient's arm positioning and photon starvation. Within this   limitation, kidneys appears atrophic. There is prominence of intrahepatic   biliary ducts.    CHEST WALL AND BONES: Patient is cachectic. There is diffuse mild   anasarca. There is a small focus of sclerosis in T3 vertebral body,   likely a bony island. Bony structures are diffusely sclerotic in keeping   with renal osteodystrophy.    LOWER NECK: There are hypodense nodules with calcification in right   thyroid lobe..    IMPRESSION:    An associated foci of consolidation and groundglass opacity in all 5   lobes, suggestive of infection. However given cardiomegaly, pericardial   effusion, bibasilar pleural effusion, and anasarca there is likely   superimposed pulmonary edema.  Small bilateral pleural effusions.    < end of copied text >   Patient is a 78y old  Female who presents with a chief complaint of Localized edema     (2024 14:52)    HPI:  78f h/o HTN, s/p Renal transplant, CKD, chronic rejection, recent admission for pneumonia s/p abx presented with shortness of breath since this morning. she also noticed significant LUE swelling that began 4-5 days ago. She denies any trauma to arm. she has chronic b/l LE edema as well which has increased slightly over the past few days. she has a cough at times productive of white sputum but denies any fevers or chills. She states she is compliant with all her medications. she has not noticed any change in urination. no cloudy urine. no abdominal pain.  (2024 17:17)       REVIEW OF SYSTEMS  Constitutional: No fevers, chills, weight loss or fatigue   Skin: No rash, no phlebitis	  Eyes: No discharge	  ENMT: No sore throat, oral thrush, ulcers or exudate  Respiratory: No cough, no SOB  Cardiovascular:  No chest pain, palpitations or edema   Gastrointestinal: No pain, nausea, vomiting, diarrhea or constipation	  Genitourinary: No dysuria, discharge or flank pain  MSK: No arthralgias or back pain   Neurological: No HA, no weakness, no seizures, no AMS       prior hospital charts reviewed [V]  primary team notes reviewed [V]  other consultant notes reviewed [V]    PAST MEDICAL & SURGICAL HISTORY:  HTN (hypertension)      Glaucoma      Cataract      ESRD (end stage renal disease) on dialysis      DVT (deep venous thrombosis)  of Left subclavian vein, 15      Kidney transplant recipient      Anemia of chronic disease      History of arteriovenostomy for renal dialysis      [place] (hard of hearing)      Acquired cataract  extraction with b/l lense placement, 2016      S/P KEVEN-BSO  for fibroids,       Hemodialysis access, AV graft      History of renal transplantation  DDRT 2021      AV fistula          SOCIAL HISTORY:  - Denied smoking/vaping/alcohol/recreational drug use    FAMILY HISTORY:  Family history of cerebrovascular accident (CVA)    Family history of colon cancer (Sibling)        Allergies  penicillin (Rash)  Mushrooms (Anaphylaxis)        ANTIMICROBIALS:  atovaquone  Suspension 1500 daily      ANTIMICROBIALS (past 90 days):  MEDICATIONS  (STANDING):  atovaquone  Suspension   1500 milliGRAM(s) Oral (24 @ 13:20)   1500 milliGRAM(s) Oral (24 @ 12:59)        OTHER MEDS:   MEDICATIONS  (STANDING):  acetaminophen     Tablet .. 650 every 6 hours PRN  aspirin enteric coated 81 daily  epoetin ivelisse (EPOGEN) Injectable 79793 once  furosemide   Injectable 40 three times a day  heparin   Injectable 5000 every 8 hours  labetalol 200 three times a day  melatonin 3 at bedtime PRN  mycophenolate mofetil 500 two times a day  NIFEdipine XL 60 two times a day  predniSONE   Tablet 5 daily  tacrolimus ER Tablet (ENVARSUS XR) 5 daily      VITALS:  Vital Signs Last 24 Hrs  T(F): 100.6 (24 @ 09:39), Max: 100.6 (24 @ 09:39)    Vital Signs Last 24 Hrs  HR: 74 (24 @ 08:44) (74 - 80)  BP: 122/64 (24 @ 08:44) (122/64 - 218/81)  RR: 18 (24 @ 08:44)  SpO2: 90% (24 @ 08:44) (90% - 98%)  Wt(kg): --    EXAM:  General: Patient in no acute distress   HEENT: bilateral conjunctivitis  CV: S1+S2, systolic murmur appreciated  Lungs: No respiratory distress, diffusely decreased breath sounds  Abd: Soft, nontender, no guarding  Ext: LUE edema > R, b/l LE edema  Neuro: Alert and oriented  Skin: No rash   IV: No phlebitis      Labs:                        8.4    6.16  )-----------( 197      ( 2024 10:35 )             26.9         137  |  105  |  54<H>  ----------------------------<  136<H>  4.4   |  16<L>  |  3.23<H>    Ca    8.8      2024 10:35        WBC Trend:  WBC Count: 6.16 (24 @ 10:35)  WBC Count: 5.41 (24 @ 22:28)  WBC Count: 4.59 (24 @ 07:43)  WBC Count: 5.30 (24 @ 11:15)      Auto Neutrophil #: 4.43 K/uL (24 @ 11:15)  Auto Neutrophil #: 1.19 K/uL (23 @ 05:18)  Auto Neutrophil #: 1.17 K/uL (23 @ 06:40)  Band Neutrophils %: 15.1 % (23 @ 06:40)  Auto Neutrophil #: 1.05 K/uL (11-10-23 @ 06:01)      Creatine Trend:  Creatinine: 3.23 ()  Creatinine: 2.95 ()  Creatinine: 2.75 ()  Creatinine: 2.67 ()      Liver Biochemical Testing Trend:  Alanine Aminotransferase (ALT/SGPT): <5 *L* ()  Alanine Aminotransferase (ALT/SGPT): 6 *L* ()  Alanine Aminotransferase (ALT/SGPT): 11 ()  Alanine Aminotransferase (ALT/SGPT): 7 *L* ()  Alanine Aminotransferase (ALT/SGPT): <5 *L* ()  Aspartate Aminotransferase (AST/SGOT): 9 (24 @ 14:10)  Aspartate Aminotransferase (AST/SGOT): 22 (24 @ 11:15)  Aspartate Aminotransferase (AST/SGOT): 15 (23 @ 07:42)  Aspartate Aminotransferase (AST/SGOT): 12 (23 @ 05:19)  Aspartate Aminotransferase (AST/SGOT): 7 (23 @ 06:40)  Bilirubin Total: 0.2 ()  Bilirubin Total: 0.2 ()  Bilirubin Total: 0.1 ()  Bilirubin Total: 0.1 ()  Bilirubin Total: 0.1 ()      Trend LDH  23 @ 06:09  204  22 @ 21:41  206  10-07-21 @ 06:41  270<H>      Auto Eosinophil %: 1.3 % (24 @ 11:15)      Urinalysis Basic - ( 2024 14:09 )    Color: Yellow / Appearance: Clear / S.015 / pH: x  Gluc: x / Ketone: Negative mg/dL  / Bili: Negative / Urobili: 0.2 mg/dL   Blood: x / Protein: 300 mg/dL / Nitrite: Negative   Leuk Esterase: Negative / RBC: 2 /HPF / WBC 1 /HPF   Sq Epi: x / Non Sq Epi: 0 /HPF / Bacteria: Negative /HPF        MICROBIOLOGY:    MRSA PCR Result.: NotDetec (23 @ 06:19)  MRSA PCR Result.: NotDetec (11-10-23 @ 19:55)      Culture - Urine (collected 2024 11:12)  Source: Clean Catch Clean Catch (Midstream)  Final Report:    <10,000 CFU/mL Normal Urogenital Rosalinda    Culture - Blood (collected 2024 10:55)  Source: .Blood Blood-Peripheral  Preliminary Report:    No growth at 24 hours    Culture - Blood (collected 2024 10:45)  Source: .Blood Blood-Peripheral  Preliminary Report:    No growth at 24 hours    Culture - Blood (collected 2023 06:09)  Source: .Blood Blood-Peripheral  Final Report:    No growth at 5 days    Culture - Urine (collected 10 Nov 2023 23:43)  Source: Clean Catch Clean Catch (Midstream)  Final Report:    <10,000 CFU/mL Normal Urogenital Rosalinda    Culture - Urine (collected 2023 14:40)  Source: Clean Catch Clean Catch (Midstream)  Final Report:    <10,000 CFU/mL Normal Urogenital Rosalinda    Culture - Blood (collected 2023 13:25)  Source: .Blood Blood-Peripheral  Final Report:    No growth at 5 days    Culture - Blood (collected 2023 13:20)  Source: .Blood Blood-Peripheral  Final Report:    No growth at 5 days    HIV-1/2 Combo Result: Nonreact (24 @ 10:13)    Cytomegalovirus By PCR: NotDetec IU/mL (24 @ 10:13)    Rapid RVP Result: NotDetec ( @ 07:01)    Procalcitonin, Serum: 0.13 ()  Procalcitonin, Serum: 0.12 ()    Troponin T, High Sensitivity Result: 104 ()    Blood Gas Venous - Lactate: 0.9 ( @ 10:50)      RADIOLOGY:  imaging below personally reviewed    < from: Xray Chest 1 View- PORTABLE-Urgent (Xray Chest 1 View- PORTABLE-Urgent .) (24 @ 11:37) >  IMPRESSION:    Mild to moderate pulmonary edema with increase in right lower lung   opacities    < end of copied text >  < from: US Trans Kidney w/ Doppler, Right (24 @ 20:37) >  IMPRESSION:    Status post renal transplant in the right lower quadrant. The transplant   vasculature is patent, as detailed above. However, there are markedly   elevated resistive indices obtained within the kidney, with little to no   diastolic flow. This has worsened compared to the prior exam. Elevated   resistiveindices are also obtained within the main renal artery. The   main renal vein and external iliac vein are demonstrated to be grossly   patent. There is no hydronephrosis. No perinephric fluid collection.   There is increased cortical echogenicity andmild urothelial thickening.    Differential considerations include rejection and ATN. Clinical and   laboratory correlation is advised.Close short term follow-up to   re-evaluate these findings is advised.    < end of copied text >  < from: CT Chest No Cont (24 @ 20:37) >  FINDINGS:    Study is limited by motion artifact.    AIRWAYS, LUNGS and PLEURA: Patent central airways. Aeration of the lung   parenchyma is limited due to respiratory motion artifact. Within this   tissue, there are diffuse airway associated foci of groundglass opacity   and a small consolidation in all 5 lobes. These foci are more prominent   in the lower lobes. Small bilateral pleural effusions with associated   compressive atelectasis, similar to prior imaging.    MEDIASTINUM AND YUSUF: Evaluation of mediastinal or hilar lymph nodes are   limited due to significant pericardial effusion. There is    HEART AND VESSELS: Cardiomegaly. Small pericardial effusion, similar to   prior imaging. Pulmonary artery pressures approximately 3.6 cm, stable   compared to prior imaging. Thoracic aorta is normal in diameter. Mitral   annular, aortic valve, and aortic calcification.    VISUALIZED UPPER ABDOMEN: Evaluation of the upper abdomen is limited due   to patient's arm positioning and photon starvation. Within this   limitation, kidneys appears atrophic. There is prominence of intrahepatic   biliary ducts.    CHEST WALL AND BONES: Patient is cachectic. There is diffuse mild   anasarca. There is a small focus of sclerosis in T3 vertebral body,   likely a bony island. Bony structures are diffusely sclerotic in keeping   with renal osteodystrophy.    LOWER NECK: There are hypodense nodules with calcification in right   thyroid lobe..    IMPRESSION:    An associated foci of consolidation and groundglass opacity in all 5   lobes, suggestive of infection. However given cardiomegaly, pericardial   effusion, bibasilar pleural effusion, and anasarca there is likely   superimposed pulmonary edema.  Small bilateral pleural effusions.    < end of copied text >   Patient is a 78y old  Female who presents with a chief complaint of Localized edema     (2024 14:52)    HPI:  78f h/o HTN, s/p Renal transplant, CKD, chronic rejection, recent admission for pneumonia s/p abx presented with shortness of breath since this morning. she also noticed significant LUE swelling that began 4-5 days ago. She denies any trauma to arm. she has chronic b/l LE edema as well which has increased slightly over the past few days. she has a cough at times productive of white sputum but denies any fevers or chills. She states she is compliant with all her medications. she has not noticed any change in urination. no cloudy urine. no abdominal pain.  (2024 17:17)       REVIEW OF SYSTEMS  Constitutional: No fevers, chills, weight loss or fatigue   Skin: No rash, no phlebitis	  Eyes: No discharge	  ENMT: No sore throat, oral thrush, ulcers or exudate  Respiratory: No cough, no SOB  Cardiovascular:  No chest pain, palpitations or edema   Gastrointestinal: No pain, nausea, vomiting, diarrhea or constipation	  Genitourinary: No dysuria, discharge or flank pain  MSK: No arthralgias or back pain   Neurological: No HA, no weakness, no seizures, no AMS       prior hospital charts reviewed [V]  primary team notes reviewed [V]  other consultant notes reviewed [V]    PAST MEDICAL & SURGICAL HISTORY:  HTN (hypertension)      Glaucoma      Cataract      ESRD (end stage renal disease) on dialysis      DVT (deep venous thrombosis)  of Left subclavian vein, 15      Kidney transplant recipient      Anemia of chronic disease      History of arteriovenostomy for renal dialysis      [place] (hard of hearing)      Acquired cataract  extraction with b/l lense placement, 2016      S/P KEVEN-BSO  for fibroids,       Hemodialysis access, AV graft      History of renal transplantation  DDRT 2021      AV fistula          SOCIAL HISTORY:  - Denied smoking/vaping/alcohol/recreational drug use    FAMILY HISTORY:  Family history of cerebrovascular accident (CVA)    Family history of colon cancer (Sibling)        Allergies  penicillin (Rash)  Mushrooms (Anaphylaxis)        ANTIMICROBIALS:  atovaquone  Suspension 1500 daily      ANTIMICROBIALS (past 90 days):  MEDICATIONS  (STANDING):  atovaquone  Suspension   1500 milliGRAM(s) Oral (24 @ 13:20)   1500 milliGRAM(s) Oral (24 @ 12:59)        OTHER MEDS:   MEDICATIONS  (STANDING):  acetaminophen     Tablet .. 650 every 6 hours PRN  aspirin enteric coated 81 daily  epoetin ivelisse (EPOGEN) Injectable 73956 once  furosemide   Injectable 40 three times a day  heparin   Injectable 5000 every 8 hours  labetalol 200 three times a day  melatonin 3 at bedtime PRN  mycophenolate mofetil 500 two times a day  NIFEdipine XL 60 two times a day  predniSONE   Tablet 5 daily  tacrolimus ER Tablet (ENVARSUS XR) 5 daily      VITALS:  Vital Signs Last 24 Hrs  T(F): 100.6 (24 @ 09:39), Max: 100.6 (24 @ 09:39)    Vital Signs Last 24 Hrs  HR: 74 (24 @ 08:44) (74 - 80)  BP: 122/64 (24 @ 08:44) (122/64 - 218/81)  RR: 18 (24 @ 08:44)  SpO2: 90% (24 @ 08:44) (90% - 98%)  Wt(kg): --    EXAM:  General: Patient in no acute distress   HEENT: bilateral conjunctivitis  CV: S1+S2, systolic murmur appreciated  Lungs: No respiratory distress, diffusely decreased breath sounds  Abd: Soft, nontender, no guarding  Ext: LUE edema > R, b/l LE edema  Neuro: Alert and oriented  Skin: No rash   IV: No phlebitis      Labs:                        8.4    6.16  )-----------( 197      ( 2024 10:35 )             26.9         137  |  105  |  54<H>  ----------------------------<  136<H>  4.4   |  16<L>  |  3.23<H>    Ca    8.8      2024 10:35        WBC Trend:  WBC Count: 6.16 (24 @ 10:35)  WBC Count: 5.41 (24 @ 22:28)  WBC Count: 4.59 (24 @ 07:43)  WBC Count: 5.30 (24 @ 11:15)      Auto Neutrophil #: 4.43 K/uL (24 @ 11:15)  Auto Neutrophil #: 1.19 K/uL (23 @ 05:18)  Auto Neutrophil #: 1.17 K/uL (23 @ 06:40)  Band Neutrophils %: 15.1 % (23 @ 06:40)  Auto Neutrophil #: 1.05 K/uL (11-10-23 @ 06:01)      Creatine Trend:  Creatinine: 3.23 ()  Creatinine: 2.95 ()  Creatinine: 2.75 ()  Creatinine: 2.67 ()      Liver Biochemical Testing Trend:  Alanine Aminotransferase (ALT/SGPT): <5 *L* ()  Alanine Aminotransferase (ALT/SGPT): 6 *L* ()  Alanine Aminotransferase (ALT/SGPT): 11 ()  Alanine Aminotransferase (ALT/SGPT): 7 *L* ()  Alanine Aminotransferase (ALT/SGPT): <5 *L* ()  Aspartate Aminotransferase (AST/SGOT): 9 (24 @ 14:10)  Aspartate Aminotransferase (AST/SGOT): 22 (24 @ 11:15)  Aspartate Aminotransferase (AST/SGOT): 15 (23 @ 07:42)  Aspartate Aminotransferase (AST/SGOT): 12 (23 @ 05:19)  Aspartate Aminotransferase (AST/SGOT): 7 (23 @ 06:40)  Bilirubin Total: 0.2 ()  Bilirubin Total: 0.2 ()  Bilirubin Total: 0.1 ()  Bilirubin Total: 0.1 ()  Bilirubin Total: 0.1 ()      Trend LDH  23 @ 06:09  204  22 @ 21:41  206  10-07-21 @ 06:41  270<H>      Auto Eosinophil %: 1.3 % (24 @ 11:15)      Urinalysis Basic - ( 2024 14:09 )    Color: Yellow / Appearance: Clear / S.015 / pH: x  Gluc: x / Ketone: Negative mg/dL  / Bili: Negative / Urobili: 0.2 mg/dL   Blood: x / Protein: 300 mg/dL / Nitrite: Negative   Leuk Esterase: Negative / RBC: 2 /HPF / WBC 1 /HPF   Sq Epi: x / Non Sq Epi: 0 /HPF / Bacteria: Negative /HPF        MICROBIOLOGY:    MRSA PCR Result.: NotDetec (23 @ 06:19)  MRSA PCR Result.: NotDetec (11-10-23 @ 19:55)      Culture - Urine (collected 2024 11:12)  Source: Clean Catch Clean Catch (Midstream)  Final Report:    <10,000 CFU/mL Normal Urogenital Ros[place]    Culture - Blood (collected 2024 10:55)  Source: .Blood Blood-Peripheral  Preliminary Report:    No growth at 24 hours    Culture - Blood (collected 2024 10:45)  Source: .Blood Blood-Peripheral  Preliminary Report:    No growth at 24 hours    Culture - Blood (collected 2023 06:09)  Source: .Blood Blood-Peripheral  Final Report:    No growth at 5 days    Culture - Urine (collected 10 Nov 2023 23:43)  Source: Clean Catch Clean Catch (Midstream)  Final Report:    <10,000 CFU/mL Normal Urogenital Rosalinda    Culture - Urine (collected 2023 14:40)  Source: Clean Catch Clean Catch (Midstream)  Final Report:    <10,000 CFU/mL Normal Urogenital Rosalinda    Culture - Blood (collected 2023 13:25)  Source: .Blood Blood-Peripheral  Final Report:    No growth at 5 days    Culture - Blood (collected 2023 13:20)  Source: .Blood Blood-Peripheral  Final Report:    No growth at 5 days    HIV-1/2 Combo Result: Nonreact (24 @ 10:13)    Cytomegalovirus By PCR: NotDetec IU/mL (24 @ 10:13)    Rapid RVP Result: NotDetec ( @ 07:01)    Procalcitonin, Serum: 0.13 ()  Procalcitonin, Serum: 0.12 ()    Troponin T, High Sensitivity Result: 104 ()    Blood Gas Venous - Lactate: 0.9 ( @ 10:50)      RADIOLOGY:  imaging below personally reviewed    < from: Xray Chest 1 View- PORTABLE-Urgent (Xray Chest 1 View- PORTABLE-Urgent .) (24 @ 11:37) >  IMPRESSION:    Mild to moderate pulmonary edema with increase in right lower lung   opacities    < end of copied text >  < from: US Trans Kidney w/ Doppler, Right (24 @ 20:37) >  IMPRESSION:    Status post renal transplant in the right lower quadrant. The transplant   vasculature is patent, as detailed above. However, there are markedly   elevated resistive indices obtained within the kidney, with little to no   diastolic flow. This has worsened compared to the prior exam. Elevated   resistiveindices are also obtained within the main renal artery. The   main renal vein and external iliac vein are demonstrated to be grossly   patent. There is no hydronephrosis. No perinephric fluid collection.   There is increased cortical echogenicity andmild urothelial thickening.    Differential considerations include rejection and ATN. Clinical and   laboratory correlation is advised.Close short term follow-up to   re-evaluate these findings is advised.    < end of copied text >  < from: CT Chest No Cont (24 @ 20:37) >  FINDINGS:    Study is limited by motion artifact.    AIRWAYS, LUNGS and PLEURA: Patent central airways. Aeration of the lung   parenchyma is limited due to respiratory motion artifact. Within this   tissue, there are diffuse airway associated foci of groundglass opacity   and a small consolidation in all 5 lobes. These foci are more prominent   in the lower lobes. Small bilateral pleural effusions with associated   compressive atelectasis, similar to prior imaging.    MEDIASTINUM AND YUSUF: Evaluation of mediastinal or hilar lymph nodes are   limited due to significant pericardial effusion. There is    HEART AND VESSELS: Cardiomegaly. Small pericardial effusion, similar to   prior imaging. Pulmonary artery pressures approximately 3.6 cm, stable   compared to prior imaging. Thoracic aorta is normal in diameter. Mitral   annular, aortic valve, and aortic calcification.    VISUALIZED UPPER ABDOMEN: Evaluation of the upper abdomen is limited due   to patient's arm positioning and photon starvation. Within this   limitation, kidneys appears atrophic. There is prominence of intrahepatic   biliary ducts.    CHEST WALL AND BONES: Patient is cachectic. There is diffuse mild   anasarca. There is a small focus of sclerosis in T3 vertebral body,   likely a bony island. Bony structures are diffusely sclerotic in keeping   with renal osteodystrophy.    LOWER NECK: There are hypodense nodules with calcification in right   thyroid lobe..    IMPRESSION:    An associated foci of consolidation and groundglass opacity in all 5   lobes, suggestive of infection. However given cardiomegaly, pericardial   effusion, bibasilar pleural effusion, and anasarca there is likely   superimposed pulmonary edema.  Small bilateral pleural effusions.    < end of copied text >

## 2024-01-05 NOTE — CONSULT NOTE ADULT - SUBJECTIVE AND OBJECTIVE BOX
DATE OF SERVICE: 01-05-24 @ 15:36    CHIEF COMPLAINT:Patient is a 78y old  Female who presents with a chief complaint of Localized edema     (05 Jan 2024 14:52)      HISTORY OF PRESENT ILLNESS:HPI:  78f h/o HTN, s/p Renal transplant, CKD, chronic rejection, recent admission for pneumonia s/p abx presented with shortness of breath since this morning. she also noticed significant LUE swelling that began 4-5 days ago. She denies any trauma to arm. she has chronic b/l LE edema as well which has increased slightly over the past few days. she has a cough at times productive of white sputum but denies any fevers or chills. She states she is compliant with all her medications. she has not noticed any change in urination. no cloudy urine. no abdominal pain.  (03 Jan 2024 17:17)      PAST MEDICAL & SURGICAL HISTORY:  HTN (hypertension)      Glaucoma      Cataract      ESRD (end stage renal disease) on dialysis      DVT (deep venous thrombosis)  of Left subclavian vein, 06/12/17      Kidney transplant recipient      Anemia of chronic disease      History of arteriovenostomy for renal dialysis      [place] (hard of hearing)      Acquired cataract  extraction with b/l lense placement, 2016      S/P KEVEN-BSO  for fibroids, 2012      Hemodialysis access, AV graft      History of renal transplantation  DDRT 5/19/2021      AV fistula              MEDICATIONS:  aspirin enteric coated 81 milliGRAM(s) Oral daily  furosemide   Injectable 40 milliGRAM(s) IV Push three times a day  heparin   Injectable 5000 Unit(s) SubCutaneous every 8 hours  labetalol 200 milliGRAM(s) Oral three times a day  NIFEdipine XL 60 milliGRAM(s) Oral two times a day    atovaquone  Suspension 1500 milliGRAM(s) Oral daily      acetaminophen     Tablet .. 650 milliGRAM(s) Oral every 6 hours PRN  melatonin 3 milliGRAM(s) Oral at bedtime PRN      predniSONE   Tablet 5 milliGRAM(s) Oral daily    cholecalciferol 2000 Unit(s) Oral daily  epoetin ivelisse (EPOGEN) Injectable 78678 Unit(s) SubCutaneous once  latanoprost 0.005% Ophthalmic Solution 1 Drop(s) Both EYES at bedtime  mycophenolate mofetil 500 milliGRAM(s) Oral two times a day  sodium bicarbonate 1300 milliGRAM(s) Oral three times a day  tacrolimus ER Tablet (ENVARSUS XR) 5 milliGRAM(s) Oral daily      FAMILY HISTORY:  Family history of cerebrovascular accident (CVA)    Family history of colon cancer (Sibling)        Non-contributory    SOCIAL HISTORY:    Denies all     Allergies    penicillin (Rash)  Mushrooms (Anaphylaxis)    Intolerances    	    REVIEW OF SYSTEMS:  CONSTITUTIONAL: No fever  EYES: No eye pain, visual disturbances, or discharge  ENMT:  No difficulty hearing, tinnitus  NECK: No pain or stiffness  RESPIRATORY: No cough, wheezing,  CARDIOVASCULAR: No chest pain, palpitations, passing out, dizziness, or leg swelling  GASTROINTESTINAL:  No nausea, vomiting, diarrhea or constipation. No melena.  GENITOURINARY: No dysuria, hematuria  NEUROLOGICAL: No stroke like symptoms  SKIN: No burning or lesions   ENDOCRINE: No heat or cold intolerance  MUSCULOSKELETAL: No joint pain or swelling  PSYCHIATRIC: No  anxiety, mood swings  HEME/LYMPH: No bleeding gums  ALLERGY AND IMMUNOLOGIC: No hives or eczema	    All other ROS negative    PHYSICAL EXAM:  T(C): 38.1 (01-05-24 @ 09:39), Max: 38.1 (01-05-24 @ 09:39)  HR: 74 (01-05-24 @ 08:44) (74 - 80)  BP: 122/64 (01-05-24 @ 08:44) (122/64 - 218/81)  RR: 18 (01-05-24 @ 08:44) (18 - 18)  SpO2: 90% (01-05-24 @ 08:44) (90% - 98%)  Wt(kg): --  I&O's Summary    04 Jan 2024 07:01  -  05 Jan 2024 07:00  --------------------------------------------------------  IN: 380 mL / OUT: 0 mL / NET: 380 mL        Appearance: Normal	  HEENT:   Normal oral mucosa, EOMI	  Cardiovascular:  S1 S2, No JVD,    Respiratory: Lungs clear to auscultation	  Psychiatry: Alert  Gastrointestinal:  Soft, Non-tender, + BS	  Skin: No rashes   Neurologic: Non-focal  Extremities:  No edema  Vascular: Peripheral pulses palpable    	    	  	  CARDIAC MARKERS:  Labs personally reviewed by me                                  8.4    6.16  )-----------( 197      ( 05 Jan 2024 10:35 )             26.9     01-05    137  |  105  |  54<H>  ----------------------------<  136<H>  4.4   |  16<L>  |  3.23<H>    Ca    8.8      05 Jan 2024 10:35    TTE 1/4/24  1. Left ventricular systolic function is normal with an ejection fraction of 63 % by Fontenot's method of disks. There are no regional wall motion abnormalities seen.   2. There is moderate (grade 2) left ventricular diastolic dysfunction, with elevated filling pressure.   3. Severe left ventricular hypertrophy.   4. Normal right ventricular cavity size, increasedwall thickness, and systolic function.   5. The left atrium is severely dilated.   6. Echocardiographic evidence of pulmonary hyptertension.   7. Small pericardial effusion noted adjacent to the posterior left ventricle, small pericardial effusion noted adjacent to the lateral left ventricle and moderate pericardial effusion noted adjacent to the right atrium with no evidence of hemodynamic compromise.   8. Compared to the transthoracic echocardiogram performed on 11/10/2023 LVOT obstruction is markedly approved.      EKG: Personally reviewed by me - NORMAL SINUS RHYTHM  MINIMAL VOLTAGE CRITERIA FOR LVH, MAY BE NORMAL VARIANT ( Rahul product )  BORDERLINE ECG  WHEN COMPARED WITH ECG OF 12-OCT-2022 20:07,  NO SIGNIFICANT CHANGE WAS FOUND    Radiology: Personally reviewed by me - Mild to moderate pulmonary edema with increase in right lower lung   opacities.        Assessment /Plan:   78f h/o HTN, s/p Renal transplant, CKD, chronic rejection, recent admission for pneumonia s/p abx presented with shortness of breath since this morning. she also noticed significant LUE swelling that began 4-5 days ago    1. Diastolic Heart Failure secondary to severe kidney disease   - Recent TTE with normal EF  - c/w GDMT as above     2. HTN   controlled   c/w amlodipine and BB    3. Pulmonary Edema   chest Xray as above shows mild - mod pulm edema with increase in right lower lung   -c/w abx therapy     4. ESRD  - renal on board     5. Prophylactic measure.   dvt proph - hsq.        Differential diagnosis and plan of care discussed with patient after the evaluation. Counseling on diet, nutritional counseling, weight management, exercise and medication compliance was done.   Advanced care planning/advanced directives discussed with patient/family. DNR status including forceful chest compressions to attempt to restart the heart, ventilator support/artificial breathing, electric shock, artificial nutrition, health care proxy, Molst form all discussed with pt. Pt wishes to consider. More than fifteen minutes spent on discussing advanced directives.     AG Krishnan DO [facility]  Cardiovascular Medicine  [address] [place] [address] Dr, [address]  Available for call or text via [employer] TE[employer]  Office [phone]   DATE OF SERVICE: 01-05-24 @ 15:36    CHIEF COMPLAINT:Patient is a 78y old  Female who presents with a chief complaint of Localized edema     (05 Jan 2024 14:52)      HISTORY OF PRESENT ILLNESS:HPI:  78f h/o HTN, s/p Renal transplant, CKD, chronic rejection, recent admission for pneumonia s/p abx presented with shortness of breath since this morning. she also noticed significant LUE swelling that began 4-5 days ago. She denies any trauma to arm. she has chronic b/l LE edema as well which has increased slightly over the past few days. she has a cough at times productive of white sputum but denies any fevers or chills. She states she is compliant with all her medications. she has not noticed any change in urination. no cloudy urine. no abdominal pain.  (03 Jan 2024 17:17)      PAST MEDICAL & SURGICAL HISTORY:  HTN (hypertension)      Glaucoma      Cataract      ESRD (end stage renal disease) on dialysis      DVT (deep venous thrombosis)  of Left subclavian vein, 06/12/17      Kidney transplant recipient      Anemia of chronic disease      History of arteriovenostomy for renal dialysis      [place] (hard of hearing)      Acquired cataract  extraction with b/l lense placement, 2016      S/P KEVEN-BSO  for fibroids, 2012      Hemodialysis access, AV graft      History of renal transplantation  DDRT 5/19/2021      AV fistula              MEDICATIONS:  aspirin enteric coated 81 milliGRAM(s) Oral daily  furosemide   Injectable 40 milliGRAM(s) IV Push three times a day  heparin   Injectable 5000 Unit(s) SubCutaneous every 8 hours  labetalol 200 milliGRAM(s) Oral three times a day  NIFEdipine XL 60 milliGRAM(s) Oral two times a day    atovaquone  Suspension 1500 milliGRAM(s) Oral daily      acetaminophen     Tablet .. 650 milliGRAM(s) Oral every 6 hours PRN  melatonin 3 milliGRAM(s) Oral at bedtime PRN      predniSONE   Tablet 5 milliGRAM(s) Oral daily    cholecalciferol 2000 Unit(s) Oral daily  epoetin ivelisse (EPOGEN) Injectable 66047 Unit(s) SubCutaneous once  latanoprost 0.005% Ophthalmic Solution 1 Drop(s) Both EYES at bedtime  mycophenolate mofetil 500 milliGRAM(s) Oral two times a day  sodium bicarbonate 1300 milliGRAM(s) Oral three times a day  tacrolimus ER Tablet (ENVARSUS XR) 5 milliGRAM(s) Oral daily      FAMILY HISTORY:  Family history of cerebrovascular accident (CVA)    Family history of colon cancer (Sibling)        Non-contributory    SOCIAL HISTORY:    Denies all     Allergies    penicillin (Rash)  Mushrooms (Anaphylaxis)    Intolerances    	    REVIEW OF SYSTEMS:  CONSTITUTIONAL: No fever  EYES: No eye pain, visual disturbances, or discharge  ENMT:  No difficulty hearing, tinnitus  NECK: No pain or stiffness  RESPIRATORY: No cough, wheezing,  CARDIOVASCULAR: No chest pain, palpitations, passing out, dizziness, or leg swelling  GASTROINTESTINAL:  No nausea, vomiting, diarrhea or constipation. No melena.  GENITOURINARY: No dysuria, hematuria  NEUROLOGICAL: No stroke like symptoms  SKIN: No burning or lesions   ENDOCRINE: No heat or cold intolerance  MUSCULOSKELETAL: No joint pain or swelling  PSYCHIATRIC: No  anxiety, mood swings  HEME/LYMPH: No bleeding gums  ALLERGY AND IMMUNOLOGIC: No hives or eczema	    All other ROS negative    PHYSICAL EXAM:  T(C): 38.1 (01-05-24 @ 09:39), Max: 38.1 (01-05-24 @ 09:39)  HR: 74 (01-05-24 @ 08:44) (74 - 80)  BP: 122/64 (01-05-24 @ 08:44) (122/64 - 218/81)  RR: 18 (01-05-24 @ 08:44) (18 - 18)  SpO2: 90% (01-05-24 @ 08:44) (90% - 98%)  Wt(kg): --  I&O's Summary    04 Jan 2024 07:01  -  05 Jan 2024 07:00  --------------------------------------------------------  IN: 380 mL / OUT: 0 mL / NET: 380 mL        Appearance: Normal	  HEENT:   Normal oral mucosa, EOMI	  Cardiovascular:  S1 S2, No JVD,    Respiratory: Lungs clear to auscultation	  Psychiatry: Alert  Gastrointestinal:  Soft, Non-tender, + BS	  Skin: No rashes   Neurologic: Non-focal  Extremities:  No edema  Vascular: Peripheral pulses palpable    	    	  	  CARDIAC MARKERS:  Labs personally reviewed by me                                  8.4    6.16  )-----------( 197      ( 05 Jan 2024 10:35 )             26.9     01-05    137  |  105  |  54<H>  ----------------------------<  136<H>  4.4   |  16<L>  |  3.23<H>    Ca    8.8      05 Jan 2024 10:35    TTE 1/4/24  1. Left ventricular systolic function is normal with an ejection fraction of 63 % by Fontenot's method of disks. There are no regional wall motion abnormalities seen.   2. There is moderate (grade 2) left ventricular diastolic dysfunction, with elevated filling pressure.   3. Severe left ventricular hypertrophy.   4. Normal right ventricular cavity size, increasedwall thickness, and systolic function.   5. The left atrium is severely dilated.   6. Echocardiographic evidence of pulmonary hyptertension.   7. Small pericardial effusion noted adjacent to the posterior left ventricle, small pericardial effusion noted adjacent to the lateral left ventricle and moderate pericardial effusion noted adjacent to the right atrium with no evidence of hemodynamic compromise.   8. Compared to the transthoracic echocardiogram performed on 11/10/2023 LVOT obstruction is markedly approved.      EKG: Personally reviewed by me - NORMAL SINUS RHYTHM  MINIMAL VOLTAGE CRITERIA FOR LVH, MAY BE NORMAL VARIANT ( Rahul product )  BORDERLINE ECG  WHEN COMPARED WITH ECG OF 12-OCT-2022 20:07,  NO SIGNIFICANT CHANGE WAS FOUND    Radiology: Personally reviewed by me - Mild to moderate pulmonary edema with increase in right lower lung   opacities.        Assessment /Plan:   78f h/o HTN, s/p Renal transplant, CKD, chronic rejection, recent admission for pneumonia s/p abx presented with shortness of breath since this morning. she also noticed significant LUE swelling that began 4-5 days ago    1. Diastolic Heart Failure secondary to severe kidney disease   - Recent TTE with normal EF  - c/w GDMT as above     2. HTN   controlled   c/w amlodipine and BB    3. Pulmonary Edema   chest Xray as above shows mild - mod pulm edema with increase in right lower lung   -c/w abx therapy     4. ESRD  - renal on board     5. Prophylactic measure.   dvt proph - hsq.        Differential diagnosis and plan of care discussed with patient after the evaluation. Counseling on diet, nutritional counseling, weight management, exercise and medication compliance was done.   Advanced care planning/advanced directives discussed with patient/family. DNR status including forceful chest compressions to attempt to restart the heart, ventilator support/artificial breathing, electric shock, artificial nutrition, health care proxy, Molst form all discussed with pt. Pt wishes to consider. More than fifteen minutes spent on discussing advanced directives.     AG Krishnan DO [facility]  Cardiovascular Medicine  [address] [place] [address] Dr, [address]  Available for call or text via [employer] TE[employer]  Office [phone]   DATE OF SERVICE: 01-05-24 @ 15:36    CHIEF COMPLAINT:Patient is a 78y old  Female who presents with a chief complaint of Localized edema     (05 Jan 2024 14:52)      HISTORY OF PRESENT ILLNESS:HPI:  78f h/o HTN, s/p Renal transplant, CKD, chronic rejection, recent admission for pneumonia s/p abx presented with shortness of breath since this morning. she also noticed significant LUE swelling that began 4-5 days ago. She denies any trauma to arm. she has chronic b/l LE edema as well which has increased slightly over the past few days. she has a cough at times productive of white sputum but denies any fevers or chills. She states she is compliant with all her medications. she has not noticed any change in urination. no cloudy urine. no abdominal pain.  (03 Jan 2024 17:17)      PAST MEDICAL & SURGICAL HISTORY:  HTN (hypertension)      Glaucoma      Cataract      ESRD (end stage renal disease) on dialysis      DVT (deep venous thrombosis)  of Left subclavian vein, 06/12/17      Kidney transplant recipient      Anemia of chronic disease      History of arteriovenostomy for renal dialysis      [place] (hard of hearing)      Acquired cataract  extraction with b/l lense placement, 2016      S/P KEVEN-BSO  for fibroids, 2012      Hemodialysis access, AV graft      History of renal transplantation  DDRT 5/19/2021      AV fistula              MEDICATIONS:  aspirin enteric coated 81 milliGRAM(s) Oral daily  furosemide   Injectable 40 milliGRAM(s) IV Push three times a day  heparin   Injectable 5000 Unit(s) SubCutaneous every 8 hours  labetalol 200 milliGRAM(s) Oral three times a day  NIFEdipine XL 60 milliGRAM(s) Oral two times a day    atovaquone  Suspension 1500 milliGRAM(s) Oral daily      acetaminophen     Tablet .. 650 milliGRAM(s) Oral every 6 hours PRN  melatonin 3 milliGRAM(s) Oral at bedtime PRN      predniSONE   Tablet 5 milliGRAM(s) Oral daily    cholecalciferol 2000 Unit(s) Oral daily  epoetin ivelisse (EPOGEN) Injectable 68273 Unit(s) SubCutaneous once  latanoprost 0.005% Ophthalmic Solution 1 Drop(s) Both EYES at bedtime  mycophenolate mofetil 500 milliGRAM(s) Oral two times a day  sodium bicarbonate 1300 milliGRAM(s) Oral three times a day  tacrolimus ER Tablet (ENVARSUS XR) 5 milliGRAM(s) Oral daily      FAMILY HISTORY:  Family history of cerebrovascular accident (CVA)    Family history of colon cancer (Sibling)        Non-contributory    SOCIAL HISTORY:    Denies all     Allergies    penicillin (Rash)  Mushrooms (Anaphylaxis)    Intolerances    	    REVIEW OF SYSTEMS:  CONSTITUTIONAL: No fever  EYES: No eye pain, visual disturbances, or discharge  ENMT:  No difficulty hearing, tinnitus  NECK: No pain or stiffness  RESPIRATORY: No cough, wheezing,  CARDIOVASCULAR: No chest pain, palpitations, passing out, dizziness, or leg swelling  GASTROINTESTINAL:  No nausea, vomiting, diarrhea or constipation. No melena.  GENITOURINARY: No dysuria, hematuria  NEUROLOGICAL: No stroke like symptoms  SKIN: No burning or lesions   ENDOCRINE: No heat or cold intolerance  MUSCULOSKELETAL: No joint pain or swelling  PSYCHIATRIC: No  anxiety, mood swings  HEME/LYMPH: No bleeding gums  ALLERGY AND IMMUNOLOGIC: No hives or eczema	    All other ROS negative    PHYSICAL EXAM:  T(C): 38.1 (01-05-24 @ 09:39), Max: 38.1 (01-05-24 @ 09:39)  HR: 74 (01-05-24 @ 08:44) (74 - 80)  BP: 122/64 (01-05-24 @ 08:44) (122/64 - 218/81)  RR: 18 (01-05-24 @ 08:44) (18 - 18)  SpO2: 90% (01-05-24 @ 08:44) (90% - 98%)  Wt(kg): --  I&O's Summary    04 Jan 2024 07:01  -  05 Jan 2024 07:00  --------------------------------------------------------  IN: 380 mL / OUT: 0 mL / NET: 380 mL        Appearance: Normal	  HEENT:   Normal oral mucosa, EOMI	  Cardiovascular:  S1 S2, No JVD,    Respiratory: Lungs clear to auscultation	  Psychiatry: Alert  Gastrointestinal:  Soft, Non-tender, + BS	  Skin: No rashes   Neurologic: Non-focal  Extremities:  No edema  Vascular: Peripheral pulses palpable    	    	  	  CARDIAC MARKERS:  Labs personally reviewed by me                                  8.4    6.16  )-----------( 197      ( 05 Jan 2024 10:35 )             26.9     01-05    137  |  105  |  54<H>  ----------------------------<  136<H>  4.4   |  16<L>  |  3.23<H>    Ca    8.8      05 Jan 2024 10:35    TTE 1/4/24  1. Left ventricular systolic function is normal with an ejection fraction of 63 % by Fontenot's method of disks. There are no regional wall motion abnormalities seen.   2. There is moderate (grade 2) left ventricular diastolic dysfunction, with elevated filling pressure.   3. Severe left ventricular hypertrophy.   4. Normal right ventricular cavity size, increasedwall thickness, and systolic function.   5. The left atrium is severely dilated.   6. Echocardiographic evidence of pulmonary hyptertension.   7. Small pericardial effusion noted adjacent to the posterior left ventricle, small pericardial effusion noted adjacent to the lateral left ventricle and moderate pericardial effusion noted adjacent to the right atrium with no evidence of hemodynamic compromise.   8. Compared to the transthoracic echocardiogram performed on 11/10/2023 LVOT obstruction is markedly approved.      EKG: Personally reviewed by me - NORMAL SINUS RHYTHM  MINIMAL VOLTAGE CRITERIA FOR LVH, MAY BE NORMAL VARIANT ( Rahul product )  BORDERLINE ECG  WHEN COMPARED WITH ECG OF 12-OCT-2022 20:07,  NO SIGNIFICANT CHANGE WAS FOUND    Radiology: Personally reviewed by me - Mild to moderate pulmonary edema with increase in right lower lung   opacities.        Assessment /Plan:   78f h/o HTN, s/p Renal transplant, CKD, chronic rejection, recent admission for pneumonia s/p abx presented with shortness of breath since this morning. she also noticed significant LUE swelling that began 4-5 days ago    1. 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 HTN   controlled   c/w amlodipine and BB    3. Pulmonary Edema   chest Xray as above shows mild - mod pulm edema with increase in right lower lung   -c/w abx therapy     4. ESRD  - renal on board     5. Prophylactic measure.   dvt proph - hsq.        Differential diagnosis and plan of care discussed with patient after the evaluation. Counseling on diet, nutritional counseling, weight management, exercise and medication compliance was done.   Advanced care planning/advanced directives discussed with patient/family. DNR status including forceful chest compressions to attempt to restart the heart, ventilator support/artificial breathing, electric shock, artificial nutrition, health care proxy, Molst form all discussed with pt. Pt wishes to consider. More than fifteen minutes spent on discussing advanced directives.     AG Krishnan, DO [facility]  Cardiovascular Medicine  [address] [place] [address], [address]  Available for call or text via [employer] TE[employer]  Office [phone]   DATE OF SERVICE: 01-05-24 @ 15:36    CHIEF COMPLAINT:Patient is a 78y old  Female who presents with a chief complaint of Localized edema     (05 Jan 2024 14:52)      HISTORY OF PRESENT ILLNESS:HPI:  78f h/o HTN, s/p Renal transplant, CKD, chronic rejection, recent admission for pneumonia s/p abx presented with shortness of breath since this morning. she also noticed significant LUE swelling that began 4-5 days ago. She denies any trauma to arm. she has chronic b/l LE edema as well which has increased slightly over the past few days. she has a cough at times productive of white sputum but denies any fevers or chills. She states she is compliant with all her medications. she has not noticed any change in urination. no cloudy urine. no abdominal pain.  (03 Jan 2024 17:17)      PAST MEDICAL & SURGICAL HISTORY:  HTN (hypertension)      Glaucoma      Cataract      ESRD (end stage renal disease) on dialysis      DVT (deep venous thrombosis)  of Left subclavian vein, 06/12/17      Kidney transplant recipient      Anemia of chronic disease      History of arteriovenostomy for renal dialysis      [place] (hard of hearing)      Acquired cataract  extraction with b/l lense placement, 2016      S/P KEVEN-BSO  for fibroids, 2012      Hemodialysis access, AV graft      History of renal transplantation  DDRT 5/19/2021      AV fistula              MEDICATIONS:  aspirin enteric coated 81 milliGRAM(s) Oral daily  furosemide   Injectable 40 milliGRAM(s) IV Push three times a day  heparin   Injectable 5000 Unit(s) SubCutaneous every 8 hours  labetalol 200 milliGRAM(s) Oral three times a day  NIFEdipine XL 60 milliGRAM(s) Oral two times a day    atovaquone  Suspension 1500 milliGRAM(s) Oral daily      acetaminophen     Tablet .. 650 milliGRAM(s) Oral every 6 hours PRN  melatonin 3 milliGRAM(s) Oral at bedtime PRN      predniSONE   Tablet 5 milliGRAM(s) Oral daily    cholecalciferol 2000 Unit(s) Oral daily  epoetin ivelisse (EPOGEN) Injectable 01555 Unit(s) SubCutaneous once  latanoprost 0.005% Ophthalmic Solution 1 Drop(s) Both EYES at bedtime  mycophenolate mofetil 500 milliGRAM(s) Oral two times a day  sodium bicarbonate 1300 milliGRAM(s) Oral three times a day  tacrolimus ER Tablet (ENVARSUS XR) 5 milliGRAM(s) Oral daily      FAMILY HISTORY:  Family history of cerebrovascular accident (CVA)    Family history of colon cancer (Sibling)        Non-contributory    SOCIAL HISTORY:    Denies all     Allergies    penicillin (Rash)  Mushrooms (Anaphylaxis)    Intolerances    	    REVIEW OF SYSTEMS:  CONSTITUTIONAL: No fever  EYES: No eye pain, visual disturbances, or discharge  ENMT:  No difficulty hearing, tinnitus  NECK: No pain or stiffness  RESPIRATORY: No cough, wheezing,  CARDIOVASCULAR: No chest pain, palpitations, passing out, dizziness, or leg swelling  GASTROINTESTINAL:  No nausea, vomiting, diarrhea or constipation. No melena.  GENITOURINARY: No dysuria, hematuria  NEUROLOGICAL: No stroke like symptoms  SKIN: No burning or lesions   ENDOCRINE: No heat or cold intolerance  MUSCULOSKELETAL: No joint pain or swelling  PSYCHIATRIC: No  anxiety, mood swings  HEME/LYMPH: No bleeding gums  ALLERGY AND IMMUNOLOGIC: No hives or eczema	    All other ROS negative    PHYSICAL EXAM:  T(C): 38.1 (01-05-24 @ 09:39), Max: 38.1 (01-05-24 @ 09:39)  HR: 74 (01-05-24 @ 08:44) (74 - 80)  BP: 122/64 (01-05-24 @ 08:44) (122/64 - 218/81)  RR: 18 (01-05-24 @ 08:44) (18 - 18)  SpO2: 90% (01-05-24 @ 08:44) (90% - 98%)  Wt(kg): --  I&O's Summary    04 Jan 2024 07:01  -  05 Jan 2024 07:00  --------------------------------------------------------  IN: 380 mL / OUT: 0 mL / NET: 380 mL        Appearance: Normal	  HEENT:   Normal oral mucosa, EOMI	  Cardiovascular:  S1 S2, No JVD,    Respiratory: Lungs clear to auscultation	  Psychiatry: Alert  Gastrointestinal:  Soft, Non-tender, + BS	  Skin: No rashes   Neurologic: Non-focal  Extremities:  No edema  Vascular: Peripheral pulses palpable    	    	  	  CARDIAC MARKERS:  Labs personally reviewed by me                                  8.4    6.16  )-----------( 197      ( 05 Jan 2024 10:35 )             26.9     01-05    137  |  105  |  54<H>  ----------------------------<  136<H>  4.4   |  16<L>  |  3.23<H>    Ca    8.8      05 Jan 2024 10:35    TTE 1/4/24  1. Left ventricular systolic function is normal with an ejection fraction of 63 % by Fontenot's method of disks. There are no regional wall motion abnormalities seen.   2. There is moderate (grade 2) left ventricular diastolic dysfunction, with elevated filling pressure.   3. Severe left ventricular hypertrophy.   4. Normal right ventricular cavity size, increasedwall thickness, and systolic function.   5. The left atrium is severely dilated.   6. Echocardiographic evidence of pulmonary hyptertension.   7. Small pericardial effusion noted adjacent to the posterior left ventricle, small pericardial effusion noted adjacent to the lateral left ventricle and moderate pericardial effusion noted adjacent to the right atrium with no evidence of hemodynamic compromise.   8. Compared to the transthoracic echocardiogram performed on 11/10/2023 LVOT obstruction is markedly approved.      EKG: Personally reviewed by me - NORMAL SINUS RHYTHM  MINIMAL VOLTAGE CRITERIA FOR LVH, MAY BE NORMAL VARIANT ( Rahul product )  BORDERLINE ECG  WHEN COMPARED WITH ECG OF 12-OCT-2022 20:07,  NO SIGNIFICANT CHANGE WAS FOUND    Radiology: Personally reviewed by me - Mild to moderate pulmonary edema with increase in right lower lung   opacities.        Assessment /Plan:   78f h/o HTN, s/p Renal transplant, CKD, chronic rejection, recent admission for pneumonia s/p abx presented with shortness of breath since this morning. she also noticed significant LUE swelling that began 4-5 days ago    1. 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 HTN   controlled   c/w amlodipine and BB    3. Pulmonary Edema   chest Xray as above shows mild - mod pulm edema with increase in right lower lung   -c/w abx therapy     4. ESRD  - renal on board     5. Prophylactic measure.   dvt proph - hsq.        Differential diagnosis and plan of care discussed with patient after the evaluation. Counseling on diet, nutritional counseling, weight management, exercise and medication compliance was done.   Advanced care planning/advanced directives discussed with patient/family. DNR status including forceful chest compressions to attempt to restart the heart, ventilator support/artificial breathing, electric shock, artificial nutrition, health care proxy, Molst form all discussed with pt. Pt wishes to consider. More than fifteen minutes spent on discussing advanced directives.     AG Krishnan, DO [facility]  Cardiovascular Medicine  [address] [place] [address], [address]  Available for call or text via [employer]  Office [phone]

## 2024-01-05 NOTE — DIETITIAN INITIAL EVALUATION ADULT - PERTINENT MEDS FT
MEDICATIONS  (STANDING):  aspirin enteric coated 81 milliGRAM(s) Oral daily  atovaquone  Suspension 1500 milliGRAM(s) Oral daily  cholecalciferol 2000 Unit(s) Oral daily  epoetin ivelisse (EPOGEN) Injectable 93132 Unit(s) SubCutaneous once  furosemide   Injectable 40 milliGRAM(s) IV Push every 12 hours  heparin   Injectable 5000 Unit(s) SubCutaneous every 8 hours  labetalol 200 milliGRAM(s) Oral three times a day  latanoprost 0.005% Ophthalmic Solution 1 Drop(s) Both EYES at bedtime  mycophenolate mofetil 500 milliGRAM(s) Oral two times a day  NIFEdipine XL 60 milliGRAM(s) Oral two times a day  predniSONE   Tablet 5 milliGRAM(s) Oral daily  sodium bicarbonate 1300 milliGRAM(s) Oral three times a day  sodium zirconium cyclosilicate 10 Gram(s) Oral daily  tacrolimus ER Tablet (ENVARSUS XR) 5 milliGRAM(s) Oral daily    MEDICATIONS  (PRN):  acetaminophen     Tablet .. 650 milliGRAM(s) Oral every 6 hours PRN Temp greater or equal to 38C (100.4F), Mild Pain (1 - 3)  melatonin 3 milliGRAM(s) Oral at bedtime PRN Insomnia   MEDICATIONS  (STANDING):  aspirin enteric coated 81 milliGRAM(s) Oral daily  atovaquone  Suspension 1500 milliGRAM(s) Oral daily  cholecalciferol 2000 Unit(s) Oral daily  epoetin ivelisse (EPOGEN) Injectable 41634 Unit(s) SubCutaneous once  furosemide   Injectable 40 milliGRAM(s) IV Push every 12 hours  heparin   Injectable 5000 Unit(s) SubCutaneous every 8 hours  labetalol 200 milliGRAM(s) Oral three times a day  latanoprost 0.005% Ophthalmic Solution 1 Drop(s) Both EYES at bedtime  mycophenolate mofetil 500 milliGRAM(s) Oral two times a day  NIFEdipine XL 60 milliGRAM(s) Oral two times a day  predniSONE   Tablet 5 milliGRAM(s) Oral daily  sodium bicarbonate 1300 milliGRAM(s) Oral three times a day  sodium zirconium cyclosilicate 10 Gram(s) Oral daily  tacrolimus ER Tablet (ENVARSUS XR) 5 milliGRAM(s) Oral daily    MEDICATIONS  (PRN):  acetaminophen     Tablet .. 650 milliGRAM(s) Oral every 6 hours PRN Temp greater or equal to 38C (100.4F), Mild Pain (1 - 3)  melatonin 3 milliGRAM(s) Oral at bedtime PRN Insomnia

## 2024-01-05 NOTE — DIETITIAN INITIAL EVALUATION ADULT - NSFNSGIASSESSMENTFT_GEN_A_CORE
Denies nausea/vomiting/constipation/diarrhea. Last BM on 1/5 per flowsheet, not on any bowel regimen.

## 2024-01-05 NOTE — CHART NOTE - [id number]_GEN_[id number]_CORE
MEDICINE PA NOTE    CC: Hypertensive Urgency     HPI: 77 y/o F w/ h/o HTN, CKD s/p Renal transplant, chronic rejection, recent admission for pneumonia s/p abx presented with shortness of breath with LUE and b/l LE edema concerning for volume overload.     Notified by RN: Pt manual BP this /70.     Evaluated patient at bedside:   Denies CP, SOB, dizziness, abdominal  pain, N/V/D, numbness/tingling, extremity weakness, dysuria.   Performed repeat manual blood pressure;       Vital Signs Last 24 Hrs  T(C): 37.2 (04 Jan 2024 19:50), Max: 37.4 (04 Jan 2024 12:14)  T(F): 98.9 (04 Jan 2024 19:50), Max: 99.3 (04 Jan 2024 12:14)  HR: 74 (04 Jan 2024 19:50) (74 - 80)  BP: 175/76 (04 Jan 2024 19:50) (124/58 - 175/76)  BP(mean): --  RR: 18 (04 Jan 2024 19:50) (18 - 18)  SpO2: 98% (04 Jan 2024 19:50) (96% - 98%)    Parameters below as of 04 Jan 2024 19:50  Patient On (Oxygen Delivery Method): room air      Physical Exam:  General: WN/WD NAD, AOx3, nontoxic appearing  Head:  NC/AT, no scleral injection, no JVD   CV: RRR, S1S2   Respiratory: CTA B/L, nonlabored  Abdominal: Soft, NT, ND no palpable mass, no guarding or rebound tenderness  MSK: No BLLE edema, + peripheral pulses, FROM all 4 extremity      Assessment & Plan:                  GAEL MarianoC  Department of Medicine   TEAMS          HTN Urgency/Emergency  - Urgency: >180/120, no EOD  - Emergency: >180/120, yes EOD  - EOD:    = Neuro: encephalopathy (HA, seizure, AMS), PRES, TIA, CVA (SAH, ICH)    = Eyes: papilledema, hemorrhage    = Resp/CV: pulm edema, MI, angina, Ao dissection    = Heme: MAHA    = Renal: YANELY, hematuria    Correction Time  - Urgency: <160/100 over several hours, then normal range over 1-2 days  - Emergency: Reduce MAP 10-20% within 1st hour, no more than 25% over 1st 24hrs; then normal range over 1-2 days    = **Ischemic CVA: permissive hypertension (goal <185/110 if tPA, <220/120 if no tPA)    = **Ao dissection: immediately reduce to <120 and MAP <80 within 20 minutes    Medications  - Urgency: PO! Captopril, labetalol >> hydralazine  - Emergency: IV initially > PO. IV labetalol, hydralazine; Topical nitropast; Gtt: labetalol, nitro MEDICINE PA NOTE    CC: Hypertensive Urgency     HPI: 79 y/o F w/ h/o HTN, CKD s/p Renal transplant, chronic rejection, recent admission for pneumonia s/p abx presented with shortness of breath with LUE and b/l LE edema concerning for volume overload.     Notified by RN: Pt manual BP this /70.     Evaluated patient at bedside:   Denies CP, SOB, dizziness, abdominal  pain, N/V/D, numbness/tingling, extremity weakness, dysuria.   Performed repeat manual blood pressure;       Vital Signs Last 24 Hrs  T(C): 37.2 (04 Jan 2024 19:50), Max: 37.4 (04 Jan 2024 12:14)  T(F): 98.9 (04 Jan 2024 19:50), Max: 99.3 (04 Jan 2024 12:14)  HR: 74 (04 Jan 2024 19:50) (74 - 80)  BP: 175/76 (04 Jan 2024 19:50) (124/58 - 175/76)  BP(mean): --  RR: 18 (04 Jan 2024 19:50) (18 - 18)  SpO2: 98% (04 Jan 2024 19:50) (96% - 98%)    Parameters below as of 04 Jan 2024 19:50  Patient On (Oxygen Delivery Method): room air      Physical Exam:  General: WN/WD NAD, AOx3, nontoxic appearing  Head:  NC/AT, no scleral injection, no JVD   CV: RRR, S1S2   Respiratory: CTA B/L, nonlabored  Abdominal: Soft, NT, ND no palpable mass, no guarding or rebound tenderness  MSK: No BLLE edema, + peripheral pulses, FROM all 4 extremity      Assessment & Plan:                  GAEL MarianoC  Department of Medicine   TEAMS          HTN Urgency/Emergency  - Urgency: >180/120, no EOD  - Emergency: >180/120, yes EOD  - EOD:    = Neuro: encephalopathy (HA, seizure, AMS), PRES, TIA, CVA (SAH, ICH)    = Eyes: papilledema, hemorrhage    = Resp/CV: pulm edema, MI, angina, Ao dissection    = Heme: MAHA    = Renal: YANELY, hematuria    Correction Time  - Urgency: <160/100 over several hours, then normal range over 1-2 days  - Emergency: Reduce MAP 10-20% within 1st hour, no more than 25% over 1st 24hrs; then normal range over 1-2 days    = **Ischemic CVA: permissive hypertension (goal <185/110 if tPA, <220/120 if no tPA)    = **Ao dissection: immediately reduce to <120 and MAP <80 within 20 minutes    Medications  - Urgency: PO! Captopril, labetalol >> hydralazine  - Emergency: IV initially > PO. IV labetalol, hydralazine; Topical nitropast; Gtt: labetalol, nitro MEDICINE PA NOTE    CC: Hypertensive Urgency     HPI: 77 y/o F w/ h/o HTN, CKD s/p Renal transplant, chronic rejection, recent admission for pneumonia s/p abx presented with shortness of breath with LUE and b/l LE edema concerning for volume overload.     Notified by RN: Pt manual BP this /70.     Evaluated patient at bedside:   Denies HA, dizziness, visual changes, dyspnea, chest pain, palpitations, abdominal  pain, N/V.   I performed a repeat manual blood pressure approx 1 hour after early administration of AM meds, PO Labetalol 200mg and Lasix 40mg IVP -> Reading obtained: 180/78.       Vital Signs Last 24 Hrs  T(C): 37.2 (04 Jan 2024 19:50), Max: 37.4 (04 Jan 2024 12:14)  T(F): 98.9 (04 Jan 2024 19:50), Max: 99.3 (04 Jan 2024 12:14)  HR: 74 (04 Jan 2024 19:50) (74 - 80)  BP: 175/76 (04 Jan 2024 19:50) (124/58 - 175/76)  BP(mean): --  RR: 18 (04 Jan 2024 19:50) (18 - 18)  SpO2: 98% (04 Jan 2024 19:50) (96% - 98%)    Parameters below as of 04 Jan 2024 19:50  Patient On (Oxygen Delivery Method): room air    Vital Signs Last 24 Hrs  T(C): 36.7 (05 Jan 2024 04:45), Max: 37.4 (04 Jan 2024 12:14)  T(F): 98.1 (05 Jan 2024 04:45), Max: 99.3 (04 Jan 2024 12:14)  HR: 77 (05 Jan 2024 04:45) (74 - 80)  BP: 210/70 (05 Jan 2024 04:47) (124/58 - 218/81)  BP(mean): --  RR: 18 (05 Jan 2024 04:45) (18 - 18)  SpO2: 98% (05 Jan 2024 04:45) (96% - 98%)    Parameters below as of 05 Jan 2024 04:45  Patient On (Oxygen Delivery Method): room air            Physical Exam:  General: WN/WD NAD, AOx3, nontoxic appearing  Head:  NC/AT, no scleral injection, no JVD   CV: RRR, S1S2   Respiratory: CTA B/L, nonlabored  Abdominal: Soft, NT, ND no palpable mass, no guarding or rebound tenderness  MSK: No BLLE edema, + peripheral pulses, FROM all 4 extremity      Assessment & Plan:                  Laurie Grijalva PA-C  Department of Medicine   TEAMS          HTN Urgency/Emergency  - Urgency: >180/120, no EOD  - Emergency: >180/120, yes EOD  - EOD:    = Neuro: encephalopathy (HA, seizure, AMS), PRES, TIA, CVA (SAH, ICH)    = Eyes: papilledema, hemorrhage    = Resp/CV: pulm edema, MI, angina, Ao dissection    = Heme: MAHA    = Renal: YANELY, hematuria    Correction Time  - Urgency: <160/100 over several hours, then normal range over 1-2 days  - Emergency: Reduce MAP 10-20% within 1st hour, no more than 25% over 1st 24hrs; then normal range over 1-2 days    = **Ischemic CVA: permissive hypertension (goal <185/110 if tPA, <220/120 if no tPA)    = **Ao dissection: immediately reduce to <120 and MAP <80 within 20 minutes    Medications  - Urgency: PO! Captopril, labetalol >> hydralazine  - Emergency: IV initially > PO. IV labetalol, hydralazine; Topical nitropast; Gtt: labetalol, nitro MEDICINE PA NOTE    CC: Hypertensive Urgency     HPI: 79 y/o F w/ h/o HTN, CKD s/p Renal transplant, chronic rejection, recent admission for pneumonia s/p abx presented with shortness of breath with LUE and b/l LE edema concerning for volume overload.     Notified by RN: Pt manual BP this /70. other VSS.     Evaluated patient at bedside:   Denies HA, dizziness, visual changes, dyspnea, chest pain, palpitations, abdominal  pain, N/V.   Repeat manual blood pressure personally obtained approximately 1 hour after early administration of AM meds, PO Labetalol 200mg and Lasix 40mg IVP (180/78).        Vital Signs Last 24 Hrs  T(C): 36.7 (05 Jan 2024 04:45), Max: 37.4 (04 Jan 2024 12:14)  T(F): 98.1 (05 Jan 2024 04:45), Max: 99.3 (04 Jan 2024 12:14)  HR: 77 (05 Jan 2024 04:45) (74 - 80)  BP: 210/70 (05 Jan 2024 04:47) (124/58 - 218/81)  BP(mean): --  RR: 18 (05 Jan 2024 04:45) (18 - 18)  SpO2: 98% (05 Jan 2024 04:45) (96% - 98%)    Parameters below as of 05 Jan 2024 04:45  Patient On (Oxygen Delivery Method): room air      Physical Exam:  General: NAD, AOx3, nontoxic appearing  Head:  NC/AT, no scleral injection, no JVD   CV: Regular rate and rhythm; No murmurs, rubs, or gallops.   Respiratory: CTA B/L, nonlabored  Abdominal: Soft, NT, ND no palpable mass, no guarding or rebound tenderness  Peripheral Vascular: 2+ pitting edema in B/L LEs. LUE notably edematous. Peripheral pulses present in all 4 extremities       Assessment & Plan:                  Laurie Grijalva PA-C  Department of Medicine   TEAMS          HTN Urgency/Emergency  - Urgency: >180/120, no EOD  - Emergency: >180/120, yes EOD  - EOD:    = Neuro: encephalopathy (HA, seizure, AMS), PRES, TIA, CVA (SAH, ICH)    = Eyes: papilledema, hemorrhage    = Resp/CV: pulm edema, MI, angina, Ao dissection    = Heme: MAHA    = Renal: YANELY, hematuria    Correction Time  - Urgency: <160/100 over several hours, then normal range over 1-2 days  - Emergency: Reduce MAP 10-20% within 1st hour, no more than 25% over 1st 24hrs; then normal range over 1-2 days    = **Ischemic CVA: permissive hypertension (goal <185/110 if tPA, <220/120 if no tPA)    = **Ao dissection: immediately reduce to <120 and MAP <80 within 20 minutes    Medications  - Urgency: PO! Captopril, labetalol >> hydralazine  - Emergency: IV initially > PO. IV labetalol, hydralazine; Topical nitropast; Gtt: labetalol, nitro MEDICINE PA NOTE    CC: Hypertensive Urgency     HPI: 77 y/o F w/ h/o HTN, CKD s/p Renal transplant, chronic rejection, recent admission for pneumonia s/p abx presented with shortness of breath with LUE and b/l LE edema concerning for volume overload.     Notified by RN: Pt manual BP this /70. other VSS.     Evaluated patient at bedside:   Denies HA, dizziness, visual changes, dyspnea, chest pain, palpitations, abdominal  pain, N/V.   Repeat manual blood pressure personally obtained approximately 1 hour after early administration of AM meds, PO Labetalol 200mg and Lasix 40mg IVP (180/78).        Vital Signs Last 24 Hrs  T(C): 36.7 (05 Jan 2024 04:45), Max: 37.4 (04 Jan 2024 12:14)  T(F): 98.1 (05 Jan 2024 04:45), Max: 99.3 (04 Jan 2024 12:14)  HR: 77 (05 Jan 2024 04:45) (74 - 80)  BP: 210/70 (05 Jan 2024 04:47) (124/58 - 218/81)  BP(mean): --  RR: 18 (05 Jan 2024 04:45) (18 - 18)  SpO2: 98% (05 Jan 2024 04:45) (96% - 98%)    Parameters below as of 05 Jan 2024 04:45  Patient On (Oxygen Delivery Method): room air      Physical Exam:  General: NAD, AOx3, nontoxic appearing  Head:  NC/AT, no scleral injection, no JVD   CV: Regular rate and rhythm; No murmurs, rubs, or gallops.   Respiratory: CTA B/L, nonlabored  Abdominal: Soft, NT, ND no palpable mass, no guarding or rebound tenderness  Peripheral Vascular: 2+ pitting edema in B/L LEs. LUE notably edematous. Peripheral pulses present in all 4 extremities       Assessment & Plan:    1) Hypertensive Urgency   - likely exacerbated i/s/o volume overload.   - Improved after early administration of IV Lasix and PO Labetalol   - Plan to administer AM Procardia XL at normal due time    - Will continue to monitor patient closely. Recheck BP 1 hour after administration of Procardia.     Will endorse to AM team     Laurie Grijalva PA-C  Department of Medicine   TEAMS              = Resp/CV: pulm edema, MI, angina, Ao dissection    = Heme: MAHA    = Renal: YANELY, hematuria    Correction Time  - Urgency: <160/100 over several hours, then normal range over 1-2 days  - Emergency: Reduce MAP 10-20% within 1st hour, no more than 25% over 1st 24hrs; then normal range over 1-2 days    = **Ischemic CVA: permissive hypertension (goal <185/110 if tPA, <220/120 if no tPA)    = **Ao dissection: immediately reduce to <120 and MAP <80 within 20 minutes    Medications  - Urgency: PO! Captopril, labetalol >> hydralazine  - Emergency: IV initially > PO. IV labetalol, hydralazine; Topical nitropast; Gtt: labetalol, nitro MEDICINE PA NOTE    CC: Hypertensive Urgency     HPI: 79 y/o F w/ h/o HTN, CKD s/p Renal transplant, chronic rejection, recent admission for pneumonia s/p abx presented with shortness of breath with LUE and b/l LE edema concerning for volume overload.     Notified by RN: Pt manual BP this /70. other VSS.     Evaluated patient at bedside:   Denies HA, dizziness, visual changes, dyspnea, chest pain, palpitations, abdominal  pain, N/V.   Repeat manual blood pressure personally obtained approximately 1 hour after early administration of AM meds, PO Labetalol 200mg and Lasix 40mg IVP (180/78).        Vital Signs Last 24 Hrs  T(C): 36.7 (05 Jan 2024 04:45), Max: 37.4 (04 Jan 2024 12:14)  T(F): 98.1 (05 Jan 2024 04:45), Max: 99.3 (04 Jan 2024 12:14)  HR: 77 (05 Jan 2024 04:45) (74 - 80)  BP: 210/70 (05 Jan 2024 04:47) (124/58 - 218/81)  BP(mean): --  RR: 18 (05 Jan 2024 04:45) (18 - 18)  SpO2: 98% (05 Jan 2024 04:45) (96% - 98%)    Parameters below as of 05 Jan 2024 04:45  Patient On (Oxygen Delivery Method): room air      Physical Exam:  General: NAD, AOx3, nontoxic appearing  Head:  NC/AT, no scleral injection, no JVD   CV: Regular rate and rhythm; No murmurs, rubs, or gallops.   Respiratory: CTA B/L, nonlabored  Abdominal: Soft, NT, ND no palpable mass, no guarding or rebound tenderness  Peripheral Vascular: 2+ pitting edema in B/L LEs. LUE notably edematous. Peripheral pulses present in all 4 extremities       Assessment & Plan:    1) Hypertensive Urgency   - likely exacerbated i/s/o volume overload.   - Improved after early administration of IV Lasix and PO Labetalol   - Plan to administer AM Procardia XL at normal due time    - Will continue to monitor patient closely. Recheck BP 1 hour after administration of Procardia.     Will endorse to AM team     Laurie Grijalva PA-C  Department of Medicine   TEAMS              = Resp/CV: pulm edema, MI, angina, Ao dissection    = Heme: MAHA    = Renal: YANELY, hematuria    Correction Time  - Urgency: <160/100 over several hours, then normal range over 1-2 days  - Emergency: Reduce MAP 10-20% within 1st hour, no more than 25% over 1st 24hrs; then normal range over 1-2 days    = **Ischemic CVA: permissive hypertension (goal <185/110 if tPA, <220/120 if no tPA)    = **Ao dissection: immediately reduce to <120 and MAP <80 within 20 minutes    Medications  - Urgency: PO! Captopril, labetalol >> hydralazine  - Emergency: IV initially > PO. IV labetalol, hydralazine; Topical nitropast; Gtt: labetalol, nitro MEDICINE PA NOTE    CC: Hypertensive Urgency     HPI: 79 y/o F w/ h/o HTN, CKD s/p Renal transplant, chronic rejection, recent admission for pneumonia s/p abx presented with shortness of breath with LUE and b/l LE edema concerning for volume overload.     Notified by RN: Pt manual BP this /70. other VSS.     Evaluated patient at bedside:   Patient endorses that she has been urinating. Denies HA, dizziness, visual changes, dyspnea, chest pain, palpitations, abdominal pain, N/V.   Repeat manual blood pressure personally obtained approximately 1 hour after early administration of AM meds, PO Labetalol 200mg and Lasix 40mg IVP (180/78).        Vital Signs Last 24 Hrs  T(C): 36.7 (05 Jan 2024 04:45), Max: 37.4 (04 Jan 2024 12:14)  T(F): 98.1 (05 Jan 2024 04:45), Max: 99.3 (04 Jan 2024 12:14)  HR: 77 (05 Jan 2024 04:45) (74 - 80)  BP: 210/70 (05 Jan 2024 04:47) (124/58 - 218/81)  BP(mean): --  RR: 18 (05 Jan 2024 04:45) (18 - 18)  SpO2: 98% (05 Jan 2024 04:45) (96% - 98%)    Parameters below as of 05 Jan 2024 04:45  Patient On (Oxygen Delivery Method): room air      Physical Exam:  General: NAD, AOx3, nontoxic appearing  Head:  NC/AT, no scleral injection, no JVD   CV: Regular rate and rhythm; No murmurs, rubs, or gallops.   Respiratory: CTA B/L, nonlabored  Abdominal: Soft, NT, ND no palpable mass, no guarding or rebound tenderness  Peripheral Vascular: 2+ pitting edema in B/L LEs. LUE notably edematous. Peripheral pulses present in all 4 extremities       Assessment & Plan:    1) Hypertensive Urgency   - likely exacerbated i/s/o volume overload.   - Improved after early administration of IV Lasix and PO Labetalol   - Plan to administer AM Procardia XL at normal due time    - Will continue to monitor patient closely. Recheck BP 1 hour after administration of Procardia.     Will endorse to AM team     GAEL MarianoC  Department of Medicine   [facility] MEDICINE PA NOTE    CC: Hypertensive Urgency     HPI: 77 y/o F w/ h/o HTN, CKD s/p Renal transplant, chronic rejection, recent admission for pneumonia s/p abx presented with shortness of breath with LUE and b/l LE edema concerning for volume overload.     Notified by RN: Pt manual BP this /70. other VSS.     Evaluated patient at bedside:   Patient endorses that she has been urinating. Denies HA, dizziness, visual changes, dyspnea, chest pain, palpitations, abdominal pain, N/V.   Repeat manual blood pressure personally obtained approximately 1 hour after early administration of AM meds, PO Labetalol 200mg and Lasix 40mg IVP (180/78).        Vital Signs Last 24 Hrs  T(C): 36.7 (05 Jan 2024 04:45), Max: 37.4 (04 Jan 2024 12:14)  T(F): 98.1 (05 Jan 2024 04:45), Max: 99.3 (04 Jan 2024 12:14)  HR: 77 (05 Jan 2024 04:45) (74 - 80)  BP: 210/70 (05 Jan 2024 04:47) (124/58 - 218/81)  BP(mean): --  RR: 18 (05 Jan 2024 04:45) (18 - 18)  SpO2: 98% (05 Jan 2024 04:45) (96% - 98%)    Parameters below as of 05 Jan 2024 04:45  Patient On (Oxygen Delivery Method): room air      Physical Exam:  General: NAD, AOx3, nontoxic appearing  Head:  NC/AT, no scleral injection, no JVD   CV: Regular rate and rhythm; No murmurs, rubs, or gallops.   Respiratory: CTA B/L, nonlabored  Abdominal: Soft, NT, ND no palpable mass, no guarding or rebound tenderness  Peripheral Vascular: 2+ pitting edema in B/L LEs. LUE notably edematous. Peripheral pulses present in all 4 extremities       Assessment & Plan:    1) Hypertensive Urgency   - likely exacerbated i/s/o volume overload.   - Improved after early administration of IV Lasix and PO Labetalol   - Plan to administer AM Procardia XL at normal due time    - Will continue to monitor patient closely. Recheck BP 1 hour after administration of Procardia.     Will endorse to AM team     GAEL MarianoC  Department of Medicine   [facility] MEDICINE PA NOTE    CC: Hypertensive Urgency     HPI: 77 y/o F w/ h/o HTN, CKD s/p Renal transplant, chronic rejection, recent admission for pneumonia s/p abx presented with shortness of breath with LUE and b/l LE edema concerning for volume overload.     Notified by RN: Pt manual BP this /70. other VSS.     Evaluated patient at bedside:   Patient endorses that she has been urinating. Denies HA, dizziness, visual changes, dyspnea, chest pain, palpitations, abdominal pain, N/V.   Repeat manual blood pressure personally obtained approximately 45 mins after early administration of AM meds, PO Labetalol 200mg and Lasix 40mg IVP (180/78).        Vital Signs Last 24 Hrs  T(C): 36.7 (05 Jan 2024 04:45), Max: 37.4 (04 Jan 2024 12:14)  T(F): 98.1 (05 Jan 2024 04:45), Max: 99.3 (04 Jan 2024 12:14)  HR: 77 (05 Jan 2024 04:45) (74 - 80)  BP: 210/70 (05 Jan 2024 04:47) (124/58 - 218/81)  BP(mean): --  RR: 18 (05 Jan 2024 04:45) (18 - 18)  SpO2: 98% (05 Jan 2024 04:45) (96% - 98%)    Parameters below as of 05 Jan 2024 04:45  Patient On (Oxygen Delivery Method): room air      Physical Exam:  General: NAD, AOx3, nontoxic appearing  Head:  NC/AT, no scleral injection, no JVD   CV: Regular rate and rhythm; No murmurs, rubs, or gallops.   Respiratory: CTA B/L, nonlabored  Abdominal: Soft, NT, ND no palpable mass, no guarding or rebound tenderness  Peripheral Vascular: 2+ pitting edema in B/L LEs. LUE notably edematous. Peripheral pulses present in all 4 extremities       Assessment & Plan:    1) Hypertensive Urgency   - likely exacerbated i/s/o volume overload.   - Improved after early administration of IV Lasix and PO Labetalol   - Plan to administer AM Procardia XL at normal due time    - Will continue to monitor patient closely. Recheck BP 1 hour after administration of Procardia.     Will endorse to AM team     GAEL MarianoC  Department of Medicine   [facility] MEDICINE PA NOTE    CC: Hypertensive Urgency     HPI: 79 y/o F w/ h/o HTN, CKD s/p Renal transplant, chronic rejection, recent admission for pneumonia s/p abx presented with shortness of breath with LUE and b/l LE edema concerning for volume overload.     Notified by RN: Pt manual BP this /70. other VSS.     Evaluated patient at bedside:   Patient endorses that she has been urinating. Denies HA, dizziness, visual changes, dyspnea, chest pain, palpitations, abdominal pain, N/V.   Repeat manual blood pressure personally obtained approximately 45 mins after early administration of AM meds, PO Labetalol 200mg and Lasix 40mg IVP (180/78).        Vital Signs Last 24 Hrs  T(C): 36.7 (05 Jan 2024 04:45), Max: 37.4 (04 Jan 2024 12:14)  T(F): 98.1 (05 Jan 2024 04:45), Max: 99.3 (04 Jan 2024 12:14)  HR: 77 (05 Jan 2024 04:45) (74 - 80)  BP: 210/70 (05 Jan 2024 04:47) (124/58 - 218/81)  BP(mean): --  RR: 18 (05 Jan 2024 04:45) (18 - 18)  SpO2: 98% (05 Jan 2024 04:45) (96% - 98%)    Parameters below as of 05 Jan 2024 04:45  Patient On (Oxygen Delivery Method): room air      Physical Exam:  General: NAD, AOx3, nontoxic appearing  Head:  NC/AT, no scleral injection, no JVD   CV: Regular rate and rhythm; No murmurs, rubs, or gallops.   Respiratory: CTA B/L, nonlabored  Abdominal: Soft, NT, ND no palpable mass, no guarding or rebound tenderness  Peripheral Vascular: 2+ pitting edema in B/L LEs. LUE notably edematous. Peripheral pulses present in all 4 extremities       Assessment & Plan:    1) Hypertensive Urgency   - likely exacerbated i/s/o volume overload.   - Improved after early administration of IV Lasix and PO Labetalol   - Plan to administer AM Procardia XL at normal due time    - Will continue to monitor patient closely. Recheck BP 1 hour after administration of Procardia.   - Strict I&Os     Will endorse to AM team     Laurie Grijalva PA-C  Department of Medicine   [facility] MEDICINE PA NOTE    CC: Hypertensive Urgency     HPI: 77 y/o F w/ h/o HTN, CKD s/p Renal transplant, chronic rejection, recent admission for pneumonia s/p abx presented with shortness of breath with LUE and b/l LE edema concerning for volume overload.     Notified by RN: Pt manual BP this /70. other VSS.     Evaluated patient at bedside:   Patient endorses that she has been urinating. Denies HA, dizziness, visual changes, dyspnea, chest pain, palpitations, abdominal pain, N/V.   Repeat manual blood pressure personally obtained approximately 45 mins after early administration of AM meds, PO Labetalol 200mg and Lasix 40mg IVP (180/78).        Vital Signs Last 24 Hrs  T(C): 36.7 (05 Jan 2024 04:45), Max: 37.4 (04 Jan 2024 12:14)  T(F): 98.1 (05 Jan 2024 04:45), Max: 99.3 (04 Jan 2024 12:14)  HR: 77 (05 Jan 2024 04:45) (74 - 80)  BP: 210/70 (05 Jan 2024 04:47) (124/58 - 218/81)  BP(mean): --  RR: 18 (05 Jan 2024 04:45) (18 - 18)  SpO2: 98% (05 Jan 2024 04:45) (96% - 98%)    Parameters below as of 05 Jan 2024 04:45  Patient On (Oxygen Delivery Method): room air      Physical Exam:  General: NAD, AOx3, nontoxic appearing  Head:  NC/AT, no scleral injection, no JVD   CV: Regular rate and rhythm; No murmurs, rubs, or gallops.   Respiratory: CTA B/L, nonlabored  Abdominal: Soft, NT, ND no palpable mass, no guarding or rebound tenderness  Peripheral Vascular: 2+ pitting edema in B/L LEs. LUE notably edematous. Peripheral pulses present in all 4 extremities       Assessment & Plan:    1) Hypertensive Urgency   - likely exacerbated i/s/o volume overload.   - Improved after early administration of IV Lasix and PO Labetalol   - Plan to administer AM Procardia XL at normal due time    - Will continue to monitor patient closely. Recheck BP 1 hour after administration of Procardia.   - Strict I&Os     Will endorse to AM team     Laurie Grijalva PA-C  Department of Medicine   [facility]

## 2024-01-05 NOTE — DIETITIAN INITIAL EVALUATION ADULT - ENERGY INTAKE
Pt reports fluctuating from fair to good since admission depends on what's provided. Pt made aware of menu ordering procedure in house with menu provided, encourage pt to order preferred foods as needed to optimize PO intake while in house. Food preferences updated, will honor as able. Pt is willing to try one oral nutrition supplements to supplement PO intake in house.

## 2024-01-05 NOTE — PROGRESS NOTE ADULT - SUBJECTIVE AND OBJECTIVE BOX
DATE OF SERVICE: 01-05-24 @ 11:45    Patient is a 78y old  Female who presents with a chief complaint of     SUBJECTIVE / OVERNIGHT EVENTS:  No chest pain. No shortness of breath. No complaints. No events overnight.     MEDICATIONS  (STANDING):  aspirin enteric coated 81 milliGRAM(s) Oral daily  atovaquone  Suspension 1500 milliGRAM(s) Oral daily  cholecalciferol 2000 Unit(s) Oral daily  epoetin ivelisse (EPOGEN) Injectable 98811 Unit(s) SubCutaneous once  furosemide   Injectable 40 milliGRAM(s) IV Push every 12 hours  heparin   Injectable 5000 Unit(s) SubCutaneous every 8 hours  labetalol 200 milliGRAM(s) Oral three times a day  latanoprost 0.005% Ophthalmic Solution 1 Drop(s) Both EYES at bedtime  mycophenolate mofetil 500 milliGRAM(s) Oral two times a day  NIFEdipine XL 60 milliGRAM(s) Oral two times a day  predniSONE   Tablet 5 milliGRAM(s) Oral daily  sodium bicarbonate 1300 milliGRAM(s) Oral three times a day  sodium zirconium cyclosilicate 10 Gram(s) Oral daily  tacrolimus ER Tablet (ENVARSUS XR) 5 milliGRAM(s) Oral daily    MEDICATIONS  (PRN):  acetaminophen     Tablet .. 650 milliGRAM(s) Oral every 6 hours PRN Temp greater or equal to 38C (100.4F), Mild Pain (1 - 3)  melatonin 3 milliGRAM(s) Oral at bedtime PRN Insomnia      Vital Signs Last 24 Hrs  T(C): 38.1 (05 Jan 2024 09:39), Max: 38.1 (05 Jan 2024 09:39)  T(F): 100.6 (05 Jan 2024 09:39), Max: 100.6 (05 Jan 2024 09:39)  HR: 74 (05 Jan 2024 08:44) (74 - 80)  BP: 122/64 (05 Jan 2024 08:44) (122/64 - 218/81)  BP(mean): --  RR: 18 (05 Jan 2024 08:44) (18 - 18)  SpO2: 90% (05 Jan 2024 08:44) (90% - 98%)    Parameters below as of 05 Jan 2024 08:44  Patient On (Oxygen Delivery Method): room air      CAPILLARY BLOOD GLUCOSE        I&O's Summary    04 Jan 2024 07:01  -  05 Jan 2024 07:00  --------------------------------------------------------  IN: 380 mL / OUT: 0 mL / NET: 380 mL        PHYSICAL EXAM:  GENERAL: NAD, well-developed  HEAD:  Atraumatic, Normocephalic  EYES: EOMI, PERRLA, conjunctiva and sclera clear  NECK: Supple, No JVD  CHEST/LUNG: Clear to auscultation bilaterally; No wheeze  HEART: Regular rate and rhythm; No murmurs, rubs, or gallops  ABDOMEN: Soft, Nontender, Nondistended; Bowel sounds present  EXTREMITIES:  2+ Peripheral Pulses, No clubbing, cyanosis, , LUE and temo LE edema  PSYCH: AAOx3  NEUROLOGY: non-focal  SKIN: No rashes or lesions    LABS:                        8.4    6.16  )-----------( 197      ( 05 Jan 2024 10:35 )             26.9     01-05    137  |  105  |  54<H>  ----------------------------<  136<H>  4.4   |  16<L>  |  3.23<H>    Ca    8.8      05 Jan 2024 10:35    TPro  5.6<L>  /  Alb  3.2<L>  /  TBili  0.2  /  DBili  x   /  AST  9<L>  /  ALT  <5<L>  /  AlkPhos  76  01-03          Urinalysis Basic - ( 05 Jan 2024 10:35 )    Color: x / Appearance: x / SG: x / pH: x  Gluc: 136 mg/dL / Ketone: x  / Bili: x / Urobili: x   Blood: x / Protein: x / Nitrite: x   Leuk Esterase: x / RBC: x / WBC x   Sq Epi: x / Non Sq Epi: x / Bacteria: x        RADIOLOGY & ADDITIONAL TESTS:    Imaging Personally Reviewed:    Consultant(s) Notes Reviewed:      Care Discussed with Consultants/Other Providers:   DATE OF SERVICE: 01-05-24 @ 11:45    Patient is a 78y old  Female who presents with a chief complaint of     SUBJECTIVE / OVERNIGHT EVENTS:  No chest pain. No shortness of breath. No complaints. No events overnight.     MEDICATIONS  (STANDING):  aspirin enteric coated 81 milliGRAM(s) Oral daily  atovaquone  Suspension 1500 milliGRAM(s) Oral daily  cholecalciferol 2000 Unit(s) Oral daily  epoetin ivelisse (EPOGEN) Injectable 63719 Unit(s) SubCutaneous once  furosemide   Injectable 40 milliGRAM(s) IV Push every 12 hours  heparin   Injectable 5000 Unit(s) SubCutaneous every 8 hours  labetalol 200 milliGRAM(s) Oral three times a day  latanoprost 0.005% Ophthalmic Solution 1 Drop(s) Both EYES at bedtime  mycophenolate mofetil 500 milliGRAM(s) Oral two times a day  NIFEdipine XL 60 milliGRAM(s) Oral two times a day  predniSONE   Tablet 5 milliGRAM(s) Oral daily  sodium bicarbonate 1300 milliGRAM(s) Oral three times a day  sodium zirconium cyclosilicate 10 Gram(s) Oral daily  tacrolimus ER Tablet (ENVARSUS XR) 5 milliGRAM(s) Oral daily    MEDICATIONS  (PRN):  acetaminophen     Tablet .. 650 milliGRAM(s) Oral every 6 hours PRN Temp greater or equal to 38C (100.4F), Mild Pain (1 - 3)  melatonin 3 milliGRAM(s) Oral at bedtime PRN Insomnia      Vital Signs Last 24 Hrs  T(C): 38.1 (05 Jan 2024 09:39), Max: 38.1 (05 Jan 2024 09:39)  T(F): 100.6 (05 Jan 2024 09:39), Max: 100.6 (05 Jan 2024 09:39)  HR: 74 (05 Jan 2024 08:44) (74 - 80)  BP: 122/64 (05 Jan 2024 08:44) (122/64 - 218/81)  BP(mean): --  RR: 18 (05 Jan 2024 08:44) (18 - 18)  SpO2: 90% (05 Jan 2024 08:44) (90% - 98%)    Parameters below as of 05 Jan 2024 08:44  Patient On (Oxygen Delivery Method): room air      CAPILLARY BLOOD GLUCOSE        I&O's Summary    04 Jan 2024 07:01  -  05 Jan 2024 07:00  --------------------------------------------------------  IN: 380 mL / OUT: 0 mL / NET: 380 mL        PHYSICAL EXAM:  GENERAL: NAD, well-developed  HEAD:  Atraumatic, Normocephalic  EYES: EOMI, PERRLA, conjunctiva and sclera clear  NECK: Supple, No JVD  CHEST/LUNG: Clear to auscultation bilaterally; No wheeze  HEART: Regular rate and rhythm; No murmurs, rubs, or gallops  ABDOMEN: Soft, Nontender, Nondistended; Bowel sounds present  EXTREMITIES:  2+ Peripheral Pulses, No clubbing, cyanosis, , LUE and temo LE edema  PSYCH: AAOx3  NEUROLOGY: non-focal  SKIN: No rashes or lesions    LABS:                        8.4    6.16  )-----------( 197      ( 05 Jan 2024 10:35 )             26.9     01-05    137  |  105  |  54<H>  ----------------------------<  136<H>  4.4   |  16<L>  |  3.23<H>    Ca    8.8      05 Jan 2024 10:35    TPro  5.6<L>  /  Alb  3.2<L>  /  TBili  0.2  /  DBili  x   /  AST  9<L>  /  ALT  <5<L>  /  AlkPhos  76  01-03          Urinalysis Basic - ( 05 Jan 2024 10:35 )    Color: x / Appearance: x / SG: x / pH: x  Gluc: 136 mg/dL / Ketone: x  / Bili: x / Urobili: x   Blood: x / Protein: x / Nitrite: x   Leuk Esterase: x / RBC: x / WBC x   Sq Epi: x / Non Sq Epi: x / Bacteria: x        RADIOLOGY & ADDITIONAL TESTS:    Imaging Personally Reviewed:    Consultant(s) Notes Reviewed:      Care Discussed with Consultants/Other Providers:

## 2024-01-05 NOTE — PROGRESS NOTE ADULT - ASSESSMENT
76 y/o female with PMH ESRD due to HTN since 2016, s/p DDRT (05/19/2021- induction with basiliximab), HTN, Anemia who presented to [facility] due to worsening B/L LE edema, facial edema and LUE edema.   78 y/o female with PMH ESRD due to HTN since 2016, s/p DDRT (05/19/2021- induction with basiliximab), HTN, Anemia who presented to [facility] due to worsening B/L LE edema, facial edema and LUE edema.

## 2024-01-05 NOTE — DIETITIAN INITIAL EVALUATION ADULT - OTHER CALCULATIONS
Fluid needs deferred to team. Energy and protein needs based on dosing wt of 54.4kg in consideration of acute severe malnutrition.

## 2024-01-05 NOTE — PROVIDER CONTACT NOTE (OTHER) - RECOMMENDATIONS
Pt received labetalol and lasix early, recheck BP in 30-45 min
As per provider, administer tacrolimus

## 2024-01-05 NOTE — PROVIDER CONTACT NOTE (OTHER) - ASSESSMENT
Warm to touch
Pt A&O x4, denies discomfort or distress.
Pt manual /70, repeat electronic 218/81. Pt reports new congestion and cough when waking up

## 2024-01-05 NOTE — DIETITIAN NUTRITION RISK NOTIFICATION - TREATMENT: THE FOLLOWING DIET HAS BEEN RECOMMENDED
Diet, Regular:   No Concentrated Potassium  Low Sodium  No Concentrated Phosphorus  Supplement Feeding Modality:  Oral  Nepro Cans or Servings Per Day:  1       Frequency:  Daily (01-05-24 @ 15:17) [Pending Verification By Attending]  Diet, Regular:   No Concentrated Potassium  Low Sodium  No Concentrated Phosphorus (01-03-24 @ 17:15) [Active]

## 2024-01-05 NOTE — DIETITIAN INITIAL EVALUATION ADULT - NS FNS DIET ORDER
Diet, Regular:   No Concentrated Potassium  Low Sodium  No Concentrated Phosphorus (01-03-24 @ 17:15) [Active]

## 2024-01-05 NOTE — PROGRESS NOTE ADULT - SUBJECTIVE AND OBJECTIVE BOX
Dr. Ivana Hackett MD  Office [phone] (9 am to 5 pm)  Service: [phone] (5pm to 9am)  Ghislaine Lucero NP       RENAL PROGRESS NOTE: DATE OF SERVICE 01-05-24 @ 13:02    Patient is a 78y old  Female who presents with a chief complaint of     Patient seen and examined at bedside.     VITALS:  T(F): 100.6 (01-05-24 @ 09:39), Max: 100.6 (01-05-24 @ 09:39)  HR: 74 (01-05-24 @ 08:44)  BP: 122/64 (01-05-24 @ 08:44)  RR: 18 (01-05-24 @ 08:44)  SpO2: 90% (01-05-24 @ 08:44)  Wt(kg): --    01-04 @ 07:01  -  01-05 @ 07:00  --------------------------------------------------------  IN: 380 mL / OUT: 0 mL / NET: 380 mL          PHYSICAL EXAM:  Constitutional: NAD  Neck: No JVD  Respiratory: basilar crackles   Cardiovascular: S1, S2, RRR  Gastrointestinal: BS+, soft, NT/ND  Extremities: pitting peripheral edema    Hospital Medications:   MEDICATIONS  (STANDING):  aspirin enteric coated 81 milliGRAM(s) Oral daily  atovaquone  Suspension 1500 milliGRAM(s) Oral daily  cholecalciferol 2000 Unit(s) Oral daily  epoetin ivelisse (EPOGEN) Injectable 30947 Unit(s) SubCutaneous once  furosemide   Injectable 40 milliGRAM(s) IV Push every 12 hours  heparin   Injectable 5000 Unit(s) SubCutaneous every 8 hours  labetalol 200 milliGRAM(s) Oral three times a day  latanoprost 0.005% Ophthalmic Solution 1 Drop(s) Both EYES at bedtime  mycophenolate mofetil 500 milliGRAM(s) Oral two times a day  NIFEdipine XL 60 milliGRAM(s) Oral two times a day  predniSONE   Tablet 5 milliGRAM(s) Oral daily  sodium bicarbonate 1300 milliGRAM(s) Oral three times a day  sodium zirconium cyclosilicate 10 Gram(s) Oral daily  tacrolimus ER Tablet (ENVARSUS XR) 5 milliGRAM(s) Oral daily      LABS:  01-05    137  |  105  |  54<H>  ----------------------------<  136<H>  4.4   |  16<L>  |  3.23<H>    Ca    8.8      05 Jan 2024 10:35    TPro  5.6<L>  /  Alb  3.2<L>  /  TBili  0.2  /  DBili      /  AST  9<L>  /  ALT  <5<L>  /  AlkPhos  76  01-03    Creatinine Trend: 3.23 <--, 2.95 <--, 2.75 <--, 2.67 <--                                8.4    6.16  )-----------( 197      ( 05 Jan 2024 10:35 )             26.9     Urine Studies:  Urinalysis - [01-05-24 @ 10:35]      Color  / Appearance  / SG  / pH       Gluc 136 / Ketone   / Bili  / Urobili        Blood  / Protein  / Leuk Est  / Nitrite       RBC  / WBC  / Hyaline  / Gran  / Sq Epi  / Non Sq Epi  / Bacteria       Iron 15, TIBC 140, %sat 11      [11-10-23 @ 06:01]  Ferritin 1817      [11-11-23 @ 06:09]  PTH -- (Ca 8.5)      [11-10-23 @ 06:01]   659  Vitamin D (25OH) 16.5      [11-11-23 @ 06:09]  HbA1c 4.6      [05-28-19 @ 09:49]  Lipid: chol 191, , HDL 64, LDL --      [01-04-24 @ 10:13]    HBsAg Nonreact      [01-04-24 @ 07:51]  HCV 0.27, Nonreact      [01-04-24 @ 07:51]  HIV Nonreact      [01-04-24 @ 10:13]    C3 Complement 120      [01-04-24 @ 07:51]  C4 Complement 34      [01-04-24 @ 07:51]    RADIOLOGY & ADDITIONAL STUDIES:     Dr. Ivana Hackett MD  Office [phone] (9 am to 5 pm)  Service: [phone] (5pm to 9am)  Ghislaine Lucero NP       RENAL PROGRESS NOTE: DATE OF SERVICE 01-05-24 @ 13:02    Patient is a 78y old  Female who presents with a chief complaint of     Patient seen and examined at bedside.     VITALS:  T(F): 100.6 (01-05-24 @ 09:39), Max: 100.6 (01-05-24 @ 09:39)  HR: 74 (01-05-24 @ 08:44)  BP: 122/64 (01-05-24 @ 08:44)  RR: 18 (01-05-24 @ 08:44)  SpO2: 90% (01-05-24 @ 08:44)  Wt(kg): --    01-04 @ 07:01  -  01-05 @ 07:00  --------------------------------------------------------  IN: 380 mL / OUT: 0 mL / NET: 380 mL          PHYSICAL EXAM:  Constitutional: NAD  Neck: No JVD  Respiratory: basilar crackles   Cardiovascular: S1, S2, RRR  Gastrointestinal: BS+, soft, NT/ND  Extremities: pitting peripheral edema    Hospital Medications:   MEDICATIONS  (STANDING):  aspirin enteric coated 81 milliGRAM(s) Oral daily  atovaquone  Suspension 1500 milliGRAM(s) Oral daily  cholecalciferol 2000 Unit(s) Oral daily  epoetin vielisse (EPOGEN) Injectable 60029 Unit(s) SubCutaneous once  furosemide   Injectable 40 milliGRAM(s) IV Push every 12 hours  heparin   Injectable 5000 Unit(s) SubCutaneous every 8 hours  labetalol 200 milliGRAM(s) Oral three times a day  latanoprost 0.005% Ophthalmic Solution 1 Drop(s) Both EYES at bedtime  mycophenolate mofetil 500 milliGRAM(s) Oral two times a day  NIFEdipine XL 60 milliGRAM(s) Oral two times a day  predniSONE   Tablet 5 milliGRAM(s) Oral daily  sodium bicarbonate 1300 milliGRAM(s) Oral three times a day  sodium zirconium cyclosilicate 10 Gram(s) Oral daily  tacrolimus ER Tablet (ENVARSUS XR) 5 milliGRAM(s) Oral daily      LABS:  01-05    137  |  105  |  54<H>  ----------------------------<  136<H>  4.4   |  16<L>  |  3.23<H>    Ca    8.8      05 Jan 2024 10:35    TPro  5.6<L>  /  Alb  3.2<L>  /  TBili  0.2  /  DBili      /  AST  9<L>  /  ALT  <5<L>  /  AlkPhos  76  01-03    Creatinine Trend: 3.23 <--, 2.95 <--, 2.75 <--, 2.67 <--                                8.4    6.16  )-----------( 197      ( 05 Jan 2024 10:35 )             26.9     Urine Studies:  Urinalysis - [01-05-24 @ 10:35]      Color  / Appearance  / SG  / pH       Gluc 136 / Ketone   / Bili  / Urobili        Blood  / Protein  / Leuk Est  / Nitrite       RBC  / WBC  / Hyaline  / Gran  / Sq Epi  / Non Sq Epi  / Bacteria       Iron 15, TIBC 140, %sat 11      [11-10-23 @ 06:01]  Ferritin 1817      [11-11-23 @ 06:09]  PTH -- (Ca 8.5)      [11-10-23 @ 06:01]   659  Vitamin D (25OH) 16.5      [11-11-23 @ 06:09]  HbA1c 4.6      [05-28-19 @ 09:49]  Lipid: chol 191, , HDL 64, LDL --      [01-04-24 @ 10:13]    HBsAg Nonreact      [01-04-24 @ 07:51]  HCV 0.27, Nonreact      [01-04-24 @ 07:51]  HIV Nonreact      [01-04-24 @ 10:13]    C3 Complement 120      [01-04-24 @ 07:51]  C4 Complement 34      [01-04-24 @ 07:51]    RADIOLOGY & ADDITIONAL STUDIES:

## 2024-01-05 NOTE — PROGRESS NOTE ADULT - ASSESSMENT
78f h/o HTN, s/p Renal transplant, CKD, chronic rejection, recent admission for pneumonia s/p abx presented with shortness of breath with LUE and b/l LE edema concerning for volume overload.     Shortness of breath.   - with LUE and b/l LE edema   UA with protein 300  check urine protein to cr - evaluate for nephrotic syndrome  check EKG   lasix 40mg iv bid - renal consult appreciated   check TTE  covid, rsv, flu negative.    Stage 4 chronic kidney disease.    creatinine improved since last admission   check urine protein to cr as above  check urine lytes, renal us   rheum studies ordered   renal transplant consult appreciated   continue tacrolimus 5mg qd (monitor levels), mycophenolate 500mg bid, prednisone 5mg qd  renally dose meds, avoid nephrotoxic medications.     uncontrolled HTN (hypertension).   monitor bp   c/w nifedipine, labetalol at home doses.     Opacity of lung on imaging study.   new RUL opacity   clinically c/w volume overload  procalcitonin low  monitor closely as patient on immunosuppressives - low threshold for starting empiric abx   check ct chest.     Metabolic acidosis.   c/w sodium bicarb.    Prophylactic measure.   dvt proph - hsq.

## 2024-01-05 NOTE — DIETITIAN INITIAL EVALUATION ADULT - ORAL INTAKE PTA/DIET HISTORY
Pt reports decreased PO intake <75% for ~ 1 week PTA as she is feeling sick and coughing. Denies difficulty chewing/swallowing, nausea/vomiting/constipation/diarrhea. Pt states she has home attendance to prepare foods for her, does not follow any therapeutic restriction PTA.   Confirms allergic to mushroom   Micronutrient/Other supplementation: Vitamin D3  Protein-energy supplementation: none

## 2024-01-05 NOTE — DIETITIAN INITIAL EVALUATION ADULT - ORAL NUTRITION SUPPLEMENTS
recommend adding Nepro 1x daily (425 kcals, 19 g protein). to provide additional calories and nutrients to encourage PO intake while in house.

## 2024-01-06 LAB
ANION GAP SERPL CALC-SCNC: 13 MMOL/L — SIGNIFICANT CHANGE UP (ref 5–17)
ANION GAP SERPL CALC-SCNC: 13 MMOL/L — SIGNIFICANT CHANGE UP (ref 5–17)
AUTO DIFF PNL BLD: ABNORMAL
AUTO DIFF PNL BLD: ABNORMAL
BUN SERPL-MCNC: 56 MG/DL — HIGH (ref 7–23)
BUN SERPL-MCNC: 56 MG/DL — HIGH (ref 7–23)
C-ANCA SER-ACNC: NEGATIVE — SIGNIFICANT CHANGE UP
C-ANCA SER-ACNC: NEGATIVE — SIGNIFICANT CHANGE UP
CALCIUM SERPL-MCNC: 8.3 MG/DL — LOW (ref 8.4–10.5)
CALCIUM SERPL-MCNC: 8.3 MG/DL — LOW (ref 8.4–10.5)
CHLORIDE SERPL-SCNC: 104 MMOL/L — SIGNIFICANT CHANGE UP (ref 96–108)
CHLORIDE SERPL-SCNC: 104 MMOL/L — SIGNIFICANT CHANGE UP (ref 96–108)
CO2 SERPL-SCNC: 18 MMOL/L — LOW (ref 22–31)
CO2 SERPL-SCNC: 18 MMOL/L — LOW (ref 22–31)
CREAT SERPL-MCNC: 3.35 MG/DL — HIGH (ref 0.5–1.3)
CREAT SERPL-MCNC: 3.35 MG/DL — HIGH (ref 0.5–1.3)
DSDNA AB SER-ACNC: <12 IU/ML — SIGNIFICANT CHANGE UP
DSDNA AB SER-ACNC: <12 IU/ML — SIGNIFICANT CHANGE UP
EGFR: 13 ML/MIN/1.73M2 — LOW
EGFR: 13 ML/MIN/1.73M2 — LOW
FERRITIN SERPL-MCNC: 1573 NG/ML — HIGH (ref 13–330)
FERRITIN SERPL-MCNC: 1573 NG/ML — HIGH (ref 13–330)
GLUCOSE SERPL-MCNC: 173 MG/DL — HIGH (ref 70–99)
GLUCOSE SERPL-MCNC: 173 MG/DL — HIGH (ref 70–99)
HCT VFR BLD CALC: 24.2 % — LOW (ref 34.5–45)
HCT VFR BLD CALC: 24.2 % — LOW (ref 34.5–45)
HGB BLD-MCNC: 7.6 G/DL — LOW (ref 11.5–15.5)
HGB BLD-MCNC: 7.6 G/DL — LOW (ref 11.5–15.5)
IRON SATN MFR SERPL: 12 % — LOW (ref 14–50)
IRON SATN MFR SERPL: 12 % — LOW (ref 14–50)
IRON SATN MFR SERPL: 21 UG/DL — LOW (ref 30–160)
IRON SATN MFR SERPL: 21 UG/DL — LOW (ref 30–160)
MCHC RBC-ENTMCNC: 25.9 PG — LOW (ref 27–34)
MCHC RBC-ENTMCNC: 25.9 PG — LOW (ref 27–34)
MCHC RBC-ENTMCNC: 31.4 GM/DL — LOW (ref 32–36)
MCHC RBC-ENTMCNC: 31.4 GM/DL — LOW (ref 32–36)
MCV RBC AUTO: 82.3 FL — SIGNIFICANT CHANGE UP (ref 80–100)
MCV RBC AUTO: 82.3 FL — SIGNIFICANT CHANGE UP (ref 80–100)
NRBC # BLD: 0 /100 WBCS — SIGNIFICANT CHANGE UP (ref 0–0)
NRBC # BLD: 0 /100 WBCS — SIGNIFICANT CHANGE UP (ref 0–0)
P-ANCA SER-ACNC: NEGATIVE — SIGNIFICANT CHANGE UP
P-ANCA SER-ACNC: NEGATIVE — SIGNIFICANT CHANGE UP
PLATELET # BLD AUTO: 192 K/UL — SIGNIFICANT CHANGE UP (ref 150–400)
PLATELET # BLD AUTO: 192 K/UL — SIGNIFICANT CHANGE UP (ref 150–400)
POTASSIUM SERPL-MCNC: 4.2 MMOL/L — SIGNIFICANT CHANGE UP (ref 3.5–5.3)
POTASSIUM SERPL-MCNC: 4.2 MMOL/L — SIGNIFICANT CHANGE UP (ref 3.5–5.3)
POTASSIUM SERPL-SCNC: 4.2 MMOL/L — SIGNIFICANT CHANGE UP (ref 3.5–5.3)
POTASSIUM SERPL-SCNC: 4.2 MMOL/L — SIGNIFICANT CHANGE UP (ref 3.5–5.3)
RBC # BLD: 2.94 M/UL — LOW (ref 3.8–5.2)
RBC # BLD: 2.94 M/UL — LOW (ref 3.8–5.2)
RBC # FLD: 16.1 % — HIGH (ref 10.3–14.5)
RBC # FLD: 16.1 % — HIGH (ref 10.3–14.5)
SODIUM SERPL-SCNC: 135 MMOL/L — SIGNIFICANT CHANGE UP (ref 135–145)
SODIUM SERPL-SCNC: 135 MMOL/L — SIGNIFICANT CHANGE UP (ref 135–145)
TIBC SERPL-MCNC: 171 UG/DL — LOW (ref 220–430)
TIBC SERPL-MCNC: 171 UG/DL — LOW (ref 220–430)
UIBC SERPL-MCNC: 151 UG/DL — SIGNIFICANT CHANGE UP (ref 110–370)
UIBC SERPL-MCNC: 151 UG/DL — SIGNIFICANT CHANGE UP (ref 110–370)
WBC # BLD: 4.45 K/UL — SIGNIFICANT CHANGE UP (ref 3.8–10.5)
WBC # BLD: 4.45 K/UL — SIGNIFICANT CHANGE UP (ref 3.8–10.5)
WBC # FLD AUTO: 4.45 K/UL — SIGNIFICANT CHANGE UP (ref 3.8–10.5)
WBC # FLD AUTO: 4.45 K/UL — SIGNIFICANT CHANGE UP (ref 3.8–10.5)

## 2024-01-06 PROCEDURE — 99232 SBSQ HOSP IP/OBS MODERATE 35: CPT

## 2024-01-06 RX ORDER — ERYTHROPOIETIN 10000 [IU]/ML
10000 INJECTION, SOLUTION INTRAVENOUS; SUBCUTANEOUS
Refills: 0 | Status: DISCONTINUED | OUTPATIENT
Start: 2024-01-06 | End: 2024-01-06

## 2024-01-06 RX ORDER — ERYTHROPOIETIN 10000 [IU]/ML
10000 INJECTION, SOLUTION INTRAVENOUS; SUBCUTANEOUS
Refills: 0 | Status: DISCONTINUED | OUTPATIENT
Start: 2024-01-06 | End: 2024-01-11

## 2024-01-06 RX ORDER — KETOTIFEN FUMARATE 0.34 MG/ML
1 SOLUTION OPHTHALMIC
Refills: 0 | Status: DISCONTINUED | OUTPATIENT
Start: 2024-01-06 | End: 2024-01-08

## 2024-01-06 RX ADMIN — TACROLIMUS 5 MILLIGRAM(S): 5 CAPSULE ORAL at 05:45

## 2024-01-06 RX ADMIN — Medication 200 MILLIGRAM(S): at 05:47

## 2024-01-06 RX ADMIN — Medication 60 MILLIGRAM(S): at 17:59

## 2024-01-06 RX ADMIN — Medication 1300 MILLIGRAM(S): at 13:18

## 2024-01-06 RX ADMIN — LATANOPROST 1 DROP(S): 0.05 SOLUTION/ DROPS OPHTHALMIC; TOPICAL at 22:11

## 2024-01-06 RX ADMIN — Medication 1 DROP(S): at 20:09

## 2024-01-06 RX ADMIN — Medication 1300 MILLIGRAM(S): at 22:11

## 2024-01-06 RX ADMIN — Medication 650 MILLIGRAM(S): at 04:54

## 2024-01-06 RX ADMIN — Medication 5 MILLIGRAM(S): at 05:48

## 2024-01-06 RX ADMIN — Medication 60 MILLIGRAM(S): at 05:47

## 2024-01-06 RX ADMIN — Medication 40 MILLIGRAM(S): at 05:50

## 2024-01-06 RX ADMIN — Medication 40 MILLIGRAM(S): at 13:19

## 2024-01-06 RX ADMIN — Medication 1300 MILLIGRAM(S): at 05:45

## 2024-01-06 RX ADMIN — HEPARIN SODIUM 5000 UNIT(S): 5000 INJECTION INTRAVENOUS; SUBCUTANEOUS at 05:49

## 2024-01-06 RX ADMIN — Medication 81 MILLIGRAM(S): at 13:17

## 2024-01-06 RX ADMIN — MYCOPHENOLATE MOFETIL 500 MILLIGRAM(S): 250 CAPSULE ORAL at 05:47

## 2024-01-06 RX ADMIN — SODIUM ZIRCONIUM CYCLOSILICATE 10 GRAM(S): 10 POWDER, FOR SUSPENSION ORAL at 13:38

## 2024-01-06 RX ADMIN — CEFEPIME 100 MILLIGRAM(S): 1 INJECTION, POWDER, FOR SOLUTION INTRAMUSCULAR; INTRAVENOUS at 22:09

## 2024-01-06 RX ADMIN — HEPARIN SODIUM 5000 UNIT(S): 5000 INJECTION INTRAVENOUS; SUBCUTANEOUS at 22:10

## 2024-01-06 RX ADMIN — Medication 2000 UNIT(S): at 13:19

## 2024-01-06 RX ADMIN — MYCOPHENOLATE MOFETIL 500 MILLIGRAM(S): 250 CAPSULE ORAL at 17:59

## 2024-01-06 RX ADMIN — ATOVAQUONE 1500 MILLIGRAM(S): 750 SUSPENSION ORAL at 13:18

## 2024-01-06 RX ADMIN — Medication 200 MILLIGRAM(S): at 13:19

## 2024-01-06 RX ADMIN — Medication 40 MILLIGRAM(S): at 22:10

## 2024-01-06 RX ADMIN — Medication 1 DROP(S): at 22:11

## 2024-01-06 RX ADMIN — HEPARIN SODIUM 5000 UNIT(S): 5000 INJECTION INTRAVENOUS; SUBCUTANEOUS at 13:19

## 2024-01-06 RX ADMIN — Medication 200 MILLIGRAM(S): at 22:10

## 2024-01-06 NOTE — PROGRESS NOTE ADULT - SUBJECTIVE AND OBJECTIVE BOX
DATE OF SERVICE: 24 @ 09:59    Patient is a 78y old  Female who presents with a chief complaint of Edema (2024 15:35)      SUBJECTIVE / OVERNIGHT EVENTS:  No chest pain. No shortness of breath. No complaints. No events overnight.     MEDICATIONS  (STANDING):  aspirin enteric coated 81 milliGRAM(s) Oral daily  atovaquone  Suspension 1500 milliGRAM(s) Oral daily  cefepime   IVPB 1000 milliGRAM(s) IV Intermittent every 24 hours  cholecalciferol 2000 Unit(s) Oral daily  epoetin ivelisse (EPOGEN) Injectable 52166 Unit(s) SubCutaneous once  furosemide   Injectable 40 milliGRAM(s) IV Push three times a day  heparin   Injectable 5000 Unit(s) SubCutaneous every 8 hours  labetalol 200 milliGRAM(s) Oral three times a day  latanoprost 0.005% Ophthalmic Solution 1 Drop(s) Both EYES at bedtime  mycophenolate mofetil 500 milliGRAM(s) Oral two times a day  NIFEdipine XL 60 milliGRAM(s) Oral two times a day  predniSONE   Tablet 5 milliGRAM(s) Oral daily  sodium bicarbonate 1300 milliGRAM(s) Oral three times a day  sodium zirconium cyclosilicate 10 Gram(s) Oral daily  tacrolimus ER Tablet (ENVARSUS XR) 5 milliGRAM(s) Oral daily    MEDICATIONS  (PRN):  acetaminophen     Tablet .. 650 milliGRAM(s) Oral every 6 hours PRN Temp greater or equal to 38C (100.4F), Mild Pain (1 - 3)  melatonin 3 milliGRAM(s) Oral at bedtime PRN Insomnia      Vital Signs Last 24 Hrs  T(C): 37.1 (2024 08:05), Max: 38.3 (2024 05:00)  T(F): 98.7 (2024 08:05), Max: 101 (2024 05:00)  HR: 71 (2024 08:05) (71 - 78)  BP: 138/68 (2024 08:05) (138/68 - 184/73)  BP(mean): --  RR: 18 (2024 08:05) (18 - 18)  SpO2: 95% (2024 08:05) (94% - 95%)    Parameters below as of 2024 08:05  Patient On (Oxygen Delivery Method): room air      CAPILLARY BLOOD GLUCOSE        I&O's Summary    2024 07:01  -  2024 07:00  --------------------------------------------------------  IN: 120 mL / OUT: 0 mL / NET: 120 mL        PHYSICAL EXAM:  GENERAL: NAD, well-developed  HEAD:  Atraumatic, Normocephalic  EYES: EOMI, PERRLA, conjunctiva and sclera clear  NECK: Supple, No JVD  CHEST/LUNG: Clear to auscultation bilaterally; No wheeze  HEART: Regular rate and rhythm; No murmurs, rubs, or gallops  ABDOMEN: Soft, Nontender, Nondistended; Bowel sounds present  EXTREMITIES:  2+ Peripheral Pulses, No clubbing, cyanosis, temo  edema  PSYCH: AAOx3  NEUROLOGY: non-focal  SKIN: No rashes or lesions    LABS:                        8.4    6.16  )-----------( 197      ( 2024 10:35 )             26.9     -    137  |  105  |  54<H>  ----------------------------<  136<H>  4.4   |  16<L>  |  3.23<H>    Ca    8.8      2024 10:35            Urinalysis Basic - ( 2024 14:09 )    Color: Yellow / Appearance: Clear / S.015 / pH: x  Gluc: x / Ketone: Negative mg/dL  / Bili: Negative / Urobili: 0.2 mg/dL   Blood: x / Protein: 300 mg/dL / Nitrite: Negative   Leuk Esterase: Negative / RBC: 2 /HPF / WBC 1 /HPF   Sq Epi: x / Non Sq Epi: 0 /HPF / Bacteria: Negative /HPF    < from: US Trans Kidney w/ Doppler, Right (24 @ 20:37) >  FINDINGS:    Renal Transplant: 11.9 cm. No renal mass, hydronephrosis or calculi.   Increased cortical echogenicity. Urothelial thickening is noted involving   the renal pelvis.  Urinary bladder: Within normal limits.    Color and spectral Doppler reveals homogeneous flow throughout the   transplant.    Peak iliac artery velocity is 121 cm/sec pre-anastomosis, 110 cm/sec at   the anastomosis, and 127 cm/sec post anastomosis.    Transplant Renal Artery:  Peak systolic velocity is 133 cm/sec anastomosis, 184 cm/sec proximal,   133 cm/sec mid, 154 cm/sec distal and 144 cm/sec hilum.  Resistive   indices range from 0.84 to 0.93  Resistive Indices within the kidney  are very elevated with little to no   diastolic flow in most of the tracings.    Transplant Renal Vein: Patent.    IMPRESSION:    Status post renal transplant in the right lower quadrant. The transplant   vasculature is patent, as detailed above. However, there are markedly   elevated resistive indices obtained within the kidney, with little to no   diastolic flow. This has worsened compared to the prior exam. Elevated   resistiveindices are also obtained within the main renal artery. The   main renal vein and external iliac vein are demonstrated to be grossly   patent. There is no hydronephrosis. No perinephric fluid collection.   There is increased cortical echogenicity andmild urothelial thickening.    Differential considerations include rejection and ATN. Clinical and   laboratory correlation is advised.Close short term follow-up to   re-evaluate these findings is advised.    Findings were discussed with AG Valentine 2024 11:36 AM by Dr. Oliveira with read back confirmation.    < end of copied text >  < from: [facility] Duplex Upper Ext Vein Scan, Left (24 @ 12:39) >  INTERPRETATION:  CLINICAL INFORMATION: Left upper extremity swelling and   pain    COMPARISON: A prior examination, dated 2017 showed a stented,   thrombosed egress basilic vein from a dialysis access and a thrombosed   left subclavian vein.    TECHNIQUE: Duplex sonography of the LEFT UPPER extremity veins with color   and spectral Doppler, with and without compression.    FINDINGS: There is edema within the superficial soft tissues of the left   upper extremity.    The left internal jugular vein is patent and free of clot.    The left subclavian vein is patent. Postphlebitic changes likely affect   the medial portion of the cephalic vein.    The left axillary and brachial veins are patent and free of clot.    The stented, thrombosed left basilic vein is again identified.    The left cephalic vein is patent and free of clot.          IMPRESSION:    The left subclavian vein is patent although displaying postphlebitic   changes medially.    No acute DVT identified.      < end of copied text >      RADIOLOGY & ADDITIONAL TESTS:    Imaging Personally Reviewed:    Consultant(s) Notes Reviewed:      Care Discussed with Consultants/Other Providers:   DATE OF SERVICE: 24 @ 09:59    Patient is a 78y old  Female who presents with a chief complaint of Edema (2024 15:35)      SUBJECTIVE / OVERNIGHT EVENTS:  No chest pain. No shortness of breath. No complaints. No events overnight.     MEDICATIONS  (STANDING):  aspirin enteric coated 81 milliGRAM(s) Oral daily  atovaquone  Suspension 1500 milliGRAM(s) Oral daily  cefepime   IVPB 1000 milliGRAM(s) IV Intermittent every 24 hours  cholecalciferol 2000 Unit(s) Oral daily  epoetin ivelisse (EPOGEN) Injectable 84127 Unit(s) SubCutaneous once  furosemide   Injectable 40 milliGRAM(s) IV Push three times a day  heparin   Injectable 5000 Unit(s) SubCutaneous every 8 hours  labetalol 200 milliGRAM(s) Oral three times a day  latanoprost 0.005% Ophthalmic Solution 1 Drop(s) Both EYES at bedtime  mycophenolate mofetil 500 milliGRAM(s) Oral two times a day  NIFEdipine XL 60 milliGRAM(s) Oral two times a day  predniSONE   Tablet 5 milliGRAM(s) Oral daily  sodium bicarbonate 1300 milliGRAM(s) Oral three times a day  sodium zirconium cyclosilicate 10 Gram(s) Oral daily  tacrolimus ER Tablet (ENVARSUS XR) 5 milliGRAM(s) Oral daily    MEDICATIONS  (PRN):  acetaminophen     Tablet .. 650 milliGRAM(s) Oral every 6 hours PRN Temp greater or equal to 38C (100.4F), Mild Pain (1 - 3)  melatonin 3 milliGRAM(s) Oral at bedtime PRN Insomnia      Vital Signs Last 24 Hrs  T(C): 37.1 (2024 08:05), Max: 38.3 (2024 05:00)  T(F): 98.7 (2024 08:05), Max: 101 (2024 05:00)  HR: 71 (2024 08:05) (71 - 78)  BP: 138/68 (2024 08:05) (138/68 - 184/73)  BP(mean): --  RR: 18 (2024 08:05) (18 - 18)  SpO2: 95% (2024 08:05) (94% - 95%)    Parameters below as of 2024 08:05  Patient On (Oxygen Delivery Method): room air      CAPILLARY BLOOD GLUCOSE        I&O's Summary    2024 07:01  -  2024 07:00  --------------------------------------------------------  IN: 120 mL / OUT: 0 mL / NET: 120 mL        PHYSICAL EXAM:  GENERAL: NAD, well-developed  HEAD:  Atraumatic, Normocephalic  EYES: EOMI, PERRLA, conjunctiva and sclera clear  NECK: Supple, No JVD  CHEST/LUNG: Clear to auscultation bilaterally; No wheeze  HEART: Regular rate and rhythm; No murmurs, rubs, or gallops  ABDOMEN: Soft, Nontender, Nondistended; Bowel sounds present  EXTREMITIES:  2+ Peripheral Pulses, No clubbing, cyanosis, temo  edema  PSYCH: AAOx3  NEUROLOGY: non-focal  SKIN: No rashes or lesions    LABS:                        8.4    6.16  )-----------( 197      ( 2024 10:35 )             26.9     -    137  |  105  |  54<H>  ----------------------------<  136<H>  4.4   |  16<L>  |  3.23<H>    Ca    8.8      2024 10:35            Urinalysis Basic - ( 2024 14:09 )    Color: Yellow / Appearance: Clear / S.015 / pH: x  Gluc: x / Ketone: Negative mg/dL  / Bili: Negative / Urobili: 0.2 mg/dL   Blood: x / Protein: 300 mg/dL / Nitrite: Negative   Leuk Esterase: Negative / RBC: 2 /HPF / WBC 1 /HPF   Sq Epi: x / Non Sq Epi: 0 /HPF / Bacteria: Negative /HPF    < from: US Trans Kidney w/ Doppler, Right (24 @ 20:37) >  FINDINGS:    Renal Transplant: 11.9 cm. No renal mass, hydronephrosis or calculi.   Increased cortical echogenicity. Urothelial thickening is noted involving   the renal pelvis.  Urinary bladder: Within normal limits.    Color and spectral Doppler reveals homogeneous flow throughout the   transplant.    Peak iliac artery velocity is 121 cm/sec pre-anastomosis, 110 cm/sec at   the anastomosis, and 127 cm/sec post anastomosis.    Transplant Renal Artery:  Peak systolic velocity is 133 cm/sec anastomosis, 184 cm/sec proximal,   133 cm/sec mid, 154 cm/sec distal and 144 cm/sec hilum.  Resistive   indices range from 0.84 to 0.93  Resistive Indices within the kidney  are very elevated with little to no   diastolic flow in most of the tracings.    Transplant Renal Vein: Patent.    IMPRESSION:    Status post renal transplant in the right lower quadrant. The transplant   vasculature is patent, as detailed above. However, there are markedly   elevated resistive indices obtained within the kidney, with little to no   diastolic flow. This has worsened compared to the prior exam. Elevated   resistiveindices are also obtained within the main renal artery. The   main renal vein and external iliac vein are demonstrated to be grossly   patent. There is no hydronephrosis. No perinephric fluid collection.   There is increased cortical echogenicity andmild urothelial thickening.    Differential considerations include rejection and ATN. Clinical and   laboratory correlation is advised.Close short term follow-up to   re-evaluate these findings is advised.    Findings were discussed with AG Valentine 2024 11:36 AM by Dr. Oliveira with read back confirmation.    < end of copied text >  < from: [facility] Duplex Upper Ext Vein Scan, Left (24 @ 12:39) >  INTERPRETATION:  CLINICAL INFORMATION: Left upper extremity swelling and   pain    COMPARISON: A prior examination, dated 2017 showed a stented,   thrombosed egress basilic vein from a dialysis access and a thrombosed   left subclavian vein.    TECHNIQUE: Duplex sonography of the LEFT UPPER extremity veins with color   and spectral Doppler, with and without compression.    FINDINGS: There is edema within the superficial soft tissues of the left   upper extremity.    The left internal jugular vein is patent and free of clot.    The left subclavian vein is patent. Postphlebitic changes likely affect   the medial portion of the cephalic vein.    The left axillary and brachial veins are patent and free of clot.    The stented, thrombosed left basilic vein is again identified.    The left cephalic vein is patent and free of clot.          IMPRESSION:    The left subclavian vein is patent although displaying postphlebitic   changes medially.    No acute DVT identified.      < end of copied text >      RADIOLOGY & ADDITIONAL TESTS:    Imaging Personally Reviewed:    Consultant(s) Notes Reviewed:      Care Discussed with Consultants/Other Providers:

## 2024-01-06 NOTE — CHART NOTE - NSCHARTNOTEFT_GEN_A_CORE
77 y/o female admitted with volume overload now with bilateral eye redness, itching and swelling; pt denied  visual changes. Case d/w opthalmology  and preservative free artifical tears 4x daily , ketotifen 0.025% and cold compression 4x daily as d/w Dr. Almaguer- Opthalmology. Please call opthalmology in AM if symptoms persist.

## 2024-01-06 NOTE — PROGRESS NOTE ADULT - ASSESSMENT
77 YO Female h/o HTN, s/p Renal transplant, CKD, chronic rejection, recent admission for pneumonia s/p abx presented with shortness of breath since this morning. she also noticed significant LUE swelling that began 4-5 days ago.  Nephrology consulted for CKD s/p renal transplant.    A/P:  S/p DDRT (5/19/2021 - induction w/ Basiliximab)  Baseline sCr ~3  Renal function fluctuating, slightly worsening today   Repeat TTE 1/4 - Left ventricular wall thickness is moderately increased. Left ventricular systolic function is normal with a calculated ejection fraction of 63 % with moderate L ventricular dysfunction.  On Furosemide 40 mg IV TID - fluid status improving at present   UA with proteinuria and hematuria.  Continue immunosuppressants per transplant - mycophenolate 500mg BID, prednisone 5mg qd, and Tacrolimus 5mg qd.  Fk level 1/5 7.5. No FK level drawn 1/6 prior to administration. FK level ordered for 1/7 AM.   Check Tacro level 30mins prior to next dose; goal 4-6.  Avoid nephrotoxic agents.  Monitor BMP and UO.    HTN:  BP fluctuating  Nifedipine changed to 60 BID 1/5   Continue current BP meds.  Avoid hypotension.  Monitor BP.    Hyperkalemia:  K stable   Low K diet.  Monitor K closely.    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  currently on lasix  CO2 slightly improving   c/w NaHCO3 1300mg TID    Anemia:  Likely in setting of CKD vs iron deficiency.  iron deficient, Tsat 11% 1/6   would hold off iron administration given ct chest with evidence of associated foci of consolidation and groundglass opacity in all 5 lobes, suggestive of infection.   s/p 1 unit PRBC 1/4  Hgb worsening today   Transfuse per primary team   Hold SHELDON for BP >170/90.    Proteinuria/Hematuria:  etiology? possibly diabetic nephropathy (donor origin)  Follows w/ Dr. Louise   pending UPr/Cr.  Vasculitis work up per transplant team  Monitor  79 YO Female h/o HTN, s/p Renal transplant, CKD, chronic rejection, recent admission for pneumonia s/p abx presented with shortness of breath since this morning. she also noticed significant LUE swelling that began 4-5 days ago.  Nephrology consulted for CKD s/p renal transplant.    A/P:  S/p DDRT (5/19/2021 - induction w/ Basiliximab)  Baseline sCr ~3  Renal function fluctuating, slightly worsening today   Repeat TTE 1/4 - Left ventricular wall thickness is moderately increased. Left ventricular systolic function is normal with a calculated ejection fraction of 63 % with moderate L ventricular dysfunction.  On Furosemide 40 mg IV TID - fluid status improving at present   UA with proteinuria and hematuria.  Continue immunosuppressants per transplant - mycophenolate 500mg BID, prednisone 5mg qd, and Tacrolimus 5mg qd.  Fk level 1/5 7.5. No FK level drawn 1/6 prior to administration. FK level ordered for 1/7 AM.   Check Tacro level 30mins prior to next dose; goal 4-6.  Avoid nephrotoxic agents.  Monitor BMP and UO.    HTN:  BP fluctuating  Nifedipine changed to 60 BID 1/5   Continue current BP meds.  Avoid hypotension.  Monitor BP.    Hyperkalemia:  K stable   Low K diet.  Monitor K closely.    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  currently on lasix  CO2 slightly improving   c/w NaHCO3 1300mg TID    Anemia:  Likely in setting of CKD vs iron deficiency.  iron deficient, Tsat 11% 1/6   would hold off iron administration given ct chest with evidence of associated foci of consolidation and groundglass opacity in all 5 lobes, suggestive of infection.   s/p 1 unit PRBC 1/4  Hgb worsening today   Transfuse per primary team   Hold SHELDON for BP >170/90.    Proteinuria/Hematuria:  etiology? possibly diabetic nephropathy (donor origin)  Follows w/ Dr. Louise   pending UPr/Cr.  Vasculitis work up per transplant team  Monitor

## 2024-01-06 NOTE — PROGRESS NOTE ADULT - SUBJECTIVE AND OBJECTIVE BOX
INFECTIOUS DISEASES FOLLOW UP-- Violetta Mueller  [phone]    This is a follow up note for this  78yFemale with  Localized edema        ROS:  CONSTITUTIONAL:  No fever, good appetite  CARDIOVASCULAR:  No chest pain or palpitations  RESPIRATORY:  No dyspnea  GASTROINTESTINAL:  No nausea, vomiting, diarrhea, or abdominal pain  GENITOURINARY:  No dysuria  NEUROLOGIC:  No headache,     Allergies    penicillin (Rash)  Mushrooms (Anaphylaxis)    Intolerances        ANTIBIOTICS/RELEVANT:  antimicrobials  atovaquone  Suspension 1500 milliGRAM(s) Oral daily  cefepime   IVPB 1000 milliGRAM(s) IV Intermittent every 24 hours    immunologic:  epoetin ivelisse (EPOGEN) Injectable 30289 Unit(s) SubCutaneous once  epoetin ivelisse-epbx (RETACRIT) Injectable 47006 Unit(s) SubCutaneous <User Schedule>  mycophenolate mofetil 500 milliGRAM(s) Oral two times a day  tacrolimus ER Tablet (ENVARSUS XR) 5 milliGRAM(s) Oral daily    OTHER:  acetaminophen     Tablet .. 650 milliGRAM(s) Oral every 6 hours PRN  aspirin enteric coated 81 milliGRAM(s) Oral daily  cholecalciferol 2000 Unit(s) Oral daily  furosemide   Injectable 40 milliGRAM(s) IV Push three times a day  heparin   Injectable 5000 Unit(s) SubCutaneous every 8 hours  labetalol 200 milliGRAM(s) Oral three times a day  latanoprost 0.005% Ophthalmic Solution 1 Drop(s) Both EYES at bedtime  melatonin 3 milliGRAM(s) Oral at bedtime PRN  NIFEdipine XL 60 milliGRAM(s) Oral two times a day  predniSONE   Tablet 5 milliGRAM(s) Oral daily  sodium bicarbonate 1300 milliGRAM(s) Oral three times a day  sodium zirconium cyclosilicate 10 Gram(s) Oral daily      Objective:  Vital Signs Last 24 Hrs  T(C): 37 (06 Jan 2024 15:52), Max: 38.3 (06 Jan 2024 05:00)  T(F): 98.6 (06 Jan 2024 15:52), Max: 101 (06 Jan 2024 05:00)  HR: 67 (06 Jan 2024 15:52) (67 - 78)  BP: 142/69 (06 Jan 2024 15:52) (138/68 - 184/73)  BP(mean): --  RR: 18 (06 Jan 2024 15:52) (18 - 18)  SpO2: 95% (06 Jan 2024 15:52) (94% - 95%)    Parameters below as of 06 Jan 2024 15:52  Patient On (Oxygen Delivery Method): room air        PHYSICAL EXAM:  Constitutional:no acute distress  Eyes:YASMIN, EOMI  Ear/Nose/Throat: no oral lesions, 	  Respiratory: clear BL  Cardiovascular: S1S2  Gastrointestinal:soft, (+) BS, no tenderness  Extremities:no e/e/c  No Lymphadenopathy  IV sites not inflammed.    LABS:                        7.6    4.45  )-----------( 192      ( 06 Jan 2024 11:12 )             24.2     01-06    135  |  104  |  56<H>  ----------------------------<  173<H>  4.2   |  18<L>  |  3.35<H>    Ca    8.3<L>      06 Jan 2024 11:12        Urinalysis Basic - ( 06 Jan 2024 11:12 )    Color: x / Appearance: x / SG: x / pH: x  Gluc: 173 mg/dL / Ketone: x  / Bili: x / Urobili: x   Blood: x / Protein: x / Nitrite: x   Leuk Esterase: x / RBC: x / WBC x   Sq Epi: x / Non Sq Epi: x / Bacteria: x        MICROBIOLOGY:            RECENT CULTURES:  01-05 @ 10:42  .Blood Blood-Peripheral  --  --  --    No growth at 24 hours  --  01-05 @ 10:37  .Blood Blood-Peripheral  --  --  --    No growth at 24 hours  --  01-03 @ 11:12  Clean Catch Clean Catch (Midstream)  --  --  --    <10,000 CFU/mL Normal Urogenital Rosalinda  --  01-03 @ 10:55  .Blood Blood-Peripheral  --  --  --    No growth at 48 Hours  --  01-03 @ 10:45  .Blood Blood-Peripheral  --  --  --    No growth at 48 Hours  --      RADIOLOGY & ADDITIONAL STUDIES:    < from: Xray Chest 1 View- PORTABLE-Urgent (Xray Chest 1 View- PORTABLE-Urgent .) (01.05.24 @ 11:37) >  IMPRESSION:    Mild to moderate pulmonary edema with increase in right lower lung   opacities.    < end of copied text >   INFECTIOUS DISEASES FOLLOW UP-- Violetta Mueller  [phone]    This is a follow up note for this  78yFemale with  Localized edema        ROS:  CONSTITUTIONAL:  No fever, good appetite  CARDIOVASCULAR:  No chest pain or palpitations  RESPIRATORY:  No dyspnea  GASTROINTESTINAL:  No nausea, vomiting, diarrhea, or abdominal pain  GENITOURINARY:  No dysuria  NEUROLOGIC:  No headache,     Allergies    penicillin (Rash)  Mushrooms (Anaphylaxis)    Intolerances        ANTIBIOTICS/RELEVANT:  antimicrobials  atovaquone  Suspension 1500 milliGRAM(s) Oral daily  cefepime   IVPB 1000 milliGRAM(s) IV Intermittent every 24 hours    immunologic:  epoetin ivelisse (EPOGEN) Injectable 94676 Unit(s) SubCutaneous once  epoetin ivelisse-epbx (RETACRIT) Injectable 28320 Unit(s) SubCutaneous <User Schedule>  mycophenolate mofetil 500 milliGRAM(s) Oral two times a day  tacrolimus ER Tablet (ENVARSUS XR) 5 milliGRAM(s) Oral daily    OTHER:  acetaminophen     Tablet .. 650 milliGRAM(s) Oral every 6 hours PRN  aspirin enteric coated 81 milliGRAM(s) Oral daily  cholecalciferol 2000 Unit(s) Oral daily  furosemide   Injectable 40 milliGRAM(s) IV Push three times a day  heparin   Injectable 5000 Unit(s) SubCutaneous every 8 hours  labetalol 200 milliGRAM(s) Oral three times a day  latanoprost 0.005% Ophthalmic Solution 1 Drop(s) Both EYES at bedtime  melatonin 3 milliGRAM(s) Oral at bedtime PRN  NIFEdipine XL 60 milliGRAM(s) Oral two times a day  predniSONE   Tablet 5 milliGRAM(s) Oral daily  sodium bicarbonate 1300 milliGRAM(s) Oral three times a day  sodium zirconium cyclosilicate 10 Gram(s) Oral daily      Objective:  Vital Signs Last 24 Hrs  T(C): 37 (06 Jan 2024 15:52), Max: 38.3 (06 Jan 2024 05:00)  T(F): 98.6 (06 Jan 2024 15:52), Max: 101 (06 Jan 2024 05:00)  HR: 67 (06 Jan 2024 15:52) (67 - 78)  BP: 142/69 (06 Jan 2024 15:52) (138/68 - 184/73)  BP(mean): --  RR: 18 (06 Jan 2024 15:52) (18 - 18)  SpO2: 95% (06 Jan 2024 15:52) (94% - 95%)    Parameters below as of 06 Jan 2024 15:52  Patient On (Oxygen Delivery Method): room air        PHYSICAL EXAM:  Constitutional:no acute distress  Eyes:YASMIN, EOMI  Ear/Nose/Throat: no oral lesions, 	  Respiratory: clear BL  Cardiovascular: S1S2  Gastrointestinal:soft, (+) BS, no tenderness  Extremities:no e/e/c  No Lymphadenopathy  IV sites not inflammed.    LABS:                        7.6    4.45  )-----------( 192      ( 06 Jan 2024 11:12 )             24.2     01-06    135  |  104  |  56<H>  ----------------------------<  173<H>  4.2   |  18<L>  |  3.35<H>    Ca    8.3<L>      06 Jan 2024 11:12        Urinalysis Basic - ( 06 Jan 2024 11:12 )    Color: x / Appearance: x / SG: x / pH: x  Gluc: 173 mg/dL / Ketone: x  / Bili: x / Urobili: x   Blood: x / Protein: x / Nitrite: x   Leuk Esterase: x / RBC: x / WBC x   Sq Epi: x / Non Sq Epi: x / Bacteria: x        MICROBIOLOGY:            RECENT CULTURES:  01-05 @ 10:42  .Blood Blood-Peripheral  --  --  --    No growth at 24 hours  --  01-05 @ 10:37  .Blood Blood-Peripheral  --  --  --    No growth at 24 hours  --  01-03 @ 11:12  Clean Catch Clean Catch (Midstream)  --  --  --    <10,000 CFU/mL Normal Urogenital Rosalinda  --  01-03 @ 10:55  .Blood Blood-Peripheral  --  --  --    No growth at 48 Hours  --  01-03 @ 10:45  .Blood Blood-Peripheral  --  --  --    No growth at 48 Hours  --      RADIOLOGY & ADDITIONAL STUDIES:    < from: Xray Chest 1 View- PORTABLE-Urgent (Xray Chest 1 View- PORTABLE-Urgent .) (01.05.24 @ 11:37) >  IMPRESSION:    Mild to moderate pulmonary edema with increase in right lower lung   opacities.    < end of copied text >   INFECTIOUS DISEASES FOLLOW UP-- Violetta Mueller  [phone]    This is a follow up note for this  78yFemale with  Localized edema        ROS:  CONSTITUTIONAL:  No fever, sleeping  CARDIOVASCULAR:  No chest pain or palpitations  RESPIRATORY:  No dyspnea  GASTROINTESTINAL:  No nausea, vomiting, diarrhea, or abdominal pain  GENITOURINARY:  No dysuria  NEUROLOGIC:  No headache,     Allergies    penicillin (Rash)  Mushrooms (Anaphylaxis)    Intolerances        ANTIBIOTICS/RELEVANT:  antimicrobials  atovaquone  Suspension 1500 milliGRAM(s) Oral daily  cefepime   IVPB 1000 milliGRAM(s) IV Intermittent every 24 hours    immunologic:  epoetin ivelisse (EPOGEN) Injectable 81672 Unit(s) SubCutaneous once  epoetin ivelisse-epbx (RETACRIT) Injectable 42926 Unit(s) SubCutaneous <User Schedule>  mycophenolate mofetil 500 milliGRAM(s) Oral two times a day  tacrolimus ER Tablet (ENVARSUS XR) 5 milliGRAM(s) Oral daily    OTHER:  acetaminophen     Tablet .. 650 milliGRAM(s) Oral every 6 hours PRN  aspirin enteric coated 81 milliGRAM(s) Oral daily  cholecalciferol 2000 Unit(s) Oral daily  furosemide   Injectable 40 milliGRAM(s) IV Push three times a day  heparin   Injectable 5000 Unit(s) SubCutaneous every 8 hours  labetalol 200 milliGRAM(s) Oral three times a day  latanoprost 0.005% Ophthalmic Solution 1 Drop(s) Both EYES at bedtime  melatonin 3 milliGRAM(s) Oral at bedtime PRN  NIFEdipine XL 60 milliGRAM(s) Oral two times a day  predniSONE   Tablet 5 milliGRAM(s) Oral daily  sodium bicarbonate 1300 milliGRAM(s) Oral three times a day  sodium zirconium cyclosilicate 10 Gram(s) Oral daily      Objective:  Vital Signs Last 24 Hrs  T(C): 37 (06 Jan 2024 15:52), Max: 38.3 (06 Jan 2024 05:00)  T(F): 98.6 (06 Jan 2024 15:52), Max: 101 (06 Jan 2024 05:00)  HR: 67 (06 Jan 2024 15:52) (67 - 78)  BP: 142/69 (06 Jan 2024 15:52) (138/68 - 184/73)  BP(mean): --  RR: 18 (06 Jan 2024 15:52) (18 - 18)  SpO2: 95% (06 Jan 2024 15:52) (94% - 95%)    Parameters below as of 06 Jan 2024 15:52  Patient On (Oxygen Delivery Method): room air        PHYSICAL EXAM:  Constitutional:no acute distress sleeping  Eyes:YASMIN, EOMI  Ear/Nose/Throat: no oral lesions, 	  Respiratory: clear BL  Cardiovascular: S1S2  Gastrointestinal:soft, (+) BS, no tenderness  Extremities:no e/e/c  No Lymphadenopathy  IV sites not inflammed.    LABS:                        7.6    4.45  )-----------( 192      ( 06 Jan 2024 11:12 )             24.2     01-06    135  |  104  |  56<H>  ----------------------------<  173<H>  4.2   |  18<L>  |  3.35<H>    Ca    8.3<L>      06 Jan 2024 11:12        Urinalysis Basic - ( 06 Jan 2024 11:12 )    Color: x / Appearance: x / SG: x / pH: x  Gluc: 173 mg/dL / Ketone: x  / Bili: x / Urobili: x   Blood: x / Protein: x / Nitrite: x   Leuk Esterase: x / RBC: x / WBC x   Sq Epi: x / Non Sq Epi: x / Bacteria: x        MICROBIOLOGY:            RECENT CULTURES:  01-05 @ 10:42  .Blood Blood-Peripheral  --  --  --    No growth at 24 hours  --  01-05 @ 10:37  .Blood Blood-Peripheral  --  --  --    No growth at 24 hours  --  01-03 @ 11:12  Clean Catch Clean Catch (Midstream)  --  --  --    <10,000 CFU/mL Normal Urogenital Rosalinda  --  01-03 @ 10:55  .Blood Blood-Peripheral  --  --  --    No growth at 48 Hours  --  01-03 @ 10:45  .Blood Blood-Peripheral  --  --  --    No growth at 48 Hours  --  Respiratory Viral Panel with COVID-19 by JASON (01.05.24 @ 07:01)    Rapid RVP Result: NotDete   SARS-CoV-2: NotDete: This Respiratory Panel uses polymerase chain reaction (PCR) to detect for  adenovirus; coronavirus (HKU1, NL63, 229E, OC43); human metapneumovirus  (hMPV); human enterovirus/rhinovirus (Entero/RV); influenza A; influenza  A/H1; influenza A/H3; influenza A/H1-2009; influenza B; parainfluenza  viruses 1, 2, 3, 4; respiratory syncytial virus; Mycoplasma pneumoniae;  Chlamydophila pneumoniae; and SARS-CoV-2.    Cytomegalovirus By PCR (01.04.24 @ 10:13)    Cytomegalovirus By PCR: NotDetec IU/mL   CMVPCR Log: NotDetec: Assay Dynamic Range: 34.5 to 1.0E+07 IU/mL (1.54 to 7.00 Log10 IU/mL)  Assay lower limit of quantification (LLOQ) is 34.5 IU/mL (1.54 Log10  IU/mL)  CMV DNA detected below the LLOQ will be reported as Detected < 34.5 IU/mL  (<1.54 Log10 IU/mL)  CobasCytomegalovirus (CMV) is an FDA-cleared quantitative test that  enables the detection and quantitation of CMV DNA in EDTA plasma of  infected transplant patients on the [facility] system. This test was  verified by [employer]. Results should be interpreted  with consideration of all clinical findings and laboratory findings and  should not form the sole basis for a diagnosis or treatment decision. Onx92UT/mL            RADIOLOGY & ADDITIONAL STUDIES:    < from: Xray Chest 1 View- PORTABLE-Urgent (Xray Chest 1 View- PORTABLE-Urgent .) (01.05.24 @ 11:37) >  IMPRESSION:    Mild to moderate pulmonary edema with increase in right lower lung   opacities.    < end of copied text >   INFECTIOUS DISEASES FOLLOW UP-- Violetta Mueller  [phone]    This is a follow up note for this  78yFemale with  Localized edema        ROS:  CONSTITUTIONAL:  No fever, sleeping  CARDIOVASCULAR:  No chest pain or palpitations  RESPIRATORY:  No dyspnea  GASTROINTESTINAL:  No nausea, vomiting, diarrhea, or abdominal pain  GENITOURINARY:  No dysuria  NEUROLOGIC:  No headache,     Allergies    penicillin (Rash)  Mushrooms (Anaphylaxis)    Intolerances        ANTIBIOTICS/RELEVANT:  antimicrobials  atovaquone  Suspension 1500 milliGRAM(s) Oral daily  cefepime   IVPB 1000 milliGRAM(s) IV Intermittent every 24 hours    immunologic:  epoetin ivelisse (EPOGEN) Injectable 88081 Unit(s) SubCutaneous once  epoetin ivelisse-epbx (RETACRIT) Injectable 08928 Unit(s) SubCutaneous <User Schedule>  mycophenolate mofetil 500 milliGRAM(s) Oral two times a day  tacrolimus ER Tablet (ENVARSUS XR) 5 milliGRAM(s) Oral daily    OTHER:  acetaminophen     Tablet .. 650 milliGRAM(s) Oral every 6 hours PRN  aspirin enteric coated 81 milliGRAM(s) Oral daily  cholecalciferol 2000 Unit(s) Oral daily  furosemide   Injectable 40 milliGRAM(s) IV Push three times a day  heparin   Injectable 5000 Unit(s) SubCutaneous every 8 hours  labetalol 200 milliGRAM(s) Oral three times a day  latanoprost 0.005% Ophthalmic Solution 1 Drop(s) Both EYES at bedtime  melatonin 3 milliGRAM(s) Oral at bedtime PRN  NIFEdipine XL 60 milliGRAM(s) Oral two times a day  predniSONE   Tablet 5 milliGRAM(s) Oral daily  sodium bicarbonate 1300 milliGRAM(s) Oral three times a day  sodium zirconium cyclosilicate 10 Gram(s) Oral daily      Objective:  Vital Signs Last 24 Hrs  T(C): 37 (06 Jan 2024 15:52), Max: 38.3 (06 Jan 2024 05:00)  T(F): 98.6 (06 Jan 2024 15:52), Max: 101 (06 Jan 2024 05:00)  HR: 67 (06 Jan 2024 15:52) (67 - 78)  BP: 142/69 (06 Jan 2024 15:52) (138/68 - 184/73)  BP(mean): --  RR: 18 (06 Jan 2024 15:52) (18 - 18)  SpO2: 95% (06 Jan 2024 15:52) (94% - 95%)    Parameters below as of 06 Jan 2024 15:52  Patient On (Oxygen Delivery Method): room air        PHYSICAL EXAM:  Constitutional:no acute distress sleeping  Eyes:YASMIN, EOMI  Ear/Nose/Throat: no oral lesions, 	  Respiratory: clear BL  Cardiovascular: S1S2  Gastrointestinal:soft, (+) BS, no tenderness  Extremities:no e/e/c  No Lymphadenopathy  IV sites not inflammed.    LABS:                        7.6    4.45  )-----------( 192      ( 06 Jan 2024 11:12 )             24.2     01-06    135  |  104  |  56<H>  ----------------------------<  173<H>  4.2   |  18<L>  |  3.35<H>    Ca    8.3<L>      06 Jan 2024 11:12        Urinalysis Basic - ( 06 Jan 2024 11:12 )    Color: x / Appearance: x / SG: x / pH: x  Gluc: 173 mg/dL / Ketone: x  / Bili: x / Urobili: x   Blood: x / Protein: x / Nitrite: x   Leuk Esterase: x / RBC: x / WBC x   Sq Epi: x / Non Sq Epi: x / Bacteria: x        MICROBIOLOGY:            RECENT CULTURES:  01-05 @ 10:42  .Blood Blood-Peripheral  --  --  --    No growth at 24 hours  --  01-05 @ 10:37  .Blood Blood-Peripheral  --  --  --    No growth at 24 hours  --  01-03 @ 11:12  Clean Catch Clean Catch (Midstream)  --  --  --    <10,000 CFU/mL Normal Urogenital Rosalinda  --  01-03 @ 10:55  .Blood Blood-Peripheral  --  --  --    No growth at 48 Hours  --  01-03 @ 10:45  .Blood Blood-Peripheral  --  --  --    No growth at 48 Hours  --  Respiratory Viral Panel with COVID-19 by JASON (01.05.24 @ 07:01)    Rapid RVP Result: NotDete   SARS-CoV-2: NotDete: This Respiratory Panel uses polymerase chain reaction (PCR) to detect for  adenovirus; coronavirus (HKU1, NL63, 229E, OC43); human metapneumovirus  (hMPV); human enterovirus/rhinovirus (Entero/RV); influenza A; influenza  A/H1; influenza A/H3; influenza A/H1-2009; influenza B; parainfluenza  viruses 1, 2, 3, 4; respiratory syncytial virus; Mycoplasma pneumoniae;  Chlamydophila pneumoniae; and SARS-CoV-2.    Cytomegalovirus By PCR (01.04.24 @ 10:13)    Cytomegalovirus By PCR: NotDetec IU/mL   CMVPCR Log: NotDetec: Assay Dynamic Range: 34.5 to 1.0E+07 IU/mL (1.54 to 7.00 Log10 IU/mL)  Assay lower limit of quantification (LLOQ) is 34.5 IU/mL (1.54 Log10  IU/mL)  CMV DNA detected below the LLOQ will be reported as Detected < 34.5 IU/mL  (<1.54 Log10 IU/mL)  CobasCytomegalovirus (CMV) is an FDA-cleared quantitative test that  enables the detection and quantitation of CMV DNA in EDTA plasma of  infected transplant patients on the [facility] system. This test was  verified by [employer] Results should be interpreted  with consideration of all clinical findings and laboratory findings and  should not form the sole basis for a diagnosis or treatment decision. Nzq66RY/mL            RADIOLOGY & ADDITIONAL STUDIES:    < from: Xray Chest 1 View- PORTABLE-Urgent (Xray Chest 1 View- PORTABLE-Urgent .) (01.05.24 @ 11:37) >  IMPRESSION:    Mild to moderate pulmonary edema with increase in right lower lung   opacities.    < end of copied text >

## 2024-01-06 NOTE — PROGRESS NOTE ADULT - SUBJECTIVE AND OBJECTIVE BOX
[facility] [place] [facility]   MD SAMMY ORDONEZ MD BRIANA PETITO, NP    TEL:  FROM 9 AM to 5 PM ---OFFICE: [phone]  FROM 5 PM - 9 AM PLEASE CALL ANSWERING SERVICE: [phone]     RENAL PROGRESS NOTE: DATE OF SERVICE 01-06-24 @ 14:53  Patient is a 78y old  Female who presents with a chief complaint of Edema   Patient seen and examined at bedside. No chest pain/sob    VITALS:  T(F): 98.9 (01-06-24 @ 12:05), Max: 101 (01-06-24 @ 05:00)  HR: 68 (01-06-24 @ 12:05)  BP: 141/75 (01-06-24 @ 12:05)  RR: 18 (01-06-24 @ 12:05)  SpO2: 94% (01-06-24 @ 12:05)  Wt(kg): --    01-05 @ 07:01  -  01-06 @ 07:00  --------------------------------------------------------  IN: 120 mL / OUT: 0 mL / NET: 120 mL      PHYSICAL EXAM:  Constitutional: NAD  Neck: No JVD  Respiratory: diminished at bases   Cardiovascular: S1, S2, RRR  Gastrointestinal: BS+, soft, NT/ND  Extremities: trace b/l [facility] Medications:   MEDICATIONS  (STANDING):  aspirin enteric coated 81 milliGRAM(s) Oral daily  atovaquone  Suspension 1500 milliGRAM(s) Oral daily  cefepime   IVPB 1000 milliGRAM(s) IV Intermittent every 24 hours  cholecalciferol 2000 Unit(s) Oral daily  epoetin ivelisse (EPOGEN) Injectable 90127 Unit(s) SubCutaneous once  furosemide   Injectable 40 milliGRAM(s) IV Push three times a day  heparin   Injectable 5000 Unit(s) SubCutaneous every 8 hours  labetalol 200 milliGRAM(s) Oral three times a day  latanoprost 0.005% Ophthalmic Solution 1 Drop(s) Both EYES at bedtime  mycophenolate mofetil 500 milliGRAM(s) Oral two times a day  NIFEdipine XL 60 milliGRAM(s) Oral two times a day  predniSONE   Tablet 5 milliGRAM(s) Oral daily  sodium bicarbonate 1300 milliGRAM(s) Oral three times a day  sodium zirconium cyclosilicate 10 Gram(s) Oral daily  tacrolimus ER Tablet (ENVARSUS XR) 5 milliGRAM(s) Oral daily      LABS:  01-06    135  |  104  |  56<H>  ----------------------------<  173<H>  4.2   |  18<L>  |  3.35<H>    Ca    8.3<L>      06 Jan 2024 11:12      Creatinine Trend: 3.35 <--, 3.23 <--, 2.95 <--, 2.75 <--, 2.67 <--                                7.6    4.45  )-----------( 192      ( 06 Jan 2024 11:12 )             24.2     Urine Studies:  Urinalysis - [01-06-24 @ 11:12]      Color  / Appearance  / SG  / pH       Gluc 173 / Ketone   / Bili  / Urobili        Blood  / Protein  / Leuk Est  / Nitrite       RBC  / WBC  / Hyaline  / Gran  / Sq Epi  / Non Sq Epi  / Bacteria       Iron 21, TIBC 193, %sat 11      [01-05-24 @ 10:35]  Ferritin 1939      [01-05-24 @ 10:35]  PTH -- (Ca 8.5)      [11-10-23 @ 06:01]   659  Vitamin D (25OH) 16.5      [11-11-23 @ 06:09]  HbA1c 4.6      [05-28-19 @ 09:49]  Lipid: chol 191, , HDL 64, LDL --      [01-04-24 @ 10:13]    HBsAg Nonreact      [01-04-24 @ 07:51]  HCV 0.27, Nonreact      [01-04-24 @ 07:51]  HIV Nonreact      [01-04-24 @ 10:13]    STEPHANIE: titer 1:80, pattern Speckled      [01-04-24 @ 07:51]  C3 Complement 120      [01-04-24 @ 07:51]  C4 Complement 34      [01-04-24 @ 07:51]    RADIOLOGY & ADDITIONAL STUDIES:   [facility] [place] [facility]   MD SAMMY ORDONEZ MD BRIANA PETITO, NP    TEL:  FROM 9 AM to 5 PM ---OFFICE: [phone]  FROM 5 PM - 9 AM PLEASE CALL ANSWERING SERVICE: [phone]     RENAL PROGRESS NOTE: DATE OF SERVICE 01-06-24 @ 14:53  Patient is a 78y old  Female who presents with a chief complaint of Edema   Patient seen and examined at bedside. No chest pain/sob    VITALS:  T(F): 98.9 (01-06-24 @ 12:05), Max: 101 (01-06-24 @ 05:00)  HR: 68 (01-06-24 @ 12:05)  BP: 141/75 (01-06-24 @ 12:05)  RR: 18 (01-06-24 @ 12:05)  SpO2: 94% (01-06-24 @ 12:05)  Wt(kg): --    01-05 @ 07:01  -  01-06 @ 07:00  --------------------------------------------------------  IN: 120 mL / OUT: 0 mL / NET: 120 mL      PHYSICAL EXAM:  Constitutional: NAD  Neck: No JVD  Respiratory: diminished at bases   Cardiovascular: S1, S2, RRR  Gastrointestinal: BS+, soft, NT/ND  Extremities: trace b/l [facility] Medications:   MEDICATIONS  (STANDING):  aspirin enteric coated 81 milliGRAM(s) Oral daily  atovaquone  Suspension 1500 milliGRAM(s) Oral daily  cefepime   IVPB 1000 milliGRAM(s) IV Intermittent every 24 hours  cholecalciferol 2000 Unit(s) Oral daily  epoetin ivelisse (EPOGEN) Injectable 54745 Unit(s) SubCutaneous once  furosemide   Injectable 40 milliGRAM(s) IV Push three times a day  heparin   Injectable 5000 Unit(s) SubCutaneous every 8 hours  labetalol 200 milliGRAM(s) Oral three times a day  latanoprost 0.005% Ophthalmic Solution 1 Drop(s) Both EYES at bedtime  mycophenolate mofetil 500 milliGRAM(s) Oral two times a day  NIFEdipine XL 60 milliGRAM(s) Oral two times a day  predniSONE   Tablet 5 milliGRAM(s) Oral daily  sodium bicarbonate 1300 milliGRAM(s) Oral three times a day  sodium zirconium cyclosilicate 10 Gram(s) Oral daily  tacrolimus ER Tablet (ENVARSUS XR) 5 milliGRAM(s) Oral daily      LABS:  01-06    135  |  104  |  56<H>  ----------------------------<  173<H>  4.2   |  18<L>  |  3.35<H>    Ca    8.3<L>      06 Jan 2024 11:12      Creatinine Trend: 3.35 <--, 3.23 <--, 2.95 <--, 2.75 <--, 2.67 <--                                7.6    4.45  )-----------( 192      ( 06 Jan 2024 11:12 )             24.2     Urine Studies:  Urinalysis - [01-06-24 @ 11:12]      Color  / Appearance  / SG  / pH       Gluc 173 / Ketone   / Bili  / Urobili        Blood  / Protein  / Leuk Est  / Nitrite       RBC  / WBC  / Hyaline  / Gran  / Sq Epi  / Non Sq Epi  / Bacteria       Iron 21, TIBC 193, %sat 11      [01-05-24 @ 10:35]  Ferritin 1939      [01-05-24 @ 10:35]  PTH -- (Ca 8.5)      [11-10-23 @ 06:01]   659  Vitamin D (25OH) 16.5      [11-11-23 @ 06:09]  HbA1c 4.6      [05-28-19 @ 09:49]  Lipid: chol 191, , HDL 64, LDL --      [01-04-24 @ 10:13]    HBsAg Nonreact      [01-04-24 @ 07:51]  HCV 0.27, Nonreact      [01-04-24 @ 07:51]  HIV Nonreact      [01-04-24 @ 10:13]    STEPHANIE: titer 1:80, pattern Speckled      [01-04-24 @ 07:51]  C3 Complement 120      [01-04-24 @ 07:51]  C4 Complement 34      [01-04-24 @ 07:51]    RADIOLOGY & ADDITIONAL STUDIES:

## 2024-01-06 NOTE — PROGRESS NOTE ADULT - SUBJECTIVE AND OBJECTIVE BOX
DATE OF SERVICE: 01-06-24 @ 12:33    Patient is a 78y old  Female who presents with a chief complaint of Edema (05 Jan 2024 15:35)      INTERVAL HISTORY:     REVIEW OF SYSTEMS:  CONSTITUTIONAL: No weakness  EYES/ENT: No visual changes;  No throat pain   NECK: No pain or stiffness  RESPIRATORY: No cough, wheezing; No shortness of breath  CARDIOVASCULAR: No chest pain or palpitations  GASTROINTESTINAL: No abdominal  pain. No nausea, vomiting, or hematemesis  GENITOURINARY: No dysuria, frequency or hematuria  NEUROLOGICAL: No stroke like symptoms  SKIN: No rashes    TELEMETRY Personally reviewed:  	  MEDICATIONS:  furosemide   Injectable 40 milliGRAM(s) IV Push three times a day  labetalol 200 milliGRAM(s) Oral three times a day  NIFEdipine XL 60 milliGRAM(s) Oral two times a day        PHYSICAL EXAM:  T(C): 37.2 (01-06-24 @ 12:05), Max: 38.3 (01-06-24 @ 05:00)  HR: 68 (01-06-24 @ 12:05) (68 - 78)  BP: 141/75 (01-06-24 @ 12:05) (138/68 - 184/73)  RR: 18 (01-06-24 @ 12:05) (18 - 18)  SpO2: 94% (01-06-24 @ 12:05) (94% - 95%)  Wt(kg): --  I&O's Summary    05 Jan 2024 07:01  -  06 Jan 2024 07:00  --------------------------------------------------------  IN: 120 mL / OUT: 0 mL / NET: 120 mL          Appearance: In no distress	  HEENT:    PERRL, EOMI	  Cardiovascular:  S1 S2, No JVD  Respiratory: Lungs clear to auscultation	  Gastrointestinal:  Soft, Non-tender, + BS	  Vascularature:  No edema of LE  Psychiatric: Appropriate affect   Neuro: no acute focal deficits                               7.6    4.45  )-----------( 192      ( 06 Jan 2024 11:12 )             24.2     01-06    135  |  104  |  56<H>  ----------------------------<  173<H>  4.2   |  18<L>  |  3.35<H>    Ca    8.3<L>      06 Jan 2024 11:12          Labs personally reviewed      ASSESSMENT/PLAN: 	  78f h/o HTN, s/p Renal transplant, CKD, chronic rejection, recent admission for pneumonia s/p abx presented with shortness of breath since this morning. she also noticed significant LUE swelling that began 4-5 days ago    1. 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 HTN   controlled   c/w amlodipine and BB    3. Pulmonary Edema   chest Xray as above shows mild - mod pulm edema with increase in right lower lung   -c/w abx therapy     4. ESRD  - renal on board     5. Prophylactic measure.   dvt proph - hsq.            AG Krishnan DO [facility]  Cardiovascular Medicine  [address] [place] [address], [address]  Available through call or text on [employer] TE[employer]  Office: [phone]   DATE OF SERVICE: 01-06-24 @ 12:33    Patient is a 78y old  Female who presents with a chief complaint of Edema (05 Jan 2024 15:35)      INTERVAL HISTORY:     REVIEW OF SYSTEMS:  CONSTITUTIONAL: No weakness  EYES/ENT: No visual changes;  No throat pain   NECK: No pain or stiffness  RESPIRATORY: No cough, wheezing; No shortness of breath  CARDIOVASCULAR: No chest pain or palpitations  GASTROINTESTINAL: No abdominal  pain. No nausea, vomiting, or hematemesis  GENITOURINARY: No dysuria, frequency or hematuria  NEUROLOGICAL: No stroke like symptoms  SKIN: No rashes    TELEMETRY Personally reviewed:  	  MEDICATIONS:  furosemide   Injectable 40 milliGRAM(s) IV Push three times a day  labetalol 200 milliGRAM(s) Oral three times a day  NIFEdipine XL 60 milliGRAM(s) Oral two times a day        PHYSICAL EXAM:  T(C): 37.2 (01-06-24 @ 12:05), Max: 38.3 (01-06-24 @ 05:00)  HR: 68 (01-06-24 @ 12:05) (68 - 78)  BP: 141/75 (01-06-24 @ 12:05) (138/68 - 184/73)  RR: 18 (01-06-24 @ 12:05) (18 - 18)  SpO2: 94% (01-06-24 @ 12:05) (94% - 95%)  Wt(kg): --  I&O's Summary    05 Jan 2024 07:01  -  06 Jan 2024 07:00  --------------------------------------------------------  IN: 120 mL / OUT: 0 mL / NET: 120 mL          Appearance: In no distress	  HEENT:    PERRL, EOMI	  Cardiovascular:  S1 S2, No JVD  Respiratory: Lungs clear to auscultation	  Gastrointestinal:  Soft, Non-tender, + BS	  Vascularature:  No edema of LE  Psychiatric: Appropriate affect   Neuro: no acute focal deficits                               7.6    4.45  )-----------( 192      ( 06 Jan 2024 11:12 )             24.2     01-06    135  |  104  |  56<H>  ----------------------------<  173<H>  4.2   |  18<L>  |  3.35<H>    Ca    8.3<L>      06 Jan 2024 11:12          Labs personally reviewed      ASSESSMENT/PLAN: 	  78f h/o HTN, s/p Renal transplant, CKD, chronic rejection, recent admission for pneumonia s/p abx presented with shortness of breath since this morning. she also noticed significant LUE swelling that began 4-5 days ago    1. 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 HTN   controlled   c/w amlodipine and BB    3. Pulmonary Edema   chest Xray as above shows mild - mod pulm edema with increase in right lower lung   -c/w abx therapy     4. ESRD  - renal on board     5. Prophylactic measure.   dvt proph - hsq.            AG Krishnan DO [facility]  Cardiovascular Medicine  [address] [place] [address], [address]  Available through call or text on [employer] TEAM[employer]  Office: [phone]   DATE OF SERVICE: 01-06-24 @ 12:33    Patient is a 78y old  Female who presents with a chief complaint of Edema (05 Jan 2024 15:35)      INTERVAL HISTORY:     REVIEW OF SYSTEMS:  CONSTITUTIONAL: No weakness  EYES/ENT: No visual changes;  No throat pain   NECK: No pain or stiffness  RESPIRATORY: No cough, wheezing; No shortness of breath  CARDIOVASCULAR: No chest pain or palpitations  GASTROINTESTINAL: No abdominal  pain. No nausea, vomiting, or hematemesis  GENITOURINARY: No dysuria, frequency or hematuria  NEUROLOGICAL: No stroke like symptoms  SKIN: No rashes    TELEMETRY Personally reviewed:  	  MEDICATIONS:  furosemide   Injectable 40 milliGRAM(s) IV Push three times a day  labetalol 200 milliGRAM(s) Oral three times a day  NIFEdipine XL 60 milliGRAM(s) Oral two times a day        PHYSICAL EXAM:  T(C): 37.2 (01-06-24 @ 12:05), Max: 38.3 (01-06-24 @ 05:00)  HR: 68 (01-06-24 @ 12:05) (68 - 78)  BP: 141/75 (01-06-24 @ 12:05) (138/68 - 184/73)  RR: 18 (01-06-24 @ 12:05) (18 - 18)  SpO2: 94% (01-06-24 @ 12:05) (94% - 95%)  Wt(kg): --  I&O's Summary    05 Jan 2024 07:01  -  06 Jan 2024 07:00  --------------------------------------------------------  IN: 120 mL / OUT: 0 mL / NET: 120 mL          Appearance: In no distress	  HEENT:    PERRL, EOMI	  Cardiovascular:  S1 S2, No JVD  Respiratory: Lungs clear to auscultation	  Gastrointestinal:  Soft, Non-tender, + BS	  Vascularature:  No edema of LE  Psychiatric: Appropriate affect   Neuro: no acute focal deficits                               7.6    4.45  )-----------( 192      ( 06 Jan 2024 11:12 )             24.2     01-06    135  |  104  |  56<H>  ----------------------------<  173<H>  4.2   |  18<L>  |  3.35<H>    Ca    8.3<L>      06 Jan 2024 11:12          Labs personally reviewed      ASSESSMENT/PLAN: 	  78f h/o HTN, s/p Renal transplant, CKD, chronic rejection, recent admission for pneumonia s/p abx presented with shortness of breath since this morning. she also noticed significant LUE swelling that began 4-5 days ago    1. Diastolic Heart Failure secondary to advanced kidney disease   - Recent TTE with normal EF  - Appears closer to euvolemia, c/w IV Lasix 40mg IV TID, on discharge can transition to Torsemide 20mg po bid      2. HTN   controlled   c/w amlodipine and BB    3. Pulmonary Edema   chest Xray as above shows mild - mod pulm edema with increase in right lower lung   -c/w abx therapy     4. ESRD  - renal on board     5. Prophylactic measure.   dvt proph - hsq.            AG Krishnan DO [facility]  Cardiovascular Medicine  [address] [place] [address], [address]  Available through call or text on [employer] TEAM[employer]  Office: [phone]   DATE OF SERVICE: 01-06-24 @ 12:33    Patient is a 78y old  Female who presents with a chief complaint of Edema (05 Jan 2024 15:35)      INTERVAL HISTORY:     REVIEW OF SYSTEMS:  CONSTITUTIONAL: No weakness  EYES/ENT: No visual changes;  No throat pain   NECK: No pain or stiffness  RESPIRATORY: No cough, wheezing; No shortness of breath  CARDIOVASCULAR: No chest pain or palpitations  GASTROINTESTINAL: No abdominal  pain. No nausea, vomiting, or hematemesis  GENITOURINARY: No dysuria, frequency or hematuria  NEUROLOGICAL: No stroke like symptoms  SKIN: No rashes    TELEMETRY Personally reviewed:  	  MEDICATIONS:  furosemide   Injectable 40 milliGRAM(s) IV Push three times a day  labetalol 200 milliGRAM(s) Oral three times a day  NIFEdipine XL 60 milliGRAM(s) Oral two times a day        PHYSICAL EXAM:  T(C): 37.2 (01-06-24 @ 12:05), Max: 38.3 (01-06-24 @ 05:00)  HR: 68 (01-06-24 @ 12:05) (68 - 78)  BP: 141/75 (01-06-24 @ 12:05) (138/68 - 184/73)  RR: 18 (01-06-24 @ 12:05) (18 - 18)  SpO2: 94% (01-06-24 @ 12:05) (94% - 95%)  Wt(kg): --  I&O's Summary    05 Jan 2024 07:01  -  06 Jan 2024 07:00  --------------------------------------------------------  IN: 120 mL / OUT: 0 mL / NET: 120 mL          Appearance: In no distress	  HEENT:    PERRL, EOMI	  Cardiovascular:  S1 S2, No JVD  Respiratory: Lungs clear to auscultation	  Gastrointestinal:  Soft, Non-tender, + BS	  Vascularature:  No edema of LE  Psychiatric: Appropriate affect   Neuro: no acute focal deficits                               7.6    4.45  )-----------( 192      ( 06 Jan 2024 11:12 )             24.2     01-06    135  |  104  |  56<H>  ----------------------------<  173<H>  4.2   |  18<L>  |  3.35<H>    Ca    8.3<L>      06 Jan 2024 11:12          Labs personally reviewed      ASSESSMENT/PLAN: 	  78f h/o HTN, s/p Renal transplant, CKD, chronic rejection, recent admission for pneumonia s/p abx presented with shortness of breath since this morning. she also noticed significant LUE swelling that began 4-5 days ago    1. Diastolic Heart Failure secondary to advanced kidney disease   - Recent TTE with normal EF  - Appears closer to euvolemia, c/w IV Lasix 40mg IV TID, on discharge can transition to Torsemide 20mg po bid      2. HTN   controlled   c/w amlodipine and BB    3. Pulmonary Edema   chest Xray as above shows mild - mod pulm edema with increase in right lower lung   -c/w abx therapy     4. ESRD  - renal on board     5. Prophylactic measure.   dvt proph - hsq.            AG Krishnan DO [facility]  Cardiovascular Medicine  [address] [place] [address], [address]  Available through call or text on [employer] TEAMs  Office: [phone]

## 2024-01-06 NOTE — PROGRESS NOTE ADULT - ASSESSMENT
78f h/o HTN, s/p Renal transplant, CKD, chronic rejection, recent admission for pneumonia presented for SOB and LUE edema. Transplant ID called to evaluate for fever.    Recently hospitalized 11/9-11/14 for SOB, at that time had pulm edema vs. pneumonia, treated with BP control and cefepime -> cefpodoxime.  Fever 100.6 on 1/5, no leukocytosis  UA no pyuria  RVP negative  US LUE: no DVT  CXR: Mild to moderate pulmonary edema with increase in right lower lung opacities  CT chest: associated foci of consolidation and groundglass opacity in all 5 lobes, suggestive of infection. However given cardiomegaly, pericardial effusion, bibasilar pleural effusion, and anasarca there is likely superimposed pulmonary edema.    Micro:  BCx: no growth  UCx: no growth    #Fever  #Pneumonia  #Conjunctivitis  #Pulmonary edema  #LUE edema    Suggest:  - Cefepime 1g q24H (renally-dosed, may need to decrease if renal function deteriorates further)  - Check serum cryptococcal antigen, aspergillus galactomannan, fungitell, adenovirus PCR, histoplasma antigen in urine   - Please induce sputum for bacterial and fungal cultures, and PCP PCR, legionella PCR  - Check urine Legionella, urine Pneumococcal Ag  - MRSA PCR  - check , HSV and CMV PCR in blood (ordered)  - Non-urgent ophthalmology evaluation for bilateral conjunctivitis  - please repeat RVP tomorrow in context of possible viral conjunctivitis  - consideration of bronchoscopy based on progress    Angel Mueller MD  Can be called via Teams  After 5pm/weekends [phone]   78f h/o HTN, s/p Renal transplant, CKD, chronic rejection, recent admission for pneumonia presented for SOB and LUE edema. Transplant ID called to evaluate for fever.    Recently hospitalized 11/9-11/14 for SOB, at that time had pulm edema vs. pneumonia, treated with BP control and cefepime -> cefpodoxime.  Fever 100.6 on 1/5, no leukocytosis  UA no pyuria  RVP negative  US LUE: no DVT  CXR: Mild to moderate pulmonary edema with increase in right lower lung opacities  CT chest: associated foci of consolidation and groundglass opacity in all 5 lobes, suggestive of infection. However given cardiomegaly, pericardial effusion, bibasilar pleural effusion, and anasarca there is likely superimposed pulmonary edema.    Micro:  BCx: no growth  UCx: no growth    #Fever  #Pneumonia  #Conjunctivitis  #Pulmonary edema  #LUE edema    Suggest:  - Cefepime 1g q24H (renally-dosed, may need to decrease if renal function deteriorates further)  - Check serum cryptococcal antigen, aspergillus galactomannan, fungitell, adenovirus PCR, histoplasma antigen in urine   - Please induce sputum for bacterial and fungal cultures, and PCP PCR, legionella PCR  - Check urine Legionella, urine Pneumococcal Ag  - MRSA PCR  - check , HSV and CMV PCR in blood (ordered)  - Non-urgent ophthalmology evaluation for bilateral conjunctivitis  - please repeat RVP tomorrow in context of possible viral conjunctivitis  - consideration of bronchoscopy based on progress    Angel Mueller MD  Can be called via Teams  After 5pm/weekends [phone]   78f h/o HTN, s/p Renal transplant, CKD, chronic rejection, recent admission for pneumonia presented for SOB and LUE edema. Transplant ID called to evaluate for fever.    Recently hospitalized 11/9-11/14 for SOB, at that time had pulm edema vs. pneumonia, treated with BP control and cefepime -> cefpodoxime.  Fever 100.6 on 1/5, no leukocytosis  UA no pyuria  RVP negative  US LUE: no DVT  CXR: Mild to moderate pulmonary edema with increase in right lower lung opacities  CT chest: associated foci of consolidation and groundglass opacity in all 5 lobes, suggestive of infection. However given cardiomegaly, pericardial effusion, bibasilar pleural effusion, and anasarca there is likely superimposed pulmonary edema.    Micro:  BCx: no growth  UCx: no growth    #Fever  #Pneumonia  #Conjunctivitis  #Pulmonary edema  #LUE edema    Suggest:  - Cefepime 1g q24H (renally-dosed, may need to decrease if renal function deteriorates further)  - Check serum cryptococcal antigen, aspergillus galactomannan, fungitell, adenovirus PCR, histoplasma antigen in urine   - Please induce sputum for bacterial and fungal cultures, and PCP PCR, legionella PCR  - Check urine Legionella, urine Pneumococcal Ag  - MRSA PCR  - check , HSV and CMV PCR in blood non detectable 1/4/24  - Non-urgent ophthalmology evaluation for bilateral conjunctivitis  - consideration of bronchoscopy based on progress    Angel Mueller MD  Can be called via Teams  After 5pm/weekends [phone]   78f h/o HTN, s/p Renal transplant, CKD, chronic rejection, recent admission for pneumonia presented for SOB and LUE edema. Transplant ID called to evaluate for fever.    Recently hospitalized 11/9-11/14 for SOB, at that time had pulm edema vs. pneumonia, treated with BP control and cefepime -> cefpodoxime.  Fever 100.6 on 1/5, no leukocytosis  UA no pyuria  RVP negative  US LUE: no DVT  CXR: Mild to moderate pulmonary edema with increase in right lower lung opacities  CT chest: associated foci of consolidation and groundglass opacity in all 5 lobes, suggestive of infection. However given cardiomegaly, pericardial effusion, bibasilar pleural effusion, and anasarca there is likely superimposed pulmonary edema.    Micro:  BCx: no growth  UCx: no growth    #Fever  #Pneumonia  #Conjunctivitis  #Pulmonary edema  #LUE edema    Suggest:  - Cefepime 1g q24H (renally-dosed, may need to decrease if renal function deteriorates further)  - Check serum cryptococcal antigen, aspergillus galactomannan, fungitell, adenovirus PCR, histoplasma antigen in urine   - Please induce sputum for bacterial and fungal cultures, and PCP PCR, legionella PCR  - Check urine Legionella, urine Pneumococcal Ag  - MRSA PCR  - check , HSV and CMV PCR in blood non detectable 1/4/24  - Non-urgent ophthalmology evaluation for bilateral conjunctivitis  - consideration of bronchoscopy based on progress    Angel Mueller MD  Can be called via Teams  After 5pm/weekends [phone]

## 2024-01-06 NOTE — PROVIDER CONTACT NOTE (OTHER) - REASON
Patient has rectal temp 100.6
x3 unsuccessful attempts to draw blood, unable to draw tacrolimus level
Patients Eyes swollen, pink sclera, Pt c/o of itching
Pt HTN /77

## 2024-01-06 NOTE — PROVIDER CONTACT NOTE (OTHER) - SITUATION
Pt has been complaining of bilateral eyes itching, now swollen compared to earlier in day. Sclera also red.
Patient adm for SOB & Fluid overload
x3 unsuccessful attempts to draw blood
Pt HTN /77

## 2024-01-06 NOTE — PROVIDER CONTACT NOTE (OTHER) - ACTION/TREATMENT ORDERED:
As per provider, administer tacrolimus
Pt received labetalol and lasix early, recheck BP in 30-45 min
Per [facility] ophthalmology will be contacted
Blood cultures/ ua / labs/ x-ray

## 2024-01-07 LAB
ANION GAP SERPL CALC-SCNC: 13 MMOL/L — SIGNIFICANT CHANGE UP (ref 5–17)
ANION GAP SERPL CALC-SCNC: 13 MMOL/L — SIGNIFICANT CHANGE UP (ref 5–17)
BUN SERPL-MCNC: 55 MG/DL — HIGH (ref 7–23)
BUN SERPL-MCNC: 55 MG/DL — HIGH (ref 7–23)
CALCIUM SERPL-MCNC: 8.4 MG/DL — SIGNIFICANT CHANGE UP (ref 8.4–10.5)
CALCIUM SERPL-MCNC: 8.4 MG/DL — SIGNIFICANT CHANGE UP (ref 8.4–10.5)
CHLORIDE SERPL-SCNC: 104 MMOL/L — SIGNIFICANT CHANGE UP (ref 96–108)
CHLORIDE SERPL-SCNC: 104 MMOL/L — SIGNIFICANT CHANGE UP (ref 96–108)
CO2 SERPL-SCNC: 20 MMOL/L — LOW (ref 22–31)
CO2 SERPL-SCNC: 20 MMOL/L — LOW (ref 22–31)
CREAT SERPL-MCNC: 3.42 MG/DL — HIGH (ref 0.5–1.3)
CREAT SERPL-MCNC: 3.42 MG/DL — HIGH (ref 0.5–1.3)
CRYPTOC AG FLD QL: NEGATIVE — SIGNIFICANT CHANGE UP
CRYPTOC AG FLD QL: NEGATIVE — SIGNIFICANT CHANGE UP
CULTURE RESULTS: SIGNIFICANT CHANGE UP
CULTURE RESULTS: SIGNIFICANT CHANGE UP
EGFR: 13 ML/MIN/1.73M2 — LOW
EGFR: 13 ML/MIN/1.73M2 — LOW
GLUCOSE SERPL-MCNC: 132 MG/DL — HIGH (ref 70–99)
GLUCOSE SERPL-MCNC: 132 MG/DL — HIGH (ref 70–99)
HCT VFR BLD CALC: 24.5 % — LOW (ref 34.5–45)
HCT VFR BLD CALC: 24.5 % — LOW (ref 34.5–45)
HGB BLD-MCNC: 7.8 G/DL — LOW (ref 11.5–15.5)
HGB BLD-MCNC: 7.8 G/DL — LOW (ref 11.5–15.5)
MAGNESIUM SERPL-MCNC: 1.4 MG/DL — LOW (ref 1.6–2.6)
MAGNESIUM SERPL-MCNC: 1.4 MG/DL — LOW (ref 1.6–2.6)
MCHC RBC-ENTMCNC: 26.2 PG — LOW (ref 27–34)
MCHC RBC-ENTMCNC: 26.2 PG — LOW (ref 27–34)
MCHC RBC-ENTMCNC: 31.8 GM/DL — LOW (ref 32–36)
MCHC RBC-ENTMCNC: 31.8 GM/DL — LOW (ref 32–36)
MCV RBC AUTO: 82.2 FL — SIGNIFICANT CHANGE UP (ref 80–100)
MCV RBC AUTO: 82.2 FL — SIGNIFICANT CHANGE UP (ref 80–100)
NRBC # BLD: 0 /100 WBCS — SIGNIFICANT CHANGE UP (ref 0–0)
NRBC # BLD: 0 /100 WBCS — SIGNIFICANT CHANGE UP (ref 0–0)
PHOSPHATE SERPL-MCNC: 4.3 MG/DL — SIGNIFICANT CHANGE UP (ref 2.5–4.5)
PHOSPHATE SERPL-MCNC: 4.3 MG/DL — SIGNIFICANT CHANGE UP (ref 2.5–4.5)
PLATELET # BLD AUTO: 203 K/UL — SIGNIFICANT CHANGE UP (ref 150–400)
PLATELET # BLD AUTO: 203 K/UL — SIGNIFICANT CHANGE UP (ref 150–400)
POTASSIUM SERPL-MCNC: 4.2 MMOL/L — SIGNIFICANT CHANGE UP (ref 3.5–5.3)
POTASSIUM SERPL-MCNC: 4.2 MMOL/L — SIGNIFICANT CHANGE UP (ref 3.5–5.3)
POTASSIUM SERPL-SCNC: 4.2 MMOL/L — SIGNIFICANT CHANGE UP (ref 3.5–5.3)
POTASSIUM SERPL-SCNC: 4.2 MMOL/L — SIGNIFICANT CHANGE UP (ref 3.5–5.3)
RBC # BLD: 2.98 M/UL — LOW (ref 3.8–5.2)
RBC # BLD: 2.98 M/UL — LOW (ref 3.8–5.2)
RBC # FLD: 16 % — HIGH (ref 10.3–14.5)
RBC # FLD: 16 % — HIGH (ref 10.3–14.5)
SODIUM SERPL-SCNC: 137 MMOL/L — SIGNIFICANT CHANGE UP (ref 135–145)
SODIUM SERPL-SCNC: 137 MMOL/L — SIGNIFICANT CHANGE UP (ref 135–145)
SPECIMEN SOURCE: SIGNIFICANT CHANGE UP
SPECIMEN SOURCE: SIGNIFICANT CHANGE UP
TACROLIMUS SERPL-MCNC: 4.3 NG/ML — SIGNIFICANT CHANGE UP
TACROLIMUS SERPL-MCNC: 4.3 NG/ML — SIGNIFICANT CHANGE UP
WBC # BLD: 4.03 K/UL — SIGNIFICANT CHANGE UP (ref 3.8–10.5)
WBC # BLD: 4.03 K/UL — SIGNIFICANT CHANGE UP (ref 3.8–10.5)
WBC # FLD AUTO: 4.03 K/UL — SIGNIFICANT CHANGE UP (ref 3.8–10.5)
WBC # FLD AUTO: 4.03 K/UL — SIGNIFICANT CHANGE UP (ref 3.8–10.5)

## 2024-01-07 PROCEDURE — 93971 EXTREMITY STUDY: CPT | Mod: 26,LT

## 2024-01-07 RX ORDER — MAGNESIUM SULFATE 500 MG/ML
1 VIAL (ML) INJECTION ONCE
Refills: 0 | Status: COMPLETED | OUTPATIENT
Start: 2024-01-07 | End: 2024-01-07

## 2024-01-07 RX ADMIN — Medication 1 DROP(S): at 19:44

## 2024-01-07 RX ADMIN — Medication 1300 MILLIGRAM(S): at 14:05

## 2024-01-07 RX ADMIN — Medication 1 DROP(S): at 08:54

## 2024-01-07 RX ADMIN — Medication 1300 MILLIGRAM(S): at 05:32

## 2024-01-07 RX ADMIN — Medication 60 MILLIGRAM(S): at 17:31

## 2024-01-07 RX ADMIN — Medication 60 MILLIGRAM(S): at 05:33

## 2024-01-07 RX ADMIN — Medication 81 MILLIGRAM(S): at 12:49

## 2024-01-07 RX ADMIN — Medication 1 DROP(S): at 10:25

## 2024-01-07 RX ADMIN — Medication 5 MILLIGRAM(S): at 05:33

## 2024-01-07 RX ADMIN — CEFEPIME 100 MILLIGRAM(S): 1 INJECTION, POWDER, FOR SOLUTION INTRAMUSCULAR; INTRAVENOUS at 22:10

## 2024-01-07 RX ADMIN — MYCOPHENOLATE MOFETIL 500 MILLIGRAM(S): 250 CAPSULE ORAL at 17:30

## 2024-01-07 RX ADMIN — LATANOPROST 1 DROP(S): 0.05 SOLUTION/ DROPS OPHTHALMIC; TOPICAL at 22:11

## 2024-01-07 RX ADMIN — KETOTIFEN FUMARATE 1 DROP(S): 0.34 SOLUTION OPHTHALMIC at 17:31

## 2024-01-07 RX ADMIN — KETOTIFEN FUMARATE 1 DROP(S): 0.34 SOLUTION OPHTHALMIC at 05:34

## 2024-01-07 RX ADMIN — TACROLIMUS 5 MILLIGRAM(S): 5 CAPSULE ORAL at 10:25

## 2024-01-07 RX ADMIN — Medication 200 MILLIGRAM(S): at 22:10

## 2024-01-07 RX ADMIN — Medication 40 MILLIGRAM(S): at 05:33

## 2024-01-07 RX ADMIN — MYCOPHENOLATE MOFETIL 500 MILLIGRAM(S): 250 CAPSULE ORAL at 05:33

## 2024-01-07 RX ADMIN — Medication 1 DROP(S): at 17:30

## 2024-01-07 RX ADMIN — Medication 1 DROP(S): at 05:32

## 2024-01-07 RX ADMIN — Medication 200 MILLIGRAM(S): at 05:32

## 2024-01-07 RX ADMIN — Medication 1 DROP(S): at 16:17

## 2024-01-07 RX ADMIN — Medication 1 DROP(S): at 22:11

## 2024-01-07 RX ADMIN — Medication 1 DROP(S): at 00:27

## 2024-01-07 RX ADMIN — HEPARIN SODIUM 5000 UNIT(S): 5000 INJECTION INTRAVENOUS; SUBCUTANEOUS at 22:11

## 2024-01-07 RX ADMIN — Medication 1300 MILLIGRAM(S): at 22:09

## 2024-01-07 RX ADMIN — ERYTHROPOIETIN 10000 UNIT(S): 10000 INJECTION, SOLUTION INTRAVENOUS; SUBCUTANEOUS at 12:49

## 2024-01-07 RX ADMIN — SODIUM ZIRCONIUM CYCLOSILICATE 10 GRAM(S): 10 POWDER, FOR SUSPENSION ORAL at 12:49

## 2024-01-07 RX ADMIN — HEPARIN SODIUM 5000 UNIT(S): 5000 INJECTION INTRAVENOUS; SUBCUTANEOUS at 05:33

## 2024-01-07 RX ADMIN — Medication 2000 UNIT(S): at 12:49

## 2024-01-07 RX ADMIN — Medication 100 GRAM(S): at 14:06

## 2024-01-07 RX ADMIN — ATOVAQUONE 1500 MILLIGRAM(S): 750 SUSPENSION ORAL at 12:49

## 2024-01-07 RX ADMIN — Medication 200 MILLIGRAM(S): at 14:05

## 2024-01-07 RX ADMIN — Medication 40 MILLIGRAM(S): at 14:05

## 2024-01-07 RX ADMIN — HEPARIN SODIUM 5000 UNIT(S): 5000 INJECTION INTRAVENOUS; SUBCUTANEOUS at 14:05

## 2024-01-07 RX ADMIN — Medication 1 DROP(S): at 14:05

## 2024-01-07 RX ADMIN — Medication 40 MILLIGRAM(S): at 22:11

## 2024-01-07 RX ADMIN — Medication 1 DROP(S): at 12:49

## 2024-01-07 NOTE — CHART NOTE - NSCHARTNOTEFT_GEN_A_CORE
Ophthalmology was called about this patient the night of 1/6/24. Per pt's nurse, the patient was complaining of itchy eyes. I instructed the team to start preservative free artificial tears and Zaditor eye drops. I saw the patient in person today, 1/7/24 and she said her eyes were feeling much better after starting the drops, and the itchiness had decreased. She declined full eye exam at this time, as symptoms had improved. Please continue preservative free artificial tears every two hours, and ketotifen twice a day in both eyes.     Patient should follow-up with her ophthalmologist or with [facility] Department of Ophthalmology within 1 week of after discharge at:    [address]  [place] [address]  [phone]    Martha Almaguer MD, PGY-3  Contact: [employer] Ophthalmology was called about this patient the night of 1/6/24. Per pt's nurse, the patient was complaining of itchy eyes. I instructed the team to start preservative free artificial tears and Zaditor eye drops. I saw the patient in person today, 1/7/24 and she said her eyes were feeling much better after starting the drops, and the itchiness had decreased. She declined full eye exam at this time, as symptoms had improved. Please continue preservative free artificial tears every two hours, and ketotifen twice a day in both eyes.     Patient should follow-up with her ophthalmologist or with [facility] Department of Ophthalmology within 1 week of after discharge at:    [address]. [address]  [place] [address]  [phone]    Martha Almaguer MD, PGY-3  Contact: [employer] Ophthalmology was called about this patient the night of 1/6/24. Per pt's nurse, the patient was complaining of itchy eyes. I instructed the team to start preservative free artificial tears and Zaditor eye drops. I saw the patient in person today, 1/7/24 and she said her eyes were feeling much better after starting the drops, and the itchiness had decreased. She declined full eye exam at this time, as symptoms had improved. Please continue preservative free artificial tears every two hours, and ketotifen twice a day in both eyes. Please reconsult ophthalmology as needed.     Patient should follow-up with her ophthalmologist or with [facility] Department of Ophthalmology within 1 week of after discharge at:    [address] [facility]  [place] [address]  [phone]    Martha Almaguer MD, PGY-3  Contact: [employer] Ophthalmology was called about this patient the night of 1/6/24. Per pt's nurse, the patient was complaining of itchy eyes. I instructed the team to start preservative free artificial tears and Zaditor eye drops. I saw the patient in person today, 1/7/24 and she said her eyes were feeling much better after starting the drops, and the itchiness had decreased. She declined full eye exam at this time, as symptoms had improved. Please continue preservative free artificial tears every two hours, and ketotifen twice a day in both eyes. Please reconsult ophthalmology as needed.     Patient should follow-up with her ophthalmologist or with [facility] Department of Ophthalmology within 1 week of after discharge at:    [address]  [place] [address]  [phone]    Martha Almaguer MD, PGY-3  Contact: [employer] .

## 2024-01-07 NOTE — PROGRESS NOTE ADULT - SUBJECTIVE AND OBJECTIVE BOX
DATE OF SERVICE: 01-07-24 @ 10:38    Patient is a 78y old  Female who presents with a chief complaint of renal transplant with fever (06 Jan 2024 15:54)      SUBJECTIVE / OVERNIGHT EVENTS:  No chest pain. No shortness of breath. No complaints. No events overnight.     MEDICATIONS  (STANDING):  artificial tears (preservative free) Ophthalmic Solution 1 Drop(s) Both EYES every 2 hours  aspirin enteric coated 81 milliGRAM(s) Oral daily  atovaquone  Suspension 1500 milliGRAM(s) Oral daily  cefepime   IVPB 1000 milliGRAM(s) IV Intermittent every 24 hours  cholecalciferol 2000 Unit(s) Oral daily  epoetin ivelisse (EPOGEN) Injectable 86712 Unit(s) SubCutaneous once  epoetin ivelisse-epbx (RETACRIT) Injectable 81255 Unit(s) SubCutaneous <User Schedule>  furosemide   Injectable 40 milliGRAM(s) IV Push three times a day  heparin   Injectable 5000 Unit(s) SubCutaneous every 8 hours  ketotifen 0.025% Ophthalmic Solution 1 Drop(s) Both EYES two times a day  labetalol 200 milliGRAM(s) Oral three times a day  latanoprost 0.005% Ophthalmic Solution 1 Drop(s) Both EYES at bedtime  mycophenolate mofetil 500 milliGRAM(s) Oral two times a day  NIFEdipine XL 60 milliGRAM(s) Oral two times a day  predniSONE   Tablet 5 milliGRAM(s) Oral daily  sodium bicarbonate 1300 milliGRAM(s) Oral three times a day  sodium zirconium cyclosilicate 10 Gram(s) Oral daily  tacrolimus ER Tablet (ENVARSUS XR) 5 milliGRAM(s) Oral daily    MEDICATIONS  (PRN):  acetaminophen     Tablet .. 650 milliGRAM(s) Oral every 6 hours PRN Temp greater or equal to 38C (100.4F), Mild Pain (1 - 3)  melatonin 3 milliGRAM(s) Oral at bedtime PRN Insomnia      Vital Signs Last 24 Hrs  T(C): 37.1 (07 Jan 2024 04:52), Max: 37.2 (06 Jan 2024 12:05)  T(F): 98.8 (07 Jan 2024 04:52), Max: 98.9 (06 Jan 2024 12:05)  HR: 74 (07 Jan 2024 04:52) (67 - 74)  BP: 167/71 (07 Jan 2024 04:52) (141/75 - 185/78)  BP(mean): --  RR: 18 (07 Jan 2024 04:52) (18 - 18)  SpO2: 94% (07 Jan 2024 04:52) (94% - 98%)    Parameters below as of 07 Jan 2024 04:52  Patient On (Oxygen Delivery Method): room air      CAPILLARY BLOOD GLUCOSE        I&O's Summary    06 Jan 2024 07:01  -  07 Jan 2024 07:00  --------------------------------------------------------  IN: 120 mL / OUT: 0 mL / NET: 120 mL        PHYSICAL EXAM:  GENERAL: NAD, well-developed  HEAD:  Atraumatic, Normocephalic  EYES: EOMI, PERRLA, conjunctiva and sclera clear  NECK: Supple, No JVD  CHEST/LUNG: Clear to auscultation bilaterally; No wheeze  HEART: Regular rate and rhythm; No murmurs, rubs, or gallops  ABDOMEN: Soft, Nontender, Nondistended; Bowel sounds present  EXTREMITIES:  2+ Peripheral Pulses, No clubbing, cyanosis, temo edema  PSYCH: AAOx3  NEUROLOGY: non-focal  SKIN: No rashes or lesions    LABS:                        7.6    4.45  )-----------( 192      ( 06 Jan 2024 11:12 )             24.2     01-06    135  |  104  |  56<H>  ----------------------------<  173<H>  4.2   |  18<L>  |  3.35<H>    Ca    8.3<L>      06 Jan 2024 11:12            Urinalysis Basic - ( 06 Jan 2024 11:12 )    Color: x / Appearance: x / SG: x / pH: x  Gluc: 173 mg/dL / Ketone: x  / Bili: x / Urobili: x   Blood: x / Protein: x / Nitrite: x   Leuk Esterase: x / RBC: x / WBC x   Sq Epi: x / Non Sq Epi: x / Bacteria: x    < from: TTE W or WO Ultrasound Enhancing Agent (01.04.24 @ 08:44) >  CONCLUSIONS:      1. Left ventricular systolic function is normal with an ejection fraction of 63 % by Fontenot's method of disks. There are no regional wall motion abnormalities seen.   2. There is moderate (grade 2) left ventricular diastolic dysfunction, with elevated filling pressure.   3. Severe left ventricular hypertrophy.   4. Normal right ventricular cavity size, increasedwall thickness, and systolic function.   5. The left atrium is severely dilated.   6. Echocardiographic evidence of pulmonary hyptertension.   7. Small pericardial effusion noted adjacent to the posterior left ventricle, small pericardial effusion noted adjacent to the lateral left ventricle and moderate pericardial effusion noted adjacent to the right atrium with no evidence of hemodynamic compromise.   8. Compared to the transthoracic echocardiogram performed on 11/10/2023 LVOT obstruction is markedly approved.      < end of copied text >      RADIOLOGY & ADDITIONAL TESTS:    Imaging Personally Reviewed:    Consultant(s) Notes Reviewed:      Care Discussed with Consultants/Other Providers:   DATE OF SERVICE: 01-07-24 @ 10:38    Patient is a 78y old  Female who presents with a chief complaint of renal transplant with fever (06 Jan 2024 15:54)      SUBJECTIVE / OVERNIGHT EVENTS:  No chest pain. No shortness of breath. No complaints. No events overnight.     MEDICATIONS  (STANDING):  artificial tears (preservative free) Ophthalmic Solution 1 Drop(s) Both EYES every 2 hours  aspirin enteric coated 81 milliGRAM(s) Oral daily  atovaquone  Suspension 1500 milliGRAM(s) Oral daily  cefepime   IVPB 1000 milliGRAM(s) IV Intermittent every 24 hours  cholecalciferol 2000 Unit(s) Oral daily  epoetin ivelisse (EPOGEN) Injectable 50759 Unit(s) SubCutaneous once  epoetin ivelisse-epbx (RETACRIT) Injectable 51404 Unit(s) SubCutaneous <User Schedule>  furosemide   Injectable 40 milliGRAM(s) IV Push three times a day  heparin   Injectable 5000 Unit(s) SubCutaneous every 8 hours  ketotifen 0.025% Ophthalmic Solution 1 Drop(s) Both EYES two times a day  labetalol 200 milliGRAM(s) Oral three times a day  latanoprost 0.005% Ophthalmic Solution 1 Drop(s) Both EYES at bedtime  mycophenolate mofetil 500 milliGRAM(s) Oral two times a day  NIFEdipine XL 60 milliGRAM(s) Oral two times a day  predniSONE   Tablet 5 milliGRAM(s) Oral daily  sodium bicarbonate 1300 milliGRAM(s) Oral three times a day  sodium zirconium cyclosilicate 10 Gram(s) Oral daily  tacrolimus ER Tablet (ENVARSUS XR) 5 milliGRAM(s) Oral daily    MEDICATIONS  (PRN):  acetaminophen     Tablet .. 650 milliGRAM(s) Oral every 6 hours PRN Temp greater or equal to 38C (100.4F), Mild Pain (1 - 3)  melatonin 3 milliGRAM(s) Oral at bedtime PRN Insomnia      Vital Signs Last 24 Hrs  T(C): 37.1 (07 Jan 2024 04:52), Max: 37.2 (06 Jan 2024 12:05)  T(F): 98.8 (07 Jan 2024 04:52), Max: 98.9 (06 Jan 2024 12:05)  HR: 74 (07 Jan 2024 04:52) (67 - 74)  BP: 167/71 (07 Jan 2024 04:52) (141/75 - 185/78)  BP(mean): --  RR: 18 (07 Jan 2024 04:52) (18 - 18)  SpO2: 94% (07 Jan 2024 04:52) (94% - 98%)    Parameters below as of 07 Jan 2024 04:52  Patient On (Oxygen Delivery Method): room air      CAPILLARY BLOOD GLUCOSE        I&O's Summary    06 Jan 2024 07:01  -  07 Jan 2024 07:00  --------------------------------------------------------  IN: 120 mL / OUT: 0 mL / NET: 120 mL        PHYSICAL EXAM:  GENERAL: NAD, well-developed  HEAD:  Atraumatic, Normocephalic  EYES: EOMI, PERRLA, conjunctiva and sclera clear  NECK: Supple, No JVD  CHEST/LUNG: Clear to auscultation bilaterally; No wheeze  HEART: Regular rate and rhythm; No murmurs, rubs, or gallops  ABDOMEN: Soft, Nontender, Nondistended; Bowel sounds present  EXTREMITIES:  2+ Peripheral Pulses, No clubbing, cyanosis, temo edema  PSYCH: AAOx3  NEUROLOGY: non-focal  SKIN: No rashes or lesions    LABS:                        7.6    4.45  )-----------( 192      ( 06 Jan 2024 11:12 )             24.2     01-06    135  |  104  |  56<H>  ----------------------------<  173<H>  4.2   |  18<L>  |  3.35<H>    Ca    8.3<L>      06 Jan 2024 11:12            Urinalysis Basic - ( 06 Jan 2024 11:12 )    Color: x / Appearance: x / SG: x / pH: x  Gluc: 173 mg/dL / Ketone: x  / Bili: x / Urobili: x   Blood: x / Protein: x / Nitrite: x   Leuk Esterase: x / RBC: x / WBC x   Sq Epi: x / Non Sq Epi: x / Bacteria: x    < from: TTE W or WO Ultrasound Enhancing Agent (01.04.24 @ 08:44) >  CONCLUSIONS:      1. Left ventricular systolic function is normal with an ejection fraction of 63 % by Fontenot's method of disks. There are no regional wall motion abnormalities seen.   2. There is moderate (grade 2) left ventricular diastolic dysfunction, with elevated filling pressure.   3. Severe left ventricular hypertrophy.   4. Normal right ventricular cavity size, increasedwall thickness, and systolic function.   5. The left atrium is severely dilated.   6. Echocardiographic evidence of pulmonary hyptertension.   7. Small pericardial effusion noted adjacent to the posterior left ventricle, small pericardial effusion noted adjacent to the lateral left ventricle and moderate pericardial effusion noted adjacent to the right atrium with no evidence of hemodynamic compromise.   8. Compared to the transthoracic echocardiogram performed on 11/10/2023 LVOT obstruction is markedly approved.      < end of copied text >      RADIOLOGY & ADDITIONAL TESTS:    Imaging Personally Reviewed:    Consultant(s) Notes Reviewed:      Care Discussed with Consultants/Other Providers:

## 2024-01-07 NOTE — PROGRESS NOTE ADULT - ASSESSMENT
78f h/o HTN, s/p Renal transplant, CKD, chronic rejection, recent admission for pneumonia s/p abx presented with shortness of breath with LUE and b/l LE edema concerning for volume overload.     Shortness of breath.   - with LUE and b/l LE edema   UA with protein 300  check urine protein to cr - evaluate for nephrotic syndrome  check EKG   lasix 40mg iv tid - renal consult appreciated   check TTE  covid, rsv, flu negative.    Stage 4 chronic kidney disease.    creatinine improved since last admission   check urine protein to cr as above  renal transplant consult appreciated   continue tacrolimus 5mg qd (monitor levels), mycophenolate 500mg bid, prednisone 5mg qd  renally dose meds, avoid nephrotoxic medications.     uncontrolled HTN (hypertension).   monitor bp   c/w nifedipine, labetalol at home doses.    fever 2/2 poss pna  - seen by transplant ID  - Cefepime 1g q24H (renally-dosed, may need to decrease if renal function deteriorates further)  - Check serum cryptococcal antigen, aspergillus galactomannan, fungitell, adenovirus PCR, histoplasma antigen in urine   - Please induce sputum for bacterial and fungal cultures, and PCP PCR, legionella PCR  - Check urine Legionella, urine Pneumococcal Ag  - MRSA PCR  - check , HSV and CMV PCR in blood (ordered)  - Non-urgent ophthalmology evaluation for bilateral conjunctivitis  - please repeat RVP  - consideration of bronchoscopy based on progress     Opacity of lung on imaging study.   new RUL opacity   clinically c/w volume overload  procalcitonin low  monitor closely as patient on immunosuppressives - low threshold for starting empiric abx   check ct chest.     Metabolic acidosis.   c/w sodium bicarb.    Prophylactic measure.   dvt proph - hsq.

## 2024-01-07 NOTE — PROGRESS NOTE ADULT - ASSESSMENT
79 YO Female h/o HTN, s/p Renal transplant, CKD, chronic rejection, recent admission for pneumonia s/p abx presented with shortness of breath since this morning. she also noticed significant LUE swelling that began 4-5 days ago.  Nephrology consulted for CKD s/p renal transplant.    A/P:  S/p DDRT (5/19/2021 - induction w/ Basiliximab)  Baseline sCr ~3  Renal function fluctuating, continues to slightly worsen at present   Repeat TTE 1/4 - Left ventricular wall thickness is moderately increased. Left ventricular systolic function is normal with a calculated ejection fraction of 63 % with moderate L ventricular dysfunction.  On Furosemide 40 mg IV TID per Transplant nephrology  - on exam pt appears to be closer to euvolemic, would consider decreasing dose  Pt is also on Cefepime 1g q24H ---> Per transplant ID 1/6  may need to decrease if renal function deteriorates further   UA with proteinuria and hematuria.  Continue immunosuppressants per transplant - mycophenolate 500mg BID, prednisone 5mg qd, and Tacrolimus 5mg qd.  Fk level 1/7, 4.3    Check Tacro level 30mins prior to next dose; goal 4-6.  Avoid nephrotoxic agents.  Monitor BMP and UO.    HTN:  BP fluctuating  Nifedipine changed to 60 BID 1/5   c/w Labetalol and Nifedipine   IF BP remains elevated, consider titrating Nifedipine   Avoid hypotension.  Monitor BP.    Hyperkalemia:  K stable   Low K diet.  Monitor K closely.    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  currently on lasix 40mg  TID   CO2 improving   c/w NaHCO3 1300mg TID    Anemia:  Likely in setting of CKD vs iron deficiency.  iron deficient, Tsat 11% 1/6   SHELDON 10,000 units TIW   would hold off iron administration given ct chest with evidence of associated foci of consolidation and groundglass opacity in all 5 lobes, suggestive of infection.   s/p 1 unit PRBC 1/4  Hgb remains relatively stable today   Transfuse per primary team as needed   Hold SHELDON for BP >170/90.    Hypomagnesemia   supplemented with 1g Mg Sulfate today  Replete as needed  Monitor     Proteinuria/Hematuria:  etiology? possibly diabetic nephropathy (donor origin)  Follows w/ Dr. Louise   pending UPr/Cr - reordered 1/7   Vasculitis work up per transplant team  Monitor     L Upper Extremity swelling   Repeat Duplex performed 1/7 , no evidence of DVT  Mgmt per Primary  77 YO Female h/o HTN, s/p Renal transplant, CKD, chronic rejection, recent admission for pneumonia s/p abx presented with shortness of breath since this morning. she also noticed significant LUE swelling that began 4-5 days ago.  Nephrology consulted for CKD s/p renal transplant.    A/P:  S/p DDRT (5/19/2021 - induction w/ Basiliximab)  Baseline sCr ~3  Renal function fluctuating, continues to slightly worsen at present   Repeat TTE 1/4 - Left ventricular wall thickness is moderately increased. Left ventricular systolic function is normal with a calculated ejection fraction of 63 % with moderate L ventricular dysfunction.  On Furosemide 40 mg IV TID per Transplant nephrology  - on exam pt appears to be closer to euvolemic, would consider decreasing dose  Pt is also on Cefepime 1g q24H ---> Per transplant ID 1/6  may need to decrease if renal function deteriorates further   UA with proteinuria and hematuria.  Continue immunosuppressants per transplant - mycophenolate 500mg BID, prednisone 5mg qd, and Tacrolimus 5mg qd.  Fk level 1/7, 4.3    Check Tacro level 30mins prior to next dose; goal 4-6.  Avoid nephrotoxic agents.  Monitor BMP and UO.    HTN:  BP fluctuating  Nifedipine changed to 60 BID 1/5   c/w Labetalol and Nifedipine   IF BP remains elevated, consider titrating Nifedipine   Avoid hypotension.  Monitor BP.    Hyperkalemia:  K stable   Low K diet.  Monitor K closely.    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  currently on lasix 40mg  TID   CO2 improving   c/w NaHCO3 1300mg TID    Anemia:  Likely in setting of CKD vs iron deficiency.  iron deficient, Tsat 11% 1/6   SHELDON 10,000 units TIW   would hold off iron administration given ct chest with evidence of associated foci of consolidation and groundglass opacity in all 5 lobes, suggestive of infection.   s/p 1 unit PRBC 1/4  Hgb remains relatively stable today   Transfuse per primary team as needed   Hold SHELDON for BP >170/90.    Hypomagnesemia   supplemented with 1g Mg Sulfate today  Replete as needed  Monitor     Proteinuria/Hematuria:  etiology? possibly diabetic nephropathy (donor origin)  Follows w/ Dr. Louise   pending UPr/Cr - reordered 1/7   Vasculitis work up per transplant team  Monitor     L Upper Extremity swelling   Repeat Duplex performed 1/7 , no evidence of DVT  Mgmt per Primary  77 YO Female h/o HTN, s/p Renal transplant, CKD, chronic rejection, recent admission for pneumonia s/p abx presented with shortness of breath since this morning. she also noticed significant LUE swelling that began 4-5 days ago.  Nephrology consulted for CKD s/p renal transplant.    A/P:  S/p DDRT (5/19/2021 - induction w/ Basiliximab)  Baseline sCr ~3  Renal function fluctuating, continues to slightly worsen at present   Repeat TTE 1/4 - Left ventricular wall thickness is moderately increased. Left ventricular systolic function is normal with a calculated ejection fraction of 63 % with moderate L ventricular dysfunction.  On Furosemide 40 mg IV TID per Transplant nephrology  - on exam pt appears to be closer to euvolemic, consider decreasing dose  Pt is also on Cefepime 1g q24H ---> Per transplant ID 1/6  may need to decrease if renal function deteriorates further   UA with proteinuria and hematuria.  Continue immunosuppressants per transplant - mycophenolate 500mg BID, prednisone 5mg qd, and Tacrolimus 5mg qd.  Fk level 1/7, 4.3    Check Tacro level 30mins prior to next dose; goal 4-6.  Avoid nephrotoxic agents.  Monitor BMP and UO.    HTN:  BP fluctuating  Nifedipine changed to 60 BID 1/5   c/w Labetalol and Nifedipine   IF BP remains elevated, consider titrating Nifedipine   Avoid hypotension.  Monitor BP.    Hyperkalemia:  K stable   Low K diet.  Monitor K closely.    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  currently on lasix 40mg  TID   CO2 improving   c/w NaHCO3 1300mg TID    Anemia:  Likely in setting of CKD vs iron deficiency.  iron deficient, Tsat 11% 1/6   SHELDON 10,000 units TIW   would hold off iron administration given ct chest with evidence of associated foci of consolidation and groundglass opacity in all 5 lobes, suggestive of infection.   s/p 1 unit PRBC 1/4  Hgb remains relatively stable today   Transfuse per primary team as needed   Hold SHELDON for BP >170/90.    Hypomagnesemia   supplemented with 1g Mg Sulfate today  Replete as needed  Monitor     Proteinuria/Hematuria:  etiology? possibly diabetic nephropathy (donor origin)  Follows w/ Dr. Louise   pending UPr/Cr - reordered 1/7   Vasculitis work up per transplant team  Monitor     L Upper Extremity swelling   Repeat Duplex performed 1/7 , no evidence of DVT  Mgmt per Primary

## 2024-01-07 NOTE — PROGRESS NOTE ADULT - SUBJECTIVE AND OBJECTIVE BOX
[facility] [place] [facility]   MD SAMMY ORDONEZ MD BRIANA PETITO, NP    TEL:  FROM 9 AM to 5 PM ---OFFICE: [phone]  FROM 5 PM - 9 AM PLEASE CALL ANSWERING SERVICE: [phone]     RENAL PROGRESS NOTE: DATE OF SERVICE 01-07-24 @ 15:07  Patient is a 78y old  Female who presents with a chief complaint of renal transplant with fever   Patient seen and examined at bedside. No chest pain/sob    VITALS:  T(F): 99.5 (01-07-24 @ 13:10), Max: 99.5 (01-07-24 @ 13:10)  HR: 68 (01-07-24 @ 13:10)  BP: 125/68 (01-07-24 @ 13:10)  RR: 18 (01-07-24 @ 13:10)  SpO2: 95% (01-07-24 @ 13:10)  Wt(kg): --    01-06 @ 07:01  -  01-07 @ 07:00  --------------------------------------------------------  IN: 120 mL / OUT: 0 mL / NET: 120 mL    01-07 @ 07:01  -  01-07 @ 15:07  --------------------------------------------------------  IN: 500 mL / OUT: 0 mL / NET: 500 mL      PHYSICAL EXAM:  Constitutional: NAD  Neck: No JVD  Respiratory: CTAB, no wheezes, rales or rhonchi  Cardiovascular: S1, S2, RRR  Gastrointestinal: BS+, soft, NT/ND  Extremities: b/l LE trace. LUE edema/ ecchymosis   Access: R F     [facility] Medications:   MEDICATIONS  (STANDING):  artificial tears (preservative free) Ophthalmic Solution 1 Drop(s) Both EYES every 2 hours  aspirin enteric coated 81 milliGRAM(s) Oral daily  atovaquone  Suspension 1500 milliGRAM(s) Oral daily  cefepime   IVPB 1000 milliGRAM(s) IV Intermittent every 24 hours  cholecalciferol 2000 Unit(s) Oral daily  epoetin ivelisse (EPOGEN) Injectable 46377 Unit(s) SubCutaneous once  epoetin ivelisse-epbx (RETACRIT) Injectable 92243 Unit(s) SubCutaneous <User Schedule>  furosemide   Injectable 40 milliGRAM(s) IV Push three times a day  heparin   Injectable 5000 Unit(s) SubCutaneous every 8 hours  ketotifen 0.025% Ophthalmic Solution 1 Drop(s) Both EYES two times a day  labetalol 200 milliGRAM(s) Oral three times a day  latanoprost 0.005% Ophthalmic Solution 1 Drop(s) Both EYES at bedtime  mycophenolate mofetil 500 milliGRAM(s) Oral two times a day  NIFEdipine XL 60 milliGRAM(s) Oral two times a day  predniSONE   Tablet 5 milliGRAM(s) Oral daily  sodium bicarbonate 1300 milliGRAM(s) Oral three times a day  sodium zirconium cyclosilicate 10 Gram(s) Oral daily  tacrolimus ER Tablet (ENVARSUS XR) 5 milliGRAM(s) Oral daily    Tacrolimus (), Serum: 4.3 ng/mL (01-07 @ 11:02)    LABS:  01-07    137  |  104  |  55<H>  ----------------------------<  132<H>  4.2   |  20<L>  |  3.42<H>    Ca    8.4      07 Jan 2024 11:02  Phos  4.3     01-07  Mg     1.4     01-07      Creatinine Trend: 3.42 <--, 3.35 <--, 3.23 <--, 2.95 <--, 2.75 <--, 2.67 <--    Phosphorus: 4.3 mg/dL (01-07 @ 11:02)                              7.8    4.03  )-----------( 203      ( 07 Jan 2024 11:02 )             24.5     Urine Studies:  Urinalysis - [01-07-24 @ 11:02]      Color  / Appearance  / SG  / pH       Gluc 132 / Ketone   / Bili  / Urobili        Blood  / Protein  / Leuk Est  / Nitrite       RBC  / WBC  / Hyaline  / Gran  / Sq Epi  / Non Sq Epi  / Bacteria       Iron 21, TIBC 171, %sat 12      [01-06-24 @ 11:12]  Ferritin 1573      [01-06-24 @ 11:12]  PTH -- (Ca 8.5)      [11-10-23 @ 06:01]   659  Vitamin D (25OH) 16.5      [11-11-23 @ 06:09]  HbA1c 4.6      [05-28-19 @ 09:49]  Lipid: chol 191, , HDL 64, LDL --      [01-04-24 @ 10:13]    HBsAg Nonreact      [01-04-24 @ 07:51]  HCV 0.27, Nonreact      [01-04-24 @ 07:51]  HIV Nonreact      [01-04-24 @ 10:13]    STEPHANIE: titer 1:80, pattern Speckled      [01-04-24 @ 07:51]  dsDNA <12      [01-04-24 @ 07:51]  C3 Complement 120      [01-04-24 @ 07:51]  C4 Complement 34      [01-04-24 @ 07:51]  ANCA: cANCA Negative, pANCA Negative, atypical ANCA Indeterminate Method interference due to STEPHANIE Fluorescence      [01-04-24 @ 07:51]    RADIOLOGY & ADDITIONAL STUDIES:   [facility] [place] [facility] [place]   MD SAMMY ORDONEZ MD BRIANA PETITO, NP    TEL:  FROM 9 AM to 5 PM ---OFFICE: [phone]  FROM 5 PM - 9 AM PLEASE CALL ANSWERING SERVICE: [phone]     RENAL PROGRESS NOTE: DATE OF SERVICE 01-07-24 @ 15:07  Patient is a 78y old  Female who presents with a chief complaint of renal transplant with fever   Patient seen and examined at bedside. No chest pain/sob    VITALS:  T(F): 99.5 (01-07-24 @ 13:10), Max: 99.5 (01-07-24 @ 13:10)  HR: 68 (01-07-24 @ 13:10)  BP: 125/68 (01-07-24 @ 13:10)  RR: 18 (01-07-24 @ 13:10)  SpO2: 95% (01-07-24 @ 13:10)  Wt(kg): --    01-06 @ 07:01  -  01-07 @ 07:00  --------------------------------------------------------  IN: 120 mL / OUT: 0 mL / NET: 120 mL    01-07 @ 07:01  -  01-07 @ 15:07  --------------------------------------------------------  IN: 500 mL / OUT: 0 mL / NET: 500 mL      PHYSICAL EXAM:  Constitutional: NAD  Neck: No JVD  Respiratory: CTAB, no wheezes, rales or rhonchi  Cardiovascular: S1, S2, RRR  Gastrointestinal: BS+, soft, NT/ND  Extremities: b/l LE trace. LUE edema/ ecchymosis   Access: R F     [facility] Medications:   MEDICATIONS  (STANDING):  artificial tears (preservative free) Ophthalmic Solution 1 Drop(s) Both EYES every 2 hours  aspirin enteric coated 81 milliGRAM(s) Oral daily  atovaquone  Suspension 1500 milliGRAM(s) Oral daily  cefepime   IVPB 1000 milliGRAM(s) IV Intermittent every 24 hours  cholecalciferol 2000 Unit(s) Oral daily  epoetin ivelisse (EPOGEN) Injectable 66543 Unit(s) SubCutaneous once  epoetin ivelisse-epbx (RETACRIT) Injectable 47307 Unit(s) SubCutaneous <User Schedule>  furosemide   Injectable 40 milliGRAM(s) IV Push three times a day  heparin   Injectable 5000 Unit(s) SubCutaneous every 8 hours  ketotifen 0.025% Ophthalmic Solution 1 Drop(s) Both EYES two times a day  labetalol 200 milliGRAM(s) Oral three times a day  latanoprost 0.005% Ophthalmic Solution 1 Drop(s) Both EYES at bedtime  mycophenolate mofetil 500 milliGRAM(s) Oral two times a day  NIFEdipine XL 60 milliGRAM(s) Oral two times a day  predniSONE   Tablet 5 milliGRAM(s) Oral daily  sodium bicarbonate 1300 milliGRAM(s) Oral three times a day  sodium zirconium cyclosilicate 10 Gram(s) Oral daily  tacrolimus ER Tablet (ENVARSUS XR) 5 milliGRAM(s) Oral daily    Tacrolimus (), Serum: 4.3 ng/mL (01-07 @ 11:02)    LABS:  01-07    137  |  104  |  55<H>  ----------------------------<  132<H>  4.2   |  20<L>  |  3.42<H>    Ca    8.4      07 Jan 2024 11:02  Phos  4.3     01-07  Mg     1.4     01-07      Creatinine Trend: 3.42 <--, 3.35 <--, 3.23 <--, 2.95 <--, 2.75 <--, 2.67 <--    Phosphorus: 4.3 mg/dL (01-07 @ 11:02)                              7.8    4.03  )-----------( 203      ( 07 Jan 2024 11:02 )             24.5     Urine Studies:  Urinalysis - [01-07-24 @ 11:02]      Color  / Appearance  / SG  / pH       Gluc 132 / Ketone   / Bili  / Urobili        Blood  / Protein  / Leuk Est  / Nitrite       RBC  / WBC  / Hyaline  / Gran  / Sq Epi  / Non Sq Epi  / Bacteria       Iron 21, TIBC 171, %sat 12      [01-06-24 @ 11:12]  Ferritin 1573      [01-06-24 @ 11:12]  PTH -- (Ca 8.5)      [11-10-23 @ 06:01]   659  Vitamin D (25OH) 16.5      [11-11-23 @ 06:09]  HbA1c 4.6      [05-28-19 @ 09:49]  Lipid: chol 191, , HDL 64, LDL --      [01-04-24 @ 10:13]    HBsAg Nonreact      [01-04-24 @ 07:51]  HCV 0.27, Nonreact      [01-04-24 @ 07:51]  HIV Nonreact      [01-04-24 @ 10:13]    STEPHANIE: titer 1:80, pattern Speckled      [01-04-24 @ 07:51]  dsDNA <12      [01-04-24 @ 07:51]  C3 Complement 120      [01-04-24 @ 07:51]  C4 Complement 34      [01-04-24 @ 07:51]  ANCA: cANCA Negative, pANCA Negative, atypical ANCA Indeterminate Method interference due to STEPHANIE Fluorescence      [01-04-24 @ 07:51]    RADIOLOGY & ADDITIONAL STUDIES:

## 2024-01-07 NOTE — PROGRESS NOTE ADULT - SUBJECTIVE AND OBJECTIVE BOX
DATE OF SERVICE: 01-07-24 @ 11:38    Patient is a 78y old  Female who presents with a chief complaint of renal transplant with fever (06 Jan 2024 15:54)      INTERVAL HISTORY:     REVIEW OF SYSTEMS:  CONSTITUTIONAL: No weakness  EYES/ENT: No visual changes;  No throat pain   NECK: No pain or stiffness  RESPIRATORY: No cough, wheezing; No shortness of breath  CARDIOVASCULAR: No chest pain or palpitations  GASTROINTESTINAL: No abdominal  pain. No nausea, vomiting, or hematemesis  GENITOURINARY: No dysuria, frequency or hematuria  NEUROLOGICAL: No stroke like symptoms  SKIN: No rashes    TELEMETRY Personally reviewed:  	  MEDICATIONS:  furosemide   Injectable 40 milliGRAM(s) IV Push three times a day  labetalol 200 milliGRAM(s) Oral three times a day  NIFEdipine XL 60 milliGRAM(s) Oral two times a day        PHYSICAL EXAM:  T(C): 37.1 (01-07-24 @ 04:52), Max: 37.2 (01-06-24 @ 12:05)  HR: 74 (01-07-24 @ 04:52) (67 - 74)  BP: 167/71 (01-07-24 @ 04:52) (141/75 - 185/78)  RR: 18 (01-07-24 @ 04:52) (18 - 18)  SpO2: 94% (01-07-24 @ 04:52) (94% - 98%)  Wt(kg): --  I&O's Summary    06 Jan 2024 07:01  -  07 Jan 2024 07:00  --------------------------------------------------------  IN: 120 mL / OUT: 0 mL / NET: 120 mL          Appearance: In no distress	  HEENT:    PERRL, EOMI	  Cardiovascular:  S1 S2, No JVD  Respiratory: Lungs clear to auscultation	  Gastrointestinal:  Soft, Non-tender, + BS	  Vascularature:  No edema of LE  Psychiatric: Appropriate affect   Neuro: no acute focal deficits                               7.8    4.03  )-----------( 203      ( 07 Jan 2024 11:02 )             24.5     01-06    135  |  104  |  56<H>  ----------------------------<  173<H>  4.2   |  18<L>  |  3.35<H>    Ca    8.3<L>      06 Jan 2024 11:12          Labs personally reviewed      ASSESSMENT/PLAN: 	  78f h/o HTN, s/p Renal transplant, CKD, chronic rejection, recent admission for pneumonia s/p abx presented with shortness of breath since this morning. she also noticed significant LUE swelling that began 4-5 days ago    1. Diastolic Heart Failure secondary to advanced kidney disease   - Recent TTE with normal EF  - Appears closer to euvolemia, c/w IV Lasix 40mg IV TID, on discharge can transition to Torsemide 20mg po bid      2. HTN   was previously controlled, today elevated   c/w amlodipine and BB for now     3. Pulmonary Edema   chest Xray as above shows mild - mod pulm edema with increase in right lower lung   -c/w abx therapy     4. ESRD  - renal on board     5. Prophylactic measure.   dvt proph - hsq.        AG Krishnan,  [facility]  Cardiovascular Medicine  [address], [address]  Available through call or text on [employer] TE[employer]  Office: [phone]   DATE OF SERVICE: 01-07-24 @ 11:38    Patient is a 78y old  Female who presents with a chief complaint of renal transplant with fever (06 Jan 2024 15:54)      INTERVAL HISTORY:     REVIEW OF SYSTEMS:  CONSTITUTIONAL: No weakness  EYES/ENT: No visual changes;  No throat pain   NECK: No pain or stiffness  RESPIRATORY: No cough, wheezing; No shortness of breath  CARDIOVASCULAR: No chest pain or palpitations  GASTROINTESTINAL: No abdominal  pain. No nausea, vomiting, or hematemesis  GENITOURINARY: No dysuria, frequency or hematuria  NEUROLOGICAL: No stroke like symptoms  SKIN: No rashes    TELEMETRY Personally reviewed:  	  MEDICATIONS:  furosemide   Injectable 40 milliGRAM(s) IV Push three times a day  labetalol 200 milliGRAM(s) Oral three times a day  NIFEdipine XL 60 milliGRAM(s) Oral two times a day        PHYSICAL EXAM:  T(C): 37.1 (01-07-24 @ 04:52), Max: 37.2 (01-06-24 @ 12:05)  HR: 74 (01-07-24 @ 04:52) (67 - 74)  BP: 167/71 (01-07-24 @ 04:52) (141/75 - 185/78)  RR: 18 (01-07-24 @ 04:52) (18 - 18)  SpO2: 94% (01-07-24 @ 04:52) (94% - 98%)  Wt(kg): --  I&O's Summary    06 Jan 2024 07:01  -  07 Jan 2024 07:00  --------------------------------------------------------  IN: 120 mL / OUT: 0 mL / NET: 120 mL          Appearance: In no distress	  HEENT:    PERRL, EOMI	  Cardiovascular:  S1 S2, No JVD  Respiratory: Lungs clear to auscultation	  Gastrointestinal:  Soft, Non-tender, + BS	  Vascularature:  No edema of LE  Psychiatric: Appropriate affect   Neuro: no acute focal deficits                               7.8    4.03  )-----------( 203      ( 07 Jan 2024 11:02 )             24.5     01-06    135  |  104  |  56<H>  ----------------------------<  173<H>  4.2   |  18<L>  |  3.35<H>    Ca    8.3<L>      06 Jan 2024 11:12          Labs personally reviewed      ASSESSMENT/PLAN: 	  78f h/o HTN, s/p Renal transplant, CKD, chronic rejection, recent admission for pneumonia s/p abx presented with shortness of breath since this morning. she also noticed significant LUE swelling that began 4-5 days ago    1. Diastolic Heart Failure secondary to advanced kidney disease   - Recent TTE with normal EF  - Appears closer to euvolemia, c/w IV Lasix 40mg IV TID, on discharge can transition to Torsemide 20mg po bid      2. HTN   was previously controlled, today elevated   c/w amlodipine and BB for now     3. Pulmonary Edema   chest Xray as above shows mild - mod pulm edema with increase in right lower lung   -c/w abx therapy     4. ESRD  - renal on board     5. Prophylactic measure.   dvt proph - hsq.        AG Krishnan,  [facility]  Cardiovascular Medicine  [address], [address]  Available through call or text on [employer] TE[employer]  Office: [phone]   DATE OF SERVICE: 01-07-24 @ 11:38    Patient is a 78y old  Female who presents with a chief complaint of renal transplant with fever (06 Jan 2024 15:54)      INTERVAL HISTORY:     REVIEW OF SYSTEMS:  CONSTITUTIONAL: No weakness  EYES/ENT: No visual changes;  No throat pain   NECK: No pain or stiffness  RESPIRATORY: No cough, wheezing; No shortness of breath  CARDIOVASCULAR: No chest pain or palpitations  GASTROINTESTINAL: No abdominal  pain. No nausea, vomiting, or hematemesis  GENITOURINARY: No dysuria, frequency or hematuria  NEUROLOGICAL: No stroke like symptoms  SKIN: No rashes    TELEMETRY Personally reviewed:  	  MEDICATIONS:  furosemide   Injectable 40 milliGRAM(s) IV Push three times a day  labetalol 200 milliGRAM(s) Oral three times a day  NIFEdipine XL 60 milliGRAM(s) Oral two times a day        PHYSICAL EXAM:  T(C): 37.1 (01-07-24 @ 04:52), Max: 37.2 (01-06-24 @ 12:05)  HR: 74 (01-07-24 @ 04:52) (67 - 74)  BP: 167/71 (01-07-24 @ 04:52) (141/75 - 185/78)  RR: 18 (01-07-24 @ 04:52) (18 - 18)  SpO2: 94% (01-07-24 @ 04:52) (94% - 98%)  Wt(kg): --  I&O's Summary    06 Jan 2024 07:01  -  07 Jan 2024 07:00  --------------------------------------------------------  IN: 120 mL / OUT: 0 mL / NET: 120 mL          Appearance: In no distress	  HEENT:    PERRL, EOMI	  Cardiovascular:  S1 S2, No JVD  Respiratory: Lungs clear to auscultation	  Gastrointestinal:  Soft, Non-tender, + BS	  Vascularature:  No edema of LE  Psychiatric: Appropriate affect   Neuro: no acute focal deficits                               7.8    4.03  )-----------( 203      ( 07 Jan 2024 11:02 )             24.5     01-06    135  |  104  |  56<H>  ----------------------------<  173<H>  4.2   |  18<L>  |  3.35<H>    Ca    8.3<L>      06 Jan 2024 11:12          Labs personally reviewed      ASSESSMENT/PLAN: 	  78f h/o HTN, s/p Renal transplant, CKD, chronic rejection, recent admission for pneumonia s/p abx presented with shortness of breath since this morning. she also noticed significant LUE swelling that began 4-5 days ago    1. Diastolic Heart Failure secondary to advanced kidney disease   - Recent TTE with normal EF  - Appears closer to euvolemia, c/w IV Lasix 40mg IV TID, transition to Torsemide 20mg po bid likely Monday    2. HTN   was previously controlled, today elevated   c/w amlodipine and BB for now     3. Pulmonary Edema   chest Xray as above shows mild - mod pulm edema with increase in right lower lung   -c/w abx therapy     4. ESRD  - renal on board     5. Prophylactic measure.   dvt proph - hsq.        AG Krishnan DO [facility]  Cardiovascular Medicine  [address], [address]  Available through call or text on [employer] TE[employer]  Office: [phone]   DATE OF SERVICE: 01-07-24 @ 11:38    Patient is a 78y old  Female who presents with a chief complaint of renal transplant with fever (06 Jan 2024 15:54)      INTERVAL HISTORY:     REVIEW OF SYSTEMS:  CONSTITUTIONAL: No weakness  EYES/ENT: No visual changes;  No throat pain   NECK: No pain or stiffness  RESPIRATORY: No cough, wheezing; No shortness of breath  CARDIOVASCULAR: No chest pain or palpitations  GASTROINTESTINAL: No abdominal  pain. No nausea, vomiting, or hematemesis  GENITOURINARY: No dysuria, frequency or hematuria  NEUROLOGICAL: No stroke like symptoms  SKIN: No rashes    TELEMETRY Personally reviewed:  	  MEDICATIONS:  furosemide   Injectable 40 milliGRAM(s) IV Push three times a day  labetalol 200 milliGRAM(s) Oral three times a day  NIFEdipine XL 60 milliGRAM(s) Oral two times a day        PHYSICAL EXAM:  T(C): 37.1 (01-07-24 @ 04:52), Max: 37.2 (01-06-24 @ 12:05)  HR: 74 (01-07-24 @ 04:52) (67 - 74)  BP: 167/71 (01-07-24 @ 04:52) (141/75 - 185/78)  RR: 18 (01-07-24 @ 04:52) (18 - 18)  SpO2: 94% (01-07-24 @ 04:52) (94% - 98%)  Wt(kg): --  I&O's Summary    06 Jan 2024 07:01  -  07 Jan 2024 07:00  --------------------------------------------------------  IN: 120 mL / OUT: 0 mL / NET: 120 mL          Appearance: In no distress	  HEENT:    PERRL, EOMI	  Cardiovascular:  S1 S2, No JVD  Respiratory: Lungs clear to auscultation	  Gastrointestinal:  Soft, Non-tender, + BS	  Vascularature:  No edema of LE  Psychiatric: Appropriate affect   Neuro: no acute focal deficits                               7.8    4.03  )-----------( 203      ( 07 Jan 2024 11:02 )             24.5     01-06    135  |  104  |  56<H>  ----------------------------<  173<H>  4.2   |  18<L>  |  3.35<H>    Ca    8.3<L>      06 Jan 2024 11:12          Labs personally reviewed      ASSESSMENT/PLAN: 	  78f h/o HTN, s/p Renal transplant, CKD, chronic rejection, recent admission for pneumonia s/p abx presented with shortness of breath since this morning. she also noticed significant LUE swelling that began 4-5 days ago    1. Diastolic Heart Failure secondary to advanced kidney disease   - Recent TTE with normal EF  - Appears closer to euvolemia, c/w IV Lasix 40mg IV TID, transition to Torsemide 20mg po bid likely Monday    2. HTN   was previously controlled, today elevated   c/w amlodipine and BB for now     3. Pulmonary Edema   chest Xray as above shows mild - mod pulm edema with increase in right lower lung   -c/w abx therapy     4. ESRD  - renal on board     5. Prophylactic measure.   dvt proph - hsq.        AG Krishnan DO [facility]  Cardiovascular Medicine  [address], [address]  Available through call or text on [employer] TE[employer]  Office: [phone]

## 2024-01-08 LAB
1,3 BETA GLUCAN SER QL: NEGATIVE — SIGNIFICANT CHANGE UP
1,3 BETA GLUCAN SER QL: NEGATIVE — SIGNIFICANT CHANGE UP
ANION GAP SERPL CALC-SCNC: 14 MMOL/L — SIGNIFICANT CHANGE UP (ref 5–17)
ANION GAP SERPL CALC-SCNC: 14 MMOL/L — SIGNIFICANT CHANGE UP (ref 5–17)
BLD GP AB SCN SERPL QL: NEGATIVE — SIGNIFICANT CHANGE UP
BLD GP AB SCN SERPL QL: NEGATIVE — SIGNIFICANT CHANGE UP
BUN SERPL-MCNC: 54 MG/DL — HIGH (ref 7–23)
BUN SERPL-MCNC: 54 MG/DL — HIGH (ref 7–23)
CALCIUM SERPL-MCNC: 8.7 MG/DL — SIGNIFICANT CHANGE UP (ref 8.4–10.5)
CALCIUM SERPL-MCNC: 8.7 MG/DL — SIGNIFICANT CHANGE UP (ref 8.4–10.5)
CHLORIDE SERPL-SCNC: 104 MMOL/L — SIGNIFICANT CHANGE UP (ref 96–108)
CHLORIDE SERPL-SCNC: 104 MMOL/L — SIGNIFICANT CHANGE UP (ref 96–108)
CO2 SERPL-SCNC: 22 MMOL/L — SIGNIFICANT CHANGE UP (ref 22–31)
CO2 SERPL-SCNC: 22 MMOL/L — SIGNIFICANT CHANGE UP (ref 22–31)
CREAT ?TM UR-MCNC: 43 MG/DL — SIGNIFICANT CHANGE UP
CREAT ?TM UR-MCNC: 43 MG/DL — SIGNIFICANT CHANGE UP
CREAT SERPL-MCNC: 3.68 MG/DL — HIGH (ref 0.5–1.3)
CREAT SERPL-MCNC: 3.68 MG/DL — HIGH (ref 0.5–1.3)
CULTURE RESULTS: SIGNIFICANT CHANGE UP
EGFR: 12 ML/MIN/1.73M2 — LOW
EGFR: 12 ML/MIN/1.73M2 — LOW
FUNGITELL: <31 PG/ML — SIGNIFICANT CHANGE UP
FUNGITELL: <31 PG/ML — SIGNIFICANT CHANGE UP
GLUCOSE SERPL-MCNC: 118 MG/DL — HIGH (ref 70–99)
GLUCOSE SERPL-MCNC: 118 MG/DL — HIGH (ref 70–99)
HCT VFR BLD CALC: 26.2 % — LOW (ref 34.5–45)
HCT VFR BLD CALC: 26.2 % — LOW (ref 34.5–45)
HGB BLD-MCNC: 8.1 G/DL — LOW (ref 11.5–15.5)
HGB BLD-MCNC: 8.1 G/DL — LOW (ref 11.5–15.5)
INTERPRETATION SERPL IFE-IMP: SIGNIFICANT CHANGE UP
INTERPRETATION SERPL IFE-IMP: SIGNIFICANT CHANGE UP
LEGIONELLA AG UR QL: NEGATIVE — SIGNIFICANT CHANGE UP
LEGIONELLA AG UR QL: NEGATIVE — SIGNIFICANT CHANGE UP
MCHC RBC-ENTMCNC: 25.7 PG — LOW (ref 27–34)
MCHC RBC-ENTMCNC: 25.7 PG — LOW (ref 27–34)
MCHC RBC-ENTMCNC: 30.9 GM/DL — LOW (ref 32–36)
MCHC RBC-ENTMCNC: 30.9 GM/DL — LOW (ref 32–36)
MCV RBC AUTO: 83.2 FL — SIGNIFICANT CHANGE UP (ref 80–100)
MCV RBC AUTO: 83.2 FL — SIGNIFICANT CHANGE UP (ref 80–100)
NRBC # BLD: 0 /100 WBCS — SIGNIFICANT CHANGE UP (ref 0–0)
NRBC # BLD: 0 /100 WBCS — SIGNIFICANT CHANGE UP (ref 0–0)
PLATELET # BLD AUTO: 228 K/UL — SIGNIFICANT CHANGE UP (ref 150–400)
PLATELET # BLD AUTO: 228 K/UL — SIGNIFICANT CHANGE UP (ref 150–400)
POTASSIUM SERPL-MCNC: 4 MMOL/L — SIGNIFICANT CHANGE UP (ref 3.5–5.3)
POTASSIUM SERPL-MCNC: 4 MMOL/L — SIGNIFICANT CHANGE UP (ref 3.5–5.3)
POTASSIUM SERPL-SCNC: 4 MMOL/L — SIGNIFICANT CHANGE UP (ref 3.5–5.3)
POTASSIUM SERPL-SCNC: 4 MMOL/L — SIGNIFICANT CHANGE UP (ref 3.5–5.3)
PROT ?TM UR-MCNC: 108 MG/DL — HIGH (ref 0–12)
PROT ?TM UR-MCNC: 108 MG/DL — HIGH (ref 0–12)
PROT/CREAT UR-RTO: 2.5 RATIO — HIGH (ref 0–0.2)
PROT/CREAT UR-RTO: 2.5 RATIO — HIGH (ref 0–0.2)
RBC # BLD: 3.15 M/UL — LOW (ref 3.8–5.2)
RBC # BLD: 3.15 M/UL — LOW (ref 3.8–5.2)
RBC # FLD: 15.9 % — HIGH (ref 10.3–14.5)
RBC # FLD: 15.9 % — HIGH (ref 10.3–14.5)
RH IG SCN BLD-IMP: POSITIVE — SIGNIFICANT CHANGE UP
RH IG SCN BLD-IMP: POSITIVE — SIGNIFICANT CHANGE UP
SODIUM SERPL-SCNC: 140 MMOL/L — SIGNIFICANT CHANGE UP (ref 135–145)
SODIUM SERPL-SCNC: 140 MMOL/L — SIGNIFICANT CHANGE UP (ref 135–145)
SPECIMEN SOURCE: SIGNIFICANT CHANGE UP
TACROLIMUS SERPL-MCNC: 16.2 NG/ML — SIGNIFICANT CHANGE UP
TACROLIMUS SERPL-MCNC: 16.2 NG/ML — SIGNIFICANT CHANGE UP
WBC # BLD: 4.53 K/UL — SIGNIFICANT CHANGE UP (ref 3.8–10.5)
WBC # BLD: 4.53 K/UL — SIGNIFICANT CHANGE UP (ref 3.8–10.5)
WBC # FLD AUTO: 4.53 K/UL — SIGNIFICANT CHANGE UP (ref 3.8–10.5)
WBC # FLD AUTO: 4.53 K/UL — SIGNIFICANT CHANGE UP (ref 3.8–10.5)

## 2024-01-08 PROCEDURE — 99232 SBSQ HOSP IP/OBS MODERATE 35: CPT | Mod: GC

## 2024-01-08 PROCEDURE — 99232 SBSQ HOSP IP/OBS MODERATE 35: CPT

## 2024-01-08 RX ORDER — FUROSEMIDE 40 MG
40 TABLET ORAL
Refills: 0 | Status: DISCONTINUED | OUTPATIENT
Start: 2024-01-08 | End: 2024-01-09

## 2024-01-08 RX ADMIN — Medication 1300 MILLIGRAM(S): at 05:28

## 2024-01-08 RX ADMIN — MYCOPHENOLATE MOFETIL 500 MILLIGRAM(S): 250 CAPSULE ORAL at 05:29

## 2024-01-08 RX ADMIN — Medication 200 MILLIGRAM(S): at 13:55

## 2024-01-08 RX ADMIN — Medication 5 MILLIGRAM(S): at 05:29

## 2024-01-08 RX ADMIN — CEFEPIME 100 MILLIGRAM(S): 1 INJECTION, POWDER, FOR SOLUTION INTRAMUSCULAR; INTRAVENOUS at 21:16

## 2024-01-08 RX ADMIN — MYCOPHENOLATE MOFETIL 500 MILLIGRAM(S): 250 CAPSULE ORAL at 17:04

## 2024-01-08 RX ADMIN — Medication 40 MILLIGRAM(S): at 13:55

## 2024-01-08 RX ADMIN — Medication 1300 MILLIGRAM(S): at 21:17

## 2024-01-08 RX ADMIN — HEPARIN SODIUM 5000 UNIT(S): 5000 INJECTION INTRAVENOUS; SUBCUTANEOUS at 05:29

## 2024-01-08 RX ADMIN — Medication 1300 MILLIGRAM(S): at 13:55

## 2024-01-08 RX ADMIN — Medication 2000 UNIT(S): at 12:26

## 2024-01-08 RX ADMIN — KETOTIFEN FUMARATE 1 DROP(S): 0.34 SOLUTION OPHTHALMIC at 05:29

## 2024-01-08 RX ADMIN — Medication 200 MILLIGRAM(S): at 21:18

## 2024-01-08 RX ADMIN — HEPARIN SODIUM 5000 UNIT(S): 5000 INJECTION INTRAVENOUS; SUBCUTANEOUS at 21:17

## 2024-01-08 RX ADMIN — ATOVAQUONE 1500 MILLIGRAM(S): 750 SUSPENSION ORAL at 12:26

## 2024-01-08 RX ADMIN — HEPARIN SODIUM 5000 UNIT(S): 5000 INJECTION INTRAVENOUS; SUBCUTANEOUS at 13:55

## 2024-01-08 RX ADMIN — TACROLIMUS 5 MILLIGRAM(S): 5 CAPSULE ORAL at 05:28

## 2024-01-08 RX ADMIN — Medication 1 DROP(S): at 05:28

## 2024-01-08 RX ADMIN — Medication 200 MILLIGRAM(S): at 05:29

## 2024-01-08 RX ADMIN — Medication 60 MILLIGRAM(S): at 17:04

## 2024-01-08 RX ADMIN — SODIUM ZIRCONIUM CYCLOSILICATE 10 GRAM(S): 10 POWDER, FOR SUSPENSION ORAL at 12:26

## 2024-01-08 RX ADMIN — Medication 81 MILLIGRAM(S): at 12:26

## 2024-01-08 RX ADMIN — Medication 60 MILLIGRAM(S): at 05:28

## 2024-01-08 RX ADMIN — Medication 40 MILLIGRAM(S): at 05:29

## 2024-01-08 NOTE — PROGRESS NOTE ADULT - ASSESSMENT
78f h/o HTN, s/p Renal transplant, CKD, chronic rejection, recent admission for pneumonia s/p abx presented with shortness of breath with LUE and b/l LE edema concerning for volume overload.     Shortness of breath.   - with LUE and b/l LE edema   UA with protein 300   urine protein to cr - evaluate for nephrotic syndrome  lasix 40mg iv bid - renal consult appreciated   TTE done  covid, rsv, flu negative.    Stage 4 chronic kidney disease.    creatinine improved since last admission   check urine protein to cr as above  renal transplant consult appreciated   continue tacrolimus 5mg qd (monitor levels), mycophenolate 500mg bid, prednisone 5mg qd  renally dose meds, avoid nephrotoxic medications.     uncontrolled HTN (hypertension).   monitor bp   c/w nifedipine, labetalol at home doses.    fever 2/2 poss pna  - seen by transplant ID  - Cefepime 1g q24H (renally-dosed, may need to decrease if renal function deteriorates further)  - Check serum cryptococcal antigen, aspergillus galactomannan, fungitell, adenovirus PCR, histoplasma antigen in urine   - Please induce sputum for bacterial and fungal cultures, and PCP PCR, legionella PCR  - Check urine Legionella, urine Pneumococcal Ag  - MRSA PCR  - check , HSV and CMV PCR in blood (ordered)  - Non-urgent ophthalmology evaluation for bilateral conjunctivitis  - please repeat RVP  - consideration of bronchoscopy based on progress     Opacity of lung on imaging study.   new RUL opacity   clinically c/w volume overload  procalcitonin low  monitor closely as patient on immunosuppressives - low threshold for starting empiric abx   check ct chest.     Metabolic acidosis.   c/w sodium bicarb.    Prophylactic measure.   dvt proph - hsq.

## 2024-01-08 NOTE — PROGRESS NOTE ADULT - ASSESSMENT
76 y/o female with PMH ESRD due to HTN since 2016, s/p DDRT (05/19/2021- induction with basiliximab), HTN, Anemia who presented to [facility] due to worsening B/L LE edema, facial edema and LUE edema.   78 y/o female with PMH ESRD due to HTN since 2016, s/p DDRT (05/19/2021- induction with basiliximab), HTN, Anemia who presented to [facility] due to worsening B/L LE edema, facial edema and LUE edema.

## 2024-01-08 NOTE — PROGRESS NOTE ADULT - SUBJECTIVE AND OBJECTIVE BOX
Follow Up:      Interval History:    REVIEW OF SYSTEMS  [  ] ROS unobtainable because:    [  ] All other systems negative except as noted below    Constitutional:  [ ] fever [ ] chills  [ ] weight loss  [ ] weakness  Skin:  [ ] rash [ ] phlebitis	  Eyes: [ ] icterus [ ] pain  [ ] discharge	  ENMT: [ ] sore throat  [ ] thrush [ ] ulcers [ ] exudates  Respiratory: [ ] dyspnea [ ] hemoptysis [ ] cough [ ] sputum	  Cardiovascular:  [ ] chest pain [ ] palpitations [ ] edema	  Gastrointestinal:  [ ] nausea [ ] vomiting [ ] diarrhea [ ] constipation [ ] pain	  Genitourinary:  [ ] dysuria [ ] frequency [ ] hematuria [ ] discharge [ ] flank pain  [ ] incontinence  Musculoskeletal:  [ ] myalgias [ ] arthralgias [ ] arthritis  [ ] back pain  Neurological:  [ ] headache [ ] seizures  [ ] confusion/altered mental status    Allergies  penicillin (Rash)  Mushrooms (Anaphylaxis)        ANTIMICROBIALS:  atovaquone  Suspension 1500 daily  cefepime   IVPB 1000 every 24 hours      OTHER MEDS:  MEDICATIONS  (STANDING):  acetaminophen     Tablet .. 650 every 6 hours PRN  aspirin enteric coated 81 daily  epoetin ivelisse (EPOGEN) Injectable 22125 once  epoetin ivelisse-epbx (RETACRIT) Injectable 61171 <User Schedule>  furosemide   Injectable 40 two times a day  heparin   Injectable 5000 every 8 hours  labetalol 200 three times a day  melatonin 3 at bedtime PRN  mycophenolate mofetil 500 two times a day  NIFEdipine XL 60 two times a day  predniSONE   Tablet 5 daily  tacrolimus ER Tablet (ENVARSUS XR) 5 daily      Vital Signs Last 24 Hrs  T(C): 36.9 (08 Jan 2024 13:25), Max: 37.3 (08 Jan 2024 05:10)  T(F): 98.4 (08 Jan 2024 13:25), Max: 99.1 (08 Jan 2024 05:10)  HR: 74 (08 Jan 2024 13:25) (70 - 74)  BP: 158/74 (08 Jan 2024 13:25) (133/70 - 175/83)  BP(mean): --  RR: 19 (08 Jan 2024 13:25) (18 - 19)  SpO2: 95% (08 Jan 2024 13:25) (94% - 96%)    Parameters below as of 08 Jan 2024 13:25  Patient On (Oxygen Delivery Method): room air        PHYSICAL EXAMINATION:  General: Alert and Awake, NAD  HEENT: PERRL, EOMI  Neck: Supple  Cardiac: RRR, No M/R/G  Resp: CTAB, No Wh/Rh/Ra  Abdomen: NBS, NT/ND, No HSM, No rigidity or guarding  MSK: No LE edema. No Calf tenderness  : No sethi  Skin: No rashes or lesions. Skin is warm and dry to the touch.   Neuro: Alert and Awake. CN 2-12 Grossly intact. Moves all four extremities spontaneously.  Psych: Calm, Pleasant, Cooperative                          8.1    4.53  )-----------( 228      ( 08 Jan 2024 10:07 )             26.2       01-08    140  |  104  |  54<H>  ----------------------------<  118<H>  4.0   |  22  |  3.68<H>    Ca    8.7      08 Jan 2024 10:07  Phos  4.3     01-07  Mg     1.4     01-07        Urinalysis Basic - ( 08 Jan 2024 10:07 )    Color: x / Appearance: x / SG: x / pH: x  Gluc: 118 mg/dL / Ketone: x  / Bili: x / Urobili: x   Blood: x / Protein: x / Nitrite: x   Leuk Esterase: x / RBC: x / WBC x   Sq Epi: x / Non Sq Epi: x / Bacteria: x        MICROBIOLOGY:  v  Clean Catch Clean Catch (Midstream)  01-05-24   >=3 organisms. Probable collection contamination.  --  --      .Blood Blood-Peripheral  01-05-24   No growth at 48 Hours  --  --      .Blood Blood-Peripheral  01-05-24   No growth at 48 Hours  --  --      Clean Catch Clean Catch (Midstream)  01-03-24   <10,000 CFU/mL Normal Urogenital Rosalinda  --  --      .Blood Blood-Peripheral  01-03-24   No growth at 4 days  --  --      .Blood Blood-Peripheral  01-03-24   No growth at 4 days  --  --          Rapid RVP Result: NotDetec (01-05 @ 07:01)  CMVPCR Log: NotDetec Izd46CW/mL (01-04 @ 10:13)        RADIOLOGY:    <The imaging below has been reviewed and visualized by me independently. Findings as detailed in report below> Follow Up:      Interval History:    REVIEW OF SYSTEMS  [  ] ROS unobtainable because:    [  ] All other systems negative except as noted below    Constitutional:  [ ] fever [ ] chills  [ ] weight loss  [ ] weakness  Skin:  [ ] rash [ ] phlebitis	  Eyes: [ ] icterus [ ] pain  [ ] discharge	  ENMT: [ ] sore throat  [ ] thrush [ ] ulcers [ ] exudates  Respiratory: [ ] dyspnea [ ] hemoptysis [ ] cough [ ] sputum	  Cardiovascular:  [ ] chest pain [ ] palpitations [ ] edema	  Gastrointestinal:  [ ] nausea [ ] vomiting [ ] diarrhea [ ] constipation [ ] pain	  Genitourinary:  [ ] dysuria [ ] frequency [ ] hematuria [ ] discharge [ ] flank pain  [ ] incontinence  Musculoskeletal:  [ ] myalgias [ ] arthralgias [ ] arthritis  [ ] back pain  Neurological:  [ ] headache [ ] seizures  [ ] confusion/altered mental status    Allergies  penicillin (Rash)  Mushrooms (Anaphylaxis)        ANTIMICROBIALS:  atovaquone  Suspension 1500 daily  cefepime   IVPB 1000 every 24 hours      OTHER MEDS:  MEDICATIONS  (STANDING):  acetaminophen     Tablet .. 650 every 6 hours PRN  aspirin enteric coated 81 daily  epoetin ivelisse (EPOGEN) Injectable 30340 once  epoetin ivelisse-epbx (RETACRIT) Injectable 80663 <User Schedule>  furosemide   Injectable 40 two times a day  heparin   Injectable 5000 every 8 hours  labetalol 200 three times a day  melatonin 3 at bedtime PRN  mycophenolate mofetil 500 two times a day  NIFEdipine XL 60 two times a day  predniSONE   Tablet 5 daily  tacrolimus ER Tablet (ENVARSUS XR) 5 daily      Vital Signs Last 24 Hrs  T(C): 36.9 (08 Jan 2024 13:25), Max: 37.3 (08 Jan 2024 05:10)  T(F): 98.4 (08 Jan 2024 13:25), Max: 99.1 (08 Jan 2024 05:10)  HR: 74 (08 Jan 2024 13:25) (70 - 74)  BP: 158/74 (08 Jan 2024 13:25) (133/70 - 175/83)  BP(mean): --  RR: 19 (08 Jan 2024 13:25) (18 - 19)  SpO2: 95% (08 Jan 2024 13:25) (94% - 96%)    Parameters below as of 08 Jan 2024 13:25  Patient On (Oxygen Delivery Method): room air        PHYSICAL EXAMINATION:  General: Alert and Awake, NAD  HEENT: PERRL, EOMI  Neck: Supple  Cardiac: RRR, No M/R/G  Resp: CTAB, No Wh/Rh/Ra  Abdomen: NBS, NT/ND, No HSM, No rigidity or guarding  MSK: No LE edema. No Calf tenderness  : No sethi  Skin: No rashes or lesions. Skin is warm and dry to the touch.   Neuro: Alert and Awake. CN 2-12 Grossly intact. Moves all four extremities spontaneously.  Psych: Calm, Pleasant, Cooperative                          8.1    4.53  )-----------( 228      ( 08 Jan 2024 10:07 )             26.2       01-08    140  |  104  |  54<H>  ----------------------------<  118<H>  4.0   |  22  |  3.68<H>    Ca    8.7      08 Jan 2024 10:07  Phos  4.3     01-07  Mg     1.4     01-07        Urinalysis Basic - ( 08 Jan 2024 10:07 )    Color: x / Appearance: x / SG: x / pH: x  Gluc: 118 mg/dL / Ketone: x  / Bili: x / Urobili: x   Blood: x / Protein: x / Nitrite: x   Leuk Esterase: x / RBC: x / WBC x   Sq Epi: x / Non Sq Epi: x / Bacteria: x        MICROBIOLOGY:  v  Clean Catch Clean Catch (Midstream)  01-05-24   >=3 organisms. Probable collection contamination.  --  --      .Blood Blood-Peripheral  01-05-24   No growth at 48 Hours  --  --      .Blood Blood-Peripheral  01-05-24   No growth at 48 Hours  --  --      Clean Catch Clean Catch (Midstream)  01-03-24   <10,000 CFU/mL Normal Urogenital Rosalinda  --  --      .Blood Blood-Peripheral  01-03-24   No growth at 4 days  --  --      .Blood Blood-Peripheral  01-03-24   No growth at 4 days  --  --          Rapid RVP Result: NotDetec (01-05 @ 07:01)  CMVPCR Log: NotDetec [id number]/mL (01-04 @ 10:13)        RADIOLOGY:    <The imaging below has been reviewed and visualized by me independently. Findings as detailed in report below> Follow Up:  pneumonia    Interval History: afebrile. cough and dyspnea improving.     REVIEW OF SYSTEMS  [  ] ROS unobtainable because:    [ x ] All other systems negative except as noted below    Constitutional:  [ ] fever [ ] chills  [ ] weight loss  [ ] weakness  Skin:  [ ] rash [ ] phlebitis	  Eyes: [ ] icterus [ ] pain  [ ] discharge	  ENMT: [ ] sore throat  [ ] thrush [ ] ulcers [ ] exudates  Respiratory: [ ] dyspnea [ ] hemoptysis [x ] cough [ ] sputum	  Cardiovascular:  [ ] chest pain [ ] palpitations [ ] edema	  Gastrointestinal:  [ ] nausea [ ] vomiting [ ] diarrhea [ ] constipation [ ] pain	  Genitourinary:  [ ] dysuria [ ] frequency [ ] hematuria [ ] discharge [ ] flank pain  [ ] incontinence  Musculoskeletal:  [ ] myalgias [ ] arthralgias [ ] arthritis  [ ] back pain  Neurological:  [ ] headache [ ] seizures  [ ] confusion/altered mental status    Allergies  penicillin (Rash)  Mushrooms (Anaphylaxis)        ANTIMICROBIALS:  atovaquone  Suspension 1500 daily  cefepime   IVPB 1000 every 24 hours      OTHER MEDS:  MEDICATIONS  (STANDING):  acetaminophen     Tablet .. 650 every 6 hours PRN  aspirin enteric coated 81 daily  epoetin ivelisse (EPOGEN) Injectable 63920 once  epoetin ivelisse-epbx (RETACRIT) Injectable 46823 <User Schedule>  furosemide   Injectable 40 two times a day  heparin   Injectable 5000 every 8 hours  labetalol 200 three times a day  melatonin 3 at bedtime PRN  mycophenolate mofetil 500 two times a day  NIFEdipine XL 60 two times a day  predniSONE   Tablet 5 daily  tacrolimus ER Tablet (ENVARSUS XR) 5 daily      Vital Signs Last 24 Hrs  T(C): 36.9 (08 Jan 2024 13:25), Max: 37.3 (08 Jan 2024 05:10)  T(F): 98.4 (08 Jan 2024 13:25), Max: 99.1 (08 Jan 2024 05:10)  HR: 74 (08 Jan 2024 13:25) (70 - 74)  BP: 158/74 (08 Jan 2024 13:25) (133/70 - 175/83)  BP(mean): --  RR: 19 (08 Jan 2024 13:25) (18 - 19)  SpO2: 95% (08 Jan 2024 13:25) (94% - 96%)    Parameters below as of 08 Jan 2024 13:25  Patient On (Oxygen Delivery Method): room air        PHYSICAL EXAMINATION:  General: Alert and Awake, NAD  Cardiac: RRR, No M/R/G  Resp: CTAB, No Wh/Rh/Ra  Abdomen: NBS, NT/ND, No HSM, No rigidity or guarding  MSK: LUE hand with 1+ edema and ecchymosis. LE edema. No Calf tenderness  : No sethi  Skin: No rashes or lesions. Skin is warm and dry to the touch.   Neuro: Alert and Awake. CN 2-12 Grossly intact. Moves all four extremities spontaneously.  Psych: Calm, Pleasant, Cooperative                          8.1    4.53  )-----------( 228      ( 08 Jan 2024 10:07 )             26.2       01-08    140  |  104  |  54<H>  ----------------------------<  118<H>  4.0   |  22  |  3.68<H>    Ca    8.7      08 Jan 2024 10:07  Phos  4.3     01-07  Mg     1.4     01-07        Urinalysis Basic - ( 08 Jan 2024 10:07 )    Color: x / Appearance: x / SG: x / pH: x  Gluc: 118 mg/dL / Ketone: x  / Bili: x / Urobili: x   Blood: x / Protein: x / Nitrite: x   Leuk Esterase: x / RBC: x / WBC x   Sq Epi: x / Non Sq Epi: x / Bacteria: x        MICROBIOLOGY:  v  Clean Catch Clean Catch (Midstream)  01-05-24   >=3 organisms. Probable collection contamination.  --  --      .Blood Blood-Peripheral  01-05-24   No growth at 48 Hours  --  --      .Blood Blood-Peripheral  01-05-24   No growth at 48 Hours  --  --      Clean Catch Clean Catch (Midstream)  01-03-24   <10,000 CFU/mL Normal Urogenital Rosalinda  --  --      .Blood Blood-Peripheral  01-03-24   No growth at 4 days  --  --      .Blood Blood-Peripheral  01-03-24   No growth at 4 days  --  --          Rapid RVP Result: NotDetec (01-05 @ 07:01)  CMVPCR Log: NotDetec [id number]/mL (01-04 @ 10:13)        RADIOLOGY:    <The imaging below has been reviewed and visualized by me independently. Findings as detailed in report below> Follow Up:  pneumonia    Interval History: afebrile. cough and dyspnea improving.     REVIEW OF SYSTEMS  [  ] ROS unobtainable because:    [ x ] All other systems negative except as noted below    Constitutional:  [ ] fever [ ] chills  [ ] weight loss  [ ] weakness  Skin:  [ ] rash [ ] phlebitis	  Eyes: [ ] icterus [ ] pain  [ ] discharge	  ENMT: [ ] sore throat  [ ] thrush [ ] ulcers [ ] exudates  Respiratory: [ ] dyspnea [ ] hemoptysis [x ] cough [ ] sputum	  Cardiovascular:  [ ] chest pain [ ] palpitations [ ] edema	  Gastrointestinal:  [ ] nausea [ ] vomiting [ ] diarrhea [ ] constipation [ ] pain	  Genitourinary:  [ ] dysuria [ ] frequency [ ] hematuria [ ] discharge [ ] flank pain  [ ] incontinence  Musculoskeletal:  [ ] myalgias [ ] arthralgias [ ] arthritis  [ ] back pain  Neurological:  [ ] headache [ ] seizures  [ ] confusion/altered mental status    Allergies  penicillin (Rash)  Mushrooms (Anaphylaxis)        ANTIMICROBIALS:  atovaquone  Suspension 1500 daily  cefepime   IVPB 1000 every 24 hours      OTHER MEDS:  MEDICATIONS  (STANDING):  acetaminophen     Tablet .. 650 every 6 hours PRN  aspirin enteric coated 81 daily  epoetin ivelisse (EPOGEN) Injectable 43253 once  epoetin ivelisse-epbx (RETACRIT) Injectable 23386 <User Schedule>  furosemide   Injectable 40 two times a day  heparin   Injectable 5000 every 8 hours  labetalol 200 three times a day  melatonin 3 at bedtime PRN  mycophenolate mofetil 500 two times a day  NIFEdipine XL 60 two times a day  predniSONE   Tablet 5 daily  tacrolimus ER Tablet (ENVARSUS XR) 5 daily      Vital Signs Last 24 Hrs  T(C): 36.9 (08 Jan 2024 13:25), Max: 37.3 (08 Jan 2024 05:10)  T(F): 98.4 (08 Jan 2024 13:25), Max: 99.1 (08 Jan 2024 05:10)  HR: 74 (08 Jan 2024 13:25) (70 - 74)  BP: 158/74 (08 Jan 2024 13:25) (133/70 - 175/83)  BP(mean): --  RR: 19 (08 Jan 2024 13:25) (18 - 19)  SpO2: 95% (08 Jan 2024 13:25) (94% - 96%)    Parameters below as of 08 Jan 2024 13:25  Patient On (Oxygen Delivery Method): room air        PHYSICAL EXAMINATION:  General: Alert and Awake, NAD  Cardiac: RRR, No M/R/G  Resp: CTAB, No Wh/Rh/Ra  Abdomen: NBS, NT/ND, No HSM, No rigidity or guarding  MSK: LUE hand with 1+ edema and ecchymosis. LE edema. No Calf tenderness  : No sethi  Skin: No rashes or lesions. Skin is warm and dry to the touch.   Neuro: Alert and Awake. CN 2-12 Grossly intact. Moves all four extremities spontaneously.  Psych: Calm, Pleasant, Cooperative                          8.1    4.53  )-----------( 228      ( 08 Jan 2024 10:07 )             26.2       01-08    140  |  104  |  54<H>  ----------------------------<  118<H>  4.0   |  22  |  3.68<H>    Ca    8.7      08 Jan 2024 10:07  Phos  4.3     01-07  Mg     1.4     01-07        Urinalysis Basic - ( 08 Jan 2024 10:07 )    Color: x / Appearance: x / SG: x / pH: x  Gluc: 118 mg/dL / Ketone: x  / Bili: x / Urobili: x   Blood: x / Protein: x / Nitrite: x   Leuk Esterase: x / RBC: x / WBC x   Sq Epi: x / Non Sq Epi: x / Bacteria: x        MICROBIOLOGY:  v  Clean Catch Clean Catch (Midstream)  01-05-24   >=3 organisms. Probable collection contamination.  --  --      .Blood Blood-Peripheral  01-05-24   No growth at 48 Hours  --  --      .Blood Blood-Peripheral  01-05-24   No growth at 48 Hours  --  --      Clean Catch Clean Catch (Midstream)  01-03-24   <10,000 CFU/mL Normal Urogenital Rosalinda  --  --      .Blood Blood-Peripheral  01-03-24   No growth at 4 days  --  --      .Blood Blood-Peripheral  01-03-24   No growth at 4 days  --  --          Rapid RVP Result: NotDetec (01-05 @ 07:01)  CMVPCR Log: NotDetec [id number]/mL (01-04 @ 10:13)        RADIOLOGY:    <The imaging below has been reviewed and visualized by me independently. Findings as detailed in report below> Follow Up:  pneumonia    Interval History: afebrile. cough and dyspnea improving.     REVIEW OF SYSTEMS  [  ] ROS unobtainable because:    [ x ] All other systems negative except as noted below    Constitutional:  [ ] fever [ ] chills  [ ] weight loss  [ ] weakness  Skin:  [ ] rash [ ] phlebitis	  Eyes: [ ] icterus [ ] pain  [ ] discharge	  ENMT: [ ] sore throat  [ ] thrush [ ] ulcers [ ] exudates  Respiratory: [ ] dyspnea [ ] hemoptysis [x ] cough [ ] sputum	  Cardiovascular:  [ ] chest pain [ ] palpitations [ ] edema	  Gastrointestinal:  [ ] nausea [ ] vomiting [ ] diarrhea [ ] constipation [ ] pain	  Genitourinary:  [ ] dysuria [ ] frequency [ ] hematuria [ ] discharge [ ] flank pain  [ ] incontinence  Musculoskeletal:  [ ] myalgias [ ] arthralgias [ ] arthritis  [ ] back pain  Neurological:  [ ] headache [ ] seizures  [ ] confusion/altered mental status    Allergies  penicillin (Rash)  Mushrooms (Anaphylaxis)        ANTIMICROBIALS:  atovaquone  Suspension 1500 daily  cefepime   IVPB 1000 every 24 hours      OTHER MEDS:  MEDICATIONS  (STANDING):  acetaminophen     Tablet .. 650 every 6 hours PRN  aspirin enteric coated 81 daily  epoetin ivelisse (EPOGEN) Injectable 37889 once  epoetin ivelisse-epbx (RETACRIT) Injectable 05299 <User Schedule>  furosemide   Injectable 40 two times a day  heparin   Injectable 5000 every 8 hours  labetalol 200 three times a day  melatonin 3 at bedtime PRN  mycophenolate mofetil 500 two times a day  NIFEdipine XL 60 two times a day  predniSONE   Tablet 5 daily  tacrolimus ER Tablet (ENVARSUS XR) 5 daily      Vital Signs Last 24 Hrs  T(C): 36.9 (08 Jan 2024 13:25), Max: 37.3 (08 Jan 2024 05:10)  T(F): 98.4 (08 Jan 2024 13:25), Max: 99.1 (08 Jan 2024 05:10)  HR: 74 (08 Jan 2024 13:25) (70 - 74)  BP: 158/74 (08 Jan 2024 13:25) (133/70 - 175/83)  BP(mean): --  RR: 19 (08 Jan 2024 13:25) (18 - 19)  SpO2: 95% (08 Jan 2024 13:25) (94% - 96%)    Parameters below as of 08 Jan 2024 13:25  Patient On (Oxygen Delivery Method): room air        PHYSICAL EXAMINATION:  General: Alert and Awake, NAD  Cardiac: RRR, No M/R/G  Resp: CTAB, No Wh/Rh/Ra  Abdomen: NBS, NT/ND, No HSM, No rigidity or guarding  MSK: LUE hand with 1+ edema and ecchymosis. LE edema. No Calf tenderness  : No sethi  Skin: No rashes or lesions. Skin is warm and dry to the touch.   Neuro: Alert and Awake. CN 2-12 Grossly intact. Moves all four extremities spontaneously.  Psych: Calm, Pleasant, Cooperative                          8.1    4.53  )-----------( 228      ( 08 Jan 2024 10:07 )             26.2       01-08    140  |  104  |  54<H>  ----------------------------<  118<H>  4.0   |  22  |  3.68<H>    Ca    8.7      08 Jan 2024 10:07  Phos  4.3     01-07  Mg     1.4     01-07        Urinalysis Basic - ( 08 Jan 2024 10:07 )    Color: x / Appearance: x / SG: x / pH: x  Gluc: 118 mg/dL / Ketone: x  / Bili: x / Urobili: x   Blood: x / Protein: x / Nitrite: x   Leuk Esterase: x / RBC: x / WBC x   Sq Epi: x / Non Sq Epi: x / Bacteria: x        MICROBIOLOGY:  v  Clean Catch Clean Catch (Midstream)  01-05-24   >=3 organisms. Probable collection contamination.  --  --      .Blood Blood-Peripheral  01-05-24   No growth at 48 Hours  --  --      .Blood Blood-Peripheral  01-05-24   No growth at 48 Hours  --  --      Clean Catch Clean Catch (Midstream)  01-03-24   <10,000 CFU/mL Normal Urogenital Rosalinda  --  --      .Blood Blood-Peripheral  01-03-24   No growth at 4 days  --  --      .Blood Blood-Peripheral  01-03-24   No growth at 4 days  --  --          Rapid RVP Result: NotDetec (01-05 @ 07:01)  CMVPCR Log: NotDetec [id number]/mL (01-04 @ 10:13)        RADIOLOGY:    <The imaging below has been reviewed and visualized by me independently. Findings as detailed in report below>    < from: [facility] Dup[facility] Upper Ext Vein Scan, Left (01.07.24 @ 12:39) >  IMPRESSION:  No evidence of left upper extremity deep venous thrombosis.    < end of copied text >   Follow Up:  pneumonia    Interval History: afebrile. cough and dyspnea improving.     REVIEW OF SYSTEMS  [  ] ROS unobtainable because:    [ x ] All other systems negative except as noted below    Constitutional:  [ ] fever [ ] chills  [ ] weight loss  [ ] weakness  Skin:  [ ] rash [ ] phlebitis	  Eyes: [ ] icterus [ ] pain  [ ] discharge	  ENMT: [ ] sore throat  [ ] thrush [ ] ulcers [ ] exudates  Respiratory: [ ] dyspnea [ ] hemoptysis [x ] cough [ ] sputum	  Cardiovascular:  [ ] chest pain [ ] palpitations [ ] edema	  Gastrointestinal:  [ ] nausea [ ] vomiting [ ] diarrhea [ ] constipation [ ] pain	  Genitourinary:  [ ] dysuria [ ] frequency [ ] hematuria [ ] discharge [ ] flank pain  [ ] incontinence  Musculoskeletal:  [ ] myalgias [ ] arthralgias [ ] arthritis  [ ] back pain  Neurological:  [ ] headache [ ] seizures  [ ] confusion/altered mental status    Allergies  penicillin (Rash)  Mushrooms (Anaphylaxis)        ANTIMICROBIALS:  atovaquone  Suspension 1500 daily  cefepime   IVPB 1000 every 24 hours      OTHER MEDS:  MEDICATIONS  (STANDING):  acetaminophen     Tablet .. 650 every 6 hours PRN  aspirin enteric coated 81 daily  epoetin ivelisse (EPOGEN) Injectable 29782 once  epoetin ivelisse-epbx (RETACRIT) Injectable 56082 <User Schedule>  furosemide   Injectable 40 two times a day  heparin   Injectable 5000 every 8 hours  labetalol 200 three times a day  melatonin 3 at bedtime PRN  mycophenolate mofetil 500 two times a day  NIFEdipine XL 60 two times a day  predniSONE   Tablet 5 daily  tacrolimus ER Tablet (ENVARSUS XR) 5 daily      Vital Signs Last 24 Hrs  T(C): 36.9 (08 Jan 2024 13:25), Max: 37.3 (08 Jan 2024 05:10)  T(F): 98.4 (08 Jan 2024 13:25), Max: 99.1 (08 Jan 2024 05:10)  HR: 74 (08 Jan 2024 13:25) (70 - 74)  BP: 158/74 (08 Jan 2024 13:25) (133/70 - 175/83)  BP(mean): --  RR: 19 (08 Jan 2024 13:25) (18 - 19)  SpO2: 95% (08 Jan 2024 13:25) (94% - 96%)    Parameters below as of 08 Jan 2024 13:25  Patient On (Oxygen Delivery Method): room air        PHYSICAL EXAMINATION:  General: Alert and Awake, NAD  Cardiac: RRR, No M/R/G  Resp: CTAB, No Wh/Rh/Ra  Abdomen: NBS, NT/ND, No HSM, No rigidity or guarding  MSK: LUE hand with 1+ edema and ecchymosis. LE edema. No Calf tenderness  : No sethi  Skin: No rashes or lesions. Skin is warm and dry to the touch.   Neuro: Alert and Awake. CN 2-12 Grossly intact. Moves all four extremities spontaneously.  Psych: Calm, Pleasant, Cooperative                          8.1    4.53  )-----------( 228      ( 08 Jan 2024 10:07 )             26.2       01-08    140  |  104  |  54<H>  ----------------------------<  118<H>  4.0   |  22  |  3.68<H>    Ca    8.7      08 Jan 2024 10:07  Phos  4.3     01-07  Mg     1.4     01-07        Urinalysis Basic - ( 08 Jan 2024 10:07 )    Color: x / Appearance: x / SG: x / pH: x  Gluc: 118 mg/dL / Ketone: x  / Bili: x / Urobili: x   Blood: x / Protein: x / Nitrite: x   Leuk Esterase: x / RBC: x / WBC x   Sq Epi: x / Non Sq Epi: x / Bacteria: x        MICROBIOLOGY:  v  Clean Catch Clean Catch (Midstream)  01-05-24   >=3 organisms. Probable collection contamination.  --  --      .Blood Blood-Peripheral  01-05-24   No growth at 48 Hours  --  --      .Blood Blood-Peripheral  01-05-24   No growth at 48 Hours  --  --      Clean Catch Clean Catch (Midstream)  01-03-24   <10,000 CFU/mL Normal Urogenital Rosalinda  --  --      .Blood Blood-Peripheral  01-03-24   No growth at 4 days  --  --      .Blood Blood-Peripheral  01-03-24   No growth at 4 days  --  --          Rapid RVP Result: NotDetec (01-05 @ 07:01)  CMVPCR Log: NotDetec [id number]/mL (01-04 @ 10:13)        RADIOLOGY:    <The imaging below has been reviewed and visualized by me independently. Findings as detailed in report below>    < from: [facility] Upper Ext Vein Scan, Left (01.07.24 @ 12:39) >  IMPRESSION:  No evidence of left upper extremity deep venous thrombosis.    < end of copied text >

## 2024-01-08 NOTE — PROGRESS NOTE ADULT - ASSESSMENT
78f h/o HTN, s/p Renal transplant, CKD, chronic rejection, recent admission for pneumonia presented for SOB and LUE edema. Transplant ID called to evaluate for fever.    Recently hospitalized 11/9-11/14 for SOB, at that time had pulm edema vs. pneumonia, treated with BP control and cefepime -> cefpodoxime.  Fever 100.6 on 1/5, no leukocytosis  UA no pyuria  RVP negative  US LUE: no DVT  CXR: Mild to moderate pulmonary edema with increase in right lower lung opacities  CT chest: associated foci of consolidation and groundglass opacity in all 5 lobes, suggestive of infection. However given cardiomegaly, pericardial effusion, bibasilar pleural effusion, and anasarca there is likely superimposed pulmonary edema.    Micro:  BCx: no growth  UCx: no growth    #Fever  #Pneumonia  #Conjunctivitis  #Pulmonary edema  #LUE edema    Suggest:  - Continue Cefepime 1g q24H (1/5-->)   - Check serum cryptococcal antigen, aspergillus galactomannan, fungitell, adenovirus PCR, histoplasma antigen in urine   - Please induce sputum for bacterial and fungal cultures, and PCP PCR, legionella PCR  - Check urine Legionella, urine Pneumococcal Ag  - MRSA PCR  - check , HSV and CMV PCR in blood non detectable 1/4/24  - Non-urgent ophthalmology evaluation for bilateral conjunctivitis  - consideration of bronchoscopy based on progress 78f h/o HTN, s/p Renal transplant, CKD, chronic rejection, recent admission for pneumonia presented for SOB and LUE edema. Transplant ID called to evaluate for fever.    Recently hospitalized 11/9-11/14 for SOB, at that time had pulm edema vs. pneumonia, treated with BP control and cefepime -> cefpodoxime.  Fever 100.6 on 1/5, no leukocytosis  UA no pyuria  RVP negative  US LUE: no DVT  CXR: Mild to moderate pulmonary edema with increase in right lower lung opacities  CT chest: associated foci of consolidation and groundglass opacity in all 5 lobes, suggestive of infection. However given cardiomegaly, pericardial effusion, bibasilar pleural effusion, and anasarca there is likely superimposed pulmonary edema.    Micro:  BCx: no growth  UCx: no growth  Crypt Ag negative  Fungitell <31  Legionella urine Ag negative  CMV PCR Negative    #Fever  #Pneumonia  #Conjunctivitis  #Pulmonary edema  #LUE edema    Suggest:  - Continue Cefepime 1g q24H (1/5-->)   - Anticipate Levofloxacin on discharge (to complete 7 day course) if continued improvement  - Follow up on histoplasma antigen in urine   - Non-urgent ophthalmology evaluation for bilateral conjunctivitis    I will continue to follow. Please feel free to contact me with any further questions.    Omar Tadeo M.D.  [facility] Division of Infectious Disease  8AM-5PM Monday - Friday: Available on [employer] Teams  After Hours and Holidays (or if no response on [employer] Teams): Please contact the Infectious Diseases Office at [phone] 78f h/o HTN, s/p Renal transplant, CKD, chronic rejection, recent admission for pneumonia presented for SOB and LUE edema. Transplant ID called to evaluate for fever.    Recently hospitalized 11/9-11/14 for SOB, at that time had pulm edema vs. pneumonia, treated with BP control and cefepime -> cefpodoxime.  Fever 100.6 on 1/5, no leukocytosis  UA no pyuria  RVP negative  US LUE: no DVT  CXR: Mild to moderate pulmonary edema with increase in right lower lung opacities  CT chest: associated foci of consolidation and groundglass opacity in all 5 lobes, suggestive of infection. However given cardiomegaly, pericardial effusion, bibasilar pleural effusion, and anasarca there is likely superimposed pulmonary edema.    Micro:  BCx: no growth  UCx: no growth  Crypt Ag negative  Fungitell <31  Legionella urine Ag negative  CMV PCR Negative    #Fever  #Pneumonia  #Conjunctivitis  #Pulmonary edema  #LUE edema    Suggest:  - Continue Cefepime 1g q24H (1/5-->)   - Anticipate Levofloxacin on discharge (to complete 7 day course) if continued improvement  - Follow up on histoplasma antigen in urine   - Non-urgent ophthalmology evaluation for bilateral conjunctivitis    I will continue to follow. Please feel free to contact me with any further questions.    Omar Tadeo M.D.  [facility] Division of Infectious Disease  8AM-5PM Monday - Friday: Available on [employer] Teams  After Hours and Holidays (or if no response on [employer] Teams): Please contact the Infectious Diseases Office at [phone] 78f h/o HTN, s/p Renal transplant, CKD, chronic rejection, recent admission for pneumonia presented for SOB and LUE edema. Transplant ID called to evaluate for fever.    Recently hospitalized 11/9-11/14 for SOB, at that time had pulm edema vs. pneumonia, treated with BP control and cefepime -> cefpodoxime.  Fever 100.6 on 1/5, no leukocytosis  UA no pyuria  RVP negative  US LUE: no DVT  CXR: Mild to moderate pulmonary edema with increase in right lower lung opacities  CT chest: associated foci of consolidation and groundglass opacity in all 5 lobes, suggestive of infection. However given cardiomegaly, pericardial effusion, bibasilar pleural effusion, and anasarca there is likely superimposed pulmonary edema.    Micro:  BCx: no growth  UCx: no growth  Crypt Ag negative  Fungitell <31  Legionella urine Ag negative  CMV PCR Negative    #Fever  #Pneumonia  #Conjunctivitis  #Pulmonary edema  #LUE edema    Suggest:  - Continue Cefepime 1g q24H while admitted (1/5-->)   - Anticipate Levofloxacin 750 Q48H on discharge (to complete total 7 day course, end 1/11/24) if continued improvement  - Follow up on histoplasma antigen in urine     Transplant ID will not follow patient regularly going forward. Please feel free to reach out with any further questions or concerns.    Omar Tadeo M.D.  [facility] Division of Infectious Disease  8AM-5PM Monday - Friday: Available on [employer] Teams  After Hours and Holidays (or if no response on [employer] Teams): Please contact the Infectious Diseases Office at [phone]  78f h/o HTN, s/p Renal transplant, CKD, chronic rejection, recent admission for pneumonia presented for SOB and LUE edema. Transplant ID called to evaluate for fever.    Recently hospitalized 11/9-11/14 for SOB, at that time had pulm edema vs. pneumonia, treated with BP control and cefepime -> cefpodoxime.  Fever 100.6 on 1/5, no leukocytosis  UA no pyuria  RVP negative  US LUE: no DVT  CXR: Mild to moderate pulmonary edema with increase in right lower lung opacities  CT chest: associated foci of consolidation and groundglass opacity in all 5 lobes, suggestive of infection. However given cardiomegaly, pericardial effusion, bibasilar pleural effusion, and anasarca there is likely superimposed pulmonary edema.    Micro:  BCx: no growth  UCx: no growth  Crypt Ag negative  Fungitell <31  Legionella urine Ag negative  CMV PCR Negative    #Fever  #Pneumonia  #Conjunctivitis  #Pulmonary edema  #LUE edema    Suggest:  - Continue Cefepime 1g q24H while admitted (1/5-->)   - Anticipate Levofloxacin 750 Q48H on discharge (to complete total 7 day course, end 1/11/24) if continued improvement  - Follow up on histoplasma antigen in urine     Transplant ID will not follow patient regularly going forward. Please feel free to reach out with any further questions or concerns.    Omar Tadeo M.D.  [facility] Division of Infectious Disease  8AM-5PM Monday - Friday: Available on [employer] Teams  After Hours and Holidays (or if no response on [employer] Teams): Please contact the Infectious Diseases Office at [phone]

## 2024-01-08 NOTE — PROGRESS NOTE ADULT - SUBJECTIVE AND OBJECTIVE BOX
DATE OF SERVICE: 01-08-24 @ 12:18    Patient is a 78y old  Female who presents with a chief complaint of renal transplant with fever (06 Jan 2024 15:54)      SUBJECTIVE / OVERNIGHT EVENTS:  No chest pain. No shortness of breath. No complaints. No events overnight.     MEDICATIONS  (STANDING):  aspirin enteric coated 81 milliGRAM(s) Oral daily  atovaquone  Suspension 1500 milliGRAM(s) Oral daily  cefepime   IVPB 1000 milliGRAM(s) IV Intermittent every 24 hours  cholecalciferol 2000 Unit(s) Oral daily  epoetin ivelisse (EPOGEN) Injectable 96433 Unit(s) SubCutaneous once  epoetin ivelisse-epbx (RETACRIT) Injectable 37388 Unit(s) SubCutaneous <User Schedule>  furosemide   Injectable 40 milliGRAM(s) IV Push two times a day  heparin   Injectable 5000 Unit(s) SubCutaneous every 8 hours  labetalol 200 milliGRAM(s) Oral three times a day  latanoprost 0.005% Ophthalmic Solution 1 Drop(s) Both EYES at bedtime  mycophenolate mofetil 500 milliGRAM(s) Oral two times a day  NIFEdipine XL 60 milliGRAM(s) Oral two times a day  predniSONE   Tablet 5 milliGRAM(s) Oral daily  sodium bicarbonate 1300 milliGRAM(s) Oral three times a day  sodium zirconium cyclosilicate 10 Gram(s) Oral daily  tacrolimus ER Tablet (ENVARSUS XR) 5 milliGRAM(s) Oral daily    MEDICATIONS  (PRN):  acetaminophen     Tablet .. 650 milliGRAM(s) Oral every 6 hours PRN Temp greater or equal to 38C (100.4F), Mild Pain (1 - 3)  melatonin 3 milliGRAM(s) Oral at bedtime PRN Insomnia      Vital Signs Last 24 Hrs  T(C): 37.3 (08 Jan 2024 05:10), Max: 37.5 (07 Jan 2024 13:10)  T(F): 99.1 (08 Jan 2024 05:10), Max: 99.5 (07 Jan 2024 13:10)  HR: 70 (08 Jan 2024 05:10) (68 - 73)  BP: 175/83 (08 Jan 2024 05:10) (125/68 - 175/83)  BP(mean): --  RR: 18 (08 Jan 2024 05:10) (18 - 18)  SpO2: 96% (08 Jan 2024 05:10) (94% - 96%)    Parameters below as of 08 Jan 2024 05:10  Patient On (Oxygen Delivery Method): room air      CAPILLARY BLOOD GLUCOSE        I&O's Summary    07 Jan 2024 07:01  -  08 Jan 2024 07:00  --------------------------------------------------------  IN: 500 mL / OUT: 0 mL / NET: 500 mL        PHYSICAL EXAM:  GENERAL: NAD, well-developed  HEAD:  Atraumatic, Normocephalic  EYES: EOMI, PERRLA, conjunctiva and sclera clear  NECK: Supple, No JVD  CHEST/LUNG: Clear to auscultation bilaterally; No wheeze  HEART: Regular rate and rhythm; No murmurs, rubs, or gallops  ABDOMEN: Soft, Nontender, Nondistended; Bowel sounds present  EXTREMITIES:  2+ Peripheral Pulses, No clubbing, cyanosis, LUE edema  PSYCH: AAOx3  NEUROLOGY: non-focal  SKIN: No rashes or lesions    LABS:                        8.1    4.53  )-----------( 228      ( 08 Jan 2024 10:07 )             26.2     01-08    140  |  104  |  54<H>  ----------------------------<  118<H>  4.0   |  22  |  3.68<H>    Ca    8.7      08 Jan 2024 10:07  Phos  4.3     01-07  Mg     1.4     01-07            Urinalysis Basic - ( 08 Jan 2024 10:07 )    Color: x / Appearance: x / SG: x / pH: x  Gluc: 118 mg/dL / Ketone: x  / Bili: x / Urobili: x   Blood: x / Protein: x / Nitrite: x   Leuk Esterase: x / RBC: x / WBC x   Sq Epi: x / Non Sq Epi: x / Bacteria: x    < from: TTE W or WO Ultrasound Enhancing Agent (01.04.24 @ 08:44) >     CONCLUSIONS:      1. Left ventricular systolic function is normal with an ejection fraction of 63 % by Fontenot's method of disks. There are no regional wall motion abnormalities seen.   2. There is moderate (grade 2) left ventricular diastolic dysfunction, with elevated filling pressure.   3. Severe left ventricular hypertrophy.   4. Normal right ventricular cavity size, increasedwall thickness, and systolic function.   5. The left atrium is severely dilated.   6. Echocardiographic evidence of pulmonary hyptertension.   7. Small pericardial effusion noted adjacent to the posterior left ventricle, small pericardial effusion noted adjacent to the lateral left ventricle and moderate pericardial effusion noted adjacent to the right atrium with no evidence of hemodynamic compromise.   8. Compared to the transthoracic echocardiogram performed on 11/10/2023 LVOT obstruction is markedly approved.      < end of copied text >      RADIOLOGY & ADDITIONAL TESTS:    Imaging Personally Reviewed:    Consultant(s) Notes Reviewed:      Care Discussed with Consultants/Other Providers:   DATE OF SERVICE: 01-08-24 @ 12:18    Patient is a 78y old  Female who presents with a chief complaint of renal transplant with fever (06 Jan 2024 15:54)      SUBJECTIVE / OVERNIGHT EVENTS:  No chest pain. No shortness of breath. No complaints. No events overnight.     MEDICATIONS  (STANDING):  aspirin enteric coated 81 milliGRAM(s) Oral daily  atovaquone  Suspension 1500 milliGRAM(s) Oral daily  cefepime   IVPB 1000 milliGRAM(s) IV Intermittent every 24 hours  cholecalciferol 2000 Unit(s) Oral daily  epoetin ivelisse (EPOGEN) Injectable 05443 Unit(s) SubCutaneous once  epoetin ivelisse-epbx (RETACRIT) Injectable 61907 Unit(s) SubCutaneous <User Schedule>  furosemide   Injectable 40 milliGRAM(s) IV Push two times a day  heparin   Injectable 5000 Unit(s) SubCutaneous every 8 hours  labetalol 200 milliGRAM(s) Oral three times a day  latanoprost 0.005% Ophthalmic Solution 1 Drop(s) Both EYES at bedtime  mycophenolate mofetil 500 milliGRAM(s) Oral two times a day  NIFEdipine XL 60 milliGRAM(s) Oral two times a day  predniSONE   Tablet 5 milliGRAM(s) Oral daily  sodium bicarbonate 1300 milliGRAM(s) Oral three times a day  sodium zirconium cyclosilicate 10 Gram(s) Oral daily  tacrolimus ER Tablet (ENVARSUS XR) 5 milliGRAM(s) Oral daily    MEDICATIONS  (PRN):  acetaminophen     Tablet .. 650 milliGRAM(s) Oral every 6 hours PRN Temp greater or equal to 38C (100.4F), Mild Pain (1 - 3)  melatonin 3 milliGRAM(s) Oral at bedtime PRN Insomnia      Vital Signs Last 24 Hrs  T(C): 37.3 (08 Jan 2024 05:10), Max: 37.5 (07 Jan 2024 13:10)  T(F): 99.1 (08 Jan 2024 05:10), Max: 99.5 (07 Jan 2024 13:10)  HR: 70 (08 Jan 2024 05:10) (68 - 73)  BP: 175/83 (08 Jan 2024 05:10) (125/68 - 175/83)  BP(mean): --  RR: 18 (08 Jan 2024 05:10) (18 - 18)  SpO2: 96% (08 Jan 2024 05:10) (94% - 96%)    Parameters below as of 08 Jan 2024 05:10  Patient On (Oxygen Delivery Method): room air      CAPILLARY BLOOD GLUCOSE        I&O's Summary    07 Jan 2024 07:01  -  08 Jan 2024 07:00  --------------------------------------------------------  IN: 500 mL / OUT: 0 mL / NET: 500 mL        PHYSICAL EXAM:  GENERAL: NAD, well-developed  HEAD:  Atraumatic, Normocephalic  EYES: EOMI, PERRLA, conjunctiva and sclera clear  NECK: Supple, No JVD  CHEST/LUNG: Clear to auscultation bilaterally; No wheeze  HEART: Regular rate and rhythm; No murmurs, rubs, or gallops  ABDOMEN: Soft, Nontender, Nondistended; Bowel sounds present  EXTREMITIES:  2+ Peripheral Pulses, No clubbing, cyanosis, LUE edema  PSYCH: AAOx3  NEUROLOGY: non-focal  SKIN: No rashes or lesions    LABS:                        8.1    4.53  )-----------( 228      ( 08 Jan 2024 10:07 )             26.2     01-08    140  |  104  |  54<H>  ----------------------------<  118<H>  4.0   |  22  |  3.68<H>    Ca    8.7      08 Jan 2024 10:07  Phos  4.3     01-07  Mg     1.4     01-07            Urinalysis Basic - ( 08 Jan 2024 10:07 )    Color: x / Appearance: x / SG: x / pH: x  Gluc: 118 mg/dL / Ketone: x  / Bili: x / Urobili: x   Blood: x / Protein: x / Nitrite: x   Leuk Esterase: x / RBC: x / WBC x   Sq Epi: x / Non Sq Epi: x / Bacteria: x    < from: TTE W or WO Ultrasound Enhancing Agent (01.04.24 @ 08:44) >     CONCLUSIONS:      1. Left ventricular systolic function is normal with an ejection fraction of 63 % by Fontenot's method of disks. There are no regional wall motion abnormalities seen.   2. There is moderate (grade 2) left ventricular diastolic dysfunction, with elevated filling pressure.   3. Severe left ventricular hypertrophy.   4. Normal right ventricular cavity size, increasedwall thickness, and systolic function.   5. The left atrium is severely dilated.   6. Echocardiographic evidence of pulmonary hyptertension.   7. Small pericardial effusion noted adjacent to the posterior left ventricle, small pericardial effusion noted adjacent to the lateral left ventricle and moderate pericardial effusion noted adjacent to the right atrium with no evidence of hemodynamic compromise.   8. Compared to the transthoracic echocardiogram performed on 11/10/2023 LVOT obstruction is markedly approved.      < end of copied text >      RADIOLOGY & ADDITIONAL TESTS:    Imaging Personally Reviewed:    Consultant(s) Notes Reviewed:      Care Discussed with Consultants/Other Providers:

## 2024-01-08 NOTE — PROGRESS NOTE ADULT - SUBJECTIVE AND OBJECTIVE BOX
DATE OF SERVICE: 01-08-24 @ 15:58    Patient is a 78y old  Female who presents with a chief complaint of renal transplant with fever (06 Jan 2024 15:54)      INTERVAL HISTORY: No acute events     REVIEW OF SYSTEMS:  CONSTITUTIONAL: No weakness  EYES/ENT: No visual changes;  No throat pain   NECK: No pain or stiffness  RESPIRATORY: No cough, wheezing; No shortness of breath  CARDIOVASCULAR: No chest pain or palpitations  GASTROINTESTINAL: No abdominal  pain. No nausea, vomiting, or hematemesis  GENITOURINARY: No dysuria, frequency or hematuria  NEUROLOGICAL: No stroke like symptoms  SKIN: No rashes    	  MEDICATIONS:  furosemide   Injectable 40 milliGRAM(s) IV Push two times a day  labetalol 200 milliGRAM(s) Oral three times a day  NIFEdipine XL 60 milliGRAM(s) Oral two times a day        PHYSICAL EXAM:  T(C): 36.6 (01-08-24 @ 15:52), Max: 37.3 (01-08-24 @ 05:10)  HR: 75 (01-08-24 @ 15:52) (70 - 75)  BP: 168/69 (01-08-24 @ 15:52) (133/70 - 175/83)  RR: 18 (01-08-24 @ 15:52) (18 - 19)  SpO2: 98% (01-08-24 @ 15:52) (94% - 98%)  Wt(kg): --  I&O's Summary    07 Jan 2024 07:01  -  08 Jan 2024 07:00  --------------------------------------------------------  IN: 500 mL / OUT: 0 mL / NET: 500 mL    08 Jan 2024 07:01  -  08 Jan 2024 15:58  --------------------------------------------------------  IN: 480 mL / OUT: 0 mL / NET: 480 mL          Appearance: In no distress	  HEENT:    PERRL, EOMI	  Cardiovascular:  S1 S2, No JVD  Respiratory: Lungs clear to auscultation	  Gastrointestinal:  Soft, Non-tender, + BS	  Vascularature:  No edema of LE  Psychiatric: Appropriate affect   Neuro: no acute focal deficits                               8.1    4.53  )-----------( 228      ( 08 Jan 2024 10:07 )             26.2     01-08    140  |  104  |  54<H>  ----------------------------<  118<H>  4.0   |  22  |  3.68<H>    Ca    8.7      08 Jan 2024 10:07  Phos  4.3     01-07  Mg     1.4     01-07          Labs personally reviewed      ASSESSMENT/PLAN: 	  78f h/o HTN, s/p Renal transplant, CKD, chronic rejection, recent admission for pneumonia s/p abx presented with shortness of breath since this morning. she also noticed significant LUE swelling that began 4-5 days ago    1. Diastolic Heart Failure secondary to advanced kidney disease   - Recent TTE with normal EF  - Appears closer to euvolemia, c/w IV Lasix 40mg IV TID, transition to Torsemide 20mg po bid    2. HTN   was previously controlled, today elevated   c/w amlodipine and BB for now     3. Pulmonary Edema   chest Xray as above shows mild - mod pulm edema with increase in right lower lung   -c/w abx therapy     4. ESRD  - renal on board     5. Prophylactic measure.   dvt proph - hsq.            Marcy Shhaid, RUDI Oh DO [facility]  Cardiovascular Medicine  [address] [place] [address], [address]  Available via call or text on [employer]  Office: [phone]   DATE OF SERVICE: 01-08-24 @ 15:58    Patient is a 78y old  Female who presents with a chief complaint of renal transplant with fever (06 Jan 2024 15:54)      INTERVAL HISTORY: No acute events     REVIEW OF SYSTEMS:  CONSTITUTIONAL: No weakness  EYES/ENT: No visual changes;  No throat pain   NECK: No pain or stiffness  RESPIRATORY: No cough, wheezing; No shortness of breath  CARDIOVASCULAR: No chest pain or palpitations  GASTROINTESTINAL: No abdominal  pain. No nausea, vomiting, or hematemesis  GENITOURINARY: No dysuria, frequency or hematuria  NEUROLOGICAL: No stroke like symptoms  SKIN: No rashes    	  MEDICATIONS:  furosemide   Injectable 40 milliGRAM(s) IV Push two times a day  labetalol 200 milliGRAM(s) Oral three times a day  NIFEdipine XL 60 milliGRAM(s) Oral two times a day        PHYSICAL EXAM:  T(C): 36.6 (01-08-24 @ 15:52), Max: 37.3 (01-08-24 @ 05:10)  HR: 75 (01-08-24 @ 15:52) (70 - 75)  BP: 168/69 (01-08-24 @ 15:52) (133/70 - 175/83)  RR: 18 (01-08-24 @ 15:52) (18 - 19)  SpO2: 98% (01-08-24 @ 15:52) (94% - 98%)  Wt(kg): --  I&O's Summary    07 Jan 2024 07:01  -  08 Jan 2024 07:00  --------------------------------------------------------  IN: 500 mL / OUT: 0 mL / NET: 500 mL    08 Jan 2024 07:01  -  08 Jan 2024 15:58  --------------------------------------------------------  IN: 480 mL / OUT: 0 mL / NET: 480 mL          Appearance: In no distress	  HEENT:    PERRL, EOMI	  Cardiovascular:  S1 S2, No JVD  Respiratory: Lungs clear to auscultation	  Gastrointestinal:  Soft, Non-tender, + BS	  Vascularature:  No edema of LE  Psychiatric: Appropriate affect   Neuro: no acute focal deficits                               8.1    4.53  )-----------( 228      ( 08 Jan 2024 10:07 )             26.2     01-08    140  |  104  |  54<H>  ----------------------------<  118<H>  4.0   |  22  |  3.68<H>    Ca    8.7      08 Jan 2024 10:07  Phos  4.3     01-07  Mg     1.4     01-07          Labs personally reviewed      ASSESSMENT/PLAN: 	  78f h/o HTN, s/p Renal transplant, CKD, chronic rejection, recent admission for pneumonia s/p abx presented with shortness of breath since this morning. she also noticed significant LUE swelling that began 4-5 days ago    1. Diastolic Heart Failure secondary to advanced kidney disease   - Recent TTE with normal EF  - Appears closer to euvolemia, c/w IV Lasix 40mg IV TID, transition to Torsemide 20mg po bid    2. HTN   was previously controlled, today elevated   c/w amlodipine and BB for now     3. Pulmonary Edema   chest Xray as above shows mild - mod pulm edema with increase in right lower lung   -c/w abx therapy     4. ESRD  - renal on board     5. Prophylactic measure.   dvt proph - hsq.            Marcy Shahid, RUDI Oh DO [facility]  Cardiovascular Medicine  [address] [place] [address], [address]  Available via call or text on [employer] TE[employer]  Office: [phone]   DATE OF SERVICE: 01-08-24 @ 15:58    Patient is a 78y old  Female who presents with a chief complaint of renal transplant with fever (06 Jan 2024 15:54)      INTERVAL HISTORY: No acute events     REVIEW OF SYSTEMS:  CONSTITUTIONAL: No weakness  EYES/ENT: No visual changes;  No throat pain   NECK: No pain or stiffness  RESPIRATORY: No cough, wheezing; No shortness of breath  CARDIOVASCULAR: No chest pain or palpitations  GASTROINTESTINAL: No abdominal  pain. No nausea, vomiting, or hematemesis  GENITOURINARY: No dysuria, frequency or hematuria  NEUROLOGICAL: No stroke like symptoms  SKIN: No rashes    	  MEDICATIONS:  furosemide   Injectable 40 milliGRAM(s) IV Push two times a day  labetalol 200 milliGRAM(s) Oral three times a day  NIFEdipine XL 60 milliGRAM(s) Oral two times a day        PHYSICAL EXAM:  T(C): 36.6 (01-08-24 @ 15:52), Max: 37.3 (01-08-24 @ 05:10)  HR: 75 (01-08-24 @ 15:52) (70 - 75)  BP: 168/69 (01-08-24 @ 15:52) (133/70 - 175/83)  RR: 18 (01-08-24 @ 15:52) (18 - 19)  SpO2: 98% (01-08-24 @ 15:52) (94% - 98%)  Wt(kg): --  I&O's Summary    07 Jan 2024 07:01  -  08 Jan 2024 07:00  --------------------------------------------------------  IN: 500 mL / OUT: 0 mL / NET: 500 mL    08 Jan 2024 07:01  -  08 Jan 2024 15:58  --------------------------------------------------------  IN: 480 mL / OUT: 0 mL / NET: 480 mL          Appearance: In no distress	  HEENT:    PERRL, EOMI	  Cardiovascular:  S1 S2, No JVD  Respiratory: Lungs clear to auscultation	  Gastrointestinal:  Soft, Non-tender, + BS	  Vascularature:  No edema of LE  Psychiatric: Appropriate affect   Neuro: no acute focal deficits                               8.1    4.53  )-----------( 228      ( 08 Jan 2024 10:07 )             26.2     01-08    140  |  104  |  54<H>  ----------------------------<  118<H>  4.0   |  22  |  3.68<H>    Ca    8.7      08 Jan 2024 10:07  Phos  4.3     01-07  Mg     1.4     01-07          Labs personally reviewed      ASSESSMENT/PLAN: 	  78f h/o HTN, s/p Renal transplant, CKD, chronic rejection, recent admission for pneumonia s/p abx presented with shortness of breath since this morning. she also noticed significant LUE swelling that began 4-5 days ago    1. Diastolic Heart Failure secondary to advanced kidney disease   - Recent TTE with normal EF  - Appears closer to euvolemia, c/w IV Lasix 40mg IV TID, transition to Torsemide 20mg po bid    2. HTN   was previously controlled, today elevated   c/w amlodipine and BB for now     3. Pulmonary Edema   chest Xray as above shows mild - mod pulm edema with increase in right lower lung   -c/w abx therapy     4. ESRD  - renal on board     5. Prophylactic measure.   dvt proph - hsq.            Marcy Shahid, RUDI Oh DO [facility]  Cardiovascular Medicine  [address] [place] [address], [address]  Available via call or text on [employer] TE[employer]  Office: [phone]   DATE OF SERVICE: 01-08-24 @ 15:58    Patient is a 78y old  Female who presents with a chief complaint of renal transplant with fever (06 Jan 2024 15:54)      INTERVAL HISTORY: No acute events     REVIEW OF SYSTEMS:  CONSTITUTIONAL: No weakness  EYES/ENT: No visual changes;  No throat pain   NECK: No pain or stiffness  RESPIRATORY: No cough, wheezing; No shortness of breath  CARDIOVASCULAR: No chest pain or palpitations  GASTROINTESTINAL: No abdominal  pain. No nausea, vomiting, or hematemesis  GENITOURINARY: No dysuria, frequency or hematuria  NEUROLOGICAL: No stroke like symptoms  SKIN: No rashes    	  MEDICATIONS:  furosemide   Injectable 40 milliGRAM(s) IV Push two times a day  labetalol 200 milliGRAM(s) Oral three times a day  NIFEdipine XL 60 milliGRAM(s) Oral two times a day        PHYSICAL EXAM:  T(C): 36.6 (01-08-24 @ 15:52), Max: 37.3 (01-08-24 @ 05:10)  HR: 75 (01-08-24 @ 15:52) (70 - 75)  BP: 168/69 (01-08-24 @ 15:52) (133/70 - 175/83)  RR: 18 (01-08-24 @ 15:52) (18 - 19)  SpO2: 98% (01-08-24 @ 15:52) (94% - 98%)  Wt(kg): --  I&O's Summary    07 Jan 2024 07:01  -  08 Jan 2024 07:00  --------------------------------------------------------  IN: 500 mL / OUT: 0 mL / NET: 500 mL    08 Jan 2024 07:01  -  08 Jan 2024 15:58  --------------------------------------------------------  IN: 480 mL / OUT: 0 mL / NET: 480 mL          Appearance: In no distress	  HEENT:    PERRL, EOMI	  Cardiovascular:  S1 S2, No JVD  Respiratory: Lungs clear to auscultation	  Gastrointestinal:  Soft, Non-tender, + BS	  Vascularature:  No edema of LE  Psychiatric: Appropriate affect   Neuro: no acute focal deficits                               8.1    4.53  )-----------( 228      ( 08 Jan 2024 10:07 )             26.2     01-08    140  |  104  |  54<H>  ----------------------------<  118<H>  4.0   |  22  |  3.68<H>    Ca    8.7      08 Jan 2024 10:07  Phos  4.3     01-07  Mg     1.4     01-07          Labs personally reviewed      ASSESSMENT/PLAN: 	  78f h/o HTN, s/p Renal transplant, CKD, chronic rejection, recent admission for pneumonia s/p abx presented with shortness of breath since this morning. she also noticed significant LUE swelling that began 4-5 days ago    1. Diastolic Heart Failure secondary to advanced kidney disease   - Recent TTE with normal EF  - Appears closer to euvolemia, c/w IV Lasix 40mg IV TID, transition to Torsemide 20mg po bid    2. HTN   was previously controlled, today elevated   c/w amlodipine and BB for now     3. Pulmonary Edema   chest Xray as above shows mild - mod pulm edema with increase in right lower lung   -c/w abx therapy     4. ESRD  - renal on board     5. Prophylactic measure.   dvt proph - hsq.            Marcy Shahid, RUDI Oh DO [facility]  Cardiovascular Medicine  [address] [place] [address], [address]  Available via call or text on [employer] TE[employer]  Office: [phone]

## 2024-01-08 NOTE — PROGRESS NOTE ADULT - ASSESSMENT
79 YO Female h/o HTN, s/p Renal transplant, CKD, chronic rejection, recent admission for pneumonia s/p abx presented with shortness of breath since this morning. she also noticed significant LUE swelling that began 4-5 days ago.  Nephrology consulted for CKD s/p renal transplant.    A/P:  S/p DDRT (5/19/2021 - induction w/ Basiliximab)  Baseline sCr ~3  Renal function fluctuating, slightly worsen 1/8  Repeat TTE 1/4 - Left ventricular wall thickness is moderately increased. Left ventricular systolic function is normal with a calculated ejection fraction of 63 % with moderate L ventricular dysfunction.  Furosemide 40 mg IV TID decreased to BID per Transplant nephrology    Pt is also on Cefepime 1g q24H ---> Per transplant ID 1/6  may need to decrease if renal function deteriorates further   UA with proteinuria and hematuria.  Continue immunosuppressants per transplant - mycophenolate 500mg BID, prednisone 5mg qd, and Tacrolimus 5mg qd.  Fk level 1/7, 4.3    Check Tacro level 30mins prior to next dose; goal 4-6.  Avoid nephrotoxic agents.  Monitor BMP and UO.    HTN:  BP fluctuating  on Nifedipine 60 BID   c/w Labetalol and Nifedipine   IF BP remains elevated, consider titrating Nifedipine   Avoid hypotension.  Monitor BP.    Hyperkalemia:  K stable   Low K diet.  Monitor K closely.    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  currently on lasix 40mg  TID   CO2 improving   c/w NaHCO3 1300mg TID    Anemia:  Likely in setting of CKD vs iron deficiency.  iron deficient, Tsat 11% 1/6   SHELDON 10,000 units TIW   would hold off iron administration given ct chest with evidence of associated foci of consolidation and ground glass opacity in all 5 lobes, suggestive of infection.   s/p 1 unit PRBC 1/4  Hgb improving  Transfuse per primary team as needed   Hold SHELDON for BP >170/90.    Hypomagnesemia   supplemented with 1g Mg Sulfate 1/7  Replete as needed  Monitor     Proteinuria/Hematuria:  etiology? possibly diabetic nephropathy (donor origin)  Follows w/ Dr. Louise   UPr/Cr -2.5   Vasculitis work up per transplant team  Monitor     L Upper Extremity swelling   Repeat Duplex performed 1/7 , no evidence of DVT  Mgmt per Primary  77 YO Female h/o HTN, s/p Renal transplant, CKD, chronic rejection, recent admission for pneumonia s/p abx presented with shortness of breath since this morning. she also noticed significant LUE swelling that began 4-5 days ago.  Nephrology consulted for CKD s/p renal transplant.    A/P:  S/p DDRT (5/19/2021 - induction w/ Basiliximab)  Baseline sCr ~3  Renal function fluctuating, slightly worsen 1/8  Repeat TTE 1/4 - Left ventricular wall thickness is moderately increased. Left ventricular systolic function is normal with a calculated ejection fraction of 63 % with moderate L ventricular dysfunction.  Furosemide 40 mg IV TID decreased to BID per Transplant nephrology    Pt is also on Cefepime 1g q24H ---> Per transplant ID 1/6  may need to decrease if renal function deteriorates further   UA with proteinuria and hematuria.  Continue immunosuppressants per transplant - mycophenolate 500mg BID, prednisone 5mg qd, and Tacrolimus 5mg qd.  Fk level 1/7, 4.3    Check Tacro level 30mins prior to next dose; goal 4-6.  Avoid nephrotoxic agents.  Monitor BMP and UO.    HTN:  BP fluctuating  on Nifedipine 60 BID   c/w Labetalol and Nifedipine   IF BP remains elevated, consider titrating Nifedipine   Avoid hypotension.  Monitor BP.    Hyperkalemia:  K stable   Low K diet.  Monitor K closely.    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  currently on lasix 40mg  TID   CO2 improving   c/w NaHCO3 1300mg TID    Anemia:  Likely in setting of CKD vs iron deficiency.  iron deficient, Tsat 11% 1/6   SHELDON 10,000 units TIW   would hold off iron administration given ct chest with evidence of associated foci of consolidation and ground glass opacity in all 5 lobes, suggestive of infection.   s/p 1 unit PRBC 1/4  Hgb improving  Transfuse per primary team as needed   Hold SHELDON for BP >170/90.    Hypomagnesemia   supplemented with 1g Mg Sulfate 1/7  Replete as needed  Monitor     Proteinuria/Hematuria:  etiology? possibly diabetic nephropathy (donor origin)  Follows w/ Dr. Louise   UPr/Cr -2.5   Vasculitis work up per transplant team  Monitor     L Upper Extremity swelling   Repeat Duplex performed 1/7 , no evidence of DVT  Mgmt per Primary  77 YO Female h/o HTN, s/p Renal transplant, CKD, chronic rejection, recent admission for pneumonia s/p abx presented with shortness of breath since this morning. she also noticed significant LUE swelling that began 4-5 days ago.  Nephrology consulted for CKD s/p renal transplant.    A/P:  S/p DDRT (5/19/2021 - induction w/ Basiliximab)  Baseline sCr ~3  Renal function fluctuating, slightly worsen 1/8  Repeat TTE 1/4 - Left ventricular wall thickness is moderately increased. Left ventricular systolic function is normal with a calculated ejection fraction of 63 % with moderate L ventricular dysfunction.  Furosemide 40 mg IV TID decreased to BID  Pt is also on Cefepime 1g q24H ---> Per transplant ID 1/6  may need to decrease if renal function deteriorates further   UA with proteinuria and hematuria.  Continue immunosuppressants per transplant - mycophenolate 500mg BID, prednisone 5mg qd, and Tacrolimus 5mg qd.  Fk level 1/7, 4.3    Check Tacro level 30mins prior to next dose; goal 4-6.  Avoid nephrotoxic agents.  Monitor BMP and UO.    HTN:  BP fluctuating  c/w Labetalol and Nifedipine   IF BP remains elevated, consider titrating up labetalol  Avoid hypotension.  Monitor BP.    Hyperkalemia:  K stable   Low K diet.  Monitor K closely.    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  CO2 improving   c/w NaHCO3 1300mg TID    Anemia:  Likely in setting of CKD vs iron deficiency.  iron deficient, Tsat 11% 1/6   SHELDON 10,000 units TIW   would hold off iron administration given ct chest with evidence of associated foci of consolidation and ground glass opacity in all 5 lobes, suggestive of infection.   s/p 1 unit PRBC 1/4  Hgb improving  Transfuse per primary team as needed   Hold SHELDON for BP >170/90.    Hypomagnesemia   supplemented with 1g Mg Sulfate 1/7  Replete as needed  Monitor     Proteinuria/Hematuria:  etiology? possibly diabetic nephropathy (donor origin)  Follows w/ Dr. Louise   UPr/Cr -2.5   Vasculitis work up per transplant team  Monitor     L Upper Extremity swelling   Repeat Duplex performed 1/7 , no evidence of DVT  Mgmt per Primary  79 YO Female h/o HTN, s/p Renal transplant, CKD, chronic rejection, recent admission for pneumonia s/p abx presented with shortness of breath since this morning. she also noticed significant LUE swelling that began 4-5 days ago.  Nephrology consulted for CKD s/p renal transplant.    A/P:  S/p DDRT (5/19/2021 - induction w/ Basiliximab)  Baseline sCr ~3  Renal function fluctuating, slightly worsen 1/8  Repeat TTE 1/4 - Left ventricular wall thickness is moderately increased. Left ventricular systolic function is normal with a calculated ejection fraction of 63 % with moderate L ventricular dysfunction.  Furosemide 40 mg IV TID decreased to BID  Pt is also on Cefepime 1g q24H ---> Per transplant ID 1/6  may need to decrease if renal function deteriorates further   UA with proteinuria and hematuria.  Continue immunosuppressants per transplant - mycophenolate 500mg BID, prednisone 5mg qd, and Tacrolimus 5mg qd.  Fk level 1/7, 4.3    Check Tacro level 30mins prior to next dose; goal 4-6.  Avoid nephrotoxic agents.  Monitor BMP and UO.    HTN:  BP fluctuating  c/w Labetalol and Nifedipine   IF BP remains elevated, consider titrating up labetalol  Avoid hypotension.  Monitor BP.    Hyperkalemia:  K stable   Low K diet.  Monitor K closely.    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  CO2 improving   c/w NaHCO3 1300mg TID    Anemia:  Likely in setting of CKD vs iron deficiency.  iron deficient, Tsat 11% 1/6   SHELDON 10,000 units TIW   would hold off iron administration given ct chest with evidence of associated foci of consolidation and ground glass opacity in all 5 lobes, suggestive of infection.   s/p 1 unit PRBC 1/4  Hgb improving  Transfuse per primary team as needed   Hold SHELDON for BP >170/90.    Hypomagnesemia   supplemented with 1g Mg Sulfate 1/7  Replete as needed  Monitor     Proteinuria/Hematuria:  etiology? possibly diabetic nephropathy (donor origin)  Follows w/ Dr. Louise   UPr/Cr -2.5   Vasculitis work up per transplant team  Monitor     L Upper Extremity swelling   Repeat Duplex performed 1/7 , no evidence of DVT  Mgmt per Primary

## 2024-01-08 NOTE — PROGRESS NOTE ADULT - SUBJECTIVE AND OBJECTIVE BOX
[facility] DIVISION OF KIDNEY DISEASES AND HYPERTENSION   FOLLOW UP NOTE  --------------------------------------------------------------------------------  Chief Complaint: 78 y/o female with PMH ESRD due to HTN since 2016, s/p DDRT (05/19/2021- induction with basiliximab), HTN, Anemia who presented to [facility] due to worsening B/L LE edema, facial edema and LUE edema.    24 hour events/subjective: Pt. was seen and examined today. She is "feeling better". Appears her LE edema is improving. Denied shortness of  breath, nausea, vomiting, fevers, chills.     PAST HISTORY  --------------------------------------------------------------------------------  No significant changes to PMH, PSH, FHx, SHx, unless otherwise noted    ALLERGIES & MEDICATIONS  --------------------------------------------------------------------------------  Allergies  penicillin (Rash)  Mushrooms (Anaphylaxis)    Intolerances    Standing Inpatient Medications  aspirin enteric coated 81 milliGRAM(s) Oral daily  atovaquone  Suspension 1500 milliGRAM(s) Oral daily  cefepime   IVPB 1000 milliGRAM(s) IV Intermittent every 24 hours  cholecalciferol 2000 Unit(s) Oral daily  epoetin ivelisse (EPOGEN) Injectable 51839 Unit(s) SubCutaneous once  epoetin ivelisse-epbx (RETACRIT) Injectable 93002 Unit(s) SubCutaneous <User Schedule>  furosemide   Injectable 40 milliGRAM(s) IV Push three times a day  heparin   Injectable 5000 Unit(s) SubCutaneous every 8 hours  labetalol 200 milliGRAM(s) Oral three times a day  latanoprost 0.005% Ophthalmic Solution 1 Drop(s) Both EYES at bedtime  mycophenolate mofetil 500 milliGRAM(s) Oral two times a day  NIFEdipine XL 60 milliGRAM(s) Oral two times a day  predniSONE   Tablet 5 milliGRAM(s) Oral daily  sodium bicarbonate 1300 milliGRAM(s) Oral three times a day  sodium zirconium cyclosilicate 10 Gram(s) Oral daily  tacrolimus ER Tablet (ENVARSUS XR) 5 milliGRAM(s) Oral daily    PRN Inpatient Medications  acetaminophen     Tablet .. 650 milliGRAM(s) Oral every 6 hours PRN  melatonin 3 milliGRAM(s) Oral at bedtime PRN    REVIEW OF SYSTEMS  --------------------------------------------------------------------------------  All other systems were reviewed and are negative, except as noted.    VITALS/PHYSICAL EXAM  --------------------------------------------------------------------------------  T(C): 37.3 (01-08-24 @ 05:10), Max: 37.5 (01-07-24 @ 13:10)  HR: 70 (01-08-24 @ 05:10) (68 - 73)  BP: 175/83 (01-08-24 @ 05:10) (125/68 - 175/83)  RR: 18 (01-08-24 @ 05:10) (18 - 18)  SpO2: 96% (01-08-24 @ 05:10) (94% - 96%)  Wt(kg): --    01-07-24 @ 07:01  -  01-08-24 @ 07:00  --------------------------------------------------------  IN: 500 mL / OUT: 0 mL / NET: 500 mL    Physical Exam:  	Gen: NAD  	HEENT: Anicteric  	Pulm: CTA B/L  	CV: S1S2+  	Abd: Soft, +BS                Transplant site: non tender, well healed surgical scar.  	Ext: + LE edema B/L and LUE Edema (both improving)  	Neuro: Awake  	Skin: Warm and dry  	Dialysis access: RUE AVF with palpable thrill     LABS/STUDIES  --------------------------------------------------------------------------------              8.1    4.53  >-----------<  228      [01-08-24 @ 10:07]              26.2     137  |  104  |  55  ----------------------------<  132      [01-07-24 @ 11:02]  4.2   |  20  |  3.42        Ca     8.4     [01-07-24 @ 11:02]      Mg     1.4     [01-07-24 @ 11:02]      Phos  4.3     [01-07-24 @ 11:02]    Creatinine Trend:  SCr 3.42 [01-07 @ 11:02]  SCr 3.35 [01-06 @ 11:12]  SCr 3.23 [01-05 @ 10:35]  SCr 2.95 [01-04 @ 07:43]  SCr 2.75 [01-03 @ 14:10]    Urine Creatinine 43      [01-08-24 @ 07:29]  Urine Protein 108      [01-08-24 @ 07:29]    HBsAg Nonreact      [01-04-24 @ 07:51]  HCV 0.27, Nonreact      [01-04-24 @ 07:51]  HIV Nonreact      [01-04-24 @ 10:13]    STEPHANIE: titer 1:80, pattern Speckled      [01-04-24 @ 07:51]  dsDNA <12      [01-04-24 @ 07:51]  C3 Complement 120      [01-04-24 @ 07:51]  C4 Complement 34      [01-04-24 @ 07:51]  ANCA: cANCA Negative, pANCA Negative, atypical ANCA Indeterminate Method interference due to STEPHANIE Fluorescence      [01-04-24 @ 07:51]  Immunofixation Serum: No Monoclonal Band Identified  Reference Range: None Detected      [01-04-24 @ 07:51]    Tacrolimus (), Serum: 4.3 ng/mL (01-07 @ 11:02)  Tacrolimus (), Serum: 7.5 ng/mL (01-05 @ 10:35)  CMVPCR Log: NotDetec Rfy75PP/mL (01-04 @ 10:13) [facility] DIVISION OF KIDNEY DISEASES AND HYPERTENSION   FOLLOW UP NOTE  --------------------------------------------------------------------------------  Chief Complaint: 78 y/o female with PMH ESRD due to HTN since 2016, s/p DDRT (05/19/2021- induction with basiliximab), HTN, Anemia who presented to [facility] due to worsening B/L LE edema, facial edema and LUE edema.    24 hour events/subjective: Pt. was seen and examined today. She is "feeling better". Appears her LE edema is improving. Denied shortness of  breath, nausea, vomiting, fevers, chills.     PAST HISTORY  --------------------------------------------------------------------------------  No significant changes to PMH, PSH, FHx, SHx, unless otherwise noted    ALLERGIES & MEDICATIONS  --------------------------------------------------------------------------------  Allergies  penicillin (Rash)  Mushrooms (Anaphylaxis)    Intolerances    Standing Inpatient Medications  aspirin enteric coated 81 milliGRAM(s) Oral daily  atovaquone  Suspension 1500 milliGRAM(s) Oral daily  cefepime   IVPB 1000 milliGRAM(s) IV Intermittent every 24 hours  cholecalciferol 2000 Unit(s) Oral daily  epoetin ivelisse (EPOGEN) Injectable 46315 Unit(s) SubCutaneous once  epoetin ivelisse-epbx (RETACRIT) Injectable 72795 Unit(s) SubCutaneous <User Schedule>  furosemide   Injectable 40 milliGRAM(s) IV Push three times a day  heparin   Injectable 5000 Unit(s) SubCutaneous every 8 hours  labetalol 200 milliGRAM(s) Oral three times a day  latanoprost 0.005% Ophthalmic Solution 1 Drop(s) Both EYES at bedtime  mycophenolate mofetil 500 milliGRAM(s) Oral two times a day  NIFEdipine XL 60 milliGRAM(s) Oral two times a day  predniSONE   Tablet 5 milliGRAM(s) Oral daily  sodium bicarbonate 1300 milliGRAM(s) Oral three times a day  sodium zirconium cyclosilicate 10 Gram(s) Oral daily  tacrolimus ER Tablet (ENVARSUS XR) 5 milliGRAM(s) Oral daily    PRN Inpatient Medications  acetaminophen     Tablet .. 650 milliGRAM(s) Oral every 6 hours PRN  melatonin 3 milliGRAM(s) Oral at bedtime PRN    REVIEW OF SYSTEMS  --------------------------------------------------------------------------------  All other systems were reviewed and are negative, except as noted.    VITALS/PHYSICAL EXAM  --------------------------------------------------------------------------------  T(C): 37.3 (01-08-24 @ 05:10), Max: 37.5 (01-07-24 @ 13:10)  HR: 70 (01-08-24 @ 05:10) (68 - 73)  BP: 175/83 (01-08-24 @ 05:10) (125/68 - 175/83)  RR: 18 (01-08-24 @ 05:10) (18 - 18)  SpO2: 96% (01-08-24 @ 05:10) (94% - 96%)  Wt(kg): --    01-07-24 @ 07:01  -  01-08-24 @ 07:00  --------------------------------------------------------  IN: 500 mL / OUT: 0 mL / NET: 500 mL    Physical Exam:  	Gen: NAD  	HEENT: Anicteric  	Pulm: CTA B/L  	CV: S1S2+  	Abd: Soft, +BS                Transplant site: non tender, well healed surgical scar.  	Ext: + LE edema B/L and LUE Edema (both improving)  	Neuro: Awake  	Skin: Warm and dry  	Dialysis access: RUE AVF with palpable thrill     LABS/STUDIES  --------------------------------------------------------------------------------              8.1    4.53  >-----------<  228      [01-08-24 @ 10:07]              26.2     137  |  104  |  55  ----------------------------<  132      [01-07-24 @ 11:02]  4.2   |  20  |  3.42        Ca     8.4     [01-07-24 @ 11:02]      Mg     1.4     [01-07-24 @ 11:02]      Phos  4.3     [01-07-24 @ 11:02]    Creatinine Trend:  SCr 3.42 [01-07 @ 11:02]  SCr 3.35 [01-06 @ 11:12]  SCr 3.23 [01-05 @ 10:35]  SCr 2.95 [01-04 @ 07:43]  SCr 2.75 [01-03 @ 14:10]    Urine Creatinine 43      [01-08-24 @ 07:29]  Urine Protein 108      [01-08-24 @ 07:29]    HBsAg Nonreact      [01-04-24 @ 07:51]  HCV 0.27, Nonreact      [01-04-24 @ 07:51]  HIV Nonreact      [01-04-24 @ 10:13]    STEPHANIE: titer 1:80, pattern Speckled      [01-04-24 @ 07:51]  dsDNA <12      [01-04-24 @ 07:51]  C3 Complement 120      [01-04-24 @ 07:51]  C4 Complement 34      [01-04-24 @ 07:51]  ANCA: cANCA Negative, pANCA Negative, atypical ANCA Indeterminate Method interference due to STEPHANIE Fluorescence      [01-04-24 @ 07:51]  Immunofixation Serum: No Monoclonal Band Identified  Reference Range: None Detected      [01-04-24 @ 07:51]    Tacrolimus (), Serum: 4.3 ng/mL (01-07 @ 11:02)  Tacrolimus (), Serum: 7.5 ng/mL (01-05 @ 10:35)  CMVPCR Log: NotDetec Mdk40IQ/mL (01-04 @ 10:13)

## 2024-01-08 NOTE — PROGRESS NOTE ADULT - SUBJECTIVE AND OBJECTIVE BOX
[facility] NEPHROLOGY PRACTICE   MD SAMMY ORDONEZ MD ANGELA WONG, PA Venitha Krishnan, NP    TEL:  OFFICE: [phone]  From 5pm-7am Answering Service [phone]    -- RENAL FOLLOW UP NOTE ---Date of Service 01-08-24 @ 15:10    Patient is a 78y old  Female who presents with a chief complaint of renal transplant with fever (06 Jan 2024 15:54)      Patient seen and examined at bedside. No chest pain/sob    VITALS:  T(F): 98.4 (01-08-24 @ 13:25), Max: 99.1 (01-08-24 @ 05:10)  HR: 74 (01-08-24 @ 13:25)  BP: 158/74 (01-08-24 @ 13:25)  RR: 19 (01-08-24 @ 13:25)  SpO2: 95% (01-08-24 @ 13:25)  Wt(kg): --    01-07 @ 07:01  -  01-08 @ 07:00  --------------------------------------------------------  IN: 500 mL / OUT: 0 mL / NET: 500 mL    01-08 @ 07:01  -  01-08 @ 15:10  --------------------------------------------------------  IN: 480 mL / OUT: 0 mL / NET: 480 mL          PHYSICAL EXAM:  General: NAD  Neck: No JVD  Respiratory: CTAB, no wheezes, rales or rhonchi  Cardiovascular: S1, S2, RRR  Gastrointestinal: BS+, soft, NT/ND  Extremities: + LE edema B/L and LUE possible IV infiltration    Hospital Medications:   MEDICATIONS  (STANDING):  aspirin enteric coated 81 milliGRAM(s) Oral daily  atovaquone  Suspension 1500 milliGRAM(s) Oral daily  cefepime   IVPB 1000 milliGRAM(s) IV Intermittent every 24 hours  cholecalciferol 2000 Unit(s) Oral daily  epoetin ivelisse (EPOGEN) Injectable 12745 Unit(s) SubCutaneous once  epoetin ivelisse-epbx (RETACRIT) Injectable 84633 Unit(s) SubCutaneous <User Schedule>  furosemide   Injectable 40 milliGRAM(s) IV Push two times a day  heparin   Injectable 5000 Unit(s) SubCutaneous every 8 hours  labetalol 200 milliGRAM(s) Oral three times a day  latanoprost 0.005% Ophthalmic Solution 1 Drop(s) Both EYES at bedtime  mycophenolate mofetil 500 milliGRAM(s) Oral two times a day  NIFEdipine XL 60 milliGRAM(s) Oral two times a day  predniSONE   Tablet 5 milliGRAM(s) Oral daily  sodium bicarbonate 1300 milliGRAM(s) Oral three times a day  sodium zirconium cyclosilicate 10 Gram(s) Oral daily  tacrolimus ER Tablet (ENVARSUS XR) 5 milliGRAM(s) Oral daily      LABS:  01-08    140  |  104  |  54<H>  ----------------------------<  118<H>  4.0   |  22  |  3.68<H>    Ca    8.7      08 Jan 2024 10:07  Phos  4.3     01-07  Mg     1.4     01-07      Creatinine Trend: 3.68 <--, 3.42 <--, 3.35 <--, 3.23 <--, 2.95 <--, 2.75 <--, 2.67 <--                                8.1    4.53  )-----------( 228      ( 08 Jan 2024 10:07 )             26.2     Urine Studies:  Urinalysis - [01-08-24 @ 10:07]      Color  / Appearance  / SG  / pH       Gluc 118 / Ketone   / Bili  / Urobili        Blood  / Protein  / Leuk Est  / Nitrite       RBC  / WBC  / Hyaline  / Gran  / Sq Epi  / Non Sq Epi  / Bacteria     Urine Creatinine 43      [01-08-24 @ 07:29]  Urine Protein 108      [01-08-24 @ 07:29]    Iron 21, TIBC 171, %sat 12      [01-06-24 @ 11:12]  Ferritin 1573      [01-06-24 @ 11:12]  PTH -- (Ca 8.5)      [11-10-23 @ 06:01]   659  Vitamin D (25OH) 16.5      [11-11-23 @ 06:09]  HbA1c 4.6      [05-28-19 @ 09:49]  Lipid: chol 191, , HDL 64, LDL --      [01-04-24 @ 10:13]    HBsAg Nonreact      [01-04-24 @ 07:51]  HCV 0.27, Nonreact      [01-04-24 @ 07:51]  HIV Nonreact      [01-04-24 @ 10:13]    STEPHANIE: titer 1:80, pattern Speckled      [01-04-24 @ 07:51]  dsDNA <12      [01-04-24 @ 07:51]  C3 Complement 120      [01-04-24 @ 07:51]  C4 Complement 34      [01-04-24 @ 07:51]  ANCA: cANCA Negative, pANCA Negative, atypical ANCA Indeterminate Method interference due to STEPHANIE Fluorescence      [01-04-24 @ 07:51]  Immunofixation Serum:   No Monoclonal Band Identified      Reference Range: None Detected      [01-04-24 @ 07:51]    RADIOLOGY & ADDITIONAL STUDIES:   [facility]   MD SAMMY ORDONEZ MD ANGELA WONG, PA Venitha Krishnan, NP    TEL:  OFFICE: [phone]  From 5pm-7am Answering Service [phone]    -- RENAL FOLLOW UP NOTE ---Date of Service 01-08-24 @ 15:10    Patient is a 78y old  Female who presents with a chief complaint of renal transplant with fever (06 Jan 2024 15:54)      Patient seen and examined at bedside. No chest pain/sob    VITALS:  T(F): 98.4 (01-08-24 @ 13:25), Max: 99.1 (01-08-24 @ 05:10)  HR: 74 (01-08-24 @ 13:25)  BP: 158/74 (01-08-24 @ 13:25)  RR: 19 (01-08-24 @ 13:25)  SpO2: 95% (01-08-24 @ 13:25)  Wt(kg): --    01-07 @ 07:01  -  01-08 @ 07:00  --------------------------------------------------------  IN: 500 mL / OUT: 0 mL / NET: 500 mL    01-08 @ 07:01  -  01-08 @ 15:10  --------------------------------------------------------  IN: 480 mL / OUT: 0 mL / NET: 480 mL          PHYSICAL EXAM:  General: NAD  Neck: No JVD  Respiratory: CTAB, no wheezes, rales or rhonchi  Cardiovascular: S1, S2, RRR  Gastrointestinal: BS+, soft, NT/ND  Extremities: + LE edema B/L and LUE possible IV infiltration    Hospital Medications:   MEDICATIONS  (STANDING):  aspirin enteric coated 81 milliGRAM(s) Oral daily  atovaquone  Suspension 1500 milliGRAM(s) Oral daily  cefepime   IVPB 1000 milliGRAM(s) IV Intermittent every 24 hours  cholecalciferol 2000 Unit(s) Oral daily  epoetin ivelisse (EPOGEN) Injectable 47531 Unit(s) SubCutaneous once  epoetin ivelisse-epbx (RETACRIT) Injectable 15715 Unit(s) SubCutaneous <User Schedule>  furosemide   Injectable 40 milliGRAM(s) IV Push two times a day  heparin   Injectable 5000 Unit(s) SubCutaneous every 8 hours  labetalol 200 milliGRAM(s) Oral three times a day  latanoprost 0.005% Ophthalmic Solution 1 Drop(s) Both EYES at bedtime  mycophenolate mofetil 500 milliGRAM(s) Oral two times a day  NIFEdipine XL 60 milliGRAM(s) Oral two times a day  predniSONE   Tablet 5 milliGRAM(s) Oral daily  sodium bicarbonate 1300 milliGRAM(s) Oral three times a day  sodium zirconium cyclosilicate 10 Gram(s) Oral daily  tacrolimus ER Tablet (ENVARSUS XR) 5 milliGRAM(s) Oral daily      LABS:  01-08    140  |  104  |  54<H>  ----------------------------<  118<H>  4.0   |  22  |  3.68<H>    Ca    8.7      08 Jan 2024 10:07  Phos  4.3     01-07  Mg     1.4     01-07      Creatinine Trend: 3.68 <--, 3.42 <--, 3.35 <--, 3.23 <--, 2.95 <--, 2.75 <--, 2.67 <--                                8.1    4.53  )-----------( 228      ( 08 Jan 2024 10:07 )             26.2     Urine Studies:  Urinalysis - [01-08-24 @ 10:07]      Color  / Appearance  / SG  / pH       Gluc 118 / Ketone   / Bili  / Urobili        Blood  / Protein  / Leuk Est  / Nitrite       RBC  / WBC  / Hyaline  / Gran  / Sq Epi  / Non Sq Epi  / Bacteria     Urine Creatinine 43      [01-08-24 @ 07:29]  Urine Protein 108      [01-08-24 @ 07:29]    Iron 21, TIBC 171, %sat 12      [01-06-24 @ 11:12]  Ferritin 1573      [01-06-24 @ 11:12]  PTH -- (Ca 8.5)      [11-10-23 @ 06:01]   659  Vitamin D (25OH) 16.5      [11-11-23 @ 06:09]  HbA1c 4.6      [05-28-19 @ 09:49]  Lipid: chol 191, , HDL 64, LDL --      [01-04-24 @ 10:13]    HBsAg Nonreact      [01-04-24 @ 07:51]  HCV 0.27, Nonreact      [01-04-24 @ 07:51]  HIV Nonreact      [01-04-24 @ 10:13]    STEPHANIE: titer 1:80, pattern Speckled      [01-04-24 @ 07:51]  dsDNA <12      [01-04-24 @ 07:51]  C3 Complement 120      [01-04-24 @ 07:51]  C4 Complement 34      [01-04-24 @ 07:51]  ANCA: cANCA Negative, pANCA Negative, atypical ANCA Indeterminate Method interference due to STEPHANIE Fluorescence      [01-04-24 @ 07:51]  Immunofixation Serum:   No Monoclonal Band Identified      Reference Range: None Detected      [01-04-24 @ 07:51]    RADIOLOGY & ADDITIONAL STUDIES:

## 2024-01-09 LAB
ANION GAP SERPL CALC-SCNC: 13 MMOL/L — SIGNIFICANT CHANGE UP (ref 5–17)
ANION GAP SERPL CALC-SCNC: 13 MMOL/L — SIGNIFICANT CHANGE UP (ref 5–17)
BUN SERPL-MCNC: 54 MG/DL — HIGH (ref 7–23)
BUN SERPL-MCNC: 54 MG/DL — HIGH (ref 7–23)
CALCIUM SERPL-MCNC: 8.7 MG/DL — SIGNIFICANT CHANGE UP (ref 8.4–10.5)
CALCIUM SERPL-MCNC: 8.7 MG/DL — SIGNIFICANT CHANGE UP (ref 8.4–10.5)
CHLORIDE SERPL-SCNC: 106 MMOL/L — SIGNIFICANT CHANGE UP (ref 96–108)
CHLORIDE SERPL-SCNC: 106 MMOL/L — SIGNIFICANT CHANGE UP (ref 96–108)
CO2 SERPL-SCNC: 22 MMOL/L — SIGNIFICANT CHANGE UP (ref 22–31)
CO2 SERPL-SCNC: 22 MMOL/L — SIGNIFICANT CHANGE UP (ref 22–31)
CREAT SERPL-MCNC: 3.32 MG/DL — HIGH (ref 0.5–1.3)
CREAT SERPL-MCNC: 3.32 MG/DL — HIGH (ref 0.5–1.3)
EGFR: 13 ML/MIN/1.73M2 — LOW
EGFR: 13 ML/MIN/1.73M2 — LOW
GLUCOSE SERPL-MCNC: 82 MG/DL — SIGNIFICANT CHANGE UP (ref 70–99)
GLUCOSE SERPL-MCNC: 82 MG/DL — SIGNIFICANT CHANGE UP (ref 70–99)
HCT VFR BLD CALC: 24.3 % — LOW (ref 34.5–45)
HCT VFR BLD CALC: 24.3 % — LOW (ref 34.5–45)
HGB BLD-MCNC: 7.7 G/DL — LOW (ref 11.5–15.5)
HGB BLD-MCNC: 7.7 G/DL — LOW (ref 11.5–15.5)
MAGNESIUM SERPL-MCNC: 1.5 MG/DL — LOW (ref 1.6–2.6)
MAGNESIUM SERPL-MCNC: 1.5 MG/DL — LOW (ref 1.6–2.6)
MCHC RBC-ENTMCNC: 26.4 PG — LOW (ref 27–34)
MCHC RBC-ENTMCNC: 26.4 PG — LOW (ref 27–34)
MCHC RBC-ENTMCNC: 31.7 GM/DL — LOW (ref 32–36)
MCHC RBC-ENTMCNC: 31.7 GM/DL — LOW (ref 32–36)
MCV RBC AUTO: 83.2 FL — SIGNIFICANT CHANGE UP (ref 80–100)
MCV RBC AUTO: 83.2 FL — SIGNIFICANT CHANGE UP (ref 80–100)
NRBC # BLD: 0 /100 WBCS — SIGNIFICANT CHANGE UP (ref 0–0)
NRBC # BLD: 0 /100 WBCS — SIGNIFICANT CHANGE UP (ref 0–0)
PHOSPHATE SERPL-MCNC: 3.8 MG/DL — SIGNIFICANT CHANGE UP (ref 2.5–4.5)
PHOSPHATE SERPL-MCNC: 3.8 MG/DL — SIGNIFICANT CHANGE UP (ref 2.5–4.5)
PLATELET # BLD AUTO: 213 K/UL — SIGNIFICANT CHANGE UP (ref 150–400)
PLATELET # BLD AUTO: 213 K/UL — SIGNIFICANT CHANGE UP (ref 150–400)
POTASSIUM SERPL-MCNC: 3.5 MMOL/L — SIGNIFICANT CHANGE UP (ref 3.5–5.3)
POTASSIUM SERPL-MCNC: 3.5 MMOL/L — SIGNIFICANT CHANGE UP (ref 3.5–5.3)
POTASSIUM SERPL-SCNC: 3.5 MMOL/L — SIGNIFICANT CHANGE UP (ref 3.5–5.3)
POTASSIUM SERPL-SCNC: 3.5 MMOL/L — SIGNIFICANT CHANGE UP (ref 3.5–5.3)
RBC # BLD: 2.92 M/UL — LOW (ref 3.8–5.2)
RBC # BLD: 2.92 M/UL — LOW (ref 3.8–5.2)
RBC # FLD: 15.9 % — HIGH (ref 10.3–14.5)
RBC # FLD: 15.9 % — HIGH (ref 10.3–14.5)
SODIUM SERPL-SCNC: 141 MMOL/L — SIGNIFICANT CHANGE UP (ref 135–145)
SODIUM SERPL-SCNC: 141 MMOL/L — SIGNIFICANT CHANGE UP (ref 135–145)
WBC # BLD: 3.37 K/UL — LOW (ref 3.8–10.5)
WBC # BLD: 3.37 K/UL — LOW (ref 3.8–10.5)
WBC # FLD AUTO: 3.37 K/UL — LOW (ref 3.8–10.5)
WBC # FLD AUTO: 3.37 K/UL — LOW (ref 3.8–10.5)

## 2024-01-09 PROCEDURE — 99232 SBSQ HOSP IP/OBS MODERATE 35: CPT | Mod: GC

## 2024-01-09 RX ORDER — MAGNESIUM SULFATE 500 MG/ML
1 VIAL (ML) INJECTION ONCE
Refills: 0 | Status: COMPLETED | OUTPATIENT
Start: 2024-01-09 | End: 2024-01-09

## 2024-01-09 RX ORDER — FUROSEMIDE 40 MG
40 TABLET ORAL
Refills: 0 | Status: DISCONTINUED | OUTPATIENT
Start: 2024-01-09 | End: 2024-01-11

## 2024-01-09 RX ADMIN — Medication 60 MILLIGRAM(S): at 18:06

## 2024-01-09 RX ADMIN — Medication 81 MILLIGRAM(S): at 11:57

## 2024-01-09 RX ADMIN — HEPARIN SODIUM 5000 UNIT(S): 5000 INJECTION INTRAVENOUS; SUBCUTANEOUS at 21:20

## 2024-01-09 RX ADMIN — Medication 200 MILLIGRAM(S): at 21:20

## 2024-01-09 RX ADMIN — Medication 200 MILLIGRAM(S): at 06:22

## 2024-01-09 RX ADMIN — Medication 100 GRAM(S): at 10:07

## 2024-01-09 RX ADMIN — Medication 40 MILLIGRAM(S): at 06:23

## 2024-01-09 RX ADMIN — ERYTHROPOIETIN 10000 UNIT(S): 10000 INJECTION, SOLUTION INTRAVENOUS; SUBCUTANEOUS at 16:38

## 2024-01-09 RX ADMIN — Medication 60 MILLIGRAM(S): at 06:22

## 2024-01-09 RX ADMIN — ATOVAQUONE 1500 MILLIGRAM(S): 750 SUSPENSION ORAL at 11:57

## 2024-01-09 RX ADMIN — Medication 5 MILLIGRAM(S): at 06:22

## 2024-01-09 RX ADMIN — Medication 200 MILLIGRAM(S): at 14:56

## 2024-01-09 RX ADMIN — Medication 1300 MILLIGRAM(S): at 06:22

## 2024-01-09 RX ADMIN — MYCOPHENOLATE MOFETIL 500 MILLIGRAM(S): 250 CAPSULE ORAL at 18:06

## 2024-01-09 RX ADMIN — SODIUM ZIRCONIUM CYCLOSILICATE 10 GRAM(S): 10 POWDER, FOR SUSPENSION ORAL at 11:57

## 2024-01-09 RX ADMIN — CEFEPIME 100 MILLIGRAM(S): 1 INJECTION, POWDER, FOR SOLUTION INTRAMUSCULAR; INTRAVENOUS at 21:20

## 2024-01-09 RX ADMIN — HEPARIN SODIUM 5000 UNIT(S): 5000 INJECTION INTRAVENOUS; SUBCUTANEOUS at 06:23

## 2024-01-09 RX ADMIN — Medication 40 MILLIGRAM(S): at 15:01

## 2024-01-09 RX ADMIN — Medication 2000 UNIT(S): at 11:57

## 2024-01-09 RX ADMIN — Medication 1300 MILLIGRAM(S): at 14:56

## 2024-01-09 RX ADMIN — Medication 1300 MILLIGRAM(S): at 21:20

## 2024-01-09 RX ADMIN — MYCOPHENOLATE MOFETIL 500 MILLIGRAM(S): 250 CAPSULE ORAL at 06:21

## 2024-01-09 RX ADMIN — HEPARIN SODIUM 5000 UNIT(S): 5000 INJECTION INTRAVENOUS; SUBCUTANEOUS at 14:55

## 2024-01-09 NOTE — PROGRESS NOTE ADULT - PROBLEM SELECTOR PLAN 7
Pt with chronic metabolic acidosis in setting of CKD. SCO2 low but stable at 20 with a pH of 7.31 (1/3/24). Continue home sodium bicarbonate. Continue to monitor SCO2 and pH.
Pt with chronic metabolic acidosis in setting of CKD. SCO2 low at 15 with a pH of 7.31. Continue home sodium bicarbonate. Continue to monitor SCO2 and pH.
Pt with chronic metabolic acidosis in setting of CKD. SCO2 low but stable at 20 with a pH of 7.31 (1/3/24). Continue home sodium bicarbonate. Continue to monitor SCO2 and pH.

## 2024-01-09 NOTE — PROGRESS NOTE ADULT - SUBJECTIVE AND OBJECTIVE BOX
[facility] DIVISION OF KIDNEY DISEASES AND HYPERTENSION   FOLLOW UP NOTE  --------------------------------------------------------------------------------  Chief Complaint: 76 y/o female with PMH ESRD due to HTN since 2016, s/p DDRT (05/19/2021- induction with basiliximab), HTN, Anemia who presented to [facility] due to worsening B/L LE edema, facial edema and LUE edema.    24 hour events/subjective: Pt. was seen and examined today. She is "feeling okay". LE edema markedly improved, still has LUE edema. Denied shortness of  breath, nausea, vomiting, fevers, chills.     PAST HISTORY  --------------------------------------------------------------------------------  No significant changes to PMH, PSH, FHx, SHx, unless otherwise noted    ALLERGIES & MEDICATIONS  --------------------------------------------------------------------------------  Allergies  penicillin (Rash)  Mushrooms (Anaphylaxis)    Intolerances    Standing Inpatient Medications  aspirin enteric coated 81 milliGRAM(s) Oral daily  atovaquone  Suspension 1500 milliGRAM(s) Oral daily  cefepime   IVPB 1000 milliGRAM(s) IV Intermittent every 24 hours  cholecalciferol 2000 Unit(s) Oral daily  epoetin ivelisse (EPOGEN) Injectable 86846 Unit(s) SubCutaneous once  epoetin ivelisse-epbx (RETACRIT) Injectable 13478 Unit(s) SubCutaneous <User Schedule>  furosemide   Injectable 40 milliGRAM(s) IV Push two times a day  heparin   Injectable 5000 Unit(s) SubCutaneous every 8 hours  labetalol 200 milliGRAM(s) Oral three times a day  latanoprost 0.005% Ophthalmic Solution 1 Drop(s) Both EYES at bedtime  mycophenolate mofetil 500 milliGRAM(s) Oral two times a day  NIFEdipine XL 60 milliGRAM(s) Oral two times a day  predniSONE   Tablet 5 milliGRAM(s) Oral daily  sodium bicarbonate 1300 milliGRAM(s) Oral three times a day  sodium zirconium cyclosilicate 10 Gram(s) Oral daily  tacrolimus ER Tablet (ENVARSUS XR) 5 milliGRAM(s) Oral daily    PRN Inpatient Medications  acetaminophen     Tablet .. 650 milliGRAM(s) Oral every 6 hours PRN  melatonin 3 milliGRAM(s) Oral at bedtime PRN    REVIEW OF SYSTEMS  --------------------------------------------------------------------------------  All other systems were reviewed and are negative, except as noted.    VITALS/PHYSICAL EXAM  --------------------------------------------------------------------------------  T(C): 36.4 (01-09-24 @ 06:00), Max: 37.2 (01-08-24 @ 23:29)  HR: 72 (01-09-24 @ 06:00) (70 - 75)  BP: 175/73 (01-09-24 @ 06:00) (158/74 - 175/73)  RR: 18 (01-09-24 @ 06:00) (18 - 19)  SpO2: 94% (01-09-24 @ 06:00) (94% - 98%)  Wt(kg): --    01-08-24 @ 07:01  -  01-09-24 @ 07:00  --------------------------------------------------------  IN: 480 mL / OUT: 800 mL / NET: -320 mL    Physical Exam:  	Gen: NAD  	HEENT: Anicteric  	Pulm: CTA B/L  	CV: S1S2+  	Abd: Soft, +BS                Transplant site: non tender, well healed surgical scar.  	Ext: Trace LE edema B/L (markedly improved) but persistent LUE Edema               Neuro: Awake  	Skin: Warm and dry  	Dialysis access: RUE AVF with palpable thrill     LABS/STUDIES  --------------------------------------------------------------------------------              8.1    4.53  >-----------<  228      [01-08-24 @ 10:07]              26.2     140  |  104  |  54  ----------------------------<  118      [01-08-24 @ 10:07]  4.0   |  22  |  3.68        Ca     8.7     [01-08-24 @ 10:07]      Mg     1.4     [01-07-24 @ 11:02]      Phos  4.3     [01-07-24 @ 11:02]    Creatinine Trend:  SCr 3.68 [01-08 @ 10:07]  SCr 3.42 [01-07 @ 11:02]  SCr 3.35 [01-06 @ 11:12]  SCr 3.23 [01-05 @ 10:35]  SCr 2.95 [01-04 @ 07:43]    Urinalysis - [01-08-24 @ 10:07]      Color  / Appearance  / SG  / pH       Gluc 118 / Ketone   / Bili  / Urobili        Blood  / Protein  / Leuk Est  / Nitrite       RBC  / WBC  / Hyaline  / Gran  / Sq Epi  / Non Sq Epi  / Bacteria     Urine Creatinine 43      [01-08-24 @ 07:29]  Urine Protein 108      [01-08-24 @ 07:29]    HBsAg Nonreact      [01-04-24 @ 07:51]  HCV 0.27, Nonreact      [01-04-24 @ 07:51]  HIV Nonreact      [01-04-24 @ 10:13]    STEPHANIE: titer 1:80, pattern Speckled      [01-04-24 @ 07:51]  dsDNA <12      [01-04-24 @ 07:51]  C3 Complement 120      [01-04-24 @ 07:51]  C4 Complement 34      [01-04-24 @ 07:51]  ANCA: cANCA Negative, pANCA Negative, atypical ANCA Indeterminate Method interference due to STEPHANIE Fluorescence      [01-04-24 @ 07:51]  Immunofixation Serum: No Monoclonal Band Identified  Reference Range: None Detected      [01-04-24 @ 07:51]    Tacrolimus (), Serum: 16.2 ng/mL (01-08 @ 10:07)  Tacrolimus (), Serum: 4.3 ng/mL (01-07 @ 11:02)  Tacrolimus (), Serum: 7.5 ng/mL (01-05 @ 10:35)  CMVPCR Log: NotDetec [id number]/mL (01-04 @ 10:13) [facility] DIVISION OF KIDNEY DISEASES AND HYPERTENSION   FOLLOW UP NOTE  --------------------------------------------------------------------------------  Chief Complaint: 78 y/o female with PMH ESRD due to HTN since 2016, s/p DDRT (05/19/2021- induction with basiliximab), HTN, Anemia who presented to [facility] due to worsening B/L LE edema, facial edema and LUE edema.    24 hour events/subjective: Pt. was seen and examined today. She is "feeling okay". LE edema markedly improved, still has LUE edema. Denied shortness of  breath, nausea, vomiting, fevers, chills.     PAST HISTORY  --------------------------------------------------------------------------------  No significant changes to PMH, PSH, FHx, SHx, unless otherwise noted    ALLERGIES & MEDICATIONS  --------------------------------------------------------------------------------  Allergies  penicillin (Rash)  Mushrooms (Anaphylaxis)    Intolerances    Standing Inpatient Medications  aspirin enteric coated 81 milliGRAM(s) Oral daily  atovaquone  Suspension 1500 milliGRAM(s) Oral daily  cefepime   IVPB 1000 milliGRAM(s) IV Intermittent every 24 hours  cholecalciferol 2000 Unit(s) Oral daily  epoetin ivelisse (EPOGEN) Injectable 78660 Unit(s) SubCutaneous once  epoetin ivelisse-epbx (RETACRIT) Injectable 49186 Unit(s) SubCutaneous <User Schedule>  furosemide   Injectable 40 milliGRAM(s) IV Push two times a day  heparin   Injectable 5000 Unit(s) SubCutaneous every 8 hours  labetalol 200 milliGRAM(s) Oral three times a day  latanoprost 0.005% Ophthalmic Solution 1 Drop(s) Both EYES at bedtime  mycophenolate mofetil 500 milliGRAM(s) Oral two times a day  NIFEdipine XL 60 milliGRAM(s) Oral two times a day  predniSONE   Tablet 5 milliGRAM(s) Oral daily  sodium bicarbonate 1300 milliGRAM(s) Oral three times a day  sodium zirconium cyclosilicate 10 Gram(s) Oral daily  tacrolimus ER Tablet (ENVARSUS XR) 5 milliGRAM(s) Oral daily    PRN Inpatient Medications  acetaminophen     Tablet .. 650 milliGRAM(s) Oral every 6 hours PRN  melatonin 3 milliGRAM(s) Oral at bedtime PRN    REVIEW OF SYSTEMS  --------------------------------------------------------------------------------  All other systems were reviewed and are negative, except as noted.    VITALS/PHYSICAL EXAM  --------------------------------------------------------------------------------  T(C): 36.4 (01-09-24 @ 06:00), Max: 37.2 (01-08-24 @ 23:29)  HR: 72 (01-09-24 @ 06:00) (70 - 75)  BP: 175/73 (01-09-24 @ 06:00) (158/74 - 175/73)  RR: 18 (01-09-24 @ 06:00) (18 - 19)  SpO2: 94% (01-09-24 @ 06:00) (94% - 98%)  Wt(kg): --    01-08-24 @ 07:01  -  01-09-24 @ 07:00  --------------------------------------------------------  IN: 480 mL / OUT: 800 mL / NET: -320 mL    Physical Exam:  	Gen: NAD  	HEENT: Anicteric  	Pulm: CTA B/L  	CV: S1S2+  	Abd: Soft, +BS                Transplant site: non tender, well healed surgical scar.  	Ext: Trace LE edema B/L (markedly improved) but persistent LUE Edema               Neuro: Awake  	Skin: Warm and dry  	Dialysis access: RUE AVF with palpable thrill     LABS/STUDIES  --------------------------------------------------------------------------------              8.1    4.53  >-----------<  228      [01-08-24 @ 10:07]              26.2     140  |  104  |  54  ----------------------------<  118      [01-08-24 @ 10:07]  4.0   |  22  |  3.68        Ca     8.7     [01-08-24 @ 10:07]      Mg     1.4     [01-07-24 @ 11:02]      Phos  4.3     [01-07-24 @ 11:02]    Creatinine Trend:  SCr 3.68 [01-08 @ 10:07]  SCr 3.42 [01-07 @ 11:02]  SCr 3.35 [01-06 @ 11:12]  SCr 3.23 [01-05 @ 10:35]  SCr 2.95 [01-04 @ 07:43]    Urinalysis - [01-08-24 @ 10:07]      Color  / Appearance  / SG  / pH       Gluc 118 / Ketone   / Bili  / Urobili        Blood  / Protein  / Leuk Est  / Nitrite       RBC  / WBC  / Hyaline  / Gran  / Sq Epi  / Non Sq Epi  / Bacteria     Urine Creatinine 43      [01-08-24 @ 07:29]  Urine Protein 108      [01-08-24 @ 07:29]    HBsAg Nonreact      [01-04-24 @ 07:51]  HCV 0.27, Nonreact      [01-04-24 @ 07:51]  HIV Nonreact      [01-04-24 @ 10:13]    STEPHANIE: titer 1:80, pattern Speckled      [01-04-24 @ 07:51]  dsDNA <12      [01-04-24 @ 07:51]  C3 Complement 120      [01-04-24 @ 07:51]  C4 Complement 34      [01-04-24 @ 07:51]  ANCA: cANCA Negative, pANCA Negative, atypical ANCA Indeterminate Method interference due to STEPHANIE Fluorescence      [01-04-24 @ 07:51]  Immunofixation Serum: No Monoclonal Band Identified  Reference Range: None Detected      [01-04-24 @ 07:51]    Tacrolimus (), Serum: 16.2 ng/mL (01-08 @ 10:07)  Tacrolimus (), Serum: 4.3 ng/mL (01-07 @ 11:02)  Tacrolimus (), Serum: 7.5 ng/mL (01-05 @ 10:35)  CMVPCR Log: NotDetec [id number]/mL (01-04 @ 10:13)

## 2024-01-09 NOTE — PROGRESS NOTE ADULT - ASSESSMENT
77 YO Female h/o HTN, s/p Renal transplant, CKD, chronic rejection, recent admission for pneumonia s/p abx presented with shortness of breath since this morning. she also noticed significant LUE swelling that began 4-5 days ago.  Nephrology consulted for CKD s/p renal transplant.    A/P:  S/p DDRT (5/19/2021 - induction w/ Basiliximab)  Baseline sCr ~3  Renal function fluctuating, slightly worsen 1/8  Repeat TTE 1/4 - Left ventricular wall thickness is moderately increased. Left ventricular systolic function is normal with a calculated ejection fraction of 63 % with moderate L ventricular dysfunction.  Furosemide 40 mg IV TID decreased to BID  Pt is also on Cefepime 1g q24H ---> Per transplant ID 1/6  may need to decrease if renal function deteriorates further   UA with proteinuria and hematuria.  Continue immunosuppressants per transplant - mycophenolate 500mg BID, prednisone 5mg qd, and Tacrolimus 5mg qd.  Fk level 1/7, 4.3    Check Tacro level 30mins prior to next dose; goal 4-6.  Avoid nephrotoxic agents.  Monitor BMP and UO.    HTN:  BP fluctuating  c/w Labetalol and Nifedipine   IF BP remains elevated, consider titrating up labetalol  Avoid hypotension.  Monitor BP.    Hyperkalemia:  K stable   Low K diet.  Monitor K closely.    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  CO2 improving   c/w NaHCO3 1300mg TID    Anemia:  Likely in setting of CKD vs iron deficiency.  iron deficient, Tsat 11% 1/6   SHELDON 10,000 units TIW   would hold off iron administration given ct chest with evidence of associated foci of consolidation and ground glass opacity in all 5 lobes, suggestive of infection.   s/p 1 unit PRBC 1/4  Hgb worsened 1/9  Transfuse per primary team as needed   Hold SHELDON for BP >170/90.    Hypomagnesemia   supplemented with 1g Mg Sulfate 1/7  Replete as needed  Monitor     Proteinuria/Hematuria:  etiology? possibly diabetic nephropathy (donor origin)  Follows w/ Dr. Louise   UPr/Cr -2.5   Vasculitis work up per transplant team  Monitor     L Upper Extremity swelling   Repeat Duplex performed 1/7 , no evidence of DVT  Mgmt per Primary  79 YO Female h/o HTN, s/p Renal transplant, CKD, chronic rejection, recent admission for pneumonia s/p abx presented with shortness of breath since this morning. she also noticed significant LUE swelling that began 4-5 days ago.  Nephrology consulted for CKD s/p renal transplant.    A/P:  S/p DDRT (5/19/2021 - induction w/ Basiliximab)  Baseline sCr ~3  Renal function fluctuating, improving 1/9  Repeat TTE 1/4 - Left ventricular wall thickness is moderately increased. Left ventricular systolic function is normal with a calculated ejection fraction of 63 % with moderate L ventricular dysfunction.  Furosemide 40 mg IV BID  Pt is also on Cefepime 1g q24H ---> Per transplant ID 1/6  may need to decrease if renal function deteriorates further   UA with proteinuria and hematuria.  Continue immunosuppressants per transplant - mycophenolate 500mg BID, prednisone 5mg qd, and Tacrolimus 5mg qd.  Fk level 1/7, 4.3    Recommend changing Furosemide 40 mg IV BID to PO   Tacro level 16 on 1/9 - level drawn @ inaccurate time. Tacro level to be redrawn, pending results.  Check Tacro level 30mins prior to next dose; goal 4-6.  Avoid nephrotoxic agents.  Monitor BMP and UO.    HTN:  BP fluctuating  c/w Labetalol and Nifedipine   IF BP remains elevated, consider titrating up labetalol  Avoid hypotension.  Monitor BP.    Hyperkalemia:  K stable   Low K diet.  Monitor K closely.    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  CO2 improving   c/w NaHCO3 1300mg TID    Anemia:  Likely in setting of CKD vs iron deficiency.  iron deficient, Tsat 11% 1/6   SHELDON 10,000 units TIW   would hold off iron administration given ct chest with evidence of associated foci of consolidation and ground glass opacity in all 5 lobes, suggestive of infection.   s/p 1 unit PRBC 1/4  Hgb worsened 1/9  Transfuse per primary team as needed   Hold SHELDON for BP >170/90.    Hypomagnesemia   supplemented with 1g Mg Sulfate 1/9  Replete as needed  Monitor     Proteinuria/Hematuria:  etiology? possibly diabetic nephropathy (donor origin)  Follows w/ Dr. Louise   UPr/Cr -2.5   Vasculitis work up per transplant team  Monitor     L Upper Extremity swelling   Repeat Duplex performed 1/7 , no evidence of DVT  Mgmt per Primary  77 YO Female h/o HTN, s/p Renal transplant, CKD, chronic rejection, recent admission for pneumonia s/p abx presented with shortness of breath since this morning. she also noticed significant LUE swelling that began 4-5 days ago.  Nephrology consulted for CKD s/p renal transplant.    A/P:  S/p DDRT (5/19/2021 - induction w/ Basiliximab)  Baseline sCr ~3  Renal function fluctuating, improving 1/9  Repeat TTE 1/4 - Left ventricular wall thickness is moderately increased. Left ventricular systolic function is normal with a calculated ejection fraction of 63 % with moderate L ventricular dysfunction.  Furosemide 40 mg IV BID  Pt is also on Cefepime 1g q24H ---> Per transplant ID 1/6  may need to decrease if renal function deteriorates further   UA with proteinuria and hematuria.  Continue immunosuppressants per transplant - mycophenolate 500mg BID, prednisone 5mg qd, and Tacrolimus 5mg qd.  Fk level 1/7, 4.3    Recommend changing Furosemide 40 mg IV BID to PO   Tacro level 16 on 1/9 - level drawn @ inaccurate time. Tacro level to be redrawn, pending results.  Check Tacro level 30mins prior to next dose; goal 4-6.  Avoid nephrotoxic agents.  Monitor BMP and UO.    HTN:  BP fluctuating  c/w Labetalol and Nifedipine   IF BP remains elevated, consider titrating up labetalol  Avoid hypotension.  Monitor BP.    Hyperkalemia:  K stable   Low K diet.  Monitor K closely.    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  CO2 improving   c/w NaHCO3 1300mg TID    Anemia:  Likely in setting of CKD vs iron deficiency.  iron deficient, Tsat 11% 1/6   SHELDON 10,000 units TIW   would hold off iron administration given ct chest with evidence of associated foci of consolidation and ground glass opacity in all 5 lobes, suggestive of infection.   s/p 1 unit PRBC 1/4  Hgb worsened 1/9  Transfuse per primary team as needed   Hold SHELDON for BP >170/90.    Hypomagnesemia   supplemented with 1g Mg Sulfate 1/9  Replete as needed  Monitor     Proteinuria/Hematuria:  etiology? possibly diabetic nephropathy (donor origin)  Follows w/ Dr. Louise   UPr/Cr -2.5   Vasculitis work up per transplant team  Monitor     L Upper Extremity swelling   Repeat Duplex performed 1/7 , no evidence of DVT  Mgmt per Primary

## 2024-01-09 NOTE — PROGRESS NOTE ADULT - PROBLEM SELECTOR PLAN 2
s/p Simulect induction  - Envarsus 5mg daily (check daily troughs 30mins prior to next dose; tac goal 4-6)      Tacro level of 16, level checked after dose given. Hold am dose on 1/9 and repeat level today.   - Continue MMF 500mg BID  - Continue Prednisone 5mg daily  - PPx: Bactrim discontinued for hx of high K. Dapsone discontinued for hx of hemolytic anemia. Continue Mepron.
s/p Simulect induction  - Envarsus 5mg daily (check daily troughs 30mins prior to next dose; tac goal 4-6),   - Continue MMF 500mg BID (Tmax 100.6F, did receive 1uPRBC, primary team evaluating for possible infection, may need to hold MMF if infection noted).  - Continue Prednisone 5mg daily  - PPx: Bactrim discontinued for hx of high K. Dapsone discontinued for hx of hemolytic anemia. Continue Mepron.
s/p Simulect induction  - Envarsus 5mg daily (check daily troughs 30mins prior to next dose; tac goal 4-6),   - Continue MMF 500mg BID  - Continue Prednisone 5mg daily  - PPx: Bactrim discontinued for hx of high K. Dapsone discontinued for hx of hemolytic anemia. Continue Mepron.

## 2024-01-09 NOTE — PROGRESS NOTE ADULT - ASSESSMENT
78f h/o HTN, s/p Renal transplant, CKD, chronic rejection, recent admission for pneumonia s/p abx presented with shortness of breath with LUE and b/l LE edema concerning for volume overload.     Shortness of breath.   - with LUE and b/l LE edema   UA with protein 300   urine protein to cr - evaluate for nephrotic syndrome  lasix 40mg po bid - renal consult appreciated   TTE done  covid, rsv, flu negative.    Stage 4 chronic kidney disease.    creatinine improved since last admission   check urine protein to cr as above  renal transplant consult appreciated   continue tacrolimus 5mg qd (monitor levels), mycophenolate 500mg bid, prednisone 5mg qd  renally dose meds, avoid nephrotoxic medications.     uncontrolled HTN (hypertension).   monitor bp   c/w nifedipine, labetalol at home doses.    fever 2/2 poss pna  - seen by transplant ID  - Cefepime 1g q24  -on discharge change to Levofloxacin 750 Q48H on discharge (to complete total 7 day course, end 1/11/24)  - Please induce sputum for bacterial and fungal cultures, and PCP PCR, legionella PCR   Opacity of lung on imaging study.   new RUL opacity   clinically c/w volume overload  procalcitonin low  monitor closely as patient on immunosuppressives        Metabolic acidosis.   c/w sodium bicarb.    Prophylactic measure.   dvt proph - hsq.

## 2024-01-09 NOTE — PROGRESS NOTE ADULT - PROBLEM SELECTOR PLAN 3
Pt with hx of chronic B/L LE edema however, has gotten acutely worse since this past Saturday with associated facial edema and LUE swelling. B/L LE US w/o evidence of DVT. LUE negative for DVT. Hx of proteinuria with 2.3 g on UPCR from 11/10/23. UA from today reviewed, 300mg/dL of protein. Recommend repeat UPCR. Serologic work up thus far reviewed (normal C3/C4, non reactive Hep and HIV). Recommend increasing IV Lasix to 40mg TID for persistent edema.
Pt with hx of chronic B/L LE edema however, has gotten acutely worse since this past Saturday with associated facial edema and LUE swelling. B/L LE US w/o evidence of DVT. LUE negative for DVT. Hx of proteinuria with 2.3 g on UPCR from 11/10/23. UA reviewed, 300mg/dL of protein with a UPCR of 2.5. Serologic work up thus far reviewed (no monoclonal bands, normal C3/C4, non reactive Hep and HIV). LE edema and UE edema improving. Consider transitioning to oral diuretics today.
Pt with hx of chronic B/L LE edema however, has gotten acutely worse since this past Saturday with associated facial edema and LUE swelling. B/L LE US w/o evidence of DVT. LUE negative for DVT. Hx of proteinuria with 2.3 g on UPCR from 11/10/23. UA reviewed, 300mg/dL of protein with a UPCR of 2.5. Serologic work up thus far reviewed (no monoclonal bands, normal C3/C4, non reactive Hep and HIV). LE edema and UE edema improving. Recommend decreasing Lasix to 40mg BID.

## 2024-01-09 NOTE — PROGRESS NOTE ADULT - PROBLEM SELECTOR PLAN 5
Continue with current regimen, BP fluctuates.
BP elevated overnight but much improved this am. Continue with current regimen (Nifedipine changed to 60mg BID).
Continue with current regimen, BP fluctuates.

## 2024-01-09 NOTE — PROGRESS NOTE ADULT - ATTENDING COMMENTS
78 yr old woman with ESRD due to HTN S/p DDRT on 5/19/2021, course complicated by DGF due to ATN needed HD until 6/4/21. Oswaldo creatinine 1.3mg/dL.  She had transplant kidney biopsy for further worsening creatinine 2.2mg/dL on 9/29/22. 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 2022.   Graft function worsened with proteinuria to scr of 3mg/dL with nephrotic range proteinuria 3-5 grams/day.   Also has anemia requiring procrit , HTN, and diastolic dysfunction   Recent admission for pneumonia in November.   Echo done in November showed LVH and diastolic dysfunction with hyperdynamic LV  Presents with shortness of breath and generalized body swelling. No fever. No NVD. Voiding urine.   U/S of upper and LE no DVT, CXR congestion, proBNO 11,000, CT multifocal infiltrates, transplant kidney US no hydro, increased echogenicity.     Given IV Lasix, sob improving, still has edema in UE and LE. UOP not recorded but voiding. Received 1 unit PRBC.  BP elevated to 200 systolic overnight improved after giving AM meds.   Echo done - EF normal, severe LVH and moderate diastolic dysfunction, pul HTN PASP 53mmHg         S/p DDRT in 5/2021 with chronic graft dysfunction in the setting of donor origin diabetic nephropathy  Baseline creatinine 3mg/dL relatively stable   Chronic hyperkalemia - continue lokelma 10grm po daily   Metabolic acidosis - continue sodium bicarb 1300mg po TID  Volume overload - Increase lasix 40mg q8h, on d/c can transition to Torsemide 20mg po bid   No need for dialysis at this time but patient and family understand she may need dialysis soon - has a functioning AV fistula     Immunosuppression  - On CellCept 500mg po bid - dose lowered for leukopenia and low level CMV viremia.  - On Envarsus 5mg po daily. Trough Goal 4-6  - Prednisone 5mg daily  ?  Infection Prophylaxis  - Bactrim discontinued for high K. Dapsone discontinued for hemolytic anemia. Continue Mepron 750mg po daily.      Hypertension - Sub optimal partly due to volume overload.  Continue Nifedipine 60mg po bid, Labetalol 200mg po TID. Lasix 40mg iv q8h    Anemia of CKD. Was on procrit 10K weekly. Transfused 1 unit 1/4/24   Increase Procrit to 20,000 units q week.   ?  ?
78 yr old woman with ESRD due to HTN S/p DDRT on 5/19/2021, course complicated by DGF due to ATN needed HD until 6/4/21. Oswaldo creatinine 1.3mg/dL.  She had transplant kidney biopsy for further worsening creatinine 2.2mg/dL on 9/29/22. 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 2022.   Graft function worsened with proteinuria to scr of 3mg/dL with nephrotic range proteinuria 3-5 grams/day.   Also has anemia requiring procrit , HTN, and diastolic dysfunction   Recent admission for pneumonia in November.   Echo done in November showed LVH and diastolic dysfunction with hyperdynamic LV  Presents with shortness of breath and generalized body swelling. No fever. No NVD. Voiding urine.   U/S of upper and LE no DVT, CXR congestion, proBNO 11,000, CT multifocal infiltrates, transplant kidney US no hydro, increased echogenicity.   Echo- EF normal, severe LVH and moderate diastolic dysfunction, pul HTN PASP 53mmHg       s/p DDRT in 5/2021 with chronic graft dysfunction in the setting of donor origin diabetic nephropathy  Baseline creatinine 3mg/dL relatively stable   Chronic hyperkalemia - continue lokelma 10grm po daily   Metabolic acidosis - continue sodium bicarb 1300mg po TID  Volume overload - agree with lasix 40 mg po bid   No need for dialysis at this time but patient and family understand she may need dialysis soon - has a functioning AV fistula     Immunosuppression- On CellCept 500mg po bid - dose lowered for leukopenia and low level CMV viremia, Envarsus 5mg po daily. Trough Goal 4-6  Prednisone 5mg daily and Infection Prophylaxis- Bactrim discontinued for high K. Dapsone discontinued for hemolytic anemia. Continue Mepron 750mg po daily.    Hypertension - on Nifedipine 60mg po bid, Labetalol 200mg po TID.   Anemia of CKD. Transfused 1 unit 1/4/24 on Procrit to 20,000 units q week.
78 yr old woman with ESRD due to HTN S/p DDRT on 5/19/2021, course complicated by DGF due to ATN needed HD until 6/4/21. Oswaldo creatinine 1.3mg/dL.  She had transplant kidney biopsy for further worsening creatinine 2.2mg/dL on 9/29/22. 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 2022.   Graft function worsened with proteinuria to scr of 3mg/dL with nephrotic range proteinuria 3-5 grams/day.   Also has anemia requiring procrit , HTN, and diastolic dysfunction   Recent admission for pneumonia in November.   Echo done in November showed LVH and diastolic dysfunction with hyperdynamic LV  Presents with shortness of breath and generalized body swelling. No fever. No NVD. Voiding urine.   U/S of upper and LE no DVT, CXR congestion, proBNO 11,000, CT multifocal infiltrates, transplant kidney US no hydro, increased echogenicity.   Echo- EF normal, severe LVH and moderate diastolic dysfunction, pul HTN PASP 53mmHg       s/p DDRT in 5/2021 with chronic graft dysfunction in the setting of donor origin diabetic nephropathy  Baseline creatinine 3mg/dL relatively stable   Chronic hyperkalemia - continue lokelma 10grm po daily   Metabolic acidosis - continue sodium bicarb 1300mg po TID  Volume overload - decrease lasix back  to 40mg q12h, on d/c can transition to Torsemide 20mg po bid   No need for dialysis at this time but patient and family understand she may need dialysis soon - has a functioning AV fistula     Immunosuppression- On CellCept 500mg po bid - dose lowered for leukopenia and low level CMV viremia, Envarsus 5mg po daily. Trough Goal 4-6   Prednisone 5mg daily and Infection Prophylaxis- Bactrim discontinued for high K. Dapsone discontinued for hemolytic anemia. Continue Mepron 750mg po daily.    Hypertension - on Nifedipine 60mg po bid, Labetalol 200mg po TID.     Anemia of CKD. Transfused 1 unit 1/4/24 on Procrit to 20,000 units q week.

## 2024-01-09 NOTE — PROGRESS NOTE ADULT - PROBLEM SELECTOR PLAN 6
Hgb low but stable at 8.1 (1/8). She is s/p 1uPRBC on 1/4/23. She is on weekly EPO 10K u (last dose on 1/1/24) and received EPO 20k on 1/5/23. Monitor Hgb.
Hgb low at 8.4 s/p 1uPRBC on 1/4/23. She is on weekly EPO 10K u (last dose on 1/1/24). BP improved, recommend EPO 20K u today (ordered). Monitor Hgb.
Hgb low but stable at 81. She is s/p 1uPRBC on 1/4/23. She is on weekly EPO 10K u (last dose on 1/1/24) and received EPO 20k on 1/5/23. Monitor Hgb.

## 2024-01-09 NOTE — PROGRESS NOTE ADULT - PROBLEM SELECTOR PLAN 1
Pt with ESRD due to HTN since 2016, s/p DDRT (05/19/2021- induction with basiliximab), currently admitted for peripheral edema. Scr elevated but near baseline at 2.9 on 1/4/24. Renal US from 11/9/23 demonstrated "No evidence of a significant renal artery stenosis. Elevated resistive indices which can be seen in rejection versus acute tubular necrosis. Similar prominence of the transplant kidney ureter with urothelial thickening. Echogenic debris is within the ureter concerning for infection. Trace fluid adjacent to the transplant kidney and small free fluid in the pelvis". ECHO from 11/10/23 demonstrating severe LVH, pericardial effusion and mod-severe pulm HTN. She has been on PO Lasix 40mg daily (see below for further recommendations). Monitor labs and urine output. Avoid any potential nephrotoxins. Dose medications as per eGFR.
Pt with ESRD due to HTN since 2016, s/p DDRT (05/19/2021- induction with basiliximab), currently admitted for peripheral edema. Scr elevated but near baseline at 3.4 on 1/7/24 (baseline is around 3). Transplant renal US from 1/3/24 demonstrating markedly elevated resistive indices obtained within the kidney, with little to no diastolic flow. No hydronephrosis and increased cortical echogenicity, possible ATN. ECHO from 11/10/23 demonstrating severe LVH, pericardial effusion and mod-severe pulm HTN. She had been on PO Lasix 40mg daily (see below for further recommendations). Monitor labs and urine output. Avoid any potential nephrotoxins. Dose medications as per eGFR.
Pt with ESRD due to HTN since 2016, s/p DDRT (05/19/2021- induction with basiliximab), currently admitted for peripheral edema. Scr elevated but near baseline at 3.4 on 1/7/24 (baseline is around 3). Transplant renal US from 1/3/24 demonstrating markedly elevated resistive indices obtained within the kidney, with little to no diastolic flow. No hydronephrosis and increased cortical echogenicity, possible ATN. ECHO from 11/10/23 demonstrating severe LVH, pericardial effusion and mod-severe pulm HTN. She had been on PO Lasix 40mg daily (see below for further recommendations). Monitor labs and urine output. Avoid any potential nephrotoxins. Dose medications as per eGFR.

## 2024-01-09 NOTE — PROGRESS NOTE ADULT - SUBJECTIVE AND OBJECTIVE BOX
DATE OF SERVICE: 01-09-24 @ 17:20    Patient is a 78y old  Female who presents with a chief complaint of renal transplant with fever (06 Jan 2024 15:54)      INTERVAL HISTORY: Feels ok.     REVIEW OF SYSTEMS:  CONSTITUTIONAL: No weakness  EYES/ENT: No visual changes;  No throat pain   NECK: No pain or stiffness  RESPIRATORY: No cough, wheezing; No shortness of breath  CARDIOVASCULAR: No chest pain or palpitations  GASTROINTESTINAL: No abdominal  pain. No nausea, vomiting, or hematemesis  GENITOURINARY: No dysuria, frequency or hematuria  NEUROLOGICAL: No stroke like symptoms  SKIN: No rashes    	  MEDICATIONS:  furosemide    Tablet 40 milliGRAM(s) Oral two times a day  labetalol 200 milliGRAM(s) Oral three times a day  NIFEdipine XL 60 milliGRAM(s) Oral two times a day        PHYSICAL EXAM:  T(C): 37.1 (01-09-24 @ 15:44), Max: 37.2 (01-08-24 @ 23:29)  HR: 69 (01-09-24 @ 15:44) (63 - 72)  BP: 169/67 (01-09-24 @ 15:44) (138/61 - 175/73)  RR: 18 (01-09-24 @ 15:44) (18 - 18)  SpO2: 96% (01-09-24 @ 15:44) (94% - 97%)  Wt(kg): --  I&O's Summary    08 Jan 2024 07:01  -  09 Jan 2024 07:00  --------------------------------------------------------  IN: 480 mL / OUT: 800 mL / NET: -320 mL    09 Jan 2024 07:01  -  09 Jan 2024 17:20  --------------------------------------------------------  IN: 440 mL / OUT: 0 mL / NET: 440 mL          Appearance: In no distress	  HEENT:    PERRL, EOMI	  Cardiovascular:  S1 S2, No JVD  Respiratory: Lungs clear to auscultation	  Gastrointestinal:  Soft, Non-tender, + BS	  Vascularature:  No edema of LE  Psychiatric: Appropriate affect   Neuro: no acute focal deficits                               7.7    3.37  )-----------( 213      ( 09 Jan 2024 07:19 )             24.3     01-09    141  |  106  |  54<H>  ----------------------------<  82  3.5   |  22  |  3.32<H>    Ca    8.7      09 Jan 2024 07:19  Phos  3.8     01-09  Mg     1.5     01-09          Labs personally reviewed      ASSESSMENT/PLAN: 	    78f h/o HTN, s/p Renal transplant, CKD, chronic rejection, recent admission for pneumonia s/p abx presented with shortness of breath since this morning. she also noticed significant LUE swelling that began 4-5 days ago    1. Diastolic Heart Failure secondary to advanced kidney disease   - Recent TTE with normal EF  - Appears closer to euvolemia, transition to lasix 40mg PO BID    2. HTN   was previously controlled, today elevated   c/w amlodipine and BB for now     3. Pulmonary Edema   chest Xray as above shows mild - mod pulm edema with increase in right lower lung   -c/w abx therapy     4. ESRD  - renal on board     5. Prophylactic measure.   dvt proph - hsq.          Charlene Hayes, AG-NP   Dutch Oh DO [facility]  Cardiovascular Medicine  [address] [place] [address], [address]  Available through call or text on [employer] TE[employer]  Office: [phone]   DATE OF SERVICE: 01-09-24 @ 17:20    Patient is a 78y old  Female who presents with a chief complaint of renal transplant with fever (06 Jan 2024 15:54)      INTERVAL HISTORY: Feels ok.     REVIEW OF SYSTEMS:  CONSTITUTIONAL: No weakness  EYES/ENT: No visual changes;  No throat pain   NECK: No pain or stiffness  RESPIRATORY: No cough, wheezing; No shortness of breath  CARDIOVASCULAR: No chest pain or palpitations  GASTROINTESTINAL: No abdominal  pain. No nausea, vomiting, or hematemesis  GENITOURINARY: No dysuria, frequency or hematuria  NEUROLOGICAL: No stroke like symptoms  SKIN: No rashes    	  MEDICATIONS:  furosemide    Tablet 40 milliGRAM(s) Oral two times a day  labetalol 200 milliGRAM(s) Oral three times a day  NIFEdipine XL 60 milliGRAM(s) Oral two times a day        PHYSICAL EXAM:  T(C): 37.1 (01-09-24 @ 15:44), Max: 37.2 (01-08-24 @ 23:29)  HR: 69 (01-09-24 @ 15:44) (63 - 72)  BP: 169/67 (01-09-24 @ 15:44) (138/61 - 175/73)  RR: 18 (01-09-24 @ 15:44) (18 - 18)  SpO2: 96% (01-09-24 @ 15:44) (94% - 97%)  Wt(kg): --  I&O's Summary    08 Jan 2024 07:01  -  09 Jan 2024 07:00  --------------------------------------------------------  IN: 480 mL / OUT: 800 mL / NET: -320 mL    09 Jan 2024 07:01  -  09 Jan 2024 17:20  --------------------------------------------------------  IN: 440 mL / OUT: 0 mL / NET: 440 mL          Appearance: In no distress	  HEENT:    PERRL, EOMI	  Cardiovascular:  S1 S2, No JVD  Respiratory: Lungs clear to auscultation	  Gastrointestinal:  Soft, Non-tender, + BS	  Vascularature:  No edema of LE  Psychiatric: Appropriate affect   Neuro: no acute focal deficits                               7.7    3.37  )-----------( 213      ( 09 Jan 2024 07:19 )             24.3     01-09    141  |  106  |  54<H>  ----------------------------<  82  3.5   |  22  |  3.32<H>    Ca    8.7      09 Jan 2024 07:19  Phos  3.8     01-09  Mg     1.5     01-09          Labs personally reviewed      ASSESSMENT/PLAN: 	    78f h/o HTN, s/p Renal transplant, CKD, chronic rejection, recent admission for pneumonia s/p abx presented with shortness of breath since this morning. she also noticed significant LUE swelling that began 4-5 days ago    1. Diastolic Heart Failure secondary to advanced kidney disease   - Recent TTE with normal EF  - Appears closer to euvolemia, transition to lasix 40mg PO BID    2. HTN   was previously controlled, today elevated   c/w amlodipine and BB for now     3. Pulmonary Edema   chest Xray as above shows mild - mod pulm edema with increase in right lower lung   -c/w abx therapy     4. ESRD  - renal on board     5. Prophylactic measure.   dvt proph - hsq.          Charlene Hayes, AG-NP   Dutch Oh DO [facility]  Cardiovascular Medicine  [address] [place] [address], [address]  Available through call or text on [employer] TE[employer]  Office: [phone]   DATE OF SERVICE: 01-09-24 @ 17:20    Patient is a 78y old  Female who presents with a chief complaint of renal transplant with fever (06 Jan 2024 15:54)      INTERVAL HISTORY: Feels ok.     REVIEW OF SYSTEMS:  CONSTITUTIONAL: No weakness  EYES/ENT: No visual changes;  No throat pain   NECK: No pain or stiffness  RESPIRATORY: No cough, wheezing; No shortness of breath  CARDIOVASCULAR: No chest pain or palpitations  GASTROINTESTINAL: No abdominal  pain. No nausea, vomiting, or hematemesis  GENITOURINARY: No dysuria, frequency or hematuria  NEUROLOGICAL: No stroke like symptoms  SKIN: No rashes    	  MEDICATIONS:  furosemide    Tablet 40 milliGRAM(s) Oral two times a day  labetalol 200 milliGRAM(s) Oral three times a day  NIFEdipine XL 60 milliGRAM(s) Oral two times a day        PHYSICAL EXAM:  T(C): 37.1 (01-09-24 @ 15:44), Max: 37.2 (01-08-24 @ 23:29)  HR: 69 (01-09-24 @ 15:44) (63 - 72)  BP: 169/67 (01-09-24 @ 15:44) (138/61 - 175/73)  RR: 18 (01-09-24 @ 15:44) (18 - 18)  SpO2: 96% (01-09-24 @ 15:44) (94% - 97%)  Wt(kg): --  I&O's Summary    08 Jan 2024 07:01  -  09 Jan 2024 07:00  --------------------------------------------------------  IN: 480 mL / OUT: 800 mL / NET: -320 mL    09 Jan 2024 07:01  -  09 Jan 2024 17:20  --------------------------------------------------------  IN: 440 mL / OUT: 0 mL / NET: 440 mL          Appearance: In no distress	  HEENT:    PERRL, EOMI	  Cardiovascular:  S1 S2, No JVD  Respiratory: Lungs clear to auscultation	  Gastrointestinal:  Soft, Non-tender, + BS	  Vascularature:  No edema of LE  Psychiatric: Appropriate affect   Neuro: no acute focal deficits                               7.7    3.37  )-----------( 213      ( 09 Jan 2024 07:19 )             24.3     01-09    141  |  106  |  54<H>  ----------------------------<  82  3.5   |  22  |  3.32<H>    Ca    8.7      09 Jan 2024 07:19  Phos  3.8     01-09  Mg     1.5     01-09          Labs personally reviewed      ASSESSMENT/PLAN: 	    78f h/o HTN, s/p Renal transplant, CKD, chronic rejection, recent admission for pneumonia s/p abx presented with shortness of breath since this morning. she also noticed significant LUE swelling that began 4-5 days ago    1. Diastolic Heart Failure secondary to advanced kidney disease   - Recent TTE with normal EF and severe LVH  - r/o cardiac amyloid with PYP scan  - Appears closer to euvolemia, transitioned to lasix 40mg PO BID    2. HTN   was previously controlled, today elevated   c/w amlodipine and BB for now     3. Pulmonary Edema   chest Xray as above shows mild - mod pulm edema with increase in right lower lung   -c/w abx therapy     4. ESRD  - renal on board     5. Prophylactic measure.   dvt proph - hsq.          Charlene Hayes, AG-NP   Dutch Oh DO [facility]  Cardiovascular Medicine  [address] [facility], [address]  Available through call or text on [employer] TEAM[employer]  Office: [phone]   DATE OF SERVICE: 01-09-24 @ 17:20    Patient is a 78y old  Female who presents with a chief complaint of renal transplant with fever (06 Jan 2024 15:54)      INTERVAL HISTORY: Feels ok.     REVIEW OF SYSTEMS:  CONSTITUTIONAL: No weakness  EYES/ENT: No visual changes;  No throat pain   NECK: No pain or stiffness  RESPIRATORY: No cough, wheezing; No shortness of breath  CARDIOVASCULAR: No chest pain or palpitations  GASTROINTESTINAL: No abdominal  pain. No nausea, vomiting, or hematemesis  GENITOURINARY: No dysuria, frequency or hematuria  NEUROLOGICAL: No stroke like symptoms  SKIN: No rashes    	  MEDICATIONS:  furosemide    Tablet 40 milliGRAM(s) Oral two times a day  labetalol 200 milliGRAM(s) Oral three times a day  NIFEdipine XL 60 milliGRAM(s) Oral two times a day        PHYSICAL EXAM:  T(C): 37.1 (01-09-24 @ 15:44), Max: 37.2 (01-08-24 @ 23:29)  HR: 69 (01-09-24 @ 15:44) (63 - 72)  BP: 169/67 (01-09-24 @ 15:44) (138/61 - 175/73)  RR: 18 (01-09-24 @ 15:44) (18 - 18)  SpO2: 96% (01-09-24 @ 15:44) (94% - 97%)  Wt(kg): --  I&O's Summary    08 Jan 2024 07:01  -  09 Jan 2024 07:00  --------------------------------------------------------  IN: 480 mL / OUT: 800 mL / NET: -320 mL    09 Jan 2024 07:01  -  09 Jan 2024 17:20  --------------------------------------------------------  IN: 440 mL / OUT: 0 mL / NET: 440 mL          Appearance: In no distress	  HEENT:    PERRL, EOMI	  Cardiovascular:  S1 S2, No JVD  Respiratory: Lungs clear to auscultation	  Gastrointestinal:  Soft, Non-tender, + BS	  Vascularature:  No edema of LE  Psychiatric: Appropriate affect   Neuro: no acute focal deficits                               7.7    3.37  )-----------( 213      ( 09 Jan 2024 07:19 )             24.3     01-09    141  |  106  |  54<H>  ----------------------------<  82  3.5   |  22  |  3.32<H>    Ca    8.7      09 Jan 2024 07:19  Phos  3.8     01-09  Mg     1.5     01-09          Labs personally reviewed      ASSESSMENT/PLAN: 	    78f h/o HTN, s/p Renal transplant, CKD, chronic rejection, recent admission for pneumonia s/p abx presented with shortness of breath since this morning. she also noticed significant LUE swelling that began 4-5 days ago    1. Diastolic Heart Failure secondary to advanced kidney disease   - Recent TTE with normal EF and severe LVH  - r/o cardiac amyloid with PYP scan  - Appears closer to euvolemia, transitioned to lasix 40mg PO BID    2. HTN   was previously controlled, today elevated   c/w amlodipine and BB for now     3. Pulmonary Edema   chest Xray as above shows mild - mod pulm edema with increase in right lower lung   -c/w abx therapy     4. ESRD  - renal on board     5. Prophylactic measure.   dvt proph - hsq.          Charlene Hayes, AG-NP   Dutch Oh DO [facility]  Cardiovascular Medicine  [address] [facility], [address]  Available through call or text on [employer] TE[employer]  Office: [phone]

## 2024-01-09 NOTE — PROGRESS NOTE ADULT - ASSESSMENT
78 y/o female with PMH ESRD due to HTN since 2016, s/p DDRT (05/19/2021- induction with basiliximab), HTN, Anemia who presented to [facility] due to worsening B/L LE edema, facial edema and LUE edema.   78 y/o female with PMH ESRD due to HTN since 2016, s/p DDRT (05/19/2021- induction with basiliximab), HTN, Anemia who presented to [facility] due to worsening B/L LE edema, facial edema and LUE edema.

## 2024-01-09 NOTE — PROGRESS NOTE ADULT - PROBLEM SELECTOR PROBLEM 1
-donor kidney transplant recipient

## 2024-01-09 NOTE — PROGRESS NOTE ADULT - SUBJECTIVE AND OBJECTIVE BOX
DATE OF SERVICE: 01-09-24 @ 12:40    Patient is a 78y old  Female who presents with a chief complaint of renal transplant with fever (06 Jan 2024 15:54)      SUBJECTIVE / OVERNIGHT EVENTS:  No chest pain. No shortness of breath. No complaints. No events overnight.     MEDICATIONS  (STANDING):  aspirin enteric coated 81 milliGRAM(s) Oral daily  atovaquone  Suspension 1500 milliGRAM(s) Oral daily  cefepime   IVPB 1000 milliGRAM(s) IV Intermittent every 24 hours  cholecalciferol 2000 Unit(s) Oral daily  epoetin ivelisse (EPOGEN) Injectable 12707 Unit(s) SubCutaneous once  epoetin ivelisse-epbx (RETACRIT) Injectable 36461 Unit(s) SubCutaneous <User Schedule>  furosemide   Injectable 40 milliGRAM(s) IV Push two times a day  heparin   Injectable 5000 Unit(s) SubCutaneous every 8 hours  labetalol 200 milliGRAM(s) Oral three times a day  latanoprost 0.005% Ophthalmic Solution 1 Drop(s) Both EYES at bedtime  mycophenolate mofetil 500 milliGRAM(s) Oral two times a day  NIFEdipine XL 60 milliGRAM(s) Oral two times a day  predniSONE   Tablet 5 milliGRAM(s) Oral daily  sodium bicarbonate 1300 milliGRAM(s) Oral three times a day  sodium zirconium cyclosilicate 10 Gram(s) Oral daily  tacrolimus ER Tablet (ENVARSUS XR) 5 milliGRAM(s) Oral daily    MEDICATIONS  (PRN):  acetaminophen     Tablet .. 650 milliGRAM(s) Oral every 6 hours PRN Temp greater or equal to 38C (100.4F), Mild Pain (1 - 3)  melatonin 3 milliGRAM(s) Oral at bedtime PRN Insomnia      Vital Signs Last 24 Hrs  T(C): 36.6 (09 Jan 2024 11:34), Max: 37.2 (08 Jan 2024 23:29)  T(F): 97.9 (09 Jan 2024 11:34), Max: 99 (08 Jan 2024 23:29)  HR: 63 (09 Jan 2024 11:34) (63 - 75)  BP: 138/61 (09 Jan 2024 11:34) (138/61 - 175/73)  BP(mean): --  RR: 18 (09 Jan 2024 11:34) (18 - 19)  SpO2: 95% (09 Jan 2024 11:34) (94% - 98%)    Parameters below as of 09 Jan 2024 06:00  Patient On (Oxygen Delivery Method): room air      CAPILLARY BLOOD GLUCOSE        I&O's Summary    08 Jan 2024 07:01  -  09 Jan 2024 07:00  --------------------------------------------------------  IN: 480 mL / OUT: 800 mL / NET: -320 mL    09 Jan 2024 07:01  -  09 Jan 2024 12:40  --------------------------------------------------------  IN: 240 mL / OUT: 0 mL / NET: 240 mL        PHYSICAL EXAM:  GENERAL: NAD, well-developed  HEAD:  Atraumatic, Normocephalic  EYES: EOMI, PERRLA, conjunctiva and sclera clear  NECK: Supple, No JVD  CHEST/LUNG: Clear to auscultation bilaterally; No wheeze  HEART: Regular rate and rhythm; No murmurs, rubs, or gallops  ABDOMEN: Soft, Nontender, Nondistended; Bowel sounds present  EXTREMITIES:  2+ Peripheral Pulses, No clubbing, cyanosis, LUE edema  PSYCH: AAOx3  NEUROLOGY: non-focal  SKIN: No rashes or lesions    LABS:                        7.7    3.37  )-----------( 213      ( 09 Jan 2024 07:19 )             24.3     01-09    141  |  106  |  54<H>  ----------------------------<  82  3.5   |  22  |  3.32<H>    Ca    8.7      09 Jan 2024 07:19  Phos  3.8     01-09  Mg     1.5     01-09      < from: CT Chest No Cont (01.03.24 @ 20:37) >    FINDINGS:    Study is limited by motion artifact.    AIRWAYS, LUNGS and PLEURA: Patent central airways. Aeration of the lung   parenchyma is limited due to respiratory motion artifact. Within this   tissue, there are diffuse airway associated foci of groundglass opacity   and a small consolidation in all 5 lobes. These foci are more prominent   in the lower lobes. Small bilateral pleural effusions with associated   compressive atelectasis, similar to prior imaging.    MEDIASTINUM AND YUSUF: Evaluation of mediastinal or hilar lymph nodes are   limited due to significant pericardial effusion. There is    HEART AND VESSELS: Cardiomegaly. Small pericardial effusion, similar to   prior imaging. Pulmonary artery pressures approximately 3.6 cm, stable   compared to prior imaging. Thoracic aorta is normal in diameter. Mitral   annular, aortic valve, and aortic calcification.    VISUALIZED UPPER ABDOMEN: Evaluation of the upper abdomen is limited due   to patient's arm positioning and photon starvation. Within this   limitation, kidneys appears atrophic. There is prominence of intrahepatic   biliary ducts.    CHEST WALL AND BONES: Patient is cachectic. There is diffuse mild   anasarca. There is a small focus of sclerosis in T3 vertebral body,   likely a bony island. Bony structures are diffusely sclerotic in keeping   with renal osteodystrophy.    LOWER NECK: There are hypodense nodules with calcification in right   thyroid lobe..    IMPRESSION:    An associated foci of consolidation and groundglass opacity in all 5   lobes, suggestive of infection. However given cardiomegaly, pericardial   effusion, bibasilar pleural effusion, and anasarca there is likely   superimposed pulmonary edema.  Small bilateral pleural effusions.  There are osteodystrophy    < end of copied text >        Urinalysis Basic - ( 09 Jan 2024 07:19 )    Color: x / Appearance: x / SG: x / pH: x  Gluc: 82 mg/dL / Ketone: x  / Bili: x / Urobili: x   Blood: x / Protein: x / Nitrite: x   Leuk Esterase: x / RBC: x / WBC x   Sq Epi: x / Non Sq Epi: x / Bacteria: x        RADIOLOGY & ADDITIONAL TESTS:    Imaging Personally Reviewed:    Consultant(s) Notes Reviewed:      Care Discussed with Consultants/Other Providers:   DATE OF SERVICE: 01-09-24 @ 12:40    Patient is a 78y old  Female who presents with a chief complaint of renal transplant with fever (06 Jan 2024 15:54)      SUBJECTIVE / OVERNIGHT EVENTS:  No chest pain. No shortness of breath. No complaints. No events overnight.     MEDICATIONS  (STANDING):  aspirin enteric coated 81 milliGRAM(s) Oral daily  atovaquone  Suspension 1500 milliGRAM(s) Oral daily  cefepime   IVPB 1000 milliGRAM(s) IV Intermittent every 24 hours  cholecalciferol 2000 Unit(s) Oral daily  epoetin ivelisse (EPOGEN) Injectable 88492 Unit(s) SubCutaneous once  epoetin ivelisse-epbx (RETACRIT) Injectable 48764 Unit(s) SubCutaneous <User Schedule>  furosemide   Injectable 40 milliGRAM(s) IV Push two times a day  heparin   Injectable 5000 Unit(s) SubCutaneous every 8 hours  labetalol 200 milliGRAM(s) Oral three times a day  latanoprost 0.005% Ophthalmic Solution 1 Drop(s) Both EYES at bedtime  mycophenolate mofetil 500 milliGRAM(s) Oral two times a day  NIFEdipine XL 60 milliGRAM(s) Oral two times a day  predniSONE   Tablet 5 milliGRAM(s) Oral daily  sodium bicarbonate 1300 milliGRAM(s) Oral three times a day  sodium zirconium cyclosilicate 10 Gram(s) Oral daily  tacrolimus ER Tablet (ENVARSUS XR) 5 milliGRAM(s) Oral daily    MEDICATIONS  (PRN):  acetaminophen     Tablet .. 650 milliGRAM(s) Oral every 6 hours PRN Temp greater or equal to 38C (100.4F), Mild Pain (1 - 3)  melatonin 3 milliGRAM(s) Oral at bedtime PRN Insomnia      Vital Signs Last 24 Hrs  T(C): 36.6 (09 Jan 2024 11:34), Max: 37.2 (08 Jan 2024 23:29)  T(F): 97.9 (09 Jan 2024 11:34), Max: 99 (08 Jan 2024 23:29)  HR: 63 (09 Jan 2024 11:34) (63 - 75)  BP: 138/61 (09 Jan 2024 11:34) (138/61 - 175/73)  BP(mean): --  RR: 18 (09 Jan 2024 11:34) (18 - 19)  SpO2: 95% (09 Jan 2024 11:34) (94% - 98%)    Parameters below as of 09 Jan 2024 06:00  Patient On (Oxygen Delivery Method): room air      CAPILLARY BLOOD GLUCOSE        I&O's Summary    08 Jan 2024 07:01  -  09 Jan 2024 07:00  --------------------------------------------------------  IN: 480 mL / OUT: 800 mL / NET: -320 mL    09 Jan 2024 07:01  -  09 Jan 2024 12:40  --------------------------------------------------------  IN: 240 mL / OUT: 0 mL / NET: 240 mL        PHYSICAL EXAM:  GENERAL: NAD, well-developed  HEAD:  Atraumatic, Normocephalic  EYES: EOMI, PERRLA, conjunctiva and sclera clear  NECK: Supple, No JVD  CHEST/LUNG: Clear to auscultation bilaterally; No wheeze  HEART: Regular rate and rhythm; No murmurs, rubs, or gallops  ABDOMEN: Soft, Nontender, Nondistended; Bowel sounds present  EXTREMITIES:  2+ Peripheral Pulses, No clubbing, cyanosis, LUE edema  PSYCH: AAOx3  NEUROLOGY: non-focal  SKIN: No rashes or lesions    LABS:                        7.7    3.37  )-----------( 213      ( 09 Jan 2024 07:19 )             24.3     01-09    141  |  106  |  54<H>  ----------------------------<  82  3.5   |  22  |  3.32<H>    Ca    8.7      09 Jan 2024 07:19  Phos  3.8     01-09  Mg     1.5     01-09      < from: CT Chest No Cont (01.03.24 @ 20:37) >    FINDINGS:    Study is limited by motion artifact.    AIRWAYS, LUNGS and PLEURA: Patent central airways. Aeration of the lung   parenchyma is limited due to respiratory motion artifact. Within this   tissue, there are diffuse airway associated foci of groundglass opacity   and a small consolidation in all 5 lobes. These foci are more prominent   in the lower lobes. Small bilateral pleural effusions with associated   compressive atelectasis, similar to prior imaging.    MEDIASTINUM AND YUSUF: Evaluation of mediastinal or hilar lymph nodes are   limited due to significant pericardial effusion. There is    HEART AND VESSELS: Cardiomegaly. Small pericardial effusion, similar to   prior imaging. Pulmonary artery pressures approximately 3.6 cm, stable   compared to prior imaging. Thoracic aorta is normal in diameter. Mitral   annular, aortic valve, and aortic calcification.    VISUALIZED UPPER ABDOMEN: Evaluation of the upper abdomen is limited due   to patient's arm positioning and photon starvation. Within this   limitation, kidneys appears atrophic. There is prominence of intrahepatic   biliary ducts.    CHEST WALL AND BONES: Patient is cachectic. There is diffuse mild   anasarca. There is a small focus of sclerosis in T3 vertebral body,   likely a bony island. Bony structures are diffusely sclerotic in keeping   with renal osteodystrophy.    LOWER NECK: There are hypodense nodules with calcification in right   thyroid lobe..    IMPRESSION:    An associated foci of consolidation and groundglass opacity in all 5   lobes, suggestive of infection. However given cardiomegaly, pericardial   effusion, bibasilar pleural effusion, and anasarca there is likely   superimposed pulmonary edema.  Small bilateral pleural effusions.  There are osteodystrophy    < end of copied text >        Urinalysis Basic - ( 09 Jan 2024 07:19 )    Color: x / Appearance: x / SG: x / pH: x  Gluc: 82 mg/dL / Ketone: x  / Bili: x / Urobili: x   Blood: x / Protein: x / Nitrite: x   Leuk Esterase: x / RBC: x / WBC x   Sq Epi: x / Non Sq Epi: x / Bacteria: x        RADIOLOGY & ADDITIONAL TESTS:    Imaging Personally Reviewed:    Consultant(s) Notes Reviewed:      Care Discussed with Consultants/Other Providers:

## 2024-01-09 NOTE — PROGRESS NOTE ADULT - SUBJECTIVE AND OBJECTIVE BOX
[facility] [place] [facility]   MD SAMMY ORDONEZ MD ANGELA WONG, CHRISTOPHER Lucero NP    TEL:  OFFICE: [phone]  From 5pm-7am Answering Service [phone]    -- RENAL FOLLOW UP NOTE ---Date of Service 01-09-24 @ 11:57    Patient is a 78y old  Female who presents with a chief complaint of renal transplant with fever (06 Jan 2024 15:54)      Patient seen and examined at bedside. No chest pain/sob    VITALS:  T(F): 97.9 (01-09-24 @ 11:34), Max: 99 (01-08-24 @ 23:29)  HR: 63 (01-09-24 @ 11:34)  BP: 138/61 (01-09-24 @ 11:34)  RR: 18 (01-09-24 @ 11:34)  SpO2: 95% (01-09-24 @ 11:34)  Wt(kg): --    01-08 @ 07:01  -  01-09 @ 07:00  --------------------------------------------------------  IN: 480 mL / OUT: 800 mL / NET: -320 mL    01-09 @ 07:01  -  01-09 @ 11:57  --------------------------------------------------------  IN: 240 mL / OUT: 0 mL / NET: 240 mL          PHYSICAL EXAM:  General: NAD  Neck: No JVD  Respiratory: CTAB, no wheezes, rales or rhonchi  Cardiovascular: S1, S2, RRR  Gastrointestinal: BS+, soft, NT/ND  Extremities: + peripheral edema BLW, LUE phlebitis     Hospital Medications:   MEDICATIONS  (STANDING):  aspirin enteric coated 81 milliGRAM(s) Oral daily  atovaquone  Suspension 1500 milliGRAM(s) Oral daily  cefepime   IVPB 1000 milliGRAM(s) IV Intermittent every 24 hours  cholecalciferol 2000 Unit(s) Oral daily  epoetin ivelisse (EPOGEN) Injectable 34564 Unit(s) SubCutaneous once  epoetin ivelisse-epbx (RETACRIT) Injectable 79862 Unit(s) SubCutaneous <User Schedule>  furosemide   Injectable 40 milliGRAM(s) IV Push two times a day  heparin   Injectable 5000 Unit(s) SubCutaneous every 8 hours  labetalol 200 milliGRAM(s) Oral three times a day  latanoprost 0.005% Ophthalmic Solution 1 Drop(s) Both EYES at bedtime  mycophenolate mofetil 500 milliGRAM(s) Oral two times a day  NIFEdipine XL 60 milliGRAM(s) Oral two times a day  predniSONE   Tablet 5 milliGRAM(s) Oral daily  sodium bicarbonate 1300 milliGRAM(s) Oral three times a day  sodium zirconium cyclosilicate 10 Gram(s) Oral daily  tacrolimus ER Tablet (ENVARSUS XR) 5 milliGRAM(s) Oral daily      LABS:  01-09    141  |  106  |  54<H>  ----------------------------<  82  3.5   |  22  |  3.32<H>    Ca    8.7      09 Jan 2024 07:19  Phos  3.8     01-09  Mg     1.5     01-09      Creatinine Trend: 3.32 <--, 3.68 <--, 3.42 <--, 3.35 <--, 3.23 <--, 2.95 <--, 2.75 <--, 2.67 <--    Phosphorus: 3.8 mg/dL (01-09 @ 07:19)                              7.7    3.37  )-----------( 213      ( 09 Jan 2024 07:19 )             24.3     Urine Studies:  Urinalysis - [01-09-24 @ 07:19]      Color  / Appearance  / SG  / pH       Gluc 82 / Ketone   / Bili  / Urobili        Blood  / Protein  / Leuk Est  / Nitrite       RBC  / WBC  / Hyaline  / Gran  / Sq Epi  / Non Sq Epi  / Bacteria     Urine Creatinine 43      [01-08-24 @ 07:29]  Urine Protein 108      [01-08-24 @ 07:29]    Iron 21, TIBC 171, %sat 12      [01-06-24 @ 11:12]  Ferritin 1573      [01-06-24 @ 11:12]  PTH -- (Ca 8.5)      [11-10-23 @ 06:01]   659  Vitamin D (25OH) 16.5      [11-11-23 @ 06:09]  HbA1c 4.6      [05-28-19 @ 09:49]  Lipid: chol 191, , HDL 64, LDL --      [01-04-24 @ 10:13]    HBsAg Nonreact      [01-04-24 @ 07:51]  HCV 0.27, Nonreact      [01-04-24 @ 07:51]  HIV Nonreact      [01-04-24 @ 10:13]    STEPHANIE: titer 1:80, pattern Speckled      [01-04-24 @ 07:51]  dsDNA <12      [01-04-24 @ 07:51]  C3 Complement 120      [01-04-24 @ 07:51]  C4 Complement 34      [01-04-24 @ 07:51]  ANCA: cANCA Negative, pANCA Negative, atypical ANCA Indeterminate Method interference due to STEPHANIE Fluorescence      [01-04-24 @ 07:51]  Immunofixation Serum:   No Monoclonal Band Identified      Reference Range: None Detected      [01-04-24 @ 07:51]    RADIOLOGY & ADDITIONAL STUDIES:   [facility] [place] [facility] [place] [facility]   MD SAMMY ORDONEZ MD ANGELA WONG, CHRISTOPHER Lucero NP    TEL:  OFFICE: [phone]  From 5pm-7am Answering Service [phone]    -- RENAL FOLLOW UP NOTE ---Date of Service 01-09-24 @ 11:57    Patient is a 78y old  Female who presents with a chief complaint of renal transplant with fever (06 Jan 2024 15:54)      Patient seen and examined at bedside. No chest pain/sob    VITALS:  T(F): 97.9 (01-09-24 @ 11:34), Max: 99 (01-08-24 @ 23:29)  HR: 63 (01-09-24 @ 11:34)  BP: 138/61 (01-09-24 @ 11:34)  RR: 18 (01-09-24 @ 11:34)  SpO2: 95% (01-09-24 @ 11:34)  Wt(kg): --    01-08 @ 07:01  -  01-09 @ 07:00  --------------------------------------------------------  IN: 480 mL / OUT: 800 mL / NET: -320 mL    01-09 @ 07:01  -  01-09 @ 11:57  --------------------------------------------------------  IN: 240 mL / OUT: 0 mL / NET: 240 mL          PHYSICAL EXAM:  General: NAD  Neck: No JVD  Respiratory: CTAB, no wheezes, rales or rhonchi  Cardiovascular: S1, S2, RRR  Gastrointestinal: BS+, soft, NT/ND  Extremities: + peripheral edema BLW, LUE phlebitis     Hospital Medications:   MEDICATIONS  (STANDING):  aspirin enteric coated 81 milliGRAM(s) Oral daily  atovaquone  Suspension 1500 milliGRAM(s) Oral daily  cefepime   IVPB 1000 milliGRAM(s) IV Intermittent every 24 hours  cholecalciferol 2000 Unit(s) Oral daily  epoetin ivelisse (EPOGEN) Injectable 05652 Unit(s) SubCutaneous once  epoetin ivelisse-epbx (RETACRIT) Injectable 05980 Unit(s) SubCutaneous <User Schedule>  furosemide   Injectable 40 milliGRAM(s) IV Push two times a day  heparin   Injectable 5000 Unit(s) SubCutaneous every 8 hours  labetalol 200 milliGRAM(s) Oral three times a day  latanoprost 0.005% Ophthalmic Solution 1 Drop(s) Both EYES at bedtime  mycophenolate mofetil 500 milliGRAM(s) Oral two times a day  NIFEdipine XL 60 milliGRAM(s) Oral two times a day  predniSONE   Tablet 5 milliGRAM(s) Oral daily  sodium bicarbonate 1300 milliGRAM(s) Oral three times a day  sodium zirconium cyclosilicate 10 Gram(s) Oral daily  tacrolimus ER Tablet (ENVARSUS XR) 5 milliGRAM(s) Oral daily      LABS:  01-09    141  |  106  |  54<H>  ----------------------------<  82  3.5   |  22  |  3.32<H>    Ca    8.7      09 Jan 2024 07:19  Phos  3.8     01-09  Mg     1.5     01-09      Creatinine Trend: 3.32 <--, 3.68 <--, 3.42 <--, 3.35 <--, 3.23 <--, 2.95 <--, 2.75 <--, 2.67 <--    Phosphorus: 3.8 mg/dL (01-09 @ 07:19)                              7.7    3.37  )-----------( 213      ( 09 Jan 2024 07:19 )             24.3     Urine Studies:  Urinalysis - [01-09-24 @ 07:19]      Color  / Appearance  / SG  / pH       Gluc 82 / Ketone   / Bili  / Urobili        Blood  / Protein  / Leuk Est  / Nitrite       RBC  / WBC  / Hyaline  / Gran  / Sq Epi  / Non Sq Epi  / Bacteria     Urine Creatinine 43      [01-08-24 @ 07:29]  Urine Protein 108      [01-08-24 @ 07:29]    Iron 21, TIBC 171, %sat 12      [01-06-24 @ 11:12]  Ferritin 1573      [01-06-24 @ 11:12]  PTH -- (Ca 8.5)      [11-10-23 @ 06:01]   659  Vitamin D (25OH) 16.5      [11-11-23 @ 06:09]  HbA1c 4.6      [05-28-19 @ 09:49]  Lipid: chol 191, , HDL 64, LDL --      [01-04-24 @ 10:13]    HBsAg Nonreact      [01-04-24 @ 07:51]  HCV 0.27, Nonreact      [01-04-24 @ 07:51]  HIV Nonreact      [01-04-24 @ 10:13]    STEPHANIE: titer 1:80, pattern Speckled      [01-04-24 @ 07:51]  dsDNA <12      [01-04-24 @ 07:51]  C3 Complement 120      [01-04-24 @ 07:51]  C4 Complement 34      [01-04-24 @ 07:51]  ANCA: cANCA Negative, pANCA Negative, atypical ANCA Indeterminate Method interference due to STEPHANIE Fluorescence      [01-04-24 @ 07:51]  Immunofixation Serum:   No Monoclonal Band Identified      Reference Range: None Detected      [01-04-24 @ 07:51]    RADIOLOGY & ADDITIONAL STUDIES:

## 2024-01-10 ENCOUNTER — TRANSCRIPTION ENCOUNTER (OUTPATIENT)
Age: 79
End: 2024-01-10

## 2024-01-10 LAB
ANION GAP SERPL CALC-SCNC: 16 MMOL/L — SIGNIFICANT CHANGE UP (ref 5–17)
ANION GAP SERPL CALC-SCNC: 16 MMOL/L — SIGNIFICANT CHANGE UP (ref 5–17)
BUN SERPL-MCNC: 53 MG/DL — HIGH (ref 7–23)
BUN SERPL-MCNC: 53 MG/DL — HIGH (ref 7–23)
CALCIUM SERPL-MCNC: 8.8 MG/DL — SIGNIFICANT CHANGE UP (ref 8.4–10.5)
CALCIUM SERPL-MCNC: 8.8 MG/DL — SIGNIFICANT CHANGE UP (ref 8.4–10.5)
CHLORIDE SERPL-SCNC: 104 MMOL/L — SIGNIFICANT CHANGE UP (ref 96–108)
CHLORIDE SERPL-SCNC: 104 MMOL/L — SIGNIFICANT CHANGE UP (ref 96–108)
CO2 SERPL-SCNC: 21 MMOL/L — LOW (ref 22–31)
CO2 SERPL-SCNC: 21 MMOL/L — LOW (ref 22–31)
CREAT SERPL-MCNC: 3.16 MG/DL — HIGH (ref 0.5–1.3)
CREAT SERPL-MCNC: 3.16 MG/DL — HIGH (ref 0.5–1.3)
CULTURE RESULTS: SIGNIFICANT CHANGE UP
EGFR: 14 ML/MIN/1.73M2 — LOW
EGFR: 14 ML/MIN/1.73M2 — LOW
GLUCOSE SERPL-MCNC: 84 MG/DL — SIGNIFICANT CHANGE UP (ref 70–99)
GLUCOSE SERPL-MCNC: 84 MG/DL — SIGNIFICANT CHANGE UP (ref 70–99)
H CAPSUL AG SPEC-ACNC: SIGNIFICANT CHANGE UP
H CAPSUL AG SPEC-ACNC: SIGNIFICANT CHANGE UP
H CAPSUL AG UR IA-ACNC: SIGNIFICANT CHANGE UP NG/ML
H CAPSUL AG UR IA-ACNC: SIGNIFICANT CHANGE UP NG/ML
H CAPSUL AG UR QL IA: SIGNIFICANT CHANGE UP
H CAPSUL AG UR QL IA: SIGNIFICANT CHANGE UP
HCT VFR BLD CALC: 26 % — LOW (ref 34.5–45)
HCT VFR BLD CALC: 26 % — LOW (ref 34.5–45)
HGB BLD-MCNC: 7.9 G/DL — LOW (ref 11.5–15.5)
HGB BLD-MCNC: 7.9 G/DL — LOW (ref 11.5–15.5)
MAGNESIUM SERPL-MCNC: 1.7 MG/DL — SIGNIFICANT CHANGE UP (ref 1.6–2.6)
MAGNESIUM SERPL-MCNC: 1.7 MG/DL — SIGNIFICANT CHANGE UP (ref 1.6–2.6)
MCHC RBC-ENTMCNC: 25.8 PG — LOW (ref 27–34)
MCHC RBC-ENTMCNC: 25.8 PG — LOW (ref 27–34)
MCHC RBC-ENTMCNC: 30.4 GM/DL — LOW (ref 32–36)
MCHC RBC-ENTMCNC: 30.4 GM/DL — LOW (ref 32–36)
MCV RBC AUTO: 85 FL — SIGNIFICANT CHANGE UP (ref 80–100)
MCV RBC AUTO: 85 FL — SIGNIFICANT CHANGE UP (ref 80–100)
NRBC # BLD: 0 /100 WBCS — SIGNIFICANT CHANGE UP (ref 0–0)
NRBC # BLD: 0 /100 WBCS — SIGNIFICANT CHANGE UP (ref 0–0)
PLATELET # BLD AUTO: 231 K/UL — SIGNIFICANT CHANGE UP (ref 150–400)
PLATELET # BLD AUTO: 231 K/UL — SIGNIFICANT CHANGE UP (ref 150–400)
POTASSIUM SERPL-MCNC: 3.9 MMOL/L — SIGNIFICANT CHANGE UP (ref 3.5–5.3)
POTASSIUM SERPL-MCNC: 3.9 MMOL/L — SIGNIFICANT CHANGE UP (ref 3.5–5.3)
POTASSIUM SERPL-SCNC: 3.9 MMOL/L — SIGNIFICANT CHANGE UP (ref 3.5–5.3)
POTASSIUM SERPL-SCNC: 3.9 MMOL/L — SIGNIFICANT CHANGE UP (ref 3.5–5.3)
RBC # BLD: 3.06 M/UL — LOW (ref 3.8–5.2)
RBC # BLD: 3.06 M/UL — LOW (ref 3.8–5.2)
RBC # FLD: 15.9 % — HIGH (ref 10.3–14.5)
RBC # FLD: 15.9 % — HIGH (ref 10.3–14.5)
SODIUM SERPL-SCNC: 141 MMOL/L — SIGNIFICANT CHANGE UP (ref 135–145)
SODIUM SERPL-SCNC: 141 MMOL/L — SIGNIFICANT CHANGE UP (ref 135–145)
SPECIMEN SOURCE: SIGNIFICANT CHANGE UP
TACROLIMUS SERPL-MCNC: 5 NG/ML — SIGNIFICANT CHANGE UP
TACROLIMUS SERPL-MCNC: 5 NG/ML — SIGNIFICANT CHANGE UP
WBC # BLD: 3.8 K/UL — SIGNIFICANT CHANGE UP (ref 3.8–10.5)
WBC # BLD: 3.8 K/UL — SIGNIFICANT CHANGE UP (ref 3.8–10.5)
WBC # FLD AUTO: 3.8 K/UL — SIGNIFICANT CHANGE UP (ref 3.8–10.5)
WBC # FLD AUTO: 3.8 K/UL — SIGNIFICANT CHANGE UP (ref 3.8–10.5)

## 2024-01-10 RX ORDER — NIFEDIPINE 30 MG
30 TABLET, EXTENDED RELEASE 24 HR ORAL ONCE
Refills: 0 | Status: COMPLETED | OUTPATIENT
Start: 2024-01-10 | End: 2024-01-10

## 2024-01-10 RX ORDER — NIFEDIPINE 30 MG
90 TABLET, EXTENDED RELEASE 24 HR ORAL DAILY
Refills: 0 | Status: DISCONTINUED | OUTPATIENT
Start: 2024-01-11 | End: 2024-01-11

## 2024-01-10 RX ADMIN — SODIUM ZIRCONIUM CYCLOSILICATE 10 GRAM(S): 10 POWDER, FOR SUSPENSION ORAL at 12:04

## 2024-01-10 RX ADMIN — MYCOPHENOLATE MOFETIL 500 MILLIGRAM(S): 250 CAPSULE ORAL at 22:06

## 2024-01-10 RX ADMIN — HEPARIN SODIUM 5000 UNIT(S): 5000 INJECTION INTRAVENOUS; SUBCUTANEOUS at 05:42

## 2024-01-10 RX ADMIN — CEFEPIME 100 MILLIGRAM(S): 1 INJECTION, POWDER, FOR SOLUTION INTRAMUSCULAR; INTRAVENOUS at 22:06

## 2024-01-10 RX ADMIN — Medication 1300 MILLIGRAM(S): at 12:05

## 2024-01-10 RX ADMIN — TACROLIMUS 5 MILLIGRAM(S): 5 CAPSULE ORAL at 05:43

## 2024-01-10 RX ADMIN — Medication 40 MILLIGRAM(S): at 05:43

## 2024-01-10 RX ADMIN — HEPARIN SODIUM 5000 UNIT(S): 5000 INJECTION INTRAVENOUS; SUBCUTANEOUS at 22:06

## 2024-01-10 RX ADMIN — Medication 5 MILLIGRAM(S): at 05:43

## 2024-01-10 RX ADMIN — Medication 200 MILLIGRAM(S): at 05:43

## 2024-01-10 RX ADMIN — Medication 40 MILLIGRAM(S): at 12:06

## 2024-01-10 RX ADMIN — LATANOPROST 1 DROP(S): 0.05 SOLUTION/ DROPS OPHTHALMIC; TOPICAL at 22:09

## 2024-01-10 RX ADMIN — Medication 60 MILLIGRAM(S): at 05:43

## 2024-01-10 RX ADMIN — Medication 200 MILLIGRAM(S): at 12:05

## 2024-01-10 RX ADMIN — Medication 1300 MILLIGRAM(S): at 22:07

## 2024-01-10 RX ADMIN — MYCOPHENOLATE MOFETIL 500 MILLIGRAM(S): 250 CAPSULE ORAL at 05:43

## 2024-01-10 RX ADMIN — Medication 30 MILLIGRAM(S): at 12:04

## 2024-01-10 RX ADMIN — HEPARIN SODIUM 5000 UNIT(S): 5000 INJECTION INTRAVENOUS; SUBCUTANEOUS at 12:07

## 2024-01-10 RX ADMIN — ATOVAQUONE 1500 MILLIGRAM(S): 750 SUSPENSION ORAL at 12:04

## 2024-01-10 RX ADMIN — Medication 81 MILLIGRAM(S): at 12:36

## 2024-01-10 RX ADMIN — Medication 1300 MILLIGRAM(S): at 05:43

## 2024-01-10 RX ADMIN — Medication 2000 UNIT(S): at 12:03

## 2024-01-10 RX ADMIN — Medication 3 MILLIGRAM(S): at 22:09

## 2024-01-10 RX ADMIN — Medication 200 MILLIGRAM(S): at 22:06

## 2024-01-10 NOTE — DISCHARGE NOTE PROVIDER - NSDCCPCAREPLAN_GEN_ALL_CORE_FT
PRINCIPAL DISCHARGE DIAGNOSIS  Diagnosis: Acute diastolic congestive heart failure  Assessment and Plan of Treatment: Weigh yourself daily.  If you gain 3lbs in 3 days, or 5lbs in a week call your Health Care Provider.  Do not eat or drink foods containing more than 2000mg of salt (sodium) in your diet every day.  Call your Health Care Provider if you have any swelling or increased swelling in your feet, ankles, and/or stomach.  The Pt was provided with CHF diet instruction (low sodium diet, daily weights, label reading, Heart Healthy Cooking Tips & Heart Healthy shopping Tips).  Take all of your medication as directed.  If you become dizzy call your Health Care Provider.        SECONDARY DISCHARGE DIAGNOSES  Diagnosis: History of renal transplant  Assessment and Plan of Treatment: Continue tacrolimus, mycophenolate, and prednisone  Follow up with Dr Louise on 1/17/24      Diagnosis: Hyperkalemia  Assessment and Plan of Treatment: Follow a low potassium diet  Now resolved    Diagnosis: Hypertension  Assessment and Plan of Treatment: Continue Labetalol and Nifedipine    Diagnosis: Metabolic acidosis  Assessment and Plan of Treatment: Continue sodium bicarbonate    Diagnosis: Pneumonia  Assessment and Plan of Treatment: Ensure that you complete your entire course of Levaquin   Pneumonia is a lung infection that can cause a fever, cough, and trouble breathing.  Nutrition is important, eat small frequent meals.  Get lots of rest and drink fluids.  Call your health care provider upon arrival home from hospital and make a follow up appointment for one week.  If your cough worsens, you develop fever greater than 101', you have shaking chills, a fast heartbeat, trouble breathing and/or feel your are breathing much faster than usual, call your healthcare provider.  Make sure you wash your hands frequently.       PRINCIPAL DISCHARGE DIAGNOSIS  Diagnosis: Acute diastolic congestive heart failure  Assessment and Plan of Treatment: Continue with Lasix 40 BID, metoprolol 200 mg and, Nifidipine 90 mg. Please follow up with Dr. Oh upon discharge within 1-2 weeks.   Weigh yourself daily.  If you gain 3lbs in 3 days, or 5lbs in a week call your Health Care Provider.  Do not eat or drink foods containing more than 2000mg of salt (sodium) in your diet every day.  Call your Health Care Provider if you have any swelling or increased swelling in your feet, ankles, and/or stomach.  The Pt was provided with CHF diet instruction (low sodium diet, daily weights, label reading, Heart Healthy Cooking Tips & Heart Healthy shopping Tips).  Take all of your medication as directed.  If you become dizzy call your Health Care Provider.        SECONDARY DISCHARGE DIAGNOSES  Diagnosis: History of renal transplant  Assessment and Plan of Treatment: Continue tacrolimus, mycophenolate, and prednisone  Follow up with Dr Louise on 1/17/24      Diagnosis: Hyperkalemia  Assessment and Plan of Treatment: Your elevated potassium level has resolved. Please follow a low potassium diet, continue taking lokelma 10 daily      Diagnosis: Hypertension  Assessment and Plan of Treatment: Continue Labetalol and Nifedipine    Diagnosis: Metabolic acidosis  Assessment and Plan of Treatment: Continue sodium bicarbonate    Diagnosis: Pneumonia  Assessment and Plan of Treatment: You completed your course of antibiotics in the hospital. You do not need additional antibiotics. Please follow up with your PCP upon discharge.   Pneumonia is a lung infection that can cause a fever, cough, and trouble breathing.  Nutrition is important, eat small frequent meals.  Get lots of rest and drink fluids.  Call your health care provider upon arrival home from hospital and make a follow up appointment for one week.  If your cough worsens, you develop fever greater than 101', you have shaking chills, a fast heartbeat, trouble breathing and/or feel your are breathing much faster than usual, call your healthcare provider.  Make sure you wash your hands frequently.      Diagnosis: Edema of left upper extremity  Assessment and Plan of Treatment: No evidence of dvt on dopplers. There was noted improvement with the IV antibiotics. You will complete course of antibiotic Levofloxacin 750 Q48H . follow up with your PCP upon discharge if it does not resolve

## 2024-01-10 NOTE — DISCHARGE NOTE PROVIDER - NSDCMRMEDTOKEN_GEN_ALL_CORE_FT
aspirin 81 mg oral tablet, chewable: 1 tab(s) orally once a day  atovaquone 750 mg/5 mL oral suspension: 10 milliliter(s) orally once a day  labetalol 200 mg oral tablet: 1 tab(s) orally 3 times a day  Lasix 40 mg oral tablet: 1 tab(s) orally once a day  latanoprost 0.005% ophthalmic solution: 1 drop(s) to each affected eye once a day (at bedtime)  Lokelma 10 g oral powder for reconstitution: 10 gram(s) orally once a day  mycophenolate mofetil 500 mg oral tablet: 1 tab(s) orally 2 times a day  NIFEdipine 60 mg oral tablet, extended release: 2 tab(s) orally once a day note: recently increased to 2 tabs as per family member  predniSONE 5 mg oral tablet: 1 tab(s) orally once a day  Procrit 10,000 units/mL preservative-free injectable solution: 10,000 unit(s) injectable once a week MONDAY  sodium bicarbonate 650 mg oral tablet: 2 tab(s) orally 3 times a day  tacrolimus 1 mg oral tablet, extended release: 5 tab(s) orally once a day  Vitamin D3 25 mcg (1000 intl units) oral tablet: 1 tab(s) orally once a day   aspirin 81 mg oral tablet, chewable: 1 tab(s) orally once a day  atovaquone 750 mg/5 mL oral suspension: 10 milliliter(s) orally once a day  furosemide 40 mg oral tablet: 1 tab(s) orally 2 times a day  labetalol 200 mg oral tablet: 1 tab(s) orally 3 times a day  latanoprost 0.005% ophthalmic solution: 1 drop(s) to each affected eye once a day (at bedtime)  levoFLOXacin 750 mg oral tablet: 1 tab(s) orally every 48 hours to be taken every 48 hours starting 1/12  Lokelma 10 g oral powder for reconstitution: 10 gram(s) orally once a day  mycophenolate mofetil 500 mg oral tablet: 1 tab(s) orally 2 times a day  NIFEdipine 90 mg oral tablet, extended release: 1 tab(s) orally once a day  predniSONE 5 mg oral tablet: 1 tab(s) orally once a day  Procrit 10,000 units/mL preservative-free injectable solution: 10,000 unit(s) injectable once a week MONDAY  sodium bicarbonate 650 mg oral tablet: 2 tab(s) orally 3 times a day  tacrolimus 1 mg oral tablet, extended release: 5 tab(s) orally once a day  Vitamin D3 25 mcg (1000 intl units) oral tablet: 1 tab(s) orally once a day

## 2024-01-10 NOTE — DISCHARGE NOTE PROVIDER - NSDCFUADDAPPT_GEN_ALL_CORE_FT
APPTS ARE READY TO BE MADE: [x ] YES    Best Family or Patient Contact (if needed):    Additional Information about above appointments (if needed):    1:   2:   3:     Other comments or requests:    APPTS ARE READY TO BE MADE: [x ] YES    Best Family or Patient Contact (if needed):    Additional Information about above appointments (if needed):    1:   2:   3:     Other comments or requests:     Patient/Caregiver was provided with follow up request details and will coordinate the appointments on their own.

## 2024-01-10 NOTE — DISCHARGE NOTE PROVIDER - NSDCFUSCHEDAPPT_GEN_ALL_CORE_FT
Simi Louise  [facility]  NEPH[facility] [address] [place] [address]  Scheduled Appointment: 01/17/2024     Simi Louise  [facility]  NEPH[facility] [address] [place] [address]  Scheduled Appointment: 01/17/2024     Simi Louise  [facility]  NEPHRO [address] [place] [address]  Scheduled Appointment: 01/24/2024     Simi Louise  [facility]  NEPHRO [address] [place] [address]  Scheduled Appointment: 01/24/2024

## 2024-01-10 NOTE — PROGRESS NOTE ADULT - SUBJECTIVE AND OBJECTIVE BOX
DATE OF SERVICE: 01-10-24 @ 11:11    Patient is a 78y old  Female who presents with a chief complaint of renal transplant with fever (06 Jan 2024 15:54)      INTERVAL HISTORY: Feels ok.     REVIEW OF SYSTEMS:  CONSTITUTIONAL: No weakness  EYES/ENT: No visual changes;  No throat pain   NECK: No pain or stiffness  RESPIRATORY: No cough, wheezing; No shortness of breath  CARDIOVASCULAR: No chest pain or palpitations  GASTROINTESTINAL: No abdominal  pain. No nausea, vomiting, or hematemesis  GENITOURINARY: No dysuria, frequency or hematuria  NEUROLOGICAL: No stroke like symptoms  SKIN: No rashes    	  MEDICATIONS:  furosemide    Tablet 40 milliGRAM(s) Oral two times a day  labetalol 200 milliGRAM(s) Oral three times a day  NIFEdipine XL 60 milliGRAM(s) Oral two times a day        PHYSICAL EXAM:  T(C): 37.2 (01-10-24 @ 05:40), Max: 37.2 (01-10-24 @ 05:40)  HR: 72 (01-10-24 @ 05:40) (63 - 72)  BP: 169/68 (01-10-24 @ 05:40) (138/61 - 197/70)  RR: 18 (01-10-24 @ 05:40) (18 - 18)  SpO2: 94% (01-10-24 @ 05:40) (94% - 96%)  Wt(kg): --  I&O's Summary    09 Jan 2024 07:01  -  10 Roque 2024 07:00  --------------------------------------------------------  IN: 440 mL / OUT: 0 mL / NET: 440 mL          Appearance: In no distress	  HEENT:    PERRL, EOMI	  Cardiovascular:  S1 S2, No JVD  Respiratory: Lungs clear to auscultation	  Gastrointestinal:  Soft, Non-tender, + BS	  Vascularature:  No edema of LE  Psychiatric: Appropriate affect   Neuro: no acute focal deficits                               7.9    3.80  )-----------( 231      ( 10 Roque 2024 07:20 )             26.0     01-10    141  |  104  |  53<H>  ----------------------------<  84  3.9   |  21<L>  |  3.16<H>    Ca    8.8      10 Roque 2024 07:20  Phos  3.8     01-09  Mg     1.7     01-10          Labs personally reviewed      ASSESSMENT/PLAN: 	  78f h/o HTN, s/p Renal transplant, CKD, chronic rejection, recent admission for pneumonia s/p abx presented with shortness of breath since this morning. she also noticed significant LUE swelling that began 4-5 days ago    1. Diastolic Heart Failure secondary to advanced kidney disease   - Recent TTE with normal EF and severe LVH  - r/o cardiac amyloid with PYP scan  - Appears closer to euvolemia, transitioned to lasix 40mg PO BID    2. HTN   was previously controlled, today elevated   - c/w labetalol 200mg PO TID  - Increase Nifedipine to 90 mg PO daily (documented was on 2 tabs daily with a total of 120mg PO daily at home)    3. Pulmonary Edema   chest Xray as above shows mild - mod pulm edema with increase in right lower lung   -c/w abx therapy     4. ESRD  - renal on board     5. Prophylactic measure.   dvt proph - hsq.          Charlene Hayes, ZAIN-NP   Dutch Oh DO [facility]  Cardiovascular Medicine  [address] [place] [address], [address]  Available through call or text on [employer]  Office: [phone]   DATE OF SERVICE: 01-10-24 @ 11:11    Patient is a 78y old  Female who presents with a chief complaint of renal transplant with fever (06 Jan 2024 15:54)      INTERVAL HISTORY: Feels ok.     REVIEW OF SYSTEMS:  CONSTITUTIONAL: No weakness  EYES/ENT: No visual changes;  No throat pain   NECK: No pain or stiffness  RESPIRATORY: No cough, wheezing; No shortness of breath  CARDIOVASCULAR: No chest pain or palpitations  GASTROINTESTINAL: No abdominal  pain. No nausea, vomiting, or hematemesis  GENITOURINARY: No dysuria, frequency or hematuria  NEUROLOGICAL: No stroke like symptoms  SKIN: No rashes    	  MEDICATIONS:  furosemide    Tablet 40 milliGRAM(s) Oral two times a day  labetalol 200 milliGRAM(s) Oral three times a day  NIFEdipine XL 60 milliGRAM(s) Oral two times a day        PHYSICAL EXAM:  T(C): 37.2 (01-10-24 @ 05:40), Max: 37.2 (01-10-24 @ 05:40)  HR: 72 (01-10-24 @ 05:40) (63 - 72)  BP: 169/68 (01-10-24 @ 05:40) (138/61 - 197/70)  RR: 18 (01-10-24 @ 05:40) (18 - 18)  SpO2: 94% (01-10-24 @ 05:40) (94% - 96%)  Wt(kg): --  I&O's Summary    09 Jan 2024 07:01  -  10 Roque 2024 07:00  --------------------------------------------------------  IN: 440 mL / OUT: 0 mL / NET: 440 mL          Appearance: In no distress	  HEENT:    PERRL, EOMI	  Cardiovascular:  S1 S2, No JVD  Respiratory: Lungs clear to auscultation	  Gastrointestinal:  Soft, Non-tender, + BS	  Vascularature:  No edema of LE  Psychiatric: Appropriate affect   Neuro: no acute focal deficits                               7.9    3.80  )-----------( 231      ( 10 Roque 2024 07:20 )             26.0     01-10    141  |  104  |  53<H>  ----------------------------<  84  3.9   |  21<L>  |  3.16<H>    Ca    8.8      10 Roque 2024 07:20  Phos  3.8     01-09  Mg     1.7     01-10          Labs personally reviewed      ASSESSMENT/PLAN: 	  78f h/o HTN, s/p Renal transplant, CKD, chronic rejection, recent admission for pneumonia s/p abx presented with shortness of breath since this morning. she also noticed significant LUE swelling that began 4-5 days ago    1. Diastolic Heart Failure secondary to advanced kidney disease   - Recent TTE with normal EF and severe LVH  - r/o cardiac amyloid with PYP scan  - Appears closer to euvolemia, transitioned to lasix 40mg PO BID    2. HTN   was previously controlled, today elevated   - c/w labetalol 200mg PO TID  - Increase Nifedipine to 90 mg PO daily (documented was on 2 tabs daily with a total of 120mg PO daily at home)    3. Pulmonary Edema   chest Xray as above shows mild - mod pulm edema with increase in right lower lung   -c/w abx therapy     4. ESRD  - renal on board     5. Prophylactic measure.   dvt proph - hsq.          Charlene Hayes, ZAIN-NP   Dutch Oh DO [facility]  Cardiovascular Medicine  [address] [place] [address], [address]  Available through call or text on [employer] TE[employer]  Office: [phone]

## 2024-01-10 NOTE — PROGRESS NOTE ADULT - SUBJECTIVE AND OBJECTIVE BOX
DATE OF SERVICE: 01-10-24 @ 16:15    Patient is a 78y old  Female who presents with a chief complaint of renal transplant with fever (06 Jan 2024 15:54)      SUBJECTIVE / OVERNIGHT EVENTS:  No chest pain. No shortness of breath. No complaints. No events overnight.      MEDICATIONS  (STANDING):  aspirin enteric coated 81 milliGRAM(s) Oral daily  atovaquone  Suspension 1500 milliGRAM(s) Oral daily  cefepime   IVPB 1000 milliGRAM(s) IV Intermittent every 24 hours  cholecalciferol 2000 Unit(s) Oral daily  epoetin ivelisse (EPOGEN) Injectable 09027 Unit(s) SubCutaneous once  epoetin ivelisse-epbx (RETACRIT) Injectable 44755 Unit(s) SubCutaneous <User Schedule>  furosemide    Tablet 40 milliGRAM(s) Oral two times a day  heparin   Injectable 5000 Unit(s) SubCutaneous every 8 hours  labetalol 200 milliGRAM(s) Oral three times a day  latanoprost 0.005% Ophthalmic Solution 1 Drop(s) Both EYES at bedtime  mycophenolate mofetil 500 milliGRAM(s) Oral two times a day  predniSONE   Tablet 5 milliGRAM(s) Oral daily  sodium bicarbonate 1300 milliGRAM(s) Oral three times a day  sodium zirconium cyclosilicate 10 Gram(s) Oral daily  tacrolimus ER Tablet (ENVARSUS XR) 5 milliGRAM(s) Oral daily    MEDICATIONS  (PRN):  acetaminophen     Tablet .. 650 milliGRAM(s) Oral every 6 hours PRN Temp greater or equal to 38C (100.4F), Mild Pain (1 - 3)  melatonin 3 milliGRAM(s) Oral at bedtime PRN Insomnia      Vital Signs Last 24 Hrs  T(C): 37.5 (10 Roque 2024 11:44), Max: 37.5 (10 Roque 2024 11:44)  T(F): 99.5 (10 Roque 2024 11:44), Max: 99.5 (10 Roque 2024 11:44)  HR: 70 (10 Roque 2024 11:44) (63 - 72)  BP: 136/64 (10 Roque 2024 11:44) (136/64 - 197/70)  BP(mean): --  RR: 18 (10 Roque 2024 11:44) (18 - 18)  SpO2: 96% (10 Roque 2024 11:44) (94% - 96%)    Parameters below as of 10 Roque 2024 05:40  Patient On (Oxygen Delivery Method): room air      CAPILLARY BLOOD GLUCOSE        I&O's Summary    09 Jan 2024 07:01  -  10 Roque 2024 07:00  --------------------------------------------------------  IN: 440 mL / OUT: 0 mL / NET: 440 mL    10 Roque 2024 07:01  -  10 Roque 2024 16:15  --------------------------------------------------------  IN: 200 mL / OUT: 0 mL / NET: 200 mL        PHYSICAL EXAM:  GENERAL: NAD, well-developed  HEAD:  Atraumatic, Normocephalic  EYES: EOMI, PERRLA, conjunctiva and sclera clear  NECK: Supple, No JVD  CHEST/LUNG: Clear to auscultation bilaterally; No wheeze  HEART: Regular rate and rhythm; No murmurs, rubs, or gallops  ABDOMEN: Soft, Nontender, Nondistended; Bowel sounds present  EXTREMITIES:  2+ Peripheral Pulses, No clubbing, cyanosis, LUE edema improved  PSYCH: AAOx3  NEUROLOGY: non-focal  SKIN: No rashes or lesions    LABS:                        7.9    3.80  )-----------( 231      ( 10 Roque 2024 07:20 )             26.0     01-10    141  |  104  |  53<H>  ----------------------------<  84  3.9   |  21<L>  |  3.16<H>    Ca    8.8      10 Roque 2024 07:20  Phos  3.8     01-09  Mg     1.7     01-10            Urinalysis Basic - ( 10 Roque 2024 07:20 )    Color: x / Appearance: x / SG: x / pH: x  Gluc: 84 mg/dL / Ketone: x  / Bili: x / Urobili: x   Blood: x / Protein: x / Nitrite: x   Leuk Esterase: x / RBC: x / WBC x   Sq Epi: x / Non Sq Epi: x / Bacteria: x        RADIOLOGY & ADDITIONAL TESTS:    Imaging Personally Reviewed:    Consultant(s) Notes Reviewed:      Care Discussed with Consultants/Other Providers:   DATE OF SERVICE: 01-10-24 @ 16:15    Patient is a 78y old  Female who presents with a chief complaint of renal transplant with fever (06 Jan 2024 15:54)      SUBJECTIVE / OVERNIGHT EVENTS:  No chest pain. No shortness of breath. No complaints. No events overnight.      MEDICATIONS  (STANDING):  aspirin enteric coated 81 milliGRAM(s) Oral daily  atovaquone  Suspension 1500 milliGRAM(s) Oral daily  cefepime   IVPB 1000 milliGRAM(s) IV Intermittent every 24 hours  cholecalciferol 2000 Unit(s) Oral daily  epoetin ivelisse (EPOGEN) Injectable 61574 Unit(s) SubCutaneous once  epoetin ivelisse-epbx (RETACRIT) Injectable 90389 Unit(s) SubCutaneous <User Schedule>  furosemide    Tablet 40 milliGRAM(s) Oral two times a day  heparin   Injectable 5000 Unit(s) SubCutaneous every 8 hours  labetalol 200 milliGRAM(s) Oral three times a day  latanoprost 0.005% Ophthalmic Solution 1 Drop(s) Both EYES at bedtime  mycophenolate mofetil 500 milliGRAM(s) Oral two times a day  predniSONE   Tablet 5 milliGRAM(s) Oral daily  sodium bicarbonate 1300 milliGRAM(s) Oral three times a day  sodium zirconium cyclosilicate 10 Gram(s) Oral daily  tacrolimus ER Tablet (ENVARSUS XR) 5 milliGRAM(s) Oral daily    MEDICATIONS  (PRN):  acetaminophen     Tablet .. 650 milliGRAM(s) Oral every 6 hours PRN Temp greater or equal to 38C (100.4F), Mild Pain (1 - 3)  melatonin 3 milliGRAM(s) Oral at bedtime PRN Insomnia      Vital Signs Last 24 Hrs  T(C): 37.5 (10 Roque 2024 11:44), Max: 37.5 (10 Roque 2024 11:44)  T(F): 99.5 (10 Roque 2024 11:44), Max: 99.5 (10 Roque 2024 11:44)  HR: 70 (10 Roque 2024 11:44) (63 - 72)  BP: 136/64 (10 Roque 2024 11:44) (136/64 - 197/70)  BP(mean): --  RR: 18 (10 Roque 2024 11:44) (18 - 18)  SpO2: 96% (10 Roque 2024 11:44) (94% - 96%)    Parameters below as of 10 Roque 2024 05:40  Patient On (Oxygen Delivery Method): room air      CAPILLARY BLOOD GLUCOSE        I&O's Summary    09 Jan 2024 07:01  -  10 Roque 2024 07:00  --------------------------------------------------------  IN: 440 mL / OUT: 0 mL / NET: 440 mL    10 Roque 2024 07:01  -  10 Roque 2024 16:15  --------------------------------------------------------  IN: 200 mL / OUT: 0 mL / NET: 200 mL        PHYSICAL EXAM:  GENERAL: NAD, well-developed  HEAD:  Atraumatic, Normocephalic  EYES: EOMI, PERRLA, conjunctiva and sclera clear  NECK: Supple, No JVD  CHEST/LUNG: Clear to auscultation bilaterally; No wheeze  HEART: Regular rate and rhythm; No murmurs, rubs, or gallops  ABDOMEN: Soft, Nontender, Nondistended; Bowel sounds present  EXTREMITIES:  2+ Peripheral Pulses, No clubbing, cyanosis, LUE edema improved  PSYCH: AAOx3  NEUROLOGY: non-focal  SKIN: No rashes or lesions    LABS:                        7.9    3.80  )-----------( 231      ( 10 Roque 2024 07:20 )             26.0     01-10    141  |  104  |  53<H>  ----------------------------<  84  3.9   |  21<L>  |  3.16<H>    Ca    8.8      10 Roque 2024 07:20  Phos  3.8     01-09  Mg     1.7     01-10            Urinalysis Basic - ( 10 Roque 2024 07:20 )    Color: x / Appearance: x / SG: x / pH: x  Gluc: 84 mg/dL / Ketone: x  / Bili: x / Urobili: x   Blood: x / Protein: x / Nitrite: x   Leuk Esterase: x / RBC: x / WBC x   Sq Epi: x / Non Sq Epi: x / Bacteria: x        RADIOLOGY & ADDITIONAL TESTS:    Imaging Personally Reviewed:    Consultant(s) Notes Reviewed:      Care Discussed with Consultants/Other Providers:

## 2024-01-10 NOTE — PROGRESS NOTE ADULT - ASSESSMENT
77 YO Female h/o HTN, s/p Renal transplant, CKD, chronic rejection, recent admission for pneumonia s/p abx presented with shortness of breath since this morning. she also noticed significant LUE swelling that began 4-5 days ago.  Nephrology consulted for CKD s/p renal transplant.    A/P:  S/p DDRT (5/19/2021 - induction w/ Basiliximab)  Baseline sCr ~3  Renal function continues to improve 1/10  Repeat TTE 1/4 - Left ventricular wall thickness is moderately increased. Left ventricular systolic function is normal with a calculated ejection fraction of 63 % with moderate L ventricular dysfunction.  Furosemide 40 mg IV BID changed to PO  Pt is also on Cefepime 1g q24H ---> Per transplant ID 1/6  may need to decrease if renal function deteriorates further   UA with proteinuria and hematuria.  Continue immunosuppressants per transplant - mycophenolate 500mg BID, prednisone 5mg qd, and Tacrolimus 5mg qd.  Fk level 1/7, 4.3    Tacro level 5 on 1/9   Check Tacro level 30mins prior to next dose; goal 4-6.  Avoid nephrotoxic agents.  Monitor BMP and UO.    HTN:  BP fluctuating  c/w Labetalol and Nifedipine   IF BP remains elevated, consider titrating up labetalol  Avoid hypotension.  Monitor BP.    Hyperkalemia:  K stable   Low K diet.  Monitor K closely.    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  c/w NaHCO3 1300mg TID    Anemia:  Likely in setting of CKD vs iron deficiency.  iron deficient, Tsat 11% 1/6   SHELDON 10,000 units TIW   would hold off iron administration given ct chest with evidence of associated foci of consolidation and ground glass opacity in all 5 lobes, suggestive of infection.   s/p 1 unit PRBC 1/4  Hgb improving 1/10  Transfuse per primary team as needed   Hold SHELDON for BP >170/90.    Hypomagnesemia   supplemented with 1g Mg Sulfate 1/9  improved 1/10  Replete as needed  Monitor     Proteinuria/Hematuria:  etiology? possibly diabetic nephropathy (donor origin)  Follows w/ Dr. Louise   UPr/Cr -2.5   Vasculitis work up per transplant team  Monitor     L Upper Extremity swelling   Repeat Duplex performed 1/7 , no evidence of DVT  Mgmt per Primary  79 YO Female h/o HTN, s/p Renal transplant, CKD, chronic rejection, recent admission for pneumonia s/p abx presented with shortness of breath since this morning. she also noticed significant LUE swelling that began 4-5 days ago.  Nephrology consulted for CKD s/p renal transplant.    A/P:  S/p DDRT (5/19/2021 - induction w/ Basiliximab)  Baseline sCr ~3  Renal function continues to improve 1/10  Repeat TTE 1/4 - Left ventricular wall thickness is moderately increased. Left ventricular systolic function is normal with a calculated ejection fraction of 63 % with moderate L ventricular dysfunction.  Furosemide 40 mg IV BID changed to PO  Pt is also on Cefepime 1g q24H ---> Per transplant ID 1/6  may need to decrease if renal function deteriorates further   UA with proteinuria and hematuria.  Continue immunosuppressants per transplant - mycophenolate 500mg BID, prednisone 5mg qd, and Tacrolimus 5mg qd.  Fk level 1/7, 4.3    Tacro level 5 on 1/9   Check Tacro level 30mins prior to next dose; goal 4-6.  Avoid nephrotoxic agents.  Monitor BMP and UO.    HTN:  BP fluctuating  c/w Labetalol and Nifedipine   IF BP remains elevated, consider titrating up labetalol  Avoid hypotension.  Monitor BP.    Hyperkalemia:  K stable   Low K diet.  Monitor K closely.    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  c/w NaHCO3 1300mg TID    Anemia:  Likely in setting of CKD vs iron deficiency.  iron deficient, Tsat 11% 1/6   SHELDON 10,000 units TIW   would hold off iron administration given ct chest with evidence of associated foci of consolidation and ground glass opacity in all 5 lobes, suggestive of infection.   s/p 1 unit PRBC 1/4  Hgb improving 1/10  Transfuse per primary team as needed   Hold SHELDON for BP >170/90.    Hypomagnesemia   supplemented with 1g Mg Sulfate 1/9  improved 1/10  Replete as needed  Monitor     Proteinuria/Hematuria:  etiology? possibly diabetic nephropathy (donor origin)  Follows w/ Dr. Louise   UPr/Cr -2.5   Vasculitis work up per transplant team  Monitor     L Upper Extremity swelling   Repeat Duplex performed 1/7 , no evidence of DVT  Mgmt per Primary  79 YO Female h/o HTN, s/p Renal transplant, CKD, chronic rejection, recent admission for pneumonia s/p abx presented with shortness of breath since this morning. she also noticed significant LUE swelling that began 4-5 days ago.  Nephrology consulted for CKD s/p renal transplant.    A/P:  S/p DDRT (5/19/2021 - induction w/ Basiliximab)  Baseline sCr ~3  Renal function continues to improve 1/10  Repeat TTE 1/4 - Left ventricular wall thickness is moderately increased. Left ventricular systolic function is normal with a calculated ejection fraction of 63 % with moderate L ventricular dysfunction.  Furosemide 40 mg IV BID changed to PO  Pt is also on Cefepime 1g q24H ---> Per transplant ID 1/6  may need to decrease if renal function deteriorates further   UA with proteinuria and hematuria.  Continue immunosuppressants per transplant - mycophenolate 500mg BID, prednisone 5mg qd, and Tacrolimus 5mg qd.  Tacro level 5 on 1/9   Check Tacro level 30mins prior to next dose; goal 4-6.  Avoid nephrotoxic agents.  Monitor BMP and UO.    HTN:  BP fluctuating  c/w Labetalol and Nifedipine   IF BP remains elevated, consider titrating up labetalol  Avoid hypotension.  Monitor BP.    Hyperkalemia:  K stable   Low K diet.  Monitor K closely.    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  c/w NaHCO3 1300mg TID    Anemia:  Likely in setting of CKD vs iron deficiency.  iron deficient, Tsat 11% 1/6   SHELDON 10,000 units TIW   would hold off iron administration given ct chest with evidence of associated foci of consolidation and ground glass opacity in all 5 lobes, suggestive of infection.   s/p 1 unit PRBC 1/4  Hgb improving 1/10  Transfuse per primary team as needed   Hold SHELDON for BP >170/90.    Hypomagnesemia   supplemented with 1g Mg Sulfate 1/9  improved   Replete as needed  Monitor     Proteinuria/Hematuria:  etiology? possibly diabetic nephropathy (donor origin)  Follows w/ Dr. Louise   UPr/Cr -2.5   Vasculitis work up per transplant team  Monitor     L Upper Extremity swelling   Repeat Duplex performed 1/7 , no evidence of DVT  Mgmt per Primary  77 YO Female h/o HTN, s/p Renal transplant, CKD, chronic rejection, recent admission for pneumonia s/p abx presented with shortness of breath since this morning. she also noticed significant LUE swelling that began 4-5 days ago.  Nephrology consulted for CKD s/p renal transplant.    A/P:  S/p DDRT (5/19/2021 - induction w/ Basiliximab)  Baseline sCr ~3  Renal function continues to improve 1/10  Repeat TTE 1/4 - Left ventricular wall thickness is moderately increased. Left ventricular systolic function is normal with a calculated ejection fraction of 63 % with moderate L ventricular dysfunction.  Furosemide 40 mg IV BID changed to PO  Pt is also on Cefepime 1g q24H ---> Per transplant ID 1/6  may need to decrease if renal function deteriorates further   UA with proteinuria and hematuria.  Continue immunosuppressants per transplant - mycophenolate 500mg BID, prednisone 5mg qd, and Tacrolimus 5mg qd.  Tacro level 5 on 1/9   Check Tacro level 30mins prior to next dose; goal 4-6.  Avoid nephrotoxic agents.  Monitor BMP and UO.    HTN:  BP fluctuating  c/w Labetalol and Nifedipine   IF BP remains elevated, consider titrating up labetalol  Avoid hypotension.  Monitor BP.    Hyperkalemia:  K stable   Low K diet.  Monitor K closely.    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  c/w NaHCO3 1300mg TID    Anemia:  Likely in setting of CKD vs iron deficiency.  iron deficient, Tsat 11% 1/6   SHELDON 10,000 units TIW   would hold off iron administration given ct chest with evidence of associated foci of consolidation and ground glass opacity in all 5 lobes, suggestive of infection.   s/p 1 unit PRBC 1/4  Hgb improving 1/10  Transfuse per primary team as needed   Hold SHELDON for BP >170/90.    Hypomagnesemia   supplemented with 1g Mg Sulfate 1/9  improved   Replete as needed  Monitor     Proteinuria/Hematuria:  etiology? possibly diabetic nephropathy (donor origin)  Follows w/ Dr. Louise   UPr/Cr -2.5   Vasculitis work up per transplant team  Monitor     L Upper Extremity swelling   Repeat Duplex performed 1/7 , no evidence of DVT  Mgmt per Primary

## 2024-01-10 NOTE — DISCHARGE NOTE PROVIDER - CARE PROVIDER_API CALL
milan hassan  Phone: (   )    -  Fax: (   )    -  Follow Up Time: 1 week   milan hassan  Phone: (   )    -  Fax: (   )    -  Follow Up Time: 1 week    Dutch Oh  Cardiovascular Disease  [address] [facility], [address]  [place] [address]  Phone: [phone]  Fax: [phone]  Follow Up Time: 1 week   milan hassan  Phone: (   )    -  Fax: (   )    -  Follow Up Time: 1 week    Dutch Oh  Cardiovascular Disease  [address] [facility], [address]  [place] [address]  Phone: [phone]  Fax: [phone]  Follow Up Time: 1 week

## 2024-01-10 NOTE — DISCHARGE NOTE PROVIDER - HOSPITAL COURSE
HPI:  78f h/o HTN, s/p Renal transplant, CKD, chronic rejection, recent admission for pneumonia s/p abx presented with shortness of breath since this morning. she also noticed significant LUE swelling that began 4-5 days ago. She denies any trauma to arm. she has chronic b/l LE edema as well which has increased slightly over the past few days. she has a cough at times productive of white sputum but denies any fevers or chills. She states she is compliant with all her medications. she has not noticed any change in urination. no cloudy urine. no abdominal pain.  (03 Jan 2024 17:17)    Hospital Course:  Patient admitted with worsening edema and shortness of breath. Cardiology consulted.  Nephrology and renal transplant consulted.  UA with proteinuria and hematuria.  Patient was diuresed with Lasx IVP BID and changed to oral diuretics prior to discharge. Echocardiogram with Left ventricular wall thickness is moderately increased. Left ventricular systolic function is normal with a calculated ejection fraction of 63 % with moderate L ventricular dysfunction.  Renal function improved during hospitalization.   Noted to have proteinuria and hematuria, underwent vasculitis workup with transplant team.   Electrolytes were closely monitored during hospital course.   Also, noted to be anemic during hospital course requiring one unit PRBC.   LUE swelling noted, underwent upper extremity duplex that was negative.   Patient underwent amyloid scan…….    Patient with episodes of fevers during hospital course. CT chest with evidence of associated foci of consolidation and ground glass opacity in all 5 lobes, suggestive of infection.   Patient was treated with a course of Cefepime and will be on discharged on  Levofloxacin 750 Q48H (to complete total 7 day course, end 1/11/24).    Important Medication Changes and Reason:    Active or Pending Issues Requiring Follow-up:    Advanced Directives:   [x] Full code  [ ] DNR  [ ] Hospice    Discharge Diagnoses:  Acute diastolic CHF secondary to advanced renal failure  Hyperkalemia  s/p renal transplant  Hypertension  Pneumonia  Metabolic acidosis       HPI:  78f h/o HTN, s/p Renal transplant, CKD, chronic rejection, recent admission for pneumonia s/p abx presented with shortness of breath since this morning. she also noticed significant LUE swelling that began 4-5 days ago. She denies any trauma to arm. she has chronic b/l LE edema as well which has increased slightly over the past few days. she has a cough at times productive of white sputum but denies any fevers or chills. She states she is compliant with all her medications. she has not noticed any change in urination. no cloudy urine. no abdominal pain.  (03 Jan 2024 17:17)      Hospital Course:  Patient admitted with worsening edema and shortness of breath. Cardiology consulted.  Nephrology and renal transplant consulted.  UA with proteinuria and hematuria.  Patient was diuresed with Lasx IVP BID and changed to oral diuretics prior to discharge. Echocardiogram with Left ventricular wall thickness is moderately increased. Left ventricular systolic function is normal with a calculated ejection fraction of 63 % with moderate L ventricular dysfunction.  Renal function improved during hospitalization.   Noted to have proteinuria and hematuria, underwent vasculitis workup with transplant team.   Electrolytes were closely monitored during hospital course.   Also, noted to be anemic during hospital course requiring one unit PRBC.   LUE swelling noted, underwent upper extremity duplex that was negative.   Patient had noted improvement of LUE cellulitis with IV cefepime, patient to complete an additional 2 doses of Levaquin (as recommended by Dr. PACHECO) upon discharge. Patient underwent amyloid scan which was negative per cardiology. Patient to follow up with Dr. Oh in 1-2 weeks.    Patient with episodes of fevers during hospital course. CT chest with evidence of associated foci of consolidation and ground glass opacity in all 5 lobes, suggestive of infection.   Patient was treated with a course of Cefepime and completed course 1/11/24).  Patient is medically clear for discharge by Dr. Simeon to home. Outpatient follow up with PCP, cards, renal  Med recc and clearance discussed with attending    Important Medication Changes and Reason:  Nifedipine 90 mg daily per cards recc   lasix 40 BID per cards recc  Labetalol 200mg  per cards recc  Levofloxacin 750 Q48H     Active or Pending Issues Requiring Follow-up:  Follow up with Cardiology in 1-2 weeks    Advanced Directives:   [x] Full code  [ ] DNR  [ ] Hospice    Discharge Diagnoses:  Acute diastolic CHF secondary to advanced renal failure  Hyperkalemia  s/p renal transplant  Hypertension  Pneumonia  Metabolic acidosis

## 2024-01-10 NOTE — PROGRESS NOTE ADULT - ASSESSMENT
78f h/o HTN, s/p Renal transplant, CKD, chronic rejection, recent admission for pneumonia s/p abx presented with shortness of breath with LUE and b/l LE edema concerning for volume overload.     Shortness of breath.   - with LUE and b/l LE edema   UA with protein 300   urine protein to cr - evaluate for nephrotic syndrome  lasix 40mg po bid - renal consult appreciated   TTE done  covid, rsv, flu negative.    Stage 4 chronic kidney disease.    creatinine improved since last admission   check urine protein to cr as above  renal transplant consult appreciated   continue tacrolimus 5mg qd (monitor levels), mycophenolate 500mg bid, prednisone 5mg qd  renally dose meds, avoid nephrotoxic medications.      Diastolic Heart Failure secondary to advanced kidney disease   - Recent TTE with normal EF and severe LVH  - r/o cardiac amyloid with PYP scan  - Appears closer to euvolemia, transitioned to lasix 40mg PO BID    HTN   was previously controlled, today elevated   - c/w labetalol 200mg PO TID  - Increase Nifedipine to 90 mg PO daily (documented was on 2 tabs daily with a total of 120mg PO daily at home)     Pulmonary Edema   chest Xray as above shows mild - mod pulm edema with increase in right lower lung   -c/w abx therapy     fever 2/2 poss pna  - seen by transplant ID  - Cefepime 1g q24  -on discharge change to Levofloxacin 750 Q48H on discharge (to complete total 7 day course, end 1/11/24)  - Please induce sputum for bacterial and fungal cultures, and PCP PCR, legionella PCR   Opacity of lung on imaging study.   new RUL opacity   clinically c/w volume overload  procalcitonin low  monitor closely as patient on immunosuppressives        Metabolic acidosis.   c/w sodium bicarb.    Prophylactic measure.   dvt proph - hsq.     78f h/o HTN, s/p Renal transplant, CKD, chronic rejection, recent admission for pneumonia s/p abx presented with shortness of breath with LUE and b/l LE edema concerning for volume overload.     Shortness of breath.   - with LUE and b/l LE edema   UA with protein 300   urine protein to cr - evaluate for nephrotic syndrome  lasix 40mg po bid - renal consult appreciated   TTE done  covid, rsv, flu negative.    Stage 4 chronic kidney disease.    creatinine improved since last admission   check urine protein to cr as above  renal transplant consult appreciated   continue tacrolimus 5mg qd (monitor levels), mycophenolate 500mg bid, prednisone 5mg qd  renally dose meds, avoid nephrotoxic medications.      Diastolic Heart Failure secondary to advanced kidney disease   - Recent TTE with normal EF and severe LVH  - r/o cardiac amyloid with PYP scan  - Appears closer to euvolemia, transitioned to lasix 40mg PO BID    LUE swelling   - doppler negative  - likely 2/2 phlebitis  - complete coarse of abx  - elevate    HTN   was previously controlled, today elevated   - c/w labetalol 200mg PO TID  - Increase Nifedipine to 90 mg PO daily (documented was on 2 tabs daily with a total of 120mg PO daily at home)     Pulmonary Edema   chest Xray as above shows mild - mod pulm edema with increase in right lower lung   -c/w abx therapy     fever 2/2 poss pna  - seen by transplant ID  - Cefepime 1g q24  -on discharge change to Levofloxacin 750 Q48H on discharge (to complete total 7 day course, end 1/11/24)  - Please induce sputum for bacterial and fungal cultures, and PCP PCR, legionella PCR   Opacity of lung on imaging study.   new RUL opacity   clinically c/w volume overload  procalcitonin low  monitor closely as patient on immunosuppressives        Metabolic acidosis.   c/w sodium bicarb.    Prophylactic measure.   dvt proph - hsq.

## 2024-01-10 NOTE — DISCHARGE NOTE PROVIDER - PROVIDER TOKENS
FREE:[LAST:[mo],FIRST:[milan],PHONE:[(   )    -],FAX:[(   )    -],FOLLOWUP:[1 week]] FREE:[LAST:[hassan],FIRST:[milan],PHONE:[(   )    -],FAX:[(   )    -],FOLLOWUP:[1 week]],PROVIDER:[TOKEN:[[id number]],FOLLOWUP:[1 week]] FREE:[LAST:[hassan],FIRST:[milan],PHONE:[(   )    -],FAX:[(   )    -],FOLLOWUP:[1 week]],PROVIDER:[TOKEN:[[id number]],FOLLOWUP:[1 week]]

## 2024-01-10 NOTE — PROGRESS NOTE ADULT - SUBJECTIVE AND OBJECTIVE BOX
[facility] [place] [facility]   MD SAMMY ORDONEZ MD ANGELA WONG, PA Venitha Krishnan, NP    TEL:  OFFICE: [phone]  From 5pm-7am Answering Service [phone]    -- RENAL FOLLOW UP NOTE ---Date of Service 01-10-24 @ 13:29    Patient is a 78y old  Female who presents with a chief complaint of renal transplant with fever (06 Jan 2024 15:54)      Patient seen and examined at bedside. No chest pain/sob    VITALS:  T(F): 99.5 (01-10-24 @ 11:44), Max: 99.5 (01-10-24 @ 11:44)  HR: 70 (01-10-24 @ 11:44)  BP: 136/64 (01-10-24 @ 11:44)  RR: 18 (01-10-24 @ 11:44)  SpO2: 96% (01-10-24 @ 11:44)  Wt(kg): --    01-09 @ 07:01  -  01-10 @ 07:00  --------------------------------------------------------  IN: 440 mL / OUT: 0 mL / NET: 440 mL    01-10 @ 07:01  -  01-10 @ 13:29  --------------------------------------------------------  IN: 200 mL / OUT: 0 mL / NET: 200 mL          PHYSICAL EXAM:  General: NAD  Neck: No JVD  Respiratory: CTAB, no wheezes, rales or rhonchi  Cardiovascular: S1, S2, RRR  Gastrointestinal: BS+, soft, NT/ND  Extremities: trace edema BLE, left upper arm phlebitis     Hospital Medications:   MEDICATIONS  (STANDING):  aspirin enteric coated 81 milliGRAM(s) Oral daily  atovaquone  Suspension 1500 milliGRAM(s) Oral daily  cefepime   IVPB 1000 milliGRAM(s) IV Intermittent every 24 hours  cholecalciferol 2000 Unit(s) Oral daily  epoetin ivelisse (EPOGEN) Injectable 36136 Unit(s) SubCutaneous once  epoetin ivelisse-epbx (RETACRIT) Injectable 82939 Unit(s) SubCutaneous <User Schedule>  furosemide    Tablet 40 milliGRAM(s) Oral two times a day  heparin   Injectable 5000 Unit(s) SubCutaneous every 8 hours  labetalol 200 milliGRAM(s) Oral three times a day  latanoprost 0.005% Ophthalmic Solution 1 Drop(s) Both EYES at bedtime  mycophenolate mofetil 500 milliGRAM(s) Oral two times a day  predniSONE   Tablet 5 milliGRAM(s) Oral daily  sodium bicarbonate 1300 milliGRAM(s) Oral three times a day  sodium zirconium cyclosilicate 10 Gram(s) Oral daily  tacrolimus ER Tablet (ENVARSUS XR) 5 milliGRAM(s) Oral daily      LABS:  01-10    141  |  104  |  53<H>  ----------------------------<  84  3.9   |  21<L>  |  3.16<H>    Ca    8.8      10 Roque 2024 07:20  Phos  3.8     01-09  Mg     1.7     01-10      Creatinine Trend: 3.16 <--, 3.32 <--, 3.68 <--, 3.42 <--, 3.35 <--, 3.23 <--, 2.95 <--, 2.75 <--                                7.9    3.80  )-----------( 231      ( 10 Roque 2024 07:20 )             26.0     Urine Studies:  Urinalysis - [01-10-24 @ 07:20]      Color  / Appearance  / SG  / pH       Gluc 84 / Ketone   / Bili  / Urobili        Blood  / Protein  / Leuk Est  / Nitrite       RBC  / WBC  / Hyaline  / Gran  / Sq Epi  / Non Sq Epi  / Bacteria     Urine Creatinine 43      [01-08-24 @ 07:29]  Urine Protein 108      [01-08-24 @ 07:29]    Iron 21, TIBC 171, %sat 12      [01-06-24 @ 11:12]  Ferritin 1573      [01-06-24 @ 11:12]  PTH -- (Ca 8.5)      [11-10-23 @ 06:01]   659  Vitamin D (25OH) 16.5      [11-11-23 @ 06:09]  HbA1c 4.6      [05-28-19 @ 09:49]  Lipid: chol 191, , HDL 64, LDL --      [01-04-24 @ 10:13]    HBsAg Nonreact      [01-04-24 @ 07:51]  HCV 0.27, Nonreact      [01-04-24 @ 07:51]  HIV Nonreact      [01-04-24 @ 10:13]    STEPHANIE: titer 1:80, pattern Speckled      [01-04-24 @ 07:51]  dsDNA <12      [01-04-24 @ 07:51]  C3 Complement 120      [01-04-24 @ 07:51]  C4 Complement 34      [01-04-24 @ 07:51]  ANCA: cANCA Negative, pANCA Negative, atypical ANCA Indeterminate Method interference due to STEPHANIE Fluorescence      [01-04-24 @ 07:51]  Immunofixation Serum:   No Monoclonal Band Identified      Reference Range: None Detected      [01-04-24 @ 07:51]    RADIOLOGY & ADDITIONAL STUDIES:   [facility] [place] [facility]   MD SAMMY ORDONEZ MD ANGELA WONG, PA Venitha Krishnan, NP    TEL:  OFFICE: [phone]  From 5pm-7am Answering Service [phone]    -- RENAL FOLLOW UP NOTE ---Date of Service 01-10-24 @ 13:29    Patient is a 78y old  Female who presents with a chief complaint of renal transplant with fever (06 Jan 2024 15:54)      Patient seen and examined at bedside. No chest pain/sob    VITALS:  T(F): 99.5 (01-10-24 @ 11:44), Max: 99.5 (01-10-24 @ 11:44)  HR: 70 (01-10-24 @ 11:44)  BP: 136/64 (01-10-24 @ 11:44)  RR: 18 (01-10-24 @ 11:44)  SpO2: 96% (01-10-24 @ 11:44)  Wt(kg): --    01-09 @ 07:01  -  01-10 @ 07:00  --------------------------------------------------------  IN: 440 mL / OUT: 0 mL / NET: 440 mL    01-10 @ 07:01  -  01-10 @ 13:29  --------------------------------------------------------  IN: 200 mL / OUT: 0 mL / NET: 200 mL          PHYSICAL EXAM:  General: NAD  Neck: No JVD  Respiratory: CTAB, no wheezes, rales or rhonchi  Cardiovascular: S1, S2, RRR  Gastrointestinal: BS+, soft, NT/ND  Extremities: trace edema BLE, left upper arm phlebitis     Hospital Medications:   MEDICATIONS  (STANDING):  aspirin enteric coated 81 milliGRAM(s) Oral daily  atovaquone  Suspension 1500 milliGRAM(s) Oral daily  cefepime   IVPB 1000 milliGRAM(s) IV Intermittent every 24 hours  cholecalciferol 2000 Unit(s) Oral daily  epoetin ivelisse (EPOGEN) Injectable 94012 Unit(s) SubCutaneous once  epoetin ivelisse-epbx (RETACRIT) Injectable 07491 Unit(s) SubCutaneous <User Schedule>  furosemide    Tablet 40 milliGRAM(s) Oral two times a day  heparin   Injectable 5000 Unit(s) SubCutaneous every 8 hours  labetalol 200 milliGRAM(s) Oral three times a day  latanoprost 0.005% Ophthalmic Solution 1 Drop(s) Both EYES at bedtime  mycophenolate mofetil 500 milliGRAM(s) Oral two times a day  predniSONE   Tablet 5 milliGRAM(s) Oral daily  sodium bicarbonate 1300 milliGRAM(s) Oral three times a day  sodium zirconium cyclosilicate 10 Gram(s) Oral daily  tacrolimus ER Tablet (ENVARSUS XR) 5 milliGRAM(s) Oral daily      LABS:  01-10    141  |  104  |  53<H>  ----------------------------<  84  3.9   |  21<L>  |  3.16<H>    Ca    8.8      10 Roque 2024 07:20  Phos  3.8     01-09  Mg     1.7     01-10      Creatinine Trend: 3.16 <--, 3.32 <--, 3.68 <--, 3.42 <--, 3.35 <--, 3.23 <--, 2.95 <--, 2.75 <--                                7.9    3.80  )-----------( 231      ( 10 Roque 2024 07:20 )             26.0     Urine Studies:  Urinalysis - [01-10-24 @ 07:20]      Color  / Appearance  / SG  / pH       Gluc 84 / Ketone   / Bili  / Urobili        Blood  / Protein  / Leuk Est  / Nitrite       RBC  / WBC  / Hyaline  / Gran  / Sq Epi  / Non Sq Epi  / Bacteria     Urine Creatinine 43      [01-08-24 @ 07:29]  Urine Protein 108      [01-08-24 @ 07:29]    Iron 21, TIBC 171, %sat 12      [01-06-24 @ 11:12]  Ferritin 1573      [01-06-24 @ 11:12]  PTH -- (Ca 8.5)      [11-10-23 @ 06:01]   659  Vitamin D (25OH) 16.5      [11-11-23 @ 06:09]  HbA1c 4.6      [05-28-19 @ 09:49]  Lipid: chol 191, , HDL 64, LDL --      [01-04-24 @ 10:13]    HBsAg Nonreact      [01-04-24 @ 07:51]  HCV 0.27, Nonreact      [01-04-24 @ 07:51]  HIV Nonreact      [01-04-24 @ 10:13]    STEPHANIE: titer 1:80, pattern Speckled      [01-04-24 @ 07:51]  dsDNA <12      [01-04-24 @ 07:51]  C3 Complement 120      [01-04-24 @ 07:51]  C4 Complement 34      [01-04-24 @ 07:51]  ANCA: cANCA Negative, pANCA Negative, atypical ANCA Indeterminate Method interference due to STEPHANIE Fluorescence      [01-04-24 @ 07:51]  Immunofixation Serum:   No Monoclonal Band Identified      Reference Range: None Detected      [01-04-24 @ 07:51]    RADIOLOGY & ADDITIONAL STUDIES:

## 2024-01-11 ENCOUNTER — TRANSCRIPTION ENCOUNTER (OUTPATIENT)
Age: 79
End: 2024-01-11

## 2024-01-11 VITALS
OXYGEN SATURATION: 95 % | RESPIRATION RATE: 18 BRPM | SYSTOLIC BLOOD PRESSURE: 143 MMHG | HEART RATE: 70 BPM | TEMPERATURE: 99 F | DIASTOLIC BLOOD PRESSURE: 65 MMHG

## 2024-01-11 LAB
ANION GAP SERPL CALC-SCNC: 13 MMOL/L — SIGNIFICANT CHANGE UP (ref 5–17)
ANION GAP SERPL CALC-SCNC: 13 MMOL/L — SIGNIFICANT CHANGE UP (ref 5–17)
BUN SERPL-MCNC: 50 MG/DL — HIGH (ref 7–23)
BUN SERPL-MCNC: 50 MG/DL — HIGH (ref 7–23)
CALCIUM SERPL-MCNC: 9.1 MG/DL — SIGNIFICANT CHANGE UP (ref 8.4–10.5)
CALCIUM SERPL-MCNC: 9.1 MG/DL — SIGNIFICANT CHANGE UP (ref 8.4–10.5)
CHLORIDE SERPL-SCNC: 106 MMOL/L — SIGNIFICANT CHANGE UP (ref 96–108)
CHLORIDE SERPL-SCNC: 106 MMOL/L — SIGNIFICANT CHANGE UP (ref 96–108)
CO2 SERPL-SCNC: 24 MMOL/L — SIGNIFICANT CHANGE UP (ref 22–31)
CO2 SERPL-SCNC: 24 MMOL/L — SIGNIFICANT CHANGE UP (ref 22–31)
CREAT SERPL-MCNC: 2.94 MG/DL — HIGH (ref 0.5–1.3)
CREAT SERPL-MCNC: 2.94 MG/DL — HIGH (ref 0.5–1.3)
EGFR: 16 ML/MIN/1.73M2 — LOW
EGFR: 16 ML/MIN/1.73M2 — LOW
GLUCOSE SERPL-MCNC: 82 MG/DL — SIGNIFICANT CHANGE UP (ref 70–99)
GLUCOSE SERPL-MCNC: 82 MG/DL — SIGNIFICANT CHANGE UP (ref 70–99)
HCT VFR BLD CALC: 27.2 % — LOW (ref 34.5–45)
HCT VFR BLD CALC: 27.2 % — LOW (ref 34.5–45)
HGB BLD-MCNC: 8.2 G/DL — LOW (ref 11.5–15.5)
HGB BLD-MCNC: 8.2 G/DL — LOW (ref 11.5–15.5)
MCHC RBC-ENTMCNC: 25.9 PG — LOW (ref 27–34)
MCHC RBC-ENTMCNC: 25.9 PG — LOW (ref 27–34)
MCHC RBC-ENTMCNC: 30.1 GM/DL — LOW (ref 32–36)
MCHC RBC-ENTMCNC: 30.1 GM/DL — LOW (ref 32–36)
MCV RBC AUTO: 86.1 FL — SIGNIFICANT CHANGE UP (ref 80–100)
MCV RBC AUTO: 86.1 FL — SIGNIFICANT CHANGE UP (ref 80–100)
NRBC # BLD: 0 /100 WBCS — SIGNIFICANT CHANGE UP (ref 0–0)
NRBC # BLD: 0 /100 WBCS — SIGNIFICANT CHANGE UP (ref 0–0)
PLATELET # BLD AUTO: 234 K/UL — SIGNIFICANT CHANGE UP (ref 150–400)
PLATELET # BLD AUTO: 234 K/UL — SIGNIFICANT CHANGE UP (ref 150–400)
POTASSIUM SERPL-MCNC: 3.7 MMOL/L — SIGNIFICANT CHANGE UP (ref 3.5–5.3)
POTASSIUM SERPL-MCNC: 3.7 MMOL/L — SIGNIFICANT CHANGE UP (ref 3.5–5.3)
POTASSIUM SERPL-SCNC: 3.7 MMOL/L — SIGNIFICANT CHANGE UP (ref 3.5–5.3)
POTASSIUM SERPL-SCNC: 3.7 MMOL/L — SIGNIFICANT CHANGE UP (ref 3.5–5.3)
RBC # BLD: 3.16 M/UL — LOW (ref 3.8–5.2)
RBC # BLD: 3.16 M/UL — LOW (ref 3.8–5.2)
RBC # FLD: 16.1 % — HIGH (ref 10.3–14.5)
RBC # FLD: 16.1 % — HIGH (ref 10.3–14.5)
SODIUM SERPL-SCNC: 143 MMOL/L — SIGNIFICANT CHANGE UP (ref 135–145)
SODIUM SERPL-SCNC: 143 MMOL/L — SIGNIFICANT CHANGE UP (ref 135–145)
TACROLIMUS SERPL-MCNC: 4.5 NG/ML — SIGNIFICANT CHANGE UP
TACROLIMUS SERPL-MCNC: 4.5 NG/ML — SIGNIFICANT CHANGE UP
WBC # BLD: 4.34 K/UL — SIGNIFICANT CHANGE UP (ref 3.8–10.5)
WBC # BLD: 4.34 K/UL — SIGNIFICANT CHANGE UP (ref 3.8–10.5)
WBC # FLD AUTO: 4.34 K/UL — SIGNIFICANT CHANGE UP (ref 3.8–10.5)
WBC # FLD AUTO: 4.34 K/UL — SIGNIFICANT CHANGE UP (ref 3.8–10.5)

## 2024-01-11 PROCEDURE — 76776 US EXAM K TRANSPL W/DOPPLER: CPT

## 2024-01-11 PROCEDURE — 84484 ASSAY OF TROPONIN QUANT: CPT

## 2024-01-11 PROCEDURE — 83880 ASSAY OF NATRIURETIC PEPTIDE: CPT

## 2024-01-11 PROCEDURE — 86747 PARVOVIRUS ANTIBODY: CPT

## 2024-01-11 PROCEDURE — P9011: CPT

## 2024-01-11 PROCEDURE — 84132 ASSAY OF SERUM POTASSIUM: CPT

## 2024-01-11 PROCEDURE — 86850 RBC ANTIBODY SCREEN: CPT

## 2024-01-11 PROCEDURE — 86922 COMPATIBILITY TEST ANTIGLOB: CPT

## 2024-01-11 PROCEDURE — 85027 COMPLETE CBC AUTOMATED: CPT

## 2024-01-11 PROCEDURE — 86403 PARTICLE AGGLUT ANTBDY SCRN: CPT

## 2024-01-11 PROCEDURE — 86901 BLOOD TYPING SEROLOGIC RH(D): CPT

## 2024-01-11 PROCEDURE — 85610 PROTHROMBIN TIME: CPT

## 2024-01-11 PROCEDURE — 86900 BLOOD TYPING SEROLOGIC ABO: CPT

## 2024-01-11 PROCEDURE — 85018 HEMOGLOBIN: CPT

## 2024-01-11 PROCEDURE — 86985 SPLIT BLOOD OR PRODUCTS: CPT

## 2024-01-11 PROCEDURE — 83550 IRON BINDING TEST: CPT

## 2024-01-11 PROCEDURE — 99285 EMERGENCY DEPT VISIT HI MDM: CPT

## 2024-01-11 PROCEDURE — 78830 RP LOCLZJ TUM SPECT W/CT 1: CPT | Mod: 26

## 2024-01-11 PROCEDURE — 83605 ASSAY OF LACTIC ACID: CPT

## 2024-01-11 PROCEDURE — 0225U NFCT DS DNA&RNA 21 SARSCOV2: CPT

## 2024-01-11 PROCEDURE — 80197 ASSAY OF TACROLIMUS: CPT

## 2024-01-11 PROCEDURE — 86038 ANTINUCLEAR ANTIBODIES: CPT

## 2024-01-11 PROCEDURE — 86803 HEPATITIS C AB TEST: CPT

## 2024-01-11 PROCEDURE — 85025 COMPLETE CBC W/AUTO DIFF WBC: CPT

## 2024-01-11 PROCEDURE — A9538: CPT

## 2024-01-11 PROCEDURE — 78830 RP LOCLZJ TUM SPECT W/CT 1: CPT

## 2024-01-11 PROCEDURE — 82330 ASSAY OF CALCIUM: CPT

## 2024-01-11 PROCEDURE — 80048 BASIC METABOLIC PNL TOTAL CA: CPT

## 2024-01-11 PROCEDURE — 82803 BLOOD GASES ANY COMBINATION: CPT

## 2024-01-11 PROCEDURE — 86160 COMPLEMENT ANTIGEN: CPT

## 2024-01-11 PROCEDURE — 87305 ASPERGILLUS AG IA: CPT

## 2024-01-11 PROCEDURE — 84145 PROCALCITONIN (PCT): CPT

## 2024-01-11 PROCEDURE — 93306 TTE W/DOPPLER COMPLETE: CPT

## 2024-01-11 PROCEDURE — 71250 CT THORAX DX C-: CPT

## 2024-01-11 PROCEDURE — 82728 ASSAY OF FERRITIN: CPT

## 2024-01-11 PROCEDURE — 93971 EXTREMITY STUDY: CPT

## 2024-01-11 PROCEDURE — 83540 ASSAY OF IRON: CPT

## 2024-01-11 PROCEDURE — 86225 DNA ANTIBODY NATIVE: CPT

## 2024-01-11 PROCEDURE — 87389 HIV-1 AG W/HIV-1&-2 AB AG IA: CPT

## 2024-01-11 PROCEDURE — 93970 EXTREMITY STUDY: CPT

## 2024-01-11 PROCEDURE — 86334 IMMUNOFIX E-PHORESIS SERUM: CPT

## 2024-01-11 PROCEDURE — 36415 COLL VENOUS BLD VENIPUNCTURE: CPT

## 2024-01-11 PROCEDURE — 84295 ASSAY OF SERUM SODIUM: CPT

## 2024-01-11 PROCEDURE — 86036 ANCA SCREEN EACH ANTIBODY: CPT

## 2024-01-11 PROCEDURE — 87385 HISTOPLASMA CAPSUL AG IA: CPT

## 2024-01-11 PROCEDURE — 82435 ASSAY OF BLOOD CHLORIDE: CPT

## 2024-01-11 PROCEDURE — 81001 URINALYSIS AUTO W/SCOPE: CPT

## 2024-01-11 PROCEDURE — 71045 X-RAY EXAM CHEST 1 VIEW: CPT

## 2024-01-11 PROCEDURE — 87340 HEPATITIS B SURFACE AG IA: CPT

## 2024-01-11 PROCEDURE — 80053 COMPREHEN METABOLIC PANEL: CPT

## 2024-01-11 PROCEDURE — 84156 ASSAY OF PROTEIN URINE: CPT

## 2024-01-11 PROCEDURE — 36430 TRANSFUSION BLD/BLD COMPNT: CPT

## 2024-01-11 PROCEDURE — 87449 NOS EACH ORGANISM AG IA: CPT

## 2024-01-11 PROCEDURE — 87040 BLOOD CULTURE FOR BACTERIA: CPT

## 2024-01-11 PROCEDURE — 86902 BLOOD TYPE ANTIGEN DONOR EA: CPT

## 2024-01-11 PROCEDURE — 87637 SARSCOV2&INF A&B&RSV AMP PRB: CPT

## 2024-01-11 PROCEDURE — 84100 ASSAY OF PHOSPHORUS: CPT

## 2024-01-11 PROCEDURE — 82947 ASSAY GLUCOSE BLOOD QUANT: CPT

## 2024-01-11 PROCEDURE — 83735 ASSAY OF MAGNESIUM: CPT

## 2024-01-11 PROCEDURE — 85014 HEMATOCRIT: CPT

## 2024-01-11 PROCEDURE — 82570 ASSAY OF URINE CREATININE: CPT

## 2024-01-11 PROCEDURE — 85730 THROMBOPLASTIN TIME PARTIAL: CPT

## 2024-01-11 PROCEDURE — 87086 URINE CULTURE/COLONY COUNT: CPT

## 2024-01-11 PROCEDURE — 80061 LIPID PANEL: CPT

## 2024-01-11 RX ORDER — LEVOFLOXACIN 5 MG/ML
1 INJECTION, SOLUTION INTRAVENOUS
Qty: 2 | Refills: 0
Start: 2024-01-11 | End: 2024-01-14

## 2024-01-11 RX ORDER — FUROSEMIDE 40 MG
2 TABLET ORAL
Qty: 60 | Refills: 0 | DISCHARGE
Start: 2024-01-11 | End: 2024-02-09

## 2024-01-11 RX ORDER — NIFEDIPINE 30 MG
1 TABLET, EXTENDED RELEASE 24 HR ORAL
Qty: 30 | Refills: 0
Start: 2024-01-11 | End: 2024-02-09

## 2024-01-11 RX ORDER — FUROSEMIDE 40 MG
1 TABLET ORAL
Qty: 60 | Refills: 0
Start: 2024-01-11 | End: 2024-02-09

## 2024-01-11 RX ORDER — ATOVAQUONE 750 MG/5ML
10 SUSPENSION ORAL
Qty: 300 | Refills: 0
Start: 2024-01-11 | End: 2024-02-09

## 2024-01-11 RX ORDER — SODIUM BICARBONATE 1 MEQ/ML
2 SYRINGE (ML) INTRAVENOUS
Qty: 0 | Refills: 0 | DISCHARGE
Start: 2024-01-11

## 2024-01-11 RX ORDER — TACROLIMUS 5 MG/1
5 CAPSULE ORAL
Qty: 0 | Refills: 0 | DISCHARGE
Start: 2024-01-11

## 2024-01-11 RX ORDER — FUROSEMIDE 40 MG
1 TABLET ORAL
Refills: 0 | DISCHARGE

## 2024-01-11 RX ORDER — ATOVAQUONE 750 MG/5ML
10 SUSPENSION ORAL
Qty: 0 | Refills: 0 | DISCHARGE
Start: 2024-01-11

## 2024-01-11 RX ADMIN — Medication 81 MILLIGRAM(S): at 14:16

## 2024-01-11 RX ADMIN — CEFEPIME 100 MILLIGRAM(S): 1 INJECTION, POWDER, FOR SOLUTION INTRAMUSCULAR; INTRAVENOUS at 18:33

## 2024-01-11 RX ADMIN — Medication 2000 UNIT(S): at 14:15

## 2024-01-11 RX ADMIN — Medication 40 MILLIGRAM(S): at 05:01

## 2024-01-11 RX ADMIN — Medication 200 MILLIGRAM(S): at 05:00

## 2024-01-11 RX ADMIN — ATOVAQUONE 1500 MILLIGRAM(S): 750 SUSPENSION ORAL at 14:14

## 2024-01-11 RX ADMIN — SODIUM ZIRCONIUM CYCLOSILICATE 10 GRAM(S): 10 POWDER, FOR SUSPENSION ORAL at 14:17

## 2024-01-11 RX ADMIN — Medication 1300 MILLIGRAM(S): at 14:13

## 2024-01-11 RX ADMIN — MYCOPHENOLATE MOFETIL 500 MILLIGRAM(S): 250 CAPSULE ORAL at 18:33

## 2024-01-11 RX ADMIN — Medication 5 MILLIGRAM(S): at 05:01

## 2024-01-11 RX ADMIN — Medication 1300 MILLIGRAM(S): at 05:01

## 2024-01-11 RX ADMIN — Medication 90 MILLIGRAM(S): at 05:00

## 2024-01-11 RX ADMIN — ERYTHROPOIETIN 10000 UNIT(S): 10000 INJECTION, SOLUTION INTRAVENOUS; SUBCUTANEOUS at 14:13

## 2024-01-11 RX ADMIN — HEPARIN SODIUM 5000 UNIT(S): 5000 INJECTION INTRAVENOUS; SUBCUTANEOUS at 05:00

## 2024-01-11 RX ADMIN — Medication 40 MILLIGRAM(S): at 14:14

## 2024-01-11 RX ADMIN — TACROLIMUS 5 MILLIGRAM(S): 5 CAPSULE ORAL at 14:14

## 2024-01-11 RX ADMIN — HEPARIN SODIUM 5000 UNIT(S): 5000 INJECTION INTRAVENOUS; SUBCUTANEOUS at 14:16

## 2024-01-11 RX ADMIN — MYCOPHENOLATE MOFETIL 500 MILLIGRAM(S): 250 CAPSULE ORAL at 05:01

## 2024-01-11 RX ADMIN — Medication 200 MILLIGRAM(S): at 14:16

## 2024-01-11 NOTE — PROGRESS NOTE ADULT - NS ATTEND AMEND GEN_ALL_CORE FT
Patient care and plan discussed and reviewed with Advanced Care Provider. Plan as outlined above edited by me to reflect our discussion.
SCr improving-monitor renal function
as above
Patient care and plan discussed and reviewed with Advanced Care Provider. Plan as outlined above edited by me to reflect our discussion.
Scr worsened, fluid overload improving-lasix dose decreased to 40 BID-monitor closley
Patient care and plan discussed and reviewed with Advanced Care Provider. Plan as outlined above edited by me to reflect our discussion.
as above
as above
Patient care and plan discussed and reviewed with Advanced Care Provider. Plan as outlined above edited by me to reflect our discussion.
as above
change lasix to PO, supplement Mg-d/c planning
transplant followup

## 2024-01-11 NOTE — PROGRESS NOTE ADULT - NUTRITIONAL ASSESSMENT
This patient has been assessed with a concern for Malnutrition and has been determined to have a diagnosis/diagnoses of Severe protein-calorie malnutrition.    This patient is being managed with:   Diet Regular-  No Concentrated Potassium  Low Sodium  No Concentrated Phosphorus  Supplement Feeding Modality:  Oral  Nepro Cans or Servings Per Day:  1       Frequency:  Daily  Entered: Jan 5 2024  3:16PM  

## 2024-01-11 NOTE — PROGRESS NOTE ADULT - ASSESSMENT
Gabbie NJ is a 38 year old female, c/o urine is really strong and cloudy. Dark yellow.     TX: none    Visit Vitals  BP (!) 161/100 (BP Location: LUE - Left upper extremity, Patient Position: Sitting, Cuff Size: Regular)   Pulse 97   Temp 98.3 °F (36.8 °C) (Oral)   Resp 18   Ht 5' 4\" (1.626 m)   Wt 81.6 kg (180 lb)   LMP 12/07/2021   SpO2 99%   BMI 30.90 kg/m²       Patient would like communication of their results via:     Cell Phone:   Telephone Information:   Mobile [phone]     Okay to leave a message containing results? Yes    Medications verified and reviewed.  Allergies verified and reviewed, including latex.  Tobacco history verified 11/3/2022.  PCP verified or updated. Karlee Coleman MD       78f h/o HTN, s/p Renal transplant, CKD, chronic rejection, recent admission for pneumonia s/p abx presented with shortness of breath with LUE and b/l LE edema concerning for volume overload.     Shortness of breath.   - with LUE and b/l LE edema   UA with protein 300   urine protein to cr - evaluate for nephrotic syndrome  lasix 40mg po bid - renal consult appreciated   TTE done  covid, rsv, flu negative.    Stage 4 chronic kidney disease.    creatinine improved since last admission   check urine protein to cr as above  renal transplant consult appreciated   continue tacrolimus 5mg qd (monitor levels), mycophenolate 500mg bid, prednisone 5mg qd  renally dose meds, avoid nephrotoxic medications.      Diastolic Heart Failure secondary to advanced kidney disease   - Recent TTE with normal EF and severe LVH  - r/o cardiac amyloid with PYP scan  - Appears closer to euvolemia, transitioned to lasix 40mg PO BID    LUE swelling   - doppler negative  - likely 2/2 phlebitis  - complete coarse of abx  - elevate    HTN   was previously controlled, today elevated   - c/w labetalol 200mg PO TID  - Increase Nifedipine to 90 mg PO daily (documented was on 2 tabs daily with a total of 120mg PO daily at home)     Pulmonary Edema   chest Xray as above shows mild - mod pulm edema with increase in right lower lung   -c/w abx therapy     fever 2/2 poss pna  - seen by transplant ID  - Cefepime 1g q24  -on discharge change to Levofloxacin 750 Q48H on discharge (to complete total 7 day course, end 1/11/24)  - Please induce sputum for bacterial and fungal cultures, and PCP PCR, legionella PCR   Opacity of lung on imaging study.   new RUL opacity   clinically c/w volume overload  procalcitonin low  monitor closely as patient on immunosuppressives        Metabolic acidosis.   c/w sodium bicarb.    Prophylactic measure.   dvt proph - hsq.

## 2024-01-11 NOTE — DISCHARGE NOTE NURSING/CASE MANAGEMENT/SOCIAL WORK - NSDCPEFALRISK_GEN_ALL_CORE
For information on Fall & Injury Prevention, visit: [email or web address] OR  [email or web address] OR  https://www.cdc.gov/steadi/patient.html For information on Fall & Injury Prevention, visit: [email or web address] OR  [email or web address] OR  https://www.cdc.gov/steadi/patient.html

## 2024-01-11 NOTE — PROGRESS NOTE ADULT - SUBJECTIVE AND OBJECTIVE BOX
[facility] [place] [facility]   MD SAMMY ORDONEZ MD ANGELA WONG, PA QIAN CHEN, NP    TEL:  OFFICE: [phone]  From 5pm-7am Answering Service [phone]    -- RENAL FOLLOW UP NOTE ---Date of Service 01-11-24 @ 15:00    Patient is a 78y old  Female who presents with a chief complaint of renal transplant with fever.    Patient seen and examined at bedside. No chest pain/sob    VITALS:  T(F): 99.3 (01-11-24 @ 12:29), Max: 99.4 (01-10-24 @ 20:03)  HR: 74 (01-11-24 @ 12:29)  BP: 166/69 (01-11-24 @ 12:29)  RR: 18 (01-11-24 @ 12:29)  SpO2: 95% (01-11-24 @ 12:29)  Wt(kg): --    01-10 @ 07:01  -  01-11 @ 07:00  --------------------------------------------------------  IN: 200 mL / OUT: 0 mL / NET: 200 mL    PHYSICAL EXAM:  General: NAD  Neck: No JVD  Respiratory: CTAB, no wheezes, rales or rhonchi  Cardiovascular: S1, S2, RRR  Gastrointestinal: BS+, soft, NT/ND  Extremities: + swelling to LUE; 1+ b/l LE edema.    Hospital Medications:   MEDICATIONS  (STANDING):  aspirin enteric coated 81 milliGRAM(s) Oral daily  atovaquone  Suspension 1500 milliGRAM(s) Oral daily  cefepime   IVPB 1000 milliGRAM(s) IV Intermittent every 24 hours  cholecalciferol 2000 Unit(s) Oral daily  epoetin ivelisse (EPOGEN) Injectable 86072 Unit(s) SubCutaneous once  epoetin ivelisse-epbx (RETACRIT) Injectable 45066 Unit(s) SubCutaneous <User Schedule>  furosemide    Tablet 40 milliGRAM(s) Oral two times a day  heparin   Injectable 5000 Unit(s) SubCutaneous every 8 hours  labetalol 200 milliGRAM(s) Oral three times a day  latanoprost 0.005% Ophthalmic Solution 1 Drop(s) Both EYES at bedtime  mycophenolate mofetil 500 milliGRAM(s) Oral two times a day  NIFEdipine XL 90 milliGRAM(s) Oral daily  predniSONE   Tablet 5 milliGRAM(s) Oral daily  sodium bicarbonate 1300 milliGRAM(s) Oral three times a day  sodium zirconium cyclosilicate 10 Gram(s) Oral daily  tacrolimus ER Tablet (ENVARSUS XR) 5 milliGRAM(s) Oral daily    Tacrolimus (), Serum: 4.5 ng/mL (01-11 @ 07:22)    LABS:  01-11    143  |  106  |  50<H>  ----------------------------<  82  3.7   |  24  |  2.94<H>    Ca    9.1      11 Jan 2024 07:21  Mg     1.7     01-10      Creatinine Trend: 2.94 <--, 3.16 <--, 3.32 <--, 3.68 <--, 3.42 <--, 3.35 <--, 3.23 <--                          8.2    4.34  )-----------( 234      ( 11 Jan 2024 07:22 )             27.2     Urine Studies:  Urinalysis - [01-11-24 @ 07:21]      Color  / Appearance  / SG  / pH       Gluc 82 / Ketone   / Bili  / Urobili        Blood  / Protein  / Leuk Est  / Nitrite       RBC  / WBC  / Hyaline  / Gran  / Sq Epi  / Non Sq Epi  / Bacteria     Urine Creatinine 43      [01-08-24 @ 07:29]  Urine Protein 108      [01-08-24 @ 07:29]    Iron 21, TIBC 171, %sat 12      [01-06-24 @ 11:12]  Ferritin 1573      [01-06-24 @ 11:12]  PTH -- (Ca 8.5)      [11-10-23 @ 06:01]   659  Vitamin D (25OH) 16.5      [11-11-23 @ 06:09]  HbA1c 4.6      [05-28-19 @ 09:49]  Lipid: chol 191, , HDL 64, LDL --      [01-04-24 @ 10:13]    HBsAg Nonreact      [01-04-24 @ 07:51]  HCV 0.27, Nonreact      [01-04-24 @ 07:51]  HIV Nonreact      [01-04-24 @ 10:13]    STEPHANIE: titer 1:80, pattern Speckled      [01-04-24 @ 07:51]  dsDNA <12      [01-04-24 @ 07:51]  C3 Complement 120      [01-04-24 @ 07:51]  C4 Complement 34      [01-04-24 @ 07:51]  ANCA: cANCA Negative, pANCA Negative, atypical ANCA Indeterminate Method interference due to STEPHANIE Fluorescence      [01-04-24 @ 07:51]  Immunofixation Serum:   No Monoclonal Band Identified      Reference Range: None Detected      [01-04-24 @ 07:51]    RADIOLOGY & ADDITIONAL STUDIES:   [facility] [place] [facility]   MD SAMMY ORDONEZ MD ANGELA WONG, PA QIAN CHEN, NP    TEL:  OFFICE: [phone]  From 5pm-7am Answering Service [phone]    -- RENAL FOLLOW UP NOTE ---Date of Service 01-11-24 @ 15:00    Patient is a 78y old  Female who presents with a chief complaint of renal transplant with fever.    Patient seen and examined at bedside. No chest pain/sob    VITALS:  T(F): 99.3 (01-11-24 @ 12:29), Max: 99.4 (01-10-24 @ 20:03)  HR: 74 (01-11-24 @ 12:29)  BP: 166/69 (01-11-24 @ 12:29)  RR: 18 (01-11-24 @ 12:29)  SpO2: 95% (01-11-24 @ 12:29)  Wt(kg): --    01-10 @ 07:01  -  01-11 @ 07:00  --------------------------------------------------------  IN: 200 mL / OUT: 0 mL / NET: 200 mL    PHYSICAL EXAM:  General: NAD  Neck: No JVD  Respiratory: CTAB, no wheezes, rales or rhonchi  Cardiovascular: S1, S2, RRR  Gastrointestinal: BS+, soft, NT/ND  Extremities: + swelling to LUE; 1+ b/l LE edema.    Hospital Medications:   MEDICATIONS  (STANDING):  aspirin enteric coated 81 milliGRAM(s) Oral daily  atovaquone  Suspension 1500 milliGRAM(s) Oral daily  cefepime   IVPB 1000 milliGRAM(s) IV Intermittent every 24 hours  cholecalciferol 2000 Unit(s) Oral daily  epoetin ivelisse (EPOGEN) Injectable 52684 Unit(s) SubCutaneous once  epoetin ivelisse-epbx (RETACRIT) Injectable 79728 Unit(s) SubCutaneous <User Schedule>  furosemide    Tablet 40 milliGRAM(s) Oral two times a day  heparin   Injectable 5000 Unit(s) SubCutaneous every 8 hours  labetalol 200 milliGRAM(s) Oral three times a day  latanoprost 0.005% Ophthalmic Solution 1 Drop(s) Both EYES at bedtime  mycophenolate mofetil 500 milliGRAM(s) Oral two times a day  NIFEdipine XL 90 milliGRAM(s) Oral daily  predniSONE   Tablet 5 milliGRAM(s) Oral daily  sodium bicarbonate 1300 milliGRAM(s) Oral three times a day  sodium zirconium cyclosilicate 10 Gram(s) Oral daily  tacrolimus ER Tablet (ENVARSUS XR) 5 milliGRAM(s) Oral daily    Tacrolimus (), Serum: 4.5 ng/mL (01-11 @ 07:22)    LABS:  01-11    143  |  106  |  50<H>  ----------------------------<  82  3.7   |  24  |  2.94<H>    Ca    9.1      11 Jan 2024 07:21  Mg     1.7     01-10      Creatinine Trend: 2.94 <--, 3.16 <--, 3.32 <--, 3.68 <--, 3.42 <--, 3.35 <--, 3.23 <--                          8.2    4.34  )-----------( 234      ( 11 Jan 2024 07:22 )             27.2     Urine Studies:  Urinalysis - [01-11-24 @ 07:21]      Color  / Appearance  / SG  / pH       Gluc 82 / Ketone   / Bili  / Urobili        Blood  / Protein  / Leuk Est  / Nitrite       RBC  / WBC  / Hyaline  / Gran  / Sq Epi  / Non Sq Epi  / Bacteria     Urine Creatinine 43      [01-08-24 @ 07:29]  Urine Protein 108      [01-08-24 @ 07:29]    Iron 21, TIBC 171, %sat 12      [01-06-24 @ 11:12]  Ferritin 1573      [01-06-24 @ 11:12]  PTH -- (Ca 8.5)      [11-10-23 @ 06:01]   659  Vitamin D (25OH) 16.5      [11-11-23 @ 06:09]  HbA1c 4.6      [05-28-19 @ 09:49]  Lipid: chol 191, , HDL 64, LDL --      [01-04-24 @ 10:13]    HBsAg Nonreact      [01-04-24 @ 07:51]  HCV 0.27, Nonreact      [01-04-24 @ 07:51]  HIV Nonreact      [01-04-24 @ 10:13]    STEPHANIE: titer 1:80, pattern Speckled      [01-04-24 @ 07:51]  dsDNA <12      [01-04-24 @ 07:51]  C3 Complement 120      [01-04-24 @ 07:51]  C4 Complement 34      [01-04-24 @ 07:51]  ANCA: cANCA Negative, pANCA Negative, atypical ANCA Indeterminate Method interference due to STEPHANIE Fluorescence      [01-04-24 @ 07:51]  Immunofixation Serum:   No Monoclonal Band Identified      Reference Range: None Detected      [01-04-24 @ 07:51]    RADIOLOGY & ADDITIONAL STUDIES:

## 2024-01-11 NOTE — PROGRESS NOTE ADULT - PROVIDER SPECIALTY LIST ADULT
Cardiology
Internal Medicine
Nephrology
Transplant ID
Transplant ID
Transplant Nephrology
Internal Medicine
Nephrology
Cardiology
Internal Medicine
Nephrology
Internal Medicine
Internal Medicine
Nephrology
Internal Medicine
Nephrology
Nephrology
Transplant Nephrology
Transplant Nephrology

## 2024-01-11 NOTE — PROGRESS NOTE ADULT - SUBJECTIVE AND OBJECTIVE BOX
DATE OF SERVICE: 01-11-24 @ 12:13    Patient is a 78y old  Female who presents with a chief complaint of renal transplant with fever (06 Jan 2024 15:54)      SUBJECTIVE / OVERNIGHT EVENTS:  No chest pain. No shortness of breath. No complaints. No events overnight.     MEDICATIONS  (STANDING):  aspirin enteric coated 81 milliGRAM(s) Oral daily  atovaquone  Suspension 1500 milliGRAM(s) Oral daily  cefepime   IVPB 1000 milliGRAM(s) IV Intermittent every 24 hours  cholecalciferol 2000 Unit(s) Oral daily  epoetin ivelisse (EPOGEN) Injectable 78230 Unit(s) SubCutaneous once  epoetin ivelisse-epbx (RETACRIT) Injectable 64685 Unit(s) SubCutaneous <User Schedule>  furosemide    Tablet 40 milliGRAM(s) Oral two times a day  heparin   Injectable 5000 Unit(s) SubCutaneous every 8 hours  labetalol 200 milliGRAM(s) Oral three times a day  latanoprost 0.005% Ophthalmic Solution 1 Drop(s) Both EYES at bedtime  mycophenolate mofetil 500 milliGRAM(s) Oral two times a day  NIFEdipine XL 90 milliGRAM(s) Oral daily  predniSONE   Tablet 5 milliGRAM(s) Oral daily  sodium bicarbonate 1300 milliGRAM(s) Oral three times a day  sodium zirconium cyclosilicate 10 Gram(s) Oral daily  tacrolimus ER Tablet (ENVARSUS XR) 5 milliGRAM(s) Oral daily    MEDICATIONS  (PRN):  acetaminophen     Tablet .. 650 milliGRAM(s) Oral every 6 hours PRN Temp greater or equal to 38C (100.4F), Mild Pain (1 - 3)  melatonin 3 milliGRAM(s) Oral at bedtime PRN Insomnia      Vital Signs Last 24 Hrs  T(C): 36.4 (11 Jan 2024 04:43), Max: 37.4 (10 Roque 2024 20:03)  T(F): 97.5 (11 Jan 2024 04:43), Max: 99.4 (10 Roque 2024 20:03)  HR: 70 (11 Jan 2024 06:44) (69 - 73)  BP: 140/57 (11 Jan 2024 06:44) (140/57 - 215/82)  BP(mean): 85 (11 Jan 2024 06:44) (85 - 85)  RR: 18 (11 Jan 2024 04:43) (18 - 18)  SpO2: 97% (11 Jan 2024 04:43) (96% - 97%)    Parameters below as of 11 Jan 2024 04:43  Patient On (Oxygen Delivery Method): room air      CAPILLARY BLOOD GLUCOSE        I&O's Summary    10 Roque 2024 07:01  -  11 Jan 2024 07:00  --------------------------------------------------------  IN: 200 mL / OUT: 0 mL / NET: 200 mL        PHYSICAL EXAM:  GENERAL: NAD, well-developed  HEAD:  Atraumatic, Normocephalic  EYES: EOMI, PERRLA, conjunctiva and sclera clear  NECK: Supple, No JVD  CHEST/LUNG: Clear to auscultation bilaterally; No wheeze  HEART: Regular rate and rhythm; No murmurs, rubs, or gallops  ABDOMEN: Soft, Nontender, Nondistended; Bowel sounds present  EXTREMITIES:  2+ Peripheral Pulses, No clubbing, cyanosis, LUE edema  PSYCH: AAOx3  NEUROLOGY: non-focal  SKIN: No rashes or lesions    LABS:                        8.2    4.34  )-----------( 234      ( 11 Jan 2024 07:22 )             27.2     01-11    143  |  106  |  50<H>  ----------------------------<  82  3.7   |  24  |  2.94<H>    Ca    9.1      11 Jan 2024 07:21  Mg     1.7     01-10            Urinalysis Basic - ( 11 Jan 2024 07:21 )    Color: x / Appearance: x / SG: x / pH: x  Gluc: 82 mg/dL / Ketone: x  / Bili: x / Urobili: x   Blood: x / Protein: x / Nitrite: x   Leuk Esterase: x / RBC: x / WBC x   Sq Epi: x / Non Sq Epi: x / Bacteria: x        RADIOLOGY & ADDITIONAL TESTS:    Imaging Personally Reviewed:    Consultant(s) Notes Reviewed:      Care Discussed with Consultants/Other Providers:   DATE OF SERVICE: 01-11-24 @ 12:13    Patient is a 78y old  Female who presents with a chief complaint of renal transplant with fever (06 Jan 2024 15:54)      SUBJECTIVE / OVERNIGHT EVENTS:  No chest pain. No shortness of breath. No complaints. No events overnight.     MEDICATIONS  (STANDING):  aspirin enteric coated 81 milliGRAM(s) Oral daily  atovaquone  Suspension 1500 milliGRAM(s) Oral daily  cefepime   IVPB 1000 milliGRAM(s) IV Intermittent every 24 hours  cholecalciferol 2000 Unit(s) Oral daily  epoetin ivelisse (EPOGEN) Injectable 90962 Unit(s) SubCutaneous once  epoetin ivelisse-epbx (RETACRIT) Injectable 77216 Unit(s) SubCutaneous <User Schedule>  furosemide    Tablet 40 milliGRAM(s) Oral two times a day  heparin   Injectable 5000 Unit(s) SubCutaneous every 8 hours  labetalol 200 milliGRAM(s) Oral three times a day  latanoprost 0.005% Ophthalmic Solution 1 Drop(s) Both EYES at bedtime  mycophenolate mofetil 500 milliGRAM(s) Oral two times a day  NIFEdipine XL 90 milliGRAM(s) Oral daily  predniSONE   Tablet 5 milliGRAM(s) Oral daily  sodium bicarbonate 1300 milliGRAM(s) Oral three times a day  sodium zirconium cyclosilicate 10 Gram(s) Oral daily  tacrolimus ER Tablet (ENVARSUS XR) 5 milliGRAM(s) Oral daily    MEDICATIONS  (PRN):  acetaminophen     Tablet .. 650 milliGRAM(s) Oral every 6 hours PRN Temp greater or equal to 38C (100.4F), Mild Pain (1 - 3)  melatonin 3 milliGRAM(s) Oral at bedtime PRN Insomnia      Vital Signs Last 24 Hrs  T(C): 36.4 (11 Jan 2024 04:43), Max: 37.4 (10 Roque 2024 20:03)  T(F): 97.5 (11 Jan 2024 04:43), Max: 99.4 (10 Roque 2024 20:03)  HR: 70 (11 Jan 2024 06:44) (69 - 73)  BP: 140/57 (11 Jan 2024 06:44) (140/57 - 215/82)  BP(mean): 85 (11 Jan 2024 06:44) (85 - 85)  RR: 18 (11 Jan 2024 04:43) (18 - 18)  SpO2: 97% (11 Jan 2024 04:43) (96% - 97%)    Parameters below as of 11 Jan 2024 04:43  Patient On (Oxygen Delivery Method): room air      CAPILLARY BLOOD GLUCOSE        I&O's Summary    10 Roque 2024 07:01  -  11 Jan 2024 07:00  --------------------------------------------------------  IN: 200 mL / OUT: 0 mL / NET: 200 mL        PHYSICAL EXAM:  GENERAL: NAD, well-developed  HEAD:  Atraumatic, Normocephalic  EYES: EOMI, PERRLA, conjunctiva and sclera clear  NECK: Supple, No JVD  CHEST/LUNG: Clear to auscultation bilaterally; No wheeze  HEART: Regular rate and rhythm; No murmurs, rubs, or gallops  ABDOMEN: Soft, Nontender, Nondistended; Bowel sounds present  EXTREMITIES:  2+ Peripheral Pulses, No clubbing, cyanosis, LUE edema  PSYCH: AAOx3  NEUROLOGY: non-focal  SKIN: No rashes or lesions    LABS:                        8.2    4.34  )-----------( 234      ( 11 Jan 2024 07:22 )             27.2     01-11    143  |  106  |  50<H>  ----------------------------<  82  3.7   |  24  |  2.94<H>    Ca    9.1      11 Jan 2024 07:21  Mg     1.7     01-10            Urinalysis Basic - ( 11 Jan 2024 07:21 )    Color: x / Appearance: x / SG: x / pH: x  Gluc: 82 mg/dL / Ketone: x  / Bili: x / Urobili: x   Blood: x / Protein: x / Nitrite: x   Leuk Esterase: x / RBC: x / WBC x   Sq Epi: x / Non Sq Epi: x / Bacteria: x        RADIOLOGY & ADDITIONAL TESTS:    Imaging Personally Reviewed:    Consultant(s) Notes Reviewed:      Care Discussed with Consultants/Other Providers:

## 2024-01-11 NOTE — PROGRESS NOTE ADULT - SUBJECTIVE AND OBJECTIVE BOX
DATE OF SERVICE: 01-11-24 @ 15:33    Patient is a 78y old  Female who presents with a chief complaint of renal transplant with fever (06 Jan 2024 15:54)      INTERVAL HISTORY: Feels well    REVIEW OF SYSTEMS:  CONSTITUTIONAL: No weakness  EYES/ENT: No visual changes;  No throat pain   NECK: No pain or stiffness  RESPIRATORY: No cough, wheezing; No shortness of breath  CARDIOVASCULAR: No chest pain or palpitations  GASTROINTESTINAL: No abdominal  pain. No nausea, vomiting, or hematemesis  GENITOURINARY: No dysuria, frequency or hematuria  NEUROLOGICAL: No stroke like symptoms  SKIN: No rashes    	  MEDICATIONS:  furosemide    Tablet 40 milliGRAM(s) Oral two times a day  labetalol 200 milliGRAM(s) Oral three times a day  NIFEdipine XL 90 milliGRAM(s) Oral daily        PHYSICAL EXAM:  T(C): 37.4 (01-11-24 @ 12:29), Max: 37.4 (01-10-24 @ 20:03)  HR: 74 (01-11-24 @ 12:29) (69 - 74)  BP: 166/69 (01-11-24 @ 12:29) (140/57 - 215/82)  RR: 18 (01-11-24 @ 12:29) (18 - 18)  SpO2: 95% (01-11-24 @ 12:29) (95% - 97%)  Wt(kg): --  I&O's Summary    10 Roque 2024 07:01  -  11 Jan 2024 07:00  --------------------------------------------------------  IN: 200 mL / OUT: 0 mL / NET: 200 mL          Appearance: In no distress	  HEENT:    PERRL, EOMI	  Cardiovascular:  S1 S2, No JVD  Respiratory: Lungs clear to auscultation	  Gastrointestinal:  Soft, Non-tender, + BS	  Vascularature:  No edema of LE  Psychiatric: Appropriate affect   Neuro: no acute focal deficits                               8.2    4.34  )-----------( 234      ( 11 Jan 2024 07:22 )             27.2     01-11    143  |  106  |  50<H>  ----------------------------<  82  3.7   |  24  |  2.94<H>    Ca    9.1      11 Jan 2024 07:21  Mg     1.7     01-10          Labs personally reviewed      ASSESSMENT/PLAN: 	  78f h/o HTN, s/p Renal transplant, CKD, chronic rejection, recent admission for pneumonia s/p abx presented with shortness of breath since this morning. she also noticed significant LUE swelling that began 4-5 days ago    1. Diastolic Heart Failure secondary to advanced kidney disease   - Recent TTE with normal EF and severe LVH  - r/o cardiac amyloid with PYP scan  - Appears closer to euvolemia, transitioned to lasix 40mg PO BID    2. HTN   was previously controlled, today elevated   - c/w labetalol 200mg PO TID  - Continue Nifedipine 90mg daily (documented was on 2 tabs daily with a total of 120mg PO daily at home)    3. Pulmonary Edema   chest Xray as above shows mild - mod pulm edema with increase in right lower lung   -c/w abx therapy     4. ESRD  - renal on board     5. Prophylactic measure.   dvt proph - hsq.          RUDI De La Paz DO [facility]  Cardiovascular Medicine  [address], [address]  Available via call or text on [employer]  Office: [phone]   DATE OF SERVICE: 01-11-24 @ 15:33    Patient is a 78y old  Female who presents with a chief complaint of renal transplant with fever (06 Jan 2024 15:54)      INTERVAL HISTORY: Feels well    REVIEW OF SYSTEMS:  CONSTITUTIONAL: No weakness  EYES/ENT: No visual changes;  No throat pain   NECK: No pain or stiffness  RESPIRATORY: No cough, wheezing; No shortness of breath  CARDIOVASCULAR: No chest pain or palpitations  GASTROINTESTINAL: No abdominal  pain. No nausea, vomiting, or hematemesis  GENITOURINARY: No dysuria, frequency or hematuria  NEUROLOGICAL: No stroke like symptoms  SKIN: No rashes    	  MEDICATIONS:  furosemide    Tablet 40 milliGRAM(s) Oral two times a day  labetalol 200 milliGRAM(s) Oral three times a day  NIFEdipine XL 90 milliGRAM(s) Oral daily        PHYSICAL EXAM:  T(C): 37.4 (01-11-24 @ 12:29), Max: 37.4 (01-10-24 @ 20:03)  HR: 74 (01-11-24 @ 12:29) (69 - 74)  BP: 166/69 (01-11-24 @ 12:29) (140/57 - 215/82)  RR: 18 (01-11-24 @ 12:29) (18 - 18)  SpO2: 95% (01-11-24 @ 12:29) (95% - 97%)  Wt(kg): --  I&O's Summary    10 Roque 2024 07:01  -  11 Jan 2024 07:00  --------------------------------------------------------  IN: 200 mL / OUT: 0 mL / NET: 200 mL          Appearance: In no distress	  HEENT:    PERRL, EOMI	  Cardiovascular:  S1 S2, No JVD  Respiratory: Lungs clear to auscultation	  Gastrointestinal:  Soft, Non-tender, + BS	  Vascularature:  No edema of LE  Psychiatric: Appropriate affect   Neuro: no acute focal deficits                               8.2    4.34  )-----------( 234      ( 11 Jan 2024 07:22 )             27.2     01-11    143  |  106  |  50<H>  ----------------------------<  82  3.7   |  24  |  2.94<H>    Ca    9.1      11 Jan 2024 07:21  Mg     1.7     01-10          Labs personally reviewed      ASSESSMENT/PLAN: 	  78f h/o HTN, s/p Renal transplant, CKD, chronic rejection, recent admission for pneumonia s/p abx presented with shortness of breath since this morning. she also noticed significant LUE swelling that began 4-5 days ago    1. Diastolic Heart Failure secondary to advanced kidney disease   - Recent TTE with normal EF and severe LVH  - r/o cardiac amyloid with PYP scan  - Appears closer to euvolemia, transitioned to lasix 40mg PO BID    2. HTN   was previously controlled, today elevated   - c/w labetalol 200mg PO TID  - Continue Nifedipine 90mg daily (documented was on 2 tabs daily with a total of 120mg PO daily at home)    3. Pulmonary Edema   chest Xray as above shows mild - mod pulm edema with increase in right lower lung   -c/w abx therapy     4. ESRD  - renal on board     5. Prophylactic measure.   dvt proph - hsq.          RUDI De La Paz DO [facility]  Cardiovascular Medicine  [address], [address]  Available via call or text on [employer] TE[employer]  Office: [phone]   DATE OF SERVICE: 01-11-24 @ 15:33    Patient is a 78y old  Female who presents with a chief complaint of renal transplant with fever (06 Jan 2024 15:54)      INTERVAL HISTORY: Feels well    REVIEW OF SYSTEMS:  CONSTITUTIONAL: No weakness  EYES/ENT: No visual changes;  No throat pain   NECK: No pain or stiffness  RESPIRATORY: No cough, wheezing; No shortness of breath  CARDIOVASCULAR: No chest pain or palpitations  GASTROINTESTINAL: No abdominal  pain. No nausea, vomiting, or hematemesis  GENITOURINARY: No dysuria, frequency or hematuria  NEUROLOGICAL: No stroke like symptoms  SKIN: No rashes    	  MEDICATIONS:  furosemide    Tablet 40 milliGRAM(s) Oral two times a day  labetalol 200 milliGRAM(s) Oral three times a day  NIFEdipine XL 90 milliGRAM(s) Oral daily        PHYSICAL EXAM:  T(C): 37.4 (01-11-24 @ 12:29), Max: 37.4 (01-10-24 @ 20:03)  HR: 74 (01-11-24 @ 12:29) (69 - 74)  BP: 166/69 (01-11-24 @ 12:29) (140/57 - 215/82)  RR: 18 (01-11-24 @ 12:29) (18 - 18)  SpO2: 95% (01-11-24 @ 12:29) (95% - 97%)  Wt(kg): --  I&O's Summary    10 Roque 2024 07:01  -  11 Jan 2024 07:00  --------------------------------------------------------  IN: 200 mL / OUT: 0 mL / NET: 200 mL          Appearance: In no distress	  HEENT:    PERRL, EOMI	  Cardiovascular:  S1 S2, No JVD  Respiratory: Lungs clear to auscultation	  Gastrointestinal:  Soft, Non-tender, + BS	  Vascularature:  No edema of LE  Psychiatric: Appropriate affect   Neuro: no acute focal deficits                               8.2    4.34  )-----------( 234      ( 11 Jan 2024 07:22 )             27.2     01-11    143  |  106  |  50<H>  ----------------------------<  82  3.7   |  24  |  2.94<H>    Ca    9.1      11 Jan 2024 07:21  Mg     1.7     01-10          Labs personally reviewed      ASSESSMENT/PLAN: 	  78f h/o HTN, s/p Renal transplant, CKD, chronic rejection, recent admission for pneumonia s/p abx presented with shortness of breath since this morning. she also noticed significant LUE swelling that began 4-5 days ago    1. Diastolic Heart Failure secondary to advanced kidney disease   - Recent TTE with normal EF and severe LVH  - r/o cardiac amyloid with PYP scan -- normal   - Appears closer to euvolemia, transitioned to lasix 40mg PO BID    2. HTN   was previously controlled, today elevated   - c/w labetalol 200mg PO TID  - Continue Nifedipine 90mg daily (documented was on 2 tabs daily with a total of 120mg PO daily at home)    3. Pulmonary Edema   chest Xray as above shows mild - mod pulm edema with increase in right lower lung   -c/w abx therapy     4. ESRD  - renal on board     5. Prophylactic measure.   dvt proph - hsq.          RUDI De La Paz DO [facility]  Cardiovascular Medicine  [address], [address]  Available via call or text on [employer] TE[employer]  Office: [phone]   DATE OF SERVICE: 01-11-24 @ 15:33    Patient is a 78y old  Female who presents with a chief complaint of renal transplant with fever (06 Jan 2024 15:54)      INTERVAL HISTORY: Feels well    REVIEW OF SYSTEMS:  CONSTITUTIONAL: No weakness  EYES/ENT: No visual changes;  No throat pain   NECK: No pain or stiffness  RESPIRATORY: No cough, wheezing; No shortness of breath  CARDIOVASCULAR: No chest pain or palpitations  GASTROINTESTINAL: No abdominal  pain. No nausea, vomiting, or hematemesis  GENITOURINARY: No dysuria, frequency or hematuria  NEUROLOGICAL: No stroke like symptoms  SKIN: No rashes    	  MEDICATIONS:  furosemide    Tablet 40 milliGRAM(s) Oral two times a day  labetalol 200 milliGRAM(s) Oral three times a day  NIFEdipine XL 90 milliGRAM(s) Oral daily        PHYSICAL EXAM:  T(C): 37.4 (01-11-24 @ 12:29), Max: 37.4 (01-10-24 @ 20:03)  HR: 74 (01-11-24 @ 12:29) (69 - 74)  BP: 166/69 (01-11-24 @ 12:29) (140/57 - 215/82)  RR: 18 (01-11-24 @ 12:29) (18 - 18)  SpO2: 95% (01-11-24 @ 12:29) (95% - 97%)  Wt(kg): --  I&O's Summary    10 Roque 2024 07:01  -  11 Jan 2024 07:00  --------------------------------------------------------  IN: 200 mL / OUT: 0 mL / NET: 200 mL          Appearance: In no distress	  HEENT:    PERRL, EOMI	  Cardiovascular:  S1 S2, No JVD  Respiratory: Lungs clear to auscultation	  Gastrointestinal:  Soft, Non-tender, + BS	  Vascularature:  No edema of LE  Psychiatric: Appropriate affect   Neuro: no acute focal deficits                               8.2    4.34  )-----------( 234      ( 11 Jan 2024 07:22 )             27.2     01-11    143  |  106  |  50<H>  ----------------------------<  82  3.7   |  24  |  2.94<H>    Ca    9.1      11 Jan 2024 07:21  Mg     1.7     01-10          Labs personally reviewed      ASSESSMENT/PLAN: 	  78f h/o HTN, s/p Renal transplant, CKD, chronic rejection, recent admission for pneumonia s/p abx presented with shortness of breath since this morning. she also noticed significant LUE swelling that began 4-5 days ago    1. Diastolic Heart Failure secondary to advanced kidney disease   - Recent TTE with normal EF and severe LVH  - r/o cardiac amyloid with PYP scan -- normal   - Appears closer to euvolemia, transitioned to lasix 40mg PO BID    2. HTN   was previously controlled, today elevated   - c/w labetalol 200mg PO TID  - Continue Nifedipine 90mg daily (documented was on 2 tabs daily with a total of 120mg PO daily at home)    3. Pulmonary Edema   chest Xray as above shows mild - mod pulm edema with increase in right lower lung   -c/w abx therapy     4. ESRD  - renal on board     5. Prophylactic measure.   dvt proph - hsq.          RUDI De La Paz DO [facility]  Cardiovascular Medicine  [address], [address]  Available via call or text on [employer]  Office: [phone]

## 2024-01-11 NOTE — DISCHARGE NOTE NURSING/CASE MANAGEMENT/SOCIAL WORK - PATIENT PORTAL LINK FT
You can access the FollowMyHealth Patient Portal offered by [facility] by registering at the following website: [email or web address]. By joining [facility]’s FollowMyHealth portal, you will also be able to view your health information using other applications (apps) compatible with our system. You can access the FollowMyHealth Patient Portal offered by [facility] by registering at the following website: [email or web address]. By joining [facility]’s FollowMyHealth portal, you will also be able to view your health information using other applications (apps) compatible with our system.

## 2024-01-11 NOTE — PROGRESS NOTE ADULT - ASSESSMENT
79 YO Female h/o HTN, s/p Renal transplant, CKD, chronic rejection, recent admission for pneumonia s/p abx presented with shortness of breath since this morning. she also noticed significant LUE swelling that began 4-5 days ago.  Nephrology consulted for CKD s/p renal transplant.    A/P:  S/p DDRT (5/19/2021 - induction w/ Basiliximab)  Baseline sCr ~3  Renal function continues to baseline.  Repeat TTE 1/4 - Left ventricular wall thickness is moderately increased. Left ventricular systolic function is normal with a calculated ejection fraction of 63 % with moderate L ventricular dysfunction.  Furosemide 40 mg IV BID changed to PO  Pt is also on Cefepime 1g q24H ---> Per transplant ID 1/6  may need to decrease if renal function deteriorates further   UA with proteinuria and hematuria.  Continue immunosuppressants per transplant - mycophenolate 500mg BID, prednisone 5mg qd, and Tacrolimus 5mg qd.  Tacro level 4.5 on 1/11  Check Tacro level 30mins prior to next dose; goal 4-6.  Avoid nephrotoxic agents.  Monitor BMP and UO.    HTN:  BP fluctuating  c/w lasix, Labetalol and Nifedipine   IF BP remains elevated, consider titrating up labetalol  Avoid hypotension.  Monitor BP.    Hyperkalemia:  K stable   On lokelma 10gm qd.  Low K diet.  Monitor K closely.    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  c/w NaHCO3 1300mg TID    Anemia:  Likely in setting of CKD vs iron deficiency.  iron deficient, Tsat 11% 1/6   SHELDON 10,000 units TIW   would hold off iron administration given ct chest with evidence of associated foci of consolidation and ground glass opacity in all 5 lobes, suggestive of infection.   s/p 1 unit PRBC 1/4  Hgb improving 1/10  Transfuse per primary team as needed   Hold SHELDON for BP >170/90.    Hypomagnesemia   supplemented with 1g Mg Sulfate 1/9  improved   Replete as needed  Monitor     Proteinuria/Hematuria:  etiology? possibly diabetic nephropathy (donor origin)  Follows w/ Dr. Louise   UPr/Cr -2.5   Vasculitis work up per transplant team  Monitor     L Upper Extremity swelling   Repeat Duplex performed 1/7, no evidence of DVT  Mgmt per Primary

## 2024-01-12 ENCOUNTER — TRANSCRIPTION ENCOUNTER (OUTPATIENT)
Age: 79
End: 2024-01-12

## 2024-01-12 LAB
GALACTOMANNAN AG SERPL-ACNC: 0.05 INDEX — SIGNIFICANT CHANGE UP (ref 0–0.49)
GALACTOMANNAN AG SERPL-ACNC: 0.05 INDEX — SIGNIFICANT CHANGE UP (ref 0–0.49)

## 2024-01-14 ENCOUNTER — TRANSCRIPTION ENCOUNTER (OUTPATIENT)
Age: 79
End: 2024-01-14

## 2024-01-16 ENCOUNTER — NON-APPOINTMENT (OUTPATIENT)
Age: 79
End: 2024-01-16

## 2024-01-17 ENCOUNTER — APPOINTMENT (OUTPATIENT)
Dept: CARE COORDINATION | Facility: HOME HEALTH | Age: 79
End: 2024-01-17
Payer: MEDICARE

## 2024-01-17 VITALS
HEART RATE: 65 BPM | WEIGHT: 114 LBS | OXYGEN SATURATION: 99 % | DIASTOLIC BLOOD PRESSURE: 70 MMHG | HEIGHT: 66 IN | SYSTOLIC BLOOD PRESSURE: 138 MMHG | TEMPERATURE: 97.1 F | BODY MASS INDEX: 18.32 KG/M2 | RESPIRATION RATE: 16 BRPM

## 2024-01-17 DIAGNOSIS — I10 ESSENTIAL (PRIMARY) HYPERTENSION: ICD-10-CM

## 2024-01-17 DIAGNOSIS — J18.9 PNEUMONIA, UNSPECIFIED ORGANISM: ICD-10-CM

## 2024-01-17 DIAGNOSIS — I50.31 ACUTE DIASTOLIC (CONGESTIVE) HEART FAILURE: ICD-10-CM

## 2024-01-17 DIAGNOSIS — R60.9 EDEMA, UNSPECIFIED: ICD-10-CM

## 2024-01-17 PROCEDURE — 99495 TRANSJ CARE MGMT MOD F2F 14D: CPT

## 2024-01-17 RX ORDER — NIFEDIPINE 60 MG/1
60 TABLET, EXTENDED RELEASE ORAL
Qty: 180 | Refills: 3 | Status: DISCONTINUED | COMMUNITY
Start: 2023-11-29 | End: 2024-01-17

## 2024-01-17 NOTE — HISTORY OF PRESENT ILLNESS
[Post-hospitalization from ___ Hospital] : Post-hospitalization from [unfilled] Hospital [Admitted on: ___] : The patient was admitted on [unfilled] [Discharged on ___] : discharged on [unfilled] [Discharge Summary] : discharge summary [Discharge Med List] : discharge medication list [Other: ____] : [unfilled] [Med Reconciliation] : medication reconciliation has been completed [Patient Contacted By: ____] : and contacted by [unfilled] [FreeTextEntry2] : 78f h/o HTN, s/p Renal transplant, CKD, chronic rejection, recent admission for pneumonia s/p abx presented with shortness of breath since this morning. she also noticed significant LUE swelling that began 4-5 days ago. She denies any trauma to arm. she has chronic b/l LE edema as well which has increased slightly over the past few days. she has a cough at times productive of white sputum but denies any fevers or chills. She states she is compliant with all her medications. she has not noticed any change in urination. no cloudy urine. no abdominal pain.  (03 Jan 2024 17:17) Hospital Course: Patient admitted with worsening edema and shortness of breath. Cardiology consulted.  Nephrology and renal transplant consulted.  UA with proteinuria and hematuria.  Patient was diuresed with Lasx IVP BID and changed to oral diuretics prior to discharge. Echocardiogram with Left ventricular wall thickness is moderately increased. Left ventricular systolic function is normal with a calculated ejection fraction of 63 % with moderate L ventricular dysfunction.  Renal function improved during hospitalization.   Noted to have proteinuria and hematuria, underwent vasculitis workup with transplant team.   Electrolytes were closely monitored during hospital course.   Also, noted to be anemic during hospital course requiring one unit PRBC.   LUE swelling noted, underwent upper extremity duplex that was negative.   Patient had noted improvement of LUE cellulitis with IV cefepime, patient to complete an additional 2 doses of Levaquin (as recommended by Dr. PACHECO) upon discharge. Patient underwent amyloid scan which was negative per cardiology. Patient to follow up with Dr. Oh in 1-2 weeks. Patient with episodes of fevers during hospital course. CT chest with evidence of associated foci of consolidation and ground glass opacity in all 5 lobes, suggestive of infection.   Patient was treated with a course of Cefepime and completed course 1/11/24). Patient is medically clear for discharge by Dr. Simeon to home. Outpatient follow up with PCP, cards, renal Med recc and clearance discussed with attending Important Medication Changes and Reason: Nifedipine 90 mg daily per cards recc  lasix 40 BID per cards recc Labetalol 200mg  per cards recc Levofloxacin 750 Q48H  Active or Pending Issues Requiring Follow-up: Follow up with Cardiology in 1-2 weeks Advanced Directives:  [x] Full code  [ ] DNR  [ ] Hospice Discharge Diagnoses: Acute diastolic CHF secondary to advanced renal failure Hyperkalemia s/p renal transplant Hypertension Pneumonia Metabolic acidosis 79 y/o female seen today for TCM initial visit,  present for visit. Pt is awake, alert and oriented x 3, in no acute distress. Breathing is even and unlabored. She states she is still coughing and has runny nose, but feels much better now. Negative for fever, SOB/HARLEY, chest pain, palpitations or dysuria. She has BLE grade 2 pitting edema. Denies any wt gain.  Medication review done, states she keeps her MD appointments.  [facility] SOC? Pt has HHA 5hrs x 5 days.  TCM explained to pt and yellow card left in the home

## 2024-01-17 NOTE — COUNSELING
[Fall prevention counseling provided] : Fall prevention counseling provided [Adequate lighting] : Adequate lighting [No throw rugs] : No throw rugs [Use proper foot wear] : Use proper foot wear [Use recommended devices] : Use recommended devices [FreeTextEntry1] : cane

## 2024-01-17 NOTE — PHYSICAL EXAM
[No Acute Distress] : no acute distress [Well Nourished] : well nourished [Well Developed] : well developed [Well-Appearing] : well-appearing [Normal Voice/Communication] : normal voice/communication [Normal Outer Ear/Nose] : the outer ears and nose were normal in appearance [No JVD] : no jugular venous distention [Supple] : supple [No Respiratory Distress] : no respiratory distress  [Clear to Auscultation] : lungs were clear to auscultation bilaterally [No Accessory Muscle Use] : no accessory muscle use [Normal Rate] : normal rate  [Regular Rhythm] : with a regular rhythm [Normal S1, S2] : normal S1 and S2 [No Extremity Clubbing/Cyanosis] : no extremity clubbing/cyanosis [Soft] : abdomen soft [Non Tender] : non-tender [Non-distended] : non-distended [Normal Bowel Sounds] : normal bowel sounds [No CVA Tenderness] : no CVA  tenderness [No Spinal Tenderness] : no spinal tenderness [Normal Affect] : the affect was normal [Alert and Oriented x3] : oriented to person, place, and time [Normal Mood] : the mood was normal [Normal Insight/Judgement] : insight and judgment were intact [Kyphosis] : no kyphosis [de-identified] : BLE 2+ pitting edema. Pt has large soft tender mass to left posterior near elbow, treated for cellulitis, has f/u vascular.

## 2024-01-17 NOTE — PLAN
[FreeTextEntry1] : follow up with vascular 1/23/24 follow up with NEPH 1/24/24 follow up with CARD 1/23/24. Son will take her to MD appointments [facility] [address] before bed for cough

## 2024-01-17 NOTE — HEALTH RISK ASSESSMENT
[No] : In the past 12 months have you used drugs other than those required for medical reasons? No [No falls in past year] : Patient reported no falls in the past year [Assistive Device] : Patient uses an assistive device [Little interest or pleasure doing things] : 1) Little interest or pleasure doing things [Feeling down, depressed, or hopeless] : 2) Feeling down, depressed, or hopeless [0] : 2) Feeling down, depressed, or hopeless: Not at all (0) [Low Fat Diet] : low fat [Low Salt Diet] : low salt [Strict Adherence] : strict adherence [None] : None [With Significant Other] : lives with significant other [# of Members in Household ___] :  household currently consist of [unfilled] member(s) [Retired] : retired [] :  [# Of Children ___] : has [unfilled] children [Independent] : managing finances [Some assistance needed] : using transportation [Reports changes in hearing] : Reports changes in hearing [With Patient/Caregiver] : , with patient/caregiver [Reviewed no changes] : Reviewed, no changes [Name: ___] : Health Care Proxy's Name: [unfilled]  [Relationship: ___] : Relationship: [unfilled] [Aggressive treatment] : aggressive treatment [I will adhere to the patient's wishes.] : I will adhere to the patient's wishes. [Time Spent: ___ minutes] : Time Spent: [unfilled] minutes [Never] : Never [de-identified] : exercise  daily on bicycle [de-identified] : low salt, low sugar and low fat [de-identified] : cane outdoors, grab bars in shower [[id number]] : 0 [Language] : denies difficulty with language [Behavior] : denies difficulty with behavior [Learning/Retaining New Information] : denies difficulty learning/retaining new information [Handling Complex Tasks] : denies difficulty handling complex tasks [Reasoning] : denies difficulty with reasoning [Spatial Ability and Orientation] : denies difficulty with spatial ability and orientation [Reports changes in vision] : Reports no changes in vision [de-identified] :  [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

## 2024-01-17 NOTE — REVIEW OF SYSTEMS
[Hearing Loss] : hearing loss [Lower Ext Edema] : lower extremity edema [Cough] : cough [Joint Pain] : joint pain [Joint Stiffness] : joint stiffness [Easy Bruising] : easy bruising [Negative] : Psychiatric [Earache] : no earache [Chest Pain] : no chest pain [Palpitations] : no palpitations [Leg Claudication] : no leg claudication [Orthopnea] : no orthopnea [Paroxysmal Nocturnal Dyspnea] : no paroxysmal nocturnal dyspnea [Shortness Of Breath] : no shortness of breath [Wheezing] : no wheezing [Dyspnea on Exertion] : no dyspnea on exertion [Joint Swelling] : no joint swelling [Muscle Weakness] : no muscle weakness [Muscle Pain] : no muscle pain [Back Pain] : no back pain [Easy Bleeding] : no easy bleeding [Swollen Glands] : no swollen glands [FreeTextEntry3] : hx of glaucoma [FreeTextEntry4] : right ear [place] has HA [FreeTextEntry5] : have swelling "ball to LUE near elbow, I have an appointment on the 24th"

## 2024-01-19 ENCOUNTER — TRANSCRIPTION ENCOUNTER (OUTPATIENT)
Age: 79
End: 2024-01-19

## 2024-01-23 ENCOUNTER — APPOINTMENT (OUTPATIENT)
Dept: VASCULAR SURGERY | Facility: CLINIC | Age: 79
End: 2024-01-23
Payer: MEDICARE

## 2024-01-23 PROCEDURE — 99213 OFFICE O/P EST LOW 20 MIN: CPT

## 2024-01-23 PROCEDURE — 93990 DOPPLER FLOW TESTING: CPT

## 2024-01-24 ENCOUNTER — APPOINTMENT (OUTPATIENT)
Dept: NEPHROLOGY | Facility: CLINIC | Age: 79
End: 2024-01-24

## 2024-01-24 ENCOUNTER — APPOINTMENT (OUTPATIENT)
Dept: TRANSPLANT | Facility: CLINIC | Age: 79
End: 2024-01-24

## 2024-01-24 LAB
ALBUMIN SERPL ELPH-MCNC: 3.5 G/DL
ALP BLD-CCNC: 75 U/L
ALT SERPL-CCNC: 8 U/L
ANION GAP SERPL CALC-SCNC: 14 MMOL/L
APPEARANCE: CLEAR
AST SERPL-CCNC: 14 U/L
BACTERIA: NEGATIVE /HPF
BASOPHILS # BLD AUTO: 0.01 K/UL
BASOPHILS NFR BLD AUTO: 0.2 %
BILIRUB SERPL-MCNC: 0.3 MG/DL
BILIRUBIN URINE: NEGATIVE
BLOOD URINE: ABNORMAL
BUN SERPL-MCNC: 62 MG/DL
CALCIUM SERPL-MCNC: 9.3 MG/DL
CAST: 0 /LPF
CHLORIDE SERPL-SCNC: 108 MMOL/L
CO2 SERPL-SCNC: 21 MMOL/L
COLOR: YELLOW
CREAT SERPL-MCNC: 2.67 MG/DL
CREAT SPEC-SCNC: 45 MG/DL
CREAT/PROT UR: 7 RATIO
EGFR: 18 ML/MIN/1.73M2
EOSINOPHIL # BLD AUTO: 0.12 K/UL
EOSINOPHIL NFR BLD AUTO: 3 %
EPITHELIAL CELLS: 0 /HPF
GLUCOSE QUALITATIVE U: NEGATIVE MG/DL
GLUCOSE SERPL-MCNC: 87 MG/DL
HCT VFR BLD CALC: 30.6 %
HGB BLD-MCNC: 9.2 G/DL
IMM GRANULOCYTES NFR BLD AUTO: 0.5 %
KETONES URINE: NEGATIVE MG/DL
LEUKOCYTE ESTERASE URINE: NEGATIVE
LYMPHOCYTES # BLD AUTO: 0.52 K/UL
LYMPHOCYTES NFR BLD AUTO: 12.9 %
MAGNESIUM SERPL-MCNC: 1.6 MG/DL
MAN DIFF?: NORMAL
MCHC RBC-ENTMCNC: 26.1 PG
MCHC RBC-ENTMCNC: 30.1 GM/DL
MCV RBC AUTO: 86.9 FL
MICROSCOPIC-UA: NORMAL
MONOCYTES # BLD AUTO: 0.24 K/UL
MONOCYTES NFR BLD AUTO: 6 %
NEUTROPHILS # BLD AUTO: 3.12 K/UL
NEUTROPHILS NFR BLD AUTO: 77.4 %
NITRITE URINE: NEGATIVE
PH URINE: 6
PHOSPHATE SERPL-MCNC: 4.3 MG/DL
PLATELET # BLD AUTO: 237 K/UL
POTASSIUM SERPL-SCNC: 4.4 MMOL/L
PROT SERPL-MCNC: 5.7 G/DL
PROT UR-MCNC: 315 MG/DL
PROTEIN URINE: 300 MG/DL
RBC # BLD: 3.52 M/UL
RBC # FLD: 17.9 %
RED BLOOD CELLS URINE: 3 /HPF
SODIUM SERPL-SCNC: 143 MMOL/L
SPECIFIC GRAVITY URINE: 1.01
TACROLIMUS SERPL-MCNC: 6 NG/ML
URATE SERPL-MCNC: 9.1 MG/DL
UROBILINOGEN URINE: 0.2 MG/DL
WBC # FLD AUTO: 4.03 K/UL
WHITE BLOOD CELLS URINE: 0 /HPF

## 2024-01-25 LAB
CMV DNA SPEC QL NAA+PROBE: NOT DETECTED IU/ML
CMVPCR LOG: NOT DETECTED LOG10IU/ML

## 2024-01-26 ENCOUNTER — TRANSCRIPTION ENCOUNTER (OUTPATIENT)
Age: 79
End: 2024-01-26

## 2024-01-29 LAB — BKV DNA SPEC QL NAA+PROBE: NOT DETECTED IU/ML

## 2024-01-29 NOTE — DISCUSSION/SUMMARY
[FreeTextEntry1] : 78-year-old female with ESRD status post kidney transplant   Patient has pre-existing right upper extremity AV graft with good thrill.  In the office today, patient underwent duplex with demonstrates a well-functioning right upper extremity AV graft.  Patient to follow-up in 3 to 4 months with repeat duplex.

## 2024-01-29 NOTE — PHYSICAL EXAM
[Normal] : normal rate, regular rhythm, normal S1/S2, no murmur [Pulsatile Thrill] : pulsatile thrill [Aneurysm] : no aneurysm [Bleeding] : no bleeding

## 2024-01-29 NOTE — HISTORY OF PRESENT ILLNESS
[] : in right upper arm [FreeTextEntry1] : Patient is an 78-year-old female with ESRD status post kidney transplant which is now failing who presents to the office today for follow-up.  Patient with previous right upper extremity AV graft here for evaluation.

## 2024-02-07 NOTE — PROGRESS NOTE ADULT - PROVIDER SPECIALTY LIST ADULT
Writer left message for patient to call back and reschedule new patient virtual visit.   
Transplant Surgery
Transplant Nephrology
Transplant Nephrology

## 2024-03-04 NOTE — ED PROVIDER NOTE - RATE
Patient arrived for cardiac rehab session. Pt reports to have eaten prior to exercise session.  The patient was on continuous EKG monitoring throughout the session. The patient tolerated exercise session well with no complaints.    
65

## 2024-03-20 ENCOUNTER — LABORATORY RESULT (OUTPATIENT)
Age: 79
End: 2024-03-20

## 2024-03-20 ENCOUNTER — APPOINTMENT (OUTPATIENT)
Dept: NEPHROLOGY | Facility: CLINIC | Age: 79
End: 2024-03-20
Payer: MEDICARE

## 2024-03-20 PROCEDURE — 99215 OFFICE O/P EST HI 40 MIN: CPT

## 2024-03-20 RX ORDER — FUROSEMIDE 40 MG/1
40 TABLET ORAL
Qty: 360 | Refills: 3 | Status: ACTIVE | COMMUNITY
Start: 2023-11-29 | End: 1900-01-01

## 2024-03-20 RX ORDER — NIFEDIPINE 90 MG/1
90 TABLET, EXTENDED RELEASE ORAL DAILY
Qty: 90 | Refills: 3 | Status: ACTIVE | COMMUNITY
Start: 2024-01-11 | End: 1900-01-01

## 2024-03-20 RX ORDER — ATOVAQUONE 750 MG/5ML
750 SUSPENSION ORAL DAILY
Qty: 300 | Refills: 2 | Status: DISCONTINUED | COMMUNITY
Start: 2023-11-29 | End: 2024-03-20

## 2024-03-20 RX ORDER — MYCOPHENOLATE MOFETIL 500 MG/1
500 TABLET ORAL DAILY
Qty: 90 | Refills: 3 | Status: ACTIVE | COMMUNITY
Start: 2023-11-29 | End: 1900-01-01

## 2024-03-20 NOTE — HISTORY OF PRESENT ILLNESS
[FreeTextEntry1] : 78 year old woman with ESRD due to HTN, was on hemodialysis since 2016. Received DDRT on 5/19/21   Donor was a 44 yr old woman, KDPI 69%, history of diabetes on insulin, terminal creatinine 3.3.  Post transplant course was complicated by DGF. Last hemodialysis was on 6/4/21. Transplant ureteric stent was removed on 8/2/21.   Other PMH includes :History of COVID-19 infection, hospitalized in March 2020 for only 1 day.   Post transplant course complicated by multiple hospitalizations.  - Oct. 2021 with severe symptomatic anemia (Hgb 5g/dL). Transfused 2 units PRBC. Anemia was due to hemolysis caused by dapsone (despite normal G6PD levels prior to initiation).  - Nov 2021 Noted to have YANELY creatinine 1.2-> 1.65mg/dL.  US showed increased velocities concerning for TRAS but no tardus parvus waves. DSA negative,  and cFDNA  0.5%. Conservatively managed.  - Jan 2022 with worsening anemia, hyperkalemia, tested +ve for COVID but had no symptoms.  - Sept 2022 - Worsening YANELY with cr to 2.2mg/dL. DSA was negative. cFDNA 0.68%.  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  Received pulse steroids, Plex/IVIG.  - Dec 2022 Transplant renal artery angiogram - no hemodynamically significant stenosis  - Nov 2023 shortness of breath and hypertensive urgency, multifocal pneumonia rx with antibiotics.  - Dec 2024 fluid overload and multi focal pneumonia   Oswaldo creatinine ~ 1.2 in 2021, has been progressively rising with nephrotic range proteinuria. 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 Creatinine on last visit was 2.6 with 7 grams proteinuria. Unable to tolerate ARB due to hyperkalemia.   She presents for follow up today.  Last seen in the office 2 months ago.  C/o worsening shortness of breath and  bilateral LE edema. On Lasix 40mg po daily.  Saw vascular surgery and AV fistula is functioning well.  Voiding urine without difficulty. No dysuria or hematuria.  No abdominal pain, nausea, vomiting No fever, chills. Appetite is good, energy is ok but limited by shortness of breath.  Taking all medications as prescribed  's systolic, weight up to 128lbs. Up 8lbs .

## 2024-03-20 NOTE — PHYSICAL EXAM
[General Appearance - Alert] : alert [General Appearance - In No Acute Distress] : in no acute distress [PERRL With Normal Accommodation] : pupils were equal in size, round, and reactive to light [Sclera] : the sclera and conjunctiva were normal [Oropharynx] : the oropharynx was normal [Jugular Venous Distention Increased] : there was no jugular-venous distention [Respiration, Rhythm And Depth] : normal respiratory rhythm and effort [Exaggerated Use Of Accessory Muscles For Inspiration] : no accessory muscle use [Heart Rate And Rhythm] : heart rate was normal and rhythm regular [Heart Sounds Gallop] : no gallops [Heart Sounds] : normal S1 and S2 [Bowel Sounds] : normal bowel sounds [Abdomen Soft] : soft [Abdomen Tenderness] : non-tender [Involuntary Movements] : no involuntary movements were seen [___ (cm) Fistula] : [unfilled] (cm) fistula [Bruit] : a bruit was present [Thrill] : a thrill was present [] : no rash [No Focal Deficits] : no focal deficits [Oriented To Time, Place, And Person] : oriented to person, place, and time [FreeTextEntry1] : RLQ scar well healed, no tenderness, bruit +present

## 2024-03-20 NOTE — ASSESSMENT
[FreeTextEntry1] : 78 yr old woman with ESRD due to HTN S/p DDRT on 5/19/2021, course complicated by DGF due to ATN. Last HD was on 6/4/21. Oswaldo creatinine 1.3mg/dL. Had worsening graft function Cr 1.6 -1.8mg/dL Nov 2021 with 2 g/d proteinuria. No DSA, cFDNA 0.58%. She had transplant kidney biopsy for further worsening creatinine 2.2mg/dL on 9/29/22. 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 2022. Also started losartan for non HLA mediated rejection possibly due to anti angiotensin Ab.  Transplant US 9/2022 with increased velocities concerning for TRAS. Also has uncontrolled HTN and edema. Referred to IR for angiogram, performed on 12/28/22 by Dr. Stafford, no hemodynamically significant stenosis seen.  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  Volume overload worse - increase Lasix to 80mg po bid  Hyperkalemia controlled on  Lokelma  10gm every other day Metabolic acidosis - Continue Sodium bicarb 1300mg po TID  Graft function slowly declining, she will follow with primary nephrologist and discussing resuming HD at [facility]. Vascular appt. Not a candidate for kidney transplant due to advanced age, patient not interested.   Immunosuppression - S/p Simulect induction - On CellCept 500mg po bid - dose lowered for leukopenia and low level CMV viremia. - On Envarsus 5mg po daily. Trough Goal 4-6 - Prednisone 5mg daily  Prophylaxis  - Bactrim discontinued for high K. Dapsone discontinued for hemolytic anemia.  - Discontinue Mepron as pt is now on lower dose immunosuppression   CMV viremia - very low level. peak 83 IU/ml on 5/10/23, undetectable on last visit Jan 2024  Hypertension - difficult to control, partly due to volume overload.  Continue Nifedipine 60mg po bid, Labetalol 200mg po TID. Lasix 40mg po daily.  Off losartan due to worsening graft function and hyperkalemia.   Anemia of CKD.  Continue Procrit 10,000 units q week. Iron stores OK, B12 and folate normal, parvovirus negative   Vitamin D deficiency - continue Vitamin D 50,000 units once a week  F/u with primary nephrologist Dr. Aquino.  Monthly blood work  F/u here in 2 months

## 2024-03-21 LAB
25(OH)D3 SERPL-MCNC: 22.4 NG/ML
ALBUMIN SERPL ELPH-MCNC: 3.7 G/DL
ALP BLD-CCNC: 104 U/L
ALT SERPL-CCNC: 8 U/L
ANION GAP SERPL CALC-SCNC: 13 MMOL/L
APPEARANCE: CLEAR
AST SERPL-CCNC: 14 U/L
BACTERIA: NEGATIVE /HPF
BASOPHILS # BLD AUTO: 0.01 K/UL
BASOPHILS NFR BLD AUTO: 0.2 %
BILIRUB SERPL-MCNC: 0.2 MG/DL
BILIRUBIN URINE: NEGATIVE
BLOOD URINE: ABNORMAL
BUN SERPL-MCNC: 65 MG/DL
CALCIUM SERPL-MCNC: 8.9 MG/DL
CALCIUM SERPL-MCNC: 8.9 MG/DL
CAST: 0 /LPF
CHLORIDE SERPL-SCNC: 109 MMOL/L
CHOLEST SERPL-MCNC: 176 MG/DL
CMV DNA SPEC QL NAA+PROBE: NOT DETECTED IU/ML
CMVPCR LOG: NOT DETECTED LOG10IU/ML
CO2 SERPL-SCNC: 18 MMOL/L
COLOR: YELLOW
CREAT SERPL-MCNC: 3.86 MG/DL
CREAT SPEC-SCNC: 34 MG/DL
CREAT/PROT UR: 7.5 RATIO
EGFR: 11 ML/MIN/1.73M2
EOSINOPHIL # BLD AUTO: 0.18 K/UL
EOSINOPHIL NFR BLD AUTO: 4.3 %
EPITHELIAL CELLS: 0 /HPF
ESTIMATED AVERAGE GLUCOSE: 100 MG/DL
GLUCOSE QUALITATIVE U: NEGATIVE MG/DL
GLUCOSE SERPL-MCNC: 87 MG/DL
HBA1C MFR BLD HPLC: 5.1 %
HCT VFR BLD CALC: 27.1 %
HDLC SERPL-MCNC: 73 MG/DL
HGB BLD-MCNC: 8.4 G/DL
IMM GRANULOCYTES NFR BLD AUTO: 0.2 %
KETONES URINE: NEGATIVE MG/DL
LDH SERPL-CCNC: 260 U/L
LDLC SERPL CALC-MCNC: 77 MG/DL
LEUKOCYTE ESTERASE URINE: NEGATIVE
LYMPHOCYTES # BLD AUTO: 0.46 K/UL
LYMPHOCYTES NFR BLD AUTO: 11.1 %
MAGNESIUM SERPL-MCNC: 1.6 MG/DL
MAN DIFF?: NORMAL
MCHC RBC-ENTMCNC: 25.6 PG
MCHC RBC-ENTMCNC: 31 GM/DL
MCV RBC AUTO: 82.6 FL
MICROSCOPIC-UA: NORMAL
MONOCYTES # BLD AUTO: 0.22 K/UL
MONOCYTES NFR BLD AUTO: 5.3 %
NEUTROPHILS # BLD AUTO: 3.27 K/UL
NEUTROPHILS NFR BLD AUTO: 78.9 %
NITRITE URINE: NEGATIVE
NONHDLC SERPL-MCNC: 102 MG/DL
PARATHYROID HORMONE INTACT: 1118 PG/ML
PH URINE: 7
PHOSPHATE SERPL-MCNC: 4.6 MG/DL
PLATELET # BLD AUTO: 167 K/UL
POTASSIUM SERPL-SCNC: 5.1 MMOL/L
PROT SERPL-MCNC: 5.9 G/DL
PROT UR-MCNC: 255 MG/DL
PROTEIN URINE: 300 MG/DL
RBC # BLD: 3.28 M/UL
RBC # FLD: 16.5 %
RED BLOOD CELLS URINE: 6 /HPF
SODIUM SERPL-SCNC: 141 MMOL/L
SPECIFIC GRAVITY URINE: 1.01
TACROLIMUS SERPL-MCNC: 4.6 NG/ML
TRIGL SERPL-MCNC: 148 MG/DL
URATE SERPL-MCNC: 7.4 MG/DL
UROBILINOGEN URINE: 0.2 MG/DL
WBC # FLD AUTO: 4.15 K/UL
WHITE BLOOD CELLS URINE: 1 /HPF

## 2024-03-21 RX ORDER — ERYTHROPOIETIN 20000 [IU]/ML
20000 INJECTION, SOLUTION INTRAVENOUS; SUBCUTANEOUS
Qty: 1 | Refills: 0 | Status: ACTIVE | COMMUNITY
Start: 2022-10-13 | End: 1900-01-01

## 2024-03-25 LAB — BKV DNA SPEC QL NAA+PROBE: NOT DETECTED IU/ML

## 2024-04-04 ENCOUNTER — APPOINTMENT (OUTPATIENT)
Dept: TRANSPLANT | Facility: CLINIC | Age: 79
End: 2024-04-04

## 2024-04-05 ENCOUNTER — INPATIENT (INPATIENT)
Facility: HOSPITAL | Age: 79
LOS: 10 days | Discharge: HOME HEALTH | DRG: 291 | End: 2024-04-16
Attending: INTERNAL MEDICINE | Admitting: INTERNAL MEDICINE
Payer: MEDICARE

## 2024-04-05 ENCOUNTER — APPOINTMENT (OUTPATIENT)
Dept: TRANSPLANT | Facility: CLINIC | Age: 79
End: 2024-04-05

## 2024-04-05 VITALS
HEART RATE: 70 BPM | SYSTOLIC BLOOD PRESSURE: 234 MMHG | WEIGHT: 119.93 LBS | RESPIRATION RATE: 16 BRPM | HEIGHT: 66 IN | TEMPERATURE: 98 F | DIASTOLIC BLOOD PRESSURE: 94 MMHG | OXYGEN SATURATION: 97 %

## 2024-04-05 DIAGNOSIS — Z94.0 KIDNEY TRANSPLANT STATUS: ICD-10-CM

## 2024-04-05 DIAGNOSIS — I50.33 ACUTE ON CHRONIC DIASTOLIC (CONGESTIVE) HEART FAILURE: ICD-10-CM

## 2024-04-05 DIAGNOSIS — Z99.2 DEPENDENCE ON RENAL DIALYSIS: Chronic | ICD-10-CM

## 2024-04-05 DIAGNOSIS — H26.9 UNSPECIFIED CATARACT: Chronic | ICD-10-CM

## 2024-04-05 DIAGNOSIS — Z90.710 ACQUIRED ABSENCE OF BOTH CERVIX AND UTERUS: Chronic | ICD-10-CM

## 2024-04-05 DIAGNOSIS — D63.8 ANEMIA IN OTHER CHRONIC DISEASES CLASSIFIED ELSEWHERE: ICD-10-CM

## 2024-04-05 DIAGNOSIS — I10 ESSENTIAL (PRIMARY) HYPERTENSION: ICD-10-CM

## 2024-04-05 DIAGNOSIS — I77.0 ARTERIOVENOUS FISTULA, ACQUIRED: Chronic | ICD-10-CM

## 2024-04-05 DIAGNOSIS — Z94.0 KIDNEY TRANSPLANT STATUS: Chronic | ICD-10-CM

## 2024-04-05 LAB
ALBUMIN SERPL ELPH-MCNC: 3.4 G/DL — SIGNIFICANT CHANGE UP (ref 3.3–5)
ALP SERPL-CCNC: 98 U/L — SIGNIFICANT CHANGE UP (ref 40–120)
ALT FLD-CCNC: 8 U/L — LOW (ref 10–45)
ANION GAP SERPL CALC-SCNC: 13 MMOL/L — SIGNIFICANT CHANGE UP (ref 5–17)
AST SERPL-CCNC: 14 U/L — SIGNIFICANT CHANGE UP (ref 10–40)
BASOPHILS # BLD AUTO: 0 K/UL — SIGNIFICANT CHANGE UP (ref 0–0.2)
BASOPHILS NFR BLD AUTO: 0 % — SIGNIFICANT CHANGE UP (ref 0–2)
BILIRUB SERPL-MCNC: 0.2 MG/DL — SIGNIFICANT CHANGE UP (ref 0.2–1.2)
BUN SERPL-MCNC: 69 MG/DL — HIGH (ref 7–23)
CALCIUM SERPL-MCNC: 8.6 MG/DL — SIGNIFICANT CHANGE UP (ref 8.4–10.5)
CHLORIDE SERPL-SCNC: 111 MMOL/L — HIGH (ref 96–108)
CO2 SERPL-SCNC: 17 MMOL/L — LOW (ref 22–31)
CREAT SERPL-MCNC: 3.81 MG/DL — HIGH (ref 0.5–1.3)
EGFR: 11 ML/MIN/1.73M2 — LOW
EOSINOPHIL # BLD AUTO: 0.12 K/UL — SIGNIFICANT CHANGE UP (ref 0–0.5)
EOSINOPHIL NFR BLD AUTO: 2.5 % — SIGNIFICANT CHANGE UP (ref 0–6)
FLUAV AG NPH QL: SIGNIFICANT CHANGE UP
FLUBV AG NPH QL: SIGNIFICANT CHANGE UP
GLUCOSE SERPL-MCNC: 86 MG/DL — SIGNIFICANT CHANGE UP (ref 70–99)
HCT VFR BLD CALC: 26.3 % — LOW (ref 34.5–45)
HGB BLD-MCNC: 8.1 G/DL — LOW (ref 11.5–15.5)
IMM GRANULOCYTES NFR BLD AUTO: 0.6 % — SIGNIFICANT CHANGE UP (ref 0–0.9)
LYMPHOCYTES # BLD AUTO: 0.55 K/UL — LOW (ref 1–3.3)
LYMPHOCYTES # BLD AUTO: 11.3 % — LOW (ref 13–44)
MCHC RBC-ENTMCNC: 26 PG — LOW (ref 27–34)
MCHC RBC-ENTMCNC: 30.8 GM/DL — LOW (ref 32–36)
MCV RBC AUTO: 84.6 FL — SIGNIFICANT CHANGE UP (ref 80–100)
MONOCYTES # BLD AUTO: 0.25 K/UL — SIGNIFICANT CHANGE UP (ref 0–0.9)
MONOCYTES NFR BLD AUTO: 5.1 % — SIGNIFICANT CHANGE UP (ref 2–14)
NEUTROPHILS # BLD AUTO: 3.93 K/UL — SIGNIFICANT CHANGE UP (ref 1.8–7.4)
NEUTROPHILS NFR BLD AUTO: 80.5 % — HIGH (ref 43–77)
NRBC # BLD: 0 /100 WBCS — SIGNIFICANT CHANGE UP (ref 0–0)
NT-PROBNP SERPL-SCNC: HIGH PG/ML (ref 0–300)
PLATELET # BLD AUTO: 170 K/UL — SIGNIFICANT CHANGE UP (ref 150–400)
POTASSIUM SERPL-MCNC: 4.9 MMOL/L — SIGNIFICANT CHANGE UP (ref 3.5–5.3)
POTASSIUM SERPL-SCNC: 4.9 MMOL/L — SIGNIFICANT CHANGE UP (ref 3.5–5.3)
PROT SERPL-MCNC: 5.9 G/DL — LOW (ref 6–8.3)
RBC # BLD: 3.11 M/UL — LOW (ref 3.8–5.2)
RBC # FLD: 17.1 % — HIGH (ref 10.3–14.5)
RSV RNA NPH QL NAA+NON-PROBE: SIGNIFICANT CHANGE UP
SARS-COV-2 RNA SPEC QL NAA+PROBE: SIGNIFICANT CHANGE UP
SODIUM SERPL-SCNC: 141 MMOL/L — SIGNIFICANT CHANGE UP (ref 135–145)
TROPONIN T, HIGH SENSITIVITY RESULT: 123 NG/L — HIGH (ref 0–51)
WBC # BLD: 4.88 K/UL — SIGNIFICANT CHANGE UP (ref 3.8–10.5)
WBC # FLD AUTO: 4.88 K/UL — SIGNIFICANT CHANGE UP (ref 3.8–10.5)

## 2024-04-05 PROCEDURE — 99222 1ST HOSP IP/OBS MODERATE 55: CPT | Mod: GC

## 2024-04-05 PROCEDURE — 99285 EMERGENCY DEPT VISIT HI MDM: CPT | Mod: GC

## 2024-04-05 PROCEDURE — 71045 X-RAY EXAM CHEST 1 VIEW: CPT | Mod: 26

## 2024-04-05 RX ORDER — NIFEDIPINE 30 MG
90 TABLET, EXTENDED RELEASE 24 HR ORAL DAILY
Refills: 0 | Status: DISCONTINUED | OUTPATIENT
Start: 2024-04-05 | End: 2024-04-11

## 2024-04-05 RX ORDER — ERYTHROPOIETIN 10000 [IU]/ML
10000 INJECTION, SOLUTION INTRAVENOUS; SUBCUTANEOUS
Refills: 0 | DISCHARGE

## 2024-04-05 RX ORDER — SODIUM ZIRCONIUM CYCLOSILICATE 10 G/10G
10 POWDER, FOR SUSPENSION ORAL DAILY
Refills: 0 | Status: DISCONTINUED | OUTPATIENT
Start: 2024-04-05 | End: 2024-04-14

## 2024-04-05 RX ORDER — SODIUM BICARBONATE 1 MEQ/ML
1300 SYRINGE (ML) INTRAVENOUS THREE TIMES A DAY
Refills: 0 | Status: DISCONTINUED | OUTPATIENT
Start: 2024-04-05 | End: 2024-04-11

## 2024-04-05 RX ORDER — FUROSEMIDE 40 MG
40 TABLET ORAL ONCE
Refills: 0 | Status: COMPLETED | OUTPATIENT
Start: 2024-04-05 | End: 2024-04-05

## 2024-04-05 RX ORDER — FUROSEMIDE 40 MG
80 TABLET ORAL
Refills: 0 | Status: DISCONTINUED | OUTPATIENT
Start: 2024-04-06 | End: 2024-04-08

## 2024-04-05 RX ORDER — HYDRALAZINE HCL 50 MG
10 TABLET ORAL ONCE
Refills: 0 | Status: COMPLETED | OUTPATIENT
Start: 2024-04-05 | End: 2024-04-05

## 2024-04-05 RX ORDER — FUROSEMIDE 40 MG
80 TABLET ORAL ONCE
Refills: 0 | Status: COMPLETED | OUTPATIENT
Start: 2024-04-05 | End: 2024-04-05

## 2024-04-05 RX ORDER — HYDRALAZINE HCL 50 MG
25 TABLET ORAL THREE TIMES A DAY
Refills: 0 | Status: DISCONTINUED | OUTPATIENT
Start: 2024-04-05 | End: 2024-04-07

## 2024-04-05 RX ORDER — CHOLECALCIFEROL (VITAMIN D3) 125 MCG
1 CAPSULE ORAL
Qty: 0 | Refills: 0 | DISCHARGE

## 2024-04-05 RX ORDER — CHLORHEXIDINE GLUCONATE 213 G/1000ML
1 SOLUTION TOPICAL DAILY
Refills: 0 | Status: DISCONTINUED | OUTPATIENT
Start: 2024-04-05 | End: 2024-04-16

## 2024-04-05 RX ORDER — MYCOPHENOLATE MOFETIL 250 MG/1
1 CAPSULE ORAL
Refills: 0 | DISCHARGE

## 2024-04-05 RX ORDER — ERYTHROPOIETIN 10000 [IU]/ML
20000 INJECTION, SOLUTION INTRAVENOUS; SUBCUTANEOUS
Refills: 0 | DISCHARGE

## 2024-04-05 RX ORDER — LABETALOL HCL 100 MG
200 TABLET ORAL THREE TIMES A DAY
Refills: 0 | Status: DISCONTINUED | OUTPATIENT
Start: 2024-04-05 | End: 2024-04-11

## 2024-04-05 RX ORDER — HYDRALAZINE HCL 50 MG
50 TABLET ORAL THREE TIMES A DAY
Refills: 0 | Status: DISCONTINUED | OUTPATIENT
Start: 2024-04-05 | End: 2024-04-05

## 2024-04-05 RX ORDER — ASPIRIN/CALCIUM CARB/MAGNESIUM 324 MG
162 TABLET ORAL ONCE
Refills: 0 | Status: COMPLETED | OUTPATIENT
Start: 2024-04-05 | End: 2024-04-05

## 2024-04-05 RX ORDER — ASPIRIN/CALCIUM CARB/MAGNESIUM 324 MG
81 TABLET ORAL DAILY
Refills: 0 | Status: DISCONTINUED | OUTPATIENT
Start: 2024-04-05 | End: 2024-04-16

## 2024-04-05 RX ORDER — HYDRALAZINE HCL 50 MG
50 TABLET ORAL ONCE
Refills: 0 | Status: COMPLETED | OUTPATIENT
Start: 2024-04-05 | End: 2024-04-05

## 2024-04-05 RX ADMIN — Medication 162 MILLIGRAM(S): at 09:48

## 2024-04-05 RX ADMIN — Medication 50 MILLIGRAM(S): at 18:37

## 2024-04-05 RX ADMIN — Medication 200 MILLIGRAM(S): at 22:09

## 2024-04-05 RX ADMIN — Medication 10 MILLIGRAM(S): at 16:10

## 2024-04-05 RX ADMIN — Medication 80 MILLIGRAM(S): at 10:35

## 2024-04-05 RX ADMIN — Medication 200 MILLIGRAM(S): at 13:37

## 2024-04-05 RX ADMIN — Medication 5 MILLIGRAM(S): at 13:37

## 2024-04-05 RX ADMIN — Medication 1300 MILLIGRAM(S): at 22:09

## 2024-04-05 RX ADMIN — Medication 90 MILLIGRAM(S): at 14:09

## 2024-04-05 RX ADMIN — Medication 40 MILLIGRAM(S): at 16:10

## 2024-04-05 RX ADMIN — Medication 1300 MILLIGRAM(S): at 13:37

## 2024-04-05 NOTE — CONSULT NOTE ADULT - ATTENDING COMMENTS
Pt seen and examined on 4/5/24  79 y/o F with hx ESRD due to HTN, was on hemodialysis since 2016. Received DDRT on 5/19/21 presented with HTN emergency and yareli on ckd.  check tacro trough and resume home BP meds  discussed with ER IV antihypertensive to stabilized BP

## 2024-04-05 NOTE — ED PROVIDER NOTE - DIFFERENTIAL DIAGNOSIS
Differential Diagnosis pulmonary edema vs ACS vs PE (unlikely, no calf pain, not tachy, no chest pain, no hypoxia)

## 2024-04-05 NOTE — CHART NOTE - NSCHARTNOTEFT_GEN_A_CORE
Prior to patient's arrival to [address] [place] [address]: Pt with /99. Pt received all home oral meds about 1 hour prior without improvement of BP. Dr. Briggs recommended hydralazine 10mg IV and lasix 40mg IV x 1. BP after these meds now 214/90. Pt denies Cp/palps, HA, Sob, dizziness, visual problems, n/v. Discussed with MD Oh who came to see the patient at bedside. Recc adding Hydralyzine 50 TID, and to continue to monitor, but if pt remains asymptomatic, will not intervene further. Prior to patient's arrival to [address] [place] [address]: Pt with /99. Pt received all home oral meds about 1 hour prior without improvement of BP. Dr. Briggs recommended hydralazine 10mg IV and lasix 40mg IV x 1. BP after these meds now 214/90. Pt denies Cp/palps, HA, Sob, dizziness, visual problems, n/v. Discussed with MD Oh who came to see the patient at bedside. Recc adding Hydralyzine 50 TID, EKG, and to continue to monitor, but if pt remains asymptomatic, will not intervene further.

## 2024-04-05 NOTE — CHART NOTE - NSCHARTNOTEFT_GEN_A_CORE
Pt with noted /90 this afternoon. Pt received all oral meds about 1 hour prior without improvement of BP. Pt denies Cp/palps, HA, Sob, dizziness, visual problems, n/v. Discussed with renal transplant fellow, Dr. Briggs who recommends hydralazine 10mg IV and lasix 40mg IV x 1 now. Pt with noted left lower arm swelling from elbow down to hand. As per patient swelling started yesterday. Will order US to concerning for DVT. Strict I and O. Will follow closely

## 2024-04-05 NOTE — ED ADULT NURSE NOTE - NS ED NURSE REPORT GIVEN TO FT
[address]. nurse gerard of elevated BP admitting provider at bedside. pt will get additional ordered Bp meds on the floor upon arrival

## 2024-04-05 NOTE — CONSULT NOTE ADULT - ASSESSMENT
78-year-old female with a history of hypertension, diastolic heart failure, renal transplant 2 years ago no longer on tacrolimus who presents with shortness of breath and lower extremity swelling since yesterday. Patient reports dyspnea with exertion, no orthopnea.  Patient denies any cough, fever, chills, abdominal pain, nausea, vomiting, dysuria, chest pain. Patient is ambulatory at home, endorses no changes in activity or medication changes recently. Not on any AC. Patient is currently on 80 mg of Lasix twice daily, was started by cardiologist 2 weeks ago.  Nephrology consulted for YANELY.      A/P:  YANELY on CKD 4  S/p DDRT (5/19/2021 - induction w/ Basiliximab)  Baseline sCr ~3-3.3.  Renal function fluctuates sec to HF.  Repeat TTE 1/4 - Left ventricular wall thickness is moderately increased. Left ventricular systolic function is normal with a calculated ejection fraction of 63 % with moderate L ventricular dysfunction.  Review of home meds shows pt only on prednisone 5mg qd.  Renal function worsened on admission likely d/t cardiorenal vs. hemodynamic changes w/ hypertensive urgency.  Check UA urine creatinine and urine urea nitrogen; bladder scan.  C/W lasix 80mg BID.  Avoid nephrotoxic agents.  Monitor BMP and UO.    HTN:  Uncontrolled.  Resumed on home meds  c/w lasix, hydralazine, and nifedipine.  If BP remains elevated, consider titrating up hydralazine  Avoid hypotension.  Monitor BP.    Hyperkalemia:  K stable   On lokelma 10gm qd.  Low K diet.  Monitor K closely.    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  c/w NaHCO3 1300mg TID.  C/w lasix.  Monitor CO2.    Anemia:  Likely in setting of CKD vs iron deficiency.  iron deficient, Tsat 11% 1/6  - repeat iron studies.  SHELDON 10,000 units TIW - hold for SBP >170.  Transfuse per primary team as needed     SOB:  Chest x-ray w/ pulmonary edema and L pleural effusion.  C/W diuretics.   78-year-old female with a history of hypertension, diastolic heart failure, renal transplant 2 years ago no longer on tacrolimus who presents with shortness of breath and lower extremity swelling since yesterday. Patient reports dyspnea with exertion, no orthopnea.  Patient denies any cough, fever, chills, abdominal pain, nausea, vomiting, dysuria, chest pain. Patient is ambulatory at home, endorses no changes in activity or medication changes recently. Not on any AC. Patient is currently on 80 mg of Lasix twice daily, was started by cardiologist 2 weeks ago.  Nephrology consulted for YANELY.      A/P:  YANELY on CKD 4  S/p DDRT (5/19/2021 - induction w/ Basiliximab)  Baseline sCr ~3-3.3.  Renal function fluctuates sec to HF.  Repeat TTE 1/4 - Left ventricular wall thickness is moderately increased. Left ventricular systolic function is normal with a calculated ejection fraction of 63 % with moderate L ventricular dysfunction.  Review of home meds shows pt only on prednisone 5mg qd.  Renal function worsened on admission likely d/t cardiorenal vs. hemodynamic changes w/ hypertensive urgency.  Check UA urine creatinine and urine urea nitrogen; bladder scan.  C/W lasix 80mg BID.  Avoid nephrotoxic agents.  Monitor BMP and UO.  Follow up Transplant nephrology    HTN:  Uncontrolled.  Resumed on home meds  c/w lasix, hydralazine, and nifedipine.  If BP remains elevated, consider titrating up hydralazine  Avoid hypotension.  Monitor BP.    Hyperkalemia:  K stable   On lokelma 10gm qd.  Low K diet.  Monitor K closely.    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  c/w NaHCO3 1300mg TID.  C/w lasix.  Monitor CO2.      SOB:  Chest x-ray w/ pulmonary edema and L pleural effusion.  C/W diuretics.

## 2024-04-05 NOTE — ED ADULT NURSE NOTE - OBJECTIVE STATEMENT
77 y/o F with  PMHx HTN, HF, DVT, ESRD s/p Renal transplant 05/2021presenting to ED with c/o SOB, fatigue and increased extremity and facial swelling since yesterday afternoon. Pt A&Ox4 able to follow all commands. Pulse, motor, sensation present and equal in all 4 extremities. Denies HA, dizziness, blurry vision. Respirations spontaneous and unlabored on room air. Denies cough, CP, palpitations. EKG done. On CM, NSR. Denies abd pain, N/V/D/C. Abd soft NT/ND. Denies urinary symptoms. Denies fever, chills. No sick contacts. Skin intact, warm, dry, normal for race. edema noted to left arm and lower extremity. fistula still in placed in right arm thrill is palpable. ultrasound guided 20G IV placed by ED resident in left AC. Pt Ambulates with cane at baseline. Patient placed in position of comfort, bed locked and in lowest position. Call bell within reach. daughter in law at bedside.

## 2024-04-05 NOTE — PATIENT PROFILE ADULT - FALL HARM RISK - HARM RISK INTERVENTIONS

## 2024-04-05 NOTE — H&P ADULT - PROBLEM SELECTOR PLAN 1
cardiology and transplant team to see  received 80 mg IV furosemide   will likely continue IV   pro-BNP noted > 18K  continue to monitor closely  strict i/o  daily wt

## 2024-04-05 NOTE — H&P ADULT - NSHPREVIEWOFSYSTEMS_GEN_ALL_CORE
Gen: no loss of wt no loss of appetite  ENT: no dizziness no hearing loss  Ophth: no blurring of vision no loss of vision  Resp: see above HPI   CVS: see above HPI   GI: no nausea, vomiting or diarrhea   : no dysuria, hematuria  Endo: no polyuria no excessive sweating  Neuro: no weakness no paresthesias  Heme: No petechiae no easy bruising  Msk: + bilateral LE swelling   Skin: No rash no itching

## 2024-04-05 NOTE — ED PROVIDER NOTE - CARE PLAN
1 Principal Discharge DX:	Acute on chronic diastolic congestive heart failure   Principal Discharge DX:	Acute on chronic diastolic congestive heart failure  Secondary Diagnosis:	Cardiac enzymes elevated  Secondary Diagnosis:	Acute on chronic renal failure

## 2024-04-05 NOTE — ED PROVIDER NOTE - PROGRESS NOTE DETAILS
On POCUS, B-lines throughout all lung fields, left-sided pleural effusion.  Patient orthopneic when lying down for echo.  Arjun Ahn MD PGY-1 Dr. Felix: Given + trop and degree of fluid overload, will admit patient. No active chest pain, will give ASA, no heparin needed at this time. Will call transplant and unattached for admission. Spoke to nephrology transplant, agreed to see patient.  Arjun Ahn MD PGY-1 Nephrology transplant team stopped by, systolics were in the 230s, indicated that they would like IV antihypertensives.  Also indicated upper extremity swelling.  Per chart review she had upper extremity swelling and prior admission, upper extremity Dopplers were at the time were negative.  Reached out to admitting team able to get updates.  Arjun Ahn MD PGY-1

## 2024-04-05 NOTE — ED PROVIDER NOTE - NSICDXPASTMEDICALHX_GEN_ALL_CORE_FT
PAST MEDICAL HISTORY:  Anemia of chronic disease     Cataract     DVT (deep venous thrombosis) of Left subclavian vein, 06/12/17    ESRD (end stage renal disease) on dialysis     Glaucoma     History of arteriovenostomy for renal dialysis     [place] (hard of hearing)     HTN (hypertension)     Kidney transplant recipient

## 2024-04-05 NOTE — CONSULT NOTE ADULT - SUBJECTIVE AND OBJECTIVE BOX
[facility] DIVISION OF KIDNEY DISEASES AND HYPERTENSION -- INITIAL CONSULT NOTE  --------------------------------------------------------------------------------  HPI:   78-year-old female with PMHx of ESRD due to HTN, was on hemodialysis since 2016. Received DDRT on 5/19/21. Donor was a 44 yr old woman, KDPI 69%, history of diabetes on insulin, terminal creatinine 3.3 presents with shortness of breath and lower extremity swelling since yesterday. Patient reports dyspnea with exertion, no orthopnea.  Patient denies any cough, fever, chills, abdominal pain, nausea, vomiting, dysuria, chest pain. Patient is ambulatory at home, endorses no changes in activity or medication changes recently. Not on any AC. Patient is currently on 80 mg of Lasix twice daily, was started by cardiologist 2 weeks ago.  Of note recently admitted for fluid overload and cellulitis, hospital course complicated by pneumonia.  Patient completed antibiotic course outpatient.  Transplant nephro was consulted for management of the renal transplant status. Follows Dr. Louise as out pt and last seen on 3/20/24 and her Scr 3.86. She endorsed that she was taking her medications regularly but does not remember the medication names. As per last transplant clinic visit, pt is supposed to be on Envar 5, pred 5,  q12.          PAST HISTORY  --------------------------------------------------------------------------------  PAST MEDICAL & SURGICAL HISTORY:  HTN (hypertension)      Glaucoma      Cataract      ESRD (end stage renal disease) on dialysis      DVT (deep venous thrombosis)  of Left subclavian vein, 06/12/17      Kidney transplant recipient      Anemia of chronic disease      History of arteriovenostomy for renal dialysis      [place] (hard of hearing)      Acquired cataract  extraction with b/l lense placement, 2016      S/P KEVEN-BSO  for fibroids, 2012      Hemodialysis access, AV graft      History of renal transplantation  DDRT 5/19/2021      AV fistula        FAMILY HISTORY:  Family history of cerebrovascular accident (CVA)    Family history of colon cancer (Sibling)      Social History:  non smoker  no IVDA  no ETOH abuse   lives with family (05 Apr 2024 13:04)        ALLERGIES & MEDICATIONS  --------------------------------------------------------------------------------  Allergies    Mushrooms (Anaphylaxis)  penicillin (Rash)    Intolerances      Standing Inpatient Medications  aspirin  chewable 81 milliGRAM(s) Oral daily  chlorhexidine 2% Cloths 1 Application(s) Topical daily  hydrALAZINE 50 milliGRAM(s) Oral three times a day  labetalol 200 milliGRAM(s) Oral three times a day  NIFEdipine XL 90 milliGRAM(s) Oral daily  predniSONE   Tablet 5 milliGRAM(s) Oral daily  sodium bicarbonate 1300 milliGRAM(s) Oral three times a day  sodium zirconium cyclosilicate 10 Gram(s) Oral daily    PRN Inpatient Medications      REVIEW OF SYSTEMS  --------------------------------------------------------------------------------  Gen: No weight changes, fatigue, fevers/chills, weakness  Skin: No rashes  Head/Eyes/Ears/Mouth: No headache; Normal hearing; Normal vision w/o blurriness; No sinus pain/discomfort, sore throat  Respiratory:  dyspnea+  CV: Dyspnea.   GI: No abdominal pain, diarrhea, constipation, nausea, vomiting, melena, hematochezia  : No increased frequency, dysuria, hematuria, nocturia  MSK: No joint pain/swelling; no back pain; no edema  Neuro: No dizziness/lightheadedness, weakness, seizures, numbness, tingling  Heme: No easy bruising or bleeding  Endo: No heat/cold intolerance  Psych: No significant nervousness, anxiety, stress, depression        VITALS/PHYSICAL EXAM  --------------------------------------------------------------------------------  T(C): 37 (04-05-24 @ 16:47), Max: 37 (04-05-24 @ 16:47)  HR: 87 (04-05-24 @ 18:36) (69 - 87)  BP: 183/89 (04-05-24 @ 18:36) (183/89 - 239/101)  RR: 18 (04-05-24 @ 16:47) (16 - 18)  SpO2: 95% (04-05-24 @ 16:47) (94% - 98%)  Wt(kg): --  Height (cm): 167.6 (04-05-24 @ 08:58)  Weight (kg): 54.4 (04-05-24 @ 08:58)  BMI (kg/m2): 19.4 (04-05-24 @ 08:58)  BSA (m2): 1.61 (04-05-24 @ 08:58)        Physical Exam:  Gen: Not in distress  HEENT: Supple neck, No JVD, PERRLA.  Pulm:Lower zone crepts +  CV:S1S2+   Abd: Non- tender, no distention, Bowel sounds +  Transplant: non tender, no bruit  : No suprapubic tenderness  Extremities: No edema.  Skin: warm  Neuro: AAOx 3 No apparent FNDs      LABS/STUDIES  --------------------------------------------------------------------------------              8.1    4.88  >-----------<  170      [04-05-24 @ 10:26]              26.3     141  |  111  |  69  ----------------------------<  86      [04-05-24 @ 10:26]  4.9   |  17  |  3.81        Ca     8.6     [04-05-24 @ 10:26]    TPro  5.9  /  Alb  3.4  /  TBili  0.2  /  DBili  x   /  AST  14  /  ALT  8   /  AlkPhos  98  [04-05-24 @ 10:26]          Creatinine Trend:  SCr 3.81 [04-05 @ 10:26]    Urinalysis - [04-05-24 @ 10:26]      Color  / Appearance  / SG  / pH       Gluc 86 / Ketone   / Bili  / Urobili        Blood  / Protein  / Leuk Est  / Nitrite       RBC  / WBC  / Hyaline  / Gran  / Sq Epi  / Non Sq Epi  / Bacteria       Iron 21, TIBC 171, %sat 12      [01-06-24 @ 11:12]  Ferritin 1573      [01-06-24 @ 11:12]  PTH -- (Ca 8.5)      [11-10-23 @ 06:01]   659  Vitamin D (25OH) 16.5      [11-11-23 @ 06:09]  HbA1c 4.6      [05-28-19 @ 09:49]  Lipid: chol 191, , HDL 64, LDL --      [01-04-24 @ 10:13]    HBsAb 22.4      [05-19-21 @ 10:42]  HBsAb Reactive      [10-19-19 @ 01:09]  HBsAg Nonreact      [01-04-24 @ 07:51]  HBcAb Nonreact      [05-19-21 @ 10:42]  HCV 0.27, Nonreact      [01-04-24 @ 07:51]  HIV Nonreact      [01-04-24 @ 10:13]  HIV Nonreact      [11-08-23 @ 13:20]    STEPHANIE: titer 1:80, pattern Speckled      [01-04-24 @ 07:51]  dsDNA <12      [01-04-24 @ 07:51]  C3 Complement 120      [01-04-24 @ 07:51]  C4 Complement 34      [01-04-24 @ 07:51]  Rheumatoid Factor 10      [11-11-23 @ 06:09]  ANCA: cANCA Negative, pANCA Negative, atypical ANCA Indeterminate Method interference due to STEPHANIE Fluorescence      [01-04-24 @ 07:51]  Immunofixation Serum:   No Monoclonal Band Identified      Reference Range: None Detected      [01-04-24 @ 07:51]  SPEP Interpretation: Normal Electrophoresis Pattern      [02-05-21 @ 06:39]    Tacrolimus  Cyclosporine  Sirolimus  Mycophenolate  BK PCR  CMV PCRCMVPCR Log: NotDetec [id number]/mL (01-04 @ 10:13)    Parvo PCR  EBV PCR

## 2024-04-05 NOTE — CONSULT NOTE ADULT - ASSESSMENT
77 y/o F with hx ESRD due to HTN, was on hemodialysis since 2016. Received DDRT on 5/19/21 presented with HTN emergency.    HTN emergency:  Pt is on home medications - ? compliance. But pt mentioned that she was taking medications.   Informed the primary team that pt has high BP and is in HTN emergency.   PLAN:  Management as per primary team.       IS:  Pt is not taking envarsus as per ED. But patient mentioned that she was taking the medications.   PLAN:  Send tacro levels   Envarsus 5mg    BID  Pred 5.    CKD:  Home sodium bicarb  SHELDON 20k qweekly as per out pt. But given her high blood pressure, hold SHELDON for now.

## 2024-04-05 NOTE — ED PROVIDER NOTE - CLINICAL SUMMARY MEDICAL DECISION MAKING FREE TEXT BOX
The patient is an 78-year-old female with a history of hypertension, diastolic heart failure, renal transplant 2 years ago no longer on tacrolimus who presents with shortness of breath and lower extremity swelling since yesterday. Concern for acute exacerbation of systolic heart failure versus ACS.Patient appears clinically fluid overloaded, plan for IV Lasix 80 mg IV push.  Plan for CBC, CMP, BNP, Trope, chest x-ray.  If patient lab workup and chest x-ray do not show evidence of fluid overload, plan for CTA to rule out PE.  Low suspicion of PE as patient appears clinically overloaded, not hypoxic or tachycardia cardiac, Wells negative. Patient will be admitted admission for fluid overload and multiple comorbidities.

## 2024-04-05 NOTE — ED PROVIDER NOTE - PHYSICAL EXAMINATION
Physical Exam:  Gen: NAD, non-toxic appearing  Head: NCAT  HEENT: Normal conjunctiva, trachea midline, moist mucous membranes  Lung: bilateral bibasilar crackles, crackles on right side up to upper lobe   CV: RRR, no murmurs, rubs or gallops  Abd: Soft, NT, ND, no guarding, rigidity, rebound tenderness  MSK: No visible deformities, moves all four extremities   Neuro: No focal motor deficits  Skin: 3+ lower extremity edema equal bilaterally   Psych: appropriate affect and mood

## 2024-04-05 NOTE — ED PROVIDER NOTE - OBJECTIVE STATEMENT
The patient is an 78-year-old female with a history of hypertension, diastolic heart failure, renal transplant 2 years ago no longer on tacrolimus who presents with shortness of breath and lower extremity swelling since yesterday. Patient reports dyspnea with exertion, no orthopnea.  Patient denies any cough, fever, chills, abdominal pain, nausea, vomiting, dysuria, chest pain. Patient is ambulatory at home, endorses no changes in activity or medication changes recently. Not on any AC. Patient is currently on 80 mg of Lasix twice daily, was started by cardiologist 2 weeks ago.  Of note recently admitted for fluid overload and cellulitis, hospital course complicated by pneumonia.  Patient completed antibiotic course outpatient.  And received follow-up with transplant team and cardiologist.

## 2024-04-05 NOTE — CONSULT NOTE ADULT - NS ATTEND AMEND GEN_ALL_CORE FT
continue diuretics   follow up transplant nephro
Patient care and plan discussed and reviewed with Advanced Care Provider. Plan as outlined above edited by me to reflect our discussion.

## 2024-04-05 NOTE — CONSULT NOTE ADULT - SUBJECTIVE AND OBJECTIVE BOX
DATE OF SERVICE: 04-05-24 @ 18:05    CHIEF COMPLAINT:Patient is a 78y old  Female who presents with a chief complaint of shortness of breath and leg swelling (05 Apr 2024 13:04)      HISTORY OF PRESENT ILLNESS:  78-year-old female with a history of hypertension, diastolic heart failure, renal transplant 2 years ago no longer on tacrolimus who presents with shortness of breath and lower extremity swelling since yesterday. Patient reports dyspnea with exertion, no orthopnea.  Patient denies any cough, fever, chills, abdominal pain, nausea, vomiting, dysuria, chest pain. Patient is ambulatory at home, endorses no changes in activity or medication changes recently. Not on any AC. Patient is currently on 80 mg of Lasix twice daily, was started by cardiologist 2 weeks ago.  Of note recently admitted for fluid overload and cellulitis, hospital course complicated by pneumonia.  Patient completed antibiotic course outpatient. (05 Apr 2024 13:04)      PAST MEDICAL & SURGICAL HISTORY:  HTN (hypertension)      Glaucoma      Cataract      ESRD (end stage renal disease) on dialysis      DVT (deep venous thrombosis)  of Left subclavian vein, 06/12/17      Kidney transplant recipient      Anemia of chronic disease      History of arteriovenostomy for renal dialysis      [place] (hard of hearing)      Acquired cataract  extraction with b/l lense placement, 2016      S/P KEVEN-BSO  for fibroids, 2012      Hemodialysis access, AV graft      History of renal transplantation  DDRT 5/19/2021      AV fistula              MEDICATIONS:  aspirin  chewable 81 milliGRAM(s) Oral daily  hydrALAZINE 50 milliGRAM(s) Oral three times a day  hydrALAZINE 50 milliGRAM(s) Oral once  labetalol 200 milliGRAM(s) Oral three times a day  NIFEdipine XL 90 milliGRAM(s) Oral daily            predniSONE   Tablet 5 milliGRAM(s) Oral daily    chlorhexidine 2% Cloths 1 Application(s) Topical daily  sodium bicarbonate 1300 milliGRAM(s) Oral three times a day      FAMILY HISTORY:  Family history of cerebrovascular accident (CVA)    Family history of colon cancer (Sibling)        Non-contributory    SOCIAL HISTORY:    [ ] Tobacco  [ ] Drugs  [ ] Alcohol    Allergies    Mushrooms (Anaphylaxis)  penicillin (Rash)    Intolerances    	    REVIEW OF SYSTEMS:  CONSTITUTIONAL: No fever  EYES: No eye pain, visual disturbances, or discharge  ENMT:  No difficulty hearing, tinnitus  NECK: No pain or stiffness  RESPIRATORY: No cough, wheezing,  CARDIOVASCULAR: No chest pain, palpitations, passing out, dizziness, + leg swelling  GASTROINTESTINAL:  No nausea, vomiting, diarrhea or constipation. No melena.  GENITOURINARY: No dysuria, hematuria  NEUROLOGICAL: No stroke like symptoms  SKIN: No burning or lesions   ENDOCRINE: No heat or cold intolerance  MUSCULOSKELETAL: No joint pain or swelling  PSYCHIATRIC: No  anxiety, mood swings  HEME/LYMPH: No bleeding gums  ALLERGY AND IMMUNOLOGIC: No hives or eczema	    All other ROS negative    PHYSICAL EXAM:  T(C): 37 (04-05-24 @ 16:47), Max: 37 (04-05-24 @ 16:47)  HR: 73 (04-05-24 @ 16:47) (69 - 79)  BP: 214/88 (04-05-24 @ 16:47) (214/88 - 239/101)  RR: 18 (04-05-24 @ 16:47) (16 - 18)  SpO2: 95% (04-05-24 @ 16:47) (94% - 98%)  Wt(kg): --  I&O's Summary      Appearance: Normal	  HEENT:   Normal oral mucosa, EOMI	  Cardiovascular:  S1 S2, No JVD,    Respiratory: Lungs clear to auscultation	  Psychiatry: Alert  Gastrointestinal:  Soft, Non-tender, + BS	  Skin: No rashes   Neurologic: Non-focal  Extremities:  No edema  Vascular: Peripheral pulses palpable    	    	  	  CARDIAC MARKERS:  Labs personally reviewed by me                                  8.1    4.88  )-----------( 170      ( 05 Apr 2024 10:26 )             26.3     04-05    141  |  111<H>  |  69<H>  ----------------------------<  86  4.9   |  17<L>  |  3.81<H>    Ca    8.6      05 Apr 2024 10:26    TPro  5.9<L>  /  Alb  3.4  /  TBili  0.2  /  DBili  x   /  AST  14  /  ALT  8<L>  /  AlkPhos  98  04-05          EKG: Personally reviewed by me -   Radiology: Personally reviewed by me -   < from: Xray Chest 1 View- PORTABLE-Urgent (04.05.24 @ 10:47) >  FINDINGS:    The heart is enlarged.  Moderate pulmonary edema. Left pleural effusion.  There is no pneumothorax; no right pleural effusion.  No acute bony abnormality.    IMPRESSION:  Cardiomegaly and pulmonary edema. Left pleural effusion.    < end of copied text >  < from: TTE W or WO Ultrasound Enhancing Agent (01.04.24 @ 08:44) >   1. Left ventricular systolic function is normal with an ejection fraction of 63 % by Fontenot's method of disks. There are no regional wall motion abnormalities seen.   2. There is moderate (grade 2) left ventricular diastolic dysfunction, with elevated filling pressure.   3. Severe left ventricular hypertrophy.   4. Normal right ventricular cavity size, increasedwall thickness, and systolic function.   5. The left atrium is severely dilated.   6. Echocardiographic evidence of pulmonary hyptertension.   7. Small pericardial effusion noted adjacent to the posterior left ventricle, small pericardial effusion noted adjacent to the lateral left ventricle and moderate pericardial effusion noted adjacent to the right atrium with no evidence of hemodynamic compromise.   8. Compared to the transthoracic echocardiogram performed on 11/10/2023 LVOT obstruction is markedly approved.    Assessment /Plan:     78-year-old female with a history of hypertension, diastolic heart failure, renal transplant 2 years ago no longer on tacrolimus who presents with shortness of breath and lower extremity swelling since yesterday. Patient reports dyspnea with exertion, no orthopnea.    78f h/o HTN, s/p Renal transplant, CKD, chronic rejection, recent admission for pneumonia s/p abx presented with shortness of breath since this morning. she also noticed significant LUE swelling that began 4-5 days ago    1. Diastolic Heart Failure secondary to advanced kidney disease   - TTE in Jan 2024 with normal EF, moderate DD, elevated filling pressures, severe LVH, severely dilated LA, small pericardial effusion  - cardiac amyloid with PYP scan normal   - CXR shows cardiomegaly and pulm edema  - BNP almost 19K  - s/p Lasix 120 IV today  - Appreciate Renal and TP recs  - Monitor Strict I&Os, daily weights    2. HTN   - c/w labetalol 200mg PO TID  - Continue Nifedipine 90mg daily   - Start Hydralazine 50mg PO TID    3. ESRD  - Renal recs appreciated      Differential diagnosis and plan of care discussed with patient after the evaluation. Counseling on diet, nutritional counseling, weight management, exercise and medication compliance was done.   Advanced care planning/advanced directives discussed with patient/family. DNR status including forceful chest compressions to attempt to restart the heart, ventilator support/artificial breathing, electric shock, artificial nutrition, health care proxy, Molst form all discussed with pt. Pt wishes to consider. More than fifteen minutes spent on discussing advanced directives.       Dutch Oh DO [facility]  Cardiovascular Medicine  [address] [place] [address] Dr, [address]  Available for call or text via [employer]  Office [phone]   DATE OF SERVICE: 04-05-24 @ 18:05    CHIEF COMPLAINT:Patient is a 78y old  Female who presents with a chief complaint of shortness of breath and leg swelling (05 Apr 2024 13:04)      HISTORY OF PRESENT ILLNESS:  78-year-old female with a history of hypertension, diastolic heart failure, renal transplant 2 years ago no longer on tacrolimus who presents with shortness of breath and lower extremity swelling since yesterday. Patient reports dyspnea with exertion, no orthopnea.  Patient denies any cough, fever, chills, abdominal pain, nausea, vomiting, dysuria, chest pain. Patient is ambulatory at home, endorses no changes in activity or medication changes recently. Not on any AC. Patient is currently on 80 mg of Lasix twice daily, was started by cardiologist 2 weeks ago.  Of note recently admitted for fluid overload and cellulitis, hospital course complicated by pneumonia.  Patient completed antibiotic course outpatient. (05 Apr 2024 13:04)      PAST MEDICAL & SURGICAL HISTORY:  HTN (hypertension)      Glaucoma      Cataract      ESRD (end stage renal disease) on dialysis      DVT (deep venous thrombosis)  of Left subclavian vein, 06/12/17      Kidney transplant recipient      Anemia of chronic disease      History of arteriovenostomy for renal dialysis      [place] (hard of hearing)      Acquired cataract  extraction with b/l lense placement, 2016      S/P KEVEN-BSO  for fibroids, 2012      Hemodialysis access, AV graft      History of renal transplantation  DDRT 5/19/2021      AV fistula              MEDICATIONS:  aspirin  chewable 81 milliGRAM(s) Oral daily  hydrALAZINE 50 milliGRAM(s) Oral three times a day  hydrALAZINE 50 milliGRAM(s) Oral once  labetalol 200 milliGRAM(s) Oral three times a day  NIFEdipine XL 90 milliGRAM(s) Oral daily            predniSONE   Tablet 5 milliGRAM(s) Oral daily    chlorhexidine 2% Cloths 1 Application(s) Topical daily  sodium bicarbonate 1300 milliGRAM(s) Oral three times a day      FAMILY HISTORY:  Family history of cerebrovascular accident (CVA)    Family history of colon cancer (Sibling)        Non-contributory    SOCIAL HISTORY:    [ ] Tobacco  [ ] Drugs  [ ] Alcohol    Allergies    Mushrooms (Anaphylaxis)  penicillin (Rash)    Intolerances    	    REVIEW OF SYSTEMS:  CONSTITUTIONAL: No fever  EYES: No eye pain, visual disturbances, or discharge  ENMT:  No difficulty hearing, tinnitus  NECK: No pain or stiffness  RESPIRATORY: No cough, wheezing,  CARDIOVASCULAR: No chest pain, palpitations, passing out, dizziness, + leg swelling  GASTROINTESTINAL:  No nausea, vomiting, diarrhea or constipation. No melena.  GENITOURINARY: No dysuria, hematuria  NEUROLOGICAL: No stroke like symptoms  SKIN: No burning or lesions   ENDOCRINE: No heat or cold intolerance  MUSCULOSKELETAL: No joint pain or swelling  PSYCHIATRIC: No  anxiety, mood swings  HEME/LYMPH: No bleeding gums  ALLERGY AND IMMUNOLOGIC: No hives or eczema	    All other ROS negative    PHYSICAL EXAM:  T(C): 37 (04-05-24 @ 16:47), Max: 37 (04-05-24 @ 16:47)  HR: 73 (04-05-24 @ 16:47) (69 - 79)  BP: 214/88 (04-05-24 @ 16:47) (214/88 - 239/101)  RR: 18 (04-05-24 @ 16:47) (16 - 18)  SpO2: 95% (04-05-24 @ 16:47) (94% - 98%)  Wt(kg): --  I&O's Summary      Appearance: Normal	  HEENT:   Normal oral mucosa, EOMI	  Cardiovascular:  S1 S2, No JVD,    Respiratory: Lungs clear to auscultation	  Psychiatry: Alert  Gastrointestinal:  Soft, Non-tender, + BS	  Skin: No rashes   Neurologic: Non-focal  Extremities:  No edema  Vascular: Peripheral pulses palpable    	    	  	  CARDIAC MARKERS:  Labs personally reviewed by me                                  8.1    4.88  )-----------( 170      ( 05 Apr 2024 10:26 )             26.3     04-05    141  |  111<H>  |  69<H>  ----------------------------<  86  4.9   |  17<L>  |  3.81<H>    Ca    8.6      05 Apr 2024 10:26    TPro  5.9<L>  /  Alb  3.4  /  TBili  0.2  /  DBili  x   /  AST  14  /  ALT  8<L>  /  AlkPhos  98  04-05          EKG: Personally reviewed by me -   Radiology: Personally reviewed by me -   < from: Xray Chest 1 View- PORTABLE-Urgent (04.05.24 @ 10:47) >  FINDINGS:    The heart is enlarged.  Moderate pulmonary edema. Left pleural effusion.  There is no pneumothorax; no right pleural effusion.  No acute bony abnormality.    IMPRESSION:  Cardiomegaly and pulmonary edema. Left pleural effusion.    < end of copied text >  < from: TTE W or WO Ultrasound Enhancing Agent (01.04.24 @ 08:44) >   1. Left ventricular systolic function is normal with an ejection fraction of 63 % by Fontenot's method of disks. There are no regional wall motion abnormalities seen.   2. There is moderate (grade 2) left ventricular diastolic dysfunction, with elevated filling pressure.   3. Severe left ventricular hypertrophy.   4. Normal right ventricular cavity size, increasedwall thickness, and systolic function.   5. The left atrium is severely dilated.   6. Echocardiographic evidence of pulmonary hyptertension.   7. Small pericardial effusion noted adjacent to the posterior left ventricle, small pericardial effusion noted adjacent to the lateral left ventricle and moderate pericardial effusion noted adjacent to the right atrium with no evidence of hemodynamic compromise.   8. Compared to the transthoracic echocardiogram performed on 11/10/2023 LVOT obstruction is markedly approved.    Assessment /Plan:     78-year-old female with a history of hypertension, diastolic heart failure, renal transplant 2 years ago no longer on tacrolimus who presents with shortness of breath and lower extremity swelling since yesterday. Patient reports dyspnea with exertion, no orthopnea.    78f h/o HTN, s/p Renal transplant, CKD, chronic rejection, recent admission for pneumonia s/p abx presented with shortness of breath since this morning. she also noticed significant LUE swelling that began 4-5 days ago    1. Diastolic Heart Failure secondary to advanced kidney disease   - TTE in Jan 2024 with normal EF, moderate DD, elevated filling pressures, severe LVH, severely dilated LA, small pericardial effusion  - cardiac amyloid with PYP scan normal   - CXR shows cardiomegaly and pulm edema  - BNP almost 19K  - Start lasix 80IV BID  - Appreciate Renal and TP recs  - Monitor Strict I&Os, daily weights    2. HTN   - c/w labetalol 200mg PO TID  - Continue Nifedipine 90mg daily   - Start Hydralazine 50mg PO TID    3. ESRD  - Renal recs appreciated      Differential diagnosis and plan of care discussed with patient after the evaluation. Counseling on diet, nutritional counseling, weight management, exercise and medication compliance was done.   Advanced care planning/advanced directives discussed with patient/family. DNR status including forceful chest compressions to attempt to restart the heart, ventilator support/artificial breathing, electric shock, artificial nutrition, health care proxy, Molst form all discussed with pt. Pt wishes to consider. More than fifteen minutes spent on discussing advanced directives.       Dutch Oh DO [facility]  Cardiovascular Medicine  [address] [place] [address] Dr, [address]  Available for call or text via [employer]  Office [phone]   DATE OF SERVICE: 04-05-24 @ 18:05    CHIEF COMPLAINT:Patient is a 78y old  Female who presents with a chief complaint of shortness of breath and leg swelling (05 Apr 2024 13:04)      HISTORY OF PRESENT ILLNESS:  78-year-old female with a history of hypertension, diastolic heart failure, renal transplant 2 years ago no longer on tacrolimus who presents with shortness of breath and lower extremity swelling since yesterday. Patient reports dyspnea with exertion, no orthopnea.  Patient denies any cough, fever, chills, abdominal pain, nausea, vomiting, dysuria, chest pain. Patient is ambulatory at home, endorses no changes in activity or medication changes recently. Not on any AC. Patient is currently on 80 mg of Lasix twice daily, was started by cardiologist 2 weeks ago.  Of note recently admitted for fluid overload and cellulitis, hospital course complicated by pneumonia.  Patient completed antibiotic course outpatient. (05 Apr 2024 13:04)      PAST MEDICAL & SURGICAL HISTORY:  HTN (hypertension)      Glaucoma      Cataract      ESRD (end stage renal disease) on dialysis      DVT (deep venous thrombosis)  of Left subclavian vein, 06/12/17      Kidney transplant recipient      Anemia of chronic disease      History of arteriovenostomy for renal dialysis      [place] (hard of hearing)      Acquired cataract  extraction with b/l lense placement, 2016      S/P KEVEN-BSO  for fibroids, 2012      Hemodialysis access, AV graft      History of renal transplantation  DDRT 5/19/2021      AV fistula              MEDICATIONS:  aspirin  chewable 81 milliGRAM(s) Oral daily  hydrALAZINE 50 milliGRAM(s) Oral three times a day  hydrALAZINE 50 milliGRAM(s) Oral once  labetalol 200 milliGRAM(s) Oral three times a day  NIFEdipine XL 90 milliGRAM(s) Oral daily            predniSONE   Tablet 5 milliGRAM(s) Oral daily    chlorhexidine 2% Cloths 1 Application(s) Topical daily  sodium bicarbonate 1300 milliGRAM(s) Oral three times a day      FAMILY HISTORY:  Family history of cerebrovascular accident (CVA)    Family history of colon cancer (Sibling)        Non-contributory    SOCIAL HISTORY:    [ ] Tobacco  [ ] Drugs  [ ] Alcohol    Allergies    Mushrooms (Anaphylaxis)  penicillin (Rash)    Intolerances    	    REVIEW OF SYSTEMS:  CONSTITUTIONAL: No fever  EYES: No eye pain, visual disturbances, or discharge  ENMT:  No difficulty hearing, tinnitus  NECK: No pain or stiffness  RESPIRATORY: No cough, wheezing,  CARDIOVASCULAR: No chest pain, palpitations, passing out, dizziness, + leg swelling  GASTROINTESTINAL:  No nausea, vomiting, diarrhea or constipation. No melena.  GENITOURINARY: No dysuria, hematuria  NEUROLOGICAL: No stroke like symptoms  SKIN: No burning or lesions   ENDOCRINE: No heat or cold intolerance  MUSCULOSKELETAL: No joint pain or swelling  PSYCHIATRIC: No  anxiety, mood swings  HEME/LYMPH: No bleeding gums  ALLERGY AND IMMUNOLOGIC: No hives or eczema	    All other ROS negative    PHYSICAL EXAM:  T(C): 37 (04-05-24 @ 16:47), Max: 37 (04-05-24 @ 16:47)  HR: 73 (04-05-24 @ 16:47) (69 - 79)  BP: 214/88 (04-05-24 @ 16:47) (214/88 - 239/101)  RR: 18 (04-05-24 @ 16:47) (16 - 18)  SpO2: 95% (04-05-24 @ 16:47) (94% - 98%)  Wt(kg): --  I&O's Summary      Appearance: Normal	  HEENT:   Normal oral mucosa, EOMI	  Cardiovascular:  S1 S2, No JVD,    Respiratory: Lungs clear to auscultation	  Psychiatry: Alert  Gastrointestinal:  Soft, Non-tender, + BS	  Skin: No rashes   Neurologic: Non-focal  Extremities:  No edema  Vascular: Peripheral pulses palpable    	    	  	  CARDIAC MARKERS:  Labs personally reviewed by me                                  8.1    4.88  )-----------( 170      ( 05 Apr 2024 10:26 )             26.3     04-05    141  |  111<H>  |  69<H>  ----------------------------<  86  4.9   |  17<L>  |  3.81<H>    Ca    8.6      05 Apr 2024 10:26    TPro  5.9<L>  /  Alb  3.4  /  TBili  0.2  /  DBili  x   /  AST  14  /  ALT  8<L>  /  AlkPhos  98  04-05          EKG: Personally reviewed by me -   Radiology: Personally reviewed by me -   < from: Xray Chest 1 View- PORTABLE-Urgent (04.05.24 @ 10:47) >  FINDINGS:    The heart is enlarged.  Moderate pulmonary edema. Left pleural effusion.  There is no pneumothorax; no right pleural effusion.  No acute bony abnormality.    IMPRESSION:  Cardiomegaly and pulmonary edema. Left pleural effusion.    < end of copied text >  < from: TTE W or WO Ultrasound Enhancing Agent (01.04.24 @ 08:44) >   1. Left ventricular systolic function is normal with an ejection fraction of 63 % by Fontenot's method of disks. There are no regional wall motion abnormalities seen.   2. There is moderate (grade 2) left ventricular diastolic dysfunction, with elevated filling pressure.   3. Severe left ventricular hypertrophy.   4. Normal right ventricular cavity size, increasedwall thickness, and systolic function.   5. The left atrium is severely dilated.   6. Echocardiographic evidence of pulmonary hyptertension.   7. Small pericardial effusion noted adjacent to the posterior left ventricle, small pericardial effusion noted adjacent to the lateral left ventricle and moderate pericardial effusion noted adjacent to the right atrium with no evidence of hemodynamic compromise.   8. Compared to the transthoracic echocardiogram performed on 11/10/2023 LVOT obstruction is markedly approved.    Assessment /Plan:     78-year-old female with a history of hypertension, diastolic heart failure, renal transplant 2 years ago no longer on tacrolimus who presents with shortness of breath and lower extremity swelling since yesterday. Patient reports dyspnea with exertion, no orthopnea.    78f h/o HTN, s/p Renal transplant, CKD, chronic rejection, recent admission for pneumonia s/p abx presented with shortness of breath since this morning. she also noticed significant LUE swelling that began 4-5 days ago    1. Diastolic Heart Failure secondary to advanced kidney disease   - TTE in Jan 2024 with normal EF, moderate DD, elevated filling pressures, severe LVH, severely dilated LA, small pericardial effusion  - cardiac amyloid with PYP scan normal   - CXR shows cardiomegaly and pulm edema  - BNP almost 19K  - Start lasix 80 IV BID  - Appreciate Renal and TP recs  - Monitor Strict I&Os, daily weights    2. HTN   - c/w labetalol 200mg PO TID  - Continue Nifedipine 90mg daily   - Start Hydralazine 25mg PO TID and uptitrate    3. ESRD  - Renal recs appreciated      Differential diagnosis and plan of care discussed with patient after the evaluation. Counseling on diet, nutritional counseling, weight management, exercise and medication compliance was done.   Advanced care planning/advanced directives discussed with patient/family. DNR status including forceful chest compressions to attempt to restart the heart, ventilator support/artificial breathing, electric shock, artificial nutrition, health care proxy, Molst form all discussed with pt. Pt wishes to consider. More than fifteen minutes spent on discussing advanced directives.       Dutch Oh DO [facility]  Cardiovascular Medicine  [address] [place] [address] Dr, [address]  Available for call or text via [employer]  Office [phone]

## 2024-04-05 NOTE — ED ADULT NURSE NOTE - NSICDXPASTMEDICALHX_GEN_ALL_CORE_FT
PAST MEDICAL HISTORY:  Anemia of chronic disease     Cataract     DVT (deep venous thrombosis) of Left subclavian vein, 06/12/17    ESRD (end stage renal disease) on dialysis     Glaucoma     History of arteriovenostomy for renal dialysis     [place] (hard of hearing)     HTN (hypertension)     Kidney transplant recipient

## 2024-04-05 NOTE — ED ADULT NURSE NOTE - NSFALLRISKINTERV_ED_ALL_ED

## 2024-04-05 NOTE — H&P ADULT - HISTORY OF PRESENT ILLNESS
78-year-old female with a history of hypertension, diastolic heart failure, renal transplant 2 years ago no longer on tacrolimus who presents with shortness of breath and lower extremity swelling since yesterday. Patient reports dyspnea with exertion, no orthopnea.  Patient denies any cough, fever, chills, abdominal pain, nausea, vomiting, dysuria, chest pain. Patient is ambulatory at home, endorses no changes in activity or medication changes recently. Not on any AC. Patient is currently on 80 mg of Lasix twice daily, was started by cardiologist 2 weeks ago.  Of note recently admitted for fluid overload and cellulitis, hospital course complicated by pneumonia.  Patient completed antibiotic course outpatient.

## 2024-04-05 NOTE — H&P ADULT - NSHPPHYSICALEXAM_GEN_ALL_CORE
PHYSICAL EXAM: vital signs noted on [place]  in no apparent distress  HEENT: YASMIN EOMI  Neck: Supple, + JVD, no thyromegaly  Lungs: bilateral basal crackles  CVS: S1 S2 systolic murmur +   Abdomen: no tenderness, no organomegaly  Neuro: AO x 3 nonfocal   Skin: warm, dry  Ext: no cyanosis or clubbing, + + pitting edema  Msk: no joint swelling or deformities  Back: no CVA tenderness, no kyphosis/scoliosis

## 2024-04-05 NOTE — H&P ADULT - ASSESSMENT
78-year-old female with a history of hypertension, diastolic heart failure, renal transplant 2 years ago no longer on tacrolimus who presents with shortness of breath and lower extremity swelling since yesterday clinical presentation consistent with acute on chronic diastolic heart failure

## 2024-04-05 NOTE — ED PROVIDER NOTE - ATTENDING CONTRIBUTION TO CARE
Dr. Felix: I have personally performed a face to face bedside history and physical examination of this patient. I have discussed the history, examination, review of systems, assessment and plan of management with the resident. I have reviewed the electronic medical record and amended it to reflect my history, review of systems, physical exam, assessment and plan.    Dr. Felix: 78-year-old female history of hypertension, diastolic heart failure, renal transplant 2 years ago no longer on tacrolimus, no longer on dialysis, presenting with 1 day of orthopnea, bilateral lower extremity edema, shortness of breath.  Patient was admitted in January for CHF and was discharged on Lasix 40 mg twice daily, changed to 80 mg 2 weeks ago.  Denies chest pain, nausea, vomiting, cough, fevers, chills, abdominal pain.  No prolonged immobilization, no calf pain.    Gen: Minimally tachypneic  HEENT: Mucous membranes moist, pink conjunctivae, EOMI  CV: RRR, no clubbing/cyanosis  Resp: CTAB  GI: Abdomen soft, NT, ND. Normal BS. No rebound, no guarding  : No CVAT  Neuro: A&O x 3, moving all 4 extremities  MSK: No spine or joint tenderness to palpation, 3+ pitting edema bilateral lower extremities, no calf tenderness to palpation, right upper extremity with AV fistula  Skin: No rashes    Patient presenting with pulmonary edema, will diurese, no signs of ischemia on the EKG.  Will check labs, chest x-ray, diuresis, reassess.

## 2024-04-05 NOTE — CONSULT NOTE ADULT - SUBJECTIVE AND OBJECTIVE BOX
[facility]   MD SAMMY ORDONEZ MD ANGELA WONG, PA QIAN CHEN, NP      TEL:  OFFICE: [phone]  From 5pm-7am answering service [phone]    --- INITIAL RENAL CONSULT NOTE ---date of service 04-05-24 @ 19:20    HPI:  78-year-old female with a history of hypertension, diastolic heart failure, renal transplant 2 years ago no longer on tacrolimus who presents with shortness of breath and lower extremity swelling since yesterday. Patient reports dyspnea with exertion, no orthopnea.  Patient denies any cough, fever, chills, abdominal pain, nausea, vomiting, dysuria, chest pain. Patient is ambulatory at home, endorses no changes in activity or medication changes recently. Not on any AC. Patient is currently on 80 mg of Lasix twice daily, was started by cardiologist 2 weeks ago.        Allergies:  Mushrooms (Anaphylaxis)  penicillin (Rash)      PAST MEDICAL & SURGICAL HISTORY:  HTN (hypertension)    Glaucoma    Cataract    ESRD (end stage renal disease) on dialysis    DVT (deep venous thrombosis)  of Left subclavian vein, 06/12/17    Kidney transplant recipient    Anemia of chronic disease    History of arteriovenostomy for renal dialysis    [place] (hard of hearing)    Acquired cataract - extraction with b/l lense placement, 2016    S/P KEVEN-BSO  for fibroids, 2012    Hemodialysis access, AV graft    History of renal transplantation  DDRT 5/19/2021    AV fistula        Home Medications Reviewed    Hospital Medications:   MEDICATIONS  (STANDING):  aspirin  chewable 81 milliGRAM(s) Oral daily  chlorhexidine 2% Cloths 1 Application(s) Topical daily  hydrALAZINE 50 milliGRAM(s) Oral three times a day  labetalol 200 milliGRAM(s) Oral three times a day  NIFEdipine XL 90 milliGRAM(s) Oral daily  predniSONE   Tablet 5 milliGRAM(s) Oral daily  sodium bicarbonate 1300 milliGRAM(s) Oral three times a day  sodium zirconium cyclosilicate 10 Gram(s) Oral daily      SOCIAL HISTORY:  Denies ETOh, Smoking,     FAMILY HISTORY:  Family history of cerebrovascular accident (CVA)    Family history of colon cancer (Sibling)        REVIEW OF SYSTEMS:  CONSTITUTIONAL: No weakness, fevers or chills  EYES/ENT: No visual changes;  No vertigo or throat pain   NECK: No pain or stiffness  RESPIRATORY: No cough, wheezing, hemoptysis; +shortness of breath  CARDIOVASCULAR: No chest pain or palpitations.  GASTROINTESTINAL: No abdominal or epigastric pain. No nausea, vomiting, or hematemesis; No diarrhea or constipation. No melena or hematochezia.  GENITOURINARY: No dysuria, frequency, foamy urine, urinary urgency, incontinence or hematuria  NEUROLOGICAL: No numbness or weakness  SKIN: No itching, burning, rashes, or lesions   VASCULAR: + LUE and b/l LE edema  All other review of systems is negative unless indicated above.    VITALS:  T(F): 98.6 (04-05-24 @ 16:47), Max: 98.6 (04-05-24 @ 16:47)  HR: 87 (04-05-24 @ 18:36)  BP: 183/89 (04-05-24 @ 18:36)  RR: 18 (04-05-24 @ 16:47)  SpO2: 95% (04-05-24 @ 16:47)  Wt(kg): --    Height (cm): 167.6 (04-05 @ 08:58)  Weight (kg): 54.4 (04-05 @ 08:58)  BMI (kg/m2): 19.4 (04-05 @ 08:58)  BSA (m2): 1.61 (04-05 @ 08:58)    PHYSICAL EXAM:  General: NAD  HEENT: anicteric sclera, oropharynx clear, MMM  Neck: No JVD  Respiratory: + bibasilar crackles.  Cardiovascular: S1, S2, RRR  Gastrointestinal: BS+, soft, NT/ND  Extremities: No cyanosis or clubbing. + LUE swelling; 2-3+ b/l LE [place]  Neurological: A/O x 3, no focal deficits  Psychiatric: Normal mood, normal affect  : No CVA tenderness. No sethi.   Skin: No rashes  Vascular Access: RUE AVG    LABS:  04-05    141  |  111<H>  |  69<H>  ----------------------------<  86  4.9   |  17<L>  |  3.81<H>    Ca    8.6      05 Apr 2024 10:26    TPro  5.9<L>  /  Alb  3.4  /  TBili  0.2  /  DBili      /  AST  14  /  ALT  8<L>  /  AlkPhos  98  04-05    Creatinine Trend: 3.81 <--                        8.1    4.88  )-----------( 170      ( 05 Apr 2024 10:26 )             26.3     Urine Studies:  Urinalysis Basic - ( 05 Apr 2024 10:26 )    Color:  / Appearance:  / SG:  / pH:   Gluc: 86 mg/dL / Ketone:   / Bili:  / Urobili:    Blood:  / Protein:  / Nitrite:    Leuk Esterase:  / RBC:  / WBC    Sq Epi:  / Non Sq Epi:  / Bacteria:           RADIOLOGY & ADDITIONAL STUDIES:

## 2024-04-05 NOTE — H&P ADULT - NSICDXPASTMEDICALHX_GEN_ALL_CORE_FT
PAST MEDICAL HISTORY:  Anemia of chronic disease     Cataract     DVT (deep venous thrombosis) of Left subclavian vein, 06/12/17    ESRD (end stage renal disease) on dialysis     Glaucoma     History of arteriovenostomy for renal dialysis     [place] (hard of hearing)     HTN (hypertension)     Kidney transplant recipient

## 2024-04-05 NOTE — CONSULT NOTE ADULT - REASON FOR ADMISSION
shortness of breath and leg swelling

## 2024-04-06 LAB
ANION GAP SERPL CALC-SCNC: 14 MMOL/L — SIGNIFICANT CHANGE UP (ref 5–17)
APPEARANCE UR: CLEAR — SIGNIFICANT CHANGE UP
BACTERIA # UR AUTO: NEGATIVE /HPF — SIGNIFICANT CHANGE UP
BILIRUB UR-MCNC: NEGATIVE — SIGNIFICANT CHANGE UP
BUN SERPL-MCNC: 74 MG/DL — HIGH (ref 7–23)
CALCIUM SERPL-MCNC: 8.7 MG/DL — SIGNIFICANT CHANGE UP (ref 8.4–10.5)
CAST: 0 /LPF — SIGNIFICANT CHANGE UP (ref 0–4)
CHLORIDE SERPL-SCNC: 111 MMOL/L — HIGH (ref 96–108)
CO2 SERPL-SCNC: 16 MMOL/L — LOW (ref 22–31)
COLOR SPEC: YELLOW — SIGNIFICANT CHANGE UP
CREAT ?TM UR-MCNC: 51 MG/DL — SIGNIFICANT CHANGE UP
CREAT SERPL-MCNC: 4.33 MG/DL — HIGH (ref 0.5–1.3)
DIFF PNL FLD: ABNORMAL
EGFR: 10 ML/MIN/1.73M2 — LOW
GLUCOSE SERPL-MCNC: 82 MG/DL — SIGNIFICANT CHANGE UP (ref 70–99)
GLUCOSE UR QL: NEGATIVE MG/DL — SIGNIFICANT CHANGE UP
HCT VFR BLD CALC: 28 % — LOW (ref 34.5–45)
HGB BLD-MCNC: 8.5 G/DL — LOW (ref 11.5–15.5)
KETONES UR-MCNC: NEGATIVE MG/DL — SIGNIFICANT CHANGE UP
LEUKOCYTE ESTERASE UR-ACNC: NEGATIVE — SIGNIFICANT CHANGE UP
MCHC RBC-ENTMCNC: 25.5 PG — LOW (ref 27–34)
MCHC RBC-ENTMCNC: 30.4 GM/DL — LOW (ref 32–36)
MCV RBC AUTO: 84.1 FL — SIGNIFICANT CHANGE UP (ref 80–100)
MRSA PCR RESULT.: SIGNIFICANT CHANGE UP
NITRITE UR-MCNC: NEGATIVE — SIGNIFICANT CHANGE UP
NRBC # BLD: 0 /100 WBCS — SIGNIFICANT CHANGE UP (ref 0–0)
PH UR: 6.5 — SIGNIFICANT CHANGE UP (ref 5–8)
PLATELET # BLD AUTO: 191 K/UL — SIGNIFICANT CHANGE UP (ref 150–400)
POTASSIUM SERPL-MCNC: 5 MMOL/L — SIGNIFICANT CHANGE UP (ref 3.5–5.3)
POTASSIUM SERPL-SCNC: 5 MMOL/L — SIGNIFICANT CHANGE UP (ref 3.5–5.3)
PROT UR-MCNC: 300 MG/DL
RBC # BLD: 3.33 M/UL — LOW (ref 3.8–5.2)
RBC # FLD: 17.1 % — HIGH (ref 10.3–14.5)
RBC CASTS # UR COMP ASSIST: 6 /HPF — HIGH (ref 0–4)
S AUREUS DNA NOSE QL NAA+PROBE: SIGNIFICANT CHANGE UP
SODIUM SERPL-SCNC: 141 MMOL/L — SIGNIFICANT CHANGE UP (ref 135–145)
SP GR SPEC: 1.01 — SIGNIFICANT CHANGE UP (ref 1–1.03)
SQUAMOUS # UR AUTO: 0 /HPF — SIGNIFICANT CHANGE UP (ref 0–5)
TACROLIMUS SERPL-MCNC: <2 NG/ML — SIGNIFICANT CHANGE UP
UROBILINOGEN FLD QL: 0.2 MG/DL — SIGNIFICANT CHANGE UP (ref 0.2–1)
UUN UR-MCNC: 387 MG/DL — SIGNIFICANT CHANGE UP
WBC # BLD: 3.8 K/UL — SIGNIFICANT CHANGE UP (ref 3.8–10.5)
WBC # FLD AUTO: 3.8 K/UL — SIGNIFICANT CHANGE UP (ref 3.8–10.5)
WBC UR QL: 1 /HPF — SIGNIFICANT CHANGE UP (ref 0–5)

## 2024-04-06 PROCEDURE — 93971 EXTREMITY STUDY: CPT | Mod: 26,LT

## 2024-04-06 RX ORDER — TACROLIMUS 5 MG/1
5 CAPSULE ORAL DAILY
Refills: 0 | Status: DISCONTINUED | OUTPATIENT
Start: 2024-04-06 | End: 2024-04-11

## 2024-04-06 RX ORDER — MYCOPHENOLATE MOFETIL 250 MG/1
500 CAPSULE ORAL
Refills: 0 | Status: DISCONTINUED | OUTPATIENT
Start: 2024-04-06 | End: 2024-04-11

## 2024-04-06 RX ORDER — APIXABAN 2.5 MG/1
2.5 TABLET, FILM COATED ORAL EVERY 12 HOURS
Refills: 0 | Status: DISCONTINUED | OUTPATIENT
Start: 2024-04-06 | End: 2024-04-16

## 2024-04-06 RX ADMIN — Medication 200 MILLIGRAM(S): at 05:51

## 2024-04-06 RX ADMIN — Medication 80 MILLIGRAM(S): at 05:55

## 2024-04-06 RX ADMIN — Medication 200 MILLIGRAM(S): at 22:16

## 2024-04-06 RX ADMIN — Medication 25 MILLIGRAM(S): at 22:17

## 2024-04-06 RX ADMIN — Medication 25 MILLIGRAM(S): at 15:42

## 2024-04-06 RX ADMIN — Medication 1300 MILLIGRAM(S): at 15:42

## 2024-04-06 RX ADMIN — Medication 90 MILLIGRAM(S): at 05:51

## 2024-04-06 RX ADMIN — Medication 25 MILLIGRAM(S): at 05:51

## 2024-04-06 RX ADMIN — Medication 200 MILLIGRAM(S): at 15:42

## 2024-04-06 RX ADMIN — Medication 5 MILLIGRAM(S): at 05:50

## 2024-04-06 RX ADMIN — Medication 80 MILLIGRAM(S): at 15:41

## 2024-04-06 RX ADMIN — CHLORHEXIDINE GLUCONATE 1 APPLICATION(S): 213 SOLUTION TOPICAL at 12:54

## 2024-04-06 RX ADMIN — SODIUM ZIRCONIUM CYCLOSILICATE 10 GRAM(S): 10 POWDER, FOR SUSPENSION ORAL at 15:42

## 2024-04-06 RX ADMIN — TACROLIMUS 5 MILLIGRAM(S): 5 CAPSULE ORAL at 18:23

## 2024-04-06 RX ADMIN — Medication 81 MILLIGRAM(S): at 15:42

## 2024-04-06 RX ADMIN — Medication 1300 MILLIGRAM(S): at 05:51

## 2024-04-06 RX ADMIN — APIXABAN 2.5 MILLIGRAM(S): 2.5 TABLET, FILM COATED ORAL at 18:23

## 2024-04-06 RX ADMIN — MYCOPHENOLATE MOFETIL 500 MILLIGRAM(S): 250 CAPSULE ORAL at 18:23

## 2024-04-06 RX ADMIN — Medication 1300 MILLIGRAM(S): at 22:16

## 2024-04-06 NOTE — PROGRESS NOTE ADULT - ASSESSMENT
78-year-old female with a history of hypertension, diastolic heart failure, renal transplant 2 years ago no longer on tacrolimus who presents with shortness of breath and lower extremity swelling since yesterday. Patient reports dyspnea with exertion, no orthopnea.  Patient denies any cough, fever, chills, abdominal pain, nausea, vomiting, dysuria, chest pain. Patient is ambulatory at home, endorses no changes in activity or medication changes recently. Not on any AC. Patient is currently on 80 mg of Lasix twice daily, was started by cardiologist 2 weeks ago.  Nephrology consulted for YANELY.      A/P:  YANELY on CKD 4  S/p DDRT (5/19/2021 - induction w/ Basiliximab)  Baseline sCr ~3-3.3.  Renal function fluctuates sec to HF.  Repeat TTE 1/4 - Left ventricular wall thickness is moderately increased. Left ventricular systolic function is normal with a calculated ejection fraction of 63 % with moderate L ventricular dysfunction.  Renal function worsened on admission likely d/t cardiorenal vs. hemodynamic changes w/ hypertensive urgency.  Check UA urine creatinine and urine urea nitrogen; bladder scan.  C/W lasix 80mg BID.  Resume immunosuppressants per  Transplant nephrology  Check FK level 30min prior to dose   Avoid nephrotoxic agents.  Monitor BMP and UO.      HTN:  BP improving, however still elevated  If BP remains elevated, consider titrating up hydralazine  Avoid hypotension.  Monitor BP.    Hyperkalemia:  sec to CKD  K stable   On lokelma 10gm qd.  Low K diet.  Monitor K closely.    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  c/w NaHCO3 1300mg TID.  C/w lasix.  Monitor CO2.      SOB:  Chest x-ray w/ pulmonary edema and L pleural effusion.  C/W diuretics.

## 2024-04-06 NOTE — PROGRESS NOTE ADULT - SUBJECTIVE AND OBJECTIVE BOX
[facility]   MD SAMMY ORDONEZ MD RUORU WONG, PA    TEL:  FROM 9 AM to 5 PM ---OFFICE: [phone]  AVAILABLE ON TEAMS    FROM 5 PM - 9 AM PLEASE CALL ANSWERING SERVICE: [phone]    RENAL FOLLOW UP NOTE--Date of Service 04-06-24 @ 09:42  --------------------------------------------------------------------------------  HPI:      Pt seen and examined at bedside.     PAST HISTORY  --------------------------------------------------------------------------------  No significant changes to PMH, PSH, FHx, SHx, unless otherwise noted    ALLERGIES & MEDICATIONS  --------------------------------------------------------------------------------  Allergies    Mushrooms (Anaphylaxis)  penicillin (Rash)    Intolerances      Standing Inpatient Medications  aspirin  chewable 81 milliGRAM(s) Oral daily  chlorhexidine 2% Cloths 1 Application(s) Topical daily  furosemide   Injectable 80 milliGRAM(s) IV Push two times a day  hydrALAZINE 25 milliGRAM(s) Oral three times a day  labetalol 200 milliGRAM(s) Oral three times a day  NIFEdipine XL 90 milliGRAM(s) Oral daily  predniSONE   Tablet 5 milliGRAM(s) Oral daily  sodium bicarbonate 1300 milliGRAM(s) Oral three times a day  sodium zirconium cyclosilicate 10 Gram(s) Oral daily    PRN Inpatient Medications      REVIEW OF SYSTEMS  --------------------------------------------------------------------------------  General: no fever,  MSK: + edema     VITALS/PHYSICAL EXAM  --------------------------------------------------------------------------------  T(C): 37.1 (04-06-24 @ 08:09), Max: 37.1 (04-05-24 @ 20:19)  HR: 66 (04-06-24 @ 08:09) (66 - 88)  BP: 161/78 (04-06-24 @ 08:09) (132/88 - 239/101)  RR: 18 (04-06-24 @ 08:09) (17 - 18)  SpO2: 97% (04-06-24 @ 08:09) (88% - 98%)  Wt(kg): --  Height (cm): 167.6 (04-05-24 @ 08:58)  Weight (kg): 54.4 (04-05-24 @ 08:58)  BMI (kg/m2): 19.4 (04-05-24 @ 08:58)  BSA (m2): 1.61 (04-05-24 @ 08:58)      04-05-24 @ 07:01  -  04-06-24 @ 07:00  --------------------------------------------------------  IN: 0 mL / OUT: 950 mL / NET: -950 mL      Physical Exam:  	Gen: NAD  	HEENT: MMM  	Pulm: Coarse breath sounds  B/L  	CV: S1S2  	Abd: Soft, +BS  	Ext: + LE edema B/L                      Neuro: Awake   	Skin: Warm and Dry   	Vascular access: NO HD catheter           M[place]el sethi  LABS/STUDIES  --------------------------------------------------------------------------------              8.5    3.80  >-----------<  191      [04-06-24 @ 06:27]              28.0     141  |  111  |  74  ----------------------------<  82      [04-06-24 @ 06:27]  5.0   |  16  |  4.33        Ca     8.7     [04-06-24 @ 06:27]    TPro  5.9  /  Alb  3.4  /  TBili  0.2  /  DBili  x   /  AST  14  /  ALT  8   /  AlkPhos  98  [04-05-24 @ 10:26]          Creatinine Trend:  SCr 4.33 [04-06 @ 06:27]  SCr 3.81 [04-05 @ 10:26]    Urinalysis - [04-06-24 @ 06:27]      Color  / Appearance  / SG  / pH       Gluc 82 / Ketone   / Bili  / Urobili        Blood  / Protein  / Leuk Est  / Nitrite       RBC  / WBC  / Hyaline  / Gran  / Sq Epi  / Non Sq Epi  / Bacteria     Urine Creatinine 51      [04-06-24 @ 02:38]    Iron 21, TIBC 171, %sat 12      [01-06-24 @ 11:12]  Ferritin 1573      [01-06-24 @ 11:12]  PTH -- (Ca 8.5)      [11-10-23 @ 06:01]   659  Vitamin D (25OH) 16.5      [11-11-23 @ 06:09]  HbA1c 4.6      [05-28-19 @ 09:49]  Lipid: chol 191, , HDL 64, LDL --      [01-04-24 @ 10:13]

## 2024-04-06 NOTE — PROGRESS NOTE ADULT - SUBJECTIVE AND OBJECTIVE BOX
Patient is a 78y old  Female who presents with a chief complaint of shortness of breath and leg swelling (06 Apr 2024 09:42)      DATE OF SERVICE: 04-06-24 @ 12:29    SUBJECTIVE / OVERNIGHT EVENTS: overnight events noted    ROS:  Resp: No cough no sputum production  CVS: No chest pain no palpitations no orthopnea  GI: no N/V/D  "I feel much better"         MEDICATIONS  (STANDING):  aspirin  chewable 81 milliGRAM(s) Oral daily  chlorhexidine 2% Cloths 1 Application(s) Topical daily  furosemide   Injectable 80 milliGRAM(s) IV Push two times a day  hydrALAZINE 25 milliGRAM(s) Oral three times a day  labetalol 200 milliGRAM(s) Oral three times a day  mycophenolate mofetil 500 milliGRAM(s) Oral two times a day  NIFEdipine XL 90 milliGRAM(s) Oral daily  predniSONE   Tablet 5 milliGRAM(s) Oral daily  sodium bicarbonate 1300 milliGRAM(s) Oral three times a day  sodium zirconium cyclosilicate 10 Gram(s) Oral daily  tacrolimus ER Tablet (ENVARSUS XR) 5 milliGRAM(s) Oral daily    MEDICATIONS  (PRN):        CAPILLARY BLOOD GLUCOSE        I&O's Summary    05 Apr 2024 07:01  -  06 Apr 2024 07:00  --------------------------------------------------------  IN: 0 mL / OUT: 950 mL / NET: -950 mL    06 Apr 2024 07:01  -  06 Apr 2024 12:29  --------------------------------------------------------  IN: 440 mL / OUT: 0 mL / NET: 440 mL        Vital Signs Last 24 Hrs  T(C): 37.1 (06 Apr 2024 11:00), Max: 37.1 (05 Apr 2024 20:19)  T(F): 98.8 (06 Apr 2024 11:00), Max: 98.8 (06 Apr 2024 11:00)  HR: 75 (06 Apr 2024 11:49) (66 - 88)  BP: 170/74 (06 Apr 2024 11:49) (132/88 - 239/101)  BP(mean): 135 (05 Apr 2024 13:43) (135 - 135)  RR: 18 (06 Apr 2024 11:00) (17 - 18)  SpO2: 95% (06 Apr 2024 11:49) (88% - 98%)    PHYSICAL EXAM:  GENERAL: in no distress  EYES: EOMI, PERRLA,  NECK: Supple, + JVD  CHEST/LUNG: decreased crackles bases  HEART: S1 S2; systolic murmur +   ABDOMEN: Soft, Nontender  EXTREMITIES:  improved edema  NEUROLOGY: AO x 3 non-focal      LABS:                        8.5    3.80  )-----------( 191      ( 06 Apr 2024 06:27 )             28.0     04-06    141  |  111<H>  |  74<H>  ----------------------------<  82  5.0   |  16<L>  |  4.33<H>    Ca    8.7      06 Apr 2024 06:27    TPro  5.9<L>  /  Alb  3.4  /  TBili  0.2  /  DBili  x   /  AST  14  /  ALT  8<L>  /  AlkPhos  98  04-05          Urinalysis Basic - ( 06 Apr 2024 06:27 )    Color: x / Appearance: x / SG: x / pH: x  Gluc: 82 mg/dL / Ketone: x  / Bili: x / Urobili: x   Blood: x / Protein: x / Nitrite: x   Leuk Esterase: x / RBC: x / WBC x   Sq Epi: x / Non Sq Epi: x / Bacteria: x          All consultant(s) notes reviewed and care discussed with other providers        Contact Number, Dr Woods [phone]

## 2024-04-06 NOTE — PHYSICAL THERAPY INITIAL EVALUATION ADULT - ADDITIONAL COMMENTS
Pt lives with spouse in elevator access apartment; independent prior to admission with use of straight cane in community; son drives to medical appointments.

## 2024-04-06 NOTE — PHYSICAL THERAPY INITIAL EVALUATION ADULT - PERTINENT HX OF CURRENT PROBLEM, REHAB EVAL
77y/o F with a history of hypertension, diastolic heart failure, renal transplant 2 years ago no longer on tacrolimus who presents with shortness of breath and lower extremity swelling since yesterday. Pt reports dyspnea with exertion, no orthopnea.  Patient denies any cough, fever, chills, abdominal pain, nausea, vomiting, dysuria, chest pain. Patient is ambulatory at home, endorses no changes in activity or medication changes recently. Not on any AC. Pt is currently on 80 mg of Lasix twice daily, was started by cardiologist 2 weeks ago.  Of note recently admitted for fluid overload and cellulitis, hospital course complicated by pneumonia.  Pt completed antibiotic course outpatient. CXR: Cardiomegaly and pulmonary edema. Left pleural effusion.

## 2024-04-06 NOTE — PROGRESS NOTE ADULT - SUBJECTIVE AND OBJECTIVE BOX
DATE OF SERVICE: 04-06-24 @ 14:28    Patient is a 78y old  Female who presents with a chief complaint of shortness of breath and leg swelling (06 Apr 2024 12:29)      INTERVAL HISTORY: feels okay    REVIEW OF SYSTEMS:  CONSTITUTIONAL: No weakness  EYES/ENT: No visual changes;  No throat pain   NECK: No pain or stiffness  RESPIRATORY: No cough, wheezing; No shortness of breath  CARDIOVASCULAR: No chest pain or palpitations  GASTROINTESTINAL: No abdominal  pain. No nausea, vomiting, or hematemesis  GENITOURINARY: No dysuria, frequency or hematuria  NEUROLOGICAL: No stroke like symptoms  SKIN: No rashes    TELEMETRY Personally reviewed: SR 60-70  	  MEDICATIONS:  furosemide   Injectable 80 milliGRAM(s) IV Push two times a day  hydrALAZINE 25 milliGRAM(s) Oral three times a day  labetalol 200 milliGRAM(s) Oral three times a day  NIFEdipine XL 90 milliGRAM(s) Oral daily        PHYSICAL EXAM:  T(C): 37.1 (04-06-24 @ 11:00), Max: 37.1 (04-05-24 @ 20:19)  HR: 75 (04-06-24 @ 11:49) (66 - 88)  BP: 170/74 (04-06-24 @ 11:49) (132/88 - 226/99)  RR: 18 (04-06-24 @ 11:00) (18 - 18)  SpO2: 95% (04-06-24 @ 11:49) (88% - 97%)  Wt(kg): --  I&O's Summary    05 Apr 2024 07:01  -  06 Apr 2024 07:00  --------------------------------------------------------  IN: 0 mL / OUT: 950 mL / NET: -950 mL    06 Apr 2024 07:01  -  06 Apr 2024 14:28  --------------------------------------------------------  IN: 440 mL / OUT: 0 mL / NET: 440 mL          Appearance: In no distress	  HEENT:    PERRL, EOMI	  Cardiovascular:  S1 S2, No JVD  Respiratory: Lungs clear to auscultation	  Gastrointestinal:  Soft, Non-tender, + BS	  Vascularature:  No edema of LE  Psychiatric: Appropriate affect   Neuro: no acute focal deficits                               8.5    3.80  )-----------( 191      ( 06 Apr 2024 06:27 )             28.0     04-06    141  |  111<H>  |  74<H>  ----------------------------<  82  5.0   |  16<L>  |  4.33<H>    Ca    8.7      06 Apr 2024 06:27    TPro  5.9<L>  /  Alb  3.4  /  TBili  0.2  /  DBili  x   /  AST  14  /  ALT  8<L>  /  AlkPhos  98  04-05        Labs personally reviewed      ASSESSMENT/PLAN: 	  78-year-old female with a history of hypertension, diastolic heart failure, renal transplant 2 years ago no longer on tacrolimus who presents with shortness of breath and lower extremity swelling since yesterday. Patient reports dyspnea with exertion, no orthopnea.    78f h/o HTN, s/p Renal transplant, CKD, chronic rejection, recent admission for pneumonia s/p abx presented with shortness of breath since this morning. she also noticed significant LUE swelling that began 4-5 days ago    1. Diastolic Heart Failure secondary to advanced kidney disease   - TTE in Jan 2024 with normal EF, moderate DD, elevated filling pressures, severe LVH, severely dilated LA, small pericardial effusion  - cardiac amyloid with PYP scan normal   - CXR shows cardiomegaly and pulm edema  - BNP almost 19K  - Start lasix 80 IV BID  - Appreciate Renal and TP recs  - Monitor Strict I&Os, daily weights    2. HTN   - c/w labetalol 200mg PO TID  - Continue Nifedipine 90mg daily   - Start Hydralazine 25mg PO TID and uptitrate    3. ESRD  - Renal recs appreciated            Iolani Behrbom, AG-NP Omid Javdan, DO [facility]  Cardiovascular Medicine  [address], [address]  Available through call or text on [employer]  Office: [phone]

## 2024-04-07 DIAGNOSIS — I82.409 ACUTE EMBOLISM AND THROMBOSIS OF UNSPECIFIED DEEP VEINS OF UNSPECIFIED LOWER EXTREMITY: ICD-10-CM

## 2024-04-07 LAB
ANION GAP SERPL CALC-SCNC: 13 MMOL/L — SIGNIFICANT CHANGE UP (ref 5–17)
BUN SERPL-MCNC: 75 MG/DL — HIGH (ref 7–23)
CALCIUM SERPL-MCNC: 8.1 MG/DL — LOW (ref 8.4–10.5)
CHLORIDE SERPL-SCNC: 111 MMOL/L — HIGH (ref 96–108)
CO2 SERPL-SCNC: 18 MMOL/L — LOW (ref 22–31)
CREAT SERPL-MCNC: 4.41 MG/DL — HIGH (ref 0.5–1.3)
EGFR: 10 ML/MIN/1.73M2 — LOW
GLUCOSE SERPL-MCNC: 90 MG/DL — SIGNIFICANT CHANGE UP (ref 70–99)
HCT VFR BLD CALC: 25 % — LOW (ref 34.5–45)
HGB BLD-MCNC: 7.8 G/DL — LOW (ref 11.5–15.5)
MCHC RBC-ENTMCNC: 26.4 PG — LOW (ref 27–34)
MCHC RBC-ENTMCNC: 31.2 GM/DL — LOW (ref 32–36)
MCV RBC AUTO: 84.5 FL — SIGNIFICANT CHANGE UP (ref 80–100)
NRBC # BLD: 0 /100 WBCS — SIGNIFICANT CHANGE UP (ref 0–0)
PLATELET # BLD AUTO: 181 K/UL — SIGNIFICANT CHANGE UP (ref 150–400)
POTASSIUM SERPL-MCNC: 4.7 MMOL/L — SIGNIFICANT CHANGE UP (ref 3.5–5.3)
POTASSIUM SERPL-SCNC: 4.7 MMOL/L — SIGNIFICANT CHANGE UP (ref 3.5–5.3)
RBC # BLD: 2.96 M/UL — LOW (ref 3.8–5.2)
RBC # FLD: 17.2 % — HIGH (ref 10.3–14.5)
SODIUM SERPL-SCNC: 142 MMOL/L — SIGNIFICANT CHANGE UP (ref 135–145)
TACROLIMUS SERPL-MCNC: 4.8 NG/ML — SIGNIFICANT CHANGE UP
WBC # BLD: 4.68 K/UL — SIGNIFICANT CHANGE UP (ref 3.8–10.5)
WBC # FLD AUTO: 4.68 K/UL — SIGNIFICANT CHANGE UP (ref 3.8–10.5)

## 2024-04-07 RX ORDER — ONDANSETRON 8 MG/1
4 TABLET, FILM COATED ORAL ONCE
Refills: 0 | Status: COMPLETED | OUTPATIENT
Start: 2024-04-07 | End: 2024-04-07

## 2024-04-07 RX ORDER — ONDANSETRON 8 MG/1
4 TABLET, FILM COATED ORAL ONCE
Refills: 0 | Status: DISCONTINUED | OUTPATIENT
Start: 2024-04-07 | End: 2024-04-07

## 2024-04-07 RX ORDER — HYDRALAZINE HCL 50 MG
50 TABLET ORAL THREE TIMES A DAY
Refills: 0 | Status: ACTIVE | OUTPATIENT
Start: 2024-04-07 | End: 2025-03-06

## 2024-04-07 RX ADMIN — Medication 5 MILLIGRAM(S): at 05:47

## 2024-04-07 RX ADMIN — Medication 50 MILLIGRAM(S): at 21:10

## 2024-04-07 RX ADMIN — Medication 1300 MILLIGRAM(S): at 13:25

## 2024-04-07 RX ADMIN — APIXABAN 2.5 MILLIGRAM(S): 2.5 TABLET, FILM COATED ORAL at 17:26

## 2024-04-07 RX ADMIN — APIXABAN 2.5 MILLIGRAM(S): 2.5 TABLET, FILM COATED ORAL at 05:47

## 2024-04-07 RX ADMIN — Medication 200 MILLIGRAM(S): at 21:10

## 2024-04-07 RX ADMIN — Medication 200 MILLIGRAM(S): at 05:47

## 2024-04-07 RX ADMIN — MYCOPHENOLATE MOFETIL 500 MILLIGRAM(S): 250 CAPSULE ORAL at 17:26

## 2024-04-07 RX ADMIN — Medication 90 MILLIGRAM(S): at 07:31

## 2024-04-07 RX ADMIN — MYCOPHENOLATE MOFETIL 500 MILLIGRAM(S): 250 CAPSULE ORAL at 05:47

## 2024-04-07 RX ADMIN — CHLORHEXIDINE GLUCONATE 1 APPLICATION(S): 213 SOLUTION TOPICAL at 11:48

## 2024-04-07 RX ADMIN — Medication 80 MILLIGRAM(S): at 13:24

## 2024-04-07 RX ADMIN — Medication 200 MILLIGRAM(S): at 13:26

## 2024-04-07 RX ADMIN — SODIUM ZIRCONIUM CYCLOSILICATE 10 GRAM(S): 10 POWDER, FOR SUSPENSION ORAL at 11:48

## 2024-04-07 RX ADMIN — Medication 1300 MILLIGRAM(S): at 05:47

## 2024-04-07 RX ADMIN — Medication 81 MILLIGRAM(S): at 11:48

## 2024-04-07 RX ADMIN — Medication 80 MILLIGRAM(S): at 05:47

## 2024-04-07 RX ADMIN — Medication 1300 MILLIGRAM(S): at 21:10

## 2024-04-07 RX ADMIN — Medication 25 MILLIGRAM(S): at 05:47

## 2024-04-07 RX ADMIN — TACROLIMUS 5 MILLIGRAM(S): 5 CAPSULE ORAL at 05:48

## 2024-04-07 RX ADMIN — Medication 50 MILLIGRAM(S): at 13:27

## 2024-04-07 NOTE — PROGRESS NOTE ADULT - SUBJECTIVE AND OBJECTIVE BOX
Patient is a 78y old  Female who presents with a chief complaint of shortness of breath and leg swelling (06 Apr 2024 14:28)      DATE OF SERVICE: 04-07-24 @ 13:38    SUBJECTIVE / OVERNIGHT EVENTS: overnight events noted    ROS:  Resp: No cough no sputum production  CVS: No chest pain no palpitations no orthopnea  'I feel fine'       MEDICATIONS  (STANDING):  apixaban 2.5 milliGRAM(s) Oral every 12 hours  aspirin  chewable 81 milliGRAM(s) Oral daily  chlorhexidine 2% Cloths 1 Application(s) Topical daily  furosemide   Injectable 80 milliGRAM(s) IV Push two times a day  hydrALAZINE 50 milliGRAM(s) Oral three times a day  labetalol 200 milliGRAM(s) Oral three times a day  mycophenolate mofetil 500 milliGRAM(s) Oral two times a day  NIFEdipine XL 90 milliGRAM(s) Oral daily  predniSONE   Tablet 5 milliGRAM(s) Oral daily  sodium bicarbonate 1300 milliGRAM(s) Oral three times a day  sodium zirconium cyclosilicate 10 Gram(s) Oral daily  tacrolimus ER Tablet (ENVARSUS XR) 5 milliGRAM(s) Oral daily    MEDICATIONS  (PRN):        CAPILLARY BLOOD GLUCOSE        I&O's Summary    06 Apr 2024 07:01  -  07 Apr 2024 07:00  --------------------------------------------------------  IN: 440 mL / OUT: 900 mL / NET: -460 mL        Vital Signs Last 24 Hrs  T(C): 37.1 (07 Apr 2024 10:53), Max: 37.1 (07 Apr 2024 04:08)  T(F): 98.8 (07 Apr 2024 10:53), Max: 98.8 (07 Apr 2024 08:30)  HR: 73 (07 Apr 2024 13:23) (66 - 74)  BP: 159/73 (07 Apr 2024 13:23) (159/73 - 210/108)  BP(mean): --  RR: 18 (07 Apr 2024 10:53) (17 - 18)  SpO2: 97% (07 Apr 2024 10:53) (97% - 100%)    PHYSICAL EXAM:distress  EYES: EOMI, PERRLA,  NECK: Supple  CHEST/LUNG: clear bases  HEART: S1 S2; systolic murmur +   ABDOMEN: Soft, Nontender  EXTREMITIES:  improved edema  LUE edema stable  NEUROLOGY: AO x 3 non-focal    LABS:                        7.8    4.68  )-----------( 181      ( 07 Apr 2024 04:58 )             25.0     04-07    142  |  111<H>  |  75<H>  ----------------------------<  90  4.7   |  18<L>  |  4.41<H>    Ca    8.1<L>      07 Apr 2024 04:59            Urinalysis Basic - ( 07 Apr 2024 04:59 )    Color: x / Appearance: x / SG: x / pH: x  Gluc: 90 mg/dL / Ketone: x  / Bili: x / Urobili: x   Blood: x / Protein: x / Nitrite: x   Leuk Esterase: x / RBC: x / WBC x   Sq Epi: x / Non Sq Epi: x / Bacteria: x          All consultant(s) notes reviewed and care discussed with other providers        Contact Number, Dr Woods [phone]

## 2024-04-07 NOTE — PROGRESS NOTE ADULT - ASSESSMENT
78-year-old female with a history of hypertension, diastolic heart failure, renal transplant 2 years ago no longer on tacrolimus who presents with shortness of breath and lower extremity swelling since yesterday. Patient reports dyspnea with exertion, no orthopnea.  Patient denies any cough, fever, chills, abdominal pain, nausea, vomiting, dysuria, chest pain. Patient is ambulatory at home, endorses no changes in activity or medication changes recently. Not on any AC. Patient is currently on 80 mg of Lasix twice daily, was started by cardiologist 2 weeks ago.  Nephrology consulted for YANELY.      A/P:  YANELY on CKD 4  S/p DDRT (5/19/2021 - induction w/ Basiliximab)  Baseline sCr ~3-3.3.  Renal function fluctuates sec to HF.  Repeat TTE 1/4 - Left ventricular wall thickness is moderately increased. Left ventricular systolic function is normal with a calculated ejection fraction of 63 % with moderate L ventricular dysfunction.  Renal function worsened on admission likely d/t cardiorenal vs. Hemodynamic changes w/ hypertensive urgency.  FEUrea 44.6 % suggestive of intrinsic causes  sCr slightly worsening at present - possibly from increase in Lasix to 80 mg IVP BID   Pt still remains volume overloaded on exam - c/w Lasix   Resume immunosuppressants per  Transplant nephrology  FK level 4.8 on 4/7   Check FK level 30min prior to dose   Avoid nephrotoxic agents.  Monitor BMP and UO.    HTN:  BP remains elevated  Would consider titrating Hydralazine up to 100 mg TID   Avoid hypotension.  Monitor BP.    Hyperkalemia:  sec to CKD  K improving   On Lokelma 10gm qd.  Low K diet.  Monitor K closely.    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  c/w NaHCO3 1300mg TID.  C/w lasix.  Monitor CO2.      SOB:  Chest x-ray w/ pulmonary edema and L pleural effusion.  C/W diuretics.

## 2024-04-07 NOTE — CHART NOTE - NSCHARTNOTEFT_GEN_A_CORE
Notified by RN; patient with elevated blood pressure. Pt seen at bedside; asymptomatic. No evidence of end-organ damage. RN advised to administer lasix, labetalol and hydralazine and reevaluate in an hour. Will  sign out to day team     Vital Signs Last 24 Hrs  T(C): 36.9 (07 Apr 2024 05:44), Max: 37.1 (06 Apr 2024 08:09)  T(F): 98.4 (07 Apr 2024 05:44), Max: 98.8 (06 Apr 2024 11:00)  HR: 74 (07 Apr 2024 05:44) (66 - 75)  BP: 199/78 (07 Apr 2024 06:55) (161/78 - 200/83)  BP(mean): --  RR: 18 (07 Apr 2024 05:44) (18 - 18)  SpO2: 97% (07 Apr 2024 05:44) (95% - 100%)    Parameters below as of 07 Apr 2024 05:44  Patient On (Oxygen Delivery Method): nasal cannula  O2 Flow (L/min): 2      Ana Millan NP  Department of Medicine   Spectra Notified by RN; patient with elevated blood pressure. Pt seen at bedside; asymptomatic. No evidence of end-organ damage. RN advised to administer lasix, labetalol and hydralazine and reevaluate in an hour. Will  sign out to day team     Vital Signs Last 24 Hrs  T(C): 36.9 (07 Apr 2024 05:44), Max: 37.1 (06 Apr 2024 08:09)  T(F): 98.4 (07 Apr 2024 05:44), Max: 98.8 (06 Apr 2024 11:00)  HR: 74 (07 Apr 2024 05:44) (66 - 75)  BP: 199/78 (07 Apr 2024 06:55) (161/78 - 200/83)  BP(mean): --  RR: 18 (07 Apr 2024 05:44) (18 - 18)  SpO2: 97% (07 Apr 2024 05:44) (95% - 100%)    Parameters below as of 07 Apr 2024 05:44  Patient On (Oxygen Delivery Method): nasal cannula  O2 Flow (L/min): 2      Ana Millan NP  Department of Medicine   Spectra [phone]

## 2024-04-07 NOTE — PROGRESS NOTE ADULT - SUBJECTIVE AND OBJECTIVE BOX
DATE OF SERVICE: 04-07-24 @ 15:03    Patient is a 78y old  Female who presents with a chief complaint of shortness of breath and leg swelling (07 Apr 2024 13:38)      INTERVAL HISTORY: feels okay    REVIEW OF SYSTEMS:  CONSTITUTIONAL: No weakness  EYES/ENT: No visual changes;  No throat pain   NECK: No pain or stiffness  RESPIRATORY: No cough, wheezing; No shortness of breath  CARDIOVASCULAR: No chest pain or palpitations  GASTROINTESTINAL: No abdominal  pain. No nausea, vomiting, or hematemesis  GENITOURINARY: No dysuria, frequency or hematuria  NEUROLOGICAL: No stroke like symptoms  SKIN: No rashes    TELEMETRY Personally reviewed: SR 60s-70s  	  MEDICATIONS:  furosemide   Injectable 80 milliGRAM(s) IV Push two times a day  hydrALAZINE 50 milliGRAM(s) Oral three times a day  labetalol 200 milliGRAM(s) Oral three times a day  NIFEdipine XL 90 milliGRAM(s) Oral daily        PHYSICAL EXAM:  T(C): 37.1 (04-07-24 @ 10:53), Max: 37.1 (04-07-24 @ 04:08)  HR: 73 (04-07-24 @ 13:23) (66 - 74)  BP: 159/73 (04-07-24 @ 13:23) (159/73 - 210/108)  RR: 18 (04-07-24 @ 10:53) (17 - 18)  SpO2: 97% (04-07-24 @ 10:53) (97% - 100%)  Wt(kg): --  I&O's Summary    06 Apr 2024 07:01  -  07 Apr 2024 07:00  --------------------------------------------------------  IN: 440 mL / OUT: 900 mL / NET: -460 mL    07 Apr 2024 07:01  -  07 Apr 2024 15:03  --------------------------------------------------------  IN: 440 mL / OUT: 200 mL / NET: 240 mL          Appearance: In no distress	  HEENT:    PERRL, EOMI	  Cardiovascular:  S1 S2, No JVD  Respiratory: Lungs clear to auscultation	  Gastrointestinal:  Soft, Non-tender, + BS	  Vascularature:  No edema of LE  Psychiatric: Appropriate affect   Neuro: no acute focal deficits                               7.8    4.68  )-----------( 181      ( 07 Apr 2024 04:58 )             25.0     04-07    142  |  111<H>  |  75<H>  ----------------------------<  90  4.7   |  18<L>  |  4.41<H>    Ca    8.1<L>      07 Apr 2024 04:59          Labs personally reviewed      ASSESSMENT/PLAN: 	  DATE OF SERVICE: 04-07-24 @ 15:03    Patient is a 78y old  Female who presents with a chief complaint of shortness of breath and leg swelling (07 Apr 2024 13:38)      INTERVAL HISTORY:     REVIEW OF SYSTEMS:  CONSTITUTIONAL: No weakness  EYES/ENT: No visual changes;  No throat pain   NECK: No pain or stiffness  RESPIRATORY: No cough, wheezing; No shortness of breath  CARDIOVASCULAR: No chest pain or palpitations  GASTROINTESTINAL: No abdominal  pain. No nausea, vomiting, or hematemesis  GENITOURINARY: No dysuria, frequency or hematuria  NEUROLOGICAL: No stroke like symptoms  SKIN: No rashes    TELEMETRY Personally reviewed:  	  MEDICATIONS:  furosemide   Injectable 80 milliGRAM(s) IV Push two times a day  hydrALAZINE 50 milliGRAM(s) Oral three times a day  labetalol 200 milliGRAM(s) Oral three times a day  NIFEdipine XL 90 milliGRAM(s) Oral daily        PHYSICAL EXAM:  T(C): 37.1 (04-07-24 @ 10:53), Max: 37.1 (04-07-24 @ 04:08)  HR: 73 (04-07-24 @ 13:23) (66 - 74)  BP: 159/73 (04-07-24 @ 13:23) (159/73 - 210/108)  RR: 18 (04-07-24 @ 10:53) (17 - 18)  SpO2: 97% (04-07-24 @ 10:53) (97% - 100%)  Wt(kg): --  I&O's Summary    06 Apr 2024 07:01  -  07 Apr 2024 07:00  --------------------------------------------------------  IN: 440 mL / OUT: 900 mL / NET: -460 mL    07 Apr 2024 07:01  -  07 Apr 2024 15:03  --------------------------------------------------------  IN: 440 mL / OUT: 200 mL / NET: 240 mL          Appearance: In no distress	  HEENT:    PERRL, EOMI	  Cardiovascular:  S1 S2, No JVD  Respiratory: Lungs clear to auscultation	  Gastrointestinal:  Soft, Non-tender, + BS	  Vascularature:  No edema of LE  Psychiatric: Appropriate affect   Neuro: no acute focal deficits                               7.8    4.68  )-----------( 181      ( 07 Apr 2024 04:58 )             25.0     04-07    142  |  111<H>  |  75<H>  ----------------------------<  90  4.7   |  18<L>  |  4.41<H>    Ca    8.1<L>      07 Apr 2024 04:59          Labs personally reviewed      ASSESSMENT/PLAN: 	  78-year-old female with a history of hypertension, diastolic heart failure, renal transplant 2 years ago no longer on tacrolimus who presents with shortness of breath and lower extremity swelling since yesterday. Patient reports dyspnea with exertion, no orthopnea.    78f h/o HTN, s/p Renal transplant, CKD, chronic rejection, recent admission for pneumonia s/p abx presented with shortness of breath since this morning. she also noticed significant LUE swelling that began 4-5 days ago    1. Diastolic Heart Failure secondary to advanced kidney disease   - TTE in Jan 2024 with normal EF, moderate DD, elevated filling pressures, severe LVH, severely dilated LA, small pericardial effusion  - cardiac amyloid with PYP scan normal   - CXR shows cardiomegaly and pulm edema  - BNP almost 19K  - Start lasix 80 IV BID  - Appreciate Renal and TP recs  - Monitor Strict I&Os, daily weights    2. HTN   - c/w labetalol 200mg PO TID  - Continue Nifedipine 90mg daily   - Start Hydralazine 25mg PO TID and uptitrate.  4/8 Systolic 190-200, hydral increased to 50mg TID    3. ESRD  - Renal recs appreciated                      Iolani Behrbom, AG-NP Omid Javdan, DO [facility]  Cardiovascular Medicine  [address] [place] [address], [address]  Available through call or text on [employer]  Office: [phone]            Iolani Behrbom, AG-NP Omid Javdan,  [address], [address]  Available through call or text on [employer]  Office: [phone]

## 2024-04-07 NOTE — PROGRESS NOTE ADULT - SUBJECTIVE AND OBJECTIVE BOX
[facility]   MD SAMMY ORDONEZ MD BRIANA PETITO, NP    TEL:  FROM 9 AM to 5 PM ---OFFICE: [phone]  FROM 5 PM - 9 AM PLEASE CALL ANSWERING SERVICE: [phone]       RENAL PROGRESS NOTE: DATE OF SERVICE 04-07-24 @ 15:43    Patient is a 78y old  Female who presents with a chief complaint of shortness of breath and leg swelling  Patient seen and examined at bedside. No chest pain/sob    VITALS:  T(F): 98.8 (04-07-24 @ 10:53), Max: 98.8 (04-07-24 @ 08:30)  HR: 73 (04-07-24 @ 13:23)  BP: 159/73 (04-07-24 @ 13:23)  RR: 18 (04-07-24 @ 10:53)  SpO2: 97% (04-07-24 @ 10:53)  Wt(kg): --    04-06 @ 07:01  -  04-07 @ 07:00  --------------------------------------------------------  IN: 440 mL / OUT: 900 mL / NET: -460 mL    04-07 @ 07:01  -  04-07 @ 15:43  --------------------------------------------------------  IN: 440 mL / OUT: 200 mL / NET: 240 mL        PHYSICAL EXAM:  Constitutional: NAD  Neck: No JVD  Respiratory: CTAB, no wheezes, rales or rhonchi  Cardiovascular: S1, S2, RRR  Gastrointestinal: BS+, soft, NT/ND  Extremities: b/l LE edema     [facility] Medications:   MEDICATIONS  (STANDING):  apixaban 2.5 milliGRAM(s) Oral every 12 hours  aspirin  chewable 81 milliGRAM(s) Oral daily  chlorhexidine 2% Cloths 1 Application(s) Topical daily  furosemide   Injectable 80 milliGRAM(s) IV Push two times a day  hydrALAZINE 50 milliGRAM(s) Oral three times a day  labetalol 200 milliGRAM(s) Oral three times a day  mycophenolate mofetil 500 milliGRAM(s) Oral two times a day  NIFEdipine XL 90 milliGRAM(s) Oral daily  predniSONE   Tablet 5 milliGRAM(s) Oral daily  sodium bicarbonate 1300 milliGRAM(s) Oral three times a day  sodium zirconium cyclosilicate 10 Gram(s) Oral daily  tacrolimus ER Tablet (ENVARSUS XR) 5 milliGRAM(s) Oral daily    Tacrolimus (), Serum: 4.8 ng/mL (04-07 @ 04:58)    LABS:  04-07    142  |  111<H>  |  75<H>  ----------------------------<  90  4.7   |  18<L>  |  4.41<H>    Ca    8.1<L>      07 Apr 2024 04:59      Creatinine Trend: 4.41 <--, 4.33 <--, 3.81 <--                                7.8    4.68  )-----------( 181      ( 07 Apr 2024 04:58 )             25.0     Urine Studies:  Urinalysis - [04-07-24 @ 04:59]      Color  / Appearance  / SG  / pH       Gluc 90 / Ketone   / Bili  / Urobili        Blood  / Protein  / Leuk Est  / Nitrite       RBC  / WBC  / Hyaline  / Gran  / Sq Epi  / Non Sq Epi  / Bacteria     Urine Creatinine 51      [04-06-24 @ 02:38]  Urine Urea Nitrogen 387      [04-06-24 @ 02:37]    Iron 21, TIBC 171, %sat 12      [01-06-24 @ 11:12]  Ferritin 1573      [01-06-24 @ 11:12]  PTH -- (Ca 8.5)      [11-10-23 @ 06:01]   659  Vitamin D (25OH) 16.5      [11-11-23 @ 06:09]  HbA1c 4.6      [05-28-19 @ 09:49]  Lipid: chol 191, , HDL 64, LDL --      [01-04-24 @ 10:13]        RADIOLOGY & ADDITIONAL STUDIES:

## 2024-04-08 LAB
ANION GAP SERPL CALC-SCNC: 13 MMOL/L — SIGNIFICANT CHANGE UP (ref 5–17)
APPEARANCE UR: CLEAR — SIGNIFICANT CHANGE UP
BACTERIA # UR AUTO: NEGATIVE /HPF — SIGNIFICANT CHANGE UP
BILIRUB UR-MCNC: NEGATIVE — SIGNIFICANT CHANGE UP
BLD GP AB SCN SERPL QL: NEGATIVE — SIGNIFICANT CHANGE UP
BUN SERPL-MCNC: 76 MG/DL — HIGH (ref 7–23)
CALCIUM SERPL-MCNC: 8.1 MG/DL — LOW (ref 8.4–10.5)
CAST: 0 /LPF — SIGNIFICANT CHANGE UP (ref 0–4)
CHLORIDE SERPL-SCNC: 109 MMOL/L — HIGH (ref 96–108)
CO2 SERPL-SCNC: 18 MMOL/L — LOW (ref 22–31)
COLOR SPEC: YELLOW — SIGNIFICANT CHANGE UP
CREAT ?TM UR-MCNC: 70 MG/DL — SIGNIFICANT CHANGE UP
CREAT SERPL-MCNC: 4.6 MG/DL — HIGH (ref 0.5–1.3)
DIFF PNL FLD: NEGATIVE — SIGNIFICANT CHANGE UP
EGFR: 9 ML/MIN/1.73M2 — LOW
FERRITIN SERPL-MCNC: 1188 NG/ML — HIGH (ref 13–330)
FOLATE SERPL-MCNC: 11.8 NG/ML — SIGNIFICANT CHANGE UP
GLUCOSE SERPL-MCNC: 86 MG/DL — SIGNIFICANT CHANGE UP (ref 70–99)
GLUCOSE UR QL: NEGATIVE MG/DL — SIGNIFICANT CHANGE UP
HCT VFR BLD CALC: 27.1 % — LOW (ref 34.5–45)
HGB BLD-MCNC: 8.2 G/DL — LOW (ref 11.5–15.5)
IRON SATN MFR SERPL: 13 % — LOW (ref 14–50)
IRON SATN MFR SERPL: 25 UG/DL — LOW (ref 30–160)
KETONES UR-MCNC: NEGATIVE MG/DL — SIGNIFICANT CHANGE UP
LEUKOCYTE ESTERASE UR-ACNC: NEGATIVE — SIGNIFICANT CHANGE UP
MCHC RBC-ENTMCNC: 25.5 PG — LOW (ref 27–34)
MCHC RBC-ENTMCNC: 30.3 GM/DL — LOW (ref 32–36)
MCV RBC AUTO: 84.4 FL — SIGNIFICANT CHANGE UP (ref 80–100)
NITRITE UR-MCNC: NEGATIVE — SIGNIFICANT CHANGE UP
NRBC # BLD: 0 /100 WBCS — SIGNIFICANT CHANGE UP (ref 0–0)
OSMOLALITY UR: 389 MOS/KG — SIGNIFICANT CHANGE UP (ref 300–900)
PH UR: 6.5 — SIGNIFICANT CHANGE UP (ref 5–8)
PLATELET # BLD AUTO: 193 K/UL — SIGNIFICANT CHANGE UP (ref 150–400)
POTASSIUM SERPL-MCNC: 4.8 MMOL/L — SIGNIFICANT CHANGE UP (ref 3.5–5.3)
POTASSIUM SERPL-SCNC: 4.8 MMOL/L — SIGNIFICANT CHANGE UP (ref 3.5–5.3)
POTASSIUM UR-SCNC: 30 MMOL/L — SIGNIFICANT CHANGE UP
PROT ?TM UR-MCNC: 359 MG/DL — HIGH (ref 0–12)
PROT UR-MCNC: 300 MG/DL
PROT/CREAT UR-RTO: 5.1 RATIO — HIGH (ref 0–0.2)
RBC # BLD: 3.21 M/UL — LOW (ref 3.8–5.2)
RBC # FLD: 17.4 % — HIGH (ref 10.3–14.5)
RBC CASTS # UR COMP ASSIST: 5 /HPF — HIGH (ref 0–4)
REVIEW: SIGNIFICANT CHANGE UP
RH IG SCN BLD-IMP: POSITIVE — SIGNIFICANT CHANGE UP
SODIUM SERPL-SCNC: 140 MMOL/L — SIGNIFICANT CHANGE UP (ref 135–145)
SODIUM UR-SCNC: 74 MMOL/L — SIGNIFICANT CHANGE UP
SP GR SPEC: 1.01 — SIGNIFICANT CHANGE UP (ref 1–1.03)
SQUAMOUS # UR AUTO: 1 /HPF — SIGNIFICANT CHANGE UP (ref 0–5)
TACROLIMUS SERPL-MCNC: 4.4 NG/ML — SIGNIFICANT CHANGE UP
TIBC SERPL-MCNC: 193 UG/DL — LOW (ref 220–430)
UIBC SERPL-MCNC: 168 UG/DL — SIGNIFICANT CHANGE UP (ref 110–370)
UROBILINOGEN FLD QL: 0.2 MG/DL — SIGNIFICANT CHANGE UP (ref 0.2–1)
VIT B12 SERPL-MCNC: 446 PG/ML — SIGNIFICANT CHANGE UP (ref 232–1245)
WBC # BLD: 4.42 K/UL — SIGNIFICANT CHANGE UP (ref 3.8–10.5)
WBC # FLD AUTO: 4.42 K/UL — SIGNIFICANT CHANGE UP (ref 3.8–10.5)
WBC UR QL: 1 /HPF — SIGNIFICANT CHANGE UP (ref 0–5)

## 2024-04-08 PROCEDURE — 99232 SBSQ HOSP IP/OBS MODERATE 35: CPT | Mod: GC

## 2024-04-08 RX ORDER — HYDRALAZINE HCL 50 MG
100 TABLET ORAL THREE TIMES A DAY
Refills: 0 | Status: DISCONTINUED | OUTPATIENT
Start: 2024-04-08 | End: 2024-04-16

## 2024-04-08 RX ADMIN — Medication 50 MILLIGRAM(S): at 05:08

## 2024-04-08 RX ADMIN — Medication 90 MILLIGRAM(S): at 05:08

## 2024-04-08 RX ADMIN — APIXABAN 2.5 MILLIGRAM(S): 2.5 TABLET, FILM COATED ORAL at 17:07

## 2024-04-08 RX ADMIN — Medication 50 MILLIGRAM(S): at 13:07

## 2024-04-08 RX ADMIN — Medication 100 MILLIGRAM(S): at 21:20

## 2024-04-08 RX ADMIN — Medication 80 MILLIGRAM(S): at 05:09

## 2024-04-08 RX ADMIN — Medication 200 MILLIGRAM(S): at 21:21

## 2024-04-08 RX ADMIN — APIXABAN 2.5 MILLIGRAM(S): 2.5 TABLET, FILM COATED ORAL at 05:09

## 2024-04-08 RX ADMIN — Medication 200 MILLIGRAM(S): at 13:08

## 2024-04-08 RX ADMIN — CHLORHEXIDINE GLUCONATE 1 APPLICATION(S): 213 SOLUTION TOPICAL at 11:28

## 2024-04-08 RX ADMIN — TACROLIMUS 5 MILLIGRAM(S): 5 CAPSULE ORAL at 08:06

## 2024-04-08 RX ADMIN — Medication 200 MILLIGRAM(S): at 05:08

## 2024-04-08 RX ADMIN — Medication 1300 MILLIGRAM(S): at 21:21

## 2024-04-08 RX ADMIN — Medication 1300 MILLIGRAM(S): at 13:08

## 2024-04-08 RX ADMIN — SODIUM ZIRCONIUM CYCLOSILICATE 10 GRAM(S): 10 POWDER, FOR SUSPENSION ORAL at 11:28

## 2024-04-08 RX ADMIN — MYCOPHENOLATE MOFETIL 500 MILLIGRAM(S): 250 CAPSULE ORAL at 17:07

## 2024-04-08 RX ADMIN — MYCOPHENOLATE MOFETIL 500 MILLIGRAM(S): 250 CAPSULE ORAL at 08:06

## 2024-04-08 RX ADMIN — Medication 81 MILLIGRAM(S): at 11:28

## 2024-04-08 RX ADMIN — Medication 1300 MILLIGRAM(S): at 05:09

## 2024-04-08 RX ADMIN — Medication 5 MILLIGRAM(S): at 05:09

## 2024-04-08 NOTE — PROGRESS NOTE ADULT - SUBJECTIVE AND OBJECTIVE BOX
DATE OF SERVICE: 04-08-24 @ 17:49    Patient is a 78y old  Female who presents with a chief complaint of shortness of breath and leg swelling (08 Apr 2024 16:02)      INTERVAL HISTORY: Feels ok.     REVIEW OF SYSTEMS:  CONSTITUTIONAL: No weakness  EYES/ENT: No visual changes;  No throat pain   NECK: No pain or stiffness  RESPIRATORY: No cough, wheezing; No shortness of breath  CARDIOVASCULAR: No chest pain or palpitations  GASTROINTESTINAL: No abdominal  pain. No nausea, vomiting, or hematemesis  GENITOURINARY: No dysuria, frequency or hematuria  NEUROLOGICAL: No stroke like symptoms  SKIN: No rashes    TELEMETRY Personally reviewed: SR 60-70  	  MEDICATIONS:  hydrALAZINE 50 milliGRAM(s) Oral three times a day  labetalol 200 milliGRAM(s) Oral three times a day  NIFEdipine XL 90 milliGRAM(s) Oral daily        PHYSICAL EXAM:  T(C): 36.7 (04-08-24 @ 16:53), Max: 37.1 (04-08-24 @ 05:07)  HR: 69 (04-08-24 @ 16:53) (69 - 74)  BP: 179/75 (04-08-24 @ 16:53) (170/79 - 197/78)  RR: 18 (04-08-24 @ 16:53) (18 - 18)  SpO2: 95% (04-08-24 @ 16:53) (89% - 98%)  Wt(kg): --  I&O's Summary    07 Apr 2024 07:01  -  08 Apr 2024 07:00  --------------------------------------------------------  IN: 440 mL / OUT: 1400 mL / NET: -960 mL    08 Apr 2024 07:01  -  08 Apr 2024 17:49  --------------------------------------------------------  IN: 480 mL / OUT: 0 mL / NET: 480 mL          Appearance: In no distress	  HEENT:    PERRL, EOMI	  Cardiovascular:  S1 S2, No JVD  Respiratory: Lungs clear to auscultation	  Gastrointestinal:  Soft, Non-tender, + BS	  Vascularature:  + edema of LE  Psychiatric: Appropriate affect   Neuro: no acute focal deficits                               8.2    4.42  )-----------( 193      ( 08 Apr 2024 06:30 )             27.1     04-08    140  |  109<H>  |  76<H>  ----------------------------<  86  4.8   |  18<L>  |  4.60<H>    Ca    8.1<L>      08 Apr 2024 06:30          Labs personally reviewed      ASSESSMENT/PLAN: 	    78f h/o HTN, s/p Renal transplant, CKD, chronic rejection, recent admission for pneumonia s/p abx presented with shortness of breath since this morning. she also noticed significant LUE swelling that began 4-5 days ago    1. Diastolic Heart Failure secondary to advanced kidney disease   - TTE in Jan 2024 with normal EF, moderate DD, elevated filling pressures, severe LVH, severely dilated LA, small pericardial effusion  - cardiac amyloid with PYP scan normal   - CXR shows cardiomegaly and pulm edema  - BNP almost 19K  - Appreciate Renal and TP recs  - Monitor Strict I&Os, daily weights  - Lasix now DC'd given improvement in her clinical symptoms and risking Cr/BUN    2. HTN   - c/w labetalol 200mg PO TID  - Continue Nifedipine 90mg daily   - BP still elevated. Increase Hydralazine to 100mg PO TID as per Renal    3. ESRD  - Renal recs appreciated        Charlene Hayes, AG-NP   Dutch Oh DO [facility]  Cardiovascular Medicine  [address], [address]  Available through call or text on [employer] TE[employer]  Office: [phone]

## 2024-04-08 NOTE — PROGRESS NOTE ADULT - SUBJECTIVE AND OBJECTIVE BOX
Patient is a 78y old  Female who presents with a chief complaint of shortness of breath and leg swelling (08 Apr 2024 11:01)      DATE OF SERVICE: 04-08-24 @ 14:31    SUBJECTIVE / OVERNIGHT EVENTS: overnight events noted    ROS:  Resp: No cough no sputum production  CVS: No chest pain no palpitations no orthopnea  GI: no N/V/D  "I feel much better"       MEDICATIONS  (STANDING):  apixaban 2.5 milliGRAM(s) Oral every 12 hours  aspirin  chewable 81 milliGRAM(s) Oral daily  chlorhexidine 2% Cloths 1 Application(s) Topical daily  hydrALAZINE 50 milliGRAM(s) Oral three times a day  labetalol 200 milliGRAM(s) Oral three times a day  mycophenolate mofetil 500 milliGRAM(s) Oral two times a day  NIFEdipine XL 90 milliGRAM(s) Oral daily  predniSONE   Tablet 5 milliGRAM(s) Oral daily  sodium bicarbonate 1300 milliGRAM(s) Oral three times a day  sodium zirconium cyclosilicate 10 Gram(s) Oral daily  tacrolimus ER Tablet (ENVARSUS XR) 5 milliGRAM(s) Oral daily    MEDICATIONS  (PRN):        CAPILLARY BLOOD GLUCOSE        I&O's Summary    07 Apr 2024 07:01  -  08 Apr 2024 07:00  --------------------------------------------------------  IN: 440 mL / OUT: 1400 mL / NET: -960 mL    08 Apr 2024 07:01  -  08 Apr 2024 14:31  --------------------------------------------------------  IN: 480 mL / OUT: 0 mL / NET: 480 mL        Vital Signs Last 24 Hrs  T(C): 36.6 (08 Apr 2024 11:17), Max: 37.1 (08 Apr 2024 05:07)  T(F): 97.9 (08 Apr 2024 11:17), Max: 98.7 (08 Apr 2024 05:07)  HR: 71 (08 Apr 2024 12:54) (67 - 74)  BP: 171/70 (08 Apr 2024 12:54) (165/69 - 197/78)  BP(mean): --  RR: 18 (08 Apr 2024 11:17) (18 - 18)  SpO2: 98% (08 Apr 2024 11:17) (89% - 98%)      PHYSICAL EXAM  NECK: Supple  CHEST/LUNG: clear   HEART: S1 S2; systolic murmur +   ABDOMEN: Soft, Nontender  EXTREMITIES:  improved edema  LUE edema stable  NEUROLOGY: Alert non-focal    LABS:                        8.2    4.42  )-----------( 193      ( 08 Apr 2024 06:30 )             27.1     04-08    140  |  109<H>  |  76<H>  ----------------------------<  86  4.8   |  18<L>  |  4.60<H>    Ca    8.1<L>      08 Apr 2024 06:30            Urinalysis Basic - ( 08 Apr 2024 06:30 )    Color: x / Appearance: x / SG: x / pH: x  Gluc: 86 mg/dL / Ketone: x  / Bili: x / Urobili: x   Blood: x / Protein: x / Nitrite: x   Leuk Esterase: x / RBC: x / WBC x   Sq Epi: x / Non Sq Epi: x / Bacteria: x          All consultant(s) notes reviewed and care discussed with other providers        Contact Number, Dr Woods [phone]

## 2024-04-08 NOTE — PROGRESS NOTE ADULT - ASSESSMENT
79 y/o F with hx ESRD due to HTN, was on hemodialysis since 2016. Received DDRT on 5/19/21 presented with HTN emergency.    HTN emergency:  Pt is on home medications - ? compliance. But pt mentioned that she was taking medications.   Informed the primary team that pt has high BP and is in HTN emergency.   PLAN:  Management as per primary team.       IS:  Pt is not taking envarsus as per ED. But patient mentioned that she was taking the medications. Tacro level 4.4. C/w same dose.   PLAN:  Send tacro levels before morning dose.   Envarsus 5mg    BID  Pred 5.    CKD:  Home sodium bicarb  SHELDON 20k qweekly as per out pt. But given her high blood pressure, hold SHELDON for now.     HTN: Management as per general nephrology  Hyperkalemia: K is better today. On Lokelma.

## 2024-04-08 NOTE — PROGRESS NOTE ADULT - ASSESSMENT
78-year-old female with a history of hypertension, diastolic heart failure, renal transplant 2 years ago no longer on tacrolimus who presents with shortness of breath and lower extremity swelling since yesterday. Patient reports dyspnea with exertion, no orthopnea.  Patient denies any cough, fever, chills, abdominal pain, nausea, vomiting, dysuria, chest pain. Patient is ambulatory at home, endorses no changes in activity or medication changes recently. Not on any AC. Patient is currently on 80 mg of Lasix twice daily, was started by cardiologist 2 weeks ago.  Nephrology consulted for YANELY.      A/P:  YANELY on CKD 4  S/p DDRT (5/19/2021 - induction w/ Basiliximab)  Baseline sCr ~3-3.3.  Renal function fluctuates sec to HF.  Repeat TTE 1/4 - Left ventricular wall thickness is moderately increased. Left ventricular systolic function is normal with a calculated ejection fraction of 63 % with moderate L ventricular dysfunction.  Renal function worsened on admission likely d/t cardiorenal vs. Hemodynamic changes w/ hypertensive urgency.  FEUrea 44.6 % suggestive of intrinsic causes  sCr slightly worsening at present - possibly from increase in Lasix   lasix held ( last dose on 4/8)  Resume immunosuppressants per  Transplant nephrology  FK level 4.8 on 4/7   Check FK level 30min prior to dose   Avoid nephrotoxic agents.  Monitor BMP and UO.    HTN:  BP remains elevated  Would consider titrating Hydralazine up to 100 mg TID   Avoid hypotension.  Monitor BP.    Hyperkalemia:  sec to CKD  K improving   On Lokelma 10gm qd.  Low K diet.  Monitor K closely.    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  c/w NaHCO3 1300mg TID.  C/w lasix.  Monitor CO2.

## 2024-04-08 NOTE — PROGRESS NOTE ADULT - SUBJECTIVE AND OBJECTIVE BOX
[facility] [place] [facility]   MD SAMMY ORDONEZ MD RUORU WONG, PA    TEL:  FROM 9 AM to 5 PM ---OFFICE: [phone]  AVAILABLE ON TEAMS    FROM 5 PM - 9 AM PLEASE CALL ANSWERING SERVICE: [phone]    RENAL FOLLOW UP NOTE--Date of Service 04-08-24 @ 11:01  --------------------------------------------------------------------------------  HPI:      Pt seen and examined at bedside.       PAST HISTORY  --------------------------------------------------------------------------------  No significant changes to PMH, PSH, FHx, SHx, unless otherwise noted    ALLERGIES & MEDICATIONS  --------------------------------------------------------------------------------  Allergies    Mushrooms (Anaphylaxis)  penicillin (Rash)    Intolerances      Standing Inpatient Medications  apixaban 2.5 milliGRAM(s) Oral every 12 hours  aspirin  chewable 81 milliGRAM(s) Oral daily  chlorhexidine 2% Cloths 1 Application(s) Topical daily  hydrALAZINE 50 milliGRAM(s) Oral three times a day  labetalol 200 milliGRAM(s) Oral three times a day  mycophenolate mofetil 500 milliGRAM(s) Oral two times a day  NIFEdipine XL 90 milliGRAM(s) Oral daily  predniSONE   Tablet 5 milliGRAM(s) Oral daily  sodium bicarbonate 1300 milliGRAM(s) Oral three times a day  sodium zirconium cyclosilicate 10 Gram(s) Oral daily  tacrolimus ER Tablet (ENVARSUS XR) 5 milliGRAM(s) Oral daily    PRN Inpatient Medications      REVIEW OF SYSTEMS  --------------------------------------------------------------------------------  General: no fever    MSK: +edema     VITALS/PHYSICAL EXAM  --------------------------------------------------------------------------------  T(C): 37.1 (04-08-24 @ 05:07), Max: 37.1 (04-08-24 @ 05:07)  HR: 69 (04-08-24 @ 06:46) (67 - 74)  BP: 184/82 (04-08-24 @ 06:46) (159/73 - 197/78)  RR: 18 (04-08-24 @ 05:07) (18 - 18)  SpO2: 98% (04-08-24 @ 05:07) (89% - 98%)  Wt(kg): --        04-07-24 @ 07:01  -  04-08-24 @ 07:00  --------------------------------------------------------  IN: 440 mL / OUT: 1400 mL / NET: -960 mL      Physical Exam:  	Gen: NAD  	HEENT: MMM  	Pulm: CTA B/L  	CV: S1S2  	Abd: Soft, +BS  	Ext: + LE edema B/L                      Neuro: Awake   	Skin: Warm and Dry   	Vascular access: NO HD catheter            no sethi  LABS/STUDIES  --------------------------------------------------------------------------------              8.2    4.42  >-----------<  193      [04-08-24 @ 06:30]              27.1     140  |  109  |  76  ----------------------------<  86      [04-08-24 @ 06:30]  4.8   |  18  |  4.60        Ca     8.1     [04-08-24 @ 06:30]            Creatinine Trend:  SCr 4.60 [04-08 @ 06:30]  SCr 4.41 [04-07 @ 04:59]  SCr 4.33 [04-06 @ 06:27]  SCr 3.81 [04-05 @ 10:26]    Urinalysis - [04-08-24 @ 06:30]      Color  / Appearance  / SG  / pH       Gluc 86 / Ketone   / Bili  / Urobili        Blood  / Protein  / Leuk Est  / Nitrite       RBC  / WBC  / Hyaline  / Gran  / Sq Epi  / Non Sq Epi  / Bacteria     Urine Creatinine 51      [04-06-24 @ 02:38]  Urine Urea Nitrogen 387      [04-06-24 @ 02:37]    Iron 21, TIBC 171, %sat 12      [01-06-24 @ 11:12]  Ferritin 1573      [01-06-24 @ 11:12]  PTH -- (Ca 8.5)      [11-10-23 @ 06:01]   659  Vitamin D (25OH) 16.5      [11-11-23 @ 06:09]  HbA1c 4.6      [05-28-19 @ 09:49]  Lipid: chol 191, , HDL 64, LDL --      [01-04-24 @ 10:13]

## 2024-04-08 NOTE — PROGRESS NOTE ADULT - SUBJECTIVE AND OBJECTIVE BOX
[facility] DIVISION OF KIDNEY DISEASES AND HYPERTENSION -- FOLLOW UP NOTE  --------------------------------------------------------------------------------  Chief Complaint:    24 hour events/subjective:  Pt was seen and examined at the bedside. No acute overnight events. No fresh complaints. On 2 L /min Supplemental oxygen via NC.   Pt denies SOB/ Constipation/ Diarrhea/ Nausea/ Vomiting/ abdominal pain/ chest pain/ tingling/ numbness.         PAST HISTORY  --------------------------------------------------------------------------------  No significant changes to PMH, PSH, FHx, SHx, unless otherwise noted    ALLERGIES & MEDICATIONS  --------------------------------------------------------------------------------  Allergies    Mushrooms (Anaphylaxis)  penicillin (Rash)    Intolerances      Standing Inpatient Medications  apixaban 2.5 milliGRAM(s) Oral every 12 hours  aspirin  chewable 81 milliGRAM(s) Oral daily  chlorhexidine 2% Cloths 1 Application(s) Topical daily  hydrALAZINE 50 milliGRAM(s) Oral three times a day  labetalol 200 milliGRAM(s) Oral three times a day  mycophenolate mofetil 500 milliGRAM(s) Oral two times a day  NIFEdipine XL 90 milliGRAM(s) Oral daily  predniSONE   Tablet 5 milliGRAM(s) Oral daily  sodium bicarbonate 1300 milliGRAM(s) Oral three times a day  sodium zirconium cyclosilicate 10 Gram(s) Oral daily  tacrolimus ER Tablet (ENVARSUS XR) 5 milliGRAM(s) Oral daily    PRN Inpatient Medications      REVIEW OF SYSTEMS  --------------------------------------------------------------------------------  as above.     VITALS/PHYSICAL EXAM  --------------------------------------------------------------------------------  T(C): 36.6 (04-08-24 @ 11:17), Max: 37.1 (04-08-24 @ 05:07)  HR: 71 (04-08-24 @ 12:54) (67 - 74)  BP: 171/70 (04-08-24 @ 12:54) (165/69 - 197/78)  RR: 18 (04-08-24 @ 11:17) (18 - 18)  SpO2: 98% (04-08-24 @ 11:17) (89% - 98%)  Wt(kg): --        04-07-24 @ 07:01  -  04-08-24 @ 07:00  --------------------------------------------------------  IN: 440 mL / OUT: 1400 mL / NET: -960 mL    04-08-24 @ 07:01  -  04-08-24 @ 16:02  --------------------------------------------------------  IN: 480 mL / OUT: 0 mL / NET: 480 mL      Physical Exam:  Gen: Not in distress  HEENT: Supple neck, No JVD, PERRLA.  Pulm:Lower zone crepts +  CV:S1S2+   Abd: Non- tender, no distention, Bowel sounds +  Transplant: non tender, no bruit  : No suprapubic tenderness  Extremities: No edema.  Skin: warm  Neuro: AAOx 3 No apparent FNDs      LABS/STUDIES  --------------------------------------------------------------------------------              8.2    4.42  >-----------<  193      [04-08-24 @ 06:30]              27.1     140  |  109  |  76  ----------------------------<  86      [04-08-24 @ 06:30]  4.8   |  18  |  4.60        Ca     8.1     [04-08-24 @ 06:30]            Creatinine Trend:  SCr 4.60 [04-08 @ 06:30]  SCr 4.41 [04-07 @ 04:59]  SCr 4.33 [04-06 @ 06:27]  SCr 3.81 [04-05 @ 10:26]    Tacrolimus (), Serum: 4.4 ng/mL (04-08 @ 06:30)  Tacrolimus (), Serum: 4.8 ng/mL (04-07 @ 04:58)  Tacrolimus (), Serum: <2.0 ng/mL (04-06 @ 10:57)            Urinalysis - [04-08-24 @ 06:30]      Color  / Appearance  / SG  / pH       Gluc 86 / Ketone   / Bili  / Urobili        Blood  / Protein  / Leuk Est  / Nitrite       RBC  / WBC  / Hyaline  / Gran  / Sq Epi  / Non Sq Epi  / Bacteria     Urine Creatinine 51      [04-06-24 @ 02:38]  Urine Urea Nitrogen 387      [04-06-24 @ 02:37]    Iron 25, TIBC 193, %sat 13      [04-08-24 @ 06:30]  Ferritin 1188      [04-08-24 @ 06:30]  PTH -- (Ca 8.5)      [11-10-23 @ 06:01]   659  Vitamin D (25OH) 16.5      [11-11-23 @ 06:09]  HbA1c 4.6      [05-28-19 @ 09:49]  Lipid: chol 191, , HDL 64, LDL --      [01-04-24 @ 10:13]

## 2024-04-09 LAB
ANION GAP SERPL CALC-SCNC: 15 MMOL/L — SIGNIFICANT CHANGE UP (ref 5–17)
BUN SERPL-MCNC: 80 MG/DL — HIGH (ref 7–23)
CALCIUM SERPL-MCNC: 8.5 MG/DL — SIGNIFICANT CHANGE UP (ref 8.4–10.5)
CHLORIDE SERPL-SCNC: 109 MMOL/L — HIGH (ref 96–108)
CO2 SERPL-SCNC: 18 MMOL/L — LOW (ref 22–31)
CREAT SERPL-MCNC: 4.86 MG/DL — HIGH (ref 0.5–1.3)
EGFR: 9 ML/MIN/1.73M2 — LOW
GLUCOSE SERPL-MCNC: 83 MG/DL — SIGNIFICANT CHANGE UP (ref 70–99)
HCT VFR BLD CALC: 26.2 % — LOW (ref 34.5–45)
HGB BLD-MCNC: 8.1 G/DL — LOW (ref 11.5–15.5)
MCHC RBC-ENTMCNC: 25.9 PG — LOW (ref 27–34)
MCHC RBC-ENTMCNC: 30.9 GM/DL — LOW (ref 32–36)
MCV RBC AUTO: 83.7 FL — SIGNIFICANT CHANGE UP (ref 80–100)
NRBC # BLD: 0 /100 WBCS — SIGNIFICANT CHANGE UP (ref 0–0)
PLATELET # BLD AUTO: 197 K/UL — SIGNIFICANT CHANGE UP (ref 150–400)
POTASSIUM SERPL-MCNC: 4.5 MMOL/L — SIGNIFICANT CHANGE UP (ref 3.5–5.3)
POTASSIUM SERPL-SCNC: 4.5 MMOL/L — SIGNIFICANT CHANGE UP (ref 3.5–5.3)
RBC # BLD: 3.13 M/UL — LOW (ref 3.8–5.2)
RBC # FLD: 17.5 % — HIGH (ref 10.3–14.5)
SODIUM SERPL-SCNC: 142 MMOL/L — SIGNIFICANT CHANGE UP (ref 135–145)
TACROLIMUS SERPL-MCNC: 4.4 NG/ML — SIGNIFICANT CHANGE UP
UUN UR-MCNC: 560 MG/DL — SIGNIFICANT CHANGE UP
WBC # BLD: 3.86 K/UL — SIGNIFICANT CHANGE UP (ref 3.8–10.5)
WBC # FLD AUTO: 3.86 K/UL — SIGNIFICANT CHANGE UP (ref 3.8–10.5)

## 2024-04-09 PROCEDURE — 99232 SBSQ HOSP IP/OBS MODERATE 35: CPT

## 2024-04-09 RX ADMIN — Medication 100 MILLIGRAM(S): at 14:20

## 2024-04-09 RX ADMIN — Medication 90 MILLIGRAM(S): at 05:11

## 2024-04-09 RX ADMIN — Medication 81 MILLIGRAM(S): at 11:24

## 2024-04-09 RX ADMIN — Medication 200 MILLIGRAM(S): at 05:11

## 2024-04-09 RX ADMIN — Medication 1300 MILLIGRAM(S): at 21:44

## 2024-04-09 RX ADMIN — TACROLIMUS 5 MILLIGRAM(S): 5 CAPSULE ORAL at 08:12

## 2024-04-09 RX ADMIN — Medication 1300 MILLIGRAM(S): at 14:20

## 2024-04-09 RX ADMIN — Medication 200 MILLIGRAM(S): at 21:44

## 2024-04-09 RX ADMIN — CHLORHEXIDINE GLUCONATE 1 APPLICATION(S): 213 SOLUTION TOPICAL at 11:25

## 2024-04-09 RX ADMIN — MYCOPHENOLATE MOFETIL 500 MILLIGRAM(S): 250 CAPSULE ORAL at 21:44

## 2024-04-09 RX ADMIN — SODIUM ZIRCONIUM CYCLOSILICATE 10 GRAM(S): 10 POWDER, FOR SUSPENSION ORAL at 11:25

## 2024-04-09 RX ADMIN — Medication 200 MILLIGRAM(S): at 14:20

## 2024-04-09 RX ADMIN — Medication 100 MILLIGRAM(S): at 21:44

## 2024-04-09 RX ADMIN — Medication 5 MILLIGRAM(S): at 05:11

## 2024-04-09 RX ADMIN — Medication 100 MILLIGRAM(S): at 05:11

## 2024-04-09 RX ADMIN — Medication 1300 MILLIGRAM(S): at 06:15

## 2024-04-09 RX ADMIN — MYCOPHENOLATE MOFETIL 500 MILLIGRAM(S): 250 CAPSULE ORAL at 08:13

## 2024-04-09 RX ADMIN — APIXABAN 2.5 MILLIGRAM(S): 2.5 TABLET, FILM COATED ORAL at 17:41

## 2024-04-09 RX ADMIN — APIXABAN 2.5 MILLIGRAM(S): 2.5 TABLET, FILM COATED ORAL at 05:11

## 2024-04-09 NOTE — PROGRESS NOTE ADULT - ASSESSMENT
79 y/o F with hx ESRD due to HTN, was on hemodialysis since 2016. Received DDRT on 5/19/21 presented with HTN emergency.    HTN emergency:  Pt is on home medications - ? compliance. But pt mentioned that she was taking medications.   Informed the primary team that pt has high BP and is in HTN emergency.   PLAN:  Management as per primary team/ general nephrology      IS:  Pt is not taking envarsus as per ED. But patient mentioned that she was taking the medications. Tacro level 4.4. C/w same dose.   PLAN:  Send tacro levels before morning dose.   Envarsus 5mg . Level is at goal.   BID  Pred 5.    CKD:  Home sodium bicarb - 1300 TID.   SHELDON 20k qweekly as per out pt. But given her high blood pressure, hold SHELDON for now.   Plan to start Hemodialysis as per general nephrology.     HTN: Management as per general nephrology  Hyperkalemia: K is better today. On Lokelma.

## 2024-04-09 NOTE — PROGRESS NOTE ADULT - SUBJECTIVE AND OBJECTIVE BOX
Patient is a 78y old  Female who presents with a chief complaint of shortness of breath and leg swelling (09 Apr 2024 15:11)      DATE OF SERVICE: 04-09-24 @ 16:33    SUBJECTIVE / OVERNIGHT EVENTS: overnight events noted    ROS:  Resp: No cough no sputum production  CVS: No chest pain no palpitations no orthopnea  GI: no N/V/D      MEDICATIONS  (STANDING):  apixaban 2.5 milliGRAM(s) Oral every 12 hours  aspirin  chewable 81 milliGRAM(s) Oral daily  chlorhexidine 2% Cloths 1 Application(s) Topical daily  hydrALAZINE 100 milliGRAM(s) Oral three times a day  labetalol 200 milliGRAM(s) Oral three times a day  mycophenolate mofetil 500 milliGRAM(s) Oral two times a day  NIFEdipine XL 90 milliGRAM(s) Oral daily  predniSONE   Tablet 5 milliGRAM(s) Oral daily  sodium bicarbonate 1300 milliGRAM(s) Oral three times a day  sodium zirconium cyclosilicate 10 Gram(s) Oral daily  tacrolimus ER Tablet (ENVARSUS XR) 5 milliGRAM(s) Oral daily    MEDICATIONS  (PRN):        CAPILLARY BLOOD GLUCOSE        I&O's Summary    08 Apr 2024 07:01  -  09 Apr 2024 07:00  --------------------------------------------------------  IN: 720 mL / OUT: 475 mL / NET: 245 mL    09 Apr 2024 07:01  -  09 Apr 2024 16:33  --------------------------------------------------------  IN: 240 mL / OUT: 150 mL / NET: 90 mL        Vital Signs Last 24 Hrs  T(C): 36.7 (09 Apr 2024 11:06), Max: 37.4 (09 Apr 2024 00:12)  T(F): 98.1 (09 Apr 2024 11:06), Max: 99.4 (09 Apr 2024 00:12)  HR: 74 (09 Apr 2024 14:19) (69 - 77)  BP: 177/72 (09 Apr 2024 14:19) (161/64 - 190/78)  BP(mean): --  RR: 18 (09 Apr 2024 11:06) (18 - 18)  SpO2: 96% (09 Apr 2024 11:06) (91% - 99%)    PHYSICAL EXAM  NECK: Supple  CHEST/LUNG: clear   HEART: S1 S2; systolic murmur +   ABDOMEN: Soft, Nontender  EXTREMITIES:  improved edema  NEUROLOGY: Alert non-focal    LABS:                        8.1    3.86  )-----------( 197      ( 09 Apr 2024 06:04 )             26.2     04-09    142  |  109<H>  |  80<H>  ----------------------------<  83  4.5   |  18<L>  |  4.86<H>    Ca    8.5      09 Apr 2024 06:03            Urinalysis Basic - ( 09 Apr 2024 06:03 )    Color: x / Appearance: x / SG: x / pH: x  Gluc: 83 mg/dL / Ketone: x  / Bili: x / Urobili: x   Blood: x / Protein: x / Nitrite: x   Leuk Esterase: x / RBC: x / WBC x   Sq Epi: x / Non Sq Epi: x / Bacteria: x          All consultant(s) notes reviewed and care discussed with other providers        Contact Number, Dr Woods [phone]

## 2024-04-09 NOTE — PROGRESS NOTE ADULT - ASSESSMENT
78-year-old female with a history of hypertension, diastolic heart failure, renal transplant 2 years ago no longer on tacrolimus who presents with shortness of breath and lower extremity swelling since yesterday. Patient reports dyspnea with exertion, no orthopnea.  Patient denies any cough, fever, chills, abdominal pain, nausea, vomiting, dysuria, chest pain. Patient is ambulatory at home, endorses no changes in activity or medication changes recently. Not on any AC. Patient is currently on 80 mg of Lasix twice daily, was started by cardiologist 2 weeks ago.  Nephrology consulted for YANELY.      A/P:  YANELY on CKD 4  S/p DDRT (5/19/2021 - induction w/ Basiliximab)  Baseline sCr ~3-3.3.  Renal function fluctuates sec to HF.  Repeat TTE 1/4 - Left ventricular wall thickness is moderately increased. Left ventricular systolic function is normal with a calculated ejection fraction of 63 % with moderate L ventricular dysfunction.  Renal function worsened on admission likely d/t cardiorenal /. Hemodynamic changes w/ hypertensive urgency.  FEUrea 44.6 % suggestive of intrinsic causes  sCr slightly worsening at present -   lasix held ( last dose on 4/8)  Resume immunosuppressants per  Transplant nephrology  FK level 4.8 on 4/7   Check FK level 30min prior to dose   Avoid nephrotoxic agents.  Monitor BMP and UO.    HTN:  BP remains elevated  Would consider titrating Nifedipine to 60mg BID  Avoid hypotension.  Monitor BP.    Hyperkalemia:  sec to CKD  On Lokelma 10gm qd.  Low K diet.  Monitor K closely.    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  c/w NaHCO3 1300mg TID.  Monitor CO2.         78-year-old female with a history of hypertension, diastolic heart failure, renal transplant 2 years ago no longer on tacrolimus who presents with shortness of breath and lower extremity swelling since yesterday. Patient reports dyspnea with exertion, no orthopnea.  Patient denies any cough, fever, chills, abdominal pain, nausea, vomiting, dysuria, chest pain. Patient is ambulatory at home, endorses no changes in activity or medication changes recently. Not on any AC. Patient is currently on 80 mg of Lasix twice daily, was started by cardiologist 2 weeks ago.  Nephrology consulted for YANELY.      A/P:  YANELY on CKD 4  S/p DDRT (5/19/2021 - induction w/ Basiliximab)  Baseline sCr ~3-3.3.  Renal function fluctuates sec to HF.  Repeat TTE 1/4 - Left ventricular wall thickness is moderately increased. Left ventricular systolic function is normal with a calculated ejection fraction of 63 % with moderate L ventricular dysfunction.  Renal function worsened on admission likely d/t cardiorenal /. Hemodynamic changes w/ hypertensive urgency.  FEUrea 44.6 % suggestive of intrinsic causes  sCr slightly worsening at present -   lasix held ( last dose on 4/8)  Resume immunosuppressants per  Transplant nephrology  FK level 4.8 on 4/7   Check FK level 30min prior to dose   Avoid nephrotoxic agents.  Monitor BMP and UO.    Discussed with Dr Louise, transplant outpt nephrologist, she reports pt has poor graft function, being planned for initiating RRT as outpt. In view of worsening renal function recommended to initiate RRT inpatient. Discussed with pt today, she will discuss with her son and decide    HTN:  BP remains elevated  Would consider titrating Nifedipine to 60mg BID  Avoid hypotension.  Monitor BP.    Hyperkalemia:  sec to CKD  On Lokelma 10gm qd.  Low K diet.  Monitor K closely.    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  c/w NaHCO3 1300mg TID.  Monitor CO2.        discussed with Dr Louise, Medical attending 78-year-old female with a history of hypertension, diastolic heart failure, renal transplant 2 years ago no longer on tacrolimus who presents with shortness of breath and lower extremity swelling since yesterday. Patient reports dyspnea with exertion, no orthopnea.  Patient denies any cough, fever, chills, abdominal pain, nausea, vomiting, dysuria, chest pain. Patient is ambulatory at home, endorses no changes in activity or medication changes recently. Not on any AC. Patient is currently on 80 mg of Lasix twice daily, was started by cardiologist 2 weeks ago.  Nephrology consulted for YANELY.      A/P:  YANELY on CKD 4  S/p DDRT (5/19/2021 - induction w/ Basiliximab)  Baseline sCr ~3-3.3.  Renal function fluctuates sec to HF.  Repeat TTE 1/4 - Left ventricular wall thickness is moderately increased. Left ventricular systolic function is normal with a calculated ejection fraction of 63 % with moderate L ventricular dysfunction.  Renal function worsened on admission likely d/t cardiorenal /. Hemodynamic changes w/ hypertensive urgency.  FEUrea 44.6 % suggestive of intrinsic causes  sCr slightly worsening at present -   lasix held ( last dose on 4/8)  Resume immunosuppressants per  Transplant nephrology  FK level 4.8 on 4/7   Check FK level 30min prior to dose   Avoid nephrotoxic agents.  Monitor BMP and UO.    Discussed with Dr Louise, transplant outpt nephrologist, she reports pt has poor graft function, being planned for initiating RRT as outpt. In view of worsening renal function recommended to initiate RRT inpatient. Discussed with pt today, she will discuss with her son and decide  PT has avf when hd will start will attempt to use avf    HTN:  BP remains elevated  Would consider titrating Nifedipine to 60mg BID  Avoid hypotension.  Monitor BP.    Hyperkalemia:  sec to CKD  On Lokelma 10gm qd.  Low K diet.  Monitor K closely.    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  c/w NaHCO3 1300mg TID.  Monitor CO2.        discussed with Dr Louise, Medical attending alcohol withdrawal seizure x today

## 2024-04-09 NOTE — PROGRESS NOTE ADULT - SUBJECTIVE AND OBJECTIVE BOX
[facility] DIVISION OF KIDNEY DISEASES AND HYPERTENSION -- FOLLOW UP NOTE  --------------------------------------------------------------------------------  Chief Complaint:    24 hour events/subjective:  Pt was seen and examined at the bedside. No acute overnight events. No fresh complaints.   Pt denies any worsening of SOB/ Constipation/ Diarrhea/ Nausea/ Vomiting/ abdominal pain/ chest pain/ tingling/ numbness.         PAST HISTORY  --------------------------------------------------------------------------------  No significant changes to PMH, PSH, FHx, SHx, unless otherwise noted    ALLERGIES & MEDICATIONS  --------------------------------------------------------------------------------  Allergies    Mushrooms (Anaphylaxis)  penicillin (Rash)    Intolerances      Standing Inpatient Medications  apixaban 2.5 milliGRAM(s) Oral every 12 hours  aspirin  chewable 81 milliGRAM(s) Oral daily  chlorhexidine 2% Cloths 1 Application(s) Topical daily  hydrALAZINE 100 milliGRAM(s) Oral three times a day  labetalol 200 milliGRAM(s) Oral three times a day  mycophenolate mofetil 500 milliGRAM(s) Oral two times a day  NIFEdipine XL 90 milliGRAM(s) Oral daily  predniSONE   Tablet 5 milliGRAM(s) Oral daily  sodium bicarbonate 1300 milliGRAM(s) Oral three times a day  sodium zirconium cyclosilicate 10 Gram(s) Oral daily  tacrolimus ER Tablet (ENVARSUS XR) 5 milliGRAM(s) Oral daily    PRN Inpatient Medications      REVIEW OF SYSTEMS  --------------------------------------------------------------------------------  as above.     VITALS/PHYSICAL EXAM  --------------------------------------------------------------------------------  T(C): 36.7 (04-09-24 @ 11:06), Max: 37.4 (04-09-24 @ 00:12)  HR: 74 (04-09-24 @ 14:19) (69 - 77)  BP: 177/72 (04-09-24 @ 14:19) (161/64 - 190/78)  RR: 18 (04-09-24 @ 11:06) (18 - 18)  SpO2: 96% (04-09-24 @ 11:06) (91% - 99%)  Wt(kg): --        04-08-24 @ 07:01  -  04-09-24 @ 07:00  --------------------------------------------------------  IN: 720 mL / OUT: 475 mL / NET: 245 mL    04-09-24 @ 07:01  -  04-09-24 @ 14:50  --------------------------------------------------------  IN: 240 mL / OUT: 150 mL / NET: 90 mL      Physical Exam:  Gen: Not in distress  HEENT: Supple neck, No JVD, PERRLA.  Pulm:Lower zone crepts + ( interval improvement)  CV:S1S2+   Abd: Non- tender, no distention, Bowel sounds +  Transplant: non tender, no bruit  : No suprapubic tenderness  Extremities: edema + in the lower extremities and Lt upper extremity.   Skin: warm  Neuro: AAOx 3 No apparent FNDs        LABS/STUDIES  --------------------------------------------------------------------------------              8.1    3.86  >-----------<  197      [04-09-24 @ 06:04]              26.2     142  |  109  |  80  ----------------------------<  83      [04-09-24 @ 06:03]  4.5   |  18  |  4.86        Ca     8.5     [04-09-24 @ 06:03]            Creatinine Trend:  SCr 4.86 [04-09 @ 06:03]  SCr 4.60 [04-08 @ 06:30]  SCr 4.41 [04-07 @ 04:59]  SCr 4.33 [04-06 @ 06:27]  SCr 3.81 [04-05 @ 10:26]    Tacrolimus (), Serum: 4.4 ng/mL (04-09 @ 06:04)  Tacrolimus (), Serum: 4.4 ng/mL (04-08 @ 06:30)  Tacrolimus (), Serum: 4.8 ng/mL (04-07 @ 04:58)  Tacrolimus (), Serum: <2.0 ng/mL (04-06 @ 10:57)            Urinalysis - [04-09-24 @ 06:03]      Color  / Appearance  / SG  / pH       Gluc 83 / Ketone   / Bili  / Urobili        Blood  / Protein  / Leuk Est  / Nitrite       RBC  / WBC  / Hyaline  / Gran  / Sq Epi  / Non Sq Epi  / Bacteria     Urine Creatinine 70      [04-08-24 @ 18:35]  Urine Protein 359      [04-08-24 @ 18:35]  Urine Sodium 74      [04-08-24 @ 18:35]  Urine Urea Nitrogen 560      [04-08-24 @ 18:35]  Urine Potassium 30      [04-08-24 @ 18:35]  Urine Osmolality 389      [04-08-24 @ 18:35]    Iron 25, TIBC 193, %sat 13      [04-08-24 @ 06:30]  Ferritin 1188      [04-08-24 @ 06:30]  PTH -- (Ca 8.5)      [11-10-23 @ 06:01]   659  Vitamin D (25OH) 16.5      [11-11-23 @ 06:09]  HbA1c 4.6      [05-28-19 @ 09:49]  Lipid: chol 191, , HDL 64, LDL --      [01-04-24 @ 10:13]

## 2024-04-09 NOTE — PROGRESS NOTE ADULT - SUBJECTIVE AND OBJECTIVE BOX
DATE OF SERVICE: 04-09-24 @ 15:11    Patient is a 78y old  Female who presents with a chief complaint of shortness of breath and leg swelling (09 Apr 2024 14:50)      INTERVAL HISTORY:  doing well     REVIEW OF SYSTEMS:  CONSTITUTIONAL: No weakness  EYES/ENT: No visual changes;  No throat pain   NECK: No pain or stiffness  RESPIRATORY: No cough, wheezing; No shortness of breath  CARDIOVASCULAR: No chest pain or palpitations  GASTROINTESTINAL: No abdominal  pain. No nausea, vomiting, or hematemesis  GENITOURINARY: No dysuria, frequency or hematuria  NEUROLOGICAL: No stroke like symptoms  SKIN: No rashes    TELEMETRY Personally reviewed: SR 70s  	  MEDICATIONS:  hydrALAZINE 100 milliGRAM(s) Oral three times a day  labetalol 200 milliGRAM(s) Oral three times a day  NIFEdipine XL 90 milliGRAM(s) Oral daily        PHYSICAL EXAM:  T(C): 36.7 (04-09-24 @ 11:06), Max: 37.4 (04-09-24 @ 00:12)  HR: 74 (04-09-24 @ 14:19) (69 - 77)  BP: 177/72 (04-09-24 @ 14:19) (161/64 - 190/78)  RR: 18 (04-09-24 @ 11:06) (18 - 18)  SpO2: 96% (04-09-24 @ 11:06) (91% - 99%)  Wt(kg): --  I&O's Summary    08 Apr 2024 07:01  -  09 Apr 2024 07:00  --------------------------------------------------------  IN: 720 mL / OUT: 475 mL / NET: 245 mL    09 Apr 2024 07:01  -  09 Apr 2024 15:11  --------------------------------------------------------  IN: 240 mL / OUT: 150 mL / NET: 90 mL          Appearance: In no distress	  HEENT:    PERRL, EOMI	  Cardiovascular:  S1 S2, No JVD  Respiratory: Lungs clear to auscultation	  Gastrointestinal:  Soft, Non-tender, + BS	  Vascularature:  No edema of LE  Psychiatric: Appropriate affect   Neuro: no acute focal deficits                               8.1    3.86  )-----------( 197      ( 09 Apr 2024 06:04 )             26.2     04-09    142  |  109<H>  |  80<H>  ----------------------------<  83  4.5   |  18<L>  |  4.86<H>    Ca    8.5      09 Apr 2024 06:03          Labs personally reviewed      ASSESSMENT/PLAN: 	  78f h/o HTN, s/p Renal transplant, CKD, chronic rejection, recent admission for pneumonia s/p abx presented with shortness of breath since this morning. she also noticed significant LUE swelling that began 4-5 days ago    1. Diastolic Heart Failure secondary to advanced kidney disease   - TTE in Jan 2024 with normal EF, moderate DD, elevated filling pressures, severe LVH, severely dilated LA, small pericardial effusion  - cardiac amyloid with PYP scan normal   - CXR shows cardiomegaly and pulm edema  - BNP almost 19K  - Appreciate Renal and TP recs  - Monitor Strict I&Os, daily weights  - Lasix now DC'd given improvement in her clinical symptoms and risking Cr/BUN    2. HTN   - c/w labetalol 200mg PO TID  - Continue Nifedipine 90mg daily   - BP still elevated. Increase Hydralazine to 100mg PO TID as per Renal    3. ESRD  - Renal recs appreciated              AG Krishnan DO [facility]  Cardiovascular Medicine  [address] [place] [address], [address]  Available through call or text on [employer]  Office: [phone]   DATE OF SERVICE: 04-09-24 @ 15:11    Patient is a 78y old  Female who presents with a chief complaint of shortness of breath and leg swelling (09 Apr 2024 14:50)      INTERVAL HISTORY:  doing well     REVIEW OF SYSTEMS:  CONSTITUTIONAL: No weakness  EYES/ENT: No visual changes;  No throat pain   NECK: No pain or stiffness  RESPIRATORY: No cough, wheezing; No shortness of breath  CARDIOVASCULAR: No chest pain or palpitations  GASTROINTESTINAL: No abdominal  pain. No nausea, vomiting, or hematemesis  GENITOURINARY: No dysuria, frequency or hematuria  NEUROLOGICAL: No stroke like symptoms  SKIN: No rashes    TELEMETRY Personally reviewed: SR 70s  	  MEDICATIONS:  hydrALAZINE 100 milliGRAM(s) Oral three times a day  labetalol 200 milliGRAM(s) Oral three times a day  NIFEdipine XL 90 milliGRAM(s) Oral daily        PHYSICAL EXAM:  T(C): 36.7 (04-09-24 @ 11:06), Max: 37.4 (04-09-24 @ 00:12)  HR: 74 (04-09-24 @ 14:19) (69 - 77)  BP: 177/72 (04-09-24 @ 14:19) (161/64 - 190/78)  RR: 18 (04-09-24 @ 11:06) (18 - 18)  SpO2: 96% (04-09-24 @ 11:06) (91% - 99%)  Wt(kg): --  I&O's Summary    08 Apr 2024 07:01  -  09 Apr 2024 07:00  --------------------------------------------------------  IN: 720 mL / OUT: 475 mL / NET: 245 mL    09 Apr 2024 07:01  -  09 Apr 2024 15:11  --------------------------------------------------------  IN: 240 mL / OUT: 150 mL / NET: 90 mL          Appearance: In no distress	  HEENT:    PERRL, EOMI	  Cardiovascular:  S1 S2, No JVD  Respiratory: Lungs clear to auscultation	  Gastrointestinal:  Soft, Non-tender, + BS	  Vascularature:  No edema of LE  Psychiatric: Appropriate affect   Neuro: no acute focal deficits                               8.1    3.86  )-----------( 197      ( 09 Apr 2024 06:04 )             26.2     04-09    142  |  109<H>  |  80<H>  ----------------------------<  83  4.5   |  18<L>  |  4.86<H>    Ca    8.5      09 Apr 2024 06:03          Labs personally reviewed      ASSESSMENT/PLAN: 	  78f h/o HTN, s/p Renal transplant, CKD, chronic rejection, recent admission for pneumonia s/p abx presented with shortness of breath since this morning. she also noticed significant LUE swelling that began 4-5 days ago    1. Diastolic Heart Failure secondary to advanced kidney disease   - TTE in Jan 2024 with normal EF, moderate DD, elevated filling pressures, severe LVH, severely dilated LA, small pericardial effusion  - cardiac amyloid with PYP scan normal   - CXR shows cardiomegaly and pulm edema  - BNP almost 19K  - Appreciate Renal and TP recs  - Monitor Strict I&Os, daily weights  - Lasix now DC'd given improvement in her clinical symptoms and risking Cr/BUN    2. HTN   - c/w labetalol 200mg PO TID  - Continue Nifedipine 90mg daily   - Hydralazine 100mg PO TID     3. ESRD  - Renal recs appreciated              AG Krishnan DO [facility]  Cardiovascular Medicine  [address] [place] [address], [address]  Available through call or text on [employer] TE[employer]  Office: [phone]

## 2024-04-09 NOTE — PROGRESS NOTE ADULT - SUBJECTIVE AND OBJECTIVE BOX
[facility] NEPHROLOGY PRACT[facility]   MD SAMMY ORDONEZ MD RUORU WONG, PA    TEL:  FROM 9 AM to 5 PM ---OFFICE: [phone]  AVAILABLE ON TEAMS    FROM 5 PM - 9 AM PLEASE CALL ANSWERING SERVICE: [phone]    RENAL FOLLOW UP NOTE--Date of Service 04-09-24 @ 08:16  --------------------------------------------------------------------------------  HPI:      Pt seen and examined at bedside.   sitting up having breakfast    PAST HISTORY  --------------------------------------------------------------------------------  No significant changes to PMH, PSH, FHx, SHx, unless otherwise noted    ALLERGIES & MEDICATIONS  --------------------------------------------------------------------------------  Allergies    Mushrooms (Anaphylaxis)  penicillin (Rash)    Intolerances      Standing Inpatient Medications  apixaban 2.5 milliGRAM(s) Oral every 12 hours  aspirin  chewable 81 milliGRAM(s) Oral daily  chlorhexidine 2% Cloths 1 Application(s) Topical daily  hydrALAZINE 100 milliGRAM(s) Oral three times a day  labetalol 200 milliGRAM(s) Oral three times a day  mycophenolate mofetil 500 milliGRAM(s) Oral two times a day  NIFEdipine XL 90 milliGRAM(s) Oral daily  predniSONE   Tablet 5 milliGRAM(s) Oral daily  sodium bicarbonate 1300 milliGRAM(s) Oral three times a day  sodium zirconium cyclosilicate 10 Gram(s) Oral daily  tacrolimus ER Tablet (ENVARSUS XR) 5 milliGRAM(s) Oral daily    PRN Inpatient Medications      REVIEW OF SYSTEMS  --------------------------------------------------------------------------------  General: no fever    MSK: + edema     VITALS/PHYSICAL EXAM  --------------------------------------------------------------------------------  T(C): 37.2 (04-09-24 @ 04:30), Max: 37.4 (04-09-24 @ 00:12)  HR: 73 (04-09-24 @ 04:30) (69 - 77)  BP: 180/89 (04-09-24 @ 04:42) (161/64 - 192/71)  RR: 18 (04-09-24 @ 04:30) (18 - 18)  SpO2: 99% (04-09-24 @ 04:30) (91% - 99%)  Wt(kg): --        04-08-24 @ 07:01  -  04-09-24 @ 07:00  --------------------------------------------------------  IN: 480 mL / OUT: 475 mL / NET: 5 mL      Physical Exam:  	Gen: NAD  	HEENT: MMM  	Pulm: CTA B/L  	CV: S1S2  	Abd: Soft, +BS  	Ext: + LE edema B/L                      Neuro: Awake   	Skin: Warm and Dry   	Vascular access: NO HD catheter            no  sethi  LABS/STUDIES  --------------------------------------------------------------------------------              8.1    3.86  >-----------<  197      [04-09-24 @ 06:04]              26.2     142  |  109  |  80  ----------------------------<  83      [04-09-24 @ 06:03]  4.5   |  18  |  4.86        Ca     8.5     [04-09-24 @ 06:03]            Creatinine Trend:  SCr 4.86 [04-09 @ 06:03]  SCr 4.60 [04-08 @ 06:30]  SCr 4.41 [04-07 @ 04:59]  SCr 4.33 [04-06 @ 06:27]  SCr 3.81 [04-05 @ 10:26]    Urinalysis - [04-09-24 @ 06:03]      Color  / Appearance  / SG  / pH       Gluc 83 / Ketone   / Bili  / Urobili        Blood  / Protein  / Leuk Est  / Nitrite       RBC  / WBC  / Hyaline  / Gran  / Sq Epi  / Non Sq Epi  / Bacteria     Urine Creatinine 70      [04-08-24 @ 18:35]  Urine Protein 359      [04-08-24 @ 18:35]  Urine Sodium 74      [04-08-24 @ 18:35]  Urine Urea Nitrogen 560      [04-08-24 @ 18:35]  Urine Potassium 30      [04-08-24 @ 18:35]  Urine Osmolality 389      [04-08-24 @ 18:35]    Iron 25, TIBC 193, %sat 13      [04-08-24 @ 06:30]  Ferritin 1188      [04-08-24 @ 06:30]  PTH -- (Ca 8.5)      [11-10-23 @ 06:01]   659  Vitamin D (25OH) 16.5      [11-11-23 @ 06:09]  HbA1c 4.6      [05-28-19 @ 09:49]  Lipid: chol 191, , HDL 64, LDL --      [01-04-24 @ 10:13]

## 2024-04-10 LAB
ANION GAP SERPL CALC-SCNC: 15 MMOL/L — SIGNIFICANT CHANGE UP (ref 5–17)
BUN SERPL-MCNC: 82 MG/DL — HIGH (ref 7–23)
CALCIUM SERPL-MCNC: 8.6 MG/DL — SIGNIFICANT CHANGE UP (ref 8.4–10.5)
CHLORIDE SERPL-SCNC: 109 MMOL/L — HIGH (ref 96–108)
CO2 SERPL-SCNC: 17 MMOL/L — LOW (ref 22–31)
CREAT SERPL-MCNC: 5.13 MG/DL — HIGH (ref 0.5–1.3)
EGFR: 8 ML/MIN/1.73M2 — LOW
GLUCOSE SERPL-MCNC: 81 MG/DL — SIGNIFICANT CHANGE UP (ref 70–99)
HBV CORE AB SER-ACNC: SIGNIFICANT CHANGE UP
HBV SURFACE AG SER-ACNC: SIGNIFICANT CHANGE UP
HCT VFR BLD CALC: 25.2 % — LOW (ref 34.5–45)
HGB BLD-MCNC: 8 G/DL — LOW (ref 11.5–15.5)
MCHC RBC-ENTMCNC: 26.5 PG — LOW (ref 27–34)
MCHC RBC-ENTMCNC: 31.7 GM/DL — LOW (ref 32–36)
MCV RBC AUTO: 83.4 FL — SIGNIFICANT CHANGE UP (ref 80–100)
NRBC # BLD: 0 /100 WBCS — SIGNIFICANT CHANGE UP (ref 0–0)
PLATELET # BLD AUTO: 186 K/UL — SIGNIFICANT CHANGE UP (ref 150–400)
POTASSIUM SERPL-MCNC: 4.7 MMOL/L — SIGNIFICANT CHANGE UP (ref 3.5–5.3)
POTASSIUM SERPL-SCNC: 4.7 MMOL/L — SIGNIFICANT CHANGE UP (ref 3.5–5.3)
RBC # BLD: 3.02 M/UL — LOW (ref 3.8–5.2)
RBC # FLD: 17.6 % — HIGH (ref 10.3–14.5)
SODIUM SERPL-SCNC: 141 MMOL/L — SIGNIFICANT CHANGE UP (ref 135–145)
WBC # BLD: 3.7 K/UL — LOW (ref 3.8–10.5)
WBC # FLD AUTO: 3.7 K/UL — LOW (ref 3.8–10.5)

## 2024-04-10 PROCEDURE — 99222 1ST HOSP IP/OBS MODERATE 55: CPT | Mod: GC

## 2024-04-10 RX ADMIN — Medication 1300 MILLIGRAM(S): at 05:08

## 2024-04-10 RX ADMIN — Medication 100 MILLIGRAM(S): at 22:22

## 2024-04-10 RX ADMIN — Medication 200 MILLIGRAM(S): at 13:33

## 2024-04-10 RX ADMIN — Medication 5 MILLIGRAM(S): at 05:09

## 2024-04-10 RX ADMIN — Medication 81 MILLIGRAM(S): at 11:48

## 2024-04-10 RX ADMIN — MYCOPHENOLATE MOFETIL 500 MILLIGRAM(S): 250 CAPSULE ORAL at 22:22

## 2024-04-10 RX ADMIN — Medication 200 MILLIGRAM(S): at 22:22

## 2024-04-10 RX ADMIN — SODIUM ZIRCONIUM CYCLOSILICATE 10 GRAM(S): 10 POWDER, FOR SUSPENSION ORAL at 11:50

## 2024-04-10 RX ADMIN — Medication 200 MILLIGRAM(S): at 05:09

## 2024-04-10 RX ADMIN — TACROLIMUS 5 MILLIGRAM(S): 5 CAPSULE ORAL at 08:29

## 2024-04-10 RX ADMIN — Medication 1300 MILLIGRAM(S): at 13:33

## 2024-04-10 RX ADMIN — APIXABAN 2.5 MILLIGRAM(S): 2.5 TABLET, FILM COATED ORAL at 05:09

## 2024-04-10 RX ADMIN — MYCOPHENOLATE MOFETIL 500 MILLIGRAM(S): 250 CAPSULE ORAL at 08:29

## 2024-04-10 RX ADMIN — Medication 90 MILLIGRAM(S): at 05:08

## 2024-04-10 RX ADMIN — CHLORHEXIDINE GLUCONATE 1 APPLICATION(S): 213 SOLUTION TOPICAL at 11:50

## 2024-04-10 RX ADMIN — APIXABAN 2.5 MILLIGRAM(S): 2.5 TABLET, FILM COATED ORAL at 17:26

## 2024-04-10 RX ADMIN — Medication 100 MILLIGRAM(S): at 05:09

## 2024-04-10 RX ADMIN — Medication 1300 MILLIGRAM(S): at 22:22

## 2024-04-10 RX ADMIN — Medication 100 MILLIGRAM(S): at 13:33

## 2024-04-10 NOTE — PROGRESS NOTE ADULT - ASSESSMENT
77 y/o F with hx ESRD due to HTN, was on hemodialysis since 2016. Received DDRT on 5/19/21 presented with HTN emergency.    HTN emergency:  Pt is on home medications - ? compliance. But pt mentioned that she was taking medications.   Informed the primary team that pt has high BP and is in HTN emergency.   PLAN:  Management as per primary team/ general nephrology      IS:  Pt is not taking envarsus as per ED. But patient mentioned that she was taking the medications. Tacro level 4.4. C/w same dose.   PLAN:  Send tacro levels before morning dose.   Envarsus 5mg . Level is at goal.   BID  Pred 5.    CKD:  Home sodium bicarb - 1300 TID.   SHELDON 20k qweekly as per out pt. But given her high blood pressure, hold SHELDON for now.   Plan to start Hemodialysis as per general nephrology.     HTN: Management as per general nephrology  Hyperkalemia: K is better today. On Lokelma.

## 2024-04-10 NOTE — PROGRESS NOTE ADULT - SUBJECTIVE AND OBJECTIVE BOX
DATE OF SERVICE: 04-10-24 @ 15:35    Patient is a 78y old  Female who presents with a chief complaint of shortness of breath and leg swelling (09 Apr 2024 16:32)      INTERVAL HISTORY:  doing well     REVIEW OF SYSTEMS:  CONSTITUTIONAL: No weakness  EYES/ENT: No visual changes;  No throat pain   NECK: No pain or stiffness  RESPIRATORY: No cough, wheezing; No shortness of breath  CARDIOVASCULAR: No chest pain or palpitations  GASTROINTESTINAL: No abdominal  pain. No nausea, vomiting, or hematemesis  GENITOURINARY: No dysuria, frequency or hematuria  NEUROLOGICAL: No stroke like symptoms  SKIN: No rashes    TELEMETRY Personally reviewed: SR 70-80   	  MEDICATIONS:  hydrALAZINE 100 milliGRAM(s) Oral three times a day  labetalol 200 milliGRAM(s) Oral three times a day  NIFEdipine XL 90 milliGRAM(s) Oral daily        PHYSICAL EXAM:  T(C): 37 (04-10-24 @ 10:26), Max: 37.2 (04-09-24 @ 16:53)  HR: 76 (04-10-24 @ 13:30) (65 - 77)  BP: 174/74 (04-10-24 @ 13:30) (155/75 - 200/91)  RR: 18 (04-10-24 @ 10:26) (18 - 18)  SpO2: 98% (04-10-24 @ 10:26) (95% - 98%)  Wt(kg): --  I&O's Summary    09 Apr 2024 07:01  -  10 Apr 2024 07:00  --------------------------------------------------------  IN: 240 mL / OUT: 1200 mL / NET: -960 mL    10 Apr 2024 07:01  -  10 Apr 2024 15:35  --------------------------------------------------------  IN: 480 mL / OUT: 350 mL / NET: 130 mL          Appearance: In no distress	  HEENT:    PERRL, EOMI	  Cardiovascular:  S1 S2, No JVD  Respiratory: Lungs clear to auscultation	  Gastrointestinal:  Soft, Non-tender, + BS	  Vascularature:  No edema of LE  Psychiatric: Appropriate affect   Neuro: no acute focal deficits                               8.0    3.70  )-----------( 186      ( 10 Apr 2024 06:31 )             25.2     04-10    141  |  109<H>  |  82<H>  ----------------------------<  81  4.7   |  17<L>  |  5.13<H>    Ca    8.6      10 Apr 2024 06:30          Labs personally reviewed      ASSESSMENT/PLAN: 	  78f h/o HTN, s/p Renal transplant, CKD, chronic rejection, recent admission for pneumonia s/p abx presented with shortness of breath since this morning. she also noticed significant LUE swelling that began 4-5 days ago    1. Diastolic Heart Failure secondary to advanced kidney disease   - TTE in Jan 2024 with normal EF, moderate DD, elevated filling pressures, severe LVH, severely dilated LA, small pericardial effusion  - cardiac amyloid with PYP scan normal   - CXR shows cardiomegaly and pulm edema  - BNP almost 19K  - Appreciate Renal and TP recs  - Monitor Strict I&Os, daily weights  - Lasix now DC'd given improvement in her clinical symptoms and risking Cr/BUN    2. HTN - needs improvement   - c/w labetalol 200mg PO TID  - Continue Nifedipine 90mg daily   - Hydralazine 100mg PO TID   - Consider Clonidine 0.1mg BID for optimal b/p control control     3. ESRD  - Renal recs appreciated          Se Xavier, AG Oh,  [facility]  Cardiovascular Medicine  [address] [facility], [address]  Available through call or text on [employer]AM[employer]  Office: [phone]

## 2024-04-10 NOTE — PROGRESS NOTE ADULT - ATTENDING COMMENTS
Allograft function with YANELY over baseline advanced CKD  Plan to start to IHD. Nephrology is following, Dr Hugh Kay
Starting on IHD , nephrology Dr Kay following  will reduce IS meds
Allograft function with YANELY over baseline advanced CKD  Lasix was d/c today am  repeat UA and check Urine lytes   check Bladder scan for retention   Tacrolimus level at goal. continue same dose  BP is still above goal- increase Hydralazine to 100 mg po tid as per nephro recs

## 2024-04-10 NOTE — PROGRESS NOTE ADULT - ASSESSMENT
78-year-old female with a history of hypertension, diastolic heart failure, renal transplant 2 years ago no longer on tacrolimus who presents with shortness of breath and lower extremity swelling since yesterday. Patient reports dyspnea with exertion, no orthopnea.  Patient denies any cough, fever, chills, abdominal pain, nausea, vomiting, dysuria, chest pain. Patient is ambulatory at home, endorses no changes in activity or medication changes recently. Not on any AC. Patient is currently on 80 mg of Lasix twice daily, was started by cardiologist 2 weeks ago.  Nephrology consulted for YANELY.      A/P:  YANELY on CKD 4 now ESRD  S/p DDRT (5/19/2021 - induction w/ Basiliximab)  Baseline sCr ~3-3.3.  Renal function fluctuates sec to HF.  Repeat TTE 1/4 - Left ventricular wall thickness is moderately increased. Left ventricular systolic function is normal with a calculated ejection fraction of 63 % with moderate L ventricular dysfunction.  Renal function worsened on admission likely d/t cardiorenal /. Hemodynamic changes w/ hypertensive urgency.  FEUrea 44.6 % suggestive of intrinsic causes  sCr worsening   lasix held ( last dose on 4/8)  Resume immunosuppressants per  Transplant nephrology  FK level 4.8 on 4/7   Check FK level 30min prior to dose   Avoid nephrotoxic agents.  Monitor BMP and UO.    Dr. Kay d/w with Dr oLuise, transplant outpt nephrologist, she reports pt has poor graft function, being planned for initiating RRT as outpt. In view of worsening renal function recommended to initiate RRT inpatient. Discussed with pt today in detail, she agreed to start HD  consent for HD in the chart  PT has avf- will attempt to use avf    She will go to [facility] under [facility] as outpatient HD unit    HTN:  BP remains elevated  Would consider titrating Nifedipine to 60mg BID  Avoid hypotension.  Monitor BP.    Hyperkalemia:  sec to CKD  On Lokelma 10gm qd.  discontinue lokelma-now we are starting HD  Low K diet.  Monitor K closely.    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  on NaHCO3 1300mg TID.-d/c after starting HD  Monitor CO2.

## 2024-04-10 NOTE — PROGRESS NOTE ADULT - SUBJECTIVE AND OBJECTIVE BOX
Dr. Heath  Office [phone] (9 am to 5 pm)  Service: [phone] (5pm to 9am)  Ghislaine WHITE      RENAL PROGRESS NOTE: DATE OF SERVICE 04-10-24 @ 17:03    Patient is a 78y old  Female who presents with a chief complaint of shortness of breath and leg swelling (10 Apr 2024 16:26)      Patient seen and examined at bedside. No chest pain/sob    VITALS:  T(F): 97.8 (04-10-24 @ 17:00), Max: 98.6 (04-09-24 @ 20:20)  HR: 71 (04-10-24 @ 17:00)  BP: 162/77 (04-10-24 @ 17:00)  RR: 18 (04-10-24 @ 17:00)  SpO2: 96% (04-10-24 @ 17:00)  Wt(kg): --    04-09 @ 07:01  -  04-10 @ 07:00  --------------------------------------------------------  IN: 240 mL / OUT: 1200 mL / NET: -960 mL    04-10 @ 07:01  -  04-10 @ 17:03  --------------------------------------------------------  IN: 480 mL / OUT: 350 mL / NET: 130 mL          PHYSICAL EXAM:  Constitutional: NAD  Neck: No JVD  Respiratory: CTAB, no wheezes, rales or rhonchi  Cardiovascular: S1, S2, RRR  Gastrointestinal: BS+, soft, NT/ND  Extremities: No peripheral edema    Hospital Medications:   MEDICATIONS  (STANDING):  apixaban 2.5 milliGRAM(s) Oral every 12 hours  aspirin  chewable 81 milliGRAM(s) Oral daily  chlorhexidine 2% Cloths 1 Application(s) Topical daily  hydrALAZINE 100 milliGRAM(s) Oral three times a day  labetalol 200 milliGRAM(s) Oral three times a day  mycophenolate mofetil 500 milliGRAM(s) Oral two times a day  NIFEdipine XL 90 milliGRAM(s) Oral daily  predniSONE   Tablet 5 milliGRAM(s) Oral daily  sodium bicarbonate 1300 milliGRAM(s) Oral three times a day  sodium zirconium cyclosilicate 10 Gram(s) Oral daily  tacrolimus ER Tablet (ENVARSUS XR) 5 milliGRAM(s) Oral daily      LABS:  04-10    141  |  109<H>  |  82<H>  ----------------------------<  81  4.7   |  17<L>  |  5.13<H>    Ca    8.6      10 Apr 2024 06:30      Creatinine Trend: 5.13 <--, 4.86 <--, 4.60 <--, 4.41 <--, 4.33 <--, 3.81 <--                                8.0    3.70  )-----------( 186      ( 10 Apr 2024 06:31 )             25.2     Urine Studies:  Urinalysis - [04-10-24 @ 06:30]      Color  / Appearance  / SG  / pH       Gluc 81 / Ketone   / Bili  / Urobili        Blood  / Protein  / Leuk Est  / Nitrite       RBC  / WBC  / Hyaline  / Gran  / Sq Epi  / Non Sq Epi  / Bacteria     Urine Creatinine 70      [04-08-24 @ 18:35]  Urine Protein 359      [04-08-24 @ 18:35]  Urine Sodium 74      [04-08-24 @ 18:35]  Urine Urea Nitrogen 560      [04-08-24 @ 18:35]  Urine Potassium 30      [04-08-24 @ 18:35]  Urine Osmolality 389      [04-08-24 @ 18:35]    Iron 25, TIBC 193, %sat 13      [04-08-24 @ 06:30]  Ferritin 1188      [04-08-24 @ 06:30]  PTH -- (Ca 8.5)      [11-10-23 @ 06:01]   659  Vitamin D (25OH) 16.5      [11-11-23 @ 06:09]  HbA1c 4.6      [05-28-19 @ 09:49]  Lipid: chol 191, , HDL 64, LDL --      [01-04-24 @ 10:13]    HBsAg Nonreact      [04-10-24 @ 06:30]      RADIOLOGY & ADDITIONAL STUDIES:  0

## 2024-04-10 NOTE — PROGRESS NOTE ADULT - SUBJECTIVE AND OBJECTIVE BOX
[facility] DIVISION OF KIDNEY DISEASES AND HYPERTENSION -- FOLLOW UP NOTE  --------------------------------------------------------------------------------  Chief Complaint:    24 hour events/subjective:  Pt was seen and examined at the bedside. No acute overnight events. No fresh complaints.  Pt denies any worsening of SOB/ Constipation/ Diarrhea/ Nausea/ Vomiting/ abdominal pain/ chest pain/ tingling/ numbness.         PAST HISTORY  --------------------------------------------------------------------------------  No significant changes to PMH, PSH, FHx, SHx, unless otherwise noted    ALLERGIES & MEDICATIONS  --------------------------------------------------------------------------------  Allergies    Mushrooms (Anaphylaxis)  penicillin (Rash)    Intolerances      Standing Inpatient Medications  apixaban 2.5 milliGRAM(s) Oral every 12 hours  aspirin  chewable 81 milliGRAM(s) Oral daily  chlorhexidine 2% Cloths 1 Application(s) Topical daily  hydrALAZINE 100 milliGRAM(s) Oral three times a day  labetalol 200 milliGRAM(s) Oral three times a day  mycophenolate mofetil 500 milliGRAM(s) Oral two times a day  NIFEdipine XL 90 milliGRAM(s) Oral daily  predniSONE   Tablet 5 milliGRAM(s) Oral daily  sodium bicarbonate 1300 milliGRAM(s) Oral three times a day  sodium zirconium cyclosilicate 10 Gram(s) Oral daily  tacrolimus ER Tablet (ENVARSUS XR) 5 milliGRAM(s) Oral daily    PRN Inpatient Medications      REVIEW OF SYSTEMS  --------------------------------------------------------------------------------  as above.     VITALS/PHYSICAL EXAM  --------------------------------------------------------------------------------  T(C): 36.6 (04-10-24 @ 17:00), Max: 37 (04-09-24 @ 20:20)  HR: 71 (04-10-24 @ 17:00) (65 - 77)  BP: 162/77 (04-10-24 @ 17:00) (162/77 - 200/91)  RR: 18 (04-10-24 @ 17:00) (18 - 18)  SpO2: 96% (04-10-24 @ 17:00) (95% - 98%)  Wt(kg): --        04-09-24 @ 07:01  -  04-10-24 @ 07:00  --------------------------------------------------------  IN: 240 mL / OUT: 1200 mL / NET: -960 mL    04-10-24 @ 07:01  -  04-10-24 @ 17:41  --------------------------------------------------------  IN: 480 mL / OUT: 350 mL / NET: 130 mL      Physical Exam:  Gen: Not in distress  HEENT: Supple neck, No JVD, PERRLA.  Pulm:Lower zone crepts + ( interval improvement)  CV:S1S2+   Abd: Non- tender, no distention, Bowel sounds +  Transplant: non tender, no bruit  : No suprapubic tenderness  Extremities: edema + in the lower extremities and Lt upper extremity.   Skin: warm  Neuro: AAOx 3 No apparent FNDs      LABS/STUDIES  --------------------------------------------------------------------------------              8.0    3.70  >-----------<  186      [04-10-24 @ 06:31]              25.2     141  |  109  |  82  ----------------------------<  81      [04-10-24 @ 06:30]  4.7   |  17  |  5.13        Ca     8.6     [04-10-24 @ 06:30]            Creatinine Trend:  SCr 5.13 [04-10 @ 06:30]  SCr 4.86 [04-09 @ 06:03]  SCr 4.60 [04-08 @ 06:30]  SCr 4.41 [04-07 @ 04:59]  SCr 4.33 [04-06 @ 06:27]    Tacrolimus (), Serum: 4.4 ng/mL (04-09 @ 06:04)  Tacrolimus (), Serum: 4.4 ng/mL (04-08 @ 06:30)  Tacrolimus (), Serum: 4.8 ng/mL (04-07 @ 04:58)  Tacrolimus (), Serum: <2.0 ng/mL (04-06 @ 10:57)            Urinalysis - [04-10-24 @ 06:30]      Color  / Appearance  / SG  / pH       Gluc 81 / Ketone   / Bili  / Urobili        Blood  / Protein  / Leuk Est  / Nitrite       RBC  / WBC  / Hyaline  / Gran  / Sq Epi  / Non Sq Epi  / Bacteria     Urine Creatinine 70      [04-08-24 @ 18:35]  Urine Protein 359      [04-08-24 @ 18:35]  Urine Sodium 74      [04-08-24 @ 18:35]  Urine Urea Nitrogen 560      [04-08-24 @ 18:35]  Urine Potassium 30      [04-08-24 @ 18:35]  Urine Osmolality 389      [04-08-24 @ 18:35]    Iron 25, TIBC 193, %sat 13      [04-08-24 @ 06:30]  Ferritin 1188      [04-08-24 @ 06:30]  PTH -- (Ca 8.5)      [11-10-23 @ 06:01]   659  Vitamin D (25OH) 16.5      [11-11-23 @ 06:09]  HbA1c 4.6      [05-28-19 @ 09:49]  Lipid: chol 191, , HDL 64, LDL --      [01-04-24 @ 10:13]    HBsAg Nonreact      [04-10-24 @ 06:30]

## 2024-04-10 NOTE — PROGRESS NOTE ADULT - SUBJECTIVE AND OBJECTIVE BOX
Patient is a 78y old  Female who presents with a chief complaint of shortness of breath and leg swelling (10 Apr 2024 15:35)      DATE OF SERVICE: 04-10-24 @ 16:26    SUBJECTIVE / OVERNIGHT EVENTS: overnight events noted    ROS:  Resp: No cough no sputum production  CVS: No chest pain no palpitations no orthopnea  GI: no N/V/D          MEDICATIONS  (STANDING):  apixaban 2.5 milliGRAM(s) Oral every 12 hours  aspirin  chewable 81 milliGRAM(s) Oral daily  chlorhexidine 2% Cloths 1 Application(s) Topical daily  hydrALAZINE 100 milliGRAM(s) Oral three times a day  labetalol 200 milliGRAM(s) Oral three times a day  mycophenolate mofetil 500 milliGRAM(s) Oral two times a day  NIFEdipine XL 90 milliGRAM(s) Oral daily  predniSONE   Tablet 5 milliGRAM(s) Oral daily  sodium bicarbonate 1300 milliGRAM(s) Oral three times a day  sodium zirconium cyclosilicate 10 Gram(s) Oral daily  tacrolimus ER Tablet (ENVARSUS XR) 5 milliGRAM(s) Oral daily    MEDICATIONS  (PRN):        CAPILLARY BLOOD GLUCOSE        I&O's Summary    09 Apr 2024 07:01  -  10 Apr 2024 07:00  --------------------------------------------------------  IN: 240 mL / OUT: 1200 mL / NET: -960 mL    10 Apr 2024 07:01  -  10 Apr 2024 16:26  --------------------------------------------------------  IN: 480 mL / OUT: 350 mL / NET: 130 mL        Vital Signs Last 24 Hrs  T(C): 37 (10 Apr 2024 10:26), Max: 37.2 (09 Apr 2024 16:53)  T(F): 98.6 (10 Apr 2024 10:26), Max: 99 (09 Apr 2024 16:53)  HR: 76 (10 Apr 2024 13:30) (65 - 77)  BP: 174/74 (10 Apr 2024 13:30) (155/75 - 200/91)  BP(mean): --  RR: 18 (10 Apr 2024 10:26) (18 - 18)  SpO2: 98% (10 Apr 2024 10:26) (95% - 98%)    PHYSICAL EXAM:  NECK: Supple  CHEST/LUNG: clear   HEART: S1 S2; systolic murmur +   ABDOMEN: Soft, Nontender  EXTREMITIES:  improved edema  NEUROLOGY: Alert non-focal    LABS:                        8.0    3.70  )-----------( 186      ( 10 Apr 2024 06:31 )             25.2     04-10    141  |  109<H>  |  82<H>  ----------------------------<  81  4.7   |  17<L>  |  5.13<H>    Ca    8.6      10 Apr 2024 06:30            Urinalysis Basic - ( 10 Apr 2024 06:30 )    Color: x / Appearance: x / SG: x / pH: x  Gluc: 81 mg/dL / Ketone: x  / Bili: x / Urobili: x   Blood: x / Protein: x / Nitrite: x   Leuk Esterase: x / RBC: x / WBC x   Sq Epi: x / Non Sq Epi: x / Bacteria: x          All consultant(s) notes reviewed and care discussed with other providers        Contact Number, Dr Woods [phone]

## 2024-04-11 LAB
ANION GAP SERPL CALC-SCNC: 13 MMOL/L — SIGNIFICANT CHANGE UP (ref 5–17)
BUN SERPL-MCNC: 58 MG/DL — HIGH (ref 7–23)
CALCIUM SERPL-MCNC: 7.9 MG/DL — LOW (ref 8.4–10.5)
CALCIUM SERPL-MCNC: 8.4 MG/DL — SIGNIFICANT CHANGE UP (ref 8.4–10.5)
CHLORIDE SERPL-SCNC: 105 MMOL/L — SIGNIFICANT CHANGE UP (ref 96–108)
CO2 SERPL-SCNC: 22 MMOL/L — SIGNIFICANT CHANGE UP (ref 22–31)
CREAT SERPL-MCNC: 3.93 MG/DL — HIGH (ref 0.5–1.3)
EGFR: 11 ML/MIN/1.73M2 — LOW
GLUCOSE SERPL-MCNC: 79 MG/DL — SIGNIFICANT CHANGE UP (ref 70–99)
HCT VFR BLD CALC: 25.8 % — LOW (ref 34.5–45)
HGB BLD-MCNC: 8.3 G/DL — LOW (ref 11.5–15.5)
MAGNESIUM SERPL-MCNC: 1.5 MG/DL — LOW (ref 1.6–2.6)
MCHC RBC-ENTMCNC: 26.5 PG — LOW (ref 27–34)
MCHC RBC-ENTMCNC: 32.2 GM/DL — SIGNIFICANT CHANGE UP (ref 32–36)
MCV RBC AUTO: 82.4 FL — SIGNIFICANT CHANGE UP (ref 80–100)
NRBC # BLD: 0 /100 WBCS — SIGNIFICANT CHANGE UP (ref 0–0)
PHOSPHATE SERPL-MCNC: 4.1 MG/DL — SIGNIFICANT CHANGE UP (ref 2.5–4.5)
PLATELET # BLD AUTO: 179 K/UL — SIGNIFICANT CHANGE UP (ref 150–400)
POTASSIUM SERPL-MCNC: 4.2 MMOL/L — SIGNIFICANT CHANGE UP (ref 3.5–5.3)
POTASSIUM SERPL-SCNC: 4.2 MMOL/L — SIGNIFICANT CHANGE UP (ref 3.5–5.3)
PTH-INTACT FLD-MCNC: 1144 PG/ML — HIGH (ref 15–65)
RBC # BLD: 3.13 M/UL — LOW (ref 3.8–5.2)
RBC # FLD: 17.4 % — HIGH (ref 10.3–14.5)
SODIUM SERPL-SCNC: 140 MMOL/L — SIGNIFICANT CHANGE UP (ref 135–145)
TACROLIMUS SERPL-MCNC: 8.3 NG/ML — SIGNIFICANT CHANGE UP
WBC # BLD: 3.54 K/UL — LOW (ref 3.8–10.5)
WBC # FLD AUTO: 3.54 K/UL — LOW (ref 3.8–10.5)

## 2024-04-11 RX ORDER — LABETALOL HCL 100 MG
400 TABLET ORAL THREE TIMES A DAY
Refills: 0 | Status: DISCONTINUED | OUTPATIENT
Start: 2024-04-11 | End: 2024-04-16

## 2024-04-11 RX ORDER — MAGNESIUM SULFATE 500 MG/ML
1 VIAL (ML) INJECTION ONCE
Refills: 0 | Status: COMPLETED | OUTPATIENT
Start: 2024-04-11 | End: 2024-04-11

## 2024-04-11 RX ORDER — NIFEDIPINE 30 MG
60 TABLET, EXTENDED RELEASE 24 HR ORAL
Refills: 0 | Status: DISCONTINUED | OUTPATIENT
Start: 2024-04-11 | End: 2024-04-16

## 2024-04-11 RX ORDER — TACROLIMUS 5 MG/1
2 CAPSULE ORAL DAILY
Refills: 0 | Status: DISCONTINUED | OUTPATIENT
Start: 2024-04-12 | End: 2024-04-16

## 2024-04-11 RX ADMIN — APIXABAN 2.5 MILLIGRAM(S): 2.5 TABLET, FILM COATED ORAL at 05:03

## 2024-04-11 RX ADMIN — Medication 400 MILLIGRAM(S): at 21:11

## 2024-04-11 RX ADMIN — Medication 200 MILLIGRAM(S): at 05:03

## 2024-04-11 RX ADMIN — Medication 100 MILLIGRAM(S): at 14:05

## 2024-04-11 RX ADMIN — TACROLIMUS 5 MILLIGRAM(S): 5 CAPSULE ORAL at 08:11

## 2024-04-11 RX ADMIN — APIXABAN 2.5 MILLIGRAM(S): 2.5 TABLET, FILM COATED ORAL at 18:00

## 2024-04-11 RX ADMIN — Medication 100 MILLIGRAM(S): at 21:09

## 2024-04-11 RX ADMIN — Medication 5 MILLIGRAM(S): at 05:03

## 2024-04-11 RX ADMIN — Medication 90 MILLIGRAM(S): at 05:03

## 2024-04-11 RX ADMIN — Medication 100 GRAM(S): at 18:03

## 2024-04-11 RX ADMIN — SODIUM ZIRCONIUM CYCLOSILICATE 10 GRAM(S): 10 POWDER, FOR SUSPENSION ORAL at 11:14

## 2024-04-11 RX ADMIN — Medication 100 MILLIGRAM(S): at 05:03

## 2024-04-11 RX ADMIN — Medication 81 MILLIGRAM(S): at 14:06

## 2024-04-11 RX ADMIN — Medication 1300 MILLIGRAM(S): at 05:03

## 2024-04-11 RX ADMIN — CHLORHEXIDINE GLUCONATE 1 APPLICATION(S): 213 SOLUTION TOPICAL at 11:15

## 2024-04-11 RX ADMIN — Medication 60 MILLIGRAM(S): at 18:06

## 2024-04-11 RX ADMIN — Medication 400 MILLIGRAM(S): at 14:05

## 2024-04-11 NOTE — PROGRESS NOTE ADULT - SUBJECTIVE AND OBJECTIVE BOX
Patient is a 78y old  Female who presents with a chief complaint of shortness of breath and leg swelling (11 Apr 2024 09:36)      DATE OF SERVICE: 04-11-24 @ 12:49    SUBJECTIVE / OVERNIGHT EVENTS: overnight events noted    ROS:  Resp: No cough no sputum production  CVS: No chest pain no palpitations no orthopnea  GI: no N/V/D  "I feel much better"         MEDICATIONS  (STANDING):  apixaban 2.5 milliGRAM(s) Oral every 12 hours  aspirin  chewable 81 milliGRAM(s) Oral daily  chlorhexidine 2% Cloths 1 Application(s) Topical daily  hydrALAZINE 100 milliGRAM(s) Oral three times a day  labetalol 400 milliGRAM(s) Oral three times a day  NIFEdipine XL 60 milliGRAM(s) Oral two times a day  predniSONE   Tablet 5 milliGRAM(s) Oral daily  sodium zirconium cyclosilicate 10 Gram(s) Oral daily  tacrolimus ER Tablet (ENVARSUS XR) 2 milliGRAM(s) Oral daily    MEDICATIONS  (PRN):        CAPILLARY BLOOD GLUCOSE        I&O's Summary    10 Apr 2024 07:01  -  11 Apr 2024 07:00  --------------------------------------------------------  IN: 480 mL / OUT: 850 mL / NET: -370 mL    11 Apr 2024 07:01  -  11 Apr 2024 12:49  --------------------------------------------------------  IN: 480 mL / OUT: 450 mL / NET: 30 mL        Vital Signs Last 24 Hrs  T(C): 36.6 (11 Apr 2024 08:07), Max: 37 (11 Apr 2024 04:38)  T(F): 97.9 (11 Apr 2024 08:07), Max: 98.6 (11 Apr 2024 04:38)  HR: 73 (11 Apr 2024 09:15) (71 - 78)  BP: 162/69 (11 Apr 2024 09:15) (143/63 - 198/77)  BP(mean): --  RR: 18 (11 Apr 2024 08:07) (16 - 18)  SpO2: 96% (11 Apr 2024 08:07) (92% - 96%)    PHYSICAL EXAM:  NECK: Supple  CHEST/LUNG: clear   HEART: S1 S2; systolic murmur +   ABDOMEN: Soft, Nontender  EXTREMITIES:  improved edema  NEUROLOGY: Alert non-focal    LABS:                        8.3    3.54  )-----------( 179      ( 11 Apr 2024 06:41 )             25.8     04-11    140  |  105  |  58<H>  ----------------------------<  79  4.2   |  22  |  3.93<H>    Ca    8.4      11 Apr 2024 06:35  Phos  4.1     04-11  Mg     1.5     04-11            Urinalysis Basic - ( 11 Apr 2024 06:35 )    Color: x / Appearance: x / SG: x / pH: x  Gluc: 79 mg/dL / Ketone: x  / Bili: x / Urobili: x   Blood: x / Protein: x / Nitrite: x   Leuk Esterase: x / RBC: x / WBC x   Sq Epi: x / Non Sq Epi: x / Bacteria: x          All consultant(s) notes reviewed and care discussed with other providers        Contact Number, Dr Woods [phone]

## 2024-04-11 NOTE — PROGRESS NOTE ADULT - ASSESSMENT
78-year-old female with a history of hypertension, diastolic heart failure, renal transplant 2 years ago no longer on tacrolimus who presents with shortness of breath and lower extremity swelling since yesterday. Patient reports dyspnea with exertion, no orthopnea.  Patient denies any cough, fever, chills, abdominal pain, nausea, vomiting, dysuria, chest pain. Patient is ambulatory at home, endorses no changes in activity or medication changes recently. Not on any AC. Patient is currently on 80 mg of Lasix twice daily, was started by cardiologist 2 weeks ago.  Nephrology consulted for YANELY.      A/P:  YANELY on CKD 4 now ESRD  S/p DDRT (5/19/2021 - induction w/ Basiliximab)  Baseline sCr ~3-3.3.  Renal function fluctuates sec to HF.  Repeat TTE 1/4 - Left ventricular wall thickness is moderately increased. Left ventricular systolic function is normal with a calculated ejection fraction of 63 % with moderate L ventricular dysfunction.  Renal function worsened on admission likely d/t cardiorenal /. Hemodynamic changes w/ hypertensive urgency.  FEUrea 44.6 % suggestive of intrinsic causes  sCr worsening   lasix held ( last dose on 4/8)  Resume immunosuppressants per  Transplant nephrology  FK level 4.8 on 4/7   Check FK level 30min prior to dose   Avoid nephrotoxic agents.  Monitor BMP and UO.    Dr. Kay d/w with Dr Louise, transplant outpt nephrologist, she reports pt has poor graft function, being planned for initiating RRT as outpt. In view of worsening renal function recommended to initiate RRT inpatient. Discussed with pt today in detail, she agreed to start HD  consent for HD in the chart  s/p First HD on 4/10 via AVF tolerated well  Plan for 2nd HD tomororw    She will go to [facility] under [facility] as outpatient HD unit    HTN:  BP remains elevated  Recommend to titrate Nifedipine to 60mg BID  Avoid hypotension.  Monitor BP.    Hyperkalemia:  sec to CKD  Low K diet.  Monitor K closely.    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  on NaHCO3 1300mg TID.-d/c after starting HD  Monitor CO2.     78-year-old female with a history of hypertension, diastolic heart failure, renal transplant 2 years ago no longer on tacrolimus who presents with shortness of breath and lower extremity swelling since yesterday. Patient reports dyspnea with exertion, no orthopnea.  Patient denies any cough, fever, chills, abdominal pain, nausea, vomiting, dysuria, chest pain. Patient is ambulatory at home, endorses no changes in activity or medication changes recently. Not on any AC. Patient is currently on 80 mg of Lasix twice daily, was started by cardiologist 2 weeks ago.  Nephrology consulted for YANELY.      A/P:  YANELY on CKD 4 now ESRD  S/p DDRT (5/19/2021 - induction w/ Basiliximab)  Baseline sCr ~3-3.3.  Renal function fluctuates sec to HF.  Repeat TTE 1/4 - Left ventricular wall thickness is moderately increased. Left ventricular systolic function is normal with a calculated ejection fraction of 63 % with moderate L ventricular dysfunction.  Renal function worsened on admission likely d/t cardiorenal /. Hemodynamic changes w/ hypertensive urgency.  FEUrea 44.6 % suggestive of intrinsic causes  sCr worsening   lasix held ( last dose on 4/8)  per  Transplant nephrology to decrease envarsus to 2mg QD and continue prednisone 5mg QD. stop cell cept   FK level 4.8 on 4/7 . Goal is 2-3   Check FK level 30min prior to dose   Avoid nephrotoxic agents.  Monitor BMP and UO.    Dr. Kay d/w with Dr Louise, transplant outpt nephrologist, she reports pt has poor graft function, being planned for initiating RRT as outpt. In view of worsening renal function recommended to initiate RRT inpatient. Discussed with pt today in detail, she agreed to start HD  consent for HD in the chart  s/p First HD on 4/10 via AVF tolerated well  Plan for 2nd HD tomororw    She will go to [facility] under [facility] as outpatient HD unit    HTN:  BP remains elevated  Recommend to titrate Nifedipine to 60mg BID  Avoid hypotension.  Monitor BP.    Hyperkalemia:  sec to CKD  Low K diet.  Monitor K closely.    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  on NaHCO3 1300mg TID.-d/c after starting HD  Monitor CO2.

## 2024-04-11 NOTE — PROGRESS NOTE ADULT - SUBJECTIVE AND OBJECTIVE BOX
[facility] [place] [facility]   MD SAMMY ORDONEZ MD RUORU WONG, PA    TEL:  FROM 9 AM to 5 PM ---OFFICE: [phone]  AVAILABLE ON TEAMS    FROM 5 PM - 9 AM PLEASE CALL ANSWERING SERVICE: [phone]    RENAL FOLLOW UP NOTE--Date of Service 04-11-24 @ 09:37  --------------------------------------------------------------------------------  HPI:      Pt seen and examined at bedside.   Edvin SOB, chest pain     PAST HISTORY  --------------------------------------------------------------------------------  No significant changes to PMH, PSH, FHx, SHx, unless otherwise noted    ALLERGIES & MEDICATIONS  --------------------------------------------------------------------------------  Allergies    Mushrooms (Anaphylaxis)  penicillin (Rash)    Intolerances      Standing Inpatient Medications  apixaban 2.5 milliGRAM(s) Oral every 12 hours  aspirin  chewable 81 milliGRAM(s) Oral daily  chlorhexidine 2% Cloths 1 Application(s) Topical daily  hydrALAZINE 100 milliGRAM(s) Oral three times a day  labetalol 200 milliGRAM(s) Oral three times a day  mycophenolate mofetil 500 milliGRAM(s) Oral two times a day  NIFEdipine XL 90 milliGRAM(s) Oral daily  predniSONE   Tablet 5 milliGRAM(s) Oral daily  sodium zirconium cyclosilicate 10 Gram(s) Oral daily  tacrolimus ER Tablet (ENVARSUS XR) 5 milliGRAM(s) Oral daily    PRN Inpatient Medications      REVIEW OF SYSTEMS  --------------------------------------------------------------------------------  General: no fever    MSK: no edema     VITALS/PHYSICAL EXAM  --------------------------------------------------------------------------------  T(C): 36.6 (04-11-24 @ 08:07), Max: 37 (04-10-24 @ 10:26)  HR: 73 (04-11-24 @ 09:15) (65 - 78)  BP: 162/69 (04-11-24 @ 09:15) (143/63 - 198/77)  RR: 18 (04-11-24 @ 08:07) (16 - 18)  SpO2: 96% (04-11-24 @ 08:07) (92% - 98%)  Wt(kg): --        04-10-24 @ 07:01  -  04-11-24 @ 07:00  --------------------------------------------------------  IN: 480 mL / OUT: 850 mL / NET: -370 mL      Physical Exam:  	Gen: NAD  	HEENT: MMM  	Pulm: CTA B/L  	CV: S1S2  	Abd: Soft, +BS  	Ext: No LE edema B/L                      Neuro: Awake   	Skin: Warm and Dry   	Vascular access: avf           no navdeep  LABS/STUDIES  --------------------------------------------------------------------------------              8.3    3.54  >-----------<  179      [04-11-24 @ 06:41]              25.8     140  |  105  |  58  ----------------------------<  79      [04-11-24 @ 06:35]  4.2   |  22  |  3.93        Ca     8.4     [04-11-24 @ 06:35]      Mg     1.5     [04-11-24 @ 06:35]      Phos  4.1     [04-11-24 @ 06:35]            Creatinine Trend:  SCr 3.93 [04-11 @ 06:35]  SCr 5.13 [04-10 @ 06:30]  SCr 4.86 [04-09 @ 06:03]  SCr 4.60 [04-08 @ 06:30]  SCr 4.41 [04-07 @ 04:59]    Urinalysis - [04-11-24 @ 06:35]      Color  / Appearance  / SG  / pH       Gluc 79 / Ketone   / Bili  / Urobili        Blood  / Protein  / Leuk Est  / Nitrite       RBC  / WBC  / Hyaline  / Gran  / Sq Epi  / Non Sq Epi  / Bacteria     Urine Creatinine 70      [04-08-24 @ 18:35]  Urine Protein 359      [04-08-24 @ 18:35]  Urine Sodium 74      [04-08-24 @ 18:35]  Urine Urea Nitrogen 560      [04-08-24 @ 18:35]  Urine Potassium 30      [04-08-24 @ 18:35]  Urine Osmolality 389      [04-08-24 @ 18:35]    Iron 25, TIBC 193, %sat 13      [04-08-24 @ 06:30]  Ferritin 1188      [04-08-24 @ 06:30]  PTH -- (Ca 7.9)      [04-10-24 @ 18:58]   1144  PTH -- (Ca 8.5)      [11-10-23 @ 06:01]   659  Vitamin D (25OH) 16.5      [11-11-23 @ 06:09]  HbA1c 4.6      [05-28-19 @ 09:49]  Lipid: chol 191, , HDL 64, LDL --      [01-04-24 @ 10:13]    HBsAg Nonreact      [04-10-24 @ 06:30]  HBcAb Nonreact      [04-10-24 @ 06:30]

## 2024-04-11 NOTE — PROGRESS NOTE ADULT - SUBJECTIVE AND OBJECTIVE BOX
DATE OF SERVICE: 04-11-24 @ 14:07    Patient is a 78y old  Female who presents with a chief complaint of shortness of breath and leg swelling (11 Apr 2024 12:49)      INTERVAL HISTORY: feels okay    REVIEW OF SYSTEMS:  CONSTITUTIONAL: No weakness  EYES/ENT: No visual changes;  No throat pain   NECK: No pain or stiffness  RESPIRATORY: No cough, wheezing; No shortness of breath  CARDIOVASCULAR: No chest pain or palpitations  GASTROINTESTINAL: No abdominal  pain. No nausea, vomiting, or hematemesis  GENITOURINARY: No dysuria, frequency or hematuria  NEUROLOGICAL: No stroke like symptoms  SKIN: No rashes    TELEMETRY Personally reviewed: SR 60s-70s  	  MEDICATIONS:  hydrALAZINE 100 milliGRAM(s) Oral three times a day  labetalol 400 milliGRAM(s) Oral three times a day  NIFEdipine XL 60 milliGRAM(s) Oral two times a day        PHYSICAL EXAM:  T(C): 36.6 (04-11-24 @ 08:07), Max: 37 (04-11-24 @ 04:38)  HR: 73 (04-11-24 @ 09:15) (71 - 78)  BP: 162/69 (04-11-24 @ 09:15) (143/63 - 198/77)  RR: 18 (04-11-24 @ 08:07) (16 - 18)  SpO2: 96% (04-11-24 @ 08:07) (92% - 96%)  Wt(kg): --  I&O's Summary    10 Apr 2024 07:01  -  11 Apr 2024 07:00  --------------------------------------------------------  IN: 480 mL / OUT: 850 mL / NET: -370 mL    11 Apr 2024 07:01  -  11 Apr 2024 14:07  --------------------------------------------------------  IN: 480 mL / OUT: 450 mL / NET: 30 mL          Appearance: In no distress	  HEENT:    PERRL, EOMI	  Cardiovascular:  S1 S2, No JVD  Respiratory: Lungs clear to auscultation	  Gastrointestinal:  Soft, Non-tender, + BS	  Vascularature:  No edema of LE  Psychiatric: Appropriate affect   Neuro: no acute focal deficits                               8.3    3.54  )-----------( 179      ( 11 Apr 2024 06:41 )             25.8     04-11    140  |  105  |  58<H>  ----------------------------<  79  4.2   |  22  |  3.93<H>    Ca    8.4      11 Apr 2024 06:35  Phos  4.1     04-11  Mg     1.5     04-11          Labs personally reviewed      ASSESSMENT/PLAN: 	  78f h/o HTN, s/p Renal transplant, CKD, chronic rejection, recent admission for pneumonia s/p abx presented with shortness of breath since this morning. she also noticed significant LUE swelling that began 4-5 days ago    1. Diastolic Heart Failure secondary to advanced kidney disease   - TTE in Jan 2024 with normal EF, moderate DD, elevated filling pressures, severe LVH, severely dilated LA, small pericardial effusion  - cardiac amyloid with PYP scan normal   - CXR shows cardiomegaly and pulm edema  - BNP almost 19K  - Appreciate Renal and TP recs  - Monitor Strict I&Os, daily weights  - Lasix now DC'd given improvement in her clinical symptoms and risking Cr/BUN    2. HTN - needs improvement   - c/w labetalol 200mg PO TID. Now increased to 400mg TID  - Continue Nifedipine 90mg daily. Nifedipine now increased to 60mg BID  - Hydralazine 100mg PO TID   - Consider Clonidine 0.1mg BID for optimal b/p control control     3. ESRD  - Renal recs appreciated              Iolani Behrbom, AG-AG Oh DO [facility]  Cardiovascular Medicine  [address], [address]  Available through call or text on [employer] TEAM[employer]  Office: [phone]

## 2024-04-12 LAB
ANION GAP SERPL CALC-SCNC: 17 MMOL/L — SIGNIFICANT CHANGE UP (ref 5–17)
BUN SERPL-MCNC: 75 MG/DL — HIGH (ref 7–23)
CALCIUM SERPL-MCNC: 8.2 MG/DL — LOW (ref 8.4–10.5)
CHLORIDE SERPL-SCNC: 105 MMOL/L — SIGNIFICANT CHANGE UP (ref 96–108)
CO2 SERPL-SCNC: 17 MMOL/L — LOW (ref 22–31)
CREAT SERPL-MCNC: 4.4 MG/DL — HIGH (ref 0.5–1.3)
EGFR: 10 ML/MIN/1.73M2 — LOW
GLUCOSE SERPL-MCNC: 83 MG/DL — SIGNIFICANT CHANGE UP (ref 70–99)
HBV CORE AB SER-ACNC: SIGNIFICANT CHANGE UP
HBV CORE IGM SER-ACNC: SIGNIFICANT CHANGE UP
HBV SURFACE AB SER-ACNC: 3.6 MIU/ML — LOW
HCV AB S/CO SERPL IA: 0.21 S/CO — SIGNIFICANT CHANGE UP (ref 0–0.99)
HCV AB SERPL-IMP: SIGNIFICANT CHANGE UP
MAGNESIUM SERPL-MCNC: 1.8 MG/DL — SIGNIFICANT CHANGE UP (ref 1.6–2.6)
POTASSIUM SERPL-MCNC: 4.4 MMOL/L — SIGNIFICANT CHANGE UP (ref 3.5–5.3)
POTASSIUM SERPL-SCNC: 4.4 MMOL/L — SIGNIFICANT CHANGE UP (ref 3.5–5.3)
SODIUM SERPL-SCNC: 139 MMOL/L — SIGNIFICANT CHANGE UP (ref 135–145)
TACROLIMUS SERPL-MCNC: 5.2 NG/ML — SIGNIFICANT CHANGE UP

## 2024-04-12 RX ADMIN — TACROLIMUS 2 MILLIGRAM(S): 5 CAPSULE ORAL at 05:23

## 2024-04-12 RX ADMIN — CHLORHEXIDINE GLUCONATE 1 APPLICATION(S): 213 SOLUTION TOPICAL at 11:27

## 2024-04-12 RX ADMIN — Medication 100 MILLIGRAM(S): at 05:22

## 2024-04-12 RX ADMIN — Medication 5 MILLIGRAM(S): at 05:23

## 2024-04-12 RX ADMIN — Medication 60 MILLIGRAM(S): at 05:22

## 2024-04-12 RX ADMIN — Medication 100 MILLIGRAM(S): at 21:17

## 2024-04-12 RX ADMIN — APIXABAN 2.5 MILLIGRAM(S): 2.5 TABLET, FILM COATED ORAL at 05:22

## 2024-04-12 RX ADMIN — APIXABAN 2.5 MILLIGRAM(S): 2.5 TABLET, FILM COATED ORAL at 16:11

## 2024-04-12 RX ADMIN — Medication 60 MILLIGRAM(S): at 16:13

## 2024-04-12 RX ADMIN — Medication 400 MILLIGRAM(S): at 05:22

## 2024-04-12 RX ADMIN — Medication 81 MILLIGRAM(S): at 13:46

## 2024-04-12 RX ADMIN — Medication 400 MILLIGRAM(S): at 21:17

## 2024-04-12 RX ADMIN — SODIUM ZIRCONIUM CYCLOSILICATE 10 GRAM(S): 10 POWDER, FOR SUSPENSION ORAL at 11:27

## 2024-04-12 NOTE — PROGRESS NOTE ADULT - ASSESSMENT
78-year-old female with a history of hypertension, diastolic heart failure, renal transplant 2 years ago no longer on tacrolimus who presents with shortness of breath and lower extremity swelling since yesterday. Patient reports dyspnea with exertion, no orthopnea.  Patient denies any cough, fever, chills, abdominal pain, nausea, vomiting, dysuria, chest pain. Patient is ambulatory at home, endorses no changes in activity or medication changes recently. Not on any AC. Patient is currently on 80 mg of Lasix twice daily, was started by cardiologist 2 weeks ago.  Nephrology consulted for YANELY.      A/P:  YANELY on CKD 4 now ESRD  S/p DDRT (5/19/2021 - induction w/ Basiliximab)  Baseline sCr ~3-3.3.  Renal function fluctuates sec to HF.  Repeat TTE 1/4 - Left ventricular wall thickness is moderately increased. Left ventricular systolic function is normal with a calculated ejection fraction of 63 % with moderate L ventricular dysfunction.  Renal function worsened on admission likely d/t cardiorenal /. Hemodynamic changes w/ hypertensive urgency.  FEUrea 44.6 % suggestive of intrinsic causes  sCr worsening   lasix held ( last dose on 4/8)  per  Transplant nephrology to decrease envarsus to 2mg QD and continue prednisone 5mg QD. stop cell cept   FK level 4.8 on 4/7 . Goal is 2-3   Check FK level 30min prior to dose   Avoid nephrotoxic agents.  Monitor BMP and UO.    Dr. Kay d/w with Dr Louise, transplant outpt nephrologist, she reports pt has poor graft function, being planned for initiating RRT as outpt. In view of worsening renal function recommended to initiate RRT inpatient. Discussed with pt today in detail, she agreed to start HD  consent for HD in the chart  s/p First HD on 4/10 via AVF tolerated well  Plan for 2nd HD today     She will go to [facility] under [facility] as outpatient HD unit    HTN:  BP improving   Avoid hypotension.  Monitor BP.    Hyperkalemia:  sec to CKD  Low K diet.  On Lokelma 10gm QD  Monitor K closely.    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  Hd as scheduled  Monitor CO2.

## 2024-04-12 NOTE — PROGRESS NOTE ADULT - SUBJECTIVE AND OBJECTIVE BOX
Patient is a 78y old  Female who presents with a chief complaint of shortness of breath and leg swelling (12 Apr 2024 14:26)      DATE OF SERVICE: 04-12-24 @ 16:33    SUBJECTIVE / OVERNIGHT EVENTS: overnight events noted    ROS:  Resp: No cough no sputum production  CVS: No chest pain no palpitations no orthopnea  GI: no N/V/D    MEDICATIONS  (STANDING):  apixaban 2.5 milliGRAM(s) Oral every 12 hours  aspirin  chewable 81 milliGRAM(s) Oral daily  chlorhexidine 2% Cloths 1 Application(s) Topical daily  hydrALAZINE 100 milliGRAM(s) Oral three times a day  labetalol 400 milliGRAM(s) Oral three times a day  NIFEdipine XL 60 milliGRAM(s) Oral two times a day  predniSONE   Tablet 5 milliGRAM(s) Oral daily  sodium zirconium cyclosilicate 10 Gram(s) Oral daily  tacrolimus ER Tablet (ENVARSUS XR) 2 milliGRAM(s) Oral daily    MEDICATIONS  (PRN):        CAPILLARY BLOOD GLUCOSE        I&O's Summary    11 Apr 2024 07:01  -  12 Apr 2024 07:00  --------------------------------------------------------  IN: 480 mL / OUT: 450 mL / NET: 30 mL    12 Apr 2024 07:01  -  12 Apr 2024 16:33  --------------------------------------------------------  IN: 480 mL / OUT: 0 mL / NET: 480 mL        Vital Signs Last 24 Hrs  T(C): 36.5 (12 Apr 2024 12:30), Max: 36.9 (12 Apr 2024 04:17)  T(F): 97.7 (12 Apr 2024 12:30), Max: 98.5 (12 Apr 2024 04:17)  HR: 72 (12 Apr 2024 12:30) (69 - 78)  BP: 126/64 (12 Apr 2024 12:30) (126/64 - 171/67)  BP(mean): --  RR: 18 (12 Apr 2024 12:30) (18 - 18)  SpO2: 97% (12 Apr 2024 12:30) (96% - 97%)    PHYSICAL EXAM:  NECK: Supple  CHEST/LUNG: clear   HEART: S1 S2; systolic murmur +   ABDOMEN: Soft, Nontender  EXTREMITIES:  improved edema  NEUROLOGY: Alert non-focal    LABS:                        8.3    3.54  )-----------( 179      ( 11 Apr 2024 06:41 )             25.8     04-12    139  |  105  |  75<H>  ----------------------------<  83  4.4   |  17<L>  |  4.40<H>    Ca    8.2<L>      12 Apr 2024 07:24  Phos  4.1     04-11  Mg     1.8     04-12            Urinalysis Basic - ( 12 Apr 2024 07:24 )    Color: x / Appearance: x / SG: x / pH: x  Gluc: 83 mg/dL / Ketone: x  / Bili: x / Urobili: x   Blood: x / Protein: x / Nitrite: x   Leuk Esterase: x / RBC: x / WBC x   Sq Epi: x / Non Sq Epi: x / Bacteria: x          All consultant(s) notes reviewed and care discussed with other providers        Contact Number, Dr Woods [phone]

## 2024-04-12 NOTE — PROGRESS NOTE ADULT - SUBJECTIVE AND OBJECTIVE BOX
DATE OF SERVICE: 04-12-24 @ 14:26    Patient is a 78y old  Female who presents with a chief complaint of shortness of breath and leg swelling (12 Apr 2024 09:46)      INTERVAL HISTORY: doing well     REVIEW OF SYSTEMS:  CONSTITUTIONAL: No weakness  EYES/ENT: No visual changes;  No throat pain   NECK: No pain or stiffness  RESPIRATORY: No cough, wheezing; No shortness of breath  CARDIOVASCULAR: No chest pain or palpitations  GASTROINTESTINAL: No abdominal  pain. No nausea, vomiting, or hematemesis  GENITOURINARY: No dysuria, frequency or hematuria  NEUROLOGICAL: No stroke like symptoms  SKIN: No rashes    TELEMETRY Personally reviewed: SR 70s  	  MEDICATIONS:  hydrALAZINE 100 milliGRAM(s) Oral three times a day  labetalol 400 milliGRAM(s) Oral three times a day  NIFEdipine XL 60 milliGRAM(s) Oral two times a day        PHYSICAL EXAM:  T(C): 36.5 (04-12-24 @ 12:30), Max: 36.9 (04-12-24 @ 04:17)  HR: 72 (04-12-24 @ 12:30) (69 - 78)  BP: 126/64 (04-12-24 @ 12:30) (126/64 - 171/67)  RR: 18 (04-12-24 @ 12:30) (18 - 18)  SpO2: 97% (04-12-24 @ 12:30) (96% - 97%)  Wt(kg): --  I&O's Summary    11 Apr 2024 07:01  -  12 Apr 2024 07:00  --------------------------------------------------------  IN: 480 mL / OUT: 450 mL / NET: 30 mL    12 Apr 2024 07:01  -  12 Apr 2024 14:26  --------------------------------------------------------  IN: 480 mL / OUT: 0 mL / NET: 480 mL          Appearance: In no distress	  HEENT:    PERRL, EOMI	  Cardiovascular:  S1 S2, No JVD  Respiratory: Lungs clear to auscultation	  Gastrointestinal:  Soft, Non-tender, + BS	  Vascularature:  No edema of LE  Psychiatric: Appropriate affect   Neuro: no acute focal deficits                               8.3    3.54  )-----------( 179      ( 11 Apr 2024 06:41 )             25.8     04-12    139  |  105  |  75<H>  ----------------------------<  83  4.4   |  17<L>  |  4.40<H>    Ca    8.2<L>      12 Apr 2024 07:24  Phos  4.1     04-11  Mg     1.8     04-12          Labs personally reviewed      ASSESSMENT/PLAN: 	  78f h/o HTN, s/p Renal transplant, CKD, chronic rejection, recent admission for pneumonia s/p abx presented with shortness of breath since this morning. she also noticed significant LUE swelling that began 4-5 days ago    1. Diastolic Heart Failure secondary to advanced kidney disease   - TTE in Jan 2024 with normal EF, moderate DD, elevated filling pressures, severe LVH, severely dilated LA, small pericardial effusion  - cardiac amyloid with PYP scan normal   - CXR shows cardiomegaly and pulm edema  - BNP almost 19K  - Appreciate Renal and TP recs  - Monitor Strict I&Os, daily weights  - Lasix now DC'd given improvement in her clinical symptoms and risking Cr/BUN    2. HTN - improved   - c/w labetalol 200mg PO TID. Now increased to 400mg TID  - Continue Nifedipine 90mg daily. Nifedipine now increased to 60mg BID  - Hydralazine 100mg PO TID     3. ESRD  - Renal recs appreciated          AG Krishnan,  [facility]  Cardiovascular Medicine  [address], [address]  Available through call or text on [employer] TEAM[employer]  Office: [phone]

## 2024-04-12 NOTE — PROGRESS NOTE ADULT - SUBJECTIVE AND OBJECTIVE BOX
[facility] [place] [facility]   MD SAMMY ORDONEZ MD RUORU WONG, PA    TEL:  FROM 9 AM to 5 PM ---OFFICE: [phone]  AVAILABLE ON TEAMS    FROM 5 PM - 9 AM PLEASE CALL ANSWERING SERVICE: [phone]    RENAL FOLLOW UP NOTE--Date of Service 04-12-24 @ 09:47  --------------------------------------------------------------------------------  HPI:      Pt seen and examined at bedside.   Edvin SOB, chest pain     PAST HISTORY  --------------------------------------------------------------------------------  No significant changes to PMH, PSH, FHx, SHx, unless otherwise noted    ALLERGIES & MEDICATIONS  --------------------------------------------------------------------------------  Allergies    Mushrooms (Anaphylaxis)  penicillin (Rash)    Intolerances      Standing Inpatient Medications  apixaban 2.5 milliGRAM(s) Oral every 12 hours  aspirin  chewable 81 milliGRAM(s) Oral daily  chlorhexidine 2% Cloths 1 Application(s) Topical daily  hydrALAZINE 100 milliGRAM(s) Oral three times a day  labetalol 400 milliGRAM(s) Oral three times a day  NIFEdipine XL 60 milliGRAM(s) Oral two times a day  predniSONE   Tablet 5 milliGRAM(s) Oral daily  sodium zirconium cyclosilicate 10 Gram(s) Oral daily  tacrolimus ER Tablet (ENVARSUS XR) 2 milliGRAM(s) Oral daily    PRN Inpatient Medications      REVIEW OF SYSTEMS  --------------------------------------------------------------------------------  General: no fever  CVS: no chest pain  RESP: no sob, no cough    VITALS/PHYSICAL EXAM  --------------------------------------------------------------------------------  T(C): 36.6 (04-12-24 @ 07:50), Max: 36.9 (04-12-24 @ 04:17)  HR: 69 (04-12-24 @ 07:50) (69 - 78)  BP: 133/70 (04-12-24 @ 07:50) (133/70 - 171/67)  RR: 18 (04-12-24 @ 07:50) (18 - 18)  SpO2: 97% (04-12-24 @ 07:50) (96% - 97%)  Wt(kg): --        04-11-24 @ 07:01  -  04-12-24 @ 07:00  --------------------------------------------------------  IN: 480 mL / OUT: 450 mL / NET: 30 mL      Physical Exam:  	Gen: NAD  	HEENT: MMM  	Pulm: CTA B/L  	CV: S1S2  	Abd: Soft, +BS  	Ext: No LE edema B/L                      Neuro: Awake   	Skin: Warm and Dry   	Vascular access: avf           no  navdeep  LABS/STUDIES  --------------------------------------------------------------------------------              8.3    3.54  >-----------<  179      [04-11-24 @ 06:41]              25.8     139  |  105  |  75  ----------------------------<  83      [04-12-24 @ 07:24]  4.4   |  17  |  4.40        Ca     8.2     [04-12-24 @ 07:24]      Mg     1.8     [04-12-24 @ 07:24]      Phos  4.1     [04-11-24 @ 06:35]            Creatinine Trend:  SCr 4.40 [04-12 @ 07:24]  SCr 3.93 [04-11 @ 06:35]  SCr 5.13 [04-10 @ 06:30]  SCr 4.86 [04-09 @ 06:03]  SCr 4.60 [04-08 @ 06:30]    Urinalysis - [04-12-24 @ 07:24]      Color  / Appearance  / SG  / pH       Gluc 83 / Ketone   / Bili  / Urobili        Blood  / Protein  / Leuk Est  / Nitrite       RBC  / WBC  / Hyaline  / Gran  / Sq Epi  / Non Sq Epi  / Bacteria     Urine Creatinine 70      [04-08-24 @ 18:35]  Urine Protein 359      [04-08-24 @ 18:35]  Urine Sodium 74      [04-08-24 @ 18:35]  Urine Urea Nitrogen 560      [04-08-24 @ 18:35]  Urine Potassium 30      [04-08-24 @ 18:35]  Urine Osmolality 389      [04-08-24 @ 18:35]    Iron 25, TIBC 193, %sat 13      [04-08-24 @ 06:30]  Ferritin 1188      [04-08-24 @ 06:30]  PTH -- (Ca 7.9)      [04-10-24 @ 18:58]   1144  PTH -- (Ca 8.5)      [11-10-23 @ 06:01]   659  Vitamin D (25OH) 16.5      [11-11-23 @ 06:09]  HbA1c 4.6      [05-28-19 @ 09:49]  Lipid: chol 191, , HDL 64, LDL --      [01-04-24 @ 10:13]    HBsAb 3.6      [04-10-24 @ 06:30]  HBsAg Nonreact      [04-10-24 @ 06:30]  HBcAb Nonreact      [04-10-24 @ 06:30]  HCV 0.21, Nonreact      [04-10-24 @ 06:30]

## 2024-04-13 LAB — TACROLIMUS SERPL-MCNC: 3.4 NG/ML — SIGNIFICANT CHANGE UP

## 2024-04-13 RX ADMIN — Medication 400 MILLIGRAM(S): at 05:01

## 2024-04-13 RX ADMIN — Medication 400 MILLIGRAM(S): at 13:54

## 2024-04-13 RX ADMIN — Medication 100 MILLIGRAM(S): at 21:08

## 2024-04-13 RX ADMIN — TACROLIMUS 2 MILLIGRAM(S): 5 CAPSULE ORAL at 05:01

## 2024-04-13 RX ADMIN — Medication 5 MILLIGRAM(S): at 05:01

## 2024-04-13 RX ADMIN — SODIUM ZIRCONIUM CYCLOSILICATE 10 GRAM(S): 10 POWDER, FOR SUSPENSION ORAL at 11:36

## 2024-04-13 RX ADMIN — APIXABAN 2.5 MILLIGRAM(S): 2.5 TABLET, FILM COATED ORAL at 17:19

## 2024-04-13 RX ADMIN — Medication 60 MILLIGRAM(S): at 05:01

## 2024-04-13 RX ADMIN — APIXABAN 2.5 MILLIGRAM(S): 2.5 TABLET, FILM COATED ORAL at 05:01

## 2024-04-13 RX ADMIN — Medication 100 MILLIGRAM(S): at 13:54

## 2024-04-13 RX ADMIN — CHLORHEXIDINE GLUCONATE 1 APPLICATION(S): 213 SOLUTION TOPICAL at 11:37

## 2024-04-13 RX ADMIN — Medication 60 MILLIGRAM(S): at 17:20

## 2024-04-13 RX ADMIN — Medication 81 MILLIGRAM(S): at 13:54

## 2024-04-13 RX ADMIN — Medication 100 MILLIGRAM(S): at 05:01

## 2024-04-13 RX ADMIN — Medication 400 MILLIGRAM(S): at 21:08

## 2024-04-13 NOTE — PROGRESS NOTE ADULT - SUBJECTIVE AND OBJECTIVE BOX
Dr. eHath  Office [phone] (9 am to 5 pm)  Service: [phone] (5pm to 9am)  Ghislaine WHITE      RENAL PROGRESS NOTE: DATE OF SERVICE 04-13-24 @ 15:30    Patient is a 78y old  Female who presents with a chief complaint of shortness of breath and leg swelling (13 Apr 2024 11:28)      Patient seen and examined at bedside. No chest pain/sob    VITALS:  T(F): 97.9 (04-13-24 @ 12:42), Max: 98.4 (04-13-24 @ 00:26)  HR: 72 (04-13-24 @ 12:42)  BP: 140/74 (04-13-24 @ 12:42)  RR: 18 (04-13-24 @ 12:42)  SpO2: 94% (04-13-24 @ 12:42)  Wt(kg): --    04-12 @ 07:01  -  04-13 @ 07:00  --------------------------------------------------------  IN: 480 mL / OUT: 500 mL / NET: -20 mL    04-13 @ 07:01  -  04-13 @ 15:30  --------------------------------------------------------  IN: 480 mL / OUT: 0 mL / NET: 480 mL          PHYSICAL EXAM:  Constitutional: NAD  Neck: No JVD  Respiratory: CTAB, no wheezes, rales or rhonchi  Cardiovascular: S1, S2, RRR  Gastrointestinal: BS+, soft, NT/ND  Extremities: No peripheral edema    Hospital Medications:   MEDICATIONS  (STANDING):  apixaban 2.5 milliGRAM(s) Oral every 12 hours  aspirin  chewable 81 milliGRAM(s) Oral daily  chlorhexidine 2% Cloths 1 Application(s) Topical daily  hydrALAZINE 100 milliGRAM(s) Oral three times a day  labetalol 400 milliGRAM(s) Oral three times a day  NIFEdipine XL 60 milliGRAM(s) Oral two times a day  predniSONE   Tablet 5 milliGRAM(s) Oral daily  sodium zirconium cyclosilicate 10 Gram(s) Oral daily  tacrolimus ER Tablet (ENVARSUS XR) 2 milliGRAM(s) Oral daily    Tacrolimus (), Serum: 3.4 ng/mL (04-13 @ 06:43)    LABS:  04-12    139  |  105  |  75<H>  ----------------------------<  83  4.4   |  17<L>  |  4.40<H>    Ca    8.2<L>      12 Apr 2024 07:24  Mg     1.8     04-12      Creatinine Trend: 4.40 <--, 3.93 <--, 5.13 <--, 4.86 <--, 4.60 <--, 4.41 <--            Urine Studies:  Urinalysis - [04-12-24 @ 07:24]      Color  / Appearance  / SG  / pH       Gluc 83 / Ketone   / Bili  / Urobili        Blood  / Protein  / Leuk Est  / Nitrite       RBC  / WBC  / Hyaline  / Gran  / Sq Epi  / Non Sq Epi  / Bacteria     Urine Creatinine 70      [04-08-24 @ 18:35]  Urine Protein 359      [04-08-24 @ 18:35]  Urine Sodium 74      [04-08-24 @ 18:35]  Urine Urea Nitrogen 560      [04-08-24 @ 18:35]  Urine Potassium 30      [04-08-24 @ 18:35]  Urine Osmolality 389      [04-08-24 @ 18:35]    Iron 25, TIBC 193, %sat 13      [04-08-24 @ 06:30]  Ferritin 1188      [04-08-24 @ 06:30]  PTH -- (Ca 7.9)      [04-10-24 @ 18:58]   1144  PTH -- (Ca 8.5)      [11-10-23 @ 06:01]   659  Vitamin D (25OH) 16.5      [11-11-23 @ 06:09]  HbA1c 4.6      [05-28-19 @ 09:49]  Lipid: chol 191, , HDL 64, LDL --      [01-04-24 @ 10:13]    HBsAb 3.6      [04-10-24 @ 06:30]  HBsAg Nonreact      [04-10-24 @ 06:30]  HBcAb Nonreact      [04-10-24 @ 06:30]  HCV 0.21, Nonreact      [04-10-24 @ 06:30]      RADIOLOGY & ADDITIONAL STUDIES:

## 2024-04-13 NOTE — PROGRESS NOTE ADULT - SUBJECTIVE AND OBJECTIVE BOX
DATE OF SERVICE: 04-13-24 @ 16:43    Patient is a 78y old  Female who presents with a chief complaint of shortness of breath and leg swelling (13 Apr 2024 15:30)      INTERVAL HISTORY: no complaints     REVIEW OF SYSTEMS:  CONSTITUTIONAL: No weakness  EYES/ENT: No visual changes;  No throat pain   NECK: No pain or stiffness  RESPIRATORY: No cough, wheezing; No shortness of breath  CARDIOVASCULAR: No chest pain or palpitations  GASTROINTESTINAL: No abdominal  pain. No nausea, vomiting, or hematemesis  GENITOURINARY: No dysuria, frequency or hematuria  NEUROLOGICAL: No stroke like symptoms  SKIN: No rashes    	  MEDICATIONS:  hydrALAZINE 100 milliGRAM(s) Oral three times a day  labetalol 400 milliGRAM(s) Oral three times a day  NIFEdipine XL 60 milliGRAM(s) Oral two times a day        PHYSICAL EXAM:  T(C): 36.6 (04-13-24 @ 12:42), Max: 36.9 (04-13-24 @ 00:26)  HR: 64 (04-13-24 @ 16:37) (64 - 76)  BP: 147/67 (04-13-24 @ 16:37) (135/71 - 198/75)  RR: 18 (04-13-24 @ 12:42) (17 - 18)  SpO2: 94% (04-13-24 @ 12:42) (93% - 95%)  Wt(kg): --  I&O's Summary    12 Apr 2024 07:01  -  13 Apr 2024 07:00  --------------------------------------------------------  IN: 480 mL / OUT: 500 mL / NET: -20 mL    13 Apr 2024 07:01  -  13 Apr 2024 16:43  --------------------------------------------------------  IN: 480 mL / OUT: 0 mL / NET: 480 mL          Appearance: In no distress	  HEENT:    PERRL, EOMI	  Cardiovascular:  S1 S2, No JVD  Respiratory: Lungs clear to auscultation	  Gastrointestinal:  Soft, Non-tender, + BS	  Vascularature:  No edema of LE  Psychiatric: Appropriate affect   Neuro: no acute focal deficits           04-12    139  |  105  |  75<H>  ----------------------------<  83  4.4   |  17<L>  |  4.40<H>    Ca    8.2<L>      12 Apr 2024 07:24  Mg     1.8     04-12          Labs personally reviewed      ASSESSMENT/PLAN: 	    78f h/o HTN, s/p Renal transplant, CKD, chronic rejection, recent admission for pneumonia s/p abx presented with shortness of breath since this morning. she also noticed significant LUE swelling that began 4-5 days ago    1. Diastolic Heart Failure secondary to advanced kidney disease   - TTE in Jan 2024 with normal EF, moderate DD, elevated filling pressures, severe LVH, severely dilated LA, small pericardial effusion  - cardiac amyloid with PYP scan normal   - CXR shows cardiomegaly and pulm edema  - BNP almost 19K  - Appreciate Renal and TP recs  - Monitor Strict I&Os, daily weights  - Lasix now DC'd given improvement in her clinical symptoms and risking Cr/BUN    2. HTN - improved   - c/w labetalol 200mg PO TID. Now increased to 400mg TID  - Continue Nifedipine 90mg daily. Nifedipine now increased to 60mg BID  - Hydralazine 100mg PO TID     3. ESRD  - Renal recs appreciated            FLORY Wilson DO [facility]  Cardiovascular Medicine  [address] [place] [address], [address]  Office: [phone]  Available via call/text on [employer] Teams

## 2024-04-13 NOTE — PROGRESS NOTE ADULT - SUBJECTIVE AND OBJECTIVE BOX
Patient is a 78y old  Female who presents with a chief complaint of shortness of breath and leg swelling (12 Apr 2024 16:32)      DATE OF SERVICE: 04-13-24 @ 11:28    SUBJECTIVE / OVERNIGHT EVENTS: overnight events noted    ROS:  Resp: No cough no sputum production  CVS: No chest pain no palpitations no orthopnea  GI: no N/V/D        MEDICATIONS  (STANDING):  apixaban 2.5 milliGRAM(s) Oral every 12 hours  aspirin  chewable 81 milliGRAM(s) Oral daily  chlorhexidine 2% Cloths 1 Application(s) Topical daily  hydrALAZINE 100 milliGRAM(s) Oral three times a day  labetalol 400 milliGRAM(s) Oral three times a day  NIFEdipine XL 60 milliGRAM(s) Oral two times a day  predniSONE   Tablet 5 milliGRAM(s) Oral daily  sodium zirconium cyclosilicate 10 Gram(s) Oral daily  tacrolimus ER Tablet (ENVARSUS XR) 2 milliGRAM(s) Oral daily    MEDICATIONS  (PRN):        CAPILLARY BLOOD GLUCOSE        I&O's Summary    12 Apr 2024 07:01  -  13 Apr 2024 07:00  --------------------------------------------------------  IN: 480 mL / OUT: 500 mL / NET: -20 mL        Vital Signs Last 24 Hrs  T(C): 36.7 (13 Apr 2024 08:00), Max: 36.9 (13 Apr 2024 00:26)  T(F): 98.1 (13 Apr 2024 08:00), Max: 98.4 (13 Apr 2024 00:26)  HR: 67 (13 Apr 2024 08:00) (67 - 76)  BP: 135/71 (13 Apr 2024 08:00) (126/64 - 198/75)  BP(mean): --  RR: 18 (13 Apr 2024 08:00) (17 - 18)  SpO2: 94% (13 Apr 2024 08:00) (93% - 97%)      PHYSICAL EXAM:  NECK: Supple  CHEST/LUNG: clear   HEART: S1 S2; systolic murmur +   ABDOMEN: Soft, Nontender  EXTREMITIES:  improved edema  NEUROLOGY: Alert non-focal    LABS:    04-12    139  |  105  |  75<H>  ----------------------------<  83  4.4   |  17<L>  |  4.40<H>    Ca    8.2<L>      12 Apr 2024 07:24  Mg     1.8     04-12            Urinalysis Basic - ( 12 Apr 2024 07:24 )    Color: x / Appearance: x / SG: x / pH: x  Gluc: 83 mg/dL / Ketone: x  / Bili: x / Urobili: x   Blood: x / Protein: x / Nitrite: x   Leuk Esterase: x / RBC: x / WBC x   Sq Epi: x / Non Sq Epi: x / Bacteria: x          All consultant(s) notes reviewed and care discussed with other providers        Contact Number, Dr Woods [phone]

## 2024-04-13 NOTE — PROGRESS NOTE ADULT - ASSESSMENT
78-year-old female with a history of hypertension, diastolic heart failure, renal transplant 2 years ago no longer on tacrolimus who presents with shortness of breath and lower extremity swelling since yesterday. Patient reports dyspnea with exertion, no orthopnea.  Patient denies any cough, fever, chills, abdominal pain, nausea, vomiting, dysuria, chest pain. Patient is ambulatory at home, endorses no changes in activity or medication changes recently. Not on any AC. Patient is currently on 80 mg of Lasix twice daily, was started by cardiologist 2 weeks ago.  Nephrology consulted for YANELY.      A/P:  YANELY on CKD 4 now ESRD  S/p DDRT (5/19/2021 - induction w/ Basiliximab)  Baseline sCr ~3-3.3.  Renal function fluctuates sec to HF.  Repeat TTE 1/4 - Left ventricular wall thickness is moderately increased. Left ventricular systolic function is normal with a calculated ejection fraction of 63 % with moderate L ventricular dysfunction.  Renal function worsened on admission likely d/t cardiorenal /. Hemodynamic changes w/ hypertensive urgency.  FEUrea 44.6 % suggestive of intrinsic causes  sCr worsening   lasix held ( last dose on 4/8)  per  Transplant nephrology to decrease envarsus to 2mg QD and continue prednisone 5mg QD. stop cell cept   FK level 4.8 on 4/7 . Goal is 2-3   Check FK level 30min prior to dose   Avoid nephrotoxic agents.  Monitor BMP and UO.    Dr. Kay d/w with Dr Louise, transplant outpt nephrologist, she reports pt has poor graft function, being planned for initiating RRT as outpt. In view of worsening renal function recommended to initiate RRT inpatient. Discussed with pt  in detail, she agreed to start HD  consent for HD in the chart  s/p First HD on 4/10 via AVF tolerated well  Plan for 2nd HD 04/12   Next HD on Monday  She will go to [facility] on [facility] as outpatient HD unit    HTN:  BP improving   Avoid hypotension.  Monitor BP.    Hyperkalemia:  sec to CKD  Low K diet.  On Lokelma 10gm QD  Monitor K closely.    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  Hd as scheduled  Monitor CO2.

## 2024-04-14 LAB — TACROLIMUS SERPL-MCNC: 2.8 NG/ML — SIGNIFICANT CHANGE UP

## 2024-04-14 RX ADMIN — APIXABAN 2.5 MILLIGRAM(S): 2.5 TABLET, FILM COATED ORAL at 05:03

## 2024-04-14 RX ADMIN — Medication 100 MILLIGRAM(S): at 21:59

## 2024-04-14 RX ADMIN — Medication 100 MILLIGRAM(S): at 05:03

## 2024-04-14 RX ADMIN — SODIUM ZIRCONIUM CYCLOSILICATE 10 GRAM(S): 10 POWDER, FOR SUSPENSION ORAL at 12:05

## 2024-04-14 RX ADMIN — Medication 60 MILLIGRAM(S): at 18:13

## 2024-04-14 RX ADMIN — Medication 100 MILLIGRAM(S): at 14:10

## 2024-04-14 RX ADMIN — CHLORHEXIDINE GLUCONATE 1 APPLICATION(S): 213 SOLUTION TOPICAL at 12:05

## 2024-04-14 RX ADMIN — Medication 400 MILLIGRAM(S): at 14:10

## 2024-04-14 RX ADMIN — Medication 5 MILLIGRAM(S): at 05:03

## 2024-04-14 RX ADMIN — Medication 81 MILLIGRAM(S): at 12:05

## 2024-04-14 RX ADMIN — APIXABAN 2.5 MILLIGRAM(S): 2.5 TABLET, FILM COATED ORAL at 18:12

## 2024-04-14 RX ADMIN — TACROLIMUS 2 MILLIGRAM(S): 5 CAPSULE ORAL at 05:03

## 2024-04-14 RX ADMIN — Medication 400 MILLIGRAM(S): at 05:03

## 2024-04-14 RX ADMIN — Medication 60 MILLIGRAM(S): at 05:03

## 2024-04-14 RX ADMIN — Medication 400 MILLIGRAM(S): at 21:59

## 2024-04-14 NOTE — PROGRESS NOTE ADULT - ASSESSMENT
78-year-old female with a history of hypertension, diastolic heart failure, renal transplant 2 years ago no longer on tacrolimus who presents with shortness of breath and lower extremity swelling since yesterday. Patient reports dyspnea with exertion, no orthopnea.  Patient denies any cough, fever, chills, abdominal pain, nausea, vomiting, dysuria, chest pain. Patient is ambulatory at home, endorses no changes in activity or medication changes recently. Not on any AC. Patient is currently on 80 mg of Lasix twice daily, was started by cardiologist 2 weeks ago.  Nephrology consulted for YANELY.      A/P:  YANELY on CKD 4 now ESRD  S/p DDRT (5/19/2021 - induction w/ Basiliximab)  Baseline sCr ~3-3.3.  Renal function fluctuates sec to HF.  Repeat TTE 1/4 - Left ventricular wall thickness is moderately increased. Left ventricular systolic function is normal with a calculated ejection fraction of 63 % with moderate L ventricular dysfunction.  Renal function worsened on admission likely d/t cardiorenal /. Hemodynamic changes w/ hypertensive urgency.  FEUrea 44.6 % suggestive of intrinsic causes  sCr worsening   lasix held ( last dose on 4/8)  per  Transplant nephrology to decrease envarsus to 2mg QD and continue prednisone 5mg QD. stop cell cept   FK level 4.8 on 4/7 . Goal is 2-3   Check FK level 30min prior to dose   Avoid nephrotoxic agents.  Monitor BMP and UO.    Dr. Kay d/w with Dr Louise, transplant outpt nephrologist, she reports pt has poor graft function, being planned for initiating RRT as outpt. In view of worsening renal function recommended to initiate RRT inpatient. Discussed with pt  in detail, she agreed to start HD  consent for HD in the chart  s/p First HD on 4/10 via AVF tolerated well  Plan for 2nd HD 04/12   Next HD on Monday  Will keep HD MWF  She will go to [facility] as outpatient HD unit    HTN:  BP improving   Avoid hypotension.  Monitor BP.    Hyperkalemia:  sec to CKD  Low K diet.  Discontinue lokelma  Monitor K closely.    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  Hd as scheduled  Monitor CO2.

## 2024-04-14 NOTE — PROGRESS NOTE ADULT - SUBJECTIVE AND OBJECTIVE BOX
Dr. Heath  Office [phone] (9 am to 5 pm)  Service: [phone] (5pm to 9am)  Ghislaine WHITE      RENAL PROGRESS NOTE: DATE OF SERVICE 04-14-24 @ 16:03    Patient is a 78y old  Female who presents with a chief complaint of shortness of breath and leg swelling (14 Apr 2024 14:41)      Patient seen and examined at bedside. No chest pain/sob    VITALS:  T(F): 97.9 (04-14-24 @ 08:00), Max: 98.4 (04-14-24 @ 00:16)  HR: 76 (04-14-24 @ 08:00)  BP: 137/67 (04-14-24 @ 08:00)  RR: 18 (04-14-24 @ 08:00)  SpO2: 94% (04-14-24 @ 08:00)  Wt(kg): --    04-13 @ 07:01  -  04-14 @ 07:00  --------------------------------------------------------  IN: 780 mL / OUT: 0 mL / NET: 780 mL    04-14 @ 07:01  -  04-14 @ 16:03  --------------------------------------------------------  IN: 480 mL / OUT: 0 mL / NET: 480 mL          PHYSICAL EXAM:  Constitutional: NAD  Neck: No JVD  Respiratory: CTAB, no wheezes, rales or rhonchi  Cardiovascular: S1, S2, RRR  Gastrointestinal: BS+, soft, NT/ND  Extremities: No peripheral edema    Hospital Medications:   MEDICATIONS  (STANDING):  apixaban 2.5 milliGRAM(s) Oral every 12 hours  aspirin  chewable 81 milliGRAM(s) Oral daily  chlorhexidine 2% Cloths 1 Application(s) Topical daily  hydrALAZINE 100 milliGRAM(s) Oral three times a day  labetalol 400 milliGRAM(s) Oral three times a day  NIFEdipine XL 60 milliGRAM(s) Oral two times a day  predniSONE   Tablet 5 milliGRAM(s) Oral daily  sodium zirconium cyclosilicate 10 Gram(s) Oral daily  tacrolimus ER Tablet (ENVARSUS XR) 2 milliGRAM(s) Oral daily    Tacrolimus (), Serum: 2.8 ng/mL (04-14 @ 06:40)    LABS:        Creatinine Trend: 4.40 <--, 3.93 <--, 5.13 <--, 4.86 <--, 4.60 <--            Urine Studies:  Urinalysis - [04-12-24 @ 07:24]      Color  / Appearance  / SG  / pH       Gluc 83 / Ketone   / Bili  / Urobili        Blood  / Protein  / Leuk Est  / Nitrite       RBC  / WBC  / Hyaline  / Gran  / Sq Epi  / Non Sq Epi  / Bacteria     Urine Creatinine 70      [04-08-24 @ 18:35]  Urine Protein 359      [04-08-24 @ 18:35]  Urine Sodium 74      [04-08-24 @ 18:35]  Urine Urea Nitrogen 560      [04-08-24 @ 18:35]  Urine Potassium 30      [04-08-24 @ 18:35]  Urine Osmolality 389      [04-08-24 @ 18:35]    Iron 25, TIBC 193, %sat 13      [04-08-24 @ 06:30]  Ferritin 1188      [04-08-24 @ 06:30]  PTH -- (Ca 7.9)      [04-10-24 @ 18:58]   1144  PTH -- (Ca 8.5)      [11-10-23 @ 06:01]   659  Vitamin D (25OH) 16.5      [11-11-23 @ 06:09]  HbA1c 4.6      [05-28-19 @ 09:49]  Lipid: chol 191, , HDL 64, LDL --      [01-04-24 @ 10:13]    HBsAb 3.6      [04-10-24 @ 06:30]  HBsAg Nonreact      [04-10-24 @ 06:30]  HBcAb Nonreact      [04-10-24 @ 06:30]  HCV 0.21, Nonreact      [04-10-24 @ 06:30]      RADIOLOGY & ADDITIONAL STUDIES:

## 2024-04-14 NOTE — PROGRESS NOTE ADULT - SUBJECTIVE AND OBJECTIVE BOX
DATE OF SERVICE: 04-14-24 @ 14:42    Patient is a 78y old  Female who presents with a chief complaint of shortness of breath and leg swelling (13 Apr 2024 16:43)      INTERVAL HISTORY: no complaints     REVIEW OF SYSTEMS:  CONSTITUTIONAL: No weakness  EYES/ENT: No visual changes;  No throat pain   NECK: No pain or stiffness  RESPIRATORY: No cough, wheezing; No shortness of breath  CARDIOVASCULAR: No chest pain or palpitations  GASTROINTESTINAL: No abdominal  pain. No nausea, vomiting, or hematemesis  GENITOURINARY: No dysuria, frequency or hematuria  NEUROLOGICAL: No stroke like symptoms  SKIN: No rashes    	  MEDICATIONS:  hydrALAZINE 100 milliGRAM(s) Oral three times a day  labetalol 400 milliGRAM(s) Oral three times a day  NIFEdipine XL 60 milliGRAM(s) Oral two times a day        PHYSICAL EXAM:  T(C): 36.6 (04-14-24 @ 08:00), Max: 36.9 (04-14-24 @ 00:16)  HR: 76 (04-14-24 @ 08:00) (64 - 77)  BP: 137/67 (04-14-24 @ 08:00) (133/64 - 193/78)  RR: 18 (04-14-24 @ 08:00) (18 - 18)  SpO2: 94% (04-14-24 @ 08:00) (91% - 98%)  Wt(kg): --  I&O's Summary    13 Apr 2024 07:01  -  14 Apr 2024 07:00  --------------------------------------------------------  IN: 780 mL / OUT: 0 mL / NET: 780 mL    14 Apr 2024 07:01  -  14 Apr 2024 14:42  --------------------------------------------------------  IN: 480 mL / OUT: 0 mL / NET: 480 mL          Appearance: In no distress	  HEENT:    PERRL, EOMI	  Cardiovascular:  S1 S2, No JVD  Respiratory: Lungs clear to auscultation	  Gastrointestinal:  Soft, Non-tender, + BS	  Vascularature:  No edema of LE  Psychiatric: Appropriate affect   Neuro: no acute focal deficits                     Labs personally reviewed      ASSESSMENT/PLAN: 	      78f h/o HTN, s/p Renal transplant, CKD, chronic rejection, recent admission for pneumonia s/p abx presented with shortness of breath since this morning. she also noticed significant LUE swelling that began 4-5 days ago    1. Diastolic Heart Failure secondary to advanced kidney disease   - TTE in Jan 2024 with normal EF, moderate DD, elevated filling pressures, severe LVH, severely dilated LA, small pericardial effusion  - cardiac amyloid with PYP scan normal   - CXR shows cardiomegaly and pulm edema  - BNP almost 19K  - Appreciate Renal and TP recs  - Monitor Strict I&Os, daily weights  - Lasix now DC'd given improvement in her clinical symptoms and risking Cr/BUN    2. HTN - improved   - c/w labetalol 200mg PO TID. Now increased to 400mg TID  - Continue Nifedipine 90mg daily. Nifedipine now increased to 60mg BID  - Hydralazine 100mg PO TID     3. ESRD  - Renal recs appreciated            FLORY Wilson DO [facility]  Cardiovascular Medicine  [address], [address]  Office: [phone]  Available via call/text on [employer] Teams

## 2024-04-14 NOTE — PROGRESS NOTE ADULT - SUBJECTIVE AND OBJECTIVE BOX
Patient is a 78y old  Female who presents with a chief complaint of shortness of breath and leg swelling (14 Apr 2024 16:02)      DATE OF SERVICE: 04-14-24 @ 17:16    SUBJECTIVE / OVERNIGHT EVENTS: overnight events noted    ROS:  Resp: No cough no sputum production  CVS: No chest pain no palpitations no orthopnea  GI: no N/V/D  : no dysuria, no hematuria  Neuro: no weakness no paresthesias  Heme: No petechiae no easy bruising  Msk: No joint pain no swelling  Skin: No rash no itching        MEDICATIONS  (STANDING):  apixaban 2.5 milliGRAM(s) Oral every 12 hours  aspirin  chewable 81 milliGRAM(s) Oral daily  chlorhexidine 2% Cloths 1 Application(s) Topical daily  hydrALAZINE 100 milliGRAM(s) Oral three times a day  labetalol 400 milliGRAM(s) Oral three times a day  NIFEdipine XL 60 milliGRAM(s) Oral two times a day  predniSONE   Tablet 5 milliGRAM(s) Oral daily  tacrolimus ER Tablet (ENVARSUS XR) 2 milliGRAM(s) Oral daily    MEDICATIONS  (PRN):        CAPILLARY BLOOD GLUCOSE        I&O's Summary    13 Apr 2024 07:01  -  14 Apr 2024 07:00  --------------------------------------------------------  IN: 780 mL / OUT: 0 mL / NET: 780 mL    14 Apr 2024 07:01  -  14 Apr 2024 17:16  --------------------------------------------------------  IN: 480 mL / OUT: 0 mL / NET: 480 mL        Vital Signs Last 24 Hrs  T(C): 36.5 (14 Apr 2024 16:36), Max: 36.9 (14 Apr 2024 00:16)  T(F): 97.7 (14 Apr 2024 16:36), Max: 98.4 (14 Apr 2024 00:16)  HR: 65 (14 Apr 2024 16:36) (65 - 77)  BP: 159/70 (14 Apr 2024 16:36) (133/64 - 193/78)  BP(mean): --  RR: 18 (14 Apr 2024 16:36) (18 - 18)  SpO2: 92% (14 Apr 2024 16:36) (91% - 98%)    PHYSICAL EXAM:  NECK: Supple  CHEST/LUNG: clear   HEART: S1 S2; systolic murmur +   ABDOMEN: Soft, Nontender  EXTREMITIES:  no edema  NEUROLOGY: Alert non-focal    LABS:                      All consultant(s) notes reviewed and care discussed with other providers        Contact Number, Dr Woods [phone]

## 2024-04-15 ENCOUNTER — TRANSCRIPTION ENCOUNTER (OUTPATIENT)
Age: 79
End: 2024-04-15

## 2024-04-15 LAB
ANION GAP SERPL CALC-SCNC: 17 MMOL/L — SIGNIFICANT CHANGE UP (ref 5–17)
BLD GP AB SCN SERPL QL: NEGATIVE — SIGNIFICANT CHANGE UP
BUN SERPL-MCNC: 72 MG/DL — HIGH (ref 7–23)
CALCIUM SERPL-MCNC: 8.1 MG/DL — LOW (ref 8.4–10.5)
CHLORIDE SERPL-SCNC: 102 MMOL/L — SIGNIFICANT CHANGE UP (ref 96–108)
CO2 SERPL-SCNC: 18 MMOL/L — LOW (ref 22–31)
CREAT SERPL-MCNC: 5.13 MG/DL — HIGH (ref 0.5–1.3)
EGFR: 8 ML/MIN/1.73M2 — LOW
GLUCOSE SERPL-MCNC: 84 MG/DL — SIGNIFICANT CHANGE UP (ref 70–99)
HCT VFR BLD CALC: 25.2 % — LOW (ref 34.5–45)
HGB BLD-MCNC: 7.9 G/DL — LOW (ref 11.5–15.5)
MAGNESIUM SERPL-MCNC: 1.7 MG/DL — SIGNIFICANT CHANGE UP (ref 1.6–2.6)
MCHC RBC-ENTMCNC: 26.3 PG — LOW (ref 27–34)
MCHC RBC-ENTMCNC: 31.3 GM/DL — LOW (ref 32–36)
MCV RBC AUTO: 84 FL — SIGNIFICANT CHANGE UP (ref 80–100)
NRBC # BLD: 0 /100 WBCS — SIGNIFICANT CHANGE UP (ref 0–0)
PHOSPHATE SERPL-MCNC: 6 MG/DL — HIGH (ref 2.5–4.5)
PLATELET # BLD AUTO: 166 K/UL — SIGNIFICANT CHANGE UP (ref 150–400)
POTASSIUM SERPL-MCNC: 4.6 MMOL/L — SIGNIFICANT CHANGE UP (ref 3.5–5.3)
POTASSIUM SERPL-SCNC: 4.6 MMOL/L — SIGNIFICANT CHANGE UP (ref 3.5–5.3)
RBC # BLD: 3 M/UL — LOW (ref 3.8–5.2)
RBC # FLD: 16.7 % — HIGH (ref 10.3–14.5)
RH IG SCN BLD-IMP: POSITIVE — SIGNIFICANT CHANGE UP
SODIUM SERPL-SCNC: 137 MMOL/L — SIGNIFICANT CHANGE UP (ref 135–145)
TACROLIMUS SERPL-MCNC: 2.5 NG/ML — SIGNIFICANT CHANGE UP
WBC # BLD: 3.69 K/UL — LOW (ref 3.8–10.5)
WBC # FLD AUTO: 3.69 K/UL — LOW (ref 3.8–10.5)

## 2024-04-15 RX ORDER — APIXABAN 2.5 MG/1
1 TABLET, FILM COATED ORAL
Qty: 60 | Refills: 0
Start: 2024-04-15 | End: 2024-05-14

## 2024-04-15 RX ADMIN — Medication 400 MILLIGRAM(S): at 16:58

## 2024-04-15 RX ADMIN — TACROLIMUS 2 MILLIGRAM(S): 5 CAPSULE ORAL at 05:29

## 2024-04-15 RX ADMIN — Medication 100 MILLIGRAM(S): at 05:29

## 2024-04-15 RX ADMIN — Medication 400 MILLIGRAM(S): at 21:03

## 2024-04-15 RX ADMIN — Medication 100 MILLIGRAM(S): at 16:59

## 2024-04-15 RX ADMIN — Medication 60 MILLIGRAM(S): at 05:29

## 2024-04-15 RX ADMIN — Medication 100 MILLIGRAM(S): at 21:03

## 2024-04-15 RX ADMIN — Medication 5 MILLIGRAM(S): at 05:29

## 2024-04-15 RX ADMIN — APIXABAN 2.5 MILLIGRAM(S): 2.5 TABLET, FILM COATED ORAL at 17:00

## 2024-04-15 RX ADMIN — APIXABAN 2.5 MILLIGRAM(S): 2.5 TABLET, FILM COATED ORAL at 05:29

## 2024-04-15 RX ADMIN — Medication 81 MILLIGRAM(S): at 11:55

## 2024-04-15 RX ADMIN — Medication 60 MILLIGRAM(S): at 17:00

## 2024-04-15 RX ADMIN — Medication 400 MILLIGRAM(S): at 05:29

## 2024-04-15 RX ADMIN — CHLORHEXIDINE GLUCONATE 1 APPLICATION(S): 213 SOLUTION TOPICAL at 11:55

## 2024-04-15 NOTE — DISCHARGE NOTE PROVIDER - NSDCFUSCHEDAPPT_GEN_ALL_CORE_FT
[facility] Physician [facility]  TRANSPLANT [address]   Scheduled Appointment: 04/22/2024    Simi Louise  [facility]  NEPHRO [address] [facility] [address]  Scheduled Appointment: 06/05/2024    [facility]  VASCULAR [address] [facility]  Scheduled Appointment: 06/07/2024    Dutch Mc  [facility]  VASCULAR [address] [facility]  Scheduled Appointment: 06/07/2024

## 2024-04-15 NOTE — DISCHARGE NOTE PROVIDER - NSDCFUADDAPPT_GEN_ALL_CORE_FT
APPTS ARE READY TO BE MADE: [ ] YES    Best Family or Patient Contact (if needed):    Additional Information about above appointments (if needed):    1:   2:   3:     Other comments or requests:    APPTS ARE READY TO BE MADE: [X] YES    Best Family or Patient Contact (if needed):    Additional Information about above appointments (if needed):    1:   2:   3:     Other comments or requests:    APPTS ARE READY TO BE MADE: [X] YES    Best Family or Patient Contact (if needed):    Additional Information about above appointments (if needed):    1:   2:   3:     Other comments or requests:   Prior to outreaching the patient, it was visible that the patient has secured a follow up appointment which was not scheduled by our team. 06/05 12:00pm with Dr. Louise    Patient informed us they already have secured a follow up appointment which is not visible on [facility] with cardiology.

## 2024-04-15 NOTE — PROGRESS NOTE ADULT - SUBJECTIVE AND OBJECTIVE BOX
Patient is a 78y old  Female who presents with a chief complaint of shortness of breath and leg swelling (15 Apr 2024 15:25)      DATE OF SERVICE: 04-15-24 @ 15:54    SUBJECTIVE / OVERNIGHT EVENTS: overnight events noted    ROS:  Resp: No cough no sputum production  CVS: No chest pain no palpitations no orthopnea  GI: no N/V/D          MEDICATIONS  (STANDING):  apixaban 2.5 milliGRAM(s) Oral every 12 hours  aspirin  chewable 81 milliGRAM(s) Oral daily  chlorhexidine 2% Cloths 1 Application(s) Topical daily  hydrALAZINE 100 milliGRAM(s) Oral three times a day  labetalol 400 milliGRAM(s) Oral three times a day  NIFEdipine XL 60 milliGRAM(s) Oral two times a day  predniSONE   Tablet 5 milliGRAM(s) Oral daily  tacrolimus ER Tablet (ENVARSUS XR) 2 milliGRAM(s) Oral daily    MEDICATIONS  (PRN):        CAPILLARY BLOOD GLUCOSE        I&O's Summary    14 Apr 2024 07:01  -  15 Apr 2024 07:00  --------------------------------------------------------  IN: 480 mL / OUT: 0 mL / NET: 480 mL    15 Apr 2024 07:01  -  15 Apr 2024 15:54  --------------------------------------------------------  IN: 60 mL / OUT: 0 mL / NET: 60 mL        Vital Signs Last 24 Hrs  T(C): 36.9 (15 Apr 2024 13:06), Max: 37.3 (15 Apr 2024 04:14)  T(F): 98.5 (15 Apr 2024 13:06), Max: 99.2 (15 Apr 2024 04:14)  HR: 67 (15 Apr 2024 13:06) (65 - 72)  BP: 192/80 (15 Apr 2024 13:06) (130/68 - 192/80)  BP(mean): --  RR: 17 (15 Apr 2024 13:06) (17 - 18)  SpO2: 98% (15 Apr 2024 13:06) (92% - 99%)    PHYSICAL EXAM:  NECK: Supple  CHEST/LUNG: clear   HEART: S1 S2; systolic murmur +   ABDOMEN: Soft, Nontender  EXTREMITIES:  no edema  NEUROLOGY: Alert non-focal    LABS:                        7.9    3.69  )-----------( 166      ( 15 Apr 2024 06:57 )             25.2     04-15    137  |  102  |  72<H>  ----------------------------<  84  4.6   |  18<L>  |  5.13<H>    Ca    8.1<L>      15 Apr 2024 07:00  Phos  6.0     04-15  Mg     1.7     04-15            Urinalysis Basic - ( 15 Apr 2024 07:00 )    Color: x / Appearance: x / SG: x / pH: x  Gluc: 84 mg/dL / Ketone: x  / Bili: x / Urobili: x   Blood: x / Protein: x / Nitrite: x   Leuk Esterase: x / RBC: x / WBC x   Sq Epi: x / Non Sq Epi: x / Bacteria: x          All consultant(s) notes reviewed and care discussed with other providers        Contact Number, Dr Woods [phone]

## 2024-04-15 NOTE — PROGRESS NOTE ADULT - ASSESSMENT
78-year-old female with a history of hypertension, diastolic heart failure, renal transplant 2 years ago no longer on tacrolimus who presents with shortness of breath and lower extremity swelling since yesterday. Patient reports dyspnea with exertion, no orthopnea.  Patient denies any cough, fever, chills, abdominal pain, nausea, vomiting, dysuria, chest pain. Patient is ambulatory at home, endorses no changes in activity or medication changes recently. Not on any AC. Patient is currently on 80 mg of Lasix twice daily, was started by cardiologist 2 weeks ago.  Nephrology consulted for YANELY.      A/P:  YANELY on CKD 4 now ESRD  S/p DDRT (5/19/2021 - induction w/ Basiliximab)  Baseline sCr ~3-3.3.  Renal function fluctuates sec to HF.  Repeat TTE 1/4 - Left ventricular wall thickness is moderately increased. Left ventricular systolic function is normal with a calculated ejection fraction of 63 % with moderate L ventricular dysfunction.  Renal function worsened on admission likely d/t cardiorenal /. Hemodynamic changes w/ hypertensive urgency.  FEUrea 44.6 % suggestive of intrinsic causes  sCr worsening   lasix held ( last dose on 4/8)  per  Transplant nephrology to decrease envarsus to 2mg QD and continue prednisone 5mg QD. stop cell cept   FK level 4.8 on 4/7 . Goal is 2-3   Check FK level 30min prior to dose   Avoid nephrotoxic agents.  Monitor BMP and UO.    Dr. Kay d/w with Dr Louise, transplant outpt nephrologist, she reports pt has poor graft function, being planned for initiating RRT as outpt. In view of worsening renal function recommended to initiate RRT inpatient. Discussed with pt  in detail, she agreed to start HD  consent for HD in the chart  s/p First HD on 4/10 via AVF tolerated well  Next HD on Monday  Will keep HD MWF    HTN:  BP fluctuating  Avoid hypotension.  Monitor BP.    Hyperkalemia:  sec to CKD  Low K diet.  Discontinue lokelma  Monitor K closely.    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  Hd as scheduled  Monitor CO2.

## 2024-04-15 NOTE — DISCHARGE NOTE PROVIDER - NSDCCPCAREPLAN_GEN_ALL_CORE_FT
PRINCIPAL DISCHARGE DIAGNOSIS  Diagnosis: Acute on chronic diastolic congestive heart failure  Assessment and Plan of Treatment: Take all medications as prescribed.  Stop smoking if you currently If you are on medication to help you quit smoking, be sure to take it as prescribed. Find healthy ways to deal with stress, such as exercise (check with your healthcare provider first), deep breathing, meditation, or enjoyable healthy hobbies.  Avoid situations that may cause you to smoke a cigarette.  Look for help with quitting; you are not alone. A resource to help you stop smoking is the [facility] – phone number [phone]., and avoid high altitudes.  Weigh yourself daily.  If you gain 3lbs in 3 days, or 5lbs in a week call your Health Care Provider.  Eat a low sodium diet.  If you have pulmonary hypertension and you are a female of child bearing age, talk to your caregiver about using birth control pills or getting pregnant.  Call your Health Care Provider if you have any swelling or increased swelling in your feet, ankles, and/or stomach.  If you experience dizziness, chest pain, or shortness of breath, seek immediate medical attention.      SECONDARY DISCHARGE DIAGNOSES  Diagnosis: Acute deep vein thrombosis (DVT)  Assessment and Plan of Treatment: Continue taking "blood thinners" as prescribed.  Blood thinners can result in abnormal bleeding and brushing.  Be mindful to avoid accidental trauma.  Participate in activities as prescribed.  If you develop new leg pain, swelling, and/or redness contact your healthcare provider.  Call your doctor if you experience lightheadedness, loss of consciousness, shortness of breath (especially with exercise), feel your heart racing or beating unusually, or experience frequent or abnormal bleeding.    Diagnosis: HTN (hypertension)  Assessment and Plan of Treatment: Continue to follow a low salt/sodium diet.  Perform physical activities as tolerated in consultation with your Primary Care Provider and physical therapist.  Take all medications as prescribed.  Follow up with your medical doctor for routine blood pressure monitoring at your next visit.  Notify your doctor if you have any of the following symptoms:  Dizziness, lightheadedness, blurry vision, headache, chest pain, or shortness of breath.    Diagnosis: Acute on chronic renal failure  Assessment and Plan of Treatment: Avoid taking NSAIDs (ex: Ibuprofen, Advil, Celebrex, Naprosyn) and other agents that can harm the kidneys such as intravenous contrast for diagnostic testing, combination cold medications, etc. until you are instructed to do so by your Primary Care Physician.  Have all of your medications adjusted for your renal function by your Health Care Provider.  Blood pressure control is important.  Take all medication as prescribed.  Do not overconsume foods that are high in potassium, such as bananas, until you are instructed to do so by your primary care physician.   Continue HD as scheduled

## 2024-04-15 NOTE — DISCHARGE NOTE PROVIDER - NSDCMRMEDTOKEN_GEN_ALL_CORE_FT
apixaban 2.5 mg oral tablet: 1 tab(s) orally every 12 hours  aspirin 81 mg oral tablet, chewable: 1 tab(s) orally once a day  furosemide 40 mg oral tablet: 2 tab(s) orally 2 times a day  labetalol 200 mg oral tablet: 1 tab(s) orally 3 times a day  Lokelma 10 g oral powder for reconstitution: 10 gram(s) orally once a day  NIFEdipine 90 mg oral tablet, extended release: 1 tab(s) orally once a day  predniSONE 5 mg oral tablet: 1 tab(s) orally once a day  Procrit 20,000 units/mL injectable solution: 20,000 unit(s) intravenously once a week  sodium bicarbonate 650 mg oral tablet: 2 tab(s) orally 3 times a day  Vitamin D 2000: once daily   apixaban 2.5 mg oral tablet: 1 tab(s) orally every 12 hours  aspirin 81 mg oral tablet, chewable: 1 tab(s) orally once a day  hydrALAZINE 100 mg oral tablet: 1 tab(s) orally 3 times a day  labetalol 200 mg oral tablet: 2 tab(s) orally 3 times a day  NIFEdipine 60 mg oral tablet, extended release: 1 tab(s) orally 2 times a day  predniSONE 5 mg oral tablet: 1 tab(s) orally once a day  Procrit 20,000 units/mL injectable solution: 20,000 unit(s) intravenously once a week  sodium bicarbonate 650 mg oral tablet: 2 tab(s) orally 3 times a day  tacrolimus 1 mg oral tablet, extended release: 2 tab(s) orally once a day  Vitamin D 2000: once daily   apixaban 2.5 mg oral tablet: 1 tab(s) orally every 12 hours  aspirin 81 mg oral tablet, chewable: 1 tab(s) orally once a day  hydrALAZINE 100 mg oral tablet: 1 tab(s) orally 3 times a day  labetalol 200 mg oral tablet: 2 tab(s) orally 3 times a day  NIFEdipine 60 mg oral tablet, extended release: 1 tab(s) orally 2 times a day  predniSONE 5 mg oral tablet: 1 tab(s) orally once a day  Procrit 20,000 units/mL injectable solution: 20,000 unit(s) intravenously once a week  tacrolimus 1 mg oral tablet, extended release: 2 tab(s) orally once a day Renal Transplant ICD Z94.0  Date of Transplant: 05/19/2021  Vitamin D 2000: once daily

## 2024-04-15 NOTE — DISCHARGE NOTE PROVIDER - CARE PROVIDER_API CALL
Robson Soriano  Nephrology  [address] [place] [address]  Phone: [phone]  Fax: [phone]  Follow Up Time:     Dutch Oh  Cardiovascular Disease  [address], [address]  [place] [address]  Phone: [phone]  Fax: [phone]  Follow Up Time:

## 2024-04-15 NOTE — PROGRESS NOTE ADULT - SUBJECTIVE AND OBJECTIVE BOX
[facility]Y [facility]ACT[facility]   MD SAMMY ORDONEZ MD RUORU WONG, PA    TEL:  FROM 9 AM to 5 PM ---OFFICE: [phone]  AVAILABLE ON TEAMS    FROM 5 PM - 9 AM PLEASE CALL ANSWERING SERVICE: [phone]    RENAL FOLLOW UP NOTE--Date of Service 04-15-24 @ 17:56  --------------------------------------------------------------------------------  HPI:      Pt seen and examined at bedside.   Edvin SOB, chest pain     PAST HISTORY  --------------------------------------------------------------------------------  No significant changes to PMH, PSH, FHx, SHx, unless otherwise noted    ALLERGIES & MEDICATIONS  --------------------------------------------------------------------------------  Allergies    Mushrooms (Anaphylaxis)  penicillin (Rash)    Intolerances      Standing Inpatient Medications  apixaban 2.5 milliGRAM(s) Oral every 12 hours  aspirin  chewable 81 milliGRAM(s) Oral daily  chlorhexidine 2% Cloths 1 Application(s) Topical daily  hydrALAZINE 100 milliGRAM(s) Oral three times a day  labetalol 400 milliGRAM(s) Oral three times a day  NIFEdipine XL 60 milliGRAM(s) Oral two times a day  predniSONE   Tablet 5 milliGRAM(s) Oral daily  tacrolimus ER Tablet (ENVARSUS XR) 2 milliGRAM(s) Oral daily    PRN Inpatient Medications      REVIEW OF SYSTEMS  --------------------------------------------------------------------------------  General: no fever  MSK: no edema     VITALS/PHYSICAL EXAM  --------------------------------------------------------------------------------  T(C): 36.5 (04-15-24 @ 16:53), Max: 37.3 (04-15-24 @ 04:14)  HR: 75 (04-15-24 @ 16:53) (67 - 75)  BP: 166/81 (04-15-24 @ 16:53) (130/68 - 192/80)  RR: 18 (04-15-24 @ 16:53) (17 - 18)  SpO2: 93% (04-15-24 @ 16:53) (92% - 99%)  Wt(kg): --        04-14-24 @ 07:01  -  04-15-24 @ 07:00  --------------------------------------------------------  IN: 480 mL / OUT: 0 mL / NET: 480 mL    04-15-24 @ 07:01  -  04-15-24 @ 17:56  --------------------------------------------------------  IN: 60 mL / OUT: 0 mL / NET: 60 mL      Physical Exam:  	Gen: NAD  	HEENT: MMM  	Pulm: CTA B/L  	CV: S1S2  	Abd: Soft, +BS  	Ext: No LE edema B/L                      Neuro: Awake   	Skin: Warm and Dry   	Vascular access: avf            no navdeep  LABS/STUDIES  --------------------------------------------------------------------------------              7.9    3.69  >-----------<  166      [04-15-24 @ 06:57]              25.2     137  |  102  |  72  ----------------------------<  84      [04-15-24 @ 07:00]  4.6   |  18  |  5.13        Ca     8.1     [04-15-24 @ 07:00]      Mg     1.7     [04-15-24 @ 07:00]      Phos  6.0     [04-15-24 @ 07:00]            Creatinine Trend:  SCr 5.13 [04-15 @ 07:00]  SCr 4.40 [04-12 @ 07:24]  SCr 3.93 [04-11 @ 06:35]  SCr 5.13 [04-10 @ 06:30]  SCr 4.86 [04-09 @ 06:03]    Urinalysis - [04-15-24 @ 07:00]      Color  / Appearance  / SG  / pH       Gluc 84 / Ketone   / Bili  / Urobili        Blood  / Protein  / Leuk Est  / Nitrite       RBC  / WBC  / Hyaline  / Gran  / Sq Epi  / Non Sq Epi  / Bacteria     Urine Creatinine 70      [04-08-24 @ 18:35]  Urine Protein 359      [04-08-24 @ 18:35]  Urine Sodium 74      [04-08-24 @ 18:35]  Urine Urea Nitrogen 560      [04-08-24 @ 18:35]  Urine Potassium 30      [04-08-24 @ 18:35]  Urine Osmolality 389      [04-08-24 @ 18:35]    Iron 25, TIBC 193, %sat 13      [04-08-24 @ 06:30]  Ferritin 1188      [04-08-24 @ 06:30]  PTH -- (Ca 7.9)      [04-10-24 @ 18:58]   1144  PTH -- (Ca 8.5)      [11-10-23 @ 06:01]   659  Vitamin D (25OH) 16.5      [11-11-23 @ 06:09]  HbA1c 4.6      [05-28-19 @ 09:49]  Lipid: chol 191, , HDL 64, LDL --      [01-04-24 @ 10:13]    HBsAb 3.6      [04-10-24 @ 06:30]  HBsAg Nonreact      [04-10-24 @ 06:30]  HBcAb Nonreact      [04-10-24 @ 06:30]  HCV 0.21, Nonreact      [04-10-24 @ 06:30]

## 2024-04-15 NOTE — DISCHARGE NOTE PROVIDER - HOSPITAL COURSE
HPI:  78-year-old female with a history of hypertension, diastolic heart failure, renal transplant 2 years ago no longer on tacrolimus who presents with shortness of breath and lower extremity swelling since yesterday. Patient reports dyspnea with exertion, no orthopnea.  Patient denies any cough, fever, chills, abdominal pain, nausea, vomiting, dysuria, chest pain. Patient is ambulatory at home, endorses no changes in activity or medication changes recently. Not on any AC. Patient is currently on 80 mg of Lasix twice daily, was started by cardiologist 2 weeks ago.  Of note recently admitted for fluid overload and cellulitis, hospital course complicated by pneumonia.  Patient completed antibiotic course outpatient. (05 Apr 2024 13:04)    Hospital Course:  Patient admitted for CHF exacerbation. Cardiology was consulted; patient received IV lasix during admission which was stopped in the setting of elevated creatinine. Transplant nephrology was consulted given history of renal transplant; patient restarted on tacrolimus and HD.   Hospital course complicated by Acute LUE DVT, patient started on eliquis.  Nifedipine and labetalol increased and hydralazine started for BP control     Important Medication Changes and Reason:  >Continue eliquis for DVT   >Continue hydralazine, nifedipine and labetalol for HTN  >Continue prednisone and tacrolimus for renal transplant  >Stop lasix due to worsening kidney function   >Stop lokelma per nephrology    Active or Pending Issues Requiring Follow-up:  >PCP follow up within one week for further outpatient management   >Nephrology follow up for further outpatient management    Advanced Directives:   [X] Full code  [ ] DNR  [ ] Hospice    Discharge Diagnoses:  >CHF exacerbation   >DVT  >YANELY    Discharge/dispo/med rec discussed with medical attending Dr. Woods. Patient is medically cleared for discharge with outpatient follow up

## 2024-04-15 NOTE — PROGRESS NOTE ADULT - ASSESSMENT
Stop Fosamax  Increase calcium product  Repeat bone density 10/2025 at [facility]   Return 10/2025        CALCIUM Recommendation 1200 mg/day in divided dose of no more than 500 mg/dose. This includes what is in your food and also what is in any supplements you are taking.      Dietary sources of calcium: These also contain vitamin D  Milk / buttermilk              8 oz            300 mg  Dry milk powder       5 Tbsp             300 mg  Yogurt                          1 cup           400 mg  Ice cream                    1/2 cup          100 mg  Hard cheese                     1.5 oz          300 mg  Cottage cheese                1/2 cup        65 mg  Spinach, cooked          1 cup              240 mg  Tofu, soft regular           4 oz          120 to 390 mg  Sardines                       3 oz               370 mg  Mackerel canned         3 oz                250 mg  Canned salmon with bones 3 oz        170-210 mg  Calcium fortified cereals are available  SILK soy and almond milk products are calcium fortified  Orange juice  Fortified with Calcium       8 oz            300 mg  Low fat dairy sources are recommended      Recommended exercise goals:    30 minutes of moderate exercise on most days of the week .  Weight bearing exercise (ie, walking, jogging, hiking, dancing)    Strength training 2 or more times/week in addition to other weight -being exercise.  Strength training -- uses weight or resistance beyond that seen in everyday activities -(pilates, weight training with free weights, weight machines or resistance bands)    VDO exercise that you can follow     https://[email or web address].com/c/melioguide     https://MediaSilo.Storytree/     https://www.bonehealthandosteoporosis.org/patients/treatment/exercisesafe-movement/osteoporosis-exercise-for-strong-bones/     https://www.bones.nih.gov/health-info/bone/bone-health/exercise/exercise-your-bone-health#b      Welcome to the [facility]  Endocrinology and Diabetes Clinics     Our Endocrinology Clinics are here to provide you with a team-based, collaborative approach in the diagnosis and treatment of patients with diabetes and endocrine disorders. The team is made up of Physicians, Physician Assistants, Certified Diabetes Educators, Registered Nurses, Medical Assistants, Emergency Medical Technicians, and many others, all of whom have the unified goal of providing our patients with high quality care.     Please see below for some helpful tips to best navigate and use the [facility] Endocrinology clinic:     [place] Respect: At [facility], we are committed to a respectful and safe space for all patients, visitors, and staff.  We believe that mutual respect between patients and their care team is the foundation of quality care.  It is our expectation that you will be treated with respect by your care team.  In turn, we ask that all communication with the care team (written and verbal) be respectful and free from profanity, threatening, or abusive language.  Disrespectful communication undermines our therapeutic relationship with you and may result in us being unable to continue to provide your care.    Refills: A provider must see you at least annually to prescribe and refill medications. This is to ensure your safety as well as meet insurance and compliance regulations.    Scheduling: Many of our Providers offer both in-person or video visits. Please call to schedule any needed follow ups as soon as possible because our provider schedules fill up very quickly. Our care team has the right to require an in-person visit when they believe that it is medically necessary. Please remember that for any virtual visits, you must be in the [facility] at the time of the visit, otherwise we are unable to see you and you will need to be rescheduled.    Missed Appointments: If you need to cancel or miss your scheduled appointment, please call  the clinic at [phone] to reschedule.  Please note if you repeatedly miss appointments or repeatedly miss appointments without calling to inform us ahead of time (no-show), the clinic may elect to not allow you to reschedule without speaking to a manager, may require a Partnership In Care Agreement prior to rescheduling, or could result in you no longer being able to receive care from the clinic. Providing the clinic with timely notification if you have to miss an appointment, allows us to better serve the needs of all of our patients.    Primary Care Provider: Our Endocrinologists are Specialists in their field. We expect you to have a Primary Care Provider established to handle any needs outside of your diabetes and endocrine care.  We would be happy to assist you find a Primary Care Provider, if you do not have one.    Pluralsight: Pluralsight is a wonderful resource that allows you access to your Care Team via online or the magda. Please ask a member of the team if you would like help creating an account. Please note that it may take up to 2 business days for a response. Pluralsight messages are not reviewed on weekends or after business hours.  Emergent or urgent care needs should never be communicated via Pluralsight.  If you experience a medical emergency call 911 or go to the nearest emergency room.    Labs: It is recommended that you stay within the [facility] for labs but you are welcome to obtain ordered labs (with some exceptions) from any location of your choice as long as they are able to complete and process the needed labs. If you need us to fax orders to your preferred lab, please provide us the name and fax number of the lab you would like to go to so we can fax the orders. If your labs are drawn outside of the [facility], please have them fax the results to [phone] ([place]) or [phone] ([place]) or via Cellmemore. It is your responsibility to ensure that outside lab  results are sent to us.    We look forward to working with you. Please do not hesitate to reach out with any questions.    Thank you,    The Endocrine Team    [facility] Address:   [place] Address:     [address] [place] [address]    Phone: [phone]  Fax: [phone] [address] [place] [address]    Phone: [phone]  Fax: [phone]     [facility] [place] Cost Estimate Phone Number: [phone]    General Lab and Imaging Scheduling Phone Number: [phone]     78f h/o HTN, s/p Renal transplant, CKD, chronic rejection, recent admission for pneumonia s/p abx presented with shortness of breath with LUE and b/l LE edema concerning for volume overload.     Shortness of breath.   - with LUE and b/l LE edema   UA with protein 300  check urine protein to cr - evaluate for nephrotic syndrome  check EKG   lasix 40mg iv tid - renal consult appreciated   check TTE  covid, rsv, flu negative.    Stage 4 chronic kidney disease.    creatinine improved since last admission   check urine protein to cr as above  renal transplant consult appreciated   continue tacrolimus 5mg qd (monitor levels), mycophenolate 500mg bid, prednisone 5mg qd  renally dose meds, avoid nephrotoxic medications.     uncontrolled HTN (hypertension).   monitor bp   c/w nifedipine, labetalol at home doses.    fever 2/2 poss pna  - seen by transplant ID  - Cefepime 1g q24H (renally-dosed, may need to decrease if renal function deteriorates further)  - Check serum cryptococcal antigen, aspergillus galactomannan, fungitell, adenovirus PCR, histoplasma antigen in urine   - Please induce sputum for bacterial and fungal cultures, and PCP PCR, legionella PCR  - Check urine Legionella, urine Pneumococcal Ag  - MRSA PCR  - check , HSV and CMV PCR in blood (ordered)  - Non-urgent ophthalmology evaluation for bilateral conjunctivitis  - please repeat RVP  - consideration of bronchoscopy based on progress     Opacity of lung on imaging study.   new RUL opacity   clinically c/w volume overload  procalcitonin low  monitor closely as patient on immunosuppressives - low threshold for starting empiric abx   check ct chest.     Metabolic acidosis.   c/w sodium bicarb.    Prophylactic measure.   dvt proph - hsq.

## 2024-04-16 ENCOUNTER — TRANSCRIPTION ENCOUNTER (OUTPATIENT)
Age: 79
End: 2024-04-16

## 2024-04-16 VITALS
SYSTOLIC BLOOD PRESSURE: 165 MMHG | OXYGEN SATURATION: 96 % | DIASTOLIC BLOOD PRESSURE: 77 MMHG | HEART RATE: 65 BPM | TEMPERATURE: 99 F | RESPIRATION RATE: 18 BRPM

## 2024-04-16 LAB
BLD GP AB SCN SERPL QL: NEGATIVE — SIGNIFICANT CHANGE UP
HCT VFR BLD CALC: 27.8 % — LOW (ref 34.5–45)
HGB BLD-MCNC: 8.4 G/DL — LOW (ref 11.5–15.5)
MCHC RBC-ENTMCNC: 25.6 PG — LOW (ref 27–34)
MCHC RBC-ENTMCNC: 30.2 GM/DL — LOW (ref 32–36)
MCV RBC AUTO: 84.8 FL — SIGNIFICANT CHANGE UP (ref 80–100)
NRBC # BLD: 0 /100 WBCS — SIGNIFICANT CHANGE UP (ref 0–0)
PLATELET # BLD AUTO: 163 K/UL — SIGNIFICANT CHANGE UP (ref 150–400)
RBC # BLD: 3.28 M/UL — LOW (ref 3.8–5.2)
RBC # FLD: 16.6 % — HIGH (ref 10.3–14.5)
RH IG SCN BLD-IMP: POSITIVE — SIGNIFICANT CHANGE UP
SARS-COV-2 RNA SPEC QL NAA+PROBE: SIGNIFICANT CHANGE UP
TACROLIMUS SERPL-MCNC: 2.8 NG/ML — SIGNIFICANT CHANGE UP
TACROLIMUS SERPL-MCNC: 3.6 NG/ML
WBC # BLD: 3.58 K/UL — LOW (ref 3.8–10.5)
WBC # FLD AUTO: 3.58 K/UL — LOW (ref 3.8–10.5)

## 2024-04-16 PROCEDURE — 97162 PT EVAL MOD COMPLEX 30 MIN: CPT

## 2024-04-16 PROCEDURE — 86706 HEP B SURFACE ANTIBODY: CPT

## 2024-04-16 PROCEDURE — 82746 ASSAY OF FOLIC ACID SERUM: CPT

## 2024-04-16 PROCEDURE — 87637 SARSCOV2&INF A&B&RSV AMP PRB: CPT

## 2024-04-16 PROCEDURE — 86850 RBC ANTIBODY SCREEN: CPT

## 2024-04-16 PROCEDURE — 84540 ASSAY OF URINE/UREA-N: CPT

## 2024-04-16 PROCEDURE — 84156 ASSAY OF PROTEIN URINE: CPT

## 2024-04-16 PROCEDURE — 87635 SARS-COV-2 COVID-19 AMP PRB: CPT

## 2024-04-16 PROCEDURE — 83935 ASSAY OF URINE OSMOLALITY: CPT

## 2024-04-16 PROCEDURE — 85027 COMPLETE CBC AUTOMATED: CPT

## 2024-04-16 PROCEDURE — 86922 COMPATIBILITY TEST ANTIGLOB: CPT

## 2024-04-16 PROCEDURE — 87641 MR-STAPH DNA AMP PROBE: CPT

## 2024-04-16 PROCEDURE — 81001 URINALYSIS AUTO W/SCOPE: CPT

## 2024-04-16 PROCEDURE — 99285 EMERGENCY DEPT VISIT HI MDM: CPT | Mod: 25

## 2024-04-16 PROCEDURE — 87640 STAPH A DNA AMP PROBE: CPT

## 2024-04-16 PROCEDURE — 99261: CPT

## 2024-04-16 PROCEDURE — 82728 ASSAY OF FERRITIN: CPT

## 2024-04-16 PROCEDURE — 86901 BLOOD TYPING SEROLOGIC RH(D): CPT

## 2024-04-16 PROCEDURE — 87340 HEPATITIS B SURFACE AG IA: CPT

## 2024-04-16 PROCEDURE — 85025 COMPLETE CBC W/AUTO DIFF WBC: CPT

## 2024-04-16 PROCEDURE — 83880 ASSAY OF NATRIURETIC PEPTIDE: CPT

## 2024-04-16 PROCEDURE — 82570 ASSAY OF URINE CREATININE: CPT

## 2024-04-16 PROCEDURE — 86705 HEP B CORE ANTIBODY IGM: CPT

## 2024-04-16 PROCEDURE — 82310 ASSAY OF CALCIUM: CPT

## 2024-04-16 PROCEDURE — 80048 BASIC METABOLIC PNL TOTAL CA: CPT

## 2024-04-16 PROCEDURE — 71045 X-RAY EXAM CHEST 1 VIEW: CPT

## 2024-04-16 PROCEDURE — 83550 IRON BINDING TEST: CPT

## 2024-04-16 PROCEDURE — 83970 ASSAY OF PARATHORMONE: CPT

## 2024-04-16 PROCEDURE — 86803 HEPATITIS C AB TEST: CPT

## 2024-04-16 PROCEDURE — 96374 THER/PROPH/DIAG INJ IV PUSH: CPT

## 2024-04-16 PROCEDURE — 36415 COLL VENOUS BLD VENIPUNCTURE: CPT

## 2024-04-16 PROCEDURE — 84300 ASSAY OF URINE SODIUM: CPT

## 2024-04-16 PROCEDURE — 83735 ASSAY OF MAGNESIUM: CPT

## 2024-04-16 PROCEDURE — 80053 COMPREHEN METABOLIC PANEL: CPT

## 2024-04-16 PROCEDURE — 86900 BLOOD TYPING SEROLOGIC ABO: CPT

## 2024-04-16 PROCEDURE — 83540 ASSAY OF IRON: CPT

## 2024-04-16 PROCEDURE — 82607 VITAMIN B-12: CPT

## 2024-04-16 PROCEDURE — 86704 HEP B CORE ANTIBODY TOTAL: CPT

## 2024-04-16 PROCEDURE — 84484 ASSAY OF TROPONIN QUANT: CPT

## 2024-04-16 PROCEDURE — 84133 ASSAY OF URINE POTASSIUM: CPT

## 2024-04-16 PROCEDURE — 80197 ASSAY OF TACROLIMUS: CPT

## 2024-04-16 PROCEDURE — 93971 EXTREMITY STUDY: CPT

## 2024-04-16 PROCEDURE — 84100 ASSAY OF PHOSPHORUS: CPT

## 2024-04-16 RX ORDER — HYDRALAZINE HCL 50 MG
1 TABLET ORAL
Qty: 90 | Refills: 0
Start: 2024-04-16 | End: 2024-05-15

## 2024-04-16 RX ORDER — LABETALOL HCL 100 MG
2 TABLET ORAL
Qty: 180 | Refills: 0
Start: 2024-04-16 | End: 2024-05-15

## 2024-04-16 RX ORDER — TACROLIMUS 5 MG/1
2 CAPSULE ORAL
Qty: 60 | Refills: 0
Start: 2024-04-16 | End: 2024-05-15

## 2024-04-16 RX ORDER — SODIUM CHLORIDE 0.65 %
1 AEROSOL, SPRAY (ML) NASAL
Refills: 0 | Status: DISCONTINUED | OUTPATIENT
Start: 2024-04-16 | End: 2024-04-16

## 2024-04-16 RX ORDER — LABETALOL HYDROCHLORIDE 200 MG/1
1 TABLET, FILM COATED ORAL
Qty: 180 | Refills: 0 | DISCHARGE
Start: 2024-04-16 | End: 2024-05-15

## 2024-04-16 RX ORDER — NIFEDIPINE 30 MG
1 TABLET, EXTENDED RELEASE 24 HR ORAL
Qty: 60 | Refills: 0
Start: 2024-04-16 | End: 2024-05-15

## 2024-04-16 RX ORDER — NIFEDIPINE 30 MG
1 TABLET, EXTENDED RELEASE 24 HR ORAL
Qty: 60 | Refills: 0 | DISCHARGE
Start: 2024-04-16 | End: 2024-05-15

## 2024-04-16 RX ORDER — SODIUM ZIRCONIUM CYCLOSILICATE 10 G/10G
10 POWDER, FOR SUSPENSION ORAL
Refills: 0 | DISCHARGE

## 2024-04-16 RX ADMIN — Medication 5 MILLIGRAM(S): at 05:56

## 2024-04-16 RX ADMIN — TACROLIMUS 2 MILLIGRAM(S): 5 CAPSULE ORAL at 05:56

## 2024-04-16 RX ADMIN — Medication 100 MILLIGRAM(S): at 05:57

## 2024-04-16 RX ADMIN — Medication 400 MILLIGRAM(S): at 05:57

## 2024-04-16 RX ADMIN — Medication 60 MILLIGRAM(S): at 17:09

## 2024-04-16 RX ADMIN — Medication 100 MILLIGRAM(S): at 14:12

## 2024-04-16 RX ADMIN — Medication 400 MILLIGRAM(S): at 14:13

## 2024-04-16 RX ADMIN — CHLORHEXIDINE GLUCONATE 1 APPLICATION(S): 213 SOLUTION TOPICAL at 11:23

## 2024-04-16 RX ADMIN — APIXABAN 2.5 MILLIGRAM(S): 2.5 TABLET, FILM COATED ORAL at 17:09

## 2024-04-16 RX ADMIN — Medication 81 MILLIGRAM(S): at 11:23

## 2024-04-16 RX ADMIN — APIXABAN 2.5 MILLIGRAM(S): 2.5 TABLET, FILM COATED ORAL at 05:56

## 2024-04-16 RX ADMIN — Medication 1 SPRAY(S): at 11:24

## 2024-04-16 RX ADMIN — Medication 60 MILLIGRAM(S): at 05:57

## 2024-04-16 NOTE — PROGRESS NOTE ADULT - PROBLEM SELECTOR PROBLEM 2
Acute on chronic heart failure with preserved ejection fraction (HFpEF)
Kidney transplant recipient
Acute on chronic heart failure with preserved ejection fraction (HFpEF)

## 2024-04-16 NOTE — PROVIDER CONTACT NOTE (OTHER) - REASON
/80
Tacro level less than 2
BP Meds pre Dialysis
Pt hypertensive /83
Pt hypertensive
Pt's /96, pt asymptomatic
Pt with IV in left arm (new DVT)

## 2024-04-16 NOTE — PROVIDER CONTACT NOTE (OTHER) - DATE AND TIME:
07-Apr-2024 05:45
07-Apr-2024 05:45
07-Apr-2024 11:30
05-Apr-2024 16:47
07-Apr-2024 06:19
12-Apr-2024 13:45
16-Apr-2024 14:14
1.56

## 2024-04-16 NOTE — PROGRESS NOTE ADULT - PROBLEM SELECTOR PROBLEM 4
Anemia of chronic disease
HTN (hypertension)
Anemia of chronic disease

## 2024-04-16 NOTE — PROVIDER CONTACT NOTE (OTHER) - SITUATION
Pt hypertensive
BP Meds pre Dialysis - patient is due for 100mg hydralazine and 400mg labatalol, scheduled for dialysis at 5pm, should we hold or give the afternoon BP meds
Patient's BP /80
Pt with IV in left arm (new DVT)
Pt hypertensive /83
Pt's /96, pt asymptomatic
Tacro level less than 2

## 2024-04-16 NOTE — PROGRESS NOTE ADULT - PROBLEM SELECTOR PROBLEM 1
Acute deep vein thrombosis (DVT)
Acute deep vein thrombosis (DVT)
Acute on chronic heart failure with preserved ejection fraction (HFpEF)
Acute deep vein thrombosis (DVT)

## 2024-04-16 NOTE — PROGRESS NOTE ADULT - SUBJECTIVE AND OBJECTIVE BOX
DATE OF SERVICE: 04-16-24 @ 16:16    Patient is a 78y old  Female who presents with a chief complaint of shortness of breath and leg swelling (16 Apr 2024 12:46)      INTERVAL HISTORY: Feels ok.     REVIEW OF SYSTEMS:  CONSTITUTIONAL: No weakness  EYES/ENT: No visual changes;  No throat pain   NECK: No pain or stiffness  RESPIRATORY: No cough, wheezing; No shortness of breath  CARDIOVASCULAR: No chest pain or palpitations  GASTROINTESTINAL: No abdominal  pain. No nausea, vomiting, or hematemesis  GENITOURINARY: No dysuria, frequency or hematuria  NEUROLOGICAL: No stroke like symptoms  SKIN: No rashes    TELEMETRY Personally reviewed: SR 60-70s  	  MEDICATIONS:  hydrALAZINE 100 milliGRAM(s) Oral three times a day  labetalol 400 milliGRAM(s) Oral three times a day  NIFEdipine XL 60 milliGRAM(s) Oral two times a day        PHYSICAL EXAM:  T(C): 36.7 (04-16-24 @ 11:20), Max: 37.1 (04-15-24 @ 16:21)  HR: 65 (04-16-24 @ 15:25) (64 - 75)  BP: 186/80 (04-16-24 @ 16:04) (147/80 - 190/80)  RR: 18 (04-16-24 @ 11:20) (18 - 18)  SpO2: 96% (04-16-24 @ 11:20) (92% - 98%)  Wt(kg): --  I&O's Summary    15 Apr 2024 07:01  -  16 Apr 2024 07:00  --------------------------------------------------------  IN: 60 mL / OUT: 0 mL / NET: 60 mL    16 Apr 2024 07:01  -  16 Apr 2024 16:16  --------------------------------------------------------  IN: 60 mL / OUT: 0 mL / NET: 60 mL          Appearance: In no distress	  HEENT:    PERRL, EOMI	  Cardiovascular:  S1 S2, No JVD  Respiratory: Lungs clear to auscultation	  Gastrointestinal:  Soft, Non-tender, + BS	  Vascularature:  dep edema of LE (R>L)  Psychiatric: Appropriate affect   Neuro: no acute focal deficits                               8.4    3.58  )-----------( 163      ( 16 Apr 2024 13:17 )             27.8     04-15    137  |  102  |  72<H>  ----------------------------<  84  4.6   |  18<L>  |  5.13<H>    Ca    8.1<L>      15 Apr 2024 07:00  Phos  6.0     04-15  Mg     1.7     04-15          Labs personally reviewed      ASSESSMENT/PLAN: 	    78f h/o HTN, s/p Renal transplant, CKD, chronic rejection, recent admission for pneumonia s/p abx presented with shortness of breath since this morning. she also noticed significant LUE swelling that began 4-5 days ago    1. Diastolic Heart Failure secondary to advanced kidney disease   - TTE in Jan 2024 with normal EF, moderate DD, elevated filling pressures, severe LVH, severely dilated LA, small pericardial effusion  - cardiac amyloid with PYP scan normal   - CXR shows cardiomegaly and pulm edema  - BNP almost 19K  - Appreciate Renal and TP recs  - Monitor Strict I&Os, daily weights  - Lasix now DC'd given improvement in her clinical symptoms and risking Cr/BUN    2. HTN - improved   - c/w labetalol 200mg PO TID. Now increased to 400mg TID  - Continue Nifedipine 90mg daily. Nifedipine now increased to 60mg BID  - Hydralazine 100mg PO TID     3. ESRD  - Renal recs appreciated  - c/w HD M/W/F        Charlene Hayes, ZAIN-AG Oh DO [facility]  Cardiovascular Medicine  [address], [address]  Available through call or text on [employer] TEAM[employer]  Office: [phone]

## 2024-04-16 NOTE — PROGRESS NOTE ADULT - REASON FOR ADMISSION
shortness of breath and leg swelling

## 2024-04-16 NOTE — PROVIDER CONTACT NOTE (OTHER) - ACTION/TREATMENT ORDERED:
As per Ana Millan NP, OK to use left arm IV. Pt has History of arteriovenostomy for renal dialysis so she has right arm precautions for that. Plan of care ongoing
Will continue to monitor. EKG to be done and med to be ordered and given.
ACP Ellen Hester notified. ACP saw pt at bedside. Will recheck BP in an hour, prior to giving labetalol, hydralazine, and lasix. Hydralazine dose increased to 50 mg.
As per Ana Millan NP, give the ordered 5mg of tacro. Provider will follow up with day team. Plan of care ongoing
CHRISTOPHER Faust notified. Reassess patient's BP in 1 hour. Hydralazine and labetalol given as ordered, see EMAR for details.
As per Ana Millan NP, will give AM BP meds (lasix, hydralazine, and labetolol). Will reschedule the nifedipine to not drop the BP too low (for risk of stroke). Plan of care ongoing
as per PA, Hold hydralazine and labatalol afternoon dose

## 2024-04-16 NOTE — PROGRESS NOTE ADULT - NS ATTEND AMEND GEN_ALL_CORE FT
Patient care and plan discussed and reviewed with Advanced Care Provider. Plan as outlined above edited by me to reflect our discussion.
contnue lasix  monitor scr and fk evel  fu transplant

## 2024-04-16 NOTE — PROGRESS NOTE ADULT - PROVIDER SPECIALTY LIST ADULT
Cardiology
Nephrology
Transplant Nephrology
Cardiology
Internal Medicine
Internal Medicine
Nephrology
Cardiology
Transplant Nephrology
Nephrology
Transplant Nephrology
Internal Medicine

## 2024-04-16 NOTE — PROGRESS NOTE ADULT - PROBLEM SELECTOR PLAN 2
creatinine up today  likely secondary to diuresis  will continue to follow transplant recommendations   continue anti-rejection meds
continue furosemide IV BID   transplant team follow up requested   strict i/o  continue daily wt
discussed with renal  appears that the outpatient transplant renal attending was planning to start hemodialysis   defer to renal   strict i/o  continue daily wt
euvolemic now  continue ultrafiltration and hemodialysis
discussed with renal  appears that the outpatient transplant renal attending was planning to start hemodialysis   defer to renal   strict i/o  continue daily wt
euvolemic now  continue ultrafiltration and hemodialysis
euvolemic now  continue ultrafiltration and hemodialysis
creatinine further up  discontinue furosemide  discussed with transferred team attending  strict i/o  continue daily wt
discussed with renal  appears that the outpatient transplant renal attending was planning to start hemodialysis   defer to renal   strict i/o  continue daily wt

## 2024-04-16 NOTE — PROGRESS NOTE ADULT - PROBLEM SELECTOR PLAN 4
continue to monitor  repeat testing
continue to monitor
continue to monitor  hemoglobin 7.9  repeat in AM
continue to monitor
acceptable  continue home meds with hold parameters
continue to monitor  will check work up
continue to monitor
continue to monitor  will check work up
continue to monitor

## 2024-04-16 NOTE — PROGRESS NOTE ADULT - ASSESSMENT
78-year-old female with a history of hypertension, diastolic heart failure, renal transplant 2 years ago no longer on tacrolimus who presents with shortness of breath and lower extremity swelling since yesterday. Patient reports dyspnea with exertion, no orthopnea.  Patient denies any cough, fever, chills, abdominal pain, nausea, vomiting, dysuria, chest pain. Patient is ambulatory at home, endorses no changes in activity or medication changes recently. Not on any AC. Patient is currently on 80 mg of Lasix twice daily, was started by cardiologist 2 weeks ago.  Nephrology consulted for YANELY.      A/P:  YANELY on CKD 4 now ESRD  S/p DDRT (5/19/2021 - induction w/ Basiliximab)  Baseline sCr ~3-3.3.  Renal function fluctuates sec to HF.  Repeat TTE 1/4 - Left ventricular wall thickness is moderately increased. Left ventricular systolic function is normal with a calculated ejection fraction of 63 % with moderate L ventricular dysfunction.  Renal function worsened on admission likely d/t cardiorenal /. Hemodynamic changes w/ hypertensive urgency.  FEUrea 44.6 % suggestive of intrinsic causes  sCr worsening   lasix held ( last dose on 4/8)  per  Transplant nephrology to decrease envarsus to 2mg QD and continue prednisone 5mg QD. stop cell cept   FK level 4.8 on 4/7 . Goal is 2-3   Check FK level 30min prior to dose   Avoid nephrotoxic agents.  Monitor BMP and UO.    Dr. Kay d/w with Dr Louise, transplant outpt nephrologist, she reports pt has poor graft function, being planned for initiating RRT as outpt. In view of worsening renal function recommended to initiate RRT inpatient. Discussed with pt  in detail, she agreed to start HD  consent for HD in the chart  s/p First HD on 4/10 via AVF tolerated well  Will keep HD MWF    HTN:  BP fluctuating  Avoid hypotension.  Monitor BP.    Hyperkalemia:  sec to CKD  Low K diet.  Monitor K closely.    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  Hd as scheduled  Monitor CO2.

## 2024-04-16 NOTE — PROVIDER CONTACT NOTE (OTHER) - ASSESSMENT
Pt Bp 214/88 asymptomatic
Pt A/Ox4. Pt hypertensive /83. Pt asymptomatic.
Pt A&Ox4, able to make needs known. Pt asymptomatic. Other VSS. No s/s of bleeding. Pt denies cp, denies SOB, denies pain. BP assessed on right leg.
Pt with IV in left arm (new DVT)
Tacro level less than 2
Patient A&Ox4, HR 79 , /80. Patient denies chest pain, SOB, blurred vision, headache, dizziness, discomfort.
patient A&O4, latest /62, SR 80's on tele, denies SOB, CP and Palpitations

## 2024-04-16 NOTE — PROGRESS NOTE ADULT - PROBLEM SELECTOR PLAN 3
now on hemodialysis   will continue to follow renal recommendations
creatinine creeping up   will continue to follow renal recommendations   continue anti-rejection meds
continue to monitor  will check work up
creatinine stable   will continue to follow renal recommendations   continue anti-rejection meds
now on hemodialysis   will continue to follow renal recommendations
now on hemodialysis   failed transplant   will continue to follow renal recommendations   clinically improving
creatinine creeping up further   will continue to follow renal recommendations   continue anti-rejection meds
now on hemodialysis   will continue to follow renal recommendations
creatinine creeping up further   failed transplant   will continue to follow renal recommendations   tolerated hemodialysis yesterday  clinically improving
creatinine creeping up further   will continue to follow renal recommendations   likely start hemodialysis
now on hemodialysis   failed transplant   will continue to follow renal recommendations   clinically improving

## 2024-04-16 NOTE — PROGRESS NOTE ADULT - PROBLEM SELECTOR PLAN 1
continue apixaban low dose  tolerating anticoagulation  hemoglobin stable
LUE DVT positive present on admission  continue apixaban low dose  tolerating anticoagulation well  active type and screen while inpatient  hemoglobin stable
continue apixaban low dose  hemoglobin stable
continue apixaban  hemoglobin stable
continue apixaban  hemoglobin stable
continue apixaban low dose  hemoglobin stable
cardiology and transplant team help appreciated   continue furosemide IV BID per transplant team  creatinine worsened, defer to renal  continue to monitor closely  strict i/o  continue daily wt
continue apixaban low dose  hemoglobin stable
LUE DVT positive present on admission  continue apixaban low dose  tolerating anticoagulation well  active type and screen while inpatient
continue apixaban low dose  tolerating anticoagulation  hemoglobin stable
LUE DVT positive present on admission  will continue apixaban low dose  high risk of bleeding with loading dose   seems to be tolerating anticoagulation well  active type and screen

## 2024-04-16 NOTE — DISCHARGE NOTE NURSING/CASE MANAGEMENT/SOCIAL WORK - NSDCPEFALRISK_GEN_ALL_CORE
For information on Fall & Injury Prevention, visit: [email or web address] OR  [email or web address] OR  https://www.cdc.gov/steadi/patient.html

## 2024-04-16 NOTE — PROGRESS NOTE ADULT - SUBJECTIVE AND OBJECTIVE BOX
[facility]   MD SAMMY ORDONEZ MD RUORU WONG, PA    TEL:  FROM 9 AM to 5 PM ---OFFICE: [phone]  AVAILABLE ON TEAMS    FROM 5 PM - 9 AM PLEASE CALL ANSWERING SERVICE: [phone]    RENAL FOLLOW UP NOTE--Date of Service 04-16-24 @ 12:21  --------------------------------------------------------------------------------  HPI:      Pt seen and examined at bedside.       PAST HISTORY  --------------------------------------------------------------------------------  No significant changes to PMH, PSH, FHx, SHx, unless otherwise noted    ALLERGIES & MEDICATIONS  --------------------------------------------------------------------------------  Allergies    Mushrooms (Anaphylaxis)  penicillin (Rash)    Intolerances      Standing Inpatient Medications  apixaban 2.5 milliGRAM(s) Oral every 12 hours  aspirin  chewable 81 milliGRAM(s) Oral daily  chlorhexidine 2% Cloths 1 Application(s) Topical daily  hydrALAZINE 100 milliGRAM(s) Oral three times a day  labetalol 400 milliGRAM(s) Oral three times a day  NIFEdipine XL 60 milliGRAM(s) Oral two times a day  predniSONE   Tablet 5 milliGRAM(s) Oral daily  tacrolimus ER Tablet (ENVARSUS XR) 2 milliGRAM(s) Oral daily    PRN Inpatient Medications  sodium chloride 0.65% Nasal 1 Spray(s) Both Nostrils every 3 hours PRN      REVIEW OF SYSTEMS  --------------------------------------------------------------------------------  General: no fever  MSK: no edema     VITALS/PHYSICAL EXAM  --------------------------------------------------------------------------------  T(C): 36.7 (04-16-24 @ 11:20), Max: 37.1 (04-15-24 @ 16:21)  HR: 65 (04-16-24 @ 11:20) (64 - 75)  BP: 147/80 (04-16-24 @ 11:20) (147/80 - 192/80)  RR: 18 (04-16-24 @ 11:20) (17 - 18)  SpO2: 96% (04-16-24 @ 11:20) (92% - 98%)  Wt(kg): --        04-15-24 @ 07:01  -  04-16-24 @ 07:00  --------------------------------------------------------  IN: 60 mL / OUT: 0 mL / NET: 60 mL    04-16-24 @ 07:01  -  04-16-24 @ 12:21  --------------------------------------------------------  IN: 60 mL / OUT: 0 mL / NET: 60 mL      Physical Exam:  	Gen: NAD  	HEENT: MMM  	Pulm: CTA B/L  	CV: S1S2  	Abd: Soft, +BS  	Ext: No LE edema B/L                      Neuro: Awake   	Skin: Warm and Dry   	Vascular access: avf          ROSE MARIE sethi  LABS/STUDIES  --------------------------------------------------------------------------------              7.9    3.69  >-----------<  166      [04-15-24 @ 06:57]              25.2     137  |  102  |  72  ----------------------------<  84      [04-15-24 @ 07:00]  4.6   |  18  |  5.13        Ca     8.1     [04-15-24 @ 07:00]      Mg     1.7     [04-15-24 @ 07:00]      Phos  6.0     [04-15-24 @ 07:00]            Creatinine Trend:  SCr 5.13 [04-15 @ 07:00]  SCr 4.40 [04-12 @ 07:24]  SCr 3.93 [04-11 @ 06:35]  SCr 5.13 [04-10 @ 06:30]  SCr 4.86 [04-09 @ 06:03]    Urinalysis - [04-15-24 @ 07:00]      Color  / Appearance  / SG  / pH       Gluc 84 / Ketone   / Bili  / Urobili        Blood  / Protein  / Leuk Est  / Nitrite       RBC  / WBC  / Hyaline  / Gran  / Sq Epi  / Non Sq Epi  / Bacteria       Iron 25, TIBC 193, %sat 13      [04-08-24 @ 06:30]  Ferritin 1188      [04-08-24 @ 06:30]  PTH -- (Ca 7.9)      [04-10-24 @ 18:58]   1144  PTH -- (Ca 8.5)      [11-10-23 @ 06:01]   659  Vitamin D (25OH) 16.5      [11-11-23 @ 06:09]  HbA1c 4.6      [05-28-19 @ 09:49]  Lipid: chol 191, , HDL 64, LDL --      [01-04-24 @ 10:13]    HBsAb 3.6      [04-10-24 @ 06:30]  HBsAg Nonreact      [04-10-24 @ 06:30]  HBcAb Nonreact      [04-10-24 @ 06:30]  HCV 0.21, Nonreact      [04-10-24 @ 06:30]

## 2024-04-16 NOTE — PROGRESS NOTE ADULT - PROBLEM SELECTOR PLAN 5
difficult to control  should improve with hemodialysis   defer to renal/ transplant team
improved   defer to renal/ transplant team
difficult to control  should improve with hemodialysis   defer to renal/ transplant team
improved   defer to renal/ transplant team
uncontrolled  increased hydralazine to 50 TID with hold parameters  continue labetalol and Procardia XL   continue home meds with hold parameters  defer to renal/ transplant team
improved   defer to renal/ transplant team
uncontrolled  increased hydralazine to 50 TID with hold parameters  continue labetalol and Procardia XL   continue home meds with hold parameters
improved   defer to renal/ transplant team
uncontrolled  increased hydralazine to 50 TID with hold parameters  continue labetalol and Procardia XL   continue home meds with hold parameters  defer to renal/ transplant team
improved   defer to renal/ transplant team

## 2024-04-16 NOTE — PROGRESS NOTE ADULT - SUBJECTIVE AND OBJECTIVE BOX
Patient is a 78y old  Female who presents with a chief complaint of shortness of breath and leg swelling (16 Apr 2024 12:21)      DATE OF SERVICE: 04-16-24 @ 12:48    SUBJECTIVE / OVERNIGHT EVENTS: overnight events noted    ROS:  Resp: No cough no sputum production  CVS: No chest pain no palpitations no orthopnea  GI: no N/V/D      MEDICATIONS  (STANDING):  apixaban 2.5 milliGRAM(s) Oral every 12 hours  aspirin  chewable 81 milliGRAM(s) Oral daily  chlorhexidine 2% Cloths 1 Application(s) Topical daily  hydrALAZINE 100 milliGRAM(s) Oral three times a day  labetalol 400 milliGRAM(s) Oral three times a day  NIFEdipine XL 60 milliGRAM(s) Oral two times a day  predniSONE   Tablet 5 milliGRAM(s) Oral daily  tacrolimus ER Tablet (ENVARSUS XR) 2 milliGRAM(s) Oral daily    MEDICATIONS  (PRN):  sodium chloride 0.65% Nasal 1 Spray(s) Both Nostrils every 3 hours PRN Nasal Congestion        CAPILLARY BLOOD GLUCOSE        I&O's Summary    15 Apr 2024 07:01  -  16 Apr 2024 07:00  --------------------------------------------------------  IN: 60 mL / OUT: 0 mL / NET: 60 mL    16 Apr 2024 07:01  -  16 Apr 2024 12:48  --------------------------------------------------------  IN: 60 mL / OUT: 0 mL / NET: 60 mL        Vital Signs Last 24 Hrs  T(C): 36.7 (16 Apr 2024 11:20), Max: 37.1 (15 Apr 2024 16:21)  T(F): 98.1 (16 Apr 2024 11:20), Max: 98.8 (15 Apr 2024 16:21)  HR: 65 (16 Apr 2024 11:20) (64 - 75)  BP: 147/80 (16 Apr 2024 11:20) (147/80 - 192/80)  BP(mean): --  RR: 18 (16 Apr 2024 11:20) (17 - 18)  SpO2: 96% (16 Apr 2024 11:20) (92% - 98%)      PHYSICAL EXAM:  NECK: Supple  CHEST/LUNG: clear   HEART: S1 S2; systolic murmur +   ABDOMEN: Soft, Nontender  EXTREMITIES:  no edema  NEUROLOGY: Alert non-focal    LABS:                        7.9    3.69  )-----------( 166      ( 15 Apr 2024 06:57 )             25.2     04-15    137  |  102  |  72<H>  ----------------------------<  84  4.6   |  18<L>  |  5.13<H>    Ca    8.1<L>      15 Apr 2024 07:00  Phos  6.0     04-15  Mg     1.7     04-15            Urinalysis Basic - ( 15 Apr 2024 07:00 )    Color: x / Appearance: x / SG: x / pH: x  Gluc: 84 mg/dL / Ketone: x  / Bili: x / Urobili: x   Blood: x / Protein: x / Nitrite: x   Leuk Esterase: x / RBC: x / WBC x   Sq Epi: x / Non Sq Epi: x / Bacteria: x          All consultant(s) notes reviewed and care discussed with other providers        Contact Number, Dr Woods [phone]

## 2024-04-16 NOTE — PROVIDER CONTACT NOTE (OTHER) - NAME OF MD/NP/PA/DO NOTIFIED:
ANGELINA Hester
Ana Millan NP
CHRISTOPHER Wise
Ana Millan NP
Nikhil Quintero
Ana Millan NP
CHRISTOPHER Faust

## 2024-04-16 NOTE — PROGRESS NOTE ADULT - PROBLEM SELECTOR PROBLEM 3
Kidney transplant recipient
Kidney transplant recipient
Anemia of chronic disease
Kidney transplant recipient

## 2024-04-16 NOTE — PROVIDER CONTACT NOTE (OTHER) - RECOMMENDATIONS
Notify PA
Ana Millan NP made aware
Ana Millan NP made aware
Notify ANGELINA Hester.
Notify CHRISTOPHER Faust
Provider notified
Ana Millan NP made aware

## 2024-04-16 NOTE — PROVIDER CONTACT NOTE (OTHER) - BACKGROUND
Admit Dx: acute/chronic diastolic congestive heart failure, dyspnea and HTN
Patient admitted for HFpEF, elevated trops, anemia, acute LUE DVT, HTN.
Pt admit dx Acute on chronic diastolic congestive heart failure. Dx of anemia, HTN, kidney transplant recipient, DVT, History of arteriovenostomy for renal dialysis
Pt is here due to SOB and LE swelling.

## 2024-04-16 NOTE — DISCHARGE NOTE NURSING/CASE MANAGEMENT/SOCIAL WORK - PATIENT PORTAL LINK FT
You can access the FollowMyHealth Patient Portal offered by [facility] by registering at the following website: [email or web address]. By joining [facility]’s FollowMyHealth portal, you will also be able to view your health information using other applications (apps) compatible with our system.

## 2024-04-22 ENCOUNTER — APPOINTMENT (OUTPATIENT)
Dept: TRANSPLANT | Facility: CLINIC | Age: 79
End: 2024-04-22

## 2024-05-06 NOTE — PROGRESS NOTE ADULT - PROBLEM/PLAN-2
DISPLAY PLAN FREE TEXT
[Time Spent: ___ minutes] : I have spent [unfilled] minutes of time on the encounter.
DISPLAY PLAN FREE TEXT
ADMIT
DISPLAY PLAN FREE TEXT

## 2024-05-29 RX ORDER — TACROLIMUS 4 MG/1
4 TABLET, EXTENDED RELEASE ORAL
Qty: 90 | Refills: 3 | Status: ACTIVE | COMMUNITY
Start: 2022-01-19 | End: 1900-01-01

## 2024-05-29 RX ORDER — TACROLIMUS 1 MG/1
1 TABLET, EXTENDED RELEASE ORAL
Qty: 30 | Refills: 11 | Status: ACTIVE | COMMUNITY
Start: 2022-12-08 | End: 1900-01-01

## 2024-06-05 ENCOUNTER — APPOINTMENT (OUTPATIENT)
Dept: NEPHROLOGY | Facility: CLINIC | Age: 79
End: 2024-06-05
Payer: MEDICARE

## 2024-06-05 VITALS
TEMPERATURE: 97.6 F | BODY MASS INDEX: 17.68 KG/M2 | OXYGEN SATURATION: 97 % | HEART RATE: 65 BPM | HEIGHT: 66 IN | WEIGHT: 110 LBS | RESPIRATION RATE: 16 BRPM | DIASTOLIC BLOOD PRESSURE: 78 MMHG | SYSTOLIC BLOOD PRESSURE: 181 MMHG

## 2024-06-05 PROCEDURE — 99215 OFFICE O/P EST HI 40 MIN: CPT

## 2024-06-05 NOTE — ASSESSMENT
[FreeTextEntry1] : 78-year-old woman with ESRD due to HTN S/p DDRT on 5/19/2021, course complicated by DGF due to ATN requiring HD until  6/4/21. Oswaldo creatinine 1.3mg/dL. Had worsening graft function Cr 1.6 -1.8mg/dL Nov 2021 with 2 g/d proteinuria. No DSA, cFDNA 0.58%. She had transplant kidney biopsy for further worsening creatinine 2.2mg/dL on 9/29/22. 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 2022. Also started losartan for non HLA mediated rejection possibly due to anti angiotensin Ab. Transplant US 9/2022 with increased velocities concerning for TRAS. Also has uncontrolled HTN and edema. Referred to IR for angiogram, performed on 12/28/22 by Dr. Stafford, no hemodynamically significant stenosis seen.  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  She was initiated on HD in April 2024 for worsening graft function and recurrent volume overload and hyperkalemia. Remains on HD 3x/week has some residual graft function. No plans for retransplant due to advanced age.   Immunosuppression - S/p Simulect induction - On Envarsus 5mg daily, MMF 500mg po bid and Prednisone 5mg daily.  - Discontinue MMF - Reduce Envarsus to 4mg po daily with plans for slow taper  - Continue prednisone 5mg po daily   Hypertension  Continue Nifedipine 60mg po bid, Labetalol 200mg po TID, Hydralazine 100mg po TID  Anemia of CKD. Continue Procrit with HD  Vitamin D deficiency - continue Vitamin D 50,000 units once a week  LUE DVT in 4/2024 - on Eliquis 2.5mg po bid   F/u with Dr. Martinez for HD Return here in 3 months, Will slowly taper Envarsus

## 2024-06-05 NOTE — HISTORY OF PRESENT ILLNESS
[FreeTextEntry1] : 78 year old woman with ESRD due to HTN, was on hemodialysis since 2016. Received DDRT on 5/19/21   Donor was a 44 yr old woman, KDPI 69%, history of diabetes on insulin, terminal creatinine 3.3.  Post transplant course was complicated by DGF. Last hemodialysis was on 6/4/21. Transplant ureteric stent was removed on 8/2/21.   Other PMH includes :History of COVID-19 infection, hospitalized in March 2020 for only 1 day.   Post transplant course complicated by multiple hospitalizations.  - Oct. 2021 with severe symptomatic anemia (Hgb 5g/dL). Transfused 2 units PRBC. Anemia was due to hemolysis caused by dapsone (despite normal G6PD levels prior to initiation).  - Nov 2021 Noted to have YANELY creatinine 1.2-> 1.65mg/dL.  US showed increased velocities concerning for TRAS but no tardus parvus waves. DSA negative,  and cFDNA  0.5%. Conservatively managed.  - Jan 2022 with worsening anemia, hyperkalemia, tested +ve for COVID but had no symptoms.  - Sept 2022 - Worsening YANELY with cr to 2.2mg/dL. DSA was negative. cFDNA 0.68%.  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  Received pulse steroids, Plex/IVIG.  - Dec 2022 Transplant renal artery angiogram - no hemodynamically significant stenosis  - Nov 2023 shortness of breath and hypertensive urgency, multifocal pneumonia rx with antibiotics.  - Dec 2023 fluid overload and multi focal pneumonia   Oswaldo creatinine ~ 1.2 in 2021, has been progressively rising with nephrotic range proteinuria. 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  Had progressively worsening graft function with nephrotic range proteinuria 7 grams proteinuria. Unable to tolerate ARB due to hyperkalemia.   She was hospitalized in April 2024 for fluid overload, She was initiated on dialysis. Had LUE DVT started on Eliquis.  She presents for follow up today.  She is on HD 3 times a week at [address] in [place]. Nephrologist is Dr. Evy Martinez.  Feels much better after starting dialysis. No hospitalizations since April.  Leg swelling is present but improving.  She continues to void urine normally. No graft tenderness. No fever or chills. No NVD.  Walking without difficulty. RUE AVF functioning.

## 2024-06-05 NOTE — PHYSICAL EXAM
[General Appearance - Alert] : alert [General Appearance - In No Acute Distress] : in no acute distress [PERRL With Normal Accommodation] : pupils were equal in size, round, and reactive to light [Sclera] : the sclera and conjunctiva were normal [Oropharynx] : the oropharynx was normal [Jugular Venous Distention Increased] : there was no jugular-venous distention [Respiration, Rhythm And Depth] : normal respiratory rhythm and effort [Exaggerated Use Of Accessory Muscles For Inspiration] : no accessory muscle use [Heart Rate And Rhythm] : heart rate was normal and rhythm regular [Heart Sounds] : normal S1 and S2 [Heart Sounds Gallop] : no gallops [Bowel Sounds] : normal bowel sounds [Abdomen Soft] : soft [Abdomen Tenderness] : non-tender [Involuntary Movements] : no involuntary movements were seen [___ (cm) Fistula] : [unfilled] (cm) fistula [Bruit] : a bruit was present [Thrill] : a thrill was present [] : no rash [No Focal Deficits] : no focal deficits [Oriented To Time, Place, And Person] : oriented to person, place, and time [FreeTextEntry1] : RLQ scar well healed, no tenderness, bruit +present

## 2024-06-06 LAB
ALBUMIN SERPL ELPH-MCNC: 4.2 G/DL
ALP BLD-CCNC: 105 U/L
ALT SERPL-CCNC: 10 U/L
ANION GAP SERPL CALC-SCNC: 15 MMOL/L
APPEARANCE: CLEAR
AST SERPL-CCNC: 15 U/L
BACTERIA: NEGATIVE /HPF
BILIRUB SERPL-MCNC: 0.3 MG/DL
BILIRUBIN URINE: NEGATIVE
BLOOD URINE: ABNORMAL
BUN SERPL-MCNC: 34 MG/DL
CALCIUM SERPL-MCNC: 9.5 MG/DL
CALCIUM SERPL-MCNC: 9.5 MG/DL
CAST: 1 /LPF
CHLORIDE SERPL-SCNC: 99 MMOL/L
CMV DNA SPEC QL NAA+PROBE: NOT DETECTED IU/ML
CMVPCR LOG: NOT DETECTED LOG10IU/ML
CO2 SERPL-SCNC: 29 MMOL/L
COLOR: YELLOW
CREAT SERPL-MCNC: 3.13 MG/DL
CREAT SPEC-SCNC: 50 MG/DL
CREAT/PROT UR: 18.5 RATIO
EGFR: 14 ML/MIN/1.73M2
EPITHELIAL CELLS: 1 /HPF
GLUCOSE QUALITATIVE U: 100 MG/DL
GLUCOSE SERPL-MCNC: 167 MG/DL
HCT VFR BLD CALC: 33.1 %
HGB BLD-MCNC: 10.4 G/DL
KETONES URINE: NEGATIVE MG/DL
LEUKOCYTE ESTERASE URINE: NEGATIVE
MAGNESIUM SERPL-MCNC: 2.2 MG/DL
MCHC RBC-ENTMCNC: 27 PG
MCHC RBC-ENTMCNC: 31.4 GM/DL
MCV RBC AUTO: 86 FL
MICROSCOPIC-UA: NORMAL
NITRITE URINE: NEGATIVE
PARATHYROID HORMONE INTACT: 927 PG/ML
PH URINE: >=9
PHOSPHATE SERPL-MCNC: 3.6 MG/DL
PLATELET # BLD AUTO: 184 K/UL
POTASSIUM SERPL-SCNC: 4.5 MMOL/L
PROT SERPL-MCNC: 6.9 G/DL
PROT UR-MCNC: 931 MG/DL
PROTEIN URINE: >=1000 MG/DL
RBC # BLD: 3.85 M/UL
RBC # FLD: 17.2 %
RED BLOOD CELLS URINE: 10 /HPF
SODIUM SERPL-SCNC: 143 MMOL/L
SPECIFIC GRAVITY URINE: 1.02
TACROLIMUS SERPL-MCNC: 3.3 NG/ML
URATE SERPL-MCNC: 3 MG/DL
UROBILINOGEN URINE: 0.2 MG/DL
WBC # FLD AUTO: 3.43 K/UL
WHITE BLOOD CELLS URINE: 1 /HPF

## 2024-06-07 ENCOUNTER — APPOINTMENT (OUTPATIENT)
Dept: VASCULAR SURGERY | Facility: CLINIC | Age: 79
End: 2024-06-07

## 2024-06-10 LAB — BKV DNA SPEC QL NAA+PROBE: NOT DETECTED IU/ML

## 2024-06-26 DIAGNOSIS — Z94.0 KIDNEY TRANSPLANT STATUS: ICD-10-CM

## 2024-07-10 NOTE — DIETITIAN INITIAL EVALUATION ADULT - MALNUTRITION
0 = understands/communicates without difficulty X Size Of Lesion In Cm (Optional): 0 Detail Level: Detailed Anatomic Location From Referring Provider: left lower back acute severe malnutrition

## 2024-08-13 NOTE — H&P ADULT. - FAMILY HISTORY
The following information and instructions were given:    Nothing to eat or drink after midnight except sips of water with routine prescribed medication (except blood thinner, certain blood pressure medications, diabetes, or weight reducing medication) unless otherwise instructed by your physician.  Do not eat, drink, smoke or chew gum after midnight the night before surgery. This also includes no mints.    EXCEPTION: ERAS patients Patient instructed to drink 20 ounces (or until full) of Gatorade and it needs to be completed 2 hours before given arrival time on the day of surgery. (NO RED Gatorade)    Patient verbalized understanding.    DO NOT shave for two days before your procedure.  Do not wear makeup.      DO NOT wear fingernail polish (gel/regular) and/or acrylic/artificial nails on the day of surgery.   If a patient had recent manicure and would rather not remove polish or artificial nails, then the minimum requirement is that the polish/artificial nails must be removed from the middle finger on each hand.      If patient was having surgery on an upper extremity, then the patient was instructed that fingernail polish/artificial fingernails must be removed for surgery.  NO EXCEPTIONS.      If patient was having surgery on a lower extremity, then the patient was instructed that toenail polish on both extremities must be removed for surgery.  NO EXCEPTIONS.    Remove all jewelry (advised to go to jeweler if unable to remove).  Jewelry especially rings can no longer be taped for surgery.    Leave anything you consider valuable at home.      Bring the following with you (if applicable)   -picture ID and insurance cards   -Co-pay/deductible required by insurance   -Medications in the original bottles (not a list) including all over-the-counter meds     Patient must have a  for transportation home after procedure.  It must be an   adult that will take responsibility for care for 24 hours after surgery.    
Father  Still living? No  Family history of cerebrovascular accident (CVA), Age at diagnosis: Age Unknown     Mother  Still living? Unknown  Family history of cerebrovascular accident (CVA), Age at diagnosis: Age Unknown

## 2024-08-21 NOTE — H&P ADULT. - RADIOLOGY RESULTS AND INTERPRETATION
if blank)  Labs Reviewed   COVID-19 & INFLUENZA COMBO   GROUP A STREP, REFLEX       (Any cultures that may have been sent were not resulted at the time of this patient visit)    MEDICAL DECISION MAKING / ED COURSE:     Summary of Patient Presentation:      Plan: covid, flu, strep    1) Number and Complexity of Problems                    Differential Diagnosis includes (but not limited to):  Covid, flu, strep                   Pertinent Comorbid Conditions:    Reviewed per the chart    2)  Data Reviewed (none if left blank, additional information can be found in the ED course)          My Independent interpretations:     EKG:           Imaging:      Labs:      positive strep, negative covid/flu                   Decision Rules/Clinical Scores utilized:                        Previous visit summary and patient history available on EMR and was reviewed.     History obtained from chart review and the patient.     Pertinent previous records reviewed:  previous notes, admissions and hospitalizations .    Code Status: Not addressed at time of initial evaluation             See Formal Diagnostic Results above for the lab and radiology tests and orders.         3)  Treatment and Disposition         ED Reassessment/Response to interventions:  stable     NA         Case discussed with consulting clinician/attending physician:  None/None         Shared Decision-Making was performed and disposition discussed with the       Patient/Family and questions answered          Social determinants of health impacting treatment or disposition:     4) MIPS  N/A                    Medical Decision Making  The patient was seen in the ER for a sore throat.  She is mildly ill appearing but in no acute distress.  No fever, drooling, trismus.  She is positive for strep.  Reviewed findings with the patient.  Will dc home with oral abx and close follow up.        Vitals Reviewed:    Vitals:    08/19/24 2200   BP: 132/87   Pulse: 85   Resp: 18 
CXR reviewed: no vascular congestion or focal consolidation

## 2024-10-18 NOTE — ED ADULT NURSE NOTE - NS ED NURSE LEVEL OF CONSCIOUSNESS MENTAL STATUS
"Physical Therapy    Physical Therapy Treatment    Patient Name: Anitha Welch  MRN: [id number]  Department: [address]  Room: [address] [place] [address]  Today's Date: 10/18/2024  Time Calculation  Start Time: 1422  Stop Time: 1451  Time Calculation (min): 29 min         Assessment/Plan   PT Assessment  PT Assessment Results: Decreased strength, Decreased mobility, Impaired balance, Decreased endurance, Decreased cognition, Impaired judgement, Decreased safety awareness, Decreased skin integrity  Rehab Prognosis: Fair  Barriers to Discharge: 24/7 for cognition  Evaluation/Treatment Tolerance: Patient tolerated treatment well (Easily desats to low/mid 80's on 2L oxygen)  End of Session Communication: Bedside nurse  End of Session Patient Position: Up in chair, Alarm on     PT Plan  Treatment/Interventions: Bed mobility, Transfer training, Gait training, Balance training, Strengthening, Endurance training, Therapeutic exercise, Therapeutic activity  PT Plan: Ongoing PT  PT Frequency: 4 times per week  PT Discharge Recommendations: Low intensity level of continued care, 24 hr supervision due to cognition  Equipment Recommended upon Discharge: Wheeled walker  PT Recommended Transfer Status: Assistive device, Contact guard  PT - OK to Discharge: Yes      General Visit Information:   PT  Visit  PT Received On: 10/18/24  General  Reason for Referral: Impaired mobility  Referred By: Dr. Lea  Past Medical History Relevant to Rehab: Chronic Respiratory Failure, COPD, Anxiety, CHF, GERD, AAA, CVA, DVT, HTN, MI, CKD  Prior to Session Communication: Bedside nurse  Patient Position Received: Bed, 3 rail up, Alarm on  Preferred Learning Style: verbal, visual  General Comment: Cleared per nurse to see pt for therapy. Pt agreeable with some encouragment. \"Well I have pneumonia so I can't do any exercising\"    Subjective   Precautions:  Precautions  Medical Precautions: Fall precautions, Oxygen therapy device and L/min (2L oxygen via " "nc.)      Objective   Pain:  Pain Assessment  Pain Assessment: 0-10  0-10 (Numeric) Pain Score: 0 - No pain  Cognition:  Cognition  Overall Cognitive Status: Impaired at baseline  Cognition Comments: Pt a little agitated. Repeatedly stating \"I didn't have this (PNA) yesterday\" \"I'm sick, I can't be doing this\" Pt cooperative and follows commands well.  Insight: Moderate  Impulsive: Mildly  Processing Speed: Delayed    Activity Tolerance:  Activity Tolerance  Endurance: Decreased tolerance for upright activites  Treatments:  Therapeutic Exercise  Therapeutic Exercise Performed: Yes  Therapeutic Exercise Activity 1: Pt performed seated bilat LE ankle pumps, heel raises, LAQ, hip flexion, ave hip adduction and resisted hip abduction x15 reps each. Rest breaks throughout.    Bed Mobility 1  Bed Mobility 1: Supine to sitting  Level of Assistance 1: Close supervision  Bed Mobility Comments 1: Head of bed elevated.    Ambulation/Gait Training 1  Surface 1: Level tile  Device 1: Rolling walker  Assistance 1: Close supervision  Quality of Gait 1: Decreased step length, Forward flexed posture  Comments/Distance (ft) 1: Pt ambulated with RW and 2L oxygen ~30' x2 trials, close supervision with seated rest break between trials due to SOB and fatigue. Slow pace, decreased bilat step height and length. No LOB. Pulse ox down to 84-85% with each trial, slow recovery in ~90 seconds, verbal cues for pursed lip breathing.    Transfer 1  Transfer From 1: Bed to  Transfer to 1: Stand  Technique 1: Sit to stand  Transfer Device 1: Walker  Transfer Level of Assistance 1: Close supervision  Transfers 2  Transfer From 2: Stand to  Transfer to 2: Sit, Chair with arms  Technique 2: Stand to sit  Transfer Level of Assistance 2: Close supervision  Trials/Comments 2: x2 STS trials to chair with arms.  Transfers 3  Transfer From 3: Chair with arms to  Transfer to 3: Stand  Technique 3: Sit to stand  Transfer Device 3: Walker  Transfer Level of " Alert/Awake Assistance 3: Close supervision    Outcome Measures:  [facility] Basic Mobility  Turning from your back to your side while in a flat bed without using bedrails: A little  Moving from lying on your back to sitting on the side of a flat bed without using bedrails: A little  Moving to and from bed to chair (including a wheelchair): A little  Standing up from a chair using your arms (e.g. wheelchair or bedside chair): A little  To walk in hospital room: A little  Climbing 3-5 steps with railing: A little  Basic Mobility - Total Score: 18    Education Documentation  Precautions, taught by Greer Major PTA at 10/18/2024  3:08 PM.  Learner: Patient  Readiness: Acceptance  Method: Explanation  Response: Verbalizes Understanding, Needs Reinforcement    Body Mechanics, taught by Greer Major PTA at 10/18/2024  3:08 PM.  Learner: Patient  Readiness: Acceptance  Method: Explanation  Response: Verbalizes Understanding, Needs Reinforcement    Mobility Training, taught by Greer Major PTA at 10/18/2024  3:08 PM.  Learner: Patient  Readiness: Acceptance  Method: Explanation  Response: Verbalizes Understanding, Needs Reinforcement    Education Comments  No comments found.        OP EDUCATION:       Encounter Problems       Encounter Problems (Active)       Balance       Standing Balance (Progressing)       Start:  10/17/24    Expected End:  10/31/24       Pt will demonstrate good static standing balance to promote safe participation with out of bed activity, transfers, and mobility              Mobility       Ambulation (Progressing)       Start:  10/17/24    Expected End:  10/31/24       Pt will ambulate 75' modified independent assist with walker to promote safe home mobility           Entry Stair Negotiation (Progressing)       Start:  10/17/24    Expected End:  10/31/24       Pt will ascend/descend 3 stairs with rail(s) on R and modified independent assist to promote safe entry and exit in home environment                PT  Transfers       Supine to sit (Progressing)       Start:  10/17/24    Expected End:  10/31/24       Pt will transfer supine to sitting at edge of bed with modified independent assist to promote acute care out of bed activity           Sit to stand (Progressing)       Start:  10/17/24    Expected End:  10/31/24       Pt will transfer sit to standing position with modified independent assist and walker to promote safe out of bed activity           Bed to chair (Progressing)       Start:  10/17/24    Expected End:  10/31/24       Pt will transfer from sitting edge of bed to the chair with modified independent assist and walker to promote out of bed activity and reduce the risks of prolonged acute care bedrest              Pain - Adult          Safety       Safe Mobility Techniques (Progressing)       Start:  10/17/24    Expected End:  10/31/24       Pt will correctly identify and demonstrate safe mobility techniques to reduce their risks for falls during their acute care stay

## 2024-11-16 NOTE — ASU PREOP CHECKLIST - BMI (KG/M2)
Uncontrolled today increased hydralazine to 50 mg p.o. every 8 hours, continue Coreg 12.5 mg p.o. twice daily and Imdur 120 mg p.o. daily  11/16- bp improved    23

## 2024-12-25 PROBLEM — F10.90 ALCOHOL USE: Status: INACTIVE | Noted: 2017-04-26
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
## 2025-02-26 NOTE — H&P ADULT - PATIENT'S SEXUAL ORIENTATION
Airway    Performed by: Loren Valiente CRNA  Authorized by: Nicanor Childs MD    Final Airway Type:  Endotracheal airway  Final Endotracheal Airway*:  ETT  Cuff*:  Regular  Technique Used for Successful ETT Placement:  Direct laryngoscopy  Devices/Methods Used in Placement*:  Mask  Intubation Procedure*:  Preoxygenation, ETCO2, Atraumatic, Dentition Unchanged and Pharynx Clear  Insertion Site:  Oral  Blade Type*:  MAC  Blade Size*:  3  Measured from*:  Lips  Secured at (cm)*:  20  Placement Verified by: auscultation and capnometry    Glottic View*:  1 - full view of glottis  Attempts*:  1   Patient Identified, Procedure confirmed, Emergency equipment available and Safety protocols followed  Location:  OR  Urgency:  Elective  Difficult Airway: No    Indications for Airway Management:  Anesthesia  Spontaneous Ventilation: absent    Sedation Level:  Anesthetized  Mask Difficulty Assessment:  1 - vent by mask  Start Time: 2/26/2025 11:39 AM   Atraumatic DL x1 attempt. Dentition and lips unchanged from preop baseline.      
Heterosexual

## 2025-03-12 NOTE — PATIENT PROFILE ADULT - NSPROPOAPRESSUREINJURY_GEN_A_NUR
Problem: PAIN - ADULT  Goal: Verbalizes/displays adequate comfort level or baseline comfort level  Description: Interventions:  - Encourage patient to monitor pain and request assistance  - Assess pain using appropriate pain scale  - Administer analgesics based on type and severity of pain and evaluate response  - Implement non-pharmacological measures as appropriate and evaluate response  - Consider cultural and social influences on pain and pain management  - Notify physician/advanced practitioner if interventions unsuccessful or patient reports new pain  Outcome: Progressing     Problem: INFECTION - ADULT  Goal: Absence or prevention of progression during hospitalization  Description: INTERVENTIONS:  - Assess and monitor for signs and symptoms of infection  - Monitor lab/diagnostic results  - Monitor all insertion sites, i.e. indwelling lines, tubes, and drains  - Monitor endotracheal if appropriate and nasal secretions for changes in amount and color  - [place] appropriate cooling/warming therapies per order  - Administer medications as ordered  - Instruct and encourage patient and family to use good hand hygiene technique  - Identify and instruct in appropriate isolation precautions for identified infection/condition  Outcome: Progressing     Problem: DISCHARGE PLANNING  Goal: Discharge to home or other facility with appropriate resources  Description: INTERVENTIONS:  - Identify barriers to discharge w/patient and caregiver  - Arrange for needed discharge resources and transportation as appropriate  - Identify discharge learning needs (meds, wound care, etc.)  - Arrange for interpretive services to assist at discharge as needed  - Refer to Case Management Department for coordinating discharge planning if the patient needs post-hospital services based on physician/advanced practitioner order or complex needs related to functional status, cognitive ability, or social support system  Outcome: Progressing      Problem: Knowledge Deficit  Goal: Patient/family/caregiver demonstrates understanding of disease process, treatment plan, medications, and discharge instructions  Description: Complete learning assessment and assess knowledge base.  Interventions:  - Provide teaching at level of understanding  - Provide teaching via preferred learning methods  Outcome: Progressing     Problem: CARDIOVASCULAR - ADULT  Goal: Maintains optimal cardiac output and hemodynamic stability  Description: INTERVENTIONS:  - Monitor I/O, vital signs and rhythm  - Monitor for S/S and trends of decreased cardiac output  - Administer and titrate ordered vasoactive medications to optimize hemodynamic stability  - Assess quality of pulses, skin color and temperature  - Assess for signs of decreased coronary artery perfusion  - Instruct patient to report change in severity of symptoms  Outcome: Progressing  Goal: Absence of cardiac dysrhythmias or at baseline rhythm  Description: INTERVENTIONS:  - Continuous cardiac monitoring, vital signs, obtain 12 lead EKG if ordered  - Administer antiarrhythmic and heart rate control medications as ordered  - Monitor electrolytes and administer replacement therapy as ordered  Outcome: Progressing     Problem: METABOLIC, FLUID AND ELECTROLYTES - ADULT  Goal: Electrolytes maintained within normal limits  Description: INTERVENTIONS:  - Monitor labs and assess patient for signs and symptoms of electrolyte imbalances  - Administer electrolyte replacement as ordered  - Monitor response to electrolyte replacements, including repeat lab results as appropriate  - Instruct patient on fluid and nutrition as appropriate  Outcome: Progressing  Goal: Fluid balance maintained  Description: INTERVENTIONS:  - Monitor labs   - Monitor I/O and WT  - Instruct patient on fluid and nutrition as appropriate  - Assess for signs & symptoms of volume excess or deficit  Outcome: Progressing      no

## 2025-03-17 NOTE — PATIENT PROFILE ADULT - NSPROPTRIGHTSUPPORTNAME_GEN_A_NUR
Refill request for: gabapentin 100mg   Directions: Take 2 capsules (200 mg) by mouth at bedtime     LOV: 04/09/24  NOV: 04/11/25    Pt of Dr. Tabares. He is out of the office until 3/24/25. Can you refill in his absence?    90 day supply with 0 refills Medication T'd for review and signature    Edith Em LPN on 3/17/2025 at 1:41 PM      
Leslie Zhou

## 2025-03-24 NOTE — ED ADULT NURSE NOTE - NSICDXPASTSURGICALHX_GEN_ALL_CORE_FT
Other PAST SURGICAL HISTORY:  Acquired cataract extraction with b/l lense placement, 2016    AV fistula     Hemodialysis access, AV graft     History of renal transplantation DDRT 5/19/2021    S/P KEVEN-BSO for fibroids, 2012

## 2025-03-27 ENCOUNTER — INPATIENT (INPATIENT)
Facility: HOSPITAL | Age: 80
LOS: 2 days | Discharge: HOME HEALTH | DRG: 291 | End: 2025-03-30
Attending: STUDENT IN AN ORGANIZED HEALTH CARE EDUCATION/TRAINING PROGRAM | Admitting: STUDENT IN AN ORGANIZED HEALTH CARE EDUCATION/TRAINING PROGRAM
Payer: MEDICARE

## 2025-03-27 VITALS
HEIGHT: 68 IN | HEART RATE: 82 BPM | TEMPERATURE: 98 F | RESPIRATION RATE: 22 BRPM | DIASTOLIC BLOOD PRESSURE: 93 MMHG | WEIGHT: 121.92 LBS | SYSTOLIC BLOOD PRESSURE: 235 MMHG | OXYGEN SATURATION: 100 %

## 2025-03-27 DIAGNOSIS — Z94.0 KIDNEY TRANSPLANT STATUS: Chronic | ICD-10-CM

## 2025-03-27 DIAGNOSIS — Z99.2 DEPENDENCE ON RENAL DIALYSIS: Chronic | ICD-10-CM

## 2025-03-27 DIAGNOSIS — Z90.710 ACQUIRED ABSENCE OF BOTH CERVIX AND UTERUS: Chronic | ICD-10-CM

## 2025-03-27 DIAGNOSIS — I77.0 ARTERIOVENOUS FISTULA, ACQUIRED: Chronic | ICD-10-CM

## 2025-03-27 DIAGNOSIS — H26.9 UNSPECIFIED CATARACT: Chronic | ICD-10-CM

## 2025-03-27 LAB
ALBUMIN SERPL ELPH-MCNC: 3.7 G/DL — SIGNIFICANT CHANGE UP (ref 3.3–5)
ALP SERPL-CCNC: 80 U/L — SIGNIFICANT CHANGE UP (ref 40–120)
ALT FLD-CCNC: 9 U/L — LOW (ref 10–45)
ANION GAP SERPL CALC-SCNC: 18 MMOL/L — HIGH (ref 5–17)
AST SERPL-CCNC: 15 U/L — SIGNIFICANT CHANGE UP (ref 10–40)
BASOPHILS # BLD AUTO: 0.01 K/UL — SIGNIFICANT CHANGE UP (ref 0–0.2)
BASOPHILS NFR BLD AUTO: 0.2 % — SIGNIFICANT CHANGE UP (ref 0–2)
BILIRUB SERPL-MCNC: 0.3 MG/DL — SIGNIFICANT CHANGE UP (ref 0.2–1.2)
BUN SERPL-MCNC: 47 MG/DL — HIGH (ref 7–23)
CALCIUM SERPL-MCNC: 9 MG/DL — SIGNIFICANT CHANGE UP (ref 8.4–10.5)
CHLORIDE SERPL-SCNC: 94 MMOL/L — LOW (ref 96–108)
CO2 SERPL-SCNC: 22 MMOL/L — SIGNIFICANT CHANGE UP (ref 22–31)
CREAT SERPL-MCNC: 5.64 MG/DL — HIGH (ref 0.5–1.3)
EGFR: 7 ML/MIN/1.73M2 — LOW
EGFR: 7 ML/MIN/1.73M2 — LOW
EOSINOPHIL # BLD AUTO: 0.43 K/UL — SIGNIFICANT CHANGE UP (ref 0–0.5)
EOSINOPHIL NFR BLD AUTO: 9.1 % — HIGH (ref 0–6)
FLUAV AG NPH QL: SIGNIFICANT CHANGE UP
FLUBV AG NPH QL: SIGNIFICANT CHANGE UP
GAS PNL BLDV: SIGNIFICANT CHANGE UP
GLUCOSE SERPL-MCNC: 88 MG/DL — SIGNIFICANT CHANGE UP (ref 70–99)
HCT VFR BLD CALC: 31.4 % — LOW (ref 34.5–45)
HGB BLD-MCNC: 10.2 G/DL — LOW (ref 11.5–15.5)
IMM GRANULOCYTES NFR BLD AUTO: 0.2 % — SIGNIFICANT CHANGE UP (ref 0–0.9)
LYMPHOCYTES # BLD AUTO: 0.77 K/UL — LOW (ref 1–3.3)
LYMPHOCYTES # BLD AUTO: 16.3 % — SIGNIFICANT CHANGE UP (ref 13–44)
MAGNESIUM SERPL-MCNC: 2.4 MG/DL — SIGNIFICANT CHANGE UP (ref 1.6–2.6)
MCHC RBC-ENTMCNC: 29 PG — SIGNIFICANT CHANGE UP (ref 27–34)
MCHC RBC-ENTMCNC: 32.5 G/DL — SIGNIFICANT CHANGE UP (ref 32–36)
MCV RBC AUTO: 89.2 FL — SIGNIFICANT CHANGE UP (ref 80–100)
MONOCYTES # BLD AUTO: 0.54 K/UL — SIGNIFICANT CHANGE UP (ref 0–0.9)
MONOCYTES NFR BLD AUTO: 11.5 % — SIGNIFICANT CHANGE UP (ref 2–14)
NEUTROPHILS # BLD AUTO: 2.95 K/UL — SIGNIFICANT CHANGE UP (ref 1.8–7.4)
NEUTROPHILS NFR BLD AUTO: 62.7 % — SIGNIFICANT CHANGE UP (ref 43–77)
NRBC BLD AUTO-RTO: 0 /100 WBCS — SIGNIFICANT CHANGE UP (ref 0–0)
NT-PROBNP SERPL-SCNC: HIGH PG/ML (ref 0–300)
PLATELET # BLD AUTO: 160 K/UL — SIGNIFICANT CHANGE UP (ref 150–400)
POTASSIUM SERPL-MCNC: 4.5 MMOL/L — SIGNIFICANT CHANGE UP (ref 3.5–5.3)
POTASSIUM SERPL-SCNC: 4.5 MMOL/L — SIGNIFICANT CHANGE UP (ref 3.5–5.3)
PROT SERPL-MCNC: 7.6 G/DL — SIGNIFICANT CHANGE UP (ref 6–8.3)
RBC # BLD: 3.52 M/UL — LOW (ref 3.8–5.2)
RBC # FLD: 16.5 % — HIGH (ref 10.3–14.5)
RSV RNA NPH QL NAA+NON-PROBE: SIGNIFICANT CHANGE UP
SARS-COV-2 RNA SPEC QL NAA+PROBE: SIGNIFICANT CHANGE UP
SODIUM SERPL-SCNC: 134 MMOL/L — LOW (ref 135–145)
SOURCE RESPIRATORY: SIGNIFICANT CHANGE UP
TROPONIN T, HIGH SENSITIVITY RESULT: 140 NG/L — HIGH (ref 0–51)
WBC # BLD: 4.71 K/UL — SIGNIFICANT CHANGE UP (ref 3.8–10.5)
WBC # FLD AUTO: 4.71 K/UL — SIGNIFICANT CHANGE UP (ref 3.8–10.5)

## 2025-03-27 PROCEDURE — 71046 X-RAY EXAM CHEST 2 VIEWS: CPT | Mod: 26

## 2025-03-27 PROCEDURE — 93010 ELECTROCARDIOGRAM REPORT: CPT

## 2025-03-27 PROCEDURE — 99285 EMERGENCY DEPT VISIT HI MDM: CPT | Mod: GC

## 2025-03-27 RX ORDER — FUROSEMIDE 10 MG/ML
40 INJECTION INTRAMUSCULAR; INTRAVENOUS ONCE
Refills: 0 | Status: COMPLETED | OUTPATIENT
Start: 2025-03-27 | End: 2025-03-27

## 2025-03-27 RX ORDER — LABETALOL HYDROCHLORIDE 200 MG/1
20 TABLET, FILM COATED ORAL ONCE
Refills: 0 | Status: COMPLETED | OUTPATIENT
Start: 2025-03-27 | End: 2025-03-27

## 2025-03-27 RX ORDER — IPRATROPIUM BROMIDE AND ALBUTEROL SULFATE .5; 2.5 MG/3ML; MG/3ML
3 SOLUTION RESPIRATORY (INHALATION)
Refills: 0 | Status: COMPLETED | OUTPATIENT
Start: 2025-03-27 | End: 2025-03-27

## 2025-03-27 RX ADMIN — IPRATROPIUM BROMIDE AND ALBUTEROL SULFATE 3 MILLILITER(S): .5; 2.5 SOLUTION RESPIRATORY (INHALATION) at 23:57

## 2025-03-27 RX ADMIN — IPRATROPIUM BROMIDE AND ALBUTEROL SULFATE 3 MILLILITER(S): .5; 2.5 SOLUTION RESPIRATORY (INHALATION) at 23:05

## 2025-03-27 RX ADMIN — IPRATROPIUM BROMIDE AND ALBUTEROL SULFATE 3 MILLILITER(S): .5; 2.5 SOLUTION RESPIRATORY (INHALATION) at 23:26

## 2025-03-27 RX ADMIN — FUROSEMIDE 40 MILLIGRAM(S): 10 INJECTION INTRAMUSCULAR; INTRAVENOUS at 23:11

## 2025-03-27 NOTE — ED PROVIDER NOTE - PHYSICAL EXAMINATION
GENERAL: no acute distress, non-toxic appearing  HEENT: normal conjunctiva, oral mucosa moist  CARDIAC: regular rate and regular rhythm  PULM: diffuse coarse breath sounds   GI: abdomen nondistended, soft, nontender  : no CVA tenderness, no suprapubic tenderness  MSK: no peripheral edema, calf tenderness/redness/swelling

## 2025-03-27 NOTE — ED ADULT NURSE NOTE - NURSING NEURO ORIENTATION
Episode reviewed, pt called and offered 30 wk  appt with [facility] on 3/28. Pt accepts appt. oriented to person, place and time

## 2025-03-27 NOTE — ED ADULT NURSE NOTE - OBJECTIVE STATEMENT
80 y/o F with Hx HTN, DVT, HF, ESRD renal transplant 2 years ago and AV fistula to right arm  HD on MWF presents to ED with c/o  right sided constant chest pain that radiates to her right arm/shoulder since yesterday. pt  also endorsing dyspnea on exertion. pt is primarily [place] speaking. Upon assessment pt is  A&Ox4. speaking coherently in full sentences. Pt is breathing unlabored and equal BL in no apparent distress, radial pulses are 2+ BL. Denies HA, dizziness, blurry vision. Denies  palpitations. On CM, NSR. Denies abd pain, N/V/D/C. Abd soft NT/ND. Denies urinary symptoms. Denies fever, chills. No sick contacts. Skin intact, warm, dry, normal for race.  20G IV to LAC. Patient placed in position of comfort, bed locked and in lowest position. Call bell within reach.

## 2025-03-27 NOTE — ED PROVIDER NOTE - CLINICAL SUMMARY MEDICAL DECISION MAKING FREE TEXT BOX
'  '  '     Attending note.  Patient was seen in room #50.  Patient reports increasing difficulty breathing since last night.  She denies any orthopnea, PND.  She does report some dyspnea on exertion today.  She has had a cough for the last 4 months.  She also has end-stage renal disease and hypertension and gets dialysis on Monday/Wednesday/Friday.  Patient went to dialysis yesterday and symptoms started afterwards.  She reports some chest discomfort.  She has postnasal drip and had a fever 2 nights ago that resolved.  She denies any abdominal pain or GI symptoms.  She does avoid.  She denies any history of diabetes, hyperlipidemia, cancer, strokes.  Social history is unremarkable.     ROS-as above, otherwise negative.  PE-patient is alert and in no acute distress.  Patient had an episode of coughing while being examined.  There is no JVD.  There is no pallor.  Skin is warm and dry.  Patient has coarse breath sounds bilaterally.  There is a fine wheeze.  Heart is regular rate and rhythm.  Abdomen is obese, soft and nontender.  Patient has AV graft for dialysis in the right upper arm with positive thrill.  There is trace nonpitting edema in the ankles bilaterally.  There is no calf tenderness.  Neurologic examination is grossly intact.      A/P-patient report of difficulty breathing since last night with cough for the last 4 months.  Differential not limited to COPD versus CHF versus ACS versus pneumonia versus viral syndrome.  Labs, troponin, proBNP, chest x-ray, viral nasal swabs, nebulizer, EKG, cardiac monitor and reassess.  Likely admission.

## 2025-03-27 NOTE — ED ADULT TRIAGE NOTE - CHIEF COMPLAINT QUOTE
sob with ambulation x 3 days, worse today; pain to right shoulder area and back; mild chest pain; HD pt MWF; last HD yesterday; av fistula right arm

## 2025-03-27 NOTE — ED ADULT NURSE NOTE - NSICDXPASTMEDICALHX_GEN_ALL_CORE_FT
PAST MEDICAL HISTORY:  Anemia of chronic disease     Cataract     DVT (deep venous thrombosis) of Left subclavian vein, 06/12/17    ESRD (end stage renal disease) on dialysis     Glaucoma     History of arteriovenostomy for renal dialysis     [place] (hard of hearing)     HTN (hypertension)     Kidney transplant recipient

## 2025-03-27 NOTE — CHART NOTE - NSCHARTNOTEFT_GEN_A_CORE
Full consult to follow:  ESRD HD MWF  c/o SOB-etiology?,   Saturating 100% on RA  If needed use lasix 80mg IV one dose  Will plan for HD in AM  d/w ER

## 2025-03-27 NOTE — ED PROVIDER NOTE - OBJECTIVE STATEMENT
78 y/o F 78-year-old female with a history of hypertension, diastolic heart failure, renal transplant 2 years ago no longer on tacrolimus, HD MWF (last HD yesterday) presenting today with right sided constant chest pain that radiates to her right arm/shoulder since yesterday. Pt endorsing dyspnea on exertion. Pt had one episode of isolated fever two days ago. Pt denies abdominal pain, nausea, vomiting, diarrhea, dysuria, recent travel, sick contacts, hx of blood clots.

## 2025-03-28 ENCOUNTER — RESULT REVIEW (OUTPATIENT)
Age: 80
End: 2025-03-28

## 2025-03-28 DIAGNOSIS — I50.9 HEART FAILURE, UNSPECIFIED: ICD-10-CM

## 2025-03-28 DIAGNOSIS — N18.6 END STAGE RENAL DISEASE: ICD-10-CM

## 2025-03-28 DIAGNOSIS — I50.33 ACUTE ON CHRONIC DIASTOLIC (CONGESTIVE) HEART FAILURE: ICD-10-CM

## 2025-03-28 DIAGNOSIS — Z29.9 ENCOUNTER FOR PROPHYLACTIC MEASURES, UNSPECIFIED: ICD-10-CM

## 2025-03-28 DIAGNOSIS — I10 ESSENTIAL (PRIMARY) HYPERTENSION: ICD-10-CM

## 2025-03-28 PROCEDURE — 93356 MYOCRD STRAIN IMG SPCKL TRCK: CPT

## 2025-03-28 PROCEDURE — 99223 1ST HOSP IP/OBS HIGH 75: CPT

## 2025-03-28 PROCEDURE — 93306 TTE W/DOPPLER COMPLETE: CPT | Mod: 26

## 2025-03-28 RX ORDER — ACETAMINOPHEN 500 MG/5ML
650 LIQUID (ML) ORAL ONCE
Refills: 0 | Status: COMPLETED | OUTPATIENT
Start: 2025-03-28 | End: 2025-03-28

## 2025-03-28 RX ORDER — ACETAMINOPHEN 500 MG/5ML
650 LIQUID (ML) ORAL EVERY 6 HOURS
Refills: 0 | Status: DISCONTINUED | OUTPATIENT
Start: 2025-03-28 | End: 2025-03-30

## 2025-03-28 RX ORDER — SEVELAMER HYDROCHLORIDE 800 MG/1
1600 TABLET ORAL THREE TIMES A DAY
Refills: 0 | Status: DISCONTINUED | OUTPATIENT
Start: 2025-03-28 | End: 2025-03-30

## 2025-03-28 RX ORDER — PREDNISONE 20 MG/1
5 TABLET ORAL DAILY
Refills: 0 | Status: DISCONTINUED | OUTPATIENT
Start: 2025-03-28 | End: 2025-03-30

## 2025-03-28 RX ORDER — LABETALOL HYDROCHLORIDE 200 MG/1
200 TABLET, FILM COATED ORAL THREE TIMES A DAY
Refills: 0 | Status: DISCONTINUED | OUTPATIENT
Start: 2025-03-28 | End: 2025-03-30

## 2025-03-28 RX ORDER — ASPIRIN 325 MG
1 TABLET ORAL
Refills: 0 | DISCHARGE

## 2025-03-28 RX ORDER — TACROLIMUS 0.5 MG/1
1 CAPSULE ORAL
Refills: 0 | DISCHARGE

## 2025-03-28 RX ORDER — EPOETIN ALFA 10000 [IU]/ML
10000 SOLUTION INTRAVENOUS; SUBCUTANEOUS
Refills: 0 | Status: DISCONTINUED | OUTPATIENT
Start: 2025-03-28 | End: 2025-03-30

## 2025-03-28 RX ORDER — SODIUM CHLORIDE 0.65 %
1 AEROSOL, SPRAY (ML) NASAL
Refills: 0 | Status: DISCONTINUED | OUTPATIENT
Start: 2025-03-28 | End: 2025-03-30

## 2025-03-28 RX ORDER — HEPARIN SODIUM 1000 [USP'U]/ML
5000 INJECTION INTRAVENOUS; SUBCUTANEOUS
Refills: 0 | Status: DISCONTINUED | OUTPATIENT
Start: 2025-03-28 | End: 2025-03-30

## 2025-03-28 RX ORDER — FLUTICASONE PROPIONATE 50 UG/1
1 SPRAY, METERED NASAL
Refills: 0 | Status: DISCONTINUED | OUTPATIENT
Start: 2025-03-28 | End: 2025-03-30

## 2025-03-28 RX ORDER — ASPIRIN 325 MG
81 TABLET ORAL DAILY
Refills: 0 | Status: DISCONTINUED | OUTPATIENT
Start: 2025-03-28 | End: 2025-03-30

## 2025-03-28 RX ORDER — BENZONATATE 100 MG
100 CAPSULE ORAL THREE TIMES A DAY
Refills: 0 | Status: DISCONTINUED | OUTPATIENT
Start: 2025-03-28 | End: 2025-03-30

## 2025-03-28 RX ORDER — LABETALOL HYDROCHLORIDE 200 MG/1
200 TABLET, FILM COATED ORAL
Refills: 0 | Status: DISCONTINUED | OUTPATIENT
Start: 2025-03-28 | End: 2025-03-28

## 2025-03-28 RX ORDER — TACROLIMUS 0.5 MG/1
4 CAPSULE ORAL DAILY
Refills: 0 | Status: DISCONTINUED | OUTPATIENT
Start: 2025-03-28 | End: 2025-03-30

## 2025-03-28 RX ORDER — NIFEDIPINE 30 MG
60 TABLET, EXTENDED RELEASE 24 HR ORAL
Refills: 0 | Status: DISCONTINUED | OUTPATIENT
Start: 2025-03-28 | End: 2025-03-30

## 2025-03-28 RX ADMIN — Medication 650 MILLIGRAM(S): at 05:01

## 2025-03-28 RX ADMIN — LABETALOL HYDROCHLORIDE 200 MILLIGRAM(S): 200 TABLET, FILM COATED ORAL at 22:23

## 2025-03-28 RX ADMIN — Medication 60 MILLIGRAM(S): at 17:10

## 2025-03-28 RX ADMIN — LABETALOL HYDROCHLORIDE 200 MILLIGRAM(S): 200 TABLET, FILM COATED ORAL at 17:10

## 2025-03-28 RX ADMIN — FUROSEMIDE 40 MILLIGRAM(S): 10 INJECTION INTRAMUSCULAR; INTRAVENOUS at 00:16

## 2025-03-28 RX ADMIN — FLUTICASONE PROPIONATE 1 SPRAY(S): 50 SPRAY, METERED NASAL at 12:21

## 2025-03-28 RX ADMIN — FLUTICASONE PROPIONATE 1 SPRAY(S): 50 SPRAY, METERED NASAL at 17:10

## 2025-03-28 RX ADMIN — EPOETIN ALFA 10000 UNIT(S): 10000 SOLUTION INTRAVENOUS; SUBCUTANEOUS at 20:27

## 2025-03-28 RX ADMIN — LABETALOL HYDROCHLORIDE 200 MILLIGRAM(S): 200 TABLET, FILM COATED ORAL at 13:28

## 2025-03-28 RX ADMIN — Medication 81 MILLIGRAM(S): at 13:28

## 2025-03-28 RX ADMIN — Medication 2.5 MILLIGRAM(S): at 04:32

## 2025-03-28 RX ADMIN — Medication 60 MILLIGRAM(S): at 13:29

## 2025-03-28 RX ADMIN — SEVELAMER HYDROCHLORIDE 1600 MILLIGRAM(S): 800 TABLET ORAL at 22:24

## 2025-03-28 RX ADMIN — TACROLIMUS 4 MILLIGRAM(S): 0.5 CAPSULE ORAL at 17:10

## 2025-03-28 RX ADMIN — HEPARIN SODIUM 5000 UNIT(S): 1000 INJECTION INTRAVENOUS; SUBCUTANEOUS at 17:11

## 2025-03-28 RX ADMIN — Medication 650 MILLIGRAM(S): at 04:31

## 2025-03-28 RX ADMIN — SEVELAMER HYDROCHLORIDE 1600 MILLIGRAM(S): 800 TABLET ORAL at 13:28

## 2025-03-28 RX ADMIN — Medication 650 MILLIGRAM(S): at 19:16

## 2025-03-28 RX ADMIN — Medication 100 MILLIGRAM(S): at 13:28

## 2025-03-28 RX ADMIN — Medication 100 MILLIGRAM(S): at 22:24

## 2025-03-28 RX ADMIN — Medication 650 MILLIGRAM(S): at 20:00

## 2025-03-28 NOTE — H&P ADULT - NSHPLABSRESULTS_GEN_ALL_CORE
LABS:                        10.2   4.71  )-----------( 160      ( 27 Mar 2025 22:02 )             31.4       03-27    134[L]  |  94[L]  |  47[H]  ----------------------------<  88  4.5   |  22  |  5.64[H]    Ca    9.0      27 Mar 2025 22:02  Mg     2.4     03-27    TPro  7.6  /  Alb  3.7  /  TBili  0.3  /  DBili  x   /  AST  15  /  ALT  9[L]  /  AlkPhos  80  03-27          CAPILLARY BLOOD GLUCOSE    Lactate Trend    Urinalysis Basic - ( 27 Mar 2025 22:02 )    Color: x / Appearance: x / SG: x / pH: x  Gluc: 88 mg/dL / Ketone: x  / Bili: x / Urobili: x   Blood: x / Protein: x / Nitrite: x   Leuk Esterase: x / RBC: x / WBC x   Sq Epi: x / Non Sq Epi: x / Bacteria: x    RADS:    x< from: Xray Chest 2 Views PA/Lat (03.27.25 @ 22:22) >    IMPRESSION:  Mild pulmonary vascular congestion.    --- End of Report ---    < end of copied text >

## 2025-03-28 NOTE — ADVANCED PRACTICE NURSE CONSULT - ASSESSMENT
the pt was encountered on [facility]- Mrs Head was awake and alert, engaging in conversation. She moves about independently in the bed and can stand independently as well. A low air-loss surface is in use.  the pt wears a brief and reports " whenever I cough, I leak urine."  upon assessment the pt presents with the following:  left flank with well-demarcated area of blanchable erythema measuring 5cm x9cm x0cm- slightly raised, warm to touch. pt denies pain when palpated  sacrum is intact   b/l buttocks with intact skin and hyperpigmentation; may be pts normal skin tone but cannot r/o a component of deep tissue injury present on admission.  education was provided to the pt re: skin condition, tx plan and PI prevention  skin on the b/l heels is intact.  the pt has a thin frail appearance- will request a nutrition consult for further evaluation

## 2025-03-28 NOTE — CONSULT NOTE ADULT - NS ATTEND AMEND GEN_ALL_CORE FT
HD as planned  Monitor [place] level
Patient care and plan discussed and reviewed with Advanced Care Provider. Plan as outlined above edited by me to reflect our discussion.   In addition, I participated in    - Ordering, reviewing, and interpreting labs, testing, and imaging.  - Reviewing prior hospitalization and outpatient records when necessary  - Counselling and educating patient and/or family regarding interpretation of aforementioned items and plan of care.  - Communicating with other health professionals (when not separately reported), and documenting clinical information in the electronic health record.

## 2025-03-28 NOTE — H&P ADULT - HISTORY OF PRESENT ILLNESS
79-year-old female with a history of hypertension, diastolic heart failure, renal transplant 2 years ago no longer on tacrolimus, HD MWF (last HD Wednesday) presenting yesterday with right sided constant chest pain that radiates to her right arm/shoulder since day prior to yesterday. Pt endorsing dyspnea on exertion. Pt had one episode of isolated fever two days ago. Pt denies abdominal pain, nausea, vomiting, diarrhea, dysuria, recent travel, sick contacts, hx of blood clots. She reports some stuffed nose and cough with whitish phlegm.   In ED received hydralazine 2.5mg iv, albuterol, lasix 40mg iv x 2, labetalol 20mg iv x 1.

## 2025-03-28 NOTE — CONSULT NOTE ADULT - SUBJECTIVE AND OBJECTIVE BOX
DATE OF SERVICE: 03-28-25 @ 15:33    CHIEF COMPLAINT:Patient is a 79y old  Female who presents with a chief complaint of SOB (28 Mar 2025 11:20)      HISTORY OF PRESENT ILLNESS:HPI:  79-year-old female with a history of hypertension, diastolic heart failure, renal transplant 2 years ago no longer on tacrolimus, HD MWF (last HD Wednesday) presenting yesterday with right sided constant chest pain that radiates to her right arm/shoulder since day prior to yesterday. Pt endorsing dyspnea on exertion. Pt had one episode of isolated fever two days ago. Pt denies abdominal pain, nausea, vomiting, diarrhea, dysuria, recent travel, sick contacts, hx of blood clots. She reports some stuffed nose and cough with whitish phlegm.   In ED received hydralazine 2.5mg iv, albuterol, lasix 40mg iv x 2, labetalol 20mg iv x 1.  (28 Mar 2025 10:54)      PAST MEDICAL & SURGICAL HISTORY:  HTN (hypertension)      Glaucoma      Cataract      ESRD (end stage renal disease) on dialysis      DVT (deep venous thrombosis)  of Left subclavian vein, 06/12/17      Kidney transplant recipient      Anemia of chronic disease      History of arteriovenostomy for renal dialysis      [place] (hard of hearing)      Acquired cataract  extraction with b/l lense placement, 2016      S/P KEVEN-BSO  for fibroids, 2012      Hemodialysis access, AV graft      History of renal transplantation  DDRT 5/19/2021      AV fistula              MEDICATIONS:  aspirin enteric coated 81 milliGRAM(s) Oral daily  heparin   Injectable 5000 Unit(s) SubCutaneous two times a day  labetalol 200 milliGRAM(s) Oral two times a day  NIFEdipine XL 60 milliGRAM(s) Oral two times a day      benzonatate 100 milliGRAM(s) Oral three times a day    acetaminophen     Tablet .. 650 milliGRAM(s) Oral every 6 hours PRN      predniSONE   Tablet 5 milliGRAM(s) Oral daily    epoetin ivelisse-epbx (RETACRIT) Injectable 75232 Unit(s) IV Push <User Schedule>  fluticasone propionate 50 MICROgram(s)/spray Nasal Spray 1 Spray(s) Both Nostrils two times a day  sodium chloride 0.65% Nasal 1 Spray(s) Both Nostrils two times a day PRN  tacrolimus ER Tablet (ENVARSUS XR) 4 milliGRAM(s) Oral daily      FAMILY HISTORY:  Family history of cerebrovascular accident (CVA)    Family history of colon cancer (Sibling)        Non-contributory    SOCIAL HISTORY:    [ ] Tobacco  [ ] Drugs  [ ] Alcohol    Allergies    Mushrooms (Anaphylaxis)  penicillin (Rash)    Intolerances    	    REVIEW OF SYSTEMS:  CONSTITUTIONAL: No fever  EYES: No eye pain, visual disturbances, or discharge  ENMT:  No difficulty hearing, tinnitus  NECK: No pain or stiffness  RESPIRATORY: No cough, wheezing, + SOB  CARDIOVASCULAR: + chest pain, palpitations, passing out, dizziness, or leg swelling  GASTROINTESTINAL:  No nausea, vomiting, diarrhea or constipation. No melena.  GENITOURINARY: No dysuria, hematuria  NEUROLOGICAL: No stroke like symptoms  SKIN: No burning or lesions   ENDOCRINE: No heat or cold intolerance  MUSCULOSKELETAL: No joint pain or swelling  PSYCHIATRIC: No  anxiety, mood swings  HEME/LYMPH: No bleeding gums  ALLERGY AND IMMUNOLOGIC: No hives or eczema	    All other ROS negative    PHYSICAL EXAM:  T(C): 36.4 (03-28-25 @ 11:18), Max: 36.8 (03-27-25 @ 20:06)  HR: 73 (03-28-25 @ 13:30) (73 - 82)  BP: 206/79 (03-28-25 @ 13:30) (170/78 - 235/93)  RR: 18 (03-28-25 @ 11:18) (18 - 22)  SpO2: 95% (03-28-25 @ 11:18) (94% - 100%)  Wt(kg): --  I&O's Summary      Appearance: Normal	  HEENT:   Normal oral mucosa, EOMI	  Cardiovascular:  S1 S2, No JVD,    Respiratory: Lungs clear to auscultation	  Psychiatry: Alert  Gastrointestinal:  Soft, Non-tender, + BS	  Skin: No rashes   Neurologic: Non-focal  Extremities:  No edema  Vascular: Peripheral pulses palpable    	    	  	  CARDIAC MARKERS:  Labs personally reviewed by me                                  10.2   4.71  )-----------( 160      ( 27 Mar 2025 22:02 )             31.4     03-27    134[L]  |  94[L]  |  47[H]  ----------------------------<  88  4.5   |  22  |  5.64[H]    Ca    9.0      27 Mar 2025 22:02  Mg     2.4     03-27    TPro  7.6  /  Alb  3.7  /  TBili  0.3  /  DBili  x   /  AST  15  /  ALT  9[L]  /  AlkPhos  80  03-27          EKG: Personally reviewed by me -   Radiology: Personally reviewed by me -   < from: Xray Chest 2 Views PA/Lat (03.27.25 @ 22:22) >  IMPRESSION:  Mild pulmonary vascular congestion.    < end of copied text >  < from: NM Amyloidosis SPECT/CT, Single Area Single Day (01.11.24 @ 11:58) >  IMPRESSION: Cardiac amyloid Imaging study is  not suggestive of   transthyretin cardiac amyloidosis.    < end of copied text >  < from: TTE W or WO Ultrasound Enhancing Agent (01.04.24 @ 08:44) >   1. Left ventricular systolic function is normal with an ejection fraction of 63 % by Fontenot's method of disks. There are no regional wall motion abnormalities seen.   2. There is moderate (grade 2) left ventricular diastolic dysfunction, with elevated filling pressure.   3. Severe left ventricular hypertrophy.   4. Normal right ventricular cavity size, increasedwall thickness, and systolic function.   5. The left atrium is severely dilated.   6. Echocardiographic evidence of pulmonary hyptertension.   7. Small pericardial effusion noted adjacent to the posterior left ventricle, small pericardial effusion noted adjacent to the lateral left ventricle and moderate pericardial effusion noted adjacent to the right atrium with no evidence of hemodynamic compromise.   8. Compared to the transthoracic echocardiogram performed on 11/10/2023 LVOT obstruction is markedly approved.        Assessment /Plan:     78-year-old female with a history of hypertension, diastolic heart failure, renal transplant 2 years ago no longer on tacrolimus who presents with shortness of breath and lower extremity swelling since yesterday clinical presentation consistent with acute on chronic diastolic heart failure 79-year-old female with a history of hypertension, diastolic heart failure, renal transplant 2 years ago no longer on tacrolimus, HD MWF (last HD Wednesday) presenting yesterday with right sided constant chest pain that radiates to her right arm/shoulder since day prior to yesterday. Pt endorsing dyspnea on exertion. Pt had one episode of isolated fever two days ago. Admitted for further management.     78f h/o HTN, s/p Renal transplant, CKD, chronic rejection, recent admission for pneumonia s/p abx presented with shortness of breath since this morning. she also noticed significant LUE swelling that began 4-5 days ago    1. Diastolic Heart Failure secondary to advanced kidney disease   - TTE in Jan 2024 with normal EF, moderate DD, elevated filling pressures, severe LVH, severely dilated LA, small pericardial effusion  - cardiac amyloid with PYP scan normal   - BNP almost 58K  - Appreciate Renal and TP recs  - Monitor Strict I&Os, daily weights  - Needs further volume optimization via HD  - CP likely 2/2 Acute Diastolic HF    2. HTN - improved   - Increase labetalol 200mg PO to TID  - Cont Nifedipine 60mg daily BID.   - Was also previously on Hydralazine 100mg PO TID     3. ESRD  - Renal recs appreciated  - c/w HD M/W/F        Differential diagnosis and plan of care discussed with patient after the evaluation. Counseling on diet, nutritional counseling, weight management, exercise and medication compliance was done.   Advanced care planning/advanced directives discussed with patient/family. DNR status including forceful chest compressions to attempt to restart the heart, ventilator support/artificial breathing, electric shock, artificial nutrition, health care proxy, Molst form all discussed with pt. Pt wishes to consider. More than fifteen minutes spent on discussing advanced directives.     Charlene Hayes [facility]-BC  Dutch Oh DO [facility]  Cardiovascular Medicine  [address], [address]  Available for call or text via [employer] TE[employer]  Office [phone]   DATE OF SERVICE: 03-28-25 @ 15:33    CHIEF COMPLAINT:Patient is a 79y old  Female who presents with a chief complaint of SOB (28 Mar 2025 11:20)      HISTORY OF PRESENT ILLNESS:HPI:  79-year-old female with a history of hypertension, diastolic heart failure, renal transplant 2 years ago no longer on tacrolimus, HD MWF (last HD Wednesday) presenting yesterday with right sided constant chest pain that radiates to her right arm/shoulder since day prior to yesterday. Pt endorsing dyspnea on exertion. Pt had one episode of isolated fever two days ago. Pt denies abdominal pain, nausea, vomiting, diarrhea, dysuria, recent travel, sick contacts, hx of blood clots. She reports some stuffed nose and cough with whitish phlegm.   In ED received hydralazine 2.5mg iv, albuterol, lasix 40mg iv x 2, labetalol 20mg iv x 1.  (28 Mar 2025 10:54)      PAST MEDICAL & SURGICAL HISTORY:  HTN (hypertension)      Glaucoma      Cataract      ESRD (end stage renal disease) on dialysis      DVT (deep venous thrombosis)  of Left subclavian vein, 06/12/17      Kidney transplant recipient      Anemia of chronic disease      History of arteriovenostomy for renal dialysis      [place] (hard of hearing)      Acquired cataract  extraction with b/l lense placement, 2016      S/P KEVEN-BSO  for fibroids, 2012      Hemodialysis access, AV graft      History of renal transplantation  DDRT 5/19/2021      AV fistula              MEDICATIONS:  aspirin enteric coated 81 milliGRAM(s) Oral daily  heparin   Injectable 5000 Unit(s) SubCutaneous two times a day  labetalol 200 milliGRAM(s) Oral two times a day  NIFEdipine XL 60 milliGRAM(s) Oral two times a day      benzonatate 100 milliGRAM(s) Oral three times a day    acetaminophen     Tablet .. 650 milliGRAM(s) Oral every 6 hours PRN      predniSONE   Tablet 5 milliGRAM(s) Oral daily    epoetin ivelisse-epbx (RETACRIT) Injectable 93907 Unit(s) IV Push <User Schedule>  fluticasone propionate 50 MICROgram(s)/spray Nasal Spray 1 Spray(s) Both Nostrils two times a day  sodium chloride 0.65% Nasal 1 Spray(s) Both Nostrils two times a day PRN  tacrolimus ER Tablet (ENVARSUS XR) 4 milliGRAM(s) Oral daily      FAMILY HISTORY:  Family history of cerebrovascular accident (CVA)    Family history of colon cancer (Sibling)        Non-contributory    SOCIAL HISTORY:    Not an active smoker  Allergies    Mushrooms (Anaphylaxis)  penicillin (Rash)    Intolerances    	    REVIEW OF SYSTEMS:  CONSTITUTIONAL: No fever  EYES: No eye pain, visual disturbances, or discharge  ENMT:  No difficulty hearing, tinnitus  NECK: No pain or stiffness  RESPIRATORY: No cough, wheezing, + SOB  CARDIOVASCULAR: + chest pain, palpitations, passing out, dizziness, or leg swelling  GASTROINTESTINAL:  No nausea, vomiting, diarrhea or constipation. No melena.  GENITOURINARY: No dysuria, hematuria  NEUROLOGICAL: No stroke like symptoms  SKIN: No burning or lesions   ENDOCRINE: No heat or cold intolerance  MUSCULOSKELETAL: No joint pain or swelling  PSYCHIATRIC: No  anxiety, mood swings  HEME/LYMPH: No bleeding gums  ALLERGY AND IMMUNOLOGIC: No hives or eczema	    All other ROS negative    PHYSICAL EXAM:  T(C): 36.4 (03-28-25 @ 11:18), Max: 36.8 (03-27-25 @ 20:06)  HR: 73 (03-28-25 @ 13:30) (73 - 82)  BP: 206/79 (03-28-25 @ 13:30) (170/78 - 235/93)  RR: 18 (03-28-25 @ 11:18) (18 - 22)  SpO2: 95% (03-28-25 @ 11:18) (94% - 100%)  Wt(kg): --  I&O's Summary      Appearance: Normal	  HEENT:   Normal oral mucosa, EOMI	  Cardiovascular:  S1 S2, No JVD,    Respiratory: Lungs clear to auscultation	  Psychiatry: Alert  Gastrointestinal:  Soft, Non-tender, + BS	  Skin: No rashes   Neurologic: Non-focal  Extremities:  No edema  Vascular: Peripheral pulses palpable    	    	  	  CARDIAC MARKERS:  Labs personally reviewed by me                                  10.2   4.71  )-----------( 160      ( 27 Mar 2025 22:02 )             31.4     03-27    134[L]  |  94[L]  |  47[H]  ----------------------------<  88  4.5   |  22  |  5.64[H]    Ca    9.0      27 Mar 2025 22:02  Mg     2.4     03-27    TPro  7.6  /  Alb  3.7  /  TBili  0.3  /  DBili  x   /  AST  15  /  ALT  9[L]  /  AlkPhos  80  03-27          EKG: Personally reviewed by me -   Radiology: Personally reviewed by me -   < from: Xray Chest 2 Views PA/Lat (03.27.25 @ 22:22) >  IMPRESSION:  Mild pulmonary vascular congestion.    < end of copied text >  < from: NM Amyloidosis SPECT/CT, Single Area Single Day (01.11.24 @ 11:58) >  IMPRESSION: Cardiac amyloid Imaging study is  not suggestive of   transthyretin cardiac amyloidosis.    < end of copied text >  < from: TTE W or WO Ultrasound Enhancing Agent (01.04.24 @ 08:44) >   1. Left ventricular systolic function is normal with an ejection fraction of 63 % by Fontenot's method of disks. There are no regional wall motion abnormalities seen.   2. There is moderate (grade 2) left ventricular diastolic dysfunction, with elevated filling pressure.   3. Severe left ventricular hypertrophy.   4. Normal right ventricular cavity size, increasedwall thickness, and systolic function.   5. The left atrium is severely dilated.   6. Echocardiographic evidence of pulmonary hyptertension.   7. Small pericardial effusion noted adjacent to the posterior left ventricle, small pericardial effusion noted adjacent to the lateral left ventricle and moderate pericardial effusion noted adjacent to the right atrium with no evidence of hemodynamic compromise.   8. Compared to the transthoracic echocardiogram performed on 11/10/2023 LVOT obstruction is markedly approved.        Assessment /Plan:     78-year-old female with a history of hypertension, diastolic heart failure, renal transplant 2 years ago no longer on tacrolimus who presents with shortness of breath and lower extremity swelling since yesterday clinical presentation consistent with acute on chronic diastolic heart failure 79-year-old female with a history of hypertension, diastolic heart failure, renal transplant 2 years ago no longer on tacrolimus, HD MWF (last HD Wednesday) presenting yesterday with right sided constant chest pain that radiates to her right arm/shoulder since day prior to yesterday. Pt endorsing dyspnea on exertion. Pt had one episode of isolated fever two days ago. Admitted for further management.     78f h/o HTN, s/p Renal transplant, CKD, chronic rejection, recent admission for pneumonia s/p abx presented with shortness of breath since this morning. she also noticed significant LUE swelling that began 4-5 days ago    1. Diastolic Heart Failure secondary to advanced kidney disease   - TTE in Jan 2024 with normal EF, moderate DD, elevated filling pressures, severe LVH, severely dilated LA, small pericardial effusion  - cardiac amyloid with PYP scan normal   - BNP almost 58K  - Appreciate Renal and TP recs  - Monitor Strict I&Os, daily weights  - Needs further volume optimization via HD  - CP likely 2/2 Acute Diastolic HF    2. HTN - improved   - Increase labetalol 200mg PO to TID  - Cont Nifedipine 60mg daily BID.   - Was also previously on Hydralazine 100mg PO TID     3. ESRD  - Renal recs appreciated  - c/w HD M/W/F    4. Chest Pain  - No ECG found in chart or EMR. Will order and review.  - Trop 140 but non-diagnostic given ESRD  - Repeat TTE  - Volume optimization with HD    Differential diagnosis and plan of care discussed with patient after the evaluation. Counseling on diet, nutritional counseling, weight management, exercise and medication compliance was done.   Advanced care planning/advanced directives discussed with patient/family. DNR status including forceful chest compressions to attempt to restart the heart, ventilator support/artificial breathing, electric shock, artificial nutrition, health care proxy, Molst form all discussed with pt. Pt wishes to consider. More than fifteen minutes spent on discussing advanced directives.     Charlene SANDERS-CATIE Oh DO [facility]  Cardiovascular Medicine  [address], [address]  Available for call or text via [employer] TE[employer]  Office [phone]   DATE OF SERVICE: 03-28-25 @ 15:33    CHIEF COMPLAINT:Patient is a 79y old  Female who presents with a chief complaint of SOB (28 Mar 2025 11:20)      HISTORY OF PRESENT ILLNESS:HPI:  79-year-old female with a history of hypertension, diastolic heart failure, renal transplant 2 years ago no longer on tacrolimus, HD MWF (last HD Wednesday) presenting yesterday with right sided constant chest pain that radiates to her right arm/shoulder since day prior to yesterday. Pt endorsing dyspnea on exertion. Pt had one episode of isolated fever two days ago. Pt denies abdominal pain, nausea, vomiting, diarrhea, dysuria, recent travel, sick contacts, hx of blood clots. She reports some stuffed nose and cough with whitish phlegm.   In ED received hydralazine 2.5mg iv, albuterol, lasix 40mg iv x 2, labetalol 20mg iv x 1.  (28 Mar 2025 10:54)      PAST MEDICAL & SURGICAL HISTORY:  HTN (hypertension)      Glaucoma      Cataract      ESRD (end stage renal disease) on dialysis      DVT (deep venous thrombosis)  of Left subclavian vein, 06/12/17      Kidney transplant recipient      Anemia of chronic disease      History of arteriovenostomy for renal dialysis      [place] (hard of hearing)      Acquired cataract  extraction with b/l lense placement, 2016      S/P KEVEN-BSO  for fibroids, 2012      Hemodialysis access, AV graft      History of renal transplantation  DDRT 5/19/2021      AV fistula              MEDICATIONS:  aspirin enteric coated 81 milliGRAM(s) Oral daily  heparin   Injectable 5000 Unit(s) SubCutaneous two times a day  labetalol 200 milliGRAM(s) Oral two times a day  NIFEdipine XL 60 milliGRAM(s) Oral two times a day      benzonatate 100 milliGRAM(s) Oral three times a day    acetaminophen     Tablet .. 650 milliGRAM(s) Oral every 6 hours PRN      predniSONE   Tablet 5 milliGRAM(s) Oral daily    epoetin ivelisse-epbx (RETACRIT) Injectable 07935 Unit(s) IV Push <User Schedule>  fluticasone propionate 50 MICROgram(s)/spray Nasal Spray 1 Spray(s) Both Nostrils two times a day  sodium chloride 0.65% Nasal 1 Spray(s) Both Nostrils two times a day PRN  tacrolimus ER Tablet (ENVARSUS XR) 4 milliGRAM(s) Oral daily      FAMILY HISTORY:  Family history of cerebrovascular accident (CVA)    Family history of colon cancer (Sibling)        Non-contributory    SOCIAL HISTORY:    Not an active smoker  Allergies    Mushrooms (Anaphylaxis)  penicillin (Rash)    Intolerances    	    REVIEW OF SYSTEMS:  CONSTITUTIONAL: No fever  EYES: No eye pain, visual disturbances, or discharge  ENMT:  No difficulty hearing, tinnitus  NECK: No pain or stiffness  RESPIRATORY: No cough, wheezing, + SOB  CARDIOVASCULAR: + chest pain, palpitations, passing out, dizziness, or leg swelling  GASTROINTESTINAL:  No nausea, vomiting, diarrhea or constipation. No melena.  GENITOURINARY: No dysuria, hematuria  NEUROLOGICAL: No stroke like symptoms  SKIN: No burning or lesions   ENDOCRINE: No heat or cold intolerance  MUSCULOSKELETAL: No joint pain or swelling  PSYCHIATRIC: No  anxiety, mood swings  HEME/LYMPH: No bleeding gums  ALLERGY AND IMMUNOLOGIC: No hives or eczema	    All other ROS negative    PHYSICAL EXAM:  T(C): 36.4 (03-28-25 @ 11:18), Max: 36.8 (03-27-25 @ 20:06)  HR: 73 (03-28-25 @ 13:30) (73 - 82)  BP: 206/79 (03-28-25 @ 13:30) (170/78 - 235/93)  RR: 18 (03-28-25 @ 11:18) (18 - 22)  SpO2: 95% (03-28-25 @ 11:18) (94% - 100%)  Wt(kg): --  I&O's Summary      Appearance: Normal	  HEENT:   Normal oral mucosa, EOMI	  Cardiovascular:  S1 S2, No JVD,    Respiratory: Lungs clear to auscultation	  Psychiatry: Alert  Gastrointestinal:  Soft, Non-tender, + BS	  Skin: No rashes   Neurologic: Non-focal  Extremities:  No edema  Vascular: Peripheral pulses palpable    	    	  	  CARDIAC MARKERS:  Labs personally reviewed by me                                  10.2   4.71  )-----------( 160      ( 27 Mar 2025 22:02 )             31.4     03-27    134[L]  |  94[L]  |  47[H]  ----------------------------<  88  4.5   |  22  |  5.64[H]    Ca    9.0      27 Mar 2025 22:02  Mg     2.4     03-27    TPro  7.6  /  Alb  3.7  /  TBili  0.3  /  DBili  x   /  AST  15  /  ALT  9[L]  /  AlkPhos  80  03-27          EKG: Personally reviewed by me -   Radiology: Personally reviewed by me -   < from: Xray Chest 2 Views PA/Lat (03.27.25 @ 22:22) >  IMPRESSION:  Mild pulmonary vascular congestion.    < end of copied text >  < from: NM Amyloidosis SPECT/CT, Single Area Single Day (01.11.24 @ 11:58) >  IMPRESSION: Cardiac amyloid Imaging study is  not suggestive of   transthyretin cardiac amyloidosis.    < end of copied text >  < from: TTE W or WO Ultrasound Enhancing Agent (01.04.24 @ 08:44) >   1. Left ventricular systolic function is normal with an ejection fraction of 63 % by Fontenot's method of disks. There are no regional wall motion abnormalities seen.   2. There is moderate (grade 2) left ventricular diastolic dysfunction, with elevated filling pressure.   3. Severe left ventricular hypertrophy.   4. Normal right ventricular cavity size, increasedwall thickness, and systolic function.   5. The left atrium is severely dilated.   6. Echocardiographic evidence of pulmonary hyptertension.   7. Small pericardial effusion noted adjacent to the posterior left ventricle, small pericardial effusion noted adjacent to the lateral left ventricle and moderate pericardial effusion noted adjacent to the right atrium with no evidence of hemodynamic compromise.   8. Compared to the transthoracic echocardiogram performed on 11/10/2023 LVOT obstruction is markedly approved.        Assessment /Plan:     78-year-old female with a history of hypertension, diastolic heart failure, renal transplant 2 years ago no longer on tacrolimus who presents with shortness of breath and lower extremity swelling since yesterday clinical presentation consistent with acute on chronic diastolic heart failure 79-year-old female with a history of hypertension, diastolic heart failure, renal transplant 2 years ago no longer on tacrolimus, HD MWF (last HD Wednesday) presenting yesterday with right sided constant chest pain that radiates to her right arm/shoulder since day prior to yesterday. Pt endorsing dyspnea on exertion. Pt had one episode of isolated fever two days ago. Admitted for further management.         1. Diastolic Heart Failure secondary to advanced kidney disease   - TTE in Jan 2024 with normal EF, moderate DD, elevated filling pressures, severe LVH, severely dilated LA, small pericardial effusion  - cardiac amyloid with PYP scan normal   - BNP almost 58K  - Appreciate Renal and TP recs  - Monitor Strict I&Os, daily weights  - Needs further volume optimization via HD  - CP likely 2/2 Acute Diastolic HF    2. HTN - improved   - Increase labetalol 200mg PO to TID  - Cont Nifedipine 60mg daily BID.   - Was also previously on Hydralazine 100mg PO TID     3. ESRD  - Renal recs appreciated  - c/w HD M/W/F    4. Chest Pain  - No ECG found in chart or EMR. Will order and review.  - Trop 140 but non-diagnostic given ESRD  - TTE shows preserved EF, no WMA, mod DD with elevated filling pressures, small pericardial effusion, improved LVOT  - Likely 2/2 ADHF. Volume optimization with HD.    Differential diagnosis and plan of care discussed with patient after the evaluation. Counseling on diet, nutritional counseling, weight management, exercise and medication compliance was done.   Advanced care planning/advanced directives discussed with patient/family. DNR status including forceful chest compressions to attempt to restart the heart, ventilator support/artificial breathing, electric shock, artificial nutrition, health care proxy, Molst form all discussed with pt. Pt wishes to consider. More than fifteen minutes spent on discussing advanced directives.     Charlene SANDERS-BC  Dutch Oh DO [facility]  Cardiovascular Medicine  [address], [address]  Available for call or text via [employer] TE[employer]  Office [phone]   DATE OF SERVICE: 03-28-25 @ 15:33    CHIEF COMPLAINT:Patient is a 79y old  Female who presents with a chief complaint of SOB (28 Mar 2025 11:20)      HISTORY OF PRESENT ILLNESS:HPI:  79-year-old female with a history of hypertension, diastolic heart failure, renal transplant 2 years ago no longer on tacrolimus, HD MWF (last HD Wednesday) presenting yesterday with right sided constant chest pain that radiates to her right arm/shoulder since day prior to yesterday. Pt endorsing dyspnea on exertion. Pt had one episode of isolated fever two days ago. Pt denies abdominal pain, nausea, vomiting, diarrhea, dysuria, recent travel, sick contacts, hx of blood clots. She reports some stuffed nose and cough with whitish phlegm.   In ED received hydralazine 2.5mg iv, albuterol, lasix 40mg iv x 2, labetalol 20mg iv x 1.  (28 Mar 2025 10:54)      PAST MEDICAL & SURGICAL HISTORY:  HTN (hypertension)      Glaucoma      Cataract      ESRD (end stage renal disease) on dialysis      DVT (deep venous thrombosis)  of Left subclavian vein, 06/12/17      Kidney transplant recipient      Anemia of chronic disease      History of arteriovenostomy for renal dialysis      [place] (hard of hearing)      Acquired cataract  extraction with b/l lense placement, 2016      S/P KEVEN-BSO  for fibroids, 2012      Hemodialysis access, AV graft      History of renal transplantation  DDRT 5/19/2021      AV fistula              MEDICATIONS:  aspirin enteric coated 81 milliGRAM(s) Oral daily  heparin   Injectable 5000 Unit(s) SubCutaneous two times a day  labetalol 200 milliGRAM(s) Oral two times a day  NIFEdipine XL 60 milliGRAM(s) Oral two times a day      benzonatate 100 milliGRAM(s) Oral three times a day    acetaminophen     Tablet .. 650 milliGRAM(s) Oral every 6 hours PRN      predniSONE   Tablet 5 milliGRAM(s) Oral daily    epoetin ivelisse-epbx (RETACRIT) Injectable 05730 Unit(s) IV Push <User Schedule>  fluticasone propionate 50 MICROgram(s)/spray Nasal Spray 1 Spray(s) Both Nostrils two times a day  sodium chloride 0.65% Nasal 1 Spray(s) Both Nostrils two times a day PRN  tacrolimus ER Tablet (ENVARSUS XR) 4 milliGRAM(s) Oral daily      FAMILY HISTORY:  Family history of cerebrovascular accident (CVA)    Family history of colon cancer (Sibling)        Non-contributory    SOCIAL HISTORY:    Not an active smoker  Allergies    Mushrooms (Anaphylaxis)  penicillin (Rash)    Intolerances    	    REVIEW OF SYSTEMS:  CONSTITUTIONAL: No fever  EYES: No eye pain, visual disturbances, or discharge  ENMT:  No difficulty hearing, tinnitus  NECK: No pain or stiffness  RESPIRATORY: No cough, wheezing, + SOB  CARDIOVASCULAR: + chest pain, palpitations, passing out, dizziness, or leg swelling  GASTROINTESTINAL:  No nausea, vomiting, diarrhea or constipation. No melena.  GENITOURINARY: No dysuria, hematuria  NEUROLOGICAL: No stroke like symptoms  SKIN: No burning or lesions   ENDOCRINE: No heat or cold intolerance  MUSCULOSKELETAL: No joint pain or swelling  PSYCHIATRIC: No  anxiety, mood swings  HEME/LYMPH: No bleeding gums  ALLERGY AND IMMUNOLOGIC: No hives or eczema	    All other ROS negative    PHYSICAL EXAM:  T(C): 36.4 (03-28-25 @ 11:18), Max: 36.8 (03-27-25 @ 20:06)  HR: 73 (03-28-25 @ 13:30) (73 - 82)  BP: 206/79 (03-28-25 @ 13:30) (170/78 - 235/93)  RR: 18 (03-28-25 @ 11:18) (18 - 22)  SpO2: 95% (03-28-25 @ 11:18) (94% - 100%)  Wt(kg): --  I&O's Summary      Appearance: Normal	  HEENT:   Normal oral mucosa, EOMI	  Cardiovascular:  S1 S2, No JVD,    Respiratory: Lungs clear to auscultation	  Psychiatry: Alert  Gastrointestinal:  Soft, Non-tender, + BS	  Skin: No rashes   Neurologic: Non-focal  Extremities:  No edema  Vascular: Peripheral pulses palpable    	    	  	  CARDIAC MARKERS:  Labs personally reviewed by me                                  10.2   4.71  )-----------( 160      ( 27 Mar 2025 22:02 )             31.4     03-27    134[L]  |  94[L]  |  47[H]  ----------------------------<  88  4.5   |  22  |  5.64[H]    Ca    9.0      27 Mar 2025 22:02  Mg     2.4     03-27    TPro  7.6  /  Alb  3.7  /  TBili  0.3  /  DBili  x   /  AST  15  /  ALT  9[L]  /  AlkPhos  80  03-27          EKG: Personally reviewed by me -   Radiology: Personally reviewed by me -   < from: Xray Chest 2 Views PA/Lat (03.27.25 @ 22:22) >  IMPRESSION:  Mild pulmonary vascular congestion.    < end of copied text >  < from: NM Amyloidosis SPECT/CT, Single Area Single Day (01.11.24 @ 11:58) >  IMPRESSION: Cardiac amyloid Imaging study is  not suggestive of   transthyretin cardiac amyloidosis.    < end of copied text >  < from: TTE W or WO Ultrasound Enhancing Agent (01.04.24 @ 08:44) >   1. Left ventricular systolic function is normal with an ejection fraction of 63 % by Fontenot's method of disks. There are no regional wall motion abnormalities seen.   2. There is moderate (grade 2) left ventricular diastolic dysfunction, with elevated filling pressure.   3. Severe left ventricular hypertrophy.   4. Normal right ventricular cavity size, increasedwall thickness, and systolic function.   5. The left atrium is severely dilated.   6. Echocardiographic evidence of pulmonary hyptertension.   7. Small pericardial effusion noted adjacent to the posterior left ventricle, small pericardial effusion noted adjacent to the lateral left ventricle and moderate pericardial effusion noted adjacent to the right atrium with no evidence of hemodynamic compromise.   8. Compared to the transthoracic echocardiogram performed on 11/10/2023 LVOT obstruction is markedly approved.        Assessment /Plan:     78-year-old female with a history of hypertension, diastolic heart failure, renal transplant 2 years ago no longer on tacrolimus who presents with shortness of breath and lower extremity swelling since yesterday clinical presentation consistent with acute on chronic diastolic heart failure 79-year-old female with a history of hypertension, diastolic heart failure, renal transplant 2 years ago no longer on tacrolimus, HD MWF (last HD Wednesday) presenting yesterday with right sided constant chest pain that radiates to her right arm/shoulder since day prior to yesterday. Pt endorsing dyspnea on exertion. Pt had one episode of isolated fever two days ago. Admitted for further management.         1. Diastolic Heart Failure secondary to advanced kidney disease   - TTE in Jan 2024 with normal EF, moderate DD, elevated filling pressures, severe LVH, severely dilated LA, small pericardial effusion  - cardiac amyloid with PYP scan normal   - BNP almost 58K  - Appreciate Renal and TP recs  - Monitor Strict I&Os, daily weights  - Needs further volume optimization via HD  - CP likely 2/2 Acute Diastolic HF    2. HTN - improved   - Increase labetalol 200mg PO to TID  - Cont Nifedipine 60mg daily BID.   - Was also previously on Hydralazine 100mg PO TID     3. ESRD  - Renal recs appreciated  - c/w HD M/W/F    4. Chest Pain  - No ECG found in chart or EMR. Will order and review.  - Trop 140 but non-diagnostic given ESRD  - TTE shows preserved EF, no WMA, mod DD with elevated filling pressures, small pericardial effusion, improved LVOT  - Likely 2/2 ADHF. Volume optimization with HD.             Charlene SANDERS-BC  Dutch Oh DO [facility]  Cardiovascular Medicine  [address], [address]  Available for call or text via [employer]  Office [phone]

## 2025-03-28 NOTE — H&P ADULT - ASSESSMENT
79-year-old female with a history of hypertension, diastolic heart failure, renal transplant 2 years ago no longer on tacrolimus, HD MWF (last HD Wednesday) presenting yesterday with right sided constant chest pain that radiates to her right arm/shoulder since day prior to yesterday. Pt endorsing dyspnea on exertion. Pt had one episode of isolated fever two days ago. Admitted for further management.

## 2025-03-28 NOTE — H&P ADULT - NSICDXPASTMEDICALHX_GEN_ALL_CORE_FT
PAST MEDICAL HISTORY:  Anemia of chronic disease     Cataract     DVT (deep venous thrombosis) of Left subclavian vein, 06/12/17    ESRD (end stage renal disease) on dialysis     Glaucoma     History of arteriovenostomy for renal dialysis     [place] (hard of hearing)     HTN (hypertension)     Kidney transplant recipient

## 2025-03-28 NOTE — CONSULT NOTE ADULT - SUBJECTIVE AND OBJECTIVE BOX
[facility]   MD SAMMY ORDONEZ MD ANGELA WONG, PA QIAN CHEN, NP      TEL:  OFFICE: [phone]  From 5pm-7am answering service [phone]    --- INITIAL RENAL CONSULT NOTE ---date of service 03-28-25 @ 11:20    HPI:  78-year-old female with a history of HTN, diastolic HF, renal transplant 2 years ago now back on HD MWF presenting today with right sided constant chest pain that radiates to her right arm/shoulder since yesterday.  Pt endorsing dyspnea on exertion.  Pt had one episode of isolated fever two days ago. Pt denies abdominal pain, nausea, vomiting, diarrhea, dysuria, recent travel, sick contacts, hx of blood clots.      Allergies:  Mushrooms (Anaphylaxis)  penicillin (Rash)      PAST MEDICAL & SURGICAL HISTORY:  HTN (hypertension)    Glaucoma    Cataract    ESRD (end stage renal disease) on dialysis    DVT (deep venous thrombosis)  of Left subclavian vein, 06/12/17    Kidney transplant recipient    Anemia of chronic disease    History of arteriovenostomy for renal dialysis    [place] (hard of hearing)    Acquired cataract extraction with b/l lense placement, 2016    S/P KEVEN-BSO for fibroids, 2012    Hemodialysis access, AV graft    History of renal transplantation DDRT 5/19/2021    AV fistula        Home Medications Reviewed    Hospital Medications:   MEDICATIONS  (STANDING):  benzonatate 100 milliGRAM(s) Oral three times a day  fluticasone propionate 50 MICROgram(s)/spray Nasal Spray 1 Spray(s) Both Nostrils two times a day      SOCIAL HISTORY:  Denies ETOh, Smoking,     FAMILY HISTORY:  Family history of cerebrovascular accident (CVA)    Family history of colon cancer (Sibling)        REVIEW OF SYSTEMS:  CONSTITUTIONAL: Per HPI  EYES/ENT: No visual changes;  No vertigo or throat pain   NECK: No pain or stiffness  RESPIRATORY: Per HPI  CARDIOVASCULAR: Per HPI  GASTROINTESTINAL: No abdominal or epigastric pain. No nausea, vomiting, or hematemesis; No diarrhea or constipation. No melena or hematochezia.  GENITOURINARY: No dysuria, frequency, foamy urine, urinary urgency, incontinence or hematuria  NEUROLOGICAL: No numbness or weakness  SKIN: No itching, burning, rashes, or lesions   VASCULAR: No bilateral lower extremity edema.   All other review of systems is negative unless indicated above.      VITALS:  T(F): 97.9 (03-28-25 @ 02:55), Max: 98.2 (03-27-25 @ 20:06)  HR: 80 (03-28-25 @ 02:55)  BP: 192/82 (03-28-25 @ 02:55)  RR: 18 (03-28-25 @ 02:55)  SpO2: 94% (03-28-25 @ 02:55)  Wt(kg): --    Height (cm): 172.7 (03-27 @ 20:06)  Weight (kg): 55.3 (03-27 @ 20:06)  BMI (kg/m2): 18.5 (03-27 @ 20:06)  BSA (m2): 1.66 (03-27 @ 20:06)    PHYSICAL EXAM:  General: NAD  HEENT: anicteric sclera, oropharynx clear, MMM  Neck: No JVD  Respiratory: Bibasilar course crackles.  Cardiovascular: S1, S2, RRR  Gastrointestinal: BS+, soft, NT/ND  Extremities: No cyanosis or clubbing. No peripheral edema  Neurological: A/O x 3, no focal deficits  Psychiatric: Normal mood, normal affect  : No CVA tenderness. No sethi.   Skin: No rashes  Vascular Access: RUE AVG.      LABS:  03-27    134[L]  |  94[L]  |  47[H]  ----------------------------<  88  4.5   |  22  |  5.64[H]    Ca    9.0      27 Mar 2025 22:02  Mg     2.4     03-27    TPro  7.6  /  Alb  3.7  /  TBili  0.3  /  DBili      /  AST  15  /  ALT  9[L]  /  AlkPhos  80  03-27    Creatinine Trend: 5.64 <--                        10.2   4.71  )-----------( 160      ( 27 Mar 2025 22:02 )             31.4     Urine Studies:  Urinalysis Basic - ( 27 Mar 2025 22:02 )    Color:  / Appearance:  / SG:  / pH:   Gluc: 88 mg/dL / Ketone:   / Bili:  / Urobili:    Blood:  / Protein:  / Nitrite:    Leuk Esterase:  / RBC:  / WBC    Sq Epi:  / Non Sq Epi:  / Bacteria:           RADIOLOGY & ADDITIONAL STUDIES:

## 2025-03-28 NOTE — ADVANCED PRACTICE NURSE CONSULT - REASON FOR CONSULT
Requested by staff to assess skin status: sacrum. PMH is noted:  Reason for Admission: CP and HARLEY  History of Present Illness:   79-year-old female with a history of hypertension, diastolic heart failure, renal transplant 2 years ago no longer on tacrolimus, HD MWF (last HD Wednesday) presenting yesterday with right sided constant chest pain that radiates to her right arm/shoulder since day prior to yesterday. Pt endorsing dyspnea on exertion. Pt had one episode of isolated fever two days ago. Pt denies abdominal pain, nausea, vomiting, diarrhea, dysuria, recent travel, sick contacts, hx of blood clots. She reports some stuffed nose and cough with whitish phlegm.   In ED received hydralazine 2.5mg iv, albuterol, lasix 40mg iv x 2, labetalol 20mg iv x 1.

## 2025-03-28 NOTE — ED CLERICAL - DIVISION
Oxytocin Safety Progress Check Note - Kay Love 25 y.o. female MRN: [id number]    Unit/Bed#: -01 Encounter: [id number]    Dose (beverly-units/min) Oxytocin: 8 beverly-Gabriel Levine MD 9/16/2024 2:03 AM       [facility]...

## 2025-03-28 NOTE — H&P ADULT - NSHPPHYSICALEXAM_GEN_ALL_CORE
PHYSICAL EXAM:  Vital Signs Last 24 Hrs  T(C): 36.6 (03-28-25 @ 02:55)  T(F): 97.9 (03-28-25 @ 02:55), Max: 98.2 (03-27-25 @ 20:06)  HR: 80 (03-28-25 @ 02:55) (74 - 82)  BP: 192/82 (03-28-25 @ 02:55)  BP(mean): 106 (03-27-25 @ 22:57) (106 - 106)  RR: 18 (03-28-25 @ 02:55) (18 - 22)  SpO2: 94% (03-28-25 @ 02:55) (94% - 100%)  Wt(kg): --    Constitutional: NAD, awake and alert  EYES: EOMI  ENT:  Normal Hearing, no tonsillar exudates   Neck: Soft and supple,    Respiratory: mild crackles in bases  Cardiovascular: S1 and S2, regular rate and rhythm, systolic murmur present  Gastrointestinal: Bowel Sounds present, soft, nontender, nondistended, no guarding, no rebound  Extremities: No cyanosis or clubbing or edema; warm to touch; right arm graft with strong thrill.   Neurological: A/O x 3, no focal deficits  Musculoskeletal: 5/5 strength b/l upper and lower extremities  Skin: No rashes  Psych: No depression or anhedonia  Heme: No bruises, no nose bleeds

## 2025-03-28 NOTE — ADVANCED PRACTICE NURSE CONSULT - RECOMMEDATIONS
1. sacrum, b/l buttocks: continue to monitor, routine skin care  2, continue to encourage mobility, T&P  3, off-load heels when in bed  4. seat cushion when oob to chair  5. nutrition consult  Tx plan discussed with pt /RN

## 2025-03-28 NOTE — PROVIDER CONTACT NOTE (OTHER) - ASSESSMENT
Patient is A&O*4 and is hemodynamically stable. Patient is asymptomatic and denies any symptoms of hypertension. Patient was given ordered medication and instructed to call if she needs experiences any symptoms.

## 2025-03-28 NOTE — H&P ADULT - PROBLEM SELECTOR PLAN 1
-Dyspnea and some chest discomfort and cough. Possible acute on chronic diastolic CHF exacerbation. Says she feels better after IV lasix given in ED.   -F/u renal and cards recs.  -TTE.  -Strict I's/O's and daily weights.  -Some nasal congestion and cough, ?allergies vs ?URI. Flu/covid swab negative. -Flonase and tessalon and nasal saline for now. CXR showed mild pulm vasc congestion.

## 2025-03-28 NOTE — H&P ADULT - PROBLEM SELECTOR PLAN 2
-HD per renal.   -H/o renal transplant failed. -C/w home envarsus er 4mg daily. F/u tacro level in am. -C/w home prednisone 5mg daily.   -C/w home sevelamer 1600mg tid. -F/u renal recs.

## 2025-03-28 NOTE — CONSULT NOTE ADULT - ASSESSMENT
78-year-old female with a history of HTN, diastolic HF, renal transplant 2 years ago now back on HD MWF presenting today with right sided constant chest pain that radiates to her right arm/shoulder since yesterday.  Pt endorsing dyspnea on exertion.  Pt had one episode of isolated fever two days ago. Pt denies abdominal pain, nausea, vomiting, diarrhea, dysuria, recent travel, sick contacts, hx of blood clots.  Nephrology consulted for HD needs.    A/P  ESRD on HD:  Schedule: MWF  Access: SHILOH PARR  Outpatient center: [facility]  Last dialyzed: 3/26  CXr w/ mild pulmonary congestion.  Plan for HD today.  Consent obtained, witnessed, and in pt's chart.  Continue HD schedule.  Renal diet.  Monitor BMP.    S/p DDRT (5/19/2021 - induction w/ Basiliximab)  Pt reports she is on Envarsus 4mg qd; no longer on cellcept or prednisone.  Check FK level 30min prior to dose   Goal is 2-3.    HTN:  BP high.  Resume home meds.  UF w/ HD.  Low sodium diet.  Monitor BP.    Anemia:  Hgb at goal.  SHELDON w/ HD.  Monitor Hgb.  Transfuse for Hgb <7.    CKD - MBD:  Check PTH.  Monitor PO4 and Ca daily.       78-year-old female with a history of HTN, diastolic HF, renal transplant 2 years ago now back on HD MWF presenting today with right sided constant chest pain that radiates to her right arm/shoulder since yesterday.  Pt endorsing dyspnea on exertion.  Pt had one episode of isolated fever two days ago. Pt denies abdominal pain, nausea, vomiting, diarrhea, dysuria, recent travel, sick contacts, hx of blood clots.  Nephrology consulted for HD needs.    A/P  ESRD on HD:  Schedule: MWF  Access: SHILOH PABLO  Nephro: Dr. Soriano  Outpatient center: [facility]  Last dialyzed: 3/26  CXr w/ mild pulmonary congestion.  Plan for HD today.  Consent obtained, witnessed, and in pt's chart.  Continue HD schedule.  Renal diet.  Monitor BMP.    S/p DDRT (5/19/2021 - induction w/ Basiliximab)  Pt reports she is on Envarsus 4mg qd; no longer on cellcept or prednisone.  Check FK level 30min prior to dose   Goal is 2-3.    HTN:  BP high.  Resume home meds.  UF w/ HD.  Low sodium diet.  Monitor BP.    Anemia:  Hgb at goal.  SHELDON w/ HD.  Monitor Hgb.  Transfuse for Hgb <7.    CKD - MBD:  Check PTH.  Monitor PO4 and Ca daily.

## 2025-03-29 ENCOUNTER — TRANSCRIPTION ENCOUNTER (OUTPATIENT)
Age: 80
End: 2025-03-29

## 2025-03-29 LAB
ANION GAP SERPL CALC-SCNC: 16 MMOL/L — SIGNIFICANT CHANGE UP (ref 5–17)
BUN SERPL-MCNC: 24 MG/DL — HIGH (ref 7–23)
CALCIUM SERPL-MCNC: 9.1 MG/DL — SIGNIFICANT CHANGE UP (ref 8.4–10.5)
CHLORIDE SERPL-SCNC: 96 MMOL/L — SIGNIFICANT CHANGE UP (ref 96–108)
CO2 SERPL-SCNC: 25 MMOL/L — SIGNIFICANT CHANGE UP (ref 22–31)
CREAT SERPL-MCNC: 4.17 MG/DL — HIGH (ref 0.5–1.3)
EGFR: 10 ML/MIN/1.73M2 — LOW
EGFR: 10 ML/MIN/1.73M2 — LOW
GLUCOSE SERPL-MCNC: 75 MG/DL — SIGNIFICANT CHANGE UP (ref 70–99)
HCT VFR BLD CALC: 31.1 % — LOW (ref 34.5–45)
HGB BLD-MCNC: 10.2 G/DL — LOW (ref 11.5–15.5)
MAGNESIUM SERPL-MCNC: 2.2 MG/DL — SIGNIFICANT CHANGE UP (ref 1.6–2.6)
MCHC RBC-ENTMCNC: 29.2 PG — SIGNIFICANT CHANGE UP (ref 27–34)
MCHC RBC-ENTMCNC: 32.8 G/DL — SIGNIFICANT CHANGE UP (ref 32–36)
MCV RBC AUTO: 89.1 FL — SIGNIFICANT CHANGE UP (ref 80–100)
NRBC BLD AUTO-RTO: 0 /100 WBCS — SIGNIFICANT CHANGE UP (ref 0–0)
PHOSPHATE SERPL-MCNC: 4.9 MG/DL — HIGH (ref 2.5–4.5)
PLATELET # BLD AUTO: 152 K/UL — SIGNIFICANT CHANGE UP (ref 150–400)
POTASSIUM SERPL-MCNC: 3.7 MMOL/L — SIGNIFICANT CHANGE UP (ref 3.5–5.3)
POTASSIUM SERPL-SCNC: 3.7 MMOL/L — SIGNIFICANT CHANGE UP (ref 3.5–5.3)
RBC # BLD: 3.49 M/UL — LOW (ref 3.8–5.2)
RBC # FLD: 16.4 % — HIGH (ref 10.3–14.5)
SODIUM SERPL-SCNC: 137 MMOL/L — SIGNIFICANT CHANGE UP (ref 135–145)
TACROLIMUS SERPL-MCNC: 4 NG/ML — SIGNIFICANT CHANGE UP
WBC # BLD: 3.44 K/UL — LOW (ref 3.8–10.5)
WBC # FLD AUTO: 3.44 K/UL — LOW (ref 3.8–10.5)

## 2025-03-29 PROCEDURE — 99233 SBSQ HOSP IP/OBS HIGH 50: CPT

## 2025-03-29 RX ORDER — SEVELAMER HYDROCHLORIDE 800 MG/1
2 TABLET ORAL
Qty: 0 | Refills: 0 | DISCHARGE

## 2025-03-29 RX ORDER — SODIUM CHLORIDE 0.65 %
1 AEROSOL, SPRAY (ML) NASAL
Qty: 0 | Refills: 0 | DISCHARGE
Start: 2025-03-29

## 2025-03-29 RX ORDER — LABETALOL HYDROCHLORIDE 200 MG/1
1 TABLET, FILM COATED ORAL
Qty: 90 | Refills: 0
Start: 2025-03-29 | End: 2025-04-27

## 2025-03-29 RX ORDER — BENZONATATE 100 MG
1 CAPSULE ORAL
Qty: 15 | Refills: 0
Start: 2025-03-29 | End: 2025-04-02

## 2025-03-29 RX ORDER — NIFEDIPINE 30 MG
1 TABLET, EXTENDED RELEASE 24 HR ORAL
Qty: 0 | Refills: 0 | DISCHARGE
Start: 2025-03-29

## 2025-03-29 RX ORDER — LABETALOL HYDROCHLORIDE 200 MG/1
200 TABLET, FILM COATED ORAL ONCE
Refills: 0 | Status: DISCONTINUED | OUTPATIENT
Start: 2025-03-29 | End: 2025-03-29

## 2025-03-29 RX ADMIN — SEVELAMER HYDROCHLORIDE 1600 MILLIGRAM(S): 800 TABLET ORAL at 06:26

## 2025-03-29 RX ADMIN — PREDNISONE 5 MILLIGRAM(S): 20 TABLET ORAL at 06:22

## 2025-03-29 RX ADMIN — Medication 81 MILLIGRAM(S): at 12:45

## 2025-03-29 RX ADMIN — Medication 100 MILLIGRAM(S): at 06:22

## 2025-03-29 RX ADMIN — LABETALOL HYDROCHLORIDE 200 MILLIGRAM(S): 200 TABLET, FILM COATED ORAL at 12:45

## 2025-03-29 RX ADMIN — SEVELAMER HYDROCHLORIDE 1600 MILLIGRAM(S): 800 TABLET ORAL at 12:45

## 2025-03-29 RX ADMIN — FLUTICASONE PROPIONATE 1 SPRAY(S): 50 SPRAY, METERED NASAL at 06:41

## 2025-03-29 RX ADMIN — Medication 60 MILLIGRAM(S): at 06:21

## 2025-03-29 RX ADMIN — LABETALOL HYDROCHLORIDE 200 MILLIGRAM(S): 200 TABLET, FILM COATED ORAL at 21:22

## 2025-03-29 RX ADMIN — Medication 60 MILLIGRAM(S): at 18:01

## 2025-03-29 RX ADMIN — Medication 100 MILLIGRAM(S): at 21:22

## 2025-03-29 RX ADMIN — LABETALOL HYDROCHLORIDE 200 MILLIGRAM(S): 200 TABLET, FILM COATED ORAL at 06:22

## 2025-03-29 RX ADMIN — Medication 100 MILLIGRAM(S): at 12:45

## 2025-03-29 RX ADMIN — Medication 25 MILLIGRAM(S): at 18:54

## 2025-03-29 RX ADMIN — TACROLIMUS 4 MILLIGRAM(S): 0.5 CAPSULE ORAL at 07:41

## 2025-03-29 RX ADMIN — SEVELAMER HYDROCHLORIDE 1600 MILLIGRAM(S): 800 TABLET ORAL at 21:22

## 2025-03-29 RX ADMIN — HEPARIN SODIUM 5000 UNIT(S): 1000 INJECTION INTRAVENOUS; SUBCUTANEOUS at 06:26

## 2025-03-29 NOTE — DISCHARGE NOTE NURSING/CASE MANAGEMENT/SOCIAL WORK - FINANCIAL ASSISTANCE
[facility] provides services at a reduced cost to those who are determined to be eligible through [facility]’s financial assistance program. Information regarding [facility]’s financial assistance program can be found by going to [email or web address] or by calling [phone].

## 2025-03-29 NOTE — DISCHARGE NOTE PROVIDER - NSDCMRMEDTOKEN_GEN_ALL_CORE_FT
aspirin 81 mg oral tablet: 1 tab(s) orally once a day  Envarsus XR 4 mg oral tablet, extended release: 1 tab(s) orally once a day (in the morning)  labetalol 200 mg oral tablet: 1 tab(s) orally 2 times a day NOTE: As per Pharmacy, 1 tablet orally 3 times a day  NIFEdipine 60 mg oral tablet, extended release: 1 tab(s) orally 3 times a day NOTE: As per Pharmacy, 1 tablet orally 2 times a day  predniSONE 5 mg oral tablet: 1 tab(s) orally once a day  sevelamer carbonate 0.8 g oral powder for reconstitution: 1 packet(s) orally once a day as needed NOTE: As per pharmacy, 2 packets orally 3 times a day  Vitamin D2 oral tablet: 1 tablet orally once a day   aspirin 81 mg oral tablet: 1 tab(s) orally once a day  benzonatate 100 mg oral capsule: 1 cap(s) orally 3 times a day as needed for  cough  Envarsus XR 4 mg oral tablet, extended release: 1 tab(s) orally once a day (in the morning)  labetalol 200 mg oral tablet: 1 tab(s) orally 3 times a day  NIFEdipine 60 mg oral tablet, extended release: 1 tab(s) orally 2 times a day  predniSONE 5 mg oral tablet: 1 tab(s) orally once a day  sevelamer carbonate 0.8 g oral powder for reconstitution: 2 packet(s) orally 3 times a day  sodium chloride 0.65% nasal spray: 1 spray(s) nasal 2 times a day as needed for nasal congestion  Vitamin D2 oral tablet: 1 tablet orally once a day   aspirin 81 mg oral tablet: 1 tab(s) orally once a day  benzonatate 100 mg oral capsule: 1 cap(s) orally 3 times a day as needed for  cough  Envarsus XR 4 mg oral tablet, extended release: 1 tab(s) orally once a day (in the morning)  labetalol 300 mg oral tablet: 1 tab(s) orally every 8 hours  NIFEdipine 60 mg oral tablet, extended release: 1 tab(s) orally 2 times a day  predniSONE 5 mg oral tablet: 1 tab(s) orally once a day  sevelamer carbonate 0.8 g oral powder for reconstitution: 2 packet(s) orally 3 times a day  sodium chloride 0.65% nasal spray: 1 spray(s) nasal 2 times a day as needed for nasal congestion  Vitamin D2 oral tablet: 1 tablet orally once a day

## 2025-03-29 NOTE — DIETITIAN INITIAL EVALUATION ADULT - REASON INDICATOR FOR ASSESSMENT
RD consult warranted for Pt a/w skin changes   Source: Patient, Electronic Medical Record,  [facility] ID #[id number]  Chart reviewed, events noted.

## 2025-03-29 NOTE — DIETITIAN INITIAL EVALUATION ADULT - PERTINENT LABORATORY DATA
03-29    137  |  96  |  24[H]  ----------------------------<  75  3.7   |  25  |  4.17[H]    Ca    9.1      29 Mar 2025 07:23  Phos  4.9     03-29  Mg     2.2     03-29    TPro  7.6  /  Alb  3.7  /  TBili  0.3  /  DBili  x   /  AST  15  /  ALT  9[L]  /  AlkPhos  80  03-27   intact

## 2025-03-29 NOTE — PROGRESS NOTE ADULT - PROBLEM SELECTOR PLAN 4
-HSQ for DVT PPx.     Disposition: Home once blood pressure controlled.     Plan discussed with patient in detail, she agrees with the plan, all questions answered. Patient to update family.

## 2025-03-29 NOTE — PROGRESS NOTE ADULT - NUTRITIONAL ASSESSMENT
This patient has been assessed with a concern for Malnutrition and has been determined to have a diagnosis/diagnoses of Severe protein-calorie malnutrition and Underweight (BMI < 19).    This patient is being managed with:   Diet Regular-  For patients receiving Renal Replacement - No Protein Restr No Conc K No Conc Phos Low Sodium (RENAL)  No Concentrated Potassium  Low Sodium  No Concentrated Phosphorus  Supplement Feeding Modality:  Oral  Nepro Cans or Servings Per Day:  1       Frequency:  Daily  Entered: Mar 28 2025 11:00AM

## 2025-03-29 NOTE — CHART NOTE - NSCHARTNOTEFT_GEN_A_CORE
Briefly, this is an 79 year old female presenting with chest pain secondary to fluid overload in the setting of known ESRD. TTE without any wall motion abnormalities, indicating no ACS. Patient received hemodialysis with improvement of chest pain. Fluid overload was likely flash pulmonary edema due to hypertension.     Patient evaluated during morning rounds. HPI and hospital course reviewed with the patient in detail. Patient reports no chest pain at this time. States that she lives with her . Discussed case with both nephrology and cardiology, who states that it is OK to discharge the patient at this time.     14 point ROS negative unless otherwise stated above.     VITAL SIGNS:  T(C): 36.9 (03-29 @ 12:40), Max: 36.9 (03-29 @ 12:40)   HR: 67   BP: 166/67   RR: 18   SpO2: 97%    PHYSICAL EXAM:  CONSTITUTIONAL: non-toxic appearing, breathing comfortably on room air  EYES: anicteric   HENT: oropharynx clear and moist, normocephalic, no palpable masses in the neck  LYMPH NODES: no cervical lymphadenopathy   RESPIRATORY: normal respiratory effort; lungs are clear to auscultation bilaterally (no wheezes/crackles)  CARDIOVASCULAR: regular rate and rhythm, normal S1 and S2, no murmur/rub/gallop  ABDOMEN: positive bowel sounds, non-tender, non-distended, no organomegaly   MUSCULOSKELETAL:  moving all four extremities   EXTREMITIES:  no lower extremity edema  NEUROLOGY: awake, alert, and oriented to self, place, date, and situation; good fund of knowledge   PSYCH: normal affect     Patient appears well enough for discharge. Patient is agreeable to discharge.     Hospital course, medication reconciliation, and follow up appointments/instructions reviewed with patient in detail. All questions answered. Patient to update family member. Labetalol dose increased to 200 mg three times a day for hypertension, as well as benzonatate as needed for cough added.

## 2025-03-29 NOTE — DIETITIAN INITIAL EVALUATION ADULT - OTHER INFO
PMH renal transplant, noted prednisone, tacrolimus ordered in house   elevated phosphorous 3/29     Weights:  - UBW (per patient): current UBW reported as 122 pounds ; Pt reported losing 100 pounds 2-3 years ago and has since gained some weight back.  - Dosing Weight (per chart): 121.9 pounds (3/27)   - Daily Weights (per flow sheets): 120.8 pounds (3/28), 121.2 pounds (3/29)  -  pounds +/- 10%   - Per [facility] HIE: 119 pounds (4/5/24), 114 pounds (1/17/24), 111 pounds (11/29/23)   RD to continue to monitor weights and trends as able.

## 2025-03-29 NOTE — PROVIDER CONTACT NOTE (OTHER) - ASSESSMENT
Pt AOx4 w/ other VSS as charted. No c/o pain, CP, dizziness, or discomfort. Pt refusing IV access at this time.

## 2025-03-29 NOTE — DISCHARGE NOTE PROVIDER - NSDCCPCAREPLAN_GEN_ALL_CORE_FT
PRINCIPAL DISCHARGE DIAGNOSIS  Diagnosis: Acute on chronic diastolic congestive heart failure  Assessment and Plan of Treatment: Please follow up with your Cardiologist Dr. Oh within 1 week after discharge for continued management and care.   Weigh yourself daily.  If you gain 3lbs in 3 days, or 5lbs in a week call your Health Care Provider.  Do not eat or drink foods containing more than 2000mg of salt (sodium) in your diet every day.  Call your Health Care Provider if you have any swelling or increased swelling in your feet, ankles, and/or stomach.  Take all of your medication as directed.  If you become dizzy call your Health Care Provider.        SECONDARY DISCHARGE DIAGNOSES  Diagnosis: HTN (hypertension)  Assessment and Plan of Treatment: Low salt diet  Activity as tolerated.  Take all medication as prescribed.  Please follow up with your Cardiologist Dr. Oh within 1 week after discharge for BP monitoring and further management and care.   Notify your doctor if you have any of the following symptoms:   Dizziness, Lightheadedness, Blurry vision, Headache, Chest pain, Shortness of breath      Diagnosis: ESRD on dialysis  Assessment and Plan of Treatment: Please maintain compliance with your Dialysis schedule.   Please closely follow up with your Nephrologist for continued management and care.   Avoid taking (NSAIDs) - (ex: Ibuprofen, Advil, Celebrex, Naprosyn)  Avoid taking any nephrotoxic agents (can harm kidneys) - Intravenous contrast for diagnostic testing, combination cold medications.  Have all medications adjusted for your renal function by your Health Care Provider.  Blood pressure control is important.  Take all medication as prescribed.

## 2025-03-29 NOTE — DIETITIAN INITIAL EVALUATION ADULT - SIGNS/SYMPTOMS
as evidenced by HD, suspected deep tissue injury  as evidenced by severe muscle and fat depletion, BMI 18.5

## 2025-03-29 NOTE — PROGRESS NOTE ADULT - PROBLEM SELECTOR PLAN 2
-HD per renal.   -H/o renal transplant failed.   -C/w home envarsus er 4mg daily.   -C/w home prednisone 5mg daily.   -C/w home sevelamer 1600mg tid. -F/u renal recs.

## 2025-03-29 NOTE — DIETITIAN INITIAL EVALUATION ADULT - NSICDXPASTMEDICALHX_GEN_ALL_CORE_FT
PAST MEDICAL HISTORY:  Anemia of chronic disease     Cataract     DVT (deep venous thrombosis) of Left subclavian vein, 06/12/17    ESRD (end stage renal disease) on dialysis     Glaucoma     History of arteriovenostomy for renal dialysis     Cachil DeHe (hard of hearing)     HTN (hypertension)     Kidney transplant recipient

## 2025-03-29 NOTE — DISCHARGE NOTE PROVIDER - CARE PROVIDER_API CALL
Kourtney Tabares  Internal Medicine  [address] [address]  Phone: [phone]  Fax: [phone]  Follow Up Time: 1 week    Robson Soriano  Nephmanohar  [address] [place] [address]  Phone: [phone]  Fax: [phone]  Follow Up Time: 1 week    Dutch Oh  Cardiovascular Disease  [address] [facility], [address]  [place] [address]  Phone: [phone]  Fax: [phone]  Follow Up Time: 1 week

## 2025-03-29 NOTE — DIETITIAN INITIAL EVALUATION ADULT - ORAL INTAKE PTA/DIET HISTORY
Pt reports having a good appetite and PO intake PTA. Follows low potassium, low sodium diet. 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 Pt denies taking any micronutrient supplementation at home regularly. Denies any difficulty chewing/swallowing at this time, reported tolerating current diet consistency.

## 2025-03-29 NOTE — DIETITIAN INITIAL EVALUATION ADULT - PERTINENT MEDS FT
MEDICATIONS  (STANDING):  aspirin enteric coated 81 milliGRAM(s) Oral daily  benzonatate 100 milliGRAM(s) Oral three times a day  epoetin ivelisse-epbx (RETACRIT) Injectable 45660 Unit(s) IV Push <User Schedule>  fluticasone propionate 50 MICROgram(s)/spray Nasal Spray 1 Spray(s) Both Nostrils two times a day  heparin   Injectable 5000 Unit(s) SubCutaneous two times a day  labetalol 200 milliGRAM(s) Oral three times a day  NIFEdipine XL 60 milliGRAM(s) Oral two times a day  predniSONE   Tablet 5 milliGRAM(s) Oral daily  sevelamer carbonate 1600 milliGRAM(s) Oral three times a day  tacrolimus ER Tablet (ENVARSUS XR) 4 milliGRAM(s) Oral daily    MEDICATIONS  (PRN):  acetaminophen     Tablet .. 650 milliGRAM(s) Oral every 6 hours PRN Temp greater or equal to 38C (100.4F), Mild Pain (1 - 3), Moderate Pain (4 - 6)  sodium chloride 0.65% Nasal 1 Spray(s) Both Nostrils two times a day PRN Nasal Congestion

## 2025-03-29 NOTE — DIETITIAN INITIAL EVALUATION ADULT - REASON FOR ADMISSION
Heart failure    per H&P: "79-year-old female with a history of hypertension, diastolic heart failure, renal transplant 2 years ago no longer on tacrolimus, HD MWF (last HD Wednesday) presenting yesterday with right sided constant chest pain that radiates to her right arm/shoulder since day prior to yesterday"

## 2025-03-29 NOTE — PROGRESS NOTE ADULT - SUBJECTIVE AND OBJECTIVE BOX
DATE OF SERVICE: 03-29-25 @ 13:30    Patient is a 79y old  Female who presents with a chief complaint of CP and HARLEY (28 Mar 2025 15:33)      INTERVAL HISTORY: feels okay    REVIEW OF SYSTEMS:  CONSTITUTIONAL: No weakness  EYES/ENT: No visual changes;  No throat pain   NECK: No pain or stiffness  RESPIRATORY: No cough, wheezing; No shortness of breath  CARDIOVASCULAR: No chest pain or palpitations  GASTROINTESTINAL: No abdominal  pain. No nausea, vomiting, or hematemesis  GENITOURINARY: No dysuria, frequency or hematuria  NEUROLOGICAL: No stroke like symptoms  SKIN: No rashes      	  MEDICATIONS:  labetalol 200 milliGRAM(s) Oral three times a day  NIFEdipine XL 60 milliGRAM(s) Oral two times a day        PHYSICAL EXAM:  T(C): 36.9 (03-29-25 @ 12:40), Max: 37.5 (03-28-25 @ 16:55)  HR: 67 (03-29-25 @ 12:40) (58 - 73)  BP: 166/67 (03-29-25 @ 12:40) (134/55 - 191/86)  RR: 18 (03-29-25 @ 12:40) (18 - 19)  SpO2: 97% (03-29-25 @ 12:40) (94% - 97%)  Wt(kg): --  I&O's Summary    28 Mar 2025 07:01  -  29 Mar 2025 07:00  --------------------------------------------------------  IN: 500 mL / OUT: 3500 mL / NET: -3000 mL          Appearance: In no distress	  HEENT:    PERRL, EOMI	  Cardiovascular:  S1 S2, No JVD  Respiratory: Lungs clear to auscultation	  Gastrointestinal:  Soft, Non-tender, + BS	  Vascularature:  No edema of LE  Psychiatric: Appropriate affect   Neuro: no acute focal deficits                               10.2   3.44  )-----------( 152      ( 29 Mar 2025 07:19 )             31.1     03-29    137  |  96  |  24[H]  ----------------------------<  75  3.7   |  25  |  4.17[H]    Ca    9.1      29 Mar 2025 07:23  Phos  4.9     03-29  Mg     2.2     03-29    TPro  7.6  /  Alb  3.7  /  TBili  0.3  /  DBili  x   /  AST  15  /  ALT  9[L]  /  AlkPhos  80  03-27        Labs personally reviewed      ASSESSMENT/PLAN: 	    78-year-old female with a history of hypertension, diastolic heart failure, renal transplant 2 years ago no longer on tacrolimus who presents with shortness of breath and lower extremity swelling since yesterday clinical presentation consistent with acute on chronic diastolic heart failure 79-year-old female with a history of hypertension, diastolic heart failure, renal transplant 2 years ago no longer on tacrolimus, HD MWF (last HD Wednesday) presenting yesterday with right sided constant chest pain that radiates to her right arm/shoulder since day prior to yesterday. Pt endorsing dyspnea on exertion. Pt had one episode of isolated fever two days ago. Admitted for further management.         1. Diastolic Heart Failure secondary to advanced kidney disease   - TTE in Jan 2024 with normal EF, moderate DD, elevated filling pressures, severe LVH, severely dilated LA, small pericardial effusion  - cardiac amyloid with PYP scan normal   - BNP almost 58K  - Appreciate Renal and TP recs  - Monitor Strict I&Os, daily weights  - Needs further volume optimization via HD  - CP likely 2/2 Acute Diastolic HF    2. HTN - improved   - Increase labetalol 200mg PO to TID  - Cont Nifedipine 60mg daily BID.   - Was also previously on Hydralazine 100mg PO TID     3. ESRD  - Renal recs appreciated  - c/w HD M/W/F    4. Chest Pain  - EKG SR 1 deg AVBl, LVH   - Trop 140 but non-diagnostic given ESRD  - TTE shows preserved EF, no WMA, mod DD with elevated filling pressures, small pericardial effusion, improved LVOT  - Likely 2/2 ADHF. Volume optimization with HD.  - 3/29 CP now resolved per pt          Iolani Behrbom, ZAIN-NP   Dutch Oh DO [facility]  Cardiovascular Medicine  [address] [facility], [address]  Available through call or text on [employer]  Office: [phone]   DATE OF SERVICE: 03-29-25 @ 13:30    Patient is a 79y old  Female who presents with a chief complaint of CP and HARLEY (28 Mar 2025 15:33)      INTERVAL HISTORY: feels okay    REVIEW OF SYSTEMS:  CONSTITUTIONAL: No weakness  EYES/ENT: No visual changes;  No throat pain   NECK: No pain or stiffness  RESPIRATORY: No cough, wheezing; No shortness of breath  CARDIOVASCULAR: No chest pain or palpitations  GASTROINTESTINAL: No abdominal  pain. No nausea, vomiting, or hematemesis  GENITOURINARY: No dysuria, frequency or hematuria  NEUROLOGICAL: No stroke like symptoms  SKIN: No rashes      	  MEDICATIONS:  labetalol 200 milliGRAM(s) Oral three times a day  NIFEdipine XL 60 milliGRAM(s) Oral two times a day        PHYSICAL EXAM:  T(C): 36.9 (03-29-25 @ 12:40), Max: 37.5 (03-28-25 @ 16:55)  HR: 67 (03-29-25 @ 12:40) (58 - 73)  BP: 166/67 (03-29-25 @ 12:40) (134/55 - 191/86)  RR: 18 (03-29-25 @ 12:40) (18 - 19)  SpO2: 97% (03-29-25 @ 12:40) (94% - 97%)  Wt(kg): --  I&O's Summary    28 Mar 2025 07:01  -  29 Mar 2025 07:00  --------------------------------------------------------  IN: 500 mL / OUT: 3500 mL / NET: -3000 mL          Appearance: In no distress	  HEENT:    PERRL, EOMI	  Cardiovascular:  S1 S2, No JVD  Respiratory: Lungs clear to auscultation	  Gastrointestinal:  Soft, Non-tender, + BS	  Vascularature:  No edema of LE  Psychiatric: Appropriate affect   Neuro: no acute focal deficits                               10.2   3.44  )-----------( 152      ( 29 Mar 2025 07:19 )             31.1     03-29    137  |  96  |  24[H]  ----------------------------<  75  3.7   |  25  |  4.17[H]    Ca    9.1      29 Mar 2025 07:23  Phos  4.9     03-29  Mg     2.2     03-29    TPro  7.6  /  Alb  3.7  /  TBili  0.3  /  DBili  x   /  AST  15  /  ALT  9[L]  /  AlkPhos  80  03-27        Labs personally reviewed      ASSESSMENT/PLAN: 	    78-year-old female with a history of hypertension, diastolic heart failure, renal transplant 2 years ago no longer on tacrolimus who presents with shortness of breath and lower extremity swelling since yesterday clinical presentation consistent with acute on chronic diastolic heart failure 79-year-old female with a history of hypertension, diastolic heart failure, renal transplant 2 years ago no longer on tacrolimus, HD MWF (last HD Wednesday) presenting yesterday with right sided constant chest pain that radiates to her right arm/shoulder since day prior to yesterday. Pt endorsing dyspnea on exertion. Pt had one episode of isolated fever two days ago. Admitted for further management.     1. Diastolic Heart Failure secondary to advanced kidney disease   - TTE in Jan 2024 with normal EF, moderate DD, elevated filling pressures, severe LVH, severely dilated LA, small pericardial effusion  - cardiac amyloid with PYP scan normal   - BNP almost 58K  - Appreciate Renal and TP recs  - Monitor Strict I&Os, daily weights  - Needs further volume optimization via HD  - CP likely 2/2 Acute Diastolic HF    2. HTN - improved   - Increase labetalol 200mg PO to TID  - Cont Nifedipine 60mg daily BID.   - Was also previously on Hydralazine 100mg PO TID     3. ESRD  - Renal recs appreciated  - c/w HD M/W/F    4. Chest Pain  - EKG SR 1 deg AVBl, LVH   - Trop 140 but non-diagnostic given ESRD  - TTE shows preserved EF, no WMA, mod DD with elevated filling pressures, small pericardial effusion, improved LVOT  - Likely 2/2 ADHF. Volume optimization with HD.  - 3/29 CP now resolved per pt          Iolani Behrbom, ZAIN-NP   Dutch Oh DO [facility]  Cardiovascular Medicine  [address] [facility], [address]  Available through call or text on [employer]  Office: [phone]

## 2025-03-29 NOTE — DISCHARGE NOTE PROVIDER - DETAILS OF MALNUTRITION DIAGNOSIS/DIAGNOSES
This patient has been assessed with a concern for Malnutrition and was treated during this hospitalization for the following Nutrition diagnosis/diagnoses:     -  03/29/2025: Severe protein-calorie malnutrition   -  03/29/2025: Underweight (BMI < 19)

## 2025-03-29 NOTE — DISCHARGE NOTE PROVIDER - CARE PROVIDERS DIRECT ADDRESSES
[email or web address]

## 2025-03-29 NOTE — PROGRESS NOTE ADULT - SUBJECTIVE AND OBJECTIVE BOX
Dr. Heath  Office [phone] (9 am to 5 pm)  Service: [phone] (5pm to 9am)  Ghislaine WHITE      RENAL PROGRESS NOTE: DATE OF SERVICE 03-29-25 @ 18:29    Patient is a 79y old  Female who presents with a chief complaint of CP and HARLEY (29 Mar 2025 14:46)      Patient seen and examined at bedside. No chest pain/sob    VITALS:  T(F): 98.4 (03-29-25 @ 12:40), Max: 98.4 (03-29-25 @ 12:40)  HR: 67 (03-29-25 @ 12:40)  BP: 166/67 (03-29-25 @ 12:40)  RR: 18 (03-29-25 @ 12:40)  SpO2: 97% (03-29-25 @ 12:40)  Wt(kg): --    03-28 @ 07:01  -  03-29 @ 07:00  --------------------------------------------------------  IN: 500 mL / OUT: 3500 mL / NET: -3000 mL          PHYSICAL EXAM:  Constitutional: NAD  Neck: No JVD  Respiratory: CTAB, no wheezes, rales or rhonchi  Cardiovascular: S1, S2, RRR  Gastrointestinal: BS+, soft, NT/ND  Extremities: No peripheral edema    Hospital Medications:   MEDICATIONS  (STANDING):  aspirin enteric coated 81 milliGRAM(s) Oral daily  benzonatate 100 milliGRAM(s) Oral three times a day  epoetin ivelisse-epbx (RETACRIT) Injectable 78060 Unit(s) IV Push <User Schedule>  fluticasone propionate 50 MICROgram(s)/spray Nasal Spray 1 Spray(s) Both Nostrils two times a day  heparin   Injectable 5000 Unit(s) SubCutaneous two times a day  labetalol 200 milliGRAM(s) Oral three times a day  labetalol 200 milliGRAM(s) Oral once  NIFEdipine XL 60 milliGRAM(s) Oral two times a day  predniSONE   Tablet 5 milliGRAM(s) Oral daily  sevelamer carbonate 1600 milliGRAM(s) Oral three times a day  tacrolimus ER Tablet (ENVARSUS XR) 4 milliGRAM(s) Oral daily    Tacrolimus (), Serum: 4.0 ng/mL (03-29 @ 07:48)    LABS:  03-29    137  |  96  |  24[H]  ----------------------------<  75  3.7   |  25  |  4.17[H]    Ca    9.1      29 Mar 2025 07:23  Phos  4.9     03-29  Mg     2.2     03-29    TPro  7.6  /  Alb  3.7  /  TBili  0.3  /  DBili      /  AST  15  /  ALT  9[L]  /  AlkPhos  80  03-27    Creatinine Trend: 4.17 <--, 5.64 <--    Phosphorus: 4.9 mg/dL (03-29 @ 07:23)                              10.2   3.44  )-----------( 152      ( 29 Mar 2025 07:19 )             31.1     Urine Studies:  Urinalysis - [03-29-25 @ 07:23]      Color  / Appearance  / SG  / pH       Gluc 75 / Ketone   / Bili  / Urobili        Blood  / Protein  / Leuk Est  / Nitrite       RBC  / WBC  / Hyaline  / Gran  / Sq Epi  / Non Sq Epi  / Bacteria       Iron 25, TIBC 193, %sat 13      [04-08-24 @ 06:30]  Ferritin 1188      [04-08-24 @ 06:30]  PTH -- (Ca 7.9)      [04-10-24 @ 18:58]   1144        RADIOLOGY & ADDITIONAL STUDIES:

## 2025-03-29 NOTE — DISCHARGE NOTE PROVIDER - HOSPITAL COURSE
HPI:  79-year-old female with a history of hypertension, diastolic heart failure, renal transplant 2 years ago no longer on tacrolimus, HD MWF (last HD Wednesday) presenting yesterday with right sided constant chest pain that radiates to her right arm/shoulder since day prior to yesterday. Pt endorsing dyspnea on exertion. Pt had one episode of isolated fever two days ago. Pt denies abdominal pain, nausea, vomiting, diarrhea, dysuria, recent travel, sick contacts, hx of blood clots. She reports some stuffed nose and cough with whitish phlegm.   In ED received hydralazine 2.5mg iv, albuterol, lasix 40mg iv x 2, labetalol 20mg iv x 1.  (28 Mar 2025 10:54)    Hospital Course: 79-year-old female with a history of hypertension, diastolic heart failure, renal transplant 2 years ago no longer on tacrolimus, HD MWF (last HD Wednesday) presenting yesterday with right sided constant chest pain that radiates to her right arm/shoulder since day prior to yesterday. Pt endorsing dyspnea on exertion. Pt had one episode of isolated fever two days ago. Admitted for further management.       Acute on chronic diastolic congestive heart failure. Dyspnea and some chest discomfort and cough. Possible acute on chronic diastolic CHF exacerbation. Says she feels better after IV lasix given in ED. F/u renal and cards recs. TTE performed, compared to the transthoracic echocardiogram performed on 1/4/2024, there have been no significant interval changes. Strict I's/O's and daily weights. Some nasal congestion and cough, ?allergies vs ?URI. Flu/covid swab negative. Flonase and tessalon and nasal saline for now. CXR showed mild pulm vasc congestion.     Problem/Plan - 2:  ·  Problem: ESRD on dialysis.   ·  Plan: -HD per renal.   -H/o renal transplant failed. -C/w home envarsus er 4mg daily. F/u tacro level in am. -C/w home prednisone 5mg daily.   -C/w home sevelamer 1600mg tid. -F/u renal recs.     Problem/Plan - 3:  ·  Problem: HTN (hypertension).   ·  Plan: -C/w home labetalol 200mg bid, and nifedipine xl 60mg bid.   -May need further adjustments of meds.      Important Medication Changes and Reason:    Active or Pending Issues Requiring Follow-up:    Advanced Directives:   [ ] Full code  [ ] DNR  [ ] Hospice    Discharge Diagnoses:         HPI:  79-year-old female with a history of hypertension, diastolic heart failure, renal transplant 2 years ago no longer on tacrolimus, HD MWF (last HD Wednesday) presenting yesterday with right sided constant chest pain that radiates to her right arm/shoulder since day prior to yesterday. Pt endorsing dyspnea on exertion. Pt had one episode of isolated fever two days ago. Pt denies abdominal pain, nausea, vomiting, diarrhea, dysuria, recent travel, sick contacts, hx of blood clots. She reports some stuffed nose and cough with whitish phlegm.   In ED received hydralazine 2.5mg iv, albuterol, lasix 40mg iv x 2, labetalol 20mg iv x 1.  (28 Mar 2025 10:54)    Hospital Course: 79-year-old female with a history of hypertension, diastolic heart failure, renal transplant 2 years ago no longer on tacrolimus, HD MWF (last HD Wednesday) presenting yesterday with right sided constant chest pain that radiates to her right arm/shoulder since day prior to yesterday. Pt endorsing dyspnea on exertion. Pt had one episode of isolated fever two days ago. Admitted for further management.       Acute on chronic diastolic congestive heart failure. Dyspnea and some chest discomfort and cough. Possible acute on chronic diastolic CHF exacerbation. Says she feels better after IV lasix given in ED. Volume further optimized via HD.  Some nasal congestion and cough, ?allergies vs ?URI. Flu/covid swab negative. Flonase and tessalon and nasal saline for now. CXR showed mild pulm vasc congestion. Followed by cardiology, medications adjusted for BP control. Chest Pain, EKG SR 1 deg AVBl, LVH. Trop 140 but non-diagnostic given ESRD. TTE shows preserved EF, no WMA, mod DD with elevated filling pressures, small pericardial effusion, improved LVOT.  Patient much improved, chest pain resolved. Cleared by nephrology and cardiology for discharge.     Discharge/dispo/med rec discussed with Dr. Malone who determined patient stable and medically cleared for discharge home with resumption of outpatient HD and outpatient follow up for continued management and care.       Important Medication Changes and Reason:  See medication reconciliation    Active or Pending Issues Requiring Follow-up:  PCP follow up  Nephrology follow up Dr. Soriano  Cardiology follow up     Advanced Directives:   [X] Full code  [ ] DNR  [ ] Hospice    Discharge Diagnoses:  Acute on chronic diastolic congestive heart failure.   ESRD on Dialysis  HTN  Chest Pain         STAR HF - Documented EF: 72%  HPI:  79-year-old female with a history of hypertension, diastolic heart failure, renal transplant 2 years ago no longer on tacrolimus, HD MWF (last HD Wednesday) presenting yesterday with right sided constant chest pain that radiates to her right arm/shoulder since day prior to yesterday. Pt endorsing dyspnea on exertion. Pt had one episode of isolated fever two days ago. Pt denies abdominal pain, nausea, vomiting, diarrhea, dysuria, recent travel, sick contacts, hx of blood clots. She reports some stuffed nose and cough with whitish phlegm.   In ED received hydralazine 2.5mg iv, albuterol, lasix 40mg iv x 2, labetalol 20mg iv x 1.  (28 Mar 2025 10:54)    Hospital Course: 79-year-old female with a history of hypertension, diastolic heart failure, renal transplant 2 years ago no longer on tacrolimus, HD MWF (last HD Wednesday) presenting yesterday with right sided constant chest pain that radiates to her right arm/shoulder since day prior to yesterday. Pt endorsing dyspnea on exertion. Pt had one episode of isolated fever two days ago. Admitted for further management.       Acute on chronic diastolic congestive heart failure. Dyspnea and some chest discomfort and cough. Possible acute on chronic diastolic CHF exacerbation. Says she feels better after IV lasix given in ED. Volume further optimized via HD.  Some nasal congestion and cough, ?allergies vs ?URI. Flu/covid swab negative. Flonase and tessalon and nasal saline for now. CXR showed mild pulm vasc congestion. Followed by cardiology, medications adjusted for BP control. Chest Pain, EKG SR 1 deg AVBl, LVH. Trop 140 but non-diagnostic given ESRD. TTE shows preserved EF, no WMA, mod DD with elevated filling pressures, small pericardial effusion, improved LVOT.  Patient much improved, chest pain resolved. Cleared by nephrology and cardiology for discharge.     Discharge/dispo/med rec discussed with Dr. Malone who determined patient stable and medically cleared for discharge home with resumption of outpatient HD and outpatient follow up for continued management and care.       Important Medication Changes and Reason:  See medication reconciliation    Active or Pending Issues Requiring Follow-up:  PCP follow up  Nephrology follow up Dr. Soriano  Cardiology follow up     Advanced Directives:   [X] Full code  [ ] DNR  [ ] Hospice    Discharge Diagnoses:  Acute on chronic diastolic congestive heart failure.   ESRD on Dialysis  HTN  Chest Pain               HPI:  79-year-old female with a history of hypertension, diastolic heart failure, renal transplant 2 years ago no longer on tacrolimus, HD MWF (last HD Wednesday) presenting yesterday with right sided constant chest pain that radiates to her right arm/shoulder since day prior to yesterday. Pt endorsing dyspnea on exertion. Pt had one episode of isolated fever two days ago. Pt denies abdominal pain, nausea, vomiting, diarrhea, dysuria, recent travel, sick contacts, hx of blood clots. She reports some stuffed nose and cough with whitish phlegm.  In ED received hydralazine 2.5mg iv, albuterol, lasix 40mg iv x 2, labetalol 20mg iv x 1.  (28 Mar 2025 10:54)    Hospital Course:   79-year-old female with a history of hypertension, diastolic heart failure, renal transplant 2 years ago no longer on tacrolimus, HD MWF (last HD Wednesday) presenting yesterday with right sided constant chest pain that radiates to her right arm/shoulder since day prior to yesterday. Pt endorsing dyspnea on exertion. Pt had one episode of isolated fever two days ago. Admitted for further management.       Acute on chronic diastolic congestive heart failure. Dyspnea and some chest discomfort and cough. Possible acute on chronic diastolic CHF exacerbation. Says she feels better after IV lasix given in ED. Volume further optimized via HD.  Some nasal congestion and cough, ?allergies vs ?URI. Flu/covid swab negative. Flonase and tessalon and nasal saline for now. CXR showed mild pulm vasc congestion. Followed by cardiology, medications adjusted for BP control. Chest Pain, EKG SR 1 deg AVBl, LVH. Trop 140 but non-diagnostic given ESRD. TTE shows preserved EF, no WMA, mod DD with elevated filling pressures, small pericardial effusion, improved LVOT.  Patient much improved, chest pain resolved. Cleared by nephrology and cardiology for discharge.    Important Medication Changes and Reason:  See medication reconciliation    Active or Pending Issues Requiring Follow-up:  PCP follow up  Nephrology follow up Dr. Soriano  Cardiology follow up     Advanced Directives:   [X] Full code  [ ] DNR  [ ] Hospice    STAR HF - Documented EF: 72%    Discharge Diagnoses:  Acute on chronic diastolic congestive heart failure.   ESRD on Dialysis  HTN  Chest Pain      Discharge/Dispo/Med rec discussed with attending Dr. Malone. Patient medically cleared for discharge Home  with outpatient follow up with PCP, nephrology and cardiology            HPI:  79-year-old female with a history of hypertension, diastolic heart failure, renal transplant 2 years ago no longer on tacrolimus, HD MWF (last HD Wednesday) presenting yesterday with right sided constant chest pain that radiates to her right arm/shoulder since day prior to yesterday. Pt endorsing dyspnea on exertion. Pt had one episode of isolated fever two days ago. Pt denies abdominal pain, nausea, vomiting, diarrhea, dysuria, recent travel, sick contacts, hx of blood clots. She reports some stuffed nose and cough with whitish phlegm.  In ED received hydralazine 2.5mg iv, albuterol, lasix 40mg iv x 2, labetalol 20mg iv x 1.  (28 Mar 2025 10:54)    Hospital Course:   79-year-old female with a history of hypertension, diastolic heart failure, renal transplant 2 years ago no longer on tacrolimus, HD MWF (last HD Wednesday) presenting yesterday with right sided constant chest pain that radiates to her right arm/shoulder since day prior to yesterday. Pt endorsing dyspnea on exertion. Pt had one episode of isolated fever two days ago. Admitted for further management.     Acute on chronic diastolic congestive heart failure exacerbation. Says she feels better after IV lasix given in ED. Volume further optimized via HD.  Some nasal congestion and cough, ?allergies vs ?URI. Flu/covid swab negative. Flonase and tessalon and nasal saline for now. CXR showed mild pulm vasc congestion. Followed by cardiology, medications adjusted for BP control. Chest Pain, EKG SR 1 deg AVBl, LVH. Trop 140 but non-diagnostic given ESRD. TTE shows preserved EF, no WMA, mod DD with elevated filling pressures, small pericardial effusion, improved LVOT.  Patient much improved, chest pain resolved. Course c/b hypertension, BP reg adjusted, now improved. Cleared by nephrology and cardiology for discharge w/ outpt f/u.     Important Medication Changes and Reason:  See medication reconciliation    Active or Pending Issues Requiring Follow-up:  PCP follow up  Nephrology follow up Dr. Soriano  Cardiology follow up     Advanced Directives:   [X] Full code  [ ] DNR  [ ] Hospice    STAR HF - Documented EF: 72%    Discharge Diagnoses:  Acute on chronic diastolic congestive heart failure.   ESRD on Dialysis  HTN  Chest Pain      Discharge/Dispo/Med rec discussed with attending Dr. Malone. Patient medically cleared for discharge Home  with outpatient follow up with PCP, nephrology and cardiology            HPI:  "79-year-old female with a history of hypertension, diastolic heart failure, renal transplant 2 years ago no longer on tacrolimus, HD MWF (last HD Wednesday) presenting yesterday with right sided constant chest pain that radiates to her right arm/shoulder since day prior to yesterday. Pt endorsing dyspnea on exertion. Pt had one episode of isolated fever two days ago. Pt denies abdominal pain, nausea, vomiting, diarrhea, dysuria, recent travel, sick contacts, hx of blood clots. She reports some stuffed nose and cough with whitish phlegm.  In ED received hydralazine 2.5mg iv, albuterol, lasix 40mg iv x 2, labetalol 20mg iv x 1." (28 Mar 2025 10:54)    Hospital Course:   79-year-old female with a history of hypertension, diastolic heart failure, renal transplant 2 years ago no longer on tacrolimus, HD MWF (last HD Wednesday) presenting yesterday with right sided constant chest pain that radiates to her right arm/shoulder since day prior to yesterday. Pt endorsing dyspnea on exertion. Pt had one episode of isolated fever two days ago. Admitted for further management.     Dyspnea and some chest discomfort and cough. Diagnosed with acute on chronic diastolic CHF exacerbation, likely flash pulmonary edema from hypertensive urgency. Reports that symptoms improved after dialysis in the hospital. Patient tested negative for viral infection in the hospital. TTE without any wall motion abnormalities, indicating that this was not ACS. Discussed case with cardiology and nephrology, who recommended discharge for the patient. Patient's blood pressure was also elevated, indicating hypertensive urgency on the original day of discharge (3/29/25). Labetalol increased to 300 mg every eight hours with improvement of blood pressure; patient to be discharged on increased dose of labetalol with close outpatient follow up with nephrologist and cardiologist in 7-10 days from discharge. Patient also to take nifedipine XL 60 mg twice a day.     Important Medication Changes and Reason:  See medication reconciliation    Active or Pending Issues Requiring Follow-up:  PCP follow up  Nephrology follow up Dr. Soriano  Cardiology follow up     Advanced Directives:   [X] Full code  [ ] DNR  [ ] Hospice    STAR HF - Documented EF: 72%    Discharge Diagnoses:  Acute on chronic diastolic congestive heart failure.   ESRD on Dialysis  HTN  Chest Pain

## 2025-03-29 NOTE — DIETITIAN INITIAL EVALUATION ADULT - NSFNSPHYEXAMSKINFT_GEN_A_CORE
per flowsheets:  - Pressure Injury 1: sacrum, Suspected deep tissue injury  per wound care note 3/28: "b/l buttocks with intact skin and hyperpigmentation; may be pts normal skin tone but cannot r/o a component of deep tissue injury present on admission."

## 2025-03-29 NOTE — PROGRESS NOTE ADULT - SUBJECTIVE AND OBJECTIVE BOX
[facility] Division of Hospital Medicine  Jeremie Malone MD  Available via MS Teams    SUBJECTIVE / OVERNIGHT EVENTS:  Overnight: No acute events.     Patient evaluated at bedside during morning rounds. HPI and hospital course discussed with the patient in detail. Patient reports no chest pain at this time. States that she lives with her . Discussed case with both nephrology and cardiology, who states that it is OK to discharge the patient at this time. Blood pressure elevated in evening, discharge cancelled.     ADDITIONAL REVIEW OF SYSTEMS:  14 point ROS negative unless otherwise stated above.    VITAL SIGNS:  T(C): 36.8 (03-29 @ 20:35), Max: 37.1 (03-29 @ 20:07)   HR: 70   BP: 183/68   RR: 18   SpO2: 95%    PHYSICAL EXAM:  CONSTITUTIONAL: non-toxic appearing, breathing comfortably on room air  EYES: anicteric   HENT: oropharynx clear and moist, normocephalic, no palpable masses in the neck  LYMPH NODES: no cervical lymphadenopathy   RESPIRATORY: normal respiratory effort; lungs are clear to auscultation bilaterally (no wheezes/crackles)  CARDIOVASCULAR: regular rate and rhythm, normal S1 and S2, no murmur/rub/gallop  ABDOMEN: positive bowel sounds, non-tender, non-distended, no organomegaly   MUSCULOSKELETAL:  moving all four extremities   EXTREMITIES:  no lower extremity edema  NEUROLOGY: awake, alert, and oriented to self, place, date, and situation; good fund of knowledge   PSYCH: normal affect       I&O's Summary    28 Mar 2025 07:01  -  29 Mar 2025 07:00  --------------------------------------------------------  IN: 500 mL / OUT: 3500 mL / NET: -3000 mL        LABS:                        10.2   3.44  )-----------( 152      ( 29 Mar 2025 07:19 )             31.1     03-29    137  |  96  |  24[H]  ----------------------------<  75  3.7   |  25  |  4.17[H]    Ca    9.1      29 Mar 2025 07:23  Phos  4.9     03-29  Mg     2.2     03-29            Urinalysis Basic - ( 29 Mar 2025 07:23 )    Color: x / Appearance: x / SG: x / pH: x  Gluc: 75 mg/dL / Ketone: x  / Bili: x / Urobili: x   Blood: x / Protein: x / Nitrite: x   Leuk Esterase: x / RBC: x / WBC x   Sq Epi: x / Non Sq Epi: x / Bacteria: x      MEDICATIONS  (STANDING):  aspirin enteric coated 81 milliGRAM(s) Oral daily  benzonatate 100 milliGRAM(s) Oral three times a day  epoetin ivelisse-epbx (RETACRIT) Injectable 38834 Unit(s) IV Push <User Schedule>  fluticasone propionate 50 MICROgram(s)/spray Nasal Spray 1 Spray(s) Both Nostrils two times a day  heparin   Injectable 5000 Unit(s) SubCutaneous two times a day  labetalol 200 milliGRAM(s) Oral three times a day  NIFEdipine XL 60 milliGRAM(s) Oral two times a day  predniSONE   Tablet 5 milliGRAM(s) Oral daily  sevelamer carbonate 1600 milliGRAM(s) Oral three times a day  tacrolimus ER Tablet (ENVARSUS XR) 4 milliGRAM(s) Oral daily    MEDICATIONS  (PRN):  acetaminophen     Tablet .. 650 milliGRAM(s) Oral every 6 hours PRN Temp greater or equal to 38C (100.4F), Mild Pain (1 - 3), Moderate Pain (4 - 6)  sodium chloride 0.65% Nasal 1 Spray(s) Both Nostrils two times a day PRN Nasal Congestion

## 2025-03-29 NOTE — PROVIDER CONTACT NOTE (OTHER) - BACKGROUND
77 y/o F history of hypertension, hyperlipidemia, diastolic heart failure, renal transplant 2 years ago no longer on tacrolimus, HD MWF presenting with right sided constant chest pain.

## 2025-03-29 NOTE — DIETITIAN INITIAL EVALUATION ADULT - NS FNS DIET ORDER
Diet, Regular:   For patients receiving Renal Replacement - No Protein Restr, No Conc K, No Conc Phos, Low Sodium (RENAL)  No Concentrated Potassium  Low Sodium  No Concentrated Phosphorus  Supplement Feeding Modality:  Oral  Nepro Cans or Servings Per Day:  1       Frequency:  Daily (03-28-25 @ 11:01) [Active]

## 2025-03-29 NOTE — CHART NOTE - NSCHARTNOTEFT_GEN_A_CORE
Notified by RN, patient with /78 mmHg. Patient with H/o HTN, cardiology following. Patient remains asymptomatic at this time, no complaints, planned for discharge. Per RN, received standing Nifedipine minutes prior to BP assessment. Discussed with Attending Dr. Malone, discharge on hold pending BP control. Plan as below:    > HR 63 bpm, PO hydral 25 mg x 1 ordered.  > Repeat BP in 1 hour, if SBP remains > 170 mmHg hold discharge overnight.   > RN aware of plan.   > Patient status and events to be signed out to night ACP for continued management and care.    Nahomy Richards PA-C Notified by RN, patient with /78 mmHg. Patient with H/o HTN, cardiology following. Patient remains asymptomatic at this time, no complaints, planned for discharge. Remainder of vitals stable. Per RN, received standing Nifedipine minutes prior to BP assessment. Discussed with Attending Dr. Malone, discharge on hold pending BP control. Plan as below:    > HR 63 bpm, PO hydral 25 mg x 1 ordered.  > Repeat BP in 1 hour, if SBP remains > 170 mmHg hold discharge overnight.   > RN aware of plan.   > Patient status and events to be signed out to night ACP for continued management and care.    Nahomy Richards PA-C

## 2025-03-29 NOTE — DISCHARGE NOTE PROVIDER - PROVIDER TOKENS
PROVIDER:[TOKEN:[[id number]],FOLLOWUP:[1 week]],PROVIDER:[TOKEN:[[id number]],FOLLOWUP:[1 week]],PROVIDER:[TOKEN:[[id number]],FOLLOWUP:[1 week]]

## 2025-03-29 NOTE — DISCHARGE NOTE PROVIDER - NSDCFUADDAPPT_GEN_ALL_CORE_FT
APPTS ARE READY TO BE MADE: [X] YES    Best Family or Patient Contact (if needed):    Additional Information about above appointments (if needed):    1: HOME - Cardiology  2: Cardiology Dr. Oh   3: Nephrology  4: PCP Dr. Tabares    Other comments or requests:        APPTS ARE READY TO BE MADE: [X] YES    Best Family or Patient Contact (if needed):    Additional Information about above appointments (if needed):    EF 72%    1: HOME - Cardiology  2: Cardiology Dr. Oh   3: Nephrology  4: PCP Dr. Tabares    Other comments or requests:        APPTS ARE READY TO BE MADE: [X] YES    Best Family or Patient Contact (if needed):    Additional Information about above appointments (if needed):    EF 72%    1: HOME - Cardiology  2: Cardiology Dr. Oh   3: Nephrology  4: PCP Dr. Tabares    Other comments or requests:           Provided patient's son with provider referral information for PCP, Nephro, and Cardio, however patient prefers to schedule the appointments on their own.     Patient advised they did not want to proceed with scheduling appointments with the provider of [facility] on their referrals. They will call us if they change their mind and do need assistance.

## 2025-03-29 NOTE — PROGRESS NOTE ADULT - PROBLEM SELECTOR PLAN 3
-C/w home labetalol 200mg q8h, and nifedipine xl 60mg bid.   -May need further adjustments of meds, titrating at this time for hypertensive urgency

## 2025-03-29 NOTE — DISCHARGE NOTE NURSING/CASE MANAGEMENT/SOCIAL WORK - NSDCFUADDAPPT_GEN_ALL_CORE_FT
APPTS ARE READY TO BE MADE: [X] YES    Best Family or Patient Contact (if needed):    Additional Information about above appointments (if needed):    EF 72%    1: HOME - Cardiology  2: Cardiology Dr. Oh   3: Nephrology  4: PCP Dr. Tabares    Other comments or requests:

## 2025-03-29 NOTE — DIETITIAN INITIAL EVALUATION ADULT - ETIOLOGY
related to increased physiological demand, wound healing related to inability to meet sufficient protein-energy needs in setting of increased nutrient needs

## 2025-03-29 NOTE — PROGRESS NOTE ADULT - PROBLEM SELECTOR PLAN 1
-Dyspnea and some chest discomfort and cough. Diagnosed with acute on chronic diastolic CHF exacerbation, likely flash pulmonary edema from hypertensive urgency. Reports that symptoms improved after dialysis.   -Dialysis per nephrology

## 2025-03-29 NOTE — DIETITIAN INITIAL EVALUATION ADULT - OTHER CALCULATIONS
defer fluid needs to medical team discretion  Estimated protein-energy needs calculated using dosing weight with consideration for underweight, HD, wound healing

## 2025-03-29 NOTE — PROGRESS NOTE ADULT - TIME BILLING
Time-based billing (NON-critical care).     The necessity of the time spent during the encounter on this date of service was due to:     Time spent on review of vitals, physical exam, documentation, and discussion of plan of care with patient.

## 2025-03-29 NOTE — DISCHARGE NOTE NURSING/CASE MANAGEMENT/SOCIAL WORK - PATIENT PORTAL LINK FT
You can access the FollowMyHealth Patient Portal offered by [facility] by registering at the following website: [email or web address]. By joining [employer]’s FollowMyHealth portal, you will also be able to view your health information using other applications (apps) compatible with our system.

## 2025-03-29 NOTE — DIETITIAN INITIAL EVALUATION ADULT - PERSON TAUGHT/METHOD
provided diet education on benefit of oral nutrition supplements to promote PO intake and healing of skin impairments. Discussed importance of adequate protein intake and starting with protein portion of meals first. Estimated good understanding of education provided. Pt agreeable to oral nutrition supplements.   Pt declined diet education for CHF/STAR. Pt was made aware RD remains available and she expressed understanding. RD to follow up per protocol./patient instructed

## 2025-03-30 VITALS
TEMPERATURE: 98 F | SYSTOLIC BLOOD PRESSURE: 157 MMHG | OXYGEN SATURATION: 96 % | HEART RATE: 80 BPM | DIASTOLIC BLOOD PRESSURE: 75 MMHG | RESPIRATION RATE: 18 BRPM

## 2025-03-30 LAB — TACROLIMUS SERPL-MCNC: 4.1 NG/ML — SIGNIFICANT CHANGE UP

## 2025-03-30 PROCEDURE — 99239 HOSP IP/OBS DSCHRG MGMT >30: CPT

## 2025-03-30 PROCEDURE — 84484 ASSAY OF TROPONIN QUANT: CPT

## 2025-03-30 PROCEDURE — 87637 SARSCOV2&INF A&B&RSV AMP PRB: CPT

## 2025-03-30 PROCEDURE — 84132 ASSAY OF SERUM POTASSIUM: CPT

## 2025-03-30 PROCEDURE — 83880 ASSAY OF NATRIURETIC PEPTIDE: CPT

## 2025-03-30 PROCEDURE — 99285 EMERGENCY DEPT VISIT HI MDM: CPT

## 2025-03-30 PROCEDURE — 84295 ASSAY OF SERUM SODIUM: CPT

## 2025-03-30 PROCEDURE — 82330 ASSAY OF CALCIUM: CPT

## 2025-03-30 PROCEDURE — 80197 ASSAY OF TACROLIMUS: CPT

## 2025-03-30 PROCEDURE — 82435 ASSAY OF BLOOD CHLORIDE: CPT

## 2025-03-30 PROCEDURE — 80053 COMPREHEN METABOLIC PANEL: CPT

## 2025-03-30 PROCEDURE — 36415 COLL VENOUS BLD VENIPUNCTURE: CPT

## 2025-03-30 PROCEDURE — 83970 ASSAY OF PARATHORMONE: CPT

## 2025-03-30 PROCEDURE — 85027 COMPLETE CBC AUTOMATED: CPT

## 2025-03-30 PROCEDURE — 93356 MYOCRD STRAIN IMG SPCKL TRCK: CPT

## 2025-03-30 PROCEDURE — 85018 HEMOGLOBIN: CPT

## 2025-03-30 PROCEDURE — 71046 X-RAY EXAM CHEST 2 VIEWS: CPT

## 2025-03-30 PROCEDURE — 83735 ASSAY OF MAGNESIUM: CPT

## 2025-03-30 PROCEDURE — 85014 HEMATOCRIT: CPT

## 2025-03-30 PROCEDURE — 82803 BLOOD GASES ANY COMBINATION: CPT

## 2025-03-30 PROCEDURE — 99261: CPT

## 2025-03-30 PROCEDURE — 94640 AIRWAY INHALATION TREATMENT: CPT

## 2025-03-30 PROCEDURE — 85025 COMPLETE CBC W/AUTO DIFF WBC: CPT

## 2025-03-30 PROCEDURE — 93306 TTE W/DOPPLER COMPLETE: CPT

## 2025-03-30 PROCEDURE — 82947 ASSAY GLUCOSE BLOOD QUANT: CPT

## 2025-03-30 PROCEDURE — 83605 ASSAY OF LACTIC ACID: CPT

## 2025-03-30 PROCEDURE — 96374 THER/PROPH/DIAG INJ IV PUSH: CPT

## 2025-03-30 PROCEDURE — 93005 ELECTROCARDIOGRAM TRACING: CPT

## 2025-03-30 PROCEDURE — 80048 BASIC METABOLIC PNL TOTAL CA: CPT

## 2025-03-30 PROCEDURE — 84100 ASSAY OF PHOSPHORUS: CPT

## 2025-03-30 RX ORDER — NIFEDIPINE 30 MG
1 TABLET, EXTENDED RELEASE 24 HR ORAL
Qty: 60 | Refills: 0
Start: 2025-03-30 | End: 2025-04-28

## 2025-03-30 RX ORDER — LABETALOL HYDROCHLORIDE 200 MG/1
1 TABLET, FILM COATED ORAL
Qty: 90 | Refills: 0
Start: 2025-03-30 | End: 2025-04-28

## 2025-03-30 RX ADMIN — LABETALOL HYDROCHLORIDE 200 MILLIGRAM(S): 200 TABLET, FILM COATED ORAL at 13:34

## 2025-03-30 RX ADMIN — LABETALOL HYDROCHLORIDE 200 MILLIGRAM(S): 200 TABLET, FILM COATED ORAL at 05:28

## 2025-03-30 RX ADMIN — Medication 81 MILLIGRAM(S): at 13:33

## 2025-03-30 RX ADMIN — SEVELAMER HYDROCHLORIDE 1600 MILLIGRAM(S): 800 TABLET ORAL at 05:24

## 2025-03-30 RX ADMIN — TACROLIMUS 4 MILLIGRAM(S): 0.5 CAPSULE ORAL at 07:56

## 2025-03-30 RX ADMIN — Medication 100 MILLIGRAM(S): at 05:25

## 2025-03-30 RX ADMIN — FLUTICASONE PROPIONATE 1 SPRAY(S): 50 SPRAY, METERED NASAL at 05:24

## 2025-03-30 RX ADMIN — SEVELAMER HYDROCHLORIDE 1600 MILLIGRAM(S): 800 TABLET ORAL at 13:34

## 2025-03-30 RX ADMIN — Medication 60 MILLIGRAM(S): at 05:25

## 2025-03-30 RX ADMIN — PREDNISONE 5 MILLIGRAM(S): 20 TABLET ORAL at 05:26

## 2025-03-30 RX ADMIN — Medication 100 MILLIGRAM(S): at 13:34

## 2025-03-30 NOTE — PROGRESS NOTE ADULT - ASSESSMENT
Daily or almost daily
78-year-old female with a history of HTN, diastolic HF, renal transplant 2 years ago now back on HD MWF presenting today with right sided constant chest pain that radiates to her right arm/shoulder since yesterday.  Pt endorsing dyspnea on exertion.  Pt had one episode of isolated fever two days ago. Pt denies abdominal pain, nausea, vomiting, diarrhea, dysuria, recent travel, sick contacts, hx of blood clots.  Nephrology consulted for HD needs.    A/P  ESRD on HD:  Schedule: MWF  Access: SHILOH P[facility]LO  Nephro: Dr. Soriano  Outpatient center: [facility]  CXr w/ mild pulmonary congestion.  s/p HD 03/28  Consent obtained, witnessed, and in pt's chart.  Continue HD schedule MWF  Renal diet.  Monitor BMP.    S/p DDRT (5/19/2021 - induction w/ Basiliximab)  Pt reports she is on Envarsus 4mg qd; no longer on cellcept or prednisone.  FK level at goal    HTN:  BP high.  Resume home meds.  UF w/ HD.  Low sodium diet.  Monitor BP.    Anemia:  Hgb at goal.  SHELDON w/ HD.  Monitor Hgb.  Transfuse for Hgb <7.    CKD - MBD:  Check PTH.  Monitor PO4 and Ca daily.      
78-year-old female with a history of HTN, diastolic HF, renal transplant 2 years ago now back on HD MWF presenting today with right sided constant chest pain that radiates to her right arm/shoulder since yesterday.  Pt endorsing dyspnea on exertion.  Pt had one episode of isolated fever two days ago. Pt denies abdominal pain, nausea, vomiting, diarrhea, dysuria, recent travel, sick contacts, hx of blood clots.  Nephrology consulted for HD needs.    A/P  ESRD on HD:  Schedule: MWF  Access: SHILOH PABLO  Nephro: Dr. Soriano  Outpatient center: [facility]  CXr w/ mild pulmonary congestion.  s/p HD 03/28  Consent obtained, witnessed, and in pt's chart.  Continue HD schedule MWF  Renal diet.  Monitor BMP.    S/p DDRT (5/19/2021 - induction w/ Basiliximab)  Pt reports she is on Envarsus 4mg qd; no longer on cellcept or prednisone.  FK level at goal    HTN:  BP high.  Resume home meds.  UF w/ HD.  Low sodium diet.  Monitor BP.    Anemia:  Hgb at goal.  SHELDON w/ HD.  Monitor Hgb.  Transfuse for Hgb <7.    CKD - MBD:  Check PTH.  Monitor PO4 and Ca daily.      
79-year-old female with a history of hypertension, diastolic heart failure, renal transplant 2 years ago no longer on tacrolimus, HD MWF (last HD Wednesday) presenting yesterday with right sided constant chest pain that radiates to her right arm/shoulder since day prior to yesterday. Pt endorsing dyspnea on exertion. Pt had one episode of isolated fever two days ago. Admitted for further management.

## 2025-03-30 NOTE — PROVIDER CONTACT NOTE (OTHER) - ASSESSMENT
Pt AOx4 on RA Pt AOx4 on RA w/ other VSS as charted. No c/o of pain, CP, dizziness, or discomfort. Pt asymptomatic and resting in bed.

## 2025-03-30 NOTE — PROGRESS NOTE ADULT - NS ATTEND AMEND GEN_ALL_CORE FT
1. Diastolic Heart Failure secondary to advanced kidney disease   - TTE in Jan 2024 with normal EF, moderate DD, elevated filling pressures, severe LVH, severely dilated LA, small pericardial effusion  - cardiac amyloid with PYP scan normal   - BNP almost 58K  - Appreciate Renal and TP recs  - Monitor Strict I&Os, daily weights  - Needs further volume optimization via HD  - CP likely 2/2 Acute Diastolic HF
Patient care and plan discussed and reviewed with Advanced Care Provider. Plan as outlined above edited by me to reflect our discussion.   In addition, I participated in    - Ordering, reviewing, and interpreting labs, testing, and imaging.  - Reviewing prior hospitalization and outpatient records when necessary  - Counselling and educating patient and/or family regarding interpretation of aforementioned items and plan of care.  - Communicating with other health professionals (when not separately reported), and documenting clinical information in the electronic health record.

## 2025-03-30 NOTE — PROGRESS NOTE ADULT - SUBJECTIVE AND OBJECTIVE BOX
DATE OF SERVICE: 03-30-25 @ 10:45    Patient is a 79y old  Female who presents with a chief complaint of CP and HARLEY (29 Mar 2025 22:54)      INTERVAL HISTORY: feels okay    REVIEW OF SYSTEMS:  CONSTITUTIONAL: No weakness  EYES/ENT: No visual changes;  No throat pain   NECK: No pain or stiffness  RESPIRATORY: No cough, wheezing; No shortness of breath  CARDIOVASCULAR: No chest pain or palpitations  GASTROINTESTINAL: No abdominal  pain. No nausea, vomiting, or hematemesis  GENITOURINARY: No dysuria, frequency or hematuria  NEUROLOGICAL: No stroke like symptoms  SKIN: No rashes    	  MEDICATIONS:  labetalol 200 milliGRAM(s) Oral three times a day  NIFEdipine XL 60 milliGRAM(s) Oral two times a day        PHYSICAL EXAM:  T(C): 36.8 (03-30-25 @ 10:30), Max: 37.1 (03-29-25 @ 20:07)  HR: 64 (03-30-25 @ 10:30) (63 - 73)  BP: 142/66 (03-30-25 @ 10:30) (132/57 - 190/78)  RR: 16 (03-30-25 @ 10:30) (16 - 18)  SpO2: 94% (03-30-25 @ 10:30) (93% - 98%)  Wt(kg): --  I&O's Summary        Appearance: In no distress	  HEENT:    PERRL, EOMI	  Cardiovascular:  S1 S2, No JVD  Respiratory: Lungs clear to auscultation	  Gastrointestinal:  Soft, Non-tender, + BS	  Vascularature:  No edema of LE  Psychiatric: Appropriate affect   Neuro: no acute focal deficits                               10.2   3.44  )-----------( 152      ( 29 Mar 2025 07:19 )             31.1     03-29    137  |  96  |  24[H]  ----------------------------<  75  3.7   |  25  |  4.17[H]    Ca    9.1      29 Mar 2025 07:23  Phos  4.9     03-29  Mg     2.2     03-29          Labs personally reviewed      ASSESSMENT/PLAN: 	    78-year-old female with a history of hypertension, diastolic heart failure, renal transplant 2 years ago no longer on tacrolimus who presents with shortness of breath and lower extremity swelling since yesterday clinical presentation consistent with acute on chronic diastolic heart failure 79-year-old female with a history of hypertension, diastolic heart failure, renal transplant 2 years ago no longer on tacrolimus, HD MWF (last HD Wednesday) presenting yesterday with right sided constant chest pain that radiates to her right arm/shoulder since day prior to yesterday. Pt endorsing dyspnea on exertion. Pt had one episode of isolated fever two days ago. Admitted for further management.         1. Diastolic Heart Failure secondary to advanced kidney disease   - TTE in Jan 2024 with normal EF, moderate DD, elevated filling pressures, severe LVH, severely dilated LA, small pericardial effusion  - cardiac amyloid with PYP scan normal   - BNP almost 58K  - Appreciate Renal and TP recs  - Monitor Strict I&Os, daily weights  - Needs further volume optimization via HD  - CP likely 2/2 Acute Diastolic HF    2. HTN - improved   - Increase labetalol 200mg PO to TID  - Cont Nifedipine 60mg daily BID.   - Was also previously on Hydralazine 100mg PO TID   - dc held 3/29 for HTN, sbp 170s-190s.  PO hydral 25mg x 1 given. BP today 3/30 improved to sbp 130s-140s    3. ESRD  - Renal recs appreciated  - c/w HD M/W/F    4. Chest Pain  - EKG SR 1 deg AVBl, LVH   - Trop 140 but non-diagnostic given ESRD  - TTE shows preserved EF, no WMA, mod DD with elevated filling pressures, small pericardial effusion, improved LVOT  - Likely 2/2 ADHF. Volume optimization with HD.  - 3/29 CP now resolved per pt          Iolani Behrbom, ZAIN-AG Oh DO [facility]  Cardiovascular Medicine  [address], [address]  Available through call or text on [employer]  Office: [phone]   DATE OF SERVICE: 03-30-25 @ 10:45    Patient is a 79y old  Female who presents with a chief complaint of CP and HARLEY (29 Mar 2025 22:54)      INTERVAL HISTORY: feels okay    REVIEW OF SYSTEMS:  CONSTITUTIONAL: No weakness  EYES/ENT: No visual changes;  No throat pain   NECK: No pain or stiffness  RESPIRATORY: No cough, wheezing; No shortness of breath  CARDIOVASCULAR: No chest pain or palpitations  GASTROINTESTINAL: No abdominal  pain. No nausea, vomiting, or hematemesis  GENITOURINARY: No dysuria, frequency or hematuria  NEUROLOGICAL: No stroke like symptoms  SKIN: No rashes    	  MEDICATIONS:  labetalol 200 milliGRAM(s) Oral three times a day  NIFEdipine XL 60 milliGRAM(s) Oral two times a day        PHYSICAL EXAM:  T(C): 36.8 (03-30-25 @ 10:30), Max: 37.1 (03-29-25 @ 20:07)  HR: 64 (03-30-25 @ 10:30) (63 - 73)  BP: 142/66 (03-30-25 @ 10:30) (132/57 - 190/78)  RR: 16 (03-30-25 @ 10:30) (16 - 18)  SpO2: 94% (03-30-25 @ 10:30) (93% - 98%)  Wt(kg): --  I&O's Summary        Appearance: In no distress	  HEENT:    PERRL, EOMI	  Cardiovascular:  S1 S2, No JVD  Respiratory: Lungs clear to auscultation	  Gastrointestinal:  Soft, Non-tender, + BS	  Vascularature:  No edema of LE  Psychiatric: Appropriate affect   Neuro: no acute focal deficits                               10.2   3.44  )-----------( 152      ( 29 Mar 2025 07:19 )             31.1     03-29    137  |  96  |  24[H]  ----------------------------<  75  3.7   |  25  |  4.17[H]    Ca    9.1      29 Mar 2025 07:23  Phos  4.9     03-29  Mg     2.2     03-29          Labs personally reviewed      ASSESSMENT/PLAN: 	    78-year-old female with a history of hypertension, diastolic heart failure, renal transplant 2 years ago no longer on tacrolimus who presents with shortness of breath and lower extremity swelling since yesterday clinical presentation consistent with acute on chronic diastolic heart failure 79-year-old female with a history of hypertension, diastolic heart failure, renal transplant 2 years ago no longer on tacrolimus, HD MWF (last HD Wednesday) presenting yesterday with right sided constant chest pain that radiates to her right arm/shoulder since day prior to yesterday. Pt endorsing dyspnea on exertion. Pt had one episode of isolated fever two days ago. Admitted for further management.         1. Diastolic Heart Failure secondary to advanced kidney disease   - TTE in Jan 2024 with normal EF, moderate DD, elevated filling pressures, severe LVH, severely dilated LA, small pericardial effusion  - cardiac amyloid with PYP scan normal   - BNP almost 58K  - Appreciate Renal and TP recs  - Monitor Strict I&Os, daily weights  - Needs further volume optimization via HD  - CP likely 2/2 Acute Diastolic HF    2. HTN - improved   - Increase labetalol 200mg PO to TID  - Cont Nifedipine 60mg daily BID.   - Was also previously on Hydralazine 100mg PO TID   - dc held 3/29 for HTN, sbp 170s-190s.  PO hydral 25mg x 1 given. BP today 3/30 improved to sbp 130s-140s  - okay for dc from cardiology standpoint    3. ESRD  - Renal recs appreciated  - c/w HD M/W/F    4. Chest Pain  - EKG SR 1 deg AVBl, LVH   - Trop 140 but non-diagnostic given ESRD  - TTE shows preserved EF, no WMA, mod DD with elevated filling pressures, small pericardial effusion, improved LVOT  - Likely 2/2 ADHF. Volume optimization with HD.  - 3/29 CP now resolved per pt          Iolani Behrbom, ZAIN-NP   Dutch Oh DO [facility]  Cardiovascular Medicine  [address], [address]  Available through call or text on [employer] TE[employer]  Office: [phone]

## 2025-03-30 NOTE — CHART NOTE - NSCHARTNOTEFT_GEN_A_CORE
Briefly, this is an 79 year old female presenting with chest pain secondary to fluid overload in the setting of known ESRD. TTE without any wall motion abnormalities, indicating no ACS. Patient received hemodialysis with improvement of chest pain. Fluid overload was likely flash pulmonary edema due to hypertension.     Patient evaluated during morning rounds. Patient continues to deny chest pain at this time. Cough improving. Blood pressure well controlled this morning, feels ready to go home.      14 point ROS negative unless otherwise stated above.     VITAL SIGNS:  T(C): 36.8 (03-30 @ 13:42), Max: 37.1 (03-29 @ 20:07)   HR: 80   BP: 157/75   RR: 18   SpO2: 96%    PHYSICAL EXAM: no change in exam on 3/30/25   CONSTITUTIONAL: non-toxic appearing, breathing comfortably on room air  EYES: anicteric   HENT: oropharynx clear and moist, normocephalic, no palpable masses in the neck  LYMPH NODES: no cervical lymphadenopathy   RESPIRATORY: normal respiratory effort; lungs are clear to auscultation bilaterally (no wheezes/crackles)  CARDIOVASCULAR: regular rate and rhythm, normal S1 and S2, no murmur/rub/gallop  ABDOMEN: positive bowel sounds, non-tender, non-distended, no organomegaly   MUSCULOSKELETAL:  moving all four extremities   EXTREMITIES:  no lower extremity edema  NEUROLOGY: awake, alert, and oriented to self, place, date, and situation; good fund of knowledge   PSYCH: normal affect     Patient appears well enough for discharge. Patient is agreeable to discharge.     Hospital course, medication reconciliation, and follow up appointments/instructions reviewed with patient in detail. All questions answered. Patient to update family member. Labetalol dose increased to 300 mg every 8 hours for hypertension, as well as benzonatate as needed for cough added. Nifedipine XL 60 mg twice a day on discharge.

## 2025-03-30 NOTE — PROVIDER CONTACT NOTE (OTHER) - ACTION/TREATMENT ORDERED:
Provider notified. D/c to be canceled.
ACP notified, discharge held until BP >170.
No new orders or intervention for RN at this time.
Provider notified. No new interventions at this time.

## 2025-03-30 NOTE — PROGRESS NOTE ADULT - SUBJECTIVE AND OBJECTIVE BOX
Dr. Heath  Office [phone] (9 am to 5 pm)  Service: [phone] (5pm to 9am)  Ghislaine WHITE      RENAL PROGRESS NOTE: DATE OF SERVICE 03-30-25 @ 13:08    Patient is a 79y old  Female who presents with a chief complaint of CP and HARLEY (30 Mar 2025 10:45)      Patient seen and examined at bedside. No chest pain/sob    VITALS:  T(F): 98.3 (03-30-25 @ 10:30), Max: 98.8 (03-29-25 @ 20:07)  HR: 64 (03-30-25 @ 10:30)  BP: 142/66 (03-30-25 @ 10:30)  RR: 16 (03-30-25 @ 10:30)  SpO2: 94% (03-30-25 @ 10:30)  Wt(kg): --    03-30 @ 07:01  -  03-30 @ 13:08  --------------------------------------------------------  IN: 480 mL / OUT: 0 mL / NET: 480 mL          PHYSICAL EXAM:  Constitutional: NAD  Neck: No JVD  Respiratory: CTAB, no wheezes, rales or rhonchi  Cardiovascular: S1, S2, RRR  Gastrointestinal: BS+, soft, NT/ND  Extremities: No peripheral edema    Hospital Medications:   MEDICATIONS  (STANDING):  aspirin enteric coated 81 milliGRAM(s) Oral daily  benzonatate 100 milliGRAM(s) Oral three times a day  epoetin ivelisse-epbx (RETACRIT) Injectable 13649 Unit(s) IV Push <User Schedule>  fluticasone propionate 50 MICROgram(s)/spray Nasal Spray 1 Spray(s) Both Nostrils two times a day  heparin   Injectable 5000 Unit(s) SubCutaneous two times a day  labetalol 200 milliGRAM(s) Oral three times a day  NIFEdipine XL 60 milliGRAM(s) Oral two times a day  predniSONE   Tablet 5 milliGRAM(s) Oral daily  sevelamer carbonate 1600 milliGRAM(s) Oral three times a day  tacrolimus ER Tablet (ENVARSUS XR) 4 milliGRAM(s) Oral daily    Tacrolimus (), Serum: 4.1 ng/mL (03-30 @ 07:08)    LABS:  03-29    137  |  96  |  24[H]  ----------------------------<  75  3.7   |  25  |  4.17[H]    Ca    9.1      29 Mar 2025 07:23  Phos  4.9     03-29  Mg     2.2     03-29      Creatinine Trend: 4.17 <--, 5.64 <--                                10.2   3.44  )-----------( 152      ( 29 Mar 2025 07:19 )             31.1     Urine Studies:  Urinalysis - [03-29-25 @ 07:23]      Color  / Appearance  / SG  / pH       Gluc 75 / Ketone   / Bili  / Urobili        Blood  / Protein  / Leuk Est  / Nitrite       RBC  / WBC  / Hyaline  / Gran  / Sq Epi  / Non Sq Epi  / Bacteria       Iron 25, TIBC 193, %sat 13      [04-08-24 @ 06:30]  Ferritin 1188      [04-08-24 @ 06:30]  PTH -- (Ca 7.9)      [04-10-24 @ 18:58]   1144        RADIOLOGY & ADDITIONAL STUDIES:

## 2025-05-13 NOTE — PATIENT PROFILE ADULT - FUNCTIONAL SCREEN CURRENT LEVEL: COMMUNICATION, MLM
May 16, 2025      Raf Alcala       [id number] [place] [address]            Dear Raf,    There’s no question about it - preventive testing can save lives or can help stop a disease process in its tracks. Many health problems start out silently without symptoms. Testing is often the only way to catch these problems in early stages, when they can be more successfully treated.    Our records show that you are due for the following preventive tests(s). If you have had this testing done, please call us so we can update your record.    Colonoscopy: Colorectal cancer usually comes from polyps (abnormal growths) in the colon or rectum. Colonoscopy can find the polyps and remove them before they turn to cancer. To schedule your Colonoscopy, please call [phone]    Your good health is important to us. Please feel free to call my office with any questions.        Sincerely,         Brando Eubanks MD   [address] [place] [address] [phone]         [facility] offers online access to your health information via [email or web address] .   By registering for [employer] you will be able to view test results, pay your bill, send messages to your care team (not for immediate response), refill prescriptions, schedule and verify appointments.       0 = understands/communicates without difficulty

## 2025-06-15 NOTE — ED ADULT NURSE NOTE - CCCP TRG CHIEF CMPLNT
Pt with a h/o of daily alcohol use   Drinks 1 pint of whiskey +4 beers for the past 2 to 3 weeks daily  Previous admission to the [facility] 2/4/2025  Thiamine, folic acid, MVI  Cleared by case management for discharge   hypertension

## 2025-06-20 NOTE — DISCHARGE NOTE ADULT - CLICK TO LAUNCH ORM
----- Message from Renata Hernandez OD sent at 6/20/2025 11:55 AM CDT -----  She needs F.OCT optic nerve scheduled before appt  
[facility] visual field testing scheduled 08/04/2025 at 9:15 am. Annual eye exam confirmed with Dr. Hernandez 08/06/2025 at 9:00 am.  
.